# Patient Record
Sex: FEMALE | Race: WHITE | NOT HISPANIC OR LATINO
[De-identification: names, ages, dates, MRNs, and addresses within clinical notes are randomized per-mention and may not be internally consistent; named-entity substitution may affect disease eponyms.]

---

## 2017-03-21 ENCOUNTER — RX ONLY (OUTPATIENT)
Age: 37
Setting detail: RX ONLY
End: 2017-03-21

## 2017-03-21 PROBLEM — Z85.820 PERSONAL HISTORY OF MALIGNANT MELANOMA OF SKIN: Status: ACTIVE | Noted: 2017-03-21

## 2017-04-12 ENCOUNTER — HOSPITAL ENCOUNTER (OUTPATIENT)
Dept: RADIOLOGY | Facility: MEDICAL CENTER | Age: 37
End: 2017-04-12
Attending: INTERNAL MEDICINE
Payer: COMMERCIAL

## 2017-04-12 DIAGNOSIS — C43.62 MALIGNANT MELANOMA OF LEFT UPPER EXTREMITY INCLUDING SHOULDER (HCC): ICD-10-CM

## 2017-04-12 PROCEDURE — A9552 F18 FDG: HCPCS

## 2017-04-28 RX ORDER — LISINOPRIL 10 MG/1
10 TABLET ORAL DAILY
COMMUNITY
End: 2021-07-12

## 2017-05-09 ENCOUNTER — HOSPITAL ENCOUNTER (OUTPATIENT)
Facility: MEDICAL CENTER | Age: 37
End: 2017-05-09
Attending: SURGERY | Admitting: SURGERY
Payer: COMMERCIAL

## 2017-05-09 ENCOUNTER — APPOINTMENT (OUTPATIENT)
Dept: RADIOLOGY | Facility: MEDICAL CENTER | Age: 37
End: 2017-05-09
Attending: SURGERY
Payer: COMMERCIAL

## 2017-05-09 VITALS
HEART RATE: 92 BPM | HEIGHT: 64 IN | OXYGEN SATURATION: 95 % | RESPIRATION RATE: 16 BRPM | SYSTOLIC BLOOD PRESSURE: 114 MMHG | WEIGHT: 184.3 LBS | BODY MASS INDEX: 31.47 KG/M2 | DIASTOLIC BLOOD PRESSURE: 78 MMHG | TEMPERATURE: 97.3 F

## 2017-05-09 DIAGNOSIS — C43.62 MALIGNANT MELANOMA OF LEFT UPPER EXTREMITY INCLUDING SHOULDER (HCC): ICD-10-CM

## 2017-05-09 LAB
GLUCOSE BLD-MCNC: 210 MG/DL (ref 65–99)
GLUCOSE BLD-MCNC: 289 MG/DL (ref 65–99)
HCG SERPL QL: NEGATIVE

## 2017-05-09 PROCEDURE — 160009 HCHG ANES TIME/MIN: Performed by: SURGERY

## 2017-05-09 PROCEDURE — 88342 IMHCHEM/IMCYTCHM 1ST ANTB: CPT

## 2017-05-09 PROCEDURE — 700111 HCHG RX REV CODE 636 W/ 250 OVERRIDE (IP)

## 2017-05-09 PROCEDURE — 160041 HCHG SURGERY MINUTES - EA ADDL 1 MIN LEVEL 4: Performed by: SURGERY

## 2017-05-09 PROCEDURE — 160029 HCHG SURGERY MINUTES - 1ST 30 MINS LEVEL 4: Performed by: SURGERY

## 2017-05-09 PROCEDURE — 82962 GLUCOSE BLOOD TEST: CPT

## 2017-05-09 PROCEDURE — 160036 HCHG PACU - EA ADDL 30 MINS PHASE I: Performed by: SURGERY

## 2017-05-09 PROCEDURE — 500054 HCHG BANDAGE, ELASTIC 6: Performed by: SURGERY

## 2017-05-09 PROCEDURE — 88341 IMHCHEM/IMCYTCHM EA ADD ANTB: CPT

## 2017-05-09 PROCEDURE — 160035 HCHG PACU - 1ST 60 MINS PHASE I: Performed by: SURGERY

## 2017-05-09 PROCEDURE — 88305 TISSUE EXAM BY PATHOLOGIST: CPT | Mod: 59

## 2017-05-09 PROCEDURE — 501838 HCHG SUTURE GENERAL: Performed by: SURGERY

## 2017-05-09 PROCEDURE — 500431 HCHG DRESSING, KLING 2: Performed by: SURGERY

## 2017-05-09 PROCEDURE — 700101 HCHG RX REV CODE 250

## 2017-05-09 PROCEDURE — 501480 HCHG STOCKINETTE: Performed by: SURGERY

## 2017-05-09 PROCEDURE — A6402 STERILE GAUZE <= 16 SQ IN: HCPCS | Performed by: SURGERY

## 2017-05-09 PROCEDURE — 700102 HCHG RX REV CODE 250 W/ 637 OVERRIDE(OP)

## 2017-05-09 PROCEDURE — A9270 NON-COVERED ITEM OR SERVICE: HCPCS

## 2017-05-09 PROCEDURE — 160048 HCHG OR STATISTICAL LEVEL 1-5: Performed by: SURGERY

## 2017-05-09 PROCEDURE — 502240 HCHG MISC OR SUPPLY RC 0272: Performed by: SURGERY

## 2017-05-09 PROCEDURE — 84703 CHORIONIC GONADOTROPIN ASSAY: CPT

## 2017-05-09 PROCEDURE — 160002 HCHG RECOVERY MINUTES (STAT): Performed by: SURGERY

## 2017-05-09 PROCEDURE — 88307 TISSUE EXAM BY PATHOLOGIST: CPT

## 2017-05-09 PROCEDURE — A9541 TC99M SULFUR COLLOID: HCPCS

## 2017-05-09 RX ORDER — LIDOCAINE HYDROCHLORIDE 10 MG/ML
INJECTION, SOLUTION INFILTRATION; PERINEURAL
Status: COMPLETED
Start: 2017-05-09 | End: 2017-05-09

## 2017-05-09 RX ORDER — BUPIVACAINE HYDROCHLORIDE AND EPINEPHRINE 5; 5 MG/ML; UG/ML
INJECTION, SOLUTION EPIDURAL; INTRACAUDAL; PERINEURAL
Status: DISCONTINUED | OUTPATIENT
Start: 2017-05-09 | End: 2017-05-09 | Stop reason: HOSPADM

## 2017-05-09 RX ORDER — SODIUM CHLORIDE, SODIUM LACTATE, POTASSIUM CHLORIDE, CALCIUM CHLORIDE 600; 310; 30; 20 MG/100ML; MG/100ML; MG/100ML; MG/100ML
INJECTION, SOLUTION INTRAVENOUS CONTINUOUS
Status: DISCONTINUED | OUTPATIENT
Start: 2017-05-09 | End: 2017-05-09 | Stop reason: HOSPADM

## 2017-05-09 RX ORDER — CELECOXIB 200 MG/1
CAPSULE ORAL
Status: COMPLETED
Start: 2017-05-09 | End: 2017-05-09

## 2017-05-09 RX ORDER — ACETAMINOPHEN 500 MG
TABLET ORAL
Status: COMPLETED
Start: 2017-05-09 | End: 2017-05-09

## 2017-05-09 RX ORDER — OXYCODONE HCL 5 MG/5 ML
SOLUTION, ORAL ORAL
Status: COMPLETED
Start: 2017-05-09 | End: 2017-05-09

## 2017-05-09 RX ORDER — LIDOCAINE AND PRILOCAINE 25; 25 MG/G; MG/G
1 CREAM TOPICAL
Status: COMPLETED | OUTPATIENT
Start: 2017-05-09 | End: 2017-05-09

## 2017-05-09 RX ORDER — LIDOCAINE HYDROCHLORIDE 10 MG/ML
0.5 INJECTION, SOLUTION INFILTRATION; PERINEURAL
Status: COMPLETED | OUTPATIENT
Start: 2017-05-09 | End: 2017-05-09

## 2017-05-09 RX ORDER — HYDROCODONE BITARTRATE AND ACETAMINOPHEN 5; 325 MG/1; MG/1
1-2 TABLET ORAL EVERY 4 HOURS PRN
Qty: 24 TAB | Refills: 2 | Status: ON HOLD | OUTPATIENT
Start: 2017-05-09 | End: 2017-06-23

## 2017-05-09 RX ORDER — GABAPENTIN 300 MG/1
CAPSULE ORAL
Status: COMPLETED
Start: 2017-05-09 | End: 2017-05-09

## 2017-05-09 RX ORDER — ONDANSETRON 2 MG/ML
4 INJECTION INTRAMUSCULAR; INTRAVENOUS EVERY 4 HOURS PRN
Status: DISCONTINUED | OUTPATIENT
Start: 2017-05-09 | End: 2017-05-09 | Stop reason: HOSPADM

## 2017-05-09 RX ORDER — QUETIAPINE FUMARATE 200 MG/1
200 TABLET, FILM COATED ORAL EVERY EVENING
Status: ON HOLD | COMMUNITY
End: 2022-10-24 | Stop reason: SDUPTHER

## 2017-05-09 RX ADMIN — SODIUM CHLORIDE, SODIUM LACTATE, POTASSIUM CHLORIDE, CALCIUM CHLORIDE 1000 ML: 600; 310; 30; 20 INJECTION, SOLUTION INTRAVENOUS at 11:15

## 2017-05-09 RX ADMIN — LIDOCAINE HYDROCHLORIDE 0.5 ML: 10 INJECTION, SOLUTION INFILTRATION; PERINEURAL at 10:30

## 2017-05-09 RX ADMIN — ACETAMINOPHEN 1000 MG: 500 TABLET, FILM COATED ORAL at 13:20

## 2017-05-09 RX ADMIN — GABAPENTIN 300 MG: 300 CAPSULE ORAL at 13:20

## 2017-05-09 RX ADMIN — CELECOXIB 400 MG: 200 CAPSULE ORAL at 13:20

## 2017-05-09 RX ADMIN — OXYCODONE HYDROCHLORIDE 10 MG: 5 SOLUTION ORAL at 16:44

## 2017-05-09 ASSESSMENT — PAIN SCALES - GENERAL
PAINLEVEL_OUTOF10: 0
PAINLEVEL_OUTOF10: 2
PAINLEVEL_OUTOF10: 4
PAINLEVEL_OUTOF10: 2
PAINLEVEL_OUTOF10: 0

## 2017-05-09 NOTE — OR SURGEON
Immediate Post-Operative Note      PreOp Diagnosis: Left Posterior Hand Malignant Melanoma    PostOp Diagnosis: same    Procedure(s):  RADICAL WIDE EXCISION - MALIGNANT MELANOMA HAND - Wound Class: Clean  NODE BIOPSY SENTINEL - AXILLARY - Wound Class: Clean    Surgeon(s):  Lionel Weinberg M.D.    Anesthesiologist/Type of Anesthesia:  Anesthesiologist: Nadja Argueta M.D./General    Surgical Staff:  Circulator: Sergei Nunez R.N.  Scrub Person: Amelia Miguel    Specimen: re-excision of melanoma site and sentinel node to path    Estimated Blood Loss: minimal    Findings: no gross pathology; excision required removal of tissue over back of left hand and onto 5th digit which will leave permanent numbness in this area and potentially limited range of motion of 5th digit.     Complications: no apparent complications        5/9/2017 4:16 PM Lionel Weinberg

## 2017-05-09 NOTE — IP AVS SNAPSHOT
5/9/2017    Jacque Mcintyre  [address] [place] [address]    Dear Jacque:    [facility] wants to ensure your discharge home is safe and you or your loved ones have had all of your questions answered regarding your care after you leave the hospital.    Below is a list of resources and contact information should you have any questions regarding your hospital stay, follow-up instructions, or active medical symptoms.    Questions or Concerns Regarding… Contact   Medical Questions Related to Your Discharge  (7 days a week, 8am-5pm) Contact a Nurse Care Coordinator   [phone]   Medical Questions Not Related to Your Discharge  (24 hours a day / 7 days a week)  Contact the Nurse Health Line   [phone]    Medications or Discharge Instructions Refer to your discharge packet   or contact your [facility] Primary Care Provider   [phone]   Follow-up Appointment(s) Schedule your appointment via [employer]   or contact Scheduling [phone]   Billing Review your statement via [employer]  or contact Billing [phone]   Medical Records Review your records via [employer]   or contact Medical Records [phone]     You may receive a telephone call within two days of discharge. This call is to make certain you understand your discharge instructions and have the opportunity to have any questions answered. You can also easily access your medical information, test results and upcoming appointments via the [employer] free online health management tool. You can learn more and sign up at [employer]. For assistance setting up your Marvel account, please call [phone].    Once again, we want to ensure your discharge home is safe and that you have a clear understanding of any next steps in your care. If you have any questions or concerns, please do not hesitate to contact us, we are here for you. Thank you for choosing [facility] for your healthcare needs.    Sincerely,    Your [facility] Healthcare Team

## 2017-05-09 NOTE — IP AVS SNAPSHOT
" Home Care Instructions                                                                                                                Name:Jacque Mcintyre  Medical Record Number:[id number]  CSN: [id number]    YOB: 1980   Age: 36 y.o.  Sex: female  HT:1.626 m (5' 4.02\") WT: 83.6 kg (184 lb 4.9 oz)          Admit Date: 5/9/2017     Discharge Date:   Today's Date: 5/9/2017  Attending Doctor:  Lionel Weinberg M.D.                  Allergies:  Review of patient's allergies indicates no known allergies.              Follow-up Information     1. Follow up with Lionel Weinberg M.D.. Call in 2 weeks.    Specialty:  Surgery    Contact information    [id number]  [place] [address] [phone]          Discharge Instructions         ACTIVITY: Rest and take it easy for the first 24 hours.  A responsible adult is recommended to remain with you during that time.  It is normal to feel sleepy.  We encourage you to not do anything that requires balance, judgment or coordination.    MILD FLU-LIKE SYMPTOMS ARE NORMAL. YOU MAY EXPERIENCE GENERALIZED MUSCLE ACHES, THROAT IRRITATION, HEADACHE AND/OR SOME NAUSEA.    FOR 24 HOURS DO NOT:  Drive, operate machinery or run household appliances.  Drink beer or alcoholic beverages.   Make important decisions or sign legal documents.    SPECIAL INSTRUCTIONS: *Remove ACE wrap in 48 hours.  Leave gauze on the area for 4 days then remove.     Remove plastic and gauze in 3 days in armpit area.   Leave underlying steristipson both incisions  until they fall off   Patient may shower on Post OP Day #3   Patient will be numb in little finger and will be black and blue and swollen on back of hand.**    DIET: To avoid nausea, slowly advance diet as tolerated, avoiding spicy or greasy foods for the first day.  Add more substantial food to your diet according to your physician's instructions.  Babies can be fed formula or breast milk as soon as they are hungry.  INCREASE FLUIDS AND " FIBER TO AVOID CONSTIPATION.    SURGICAL DRESSING/BATHING: *MAY SHOWER FRIDAY**    FOLLOW-UP APPOINTMENT:  A follow-up appointment should be arranged with your doctor in *FOLLOW UP WITH DR. INFANTE**; call to schedule.    You should CALL YOUR PHYSICIAN if you develop:  Fever greater than 101 degrees F.  Pain not relieved by medication, or persistent nausea or vomiting.  Excessive bleeding (blood soaking through dressing) or unexpected drainage from the wound.  Extreme redness or swelling around the incision site, drainage of pus or foul smelling drainage.  Inability to urinate or empty your bladder within 8 hours.  Problems with breathing or chest pain.    You should call 911 if you develop problems with breathing or chest pain.  If you are unable to contact your doctor or surgical center, you should go to the nearest emergency room or urgent care center.  Physician's telephone #: *DR. INFANTE [phone]    If any questions arise, call your doctor.  If your doctor is not available, please feel free to call the Surgical Center at [phone].  The Center is open Monday through Friday from 7AM to 7PM.  You can also call the [employer] HOTLINE open 24 hours/day, 7 days/week and speak to a nurse at [phone], or toll free at [phone].    A registered nurse may call you a few days after your surgery to see how you are doing after your procedure.    MEDICATIONS: Resume taking daily medication.  Take prescribed pain medication with food.  If no medication is prescribed, you may take non-aspirin pain medication if needed.  PAIN MEDICATION CAN BE VERY CONSTIPATING.  Take a stool softener or laxative such as senokot, pericolace, or milk of magnesia if needed.    Prescription given for **NORCO*.  Last pain medication given at *______4:44 PM_________________**.    If your physician has prescribed pain medication that includes Acetaminophen (Tylenol), do not take additional Acetaminophen (Tylenol) while taking the  prescribed medication.    Depression / Suicide Risk    As you are discharged from this [facility] facility, it is important to learn how to keep safe from harming yourself.    Recognize the warning signs:  · Abrupt changes in personality, positive or negative- including increase in energy   · Giving away possessions  · Change in eating patterns- significant weight changes-  positive or negative  · Change in sleeping patterns- unable to sleep or sleeping all the time   · Unwillingness or inability to communicate  · Depression  · Unusual sadness, discouragement and loneliness  · Talk of wanting to die  · Neglect of personal appearance   · Rebelliousness- reckless behavior  · Withdrawal from people/activities they love  · Confusion- inability to concentrate     If you or a loved one observes any of these behaviors or has concerns about self-harm, here's what you can do:  · Talk about it- your feelings and reasons for harming yourself  · Remove any means that you might use to hurt yourself (examples: pills, rope, extension cords, firearm)  · Get professional help from the community (Mental Health, Substance Abuse, psychological counseling)  · Do not be alone:Call your Safe Contact- someone whom you trust who will be there for you.  · Call your local CRISIS HOTLINE 910-3419 or 373-608-3910  · Call your local Children's Mobile Crisis Response Team [place] (202) 477-8856 or www.[employer]  · Call the toll free National Suicide Prevention Hotlines   · National Suicide Prevention Lifeline 716-602-GRMY (3378)  · National Hope Line Network 800-SUICIDE (756-2092)       Medication List      START taking these medications        Instructions    Morning Afternoon Evening Bedtime    hydrocodone-acetaminophen 5-325 MG Tabs per tablet   Commonly known as:  NORCO        Take 1-2 Tabs by mouth every four hours as needed (prn moderate pain).   Dose:  1-2 Tab                          CONTINUE taking these medications           Instructions    Morning Afternoon Evening Bedtime    albuterol 108 (90 BASE) MCG/ACT Aers inhalation aerosol   Commonly known as:  PROVENTIL HFA        Inhale 2 Puffs by mouth every 6 hours as needed.   Dose:  2 Puff                        atorvastatin 10 MG Tabs   Commonly known as:  LIPITOR        Take 10 mg by mouth every evening.   Dose:  10 mg                        clonidine 0.1 MG Tabs   Commonly known as:  CATAPRES        Take 0.1 mg by mouth 2 times a day.   Dose:  0.1 mg                        lamotrigine 100 MG Tabs   Commonly known as:  LAMICTAL        Take 150 mg by mouth every day.   Dose:  150 mg                        LANTUS SOLOSTAR 100 UNIT/ML Sopn injection   Generic drug:  insulin glargine        Inject 20 Units as instructed every evening.   Dose:  20 Units                        lisinopril 10 MG Tabs   Commonly known as:  PRINIVIL        Take 10 mg by mouth every day.   Dose:  10 mg                        metformin  MG Tb24   Commonly known as:  GLUCOPHAGE XR        Take 750 mg by mouth 2 times a day.   Dose:  750 mg                        paroxetine 40 MG tablet   Commonly known as:  PAXIL        Take 1 Tab by mouth every day. Take with food   Dose:  40 mg                        SEROQUEL 200 MG Tabs   Generic drug:  quetiapine        Take 200 mg by mouth 2 times a day.   Dose:  200 mg                             Where to Get Your Medications      Information about where to get these medications is not yet available     ! Ask your nurse or doctor about these medications    - hydrocodone-acetaminophen 5-325 MG Tabs per tablet            Medication Information     Above and/or attached are the medications your physician expects you to take upon discharge. Review all of your home medications and newly ordered medications with your doctor and/or pharmacist. Follow medication instructions as directed by your doctor and/or pharmacist. Please keep your medication list with you and share with  your physician. Update the information when medications are discontinued, doses are changed, or new medications (including over-the-counter products) are added; and carry medication information at all times in the event of emergency situations.        Resources     Quit Smoking / Tobacco Use:    I understand the use of any tobacco products increases my chance of suffering from future heart disease or stroke and could cause other illnesses which may shorten my life. Quitting the use of tobacco products is the single most important thing I can do to improve my health. For further information on smoking / tobacco cessation call a Toll Free Quit Line at 1-730.244.2726 (*National Cancer [place]) or 1-378.924.3098 (American Lung Association) or you can access the web based program at www.lungusa.org.    [place] Tobacco Users Help Line:  (394) 776-5021       Toll Free: 1-199.727.4947  Quit Tobacco Program [facility] (469)917-4621    Crisis Hotline:    [place] Crisis Hotline:  6-983-RFYRBLH or 1-360.962.2352    [place] Crisis Hotline:    1-182.427.7826 or 149-911-3197    Discharge Survey:   Thank you for choosing [facility]. We hope we did everything we could to make your hospital stay a pleasant one. You may be receiving a survey and we would appreciate your time and participation in answering the questions. Your input is very valuable to us in our efforts to improve our service to our patients and their families.            Signatures     My signature on this form indicates that:    1. I acknowledge receipt and understanding of these Home Care Instruction.  2. My questions regarding this information have been answered to my satisfaction.  3. I have formulated a plan with my discharge nurse to obtain my prescribed medications for home.    __________________________________      __________________________________                   Patient Signature                                  Guardian/Responsible Adult Signature      __________________________________                 __________       ________                       Nurse Signature                                               Date                 Time

## 2017-05-09 NOTE — OR NURSING
1610  RECEIVED PATIENT FROM OR.  REPORT FROM DR. GONZALEZ.  LMA IN PLACE.  RESPIRATIONS ARE EVEN AND UNLABORED.  GAUZE AND TEGADERM TO LEFT AXILLA ARE CDI.  DRESSING TO LEFT HAND IS CDI.      1614  FINGERSTICK 210.    1620  DR. GONZALEZ REMOVED LMA.    1626  PATIENT AWAKE.  DENIES PAIN.  TAKING PO WITHOUT DIFFICULTY.    1644  MEDICATED WITH PO OXYCODONE.    1701  SISTER IN LAW KALEE TO BEDSIDE.     1801  PATIENT USING IS 3000 ML.    1829  UP GETTING DRESSED.    1837  DISCHARGED.  DISCHARGE INSTRUCTIONS GIVEN TO PATIENT.  A VERBAL UNDERSTANDING OF ALL INSTRUCTIONS WAS STATED.  PATIENT TAKING PO AND AMBULATING WITHOUT DIFFICULTY.  DRESSINGS TO LEFT HAND AND LEFT AXILLA REMAIN CDI.  PATIENT STATES SHE IS READY TO GO HOME.

## 2017-05-09 NOTE — OR NURSING
1610  RECEIVED PATIENT FROM OR.  REPORT FROM DR. GONZALEZ.  LMA IN PLACE.  RESPIRATIONS ARE EVEN AND UNLABORED.  GAUZE AND TEGADERM TO LEFT AXILLA ARE CDI.  DRESSING TO LEFT HAND IS CDI.      1614  FINGERSTICK 210.    1620  DR. GONZALEZ REMOVED LMA.    1626  PATIENT AWAKE.  DENIES PAIN.  TAKING PO WITHOUT DIFFICULTY.

## 2017-05-09 NOTE — DISCHARGE INSTRUCTIONS
ACTIVITY: Rest and take it easy for the first 24 hours.  A responsible adult is recommended to remain with you during that time.  It is normal to feel sleepy.  We encourage you to not do anything that requires balance, judgment or coordination.    MILD FLU-LIKE SYMPTOMS ARE NORMAL. YOU MAY EXPERIENCE GENERALIZED MUSCLE ACHES, THROAT IRRITATION, HEADACHE AND/OR SOME NAUSEA.    FOR 24 HOURS DO NOT:  Drive, operate machinery or run household appliances.  Drink beer or alcoholic beverages.   Make important decisions or sign legal documents.    SPECIAL INSTRUCTIONS: *Remove ACE wrap in 48 hours.  Leave gauze on the area for 4 days then remove.     Remove plastic and gauze in 3 days in armpit area.   Leave underlying steristipson both incisions  until they fall off   Patient may shower on Post OP Day #3   Patient will be numb in little finger and will be black and blue and swollen on back of hand.**    DIET: To avoid nausea, slowly advance diet as tolerated, avoiding spicy or greasy foods for the first day.  Add more substantial food to your diet according to your physician's instructions.  Babies can be fed formula or breast milk as soon as they are hungry.  INCREASE FLUIDS AND FIBER TO AVOID CONSTIPATION.    SURGICAL DRESSING/BATHING: *MAY SHOWER FRIDAY**    FOLLOW-UP APPOINTMENT:  A follow-up appointment should be arranged with your doctor in *FOLLOW UP WITH DR. INFANTE**; call to schedule.    You should CALL YOUR PHYSICIAN if you develop:  Fever greater than 101 degrees F.  Pain not relieved by medication, or persistent nausea or vomiting.  Excessive bleeding (blood soaking through dressing) or unexpected drainage from the wound.  Extreme redness or swelling around the incision site, drainage of pus or foul smelling drainage.  Inability to urinate or empty your bladder within 8 hours.  Problems with breathing or chest pain.    You should call 911 if you develop problems with breathing or chest pain.  If you are  unable to contact your doctor or surgical center, you should go to the nearest emergency room or urgent care center.  Physician's telephone #: *DR. INFANTE [phone]    If any questions arise, call your doctor.  If your doctor is not available, please feel free to call the Surgical Center at [phone].  The Center is open Monday through Friday from 7AM to 7PM.  You can also call the HEALTH HOTLINE open 24 hours/day, 7 days/week and speak to a nurse at [phone], or toll free at [phone].    A registered nurse may call you a few days after your surgery to see how you are doing after your procedure.    MEDICATIONS: Resume taking daily medication.  Take prescribed pain medication with food.  If no medication is prescribed, you may take non-aspirin pain medication if needed.  PAIN MEDICATION CAN BE VERY CONSTIPATING.  Take a stool softener or laxative such as senokot, pericolace, or milk of magnesia if needed.    Prescription given for **NORCO*.  Last pain medication given at *______4:44 PM_________________**.    If your physician has prescribed pain medication that includes Acetaminophen (Tylenol), do not take additional Acetaminophen (Tylenol) while taking the prescribed medication.    Depression / Suicide Risk    As you are discharged from this [facility] facility, it is important to learn how to keep safe from harming yourself.    Recognize the warning signs:  · Abrupt changes in personality, positive or negative- including increase in energy   · Giving away possessions  · Change in eating patterns- significant weight changes-  positive or negative  · Change in sleeping patterns- unable to sleep or sleeping all the time   · Unwillingness or inability to communicate  · Depression  · Unusual sadness, discouragement and loneliness  · Talk of wanting to die  · Neglect of personal appearance   · Rebelliousness- reckless behavior  · Withdrawal from people/activities they love  · Confusion- inability to  concentrate     If you or a loved one observes any of these behaviors or has concerns about self-harm, here's what you can do:  · Talk about it- your feelings and reasons for harming yourself  · Remove any means that you might use to hurt yourself (examples: pills, rope, extension cords, firearm)  · Get professional help from the community (Mental Health, Substance Abuse, psychological counseling)  · Do not be alone:Call your Safe Contact- someone whom you trust who will be there for you.  · Call your local CRISIS HOTLINE 287-0788 or 330-587-7965  · Call your local Children's Mobile Crisis Response Team [place] (928) 024-5806 or www.[employer]  · Call the toll free National Suicide Prevention Hotlines   · National Suicide Prevention Lifeline 518-059-NILK (1312)  · National Hope Line Network 800-SUICIDE (890-2208)

## 2017-05-10 NOTE — OP REPORT
DATE OF SERVICE:  05/09/2017    DATE OF OPERATION:  05/09/2017    SURGEON:  Lioenl Weinberg MD    ASSISTANT:  No assistant.    ANESTHESIOLOGIST:  Nadja Argueta MD    TYPE OF ANESTHETIC:  General anesthetic using laryngeal mask airway plus local   0.5% Marcaine with epinephrine.    PREOPERATIVE DIAGNOSIS:  History of malignant melanoma of the left posterior   hand.    POSTOPERATIVE DIAGNOSES:  History of malignant melanoma of the left posterior   hand.    PROCEDURE:  1.  Radical wide excision of malignant melanoma of the left hand with layered   closure.  2.  Left axillary sentinel node biopsy.    FINDINGS:  The patient who was referred from the office of Dr. Reed with a   history of malignant melanoma on the posterior aspect of the left hand and   minimal excision had been previously obtained and no sentinel node had been   obtained, this tumor in greater than 1 mm in depth and a wider excision was   recommended.  Today 1 cm margin was taken around the previous incision site   circumferentially as well as extending this incision more distally and   proximally.  The sentinel node biopsy was also performed after the patient had   been injected with radionuclide.    FINDINGS AND PROCEDURE:  The patient was brought to the operating room and   placed on the table in supine position.  General anesthesia was provided by   Dr. Argueta, the anesthesiologist.  The entire left upper extremity and   axillary area were prepped and draped in the usual sterile fashion.  The   axillary sentinel node biopsy was performed first.  An incision made just   below the hair bearing portion of the left axilla with #15 blade through the   skin and dermis.  All bleeding was controlled with electrocautery.  Dissection   was then carried down through the deeper tissue and then through the   clavipectoral fascia using a navigator probe, a fairly large 2x3 cm node was   identified this was soft and normal in color.  This was dissected out  with the   LigaSure device.  The node was radioactive and was sent to pathology for   further characterization.  Further interrogation of the axilla revealed no   other areas of radioactivity.  The wound was irrigated approximately 100 mL of   warm normal saline.  The clavipectoral fascia was closed using running 3-0   Vicryl suture.  The dermis was closed using running 3-0 Vicryl suture.  The   skin was closed using running 4-0 Monocryl suture in subcuticular fashion.    Steri-Strips and sterile dressing were applied.  Next, the previous incision   for the melanoma on the posterior aspect of the left hand was identified.    This was directly over the 4th and 5th metacarpal bone.  A 1 cm margin was   then marked using a ruler around the previous incision site.  An elliptical   incision was then designed to incorporate this 1 cm margin, so that extended   more superiorly and inferiorly several centimeter, incision was then made in   the skin and dermis using #15 blade.  All bleeding was controlled with an   elliptical electrocautery.  Incision was then carried down to the point where   the fascia above the extensor tendon was identified.  All superficial veins   were controlled with a LigaSure device.  The entire specimen was removed from   the fascia over the tendons, it was oriented for the pathologist and sent to   pathology for further characterization.  The remaining tissue was then   elevated off these tendon circumferentially for approximately 2-3 cm in all   directions.  This provided enough laxity for the wound and brought back   together.  The wound was then closed using 3-0 Vicryl sutures for the dermis   and 3-0 nylon sutures for the skin.  Sterile dressing was applied.  The   patient did tolerate procedure well without complications.  Final sponge and   needle count were correct.       ____________________________________     MD GRACE ESPINO / ARIADNE    DD:  05/09/2017 22:48:23  DT:  05/10/2017  00:10:51    D#:  [id number]  Job#:  [id number]    cc: NIKKI DE OLIVEIRA MD, _____

## 2017-05-23 ENCOUNTER — HOSPITAL ENCOUNTER (OUTPATIENT)
Dept: RADIOLOGY | Facility: MEDICAL CENTER | Age: 37
End: 2017-05-23
Attending: INTERNAL MEDICINE
Payer: COMMERCIAL

## 2017-05-23 DIAGNOSIS — C43.62 MALIGNANT MELANOMA OF LEFT UPPER EXTREMITY INCLUDING SHOULDER (HCC): ICD-10-CM

## 2017-05-23 PROCEDURE — A9579 GAD-BASE MR CONTRAST NOS,1ML: HCPCS | Performed by: INTERNAL MEDICINE

## 2017-05-23 PROCEDURE — 70553 MRI BRAIN STEM W/O & W/DYE: CPT

## 2017-05-23 PROCEDURE — 700117 HCHG RX CONTRAST REV CODE 255: Performed by: INTERNAL MEDICINE

## 2017-05-23 RX ADMIN — GADODIAMIDE 20 ML: 287 INJECTION INTRAVENOUS at 09:08

## 2017-06-20 DIAGNOSIS — Z01.812 PRE-OPERATIVE LABORATORY EXAMINATION: ICD-10-CM

## 2017-06-20 LAB
ANION GAP SERPL CALC-SCNC: 12 MMOL/L (ref 0–11.9)
BUN SERPL-MCNC: 8 MG/DL (ref 8–22)
CALCIUM SERPL-MCNC: 9.6 MG/DL (ref 8.5–10.5)
CHLORIDE SERPL-SCNC: 103 MMOL/L (ref 96–112)
CO2 SERPL-SCNC: 21 MMOL/L (ref 20–33)
CREAT SERPL-MCNC: 0.54 MG/DL (ref 0.5–1.4)
EKG IMPRESSION: NORMAL
ERYTHROCYTE [DISTWIDTH] IN BLOOD BY AUTOMATED COUNT: 45.8 FL (ref 35.9–50)
EST. AVERAGE GLUCOSE BLD GHB EST-MCNC: 240 MG/DL
GFR SERPL CREATININE-BSD FRML MDRD: >60 ML/MIN/1.73 M 2
GLUCOSE SERPL-MCNC: 275 MG/DL (ref 65–99)
HBA1C MFR BLD: 10 % (ref 0–5.6)
HCG SERPL QL: NEGATIVE
HCT VFR BLD AUTO: 39.6 % (ref 37–47)
HGB BLD-MCNC: 13.2 G/DL (ref 12–16)
MCH RBC QN AUTO: 29.9 PG (ref 27–33)
MCHC RBC AUTO-ENTMCNC: 33.3 G/DL (ref 33.6–35)
MCV RBC AUTO: 89.8 FL (ref 81.4–97.8)
PLATELET # BLD AUTO: 301 K/UL (ref 164–446)
PMV BLD AUTO: 11.4 FL (ref 9–12.9)
POTASSIUM SERPL-SCNC: 3.7 MMOL/L (ref 3.6–5.5)
RBC # BLD AUTO: 4.41 M/UL (ref 4.2–5.4)
SODIUM SERPL-SCNC: 136 MMOL/L (ref 135–145)
WBC # BLD AUTO: 7.9 K/UL (ref 4.8–10.8)

## 2017-06-20 PROCEDURE — 85027 COMPLETE CBC AUTOMATED: CPT

## 2017-06-20 PROCEDURE — 80048 BASIC METABOLIC PNL TOTAL CA: CPT

## 2017-06-20 PROCEDURE — 93005 ELECTROCARDIOGRAM TRACING: CPT

## 2017-06-20 PROCEDURE — 83036 HEMOGLOBIN GLYCOSYLATED A1C: CPT

## 2017-06-20 PROCEDURE — 84703 CHORIONIC GONADOTROPIN ASSAY: CPT

## 2017-06-20 PROCEDURE — 93010 ELECTROCARDIOGRAM REPORT: CPT | Performed by: INTERNAL MEDICINE

## 2017-06-20 PROCEDURE — 36415 COLL VENOUS BLD VENIPUNCTURE: CPT

## 2017-06-23 ENCOUNTER — HOSPITAL ENCOUNTER (OUTPATIENT)
Facility: MEDICAL CENTER | Age: 37
End: 2017-06-23
Attending: SURGERY | Admitting: SURGERY
Payer: COMMERCIAL

## 2017-06-23 VITALS
WEIGHT: 178.79 LBS | OXYGEN SATURATION: 93 % | BODY MASS INDEX: 30.52 KG/M2 | TEMPERATURE: 97.7 F | HEIGHT: 64 IN | HEART RATE: 90 BPM | RESPIRATION RATE: 14 BRPM

## 2017-06-23 PROBLEM — C43.62 MELANOMA OF LEFT UPPER ARM (HCC): Status: ACTIVE | Noted: 2017-06-23

## 2017-06-23 LAB
GLUCOSE BLD-MCNC: 197 MG/DL (ref 65–99)
HCG UR QL: NEGATIVE
SP GR UR REFRACTOMETRY: 1.03

## 2017-06-23 PROCEDURE — 81025 URINE PREGNANCY TEST: CPT

## 2017-06-23 PROCEDURE — 160035 HCHG PACU - 1ST 60 MINS PHASE I: Performed by: SURGERY

## 2017-06-23 PROCEDURE — 502240 HCHG MISC OR SUPPLY RC 0272: Performed by: SURGERY

## 2017-06-23 PROCEDURE — 88307 TISSUE EXAM BY PATHOLOGIST: CPT

## 2017-06-23 PROCEDURE — 501838 HCHG SUTURE GENERAL: Performed by: SURGERY

## 2017-06-23 PROCEDURE — 700111 HCHG RX REV CODE 636 W/ 250 OVERRIDE (IP)

## 2017-06-23 PROCEDURE — 160009 HCHG ANES TIME/MIN: Performed by: SURGERY

## 2017-06-23 PROCEDURE — 160041 HCHG SURGERY MINUTES - EA ADDL 1 MIN LEVEL 4: Performed by: SURGERY

## 2017-06-23 PROCEDURE — 501445 HCHG STAPLER, SKIN DISP: Performed by: SURGERY

## 2017-06-23 PROCEDURE — 700102 HCHG RX REV CODE 250 W/ 637 OVERRIDE(OP)

## 2017-06-23 PROCEDURE — 160047 HCHG PACU  - EA ADDL 30 MINS PHASE II: Performed by: SURGERY

## 2017-06-23 PROCEDURE — 160029 HCHG SURGERY MINUTES - 1ST 30 MINS LEVEL 4: Performed by: SURGERY

## 2017-06-23 PROCEDURE — 160002 HCHG RECOVERY MINUTES (STAT): Performed by: SURGERY

## 2017-06-23 PROCEDURE — 160025 RECOVERY II MINUTES (STATS): Performed by: SURGERY

## 2017-06-23 PROCEDURE — 700101 HCHG RX REV CODE 250

## 2017-06-23 PROCEDURE — A6402 STERILE GAUZE <= 16 SQ IN: HCPCS | Performed by: SURGERY

## 2017-06-23 PROCEDURE — 500389 HCHG DRAIN, RESERVOIR SUCT JP 100CC: Performed by: SURGERY

## 2017-06-23 PROCEDURE — 160046 HCHG PACU - 1ST 60 MINS PHASE II: Performed by: SURGERY

## 2017-06-23 PROCEDURE — 500054 HCHG BANDAGE, ELASTIC 6: Performed by: SURGERY

## 2017-06-23 PROCEDURE — 160048 HCHG OR STATISTICAL LEVEL 1-5: Performed by: SURGERY

## 2017-06-23 PROCEDURE — 500122 HCHG BOVIE, BLADE: Performed by: SURGERY

## 2017-06-23 PROCEDURE — 500440 HCHG DRESSING, STERILE ROLL (KERLIX): Performed by: SURGERY

## 2017-06-23 PROCEDURE — 502703 HCHG DEVICE, LIGASURE V SEALER: Performed by: SURGERY

## 2017-06-23 PROCEDURE — 82962 GLUCOSE BLOOD TEST: CPT

## 2017-06-23 PROCEDURE — A9270 NON-COVERED ITEM OR SERVICE: HCPCS

## 2017-06-23 PROCEDURE — 700101 HCHG RX REV CODE 250: Mod: JW

## 2017-06-23 PROCEDURE — 160036 HCHG PACU - EA ADDL 30 MINS PHASE I: Performed by: SURGERY

## 2017-06-23 RX ORDER — SODIUM CHLORIDE 9 MG/ML
INJECTION, SOLUTION INTRAVENOUS CONTINUOUS
Status: DISCONTINUED | OUTPATIENT
Start: 2017-06-23 | End: 2017-06-23 | Stop reason: HOSPADM

## 2017-06-23 RX ORDER — DEXAMETHASONE SODIUM PHOSPHATE 4 MG/ML
4 INJECTION, SOLUTION INTRA-ARTICULAR; INTRALESIONAL; INTRAMUSCULAR; INTRAVENOUS; SOFT TISSUE
Status: DISCONTINUED | OUTPATIENT
Start: 2017-06-23 | End: 2017-06-23 | Stop reason: HOSPADM

## 2017-06-23 RX ORDER — LIDOCAINE HYDROCHLORIDE 10 MG/ML
INJECTION, SOLUTION INFILTRATION; PERINEURAL
Status: DISCONTINUED
Start: 2017-06-23 | End: 2017-06-23 | Stop reason: HOSPADM

## 2017-06-23 RX ORDER — LIDOCAINE HYDROCHLORIDE 10 MG/ML
0.5 INJECTION, SOLUTION INFILTRATION; PERINEURAL
Status: DISCONTINUED | OUTPATIENT
Start: 2017-06-23 | End: 2017-06-23 | Stop reason: HOSPADM

## 2017-06-23 RX ORDER — HALOPERIDOL 5 MG/ML
1 INJECTION INTRAMUSCULAR EVERY 6 HOURS PRN
Status: DISCONTINUED | OUTPATIENT
Start: 2017-06-23 | End: 2017-06-23 | Stop reason: HOSPADM

## 2017-06-23 RX ORDER — LIDOCAINE AND PRILOCAINE 25; 25 MG/G; MG/G
1 CREAM TOPICAL
Status: DISCONTINUED | OUTPATIENT
Start: 2017-06-23 | End: 2017-06-23 | Stop reason: HOSPADM

## 2017-06-23 RX ORDER — ACETAMINOPHEN 500 MG
TABLET ORAL
Status: DISCONTINUED
Start: 2017-06-23 | End: 2017-06-23 | Stop reason: HOSPADM

## 2017-06-23 RX ORDER — GABAPENTIN 300 MG/1
CAPSULE ORAL
Status: DISCONTINUED
Start: 2017-06-23 | End: 2017-06-23 | Stop reason: HOSPADM

## 2017-06-23 RX ORDER — OXYCODONE HCL 5 MG/5 ML
SOLUTION, ORAL ORAL
Status: COMPLETED
Start: 2017-06-23 | End: 2017-06-23

## 2017-06-23 RX ORDER — HYDROCODONE BITARTRATE AND ACETAMINOPHEN 5; 325 MG/1; MG/1
1-2 TABLET ORAL EVERY 4 HOURS PRN
Qty: 24 TAB | Refills: 0 | Status: SHIPPED | OUTPATIENT
Start: 2017-06-23 | End: 2018-01-31

## 2017-06-23 RX ORDER — ACETAMINOPHEN 500 MG
500 TABLET ORAL
COMMUNITY
End: 2019-10-07

## 2017-06-23 RX ORDER — BUPIVACAINE HYDROCHLORIDE AND EPINEPHRINE 5; 5 MG/ML; UG/ML
INJECTION, SOLUTION EPIDURAL; INTRACAUDAL; PERINEURAL
Status: DISCONTINUED | OUTPATIENT
Start: 2017-06-23 | End: 2017-06-23 | Stop reason: HOSPADM

## 2017-06-23 RX ORDER — CELECOXIB 200 MG/1
CAPSULE ORAL
Status: DISCONTINUED
Start: 2017-06-23 | End: 2017-06-23 | Stop reason: HOSPADM

## 2017-06-23 RX ORDER — ONDANSETRON 2 MG/ML
4 INJECTION INTRAMUSCULAR; INTRAVENOUS EVERY 4 HOURS PRN
Status: DISCONTINUED | OUTPATIENT
Start: 2017-06-23 | End: 2017-06-23 | Stop reason: HOSPADM

## 2017-06-23 RX ORDER — SCOLOPAMINE TRANSDERMAL SYSTEM 1 MG/1
1 PATCH, EXTENDED RELEASE TRANSDERMAL
Status: DISCONTINUED | OUTPATIENT
Start: 2017-06-23 | End: 2017-06-23 | Stop reason: HOSPADM

## 2017-06-23 RX ORDER — DIPHENHYDRAMINE HYDROCHLORIDE 50 MG/ML
25 INJECTION INTRAMUSCULAR; INTRAVENOUS EVERY 6 HOURS PRN
Status: DISCONTINUED | OUTPATIENT
Start: 2017-06-23 | End: 2017-06-23 | Stop reason: HOSPADM

## 2017-06-23 RX ADMIN — OXYCODONE HYDROCHLORIDE 5 MG: 5 SOLUTION ORAL at 15:50

## 2017-06-23 RX ADMIN — HYDROMORPHONE HYDROCHLORIDE 0.2 MG: 1 INJECTION, SOLUTION INTRAMUSCULAR; INTRAVENOUS; SUBCUTANEOUS at 17:56

## 2017-06-23 RX ADMIN — OXYCODONE HYDROCHLORIDE 5 MG: 5 SOLUTION ORAL at 16:07

## 2017-06-23 RX ADMIN — FENTANYL CITRATE 50 MCG: 50 INJECTION, SOLUTION INTRAMUSCULAR; INTRAVENOUS at 15:55

## 2017-06-23 RX ADMIN — HYDROMORPHONE HYDROCHLORIDE 0.2 MG: 1 INJECTION, SOLUTION INTRAMUSCULAR; INTRAVENOUS; SUBCUTANEOUS at 17:30

## 2017-06-23 RX ADMIN — FENTANYL CITRATE 50 MCG: 50 INJECTION, SOLUTION INTRAMUSCULAR; INTRAVENOUS at 16:08

## 2017-06-23 RX ADMIN — HYDROMORPHONE HYDROCHLORIDE 0.2 MG: 1 INJECTION, SOLUTION INTRAMUSCULAR; INTRAVENOUS; SUBCUTANEOUS at 16:15

## 2017-06-23 RX ADMIN — HYDROMORPHONE HYDROCHLORIDE 0.2 MG: 1 INJECTION, SOLUTION INTRAMUSCULAR; INTRAVENOUS; SUBCUTANEOUS at 16:44

## 2017-06-23 ASSESSMENT — PAIN SCALES - GENERAL
PAINLEVEL_OUTOF10: 3
PAINLEVEL_OUTOF10: 4
PAINLEVEL_OUTOF10: 6
PAINLEVEL_OUTOF10: 5
PAINLEVEL_OUTOF10: 3
PAINLEVEL_OUTOF10: 5
PAINLEVEL_OUTOF10: 5
PAINLEVEL_OUTOF10: 0

## 2017-06-23 NOTE — IP AVS SNAPSHOT
6/23/2017    Jacque Mcintyre  [address] [place] [address]    Dear Jacque:    [facility] wants to ensure your discharge home is safe and you or your loved ones have had all of your questions answered regarding your care after you leave the hospital.    Below is a list of resources and contact information should you have any questions regarding your hospital stay, follow-up instructions, or active medical symptoms.    Questions or Concerns Regarding… Contact   Medical Questions Related to Your Discharge  (7 days a week, 8am-5pm) Contact a Nurse Care Coordinator   [phone]   Medical Questions Not Related to Your Discharge  (24 hours a day / 7 days a week)  Contact the Nurse Health Line   [phone]    Medications or Discharge Instructions Refer to your discharge packet   or contact your [facility] Primary Care Provider   [phone]   Follow-up Appointment(s) Schedule your appointment via Vusion   or contact Scheduling [phone]   Billing Review your statement via Vusion  or contact Billing [phone]   Medical Records Review your records via Vusion   or contact Medical Records [phone]     You may receive a telephone call within two days of discharge. This call is to make certain you understand your discharge instructions and have the opportunity to have any questions answered. You can also easily access your medical information, test results and upcoming appointments via the Vusion free online health management tool. You can learn more and sign up at [facility]/Vusion. For assistance setting up your Vusion account, please call [phone].    Once again, we want to ensure your discharge home is safe and that you have a clear understanding of any next steps in your care. If you have any questions or concerns, please do not hesitate to contact us, we are here for you. Thank you for choosing [facility] for your healthcare needs.    Sincerely,    Your [facility] Healthcare Team

## 2017-06-23 NOTE — IP AVS SNAPSHOT
" Home Care Instructions                                                                                                                Name:Jacque Mcintyre  Medical Record Number:[id number]  CSN: [id number]    YOB: 1980   Age: 36 y.o.  Sex: female  HT:1.626 m (5' 4\") WT: 81.1 kg (178 lb 12.7 oz)          Admit Date: 6/23/2017     Discharge Date:   Today's Date: 6/23/2017  Attending Doctor:  Lionel Weinberg M.D.                  Allergies:  Review of patient's allergies indicates no known allergies.              Follow-up Information     1. Follow up with Lionel Weinberg M.D.. Call in 2 weeks.    Specialty:  Surgery    Contact information    [address]  [place] [address] [phone]          Discharge Instructions         ACTIVITY: Rest and take it easy for the first 24 hours.  A responsible adult is recommended to remain with you during that time.  It is normal to feel sleepy.  We encourage you to not do anything that requires balance, judgment or coordination.    MILD FLU-LIKE SYMPTOMS ARE NORMAL. YOU MAY EXPERIENCE GENERALIZED MUSCLE ACHES, THROAT IRRITATION, HEADACHE AND/OR SOME NAUSEA.    FOR 24 HOURS DO NOT:  Drive, operate machinery or run household appliances.  Drink beer or alcoholic beverages.   Make important decisions or sign legal documents.    SPECIAL INSTRUCTIONS and SURGICAL DRESSING/BATHING:   Remove plastic and gauze in 3 days   Leave underlying steristips on until they fall off   Patient may shower on Post OP Day #2 (Sunday)  Change dressing around PRISCILLA drain after shower.       Bulb Drain Home Care  A bulb drain consists of a thin rubber tube and a soft, round bulb that creates a gentle suction. The rubber tube is placed in the area where you had surgery. A bulb is attached to the end of the tube that is outside the body. The bulb drain removes excess fluid that normally builds up in a surgical wound after surgery. The color and amount of fluid will vary. Immediately after " surgery, the fluid is bright red and is a little thicker than water. It may gradually change to a yellow or pink color and become more thin and water-like. When the amount decreases to about 1 or 2 tbsp in 24 hours, your health care provider will usually remove it.  DAILY CARE  · Keep the bulb flat (compressed) at all times, except while emptying it. The flatness creates suction. You can flatten the bulb by squeezing it firmly in the middle and then closing the cap.  · Keep sites where the tube enters the skin dry and covered with a bandage (dressing).  · Secure the tube 1-2 in (2.5-5.1 cm) below the insertion sites to keep it from pulling on your stitches. The tube is stitched in place and will not slip out.  · Secure the bulb as directed by your health care provider.  · For the first 3 days after surgery, there usually is more fluid in the bulb. Empty the bulb whenever it becomes half full because the bulb does not create enough suction if it is too full. The bulb could also overflow. Write down how much fluid you remove each time you empty your drain. Add up the amount removed in 24 hours.  · Empty the bulb at the same time every day once the amount of fluid decreases and you only need to empty it once a day. Write down the amounts and the 24-hour totals to give to your health care provider. This helps your health care provider know when the tubes can be removed.  EMPTYING THE BULB DRAIN  Before emptying the bulb, get a measuring cup, a piece of paper and a pen, and wash your hands.  · Gently run your fingers down the tube (stripping) to empty any drainage from the tubing into the bulb. This may need to be done several times a day to clear the tubing of clots and tissue.  · Open the bulb cap to release suction, which causes it to inflate. Do not touch the inside of the cap.  · Gently run your fingers down the tube (stripping) to empty any drainage from the tubing into the bulb.  · Hold the cap out of the way, and  pour fluid into the measuring cup.    · Squeeze the bulb to provide suction.   · Replace the cap.    · Check the tape that holds the tube to your skin. If it is becoming loose, you can remove the loose piece of tape and apply a new one. Then, pin the bulb to your shirt.    · Write down the amount of fluid you emptied out. Write down the date and each time you emptied your bulb drain. (If there are 2 bulbs, note the amount of drainage from each bulb and keep the totals separate. Your health care provider will want to know the total amounts for each drain and which tube is draining more.)    · Flush the fluid down the toilet and wash your hands.    · Call your health care provider once you have less than 2 tbsp of fluid collecting in the bulb drain every 24 hours.  If there is drainage around the tube site, change dressings and keep the area dry. Cleanse around tube with sterile saline and place dry gauze around site. This gauze should be changed when it is soiled. If it stays clean and unsoiled, it should still be changed daily.   SEEK MEDICAL CARE IF:  · Your drainage has a bad smell or is cloudy.    · You have a fever.    · Your drainage is increasing instead of decreasing.    · Your tube fell out.    · You have redness or swelling around the tube site.    · You have drainage from a surgical wound.    · Your bulb drain will not stay flat after you empty it.    MAKE SURE YOU:   · Understand these instructions.  · Will watch your condition.  · Will get help right away if you are not doing well or get worse.     This information is not intended to replace advice given to you by your health care provider. Make sure you discuss any questions you have with your health care provider.     Document Released: 12/15/2001 Document Revised: 01/08/2016 Document Reviewed: 05/22/2013  [employer] Interactive Patient Education ©2016 [employer]    DIET: To avoid nausea, slowly advance diet as tolerated, avoiding spicy or greasy  foods for the first day.  Add more substantial food to your diet according to your physician's instructions. INCREASE FLUIDS AND FIBER TO AVOID CONSTIPATION.    FOLLOW-UP APPOINTMENT:  A follow-up appointment should be arranged with your doctor; call to schedule.    You should CALL YOUR PHYSICIAN if you develop:  Fever greater than 101 degrees F.  Pain not relieved by medication, or persistent nausea or vomiting.  Excessive bleeding (blood soaking through dressing) or unexpected drainage from the wound.  Extreme redness or swelling around the incision site, drainage of pus or foul smelling drainage.  Inability to urinate or empty your bladder within 8 hours.  Problems with breathing or chest pain.    You should call 911 if you develop problems with breathing or chest pain.  If you are unable to contact your doctor or surgical center, you should go to the nearest emergency room or urgent care center.  Physician's telephone #: [phone]    If any questions arise, call your doctor.  If your doctor is not available, please feel free to call the Surgical Center at [phone].  The Center is open Monday through Friday from 7AM to 7PM.  You can also call the BTR HOTLINE open 24 hours/day, 7 days/week and speak to a nurse at [phone], or toll free at [phone].    A registered nurse may call you a few days after your surgery to see how you are doing after your procedure.    MEDICATIONS: Resume taking daily medication.  Take prescribed pain medication with food.  If no medication is prescribed, you may take non-aspirin pain medication if needed.  PAIN MEDICATION CAN BE VERY CONSTIPATING.  Take a stool softener or laxative such as senokot, pericolace, or milk of magnesia if needed.    Prescription given for Norco.  Last pain medication given at 4:07 pm (Can have next pain medication at 8:07)    If your physician has prescribed pain medication that includes Acetaminophen (Tylenol), do not take additional  Acetaminophen (Tylenol) while taking the prescribed medication.    Depression / Suicide Risk    As you are discharged from this [facility] facility, it is important to learn how to keep safe from harming yourself.    Recognize the warning signs:  · Abrupt changes in personality, positive or negative- including increase in energy   · Giving away possessions  · Change in eating patterns- significant weight changes-  positive or negative  · Change in sleeping patterns- unable to sleep or sleeping all the time   · Unwillingness or inability to communicate  · Depression  · Unusual sadness, discouragement and loneliness  · Talk of wanting to die  · Neglect of personal appearance   · Rebelliousness- reckless behavior  · Withdrawal from people/activities they love  · Confusion- inability to concentrate     If you or a loved one observes any of these behaviors or has concerns about self-harm, here's what you can do:  · Talk about it- your feelings and reasons for harming yourself  · Remove any means that you might use to hurt yourself (examples: pills, rope, extension cords, firearm)  · Get professional help from the community (Mental Health, Substance Abuse, psychological counseling)  · Do not be alone:Call your Safe Contact- someone whom you trust who will be there for you.  · Call your local CRISIS HOTLINE 435-1794 or 621-616-9173  · Call your local Children's Mobile Crisis Response Team [place] (862) 702-3541 or www.[employer] Dream  · Call the toll free National Suicide Prevention Hotlines   · National Suicide Prevention Lifeline 370-692-TKUP (9407)  · National Hope Line Network 800-SUICIDE (178-3771)       Medication List      START taking these medications        Instructions    Morning Afternoon Evening Bedtime    hydrocodone-acetaminophen 5-325 MG Tabs per tablet   Commonly known as:  NORCO        Take 1-2 Tabs by mouth every four hours as needed.   Dose:  1-2 Tab                          CONTINUE taking  these medications        Instructions    Morning Afternoon Evening Bedtime    acetaminophen 500 MG Tabs   Commonly known as:  TYLENOL        Take 500 mg by mouth 1 time daily as needed.   Dose:  500 mg                        albuterol 108 (90 BASE) MCG/ACT Aers inhalation aerosol   Commonly known as:  PROVENTIL HFA        Inhale 2 Puffs by mouth every 6 hours as needed.   Dose:  2 Puff                        atorvastatin 10 MG Tabs   Commonly known as:  LIPITOR        Take 10 mg by mouth every evening.   Dose:  10 mg                        clonidine 0.1 MG Tabs   Commonly known as:  CATAPRES        Take 0.1 mg by mouth every evening.   Dose:  0.1 mg                        lamotrigine 100 MG Tabs   Commonly known as:  LAMICTAL        Take 150 mg by mouth every day.   Dose:  150 mg                        LANTUS SOLOSTAR 100 UNIT/ML Sopn injection   Generic drug:  insulin glargine        Inject 20 Units as instructed every evening.   Dose:  20 Units                        lisinopril 10 MG Tabs   Commonly known as:  PRINIVIL        Take 10 mg by mouth every day.   Dose:  10 mg                        metformin  MG Tb24   Commonly known as:  GLUCOPHAGE XR        Take 750 mg by mouth 2 times a day.   Dose:  750 mg                        paroxetine 40 MG tablet   Commonly known as:  PAXIL        Take 1 Tab by mouth every day. Take with food   Dose:  40 mg                        SEROQUEL 200 MG Tabs   Generic drug:  quetiapine        Take 200 mg by mouth every evening.   Dose:  200 mg                             Where to Get Your Medications      You can get these medications from any pharmacy     Bring a paper prescription for each of these medications    - hydrocodone-acetaminophen 5-325 MG Tabs per tablet            Medication Information     Above and/or attached are the medications your physician expects you to take upon discharge. Review all of your home medications and newly ordered medications with your doctor  and/or pharmacist. Follow medication instructions as directed by your doctor and/or pharmacist. Please keep your medication list with you and share with your physician. Update the information when medications are discontinued, doses are changed, or new medications (including over-the-counter products) are added; and carry medication information at all times in the event of emergency situations.        Resources     Quit Smoking / Tobacco Use:    I understand the use of any tobacco products increases my chance of suffering from future heart disease or stroke and could cause other illnesses which may shorten my life. Quitting the use of tobacco products is the single most important thing I can do to improve my health. For further information on smoking / tobacco cessation call a Toll Free Quit Line at 1-995.356.6648 (*National [employer] [place]) or 1-491.556.3583 (American Lung Association) or you can access the web based program at www.lungusa.org.    [place] Tobacco Users Help Line:  (565) 139-2436       Toll Free: 1-381.354.7356  Quit Tobacco Program [facility] (173)751-3553    Crisis Hotline:    [place] Crisis Hotline:  8-939-NMXPQHJ or 1-308.837.5274    [place] Crisis Hotline:    1-144.547.2195 or 163-215-7531    Discharge Survey:   Thank you for choosing [facility]. We hope we did everything we could to make your hospital stay a pleasant one. You may be receiving a survey and we would appreciate your time and participation in answering the questions. Your input is very valuable to us in our efforts to improve our service to our patients and their families.            Signatures     My signature on this form indicates that:    1. I acknowledge receipt and understanding of these Home Care Instruction.  2. My questions regarding this information have been answered to my satisfaction.  3. I have formulated a plan with my discharge nurse to obtain my prescribed medications for  home.    __________________________________      __________________________________                   Patient Signature                                 Guardian/Responsible Adult Signature      __________________________________                 __________       ________                       Nurse Signature                                               Date                 Time

## 2017-06-23 NOTE — OR SURGEON
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    Anesthesiologist/Type of Anesthesia:  Anesthesiologist: Yakov Bradford M.D./General    Surgical Staff:  Circulator: Ada Wheeler R.N.; Kenroy Marie R.N.  Relief Circulator: Ayah Dimas RKARINA  Scrub Person: Shagufta Garg    Specimen: level I,II, II nodes to path    Estimated Blood Loss: 25ccs    Findings: multiple nodes seen, several a little dark    Complications: no apparent complications        6/23/2017 3:17 PM Lionel Weinberg

## 2017-06-24 NOTE — OP REPORT
DATE OF SERVICE:  06/23/2017    SURGEON:  Lionel Weinberg MD    ANESTHESIOLOGIST:  Yakov Bradford MD    TYPE OF ANESTHETIC:  General anesthetic using a laryngeal mask airway plus   local 0.5% Marcaine with epinephrine.    PREOPERATIVE DIAGNOSIS:  Malignant melanoma of the left posterior hand with   metastasis to the left axilla on sentinel node biopsy.    POSTOPERATIVE DIAGNOSIS:  Malignant melanoma of the left posterior hand with   metastasis to the left axilla on sentinel node biopsy.    PROCEDURE:  Radical axillary node dissection on the left.    FINDINGS:  The patient is a 36-year-old female who had a finding of malignant   melanoma, biopsy of her left posterior hand in June 2016.  It is not clear why   further re-excision and node biopsy was performed; however, it was felt the   patient had inadequate excision and leodan evaluation.  She saw Dr. Reed, who   referred her to my office and she was scheduled for a re-excision of the   malignant melanoma primary site along with sentinel node.  The re-excision did   not reveal any residual tumor; however, the sentinel node was positive for   malignant melanoma.  She returns today for completion of node dissection.  At   the time of surgery, she was found to have several somewhat suspicious-looking   lymph nodes, one was relatively large on the order of 3x2 cm and several   other ones were small, less than 1 cm, but somewhat dark in appearance.  A   level 1, 2 and partial 3 dissection was performed in order to incorporate all   of these lymph nodes.  There were no apparent complications.    FINDINGS AND PROCEDURE:  Patient brought to the operating room and placed on   the table in supine position.  General anesthesia was then provided by Dr. Bradford, the anesthesiologist.  A timeout was called.  The correct patient,   correct diagnosis, correct surgery, correct location and surgery were   discussed and agreed upon.  She did receive preoperative IV Ancef.  The left    axilla was then prepped and draped in sterile fashion.  Patient had a previous   incision in the lower part of the axilla.  This was excised using #15 blade   through the skin and dermis and all bleeding was with electrocautery.    Dissection was then carried out both inferiorly and then medially toward the   pectoralis major muscle.  Once the pectoralis major muscle was found, the   lateral border of the pectoralis major muscle was followed superiorly using   the Metzenbaum scissors, electrocautery device and the LigaSure device.    Dissection was carried out until the median pectoral nerve was identified.    The dissection was then carried out more laterally through the clavipectoral   fascia.  The previous biopsy site was contained within the tissue beneath this   area of dissection.  The left axillary vein was exposed.  It appeared to be a   2-vein structure.  All lymphatic tissues from just above this vein down were   then dissected with a LigaSure device.  Care was then taken to avoid injury to   the long thoracic nerve and thoracodorsal nerve at the level just above the   left axillary vein with a somewhat suspicious 3x2 cm soft reddish node.  This   was dissected down with the axillary contents along the thoracodorsal nerve.    There were also multiple small less than 1 cm somewhat darkish nodes.  These   were also resected being careful to not injure the nerve.  All small venous   and arterial tributaries were controlled with LigaSure device.  The large   venous tributary to the axillary vein was controlled with 3-0 Vicryl ties.    Dissection was further carried down to remove all the axillary contents off   the subscapularis muscle laterally in front of the latissimus dorsi muscle and   then inferiorly across the tail of the breast area.  This entire axillary   content was removed and sent to pathology for further characterization.  The   wound was irrigated with approximately 1 liter of warm normal  saline.  There   was no evidence of any bleeding.  A #19-Sinhala Alessio drain was then placed   into the wound and brought through a separate stab wound incision in the   inferior flap.  This drain was sutured to the skin with a 3-0 nylon suture.    Next, the clavipectoral fascia was closed using running 3-0 Vicryl suture.    The dermis was closed using running 3-0 Vicryl suture.  The skin was closed   using running 4-0 Monocryl suture in subcuticular fashion.  Sterile dressings   were then applied.  An Ace wrap was applied.  The patient did tolerate   procedure well without complications.  Final sponge and needle count were   correct.       ____________________________________     MD GRACE ESPINO / ARIADNE    DD:  06/24/2017 09:08:19  DT:  06/24/2017 12:04:09    D#:  [id number]  Job#:  [id number]    cc: Anna Barton MD, GELA HUTCHINSON MD

## 2017-06-24 NOTE — OR NURSING
Report called to Fabricio RODRIGUEZ. Plan of care discussed. Patient reporting 3/10 tolerable incisional pain, no complaints of nausea

## 2017-06-24 NOTE — DISCHARGE INSTRUCTIONS
ACTIVITY: Rest and take it easy for the first 24 hours.  A responsible adult is recommended to remain with you during that time.  It is normal to feel sleepy.  We encourage you to not do anything that requires balance, judgment or coordination.    MILD FLU-LIKE SYMPTOMS ARE NORMAL. YOU MAY EXPERIENCE GENERALIZED MUSCLE ACHES, THROAT IRRITATION, HEADACHE AND/OR SOME NAUSEA.    FOR 24 HOURS DO NOT:  Drive, operate machinery or run household appliances.  Drink beer or alcoholic beverages.   Make important decisions or sign legal documents.    SPECIAL INSTRUCTIONS and SURGICAL DRESSING/BATHING:   Remove plastic and gauze in 3 days   Leave underlying steristips on until they fall off   Patient may shower on Post OP Day #2 (Sunday)  Change dressing around PRISCILLA drain after shower.       Bulb Drain Home Care  A bulb drain consists of a thin rubber tube and a soft, round bulb that creates a gentle suction. The rubber tube is placed in the area where you had surgery. A bulb is attached to the end of the tube that is outside the body. The bulb drain removes excess fluid that normally builds up in a surgical wound after surgery. The color and amount of fluid will vary. Immediately after surgery, the fluid is bright red and is a little thicker than water. It may gradually change to a yellow or pink color and become more thin and water-like. When the amount decreases to about 1 or 2 tbsp in 24 hours, your health care provider will usually remove it.  DAILY CARE  · Keep the bulb flat (compressed) at all times, except while emptying it. The flatness creates suction. You can flatten the bulb by squeezing it firmly in the middle and then closing the cap.  · Keep sites where the tube enters the skin dry and covered with a bandage (dressing).  · Secure the tube 1-2 in (2.5-5.1 cm) below the insertion sites to keep it from pulling on your stitches. The tube is stitched in place and will not slip out.  · Secure the bulb as directed by  your health care provider.  · For the first 3 days after surgery, there usually is more fluid in the bulb. Empty the bulb whenever it becomes half full because the bulb does not create enough suction if it is too full. The bulb could also overflow. Write down how much fluid you remove each time you empty your drain. Add up the amount removed in 24 hours.  · Empty the bulb at the same time every day once the amount of fluid decreases and you only need to empty it once a day. Write down the amounts and the 24-hour totals to give to your health care provider. This helps your health care provider know when the tubes can be removed.  EMPTYING THE BULB DRAIN  Before emptying the bulb, get a measuring cup, a piece of paper and a pen, and wash your hands.  · Gently run your fingers down the tube (stripping) to empty any drainage from the tubing into the bulb. This may need to be done several times a day to clear the tubing of clots and tissue.  · Open the bulb cap to release suction, which causes it to inflate. Do not touch the inside of the cap.  · Gently run your fingers down the tube (stripping) to empty any drainage from the tubing into the bulb.  · Hold the cap out of the way, and pour fluid into the measuring cup.    · Squeeze the bulb to provide suction.   · Replace the cap.    · Check the tape that holds the tube to your skin. If it is becoming loose, you can remove the loose piece of tape and apply a new one. Then, pin the bulb to your shirt.    · Write down the amount of fluid you emptied out. Write down the date and each time you emptied your bulb drain. (If there are 2 bulbs, note the amount of drainage from each bulb and keep the totals separate. Your health care provider will want to know the total amounts for each drain and which tube is draining more.)    · Flush the fluid down the toilet and wash your hands.    · Call your health care provider once you have less than 2 tbsp of fluid collecting in the bulb  drain every 24 hours.  If there is drainage around the tube site, change dressings and keep the area dry. Cleanse around tube with sterile saline and place dry gauze around site. This gauze should be changed when it is soiled. If it stays clean and unsoiled, it should still be changed daily.   SEEK MEDICAL CARE IF:  · Your drainage has a bad smell or is cloudy.    · You have a fever.    · Your drainage is increasing instead of decreasing.    · Your tube fell out.    · You have redness or swelling around the tube site.    · You have drainage from a surgical wound.    · Your bulb drain will not stay flat after you empty it.    MAKE SURE YOU:   · Understand these instructions.  · Will watch your condition.  · Will get help right away if you are not doing well or get worse.     This information is not intended to replace advice given to you by your health care provider. Make sure you discuss any questions you have with your health care provider.     Document Released: 12/15/2001 Document Revised: 01/08/2016 Document Reviewed: 05/22/2013  [employer] Patient Education ©2016 [employer]    DIET: To avoid nausea, slowly advance diet as tolerated, avoiding spicy or greasy foods for the first day.  Add more substantial food to your diet according to your physician's instructions. INCREASE FLUIDS AND FIBER TO AVOID CONSTIPATION.    FOLLOW-UP APPOINTMENT:  A follow-up appointment should be arranged with your doctor; call to schedule.    You should CALL YOUR PHYSICIAN if you develop:  Fever greater than 101 degrees F.  Pain not relieved by medication, or persistent nausea or vomiting.  Excessive bleeding (blood soaking through dressing) or unexpected drainage from the wound.  Extreme redness or swelling around the incision site, drainage of pus or foul smelling drainage.  Inability to urinate or empty your bladder within 8 hours.  Problems with breathing or chest pain.    You should call 911 if you develop problems  with breathing or chest pain.  If you are unable to contact your doctor or surgical center, you should go to the nearest emergency room or urgent care center.  Physician's telephone #: [phone]    If any questions arise, call your doctor.  If your doctor is not available, please feel free to call the Surgical Center at [phone].  The Center is open Monday through Friday from 7AM to 7PM.  You can also call the HEALTH HOTLINE open 24 hours/day, 7 days/week and speak to a nurse at [phone], or toll free at [phone].    A registered nurse may call you a few days after your surgery to see how you are doing after your procedure.    MEDICATIONS: Resume taking daily medication.  Take prescribed pain medication with food.  If no medication is prescribed, you may take non-aspirin pain medication if needed.  PAIN MEDICATION CAN BE VERY CONSTIPATING.  Take a stool softener or laxative such as senokot, pericolace, or milk of magnesia if needed.    Prescription given for Norco.  Last pain medication given at 4:07 pm (Can have next pain medication at 8:07)    If your physician has prescribed pain medication that includes Acetaminophen (Tylenol), do not take additional Acetaminophen (Tylenol) while taking the prescribed medication.    Depression / Suicide Risk    As you are discharged from this [facility] facility, it is important to learn how to keep safe from harming yourself.    Recognize the warning signs:  · Abrupt changes in personality, positive or negative- including increase in energy   · Giving away possessions  · Change in eating patterns- significant weight changes-  positive or negative  · Change in sleeping patterns- unable to sleep or sleeping all the time   · Unwillingness or inability to communicate  · Depression  · Unusual sadness, discouragement and loneliness  · Talk of wanting to die  · Neglect of personal appearance   · Rebelliousness- reckless behavior  · Withdrawal from  people/activities they love  · Confusion- inability to concentrate     If you or a loved one observes any of these behaviors or has concerns about self-harm, here's what you can do:  · Talk about it- your feelings and reasons for harming yourself  · Remove any means that you might use to hurt yourself (examples: pills, rope, extension cords, firearm)  · Get professional help from the community (Mental Health, Substance Abuse, psychological counseling)  · Do not be alone:Call your Safe Contact- someone whom you trust who will be there for you.  · Call your local CRISIS HOTLINE 334-0015 or 240-756-4036  · Call your local Children's Mobile Crisis Response Team [place] (602) 230-1370 or www.[employer]  · Call the toll free National Suicide Prevention Hotlines   · National Suicide Prevention Lifeline 252-043-WJFP (8729)  · National Hope Line Network 800-SUICIDE (889-0514)

## 2017-07-25 ENCOUNTER — HOSPITAL ENCOUNTER (OUTPATIENT)
Facility: MEDICAL CENTER | Age: 37
End: 2017-07-25
Attending: INTERNAL MEDICINE
Payer: COMMERCIAL

## 2017-07-25 PROBLEM — D22.72 MELANOCYTIC NEVI OF LEFT LOWER LIMB, INCLUDING HIP: Status: ACTIVE | Noted: 2017-07-25

## 2017-07-25 PROBLEM — E11.9 TYPE 2 DIABETES MELLITUS WITHOUT COMPLICATIONS: Status: ACTIVE | Noted: 2017-07-25

## 2017-07-25 LAB
BASOPHILS # BLD AUTO: 0.9 % (ref 0–1.8)
BASOPHILS # BLD: 0.08 K/UL (ref 0–0.12)
EOSINOPHIL # BLD AUTO: 0.15 K/UL (ref 0–0.51)
EOSINOPHIL NFR BLD: 1.7 % (ref 0–6.9)
ERYTHROCYTE [DISTWIDTH] IN BLOOD BY AUTOMATED COUNT: 48.1 FL (ref 35.9–50)
HCT VFR BLD AUTO: 43.8 % (ref 37–47)
HGB BLD-MCNC: 14.5 G/DL (ref 12–16)
IMM GRANULOCYTES # BLD AUTO: 0.02 K/UL (ref 0–0.11)
IMM GRANULOCYTES NFR BLD AUTO: 0.2 % (ref 0–0.9)
LYMPHOCYTES # BLD AUTO: 2.59 K/UL (ref 1–4.8)
LYMPHOCYTES NFR BLD: 29.7 % (ref 22–41)
MCH RBC QN AUTO: 30.7 PG (ref 27–33)
MCHC RBC AUTO-ENTMCNC: 33.1 G/DL (ref 33.6–35)
MCV RBC AUTO: 92.6 FL (ref 81.4–97.8)
MONOCYTES # BLD AUTO: 0.57 K/UL (ref 0–0.85)
MONOCYTES NFR BLD AUTO: 6.5 % (ref 0–13.4)
NEUTROPHILS # BLD AUTO: 5.32 K/UL (ref 2–7.15)
NEUTROPHILS NFR BLD: 61 % (ref 44–72)
NRBC # BLD AUTO: 0 K/UL
NRBC BLD AUTO-RTO: 0 /100 WBC
PLATELET # BLD AUTO: 342 K/UL (ref 164–446)
PMV BLD AUTO: 11.6 FL (ref 9–12.9)
RBC # BLD AUTO: 4.73 M/UL (ref 4.2–5.4)
WBC # BLD AUTO: 8.7 K/UL (ref 4.8–10.8)

## 2017-07-25 PROCEDURE — 85025 COMPLETE CBC W/AUTO DIFF WBC: CPT

## 2017-08-08 PROBLEM — D49.2 NEOPLASM OF UNSPECIFIED BEHAVIOR OF BONE, SOFT TISSUE, AND SKIN: Status: RESOLVED | Noted: 2017-07-25 | Resolved: 2017-08-08

## 2017-08-31 ENCOUNTER — OFFICE VISIT (OUTPATIENT)
Dept: URGENT CARE | Facility: CLINIC | Age: 37
End: 2017-08-31
Payer: COMMERCIAL

## 2017-08-31 VITALS
OXYGEN SATURATION: 98 % | DIASTOLIC BLOOD PRESSURE: 78 MMHG | RESPIRATION RATE: 16 BRPM | SYSTOLIC BLOOD PRESSURE: 118 MMHG | HEART RATE: 74 BPM | TEMPERATURE: 97.6 F

## 2017-08-31 DIAGNOSIS — E11.8 TYPE 2 DIABETES MELLITUS WITH COMPLICATION, WITH LONG-TERM CURRENT USE OF INSULIN (HCC): ICD-10-CM

## 2017-08-31 DIAGNOSIS — J40 BRONCHITIS: ICD-10-CM

## 2017-08-31 DIAGNOSIS — R06.2 WHEEZING: ICD-10-CM

## 2017-08-31 DIAGNOSIS — Z79.4 TYPE 2 DIABETES MELLITUS WITH COMPLICATION, WITH LONG-TERM CURRENT USE OF INSULIN (HCC): ICD-10-CM

## 2017-08-31 PROCEDURE — 99203 OFFICE O/P NEW LOW 30 MIN: CPT | Mod: 25 | Performed by: PHYSICIAN ASSISTANT

## 2017-08-31 PROCEDURE — 94640 AIRWAY INHALATION TREATMENT: CPT | Performed by: PHYSICIAN ASSISTANT

## 2017-08-31 RX ORDER — ALBUTEROL SULFATE 2.5 MG/3ML
2.5 SOLUTION RESPIRATORY (INHALATION) ONCE
Status: COMPLETED | OUTPATIENT
Start: 2017-08-31 | End: 2017-08-31

## 2017-08-31 RX ORDER — BENZONATATE 100 MG/1
200 CAPSULE ORAL 3 TIMES DAILY PRN
Qty: 30 CAP | Refills: 0 | Status: SHIPPED | OUTPATIENT
Start: 2017-08-31 | End: 2018-01-31

## 2017-08-31 RX ORDER — ALBUTEROL SULFATE 90 UG/1
2 AEROSOL, METERED RESPIRATORY (INHALATION) EVERY 6 HOURS PRN
Qty: 8.5 G | Refills: 0 | Status: SHIPPED | OUTPATIENT
Start: 2017-08-31 | End: 2018-01-31

## 2017-08-31 RX ORDER — DOXYCYCLINE HYCLATE 100 MG
100 TABLET ORAL 2 TIMES DAILY
Qty: 10 TAB | Refills: 0 | Status: SHIPPED | OUTPATIENT
Start: 2017-08-31 | End: 2017-09-05

## 2017-08-31 RX ADMIN — ALBUTEROL SULFATE 2.5 MG: 2.5 SOLUTION RESPIRATORY (INHALATION) at 17:12

## 2017-08-31 ASSESSMENT — ENCOUNTER SYMPTOMS
CHILLS: 0
EYE REDNESS: 0
SORE THROAT: 0
FEVER: 0
SINUS PAIN: 0
NAUSEA: 0
MYALGIAS: 0
DIARRHEA: 0
DIZZINESS: 0
RHINORRHEA: 1
ABDOMINAL PAIN: 0
NECK PAIN: 0
VOMITING: 0
HEADACHES: 0
COUGH: 1
SHORTNESS OF BREATH: 0
EYE DISCHARGE: 0
WHEEZING: 1
TINGLING: 0
SPUTUM PRODUCTION: 1

## 2017-08-31 NOTE — LETTER
August 31, 2017         Patient: Jacque Mcintyre   YOB: 1980   Date of Visit: 8/31/2017           To Whom it May Concern:    Jacque Mcintyre was seen in my clinic on 8/31/2017. Please excuse this patient from work due to recent illness- she may return on Sunday if symptoms have improved.     If you have any questions or concerns, please don't hesitate to call.        Sincerely,           Dread De LaR osa P.A.-C.  Electronically Signed

## 2017-09-01 NOTE — PROGRESS NOTES
Subjective:      Jacque Mcintyre is a 36 y.o. female who presents with Cough (X 5 days needs antibio )            Pt is 37 y/o female who presents with wheezing, coughing for the last 5-6 days. She is concerned as she has hx of asthma- currently running out of her rescue inhaler. She reports currently being treated for malignant melanoma.       URI    This is a new problem. Episode onset: 6-7 days ago. The problem has been gradually worsening. There has been no fever. Associated symptoms include congestion, coughing, a plugged ear sensation, rhinorrhea and wheezing. Pertinent negatives include no abdominal pain, chest pain, diarrhea, dysuria, ear pain, headaches, joint pain, joint swelling, nausea, neck pain, rash, sinus pain, sneezing, sore throat or vomiting. She has tried decongestant for the symptoms. The treatment provided mild relief.       Review of Systems   Constitutional: Positive for malaise/fatigue. Negative for chills and fever.   HENT: Positive for congestion and rhinorrhea. Negative for ear discharge, ear pain, sneezing and sore throat.    Eyes: Negative for discharge and redness.   Respiratory: Positive for cough, sputum production and wheezing. Negative for shortness of breath.    Cardiovascular: Negative for chest pain and leg swelling.   Gastrointestinal: Negative for abdominal pain, diarrhea, nausea and vomiting.   Genitourinary: Negative for dysuria and urgency.   Musculoskeletal: Negative for joint pain, myalgias and neck pain.   Skin: Negative for itching and rash.   Neurological: Negative for dizziness, tingling and headaches.          Objective:     /78   Pulse 74   Temp 36.4 °C (97.6 °F)   Resp 16   SpO2 98%    PMH:  has a past medical history of Asthma; Bipolar 1 disorder (CMS-[facility]); Cancer (CMS-HCC) (2016); Depression; DM mellitus, gestational; Hyperlipidemia; Hypertension; Pap smear; and PCOS (polycystic ovarian syndrome).  MEDS:   Current Outpatient Prescriptions:   •   Ipilimumab (YERVOY IV), by Intravenous route., Disp: , Rfl:   •  doxycycline (VIBRAMYCIN) 100 MG Tab, Take 1 Tab by mouth 2 times a day for 5 days., Disp: 10 Tab, Rfl: 0  •  albuterol 108 (90 Base) MCG/ACT Aero Soln inhalation aerosol, Inhale 2 Puffs by mouth every 6 hours as needed for Shortness of Breath., Disp: 8.5 g, Rfl: 0  •  benzonatate (TESSALON) 100 MG Cap, Take 2 Caps by mouth 3 times a day as needed for Cough., Disp: 30 Cap, Rfl: 0  •  acetaminophen (TYLENOL) 500 MG Tab, Take 500 mg by mouth 1 time daily as needed., Disp: , Rfl:   •  quetiapine (SEROQUEL) 200 MG Tab, Take 200 mg by mouth every evening., Disp: , Rfl:   •  lisinopril (PRINIVIL) 10 MG Tab, Take 10 mg by mouth every day., Disp: , Rfl:   •  insulin glargine (LANTUS SOLOSTAR) 100 UNIT/ML Solution Pen-injector injection, Inject 20 Units as instructed every evening., Disp: , Rfl:   •  albuterol (PROVENTIL HFA) 108 (90 BASE) MCG/ACT Aero Soln inhalation aerosol, Inhale 2 Puffs by mouth every 6 hours as needed., Disp: 1 Inhaler, Rfl: 5  •  metformin ER (GLUCOPHAGE XR) 750 MG TABLET SR 24 HR, Take 750 mg by mouth 2 times a day., Disp: , Rfl:   •  atorvastatin (LIPITOR) 10 MG Tab, Take 10 mg by mouth every evening., Disp: , Rfl:   •  clonidine (CATAPRES) 0.1 MG TABS, Take 0.1 mg by mouth every evening., Disp: , Rfl:   •  paroxetine (PAXIL) 40 MG tablet, Take 1 Tab by mouth every day. Take with food, Disp: 30 Tab, Rfl: 5  •  hydrocodone-acetaminophen (NORCO) 5-325 MG Tab per tablet, Take 1-2 Tabs by mouth every four hours as needed., Disp: 24 Tab, Rfl: 0  •  lamotrigine (LAMICTAL) 100 MG Tab, Take 150 mg by mouth every day., Disp: , Rfl:   ALLERGIES: No Known Allergies  SURGHX:   Past Surgical History:   Procedure Laterality Date   • AXILLARY NODE DISSECTION Left 6/23/2017    Procedure: AXILLARY NODE DISSECTION- COMPLETION LYPHADENECTOMY;  Surgeon: Lionel Weinberg M.D.;  Location: SURGERY [facility];  Service:    • WIDE EXCISION Left 5/9/2017     Procedure: WIDE EXCISION - MALIGNANT MELANOMA HAND;  Surgeon: Lionel Weinberg M.D.;  Location: SURGERY SAME DAY [facility];  Service:    • NODE BIOPSY SENTINEL Left 5/9/2017    Procedure: NODE BIOPSY SENTINEL - AXILLARY;  Surgeon: Lionel Weinberg M.D.;  Location: SURGERY SAME DAY [facility];  Service:    • OTHER  2016    excisino of melanoma left hand   • ADENOIDECTOMY     • MYRINGOTOMY      with tube placement     SOCHX:  reports that she has been smoking Cigarettes.  She has a 2.50 pack-year smoking history. She has never used smokeless tobacco. She reports that she drinks about 1.0 oz of alcohol per week . She reports that she does not use drugs.  FH: Family history was reviewed, no pertinent findings to report    Physical Exam   Constitutional: She is oriented to person, place, and time. She appears well-developed and well-nourished.   HENT:   Head: Normocephalic and atraumatic.   Mouth/Throat: No oropharyngeal exudate.   Ears- Canals clear- TM- with clear fluid effusions bilaterally.   Pos. PND, with slight erythema- without tonsillar edema or exudate.   Mild discharge noted bilaterally- to nares.      Eyes: EOM are normal. Pupils are equal, round, and reactive to light.   Neck: Normal range of motion. Neck supple.   Cardiovascular: Normal rate and regular rhythm.    No murmur heard.  Pulmonary/Chest: Effort normal. No respiratory distress. She has wheezes.   Exp. Wheezing throughout= recheck after breathing treatment- significant subjective improvement of SOB.    Musculoskeletal: Normal range of motion. She exhibits no tenderness.   Lymphadenopathy:     She has no cervical adenopathy.   Neurological: She is alert and oriented to person, place, and time.   Skin: Skin is warm. No rash noted.   Psychiatric: She has a normal mood and affect. Her behavior is normal.   Vitals reviewed.              Assessment/Plan:     1. Bronchitis  - doxycycline (VIBRAMYCIN) 100 MG Tab; Take 1 Tab by mouth 2 times a day for 5  days.  Dispense: 10 Tab; Refill: 0  - albuterol 108 (90 Base) MCG/ACT Aero Soln inhalation aerosol; Inhale 2 Puffs by mouth every 6 hours as needed for Shortness of Breath.  Dispense: 8.5 g; Refill: 0  - benzonatate (TESSALON) 100 MG Cap; Take 2 Caps by mouth 3 times a day as needed for Cough.  Dispense: 30 Cap; Refill: 0    2. Wheezing  - albuterol (PROVENTIL) 2.5mg/3ml nebulizer solution 2.5 mg; 3 mL by Nebulization route Once.  - albuterol 108 (90 Base) MCG/ACT Aero Soln inhalation aerosol; Inhale 2 Puffs by mouth every 6 hours as needed for Shortness of Breath.  Dispense: 8.5 g; Refill: 0  - benzonatate (TESSALON) 100 MG Cap; Take 2 Caps by mouth 3 times a day as needed for Cough.  Dispense: 30 Cap; Refill: 0    3. Type 2 diabetes mellitus with complication, with long-term current use of insulin (CMS-[facility])    Discussed the importance of tighter DMII control- she is to avoid OTC cough syrup. And follow up with Dr. Lenard ROBERTS for discussion of possible sliding scale insulin.   Due to hx of uncontrolled DMII along with asthma, and with recent hx of malignant melanoma currently on Ipilimumab will treat with Doxycyline today.   Avoid night time dairy, humidification- start on home neb. Work note written.     Patient given precautionary s/sx that mandate immediate follow up and evaluation in the ED. Advised of risks of not doing so.    DDX, Supportive care, and indications for immediate follow-up discussed with patient.    Instructed to return to clinic or nearest emergency department if we are not available for any change in condition, further concerns, or worsening of symptoms.    The patient demonstrated a good understanding and agreed with the treatment plan.

## 2017-10-24 ENCOUNTER — APPOINTMENT (RX ONLY)
Dept: URBAN - METROPOLITAN AREA CLINIC 20 | Facility: CLINIC | Age: 37
Setting detail: DERMATOLOGY
End: 2017-10-24

## 2017-10-24 DIAGNOSIS — L57.8 OTHER SKIN CHANGES DUE TO CHRONIC EXPOSURE TO NONIONIZING RADIATION: ICD-10-CM

## 2017-10-24 DIAGNOSIS — D18.0 HEMANGIOMA: ICD-10-CM

## 2017-10-24 DIAGNOSIS — Z85.820 PERSONAL HISTORY OF MALIGNANT MELANOMA OF SKIN: ICD-10-CM

## 2017-10-24 DIAGNOSIS — D22 MELANOCYTIC NEVI: ICD-10-CM

## 2017-10-24 DIAGNOSIS — L81.4 OTHER MELANIN HYPERPIGMENTATION: ICD-10-CM

## 2017-10-24 PROBLEM — D22.5 MELANOCYTIC NEVI OF TRUNK: Status: ACTIVE | Noted: 2017-10-24

## 2017-10-24 PROBLEM — D22.72 MELANOCYTIC NEVI OF LEFT LOWER LIMB, INCLUDING HIP: Status: ACTIVE | Noted: 2017-10-24

## 2017-10-24 PROBLEM — D18.01 HEMANGIOMA OF SKIN AND SUBCUTANEOUS TISSUE: Status: ACTIVE | Noted: 2017-10-24

## 2017-10-24 PROBLEM — D48.5 NEOPLASM OF UNCERTAIN BEHAVIOR OF SKIN: Status: ACTIVE | Noted: 2017-10-24

## 2017-10-24 PROBLEM — D22.71 MELANOCYTIC NEVI OF RIGHT LOWER LIMB, INCLUDING HIP: Status: ACTIVE | Noted: 2017-10-24

## 2017-10-24 PROCEDURE — 11100: CPT

## 2017-10-24 PROCEDURE — 11101: CPT

## 2017-10-24 PROCEDURE — 99214 OFFICE O/P EST MOD 30 MIN: CPT | Mod: 25

## 2017-10-24 PROCEDURE — ? COUNSELING

## 2017-10-24 PROCEDURE — ? BIOPSY BY SHAVE METHOD

## 2017-10-24 ASSESSMENT — LOCATION DETAILED DESCRIPTION DERM
LOCATION DETAILED: RIGHT INFERIOR LATERAL LOWER BACK
LOCATION DETAILED: LEFT ANTERIOR DISTAL THIGH
LOCATION DETAILED: LEFT DISTAL POSTERIOR THIGH
LOCATION DETAILED: LEFT DORSAL FOREARM
LOCATION DETAILED: RIGHT DORSAL HAND
LOCATION DETAILED: LEFT INFERIOR MEDIAL MIDBACK
LOCATION DETAILED: LEFT INFERIOR UPPER BACK
LOCATION DETAILED: LEFT ANTERIOR THIGH
LOCATION DETAILED: LEFT ANTERIOR PROXIMAL THIGH
LOCATION DETAILED: LEFT UPPER BACK
LOCATION DETAILED: LEFT CHEEK
LOCATION DETAILED: LEFT DORSAL HAND
LOCATION DETAILED: LEFT RADIAL DORSAL HAND
LOCATION DETAILED: LEFT MID BACK
LOCATION DETAILED: RIGHT DISTAL CALF
LOCATION DETAILED: RIGHT CHEEK
LOCATION DETAILED: RIGHT ANTERIOR PROXIMAL THIGH
LOCATION DETAILED: RIGHT ANTERIOR DISTAL THIGH
LOCATION DETAILED: RIGHT ANTERIOR THIGH
LOCATION DETAILED: RIGHT DORSAL FOREARM

## 2017-10-24 ASSESSMENT — LOCATION ZONE DERM
LOCATION ZONE: LEG
LOCATION ZONE: LEG
LOCATION ZONE: TRUNK
LOCATION ZONE: FACE
LOCATION ZONE: HAND
LOCATION ZONE: TRUNK
LOCATION ZONE: ARM

## 2017-10-24 ASSESSMENT — LOCATION SIMPLE DESCRIPTION DERM
LOCATION SIMPLE: RIGHT LOWER EXTREMITY
LOCATION SIMPLE: LEFT THIGH
LOCATION SIMPLE: LEFT LOWER BACK
LOCATION SIMPLE: LEFT THIGH
LOCATION SIMPLE: LEFT LOWER EXTREMITY
LOCATION SIMPLE: RIGHT LOWER BACK
LOCATION SIMPLE: RIGHT UPPER EXTREMITY
LOCATION SIMPLE: RIGHT THIGH
LOCATION SIMPLE: RIGHT HAND
LOCATION SIMPLE: LEFT POSTERIOR THIGH
LOCATION SIMPLE: RIGHT CHEEK
LOCATION SIMPLE: BACK
LOCATION SIMPLE: LEFT UPPER EXTREMITY
LOCATION SIMPLE: LEFT HAND
LOCATION SIMPLE: LEFT UPPER BACK
LOCATION SIMPLE: LEFT CHEEK
LOCATION SIMPLE: RIGHT CALF
LOCATION SIMPLE: RIGHT THIGH

## 2017-10-24 NOTE — PROCEDURE: BIOPSY BY SHAVE METHOD
Dressing: Band-Aid
Curettage Text: The wound bed was treated with curettage after the biopsy was performed.
Detail Level: Detailed
Notification Instructions: Patient will be notified of biopsy results. However, patient instructed to call the office if not contacted within 2 weeks.
Wound Care: Aquaphor
Size Of Lesion In Cm: 0.1
Consent: Written consent was obtained and risks were reviewed including but not limited to scarring, infection, bleeding, scabbing, incomplete removal, nerve damage and allergy to anesthesia.
Electrodesiccation And Curettage Text: The wound bed was treated with electrodesiccation and curettage after the biopsy was performed.
Anesthesia Volume In Cc: 0.2
Hemostasis: Drysol and Electrocautery
Cryotherapy Text: The wound bed was treated with cryotherapy after the biopsy was performed.
Additional Anesthesia Volume In Cc (Will Not Render If 0): 0
Render Post-Care Instructions In Note?: no
Biopsy Type: H and E
Lab Facility: 
Billing Type: Third-Party Bill
Anesthesia Type: 1% lidocaine with 1:100,000 epinephrine and 408mcg clindamycin/ml and a 1:10 solution of 8.4% sodium bicarbonate
Lab: [id number]
Body Location Override (Optional - Billing Will Still Be Based On Selected Body Map Location If Applicable): Right Anterior thigh
Silver Nitrate Text: The wound bed was treated with silver nitrate after the biopsy was performed.
Type Of Destruction Used: Curettage
Biopsy Method: Personna blade
Electrodesiccation Text: The wound bed was treated with electrodesiccation after the biopsy was performed.
Post-Care Instructions: I reviewed with the patient in detail post-care instructions. Patient is to keep the biopsy site dry overnight, and then apply bacitracin twice daily until healed. Patient may apply hydrogen peroxide soaks to remove any crusting.
Body Location Override (Optional - Billing Will Still Be Based On Selected Body Map Location If Applicable): Left Proximal Thigh
Body Location Override (Optional - Billing Will Still Be Based On Selected Body Map Location If Applicable): Right Calf
Size Of Lesion In Cm: 0.5
Body Location Override (Optional - Billing Will Still Be Based On Selected Body Map Location If Applicable): Left Posterior Thigh
Body Location Override (Optional - Billing Will Still Be Based On Selected Body Map Location If Applicable): Right Lower Back
Size Of Lesion In Cm: 0.3
X Size Of Lesion In Cm: 0.6
Body Location Override (Optional - Billing Will Still Be Based On Selected Body Map Location If Applicable): Left Mid Back

## 2017-11-02 ENCOUNTER — RX ONLY (OUTPATIENT)
Age: 37
Setting detail: RX ONLY
End: 2017-11-02

## 2017-11-02 RX ORDER — CLOBETASOL PROPIONATE 0.5 MG/G
CREAM TOPICAL
Qty: 1 | Refills: 3 | Status: ERX

## 2017-11-17 ENCOUNTER — APPOINTMENT (RX ONLY)
Dept: URBAN - METROPOLITAN AREA CLINIC 20 | Facility: CLINIC | Age: 37
Setting detail: DERMATOLOGY
End: 2017-11-17

## 2017-11-17 DIAGNOSIS — D22 MELANOCYTIC NEVI: ICD-10-CM

## 2017-11-17 PROBLEM — D22.72 MELANOCYTIC NEVI OF LEFT LOWER LIMB, INCLUDING HIP: Status: ACTIVE | Noted: 2017-11-17

## 2017-11-17 PROCEDURE — ? EXCISION

## 2017-11-17 PROCEDURE — 12032 INTMD RPR S/A/T/EXT 2.6-7.5: CPT

## 2017-11-17 PROCEDURE — 11401 EXC TR-EXT B9+MARG 0.6-1 CM: CPT

## 2017-11-17 ASSESSMENT — LOCATION ZONE DERM: LOCATION ZONE: LEG

## 2017-11-17 ASSESSMENT — LOCATION DETAILED DESCRIPTION DERM: LOCATION DETAILED: LEFT POSTERIOR ANKLE

## 2017-11-17 ASSESSMENT — LOCATION SIMPLE DESCRIPTION DERM: LOCATION SIMPLE: LEFT ANKLE

## 2017-11-17 NOTE — PROCEDURE: EXCISION
Dermal Closure: simple interrupted
Paramedian Forehead Flap Text: A decision was made to reconstruct the defect utilizing an interpolation axial flap and a staged reconstruction.  A telfa template was made of the defect.  This telfa template was then used to outline the paramedian forehead pedicle flap.  The donor area for the pedicle flap was then injected with anesthesia.  The flap was excised through the skin and subcutaneous tissue down to the layer of the underlying musculature.  The pedicle flap was carefully excised within this deep plane to maintain its blood supply.  The edges of the donor site were undermined.   The donor site was closed in a primary fashion.  The pedicle was then rotated into position and sutured.  Once the tube was sutured into place, adequate blood supply was confirmed with blanching and refill.  The pedicle was then wrapped with xeroform gauze and dressed appropriately with a telfa and gauze bandage to ensure continued blood supply and protect the attached pedicle.
Patient Will Remove Sutures At Home?: Yes
Anesthesia Volume In Cc: 0
S Plasty Text: Given the location and shape of the defect, and the orientation of relaxed skin tension lines, an S-plasty was deemed most appropriate for repair.  Using a sterile surgical marker, the appropriate outline of the S-plasty was drawn, incorporating the defect and placing the expected incisions within the relaxed skin tension lines where possible.  The area thus outlined was incised deep to adipose tissue with a #15 scalpel blade.  The skin margins were undermined to an appropriate distance in all directions utilizing iris scissors. The skin flaps were advanced over the defect.  The opposing margins were then approximated with interrupted buried subcutaneous sutures.
Scalpel Size: 15 blade
O-L Flap Text: The defect edges were debeveled with a #15 scalpel blade.  Given the location of the defect, shape of the defect and the proximity to free margins an O-L flap was deemed most appropriate.  Using a sterile surgical marker, an appropriate advancement flap was drawn incorporating the defect and placing the expected incisions within the relaxed skin tension lines where possible.    The area thus outlined was incised deep to adipose tissue with a #15 scalpel blade.  The skin margins were undermined to an appropriate distance in all directions utilizing iris scissors.
Medical Necessity Information: It is in your best interest to select a reason for this procedure from the list below. All of these items fulfill various CMS LCD requirements except lesion extends to a margin.
Skin Substitute Text: The defect edges were debeveled with a #15 scalpel blade.  Given the location of the defect, shape of the defect and the proximity to free margins a skin substitute graft was deemed most appropriate.  The graft material was trimmed to fit the size of the defect. The graft was then placed in the primary defect and oriented appropriately.
Complex Repair And Double M Plasty Text: The defect edges were debeveled with a #15 scalpel blade.  The primary defect was closed partially with a complex linear closure.  Given the location of the remaining defect, shape of the defect and the proximity to free margins a double M plasty was deemed most appropriate for complete closure of the defect.  Using a sterile surgical marker, an appropriate advancement flap was drawn incorporating the defect and placing the expected incisions within the relaxed skin tension lines where possible.    The area thus outlined was incised deep to adipose tissue with a #15 scalpel blade.  The skin margins were undermined to an appropriate distance in all directions utilizing iris scissors.
Complex Repair Preamble Text (Leave Blank If You Do Not Want): Extensive wide undermining was performed.
Complex Repair And W Plasty Text: The defect edges were debeveled with a #15 scalpel blade.  The primary defect was closed partially with a complex linear closure.  Given the location of the remaining defect, shape of the defect and the proximity to free margins a W plasty was deemed most appropriate for complete closure of the defect.  Using a sterile surgical marker, an appropriate advancement flap was drawn incorporating the defect and placing the expected incisions within the relaxed skin tension lines where possible.    The area thus outlined was incised deep to adipose tissue with a #15 scalpel blade.  The skin margins were undermined to an appropriate distance in all directions utilizing iris scissors.
Trilobed Flap Text: The defect edges were debeveled with a #15 scalpel blade.  Given the location of the defect and the proximity to free margins a trilobed flap was deemed most appropriate.  Using a sterile surgical marker, an appropriate trilobed flap drawn around the defect.    The area thus outlined was incised deep to adipose tissue with a #15 scalpel blade.  The skin margins were undermined to an appropriate distance in all directions utilizing iris scissors.
A-T Advancement Flap Text: The defect edges were debeveled with a #15 scalpel blade.  Given the location of the defect, shape of the defect and the proximity to free margins an A-T advancement flap was deemed most appropriate.  Using a sterile surgical marker, an appropriate advancement flap was drawn incorporating the defect and placing the expected incisions within the relaxed skin tension lines where possible.    The area thus outlined was incised deep to adipose tissue with a #15 scalpel blade.  The skin margins were undermined to an appropriate distance in all directions utilizing iris scissors.
Saucerization Excision Additional Text (Leave Blank If You Do Not Want): The margin was drawn around the clinically apparent lesion.  Incisions were then made along these lines, in a tangential fashion, to the appropriate tissue plane and the lesion was extirpated.
Ftsg Text: The defect edges were debeveled with a #15 scalpel blade.  Given the location of the defect, shape of the defect and the proximity to free margins a full thickness skin graft was deemed most appropriate.  Using a sterile surgical marker, the primary defect shape was transferred to the donor site. The area thus outlined was incised deep to adipose tissue with a #15 scalpel blade.  The harvested graft was then trimmed of adipose tissue until only dermis and epidermis was left.  The skin margins of the secondary defect were undermined to an appropriate distance in all directions utilizing iris scissors.  The secondary defect was closed with interrupted buried subcutaneous sutures.  The skin edges were then re-apposed with running  sutures.  The skin graft was then placed in the primary defect and oriented appropriately.
Size Of Lesion In Cm: 0.3
Lab Facility: 
Bill For Surgical Tray: no
Bilobed Transposition Flap Text: The defect edges were debeveled with a #15 scalpel blade.  Given the location of the defect and the proximity to free margins a bilobed transposition flap was deemed most appropriate.  Using a sterile surgical marker, an appropriate bilobe flap drawn around the defect.    The area thus outlined was incised deep to adipose tissue with a #15 scalpel blade.  The skin margins were undermined to an appropriate distance in all directions utilizing iris scissors.
Muscle Hinge Flap Text: The defect edges were debeveled with a #15 scalpel blade.  Given the size, depth and location of the defect and the proximity to free margins a muscle hinge flap was deemed most appropriate.  Using a sterile surgical marker, an appropriate hinge flap was drawn incorporating the defect. The area thus outlined was incised with a #15 scalpel blade.  The skin margins were undermined to an appropriate distance in all directions utilizing iris scissors.
Intermediate / Complex Repair - Final Wound Length In Cm: 2.7
Rotation Flap Text: The defect edges were debeveled with a #15 scalpel blade.  Given the location of the defect, shape of the defect and the proximity to free margins a rotation flap was deemed most appropriate.  Using a sterile surgical marker, an appropriate rotation flap was drawn incorporating the defect and placing the expected incisions within the relaxed skin tension lines where possible.    The area thus outlined was incised deep to adipose tissue with a #15 scalpel blade.  The skin margins were undermined to an appropriate distance in all directions utilizing iris scissors.
Repair Performed By Another Provider Text (Leave Blank If You Do Not Want): After the tissue was excised the defect was repaired by another provider.
Bilobed Flap Text: The defect edges were debeveled with a #15 scalpel blade.  Given the location of the defect and the proximity to free margins a bilobe flap was deemed most appropriate.  Using a sterile surgical marker, an appropriate bilobe flap drawn around the defect.    The area thus outlined was incised deep to adipose tissue with a #15 scalpel blade.  The skin margins were undermined to an appropriate distance in all directions utilizing iris scissors.
Melolabial Interpolation Flap Text: A decision was made to reconstruct the defect utilizing an interpolation axial flap and a staged reconstruction.  A telfa template was made of the defect.  This telfa template was then used to outline the melolabial interpolation flap.  The donor area for the pedicle flap was then injected with anesthesia.  The flap was excised through the skin and subcutaneous tissue down to the layer of the underlying musculature.  The pedicle flap was carefully excised within this deep plane to maintain its blood supply.  The edges of the donor site were undermined.   The donor site was closed in a primary fashion.  The pedicle was then rotated into position and sutured.  Once the tube was sutured into place, adequate blood supply was confirmed with blanching and refill.  The pedicle was then wrapped with xeroform gauze and dressed appropriately with a telfa and gauze bandage to ensure continued blood supply and protect the attached pedicle.
Anesthesia Type: 1% lidocaine with 1:100,000 epinephrine and 408mcg clindamycin/ml and a 1:10 solution of 8.4% sodium bicarbonate
O-Z Plasty Text: The defect edges were debeveled with a #15 scalpel blade.  Given the location of the defect, shape of the defect and the proximity to free margins an O-Z plasty (double transposition flap) was deemed most appropriate.  Using a sterile surgical marker, the appropriate transposition flaps were drawn incorporating the defect and placing the expected incisions within the relaxed skin tension lines where possible.    The area thus outlined was incised deep to adipose tissue with a #15 scalpel blade.  The skin margins were undermined to an appropriate distance in all directions utilizing iris scissors.  Hemostasis was achieved with electrocautery.  The flaps were then transposed into place, one clockwise and the other counterclockwise, and anchored with interrupted buried subcutaneous sutures.
Eliptical Excision Additional Text (Leave Blank If You Do Not Want): The margin was drawn around the clinically apparent lesion.  An elliptical shape was then drawn on the skin incorporating the lesion and margins.  Incisions were then made along these lines to the appropriate tissue plane and the lesion was extirpated.
Excision Depth: adipose tissue
V-Y Flap Text: The defect edges were debeveled with a #15 scalpel blade.  Given the location of the defect, shape of the defect and the proximity to free margins a V-Y flap was deemed most appropriate.  Using a sterile surgical marker, an appropriate advancement flap was drawn incorporating the defect and placing the expected incisions within the relaxed skin tension lines where possible.    The area thus outlined was incised deep to adipose tissue with a #15 scalpel blade.  The skin margins were undermined to an appropriate distance in all directions utilizing iris scissors.
Posterior Auricular Interpolation Flap Text: A decision was made to reconstruct the defect utilizing an interpolation axial flap and a staged reconstruction.  A telfa template was made of the defect.  This telfa template was then used to outline the posterior auricular interpolation flap.  The donor area for the pedicle flap was then injected with anesthesia.  The flap was excised through the skin and subcutaneous tissue down to the layer of the underlying musculature.  The pedicle flap was carefully excised within this deep plane to maintain its blood supply.  The edges of the donor site were undermined.   The donor site was closed in a primary fashion.  The pedicle was then rotated into position and sutured.  Once the tube was sutured into place, adequate blood supply was confirmed with blanching and refill.  The pedicle was then wrapped with xeroform gauze and dressed appropriately with a telfa and gauze bandage to ensure continued blood supply and protect the attached pedicle.
Split-Thickness Skin Graft Text: The defect edges were debeveled with a #15 scalpel blade.  Given the location of the defect, shape of the defect and the proximity to free margins a split thickness skin graft was deemed most appropriate.  Using a sterile surgical marker, the primary defect shape was transferred to the donor site. The split thickness graft was then harvested.  The skin graft was then placed in the primary defect and oriented appropriately.
Complex Repair And M Plasty Text: The defect edges were debeveled with a #15 scalpel blade.  The primary defect was closed partially with a complex linear closure.  Given the location of the remaining defect, shape of the defect and the proximity to free margins an M plasty was deemed most appropriate for complete closure of the defect.  Using a sterile surgical marker, an appropriate advancement flap was drawn incorporating the defect and placing the expected incisions within the relaxed skin tension lines where possible.    The area thus outlined was incised deep to adipose tissue with a #15 scalpel blade.  The skin margins were undermined to an appropriate distance in all directions utilizing iris scissors.
No Repair - Repaired With Adjacent Surgical Defect Text (Leave Blank If You Do Not Want): After the excision the defect was repaired concurrently with another surgical defect which was in close approximation.
V-Y Plasty Text: The defect edges were debeveled with a #15 scalpel blade.  Given the location of the defect, shape of the defect and the proximity to free margins an V-Y advancement flap was deemed most appropriate.  Using a sterile surgical marker, an appropriate advancement flap was drawn incorporating the defect and placing the expected incisions within the relaxed skin tension lines where possible.    The area thus outlined was incised deep to adipose tissue with a #15 scalpel blade.  The skin margins were undermined to an appropriate distance in all directions utilizing iris scissors.
Lab: [id number]
Double Island Pedicle Flap Text: The defect edges were debeveled with a #15 scalpel blade.  Given the location of the defect, shape of the defect and the proximity to free margins a double island pedicle advancement flap was deemed most appropriate.  Using a sterile surgical marker, an appropriate advancement flap was drawn incorporating the defect, outlining the appropriate donor tissue and placing the expected incisions within the relaxed skin tension lines where possible.    The area thus outlined was incised deep to adipose tissue with a #15 scalpel blade.  The skin margins were undermined to an appropriate distance in all directions around the primary defect and laterally outward around the island pedicle utilizing iris scissors.  There was minimal undermining beneath the pedicle flap.
Home Suture Removal Text: Patient was provided a home suture removal kit and will remove their sutures at home.  If they have any questions or difficulties they will call the office.
Complex Repair And Modified Advancement Flap Text: The defect edges were debeveled with a #15 scalpel blade.  The primary defect was closed partially with a complex linear closure.  Given the location of the remaining defect, shape of the defect and the proximity to free margins a modified advancement flap was deemed most appropriate for complete closure of the defect.  Using a sterile surgical marker, an appropriate advancement flap was drawn incorporating the defect and placing the expected incisions within the relaxed skin tension lines where possible.    The area thus outlined was incised deep to adipose tissue with a #15 scalpel blade.  The skin margins were undermined to an appropriate distance in all directions utilizing iris scissors.
Anesthesia Volume In Cc: 2.5
Spiral Flap Text: The defect edges were debeveled with a #15 scalpel blade.  Given the location of the defect, shape of the defect and the proximity to free margins a spiral flap was deemed most appropriate.  Using a sterile surgical marker, an appropriate rotation flap was drawn incorporating the defect and placing the expected incisions within the relaxed skin tension lines where possible. The area thus outlined was incised deep to adipose tissue with a #15 scalpel blade.  The skin margins were undermined to an appropriate distance in all directions utilizing iris scissors.
Estimated Blood Loss (Cc): minimal
Ear Star Wedge Flap Text: The defect edges were debeveled with a #15 blade scalpel.  Given the location of the defect and the proximity to free margins (helical rim) an ear star wedge flap was deemed most appropriate.  Using a sterile surgical marker, the appropriate flap was drawn incorporating the defect and placing the expected incisions between the helical rim and antihelix where possible.  The area thus outlined was incised through and through with a #15 scalpel blade.
Path Notes (To The Dermatopathologist): Please check margins.
Number Of Deep Sutures (Optional): 4
Crescentic Advancement Flap Text: The defect edges were debeveled with a #15 scalpel blade.  Given the location of the defect and the proximity to free margins a crescentic advancement flap was deemed most appropriate.  Using a sterile surgical marker, the appropriate advancement flap was drawn incorporating the defect and placing the expected incisions within the relaxed skin tension lines where possible.    The area thus outlined was incised deep to adipose tissue with a #15 scalpel blade.  The skin margins were undermined to an appropriate distance in all directions utilizing iris scissors.
Complex Repair And Rotation Flap Text: The defect edges were debeveled with a #15 scalpel blade.  The primary defect was closed partially with a complex linear closure.  Given the location of the remaining defect, shape of the defect and the proximity to free margins a rotation flap was deemed most appropriate for complete closure of the defect.  Using a sterile surgical marker, an appropriate advancement flap was drawn incorporating the defect and placing the expected incisions within the relaxed skin tension lines where possible.    The area thus outlined was incised deep to adipose tissue with a #15 scalpel blade.  The skin margins were undermined to an appropriate distance in all directions utilizing iris scissors.
Interpolation Flap Text: A decision was made to reconstruct the defect utilizing an interpolation axial flap and a staged reconstruction.  A telfa template was made of the defect.  This telfa template was then used to outline the interpolation flap.  The donor area for the pedicle flap was then injected with anesthesia.  The flap was excised through the skin and subcutaneous tissue down to the layer of the underlying musculature.  The interpolation flap was carefully excised within this deep plane to maintain its blood supply.  The edges of the donor site were undermined.   The donor site was closed in a primary fashion.  The pedicle was then rotated into position and sutured.  Once the tube was sutured into place, adequate blood supply was confirmed with blanching and refill.  The pedicle was then wrapped with xeroform gauze and dressed appropriately with a telfa and gauze bandage to ensure continued blood supply and protect the attached pedicle.
Complex Repair And Dermal Autograft Text: The defect edges were debeveled with a #15 scalpel blade.  The primary defect was closed partially with a complex linear closure.  Given the location of the defect, shape of the defect and the proximity to free margins an dermal autograft was deemed most appropriate to repair the remaining defect.  The graft was trimmed to fit the size of the remaining defect.  The graft was then placed in the primary defect, oriented appropriately, and sutured into place.
Dorsal Nasal Flap Text: The defect edges were debeveled with a #15 scalpel blade.  Given the location of the defect and the proximity to free margins a dorsal nasal flap was deemed most appropriate.  Using a sterile surgical marker, an appropriate dorsal nasal flap was drawn around the defect.    The area thus outlined was incised deep to adipose tissue with a #15 scalpel blade.  The skin margins were undermined to an appropriate distance in all directions utilizing iris scissors.
Dermal Autograft Text: The defect edges were debeveled with a #15 scalpel blade.  Given the location of the defect, shape of the defect and the proximity to free margins a dermal autograft was deemed most appropriate.  Using a sterile surgical marker, the primary defect shape was transferred to the donor site. The area thus outlined was incised deep to adipose tissue with a #15 scalpel blade.  The harvested graft was then trimmed of adipose and epidermal tissue until only dermis was left.  The skin graft was then placed in the primary defect and oriented appropriately.
Hemostasis: Electrocautery
Number Of Epidermal Sutures (Optional): 11
Mastoid Interpolation Flap Text: A decision was made to reconstruct the defect utilizing an interpolation axial flap and a staged reconstruction.  A telfa template was made of the defect.  This telfa template was then used to outline the mastoid interpolation flap.  The donor area for the pedicle flap was then injected with anesthesia.  The flap was excised through the skin and subcutaneous tissue down to the layer of the underlying musculature.  The pedicle flap was carefully excised within this deep plane to maintain its blood supply.  The edges of the donor site were undermined.   The donor site was closed in a primary fashion.  The pedicle was then rotated into position and sutured.  Once the tube was sutured into place, adequate blood supply was confirmed with blanching and refill.  The pedicle was then wrapped with xeroform gauze and dressed appropriately with a telfa and gauze bandage to ensure continued blood supply and protect the attached pedicle.
Dressing: pressure dressing
Complex Repair And Tissue Cultured Epidermal Autograft Text: The defect edges were debeveled with a #15 scalpel blade.  The primary defect was closed partially with a complex linear closure.  Given the location of the defect, shape of the defect and the proximity to free margins an tissue cultured epidermal autograft was deemed most appropriate to repair the remaining defect.  The graft was trimmed to fit the size of the remaining defect.  The graft was then placed in the primary defect, oriented appropriately, and sutured into place.
Detail Level: Detailed
Complex Repair And Double Advancement Flap Text: The defect edges were debeveled with a #15 scalpel blade.  The primary defect was closed partially with a complex linear closure.  Given the location of the remaining defect, shape of the defect and the proximity to free margins a double advancement flap was deemed most appropriate for complete closure of the defect.  Using a sterile surgical marker, an appropriate advancement flap was drawn incorporating the defect and placing the expected incisions within the relaxed skin tension lines where possible.    The area thus outlined was incised deep to adipose tissue with a #15 scalpel blade.  The skin margins were undermined to an appropriate distance in all directions utilizing iris scissors.
Island Pedicle Flap-Requiring Vessel Identification Text: The defect edges were debeveled with a #15 scalpel blade.  Given the location of the defect, shape of the defect and the proximity to free margins an island pedicle advancement flap was deemed most appropriate.  Using a sterile surgical marker, an appropriate advancement flap was drawn, based on the axial vessel mentioned above, incorporating the defect, outlining the appropriate donor tissue and placing the expected incisions within the relaxed skin tension lines where possible.    The area thus outlined was incised deep to adipose tissue with a #15 scalpel blade.  The skin margins were undermined to an appropriate distance in all directions around the primary defect and laterally outward around the island pedicle utilizing iris scissors.  There was minimal undermining beneath the pedicle flap.
Complex Repair And A-T Advancement Flap Text: The defect edges were debeveled with a #15 scalpel blade.  The primary defect was closed partially with a complex linear closure.  Given the location of the remaining defect, shape of the defect and the proximity to free margins an A-T advancement flap was deemed most appropriate for complete closure of the defect.  Using a sterile surgical marker, an appropriate advancement flap was drawn incorporating the defect and placing the expected incisions within the relaxed skin tension lines where possible.    The area thus outlined was incised deep to adipose tissue with a #15 scalpel blade.  The skin margins were undermined to an appropriate distance in all directions utilizing iris scissors.
Slit Excision Additional Text (Leave Blank If You Do Not Want): A linear line was drawn on the skin overlying the lesion. An incision was made slowly until the lesion was visualized.  Once visualized, the lesion was removed with blunt dissection.
Excisional Biopsy Additional Text (Leave Blank If You Do Not Want): The margin was drawn around the clinically apparent lesion. An elliptical shape was then drawn on the skin incorporating the lesion and margins.  Incisions were then made along these lines to the appropriate tissue plane and the lesion was extirpated.
Star Wedge Flap Text: The defect edges were debeveled with a #15 scalpel blade.  Given the location of the defect, shape of the defect and the proximity to free margins a star wedge flap was deemed most appropriate.  Using a sterile surgical marker, an appropriate rotation flap was drawn incorporating the defect and placing the expected incisions within the relaxed skin tension lines where possible. The area thus outlined was incised deep to adipose tissue with a #15 scalpel blade.  The skin margins were undermined to an appropriate distance in all directions utilizing iris scissors.
Excision Method: Elliptical
Complex Repair And O-T Advancement Flap Text: The defect edges were debeveled with a #15 scalpel blade.  The primary defect was closed partially with a complex linear closure.  Given the location of the remaining defect, shape of the defect and the proximity to free margins an O-T advancement flap was deemed most appropriate for complete closure of the defect.  Using a sterile surgical marker, an appropriate advancement flap was drawn incorporating the defect and placing the expected incisions within the relaxed skin tension lines where possible.    The area thus outlined was incised deep to adipose tissue with a #15 scalpel blade.  The skin margins were undermined to an appropriate distance in all directions utilizing iris scissors.
Rhombic Flap Text: The defect edges were debeveled with a #15 scalpel blade.  Given the location of the defect and the proximity to free margins a rhombic flap was deemed most appropriate.  Using a sterile surgical marker, an appropriate rhombic flap was drawn incorporating the defect.    The area thus outlined was incised deep to adipose tissue with a #15 scalpel blade.  The skin margins were undermined to an appropriate distance in all directions utilizing iris scissors.
Complex Repair And Xenograft Text: The defect edges were debeveled with a #15 scalpel blade.  The primary defect was closed partially with a complex linear closure.  Given the location of the defect, shape of the defect and the proximity to free margins a xenograft was deemed most appropriate to repair the remaining defect.  The graft was trimmed to fit the size of the remaining defect.  The graft was then placed in the primary defect, oriented appropriately, and sutured into place.
Keystone Flap Text: The defect edges were debeveled with a #15 scalpel blade.  Given the location of the defect, shape of the defect a keystone flap was deemed most appropriate.  Using a sterile surgical marker, an appropriate keystone flap was drawn incorporating the defect, outlining the appropriate donor tissue and placing the expected incisions within the relaxed skin tension lines where possible. The area thus outlined was incised deep to adipose tissue with a #15 scalpel blade.  The skin margins were undermined to an appropriate distance in all directions around the primary defect and laterally outward around the flap utilizing iris scissors.
Helical Rim Advancement Flap Text: The defect edges were debeveled with a #15 blade scalpel.  Given the location of the defect and the proximity to free margins (helical rim) a double helical rim advancement flap was deemed most appropriate.  Using a sterile surgical marker, the appropriate advancement flaps were drawn incorporating the defect and placing the expected incisions between the helical rim and antihelix where possible.  The area thus outlined was incised through and through with a #15 scalpel blade.  With a skin hook and iris scissors, the flaps were gently and sharply undermined and freed up.
H Plasty Text: Given the location of the defect, shape of the defect and the proximity to free margins a H-plasty was deemed most appropriate for repair.  Using a sterile surgical marker, the appropriate advancement arms of the H-plasty were drawn incorporating the defect and placing the expected incisions within the relaxed skin tension lines where possible. The area thus outlined was incised deep to adipose tissue with a #15 scalpel blade. The skin margins were undermined to an appropriate distance in all directions utilizing iris scissors.  The opposing advancement arms were then advanced into place in opposite direction and anchored with interrupted buried subcutaneous sutures.
Tissue Cultured Epidermal Autograft Text: The defect edges were debeveled with a #15 scalpel blade.  Given the location of the defect, shape of the defect and the proximity to free margins a tissue cultured epidermal autograft was deemed most appropriate.  The graft was then trimmed to fit the size of the defect.  The graft was then placed in the primary defect and oriented appropriately.
Graft Donor Site Bandage (Optional-Leave Blank If You Don't Want In Note): Steri-strips and a pressure bandage were applied to the donor site.
Advancement Flap (Double) Text: The defect edges were debeveled with a #15 scalpel blade.  Given the location of the defect and the proximity to free margins a double advancement flap was deemed most appropriate.  Using a sterile surgical marker, the appropriate advancement flaps were drawn incorporating the defect and placing the expected incisions within the relaxed skin tension lines where possible.    The area thus outlined was incised deep to adipose tissue with a #15 scalpel blade.  The skin margins were undermined to an appropriate distance in all directions utilizing iris scissors.
Cheek-To-Nose Interpolation Flap Text: A decision was made to reconstruct the defect utilizing an interpolation axial flap and a staged reconstruction.  A telfa template was made of the defect.  This telfa template was then used to outline the Cheek-To-Nose Interpolation flap.  The donor area for the pedicle flap was then injected with anesthesia.  The flap was excised through the skin and subcutaneous tissue down to the layer of the underlying musculature.  The interpolation flap was carefully excised within this deep plane to maintain its blood supply.  The edges of the donor site were undermined.   The donor site was closed in a primary fashion.  The pedicle was then rotated into position and sutured.  Once the tube was sutured into place, adequate blood supply was confirmed with blanching and refill.  The pedicle was then wrapped with xeroform gauze and dressed appropriately with a telfa and gauze bandage to ensure continued blood supply and protect the attached pedicle.
Island Pedicle Flap Text: The defect edges were debeveled with a #15 scalpel blade.  Given the location of the defect, shape of the defect and the proximity to free margins an island pedicle advancement flap was deemed most appropriate.  Using a sterile surgical marker, an appropriate advancement flap was drawn incorporating the defect, outlining the appropriate donor tissue and placing the expected incisions within the relaxed skin tension lines where possible.    The area thus outlined was incised deep to adipose tissue with a #15 scalpel blade.  The skin margins were undermined to an appropriate distance in all directions around the primary defect and laterally outward around the island pedicle utilizing iris scissors.  There was minimal undermining beneath the pedicle flap.
Complex Repair And Epidermal Autograft Text: The defect edges were debeveled with a #15 scalpel blade.  The primary defect was closed partially with a complex linear closure.  Given the location of the defect, shape of the defect and the proximity to free margins an epidermal autograft was deemed most appropriate to repair the remaining defect.  The graft was trimmed to fit the size of the remaining defect.  The graft was then placed in the primary defect, oriented appropriately, and sutured into place.
Fusiform Excision Additional Text (Leave Blank If You Do Not Want): The margin was drawn around the clinically apparent lesion.  A fusiform shape was then drawn on the skin incorporating the lesion and margins.  Incisions were then made along these lines to the appropriate tissue plane and the lesion was extirpated.
Complex Repair And Dorsal Nasal Flap Text: The defect edges were debeveled with a #15 scalpel blade.  The primary defect was closed partially with a complex linear closure.  Given the location of the remaining defect, shape of the defect and the proximity to free margins a dorsal nasal flap was deemed most appropriate for complete closure of the defect.  Using a sterile surgical marker, an appropriate flap was drawn incorporating the defect and placing the expected incisions within the relaxed skin tension lines where possible.    The area thus outlined was incised deep to adipose tissue with a #15 scalpel blade.  The skin margins were undermined to an appropriate distance in all directions utilizing iris scissors.
Complex Repair And Split-Thickness Skin Graft Text: The defect edges were debeveled with a #15 scalpel blade.  The primary defect was closed partially with a complex linear closure.  Given the location of the defect, shape of the defect and the proximity to free margins a split thickness skin graft was deemed most appropriate to repair the remaining defect.  The graft was trimmed to fit the size of the remaining defect.  The graft was then placed in the primary defect, oriented appropriately, and sutured into place.
Cartilage Graft Text: The defect edges were debeveled with a #15 scalpel blade.  Given the location of the defect, shape of the defect, the fact the defect involved a full thickness cartilage defect a cartilage graft was deemed most appropriate.  An appropriate donor site was identified, cleansed, and anesthetized. The cartilage graft was then harvested and transferred to the recipient site, oriented appropriately and then sutured into place.  The secondary defect was then repaired using a primary closure.
Transposition Flap Text: The defect edges were debeveled with a #15 scalpel blade.  Given the location of the defect and the proximity to free margins a transposition flap was deemed most appropriate.  Using a sterile surgical marker, an appropriate transposition flap was drawn incorporating the defect.    The area thus outlined was incised deep to adipose tissue with a #15 scalpel blade.  The skin margins were undermined to an appropriate distance in all directions utilizing iris scissors.
Island Pedicle Flap With Canthal Suspension Text: The defect edges were debeveled with a #15 scalpel blade.  Given the location of the defect, shape of the defect and the proximity to free margins an island pedicle advancement flap was deemed most appropriate.  Using a sterile surgical marker, an appropriate advancement flap was drawn incorporating the defect, outlining the appropriate donor tissue and placing the expected incisions within the relaxed skin tension lines where possible. The area thus outlined was incised deep to adipose tissue with a #15 scalpel blade.  The skin margins were undermined to an appropriate distance in all directions around the primary defect and laterally outward around the island pedicle utilizing iris scissors.  There was minimal undermining beneath the pedicle flap. A suspension suture was placed in the canthal tendon to prevent tension and prevent ectropion.
Wound Care: Petrolatum
Bilateral Helical Rim Advancement Flap Text: The defect edges were debeveled with a #15 blade scalpel.  Given the location of the defect and the proximity to free margins (helical rim) a bilateral helical rim advancement flap was deemed most appropriate.  Using a sterile surgical marker, the appropriate advancement flaps were drawn incorporating the defect and placing the expected incisions between the helical rim and antihelix where possible.  The area thus outlined was incised through and through with a #15 scalpel blade.  With a skin hook and iris scissors, the flaps were gently and sharply undermined and freed up.
Complex Repair And Single Advancement Flap Text: The defect edges were debeveled with a #15 scalpel blade.  The primary defect was closed partially with a complex linear closure.  Given the location of the remaining defect, shape of the defect and the proximity to free margins a single advancement flap was deemed most appropriate for complete closure of the defect.  Using a sterile surgical marker, an appropriate advancement flap was drawn incorporating the defect and placing the expected incisions within the relaxed skin tension lines where possible.    The area thus outlined was incised deep to adipose tissue with a #15 scalpel blade.  The skin margins were undermined to an appropriate distance in all directions utilizing iris scissors.
Bi-Rhombic Flap Text: The defect edges were debeveled with a #15 scalpel blade.  Given the location of the defect and the proximity to free margins a bi-rhombic flap was deemed most appropriate.  Using a sterile surgical marker, an appropriate rhombic flap was drawn incorporating the defect. The area thus outlined was incised deep to adipose tissue with a #15 scalpel blade.  The skin margins were undermined to an appropriate distance in all directions utilizing iris scissors.
Post-Care Instructions: I reviewed with the patient in detail post-care instructions. Patient is not to engage in any heavy lifting, exercise, or swimming for the next 14 days. Should the patient develop any fevers, chills, bleeding, severe pain patient will contact the office immediately.
Complex Repair And Skin Substitute Graft Text: The defect edges were debeveled with a #15 scalpel blade.  The primary defect was closed partially with a complex linear closure.  Given the location of the remaining defect, shape of the defect and the proximity to free margins a skin substitute graft was deemed most appropriate to repair the remaining defect.  The graft was trimmed to fit the size of the remaining defect.  The graft was then placed in the primary defect, oriented appropriately, and sutured into place.
Xenograft Text: The defect edges were debeveled with a #15 scalpel blade.  Given the location of the defect, shape of the defect and the proximity to free margins a xenograft was deemed most appropriate.  The graft was then trimmed to fit the size of the defect.  The graft was then placed in the primary defect and oriented appropriately.
Complex Repair And Rhombic Flap Text: The defect edges were debeveled with a #15 scalpel blade.  The primary defect was closed partially with a complex linear closure.  Given the location of the remaining defect, shape of the defect and the proximity to free margins a rhombic flap was deemed most appropriate for complete closure of the defect.  Using a sterile surgical marker, an appropriate advancement flap was drawn incorporating the defect and placing the expected incisions within the relaxed skin tension lines where possible.    The area thus outlined was incised deep to adipose tissue with a #15 scalpel blade.  The skin margins were undermined to an appropriate distance in all directions utilizing iris scissors.
Mucosal Advancement Flap Text: Given the location of the defect, shape of the defect and the proximity to free margins a mucosal advancement flap was deemed most appropriate. Incisions were made with a 15 blade scalpel in the appropriate fashion along the cutaneous vermillion border and the mucosal lip. The remaining actinically damaged mucosal tissue was excised.  The mucosal advancement flap was then elevated to the gingival sulcus with care taken to preserve the neurovascular structures and advanced into the primary defect. Care was taken to ensure that precise realignment of the vermilion border was achieved.
Complex Repair And Transposition Flap Text: The defect edges were debeveled with a #15 scalpel blade.  The primary defect was closed partially with a complex linear closure.  Given the location of the remaining defect, shape of the defect and the proximity to free margins a transposition flap was deemed most appropriate for complete closure of the defect.  Using a sterile surgical marker, an appropriate advancement flap was drawn incorporating the defect and placing the expected incisions within the relaxed skin tension lines where possible.    The area thus outlined was incised deep to adipose tissue with a #15 scalpel blade.  The skin margins were undermined to an appropriate distance in all directions utilizing iris scissors.
Advancement Flap (Single) Text: The defect edges were debeveled with a #15 scalpel blade.  Given the location of the defect and the proximity to free margins a single advancement flap was deemed most appropriate.  Using a sterile surgical marker, an appropriate advancement flap was drawn incorporating the defect and placing the expected incisions within the relaxed skin tension lines where possible.    The area thus outlined was incised deep to adipose tissue with a #15 scalpel blade.  The skin margins were undermined to an appropriate distance in all directions utilizing iris scissors.
Burow's Advancement Flap Text: The defect edges were debeveled with a #15 scalpel blade.  Given the location of the defect and the proximity to free margins a Burow's advancement flap was deemed most appropriate.  Using a sterile surgical marker, the appropriate advancement flap was drawn incorporating the defect and placing the expected incisions within the relaxed skin tension lines where possible.    The area thus outlined was incised deep to adipose tissue with a #15 scalpel blade.  The skin margins were undermined to an appropriate distance in all directions utilizing iris scissors.
Melolabial Transposition Flap Text: The defect edges were debeveled with a #15 scalpel blade.  Given the location of the defect and the proximity to free margins a melolabial flap was deemed most appropriate.  Using a sterile surgical marker, an appropriate melolabial transposition flap was drawn incorporating the defect.    The area thus outlined was incised deep to adipose tissue with a #15 scalpel blade.  The skin margins were undermined to an appropriate distance in all directions utilizing iris scissors.
Previous Accession (Optional): [id number]
Purse String (Simple) Text: Given the location of the defect and the characteristics of the surrounding skin a purse string simple closure was deemed most appropriate.  Undermining was performed circumferentially around the surgical defect.  A purse string suture was then placed and tightened.
Repair Type: Intermediate
O-T Advancement Flap Text: The defect edges were debeveled with a #15 scalpel blade.  Given the location of the defect, shape of the defect and the proximity to free margins an O-T advancement flap was deemed most appropriate.  Using a sterile surgical marker, an appropriate advancement flap was drawn incorporating the defect and placing the expected incisions within the relaxed skin tension lines where possible.    The area thus outlined was incised deep to adipose tissue with a #15 scalpel blade.  The skin margins were undermined to an appropriate distance in all directions utilizing iris scissors.
Complex Repair And Z Plasty Text: The defect edges were debeveled with a #15 scalpel blade.  The primary defect was closed partially with a complex linear closure.  Given the location of the remaining defect, shape of the defect and the proximity to free margins a Z plasty was deemed most appropriate for complete closure of the defect.  Using a sterile surgical marker, an appropriate advancement flap was drawn incorporating the defect and placing the expected incisions within the relaxed skin tension lines where possible.    The area thus outlined was incised deep to adipose tissue with a #15 scalpel blade.  The skin margins were undermined to an appropriate distance in all directions utilizing iris scissors.
Intermediate Repair Preamble Text (Leave Blank If You Do Not Want): Undermining was performed with blunt dissection.
Epidermal Autograft Text: The defect edges were debeveled with a #15 scalpel blade.  Given the location of the defect, shape of the defect and the proximity to free margins an epidermal autograft was deemed most appropriate.  Using a sterile surgical marker, the primary defect shape was transferred to the donor site. The epidermal graft was then harvested.  The skin graft was then placed in the primary defect and oriented appropriately.
Z Plasty Text: The lesion was extirpated to the level of the fat with a #15 scalpel blade.  Given the location of the defect, shape of the defect and the proximity to free margins a Z-plasty was deemed most appropriate for repair.  Using a sterile surgical marker, the appropriate transposition arms of the Z-plasty were drawn incorporating the defect and placing the expected incisions within the relaxed skin tension lines where possible.    The area thus outlined was incised deep to adipose tissue with a #15 scalpel blade.  The skin margins were undermined to an appropriate distance in all directions utilizing iris scissors.  The opposing transposition arms were then transposed into place in opposite direction and anchored with interrupted buried subcutaneous sutures.
Perilesional Excision Additional Text (Leave Blank If You Do Not Want): The margin was drawn around the clinically apparent lesion. Incisions were then made along these lines to the appropriate tissue plane and the lesion was extirpated.
W Plasty Text: The lesion was extirpated to the level of the fat with a #15 scalpel blade.  Given the location of the defect, shape of the defect and the proximity to free margins a W-plasty was deemed most appropriate for repair.  Using a sterile surgical marker, the appropriate transposition arms of the W-plasty were drawn incorporating the defect and placing the expected incisions within the relaxed skin tension lines where possible.    The area thus outlined was incised deep to adipose tissue with a #15 scalpel blade.  The skin margins were undermined to an appropriate distance in all directions utilizing iris scissors.  The opposing transposition arms were then transposed into place in opposite direction and anchored with interrupted buried subcutaneous sutures.
Lip Wedge Excision Repair Text: Given the location of the defect and the proximity to free margins a full thickness wedge repair was deemed most appropriate.  Using a sterile surgical marker, the appropriate repair was drawn incorporating the defect and placing the expected incisions perpendicular to the vermilion border.  The vermilion border was also meticulously outlined to ensure appropriate reapproximation during the repair.  The area thus outlined was incised through and through with a #15 scalpel blade.  The muscularis and dermis were reaproximated with deep sutures following hemostasis. Care was taken to realign the vermilion border before proceeding with the superficial closure.  Once the vermilion was realigned the superfical and mucosal closure was finished.
Deep Sutures: 2-0 Vicryl
Complex Repair And Bilobe Flap Text: The defect edges were debeveled with a #15 scalpel blade.  The primary defect was closed partially with a complex linear closure.  Given the location of the remaining defect, shape of the defect and the proximity to free margins a bilobe flap was deemed most appropriate for complete closure of the defect.  Using a sterile surgical marker, an appropriate advancement flap was drawn incorporating the defect and placing the expected incisions within the relaxed skin tension lines where possible.    The area thus outlined was incised deep to adipose tissue with a #15 scalpel blade.  The skin margins were undermined to an appropriate distance in all directions utilizing iris scissors.
Suture Removal: 14 days
O-T Plasty Text: The defect edges were debeveled with a #15 scalpel blade.  Given the location of the defect, shape of the defect and the proximity to free margins an O-T plasty was deemed most appropriate.  Using a sterile surgical marker, an appropriate O-T plasty was drawn incorporating the defect and placing the expected incisions within the relaxed skin tension lines where possible.    The area thus outlined was incised deep to adipose tissue with a #15 scalpel blade.  The skin margins were undermined to an appropriate distance in all directions utilizing iris scissors.
Complex Repair And Melolabial Flap Text: The defect edges were debeveled with a #15 scalpel blade.  The primary defect was closed partially with a complex linear closure.  Given the location of the remaining defect, shape of the defect and the proximity to free margins a melolabial flap was deemed most appropriate for complete closure of the defect.  Using a sterile surgical marker, an appropriate advancement flap was drawn incorporating the defect and placing the expected incisions within the relaxed skin tension lines where possible.    The area thus outlined was incised deep to adipose tissue with a #15 scalpel blade.  The skin margins were undermined to an appropriate distance in all directions utilizing iris scissors.
Date Of Previous Biopsy (Optional): 7/25/17
Billing Type: Third-Party Bill
Purse String (Intermediate) Text: Given the location of the defect and the characteristics of the surrounding skin a purse string intermediate closure was deemed most appropriate.  Undermining was performed circumferentially around the surgical defect.  A purse string suture was then placed and tightened.
Complex Repair And O-L Flap Text: The defect edges were debeveled with a #15 scalpel blade.  The primary defect was closed partially with a complex linear closure.  Given the location of the remaining defect, shape of the defect and the proximity to free margins an O-L flap was deemed most appropriate for complete closure of the defect.  Using a sterile surgical marker, an appropriate flap was drawn incorporating the defect and placing the expected incisions within the relaxed skin tension lines where possible.    The area thus outlined was incised deep to adipose tissue with a #15 scalpel blade.  The skin margins were undermined to an appropriate distance in all directions utilizing iris scissors.
Alar Island Pedicle Flap Text: The defect edges were debeveled with a #15 scalpel blade.  Given the location of the defect, shape of the defect and the proximity to the alar rim an island pedicle advancement flap was deemed most appropriate.  Using a sterile surgical marker, an appropriate advancement flap was drawn incorporating the defect, outlining the appropriate donor tissue and placing the expected incisions within the nasal ala running parallel to the alar rim. The area thus outlined was incised with a #15 scalpel blade.  The skin margins were undermined minimally to an appropriate distance in all directions around the primary defect and laterally outward around the island pedicle utilizing iris scissors.  There was minimal undermining beneath the pedicle flap.
Composite Graft Text: The defect edges were debeveled with a #15 scalpel blade.  Given the location of the defect, shape of the defect, the proximity to free margins and the fact the defect was full thickness a composite graft was deemed most appropriate.  The defect was outline and then transferred to the donor site.  A full thickness graft was then excised from the donor site. The graft was then placed in the primary defect, oriented appropriately and then sutured into place.  The secondary defect was then repaired using a primary closure.
Complex Repair And Ftsg Text: The defect edges were debeveled with a #15 scalpel blade.  The primary defect was closed partially with a complex linear closure.  Given the location of the defect, shape of the defect and the proximity to free margins a full thickness skin graft was deemed most appropriate to repair the remaining defect.  The graft was trimmed to fit the size of the remaining defect.  The graft was then placed in the primary defect, oriented appropriately, and sutured into place.
Cheek Interpolation Flap Text: A decision was made to reconstruct the defect utilizing an interpolation axial flap and a staged reconstruction.  A telfa template was made of the defect.  This telfa template was then used to outline the Cheek Interpolation flap.  The donor area for the pedicle flap was then injected with anesthesia.  The flap was excised through the skin and subcutaneous tissue down to the layer of the underlying musculature.  The interpolation flap was carefully excised within this deep plane to maintain its blood supply.  The edges of the donor site were undermined.   The donor site was closed in a primary fashion.  The pedicle was then rotated into position and sutured.  Once the tube was sutured into place, adequate blood supply was confirmed with blanching and refill.  The pedicle was then wrapped with xeroform gauze and dressed appropriately with a telfa and gauze bandage to ensure continued blood supply and protect the attached pedicle.
Epidermal Sutures: 3-0 Nylon
Consent was obtained from the patient. The risks and benefits to therapy were discussed in detail. Specifically, the risks of infection, scarring, bleeding, prolonged wound healing, incomplete removal, allergy to anesthesia, nerve injury and recurrence were addressed. Prior to the procedure, the treatment site was clearly identified and confirmed by the patient. All components of Universal Protocol/PAUSE Rule completed.
Complex Repair And V-Y Plasty Text: The defect edges were debeveled with a #15 scalpel blade.  The primary defect was closed partially with a complex linear closure.  Given the location of the remaining defect, shape of the defect and the proximity to free margins a V-Y plasty was deemed most appropriate for complete closure of the defect.  Using a sterile surgical marker, an appropriate advancement flap was drawn incorporating the defect and placing the expected incisions within the relaxed skin tension lines where possible.    The area thus outlined was incised deep to adipose tissue with a #15 scalpel blade.  The skin margins were undermined to an appropriate distance in all directions utilizing iris scissors.
Medical Necessity Clause: This procedure was medically necessary because the lesion that was treated was:
Hatchet Flap Text: The defect edges were debeveled with a #15 scalpel blade.  Given the location of the defect, shape of the defect and the proximity to free margins a hatchet flap was deemed most appropriate.  Using a sterile surgical marker, an appropriate hatchet flap was drawn incorporating the defect and placing the expected incisions within the relaxed skin tension lines where possible.    The area thus outlined was incised deep to adipose tissue with a #15 scalpel blade.  The skin margins were undermined to an appropriate distance in all directions utilizing iris scissors.
Epidermal Closure: running
Modified Advancement Flap Text: The defect edges were debeveled with a #15 scalpel blade.  Given the location of the defect, shape of the defect and the proximity to free margins a modified advancement flap was deemed most appropriate.  Using a sterile surgical marker, an appropriate advancement flap was drawn incorporating the defect and placing the expected incisions within the relaxed skin tension lines where possible.    The area thus outlined was incised deep to adipose tissue with a #15 scalpel blade.  The skin margins were undermined to an appropriate distance in all directions utilizing iris scissors.

## 2017-11-17 NOTE — HPI: PROCEDURE (SKIN SURGERY)
Has The Growth Been Previously Biopsied?: has been previously biopsied
Body Location Override (Optional): Right Calf

## 2017-12-13 ENCOUNTER — HOSPITAL ENCOUNTER (OUTPATIENT)
Dept: RADIOLOGY | Facility: MEDICAL CENTER | Age: 37
End: 2017-12-13
Attending: INTERNAL MEDICINE
Payer: COMMERCIAL

## 2017-12-13 DIAGNOSIS — C43.62 MALIGNANT MELANOMA OF LEFT UPPER EXTREMITY INCLUDING SHOULDER (HCC): ICD-10-CM

## 2017-12-13 PROCEDURE — 700117 HCHG RX CONTRAST REV CODE 255: Performed by: INTERNAL MEDICINE

## 2017-12-13 PROCEDURE — 71260 CT THORAX DX C+: CPT

## 2017-12-13 RX ADMIN — IOHEXOL 100 ML: 350 INJECTION, SOLUTION INTRAVENOUS at 10:10

## 2017-12-13 RX ADMIN — IOHEXOL 50 ML: 240 INJECTION, SOLUTION INTRATHECAL; INTRAVASCULAR; INTRAVENOUS; ORAL at 10:11

## 2017-12-21 ENCOUNTER — APPOINTMENT (RX ONLY)
Dept: URBAN - METROPOLITAN AREA CLINIC 20 | Facility: CLINIC | Age: 37
Setting detail: DERMATOLOGY
End: 2017-12-21

## 2017-12-21 DIAGNOSIS — D22 MELANOCYTIC NEVI: ICD-10-CM

## 2017-12-21 PROBLEM — D22.71 MELANOCYTIC NEVI OF RIGHT LOWER LIMB, INCLUDING HIP: Status: ACTIVE | Noted: 2017-12-21

## 2017-12-21 PROCEDURE — 11402 EXC TR-EXT B9+MARG 1.1-2 CM: CPT

## 2017-12-21 PROCEDURE — 12032 INTMD RPR S/A/T/EXT 2.6-7.5: CPT

## 2017-12-21 PROCEDURE — ? EXCISION

## 2017-12-21 ASSESSMENT — LOCATION DETAILED DESCRIPTION DERM: LOCATION DETAILED: RIGHT DISTAL CALF

## 2017-12-21 ASSESSMENT — LOCATION SIMPLE DESCRIPTION DERM: LOCATION SIMPLE: RIGHT CALF

## 2017-12-21 ASSESSMENT — LOCATION ZONE DERM: LOCATION ZONE: LEG

## 2017-12-21 NOTE — PROCEDURE: EXCISION
Patient Will Remove Sutures At Home?: No
Eliptical Excision Additional Text (Leave Blank If You Do Not Want): The margin was drawn around the clinically apparent lesion.  An elliptical shape was then drawn on the skin incorporating the lesion and margins.  Incisions were then made along these lines to the appropriate tissue plane and the lesion was extirpated.
Epidermal Closure Graft Donor Site (Optional): simple interrupted
Primary Defect Width (In Cm): 0
Posterior Auricular Interpolation Flap Text: A decision was made to reconstruct the defect utilizing an interpolation axial flap and a staged reconstruction.  A telfa template was made of the defect.  This telfa template was then used to outline the posterior auricular interpolation flap.  The donor area for the pedicle flap was then injected with anesthesia.  The flap was excised through the skin and subcutaneous tissue down to the layer of the underlying musculature.  The pedicle flap was carefully excised within this deep plane to maintain its blood supply.  The edges of the donor site were undermined.   The donor site was closed in a primary fashion.  The pedicle was then rotated into position and sutured.  Once the tube was sutured into place, adequate blood supply was confirmed with blanching and refill.  The pedicle was then wrapped with xeroform gauze and dressed appropriately with a telfa and gauze bandage to ensure continued blood supply and protect the attached pedicle.
Biopsy Photograph Reviewed: Yes
Double Island Pedicle Flap Text: The defect edges were debeveled with a #15 scalpel blade.  Given the location of the defect, shape of the defect and the proximity to free margins a double island pedicle advancement flap was deemed most appropriate.  Using a sterile surgical marker, an appropriate advancement flap was drawn incorporating the defect, outlining the appropriate donor tissue and placing the expected incisions within the relaxed skin tension lines where possible.    The area thus outlined was incised deep to adipose tissue with a #15 scalpel blade.  The skin margins were undermined to an appropriate distance in all directions around the primary defect and laterally outward around the island pedicle utilizing iris scissors.  There was minimal undermining beneath the pedicle flap.
Melolabial Transposition Flap Text: The defect edges were debeveled with a #15 scalpel blade.  Given the location of the defect and the proximity to free margins a melolabial flap was deemed most appropriate.  Using a sterile surgical marker, an appropriate melolabial transposition flap was drawn incorporating the defect.    The area thus outlined was incised deep to adipose tissue with a #15 scalpel blade.  The skin margins were undermined to an appropriate distance in all directions utilizing iris scissors.
Complex Repair And Double Advancement Flap Text: The defect edges were debeveled with a #15 scalpel blade.  The primary defect was closed partially with a complex linear closure.  Given the location of the remaining defect, shape of the defect and the proximity to free margins a double advancement flap was deemed most appropriate for complete closure of the defect.  Using a sterile surgical marker, an appropriate advancement flap was drawn incorporating the defect and placing the expected incisions within the relaxed skin tension lines where possible.    The area thus outlined was incised deep to adipose tissue with a #15 scalpel blade.  The skin margins were undermined to an appropriate distance in all directions utilizing iris scissors.
Complex Repair And Melolabial Flap Text: The defect edges were debeveled with a #15 scalpel blade.  The primary defect was closed partially with a complex linear closure.  Given the location of the remaining defect, shape of the defect and the proximity to free margins a melolabial flap was deemed most appropriate for complete closure of the defect.  Using a sterile surgical marker, an appropriate advancement flap was drawn incorporating the defect and placing the expected incisions within the relaxed skin tension lines where possible.    The area thus outlined was incised deep to adipose tissue with a #15 scalpel blade.  The skin margins were undermined to an appropriate distance in all directions utilizing iris scissors.
Complex Repair And Dorsal Nasal Flap Text: The defect edges were debeveled with a #15 scalpel blade.  The primary defect was closed partially with a complex linear closure.  Given the location of the remaining defect, shape of the defect and the proximity to free margins a dorsal nasal flap was deemed most appropriate for complete closure of the defect.  Using a sterile surgical marker, an appropriate flap was drawn incorporating the defect and placing the expected incisions within the relaxed skin tension lines where possible.    The area thus outlined was incised deep to adipose tissue with a #15 scalpel blade.  The skin margins were undermined to an appropriate distance in all directions utilizing iris scissors.
Skin Substitute Text: The defect edges were debeveled with a #15 scalpel blade.  Given the location of the defect, shape of the defect and the proximity to free margins a skin substitute graft was deemed most appropriate.  The graft material was trimmed to fit the size of the defect. The graft was then placed in the primary defect and oriented appropriately.
Bi-Rhombic Flap Text: The defect edges were debeveled with a #15 scalpel blade.  Given the location of the defect and the proximity to free margins a bi-rhombic flap was deemed most appropriate.  Using a sterile surgical marker, an appropriate rhombic flap was drawn incorporating the defect. The area thus outlined was incised deep to adipose tissue with a #15 scalpel blade.  The skin margins were undermined to an appropriate distance in all directions utilizing iris scissors.
Complex Repair And M Plasty Text: The defect edges were debeveled with a #15 scalpel blade.  The primary defect was closed partially with a complex linear closure.  Given the location of the remaining defect, shape of the defect and the proximity to free margins an M plasty was deemed most appropriate for complete closure of the defect.  Using a sterile surgical marker, an appropriate advancement flap was drawn incorporating the defect and placing the expected incisions within the relaxed skin tension lines where possible.    The area thus outlined was incised deep to adipose tissue with a #15 scalpel blade.  The skin margins were undermined to an appropriate distance in all directions utilizing iris scissors.
Complex Repair And Double M Plasty Text: The defect edges were debeveled with a #15 scalpel blade.  The primary defect was closed partially with a complex linear closure.  Given the location of the remaining defect, shape of the defect and the proximity to free margins a double M plasty was deemed most appropriate for complete closure of the defect.  Using a sterile surgical marker, an appropriate advancement flap was drawn incorporating the defect and placing the expected incisions within the relaxed skin tension lines where possible.    The area thus outlined was incised deep to adipose tissue with a #15 scalpel blade.  The skin margins were undermined to an appropriate distance in all directions utilizing iris scissors.
Fusiform Excision Additional Text (Leave Blank If You Do Not Want): The margin was drawn around the clinically apparent lesion.  A fusiform shape was then drawn on the skin incorporating the lesion and margins.  Incisions were then made along these lines to the appropriate tissue plane and the lesion was extirpated.
Island Pedicle Flap-Requiring Vessel Identification Text: The defect edges were debeveled with a #15 scalpel blade.  Given the location of the defect, shape of the defect and the proximity to free margins an island pedicle advancement flap was deemed most appropriate.  Using a sterile surgical marker, an appropriate advancement flap was drawn, based on the axial vessel mentioned above, incorporating the defect, outlining the appropriate donor tissue and placing the expected incisions within the relaxed skin tension lines where possible.    The area thus outlined was incised deep to adipose tissue with a #15 scalpel blade.  The skin margins were undermined to an appropriate distance in all directions around the primary defect and laterally outward around the island pedicle utilizing iris scissors.  There was minimal undermining beneath the pedicle flap.
Burow's Advancement Flap Text: The defect edges were debeveled with a #15 scalpel blade.  Given the location of the defect and the proximity to free margins a Burow's advancement flap was deemed most appropriate.  Using a sterile surgical marker, the appropriate advancement flap was drawn incorporating the defect and placing the expected incisions within the relaxed skin tension lines where possible.    The area thus outlined was incised deep to adipose tissue with a #15 scalpel blade.  The skin margins were undermined to an appropriate distance in all directions utilizing iris scissors.
H Plasty Text: Given the location of the defect, shape of the defect and the proximity to free margins a H-plasty was deemed most appropriate for repair.  Using a sterile surgical marker, the appropriate advancement arms of the H-plasty were drawn incorporating the defect and placing the expected incisions within the relaxed skin tension lines where possible. The area thus outlined was incised deep to adipose tissue with a #15 scalpel blade. The skin margins were undermined to an appropriate distance in all directions utilizing iris scissors.  The opposing advancement arms were then advanced into place in opposite direction and anchored with interrupted buried subcutaneous sutures.
Saucerization Excision Additional Text (Leave Blank If You Do Not Want): The margin was drawn around the clinically apparent lesion.  Incisions were then made along these lines, in a tangential fashion, to the appropriate tissue plane and the lesion was extirpated.
Epidermal Autograft Text: The defect edges were debeveled with a #15 scalpel blade.  Given the location of the defect, shape of the defect and the proximity to free margins an epidermal autograft was deemed most appropriate.  Using a sterile surgical marker, the primary defect shape was transferred to the donor site. The epidermal graft was then harvested.  The skin graft was then placed in the primary defect and oriented appropriately.
Dermal Autograft Text: The defect edges were debeveled with a #15 scalpel blade.  Given the location of the defect, shape of the defect and the proximity to free margins a dermal autograft was deemed most appropriate.  Using a sterile surgical marker, the primary defect shape was transferred to the donor site. The area thus outlined was incised deep to adipose tissue with a #15 scalpel blade.  The harvested graft was then trimmed of adipose and epidermal tissue until only dermis was left.  The skin graft was then placed in the primary defect and oriented appropriately.
No Repair - Repaired With Adjacent Surgical Defect Text (Leave Blank If You Do Not Want): After the excision the defect was repaired concurrently with another surgical defect which was in close approximation.
Consent was obtained from the patient. The risks and benefits to therapy were discussed in detail. Specifically, the risks of infection, scarring, bleeding, prolonged wound healing, incomplete removal, allergy to anesthesia, nerve injury and recurrence were addressed. Prior to the procedure, the treatment site was clearly identified and confirmed by the patient. All components of Universal Protocol/PAUSE Rule completed.
Dorsal Nasal Flap Text: The defect edges were debeveled with a #15 scalpel blade.  Given the location of the defect and the proximity to free margins a dorsal nasal flap was deemed most appropriate.  Using a sterile surgical marker, an appropriate dorsal nasal flap was drawn around the defect.    The area thus outlined was incised deep to adipose tissue with a #15 scalpel blade.  The skin margins were undermined to an appropriate distance in all directions utilizing iris scissors.
S Plasty Text: Given the location and shape of the defect, and the orientation of relaxed skin tension lines, an S-plasty was deemed most appropriate for repair.  Using a sterile surgical marker, the appropriate outline of the S-plasty was drawn, incorporating the defect and placing the expected incisions within the relaxed skin tension lines where possible.  The area thus outlined was incised deep to adipose tissue with a #15 scalpel blade.  The skin margins were undermined to an appropriate distance in all directions utilizing iris scissors. The skin flaps were advanced over the defect.  The opposing margins were then approximated with interrupted buried subcutaneous sutures.
Anesthesia Volume In Cc: 3
Rhombic Flap Text: The defect edges were debeveled with a #15 scalpel blade.  Given the location of the defect and the proximity to free margins a rhombic flap was deemed most appropriate.  Using a sterile surgical marker, an appropriate rhombic flap was drawn incorporating the defect.    The area thus outlined was incised deep to adipose tissue with a #15 scalpel blade.  The skin margins were undermined to an appropriate distance in all directions utilizing iris scissors.
Split-Thickness Skin Graft Text: The defect edges were debeveled with a #15 scalpel blade.  Given the location of the defect, shape of the defect and the proximity to free margins a split thickness skin graft was deemed most appropriate.  Using a sterile surgical marker, the primary defect shape was transferred to the donor site. The split thickness graft was then harvested.  The skin graft was then placed in the primary defect and oriented appropriately.
Dressing: pressure dressing
Complex Repair And O-L Flap Text: The defect edges were debeveled with a #15 scalpel blade.  The primary defect was closed partially with a complex linear closure.  Given the location of the remaining defect, shape of the defect and the proximity to free margins an O-L flap was deemed most appropriate for complete closure of the defect.  Using a sterile surgical marker, an appropriate flap was drawn incorporating the defect and placing the expected incisions within the relaxed skin tension lines where possible.    The area thus outlined was incised deep to adipose tissue with a #15 scalpel blade.  The skin margins were undermined to an appropriate distance in all directions utilizing iris scissors.
Complex Repair And Rhombic Flap Text: The defect edges were debeveled with a #15 scalpel blade.  The primary defect was closed partially with a complex linear closure.  Given the location of the remaining defect, shape of the defect and the proximity to free margins a rhombic flap was deemed most appropriate for complete closure of the defect.  Using a sterile surgical marker, an appropriate advancement flap was drawn incorporating the defect and placing the expected incisions within the relaxed skin tension lines where possible.    The area thus outlined was incised deep to adipose tissue with a #15 scalpel blade.  The skin margins were undermined to an appropriate distance in all directions utilizing iris scissors.
Post-Care Instructions: I reviewed with the patient in detail post-care instructions. Patient is not to engage in any heavy lifting, exercise, or swimming for the next 14 days. Should the patient develop any fevers, chills, bleeding, severe pain patient will contact the office immediately.
Deep Sutures: 2-0 Vicryl
Complex Repair Preamble Text (Leave Blank If You Do Not Want): Extensive wide undermining was performed.
Mastoid Interpolation Flap Text: A decision was made to reconstruct the defect utilizing an interpolation axial flap and a staged reconstruction.  A telfa template was made of the defect.  This telfa template was then used to outline the mastoid interpolation flap.  The donor area for the pedicle flap was then injected with anesthesia.  The flap was excised through the skin and subcutaneous tissue down to the layer of the underlying musculature.  The pedicle flap was carefully excised within this deep plane to maintain its blood supply.  The edges of the donor site were undermined.   The donor site was closed in a primary fashion.  The pedicle was then rotated into position and sutured.  Once the tube was sutured into place, adequate blood supply was confirmed with blanching and refill.  The pedicle was then wrapped with xeroform gauze and dressed appropriately with a telfa and gauze bandage to ensure continued blood supply and protect the attached pedicle.
Spiral Flap Text: The defect edges were debeveled with a #15 scalpel blade.  Given the location of the defect, shape of the defect and the proximity to free margins a spiral flap was deemed most appropriate.  Using a sterile surgical marker, an appropriate rotation flap was drawn incorporating the defect and placing the expected incisions within the relaxed skin tension lines where possible. The area thus outlined was incised deep to adipose tissue with a #15 scalpel blade.  The skin margins were undermined to an appropriate distance in all directions utilizing iris scissors.
Melolabial Interpolation Flap Text: A decision was made to reconstruct the defect utilizing an interpolation axial flap and a staged reconstruction.  A telfa template was made of the defect.  This telfa template was then used to outline the melolabial interpolation flap.  The donor area for the pedicle flap was then injected with anesthesia.  The flap was excised through the skin and subcutaneous tissue down to the layer of the underlying musculature.  The pedicle flap was carefully excised within this deep plane to maintain its blood supply.  The edges of the donor site were undermined.   The donor site was closed in a primary fashion.  The pedicle was then rotated into position and sutured.  Once the tube was sutured into place, adequate blood supply was confirmed with blanching and refill.  The pedicle was then wrapped with xeroform gauze and dressed appropriately with a telfa and gauze bandage to ensure continued blood supply and protect the attached pedicle.
Intermediate Repair Preamble Text (Leave Blank If You Do Not Want): Undermining was performed with blunt dissection.
Excision Method: Elliptical
Cartilage Graft Text: The defect edges were debeveled with a #15 scalpel blade.  Given the location of the defect, shape of the defect, the fact the defect involved a full thickness cartilage defect a cartilage graft was deemed most appropriate.  An appropriate donor site was identified, cleansed, and anesthetized. The cartilage graft was then harvested and transferred to the recipient site, oriented appropriately and then sutured into place.  The secondary defect was then repaired using a primary closure.
Repair Performed By Another Provider Text (Leave Blank If You Do Not Want): After the tissue was excised the defect was repaired by another provider.
Complex Repair And Single Advancement Flap Text: The defect edges were debeveled with a #15 scalpel blade.  The primary defect was closed partially with a complex linear closure.  Given the location of the remaining defect, shape of the defect and the proximity to free margins a single advancement flap was deemed most appropriate for complete closure of the defect.  Using a sterile surgical marker, an appropriate advancement flap was drawn incorporating the defect and placing the expected incisions within the relaxed skin tension lines where possible.    The area thus outlined was incised deep to adipose tissue with a #15 scalpel blade.  The skin margins were undermined to an appropriate distance in all directions utilizing iris scissors.
Epidermal Sutures: 4-0 Nylon
Complex Repair And O-T Advancement Flap Text: The defect edges were debeveled with a #15 scalpel blade.  The primary defect was closed partially with a complex linear closure.  Given the location of the remaining defect, shape of the defect and the proximity to free margins an O-T advancement flap was deemed most appropriate for complete closure of the defect.  Using a sterile surgical marker, an appropriate advancement flap was drawn incorporating the defect and placing the expected incisions within the relaxed skin tension lines where possible.    The area thus outlined was incised deep to adipose tissue with a #15 scalpel blade.  The skin margins were undermined to an appropriate distance in all directions utilizing iris scissors.
Slit Excision Additional Text (Leave Blank If You Do Not Want): A linear line was drawn on the skin overlying the lesion. An incision was made slowly until the lesion was visualized.  Once visualized, the lesion was removed with blunt dissection.
Complex Repair And W Plasty Text: The defect edges were debeveled with a #15 scalpel blade.  The primary defect was closed partially with a complex linear closure.  Given the location of the remaining defect, shape of the defect and the proximity to free margins a W plasty was deemed most appropriate for complete closure of the defect.  Using a sterile surgical marker, an appropriate advancement flap was drawn incorporating the defect and placing the expected incisions within the relaxed skin tension lines where possible.    The area thus outlined was incised deep to adipose tissue with a #15 scalpel blade.  The skin margins were undermined to an appropriate distance in all directions utilizing iris scissors.
Complex Repair And Skin Substitute Graft Text: The defect edges were debeveled with a #15 scalpel blade.  The primary defect was closed partially with a complex linear closure.  Given the location of the remaining defect, shape of the defect and the proximity to free margins a skin substitute graft was deemed most appropriate to repair the remaining defect.  The graft was trimmed to fit the size of the remaining defect.  The graft was then placed in the primary defect, oriented appropriately, and sutured into place.
Tissue Cultured Epidermal Autograft Text: The defect edges were debeveled with a #15 scalpel blade.  Given the location of the defect, shape of the defect and the proximity to free margins a tissue cultured epidermal autograft was deemed most appropriate.  The graft was then trimmed to fit the size of the defect.  The graft was then placed in the primary defect and oriented appropriately.
Hemostasis: Electrocautery
Number Of Epidermal Sutures (Optional): 14
Path Notes (To The Dermatopathologist): Please check margins.
O-L Flap Text: The defect edges were debeveled with a #15 scalpel blade.  Given the location of the defect, shape of the defect and the proximity to free margins an O-L flap was deemed most appropriate.  Using a sterile surgical marker, an appropriate advancement flap was drawn incorporating the defect and placing the expected incisions within the relaxed skin tension lines where possible.    The area thus outlined was incised deep to adipose tissue with a #15 scalpel blade.  The skin margins were undermined to an appropriate distance in all directions utilizing iris scissors.
Complex Repair And Rotation Flap Text: The defect edges were debeveled with a #15 scalpel blade.  The primary defect was closed partially with a complex linear closure.  Given the location of the remaining defect, shape of the defect and the proximity to free margins a rotation flap was deemed most appropriate for complete closure of the defect.  Using a sterile surgical marker, an appropriate advancement flap was drawn incorporating the defect and placing the expected incisions within the relaxed skin tension lines where possible.    The area thus outlined was incised deep to adipose tissue with a #15 scalpel blade.  The skin margins were undermined to an appropriate distance in all directions utilizing iris scissors.
V-Y Plasty Text: The defect edges were debeveled with a #15 scalpel blade.  Given the location of the defect, shape of the defect and the proximity to free margins an V-Y advancement flap was deemed most appropriate.  Using a sterile surgical marker, an appropriate advancement flap was drawn incorporating the defect and placing the expected incisions within the relaxed skin tension lines where possible.    The area thus outlined was incised deep to adipose tissue with a #15 scalpel blade.  The skin margins were undermined to an appropriate distance in all directions utilizing iris scissors.
Graft Donor Site Bandage (Optional-Leave Blank If You Don't Want In Note): Steri-strips and a pressure bandage were applied to the donor site.
V-Y Flap Text: The defect edges were debeveled with a #15 scalpel blade.  Given the location of the defect, shape of the defect and the proximity to free margins a V-Y flap was deemed most appropriate.  Using a sterile surgical marker, an appropriate advancement flap was drawn incorporating the defect and placing the expected incisions within the relaxed skin tension lines where possible.    The area thus outlined was incised deep to adipose tissue with a #15 scalpel blade.  The skin margins were undermined to an appropriate distance in all directions utilizing iris scissors.
Trilobed Flap Text: The defect edges were debeveled with a #15 scalpel blade.  Given the location of the defect and the proximity to free margins a trilobed flap was deemed most appropriate.  Using a sterile surgical marker, an appropriate trilobed flap drawn around the defect.    The area thus outlined was incised deep to adipose tissue with a #15 scalpel blade.  The skin margins were undermined to an appropriate distance in all directions utilizing iris scissors.
Island Pedicle Flap Text: The defect edges were debeveled with a #15 scalpel blade.  Given the location of the defect, shape of the defect and the proximity to free margins an island pedicle advancement flap was deemed most appropriate.  Using a sterile surgical marker, an appropriate advancement flap was drawn incorporating the defect, outlining the appropriate donor tissue and placing the expected incisions within the relaxed skin tension lines where possible.    The area thus outlined was incised deep to adipose tissue with a #15 scalpel blade.  The skin margins were undermined to an appropriate distance in all directions around the primary defect and laterally outward around the island pedicle utilizing iris scissors.  There was minimal undermining beneath the pedicle flap.
Composite Graft Text: The defect edges were debeveled with a #15 scalpel blade.  Given the location of the defect, shape of the defect, the proximity to free margins and the fact the defect was full thickness a composite graft was deemed most appropriate.  The defect was outline and then transferred to the donor site.  A full thickness graft was then excised from the donor site. The graft was then placed in the primary defect, oriented appropriately and then sutured into place.  The secondary defect was then repaired using a primary closure.
Medical Necessity Clause: This procedure was medically necessary because the lesion that was treated was:
Bilateral Helical Rim Advancement Flap Text: The defect edges were debeveled with a #15 blade scalpel.  Given the location of the defect and the proximity to free margins (helical rim) a bilateral helical rim advancement flap was deemed most appropriate.  Using a sterile surgical marker, the appropriate advancement flaps were drawn incorporating the defect and placing the expected incisions between the helical rim and antihelix where possible.  The area thus outlined was incised through and through with a #15 scalpel blade.  With a skin hook and iris scissors, the flaps were gently and sharply undermined and freed up.
A-T Advancement Flap Text: The defect edges were debeveled with a #15 scalpel blade.  Given the location of the defect, shape of the defect and the proximity to free margins an A-T advancement flap was deemed most appropriate.  Using a sterile surgical marker, an appropriate advancement flap was drawn incorporating the defect and placing the expected incisions within the relaxed skin tension lines where possible.    The area thus outlined was incised deep to adipose tissue with a #15 scalpel blade.  The skin margins were undermined to an appropriate distance in all directions utilizing iris scissors.
Purse String (Simple) Text: Given the location of the defect and the characteristics of the surrounding skin a purse string simple closure was deemed most appropriate.  Undermining was performed circumferentially around the surgical defect.  A purse string suture was then placed and tightened.
O-T Advancement Flap Text: The defect edges were debeveled with a #15 scalpel blade.  Given the location of the defect, shape of the defect and the proximity to free margins an O-T advancement flap was deemed most appropriate.  Using a sterile surgical marker, an appropriate advancement flap was drawn incorporating the defect and placing the expected incisions within the relaxed skin tension lines where possible.    The area thus outlined was incised deep to adipose tissue with a #15 scalpel blade.  The skin margins were undermined to an appropriate distance in all directions utilizing iris scissors.
Billing Type: Third-Party Bill
Island Pedicle Flap With Canthal Suspension Text: The defect edges were debeveled with a #15 scalpel blade.  Given the location of the defect, shape of the defect and the proximity to free margins an island pedicle advancement flap was deemed most appropriate.  Using a sterile surgical marker, an appropriate advancement flap was drawn incorporating the defect, outlining the appropriate donor tissue and placing the expected incisions within the relaxed skin tension lines where possible. The area thus outlined was incised deep to adipose tissue with a #15 scalpel blade.  The skin margins were undermined to an appropriate distance in all directions around the primary defect and laterally outward around the island pedicle utilizing iris scissors.  There was minimal undermining beneath the pedicle flap. A suspension suture was placed in the canthal tendon to prevent tension and prevent ectropion.
Cheek-To-Nose Interpolation Flap Text: A decision was made to reconstruct the defect utilizing an interpolation axial flap and a staged reconstruction.  A telfa template was made of the defect.  This telfa template was then used to outline the Cheek-To-Nose Interpolation flap.  The donor area for the pedicle flap was then injected with anesthesia.  The flap was excised through the skin and subcutaneous tissue down to the layer of the underlying musculature.  The interpolation flap was carefully excised within this deep plane to maintain its blood supply.  The edges of the donor site were undermined.   The donor site was closed in a primary fashion.  The pedicle was then rotated into position and sutured.  Once the tube was sutured into place, adequate blood supply was confirmed with blanching and refill.  The pedicle was then wrapped with xeroform gauze and dressed appropriately with a telfa and gauze bandage to ensure continued blood supply and protect the attached pedicle.
Complex Repair And Epidermal Autograft Text: The defect edges were debeveled with a #15 scalpel blade.  The primary defect was closed partially with a complex linear closure.  Given the location of the defect, shape of the defect and the proximity to free margins an epidermal autograft was deemed most appropriate to repair the remaining defect.  The graft was trimmed to fit the size of the remaining defect.  The graft was then placed in the primary defect, oriented appropriately, and sutured into place.
Advancement Flap (Double) Text: The defect edges were debeveled with a #15 scalpel blade.  Given the location of the defect and the proximity to free margins a double advancement flap was deemed most appropriate.  Using a sterile surgical marker, the appropriate advancement flaps were drawn incorporating the defect and placing the expected incisions within the relaxed skin tension lines where possible.    The area thus outlined was incised deep to adipose tissue with a #15 scalpel blade.  The skin margins were undermined to an appropriate distance in all directions utilizing iris scissors.
Complex Repair And Xenograft Text: The defect edges were debeveled with a #15 scalpel blade.  The primary defect was closed partially with a complex linear closure.  Given the location of the defect, shape of the defect and the proximity to free margins a xenograft was deemed most appropriate to repair the remaining defect.  The graft was trimmed to fit the size of the remaining defect.  The graft was then placed in the primary defect, oriented appropriately, and sutured into place.
Paramedian Forehead Flap Text: A decision was made to reconstruct the defect utilizing an interpolation axial flap and a staged reconstruction.  A telfa template was made of the defect.  This telfa template was then used to outline the paramedian forehead pedicle flap.  The donor area for the pedicle flap was then injected with anesthesia.  The flap was excised through the skin and subcutaneous tissue down to the layer of the underlying musculature.  The pedicle flap was carefully excised within this deep plane to maintain its blood supply.  The edges of the donor site were undermined.   The donor site was closed in a primary fashion.  The pedicle was then rotated into position and sutured.  Once the tube was sutured into place, adequate blood supply was confirmed with blanching and refill.  The pedicle was then wrapped with xeroform gauze and dressed appropriately with a telfa and gauze bandage to ensure continued blood supply and protect the attached pedicle.
Repair Type: Intermediate
Star Wedge Flap Text: The defect edges were debeveled with a #15 scalpel blade.  Given the location of the defect, shape of the defect and the proximity to free margins a star wedge flap was deemed most appropriate.  Using a sterile surgical marker, an appropriate rotation flap was drawn incorporating the defect and placing the expected incisions within the relaxed skin tension lines where possible. The area thus outlined was incised deep to adipose tissue with a #15 scalpel blade.  The skin margins were undermined to an appropriate distance in all directions utilizing iris scissors.
Keystone Flap Text: The defect edges were debeveled with a #15 scalpel blade.  Given the location of the defect, shape of the defect a keystone flap was deemed most appropriate.  Using a sterile surgical marker, an appropriate keystone flap was drawn incorporating the defect, outlining the appropriate donor tissue and placing the expected incisions within the relaxed skin tension lines where possible. The area thus outlined was incised deep to adipose tissue with a #15 scalpel blade.  The skin margins were undermined to an appropriate distance in all directions around the primary defect and laterally outward around the flap utilizing iris scissors.
Excisional Biopsy Additional Text (Leave Blank If You Do Not Want): The margin was drawn around the clinically apparent lesion. An elliptical shape was then drawn on the skin incorporating the lesion and margins.  Incisions were then made along these lines to the appropriate tissue plane and the lesion was extirpated.
Transposition Flap Text: The defect edges were debeveled with a #15 scalpel blade.  Given the location of the defect and the proximity to free margins a transposition flap was deemed most appropriate.  Using a sterile surgical marker, an appropriate transposition flap was drawn incorporating the defect.    The area thus outlined was incised deep to adipose tissue with a #15 scalpel blade.  The skin margins were undermined to an appropriate distance in all directions utilizing iris scissors.
Z Plasty Text: The lesion was extirpated to the level of the fat with a #15 scalpel blade.  Given the location of the defect, shape of the defect and the proximity to free margins a Z-plasty was deemed most appropriate for repair.  Using a sterile surgical marker, the appropriate transposition arms of the Z-plasty were drawn incorporating the defect and placing the expected incisions within the relaxed skin tension lines where possible.    The area thus outlined was incised deep to adipose tissue with a #15 scalpel blade.  The skin margins were undermined to an appropriate distance in all directions utilizing iris scissors.  The opposing transposition arms were then transposed into place in opposite direction and anchored with interrupted buried subcutaneous sutures.
Bilobed Transposition Flap Text: The defect edges were debeveled with a #15 scalpel blade.  Given the location of the defect and the proximity to free margins a bilobed transposition flap was deemed most appropriate.  Using a sterile surgical marker, an appropriate bilobe flap drawn around the defect.    The area thus outlined was incised deep to adipose tissue with a #15 scalpel blade.  The skin margins were undermined to an appropriate distance in all directions utilizing iris scissors.
Size Of Lesion In Cm: 0.5
O-T Plasty Text: The defect edges were debeveled with a #15 scalpel blade.  Given the location of the defect, shape of the defect and the proximity to free margins an O-T plasty was deemed most appropriate.  Using a sterile surgical marker, an appropriate O-T plasty was drawn incorporating the defect and placing the expected incisions within the relaxed skin tension lines where possible.    The area thus outlined was incised deep to adipose tissue with a #15 scalpel blade.  The skin margins were undermined to an appropriate distance in all directions utilizing iris scissors.
Date Of Previous Biopsy (Optional): 10/24/17
Home Suture Removal Text: Patient was provided a home suture removal kit and will remove their sutures at home.  If they have any questions or difficulties they will call the office.
Number Of Deep Sutures (Optional): 4
Modified Advancement Flap Text: The defect edges were debeveled with a #15 scalpel blade.  Given the location of the defect, shape of the defect and the proximity to free margins a modified advancement flap was deemed most appropriate.  Using a sterile surgical marker, an appropriate advancement flap was drawn incorporating the defect and placing the expected incisions within the relaxed skin tension lines where possible.    The area thus outlined was incised deep to adipose tissue with a #15 scalpel blade.  The skin margins were undermined to an appropriate distance in all directions utilizing iris scissors.
Mucosal Advancement Flap Text: Given the location of the defect, shape of the defect and the proximity to free margins a mucosal advancement flap was deemed most appropriate. Incisions were made with a 15 blade scalpel in the appropriate fashion along the cutaneous vermillion border and the mucosal lip. The remaining actinically damaged mucosal tissue was excised.  The mucosal advancement flap was then elevated to the gingival sulcus with care taken to preserve the neurovascular structures and advanced into the primary defect. Care was taken to ensure that precise realignment of the vermilion border was achieved.
Complex Repair And Z Plasty Text: The defect edges were debeveled with a #15 scalpel blade.  The primary defect was closed partially with a complex linear closure.  Given the location of the remaining defect, shape of the defect and the proximity to free margins a Z plasty was deemed most appropriate for complete closure of the defect.  Using a sterile surgical marker, an appropriate advancement flap was drawn incorporating the defect and placing the expected incisions within the relaxed skin tension lines where possible.    The area thus outlined was incised deep to adipose tissue with a #15 scalpel blade.  The skin margins were undermined to an appropriate distance in all directions utilizing iris scissors.
Previous Accession (Optional): [id number]
Excision Depth: adipose tissue
Complex Repair And Tissue Cultured Epidermal Autograft Text: The defect edges were debeveled with a #15 scalpel blade.  The primary defect was closed partially with a complex linear closure.  Given the location of the defect, shape of the defect and the proximity to free margins an tissue cultured epidermal autograft was deemed most appropriate to repair the remaining defect.  The graft was trimmed to fit the size of the remaining defect.  The graft was then placed in the primary defect, oriented appropriately, and sutured into place.
Advancement Flap (Single) Text: The defect edges were debeveled with a #15 scalpel blade.  Given the location of the defect and the proximity to free margins a single advancement flap was deemed most appropriate.  Using a sterile surgical marker, an appropriate advancement flap was drawn incorporating the defect and placing the expected incisions within the relaxed skin tension lines where possible.    The area thus outlined was incised deep to adipose tissue with a #15 scalpel blade.  The skin margins were undermined to an appropriate distance in all directions utilizing iris scissors.
Cheek Interpolation Flap Text: A decision was made to reconstruct the defect utilizing an interpolation axial flap and a staged reconstruction.  A telfa template was made of the defect.  This telfa template was then used to outline the Cheek Interpolation flap.  The donor area for the pedicle flap was then injected with anesthesia.  The flap was excised through the skin and subcutaneous tissue down to the layer of the underlying musculature.  The interpolation flap was carefully excised within this deep plane to maintain its blood supply.  The edges of the donor site were undermined.   The donor site was closed in a primary fashion.  The pedicle was then rotated into position and sutured.  Once the tube was sutured into place, adequate blood supply was confirmed with blanching and refill.  The pedicle was then wrapped with xeroform gauze and dressed appropriately with a telfa and gauze bandage to ensure continued blood supply and protect the attached pedicle.
Suture Removal: 14 days
Complex Repair And A-T Advancement Flap Text: The defect edges were debeveled with a #15 scalpel blade.  The primary defect was closed partially with a complex linear closure.  Given the location of the remaining defect, shape of the defect and the proximity to free margins an A-T advancement flap was deemed most appropriate for complete closure of the defect.  Using a sterile surgical marker, an appropriate advancement flap was drawn incorporating the defect and placing the expected incisions within the relaxed skin tension lines where possible.    The area thus outlined was incised deep to adipose tissue with a #15 scalpel blade.  The skin margins were undermined to an appropriate distance in all directions utilizing iris scissors.
Wound Care: Petrolatum
Complex Repair And Ftsg Text: The defect edges were debeveled with a #15 scalpel blade.  The primary defect was closed partially with a complex linear closure.  Given the location of the defect, shape of the defect and the proximity to free margins a full thickness skin graft was deemed most appropriate to repair the remaining defect.  The graft was trimmed to fit the size of the remaining defect.  The graft was then placed in the primary defect, oriented appropriately, and sutured into place.
Body Location Override (Optional - Billing Will Still Be Based On Selected Body Map Location If Applicable): Rt Calf
Complex Repair And Split-Thickness Skin Graft Text: The defect edges were debeveled with a #15 scalpel blade.  The primary defect was closed partially with a complex linear closure.  Given the location of the defect, shape of the defect and the proximity to free margins a split thickness skin graft was deemed most appropriate to repair the remaining defect.  The graft was trimmed to fit the size of the remaining defect.  The graft was then placed in the primary defect, oriented appropriately, and sutured into place.
Perilesional Excision Additional Text (Leave Blank If You Do Not Want): The margin was drawn around the clinically apparent lesion. Incisions were then made along these lines to the appropriate tissue plane and the lesion was extirpated.
Complex Repair And Modified Advancement Flap Text: The defect edges were debeveled with a #15 scalpel blade.  The primary defect was closed partially with a complex linear closure.  Given the location of the remaining defect, shape of the defect and the proximity to free margins a modified advancement flap was deemed most appropriate for complete closure of the defect.  Using a sterile surgical marker, an appropriate advancement flap was drawn incorporating the defect and placing the expected incisions within the relaxed skin tension lines where possible.    The area thus outlined was incised deep to adipose tissue with a #15 scalpel blade.  The skin margins were undermined to an appropriate distance in all directions utilizing iris scissors.
Xenograft Text: The defect edges were debeveled with a #15 scalpel blade.  Given the location of the defect, shape of the defect and the proximity to free margins a xenograft was deemed most appropriate.  The graft was then trimmed to fit the size of the defect.  The graft was then placed in the primary defect and oriented appropriately.
Lab: [id number]
Anesthesia Type: 1% lidocaine with epinephrine and a 1:10 solution of 8.4% sodium bicarbonate
Muscle Hinge Flap Text: The defect edges were debeveled with a #15 scalpel blade.  Given the size, depth and location of the defect and the proximity to free margins a muscle hinge flap was deemed most appropriate.  Using a sterile surgical marker, an appropriate hinge flap was drawn incorporating the defect. The area thus outlined was incised with a #15 scalpel blade.  The skin margins were undermined to an appropriate distance in all directions utilizing iris scissors.
Medical Necessity Information: It is in your best interest to select a reason for this procedure from the list below. All of these items fulfill various CMS LCD requirements except lesion extends to a margin.
Crescentic Advancement Flap Text: The defect edges were debeveled with a #15 scalpel blade.  Given the location of the defect and the proximity to free margins a crescentic advancement flap was deemed most appropriate.  Using a sterile surgical marker, the appropriate advancement flap was drawn incorporating the defect and placing the expected incisions within the relaxed skin tension lines where possible.    The area thus outlined was incised deep to adipose tissue with a #15 scalpel blade.  The skin margins were undermined to an appropriate distance in all directions utilizing iris scissors.
Ear Star Wedge Flap Text: The defect edges were debeveled with a #15 blade scalpel.  Given the location of the defect and the proximity to free margins (helical rim) an ear star wedge flap was deemed most appropriate.  Using a sterile surgical marker, the appropriate flap was drawn incorporating the defect and placing the expected incisions between the helical rim and antihelix where possible.  The area thus outlined was incised through and through with a #15 scalpel blade.
Intermediate / Complex Repair - Final Wound Length In Cm: 3.3
Epidermal Closure: running
Lip Wedge Excision Repair Text: Given the location of the defect and the proximity to free margins a full thickness wedge repair was deemed most appropriate.  Using a sterile surgical marker, the appropriate repair was drawn incorporating the defect and placing the expected incisions perpendicular to the vermilion border.  The vermilion border was also meticulously outlined to ensure appropriate reapproximation during the repair.  The area thus outlined was incised through and through with a #15 scalpel blade.  The muscularis and dermis were reaproximated with deep sutures following hemostasis. Care was taken to realign the vermilion border before proceeding with the superficial closure.  Once the vermilion was realigned the superfical and mucosal closure was finished.
Detail Level: Detailed
Ftsg Text: The defect edges were debeveled with a #15 scalpel blade.  Given the location of the defect, shape of the defect and the proximity to free margins a full thickness skin graft was deemed most appropriate.  Using a sterile surgical marker, the primary defect shape was transferred to the donor site. The area thus outlined was incised deep to adipose tissue with a #15 scalpel blade.  The harvested graft was then trimmed of adipose tissue until only dermis and epidermis was left.  The skin margins of the secondary defect were undermined to an appropriate distance in all directions utilizing iris scissors.  The secondary defect was closed with interrupted buried subcutaneous sutures.  The skin edges were then re-apposed with running  sutures.  The skin graft was then placed in the primary defect and oriented appropriately.
Additional Deep Sutures: 3-0 Vicryl
Lab Facility: 
O-Z Plasty Text: The defect edges were debeveled with a #15 scalpel blade.  Given the location of the defect, shape of the defect and the proximity to free margins an O-Z plasty (double transposition flap) was deemed most appropriate.  Using a sterile surgical marker, the appropriate transposition flaps were drawn incorporating the defect and placing the expected incisions within the relaxed skin tension lines where possible.    The area thus outlined was incised deep to adipose tissue with a #15 scalpel blade.  The skin margins were undermined to an appropriate distance in all directions utilizing iris scissors.  Hemostasis was achieved with electrocautery.  The flaps were then transposed into place, one clockwise and the other counterclockwise, and anchored with interrupted buried subcutaneous sutures.
Helical Rim Advancement Flap Text: The defect edges were debeveled with a #15 blade scalpel.  Given the location of the defect and the proximity to free margins (helical rim) a double helical rim advancement flap was deemed most appropriate.  Using a sterile surgical marker, the appropriate advancement flaps were drawn incorporating the defect and placing the expected incisions between the helical rim and antihelix where possible.  The area thus outlined was incised through and through with a #15 scalpel blade.  With a skin hook and iris scissors, the flaps were gently and sharply undermined and freed up.
Interpolation Flap Text: A decision was made to reconstruct the defect utilizing an interpolation axial flap and a staged reconstruction.  A telfa template was made of the defect.  This telfa template was then used to outline the interpolation flap.  The donor area for the pedicle flap was then injected with anesthesia.  The flap was excised through the skin and subcutaneous tissue down to the layer of the underlying musculature.  The interpolation flap was carefully excised within this deep plane to maintain its blood supply.  The edges of the donor site were undermined.   The donor site was closed in a primary fashion.  The pedicle was then rotated into position and sutured.  Once the tube was sutured into place, adequate blood supply was confirmed with blanching and refill.  The pedicle was then wrapped with xeroform gauze and dressed appropriately with a telfa and gauze bandage to ensure continued blood supply and protect the attached pedicle.
Purse String (Intermediate) Text: Given the location of the defect and the characteristics of the surrounding skin a purse string intermediate closure was deemed most appropriate.  Undermining was performed circumferentially around the surgical defect.  A purse string suture was then placed and tightened.
Complex Repair And Bilobe Flap Text: The defect edges were debeveled with a #15 scalpel blade.  The primary defect was closed partially with a complex linear closure.  Given the location of the remaining defect, shape of the defect and the proximity to free margins a bilobe flap was deemed most appropriate for complete closure of the defect.  Using a sterile surgical marker, an appropriate advancement flap was drawn incorporating the defect and placing the expected incisions within the relaxed skin tension lines where possible.    The area thus outlined was incised deep to adipose tissue with a #15 scalpel blade.  The skin margins were undermined to an appropriate distance in all directions utilizing iris scissors.
Size Of Margin In Cm: 0.3
Bilobed Flap Text: The defect edges were debeveled with a #15 scalpel blade.  Given the location of the defect and the proximity to free margins a bilobe flap was deemed most appropriate.  Using a sterile surgical marker, an appropriate bilobe flap drawn around the defect.    The area thus outlined was incised deep to adipose tissue with a #15 scalpel blade.  The skin margins were undermined to an appropriate distance in all directions utilizing iris scissors.
Scalpel Size: 15 blade
Estimated Blood Loss (Cc): minimal
Hatchet Flap Text: The defect edges were debeveled with a #15 scalpel blade.  Given the location of the defect, shape of the defect and the proximity to free margins a hatchet flap was deemed most appropriate.  Using a sterile surgical marker, an appropriate hatchet flap was drawn incorporating the defect and placing the expected incisions within the relaxed skin tension lines where possible.    The area thus outlined was incised deep to adipose tissue with a #15 scalpel blade.  The skin margins were undermined to an appropriate distance in all directions utilizing iris scissors.
W Plasty Text: The lesion was extirpated to the level of the fat with a #15 scalpel blade.  Given the location of the defect, shape of the defect and the proximity to free margins a W-plasty was deemed most appropriate for repair.  Using a sterile surgical marker, the appropriate transposition arms of the W-plasty were drawn incorporating the defect and placing the expected incisions within the relaxed skin tension lines where possible.    The area thus outlined was incised deep to adipose tissue with a #15 scalpel blade.  The skin margins were undermined to an appropriate distance in all directions utilizing iris scissors.  The opposing transposition arms were then transposed into place in opposite direction and anchored with interrupted buried subcutaneous sutures.
Complex Repair And V-Y Plasty Text: The defect edges were debeveled with a #15 scalpel blade.  The primary defect was closed partially with a complex linear closure.  Given the location of the remaining defect, shape of the defect and the proximity to free margins a V-Y plasty was deemed most appropriate for complete closure of the defect.  Using a sterile surgical marker, an appropriate advancement flap was drawn incorporating the defect and placing the expected incisions within the relaxed skin tension lines where possible.    The area thus outlined was incised deep to adipose tissue with a #15 scalpel blade.  The skin margins were undermined to an appropriate distance in all directions utilizing iris scissors.
Alar Island Pedicle Flap Text: The defect edges were debeveled with a #15 scalpel blade.  Given the location of the defect, shape of the defect and the proximity to the alar rim an island pedicle advancement flap was deemed most appropriate.  Using a sterile surgical marker, an appropriate advancement flap was drawn incorporating the defect, outlining the appropriate donor tissue and placing the expected incisions within the nasal ala running parallel to the alar rim. The area thus outlined was incised with a #15 scalpel blade.  The skin margins were undermined minimally to an appropriate distance in all directions around the primary defect and laterally outward around the island pedicle utilizing iris scissors.  There was minimal undermining beneath the pedicle flap.
Complex Repair And Transposition Flap Text: The defect edges were debeveled with a #15 scalpel blade.  The primary defect was closed partially with a complex linear closure.  Given the location of the remaining defect, shape of the defect and the proximity to free margins a transposition flap was deemed most appropriate for complete closure of the defect.  Using a sterile surgical marker, an appropriate advancement flap was drawn incorporating the defect and placing the expected incisions within the relaxed skin tension lines where possible.    The area thus outlined was incised deep to adipose tissue with a #15 scalpel blade.  The skin margins were undermined to an appropriate distance in all directions utilizing iris scissors.
Complex Repair And Dermal Autograft Text: The defect edges were debeveled with a #15 scalpel blade.  The primary defect was closed partially with a complex linear closure.  Given the location of the defect, shape of the defect and the proximity to free margins an dermal autograft was deemed most appropriate to repair the remaining defect.  The graft was trimmed to fit the size of the remaining defect.  The graft was then placed in the primary defect, oriented appropriately, and sutured into place.
Rotation Flap Text: The defect edges were debeveled with a #15 scalpel blade.  Given the location of the defect, shape of the defect and the proximity to free margins a rotation flap was deemed most appropriate.  Using a sterile surgical marker, an appropriate rotation flap was drawn incorporating the defect and placing the expected incisions within the relaxed skin tension lines where possible.    The area thus outlined was incised deep to adipose tissue with a #15 scalpel blade.  The skin margins were undermined to an appropriate distance in all directions utilizing iris scissors.

## 2017-12-21 NOTE — HPI: PROCEDURE (SKIN SURGERY)
Has The Growth Been Previously Biopsied?: has been previously biopsied
Body Location Override (Optional): Rt calf
Year Removed: 2016
Location: Lt Dorsal lateral hand

## 2018-01-24 ENCOUNTER — APPOINTMENT (RX ONLY)
Dept: URBAN - METROPOLITAN AREA CLINIC 20 | Facility: CLINIC | Age: 38
Setting detail: DERMATOLOGY
End: 2018-01-24

## 2018-01-24 DIAGNOSIS — Z85.820 PERSONAL HISTORY OF MALIGNANT MELANOMA OF SKIN: ICD-10-CM

## 2018-01-24 DIAGNOSIS — L82.1 OTHER SEBORRHEIC KERATOSIS: ICD-10-CM

## 2018-01-24 DIAGNOSIS — L81.4 OTHER MELANIN HYPERPIGMENTATION: ICD-10-CM

## 2018-01-24 DIAGNOSIS — D18.0 HEMANGIOMA: ICD-10-CM

## 2018-01-24 DIAGNOSIS — L57.8 OTHER SKIN CHANGES DUE TO CHRONIC EXPOSURE TO NONIONIZING RADIATION: ICD-10-CM

## 2018-01-24 DIAGNOSIS — D22 MELANOCYTIC NEVI: ICD-10-CM

## 2018-01-24 PROBLEM — D22.5 MELANOCYTIC NEVI OF TRUNK: Status: ACTIVE | Noted: 2018-01-24

## 2018-01-24 PROBLEM — D18.01 HEMANGIOMA OF SKIN AND SUBCUTANEOUS TISSUE: Status: ACTIVE | Noted: 2018-01-24

## 2018-01-24 PROBLEM — D22.4 MELANOCYTIC NEVI OF SCALP AND NECK: Status: ACTIVE | Noted: 2018-01-24

## 2018-01-24 PROBLEM — D48.5 NEOPLASM OF UNCERTAIN BEHAVIOR OF SKIN: Status: ACTIVE | Noted: 2018-01-24

## 2018-01-24 PROBLEM — D22.72 MELANOCYTIC NEVI OF LEFT LOWER LIMB, INCLUDING HIP: Status: ACTIVE | Noted: 2018-01-24

## 2018-01-24 PROBLEM — D22.61 MELANOCYTIC NEVI OF RIGHT UPPER LIMB, INCLUDING SHOULDER: Status: ACTIVE | Noted: 2018-01-24

## 2018-01-24 PROCEDURE — 99214 OFFICE O/P EST MOD 30 MIN: CPT | Mod: 25

## 2018-01-24 PROCEDURE — 11100: CPT

## 2018-01-24 PROCEDURE — 11101: CPT

## 2018-01-24 PROCEDURE — ? BIOPSY BY SHAVE METHOD

## 2018-01-24 PROCEDURE — ? OBSERVATION AND MEASURE

## 2018-01-24 PROCEDURE — ? COUNSELING

## 2018-01-24 PROCEDURE — ? ADDITIONAL NOTES

## 2018-01-24 ASSESSMENT — LOCATION DETAILED DESCRIPTION DERM
LOCATION DETAILED: LEFT SUPERIOR UPPER BACK
LOCATION DETAILED: RIGHT MEDIAL SUPERIOR CHEST
LOCATION DETAILED: LEFT ANTERIOR THIGH
LOCATION DETAILED: LEFT DORSAL HAND
LOCATION DETAILED: LEFT MID BACK
LOCATION DETAILED: LEFT CHEEK
LOCATION DETAILED: RIGHT CHEEK
LOCATION DETAILED: LEFT SUPERIOR FRONTAL SCALP
LOCATION DETAILED: RIGHT DORSAL HAND
LOCATION DETAILED: LEFT ANTERIOR PROXIMAL THIGH
LOCATION DETAILED: RIGHT UPPER BACK
LOCATION DETAILED: LEFT DISTAL POSTERIOR THIGH
LOCATION DETAILED: LEFT DORSAL FOREARM
LOCATION DETAILED: LEFT UPPER BACK
LOCATION DETAILED: LEFT ANTERIOR DISTAL THIGH
LOCATION DETAILED: RIGHT ANTERIOR THIGH
LOCATION DETAILED: LEFT RADIAL DORSAL HAND
LOCATION DETAILED: RIGHT PROXIMAL POSTERIOR UPPER ARM
LOCATION DETAILED: RIGHT DORSAL FOREARM

## 2018-01-24 ASSESSMENT — LOCATION SIMPLE DESCRIPTION DERM
LOCATION SIMPLE: LEFT CHEEK
LOCATION SIMPLE: BACK
LOCATION SIMPLE: SCALP
LOCATION SIMPLE: RIGHT HAND
LOCATION SIMPLE: RIGHT POSTERIOR UPPER ARM
LOCATION SIMPLE: LEFT UPPER BACK
LOCATION SIMPLE: LEFT POSTERIOR THIGH
LOCATION SIMPLE: RIGHT CHEEK
LOCATION SIMPLE: LEFT LOWER EXTREMITY
LOCATION SIMPLE: LEFT UPPER EXTREMITY
LOCATION SIMPLE: RIGHT LOWER EXTREMITY
LOCATION SIMPLE: LEFT THIGH
LOCATION SIMPLE: RIGHT UPPER EXTREMITY
LOCATION SIMPLE: LEFT HAND
LOCATION SIMPLE: CHEST

## 2018-01-24 ASSESSMENT — LOCATION ZONE DERM
LOCATION ZONE: HAND
LOCATION ZONE: ARM
LOCATION ZONE: LEG
LOCATION ZONE: SCALP
LOCATION ZONE: TRUNK
LOCATION ZONE: FACE

## 2018-01-24 NOTE — PROCEDURE: BIOPSY BY SHAVE METHOD
Bill 35356 For Specimen Handling/Conveyance To Laboratory?: no
Notification Instructions: Patient will be notified of biopsy results. However, patient instructed to call the office if not contacted within 2 weeks.
Wound Care: Aquaphor
Electrodesiccation And Curettage Text: The wound bed was treated with electrodesiccation and curettage after the biopsy was performed.
Curettage Text: The wound bed was treated with curettage after the biopsy was performed.
Billing Type: Third-Party Bill
Hemostasis: Drysol and Electrocautery
Lab: [id number]
Type Of Destruction Used: Curettage
Dressing: Band-Aid
Detail Level: Detailed
X Size Of Lesion In Cm: 0.5
Anesthesia Type: 1% lidocaine with 1:100,000 epinephrine and 408mcg clindamycin/ml and a 1:10 solution of 8.4% sodium bicarbonate
Additional Anesthesia Volume In Cc (Will Not Render If 0): 0
Consent: Written consent was obtained and risks were reviewed including but not limited to scarring, infection, bleeding, scabbing, incomplete removal, nerve damage and allergy to anesthesia.
Electrodesiccation Text: The wound bed was treated with electrodesiccation after the biopsy was performed.
Post-Care Instructions: I reviewed with the patient in detail post-care instructions. Patient is to keep the biopsy site dry overnight, and then apply bacitracin twice daily until healed. Patient may apply hydrogen peroxide soaks to remove any crusting.
Silver Nitrate Text: The wound bed was treated with silver nitrate after the biopsy was performed.
Biopsy Type: H and E
Biopsy Method: Personna blade
Lab Facility: 
Anesthesia Volume In Cc: 0.2
Cryotherapy Text: The wound bed was treated with cryotherapy after the biopsy was performed.
X Size Of Lesion In Cm: 0.4
X Size Of Lesion In Cm: 0.3

## 2018-01-31 ENCOUNTER — APPOINTMENT (OUTPATIENT)
Dept: RADIOLOGY | Facility: IMAGING CENTER | Age: 38
End: 2018-01-31
Attending: PHYSICIAN ASSISTANT
Payer: COMMERCIAL

## 2018-01-31 ENCOUNTER — OFFICE VISIT (OUTPATIENT)
Dept: URGENT CARE | Facility: CLINIC | Age: 38
End: 2018-01-31
Payer: COMMERCIAL

## 2018-01-31 VITALS
BODY MASS INDEX: 28.17 KG/M2 | TEMPERATURE: 98.6 F | HEIGHT: 64 IN | SYSTOLIC BLOOD PRESSURE: 110 MMHG | WEIGHT: 165 LBS | OXYGEN SATURATION: 91 % | DIASTOLIC BLOOD PRESSURE: 70 MMHG | RESPIRATION RATE: 16 BRPM | HEART RATE: 92 BPM

## 2018-01-31 DIAGNOSIS — J45.41 MODERATE PERSISTENT ASTHMA WITH EXACERBATION: Primary | ICD-10-CM

## 2018-01-31 DIAGNOSIS — J45.41 MODERATE PERSISTENT ASTHMA WITH EXACERBATION: ICD-10-CM

## 2018-01-31 PROCEDURE — 71046 X-RAY EXAM CHEST 2 VIEWS: CPT | Mod: TC | Performed by: PHYSICIAN ASSISTANT

## 2018-01-31 PROCEDURE — 99214 OFFICE O/P EST MOD 30 MIN: CPT | Performed by: PHYSICIAN ASSISTANT

## 2018-01-31 RX ORDER — FLUTICASONE PROPIONATE AND SALMETEROL XINAFOATE 230; 21 UG/1; UG/1
1 AEROSOL, METERED RESPIRATORY (INHALATION) 2 TIMES DAILY
Qty: 12 G | Refills: 0 | Status: SHIPPED | OUTPATIENT
Start: 2018-01-31 | End: 2019-10-07

## 2018-01-31 RX ORDER — IPRATROPIUM BROMIDE AND ALBUTEROL SULFATE 2.5; .5 MG/3ML; MG/3ML
3 SOLUTION RESPIRATORY (INHALATION) ONCE
Status: COMPLETED | OUTPATIENT
Start: 2018-01-31 | End: 2018-01-31

## 2018-01-31 RX ORDER — PREDNISONE 20 MG/1
TABLET ORAL
Qty: 23 TAB | Refills: 0 | Status: SHIPPED | OUTPATIENT
Start: 2018-01-31 | End: 2019-02-09

## 2018-01-31 RX ADMIN — IPRATROPIUM BROMIDE AND ALBUTEROL SULFATE 3 ML: 2.5; .5 SOLUTION RESPIRATORY (INHALATION) at 09:57

## 2018-01-31 NOTE — PROGRESS NOTES
Subjective:      Pt is a 37 y.o. female who presents with Shortness of Breath (astma attack  )            HPI  Pt notes hx of asthma with exacerbation x 2 days with SOB and coughing but no signs of cold or flu per pt. Pt has taken only albuterol recently which she notes is not helping her. Pt states the pain is a 7/10 with coughing fits, aching in nature and worse at night. Pt denies CP, NVD, paresthesias, headaches, dizziness, change in vision, hives, or other joint pain. The pt's medication list, problem list, and allergies have been evaluated and reviewed during today's visit.    PMH:  Past Medical History:   Diagnosis Date   • Asthma     inhaler   • Bipolar 1 disorder (CMS-HCC)    • Cancer (CMS-[facility]) 2016    melanoma   • Depression    • DM mellitus, gestational     insulin   • Hyperlipidemia    • Hypertension    • Pap smear     Dr. Baird   • PCOS (polycystic ovarian syndrome)        PSH:  Past Surgical History:   Procedure Laterality Date   • AXILLARY NODE DISSECTION Left 6/23/2017    Procedure: AXILLARY NODE DISSECTION- COMPLETION LYPHADENECTOMY;  Surgeon: Lionel Weinberg M.D.;  Location: SURGERY [facility];  Service:    • WIDE EXCISION Left 5/9/2017    Procedure: WIDE EXCISION - MALIGNANT MELANOMA HAND;  Surgeon: Lionel Weinberg M.D.;  Location: SURGERY SAME DAY [facility];  Service:    • NODE BIOPSY SENTINEL Left 5/9/2017    Procedure: NODE BIOPSY SENTINEL - AXILLARY;  Surgeon: Lionel Weinberg M.D.;  Location: SURGERY SAME DAY [facility];  Service:    • OTHER  2016    excisino of melanoma left hand   • ADENOIDECTOMY     • MYRINGOTOMY      with tube placement       Fam Hx:    family history includes Diabetes in her father and mother; Hyperlipidemia in her mother; Hypertension in her father; Psychiatry in her mother; Thyroid in her mother.  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     Social History Main Topics   • Smoking status: Former Smoker     Packs/day: 0.50     Years: 5.00     Types: Cigarettes   • Smokeless tobacco: Never Used   • Alcohol use 1.0 oz/week     2 Cans of beer per week      Comment: rare social   • Drug use: No   • Sexual activity: Yes     Other Topics Concern   • Not on file     Social History Narrative   • No narrative on file         Medications:    Current Outpatient Prescriptions:   •  fluticasone-salmeterol (ADVAIR HFA) 230-21 MCG/ACT inhaler, Inhale 1 Puff by mouth 2 times a day., Disp: 12 g, Rfl: 0  •  predniSONE (DELTASONE) 20 MG Tab, Take 3 tabs at once PO daily x 5 days, then take 2 tabs at once daily x 3 days, then take 1 tab PO daily x 2 days, Disp: 23 Tab, Rfl: 0  •  acetaminophen (TYLENOL) 500 MG Tab, Take 500 mg by mouth 1 time daily as needed., Disp: , Rfl:   •  quetiapine (SEROQUEL) 200 MG Tab, Take 200 mg by mouth every evening., Disp: , Rfl:   •  lisinopril (PRINIVIL) 10 MG Tab, Take 10 mg by mouth every day., Disp: , Rfl:   •  insulin glargine (LANTUS SOLOSTAR) 100 UNIT/ML Solution Pen-injector injection, Inject 20 Units as instructed every evening., Disp: , Rfl:   •  albuterol (PROVENTIL HFA) 108 (90 BASE) MCG/ACT Aero Soln inhalation aerosol, Inhale 2 Puffs by mouth every 6 hours as needed., Disp: 1 Inhaler, Rfl: 5  •  lamotrigine (LAMICTAL) 100 MG Tab, Take 150 mg by mouth every day., Disp: , Rfl:   •  metformin ER (GLUCOPHAGE XR) 750 MG TABLET SR 24 HR, Take 750 mg by mouth 2 times a day., Disp: , Rfl:   •  atorvastatin (LIPITOR) 10 MG Tab, Take 10 mg by mouth every evening., Disp: , Rfl:   •  clonidine (CATAPRES) 0.1 MG TABS, Take 0.1 mg by mouth every evening., Disp: , Rfl:   •  paroxetine (PAXIL) 40 MG tablet, Take 1 Tab by mouth every day. Take with food, Disp: 30 Tab, Rfl: 5  •  Ipilimumab (YERVOY IV), by Intravenous  "route., Disp: , Rfl:       Allergies:  Patient has no known allergies.    ROS  Review of Systems   Constitutional: Negative for fever and diaphoresis.   HENT: Positive for congestion. Negative for ear discharge, hearing loss, nosebleeds and tinnitus.    Eyes: Negative for blurred vision, double vision and photophobia.   Respiratory: Positive for cough, sputum production, shortness of breath and wheezing. Negative for hemoptysis.    Cardiovascular: Negative for chest pain and palpitations.   Gastrointestinal: Negative for nausea, vomiting, abdominal pain, diarrhea and constipation.   Genitourinary: Negative for dysuria and flank pain.   Musculoskeletal: Negative for joint pain and myalgias.   Skin: Negative for itching and rash.   Neurological: Negative for dizziness, tingling and weakness.   Endo/Heme/Allergies: Does not bruise/bleed easily.   Psychiatric/Behavioral: Negative for depression. The patient is not nervous/anxious.             Objective:     /70   Pulse 92   Temp 37 °C (98.6 °F)   Resp 16   Ht 1.626 m (5' 4\")   Wt 74.8 kg (165 lb)   SpO2 91%   BMI 28.32 kg/m²      Physical Exam       Physical Exam   Constitutional: PT is oriented to person, place, and time. PT appears well-developed and well-nourished. No distress.   HENT:   Head: Normocephalic and atraumatic.   Right Ear: Hearing, tympanic membrane, external ear and ear canal normal.   Left Ear: Hearing, tympanic membrane, external ear and ear canal normal.   Nose: wnl  Mouth/Throat: Uvula is midline. Mucous membranes are wnl. No oropharyngeal exudate.   Eyes: Conjunctivae normal and EOM are normal. Pupils are equal, round, and reactive to light. Right eye exhibits no discharge. Left eye exhibits no discharge.   Neck: Normal range of motion. Neck supple. No thyromegaly present.   Cardiovascular: Normal rate, regular rhythm, normal heart sounds and intact distal pulses.  Exam reveals no gallop and no friction rub.    No murmur " heard.  Pulmonary/Chest: Effort normal. No respiratory distress. PT has wheezes. PT has no rales. PT exhibits tenderness.   Abdominal: Soft. Bowel sounds are normal. PT exhibits no distension and no mass. There is no tenderness. There is no rebound and no guarding.   Musculoskeletal: Normal range of motion. PT exhibits no edema and no tenderness.   Lymphadenopathy:     PT has no cervical adenopathy.   Neurological: Pt is alert and oriented to person, place, and time. Pt has normal reflexes. No cranial nerve deficit.   Skin: Skin is warm and dry. No rash noted. No erythema.   Psychiatric: PT has a normal mood and affect. Pt behavior is normal. Judgment and thought content normal.     RADS;  Narrative     1/31/2018 9:12 AM    HISTORY/REASON FOR EXAM:  Shortness of Breath      TECHNIQUE/EXAM DESCRIPTION AND NUMBER OF VIEWS:  Two views of the chest.    COMPARISON:  09/03/2015      FINDINGS:  HEART: Not enlarged.  LUNGS: No areas of air space disease are demonstrated.  PLEURA: No effusion or pneumothorax.   Impression     No evidence of acute cardiopulmonary disease      Reading Provider Reading Date   Raphael Juan M.D. Jan 31, 2018      Signing Provider Signing Date Signing Time   Raphael Juan M.D. Jan 31, 2018  9:33 AM          Assessment/Plan:     1. Moderate persistent asthma with exacerbation  - DX-CHEST-2 VIEWS; Future  - ipratropium-albuterol (DUONEB) nebulizer solution 3 mL; 3 mL by Nebulization route Once.  - fluticasone-salmeterol (ADVAIR HFA) 230-21 MCG/ACT inhaler; Inhale 1 Puff by mouth 2 times a day.  Dispense: 12 g; Refill: 0  - predniSONE (DELTASONE) 20 MG Tab; Take 3 tabs at once PO daily x 5 days, then take 2 tabs at once daily x 3 days, then take 1 tab PO daily x 2 days  Dispense: 23 Tab; Refill: 0    Rest, fluids encouraged.  OTC decongestant for congestion/cough  Note given for work.  AVS with medical info given.  Pt was in full understanding and agreement with the plan.  Follow-up as  needed if symptoms worsen or fail to improve.

## 2018-01-31 NOTE — PATIENT INSTRUCTIONS
Asthma, Adult  Asthma is a recurring condition in which the airways tighten and narrow. Asthma can make it difficult to breathe. It can cause coughing, wheezing, and shortness of breath. Asthma episodes, also called asthma attacks, range from minor to life-threatening. Asthma cannot be cured, but medicines and lifestyle changes can help control it.  CAUSES  Asthma is believed to be caused by inherited (genetic) and environmental factors, but its exact cause is unknown. Asthma may be triggered by allergens, lung infections, or irritants in the air. Asthma triggers are different for each person. Common triggers include:   · Animal dander.  · Dust mites.  · Cockroaches.  · Pollen from trees or grass.  · Mold.  · Smoke.  · Air pollutants such as dust, household , hair sprays, aerosol sprays, paint fumes, strong chemicals, or strong odors.  · Cold air, weather changes, and winds (which increase molds and pollens in the air).  · Strong emotional expressions such as crying or laughing hard.  · Stress.  · Certain medicines (such as aspirin) or types of drugs (such as beta-blockers).  · Sulfites in foods and drinks. Foods and drinks that may contain sulfites include dried fruit, potato chips, and sparkling grape juice.  · Infections or inflammatory conditions such as the flu, a cold, or an inflammation of the nasal membranes (rhinitis).  · Gastroesophageal reflux disease (GERD).  · Exercise or strenuous activity.  SYMPTOMS  Symptoms may occur immediately after asthma is triggered or many hours later. Symptoms include:  · Wheezing.  · Excessive nighttime or early morning coughing.  · Frequent or severe coughing with a common cold.  · Chest tightness.  · Shortness of breath.  DIAGNOSIS   The diagnosis of asthma is made by a review of your medical history and a physical exam. Tests may also be performed. These may include:  · Lung function studies. These tests show how much air you breathe in and out.  · Allergy  tests.  · Imaging tests such as X-rays.  TREATMENT   Asthma cannot be cured, but it can usually be controlled. Treatment involves identifying and avoiding your asthma triggers. It also involves medicines. There are 2 classes of medicine used for asthma treatment:   · Controller medicines. These prevent asthma symptoms from occurring. They are usually taken every day.  · Reliever or rescue medicines. These quickly relieve asthma symptoms. They are used as needed and provide short-term relief.  Your health care provider will help you create an asthma action plan. An asthma action plan is a written plan for managing and treating your asthma attacks. It includes a list of your asthma triggers and how they may be avoided. It also includes information on when medicines should be taken and when their dosage should be changed. An action plan may also involve the use of a device called a peak flow meter. A peak flow meter measures how well the lungs are working. It helps you monitor your condition.  HOME CARE INSTRUCTIONS   · Take medicines only as directed by your health care provider. Speak with your health care provider if you have questions about how or when to take the medicines.  · Use a peak flow meter as directed by your health care provider. Record and keep track of readings.  · Understand and use the action plan to help minimize or stop an asthma attack without needing to seek medical care.  · Control your home environment in the following ways to help prevent asthma attacks:  ¨ Do not smoke. Avoid being exposed to secondhand smoke.  ¨ Change your heating and air conditioning filter regularly.  ¨ Limit your use of fireplaces and wood stoves.  ¨ Get rid of pests (such as roaches and mice) and their droppings.  ¨ Throw away plants if you see mold on them.  ¨ Clean your floors and dust regularly. Use unscented cleaning products.  ¨ Try to have someone else vacuum for you regularly. Stay out of rooms while they are  being vacuumed and for a short while afterward. If you vacuum, use a dust mask from a hardware store, a double-layered or microfilter vacuum  bag, or a vacuum  with a HEPA filter.  ¨ Replace carpet with wood, tile, or vinyl jennifer. Carpet can trap dander and dust.  ¨ Use allergy-proof pillows, mattress covers, and box spring covers.  ¨ Wash bed sheets and blankets every week in hot water and dry them in a dryer.  ¨ Use blankets that are made of polyester or cotton.  ¨ Clean bathrooms and sade with bleach. If possible, have someone repaint the walls in these rooms with mold-resistant paint. Keep out of the rooms that are being cleaned and painted.  ¨ Wash hands frequently.  SEEK MEDICAL CARE IF:   · You have wheezing, shortness of breath, or a cough even if taking medicine to prevent attacks.  · The colored mucus you cough up (sputum) is thicker than usual.  · Your sputum changes from clear or white to yellow, green, gray, or bloody.  · You have any problems that may be related to the medicines you are taking (such as a rash, itching, swelling, or trouble breathing).  · You are using a reliever medicine more than 2-3 times per week.  · Your peak flow is still at 50-79% of your personal best after following your action plan for 1 hour.  · You have a fever.  SEEK IMMEDIATE MEDICAL CARE IF:   · You seem to be getting worse and are unresponsive to treatment during an asthma attack.  · You are short of breath even at rest.  · You get short of breath when doing very little physical activity.  · You have difficulty eating, drinking, or talking due to asthma symptoms.  · You develop chest pain.  · You develop a fast heartbeat.  · You have a bluish color to your lips or fingernails.  · You are light-headed, dizzy, or faint.  · Your peak flow is less than 50% of your personal best.  MAKE SURE YOU:   · Understand these instructions.  · Will watch your condition.  · Will get help right away if you are not  doing well or get worse.     This information is not intended to replace advice given to you by your health care provider. Make sure you discuss any questions you have with your health care provider.     Document Released: 12/18/2006 Document Revised: 01/08/2016 Document Reviewed: 07/17/2014  Elsevier Interactive Patient Education ©2016 [employer]

## 2018-04-18 ENCOUNTER — APPOINTMENT (RX ONLY)
Dept: URBAN - METROPOLITAN AREA CLINIC 20 | Facility: CLINIC | Age: 38
Setting detail: DERMATOLOGY
End: 2018-04-18

## 2018-04-18 DIAGNOSIS — L82.1 OTHER SEBORRHEIC KERATOSIS: ICD-10-CM

## 2018-04-18 DIAGNOSIS — D22 MELANOCYTIC NEVI: ICD-10-CM

## 2018-04-18 DIAGNOSIS — L57.8 OTHER SKIN CHANGES DUE TO CHRONIC EXPOSURE TO NONIONIZING RADIATION: ICD-10-CM

## 2018-04-18 DIAGNOSIS — L81.4 OTHER MELANIN HYPERPIGMENTATION: ICD-10-CM

## 2018-04-18 DIAGNOSIS — Z85.820 PERSONAL HISTORY OF MALIGNANT MELANOMA OF SKIN: ICD-10-CM

## 2018-04-18 DIAGNOSIS — D18.0 HEMANGIOMA: ICD-10-CM

## 2018-04-18 PROBLEM — D22.5 MELANOCYTIC NEVI OF TRUNK: Status: ACTIVE | Noted: 2018-04-18

## 2018-04-18 PROBLEM — D22.61 MELANOCYTIC NEVI OF RIGHT UPPER LIMB, INCLUDING SHOULDER: Status: ACTIVE | Noted: 2018-04-18

## 2018-04-18 PROBLEM — D18.01 HEMANGIOMA OF SKIN AND SUBCUTANEOUS TISSUE: Status: ACTIVE | Noted: 2018-04-18

## 2018-04-18 PROBLEM — D22.71 MELANOCYTIC NEVI OF RIGHT LOWER LIMB, INCLUDING HIP: Status: ACTIVE | Noted: 2018-04-18

## 2018-04-18 PROBLEM — D48.5 NEOPLASM OF UNCERTAIN BEHAVIOR OF SKIN: Status: ACTIVE | Noted: 2018-04-18

## 2018-04-18 PROBLEM — D22.72 MELANOCYTIC NEVI OF LEFT LOWER LIMB, INCLUDING HIP: Status: ACTIVE | Noted: 2018-04-18

## 2018-04-18 PROCEDURE — ? BIOPSY BY SHAVE METHOD

## 2018-04-18 PROCEDURE — 11101: CPT

## 2018-04-18 PROCEDURE — ? PHOTO-DOCUMENTATION

## 2018-04-18 PROCEDURE — 99214 OFFICE O/P EST MOD 30 MIN: CPT | Mod: 25

## 2018-04-18 PROCEDURE — ? OBSERVATION AND MEASURE

## 2018-04-18 PROCEDURE — ? COUNSELING

## 2018-04-18 PROCEDURE — 11100: CPT

## 2018-04-18 ASSESSMENT — LOCATION DETAILED DESCRIPTION DERM
LOCATION DETAILED: LEFT UPPER BACK
LOCATION DETAILED: LEFT CHEEK
LOCATION DETAILED: LEFT SUPERIOR UPPER BACK
LOCATION DETAILED: LEFT DORSAL FOREARM
LOCATION DETAILED: LEFT ANTERIOR THIGH
LOCATION DETAILED: RIGHT CHEEK
LOCATION DETAILED: RIGHT DORSAL HAND
LOCATION DETAILED: RIGHT ANTERIOR THIGH
LOCATION DETAILED: RIGHT DISTAL PRETIBIAL REGION
LOCATION DETAILED: RIGHT UPPER BACK
LOCATION DETAILED: LEFT PROXIMAL PRETIBIAL REGION
LOCATION DETAILED: RIGHT DORSAL FOREARM
LOCATION DETAILED: RIGHT PROXIMAL POSTERIOR UPPER ARM
LOCATION DETAILED: RIGHT ANTERIOR MEDIAL MALLEOLUS
LOCATION DETAILED: LEFT RADIAL DORSAL HAND
LOCATION DETAILED: LEFT DORSAL HAND
LOCATION DETAILED: LEFT DISTAL PRETIBIAL REGION
LOCATION DETAILED: LEFT ANTERIOR DISTAL THIGH
LOCATION DETAILED: LEFT MID BACK
LOCATION DETAILED: LEFT LATERAL DISTAL PRETIBIAL REGION

## 2018-04-18 ASSESSMENT — LOCATION SIMPLE DESCRIPTION DERM
LOCATION SIMPLE: RIGHT UPPER EXTREMITY
LOCATION SIMPLE: BACK
LOCATION SIMPLE: LEFT UPPER BACK
LOCATION SIMPLE: RIGHT LOWER EXTREMITY
LOCATION SIMPLE: LEFT CHEEK
LOCATION SIMPLE: RIGHT HAND
LOCATION SIMPLE: LEFT THIGH
LOCATION SIMPLE: RIGHT CHEEK
LOCATION SIMPLE: RIGHT PRETIBIAL REGION
LOCATION SIMPLE: RIGHT POSTERIOR UPPER ARM
LOCATION SIMPLE: LEFT LOWER EXTREMITY
LOCATION SIMPLE: RIGHT ANKLE
LOCATION SIMPLE: LEFT HAND
LOCATION SIMPLE: LEFT UPPER EXTREMITY
LOCATION SIMPLE: LEFT PRETIBIAL REGION

## 2018-04-18 ASSESSMENT — LOCATION ZONE DERM
LOCATION ZONE: LEG
LOCATION ZONE: ARM
LOCATION ZONE: HAND
LOCATION ZONE: FACE
LOCATION ZONE: TRUNK

## 2018-04-18 NOTE — PROCEDURE: BIOPSY BY SHAVE METHOD
Electrodesiccation Text: The wound bed was treated with electrodesiccation after the biopsy was performed.
Dressing: Band-Aid
Destruction After The Procedure: No
Lab Facility: 
Curettage Text: The wound bed was treated with curettage after the biopsy was performed.
Type Of Destruction Used: Curettage
Billing Type: Third-Party Bill
Lab: [id number]
Anesthesia Type: 1% lidocaine with 1:100,000 epinephrine and a 1:6 solution of 8.4% sodium bicarbonate
Silver Nitrate Text: The wound bed was treated with silver nitrate after the biopsy was performed.
Size Of Lesion In Cm: 0.2
Wound Care: Aquaphor
Was A Bandage Applied: Yes
Notification Instructions: Patient will be notified of biopsy results. However, patient instructed to call the office if not contacted within 2 weeks.
Electrodesiccation And Curettage Text: The wound bed was treated with electrodesiccation and curettage after the biopsy was performed.
Post-Care Instructions: I reviewed with the patient in detail post-care instructions. Patient is to keep the biopsy site dry overnight, and then apply bacitracin twice daily until healed. Patient may apply hydrogen peroxide soaks to remove any crusting.
Biopsy Method: Personna blade
Detail Level: Detailed
Consent: Written consent was obtained and risks were reviewed including but not limited to scarring, infection, bleeding, scabbing, incomplete removal, nerve damage and allergy to anesthesia.
Hemostasis: Drysol and Electrocautery
Biopsy Type: H and E
Body Location Override (Optional - Billing Will Still Be Based On Selected Body Map Location If Applicable): Left superior proximal pretibial region
Additional Anesthesia Volume In Cc (Will Not Render If 0): 0
Cryotherapy Text: The wound bed was treated with cryotherapy after the biopsy was performed.
Body Location Override (Optional - Billing Will Still Be Based On Selected Body Map Location If Applicable): Left inferior distal pretibial region

## 2018-07-18 ENCOUNTER — APPOINTMENT (RX ONLY)
Dept: URBAN - METROPOLITAN AREA CLINIC 20 | Facility: CLINIC | Age: 38
Setting detail: DERMATOLOGY
End: 2018-07-18

## 2018-07-18 DIAGNOSIS — D22 MELANOCYTIC NEVI: ICD-10-CM

## 2018-07-18 DIAGNOSIS — L82.1 OTHER SEBORRHEIC KERATOSIS: ICD-10-CM

## 2018-07-18 DIAGNOSIS — D18.0 HEMANGIOMA: ICD-10-CM

## 2018-07-18 DIAGNOSIS — L57.8 OTHER SKIN CHANGES DUE TO CHRONIC EXPOSURE TO NONIONIZING RADIATION: ICD-10-CM

## 2018-07-18 DIAGNOSIS — Z85.820 PERSONAL HISTORY OF MALIGNANT MELANOMA OF SKIN: ICD-10-CM

## 2018-07-18 DIAGNOSIS — L81.4 OTHER MELANIN HYPERPIGMENTATION: ICD-10-CM

## 2018-07-18 DIAGNOSIS — L71.0 PERIORAL DERMATITIS: ICD-10-CM

## 2018-07-18 PROBLEM — D22.4 MELANOCYTIC NEVI OF SCALP AND NECK: Status: ACTIVE | Noted: 2018-07-18

## 2018-07-18 PROBLEM — D48.5 NEOPLASM OF UNCERTAIN BEHAVIOR OF SKIN: Status: ACTIVE | Noted: 2018-07-18

## 2018-07-18 PROBLEM — D22.5 MELANOCYTIC NEVI OF TRUNK: Status: ACTIVE | Noted: 2018-07-18

## 2018-07-18 PROBLEM — D22.72 MELANOCYTIC NEVI OF LEFT LOWER LIMB, INCLUDING HIP: Status: ACTIVE | Noted: 2018-07-18

## 2018-07-18 PROBLEM — D22.61 MELANOCYTIC NEVI OF RIGHT UPPER LIMB, INCLUDING SHOULDER: Status: ACTIVE | Noted: 2018-07-18

## 2018-07-18 PROBLEM — D18.01 HEMANGIOMA OF SKIN AND SUBCUTANEOUS TISSUE: Status: ACTIVE | Noted: 2018-07-18

## 2018-07-18 PROCEDURE — 99214 OFFICE O/P EST MOD 30 MIN: CPT | Mod: 25

## 2018-07-18 PROCEDURE — 11100: CPT

## 2018-07-18 PROCEDURE — 11101: CPT

## 2018-07-18 PROCEDURE — ? COUNSELING

## 2018-07-18 PROCEDURE — ? BIOPSY BY SHAVE METHOD

## 2018-07-18 PROCEDURE — ? ADDITIONAL NOTES

## 2018-07-18 PROCEDURE — ? OBSERVATION AND MEASURE

## 2018-07-18 ASSESSMENT — LOCATION DETAILED DESCRIPTION DERM
LOCATION DETAILED: LEFT INFERIOR UPPER BACK
LOCATION DETAILED: RIGHT ANTERIOR THIGH
LOCATION DETAILED: LEFT CHEEK
LOCATION DETAILED: RIGHT CHEEK
LOCATION DETAILED: LEFT UPPER CUTANEOUS LIP
LOCATION DETAILED: LEFT INFERIOR OCCIPITAL SCALP
LOCATION DETAILED: RIGHT DORSAL FOREARM
LOCATION DETAILED: LEFT SUPERIOR UPPER BACK
LOCATION DETAILED: LEFT ANTERIOR DISTAL THIGH
LOCATION DETAILED: RIGHT UPPER CUTANEOUS LIP
LOCATION DETAILED: LEFT DORSAL FOREARM
LOCATION DETAILED: LEFT DORSAL HAND
LOCATION DETAILED: RIGHT PROXIMAL POSTERIOR UPPER ARM
LOCATION DETAILED: LEFT UPPER BACK
LOCATION DETAILED: RIGHT DORSAL HAND
LOCATION DETAILED: LEFT MID BACK
LOCATION DETAILED: LEFT CENTRAL FRONTAL SCALP
LOCATION DETAILED: LEFT ANTERIOR THIGH
LOCATION DETAILED: LEFT RADIAL DORSAL HAND
LOCATION DETAILED: RIGHT UPPER BACK

## 2018-07-18 ASSESSMENT — LOCATION SIMPLE DESCRIPTION DERM
LOCATION SIMPLE: BACK
LOCATION SIMPLE: LEFT LIP
LOCATION SIMPLE: LEFT UPPER EXTREMITY
LOCATION SIMPLE: LEFT HAND
LOCATION SIMPLE: LEFT CHEEK
LOCATION SIMPLE: LEFT UPPER BACK
LOCATION SIMPLE: SCALP
LOCATION SIMPLE: RIGHT UPPER EXTREMITY
LOCATION SIMPLE: LEFT THIGH
LOCATION SIMPLE: RIGHT LOWER EXTREMITY
LOCATION SIMPLE: RIGHT POSTERIOR UPPER ARM
LOCATION SIMPLE: RIGHT LIP
LOCATION SIMPLE: RIGHT CHEEK
LOCATION SIMPLE: LEFT LOWER EXTREMITY
LOCATION SIMPLE: POSTERIOR SCALP
LOCATION SIMPLE: RIGHT HAND

## 2018-07-18 ASSESSMENT — LOCATION ZONE DERM
LOCATION ZONE: TRUNK
LOCATION ZONE: ARM
LOCATION ZONE: LIP
LOCATION ZONE: HAND
LOCATION ZONE: FACE
LOCATION ZONE: LEG
LOCATION ZONE: SCALP

## 2018-07-18 NOTE — PROCEDURE: BIOPSY BY SHAVE METHOD
Dressing: Band-Aid
Electrodesiccation And Curettage Text: The wound bed was treated with electrodesiccation and curettage after the biopsy was performed.
X Size Of Lesion In Cm: 0.4
Detail Level: Detailed
Lab: [id number]
Notification Instructions: Patient will be notified of biopsy results. However, patient instructed to call the office if not contacted within 2 weeks.
Electrodesiccation Text: The wound bed was treated with electrodesiccation after the biopsy was performed.
Body Location Override (Optional - Billing Will Still Be Based On Selected Body Map Location If Applicable): left superior frontal scalp
Biopsy Method: Personna blade
Cryotherapy Text: The wound bed was treated with cryotherapy after the biopsy was performed.
Biopsy Type: H and E
Post-Care Instructions: I reviewed with the patient in detail post-care instructions. Patient is to keep the biopsy site dry overnight, and then apply bacitracin twice daily until healed. Patient may apply hydrogen peroxide soaks to remove any crusting.
Depth Of Biopsy: dermis
Render Post-Care Instructions In Note?: no
Additional Anesthesia Volume In Cc (Will Not Render If 0): 0
Was A Bandage Applied: Yes
Wound Care: Aquaphor
Type Of Destruction Used: Curettage
Hemostasis: Drysol and Electrocautery
Consent: Written consent was obtained and risks were reviewed including but not limited to scarring, infection, bleeding, scabbing, incomplete removal, nerve damage and allergy to anesthesia.
Silver Nitrate Text: The wound bed was treated with silver nitrate after the biopsy was performed.
Lab Facility: 
Anesthesia Type: 1% lidocaine with 1:100,000 epinephrine and a 1:6 solution of 8.4% sodium bicarbonate
Anesthesia Volume In Cc: 0.2
Billing Type: Third-Party Bill
Curettage Text: The wound bed was treated with curettage after the biopsy was performed.
Body Location Override (Optional - Billing Will Still Be Based On Selected Body Map Location If Applicable): left inferior central scalp
X Size Of Lesion In Cm: 0.6

## 2018-07-18 NOTE — PROCEDURE: OBSERVATION
R hip ROM limited by pain, in supine hip flexion=0-20 degrees, hip abd=~20 degrees R ankle=WFL/bilateral upper extremity ROM was WFL (within functional limits)/Left LE ROM was WFL (within functional limits)
Detail Level: Detailed
Size Of Lesion: 2mm x 2mm

## 2018-07-18 NOTE — PROCEDURE: ADDITIONAL NOTES
Additional Notes: Due to medication interaction can not use doxy\\n\\nTherefore called in \\nAzithromycin 500mg\\nTake one pill a day on Monday, Tuesday, Wednesday and Thursday\\nRepeat dosing for 1 month\\nRefill 2\\nPt aware not to take Zofran when taking azithromycin

## 2018-10-17 ENCOUNTER — APPOINTMENT (RX ONLY)
Dept: URBAN - METROPOLITAN AREA CLINIC 20 | Facility: CLINIC | Age: 38
Setting detail: DERMATOLOGY
End: 2018-10-17

## 2018-10-17 DIAGNOSIS — D18.0 HEMANGIOMA: ICD-10-CM

## 2018-10-17 DIAGNOSIS — Z87.2 PERSONAL HISTORY OF DISEASES OF THE SKIN AND SUBCUTANEOUS TISSUE: ICD-10-CM

## 2018-10-17 DIAGNOSIS — D22 MELANOCYTIC NEVI: ICD-10-CM

## 2018-10-17 DIAGNOSIS — L57.8 OTHER SKIN CHANGES DUE TO CHRONIC EXPOSURE TO NONIONIZING RADIATION: ICD-10-CM

## 2018-10-17 DIAGNOSIS — Z85.820 PERSONAL HISTORY OF MALIGNANT MELANOMA OF SKIN: ICD-10-CM

## 2018-10-17 DIAGNOSIS — L81.4 OTHER MELANIN HYPERPIGMENTATION: ICD-10-CM

## 2018-10-17 PROBLEM — D22.4 MELANOCYTIC NEVI OF SCALP AND NECK: Status: ACTIVE | Noted: 2018-10-17

## 2018-10-17 PROBLEM — D18.01 HEMANGIOMA OF SKIN AND SUBCUTANEOUS TISSUE: Status: ACTIVE | Noted: 2018-10-17

## 2018-10-17 PROBLEM — D22.5 MELANOCYTIC NEVI OF TRUNK: Status: ACTIVE | Noted: 2018-10-17

## 2018-10-17 PROBLEM — D22.61 MELANOCYTIC NEVI OF RIGHT UPPER LIMB, INCLUDING SHOULDER: Status: ACTIVE | Noted: 2018-10-17

## 2018-10-17 PROBLEM — D48.5 NEOPLASM OF UNCERTAIN BEHAVIOR OF SKIN: Status: ACTIVE | Noted: 2018-10-17

## 2018-10-17 PROBLEM — D22.72 MELANOCYTIC NEVI OF LEFT LOWER LIMB, INCLUDING HIP: Status: ACTIVE | Noted: 2018-10-17

## 2018-10-17 PROCEDURE — ? COUNSELING

## 2018-10-17 PROCEDURE — 11100: CPT

## 2018-10-17 PROCEDURE — ? DIAGNOSIS COMMENT

## 2018-10-17 PROCEDURE — ? BIOPSY BY SHAVE METHOD

## 2018-10-17 PROCEDURE — 11101: CPT

## 2018-10-17 PROCEDURE — 99214 OFFICE O/P EST MOD 30 MIN: CPT | Mod: 25

## 2018-10-17 ASSESSMENT — LOCATION ZONE DERM
LOCATION ZONE: ARM
LOCATION ZONE: SCALP
LOCATION ZONE: HAND
LOCATION ZONE: LEG
LOCATION ZONE: FACE
LOCATION ZONE: TRUNK

## 2018-10-17 ASSESSMENT — LOCATION DETAILED DESCRIPTION DERM
LOCATION DETAILED: LEFT DORSAL FOREARM
LOCATION DETAILED: RIGHT PROXIMAL POSTERIOR UPPER ARM
LOCATION DETAILED: RIGHT CHEEK
LOCATION DETAILED: RIGHT DORSAL FOREARM
LOCATION DETAILED: RIGHT ANTERIOR THIGH
LOCATION DETAILED: RIGHT DISTAL LATERAL CALF
LOCATION DETAILED: LEFT RADIAL DORSAL HAND
LOCATION DETAILED: LEFT ANTERIOR PROXIMAL THIGH
LOCATION DETAILED: LEFT CHEEK
LOCATION DETAILED: RIGHT LATERAL ABDOMEN
LOCATION DETAILED: LEFT ANTERIOR DISTAL THIGH
LOCATION DETAILED: RIGHT DORSAL HAND
LOCATION DETAILED: LEFT INFERIOR OCCIPITAL SCALP
LOCATION DETAILED: LEFT POSTERIOR ANKLE
LOCATION DETAILED: LEFT UPPER BACK
LOCATION DETAILED: LEFT SUPERIOR UPPER BACK
LOCATION DETAILED: LEFT ANTERIOR THIGH
LOCATION DETAILED: LEFT DORSAL HAND
LOCATION DETAILED: LEFT MID BACK

## 2018-10-17 ASSESSMENT — LOCATION SIMPLE DESCRIPTION DERM
LOCATION SIMPLE: LEFT ANKLE
LOCATION SIMPLE: RIGHT CALF
LOCATION SIMPLE: POSTERIOR SCALP
LOCATION SIMPLE: RIGHT HAND
LOCATION SIMPLE: RIGHT LOWER EXTREMITY
LOCATION SIMPLE: RIGHT UPPER EXTREMITY
LOCATION SIMPLE: LEFT UPPER BACK
LOCATION SIMPLE: ABDOMEN
LOCATION SIMPLE: LEFT THIGH
LOCATION SIMPLE: LEFT CHEEK
LOCATION SIMPLE: RIGHT POSTERIOR UPPER ARM
LOCATION SIMPLE: LEFT LOWER EXTREMITY
LOCATION SIMPLE: RIGHT CHEEK
LOCATION SIMPLE: BACK
LOCATION SIMPLE: LEFT HAND
LOCATION SIMPLE: LEFT UPPER EXTREMITY

## 2018-10-17 NOTE — PROCEDURE: BIOPSY BY SHAVE METHOD
Electrodesiccation Text: The wound bed was treated with electrodesiccation after the biopsy was performed.
Destruction After The Procedure: No
Silver Nitrate Text: The wound bed was treated with silver nitrate after the biopsy was performed.
X Size Of Lesion In Cm: 0.2
Electrodesiccation And Curettage Text: The wound bed was treated with electrodesiccation and curettage after the biopsy was performed.
Detail Level: Detailed
Notification Instructions: Patient will be notified of biopsy results. However, patient instructed to call the office if not contacted within 2 weeks.
Dressing: Band-Aid
Additional Anesthesia Volume In Cc (Will Not Render If 0): 0
Depth Of Biopsy: dermis
Hemostasis: Drysol and Electrocautery
Lab: [id number]
Post-Care Instructions: I reviewed with the patient in detail post-care instructions. Patient is to keep the biopsy site dry overnight, and then apply bacitracin twice daily until healed. Patient may apply hydrogen peroxide soaks to remove any crusting.
Biopsy Type: H and E
Wound Care: Aquaphor
Lab Facility: 
Anesthesia Type: 1% lidocaine with 1:100,000 epinephrine and a 1:6 solution of 8.4% sodium bicarbonate
Curettage Text: The wound bed was treated with curettage after the biopsy was performed.
Billing Type: Third-Party Bill
Biopsy Method: Personna blade
Was A Bandage Applied: Yes
Type Of Destruction Used: Curettage
Consent: Written consent was obtained and risks were reviewed including but not limited to scarring, infection, bleeding, scabbing, incomplete removal, nerve damage and allergy to anesthesia.
Cryotherapy Text: The wound bed was treated with cryotherapy after the biopsy was performed.
Size Of Lesion In Cm: 0.6
X Size Of Lesion In Cm: 0.3

## 2018-10-17 NOTE — PROCEDURE: DIAGNOSIS COMMENT
Comment: Biopsy proven mild to moderate 10/2017; monitor for repigmentation
Detail Level: Simple
Comment: Biopsy proven; all sites excised

## 2019-01-17 ENCOUNTER — APPOINTMENT (RX ONLY)
Dept: URBAN - METROPOLITAN AREA CLINIC 20 | Facility: CLINIC | Age: 39
Setting detail: DERMATOLOGY
End: 2019-01-17

## 2019-01-17 DIAGNOSIS — Z85.820 PERSONAL HISTORY OF MALIGNANT MELANOMA OF SKIN: ICD-10-CM | Status: UNCHANGED

## 2019-01-17 DIAGNOSIS — L81.4 OTHER MELANIN HYPERPIGMENTATION: ICD-10-CM

## 2019-01-17 DIAGNOSIS — D22 MELANOCYTIC NEVI: ICD-10-CM

## 2019-01-17 DIAGNOSIS — D18.0 HEMANGIOMA: ICD-10-CM

## 2019-01-17 DIAGNOSIS — L57.8 OTHER SKIN CHANGES DUE TO CHRONIC EXPOSURE TO NONIONIZING RADIATION: ICD-10-CM

## 2019-01-17 DIAGNOSIS — Z87.2 PERSONAL HISTORY OF DISEASES OF THE SKIN AND SUBCUTANEOUS TISSUE: ICD-10-CM | Status: UNCHANGED

## 2019-01-17 PROBLEM — D18.01 HEMANGIOMA OF SKIN AND SUBCUTANEOUS TISSUE: Status: ACTIVE | Noted: 2019-01-17

## 2019-01-17 PROBLEM — D48.5 NEOPLASM OF UNCERTAIN BEHAVIOR OF SKIN: Status: ACTIVE | Noted: 2019-01-17

## 2019-01-17 PROBLEM — D22.5 MELANOCYTIC NEVI OF TRUNK: Status: ACTIVE | Noted: 2019-01-17

## 2019-01-17 PROBLEM — D22.4 MELANOCYTIC NEVI OF SCALP AND NECK: Status: ACTIVE | Noted: 2019-01-17

## 2019-01-17 PROBLEM — D22.61 MELANOCYTIC NEVI OF RIGHT UPPER LIMB, INCLUDING SHOULDER: Status: ACTIVE | Noted: 2019-01-17

## 2019-01-17 PROBLEM — D22.72 MELANOCYTIC NEVI OF LEFT LOWER LIMB, INCLUDING HIP: Status: ACTIVE | Noted: 2019-01-17

## 2019-01-17 PROBLEM — D22.21 MELANOCYTIC NEVI OF RIGHT EAR AND EXTERNAL AURICULAR CANAL: Status: ACTIVE | Noted: 2019-01-17

## 2019-01-17 PROCEDURE — 11103 TANGNTL BX SKIN EA SEP/ADDL: CPT

## 2019-01-17 PROCEDURE — ? COUNSELING

## 2019-01-17 PROCEDURE — ? BIOPSY BY SHAVE METHOD

## 2019-01-17 PROCEDURE — 11102 TANGNTL BX SKIN SINGLE LES: CPT

## 2019-01-17 PROCEDURE — 99214 OFFICE O/P EST MOD 30 MIN: CPT | Mod: 25

## 2019-01-17 PROCEDURE — ? DIAGNOSIS COMMENT

## 2019-01-17 PROCEDURE — ? OBSERVATION AND MEASURE

## 2019-01-17 ASSESSMENT — LOCATION DETAILED DESCRIPTION DERM
LOCATION DETAILED: LEFT CHEEK
LOCATION DETAILED: RIGHT BUTTOCK
LOCATION DETAILED: LEFT SUPERIOR UPPER BACK
LOCATION DETAILED: RIGHT CHEEK
LOCATION DETAILED: LEFT UPPER BACK
LOCATION DETAILED: LEFT ANTERIOR THIGH
LOCATION DETAILED: LEFT PROXIMAL POSTERIOR UPPER ARM
LOCATION DETAILED: LEFT ANTERIOR PROXIMAL THIGH
LOCATION DETAILED: RIGHT DORSAL FOREARM
LOCATION DETAILED: RIGHT DORSAL HAND
LOCATION DETAILED: LEFT MID BACK
LOCATION DETAILED: RIGHT DISTAL LATERAL CALF
LOCATION DETAILED: RIGHT ANTERIOR THIGH
LOCATION DETAILED: RIGHT PROXIMAL POSTERIOR UPPER ARM
LOCATION DETAILED: RIGHT RIB CAGE
LOCATION DETAILED: LEFT ANTERIOR DISTAL THIGH
LOCATION DETAILED: RIGHT SUPERIOR HELIX
LOCATION DETAILED: LEFT POSTERIOR ANKLE
LOCATION DETAILED: LEFT DORSAL HAND
LOCATION DETAILED: LEFT INFERIOR OCCIPITAL SCALP
LOCATION DETAILED: RIGHT LATERAL ABDOMEN
LOCATION DETAILED: LEFT DORSAL FOREARM
LOCATION DETAILED: LEFT RADIAL DORSAL HAND

## 2019-01-17 ASSESSMENT — LOCATION SIMPLE DESCRIPTION DERM
LOCATION SIMPLE: POSTERIOR SCALP
LOCATION SIMPLE: RIGHT CALF
LOCATION SIMPLE: RIGHT BUTTOCK
LOCATION SIMPLE: RIGHT POSTERIOR UPPER ARM
LOCATION SIMPLE: LEFT LOWER EXTREMITY
LOCATION SIMPLE: LEFT UPPER EXTREMITY
LOCATION SIMPLE: RIGHT LOWER EXTREMITY
LOCATION SIMPLE: BACK
LOCATION SIMPLE: RIGHT UPPER EXTREMITY
LOCATION SIMPLE: LEFT HAND
LOCATION SIMPLE: RIGHT CHEEK
LOCATION SIMPLE: LEFT POSTERIOR UPPER ARM
LOCATION SIMPLE: LEFT UPPER BACK
LOCATION SIMPLE: LEFT THIGH
LOCATION SIMPLE: ABDOMEN
LOCATION SIMPLE: RIGHT EAR
LOCATION SIMPLE: LEFT CHEEK
LOCATION SIMPLE: LEFT ANKLE
LOCATION SIMPLE: RIGHT HAND

## 2019-01-17 ASSESSMENT — LOCATION ZONE DERM
LOCATION ZONE: EAR
LOCATION ZONE: SCALP
LOCATION ZONE: HAND
LOCATION ZONE: FACE
LOCATION ZONE: TRUNK
LOCATION ZONE: LEG
LOCATION ZONE: ARM

## 2019-01-17 NOTE — PROCEDURE: BIOPSY BY SHAVE METHOD
Size Of Lesion In Cm: 0.4
Lab: [id number]
Curettage Text: The wound bed was treated with curettage after the biopsy was performed.
Additional Anesthesia Volume In Cc (Will Not Render If 0): 0
Cryotherapy Text: The wound bed was treated with cryotherapy after the biopsy was performed.
Anesthesia Type: 1% lidocaine with 1:100,000 epinephrine and a 1:6 solution of 8.4% sodium bicarbonate
Destruction After The Procedure: No
Consent: Written consent was obtained and risks were reviewed including but not limited to scarring, infection, bleeding, scabbing, incomplete removal, nerve damage and allergy to anesthesia.
Wound Care: Aquaphor
Biopsy Type: H and E
X Size Of Lesion In Cm: 0.5
Depth Of Biopsy: dermis
Lab Facility: 
Electrodesiccation Text: The wound bed was treated with electrodesiccation after the biopsy was performed.
Anesthesia Volume In Cc: 0.2
Type Of Destruction Used: Curettage
Post-Care Instructions: I reviewed with the patient in detail post-care instructions. Patient is to keep the biopsy site dry overnight, and then apply bacitracin twice daily until healed. Patient may apply hydrogen peroxide soaks to remove any crusting.
Was A Bandage Applied: Yes
Billing Type: Third-Party Bill
Electrodesiccation And Curettage Text: The wound bed was treated with electrodesiccation and curettage after the biopsy was performed.
Silver Nitrate Text: The wound bed was treated with silver nitrate after the biopsy was performed.
Detail Level: Detailed
Notification Instructions: Patient will be notified of biopsy results. However, patient instructed to call the office if not contacted within 2 weeks.
Dressing: Band-Aid
Biopsy Method: Personna blade
Hemostasis: Drysol and Electrocautery

## 2019-02-06 ENCOUNTER — APPOINTMENT (RX ONLY)
Dept: URBAN - METROPOLITAN AREA CLINIC 20 | Facility: CLINIC | Age: 39
Setting detail: DERMATOLOGY
End: 2019-02-06

## 2019-02-06 DIAGNOSIS — Z85.820 PERSONAL HISTORY OF MALIGNANT MELANOMA OF SKIN: ICD-10-CM

## 2019-02-06 PROCEDURE — ? COUNSELING

## 2019-02-06 PROCEDURE — ? ORDER TESTS

## 2019-02-06 ASSESSMENT — LOCATION DETAILED DESCRIPTION DERM: LOCATION DETAILED: LEFT RADIAL DORSAL HAND

## 2019-02-06 ASSESSMENT — LOCATION ZONE DERM: LOCATION ZONE: HAND

## 2019-02-06 ASSESSMENT — LOCATION SIMPLE DESCRIPTION DERM: LOCATION SIMPLE: LEFT HAND

## 2019-02-06 NOTE — PROCEDURE: ORDER TESTS
Expected Date Of Service: 02/06/2019
Billing Type: Third-Party Bill
Performing Laboratory: [id number]
Lab Facility: [id number]
Bill For Surgical Tray: no

## 2019-02-09 ENCOUNTER — APPOINTMENT (OUTPATIENT)
Dept: RADIOLOGY | Facility: MEDICAL CENTER | Age: 39
End: 2019-02-09
Attending: EMERGENCY MEDICINE
Payer: COMMERCIAL

## 2019-02-09 ENCOUNTER — HOSPITAL ENCOUNTER (EMERGENCY)
Facility: MEDICAL CENTER | Age: 39
End: 2019-02-09
Attending: EMERGENCY MEDICINE
Payer: COMMERCIAL

## 2019-02-09 VITALS
WEIGHT: 176.15 LBS | OXYGEN SATURATION: 92 % | RESPIRATION RATE: 20 BRPM | DIASTOLIC BLOOD PRESSURE: 81 MMHG | BODY MASS INDEX: 30.07 KG/M2 | HEIGHT: 64 IN | TEMPERATURE: 98.9 F | HEART RATE: 113 BPM | SYSTOLIC BLOOD PRESSURE: 139 MMHG

## 2019-02-09 DIAGNOSIS — J45.41 MODERATE PERSISTENT ASTHMA WITH EXACERBATION: ICD-10-CM

## 2019-02-09 DIAGNOSIS — J10.1 INFLUENZA A: ICD-10-CM

## 2019-02-09 LAB
FLUAV RNA SPEC QL NAA+PROBE: POSITIVE
FLUBV RNA SPEC QL NAA+PROBE: NEGATIVE

## 2019-02-09 PROCEDURE — 94760 N-INVAS EAR/PLS OXIMETRY 1: CPT

## 2019-02-09 PROCEDURE — 99284 EMERGENCY DEPT VISIT MOD MDM: CPT

## 2019-02-09 PROCEDURE — 87502 INFLUENZA DNA AMP PROBE: CPT

## 2019-02-09 PROCEDURE — 700111 HCHG RX REV CODE 636 W/ 250 OVERRIDE (IP)

## 2019-02-09 PROCEDURE — 94640 AIRWAY INHALATION TREATMENT: CPT

## 2019-02-09 PROCEDURE — 71045 X-RAY EXAM CHEST 1 VIEW: CPT

## 2019-02-09 PROCEDURE — 700101 HCHG RX REV CODE 250: Performed by: EMERGENCY MEDICINE

## 2019-02-09 RX ORDER — PREDNISONE 20 MG/1
TABLET ORAL
Status: COMPLETED
Start: 2019-02-09 | End: 2019-02-09

## 2019-02-09 RX ORDER — PREDNISONE 20 MG/1
60 TABLET ORAL DAILY
Status: DISCONTINUED | OUTPATIENT
Start: 2019-02-10 | End: 2019-02-10 | Stop reason: HOSPADM

## 2019-02-09 RX ORDER — IPRATROPIUM BROMIDE AND ALBUTEROL SULFATE 2.5; .5 MG/3ML; MG/3ML
3 SOLUTION RESPIRATORY (INHALATION)
Status: DISCONTINUED | OUTPATIENT
Start: 2019-02-09 | End: 2019-02-10 | Stop reason: HOSPADM

## 2019-02-09 RX ORDER — PREDNISONE 20 MG/1
60 TABLET ORAL DAILY
Qty: 12 TAB | Refills: 0 | Status: SHIPPED | OUTPATIENT
Start: 2019-02-09 | End: 2019-02-13

## 2019-02-09 RX ORDER — IPRATROPIUM BROMIDE AND ALBUTEROL SULFATE 2.5; .5 MG/3ML; MG/3ML
3 SOLUTION RESPIRATORY (INHALATION)
Status: DISCONTINUED | OUTPATIENT
Start: 2019-02-09 | End: 2019-02-09

## 2019-02-09 RX ORDER — BENZONATATE 100 MG/1
200 CAPSULE ORAL 3 TIMES DAILY PRN
Qty: 20 CAP | Refills: 0 | Status: SHIPPED | OUTPATIENT
Start: 2019-02-09 | End: 2019-10-07

## 2019-02-09 RX ADMIN — PREDNISONE 60 MG: 20 TABLET ORAL at 21:32

## 2019-02-09 RX ADMIN — IPRATROPIUM BROMIDE AND ALBUTEROL SULFATE 3 ML: .5; 3 SOLUTION RESPIRATORY (INHALATION) at 21:47

## 2019-02-09 RX ADMIN — IPRATROPIUM BROMIDE AND ALBUTEROL SULFATE 3 ML: .5; 3 SOLUTION RESPIRATORY (INHALATION) at 21:48

## 2019-02-09 RX ADMIN — IPRATROPIUM BROMIDE AND ALBUTEROL SULFATE 3 ML: .5; 3 SOLUTION RESPIRATORY (INHALATION) at 21:00

## 2019-02-10 NOTE — ED PROVIDER NOTES
ED Provider Note    CHIEF COMPLAINT  Chief Complaint   Patient presents with   • Shortness of Breath   • Cough     productive       HPI  Jacque Mcintyre is a 38 y.o. female who presents for evaluation of shortness of breath of the past 4 days with wheezing, she does have a history of asthma and states she is been having sneezing and some mild nasal congestion but more of a feeling of congestion in her chest.  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  She is been using her albuterol inhaler with decreasing efficacy.  The patient has not taken her temperature.  No vomiting, no chest pain, no diarrhea, no other complaints    REVIEW OF SYSTEMS  Negative for rash, chest pain, abdominal pain, nausea, vomiting, diarrhea, headache, focal weakness, focal numbness, focal tingling, back pain. All other systems are negative.     PAST MEDICAL HISTORY  Past Medical History:   Diagnosis Date   • Asthma     inhaler   • Bipolar 1 disorder (HCC)    • Cancer (HCC) 2016    melanoma   • Depression    • DM mellitus, gestational     insulin   • Hyperlipidemia    • Hypertension    • Pap smear     Dr. Baird   • PCOS (polycystic ovarian syndrome)        FAMILY HISTORY  Family History   Problem Relation Age of Onset   • Psychiatry Mother         depression   • Diabetes Mother    • Hyperlipidemia Mother    • Thyroid Mother         s/p radioactive iodine treatment on replacement   • Hypertension Father    • Diabetes Father        SOCIAL HISTORY  Social History   Substance Use Topics   • Smoking status: Former Smoker     Packs/day: 0.50     Years: 5.00     Types: Cigarettes   • Smokeless tobacco: Never Used   • Alcohol use 1.0 oz/week     2 Cans of beer per week      Comment: rare social       SURGICAL HISTORY  Past Surgical History:   Procedure Laterality Date   • AXILLARY NODE DISSECTION Left 6/23/2017    Procedure: AXILLARY NODE DISSECTION- COMPLETION  "LYPHADENECTOMY;  Surgeon: Lionel Weinberg M.D.;  Location: SURGERY [facility] ORS;  Service:    • WIDE EXCISION Left 5/9/2017    Procedure: WIDE EXCISION - MALIGNANT MELANOMA HAND;  Surgeon: Lionel Weinberg M.D.;  Location: SURGERY SAME DAY [facility] ORS;  Service:    • NODE BIOPSY SENTINEL Left 5/9/2017    Procedure: NODE BIOPSY SENTINEL - AXILLARY;  Surgeon: Lionel Weinberg M.D.;  Location: SURGERY SAME DAY [facility];  Service:    • OTHER  2016    excisino of melanoma left hand   • ADENOIDECTOMY     • MYRINGOTOMY      with tube placement       CURRENT MEDICATIONS  I personally reviewed the medication list in the charting documentation.     ALLERGIES  Allergies   Allergen Reactions   • Augmentin Vomiting       MEDICAL RECORD  I have reviewed patient's medical record and pertinent results are listed above.      PHYSICAL EXAM  VITAL SIGNS: /81   Pulse (!) 103   Temp 36.4 °C (97.5 °F) (Temporal)   Resp (!) 22   Ht 1.626 m (5' 4\")   Wt 79.9 kg (176 lb 2.4 oz)   SpO2 90%   BMI 30.24 kg/m²    Constitutional: Well appearing patient in no acute distress.  Not toxic, nor ill in appearance.  Eyes: No scleral icterus. Normal conjunctiva   Neck: Supple, comfortable, nonpainful range of motion.   Cardiovascular: Minimal tachycardia, regular  Thorax & Lungs: Left greater than right inspiratory and expiratory wheezes, marginal air movement, no focal rales  Skin: Warm, dry. No rash appreciated  Extremities/Musculoskeletal: No sign of trauma. No asymmetric calf tenderness, erythema or edema. Normal range of motion   Neurologic: Alert & oriented. No focal deficits observed.   Psychiatric: Normal affect appropriate for the clinical situation.    DIAGNOSTIC STUDIES / PROCEDURES    LABS  Results for orders placed or performed during the hospital encounter of 02/09/19   Influenza A/B By PCR (Adult - Flu Only)   Result Value Ref Range    Influenza virus A RNA POSITIVE (A) Negative    Influenza virus B, PCR Negative " Negative        RADIOLOGY  DX-CHEST-PORTABLE (1 VIEW)   Final Result      No acute cardiopulmonary abnormality.            COURSE & MEDICAL DECISION MAKING  I have reviewed any medical record information, laboratory studies and radiographic results as noted above.  Differential diagnoses includes: Asthma exacerbation, viral URI, pneumonia, influenza    Encounter Summary: This is a 38 y.o. female with history of asthma, presents with cough and shortness of breath with wheezing on exam, she has an oxygen saturation on room air right around 90%, she is treated with some supplemental oxygen, breathing treatment, will also obtain an x-ray to rule out pneumonia as well as a flu swab ------ upon reevaluation the patient is somewhat improved although remains wheezy and tight, will administer a second breathing treatment.  She will also receive prednisone ------ influenza A is positive.  The patient is receiving the second breathing treatment and is feeling much better.  She will be prescribed prednisone as well as Tessalon, strict return instructions discussed      DISPOSITION: Discharged home in stable condition      FINAL IMPRESSION  1. Influenza A    2. Moderate persistent asthma with exacerbation           This dictation was created using voice recognition software. The accuracy of the dictation is limited to the abilities of the software. I expect there may be some errors of grammar and possibly content. The nursing notes were reviewed and certain aspects of this information were incorporated into this note.    Electronically signed by: Tulio Li, 2/9/2019 9:22 PM

## 2019-02-10 NOTE — FLOWSHEET NOTE
This note also relates to the following rows which could not be included:  Pulse - Cannot attach notes to unvalidated device data  Pulse Oximetry - Cannot attach notes to unvalidated device data       02/09/19 2100   Events/Summary/Plan   Events/Summary/Plan svn   Interdisciplinary Plan of Care-Goals (Indications)   Bronchodilator Indications History / Diagnosis   Interdisciplinary Plan of Care-Outcomes    Bronchodilator Outcome Improved Vital Signs and Measures of Gas Exchange   Education   Education Yes - Pt. / Family has been Instructed in use of Respiratory Equipment;Yes - Pt. / Family has been Instructed in use of Respiratory Medications and Adverse Reactions   RT Assessment of Delivered Medications   Evaluation of Medication Delivery Daily Yes-- Pt /Family has been Instructed in use of Respiratory Medications and Adverse Reactions   SVN Group   #SVN Performed Yes   Chest Exam   Respiration (!) 22   Breath Sounds   RUL Breath Sounds Clear   RML Breath Sounds Diminished   RLL Breath Sounds Diminished   ASHLEY Breath Sounds Inspiratory Wheezes;Expiratory Wheezes   LLL Breath Sounds Inspiratory Wheezes;Expiratory Wheezes   Secretions   Cough Congested;Non Productive   Oximetry   #Pulse Oximetry (Single Determination) Yes   Oxygen   O2 (LPM) 1.5   O2 Daily Delivery Respiratory  Nasal Cannula

## 2019-02-10 NOTE — ED NOTES
Pt given written and oral discharge instructions. Pt verbalized understanding of all instructions given. All questions answered. VSS. Pt instructed on where to  prescriptions and educated on use. Pt given f/u instructions and educated on s/s of when to return to the ER. Pt ambulating independently upon time of discharge in stable condition.

## 2019-04-17 ENCOUNTER — HOSPITAL ENCOUNTER (OUTPATIENT)
Dept: RADIOLOGY | Facility: MEDICAL CENTER | Age: 39
End: 2019-04-17
Attending: NURSE PRACTITIONER
Payer: COMMERCIAL

## 2019-04-17 DIAGNOSIS — Z85.820 PERSONAL HISTORY OF MALIGNANT MELANOMA OF SKIN: ICD-10-CM

## 2019-04-17 PROCEDURE — A9552 F18 FDG: HCPCS

## 2019-05-09 ENCOUNTER — APPOINTMENT (RX ONLY)
Dept: URBAN - METROPOLITAN AREA CLINIC 20 | Facility: CLINIC | Age: 39
Setting detail: DERMATOLOGY
End: 2019-05-09

## 2019-05-09 DIAGNOSIS — Z85.820 PERSONAL HISTORY OF MALIGNANT MELANOMA OF SKIN: ICD-10-CM

## 2019-05-09 DIAGNOSIS — Z87.2 PERSONAL HISTORY OF DISEASES OF THE SKIN AND SUBCUTANEOUS TISSUE: ICD-10-CM

## 2019-05-09 DIAGNOSIS — L81.4 OTHER MELANIN HYPERPIGMENTATION: ICD-10-CM

## 2019-05-09 DIAGNOSIS — D22 MELANOCYTIC NEVI: ICD-10-CM

## 2019-05-09 DIAGNOSIS — L30.9 DERMATITIS, UNSPECIFIED: ICD-10-CM

## 2019-05-09 DIAGNOSIS — D18.0 HEMANGIOMA: ICD-10-CM

## 2019-05-09 DIAGNOSIS — L57.8 OTHER SKIN CHANGES DUE TO CHRONIC EXPOSURE TO NONIONIZING RADIATION: ICD-10-CM

## 2019-05-09 PROBLEM — D22.72 MELANOCYTIC NEVI OF LEFT LOWER LIMB, INCLUDING HIP: Status: ACTIVE | Noted: 2019-05-09

## 2019-05-09 PROBLEM — D22.4 MELANOCYTIC NEVI OF SCALP AND NECK: Status: ACTIVE | Noted: 2019-05-09

## 2019-05-09 PROBLEM — D18.01 HEMANGIOMA OF SKIN AND SUBCUTANEOUS TISSUE: Status: ACTIVE | Noted: 2019-05-09

## 2019-05-09 PROBLEM — D22.21 MELANOCYTIC NEVI OF RIGHT EAR AND EXTERNAL AURICULAR CANAL: Status: ACTIVE | Noted: 2019-05-09

## 2019-05-09 PROBLEM — D22.5 MELANOCYTIC NEVI OF TRUNK: Status: ACTIVE | Noted: 2019-05-09

## 2019-05-09 PROBLEM — D22.61 MELANOCYTIC NEVI OF RIGHT UPPER LIMB, INCLUDING SHOULDER: Status: ACTIVE | Noted: 2019-05-09

## 2019-05-09 PROCEDURE — ? DIAGNOSIS COMMENT

## 2019-05-09 PROCEDURE — ? COUNSELING

## 2019-05-09 PROCEDURE — ? OBSERVATION AND MEASURE

## 2019-05-09 PROCEDURE — ? RADIOLOGY REPORTS REVIEWED

## 2019-05-09 PROCEDURE — ? ADDITIONAL NOTES

## 2019-05-09 PROCEDURE — 99214 OFFICE O/P EST MOD 30 MIN: CPT

## 2019-05-09 PROCEDURE — ? PRESCRIPTION

## 2019-05-09 ASSESSMENT — LOCATION SIMPLE DESCRIPTION DERM
LOCATION SIMPLE: RIGHT HAND
LOCATION SIMPLE: LEFT THIGH
LOCATION SIMPLE: BACK
LOCATION SIMPLE: RIGHT CALF
LOCATION SIMPLE: RIGHT POSTERIOR UPPER ARM
LOCATION SIMPLE: RIGHT EAR
LOCATION SIMPLE: LEFT UPPER EXTREMITY
LOCATION SIMPLE: LEFT HAND
LOCATION SIMPLE: LEFT ANKLE
LOCATION SIMPLE: RIGHT LOWER EXTREMITY
LOCATION SIMPLE: RIGHT CHEEK
LOCATION SIMPLE: RIGHT LOWER BACK
LOCATION SIMPLE: GLUTEAL CLEFT
LOCATION SIMPLE: ABDOMEN
LOCATION SIMPLE: LEFT CHEEK
LOCATION SIMPLE: RIGHT UPPER EXTREMITY
LOCATION SIMPLE: POSTERIOR SCALP
LOCATION SIMPLE: LEFT UPPER BACK
LOCATION SIMPLE: LEFT LOWER EXTREMITY

## 2019-05-09 ASSESSMENT — LOCATION DETAILED DESCRIPTION DERM
LOCATION DETAILED: RIGHT ANTERIOR THIGH
LOCATION DETAILED: LEFT ANTERIOR THIGH
LOCATION DETAILED: GLUTEAL CLEFT
LOCATION DETAILED: LEFT UPPER BACK
LOCATION DETAILED: LEFT ANTERIOR DISTAL THIGH
LOCATION DETAILED: RIGHT INFERIOR MEDIAL LOWER BACK
LOCATION DETAILED: RIGHT PROXIMAL POSTERIOR UPPER ARM
LOCATION DETAILED: RIGHT DORSAL HAND
LOCATION DETAILED: RIGHT CHEEK
LOCATION DETAILED: LEFT MID BACK
LOCATION DETAILED: RIGHT DISTAL LATERAL CALF
LOCATION DETAILED: LEFT POSTERIOR ANKLE
LOCATION DETAILED: LEFT CHEEK
LOCATION DETAILED: RIGHT SUPERIOR HELIX
LOCATION DETAILED: LEFT SUPERIOR UPPER BACK
LOCATION DETAILED: RIGHT DORSAL FOREARM
LOCATION DETAILED: LEFT RADIAL DORSAL HAND
LOCATION DETAILED: LEFT DORSAL FOREARM
LOCATION DETAILED: RIGHT LATERAL ABDOMEN
LOCATION DETAILED: LEFT DORSAL HAND
LOCATION DETAILED: LEFT ANTERIOR PROXIMAL THIGH
LOCATION DETAILED: LEFT INFERIOR OCCIPITAL SCALP

## 2019-05-09 ASSESSMENT — LOCATION ZONE DERM
LOCATION ZONE: ARM
LOCATION ZONE: TRUNK
LOCATION ZONE: FACE
LOCATION ZONE: EAR
LOCATION ZONE: LEG
LOCATION ZONE: HAND
LOCATION ZONE: SCALP

## 2019-05-09 NOTE — PROCEDURE: COUNSELING
Detail Level: Detailed
Quality 137: Melanoma: Continuity Of Care - Recall System: Patient information entered into a recall system that includes: target date for the next exam specified AND a process to follow up with patients regarding missed or unscheduled appointments
Quality 224: Stage 0-Iic Melanoma: Overutilization Of Imaging Studies For Only Stage 0-Iic Melanoma: None of the following diagnostic imaging studies ordered: chest X-ray, CT, Ultrasound, MRI, PET, or nuclear medicine scans (ML)
Detail Level: Zone
When Should The Patient Follow-Up For Their Next Full-Body Skin Exam?: 3 Months

## 2019-05-10 RX ORDER — TRIAMCINOLONE ACETONIDE 1 MG/G
CREAM TOPICAL BID
Qty: 1 | Refills: 1 | Status: ERX

## 2019-07-25 ENCOUNTER — APPOINTMENT (RX ONLY)
Dept: URBAN - METROPOLITAN AREA CLINIC 36 | Facility: CLINIC | Age: 39
Setting detail: DERMATOLOGY
End: 2019-07-25

## 2019-07-25 DIAGNOSIS — L57.8 OTHER SKIN CHANGES DUE TO CHRONIC EXPOSURE TO NONIONIZING RADIATION: ICD-10-CM

## 2019-07-25 DIAGNOSIS — D18.0 HEMANGIOMA: ICD-10-CM

## 2019-07-25 DIAGNOSIS — D22 MELANOCYTIC NEVI: ICD-10-CM

## 2019-07-25 DIAGNOSIS — L40.0 PSORIASIS VULGARIS: ICD-10-CM

## 2019-07-25 DIAGNOSIS — Z85.820 PERSONAL HISTORY OF MALIGNANT MELANOMA OF SKIN: ICD-10-CM

## 2019-07-25 DIAGNOSIS — L81.4 OTHER MELANIN HYPERPIGMENTATION: ICD-10-CM

## 2019-07-25 DIAGNOSIS — Z87.2 PERSONAL HISTORY OF DISEASES OF THE SKIN AND SUBCUTANEOUS TISSUE: ICD-10-CM

## 2019-07-25 PROBLEM — D18.01 HEMANGIOMA OF SKIN AND SUBCUTANEOUS TISSUE: Status: ACTIVE | Noted: 2019-07-25

## 2019-07-25 PROBLEM — D22.72 MELANOCYTIC NEVI OF LEFT LOWER LIMB, INCLUDING HIP: Status: ACTIVE | Noted: 2019-07-25

## 2019-07-25 PROBLEM — D22.61 MELANOCYTIC NEVI OF RIGHT UPPER LIMB, INCLUDING SHOULDER: Status: ACTIVE | Noted: 2019-07-25

## 2019-07-25 PROBLEM — D22.5 MELANOCYTIC NEVI OF TRUNK: Status: ACTIVE | Noted: 2019-07-25

## 2019-07-25 PROBLEM — D22.21 MELANOCYTIC NEVI OF RIGHT EAR AND EXTERNAL AURICULAR CANAL: Status: ACTIVE | Noted: 2019-07-25

## 2019-07-25 PROBLEM — D22.4 MELANOCYTIC NEVI OF SCALP AND NECK: Status: ACTIVE | Noted: 2019-07-25

## 2019-07-25 PROCEDURE — ? DIAGNOSIS COMMENT

## 2019-07-25 PROCEDURE — 99214 OFFICE O/P EST MOD 30 MIN: CPT

## 2019-07-25 PROCEDURE — ? COUNSELING

## 2019-07-25 PROCEDURE — ? RADIOLOGY REPORTS REVIEWED

## 2019-07-25 PROCEDURE — ? OBSERVATION AND MEASURE

## 2019-07-25 PROCEDURE — ? ADDITIONAL NOTES

## 2019-07-25 PROCEDURE — ? PRESCRIPTION

## 2019-07-25 RX ORDER — TRETINOIN 0.25 MG/G
CREAM TOPICAL
Qty: 1 | Refills: 11 | Status: ERX

## 2019-07-25 ASSESSMENT — LOCATION SIMPLE DESCRIPTION DERM
LOCATION SIMPLE: POSTERIOR SCALP
LOCATION SIMPLE: CHEST
LOCATION SIMPLE: ABDOMEN
LOCATION SIMPLE: BACK
LOCATION SIMPLE: LEFT UPPER BACK
LOCATION SIMPLE: LEFT LOWER EXTREMITY
LOCATION SIMPLE: LEFT ANKLE
LOCATION SIMPLE: NOSE
LOCATION SIMPLE: LEFT THIGH
LOCATION SIMPLE: RIGHT HAND
LOCATION SIMPLE: RIGHT EAR
LOCATION SIMPLE: LEFT HAND
LOCATION SIMPLE: RIGHT LOWER EXTREMITY
LOCATION SIMPLE: RIGHT CALF
LOCATION SIMPLE: RIGHT POSTERIOR UPPER ARM
LOCATION SIMPLE: RIGHT CHEEK

## 2019-07-25 ASSESSMENT — LOCATION DETAILED DESCRIPTION DERM
LOCATION DETAILED: RIGHT PROXIMAL POSTERIOR UPPER ARM
LOCATION DETAILED: RIGHT DISTAL LATERAL CALF
LOCATION DETAILED: RIGHT SUPERIOR HELIX
LOCATION DETAILED: LEFT RADIAL DORSAL HAND
LOCATION DETAILED: NASAL DORSUM
LOCATION DETAILED: UPPER STERNUM
LOCATION DETAILED: LEFT POSTERIOR ANKLE
LOCATION DETAILED: LEFT ANTERIOR THIGH
LOCATION DETAILED: LEFT DORSAL HAND
LOCATION DETAILED: LEFT ANTERIOR DISTAL THIGH
LOCATION DETAILED: LEFT INFERIOR OCCIPITAL SCALP
LOCATION DETAILED: LEFT UPPER BACK
LOCATION DETAILED: LEFT SUPERIOR UPPER BACK
LOCATION DETAILED: LEFT ANTERIOR PROXIMAL THIGH
LOCATION DETAILED: RIGHT LATERAL ABDOMEN
LOCATION DETAILED: LEFT MID BACK
LOCATION DETAILED: RIGHT DORSAL HAND
LOCATION DETAILED: RIGHT CHEEK
LOCATION DETAILED: RIGHT ANTERIOR THIGH

## 2019-07-25 ASSESSMENT — LOCATION ZONE DERM
LOCATION ZONE: SCALP
LOCATION ZONE: EAR
LOCATION ZONE: LEG
LOCATION ZONE: FACE
LOCATION ZONE: TRUNK
LOCATION ZONE: ARM
LOCATION ZONE: NOSE
LOCATION ZONE: HAND

## 2019-07-25 NOTE — HPI: MELANOMA F/U (HISTORY OF MALIGNANT MELANOMA)
What Is The Reason For Today's Visit?: Follow Up Melanoma
Year Excised?: Hx MM; MM left hand, 2.8mm, 6/20/16, EXC @ [facility]-No follow up \\n-3/2017; new to me, margins not captured, referred to Dr. Mills; re-excised, 1 positive auxiliary node, remaining 18 lymph nodes removed came back negative.\\n-3/2017; I referred to Dr. Caraballo\\n\\n\\n\\nHx Moderate dysplastic, right calf, 10/2017, EXC\\nHx Moderate dysplastic, right lower abdomen, 2017, EXC\\nHx Moderate dysplastic, left ankle,11/2017,  EXC\\n\\nMonitoring; left upper thigh; mild to moderate dysplastic, no re pigmentation, 1/2019\\n\\n\\nPertinent History:\\nDr. Gene re-excised on 6/23/17, \\nOncology:\\nDr. Ilya \\n-3/2017-  Yervey infusion 3mg per kg, every 3 weeks for 3 months, then every 3 months for 3 years if tolerated\\n-1/2019; pt did experience nausea and vomiting with most recent treatment.,\\n\\nPET every 6 months \\n3/2017, 2/2018-per pt WNL\\n4/17/19, mild non-specific activity in right hilar and subcarinal lymph nodes.  Focal activity in the gluteal cleft on the right may correlate with a superficial skin lesion. Please correlate clinically.\\n\\nMRI of brain:\\n-3/2017; negative\\n-2/2018; negative \\n\\n[place] Results;\\n-Class 2B Probability score 0.64, high recurrence within 5 years

## 2019-07-25 NOTE — PROCEDURE: ADDITIONAL NOTES
Additional Notes: Staci has a very high risk melanoma. She is doing well on her yervoy infusions. Her last pet scan did show some nonspecific adenopathy. She is a smoker and I encouraged her to quit smoking. I will discuss with Dr. kohler when the next imaging will be done to follow up on the subcarinal and hilar nodes that were enlarged on pet In April
Detail Level: Zone

## 2019-07-25 NOTE — PROCEDURE: COUNSELING
Quality 137: Melanoma: Continuity Of Care - Recall System: Patient information entered into a recall system that includes: target date for the next exam specified AND a process to follow up with patients regarding missed or unscheduled appointments
Detail Level: Detailed
Quality 224: Stage 0-Iic Melanoma: Overutilization Of Imaging Studies For Only Stage 0-Iic Melanoma: None of the following diagnostic imaging studies ordered: chest X-ray, CT, Ultrasound, MRI, PET, or nuclear medicine scans (ML)
When Should The Patient Follow-Up For Their Next Full-Body Skin Exam?: 3 Months
Detail Level: Zone

## 2019-10-07 ENCOUNTER — ANESTHESIA EVENT (OUTPATIENT)
Dept: SURGERY | Facility: MEDICAL CENTER | Age: 39
DRG: 854 | End: 2019-10-07
Payer: COMMERCIAL

## 2019-10-07 ENCOUNTER — APPOINTMENT (OUTPATIENT)
Dept: RADIOLOGY | Facility: MEDICAL CENTER | Age: 39
DRG: 854 | End: 2019-10-07
Attending: EMERGENCY MEDICINE
Payer: COMMERCIAL

## 2019-10-07 ENCOUNTER — ANESTHESIA (OUTPATIENT)
Dept: SURGERY | Facility: MEDICAL CENTER | Age: 39
DRG: 854 | End: 2019-10-07
Payer: COMMERCIAL

## 2019-10-07 ENCOUNTER — HOSPITAL ENCOUNTER (INPATIENT)
Facility: MEDICAL CENTER | Age: 39
LOS: 2 days | DRG: 854 | End: 2019-10-09
Attending: EMERGENCY MEDICINE | Admitting: HOSPITALIST
Payer: COMMERCIAL

## 2019-10-07 DIAGNOSIS — R79.89 ELEVATED LACTIC ACID LEVEL: ICD-10-CM

## 2019-10-07 DIAGNOSIS — D84.9 IMMUNOCOMPROMISED PATIENT (HCC): ICD-10-CM

## 2019-10-07 DIAGNOSIS — R74.8 ELEVATED LIPASE: ICD-10-CM

## 2019-10-07 DIAGNOSIS — K35.30 ACUTE APPENDICITIS WITH LOCALIZED PERITONITIS, WITHOUT PERFORATION, ABSCESS, OR GANGRENE: ICD-10-CM

## 2019-10-07 PROBLEM — E11.9 DM2 (DIABETES MELLITUS, TYPE 2) (HCC): Status: ACTIVE | Noted: 2019-10-07

## 2019-10-07 PROBLEM — K37 APPENDICITIS: Status: ACTIVE | Noted: 2019-10-07

## 2019-10-07 PROBLEM — F31.9 BIPOLAR 1 DISORDER (HCC): Status: ACTIVE | Noted: 2019-10-07

## 2019-10-07 LAB
ALBUMIN SERPL BCP-MCNC: 4.4 G/DL (ref 3.2–4.9)
ALBUMIN/GLOB SERPL: 1.5 G/DL
ALP SERPL-CCNC: 100 U/L (ref 30–99)
ALT SERPL-CCNC: 15 U/L (ref 2–50)
ANION GAP SERPL CALC-SCNC: 20 MMOL/L (ref 0–11.9)
AST SERPL-CCNC: 14 U/L (ref 12–45)
BASOPHILS # BLD AUTO: 0.3 % (ref 0–1.8)
BASOPHILS # BLD: 0.06 K/UL (ref 0–0.12)
BILIRUB SERPL-MCNC: 0.5 MG/DL (ref 0.1–1.5)
BUN SERPL-MCNC: 10 MG/DL (ref 8–22)
CALCIUM SERPL-MCNC: 9.8 MG/DL (ref 8.4–10.2)
CHLORIDE SERPL-SCNC: 100 MMOL/L (ref 96–112)
CO2 SERPL-SCNC: 22 MMOL/L (ref 20–33)
CREAT SERPL-MCNC: 0.49 MG/DL (ref 0.5–1.4)
EOSINOPHIL # BLD AUTO: 0.04 K/UL (ref 0–0.51)
EOSINOPHIL NFR BLD: 0.2 % (ref 0–6.9)
ERYTHROCYTE [DISTWIDTH] IN BLOOD BY AUTOMATED COUNT: 47.5 FL (ref 35.9–50)
GLOBULIN SER CALC-MCNC: 2.9 G/DL (ref 1.9–3.5)
GLUCOSE BLD-MCNC: 135 MG/DL (ref 65–99)
GLUCOSE BLD-MCNC: 161 MG/DL (ref 65–99)
GLUCOSE SERPL-MCNC: 172 MG/DL (ref 65–99)
HCG SERPL QL: NEGATIVE
HCT VFR BLD AUTO: 43 % (ref 37–47)
HGB BLD-MCNC: 14.1 G/DL (ref 12–16)
IMM GRANULOCYTES # BLD AUTO: 0.08 K/UL (ref 0–0.11)
IMM GRANULOCYTES NFR BLD AUTO: 0.4 % (ref 0–0.9)
INR PPP: 0.96 (ref 0.87–1.13)
LACTATE BLD-SCNC: 1.8 MMOL/L (ref 0.5–2)
LACTATE BLD-SCNC: 2.5 MMOL/L (ref 0.5–2)
LACTATE BLD-SCNC: 2.9 MMOL/L (ref 0.5–2)
LIPASE SERPL-CCNC: 154 U/L (ref 7–58)
LYMPHOCYTES # BLD AUTO: 1.45 K/UL (ref 1–4.8)
LYMPHOCYTES NFR BLD: 7.6 % (ref 22–41)
MCH RBC QN AUTO: 28.1 PG (ref 27–33)
MCHC RBC AUTO-ENTMCNC: 32.8 G/DL (ref 33.6–35)
MCV RBC AUTO: 85.8 FL (ref 81.4–97.8)
MONOCYTES # BLD AUTO: 1.02 K/UL (ref 0–0.85)
MONOCYTES NFR BLD AUTO: 5.4 % (ref 0–13.4)
NEUTROPHILS # BLD AUTO: 16.34 K/UL (ref 2–7.15)
NEUTROPHILS NFR BLD: 86.1 % (ref 44–72)
NRBC # BLD AUTO: 0 K/UL
NRBC BLD-RTO: 0 /100 WBC
PATHOLOGY CONSULT NOTE: NORMAL
PLATELET # BLD AUTO: 362 K/UL (ref 164–446)
PMV BLD AUTO: 10.4 FL (ref 9–12.9)
POTASSIUM SERPL-SCNC: 4.9 MMOL/L (ref 3.6–5.5)
PROT SERPL-MCNC: 7.3 G/DL (ref 6–8.2)
PROTHROMBIN TIME: 12.9 SEC (ref 12–14.6)
RBC # BLD AUTO: 5.01 M/UL (ref 4.2–5.4)
SODIUM SERPL-SCNC: 142 MMOL/L (ref 135–145)
WBC # BLD AUTO: 19 K/UL (ref 4.8–10.8)

## 2019-10-07 PROCEDURE — 700102 HCHG RX REV CODE 250 W/ 637 OVERRIDE(OP): Performed by: HOSPITALIST

## 2019-10-07 PROCEDURE — 700101 HCHG RX REV CODE 250: Performed by: SURGERY

## 2019-10-07 PROCEDURE — 501570 HCHG TROCAR, SEPARATOR: Performed by: SURGERY

## 2019-10-07 PROCEDURE — 96376 TX/PRO/DX INJ SAME DRUG ADON: CPT

## 2019-10-07 PROCEDURE — 700105 HCHG RX REV CODE 258: Performed by: ANESTHESIOLOGY

## 2019-10-07 PROCEDURE — 700101 HCHG RX REV CODE 250: Performed by: ANESTHESIOLOGY

## 2019-10-07 PROCEDURE — 501450 HCHG STAPLES, ENDO MULTIFIRE: Performed by: SURGERY

## 2019-10-07 PROCEDURE — 500800 HCHG LAPAROSCOPIC J/L HOOK: Performed by: SURGERY

## 2019-10-07 PROCEDURE — 700111 HCHG RX REV CODE 636 W/ 250 OVERRIDE (IP): Performed by: ANESTHESIOLOGY

## 2019-10-07 PROCEDURE — 500002 HCHG ADHESIVE, DERMABOND: Performed by: SURGERY

## 2019-10-07 PROCEDURE — 501399 HCHG SPECIMAN BAG, ENDO CATC: Performed by: SURGERY

## 2019-10-07 PROCEDURE — 700111 HCHG RX REV CODE 636 W/ 250 OVERRIDE (IP): Performed by: EMERGENCY MEDICINE

## 2019-10-07 PROCEDURE — 36415 COLL VENOUS BLD VENIPUNCTURE: CPT

## 2019-10-07 PROCEDURE — 85610 PROTHROMBIN TIME: CPT

## 2019-10-07 PROCEDURE — 501838 HCHG SUTURE GENERAL: Performed by: SURGERY

## 2019-10-07 PROCEDURE — 501583 HCHG TROCAR, THRD CAN&SEAL 5X100: Performed by: SURGERY

## 2019-10-07 PROCEDURE — 770006 HCHG ROOM/CARE - MED/SURG/GYN SEMI*

## 2019-10-07 PROCEDURE — 700117 HCHG RX CONTRAST REV CODE 255: Performed by: EMERGENCY MEDICINE

## 2019-10-07 PROCEDURE — 700102 HCHG RX REV CODE 250 W/ 637 OVERRIDE(OP): Performed by: ANESTHESIOLOGY

## 2019-10-07 PROCEDURE — 501568 HCHG TROCAR, BLUNTPORT 12MM: Performed by: SURGERY

## 2019-10-07 PROCEDURE — 96365 THER/PROPH/DIAG IV INF INIT: CPT

## 2019-10-07 PROCEDURE — 82962 GLUCOSE BLOOD TEST: CPT

## 2019-10-07 PROCEDURE — 700111 HCHG RX REV CODE 636 W/ 250 OVERRIDE (IP): Performed by: HOSPITALIST

## 2019-10-07 PROCEDURE — 99285 EMERGENCY DEPT VISIT HI MDM: CPT

## 2019-10-07 PROCEDURE — 502571 HCHG PACK, LAP CHOLE: Performed by: SURGERY

## 2019-10-07 PROCEDURE — 87040 BLOOD CULTURE FOR BACTERIA: CPT

## 2019-10-07 PROCEDURE — 84703 CHORIONIC GONADOTROPIN ASSAY: CPT

## 2019-10-07 PROCEDURE — 700105 HCHG RX REV CODE 258: Performed by: HOSPITALIST

## 2019-10-07 PROCEDURE — 160028 HCHG SURGERY MINUTES - 1ST 30 MINS LEVEL 3: Performed by: SURGERY

## 2019-10-07 PROCEDURE — A9270 NON-COVERED ITEM OR SERVICE: HCPCS | Performed by: HOSPITALIST

## 2019-10-07 PROCEDURE — 0DTJ4ZZ RESECTION OF APPENDIX, PERCUTANEOUS ENDOSCOPIC APPROACH: ICD-10-PCS | Performed by: SURGERY

## 2019-10-07 PROCEDURE — 83690 ASSAY OF LIPASE: CPT

## 2019-10-07 PROCEDURE — 160048 HCHG OR STATISTICAL LEVEL 1-5: Performed by: SURGERY

## 2019-10-07 PROCEDURE — 74177 CT ABD & PELVIS W/CONTRAST: CPT

## 2019-10-07 PROCEDURE — 96375 TX/PRO/DX INJ NEW DRUG ADDON: CPT

## 2019-10-07 PROCEDURE — 700101 HCHG RX REV CODE 250: Performed by: HOSPITALIST

## 2019-10-07 PROCEDURE — A9270 NON-COVERED ITEM OR SERVICE: HCPCS | Performed by: ANESTHESIOLOGY

## 2019-10-07 PROCEDURE — 160002 HCHG RECOVERY MINUTES (STAT): Performed by: SURGERY

## 2019-10-07 PROCEDURE — 83605 ASSAY OF LACTIC ACID: CPT | Mod: 91

## 2019-10-07 PROCEDURE — 88304 TISSUE EXAM BY PATHOLOGIST: CPT

## 2019-10-07 PROCEDURE — 99223 1ST HOSP IP/OBS HIGH 75: CPT | Performed by: HOSPITALIST

## 2019-10-07 PROCEDURE — 700105 HCHG RX REV CODE 258: Performed by: EMERGENCY MEDICINE

## 2019-10-07 PROCEDURE — 85025 COMPLETE CBC W/AUTO DIFF WBC: CPT

## 2019-10-07 PROCEDURE — 160039 HCHG SURGERY MINUTES - EA ADDL 1 MIN LEVEL 3: Performed by: SURGERY

## 2019-10-07 PROCEDURE — 80053 COMPREHEN METABOLIC PANEL: CPT

## 2019-10-07 PROCEDURE — 160035 HCHG PACU - 1ST 60 MINS PHASE I: Performed by: SURGERY

## 2019-10-07 PROCEDURE — 160009 HCHG ANES TIME/MIN: Performed by: SURGERY

## 2019-10-07 RX ORDER — ACETAMINOPHEN 325 MG/1
650 TABLET ORAL EVERY 6 HOURS PRN
Status: DISCONTINUED | OUTPATIENT
Start: 2019-10-07 | End: 2019-10-09 | Stop reason: HOSPADM

## 2019-10-07 RX ORDER — ATORVASTATIN CALCIUM 20 MG/1
20 TABLET, FILM COATED ORAL NIGHTLY
Status: DISCONTINUED | OUTPATIENT
Start: 2019-10-07 | End: 2019-10-09 | Stop reason: HOSPADM

## 2019-10-07 RX ORDER — ENALAPRILAT 1.25 MG/ML
1.25 INJECTION INTRAVENOUS EVERY 6 HOURS PRN
Status: DISCONTINUED | OUTPATIENT
Start: 2019-10-07 | End: 2019-10-09 | Stop reason: HOSPADM

## 2019-10-07 RX ORDER — HYDRALAZINE HYDROCHLORIDE 20 MG/ML
5 INJECTION INTRAMUSCULAR; INTRAVENOUS
Status: DISCONTINUED | OUTPATIENT
Start: 2019-10-07 | End: 2019-10-07 | Stop reason: HOSPADM

## 2019-10-07 RX ORDER — CLONAZEPAM 0.5 MG/1
0.25 TABLET ORAL 2 TIMES DAILY PRN
Status: DISCONTINUED | OUTPATIENT
Start: 2019-10-07 | End: 2019-10-09 | Stop reason: HOSPADM

## 2019-10-07 RX ORDER — SODIUM CHLORIDE, SODIUM LACTATE, POTASSIUM CHLORIDE, CALCIUM CHLORIDE 600; 310; 30; 20 MG/100ML; MG/100ML; MG/100ML; MG/100ML
INJECTION, SOLUTION INTRAVENOUS CONTINUOUS
Status: DISCONTINUED | OUTPATIENT
Start: 2019-10-07 | End: 2019-10-08

## 2019-10-07 RX ORDER — SODIUM CHLORIDE 9 MG/ML
30 INJECTION, SOLUTION INTRAVENOUS ONCE
Status: COMPLETED | OUTPATIENT
Start: 2019-10-07 | End: 2019-10-07

## 2019-10-07 RX ORDER — CLONAZEPAM 2 MG/1
1 TABLET ORAL 2 TIMES DAILY
COMMUNITY

## 2019-10-07 RX ORDER — ROCURONIUM BROMIDE 10 MG/ML
INJECTION, SOLUTION INTRAVENOUS PRN
Status: DISCONTINUED | OUTPATIENT
Start: 2019-10-07 | End: 2019-10-07 | Stop reason: SURG

## 2019-10-07 RX ORDER — DIPHENHYDRAMINE HYDROCHLORIDE 50 MG/ML
12.5 INJECTION INTRAMUSCULAR; INTRAVENOUS
Status: DISCONTINUED | OUTPATIENT
Start: 2019-10-07 | End: 2019-10-07 | Stop reason: HOSPADM

## 2019-10-07 RX ORDER — CELECOXIB 200 MG/1
200 CAPSULE ORAL EVERY MORNING
COMMUNITY
End: 2022-05-19

## 2019-10-07 RX ORDER — METFORMIN HYDROCHLORIDE EXTENDED-RELEASE TABLETS 1000 MG/1
1000 TABLET, FILM COATED, EXTENDED RELEASE ORAL
COMMUNITY
End: 2022-08-26

## 2019-10-07 RX ORDER — SODIUM CHLORIDE, SODIUM LACTATE, POTASSIUM CHLORIDE, CALCIUM CHLORIDE 600; 310; 30; 20 MG/100ML; MG/100ML; MG/100ML; MG/100ML
INJECTION, SOLUTION INTRAVENOUS CONTINUOUS
Status: DISCONTINUED | OUTPATIENT
Start: 2019-10-07 | End: 2019-10-07 | Stop reason: HOSPADM

## 2019-10-07 RX ORDER — ONDANSETRON 2 MG/ML
4 INJECTION INTRAMUSCULAR; INTRAVENOUS ONCE
Status: COMPLETED | OUTPATIENT
Start: 2019-10-07 | End: 2019-10-07

## 2019-10-07 RX ORDER — ONDANSETRON 2 MG/ML
INJECTION INTRAMUSCULAR; INTRAVENOUS PRN
Status: DISCONTINUED | OUTPATIENT
Start: 2019-10-07 | End: 2019-10-07 | Stop reason: SURG

## 2019-10-07 RX ORDER — ONDANSETRON 2 MG/ML
4 INJECTION INTRAMUSCULAR; INTRAVENOUS EVERY 4 HOURS PRN
Status: DISCONTINUED | OUTPATIENT
Start: 2019-10-07 | End: 2019-10-09 | Stop reason: HOSPADM

## 2019-10-07 RX ORDER — MORPHINE SULFATE 4 MG/ML
4 INJECTION, SOLUTION INTRAMUSCULAR; INTRAVENOUS ONCE
Status: COMPLETED | OUTPATIENT
Start: 2019-10-07 | End: 2019-10-07

## 2019-10-07 RX ORDER — AMOXICILLIN 250 MG
2 CAPSULE ORAL 2 TIMES DAILY
Status: DISCONTINUED | OUTPATIENT
Start: 2019-10-07 | End: 2019-10-09 | Stop reason: HOSPADM

## 2019-10-07 RX ORDER — HYDROMORPHONE HYDROCHLORIDE 1 MG/ML
0.5 INJECTION, SOLUTION INTRAMUSCULAR; INTRAVENOUS; SUBCUTANEOUS
Status: DISCONTINUED | OUTPATIENT
Start: 2019-10-07 | End: 2019-10-09 | Stop reason: HOSPADM

## 2019-10-07 RX ORDER — HALOPERIDOL 5 MG/ML
1 INJECTION INTRAMUSCULAR
Status: DISCONTINUED | OUTPATIENT
Start: 2019-10-07 | End: 2019-10-07 | Stop reason: HOSPADM

## 2019-10-07 RX ORDER — BISACODYL 10 MG
10 SUPPOSITORY, RECTAL RECTAL
Status: DISCONTINUED | OUTPATIENT
Start: 2019-10-07 | End: 2019-10-09 | Stop reason: HOSPADM

## 2019-10-07 RX ORDER — OXYCODONE HYDROCHLORIDE 5 MG/1
5 TABLET ORAL
Status: DISCONTINUED | OUTPATIENT
Start: 2019-10-07 | End: 2019-10-09 | Stop reason: HOSPADM

## 2019-10-07 RX ORDER — LISINOPRIL 5 MG/1
10 TABLET ORAL DAILY
Status: DISCONTINUED | OUTPATIENT
Start: 2019-10-08 | End: 2019-10-08

## 2019-10-07 RX ORDER — OXCARBAZEPINE 150 MG/1
150 TABLET, FILM COATED ORAL 2 TIMES DAILY
COMMUNITY
End: 2021-01-28

## 2019-10-07 RX ORDER — PROCHLORPERAZINE EDISYLATE 5 MG/ML
5-10 INJECTION INTRAMUSCULAR; INTRAVENOUS EVERY 4 HOURS PRN
Status: DISCONTINUED | OUTPATIENT
Start: 2019-10-07 | End: 2019-10-09 | Stop reason: HOSPADM

## 2019-10-07 RX ORDER — PROMETHAZINE HYDROCHLORIDE 25 MG/1
12.5-25 TABLET ORAL EVERY 4 HOURS PRN
Status: DISCONTINUED | OUTPATIENT
Start: 2019-10-07 | End: 2019-10-09 | Stop reason: HOSPADM

## 2019-10-07 RX ORDER — DIPHENHYDRAMINE HYDROCHLORIDE 50 MG/ML
INJECTION INTRAMUSCULAR; INTRAVENOUS PRN
Status: DISCONTINUED | OUTPATIENT
Start: 2019-10-07 | End: 2019-10-07 | Stop reason: SURG

## 2019-10-07 RX ORDER — GABAPENTIN 300 MG/1
300 CAPSULE ORAL 3 TIMES DAILY
COMMUNITY
End: 2021-01-28

## 2019-10-07 RX ORDER — KETOROLAC TROMETHAMINE 30 MG/ML
15 INJECTION, SOLUTION INTRAMUSCULAR; INTRAVENOUS EVERY 6 HOURS PRN
Status: DISCONTINUED | OUTPATIENT
Start: 2019-10-07 | End: 2019-10-09 | Stop reason: HOSPADM

## 2019-10-07 RX ORDER — METOPROLOL TARTRATE 1 MG/ML
1 INJECTION, SOLUTION INTRAVENOUS
Status: DISCONTINUED | OUTPATIENT
Start: 2019-10-07 | End: 2019-10-07 | Stop reason: HOSPADM

## 2019-10-07 RX ORDER — PROMETHAZINE HYDROCHLORIDE 25 MG/1
12.5-25 SUPPOSITORY RECTAL EVERY 4 HOURS PRN
Status: DISCONTINUED | OUTPATIENT
Start: 2019-10-07 | End: 2019-10-09 | Stop reason: HOSPADM

## 2019-10-07 RX ORDER — BUPIVACAINE HYDROCHLORIDE AND EPINEPHRINE 5; 5 MG/ML; UG/ML
INJECTION, SOLUTION PERINEURAL
Status: DISCONTINUED | OUTPATIENT
Start: 2019-10-07 | End: 2019-10-07 | Stop reason: HOSPADM

## 2019-10-07 RX ORDER — OXYCODONE HYDROCHLORIDE 5 MG/1
2.5 TABLET ORAL
Status: DISCONTINUED | OUTPATIENT
Start: 2019-10-07 | End: 2019-10-09 | Stop reason: HOSPADM

## 2019-10-07 RX ORDER — INSULIN GLARGINE 100 [IU]/ML
40 INJECTION, SOLUTION SUBCUTANEOUS EVERY EVENING
Status: DISCONTINUED | OUTPATIENT
Start: 2019-10-07 | End: 2019-10-09 | Stop reason: HOSPADM

## 2019-10-07 RX ORDER — CYCLOBENZAPRINE HCL 10 MG
10 TABLET ORAL
COMMUNITY
End: 2022-06-15

## 2019-10-07 RX ORDER — ONDANSETRON 4 MG/1
4 TABLET, ORALLY DISINTEGRATING ORAL EVERY 4 HOURS PRN
Status: DISCONTINUED | OUTPATIENT
Start: 2019-10-07 | End: 2019-10-09 | Stop reason: HOSPADM

## 2019-10-07 RX ORDER — SODIUM CHLORIDE, SODIUM LACTATE, POTASSIUM CHLORIDE, CALCIUM CHLORIDE 600; 310; 30; 20 MG/100ML; MG/100ML; MG/100ML; MG/100ML
1000 INJECTION, SOLUTION INTRAVENOUS ONCE
Status: COMPLETED | OUTPATIENT
Start: 2019-10-07 | End: 2019-10-07

## 2019-10-07 RX ORDER — PAROXETINE HYDROCHLORIDE 20 MG/1
40 TABLET, FILM COATED ORAL DAILY
Status: DISCONTINUED | OUTPATIENT
Start: 2019-10-08 | End: 2019-10-09 | Stop reason: HOSPADM

## 2019-10-07 RX ORDER — CYCLOBENZAPRINE HCL 10 MG
10 TABLET ORAL
Status: DISCONTINUED | OUTPATIENT
Start: 2019-10-07 | End: 2019-10-09 | Stop reason: HOSPADM

## 2019-10-07 RX ORDER — HYDROMORPHONE HYDROCHLORIDE 1 MG/ML
0.25 INJECTION, SOLUTION INTRAMUSCULAR; INTRAVENOUS; SUBCUTANEOUS
Status: DISCONTINUED | OUTPATIENT
Start: 2019-10-07 | End: 2019-10-07

## 2019-10-07 RX ORDER — ONDANSETRON 2 MG/ML
4 INJECTION INTRAMUSCULAR; INTRAVENOUS
Status: DISCONTINUED | OUTPATIENT
Start: 2019-10-07 | End: 2019-10-07 | Stop reason: HOSPADM

## 2019-10-07 RX ORDER — POLYETHYLENE GLYCOL 3350 17 G/17G
1 POWDER, FOR SOLUTION ORAL
Status: DISCONTINUED | OUTPATIENT
Start: 2019-10-07 | End: 2019-10-09 | Stop reason: HOSPADM

## 2019-10-07 RX ORDER — QUETIAPINE FUMARATE 100 MG/1
200 TABLET, FILM COATED ORAL EVERY EVENING
Status: DISCONTINUED | OUTPATIENT
Start: 2019-10-07 | End: 2019-10-09 | Stop reason: HOSPADM

## 2019-10-07 RX ORDER — OXCARBAZEPINE 300 MG/1
150 TABLET, FILM COATED ORAL 2 TIMES DAILY
Status: DISCONTINUED | OUTPATIENT
Start: 2019-10-07 | End: 2019-10-09 | Stop reason: HOSPADM

## 2019-10-07 RX ORDER — GABAPENTIN 300 MG/1
300 CAPSULE ORAL 3 TIMES DAILY
Status: DISCONTINUED | OUTPATIENT
Start: 2019-10-07 | End: 2019-10-09 | Stop reason: HOSPADM

## 2019-10-07 RX ORDER — CELECOXIB 200 MG/1
200 CAPSULE ORAL EVERY MORNING
Status: DISCONTINUED | OUTPATIENT
Start: 2019-10-08 | End: 2019-10-07

## 2019-10-07 RX ADMIN — DIPHENHYDRAMINE HYDROCHLORIDE 25 MG: 50 INJECTION, SOLUTION INTRAMUSCULAR; INTRAVENOUS at 20:18

## 2019-10-07 RX ADMIN — FENTANYL CITRATE 50 MCG: 50 INJECTION, SOLUTION INTRAMUSCULAR; INTRAVENOUS at 20:21

## 2019-10-07 RX ADMIN — CEFTRIAXONE SODIUM 2 G: 2 INJECTION, POWDER, FOR SOLUTION INTRAMUSCULAR; INTRAVENOUS at 13:26

## 2019-10-07 RX ADMIN — SODIUM CHLORIDE, POTASSIUM CHLORIDE, SODIUM LACTATE AND CALCIUM CHLORIDE: 600; 310; 30; 20 INJECTION, SOLUTION INTRAVENOUS at 14:35

## 2019-10-07 RX ADMIN — KETOROLAC TROMETHAMINE 15 MG: 30 INJECTION, SOLUTION INTRAMUSCULAR; INTRAVENOUS at 17:30

## 2019-10-07 RX ADMIN — ROCURONIUM BROMIDE 35 MG: 10 INJECTION, SOLUTION INTRAVENOUS at 19:51

## 2019-10-07 RX ADMIN — SUGAMMADEX 75 MG: 100 INJECTION, SOLUTION INTRAVENOUS at 20:17

## 2019-10-07 RX ADMIN — PROPOFOL 170 MG: 10 INJECTION, EMULSION INTRAVENOUS at 19:51

## 2019-10-07 RX ADMIN — OXYCODONE HYDROCHLORIDE 5 MG: 5 TABLET ORAL at 16:01

## 2019-10-07 RX ADMIN — HYDROMORPHONE HYDROCHLORIDE 0.25 MG: 1 INJECTION, SOLUTION INTRAMUSCULAR; INTRAVENOUS; SUBCUTANEOUS at 16:43

## 2019-10-07 RX ADMIN — ONDANSETRON 4 MG: 2 INJECTION INTRAMUSCULAR; INTRAVENOUS at 20:11

## 2019-10-07 RX ADMIN — METRONIDAZOLE 500 MG: 500 INJECTION, SOLUTION INTRAVENOUS at 22:09

## 2019-10-07 RX ADMIN — MORPHINE SULFATE 4 MG: 4 INJECTION INTRAVENOUS at 10:29

## 2019-10-07 RX ADMIN — METRONIDAZOLE 500 MG: 500 INJECTION, SOLUTION INTRAVENOUS at 14:29

## 2019-10-07 RX ADMIN — ONDANSETRON 4 MG: 2 INJECTION INTRAMUSCULAR; INTRAVENOUS at 15:10

## 2019-10-07 RX ADMIN — PROPOFOL 30 MG: 10 INJECTION, EMULSION INTRAVENOUS at 20:20

## 2019-10-07 RX ADMIN — SODIUM CHLORIDE, POTASSIUM CHLORIDE, SODIUM LACTATE AND CALCIUM CHLORIDE 1000 ML: 600; 310; 30; 20 INJECTION, SOLUTION INTRAVENOUS at 10:30

## 2019-10-07 RX ADMIN — GABAPENTIN 300 MG: 300 CAPSULE ORAL at 17:37

## 2019-10-07 RX ADMIN — IOHEXOL 25 ML: 240 INJECTION, SOLUTION INTRATHECAL; INTRAVASCULAR; INTRAVENOUS; ORAL at 12:20

## 2019-10-07 RX ADMIN — HYDROMORPHONE HYDROCHLORIDE 0.25 MG: 1 INJECTION, SOLUTION INTRAMUSCULAR; INTRAVENOUS; SUBCUTANEOUS at 14:03

## 2019-10-07 RX ADMIN — QUETIAPINE FUMARATE 200 MG: 100 TABLET ORAL at 17:37

## 2019-10-07 RX ADMIN — IOHEXOL 100 ML: 350 INJECTION, SOLUTION INTRAVENOUS at 12:20

## 2019-10-07 RX ADMIN — FENTANYL CITRATE 25 MCG: 50 INJECTION, SOLUTION INTRAMUSCULAR; INTRAVENOUS at 20:53

## 2019-10-07 RX ADMIN — INSULIN GLARGINE 40 UNITS: 100 INJECTION, SOLUTION SUBCUTANEOUS at 22:20

## 2019-10-07 RX ADMIN — FENTANYL CITRATE 100 MCG: 50 INJECTION, SOLUTION INTRAMUSCULAR; INTRAVENOUS at 19:49

## 2019-10-07 RX ADMIN — MIDAZOLAM HYDROCHLORIDE 2 MG: 1 INJECTION, SOLUTION INTRAMUSCULAR; INTRAVENOUS at 19:48

## 2019-10-07 RX ADMIN — HYDROCODONE BITARTRATE AND ACETAMINOPHEN 15 ML: 7.5; 325 SOLUTION ORAL at 20:50

## 2019-10-07 RX ADMIN — ONDANSETRON 4 MG: 2 INJECTION INTRAMUSCULAR; INTRAVENOUS at 10:29

## 2019-10-07 RX ADMIN — SODIUM CHLORIDE 2250 ML: 9 INJECTION, SOLUTION INTRAVENOUS at 12:07

## 2019-10-07 RX ADMIN — INSULIN HUMAN 2 UNITS: 100 INJECTION, SOLUTION PARENTERAL at 22:18

## 2019-10-07 RX ADMIN — GABAPENTIN 300 MG: 300 CAPSULE ORAL at 14:34

## 2019-10-07 RX ADMIN — HYDROMORPHONE HYDROCHLORIDE 0.5 MG: 1 INJECTION, SOLUTION INTRAMUSCULAR; INTRAVENOUS; SUBCUTANEOUS at 18:01

## 2019-10-07 RX ADMIN — OXCARBAZEPINE 150 MG: 300 TABLET, FILM COATED ORAL at 17:38

## 2019-10-07 RX ADMIN — SODIUM CHLORIDE, POTASSIUM CHLORIDE, SODIUM LACTATE AND CALCIUM CHLORIDE: 600; 310; 30; 20 INJECTION, SOLUTION INTRAVENOUS at 20:30

## 2019-10-07 RX ADMIN — MORPHINE SULFATE 4 MG: 4 INJECTION INTRAVENOUS at 11:45

## 2019-10-07 ASSESSMENT — ENCOUNTER SYMPTOMS
BACK PAIN: 0
CHILLS: 1
HEADACHES: 0
PALPITATIONS: 0
COUGH: 0
TINGLING: 0
BLURRED VISION: 0
FEVER: 0
DIZZINESS: 0
DEPRESSION: 0
ABDOMINAL PAIN: 1
INSOMNIA: 0
EYE PAIN: 0
NECK PAIN: 0
VOMITING: 1
SORE THROAT: 0
SHORTNESS OF BREATH: 0
NAUSEA: 1

## 2019-10-07 ASSESSMENT — COGNITIVE AND FUNCTIONAL STATUS - GENERAL
DAILY ACTIVITIY SCORE: 18
MOVING FROM LYING ON BACK TO SITTING ON SIDE OF FLAT BED: A LITTLE
CLIMB 3 TO 5 STEPS WITH RAILING: A LITTLE
STANDING UP FROM CHAIR USING ARMS: A LITTLE
EATING MEALS: A LITTLE
PERSONAL GROOMING: A LITTLE
TURNING FROM BACK TO SIDE WHILE IN FLAT BAD: A LITTLE
DRESSING REGULAR LOWER BODY CLOTHING: A LITTLE
MOVING TO AND FROM BED TO CHAIR: A LITTLE
HELP NEEDED FOR BATHING: A LITTLE
WALKING IN HOSPITAL ROOM: A LITTLE
MOBILITY SCORE: 18
TOILETING: A LITTLE
SUGGESTED CMS G CODE MODIFIER MOBILITY: CK
SUGGESTED CMS G CODE MODIFIER DAILY ACTIVITY: CK
DRESSING REGULAR UPPER BODY CLOTHING: A LITTLE

## 2019-10-07 ASSESSMENT — LIFESTYLE VARIABLES
HAVE YOU EVER FELT YOU SHOULD CUT DOWN ON YOUR DRINKING: NO
TOTAL SCORE: 0
EVER FELT BAD OR GUILTY ABOUT YOUR DRINKING: NO
DOES PATIENT WANT TO STOP DRINKING: NO
EVER_SMOKED: YES
AVERAGE NUMBER OF DAYS PER WEEK YOU HAVE A DRINK CONTAINING ALCOHOL: 0
HOW MANY TIMES IN THE PAST YEAR HAVE YOU HAD 5 OR MORE DRINKS IN A DAY: 0
HAVE PEOPLE ANNOYED YOU BY CRITICIZING YOUR DRINKING: NO
TOTAL SCORE: 0
CONSUMPTION TOTAL: NEGATIVE
ALCOHOL_USE: NO
ON A TYPICAL DAY WHEN YOU DRINK ALCOHOL HOW MANY DRINKS DO YOU HAVE: 0
TOTAL SCORE: 0
EVER HAD A DRINK FIRST THING IN THE MORNING TO STEADY YOUR NERVES TO GET RID OF A HANGOVER: NO

## 2019-10-07 ASSESSMENT — COPD QUESTIONNAIRES
DO YOU EVER COUGH UP ANY MUCUS OR PHLEGM?: NO/ONLY WITH OCCASIONAL COLDS OR INFECTIONS
IN THE PAST 12 MONTHS DO YOU DO LESS THAN YOU USED TO BECAUSE OF YOUR BREATHING PROBLEMS: DISAGREE/UNSURE
DURING THE PAST 4 WEEKS HOW MUCH DID YOU FEEL SHORT OF BREATH: NONE/LITTLE OF THE TIME
HAVE YOU SMOKED AT LEAST 100 CIGARETTES IN YOUR ENTIRE LIFE: NO/DON'T KNOW
COPD SCREENING SCORE: 0

## 2019-10-07 ASSESSMENT — PATIENT HEALTH QUESTIONNAIRE - PHQ9
2. FEELING DOWN, DEPRESSED, IRRITABLE, OR HOPELESS: NOT AT ALL
1. LITTLE INTEREST OR PLEASURE IN DOING THINGS: NOT AT ALL
SUM OF ALL RESPONSES TO PHQ9 QUESTIONS 1 AND 2: 0
1. LITTLE INTEREST OR PLEASURE IN DOING THINGS: NOT AT ALL
2. FEELING DOWN, DEPRESSED, IRRITABLE, OR HOPELESS: NOT AT ALL
SUM OF ALL RESPONSES TO PHQ9 QUESTIONS 1 AND 2: 0

## 2019-10-07 NOTE — ASSESSMENT & PLAN NOTE
This is Sepsis Present on admission  SIRS criteria identified on my evaluation include: Tachycardia, with heart rate greater than 90 BPM and Leukocyosis, with WBC greater than 12,000  Source is appendicitis  Sepsis protocol initiated  Fluid resuscitation ordered per protocol  IV antibiotics as appropriate for source of sepsis  While organ dysfunction may be noted elsewhere in this problem list or in the chart, degree of organ dysfunction does not meet CMS criteria for severe sepsis  Blood pressures remain a little soft  White count 15 K  Continue IV fluids  Continue antibiotics  Recheck a.m. labs

## 2019-10-07 NOTE — ED NOTES
1022:  PIV placed in RAC, blood drawn and sent to lab.    1030:  Medicated as ordered.  Reviewed POC w/ pt including pending tests and chart review by ERP.

## 2019-10-07 NOTE — ASSESSMENT & PLAN NOTE
Stage 3. Last chemo was in september. Continue to monitor. Immunocompromised and at risk for complications from this. Aggressive infection control.

## 2019-10-07 NOTE — PROGRESS NOTES
Received report from ER RN. Pt arrived to unit via gurney. Pt is ambulatory, gait steady. Pt c/o abdominal pain, administered IV dilaudid 0.25 ml with good relief. Pt is scheduled for a appendectomy with Dr. Frazier at 1845. Pt states she has been NPO since 2100 last night. Call light within reach. Will continue to monitor.

## 2019-10-07 NOTE — H&P
Hospital Medicine History & Physical Note    Date of Service  10/7/2019    Primary Care Physician  Adriana Levine M.D.    Consultants  surgery- [facility]    Code Status  full    Chief Complaint  Abdominal pain.     History of Presenting Illness  38 y.o. female who presented 10/7/2019 with one day of abdominal pain. It woke her at 3am this morning. It is in both lower quadrants. It improved with flexing her hips and worsens when she lays flat. She has had chills and one episode of vomiting. She had a normal Bowel movement this morning. She has had no dysuria or hematuria.     Review of Systems  Review of Systems   Constitutional: Positive for chills. Negative for fever.   HENT: Negative for sore throat.    Eyes: Negative for blurred vision and pain.   Respiratory: Negative for cough and shortness of breath.    Cardiovascular: Negative for chest pain and palpitations.   Gastrointestinal: Positive for abdominal pain, nausea and vomiting.   Genitourinary: Negative for dysuria and urgency.   Musculoskeletal: Negative for back pain and neck pain.   Skin: Negative for itching and rash.   Neurological: Negative for dizziness, tingling and headaches.   Psychiatric/Behavioral: Negative for depression. The patient does not have insomnia.    All other systems reviewed and are negative.      Past Medical History   has a past medical history of Asthma, Bipolar 1 disorder (HCC), Cancer ([facility]) (2016), Depression, DM mellitus, gestational, Hyperlipidemia, Hypertension, Pap smear, and PCOS (polycystic ovarian syndrome).    Surgical History   has a past surgical history that includes myringotomy; adenoidectomy; other (2016); wide excision (Left, 5/9/2017); node biopsy sentinel (Left, 5/9/2017); and axillary node dissection (Left, 6/23/2017).     Family History  family history includes Diabetes in her father and mother; Hyperlipidemia in her mother; Hypertension in her father; Psychiatric Illness in her mother; Thyroid in her  mother.     Social History   reports that she has been smoking cigarettes. She has a 2.50 pack-year smoking history. She has never used smokeless tobacco. She reports that she drank alcohol. She reports that she has current or past drug history. Drug: Inhaled.    Allergies  Allergies   Allergen Reactions   • Clavulanic Acid        Medications  Prior to Admission Medications   Prescriptions Last Dose Informant Patient Reported? Taking?   Ipilimumab (YERVOY IV) 2019 at Q90D Patient Yes No   Si Dose Pack by Intravenous route every 90 days.   OXcarbazepine (TRILEPTAL) 150 MG Tab 10/7/2019 at 0400 Patient Yes Yes   Sig: Take 150 mg by mouth 2 times a day.   SITagliptin (JANUVIA) 100 MG Tab 10/7/2019 at 0400 Patient Yes Yes   Sig: Take 100 mg by mouth every morning.   albuterol (PROVENTIL HFA) 108 (90 BASE) MCG/ACT Aero Soln inhalation aerosol 10/6/2019 at 2000 Patient No No   Sig: Inhale 2 Puffs by mouth every 6 hours as needed.   atorvastatin (LIPITOR) 20 MG Tab 10/6/2019 at 2000 Patient Yes No   Sig: Take 20 mg by mouth every evening.   celecoxib (CELEBREX) 200 MG Cap 10/7/2019 at 0400 Patient Yes Yes   Sig: Take 200 mg by mouth every morning.   clonazePAM (KLONOPIN) 0.5 MG Tab 10/7/2019 at 0400 Patient Yes Yes   Sig: Take 0.25 mg by mouth 2 times a day as needed (ANXIETY).   cyclobenzaprine (FLEXERIL) 10 MG Tab 10/7/2019 at 0400 Patient Yes Yes   Sig: Take 10 mg by mouth 1 time daily as needed for Muscle Spasms.   gabapentin (NEURONTIN) 300 MG Cap 10/7/2019 at 0400 Patient Yes Yes   Sig: Take 300 mg by mouth 3 times a day.   insulin glargine (LANTUS SOLOSTAR) 100 UNIT/ML Solution Pen-injector injection 10/6/2019 at 2000 Patient Yes No   Sig: Inject 40 Units as instructed every evening.   lisinopril (PRINIVIL) 10 MG Tab 10/7/2019 at 0400 Patient Yes No   Sig: Take 10 mg by mouth every day.   metFORMIN ER 1000 MG TABLET SR 24 HR 10/7/2019 at 0400 Patient Yes Yes   Sig: Take 1,000 mg by mouth 2 Times a Day. 1000  mg Twice daily   paroxetine (PAXIL) 40 MG tablet 10/7/2019 at 0400 Patient No No   Sig: Take 1 Tab by mouth every day. Take with food   quetiapine (SEROQUEL) 200 MG Tab 10/6/2019 at 2000 Patient Yes No   Sig: Take 200 mg by mouth every evening.      Facility-Administered Medications: None       Physical Exam  Temp:  [36.3 °C (97.3 °F)] 36.3 °C (97.3 °F)  Pulse:  [82-98] 98  Resp:  [18] 18  BP: (142-144)/(95-96) 144/95  SpO2:  [95 %-99 %] 97 %    Physical Exam   Constitutional: She is oriented to person, place, and time. She appears well-developed and well-nourished. No distress.   Patient seen and examined  Discussed plan with RN   HENT:   Right Ear: External ear normal.   Left Ear: External ear normal.   Nose: Nose normal.   Eyes: Conjunctivae are normal. Right eye exhibits no discharge. Left eye exhibits no discharge.   Neck: No JVD present.   Cardiovascular: Regular rhythm and normal heart sounds.   No murmur heard.  Cap refill 2sec  Pulses 2+ throughout     Pulmonary/Chest: Effort normal and breath sounds normal. No stridor. No respiratory distress. She has no wheezes. She has no rales.   Abdominal: Soft. She exhibits no distension. There is tenderness. There is rebound and guarding.   Hypoactive bowel sounds.    Musculoskeletal: She exhibits no edema.   Neurological: She is alert and oriented to person, place, and time.   Skin: Skin is warm and dry. She is not diaphoretic. No erythema.   Normal skin  Color.    Psychiatric: She has a normal mood and affect. Her behavior is normal.   Nursing note and vitals reviewed.      Laboratory:  Recent Labs     10/07/19  1022   WBC 19.0*   RBC 5.01   HEMOGLOBIN 14.1   HEMATOCRIT 43.0   MCV 85.8   MCH 28.1   MCHC 32.8*   RDW 47.5   PLATELETCT 362   MPV 10.4     Recent Labs     10/07/19  1022   SODIUM 142   POTASSIUM 4.9   CHLORIDE 100   CO2 22   GLUCOSE 172*   BUN 10   CREATININE 0.49*   CALCIUM 9.8     Recent Labs     10/07/19  1022   ALTSGPT 15   ASTSGOT 14   ALKPHOSPHAT  100*   TBILIRUBIN 0.5   LIPASE 154*   GLUCOSE 172*         No results for input(s): NTPROBNP in the last 72 hours.      No results for input(s): TROPONINT in the last 72 hours.    Urinalysis:    No results found     Imaging:  CT-ABDOMEN-PELVIS WITH   Final Result      1.  Acute appendicitis in the right lower quadrant. No evidence of perforation.      2.  No evidence of bowel obstruction.      3.  Normal appearance of the pancreas.      Findings were discussed with LYNETTE MUELLER M.D. on 10/7/2019 12:36 PM.            Assessment/Plan:  I anticipate this patient will require at least two midnights for appropriate medical management, necessitating inpatient admission.    * Appendicitis  Assessment & Plan  Acute. Dr Frazier from surgery has been consulted. With sepsis. IV antibiotics and treat sepsis. Surgical intervention at discretion of surgery. Pain control.     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)- (present on admission)  Assessment & Plan  Stage 3. Last chemo was in september. Continue to monitor. Immunocompromised and at risk for complications from this. Aggressive infection control.     DM2 (diabetes mellitus, type 2) ([facility])  Assessment & Plan  With hyperglycemia. Check a1c, add ssi. Acuchecks.     Bipolar 1 disorder ([facility])  Assessment & Plan  No flare.     Lactic acidosis- (present on admission)  Assessment & Plan  IV fluids, treat sepsis. trend    Sepsis (HCC)- (present on admission)  Assessment & Plan  This is Sepsis Present on admission  SIRS criteria identified on my evaluation include: Tachycardia, with heart rate greater than 90 BPM and Leukocyosis, with WBC greater than 12,000  Source is appendicitis  Sepsis protocol initiated  Fluid resuscitation ordered per protocol  IV antibiotics as appropriate for source of sepsis  While organ dysfunction may be noted elsewhere in this problem list or in the chart, degree of organ dysfunction does not meet CMS criteria for severe  sepsis  Trend lactic acid          Asthma- (present on admission)  Assessment & Plan  No flare rt protocol       VTE prophylaxis: scd

## 2019-10-07 NOTE — PROGRESS NOTES
Pt reports severe abdominal pain. Pt is sweating and crying. Administered prn oxycodone with no relief and administered prn IV dilaudid 0.25 ml and Page Dr. Wilson for orders. Offered emotional support and ice packs for abdominal pain. Will continue to monitor.

## 2019-10-07 NOTE — ED NOTES
"Per pt she has not eaten anything today  \"only drank the oral contrast for the test\"  Instructed not to have anything by mouth today until after surgery   "

## 2019-10-07 NOTE — ED NOTES
Pt reports pain now 8/10 after receiving Morphine, will notify ERP.  Pt aware of pending CT, attempting to complete oral contrast.

## 2019-10-07 NOTE — ED TRIAGE NOTES
"Chief Complaint   Patient presents with   • Abdominal Pain     Severe abdominal pain \" it feels like my intestines are twisting\" beginning around 0300 last evening.    • N/V     since last night    • Diarrhea     yesterday      Pt receving Yervoy immunotherapy for stage 3 melanoma. Last tx was Sept 11th.   "

## 2019-10-07 NOTE — ED PROVIDER NOTES
"ED Provider Note    CHIEF COMPLAINT  Chief Complaint   Patient presents with   • Abdominal Pain     Severe abdominal pain \" it feels like my intestines are twisting\" beginning around 0300 last evening.    • N/V     since last night    • Diarrhea     yesterday        HPI  Jacque Mcintyre is a 38 y.o. female who presents with a report that she has had episodic vomiting with abdominal pain since yesterday evening.  She says that it feels like her intestines are twisting around.  She had a little bit of diarrhea yesterday without fever.  Reports that she has a history of stage III malignant melanoma with her last chemotherapy back in mid September.  She had a bowel movement this morning without any blood and denies any urinary symptoms.  Denies any other complaints at this time and says that her pain is moderate in severity right pelvis greater than left with no alleviating factors pain is fairly constant    REVIEW OF SYSTEMS  See HPI for further details. All other systems are negative.     PAST MEDICAL HISTORY  Past Medical History:   Diagnosis Date   • Asthma     inhaler   • Bipolar 1 disorder (HCC)    • Cancer (HCC) 2016    melanoma   • Depression    • DM mellitus, gestational     insulin   • Hyperlipidemia    • Hypertension    • Pap smear     Dr. Baird   • PCOS (polycystic ovarian syndrome)        FAMILY HISTORY  Family History   Problem Relation Age of Onset   • Psychiatric Illness Mother         depression   • Diabetes Mother    • Hyperlipidemia Mother    • Thyroid Mother         s/p radioactive iodine treatment on replacement   • Hypertension Father    • Diabetes Father        SOCIAL HISTORY   reports that she has been smoking cigarettes. She has a 2.50 pack-year smoking history. She has never used smokeless tobacco. She reports that she drank alcohol. She reports that she has current or past drug history. Drug: Inhaled.    SURGICAL HISTORY  Past Surgical History:   Procedure Laterality Date   • AXILLARY NODE " "DISSECTION Left 6/23/2017    Procedure: AXILLARY NODE DISSECTION- COMPLETION LYPHADENECTOMY;  Surgeon: Lionel Weinberg M.D.;  Location: SURGERY [facility];  Service:    • WIDE EXCISION Left 5/9/2017    Procedure: WIDE EXCISION - MALIGNANT MELANOMA HAND;  Surgeon: Lionel Weinberg M.D.;  Location: SURGERY SAME DAY [facility];  Service:    • NODE BIOPSY SENTINEL Left 5/9/2017    Procedure: NODE BIOPSY SENTINEL - AXILLARY;  Surgeon: Lionel Weinberg M.D.;  Location: SURGERY SAME DAY [facility];  Service:    • OTHER  2016    excisino of melanoma left hand   • ADENOIDECTOMY     • MYRINGOTOMY      with tube placement       CURRENT MEDICATIONS  Home Medications     Reviewed by Samia Vázquez (Pharmacy Tech) on 10/07/19 at 1203  Med List Status: Complete   Medication Last Dose Status   albuterol (PROVENTIL HFA) 108 (90 BASE) MCG/ACT Aero Soln inhalation aerosol 10/6/2019 Active   atorvastatin (LIPITOR) 20 MG Tab 10/6/2019 Active   celecoxib (CELEBREX) 200 MG Cap 10/7/2019 Active   clonazePAM (KLONOPIN) 0.5 MG Tab 10/7/2019 Active   cyclobenzaprine (FLEXERIL) 10 MG Tab 10/7/2019 Active   gabapentin (NEURONTIN) 300 MG Cap 10/7/2019 Active   insulin glargine (LANTUS SOLOSTAR) 100 UNIT/ML Solution Pen-injector injection 10/6/2019 Active   Ipilimumab (YERVOY IV) 9/11/2019 Active   lisinopril (PRINIVIL) 10 MG Tab 10/7/2019 Active   metFORMIN ER 1000 MG TABLET SR 24 HR 10/7/2019 Active   OXcarbazepine (TRILEPTAL) 150 MG Tab 10/7/2019 Active   paroxetine (PAXIL) 40 MG tablet 10/7/2019 Active   quetiapine (SEROQUEL) 200 MG Tab 10/6/2019 Active   SITagliptin (JANUVIA) 100 MG Tab 10/7/2019 Active                ALLERGIES  Allergies   Allergen Reactions   • Clavulanic Acid        PHYSICAL EXAM  VITAL SIGNS: /95   Pulse 98   Temp 36.3 °C (97.3 °F) (Temporal)   Resp 18   Ht 1.626 m (5' 4\")   Wt 75 kg (165 lb 5.5 oz)   LMP 09/13/2019   SpO2 97%   BMI 28.38 kg/m²    Constitutional: Well developed, Well " nourished, mildly ill appearance.   HENT: Normocephalic, Atraumatic, Bilateral external ears normal, Oropharynx is clear mucous membranes are moist. No oral exudates or nasal discharge.   Eyes: Pupils are equal round and reactive, EOMI, Conjunctiva normal, No discharge.   Neck: Normal range of motion, No tenderness, Supple, No stridor. No meningismus.  Lymphatic: No lymphadenopathy noted.   Cardiovascular: Regular rate and rhythm without murmur rub or gallop.  Thorax & Lungs: Clear breath sounds bilaterally without wheezes, rhonchi or rales. There is no chest wall tenderness.   Abdomen: Soft with tenderness in the right lower quadrant as well as to a lesser extent the left lower quadrant and only minimal in the epigastrium, the abdomen is otherwise non-distended. There is no rebound or guarding. No organomegaly is appreciated. Bowel sounds are normal.  Skin: Slight pallor without rash.   Back: No CVA or spinal tenderness.   Extremities: Intact distal pulses, No edema, No tenderness, No cyanosis, No clubbing. Capillary refill is less than 2 seconds.  Musculoskeletal: Good range of motion in all major joints. No tenderness to palpation or major deformities noted.   Neurologic: Alert & oriented x 3, Normal motor function, Normal sensory function, No focal deficits noted. Reflexes are normal.  Psychiatric: Affect normal, Judgment normal, Mood normal. There is no suicidal ideation or patient reported hallucinations.       RADIOLOGY/PROCEDURES  CT-ABDOMEN-PELVIS WITH   Final Result      1.  Acute appendicitis in the right lower quadrant. No evidence of perforation.      2.  No evidence of bowel obstruction.      3.  Normal appearance of the pancreas.      Findings were discussed with LYNETTE MUELLER M.D. on 10/7/2019 12:36 PM.            COURSE & MEDICAL DECISION MAKING  Pertinent Labs & Imaging studies reviewed. (See chart for details)  Patient presents in immunocompromise state with history of diabetes as well as melanoma  on treatment.  I am concerned about bowel pathology versus pancreatitis versus appendicitis versus colitis.  Also concerned about mass.    White blood cell count returned at 19,000 with an 86% PMNs shift.  Mild acidosis with an anion gap of 20 but normal bicarb of 22.  Lipase is elevated at 154 but this is not this does not clinically correlate well with pancreatitis and she does not have much in the way of epigastric tenderness or mid back pain.  Lactic acid is elevated at 2.9 but she does not appear to be in DKA and this is likely from alternative abdominal pathology    CT scan of the abdomen pelvis reveals acute appendicitis in the right lower quadrant with no perforation and no evidence of obstruction with a normal-appearing pancreas.  Spoke with radiologist, Dr. Jackson about these findings and then spoke with Dr. Frazier who will provide surgical consultation as the patient gets admitted to the hospital under the care of Dr. Newman given her complexity.  Fluid and antibiotics have been given and she is updated on her plan of care    FINAL IMPRESSION  1. Acute appendicitis with localized peritonitis, without perforation, abscess, or gangrene    2. Immunocompromised patient (HCC)    3. Elevated lactic acid level    4. Elevated lipase             Electronically signed by: Kenneth Napier, 10/7/2019 12:38 PM

## 2019-10-08 PROBLEM — I95.9 HYPOTENSION: Status: ACTIVE | Noted: 2019-10-08

## 2019-10-08 PROBLEM — E87.6 HYPOKALEMIA: Status: ACTIVE | Noted: 2019-10-08

## 2019-10-08 LAB
ALBUMIN SERPL BCP-MCNC: 3.5 G/DL (ref 3.2–4.9)
ALBUMIN/GLOB SERPL: 1.7 G/DL
ALP SERPL-CCNC: 65 U/L (ref 30–99)
ALT SERPL-CCNC: 10 U/L (ref 2–50)
ANION GAP SERPL CALC-SCNC: 16 MMOL/L (ref 0–11.9)
AST SERPL-CCNC: 13 U/L (ref 12–45)
BASOPHILS # BLD AUTO: 0.3 % (ref 0–1.8)
BASOPHILS # BLD: 0.05 K/UL (ref 0–0.12)
BILIRUB SERPL-MCNC: 0.3 MG/DL (ref 0.1–1.5)
BUN SERPL-MCNC: 6 MG/DL (ref 8–22)
CALCIUM SERPL-MCNC: 8.2 MG/DL (ref 8.4–10.2)
CHLORIDE SERPL-SCNC: 99 MMOL/L (ref 96–112)
CHOLEST SERPL-MCNC: 131 MG/DL (ref 100–199)
CO2 SERPL-SCNC: 25 MMOL/L (ref 20–33)
CREAT SERPL-MCNC: 0.45 MG/DL (ref 0.5–1.4)
EOSINOPHIL # BLD AUTO: 0.03 K/UL (ref 0–0.51)
EOSINOPHIL NFR BLD: 0.2 % (ref 0–6.9)
ERYTHROCYTE [DISTWIDTH] IN BLOOD BY AUTOMATED COUNT: 47 FL (ref 35.9–50)
EST. AVERAGE GLUCOSE BLD GHB EST-MCNC: 148 MG/DL
GLOBULIN SER CALC-MCNC: 2.1 G/DL (ref 1.9–3.5)
GLUCOSE BLD-MCNC: 104 MG/DL (ref 65–99)
GLUCOSE BLD-MCNC: 104 MG/DL (ref 65–99)
GLUCOSE BLD-MCNC: 111 MG/DL (ref 65–99)
GLUCOSE BLD-MCNC: 78 MG/DL (ref 65–99)
GLUCOSE SERPL-MCNC: 102 MG/DL (ref 65–99)
HBA1C MFR BLD: 6.8 % (ref 0–5.6)
HCT VFR BLD AUTO: 34.1 % (ref 37–47)
HDLC SERPL-MCNC: 61 MG/DL
HGB BLD-MCNC: 11.1 G/DL (ref 12–16)
IMM GRANULOCYTES # BLD AUTO: 0.07 K/UL (ref 0–0.11)
IMM GRANULOCYTES NFR BLD AUTO: 0.5 % (ref 0–0.9)
LACTATE BLD-SCNC: 1.3 MMOL/L (ref 0.5–2)
LDLC SERPL CALC-MCNC: 45 MG/DL
LYMPHOCYTES # BLD AUTO: 1.61 K/UL (ref 1–4.8)
LYMPHOCYTES NFR BLD: 10.6 % (ref 22–41)
MCH RBC QN AUTO: 27.8 PG (ref 27–33)
MCHC RBC AUTO-ENTMCNC: 32.6 G/DL (ref 33.6–35)
MCV RBC AUTO: 85.5 FL (ref 81.4–97.8)
MONOCYTES # BLD AUTO: 1.17 K/UL (ref 0–0.85)
MONOCYTES NFR BLD AUTO: 7.7 % (ref 0–13.4)
NEUTROPHILS # BLD AUTO: 12.2 K/UL (ref 2–7.15)
NEUTROPHILS NFR BLD: 80.7 % (ref 44–72)
NRBC # BLD AUTO: 0 K/UL
NRBC BLD-RTO: 0 /100 WBC
PLATELET # BLD AUTO: 258 K/UL (ref 164–446)
PMV BLD AUTO: 10.4 FL (ref 9–12.9)
POTASSIUM SERPL-SCNC: 3.3 MMOL/L (ref 3.6–5.5)
PROT SERPL-MCNC: 5.6 G/DL (ref 6–8.2)
RBC # BLD AUTO: 3.99 M/UL (ref 4.2–5.4)
SODIUM SERPL-SCNC: 140 MMOL/L (ref 135–145)
TRIGL SERPL-MCNC: 126 MG/DL (ref 0–149)
WBC # BLD AUTO: 15.1 K/UL (ref 4.8–10.8)

## 2019-10-08 PROCEDURE — 36415 COLL VENOUS BLD VENIPUNCTURE: CPT

## 2019-10-08 PROCEDURE — 85025 COMPLETE CBC W/AUTO DIFF WBC: CPT

## 2019-10-08 PROCEDURE — 700111 HCHG RX REV CODE 636 W/ 250 OVERRIDE (IP): Performed by: HOSPITALIST

## 2019-10-08 PROCEDURE — 700102 HCHG RX REV CODE 250 W/ 637 OVERRIDE(OP): Performed by: HOSPITALIST

## 2019-10-08 PROCEDURE — 700105 HCHG RX REV CODE 258: Performed by: HOSPITALIST

## 2019-10-08 PROCEDURE — 80061 LIPID PANEL: CPT

## 2019-10-08 PROCEDURE — 700102 HCHG RX REV CODE 250 W/ 637 OVERRIDE(OP): Performed by: INTERNAL MEDICINE

## 2019-10-08 PROCEDURE — 99233 SBSQ HOSP IP/OBS HIGH 50: CPT | Performed by: INTERNAL MEDICINE

## 2019-10-08 PROCEDURE — 700105 HCHG RX REV CODE 258: Performed by: INTERNAL MEDICINE

## 2019-10-08 PROCEDURE — 80053 COMPREHEN METABOLIC PANEL: CPT

## 2019-10-08 PROCEDURE — 700101 HCHG RX REV CODE 250: Performed by: INTERNAL MEDICINE

## 2019-10-08 PROCEDURE — 700111 HCHG RX REV CODE 636 W/ 250 OVERRIDE (IP): Performed by: INTERNAL MEDICINE

## 2019-10-08 PROCEDURE — 82962 GLUCOSE BLOOD TEST: CPT | Mod: 91

## 2019-10-08 PROCEDURE — A9270 NON-COVERED ITEM OR SERVICE: HCPCS | Performed by: HOSPITALIST

## 2019-10-08 PROCEDURE — 83605 ASSAY OF LACTIC ACID: CPT

## 2019-10-08 PROCEDURE — 700101 HCHG RX REV CODE 250: Performed by: HOSPITALIST

## 2019-10-08 PROCEDURE — 83036 HEMOGLOBIN GLYCOSYLATED A1C: CPT

## 2019-10-08 PROCEDURE — 770006 HCHG ROOM/CARE - MED/SURG/GYN SEMI*

## 2019-10-08 PROCEDURE — A9270 NON-COVERED ITEM OR SERVICE: HCPCS | Performed by: INTERNAL MEDICINE

## 2019-10-08 RX ORDER — GABAPENTIN 300 MG/1
300 CAPSULE ORAL ONCE
Status: COMPLETED | OUTPATIENT
Start: 2019-10-08 | End: 2019-10-08

## 2019-10-08 RX ORDER — SODIUM CHLORIDE AND POTASSIUM CHLORIDE 150; 900 MG/100ML; MG/100ML
INJECTION, SOLUTION INTRAVENOUS CONTINUOUS
Status: DISCONTINUED | OUTPATIENT
Start: 2019-10-08 | End: 2019-10-09 | Stop reason: HOSPADM

## 2019-10-08 RX ORDER — QUETIAPINE FUMARATE 100 MG/1
200 TABLET, FILM COATED ORAL 3 TIMES DAILY
Status: DISCONTINUED | OUTPATIENT
Start: 2019-10-08 | End: 2019-10-08

## 2019-10-08 RX ORDER — METRONIDAZOLE 500 MG/1
500 TABLET ORAL EVERY 8 HOURS
Status: DISCONTINUED | OUTPATIENT
Start: 2019-10-08 | End: 2019-10-09 | Stop reason: HOSPADM

## 2019-10-08 RX ORDER — QUETIAPINE FUMARATE 100 MG/1
200 TABLET, FILM COATED ORAL EVERY EVENING
Status: COMPLETED | OUTPATIENT
Start: 2019-10-08 | End: 2019-10-08

## 2019-10-08 RX ORDER — PAROXETINE HYDROCHLORIDE 20 MG/1
40 TABLET, FILM COATED ORAL ONCE
Status: COMPLETED | OUTPATIENT
Start: 2019-10-08 | End: 2019-10-08

## 2019-10-08 RX ORDER — OXCARBAZEPINE 300 MG/1
150 TABLET, FILM COATED ORAL ONCE
Status: COMPLETED | OUTPATIENT
Start: 2019-10-08 | End: 2019-10-08

## 2019-10-08 RX ADMIN — GABAPENTIN 300 MG: 300 CAPSULE ORAL at 21:07

## 2019-10-08 RX ADMIN — KETOROLAC TROMETHAMINE 15 MG: 30 INJECTION, SOLUTION INTRAMUSCULAR; INTRAVENOUS at 00:30

## 2019-10-08 RX ADMIN — OXYCODONE HYDROCHLORIDE 2.5 MG: 5 TABLET ORAL at 06:55

## 2019-10-08 RX ADMIN — POTASSIUM CHLORIDE AND SODIUM CHLORIDE: 900; 150 INJECTION, SOLUTION INTRAVENOUS at 22:08

## 2019-10-08 RX ADMIN — PAROXETINE HYDROCHLORIDE 40 MG: 20 TABLET, FILM COATED ORAL at 00:38

## 2019-10-08 RX ADMIN — CEFTRIAXONE SODIUM 2 G: 2 INJECTION, POWDER, FOR SOLUTION INTRAMUSCULAR; INTRAVENOUS at 00:34

## 2019-10-08 RX ADMIN — OXCARBAZEPINE 150 MG: 300 TABLET, FILM COATED ORAL at 00:39

## 2019-10-08 RX ADMIN — QUETIAPINE FUMARATE 200 MG: 100 TABLET ORAL at 01:19

## 2019-10-08 RX ADMIN — METRONIDAZOLE 500 MG: 500 TABLET ORAL at 21:00

## 2019-10-08 RX ADMIN — OXYCODONE HYDROCHLORIDE 5 MG: 5 TABLET ORAL at 23:03

## 2019-10-08 RX ADMIN — METRONIDAZOLE 500 MG: 500 INJECTION, SOLUTION INTRAVENOUS at 06:21

## 2019-10-08 RX ADMIN — SODIUM CHLORIDE, POTASSIUM CHLORIDE, SODIUM LACTATE AND CALCIUM CHLORIDE: 600; 310; 30; 20 INJECTION, SOLUTION INTRAVENOUS at 00:30

## 2019-10-08 RX ADMIN — PAROXETINE HYDROCHLORIDE 40 MG: 20 TABLET, FILM COATED ORAL at 11:17

## 2019-10-08 RX ADMIN — QUETIAPINE FUMARATE 200 MG: 100 TABLET ORAL at 18:00

## 2019-10-08 RX ADMIN — OXYCODONE HYDROCHLORIDE 5 MG: 5 TABLET ORAL at 19:40

## 2019-10-08 RX ADMIN — GABAPENTIN 300 MG: 300 CAPSULE ORAL at 15:12

## 2019-10-08 RX ADMIN — INSULIN GLARGINE 40 UNITS: 100 INJECTION, SOLUTION SUBCUTANEOUS at 18:01

## 2019-10-08 RX ADMIN — KETOROLAC TROMETHAMINE 15 MG: 30 INJECTION, SOLUTION INTRAMUSCULAR; INTRAVENOUS at 13:09

## 2019-10-08 RX ADMIN — OXCARBAZEPINE 150 MG: 300 TABLET, FILM COATED ORAL at 17:59

## 2019-10-08 RX ADMIN — CEFTRIAXONE SODIUM 2 G: 2 INJECTION, POWDER, FOR SOLUTION INTRAMUSCULAR; INTRAVENOUS at 17:58

## 2019-10-08 RX ADMIN — ATORVASTATIN CALCIUM 20 MG: 20 TABLET, FILM COATED ORAL at 00:33

## 2019-10-08 RX ADMIN — METRONIDAZOLE 500 MG: 500 TABLET ORAL at 13:11

## 2019-10-08 RX ADMIN — SENNOSIDES, DOCUSATE SODIUM 2 TABLET: 50; 8.6 TABLET, FILM COATED ORAL at 17:59

## 2019-10-08 RX ADMIN — GABAPENTIN 300 MG: 300 CAPSULE ORAL at 00:39

## 2019-10-08 RX ADMIN — POTASSIUM CHLORIDE AND SODIUM CHLORIDE: 900; 150 INJECTION, SOLUTION INTRAVENOUS at 09:24

## 2019-10-08 RX ADMIN — OXYCODONE HYDROCHLORIDE 5 MG: 5 TABLET ORAL at 15:10

## 2019-10-08 RX ADMIN — GABAPENTIN 300 MG: 300 CAPSULE ORAL at 08:27

## 2019-10-08 RX ADMIN — POTASSIUM CHLORIDE AND SODIUM CHLORIDE: 900; 150 INJECTION, SOLUTION INTRAVENOUS at 16:20

## 2019-10-08 RX ADMIN — SENNOSIDES, DOCUSATE SODIUM 2 TABLET: 50; 8.6 TABLET, FILM COATED ORAL at 06:24

## 2019-10-08 RX ADMIN — OXYCODONE HYDROCHLORIDE 5 MG: 5 TABLET ORAL at 11:17

## 2019-10-08 RX ADMIN — ATORVASTATIN CALCIUM 20 MG: 20 TABLET, FILM COATED ORAL at 21:00

## 2019-10-08 RX ADMIN — OXCARBAZEPINE 150 MG: 300 TABLET, FILM COATED ORAL at 08:27

## 2019-10-08 ASSESSMENT — ENCOUNTER SYMPTOMS
NERVOUS/ANXIOUS: 0
SHORTNESS OF BREATH: 1
PALPITATIONS: 0
ROS GI COMMENTS: NO BM
SORE THROAT: 0
ABDOMINAL PAIN: 1
NAUSEA: 0
DIARRHEA: 0
VOMITING: 0
HEADACHES: 0
CONSTIPATION: 0
WEIGHT LOSS: 0
DIZZINESS: 0
WHEEZING: 1

## 2019-10-08 ASSESSMENT — PAIN SCALES - GENERAL: PAIN_LEVEL: 0

## 2019-10-08 NOTE — ANESTHESIA POSTPROCEDURE EVALUATION
Patient: Jacque Mcintyre    Procedure Summary     Date:  10/07/19 Room / Location:  [place] [address] / SURGERY [facility]    Anesthesia Start:  1947 Anesthesia Stop:      Procedure:  APPENDECTOMY, LAPAROSCOPIC (N/A Abdomen) Diagnosis:  (acute appendicitis)    Surgeon:  Lionel Frazier M.D. Responsible Provider:  Boni Walker M.D.    Anesthesia Type:  general ASA Status:  3 - Emergent          Final Anesthesia Type: general  Last vitals  BP   Blood Pressure: (!) 90/66    Temp   36.7 °C (98 °F)    Pulse   Pulse: 90, Heart Rate (Monitored): (!) 111   Resp   18    SpO2   98 %      Anesthesia Post Evaluation    Patient location during evaluation: PACU  Patient participation: complete - patient participated  Level of consciousness: sleepy but conscious and responsive to verbal stimuli  Pain score: 0    Airway patency: patent  Anesthetic complications: no  Cardiovascular status: hemodynamically stable  Respiratory status: acceptable  Hydration status: euvolemic    PONV: none           Nurse Pain Score: 4 (NPRS)

## 2019-10-08 NOTE — OR NURSING
2030- Patient arrived from OR. Respirations spontaneous and unlabored via oral airway. VSS, see flowsheets. Abdomen soft. Lap sites x3 to abdomen. Scant oozing from umbilicus lap site.   2050- Patient reporting increase in pain. Medicated per MAR. Denies any nausea.   2104- Patient sleeping with audible snoring.   2112- Patient reporting tolerable pain.   2117- Meets criteria to transfer back to floor.

## 2019-10-08 NOTE — PROGRESS NOTES
Pt's BP in the 80s, asymptomatic. Dr Torres notified. Continue monitoring Pt and notify him if Pt starts being symptomatic or id MAP below 60.

## 2019-10-08 NOTE — ASSESSMENT & PLAN NOTE
Possibly related to sepsis versus pain medication  Hydrate aggressively, continue to monitor  Not stable for discharge at this time

## 2019-10-08 NOTE — PROGRESS NOTES
Pt arrived to room from surgery. Pt awake, A&Ox4. Regular and unlabored breathing. O2 via NC at 4L. Low BP but Pt asymptomatic. Rates abd pain at 2/10. No other discomforts. Encouraged Pt to call for assistance as needed. Safety measurers in place.

## 2019-10-08 NOTE — CARE PLAN
Problem: Safety  Goal: Will remain free from injury  Outcome: PROGRESSING AS EXPECTED    Pt ambulates independently in room, gait is steady. Family member is at bedside. Call light and belongings are within reach. Hourly rounds in place.      Problem: Infection  Goal: Will remain free from infection  Outcome: PROGRESSING AS EXPECTED    Pt is on IV abt for appendicitis, no adverse reaction noted. Pt is afebrile and VSS. Pt awaiting for lap jeremias at 2045.

## 2019-10-08 NOTE — PROGRESS NOTES
Hospital Medicine Daily Progress Note    Date of Service  10/8/2019    Chief Complaint  Abdominal pain    Hospital Course    *38-year-old diabetic female on insulin presented with 1 day history of abdominal pain radiology revealed acute appendicitis, there was no evidence of rupture at the time of surgery.  She underwent laparoscopic appendectomy by Dr. Frazier on 10/7 in the evening. *      Interval Problem Update  She was hypotensive overnight into the 80s, now in the 90s-states her normal blood pressure is 100-120.  Discussed laboratory studies and evidence of ongoing infection/sepsis, plan of care.  Anticipate patient will be stable for discharge tomorrow.  She has not yet had bowel movement but has had minimal gas    Consultants/Specialty  Lionel Frazier MD-surgery    Code Status  Full    Disposition  Home    Review of Systems  Review of Systems   Constitutional: Negative for malaise/fatigue and weight loss.   HENT: Negative for congestion and sore throat.    Respiratory: Positive for shortness of breath (Mild) and wheezing.    Cardiovascular: Negative for chest pain and palpitations.   Gastrointestinal: Positive for abdominal pain (Postoperative). Negative for constipation, diarrhea, nausea and vomiting.        No BM   Genitourinary: Negative for dysuria.   Musculoskeletal: Negative for joint pain.   Skin: Negative for itching.   Neurological: Negative for dizziness and headaches.   Psychiatric/Behavioral: The patient is not nervous/anxious.         Physical Exam  Temp:  [36.7 °C (98 °F)-38 °C (100.4 °F)] 36.9 °C (98.5 °F)  Pulse:  [] 82  Resp:  [16-22] 18  BP: ()/(44-94) 107/67  SpO2:  [83 %-100 %] 91 %    Physical Exam   Constitutional: She is oriented to person, place, and time. She appears well-developed and well-nourished. No distress.   HENT:   Head: Normocephalic and atraumatic.   Mouth/Throat: Oropharynx is clear and moist.   Eyes: Pupils are equal, round, and reactive to light. Conjunctivae  and EOM are normal. Right eye exhibits no discharge. Left eye exhibits no discharge. No scleral icterus.   Neck: Neck supple.   Cardiovascular: Normal rate and regular rhythm.   Pulmonary/Chest: Effort normal.   Mildly diminished with faint bibasilar wheezes   Abdominal: Soft. She exhibits no distension and no mass. There is tenderness (Normal postoperative). There is no rebound and no guarding.   Somewhat diminished bowel sounds  Incisions CDI  Bruises noted likely from prior insulin injections   Musculoskeletal: She exhibits no edema or tenderness.   Neurological: She is alert and oriented to person, place, and time. No cranial nerve deficit.   Skin: Skin is warm and dry. She is not diaphoretic.   Psychiatric: She has a normal mood and affect.   Nursing note and vitals reviewed.      Fluids    Intake/Output Summary (Last 24 hours) at 10/8/2019 1548  Last data filed at 10/8/2019 0802  Gross per 24 hour   Intake 2420 ml   Output 1220 ml   Net 1200 ml       Laboratory  Recent Labs     10/07/19  1022 10/08/19  0147   WBC 19.0* 15.1*   RBC 5.01 3.99*   HEMOGLOBIN 14.1 11.1*   HEMATOCRIT 43.0 34.1*   MCV 85.8 85.5   MCH 28.1 27.8   MCHC 32.8* 32.6*   RDW 47.5 47.0   PLATELETCT 362 258   MPV 10.4 10.4     Recent Labs     10/07/19  1022 10/08/19  0147   SODIUM 142 140   POTASSIUM 4.9 3.3*   CHLORIDE 100 99   CO2 22 25   GLUCOSE 172* 102*   BUN 10 6*   CREATININE 0.49* 0.45*   CALCIUM 9.8 8.2*     Recent Labs     10/07/19  1022   INR 0.96         Recent Labs     10/08/19  0147   TRIGLYCERIDE 126   HDL 61   LDL 45       Imaging  CT-ABDOMEN-PELVIS WITH   Final Result      1.  Acute appendicitis in the right lower quadrant. No evidence of perforation.      2.  No evidence of bowel obstruction.      3.  Normal appearance of the pancreas.      Findings were discussed with LYNETTE UMELLER M.D. on 10/7/2019 12:36 PM.           Assessment/Plan  * Appendicitis  Assessment & Plan  Acute without perforation status post laparoscopic  appendectomy 10/7 p.m.    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)- (present on admission)  Assessment & Plan  Stage 3. Last chemo was in september. Continue to monitor. Immunocompromised and at risk for complications from this. Aggressive infection control.     Hypotension  Assessment & Plan  Possibly related to sepsis versus pain medication  Hydrate aggressively, continue to monitor  Not stable for discharge at this time    Hypokalemia  Assessment & Plan  IV fluids changed  Recheck in the morning with magnesium    DM2 (diabetes mellitus, type 2) ([facility])  Assessment & Plan  A1c 6.8  Continue Lantus plus sliding scale  Continue Januvia    Bipolar 1 disorder ([facility])  Assessment & Plan  No flare.     Lactic acidosis- (present on admission)  Assessment & Plan  Improving with IV fluids    Sepsis ([facility])- (present on admission)  Assessment & Plan  This is Sepsis Present on admission  SIRS criteria identified on my evaluation include: Tachycardia, with heart rate greater than 90 BPM and Leukocyosis, with WBC greater than 12,000  Source is appendicitis  Sepsis protocol initiated  Fluid resuscitation ordered per protocol  IV antibiotics as appropriate for source of sepsis  While organ dysfunction may be noted elsewhere in this problem list or in the chart, degree of organ dysfunction does not meet CMS criteria for severe sepsis  Blood pressures remain a little soft  White count 15 K  Continue IV fluids  Continue antibiotics  Recheck a.m. labs        Asthma- (present on admission)  Assessment & Plan  Minimal bibasilar wheeze  Continue bronchodilators as needed       VTE prophylaxis: SCDs

## 2019-10-08 NOTE — ANESTHESIA QCDR
2019 [facility] Clinical Data Registry (for Quality Improvement)     Postoperative nausea/vomiting risk protocol (Adult = 18 yrs and Pediatric 3-17 yrs)- (430 and 463)  General inhalation anesthetic (NOT TIVA) with PONV risk factors: Yes  Provision of anti-emetic therapy with at least 2 different classes of agents: Yes   Patient DID NOT receive anti-emetic therapy and reason is documented in Medical Record:  N/A    Multimodal Pain Management- (AQI59)  Patient undergoing Elective Surgery (i.e. Outpatient, or ASC, or Prescheduled Surgery prior to Hospital Admission): No  Use of Multimodal Pain Management, two or more drugs and/or interventions, NOT including systemic opioids: N/A  Exception: Documented allergy to multiple classes of analgesics: N/A    PACU assessment of acute postoperative pain prior to Anesthesia Care End- Applies to Patients Age = 18- (ABG7)  Initial PACU pain score is which of the following: Pain not assessed  Patient unable to report pain score: Yes (Patient Unable to Report)    Post-anesthetic transfer of care checklist/protocol to PACU/ICU- (426 and 427)  Upon conclusion of case, patient transferred to which of the following locations: PACU/Non-ICU  Use of transfer checklist/protocol: Yes  Exclusion: Service Performed in Patient Hospital Room (and thus did not require transfer): N/A    PACU Reintubation- (AQI31)  General anesthesia requiring endotracheal intubation (ETT) along with subsequent extubation in OR or PACU: Yes  Required reintubation in the PACU: No   Extubation was a planned trial documented in the medical record prior to removal of the original airway device:  N/A    Unplanned admission to ICU related to anesthesia service up through end of PACU care- (MD51)  Unplanned admission to ICU (not initially anticipated at anesthesia start time): No

## 2019-10-08 NOTE — OP REPORT
Post OP Note    PreOp Diagnosis: Acute appendicitis    PostOp Diagnosis: Acute appendicitis without rupture    Procedure(s):  APPENDECTOMY, LAPAROSCOPIC - Wound Class: Clean Contaminated    Surgeon(s):  Lionel Frazier M.D.    Anesthesiologist/Type of Anesthesia:  Anesthesiologist: Boni Walker M.D./General    Surgical Staff:  Circulator: Jasmin Paez R.N.  Scrub Person: Micah Melchor R.N.    Specimens removed if any:  ID Type Source Tests Collected by Time Destination   A : appendix Tissue Appendix PATHOLOGY SPECIMEN Lionel Frazier M.D. 10/7/2019  8:12 PM        Estimated Blood Loss: 10 cc    Findings: Dilated and inflamed appendix without rupture    Complications: No complications were noted    Indication: Patient is a 38-year-old female presented with signs symptoms and CT scan findings consistent with acute appendicitis.  The patient was counseled extensively as to the risk versus the benefits of surgery and agreed to proceed fully informed.    Description of the procedure:    The patient was prepped and draped in the standard sterile surgical fashion after induction of general anesthesia.  Appropriate timeout had been performed and antibiotics delivered.  A periumbilical verres insertion was performed and the abdomen was insufflated to 15 mmHg CO2.  A 5 mm Visiport was applied.  A suprapubic 5 mm trocar and a subxiphoid 12 mm trocar placed under direct visualization.    The appendix was located in its right lower quadrant position.  It was dilated and inflamed but not perforated.  A harmonic scalpel was used to carefully take the mesoappendix down to the base of the appendix.  A 45 mm blue load stapler was used to transect the appendix at its base.  An Endo Catch was used to retrieve the appendix via the subxiphoid incision.    The right lower quadrant was irrigated until clear.  The staple line was intact.  Hemostasis was meticulously achieved.  An Endo Close device was used to close the  fascia of the subxiphoid trocar.  Monocryl was used for skin edges.  Dermabond was applied as a dressing.  The patient was extubated to recovery in satisfactory condition.          10/7/2019 8:21 PM Lionel Frazier M.D.

## 2019-10-08 NOTE — CARE PLAN
Problem: Infection  Goal: Will remain free from infection  10/8/2019 0807 by Samantha Araya RHONORIO.  Outcome: PROGRESSING AS EXPECTED    Pt is IV ABT, no adverse reaction noted. Pt is afebrile and VSS. Pt has s/p lap sites to abdomen x3 and dermabond with scant serosang, no SOI.      Problem: Bowel/Gastric:  Goal: Normal bowel function is maintained or improved  10/8/2019 0807 by Samantha Araya, R.N.  Outcome: PROGRESSING AS EXPECTED    Abdomen with hypoactive bowel sounds and soft to palpation. Pt states not passing gas at this time. Encouraged ambulation after breakfast this am.

## 2019-10-08 NOTE — ANESTHESIA PROCEDURE NOTES
Airway  Date/Time: 10/7/2019 7:51 PM  Performed by: Boni Walker M.D.  Authorized by: Boni Walker M.D.     Location:  OR  Urgency:  Elective  Difficult Airway: No    Indications for Airway Management:  Anesthesia  Spontaneous Ventilation: absent    Sedation Level:  Deep  Preoxygenated: Yes    Patient Position:  Sniffing  Mask Difficulty Assessment:  0 - not attempted  Final Airway Type:  Endotracheal airway  Final Endotracheal Airway:  ETT  Cuffed: Yes    Technique Used for Successful ETT Placement:  Direct laryngoscopy  Devices/Methods Used in Placement:  Intubating stylet  Insertion Site:  Oral  Blade Type:  Elliott  Laryngoscope Blade/Videolaryngoscope Blade Size:  3  ETT Size (mm):  2.0  Measured from:  Lips  ETT to Lips (cm):  22  Placement Verified by: capnometry    Cormack-Lehane Classification:  Grade IIa - partial view of glottis  Number of Attempts at Approach:  1  Ventilation Between Attempts:  None  Number of Other Approaches Attempted:  0

## 2019-10-08 NOTE — CONSULTS
General Surgery Consult    CHIEF COMPLAINT: Abdominal pain.     HISTORY OF PRESENT ILLNESS: The patient is a 38 y.o. female, who presents with abdominal pain.  She describes diffuse abdominal pain that migrated to the right lower quadrant. It woke her from sleep this morning.  It became so severe that she presented to the emergency department.  Here she underwent a work-up to include a CT scan consistent with acute appendicitis.  Her medical history is complicated by melanoma for which she recently received treatment.     PAST MEDICAL HISTORY:  has a past medical history of Asthma, Bipolar 1 disorder (HCC), Cancer (HCC) (2016), Depression, DM mellitus, gestational, Hyperlipidemia, Hypertension, Pap smear, and PCOS (polycystic ovarian syndrome).     PAST SURGICAL HISTORY:  has a past surgical history that includes myringotomy; adenoidectomy; other (2016); wide excision (Left, 5/9/2017); node biopsy sentinel (Left, 5/9/2017); and axillary node dissection (Left, 6/23/2017).     ALLERGIES:   Allergies   Allergen Reactions   • Clavulanic Acid    • Augmentin Vomiting        CURRENT MEDICATIONS:   Home Medications     Reviewed by Samia Vázquez (Pharmacy Tech) on 10/07/19 at 1203  Med List Status: Complete   Medication Last Dose Status   albuterol (PROVENTIL HFA) 108 (90 BASE) MCG/ACT Aero Soln inhalation aerosol 10/6/2019 Active   atorvastatin (LIPITOR) 20 MG Tab 10/6/2019 Active   celecoxib (CELEBREX) 200 MG Cap 10/7/2019 Active   clonazePAM (KLONOPIN) 0.5 MG Tab 10/7/2019 Active   cyclobenzaprine (FLEXERIL) 10 MG Tab 10/7/2019 Active   gabapentin (NEURONTIN) 300 MG Cap 10/7/2019 Active   insulin glargine (LANTUS SOLOSTAR) 100 UNIT/ML Solution Pen-injector injection 10/6/2019 Active   Ipilimumab (YERVOY IV) 9/11/2019 Active   lisinopril (PRINIVIL) 10 MG Tab 10/7/2019 Active   metFORMIN ER 1000 MG TABLET SR 24 HR 10/7/2019 Active   OXcarbazepine (TRILEPTAL) 150 MG Tab 10/7/2019 Active   paroxetine (PAXIL) 40 MG  "tablet 10/7/2019 Active   quetiapine (SEROQUEL) 200 MG Tab 10/6/2019 Active   SITagliptin (JANUVIA) 100 MG Tab 10/7/2019 Active                FAMILY HISTORY:   Family History   Problem Relation Age of Onset   • Psychiatric Illness Mother         depression   • Diabetes Mother    • Hyperlipidemia Mother    • Thyroid Mother         s/p radioactive iodine treatment on replacement   • Hypertension Father    • Diabetes Father         SOCIAL HISTORY:   Social History     Tobacco Use   • Smoking status: Current Every Day Smoker     Packs/day: 0.50     Years: 5.00     Pack years: 2.50     Types: Cigarettes   • Smokeless tobacco: Never Used   Substance and Sexual Activity   • Alcohol use: Not Currently   • Drug use: Yes     Types: Inhaled     Comment: occasional smoked marijuanna    • Sexual activity: Yes       REVIEW OF SYSTEMS: Comprehensive review of systems was negative aside from abdominal pain described in the history and physical    PHYSICAL EXAMINATION:     GENERAL: The patient is in no acute distress.  She is alert oriented and appropriate.   VITAL SIGNS: /94   Pulse 96   Temp 36.9 °C (98.5 °F) (Oral)   Resp 18   Ht 1.626 m (5' 4\")   Wt 75 kg (165 lb 5.5 oz)   SpO2 99%   HEAD AND NECK: Demonstrates symmetric, reactive pupils. Extraocular muscles   are intact. Nares and oropharynx are clear.   NECK: Supple. No adenopathy.  CHEST:No respiratory distress.    CARDIOVASCULAR: Regular rate. The extremities are well perfused.   ABDOMEN: Tender palpation of the right lower quadrant with guarding.   EXTREMITIES: Examination of the upper and lower extremities demonstrates no cyanosis edema or clubbing.  NEUROLOGIC: Alert & oriented x 3, Normal motor function, Normal sensory function, No focal deficits noted.    LABORATORY VALUES:   Recent Labs     10/07/19  1022   WBC 19.0*   RBC 5.01   HEMOGLOBIN 14.1   HEMATOCRIT 43.0   MCV 85.8   MCH 28.1   MCHC 32.8*   RDW 47.5   PLATELETCT 362   MPV 10.4     Recent Labs     " 10/07/19  1022   SODIUM 142   POTASSIUM 4.9   CHLORIDE 100   CO2 22   GLUCOSE 172*   BUN 10   CREATININE 0.49*   CALCIUM 9.8     Recent Labs     10/07/19  1022   ASTSGOT 14   ALTSGPT 15   TBILIRUBIN 0.5   ALKPHOSPHAT 100*   GLOBULIN 2.9   INR 0.96     Recent Labs     10/07/19  1022   INR 0.96        IMAGING:   CT-ABDOMEN-PELVIS WITH   Final Result      1.  Acute appendicitis in the right lower quadrant. No evidence of perforation.      2.  No evidence of bowel obstruction.      3.  Normal appearance of the pancreas.      Findings were discussed with LYNETTE MUELLER M.D. on 10/7/2019 12:36 PM.          IMPRESSION AND PLAN:     1.  Acute appendicitis.    1.  The patient will be taken to the operating room for a laparoscopic appendectomy. The surgical conduct was explained. Potential complications including but not limited to infection, bleeding, damage to adjacent structures, anesthetic complications were discussed in detail. Questions were elicited and answered to her satisfaction. She understands the rationale for surgery and elects to proceed.  Operative consent signed.    2.  The patient has been admitted to the medicine service given her recent history of treatment for melanoma.    ___________________________________   Lionel Frazier M.D.    DD: 10/7/2019 DT: 7:27 PM

## 2019-10-08 NOTE — ANESTHESIA PREPROCEDURE EVALUATION
Relevant Problems   PULMONARY   (+) Asthma      ENDO   (+) DM2 (diabetes mellitus, type 2) ([facility])      Other   (+) Appendicitis   (+) Bipolar 1 disorder (HCC)   (+) Lactic acidosis   (+) Sepsis ([facility])       Physical Exam    Airway   Mallampati: II  TM distance: >3 FB  Neck ROM: full       Cardiovascular - normal exam  Rhythm: regular  Rate: normal  (-) murmur     Dental - normal exam         Pulmonary - normal exam  Breath sounds clear to auscultation     Abdominal    Neurological - normal exam                 Anesthesia Plan    ASA 3 (acute appendicitis. IDDM. tobacco dependence. dysplipidemia. moderate asthma.)- EMERGENT   ASA physical status 3 criteria: other (comment)ASA physical status emergent criteria: other (comment) and acute peritonitis    Plan - general       Airway plan will be ETT      Plan Factors:   Patient was previously instructed to abstain from smoking on day of procedure.  Patient smoked on day of procedure.          Pertinent diagnostic labs and testing reviewed    Informed Consent:    Anesthetic plan and risks discussed with patient.    Use of blood products discussed with: patient whom consented to blood products.

## 2019-10-08 NOTE — CARE PLAN
Problem: Infection  Goal: Will remain free from infection  Outcome: PROGRESSING AS EXPECTED  Assess for signs and symptoms of infection. Monitor lab values that indicate possible infection. Implement standard precautions. Perform hand washing.  Administer ABX as prescribed       Problem: Pain Management  Goal: Pain level will decrease to patient's comfort goal  Outcome: PROGRESSING AS EXPECTED  Perform a comprehensive assessment of pain. Observe for non-verbal clues of discomfort. Teach Pt pain scale. Encouraged Pt to report any pain or discomfort. Apply comfort measures. Administer pain medications as appropriate. Instruct Pt to notify RN if pain relief is not achieved.

## 2019-10-08 NOTE — ANESTHESIA TIME REPORT
Anesthesia Start and Stop Event Times     Date Time Event    10/7/2019 1943 Ready for Procedure     1947 Anesthesia Start     2032 Anesthesia Stop        Responsible Staff  10/07/19    Name Role Begin End    Boni Walker M.D. Anesth 1947 2032        Preop Diagnosis (Free Text):  Pre-op Diagnosis     acute appendicitis        Preop Diagnosis (Codes):    Post op Diagnosis  Acute appendicitis      Premium Reason  A. 3PM - 7AM    Comments:

## 2019-10-09 VITALS
DIASTOLIC BLOOD PRESSURE: 92 MMHG | BODY MASS INDEX: 28.23 KG/M2 | OXYGEN SATURATION: 94 % | WEIGHT: 165.34 LBS | TEMPERATURE: 98.7 F | SYSTOLIC BLOOD PRESSURE: 142 MMHG | HEART RATE: 83 BPM | HEIGHT: 64 IN | RESPIRATION RATE: 18 BRPM

## 2019-10-09 LAB
ANION GAP SERPL CALC-SCNC: 12 MMOL/L (ref 0–11.9)
BASOPHILS # BLD AUTO: 0.9 % (ref 0–1.8)
BASOPHILS # BLD: 0.06 K/UL (ref 0–0.12)
BUN SERPL-MCNC: 5 MG/DL (ref 8–22)
CALCIUM SERPL-MCNC: 8.2 MG/DL (ref 8.4–10.2)
CHLORIDE SERPL-SCNC: 108 MMOL/L (ref 96–112)
CO2 SERPL-SCNC: 24 MMOL/L (ref 20–33)
CREAT SERPL-MCNC: 0.45 MG/DL (ref 0.5–1.4)
EOSINOPHIL # BLD AUTO: 0.19 K/UL (ref 0–0.51)
EOSINOPHIL NFR BLD: 2.7 % (ref 0–6.9)
ERYTHROCYTE [DISTWIDTH] IN BLOOD BY AUTOMATED COUNT: 50.6 FL (ref 35.9–50)
GLUCOSE BLD-MCNC: 97 MG/DL (ref 65–99)
GLUCOSE SERPL-MCNC: 104 MG/DL (ref 65–99)
HCT VFR BLD AUTO: 32.7 % (ref 37–47)
HGB BLD-MCNC: 10.2 G/DL (ref 12–16)
IMM GRANULOCYTES # BLD AUTO: 0.02 K/UL (ref 0–0.11)
IMM GRANULOCYTES NFR BLD AUTO: 0.3 % (ref 0–0.9)
LYMPHOCYTES # BLD AUTO: 1.78 K/UL (ref 1–4.8)
LYMPHOCYTES NFR BLD: 25.4 % (ref 22–41)
MAGNESIUM SERPL-MCNC: 1.8 MG/DL (ref 1.5–2.5)
MCH RBC QN AUTO: 27.7 PG (ref 27–33)
MCHC RBC AUTO-ENTMCNC: 31.2 G/DL (ref 33.6–35)
MCV RBC AUTO: 88.9 FL (ref 81.4–97.8)
MONOCYTES # BLD AUTO: 0.79 K/UL (ref 0–0.85)
MONOCYTES NFR BLD AUTO: 11.3 % (ref 0–13.4)
NEUTROPHILS # BLD AUTO: 4.17 K/UL (ref 2–7.15)
NEUTROPHILS NFR BLD: 59.4 % (ref 44–72)
NRBC # BLD AUTO: 0 K/UL
NRBC BLD-RTO: 0 /100 WBC
PLATELET # BLD AUTO: 242 K/UL (ref 164–446)
PMV BLD AUTO: 10.7 FL (ref 9–12.9)
POTASSIUM SERPL-SCNC: 4 MMOL/L (ref 3.6–5.5)
RBC # BLD AUTO: 3.68 M/UL (ref 4.2–5.4)
SODIUM SERPL-SCNC: 144 MMOL/L (ref 135–145)
WBC # BLD AUTO: 7 K/UL (ref 4.8–10.8)

## 2019-10-09 PROCEDURE — 700102 HCHG RX REV CODE 250 W/ 637 OVERRIDE(OP): Performed by: HOSPITALIST

## 2019-10-09 PROCEDURE — A9270 NON-COVERED ITEM OR SERVICE: HCPCS | Performed by: HOSPITALIST

## 2019-10-09 PROCEDURE — 700101 HCHG RX REV CODE 250: Performed by: INTERNAL MEDICINE

## 2019-10-09 PROCEDURE — 83735 ASSAY OF MAGNESIUM: CPT

## 2019-10-09 PROCEDURE — 36415 COLL VENOUS BLD VENIPUNCTURE: CPT

## 2019-10-09 PROCEDURE — 85025 COMPLETE CBC W/AUTO DIFF WBC: CPT

## 2019-10-09 PROCEDURE — 99239 HOSP IP/OBS DSCHRG MGMT >30: CPT | Performed by: INTERNAL MEDICINE

## 2019-10-09 PROCEDURE — 82962 GLUCOSE BLOOD TEST: CPT

## 2019-10-09 PROCEDURE — 80048 BASIC METABOLIC PNL TOTAL CA: CPT

## 2019-10-09 RX ORDER — METRONIDAZOLE 500 MG/1
500 TABLET ORAL EVERY 8 HOURS
Qty: 15 TAB | Refills: 0 | Status: SHIPPED | OUTPATIENT
Start: 2019-10-09 | End: 2021-01-28

## 2019-10-09 RX ORDER — CEFDINIR 300 MG/1
300 CAPSULE ORAL 2 TIMES DAILY
Qty: 10 CAP | Refills: 0 | Status: SHIPPED | OUTPATIENT
Start: 2019-10-09 | End: 2021-01-28

## 2019-10-09 RX ADMIN — OXYCODONE HYDROCHLORIDE 5 MG: 5 TABLET ORAL at 03:21

## 2019-10-09 RX ADMIN — METRONIDAZOLE 500 MG: 500 TABLET ORAL at 05:17

## 2019-10-09 RX ADMIN — SENNOSIDES, DOCUSATE SODIUM 2 TABLET: 50; 8.6 TABLET, FILM COATED ORAL at 05:18

## 2019-10-09 RX ADMIN — GABAPENTIN 300 MG: 300 CAPSULE ORAL at 05:16

## 2019-10-09 RX ADMIN — SITAGLIPTIN 100 MG: 50 TABLET, FILM COATED ORAL at 05:17

## 2019-10-09 RX ADMIN — POTASSIUM CHLORIDE AND SODIUM CHLORIDE: 900; 150 INJECTION, SOLUTION INTRAVENOUS at 03:22

## 2019-10-09 RX ADMIN — CLONAZEPAM 0.25 MG: 0.5 TABLET ORAL at 09:07

## 2019-10-09 RX ADMIN — PAROXETINE HYDROCHLORIDE 40 MG: 20 TABLET, FILM COATED ORAL at 05:18

## 2019-10-09 RX ADMIN — OXYCODONE HYDROCHLORIDE 5 MG: 5 TABLET ORAL at 09:04

## 2019-10-09 RX ADMIN — OXCARBAZEPINE 150 MG: 300 TABLET, FILM COATED ORAL at 05:16

## 2019-10-09 NOTE — PROGRESS NOTES
Assumed care of patient at 1900.  Patient is alert and oriented, resting in bed with ice pack on abdomen.  Oxycodone 5mg given for pain at 5/10 in abdomen.  Patient instructed on I&O and  labeled and in the bathroom.  IV fluids infusing at 175ml/hr through PIV.  3 lap sites to abdomen with dermabond and ERIK.  SCDs to bilat lower legs.  Hourly rounding continues.

## 2019-10-09 NOTE — PROGRESS NOTES
Bedside report received from night RN. Assumed care of patient. Daily plan of care discussed. Pt awake and alert, reports mild pain and requesting prn pain medication. Will medicate per MAR. Hourly rounding in place.

## 2019-10-09 NOTE — CARE PLAN
Problem: Communication  Goal: The ability to communicate needs accurately and effectively will improve  Outcome: PROGRESSING AS EXPECTED  Intervention: [place] patient and significant other/support system to call light to alert staff of needs  Flowsheets (Taken 10/8/2019 1516)  Oriented to:: Call Light & Bedside Controls     Problem: Safety  Goal: Will remain free from injury  Outcome: PROGRESSING AS EXPECTED  Goal: Will remain free from falls  Outcome: PROGRESSING AS EXPECTED     Problem: Venous Thromboembolism (VTW)/Deep Vein Thrombosis (DVT) Prevention:  Goal: Patient will participate in Venous Thrombosis (VTE)/Deep Vein Thrombosis (DVT)Prevention Measures  Outcome: PROGRESSING AS EXPECTED

## 2019-10-09 NOTE — DISCHARGE INSTRUCTIONS
Discharge Instructions    Discharged to home by car with relative. Discharged via walking, hospital escort: Yes.  Special equipment needed: Not Applicable    Be sure to schedule a follow-up appointment with your primary care doctor or any specialists as instructed.     Discharge Plan:   Smoking Cessation Offered: Patient Counseled  Influenza Vaccine Indication: Not indicated: Previously immunized this influenza season and > 8 years of age    I understand that a diet low in cholesterol, fat, and sodium is recommended for good health. Unless I have been given specific instructions below for another diet, I accept this instruction as my diet prescription.   Other diet: n/a    Special Instructions: Discharge Instructions per Rea Herrera M.D.    Follow up with surgery in 2 weeks    DIET: diabetic    ACTIVITY: no lifting, pushing or pulling over 15lb    DIAGNOSIS: appendicitis and sepsis    Return to ER if fever, worsening pain    · Is patient discharged on Warfarin / Coumadin?   No     Depression / Suicide Risk    As you are discharged from this [facility] facility, it is important to learn how to keep safe from harming yourself.    Recognize the warning signs:  · Abrupt changes in personality, positive or negative- including increase in energy   · Giving away possessions  · Change in eating patterns- significant weight changes-  positive or negative  · Change in sleeping patterns- unable to sleep or sleeping all the time   · Unwillingness or inability to communicate  · Depression  · Unusual sadness, discouragement and loneliness  · Talk of wanting to die  · Neglect of personal appearance   · Rebelliousness- reckless behavior  · Withdrawal from people/activities they love  · Confusion- inability to concentrate     If you or a loved one observes any of these behaviors or has concerns about self-harm, here's what you can do:  · Talk about it- your feelings and reasons for harming yourself  · Remove any means  that you might use to hurt yourself (examples: pills, rope, extension cords, firearm)  · Get professional help from the community (Mental Health, Substance Abuse, psychological counseling)  · Do not be alone:Call your Safe Contact- someone whom you trust who will be there for you.  · Call your local CRISIS HOTLINE 244-6937 or 521-402-7936  · Call your local Children's Mobile Crisis Response Team [place] (924) 976-8098 or www.Webbynode  · Call the toll free National Suicide Prevention Hotlines   · National Suicide Prevention Lifeline 487-110-TXTV (2931)  · National Hope Line Network 800-SUICIDE (121-6732)

## 2019-10-09 NOTE — PROGRESS NOTES
Pt discharged to home via personal vehicle with family member. Discharge paperwork reviewed and signed. VSS. Pt escorted off unit with hospital staff.

## 2019-10-10 NOTE — DISCHARGE SUMMARY
"Discharge Summary    CHIEF COMPLAINT ON ADMISSION  Chief Complaint   Patient presents with   • Abdominal Pain     Severe abdominal pain \" it feels like my intestines are twisting\" beginning around 0300 last evening.    • N/V     since last night    • Diarrhea     yesterday        Reason for Admission  Abdominal Pain, Nausea     Admission Date  10/7/2019    CODE STATUS  Prior    HPI & HOSPITAL COURSE  *38-year-old diabetic female on insulin presented with 1 day history of abdominal pain radiology revealed acute appendicitis, there was no evidence of rupture at the time of surgery.  She underwent laparoscopic appendectomy by Dr. Frazier on 10/7 in the evening. *      The following AM she was feeling better, however she was hypotensive 80's-90s overnight and WBC remained high at 15K, IVF and IV atb were continued, by the next AM she was feeling better, was normotensive, WBC were 7K and she was eating and stooling.    We discussed activity restrictions as well as antibiotics and follow up.    Therefore, she is discharged in good and stable condition to home with close outpatient follow-up.    The patient met 2-midnight criteria for an inpatient stay at the time of discharge.    Discharge Date  10/9/2019    FOLLOW UP ITEMS POST DISCHARGE  Surgery    DISCHARGE DIAGNOSES  Principal Problem:    Appendicitis POA: Unknown  Active Problems:    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) POA: Yes    Asthma POA: Yes    Sepsis (HCC) POA: Yes    Lactic acidosis POA: Yes    Bipolar 1 disorder (HCC) POA: Unknown    DM2 (diabetes mellitus, type 2) (HCC) POA: Unknown    Hypokalemia POA: Unknown    Hypotension POA: Unknown  Resolved Problems:    * No resolved hospital problems. *      FOLLOW UP  No future appointments.  Adriana Levine M.D.  [address] [address]  [phone]            MEDICATIONS ON DISCHARGE     Medication List      START taking these medications      " Instructions   cefdinir 300 MG Caps  Commonly known as:  OMNICEF   Take 1 Cap by mouth 2 times a day.  Dose:  300 mg     metroNIDAZOLE 500 MG Tabs  Commonly known as:  FLAGYL   Take 1 Tab by mouth every 8 hours.  Dose:  500 mg        CONTINUE taking these medications      Instructions   albuterol 108 (90 Base) MCG/ACT Aers inhalation aerosol   Inhale 2 Puffs by mouth every 6 hours as needed.  Dose:  2 Puff     atorvastatin 20 MG Tabs  Commonly known as:  LIPITOR   Take 20 mg by mouth every evening.  Dose:  20 mg     celecoxib 200 MG Caps  Commonly known as:  CELEBREX   Take 200 mg by mouth every morning.  Dose:  200 mg     clonazePAM 0.5 MG Tabs  Commonly known as:  KLONOPIN   Take 0.25 mg by mouth 2 times a day as needed (ANXIETY).  Dose:  0.25 mg     cyclobenzaprine 10 MG Tabs  Commonly known as:  FLEXERIL   Take 10 mg by mouth 1 time daily as needed for Muscle Spasms.  Dose:  10 mg     gabapentin 300 MG Caps  Commonly known as:  NEURONTIN   Take 300 mg by mouth 3 times a day.  Dose:  300 mg     LANTUS SOLOSTAR 100 UNIT/ML Sopn injection  Generic drug:  insulin glargine   Inject 40 Units as instructed every evening.  Dose:  40 Units     lisinopril 10 MG Tabs  Commonly known as:  PRINIVIL   Take 10 mg by mouth every day.  Dose:  10 mg     metFORMIN ER 1000 MG Tb24   Take 1,000 mg by mouth 2 Times a Day. 1000 mg Twice daily  Dose:  1,000 mg     paroxetine 40 MG tablet  Commonly known as:  PAXIL   Take 1 Tab by mouth every day. Take with food  Dose:  40 mg     SEROQUEL 200 MG Tabs  Generic drug:  QUEtiapine   Take 200 mg by mouth every evening.  Dose:  200 mg     SITagliptin 100 MG Tabs  Commonly known as:  JANUVIA   Take 100 mg by mouth every morning.  Dose:  100 mg     TRILEPTAL 150 MG Tabs  Generic drug:  OXcarbazepine   Take 150 mg by mouth 2 times a day.  Dose:  150 mg     YERVOY IV   1 Dose Pack by Intravenous route every 90 days.  Dose:  1 Dose Pack            Allergies  Allergies   Allergen Reactions   •  Clavulanic Acid    • Augmentin Vomiting       DIET  No orders of the defined types were placed in this encounter.      ACTIVITY  Light duty.      CONSULTATIONS  Lionel Frazier MD -Surgery    PROCEDURES  Lionel Frazier M.D.   Physician   Surgery General   OP Report   Signed   Date of Service:  10/7/2019  8:21 PM                    []Hide copied text    []Hover for details   Post OP Note     PreOp Diagnosis: Acute appendicitis     PostOp Diagnosis: Acute appendicitis without rupture     Procedure(s):  APPENDECTOMY, LAPAROSCOPIC - Wound Class: Clean Contaminated     Surgeon(s):  Lionel Frazier M.D.     Anesthesiologist/Type of Anesthesia:  Anesthesiologist: Boni Walker M.D./General     Surgical Staff:  Circulator: Jasmin Paez R.N.  Scrub Person: Micah Melchor R.N.     Specimens removed if any:  ID Type Source Tests Collected by Time Destination   A : appendix Tissue Appendix PATHOLOGY SPECIMEN Lionel Frazier M.D. 10/7/2019  8:12 PM           Estimated Blood Loss: 10 cc     Findings: Dilated and inflamed appendix without rupture     Complications: No complications were noted     Indication: Patient is a 38-year-old female presented with signs symptoms and CT scan findings consistent with acute appendicitis.  The patient was counseled extensively as to the risk versus the benefits of surgery and agreed to proceed fully informed.     Description of the procedure:     The patient was prepped and draped in the standard sterile surgical fashion after induction of general anesthesia.  Appropriate timeout had been performed and antibiotics delivered.  A periumbilical verres insertion was performed and the abdomen was insufflated to 15 mmHg CO2.  A 5 mm Visiport was applied.  A suprapubic 5 mm trocar and a subxiphoid 12 mm trocar placed under direct visualization.     The appendix was located in its right lower quadrant position.  It was dilated and inflamed but not perforated.  A harmonic scalpel was used  to carefully take the mesoappendix down to the base of the appendix.  A 45 mm blue load stapler was used to transect the appendix at its base.  An Endo Catch was used to retrieve the appendix via the subxiphoid incision.     The right lower quadrant was irrigated until clear.  The staple line was intact.  Hemostasis was meticulously achieved.  An Endo Close device was used to close the fascia of the subxiphoid trocar.  Monocryl was used for skin edges.  Dermabond was applied as a dressing.  The patient was extubated to recovery in satisfactory condition.              10/7/2019 8:21 PM Lionel Frazier M.D.         Routing History                LABORATORY  Lab Results   Component Value Date    SODIUM 144 10/09/2019    POTASSIUM 4.0 10/09/2019    CHLORIDE 108 10/09/2019    CO2 24 10/09/2019    GLUCOSE 104 (H) 10/09/2019    BUN 5 (L) 10/09/2019    CREATININE 0.45 (L) 10/09/2019        Lab Results   Component Value Date    WBC 7.0 10/09/2019    HEMOGLOBIN 10.2 (L) 10/09/2019    HEMATOCRIT 32.7 (L) 10/09/2019    PLATELETCT 242 10/09/2019        Total time of the discharge process exceeds 37 minutes.

## 2019-10-12 LAB
BACTERIA BLD CULT: NORMAL
BACTERIA BLD CULT: NORMAL
SIGNIFICANT IND 70042: NORMAL
SIGNIFICANT IND 70042: NORMAL
SITE SITE: NORMAL
SITE SITE: NORMAL
SOURCE SOURCE: NORMAL
SOURCE SOURCE: NORMAL

## 2019-10-29 ENCOUNTER — APPOINTMENT (RX ONLY)
Dept: URBAN - METROPOLITAN AREA CLINIC 20 | Facility: CLINIC | Age: 39
Setting detail: DERMATOLOGY
End: 2019-10-29

## 2019-10-29 DIAGNOSIS — L81.4 OTHER MELANIN HYPERPIGMENTATION: ICD-10-CM

## 2019-10-29 DIAGNOSIS — D18.0 HEMANGIOMA: ICD-10-CM

## 2019-10-29 DIAGNOSIS — D22 MELANOCYTIC NEVI: ICD-10-CM

## 2019-10-29 DIAGNOSIS — Z87.2 PERSONAL HISTORY OF DISEASES OF THE SKIN AND SUBCUTANEOUS TISSUE: ICD-10-CM

## 2019-10-29 DIAGNOSIS — Z85.820 PERSONAL HISTORY OF MALIGNANT MELANOMA OF SKIN: ICD-10-CM

## 2019-10-29 PROBLEM — D22.5 MELANOCYTIC NEVI OF TRUNK: Status: ACTIVE | Noted: 2019-10-29

## 2019-10-29 PROBLEM — D22.61 MELANOCYTIC NEVI OF RIGHT UPPER LIMB, INCLUDING SHOULDER: Status: ACTIVE | Noted: 2019-10-29

## 2019-10-29 PROBLEM — D18.01 HEMANGIOMA OF SKIN AND SUBCUTANEOUS TISSUE: Status: ACTIVE | Noted: 2019-10-29

## 2019-10-29 PROBLEM — D22.21 MELANOCYTIC NEVI OF RIGHT EAR AND EXTERNAL AURICULAR CANAL: Status: ACTIVE | Noted: 2019-10-29

## 2019-10-29 PROBLEM — D22.72 MELANOCYTIC NEVI OF LEFT LOWER LIMB, INCLUDING HIP: Status: ACTIVE | Noted: 2019-10-29

## 2019-10-29 PROBLEM — D22.4 MELANOCYTIC NEVI OF SCALP AND NECK: Status: ACTIVE | Noted: 2019-10-29

## 2019-10-29 PROCEDURE — ? ADDITIONAL NOTES

## 2019-10-29 PROCEDURE — ? COUNSELING

## 2019-10-29 PROCEDURE — ? DIAGNOSIS COMMENT

## 2019-10-29 PROCEDURE — ? RADIOLOGY REPORTS REVIEWED

## 2019-10-29 PROCEDURE — ? OBSERVATION AND MEASURE

## 2019-10-29 PROCEDURE — 99214 OFFICE O/P EST MOD 30 MIN: CPT

## 2019-10-29 ASSESSMENT — LOCATION SIMPLE DESCRIPTION DERM
LOCATION SIMPLE: RIGHT CHEEK
LOCATION SIMPLE: ABDOMEN
LOCATION SIMPLE: RIGHT POSTERIOR UPPER ARM
LOCATION SIMPLE: RIGHT LOWER EXTREMITY
LOCATION SIMPLE: RIGHT CALF
LOCATION SIMPLE: LEFT THIGH
LOCATION SIMPLE: LEFT LOWER EXTREMITY
LOCATION SIMPLE: RIGHT EAR
LOCATION SIMPLE: POSTERIOR SCALP
LOCATION SIMPLE: LEFT ANKLE
LOCATION SIMPLE: RIGHT HAND
LOCATION SIMPLE: LEFT HAND
LOCATION SIMPLE: BACK
LOCATION SIMPLE: LEFT UPPER BACK

## 2019-10-29 ASSESSMENT — LOCATION DETAILED DESCRIPTION DERM
LOCATION DETAILED: LEFT SUPERIOR UPPER BACK
LOCATION DETAILED: LEFT ANTERIOR PROXIMAL THIGH
LOCATION DETAILED: LEFT ANTERIOR DISTAL THIGH
LOCATION DETAILED: LEFT MID BACK
LOCATION DETAILED: LEFT ANTERIOR THIGH
LOCATION DETAILED: RIGHT DORSAL HAND
LOCATION DETAILED: RIGHT DISTAL LATERAL CALF
LOCATION DETAILED: LEFT POSTERIOR ANKLE
LOCATION DETAILED: RIGHT LATERAL ABDOMEN
LOCATION DETAILED: RIGHT ANTERIOR THIGH
LOCATION DETAILED: RIGHT SUPERIOR HELIX
LOCATION DETAILED: RIGHT CHEEK
LOCATION DETAILED: RIGHT PROXIMAL POSTERIOR UPPER ARM
LOCATION DETAILED: LEFT INFERIOR OCCIPITAL SCALP
LOCATION DETAILED: LEFT UPPER BACK
LOCATION DETAILED: LEFT RADIAL DORSAL HAND
LOCATION DETAILED: LEFT DORSAL HAND

## 2019-10-29 ASSESSMENT — LOCATION ZONE DERM
LOCATION ZONE: ARM
LOCATION ZONE: HAND
LOCATION ZONE: FACE
LOCATION ZONE: EAR
LOCATION ZONE: SCALP
LOCATION ZONE: TRUNK
LOCATION ZONE: LEG

## 2019-10-29 NOTE — PROCEDURE: COUNSELING
Detail Level: Detailed
Quality 224: Stage 0-Iic Melanoma: Overutilization Of Imaging Studies For Only Stage 0-Iic Melanoma: None of the following diagnostic imaging studies ordered: chest X-ray, CT, Ultrasound, MRI, PET, or nuclear medicine scans (ML)
When Should The Patient Follow-Up For Their Next Full-Body Skin Exam?: 3 Months
Quality 137: Melanoma: Continuity Of Care - Recall System: Patient information entered into a recall system that includes: target date for the next exam specified AND a process to follow up with patients regarding missed or unscheduled appointments
Detail Level: Zone

## 2019-10-29 NOTE — PROCEDURE: DIAGNOSIS COMMENT
Detail Level: Simple
Comment: Biopsy proven mild to moderate 10/2017; monitor for repigmentation
Comment: Biopsy proven; all sites excised

## 2019-12-07 ENCOUNTER — HOSPITAL ENCOUNTER (OUTPATIENT)
Dept: RADIOLOGY | Facility: MEDICAL CENTER | Age: 39
End: 2019-12-07
Attending: PAIN MEDICINE
Payer: COMMERCIAL

## 2019-12-07 DIAGNOSIS — M41.9 SCOLIOSIS, UNSPECIFIED SCOLIOSIS TYPE, UNSPECIFIED SPINAL REGION: ICD-10-CM

## 2019-12-07 PROCEDURE — 72110 X-RAY EXAM L-2 SPINE 4/>VWS: CPT

## 2020-01-03 ENCOUNTER — OFFICE VISIT (OUTPATIENT)
Dept: URGENT CARE | Facility: CLINIC | Age: 40
End: 2020-01-03
Payer: COMMERCIAL

## 2020-01-03 VITALS
DIASTOLIC BLOOD PRESSURE: 84 MMHG | WEIGHT: 165 LBS | SYSTOLIC BLOOD PRESSURE: 132 MMHG | HEIGHT: 64 IN | OXYGEN SATURATION: 95 % | BODY MASS INDEX: 28.17 KG/M2 | RESPIRATION RATE: 16 BRPM | TEMPERATURE: 98.3 F | HEART RATE: 98 BPM

## 2020-01-03 DIAGNOSIS — Z86.79 HISTORY OF HIGH BLOOD PRESSURE: ICD-10-CM

## 2020-01-03 DIAGNOSIS — J06.9 URI WITH COUGH AND CONGESTION: ICD-10-CM

## 2020-01-03 PROCEDURE — 99214 OFFICE O/P EST MOD 30 MIN: CPT | Performed by: PHYSICIAN ASSISTANT

## 2020-01-03 RX ORDER — AZITHROMYCIN 250 MG/1
TABLET, FILM COATED ORAL
Qty: 6 TAB | Refills: 0 | Status: SHIPPED | OUTPATIENT
Start: 2020-01-03 | End: 2021-01-28

## 2020-01-03 ASSESSMENT — ENCOUNTER SYMPTOMS
SINUS PAIN: 0
NAUSEA: 0
DIZZINESS: 0
DOUBLE VISION: 0
WHEEZING: 0
VOMITING: 0
SENSORY CHANGE: 0
COUGH: 1
EYE PAIN: 1
PALPITATIONS: 0
PHOTOPHOBIA: 1
SPUTUM PRODUCTION: 1
TINGLING: 0
MYALGIAS: 0
SPEECH CHANGE: 0
BLURRED VISION: 1
WEAKNESS: 0
HEADACHES: 1
BRUISES/BLEEDS EASILY: 0
SORE THROAT: 0
FEVER: 0
SHORTNESS OF BREATH: 0
DIAPHORESIS: 0
CHILLS: 0

## 2020-01-03 NOTE — PROGRESS NOTES
Subjective:      Jacque Mcintyre is a 39 y.o. female who presents with Blood Pressure Problem (x1 day, high blood pressure, (177/125 taken today) no hx. headache,)            HPI  1. High blood pressure  39-year-old female presents to urgent care with new problem of increased blood pressure onset yesterday.  Patient reports she took her blood pressure yesterday and measured a max of 213/168.  Patient reports associated blurred vision, photophobia and headaches with this episode yesterday.  She reports these symptoms are currently resolved.  Patient states she took her blood pressure again this morning at work and measured it at 177/125.  She denies any history of diagnosed hypertension.  She takes lisinopril 10 mg daily for renal protection secondary to type 2 diabetes.  Patient denies chest pain, shortness of breath, palpitations, numbness/weakness, difficulty with speech/ambulation, or any focal deficits.    2. URI   Patient also reports mild productive cough onset about 1 to 2 weeks ago.  She denies fevers, body aches, sore throat, or sinus pressure.  She denies sick contacts.  Minimal symptomatic relief with over-the-counter cold and cough medications.  Patient denies associated shortness of breath or wheezing.    Review of Systems   Constitutional: Negative for chills, diaphoresis, fever and malaise/fatigue.   HENT: Positive for congestion. Negative for ear pain, nosebleeds, sinus pain and sore throat.    Eyes: Positive for blurred vision, photophobia and pain. Negative for double vision.   Respiratory: Positive for cough and sputum production. Negative for shortness of breath and wheezing.    Cardiovascular: Negative for chest pain, palpitations and leg swelling.   Gastrointestinal: Negative for nausea and vomiting.   Musculoskeletal: Negative for myalgias.   Neurological: Positive for headaches. Negative for dizziness, tingling, sensory change, speech change and weakness.   Endo/Heme/Allergies: Does not  bruise/bleed easily.     Past Medical History:   Diagnosis Date   • Asthma     inhaler   • Bipolar 1 disorder (HCC)    • Cancer (HCC) 2016    melanoma   • Depression    • DM mellitus, gestational     insulin   • Hyperlipidemia    • Hypertension    • Pap smear     Dr. Baird   • PCOS (polycystic ovarian syndrome)      Current Outpatient Medications on File Prior to Visit   Medication Sig Dispense Refill   • metFORMIN ER 1000 MG TABLET SR 24 HR Take 1,000 mg by mouth 2 Times a Day. 1000 mg Twice daily     • SITagliptin (JANUVIA) 100 MG Tab Take 100 mg by mouth every morning.     • celecoxib (CELEBREX) 200 MG Cap Take 200 mg by mouth every morning.     • gabapentin (NEURONTIN) 300 MG Cap Take 300 mg by mouth 3 times a day.     • OXcarbazepine (TRILEPTAL) 150 MG Tab Take 150 mg by mouth 2 times a day.     • clonazePAM (KLONOPIN) 0.5 MG Tab Take 0.25 mg by mouth 2 times a day as needed (ANXIETY).     • cyclobenzaprine (FLEXERIL) 10 MG Tab Take 10 mg by mouth 1 time daily as needed for Muscle Spasms.     • Ipilimumab (YERVOY IV) 1 Dose Pack by Intravenous route every 90 days.     • quetiapine (SEROQUEL) 200 MG Tab Take 200 mg by mouth every evening.     • lisinopril (PRINIVIL) 10 MG Tab Take 10 mg by mouth every day.     • insulin glargine (LANTUS SOLOSTAR) 100 UNIT/ML Solution Pen-injector injection Inject 40 Units as instructed every evening.     • albuterol (PROVENTIL HFA) 108 (90 BASE) MCG/ACT Aero Soln inhalation aerosol Inhale 2 Puffs by mouth every 6 hours as needed. 1 Inhaler 5   • atorvastatin (LIPITOR) 20 MG Tab Take 20 mg by mouth every evening.     • paroxetine (PAXIL) 40 MG tablet Take 1 Tab by mouth every day. Take with food 30 Tab 5   • metroNIDAZOLE (FLAGYL) 500 MG Tab Take 1 Tab by mouth every 8 hours. 15 Tab 0   • cefdinir (OMNICEF) 300 MG Cap Take 1 Cap by mouth 2 times a day. 10 Cap 0     No current facility-administered medications on file prior to visit.      Allergies   Allergen Reactions   •  "Clavulanic Acid    • Augmentin Vomiting     Social History     Tobacco Use   • Smoking status: Current Every Day Smoker     Packs/day: 0.50     Years: 5.00     Pack years: 2.50     Types: Cigarettes   • Smokeless tobacco: Never Used   Substance Use Topics   • Alcohol use: Not Currently      Objective:     /84   Pulse 98   Temp 36.8 °C (98.3 °F) (Temporal)   Resp 16   Ht 1.626 m (5' 4\")   Wt 74.8 kg (165 lb)   SpO2 95%   BMI 28.32 kg/m²       Physical Exam  Vitals signs reviewed.   Constitutional:       General: She is not in acute distress.     Appearance: Normal appearance. She is well-developed. She is not ill-appearing.   HENT:      Head: Normocephalic and atraumatic.      Right Ear: Tympanic membrane normal.      Left Ear: Tympanic membrane normal.      Nose: Nose normal.      Mouth/Throat:      Mouth: Mucous membranes are moist.      Pharynx: Posterior oropharyngeal erythema present. No oropharyngeal exudate.   Eyes:      Extraocular Movements: Extraocular movements intact.      Conjunctiva/sclera: Conjunctivae normal.      Pupils: Pupils are equal, round, and reactive to light.   Neck:      Musculoskeletal: Normal range of motion and neck supple.   Cardiovascular:      Rate and Rhythm: Normal rate and regular rhythm.      Pulses: Normal pulses.      Heart sounds: Normal heart sounds.   Pulmonary:      Effort: Pulmonary effort is normal. No respiratory distress.      Breath sounds: Normal breath sounds.   Musculoskeletal: Normal range of motion.   Skin:     General: Skin is warm and dry.      Findings: No erythema.   Neurological:      General: No focal deficit present.      Mental Status: She is alert and oriented to person, place, and time.      Cranial Nerves: No cranial nerve deficit.      Sensory: No sensory deficit.      Motor: No weakness.      Coordination: Coordination normal.      Gait: Gait normal.   Psychiatric:         Behavior: Behavior normal.         Thought Content: Thought content " normal.         Judgment: Judgment normal.                 Assessment/Plan:     1. URI with cough and congestion  azithromycin (ZITHROMAX) 250 MG Tab   2. History of high blood pressure       1. URI   Continue with over-the-counter cold/cough medications and Tylenol/ibuprofen for symptomatic relief.    2.  High blood pressure  Recommend patient keep log of blood pressures 3 times daily to bring to follow-up appointment with primary care provider scheduled on 1/8/2020.  Patient's blood pressure is 132/84 at discharge from urgent care.  She is in no acute distress.  Patient given strict ER precautions for any increased blood pressure or development of associated neurological symptoms.  Recommend patient stop smoking.  Patient verbalized understanding of treatment plan and has no further questions regarding care.

## 2020-01-11 ENCOUNTER — HOSPITAL ENCOUNTER (OUTPATIENT)
Dept: RADIOLOGY | Facility: MEDICAL CENTER | Age: 40
End: 2020-01-11
Attending: PAIN MEDICINE
Payer: COMMERCIAL

## 2020-01-11 DIAGNOSIS — M54.50 LOW BACK PAIN, UNSPECIFIED BACK PAIN LATERALITY, UNSPECIFIED CHRONICITY, UNSPECIFIED WHETHER SCIATICA PRESENT: ICD-10-CM

## 2020-01-11 PROCEDURE — 72148 MRI LUMBAR SPINE W/O DYE: CPT

## 2020-01-29 ENCOUNTER — APPOINTMENT (RX ONLY)
Dept: URBAN - METROPOLITAN AREA CLINIC 20 | Facility: CLINIC | Age: 40
Setting detail: DERMATOLOGY
End: 2020-01-29

## 2020-01-29 DIAGNOSIS — L81.4 OTHER MELANIN HYPERPIGMENTATION: ICD-10-CM

## 2020-01-29 DIAGNOSIS — Z85.820 PERSONAL HISTORY OF MALIGNANT MELANOMA OF SKIN: ICD-10-CM

## 2020-01-29 DIAGNOSIS — Z87.2 PERSONAL HISTORY OF DISEASES OF THE SKIN AND SUBCUTANEOUS TISSUE: ICD-10-CM

## 2020-01-29 DIAGNOSIS — D18.0 HEMANGIOMA: ICD-10-CM

## 2020-01-29 DIAGNOSIS — D22 MELANOCYTIC NEVI: ICD-10-CM

## 2020-01-29 PROBLEM — D22.21 MELANOCYTIC NEVI OF RIGHT EAR AND EXTERNAL AURICULAR CANAL: Status: ACTIVE | Noted: 2020-01-29

## 2020-01-29 PROBLEM — D22.4 MELANOCYTIC NEVI OF SCALP AND NECK: Status: ACTIVE | Noted: 2020-01-29

## 2020-01-29 PROBLEM — D22.5 MELANOCYTIC NEVI OF TRUNK: Status: ACTIVE | Noted: 2020-01-29

## 2020-01-29 PROBLEM — D48.5 NEOPLASM OF UNCERTAIN BEHAVIOR OF SKIN: Status: ACTIVE | Noted: 2020-01-29

## 2020-01-29 PROBLEM — D22.72 MELANOCYTIC NEVI OF LEFT LOWER LIMB, INCLUDING HIP: Status: ACTIVE | Noted: 2020-01-29

## 2020-01-29 PROBLEM — D22.61 MELANOCYTIC NEVI OF RIGHT UPPER LIMB, INCLUDING SHOULDER: Status: ACTIVE | Noted: 2020-01-29

## 2020-01-29 PROBLEM — D18.01 HEMANGIOMA OF SKIN AND SUBCUTANEOUS TISSUE: Status: ACTIVE | Noted: 2020-01-29

## 2020-01-29 PROCEDURE — ? COUNSELING

## 2020-01-29 PROCEDURE — ? DIAGNOSIS COMMENT

## 2020-01-29 PROCEDURE — ? OBSERVATION AND MEASURE

## 2020-01-29 PROCEDURE — 11103 TANGNTL BX SKIN EA SEP/ADDL: CPT

## 2020-01-29 PROCEDURE — ? ADDITIONAL NOTES

## 2020-01-29 PROCEDURE — 99214 OFFICE O/P EST MOD 30 MIN: CPT | Mod: 25

## 2020-01-29 PROCEDURE — 11102 TANGNTL BX SKIN SINGLE LES: CPT

## 2020-01-29 PROCEDURE — ? BIOPSY BY SHAVE METHOD

## 2020-01-29 ASSESSMENT — LOCATION SIMPLE DESCRIPTION DERM
LOCATION SIMPLE: LEFT POSTERIOR THIGH
LOCATION SIMPLE: ABDOMEN
LOCATION SIMPLE: RIGHT CALF
LOCATION SIMPLE: RIGHT POSTERIOR UPPER ARM
LOCATION SIMPLE: LEFT UPPER BACK
LOCATION SIMPLE: RIGHT HAND
LOCATION SIMPLE: RIGHT EAR
LOCATION SIMPLE: LEFT HAND
LOCATION SIMPLE: LEFT ANKLE
LOCATION SIMPLE: LEFT THIGH
LOCATION SIMPLE: BACK
LOCATION SIMPLE: POSTERIOR SCALP
LOCATION SIMPLE: RIGHT CHEEK
LOCATION SIMPLE: LEFT LOWER EXTREMITY
LOCATION SIMPLE: RIGHT LOWER EXTREMITY

## 2020-01-29 ASSESSMENT — LOCATION DETAILED DESCRIPTION DERM
LOCATION DETAILED: LEFT ANTERIOR DISTAL THIGH
LOCATION DETAILED: LEFT POSTERIOR ANKLE
LOCATION DETAILED: RIGHT SUPERIOR HELIX
LOCATION DETAILED: LEFT ANTERIOR PROXIMAL THIGH
LOCATION DETAILED: RIGHT LATERAL ABDOMEN
LOCATION DETAILED: RIGHT PROXIMAL POSTERIOR UPPER ARM
LOCATION DETAILED: RIGHT CHEEK
LOCATION DETAILED: LEFT DORSAL HAND
LOCATION DETAILED: LEFT PROXIMAL POSTERIOR THIGH
LOCATION DETAILED: LEFT MID BACK
LOCATION DETAILED: LEFT SUPERIOR UPPER BACK
LOCATION DETAILED: LEFT ANTERIOR THIGH
LOCATION DETAILED: RIGHT DISTAL LATERAL CALF
LOCATION DETAILED: RIGHT DORSAL HAND
LOCATION DETAILED: RIGHT ANTERIOR THIGH
LOCATION DETAILED: RIGHT RIB CAGE
LOCATION DETAILED: LEFT RADIAL DORSAL HAND
LOCATION DETAILED: LEFT UPPER BACK
LOCATION DETAILED: LEFT INFERIOR OCCIPITAL SCALP

## 2020-01-29 ASSESSMENT — LOCATION ZONE DERM
LOCATION ZONE: SCALP
LOCATION ZONE: FACE
LOCATION ZONE: HAND
LOCATION ZONE: LEG
LOCATION ZONE: EAR
LOCATION ZONE: TRUNK
LOCATION ZONE: ARM

## 2020-01-29 NOTE — PROCEDURE: BIOPSY BY SHAVE METHOD
Electrodesiccation Text: The wound bed was treated with electrodesiccation after the biopsy was performed.
Hide Additional Anticipated Plan?: No
X Size Of Lesion In Cm: 0
Depth Of Biopsy: dermis
Wound Care: Aquaphor
Biopsy Type: H and E
Notification Instructions: Patient will be notified of biopsy results. However, patient instructed to call the office if not contacted within 2 weeks.
Electrodesiccation And Curettage Text: The wound bed was treated with electrodesiccation and curettage after the biopsy was performed.
Anesthesia Volume In Cc: 0.2
Type Of Destruction Used: Curettage
Consent: Written consent was obtained and risks were reviewed including but not limited to scarring, infection, bleeding, scabbing, incomplete removal, nerve damage and allergy to anesthesia.
Was A Bandage Applied: Yes
Dressing: Band-Aid
Detail Level: Detailed
Biopsy Method: Personna blade
Billing Type: Third-Party Bill
Hemostasis: Drysol and Electrocautery
Silver Nitrate Text: The wound bed was treated with silver nitrate after the biopsy was performed.
Lab: [id number]
Curettage Text: The wound bed was treated with curettage after the biopsy was performed.
Post-Care Instructions: I reviewed with the patient in detail post-care instructions. Patient is to keep the biopsy site dry overnight, and then apply bacitracin twice daily until healed. Patient may apply hydrogen peroxide soaks to remove any crusting.
Anesthesia Type: 1% lidocaine with 1:100,000 epinephrine and a 1:6 solution of 8.4% sodium bicarbonate
Lab Facility: 
Cryotherapy Text: The wound bed was treated with cryotherapy after the biopsy was performed.

## 2020-01-29 NOTE — PROCEDURE: COUNSELING
Quality 224: Stage 0-Iic Melanoma: Overutilization Of Imaging Studies For Only Stage 0-Iic Melanoma: None of the following diagnostic imaging studies ordered: chest X-ray, CT, Ultrasound, MRI, PET, or nuclear medicine scans (ML)
Quality 137: Melanoma: Continuity Of Care - Recall System: Patient information entered into a recall system that includes: target date for the next exam specified AND a process to follow up with patients regarding missed or unscheduled appointments
When Should The Patient Follow-Up For Their Next Full-Body Skin Exam?: 3 Months
Detail Level: Detailed
Detail Level: Zone

## 2020-01-29 NOTE — PROCEDURE: ADDITIONAL NOTES
Detail Level: Zone
Additional Notes: Will discuss w/ Dr. Bar about returning to [facility] for evaluation and guidance in long term management (frequency in PET etc.)\\nPt has 2 treatments left of Rafael, will have completed 3 years. \\nWill discuss w/ Dr. Bar how frequently we should be doing PET

## 2020-05-28 ENCOUNTER — APPOINTMENT (RX ONLY)
Dept: URBAN - METROPOLITAN AREA CLINIC 36 | Facility: CLINIC | Age: 40
Setting detail: DERMATOLOGY
End: 2020-05-28

## 2020-05-28 DIAGNOSIS — Z85.820 PERSONAL HISTORY OF MALIGNANT MELANOMA OF SKIN: ICD-10-CM

## 2020-05-28 DIAGNOSIS — L70.8 OTHER ACNE: ICD-10-CM

## 2020-05-28 DIAGNOSIS — Z87.2 PERSONAL HISTORY OF DISEASES OF THE SKIN AND SUBCUTANEOUS TISSUE: ICD-10-CM

## 2020-05-28 DIAGNOSIS — L81.4 OTHER MELANIN HYPERPIGMENTATION: ICD-10-CM

## 2020-05-28 DIAGNOSIS — L40.0 PSORIASIS VULGARIS: ICD-10-CM

## 2020-05-28 DIAGNOSIS — D485 NEOPLASM OF UNCERTAIN BEHAVIOR OF SKIN: ICD-10-CM

## 2020-05-28 DIAGNOSIS — D22 MELANOCYTIC NEVI: ICD-10-CM

## 2020-05-28 DIAGNOSIS — D18.0 HEMANGIOMA: ICD-10-CM

## 2020-05-28 PROBLEM — D22.21 MELANOCYTIC NEVI OF RIGHT EAR AND EXTERNAL AURICULAR CANAL: Status: ACTIVE | Noted: 2020-05-28

## 2020-05-28 PROBLEM — D22.61 MELANOCYTIC NEVI OF RIGHT UPPER LIMB, INCLUDING SHOULDER: Status: ACTIVE | Noted: 2020-05-28

## 2020-05-28 PROBLEM — D18.01 HEMANGIOMA OF SKIN AND SUBCUTANEOUS TISSUE: Status: ACTIVE | Noted: 2020-05-28

## 2020-05-28 PROBLEM — D22.72 MELANOCYTIC NEVI OF LEFT LOWER LIMB, INCLUDING HIP: Status: ACTIVE | Noted: 2020-05-28

## 2020-05-28 PROBLEM — D48.5 NEOPLASM OF UNCERTAIN BEHAVIOR OF SKIN: Status: ACTIVE | Noted: 2020-05-28

## 2020-05-28 PROBLEM — D22.4 MELANOCYTIC NEVI OF SCALP AND NECK: Status: ACTIVE | Noted: 2020-05-28

## 2020-05-28 PROBLEM — D22.5 MELANOCYTIC NEVI OF TRUNK: Status: ACTIVE | Noted: 2020-05-28

## 2020-05-28 PROCEDURE — 99213 OFFICE O/P EST LOW 20 MIN: CPT | Mod: 25

## 2020-05-28 PROCEDURE — 69100 BIOPSY OF EXTERNAL EAR: CPT

## 2020-05-28 PROCEDURE — ? OBSERVATION AND MEASURE

## 2020-05-28 PROCEDURE — ? DIAGNOSIS COMMENT

## 2020-05-28 PROCEDURE — ? COUNSELING

## 2020-05-28 PROCEDURE — ? BIOPSY BY SHAVE METHOD

## 2020-05-28 PROCEDURE — ? PRESCRIPTION

## 2020-05-28 PROCEDURE — ? PHOTO-DOCUMENTATION

## 2020-05-28 PROCEDURE — 11102 TANGNTL BX SKIN SINGLE LES: CPT | Mod: 59

## 2020-05-28 RX ORDER — CALCIPOTRIENE 50 UG/G
CREAM TOPICAL BID
Qty: 1 | Refills: 3 | Status: ERX

## 2020-05-28 RX ORDER — KETOCONAZOLE 20 MG/ML
SHAMPOO TOPICAL
Qty: 1 | Refills: 0 | Status: ERX

## 2020-05-28 ASSESSMENT — LOCATION DETAILED DESCRIPTION DERM
LOCATION DETAILED: LEFT ANTERIOR THIGH
LOCATION DETAILED: LEFT ANTERIOR PROXIMAL THIGH
LOCATION DETAILED: RIGHT DISTAL LATERAL CALF
LOCATION DETAILED: LEFT PROXIMAL POSTERIOR THIGH
LOCATION DETAILED: RIGHT SUPERIOR HELIX
LOCATION DETAILED: LEFT UPPER BACK
LOCATION DETAILED: LEFT ANTERIOR DISTAL THIGH
LOCATION DETAILED: RIGHT CHEEK
LOCATION DETAILED: LEFT DORSAL HAND
LOCATION DETAILED: LEFT SUPERIOR UPPER BACK
LOCATION DETAILED: RIGHT ANTERIOR THIGH
LOCATION DETAILED: LEFT MID BACK
LOCATION DETAILED: LEFT INFERIOR OCCIPITAL SCALP
LOCATION DETAILED: RIGHT DORSAL HAND
LOCATION DETAILED: RIGHT LATERAL ABDOMEN
LOCATION DETAILED: LEFT POSTERIOR ANKLE
LOCATION DETAILED: RIGHT PROXIMAL POSTERIOR UPPER ARM
LOCATION DETAILED: LEFT RADIAL DORSAL HAND

## 2020-05-28 ASSESSMENT — LOCATION SIMPLE DESCRIPTION DERM
LOCATION SIMPLE: LEFT UPPER BACK
LOCATION SIMPLE: LEFT POSTERIOR THIGH
LOCATION SIMPLE: RIGHT HAND
LOCATION SIMPLE: RIGHT CHEEK
LOCATION SIMPLE: ABDOMEN
LOCATION SIMPLE: RIGHT CALF
LOCATION SIMPLE: RIGHT LOWER EXTREMITY
LOCATION SIMPLE: LEFT LOWER EXTREMITY
LOCATION SIMPLE: BACK
LOCATION SIMPLE: LEFT HAND
LOCATION SIMPLE: RIGHT POSTERIOR UPPER ARM
LOCATION SIMPLE: LEFT THIGH
LOCATION SIMPLE: RIGHT EAR
LOCATION SIMPLE: LEFT ANKLE
LOCATION SIMPLE: POSTERIOR SCALP

## 2020-05-28 ASSESSMENT — LOCATION ZONE DERM
LOCATION ZONE: FACE
LOCATION ZONE: EAR
LOCATION ZONE: SCALP
LOCATION ZONE: ARM
LOCATION ZONE: TRUNK
LOCATION ZONE: LEG
LOCATION ZONE: HAND

## 2020-05-28 NOTE — PROCEDURE: COUNSELING
Detail Level: Detailed
Quality 224: Stage 0-Iic Melanoma: Overutilization Of Imaging Studies For Only Stage 0-Iic Melanoma: None of the following diagnostic imaging studies ordered: chest X-ray, CT, Ultrasound, MRI, PET, or nuclear medicine scans (ML)
Quality 137: Melanoma: Continuity Of Care - Recall System: Patient information entered into a recall system that includes: target date for the next exam specified AND a process to follow up with patients regarding missed or unscheduled appointments
When Should The Patient Follow-Up For Their Next Full-Body Skin Exam?: 3 Months
Detail Level: Zone
Tetracycline Pregnancy And Lactation Text: This medication is Pregnancy Category D and not consider safe during pregnancy. It is also excreted in breast milk.
Sarecycline Counseling: Patient advised regarding possible photosensitivity and discoloration of the teeth, skin, lips, tongue and gums.  Patient instructed to avoid sunlight, if possible.  When exposed to sunlight, patients should wear protective clothing, sunglasses, and sunscreen.  The patient was instructed to call the office immediately if the following severe adverse effects occur:  hearing changes, easy bruising/bleeding, severe headache, or vision changes.  The patient verbalized understanding of the proper use and possible adverse effects of sarecycline.  All of the patient's questions and concerns were addressed.
Azithromycin Counseling:  I discussed with the patient the risks of azithromycin including but not limited to GI upset, allergic reaction, drug rash, diarrhea, and yeast infections.
Topical Retinoid Pregnancy And Lactation Text: This medication is Pregnancy Category C. It is unknown if this medication is excreted in breast milk.
Azithromycin Pregnancy And Lactation Text: This medication is considered safe during pregnancy and is also secreted in breast milk.
Tazorac Counseling:  Patient advised that medication is irritating and drying.  Patient may need to apply sparingly and wash off after an hour before eventually leaving it on overnight.  The patient verbalized understanding of the proper use and possible adverse effects of tazorac.  All of the patient's questions and concerns were addressed.
Birth Control Pills Pregnancy And Lactation Text: This medication should be avoided if pregnant and for the first 30 days post-partum.
Topical Sulfur Applications Counseling: Topical Sulfur Counseling: Patient counseled that this medication may cause skin irritation or allergic reactions.  In the event of skin irritation, the patient was advised to reduce the amount of the drug applied or use it less frequently.   The patient verbalized understanding of the proper use and possible adverse effects of topical sulfur application.  All of the patient's questions and concerns were addressed.
High Dose Vitamin A Counseling: Side effects reviewed, pt to contact office should one occur.
Dapsone Counseling: I discussed with the patient the risks of dapsone including but not limited to hemolytic anemia, agranulocytosis, rashes, methemoglobinemia, kidney failure, peripheral neuropathy, headaches, GI upset, and liver toxicity.  Patients who start dapsone require monitoring including baseline LFTs and weekly CBCs for the first month, then every month thereafter.  The patient verbalized understanding of the proper use and possible adverse effects of dapsone.  All of the patient's questions and concerns were addressed.
Topical Sulfur Applications Pregnancy And Lactation Text: This medication is Pregnancy Category C and has an unknown safety profile during pregnancy. It is unknown if this topical medication is excreted in breast milk.
High Dose Vitamin A Pregnancy And Lactation Text: High dose vitamin A therapy is contraindicated during pregnancy and breast feeding.
Erythromycin Counseling:  I discussed with the patient the risks of erythromycin including but not limited to GI upset, allergic reaction, drug rash, diarrhea, increase in liver enzymes, and yeast infections.
Spironolactone Counseling: Patient advised regarding risks of diarrhea, abdominal pain, hyperkalemia, birth defects (for female patients), liver toxicity and renal toxicity. The patient may need blood work to monitor liver and kidney function and potassium levels while on therapy. The patient verbalized understanding of the proper use and possible adverse effects of spironolactone.  All of the patient's questions and concerns were addressed.
Bactrim Counseling:  I discussed with the patient the risks of sulfa antibiotics including but not limited to GI upset, allergic reaction, drug rash, diarrhea, dizziness, photosensitivity, and yeast infections.  Rarely, more serious reactions can occur including but not limited to aplastic anemia, agranulocytosis, methemoglobinemia, blood dyscrasias, liver or kidney failure, lung infiltrates or desquamative/blistering drug rashes.
Tazorac Pregnancy And Lactation Text: This medication is not safe during pregnancy. It is unknown if this medication is excreted in breast milk.
Benzoyl Peroxide Counseling: Patient counseled that medicine may cause skin irritation and bleach clothing.  In the event of skin irritation, the patient was advised to reduce the amount of the drug applied or use it less frequently.   The patient verbalized understanding of the proper use and possible adverse effects of benzoyl peroxide.  All of the patient's questions and concerns were addressed.
Benzoyl Peroxide Pregnancy And Lactation Text: This medication is Pregnancy Category C. It is unknown if benzoyl peroxide is excreted in breast milk.
Dapsone Pregnancy And Lactation Text: This medication is Pregnancy Category C and is not considered safe during pregnancy or breast feeding.
Minocycline Counseling: Patient advised regarding possible photosensitivity and discoloration of the teeth, skin, lips, tongue and gums.  Patient instructed to avoid sunlight, if possible.  When exposed to sunlight, patients should wear protective clothing, sunglasses, and sunscreen.  The patient was instructed to call the office immediately if the following severe adverse effects occur:  hearing changes, easy bruising/bleeding, severe headache, or vision changes.  The patient verbalized understanding of the proper use and possible adverse effects of minocycline.  All of the patient's questions and concerns were addressed.
Erythromycin Pregnancy And Lactation Text: This medication is Pregnancy Category B and is considered safe during pregnancy. It is also excreted in breast milk.
Isotretinoin Counseling: Patient should get monthly blood tests, not donate blood, not drive at night if vision affected, not share medication, and not undergo elective surgery for 6 months after tx completed. Side effects reviewed, pt to contact office should one occur.
Spironolactone Pregnancy And Lactation Text: This medication can cause feminization of the male fetus and should be avoided during pregnancy. The active metabolite is also found in breast milk.
Topical Clindamycin Counseling: Patient counseled that this medication may cause skin irritation or allergic reactions.  In the event of skin irritation, the patient was advised to reduce the amount of the drug applied or use it less frequently.   The patient verbalized understanding of the proper use and possible adverse effects of clindamycin.  All of the patient's questions and concerns were addressed.
Include Pregnancy/Lactation Warning?: No
Topical Retinoid counseling:  Patient advised to apply a pea-sized amount only at bedtime and wait 30 minutes after washing their face before applying.  If too drying, patient may add a non-comedogenic moisturizer. The patient verbalized understanding of the proper use and possible adverse effects of retinoids.  All of the patient's questions and concerns were addressed.
Doxycycline Counseling:  Patient counseled regarding possible photosensitivity and increased risk for sunburn.  Patient instructed to avoid sunlight, if possible.  When exposed to sunlight, patients should wear protective clothing, sunglasses, and sunscreen.  The patient was instructed to call the office immediately if the following severe adverse effects occur:  hearing changes, easy bruising/bleeding, severe headache, or vision changes.  The patient verbalized understanding of the proper use and possible adverse effects of doxycycline.  All of the patient's questions and concerns were addressed.
Bactrim Pregnancy And Lactation Text: This medication is Pregnancy Category D and is known to cause fetal risk.  It is also excreted in breast milk.
Doxycycline Pregnancy And Lactation Text: This medication is Pregnancy Category D and not consider safe during pregnancy. It is also excreted in breast milk but is considered safe for shorter treatment courses.
Birth Control Pills Counseling: Birth Control Pill Counseling: I discussed with the patient the potential side effects of OCPs including but not limited to increased risk of stroke, heart attack, thrombophlebitis, deep venous thrombosis, hepatic adenomas, breast changes, GI upset, headaches, and depression.  The patient verbalized understanding of the proper use and possible adverse effects of OCPs. All of the patient's questions and concerns were addressed.
Topical Clindamycin Pregnancy And Lactation Text: This medication is Pregnancy Category B and is considered safe during pregnancy. It is unknown if it is excreted in breast milk.
Isotretinoin Pregnancy And Lactation Text: This medication is Pregnancy Category X and is considered extremely dangerous during pregnancy. It is unknown if it is excreted in breast milk.
Tetracycline Counseling: Patient counseled regarding possible photosensitivity and increased risk for sunburn.  Patient instructed to avoid sunlight, if possible.  When exposed to sunlight, patients should wear protective clothing, sunglasses, and sunscreen.  The patient was instructed to call the office immediately if the following severe adverse effects occur:  hearing changes, easy bruising/bleeding, severe headache, or vision changes.  The patient verbalized understanding of the proper use and possible adverse effects of tetracycline.  All of the patient's questions and concerns were addressed. Patient understands to avoid pregnancy while on therapy due to potential birth defects.
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 Add calcipotriene oint to elbows to prevent calciphylaxis

## 2020-08-03 ENCOUNTER — HOSPITAL ENCOUNTER (OUTPATIENT)
Dept: RADIOLOGY | Facility: MEDICAL CENTER | Age: 40
End: 2020-08-03
Attending: INTERNAL MEDICINE
Payer: COMMERCIAL

## 2020-08-03 DIAGNOSIS — C43.62 MALIGNANT MELANOMA OF LEFT UPPER EXTREMITY INCLUDING SHOULDER (HCC): ICD-10-CM

## 2020-08-03 PROCEDURE — A9552 F18 FDG: HCPCS

## 2020-09-16 ENCOUNTER — APPOINTMENT (RX ONLY)
Dept: URBAN - METROPOLITAN AREA CLINIC 20 | Facility: CLINIC | Age: 40
Setting detail: DERMATOLOGY
End: 2020-09-16

## 2020-09-16 DIAGNOSIS — Z85.820 PERSONAL HISTORY OF MALIGNANT MELANOMA OF SKIN: ICD-10-CM

## 2020-09-16 DIAGNOSIS — D18.0 HEMANGIOMA: ICD-10-CM

## 2020-09-16 DIAGNOSIS — D22 MELANOCYTIC NEVI: ICD-10-CM

## 2020-09-16 DIAGNOSIS — L40.0 PSORIASIS VULGARIS: ICD-10-CM | Status: INADEQUATELY CONTROLLED

## 2020-09-16 DIAGNOSIS — Z87.2 PERSONAL HISTORY OF DISEASES OF THE SKIN AND SUBCUTANEOUS TISSUE: ICD-10-CM

## 2020-09-16 DIAGNOSIS — L81.4 OTHER MELANIN HYPERPIGMENTATION: ICD-10-CM

## 2020-09-16 PROBLEM — D22.5 MELANOCYTIC NEVI OF TRUNK: Status: ACTIVE | Noted: 2020-09-16

## 2020-09-16 PROBLEM — D48.5 NEOPLASM OF UNCERTAIN BEHAVIOR OF SKIN: Status: ACTIVE | Noted: 2020-09-16

## 2020-09-16 PROBLEM — D22.72 MELANOCYTIC NEVI OF LEFT LOWER LIMB, INCLUDING HIP: Status: ACTIVE | Noted: 2020-09-16

## 2020-09-16 PROBLEM — D18.01 HEMANGIOMA OF SKIN AND SUBCUTANEOUS TISSUE: Status: ACTIVE | Noted: 2020-09-16

## 2020-09-16 PROBLEM — D22.4 MELANOCYTIC NEVI OF SCALP AND NECK: Status: ACTIVE | Noted: 2020-09-16

## 2020-09-16 PROBLEM — D22.61 MELANOCYTIC NEVI OF RIGHT UPPER LIMB, INCLUDING SHOULDER: Status: ACTIVE | Noted: 2020-09-16

## 2020-09-16 PROCEDURE — ? BIOPSY BY SHAVE METHOD

## 2020-09-16 PROCEDURE — ? COUNSELING

## 2020-09-16 PROCEDURE — 99214 OFFICE O/P EST MOD 30 MIN: CPT | Mod: 25

## 2020-09-16 PROCEDURE — ? DIAGNOSIS COMMENT

## 2020-09-16 PROCEDURE — 11102 TANGNTL BX SKIN SINGLE LES: CPT | Mod: 59

## 2020-09-16 PROCEDURE — 11103 TANGNTL BX SKIN EA SEP/ADDL: CPT | Mod: 59

## 2020-09-16 PROCEDURE — ? ADDITIONAL NOTES

## 2020-09-16 PROCEDURE — 69100 BIOPSY OF EXTERNAL EAR: CPT

## 2020-09-16 ASSESSMENT — LOCATION SIMPLE DESCRIPTION DERM
LOCATION SIMPLE: LEFT THIGH
LOCATION SIMPLE: BACK
LOCATION SIMPLE: RIGHT POSTERIOR UPPER ARM
LOCATION SIMPLE: RIGHT EAR
LOCATION SIMPLE: LEFT ELBOW
LOCATION SIMPLE: LEFT UPPER BACK
LOCATION SIMPLE: LEFT HAND
LOCATION SIMPLE: LEFT LOWER EXTREMITY
LOCATION SIMPLE: RIGHT LOWER EXTREMITY
LOCATION SIMPLE: RIGHT KNEE
LOCATION SIMPLE: RIGHT CALF
LOCATION SIMPLE: LEFT PRETIBIAL REGION
LOCATION SIMPLE: RIGHT HAND
LOCATION SIMPLE: RIGHT CHEEK
LOCATION SIMPLE: POSTERIOR SCALP
LOCATION SIMPLE: ABDOMEN
LOCATION SIMPLE: LEFT ANKLE

## 2020-09-16 ASSESSMENT — LOCATION ZONE DERM
LOCATION ZONE: ARM
LOCATION ZONE: SCALP
LOCATION ZONE: EAR
LOCATION ZONE: LEG
LOCATION ZONE: TRUNK
LOCATION ZONE: FACE
LOCATION ZONE: HAND

## 2020-09-16 ASSESSMENT — LOCATION DETAILED DESCRIPTION DERM
LOCATION DETAILED: LEFT RADIAL DORSAL HAND
LOCATION DETAILED: LEFT MID BACK
LOCATION DETAILED: LEFT INFERIOR OCCIPITAL SCALP
LOCATION DETAILED: LEFT SUPERIOR UPPER BACK
LOCATION DETAILED: LEFT POSTERIOR ANKLE
LOCATION DETAILED: LEFT ANTERIOR DISTAL THIGH
LOCATION DETAILED: RIGHT KNEE
LOCATION DETAILED: RIGHT INFERIOR HELIX
LOCATION DETAILED: LEFT UPPER BACK
LOCATION DETAILED: RIGHT DORSAL HAND
LOCATION DETAILED: RIGHT LATERAL ABDOMEN
LOCATION DETAILED: RIGHT ANTERIOR THIGH
LOCATION DETAILED: RIGHT DISTAL LATERAL CALF
LOCATION DETAILED: LEFT ANTERIOR THIGH
LOCATION DETAILED: RIGHT PROXIMAL POSTERIOR UPPER ARM
LOCATION DETAILED: RIGHT CHEEK
LOCATION DETAILED: LEFT DORSAL HAND
LOCATION DETAILED: LEFT ELBOW
LOCATION DETAILED: LEFT ANTERIOR PROXIMAL THIGH
LOCATION DETAILED: LEFT DISTAL PRETIBIAL REGION

## 2020-09-16 NOTE — HPI: MELANOMA F/U (HISTORY OF MALIGNANT MELANOMA)
What Is The Reason For Today's Visit?: Surveillance against skin cancer recurrences
Breslow Depth?: 2.8mm
Year Excised?: 2016

## 2020-12-16 ENCOUNTER — APPOINTMENT (RX ONLY)
Dept: URBAN - METROPOLITAN AREA CLINIC 20 | Facility: CLINIC | Age: 40
Setting detail: DERMATOLOGY
End: 2020-12-16

## 2020-12-16 DIAGNOSIS — L81.4 OTHER MELANIN HYPERPIGMENTATION: ICD-10-CM

## 2020-12-16 DIAGNOSIS — L40.0 PSORIASIS VULGARIS: ICD-10-CM | Status: INADEQUATELY CONTROLLED

## 2020-12-16 DIAGNOSIS — Z87.2 PERSONAL HISTORY OF DISEASES OF THE SKIN AND SUBCUTANEOUS TISSUE: ICD-10-CM

## 2020-12-16 DIAGNOSIS — D22 MELANOCYTIC NEVI: ICD-10-CM

## 2020-12-16 DIAGNOSIS — D18.0 HEMANGIOMA: ICD-10-CM

## 2020-12-16 DIAGNOSIS — Z85.820 PERSONAL HISTORY OF MALIGNANT MELANOMA OF SKIN: ICD-10-CM

## 2020-12-16 DIAGNOSIS — L98429 CHRONIC ULCER OF OTHER SPECIFIED SITES: ICD-10-CM

## 2020-12-16 DIAGNOSIS — L30.4 ERYTHEMA INTERTRIGO: ICD-10-CM | Status: INADEQUATELY CONTROLLED

## 2020-12-16 DIAGNOSIS — L98419 CHRONIC ULCER OF OTHER SPECIFIED SITES: ICD-10-CM

## 2020-12-16 PROBLEM — D22.4 MELANOCYTIC NEVI OF SCALP AND NECK: Status: ACTIVE | Noted: 2020-12-16

## 2020-12-16 PROBLEM — D48.5 NEOPLASM OF UNCERTAIN BEHAVIOR OF SKIN: Status: ACTIVE | Noted: 2020-12-16

## 2020-12-16 PROBLEM — L97.529 NON-PRESSURE CHRONIC ULCER OF OTHER PART OF LEFT FOOT WITH UNSPECIFIED SEVERITY: Status: ACTIVE | Noted: 2020-12-16

## 2020-12-16 PROBLEM — D22.61 MELANOCYTIC NEVI OF RIGHT UPPER LIMB, INCLUDING SHOULDER: Status: ACTIVE | Noted: 2020-12-16

## 2020-12-16 PROBLEM — D22.5 MELANOCYTIC NEVI OF TRUNK: Status: ACTIVE | Noted: 2020-12-16

## 2020-12-16 PROBLEM — D18.01 HEMANGIOMA OF SKIN AND SUBCUTANEOUS TISSUE: Status: ACTIVE | Noted: 2020-12-16

## 2020-12-16 PROBLEM — D22.72 MELANOCYTIC NEVI OF LEFT LOWER LIMB, INCLUDING HIP: Status: ACTIVE | Noted: 2020-12-16

## 2020-12-16 PROCEDURE — ? COUNSELING

## 2020-12-16 PROCEDURE — 11102 TANGNTL BX SKIN SINGLE LES: CPT

## 2020-12-16 PROCEDURE — ? PRESCRIPTION

## 2020-12-16 PROCEDURE — 99214 OFFICE O/P EST MOD 30 MIN: CPT | Mod: 25

## 2020-12-16 PROCEDURE — ? DIAGNOSIS COMMENT

## 2020-12-16 PROCEDURE — ? ADDITIONAL NOTES

## 2020-12-16 PROCEDURE — ? BIOPSY BY SHAVE METHOD

## 2020-12-16 RX ORDER — KETOCONAZOLE 20 MG/G
CREAM TOPICAL QD
Qty: 1 | Refills: 3 | Status: ERX

## 2020-12-16 RX ORDER — HALOBETASOL PROPIONATE AND TAZAROTENE .1; .45 MG/G; MG/G
LOTION TOPICAL BID
Qty: 1 | Refills: 6 | Status: ERX

## 2020-12-16 ASSESSMENT — LOCATION DETAILED DESCRIPTION DERM
LOCATION DETAILED: LEFT SUPERIOR UPPER BACK
LOCATION DETAILED: RIGHT DORSAL HAND
LOCATION DETAILED: RIGHT CHEEK
LOCATION DETAILED: LEFT ELBOW
LOCATION DETAILED: LEFT ANTERIOR PROXIMAL THIGH
LOCATION DETAILED: LEFT ANTERIOR THIGH
LOCATION DETAILED: RIGHT ANTERIOR THIGH
LOCATION DETAILED: LEFT DORSAL HAND
LOCATION DETAILED: RIGHT PROXIMAL POSTERIOR UPPER ARM
LOCATION DETAILED: LEFT POSTERIOR ANKLE
LOCATION DETAILED: RIGHT DISTAL LATERAL CALF
LOCATION DETAILED: LEFT MID BACK
LOCATION DETAILED: LEFT PROXIMAL POSTERIOR THIGH
LOCATION DETAILED: LEFT INFERIOR OCCIPITAL SCALP
LOCATION DETAILED: PERIUMBILICAL SKIN
LOCATION DETAILED: LEFT RADIAL DORSAL HAND
LOCATION DETAILED: LEFT UPPER BACK
LOCATION DETAILED: LEFT PLANTAR FOREFOOT OVERLYING 1ST METATARSAL
LOCATION DETAILED: RIGHT LATERAL ABDOMEN
LOCATION DETAILED: LEFT ANTERIOR DISTAL THIGH

## 2020-12-16 ASSESSMENT — LOCATION ZONE DERM
LOCATION ZONE: FEET
LOCATION ZONE: TRUNK
LOCATION ZONE: HAND
LOCATION ZONE: ARM
LOCATION ZONE: FACE
LOCATION ZONE: SCALP
LOCATION ZONE: LEG

## 2020-12-16 ASSESSMENT — LOCATION SIMPLE DESCRIPTION DERM
LOCATION SIMPLE: LEFT HAND
LOCATION SIMPLE: RIGHT HAND
LOCATION SIMPLE: LEFT ANKLE
LOCATION SIMPLE: RIGHT LOWER EXTREMITY
LOCATION SIMPLE: LEFT ELBOW
LOCATION SIMPLE: BACK
LOCATION SIMPLE: LEFT UPPER BACK
LOCATION SIMPLE: POSTERIOR SCALP
LOCATION SIMPLE: RIGHT CALF
LOCATION SIMPLE: LEFT PLANTAR SURFACE
LOCATION SIMPLE: LEFT POSTERIOR THIGH
LOCATION SIMPLE: LEFT THIGH
LOCATION SIMPLE: LEFT LOWER EXTREMITY
LOCATION SIMPLE: RIGHT CHEEK
LOCATION SIMPLE: RIGHT POSTERIOR UPPER ARM
LOCATION SIMPLE: ABDOMEN

## 2020-12-16 NOTE — PROCEDURE: DIAGNOSIS COMMENT
Detail Level: Simple
Comment: Biopsy proven mild to moderate 10/2017; monitor for repigmentation
Comment: Biopsy proven; all sites excised
Comment: Excised in 2016; 2.8mm, re-excised 2017 (1+ node,18 neg), chuy simpson/ Ileana [id number], Lelo PERKINS
Comment: Biopsy x 2; [id number], proven benign nevi, closely monitor

## 2020-12-16 NOTE — PROCEDURE: MIPS QUALITY
Quality 130: Documentation Of Current Medications In The Medical Record: Current Medications Documented
Quality 402: Tobacco Use And Help With Quitting Among Adolescents: Patient screened for tobacco and never smoked
Quality 111:Pneumonia Vaccination Status For Older Adults: Pneumococcal Vaccination Previously Received
Quality 431: Preventive Care And Screening: Unhealthy Alcohol Use - Screening: Patient screened for unhealthy alcohol use using a single question and scores less than 2 times per year
Quality 226: Preventive Care And Screening: Tobacco Use: Screening And Cessation Intervention: Patient screened for tobacco use and is an ex/non-smoker
Quality 137: Melanoma: Continuity Of Care - Recall System: Patient information entered into a recall system that includes: target date for the next exam specified AND a process to follow up with patients regarding missed or unscheduled appointments
Detail Level: Detailed

## 2020-12-16 NOTE — PROCEDURE: ADDITIONAL NOTES
Detail Level: Simple
Additional Notes: Pt diabetic, refer to [facility] wound care. \\n\\nNon-healing wound for months

## 2021-01-11 ENCOUNTER — OFFICE VISIT (OUTPATIENT)
Dept: WOUND CARE | Facility: MEDICAL CENTER | Age: 41
End: 2021-01-11
Attending: FAMILY MEDICINE
Payer: COMMERCIAL

## 2021-01-11 VITALS
RESPIRATION RATE: 16 BRPM | SYSTOLIC BLOOD PRESSURE: 127 MMHG | HEART RATE: 73 BPM | DIASTOLIC BLOOD PRESSURE: 77 MMHG | OXYGEN SATURATION: 96 % | TEMPERATURE: 98.9 F

## 2021-01-11 DIAGNOSIS — E08.42 DIABETIC POLYNEUROPATHY ASSOCIATED WITH DIABETES MELLITUS DUE TO UNDERLYING CONDITION (HCC): ICD-10-CM

## 2021-01-11 DIAGNOSIS — E11.621 TYPE 2 DIABETES MELLITUS WITH LEFT DIABETIC FOOT ULCER (HCC): Primary | ICD-10-CM

## 2021-01-11 DIAGNOSIS — L97.529 TYPE 2 DIABETES MELLITUS WITH LEFT DIABETIC FOOT ULCER (HCC): Primary | ICD-10-CM

## 2021-01-11 PROCEDURE — 99213 OFFICE O/P EST LOW 20 MIN: CPT | Mod: 25 | Performed by: NURSE PRACTITIONER

## 2021-01-11 PROCEDURE — 11042 DBRDMT SUBQ TIS 1ST 20SQCM/<: CPT

## 2021-01-11 PROCEDURE — 11042 DBRDMT SUBQ TIS 1ST 20SQCM/<: CPT | Performed by: NURSE PRACTITIONER

## 2021-01-11 PROCEDURE — 99214 OFFICE O/P EST MOD 30 MIN: CPT

## 2021-01-11 ASSESSMENT — ENCOUNTER SYMPTOMS
WHEEZING: 0
VOMITING: 0
HEADACHES: 0
DOUBLE VISION: 0
SHORTNESS OF BREATH: 0
PALPITATIONS: 0
DIZZINESS: 0
BLURRED VISION: 0
CHILLS: 0
FEVER: 0
NAUSEA: 0
COUGH: 0

## 2021-01-11 NOTE — PATIENT INSTRUCTIONS
-Keep your wound dressing clean, dry, and intact.    -Change your dressing if it becomes soiled, soaked, or falls off.    -Should you experience any significant changes in your wound(s), such as infection (redness, swelling, localized heat, increased pain, fever > 101 F, chills) or have any questions regarding your home care instructions, please contact the wound center at [phone]. If after hours, contact your primary care physician or go to the hospital emergency room.

## 2021-01-12 NOTE — PROGRESS NOTES
Provider Encounter- Diabetic Foot Ulcer      HISTORY OF PRESENT ILLNESS  Wound History:    START OF CARE IN CLINIC: 1/11/2021    REFERRING PROVIDER: Adriana Levine M.D.     WOUND- Diabetic foot ulcer   LOCATION: Left plantar first metatarsal head   HISTORY: Patient reports that she went on vacation out of town and walked on her foot for extended period of time.  Patient reports that she has neuropathy and did not notice that she developed a blister to her first MTH on the left foot.  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  Patient asked for referral to [facility] from her primary care provider due to wound nonhealing wound.  Patient was referred to [facility] for assistance and resolution of diabetic foot ulcer.    Pertinent Medical History: Asthma, depression, anxiety, bipolar disorder, DM 2           TOBACCO USE: Current everyday smoker 0.5 packs/day for 5 years    Patient's problem list, allergies, and current medications reviewed and updated in Epic    Interval History:  1/11/2021: Clinic visit with NUHA Chaney. Patient states that they are feeling well today.  Patient denies fever, chills, nausea, vomiting, lightheadedness, dizziness, shortness of breath and chest pain.  Removal of significant amount of thick hardened callus to first MTH.  Excisional debridement of wound bed to stimulate granulation tissue formation.       REVIEW OF SYSTEMS:   Review of Systems   Constitutional: Negative for chills and fever.   HENT: Negative for hearing loss.    Eyes: Negative for blurred vision and double vision.   Respiratory: Negative for cough, shortness of breath and wheezing.    Cardiovascular: Negative for chest pain, palpitations and leg swelling.   Gastrointestinal: Negative for nausea and vomiting.   Skin:        Diabetic foot ulcer left first MTH   Neurological: Negative for dizziness and headaches.       PHYSICAL  EXAMINATION:   /77   Pulse 73   Temp 37.2 °C (98.9 °F)   Resp 16   SpO2 96%     Physical Exam   Constitutional: She is oriented to person, place, and time and well-developed, well-nourished, and in no distress.   HENT:   Head: Normocephalic and atraumatic.   Eyes: Pupils are equal, round, and reactive to light. Conjunctivae are normal.   Neck: Normal range of motion.   Cardiovascular: Intact distal pulses.   Pulmonary/Chest: Effort normal and breath sounds normal. No respiratory distress. She has no wheezes.   Musculoskeletal:         General: No tenderness or edema.   Neurological: She is alert and oriented to person, place, and time.   Skin: Skin is warm. No erythema.   Diabetic foot ulcer to left first MTH   Psychiatric: Mood, memory and affect normal.       WOUND ASSESSMENT          Wound 01/11/21 Left Plantar 1st MTH (Active)   Wound Image    01/11/21 1500   Site Assessment Pink;Red 01/11/21 1500   Periwound Assessment Dry;Callused 01/11/21 1500   Margins Attached edges 01/11/21 1500   Drainage Amount Small 01/11/21 1500   Drainage Description Serosanguineous 01/11/21 1500   Treatments Cleansed;Topical Lidocaine;Provider debridement;Site care 01/11/21 1500   Wound Cleansing Puracyn Streator 01/11/21 1500   Periwound Protectant Skin Protectant Wipes to Periwound;Barrier Paste 01/11/21 1500   Dressing Cleansing/Solutions Not Applicable 01/11/21 1500   Dressing Changed New 01/11/21 1500   Dressing Status Clean;Dry;Intact 01/11/21 1500   Non-staged Wound Description Full thickness 01/11/21 1500   Wound Length (cm) 1.2 cm 01/11/21 1500   Wound Width (cm) 0.8 cm 01/11/21 1500   Wound Depth (cm) 0.2 cm 01/11/21 1500   Wound Surface Area (cm^2) 0.96 cm^2 01/11/21 1500   Wound Volume (cm^3) 0.19 cm^3 01/11/21 1500   Tunneling (cm) 0 cm 01/11/21 1500   Undermining (cm) 0 cm 01/11/21 1500   Wound Odor None 01/11/21 1500   Exposed Structures None 01/11/21 1500          PROCEDURE:   -2% viscous lidocaine applied  topically to wound bed for approximately 5 minutes prior to debridement  -Curette used to debride wound bed and periwound callus.  Excisional debridement was performed to remove devitalized tissue until healthy, bleeding tissue was visualized.   Entire surface of wound, 0.96 cm2 debrided.  Tissue debrided into the subcutaneous layer.  Periwound callus, 2 cm2, debrided to skin level, excising hyperkeratinized tissue.   -Bleeding controlled with manual pressure.    -Wound care completed by wound RN, refer to flowsheet  -Patient tolerated the procedure well, without c/o pain or discomfort.       Pertinent Labs and Diagnostics:    Labs:     A1c:   Lab Results   Component Value Date/Time    HBA1C 6.8 (H) 10/08/2019 01:47 AM          IMAGING: N/A    VASCULAR STUDIES: N/A    LAST  WOUND CULTURE:  DATE : N/A          ASSESSMENT AND PLAN:   1. Type 2 diabetes mellitus with left diabetic foot ulcer (HCC)  -Excisional debridement of wound in clinic today, medically necessary to promote wound healing.  -Patient to return to clinic weekly for assessment and debridement  -Patient to change dressing 1-2 times per week in between clinic visits   Wound care: Zinc paste to perimeter wound to protect against maceration, hoarse shoe dressing for off loading, Hydrofiber silver for antimicrobial properties and absorption, Hypafix tape for skin    2. Diabetic polyneuropathy associated with diabetes mellitus due to underlying condition (HCC)  Comments: Patient has severe diabetic polyneuropathy with limited sensation to her feet patient also reports that she has lost most of the sensation in her hands.  Patient instructed to keep tight control over fasting blood sugar <140 for optimal wound healing; Implications of loss of protective sensation (LOPS) discussed with patient, including increased risk for amputation.  Advised to check feet at least daily, moisturize feet, and to always wear protective foot wear, arrange meticulous regular  foot care by podiatrist or [facility]. Pt with good understanding.       PATIENT EDUCATION  - Importance of tight glucose control for wound healing   - Implications of loss of protective sensation (LOPS) discussed with patient- including increased risk for amputation.  - Advised to check feet at least daily, moisturize feet, and to always wear protective foot wear.   -  Importance of offloading foot to assist with wound healing  - Advised pt not to trim nails or calluses, seek foot/nail care from podiatrist or certified foot/nail nurse  - Importance of adequate nutrition for wound healing    20 min spent face to face with patient, >50% of time spent counseling, coordinating care, reviewing records, discussing POC, educating patient regarding wound healing and progressions, diabetes education.  This time was spent in excess to procedure time.       Please note that this note may have been created using voice recognition software. I have worked with technical experts from [employer] [employer] to optimize the interface.  I have made every reasonable attempt to correct obvious errors, but there may be errors of grammar and possibly content that I did not discover before finalizing the note.    N

## 2021-01-21 ENCOUNTER — OFFICE VISIT (OUTPATIENT)
Dept: WOUND CARE | Facility: MEDICAL CENTER | Age: 41
End: 2021-01-21
Attending: FAMILY MEDICINE
Payer: COMMERCIAL

## 2021-01-21 VITALS
HEART RATE: 86 BPM | RESPIRATION RATE: 20 BRPM | TEMPERATURE: 98.6 F | SYSTOLIC BLOOD PRESSURE: 118 MMHG | OXYGEN SATURATION: 98 % | DIASTOLIC BLOOD PRESSURE: 86 MMHG

## 2021-01-21 DIAGNOSIS — E08.42 DIABETIC POLYNEUROPATHY ASSOCIATED WITH DIABETES MELLITUS DUE TO UNDERLYING CONDITION (HCC): ICD-10-CM

## 2021-01-21 DIAGNOSIS — E11.621 TYPE 2 DIABETES MELLITUS WITH LEFT DIABETIC FOOT ULCER (HCC): Primary | ICD-10-CM

## 2021-01-21 DIAGNOSIS — L97.529 TYPE 2 DIABETES MELLITUS WITH LEFT DIABETIC FOOT ULCER (HCC): Primary | ICD-10-CM

## 2021-01-21 PROCEDURE — 99212 OFFICE O/P EST SF 10 MIN: CPT

## 2021-01-21 PROCEDURE — 11042 DBRDMT SUBQ TIS 1ST 20SQCM/<: CPT

## 2021-01-21 PROCEDURE — 11042 DBRDMT SUBQ TIS 1ST 20SQCM/<: CPT | Performed by: NURSE PRACTITIONER

## 2021-01-21 ASSESSMENT — PAIN SCALES - GENERAL: PAINLEVEL: NO PAIN

## 2021-01-22 ASSESSMENT — ENCOUNTER SYMPTOMS
HEADACHES: 0
FEVER: 0
PALPITATIONS: 0
VOMITING: 0
BLURRED VISION: 0
SHORTNESS OF BREATH: 0
CHILLS: 0
NAUSEA: 0
DIZZINESS: 0
COUGH: 0
WHEEZING: 0
DOUBLE VISION: 0

## 2021-01-22 NOTE — PROGRESS NOTES
Provider Encounter- Diabetic Foot Ulcer      HISTORY OF PRESENT ILLNESS  Wound History:    START OF CARE IN CLINIC: 1/11/2021    REFERRING PROVIDER: Adriana Levine M.D.     WOUND- Diabetic foot ulcer   LOCATION: Left plantar first metatarsal head   HISTORY: Patient reports that she went on vacation out of town and walked on her foot for extended period of time.  Patient reports that she has neuropathy and did not notice that she developed a blister to her first MTH on the left foot.  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  Patient asked for referral to [facility] from her primary care provider due to wound nonhealing wound.  Patient was referred to [facility] for assistance and resolution of diabetic foot ulcer.    Pertinent Medical History: Asthma, depression, anxiety, bipolar disorder, DM 2           TOBACCO USE: Current everyday smoker 0.5 packs/day for 5 years    Patient's problem list, allergies, and current medications reviewed and updated in Epic    Interval History:  1/11/2021: Clinic visit with NUHA Chaney. Patient states that they are feeling well today.  Patient denies fever, chills, nausea, vomiting, lightheadedness, dizziness, shortness of breath and chest pain.  Removal of significant amount of thick hardened callus to first MTH.  Excisional debridement of wound bed to stimulate granulation tissue formation.    1/22/2021: Clinic visit with NUHA Chaney. Patient states that they are feeling well today.  Patient denies fever, chills, nausea, vomiting, lightheadedness, dizziness, shortness of breath and chest pain.  Wound is improving however it would do significantly better if the patient could offload more.  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       REVIEW OF SYSTEMS:   Review of Systems   Constitutional: Negative for chills and fever.   HENT: Negative  for hearing loss.    Eyes: Negative for blurred vision and double vision.   Respiratory: Negative for cough, shortness of breath and wheezing.    Cardiovascular: Negative for chest pain, palpitations and leg swelling.   Gastrointestinal: Negative for nausea and vomiting.   Skin:        Diabetic foot ulcer left first MTH   Neurological: Negative for dizziness and headaches.       PHYSICAL EXAMINATION:   /86   Pulse 86   Temp 37 °C (98.6 °F)   Resp 20   SpO2 98%     Physical Exam   Constitutional: She is oriented to person, place, and time and well-developed, well-nourished, and in no distress.   HENT:   Head: Normocephalic and atraumatic.   Eyes: Pupils are equal, round, and reactive to light. Conjunctivae are normal.   Neck: Normal range of motion.   Cardiovascular: Intact distal pulses.   Pulmonary/Chest: Effort normal and breath sounds normal. No respiratory distress. She has no wheezes.   Musculoskeletal:         General: No tenderness or edema.   Neurological: She is alert and oriented to person, place, and time.   Skin: Skin is warm. No erythema.   Diabetic foot ulcer to left first MTH   Psychiatric: Mood, memory and affect normal.       WOUND ASSESSMENT           Wound 01/11/21 Left Plantar 1st MTH (Active)   Wound Image    01/21/21 1600   Site Assessment Brown;Dry;Red 01/21/21 1600   Periwound Assessment Dry;Callused 01/21/21 1600   Margins Attached edges 01/21/21 1600   Drainage Amount Small 01/21/21 1600   Drainage Description Serosanguineous 01/21/21 1600   Treatments Cleansed;Topical Lidocaine;Provider debridement 01/21/21 1600   Wound Cleansing Puracyn Hurley 01/21/21 1600   Periwound Protectant Barrier Paste 01/21/21 1600   Dressing Cleansing/Solutions Not Applicable 01/11/21 1500   Dressing Options Hydrofera Blue Ready;Komprex Horseshoe;Hypafix Tape 01/21/21 1600   Dressing Changed New 01/21/21 1600   Dressing Status Clean;Dry;Intact 01/21/21 1600   Dressing Change/Treatment Frequency Every 72  hrs, and As Needed 01/21/21 1600   Non-staged Wound Description Full thickness 01/21/21 1600   Wound Length (cm) 0.7 cm 01/21/21 1600   Wound Width (cm) 0.9 cm 01/21/21 1600   Wound Depth (cm) 0.1 cm 01/21/21 1600   Wound Surface Area (cm^2) 0.63 cm^2 01/21/21 1600   Wound Volume (cm^3) 0.06 cm^3 01/21/21 1600   Post-Procedure Length (cm) 0.8 cm 01/21/21 1600   Post-Procedure Width (cm) 1 cm 01/21/21 1600   Post-Procedure Depth (cm) 0.1 cm 01/21/21 1600   Post-Procedure Surface Area (cm^2) 0.8 cm^2 01/21/21 1600   Post-Procedure Volume (cm^3) 0.08 cm^3 01/21/21 1600   Wound Healing % 68 01/21/21 1600   Tunneling (cm) 0 cm 01/21/21 1600   Undermining (cm) 0 cm 01/21/21 1600   Wound Odor None 01/21/21 1600   Exposed Structures None 01/21/21 1600                      PROCEDURE:   -2% viscous lidocaine applied topically to wound bed for approximately 5 minutes prior to debridement  -Curette used to debride wound bed and periwound callus.  Excisional debridement was performed to remove devitalized tissue until healthy, bleeding tissue was visualized.   Entire surface of wound, 0.8 cm2 debrided.  Tissue debrided into the subcutaneous layer.  Periwound callus, 1 cm2, debrided to skin level, excising hyperkeratinized tissue.   -Bleeding controlled with manual pressure.    -Wound care completed by wound RN, refer to flowsheet  -Patient tolerated the procedure well, without c/o pain or discomfort.       Pertinent Labs and Diagnostics:    Labs:     A1c:   Lab Results   Component Value Date/Time    HBA1C 6.8 (H) 10/08/2019 01:47 AM          IMAGING: N/A    VASCULAR STUDIES: N/A    LAST  WOUND CULTURE:  DATE : N/A          ASSESSMENT AND PLAN:   1. Type 2 diabetes mellitus with left diabetic foot ulcer (HCC)  -Excisional debridement of wound in clinic today, medically necessary to promote wound healing.  -Patient to return to clinic weekly for assessment and debridement  -Patient to change dressing 1-2 times per week in between  clinic visits   Wound care: Zinc paste to perimeter wound to protect against maceration, hoarse shoe dressing for off loading, Hydrofera Blue for antimicrobial properties and absorption, Hypafix tape for skin    2. Diabetic polyneuropathy associated with diabetes mellitus due to underlying condition (HCC)  Comments: Patient has severe diabetic polyneuropathy with limited sensation to her feet patient also reports that she has lost most of the sensation in her hands.  Patient instructed to keep tight control over fasting blood sugar <140 for optimal wound healing; Implications of loss of protective sensation (LOPS) discussed with patient, including increased risk for amputation.  Advised to check feet at least daily, moisturize feet, and to always wear protective foot wear, arrange meticulous regular foot care by podiatrist or [facility]. Pt with good understanding.       PATIENT EDUCATION  - Importance of tight glucose control for wound healing   - Implications of loss of protective sensation (LOPS) discussed with patient- including increased risk for amputation.  - Advised to check feet at least daily, moisturize feet, and to always wear protective foot wear.   -  Importance of offloading foot to assist with wound healing  - Advised pt not to trim nails or calluses, seek foot/nail care from podiatrist or certified foot/nail nurse  - Importance of adequate nutrition for wound healing      Please note that this note may have been created using voice recognition software. I have worked with technical experts from [employer] to optimize the interface.  I have made every reasonable attempt to correct obvious errors, but there may be errors of grammar and possibly content that I did not discover before finalizing the note.    N

## 2021-01-28 ENCOUNTER — OFFICE VISIT (OUTPATIENT)
Dept: WOUND CARE | Facility: MEDICAL CENTER | Age: 41
End: 2021-01-28
Attending: FAMILY MEDICINE
Payer: COMMERCIAL

## 2021-01-28 VITALS
SYSTOLIC BLOOD PRESSURE: 126 MMHG | RESPIRATION RATE: 20 BRPM | HEART RATE: 94 BPM | TEMPERATURE: 97.9 F | DIASTOLIC BLOOD PRESSURE: 85 MMHG | OXYGEN SATURATION: 98 %

## 2021-01-28 DIAGNOSIS — E08.42 DIABETIC POLYNEUROPATHY ASSOCIATED WITH DIABETES MELLITUS DUE TO UNDERLYING CONDITION (HCC): ICD-10-CM

## 2021-01-28 DIAGNOSIS — L97.529 TYPE 2 DIABETES MELLITUS WITH LEFT DIABETIC FOOT ULCER (HCC): Primary | ICD-10-CM

## 2021-01-28 DIAGNOSIS — E11.621 TYPE 2 DIABETES MELLITUS WITH LEFT DIABETIC FOOT ULCER (HCC): Primary | ICD-10-CM

## 2021-01-28 PROCEDURE — 11055 PARING/CUTG B9 HYPRKER LES 1: CPT

## 2021-01-28 PROCEDURE — 11055 PARING/CUTG B9 HYPRKER LES 1: CPT | Performed by: NURSE PRACTITIONER

## 2021-01-28 RX ORDER — LISINOPRIL 20 MG/1
20 TABLET ORAL DAILY
Status: ON HOLD | COMMUNITY
Start: 2021-01-18 | End: 2022-10-14

## 2021-01-28 RX ORDER — ATORVASTATIN CALCIUM 40 MG/1
40 TABLET, FILM COATED ORAL EVERY EVENING
Status: ON HOLD | COMMUNITY
Start: 2020-12-04 | End: 2022-10-24 | Stop reason: SDUPTHER

## 2021-01-28 RX ORDER — ATENOLOL 50 MG/1
50 TABLET ORAL EVERY MORNING
Status: ON HOLD | COMMUNITY
Start: 2020-12-04 | End: 2022-10-14

## 2021-01-28 RX ORDER — TRAZODONE HYDROCHLORIDE 100 MG/1
TABLET ORAL
COMMUNITY
Start: 2021-01-13 | End: 2022-05-19

## 2021-01-28 RX ORDER — HYDROCODONE BITARTRATE AND ACETAMINOPHEN 10; 325 MG/1; MG/1
TABLET ORAL
COMMUNITY
Start: 2021-01-09 | End: 2021-07-12

## 2021-01-28 RX ORDER — MINOCYCLINE HYDROCHLORIDE 100 MG/1
CAPSULE ORAL
COMMUNITY
Start: 2021-01-05 | End: 2022-05-19

## 2021-01-28 RX ORDER — GABAPENTIN 800 MG/1
800 TABLET ORAL 4 TIMES DAILY
Status: ON HOLD | COMMUNITY
Start: 2021-01-18 | End: 2022-10-24 | Stop reason: SDUPTHER

## 2021-01-28 RX ORDER — AMLODIPINE BESYLATE 5 MG/1
TABLET ORAL
COMMUNITY
Start: 2021-01-18 | End: 2021-07-12

## 2021-01-28 RX ORDER — LANCETS
EACH MISCELLANEOUS
COMMUNITY
Start: 2020-12-04 | End: 2022-05-19

## 2021-01-28 RX ORDER — OXCARBAZEPINE 300 MG/1
300 TABLET, FILM COATED ORAL 2 TIMES DAILY
Status: ON HOLD | COMMUNITY
Start: 2020-12-23 | End: 2022-10-24 | Stop reason: SDUPTHER

## 2021-01-28 RX ORDER — DULOXETIN HYDROCHLORIDE 60 MG/1
60 CAPSULE, DELAYED RELEASE ORAL EVERY EVENING
Status: ON HOLD | COMMUNITY
Start: 2020-12-31 | End: 2022-10-24 | Stop reason: SDUPTHER

## 2021-01-28 RX ORDER — KETOCONAZOLE 20 MG/G
CREAM TOPICAL
COMMUNITY
Start: 2021-01-16 | End: 2022-05-19

## 2021-01-28 RX ORDER — PEN NEEDLE, DIABETIC 29 G X1/2"
NEEDLE, DISPOSABLE MISCELLANEOUS
COMMUNITY
Start: 2021-01-03 | End: 2022-05-19

## 2021-01-28 ASSESSMENT — ENCOUNTER SYMPTOMS
BLURRED VISION: 0
FEVER: 0
PALPITATIONS: 0
DOUBLE VISION: 0
DIZZINESS: 0
CHILLS: 0
VOMITING: 0
NAUSEA: 0
SHORTNESS OF BREATH: 0
COUGH: 0
HEADACHES: 0
WHEEZING: 0

## 2021-01-28 ASSESSMENT — PAIN SCALES - GENERAL: PAINLEVEL: NO PAIN

## 2021-01-28 NOTE — PROGRESS NOTES
Provider Encounter- Diabetic Foot Ulcer      HISTORY OF PRESENT ILLNESS  Wound History:    START OF CARE IN CLINIC: 1/11/2021    REFERRING PROVIDER: Adriana Levine M.D.     WOUND- Diabetic foot ulcer   LOCATION: Left plantar first metatarsal head   HISTORY: Patient reports that she went on vacation out of town and walked on her foot for extended period of time.  Patient reports that she has neuropathy and did not notice that she developed a blister to her first MTH on the left foot.  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  Patient asked for referral to [facility] from her primary care provider due to wound nonhealing wound.  Patient was referred to [facility] for assistance and resolution of diabetic foot ulcer.    Pertinent Medical History: Asthma, depression, anxiety, bipolar disorder, DM 2           TOBACCO USE: Current everyday smoker 0.5 packs/day for 5 years    Patient's problem list, allergies, and current medications reviewed and updated in Epic    Interval History:  1/11/2021: Clinic visit with NUHA Chaney. Patient states that they are feeling well today.  Patient denies fever, chills, nausea, vomiting, lightheadedness, dizziness, shortness of breath and chest pain.  Removal of significant amount of thick hardened callus to first MTH.  Excisional debridement of wound bed to stimulate granulation tissue formation.    1/22/2021: Clinic visit with UNHA Chaney. Patient states that they are feeling well today.  Patient denies fever, chills, nausea, vomiting, lightheadedness, dizziness, shortness of breath and chest pain.  Wound is improving however it would do significantly better if the patient could offload more.  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    1/28/2021: Clinic visit with NUHA Chaney. Patient states that they are feeling well today.   Patient denies fever, chills, nausea, vomiting, lightheadedness, dizziness, shortness of breath and chest pain.  Removed a significant amount of callus from the patient's wound bed.  The tissue is questionable in nature will bring the patient back in 1 week to evaluate for epithelialization and resolution of wound.       REVIEW OF SYSTEMS:   Review of Systems   Constitutional: Negative for chills and fever.   HENT: Negative for hearing loss.    Eyes: Negative for blurred vision and double vision.   Respiratory: Negative for cough, shortness of breath and wheezing.    Cardiovascular: Negative for chest pain, palpitations and leg swelling.   Gastrointestinal: Negative for nausea and vomiting.   Skin:        Diabetic foot ulcer left first MTH   Neurological: Negative for dizziness and headaches.       PHYSICAL EXAMINATION:   /85   Pulse 94   Temp 36.6 °C (97.9 °F)   Resp 20   SpO2 98%     Physical Exam   Constitutional: She is oriented to person, place, and time and well-developed, well-nourished, and in no distress.   HENT:   Head: Normocephalic and atraumatic.   Eyes: Pupils are equal, round, and reactive to light. Conjunctivae are normal.   Neck: Normal range of motion.   Cardiovascular: Intact distal pulses.   Pulmonary/Chest: Effort normal and breath sounds normal. No respiratory distress. She has no wheezes.   Musculoskeletal:         General: No tenderness or edema.   Neurological: She is alert and oriented to person, place, and time.   Skin: Skin is warm. No erythema.   Diabetic foot ulcer to left first MTH   Psychiatric: Mood, memory and affect normal.       WOUND ASSESSMENT        Wound 01/11/21 Left Plantar 1st MTH (Active)   Wound Image    01/28/21 1515   Site Assessment Dry;Pink 01/28/21 1515   Periwound Assessment Dry;Callused 01/28/21 1515   Margins Attached edges 01/28/21 1515   Drainage Amount Small 01/28/21 1515   Drainage Description Serosanguineous 01/28/21 1515   Treatments  Cleansed;Provider debridement 01/28/21 1515   Wound Cleansing Puracyn Spray 01/28/21 1515   Periwound Protectant Barrier Paste;Skin Moisturizer 01/28/21 1515   Dressing Cleansing/Solutions Not Applicable 01/11/21 1500   Dressing Options Hydrofera Blue Ready;Komprex Horseshoe;Hypafix Tape 01/28/21 1515   Dressing Changed New 01/21/21 1600   Dressing Status Clean;Dry;Intact 01/28/21 1515   Dressing Change/Treatment Frequency Every 72 hrs, and As Needed 01/28/21 1515   Non-staged Wound Description Full thickness 01/28/21 1515   Wound Length (cm) 0.4 cm 01/28/21 1515   Wound Width (cm) 0.5 cm 01/28/21 1515   Wound Depth (cm) 0.1 cm 01/28/21 1515   Wound Surface Area (cm^2) 0.2 cm^2 01/28/21 1515   Wound Volume (cm^3) 0.02 cm^3 01/28/21 1515   Post-Procedure Length (cm) 0.1 cm 01/28/21 1515   Post-Procedure Width (cm) 0.1 cm 01/28/21 1515   Post-Procedure Depth (cm) 0 cm 01/28/21 1515   Post-Procedure Surface Area (cm^2) 0.01 cm^2 01/28/21 1515   Post-Procedure Volume (cm^3) 0 cm^3 01/28/21 1515   Wound Healing % 89 01/28/21 1515   Tunneling (cm) 0 cm 01/28/21 1515   Undermining (cm) 0 cm 01/28/21 1515   Wound Odor None 01/28/21 1515   Exposed Structures None 01/28/21 1515       PROCEDURE: Using an emery board I pared down(1 callus) hyper keratotic tissue to skin level, the surface area of the callus measured approximately 2 cm².  -Wound care completed by wound RN, refer to flowsheet  -Patient tolerated the procedure well, without c/o pain or discomfort.       Pertinent Labs and Diagnostics:    Labs:     A1c:   Lab Results   Component Value Date/Time    HBA1C 6.8 (H) 10/08/2019 01:47 AM          IMAGING: N/A    VASCULAR STUDIES: N/A    LAST  WOUND CULTURE:  DATE : N/A          ASSESSMENT AND PLAN:   1. Type 2 diabetes mellitus with left diabetic foot ulcer (HCC)  -Excisional debridement not necessary in clinic today  -Patient to return to clinic weekly for assessment and debridement  -Patient to change dressing 1-2 times  per week in between clinic visits   Wound care: Zinc paste to perimeter wound to protect against maceration, hoarse shoe dressing for off loading, Hydrofera Blue for antimicrobial properties and absorption, Hypafix tape for securement    2. Diabetic polyneuropathy associated with diabetes mellitus due to underlying condition (HCC)  Comments: Patient has severe diabetic polyneuropathy with limited sensation to her feet patient also reports that she has lost most of the sensation in her hands.  Patient instructed to keep tight control over fasting blood sugar <140 for optimal wound healing; Implications of loss of protective sensation (LOPS) discussed with patient, including increased risk for amputation.  Advised to check feet at least daily, moisturize feet, and to always wear protective foot wear, arrange meticulous regular foot care by podiatrist or [facility]. Pt with good understanding.       PATIENT EDUCATION  - Importance of tight glucose control for wound healing   - Implications of loss of protective sensation (LOPS) discussed with patient- including increased risk for amputation.  - Advised to check feet at least daily, moisturize feet, and to always wear protective foot wear.   -  Importance of offloading foot to assist with wound healing  - Advised pt not to trim nails or calluses, seek foot/nail care from podiatrist or certified foot/nail nurse  - Importance of adequate nutrition for wound healing      Please note that this note may have been created using voice recognition software. I have worked with technical experts from [employer] to optimize the interface.  I have made every reasonable attempt to correct obvious errors, but there may be errors of grammar and possibly content that I did not discover before finalizing the note.    N

## 2021-01-29 NOTE — PATIENT INSTRUCTIONS
-Keep dressings clean and dry. Change dressings once between wound clinic visits, and if the dressings become saturated, soiled, or fall off.    -Avoid prolonged standing or sitting without elevating your legs.    -Never walk around the house barefoot. Always wear a rubber soled slipper when walking around the house.    -Should you experience any significant changes in your wound(s), such as infection (redness, swelling, localized heat, increased pain, fever > 101 F, chills) or have any questions regarding your home care instructions, please contact the wound center at [phone]. If after hours, contact your primary care physician or go to the hospital emergency room.

## 2021-02-04 ENCOUNTER — OFFICE VISIT (OUTPATIENT)
Dept: WOUND CARE | Facility: MEDICAL CENTER | Age: 41
End: 2021-02-04
Attending: FAMILY MEDICINE
Payer: COMMERCIAL

## 2021-02-04 VITALS
HEART RATE: 94 BPM | RESPIRATION RATE: 16 BRPM | OXYGEN SATURATION: 97 % | DIASTOLIC BLOOD PRESSURE: 86 MMHG | SYSTOLIC BLOOD PRESSURE: 124 MMHG | TEMPERATURE: 98.1 F

## 2021-02-04 DIAGNOSIS — L97.529 TYPE 2 DIABETES MELLITUS WITH LEFT DIABETIC FOOT ULCER (HCC): ICD-10-CM

## 2021-02-04 DIAGNOSIS — E11.621 TYPE 2 DIABETES MELLITUS WITH LEFT DIABETIC FOOT ULCER (HCC): ICD-10-CM

## 2021-02-04 PROCEDURE — 97597 DBRDMT OPN WND 1ST 20 CM/<: CPT

## 2021-02-04 NOTE — PATIENT INSTRUCTIONS
-Keep dressings clean and dry. Change dressings if they become over saturated, soiled or fall off.     -Avoid prolonged standing or sitting without elevating your legs.    -Should you experience any significant changes in your wound(s), such as infection (redness, swelling, localized heat, increased pain, fever > 101 F, chills) or have any questions regarding your home care instructions, please contact the wound center at [phone]. If after hours, contact your primary care physician or go to the hospital emergency room.

## 2021-02-04 NOTE — PROCEDURES
Patient not seen by provider this visit. RN visit only.  CSWD with curette to remove ~0.5cm2 of callus and non viable tissue from wound bed and francois wound.     Patient instructed to present to [facility] to begin process of having diabetic shoes and inserts made. Educated patient this process can take up to 4 weeks, so the sooner she can begin this process, the better. Patient verbalizes understanding.

## 2021-02-11 ENCOUNTER — OFFICE VISIT (OUTPATIENT)
Dept: WOUND CARE | Facility: MEDICAL CENTER | Age: 41
End: 2021-02-11
Attending: FAMILY MEDICINE
Payer: COMMERCIAL

## 2021-02-11 VITALS
RESPIRATION RATE: 20 BRPM | TEMPERATURE: 98 F | HEART RATE: 81 BPM | DIASTOLIC BLOOD PRESSURE: 72 MMHG | SYSTOLIC BLOOD PRESSURE: 113 MMHG

## 2021-02-11 DIAGNOSIS — E08.42 DIABETIC POLYNEUROPATHY ASSOCIATED WITH DIABETES MELLITUS DUE TO UNDERLYING CONDITION (HCC): ICD-10-CM

## 2021-02-11 DIAGNOSIS — E11.621 TYPE 2 DIABETES MELLITUS WITH LEFT DIABETIC FOOT ULCER (HCC): Primary | ICD-10-CM

## 2021-02-11 DIAGNOSIS — L97.529 TYPE 2 DIABETES MELLITUS WITH LEFT DIABETIC FOOT ULCER (HCC): Primary | ICD-10-CM

## 2021-02-11 PROCEDURE — 11055 PARING/CUTG B9 HYPRKER LES 1: CPT

## 2021-02-11 PROCEDURE — 11055 PARING/CUTG B9 HYPRKER LES 1: CPT | Performed by: NURSE PRACTITIONER

## 2021-02-11 ASSESSMENT — ENCOUNTER SYMPTOMS
BLURRED VISION: 0
DOUBLE VISION: 0
WHEEZING: 0
SHORTNESS OF BREATH: 0
HEADACHES: 0
DIZZINESS: 0
FEVER: 0
CHILLS: 0
NAUSEA: 0
VOMITING: 0
COUGH: 0
PALPITATIONS: 0

## 2021-02-11 ASSESSMENT — PAIN SCALES - GENERAL: PAINLEVEL: NO PAIN

## 2021-02-11 NOTE — PROGRESS NOTES
Provider Encounter- Diabetic Foot Ulcer      HISTORY OF PRESENT ILLNESS  Wound History:    START OF CARE IN CLINIC: 1/11/2021    REFERRING PROVIDER: Adriana Levine M.D.     WOUND- Diabetic foot ulcer   LOCATION: Left plantar first metatarsal head   HISTORY: Patient reports that she went on vacation out of town and walked on her foot for extended period of time.  Patient reports that she has neuropathy and did not notice that she developed a blister to her first MTH on the left foot.  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  Patient asked for referral to [facility] from her primary care provider due to wound nonhealing wound.  Patient was referred to [facility] for assistance and resolution of diabetic foot ulcer.    Pertinent Medical History: Asthma, depression, anxiety, bipolar disorder, DM 2           TOBACCO USE: Current everyday smoker 0.5 packs/day for 5 years    Patient's problem list, allergies, and current medications reviewed and updated in Epic    Interval History:  1/11/2021: Clinic visit with NUHA Chaney. Patient states that they are feeling well today.  Patient denies fever, chills, nausea, vomiting, lightheadedness, dizziness, shortness of breath and chest pain.  Removal of significant amount of thick hardened callus to first MTH.  Excisional debridement of wound bed to stimulate granulation tissue formation.    1/22/2021: Clinic visit with NUHA Chaney. Patient states that they are feeling well today.  Patient denies fever, chills, nausea, vomiting, lightheadedness, dizziness, shortness of breath and chest pain.  Wound is improving however it would do significantly better if the patient could offload more.  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    1/28/2021: Clinic visit with NUHA Chaney. Patient states that they are feeling well today.   Patient denies fever, chills, nausea, vomiting, lightheadedness, dizziness, shortness of breath and chest pain.  Removed a significant amount of callus from the patient's wound bed.  The tissue is questionable in nature will bring the patient back in 1 week to evaluate for epithelialization and resolution of wound.    2/11/2021: Clinic visit with NUHA Chaney. Patient states that they are feeling well today.  Patient denies fever, chills, nausea, vomiting, lightheadedness, dizziness, shortness of breath and chest pain.  Removal of callus in clinic today.  There are no open wounds beneath the callus.  Patient will be discharged in [facility] at this time.         REVIEW OF SYSTEMS:   Review of Systems   Constitutional: Negative for chills and fever.   HENT: Negative for hearing loss.    Eyes: Negative for blurred vision and double vision.   Respiratory: Negative for cough, shortness of breath and wheezing.    Cardiovascular: Negative for chest pain, palpitations and leg swelling.   Gastrointestinal: Negative for nausea and vomiting.   Skin:        Callus to left first MTH   Neurological: Negative for dizziness and headaches.       PHYSICAL EXAMINATION:   /72   Pulse 81   Temp 36.7 °C (98 °F)   Resp 20     Physical Exam   Constitutional: She is oriented to person, place, and time and well-developed, well-nourished, and in no distress.   HENT:   Head: Normocephalic and atraumatic.   Eyes: Pupils are equal, round, and reactive to light. Conjunctivae are normal.   Cardiovascular: Intact distal pulses.   Pulmonary/Chest: Effort normal and breath sounds normal. No respiratory distress. She has no wheezes.   Musculoskeletal:         General: No tenderness or edema.      Cervical back: Normal range of motion.   Neurological: She is alert and oriented to person, place, and time.   Skin: Skin is warm. No erythema.   Callus removed to left first MTH there are no open wounds at this time   Psychiatric: Mood, memory  and affect normal.         PROCEDURE: Using a #15 scalpel I pared down/shaved 1 callus to the patient's left first MTH callus was taken down to the epithelial level.  -Wound care completed by wound RN, refer to flowsheet  -Patient tolerated the procedure well, without c/o pain or discomfort.       Pertinent Labs and Diagnostics:    Labs:     A1c:   Lab Results   Component Value Date/Time    HBA1C 6.8 (H) 10/08/2019 01:47 AM          IMAGING: N/A    VASCULAR STUDIES: N/A    LAST  WOUND CULTURE:  DATE : N/A          ASSESSMENT AND PLAN:   1. Type 2 diabetes mellitus with left diabetic foot ulcer (HCC)  -Resolved  -Patient educated that she will need to frequently pare down built up callus due to her diabetes with an emery board.  Patient to get her diabetic shoes she states she will go to [facility] on 2/12/2021 for day off.   Wound care: Nonadhesive foam, Hypafix tape    2. Diabetic polyneuropathy associated with diabetes mellitus due to underlying condition (HCC)  Comments: Patient has severe diabetic polyneuropathy with limited sensation to her feet patient also reports that she has lost most of the sensation in her hands.  Patient instructed to keep tight control over fasting blood sugar <140 for optimal wound healing; Implications of loss of protective sensation (LOPS) discussed with patient, including increased risk for amputation.  Advised to check feet at least daily, moisturize feet, and to always wear protective foot wear, arrange meticulous regular foot care by podiatrist or [facility]. Pt with good understanding.       PATIENT EDUCATION  - Importance of tight glucose control for wound healing   - Implications of loss of protective sensation (LOPS) discussed with patient- including increased risk for amputation.  - Advised to check feet at least daily, moisturize feet, and to always wear protective foot wear.   -  Importance of offloading foot to assist with wound healing  - Advised pt not to trim nails or calluses,  seek foot/nail care from podiatrist or certified foot/nail nurse  - Importance of adequate nutrition for wound healing      Please note that this note may have been created using voice recognition software. I have worked with technical experts from [facility] to optimize the interface.  I have made every reasonable attempt to correct obvious errors, but there may be errors of grammar and possibly content that I did not discover before finalizing the note.    N

## 2021-02-18 ENCOUNTER — APPOINTMENT (OUTPATIENT)
Dept: WOUND CARE | Facility: MEDICAL CENTER | Age: 41
End: 2021-02-18
Attending: FAMILY MEDICINE
Payer: COMMERCIAL

## 2021-02-25 ENCOUNTER — APPOINTMENT (OUTPATIENT)
Dept: WOUND CARE | Facility: MEDICAL CENTER | Age: 41
End: 2021-02-25
Attending: FAMILY MEDICINE
Payer: COMMERCIAL

## 2021-05-19 ENCOUNTER — APPOINTMENT (RX ONLY)
Dept: URBAN - METROPOLITAN AREA CLINIC 20 | Facility: CLINIC | Age: 41
Setting detail: DERMATOLOGY
End: 2021-05-19

## 2021-05-19 DIAGNOSIS — D18.0 HEMANGIOMA: ICD-10-CM

## 2021-05-19 DIAGNOSIS — D22 MELANOCYTIC NEVI: ICD-10-CM

## 2021-05-19 DIAGNOSIS — L40.0 PSORIASIS VULGARIS: ICD-10-CM | Status: INADEQUATELY CONTROLLED

## 2021-05-19 DIAGNOSIS — Z87.2 PERSONAL HISTORY OF DISEASES OF THE SKIN AND SUBCUTANEOUS TISSUE: ICD-10-CM

## 2021-05-19 DIAGNOSIS — L81.4 OTHER MELANIN HYPERPIGMENTATION: ICD-10-CM

## 2021-05-19 DIAGNOSIS — L30.4 ERYTHEMA INTERTRIGO: ICD-10-CM

## 2021-05-19 DIAGNOSIS — T81.89 OTHER COMPLICATIONS OF PROCEDURES, NOT ELSEWHERE CLASSIFIED: ICD-10-CM | Status: INADEQUATELY CONTROLLED

## 2021-05-19 DIAGNOSIS — Z85.820 PERSONAL HISTORY OF MALIGNANT MELANOMA OF SKIN: ICD-10-CM

## 2021-05-19 PROBLEM — D22.5 MELANOCYTIC NEVI OF TRUNK: Status: ACTIVE | Noted: 2021-05-19

## 2021-05-19 PROBLEM — D22.4 MELANOCYTIC NEVI OF SCALP AND NECK: Status: ACTIVE | Noted: 2021-05-19

## 2021-05-19 PROBLEM — D18.01 HEMANGIOMA OF SKIN AND SUBCUTANEOUS TISSUE: Status: ACTIVE | Noted: 2021-05-19

## 2021-05-19 PROBLEM — D22.61 MELANOCYTIC NEVI OF RIGHT UPPER LIMB, INCLUDING SHOULDER: Status: ACTIVE | Noted: 2021-05-19

## 2021-05-19 PROBLEM — D22.72 MELANOCYTIC NEVI OF LEFT LOWER LIMB, INCLUDING HIP: Status: ACTIVE | Noted: 2021-05-19

## 2021-05-19 PROBLEM — T81.89XA OTHER COMPLICATIONS OF PROCEDURES, NOT ELSEWHERE CLASSIFIED, INITIAL ENCOUNTER: Status: ACTIVE | Noted: 2021-05-19

## 2021-05-19 PROCEDURE — ? PRESCRIPTION

## 2021-05-19 PROCEDURE — ? DIAGNOSIS COMMENT

## 2021-05-19 PROCEDURE — ? COUNSELING

## 2021-05-19 PROCEDURE — ? ADDITIONAL NOTES

## 2021-05-19 PROCEDURE — ? OBSERVATION AND MEASURE

## 2021-05-19 PROCEDURE — 99214 OFFICE O/P EST MOD 30 MIN: CPT

## 2021-05-19 RX ORDER — HALOBETASOL PROPIONATE AND TAZAROTENE .1; .45 MG/G; MG/G
LOTION TOPICAL
Qty: 1 | Refills: 6 | Status: ERX

## 2021-05-19 ASSESSMENT — LOCATION ZONE DERM
LOCATION ZONE: FEET
LOCATION ZONE: FACE
LOCATION ZONE: LEG
LOCATION ZONE: TRUNK
LOCATION ZONE: ARM
LOCATION ZONE: SCALP
LOCATION ZONE: HAND

## 2021-05-19 ASSESSMENT — LOCATION DETAILED DESCRIPTION DERM
LOCATION DETAILED: LEFT DORSAL HAND
LOCATION DETAILED: LEFT SUPERIOR UPPER BACK
LOCATION DETAILED: RIGHT DORSAL HAND
LOCATION DETAILED: LEFT PLANTAR FOREFOOT OVERLYING 1ST METATARSAL
LOCATION DETAILED: LEFT ANTERIOR PROXIMAL THIGH
LOCATION DETAILED: LEFT MID BACK
LOCATION DETAILED: RIGHT PLANTAR FOREFOOT OVERLYING 1ST METATARSAL
LOCATION DETAILED: RIGHT POSTERIOR SHOULDER
LOCATION DETAILED: LEFT UPPER BACK
LOCATION DETAILED: LEFT ELBOW
LOCATION DETAILED: RIGHT CHEEK
LOCATION DETAILED: LEFT RADIAL DORSAL HAND
LOCATION DETAILED: RIGHT DISTAL LATERAL CALF
LOCATION DETAILED: RIGHT ELBOW
LOCATION DETAILED: PERIUMBILICAL SKIN
LOCATION DETAILED: RIGHT ANTERIOR THIGH
LOCATION DETAILED: LEFT POSTERIOR ANKLE
LOCATION DETAILED: LEFT PROXIMAL POSTERIOR THIGH
LOCATION DETAILED: LEFT INFERIOR OCCIPITAL SCALP
LOCATION DETAILED: RIGHT LATERAL ABDOMEN
LOCATION DETAILED: RIGHT PROXIMAL POSTERIOR UPPER ARM
LOCATION DETAILED: LEFT ANTERIOR DISTAL THIGH
LOCATION DETAILED: LEFT ANTERIOR THIGH

## 2021-05-19 ASSESSMENT — LOCATION SIMPLE DESCRIPTION DERM
LOCATION SIMPLE: LEFT THIGH
LOCATION SIMPLE: LEFT HAND
LOCATION SIMPLE: RIGHT CALF
LOCATION SIMPLE: POSTERIOR SCALP
LOCATION SIMPLE: RIGHT POSTERIOR UPPER ARM
LOCATION SIMPLE: RIGHT LOWER EXTREMITY
LOCATION SIMPLE: LEFT PLANTAR SURFACE
LOCATION SIMPLE: BACK
LOCATION SIMPLE: RIGHT SHOULDER
LOCATION SIMPLE: ABDOMEN
LOCATION SIMPLE: LEFT UPPER BACK
LOCATION SIMPLE: RIGHT ELBOW
LOCATION SIMPLE: RIGHT HAND
LOCATION SIMPLE: LEFT LOWER EXTREMITY
LOCATION SIMPLE: RIGHT CHEEK
LOCATION SIMPLE: LEFT POSTERIOR THIGH
LOCATION SIMPLE: RIGHT PLANTAR SURFACE
LOCATION SIMPLE: LEFT ELBOW
LOCATION SIMPLE: LEFT ANKLE

## 2021-05-19 NOTE — PROCEDURE: ADDITIONAL NOTES
Additional Notes: Plan:\\n1. Continue duobrii once a day\\n2. Start UVB phototherapy  2-3 week for 2 months then reevaluate \\n3. Talk to Dr. Caraballo (Oncologist) about a biologics
Render Risk Assessment In Note?: no
Detail Level: Simple
Additional Notes: Refer to wound care [facility].\\n\\nAlso I have recommended pt see Dr. Martini podiatrist
Additional Notes: Pt is diabetic and has had persistent ulcers to feet, most likely from being on he feet for 12 hours, pt is an RN.  In the past she has seen wound care for delayed healing ulcers. \\n\\nI have also recommended pt seeing a podiatrist for possibly orthotics which may help with pressure points of feet that are sites of reoccurring ulcers.

## 2021-05-19 NOTE — PROCEDURE: DIAGNOSIS COMMENT
Comment: Excised in 2016; 2.8mm, re-excised 2017 (1+ node,18 neg), chuy simpson/ Ileana [id number], Lelo PERKINS
Detail Level: Simple
Comment: Biopsy proven mild to moderate 10/2017; monitor for repigmentation
Comment: Biopsy proven; all sites excised
Comment: Biopsy x 2; [id number], proven benign nevi, closely monitor

## 2021-06-15 ENCOUNTER — APPOINTMENT (RX ONLY)
Dept: URBAN - METROPOLITAN AREA CLINIC 4 | Facility: CLINIC | Age: 41
Setting detail: DERMATOLOGY
End: 2021-06-15

## 2021-06-15 DIAGNOSIS — L40.0 PSORIASIS VULGARIS: ICD-10-CM

## 2021-06-15 PROCEDURE — 96900 ACTINOTHERAPY UV LIGHT: CPT

## 2021-06-15 PROCEDURE — ? PHOTOTHERAPY TREATMENT

## 2021-06-15 ASSESSMENT — LOCATION SIMPLE DESCRIPTION DERM
LOCATION SIMPLE: RIGHT ELBOW
LOCATION SIMPLE: LEFT ELBOW

## 2021-06-15 ASSESSMENT — LOCATION DETAILED DESCRIPTION DERM
LOCATION DETAILED: LEFT ELBOW
LOCATION DETAILED: RIGHT ELBOW

## 2021-06-15 ASSESSMENT — LOCATION ZONE DERM: LOCATION ZONE: ARM

## 2021-06-15 NOTE — PROCEDURE: PHOTOTHERAPY TREATMENT
Total Treatment Time: 30 seconds
Protocol For Photochemotherapy For Severe Photoresponsive Dermatoses: Petrolatum And Broad Band Uvb: The patient received Photochemotherapyfor severe photoresponsive dermatoses: Petrolatum and Broad Band UVB requiring at least 4 to 8 hours of care under direct physician supervision.
Protocol For Photochemotherapy: Baby Oil And Nbuvb: The patient received Photochemotherapy: Baby Oil and NBUVB (baby oil applied to all lesions prior to phototherapy).
Protocol For Photochemotherapy: Tar And Broad Band Uvb (Goeckerman Treatment): The patient received Photochemotherapy: Tar and Broad Band UVB (Goeckerman treatment).
Post-Care Instructions: I reviewed with the patient in detail post-care instructions. Patient is to wear sun protection. Patients may expect sunburn like redness, discomfort and scabbing.
Protocol For Broad Band Uvb: The patient received Broad Band UVB.
Changes In Treatment Protocol: Per patient protocol increased treatment time by 15 seconds
Protocol For Protocol For Photochemotherapy For Severe Photoresponsive Dermatoses: Bath Puva: The patient received Photochemotherapy for severe photoresponsive dermatoses: Bath PUVA requiring at least 4 to 8 hours of care under direct physician supervision.
Protocol For Photochemotherapy For Severe Photoresponsive Dermatoses: Petrolatum And Nbuvb: The patient received Photochemotherapy for severe photoresponsive dermatoses: Petrolatum and NBUVB requiring at least 4 to 8 hours of care under direct physician supervision.
Protocol For Photochemotherapy: Petrolatum And Broad Band Uvb: The patient received Photochemotherapy: Petrolatum and Broad Band UVB.
Protocol For Photochemotherapy: Mineral Oil And Nbuvb: The patient received Photochemotherapy: Mineral Oil and NBUVB (mineral oil applied to all lesions prior to phototherapy).
Render Post-Care In The Note: no
Consent: Written consent obtained.  The risks were reviewed with the patient including but not limited to: burn, pigmentary changes, pain, blistering, scabbing, redness, increased risk of skin cancers, and the remote possibility of scarring.
Treatment Number: 2
Protocol For Nb Uva: The patient received NB UVA.
Name Of Supervising Technician: Julieta Malagon MA
Protocol For Puva: The patient received PUVA.
Protocol For Photochemotherapy: Triamcinolone Ointment And Nbuvb: The patient received Photochemotherapy: Triamcinolone and NBUVB (triamcinolone ointment applied to all lesions prior to phototherapy).
Protocol For Photochemotherapy: Mineral Oil And Broad Band Uvb: The patient received Photochemotherapy: Mineral Oil and Broad Band UVB.
Protocol For Nbuvb: The patient received NBUVB.
Protocol For Photochemotherapy For Severe Photoresponsive Dermatoses: Puva: The patient received Photochemotherapy for severe photoresponsive dermatoses: PUVA requiring at least 4 to 8 hours of care under direct physician supervision.
Protocol For Uva: The patient received UVA.
Protocol: NBUVB
Protocol For Photochemotherapy For Severe Photoresponsive Dermatoses: Tar And Nbuvb (Goeckerman Treatment): The patient received Photochemotherapy for severe photoresponsive dermatoses: Tar and NBUVB (Goeckerman treatment) requiring at least 4 to 8 hours of care under direct physician supervision.
Protocol For Photochemotherapy For Severe Photoresponsive Dermatoses: Tar And Broad Band Uvb (Goeckerman Treatment): The patient received Photochemotherapy for severe photoresponsive dermatoses: Tar and Broad Band UVB (Goeckerman treatment) requiring at least 4 to 8 hours of care under direct physician supervision.
Additional Shield Locations: Face - covered with a towel
Protocol For Photochemotherapy: Petrolatum And Nbuvb: The patient received Photochemotherapy: Petrolatum and NBUVB (petrolatum applied to all lesions prior to phototherapy).
Detail Level: Zone
Protocol For Photochemotherapy: Tar And Nbuvb (Goeckerman Treatment): The patient received Photochemotherapy: Tar and NBUVB (Goeckerman treatment).
Protocol For Bath Puva: The patient received Bath PUVA.
Comments On Previous Treatment: Pt denies any redness or discomfort from previous treatment
Protocol For Uva1: The patient received UVA1.

## 2021-06-16 ENCOUNTER — APPOINTMENT (RX ONLY)
Dept: URBAN - METROPOLITAN AREA CLINIC 4 | Facility: CLINIC | Age: 41
Setting detail: DERMATOLOGY
End: 2021-06-16

## 2021-06-16 DIAGNOSIS — L40.0 PSORIASIS VULGARIS: ICD-10-CM

## 2021-06-16 PROCEDURE — ? PHOTOTHERAPY TREATMENT

## 2021-06-16 NOTE — PROCEDURE: PHOTOTHERAPY TREATMENT
Protocol For Photochemotherapy: Mineral Oil And Broad Band Uvb: The patient received Photochemotherapy: Mineral Oil and Broad Band UVB.
Protocol For Photochemotherapy: Triamcinolone Ointment And Nbuvb: The patient received Photochemotherapy: Triamcinolone and NBUVB (triamcinolone ointment applied to all lesions prior to phototherapy).
Protocol: NBUVB
Protocol For Photochemotherapy For Severe Photoresponsive Dermatoses: Puva: The patient received Photochemotherapy for severe photoresponsive dermatoses: PUVA requiring at least 4 to 8 hours of care under direct physician supervision.
Total Body Time: 30 seconds
Protocol For Uva: The patient received UVA.
Protocol For Photochemotherapy: Petrolatum And Nbuvb: The patient received Photochemotherapy: Petrolatum and NBUVB (petrolatum applied to all lesions prior to phototherapy).
Protocol For Bath Puva: The patient received Bath PUVA.
Protocol For Photochemotherapy For Severe Photoresponsive Dermatoses: Tar And Nbuvb (Goeckerman Treatment): The patient received Photochemotherapy for severe photoresponsive dermatoses: Tar and NBUVB (Goeckerman treatment) requiring at least 4 to 8 hours of care under direct physician supervision.
Post-Care Instructions: I reviewed with the patient in detail post-care instructions. Patient is to wear sun protection. Patients may expect sunburn like redness, discomfort and scabbing.
Protocol For Uva1: The patient received UVA1.
Detail Level: Zone
Protocol For Broad Band Uvb: The patient received Broad Band UVB.
Protocol For Photochemotherapy: Baby Oil And Nbuvb: The patient received Photochemotherapy: Baby Oil and NBUVB (baby oil applied to all lesions prior to phototherapy).
Protocol For Photochemotherapy For Severe Photoresponsive Dermatoses: Tar And Broad Band Uvb (Goeckerman Treatment): The patient received Photochemotherapy for severe photoresponsive dermatoses: Tar and Broad Band UVB (Goeckerman treatment) requiring at least 4 to 8 hours of care under direct physician supervision.
Protocol For Protocol For Photochemotherapy For Severe Photoresponsive Dermatoses: Bath Puva: The patient received Photochemotherapy for severe photoresponsive dermatoses: Bath PUVA requiring at least 4 to 8 hours of care under direct physician supervision.
Protocol For Photochemotherapy For Severe Photoresponsive Dermatoses: Petrolatum And Broad Band Uvb: The patient received Photochemotherapyfor severe photoresponsive dermatoses: Petrolatum and Broad Band UVB requiring at least 4 to 8 hours of care under direct physician supervision.
Name Of Supervising Technician: Julieta Malagon MA
Protocol For Photochemotherapy For Severe Photoresponsive Dermatoses: Petrolatum And Nbuvb: The patient received Photochemotherapy for severe photoresponsive dermatoses: Petrolatum and NBUVB requiring at least 4 to 8 hours of care under direct physician supervision.
Protocol For Nb Uva: The patient received NB UVA.
Consent: Written consent obtained.  The risks were reviewed with the patient including but not limited to: burn, pigmentary changes, pain, blistering, scabbing, redness, increased risk of skin cancers, and the remote possibility of scarring.
Treatment Number: 2
Protocol For Photochemotherapy: Tar And Broad Band Uvb (Goeckerman Treatment): The patient received Photochemotherapy: Tar and Broad Band UVB (Goeckerman treatment).
Protocol For Photochemotherapy: Petrolatum And Broad Band Uvb: The patient received Photochemotherapy: Petrolatum and Broad Band UVB.
Protocol For Nbuvb: The patient received NBUVB.
Protocol For Puva: The patient received PUVA.
Protocol For Photochemotherapy: Tar And Nbuvb (Goeckerman Treatment): The patient received Photochemotherapy: Tar and NBUVB (Goeckerman treatment).
Protocol For Photochemotherapy: Mineral Oil And Nbuvb: The patient received Photochemotherapy: Mineral Oil and NBUVB (mineral oil applied to all lesions prior to phototherapy).
Render Post-Care In The Note: no

## 2021-06-17 ENCOUNTER — APPOINTMENT (RX ONLY)
Dept: URBAN - METROPOLITAN AREA CLINIC 4 | Facility: CLINIC | Age: 41
Setting detail: DERMATOLOGY
End: 2021-06-17

## 2021-06-17 DIAGNOSIS — L40.0 PSORIASIS VULGARIS: ICD-10-CM

## 2021-06-17 PROCEDURE — ? PHOTOTHERAPY TREATMENT

## 2021-06-17 PROCEDURE — 96900 ACTINOTHERAPY UV LIGHT: CPT

## 2021-06-17 ASSESSMENT — LOCATION DETAILED DESCRIPTION DERM
LOCATION DETAILED: RIGHT ELBOW
LOCATION DETAILED: LEFT ELBOW

## 2021-06-17 ASSESSMENT — LOCATION SIMPLE DESCRIPTION DERM
LOCATION SIMPLE: LEFT ELBOW
LOCATION SIMPLE: RIGHT ELBOW

## 2021-06-17 ASSESSMENT — LOCATION ZONE DERM: LOCATION ZONE: ARM

## 2021-06-17 NOTE — PROCEDURE: PHOTOTHERAPY TREATMENT
Total Treatment Time: 45 seconds
Protocol For Photochemotherapy For Severe Photoresponsive Dermatoses: Petrolatum And Broad Band Uvb: The patient received Photochemotherapyfor severe photoresponsive dermatoses: Petrolatum and Broad Band UVB requiring at least 4 to 8 hours of care under direct physician supervision.
Protocol For Photochemotherapy: Baby Oil And Nbuvb: The patient received Photochemotherapy: Baby Oil and NBUVB (baby oil applied to all lesions prior to phototherapy).
Protocol For Photochemotherapy: Tar And Broad Band Uvb (Goeckerman Treatment): The patient received Photochemotherapy: Tar and Broad Band UVB (Goeckerman treatment).
Post-Care Instructions: I reviewed with the patient in detail post-care instructions. Patient is to wear sun protection. Patients may expect sunburn like redness, discomfort and scabbing.
Protocol For Broad Band Uvb: The patient received Broad Band UVB.
Changes In Treatment Protocol: Per patient protocol increased treatment time by 15 seconds
Protocol For Protocol For Photochemotherapy For Severe Photoresponsive Dermatoses: Bath Puva: The patient received Photochemotherapy for severe photoresponsive dermatoses: Bath PUVA requiring at least 4 to 8 hours of care under direct physician supervision.
Protocol For Photochemotherapy For Severe Photoresponsive Dermatoses: Petrolatum And Nbuvb: The patient received Photochemotherapy for severe photoresponsive dermatoses: Petrolatum and NBUVB requiring at least 4 to 8 hours of care under direct physician supervision.
Skin Type: II
Protocol For Photochemotherapy: Petrolatum And Broad Band Uvb: The patient received Photochemotherapy: Petrolatum and Broad Band UVB.
Protocol For Photochemotherapy: Mineral Oil And Nbuvb: The patient received Photochemotherapy: Mineral Oil and NBUVB (mineral oil applied to all lesions prior to phototherapy).
Render Post-Care In The Note: no
Consent: Written consent obtained.  The risks were reviewed with the patient including but not limited to: burn, pigmentary changes, pain, blistering, scabbing, redness, increased risk of skin cancers, and the remote possibility of scarring.
Treatment Number: 3
Protocol For Nb Uva: The patient received NB UVA.
Name Of Supervising Technician: Rita Eisenberg MA
Protocol For Puva: The patient received PUVA.
Protocol For Photochemotherapy: Triamcinolone Ointment And Nbuvb: The patient received Photochemotherapy: Triamcinolone and NBUVB (triamcinolone ointment applied to all lesions prior to phototherapy).
Protocol For Photochemotherapy: Mineral Oil And Broad Band Uvb: The patient received Photochemotherapy: Mineral Oil and Broad Band UVB.
Protocol For Nbuvb: The patient received NBUVB.
Protocol For Photochemotherapy For Severe Photoresponsive Dermatoses: Puva: The patient received Photochemotherapy for severe photoresponsive dermatoses: PUVA requiring at least 4 to 8 hours of care under direct physician supervision.
Protocol For Uva: The patient received UVA.
Protocol: NBUVB
Protocol For Photochemotherapy For Severe Photoresponsive Dermatoses: Tar And Nbuvb (Goeckerman Treatment): The patient received Photochemotherapy for severe photoresponsive dermatoses: Tar and NBUVB (Goeckerman treatment) requiring at least 4 to 8 hours of care under direct physician supervision.
Protocol For Photochemotherapy For Severe Photoresponsive Dermatoses: Tar And Broad Band Uvb (Goeckerman Treatment): The patient received Photochemotherapy for severe photoresponsive dermatoses: Tar and Broad Band UVB (Goeckerman treatment) requiring at least 4 to 8 hours of care under direct physician supervision.
Additional Shield Locations: Face - covered with a towel
Protocol For Photochemotherapy: Petrolatum And Nbuvb: The patient received Photochemotherapy: Petrolatum and NBUVB (petrolatum applied to all lesions prior to phototherapy).
Detail Level: Zone
Protocol For Photochemotherapy: Tar And Nbuvb (Goeckerman Treatment): The patient received Photochemotherapy: Tar and NBUVB (Goeckerman treatment).
Protocol For Bath Puva: The patient received Bath PUVA.
Comments On Previous Treatment: Pt denies any redness or discomfort from previous treatment
Protocol For Uva1: The patient received UVA1.

## 2021-06-22 ENCOUNTER — APPOINTMENT (RX ONLY)
Dept: URBAN - METROPOLITAN AREA CLINIC 4 | Facility: CLINIC | Age: 41
Setting detail: DERMATOLOGY
End: 2021-06-22

## 2021-06-22 DIAGNOSIS — L40.0 PSORIASIS VULGARIS: ICD-10-CM

## 2021-06-22 PROCEDURE — 96900 ACTINOTHERAPY UV LIGHT: CPT

## 2021-06-22 PROCEDURE — ? PHOTOTHERAPY TREATMENT

## 2021-06-22 ASSESSMENT — LOCATION ZONE DERM: LOCATION ZONE: ARM

## 2021-06-22 ASSESSMENT — LOCATION SIMPLE DESCRIPTION DERM
LOCATION SIMPLE: LEFT ELBOW
LOCATION SIMPLE: RIGHT ELBOW

## 2021-06-22 ASSESSMENT — LOCATION DETAILED DESCRIPTION DERM
LOCATION DETAILED: RIGHT ELBOW
LOCATION DETAILED: LEFT ELBOW

## 2021-06-22 NOTE — PROCEDURE: PHOTOTHERAPY TREATMENT
Total Treatment Time: 60 seconds
Protocol For Photochemotherapy For Severe Photoresponsive Dermatoses: Petrolatum And Broad Band Uvb: The patient received Photochemotherapyfor severe photoresponsive dermatoses: Petrolatum and Broad Band UVB requiring at least 4 to 8 hours of care under direct physician supervision.
Protocol For Photochemotherapy: Baby Oil And Nbuvb: The patient received Photochemotherapy: Baby Oil and NBUVB (baby oil applied to all lesions prior to phototherapy).
Protocol For Photochemotherapy: Tar And Broad Band Uvb (Goeckerman Treatment): The patient received Photochemotherapy: Tar and Broad Band UVB (Goeckerman treatment).
Post-Care Instructions: I reviewed with the patient in detail post-care instructions. Patient is to wear sun protection. Patients may expect sunburn like redness, discomfort and scabbing.
Protocol For Broad Band Uvb: The patient received Broad Band UVB.
Changes In Treatment Protocol: Per patient protocol increased treatment time by 15 seconds
Protocol For Protocol For Photochemotherapy For Severe Photoresponsive Dermatoses: Bath Puva: The patient received Photochemotherapy for severe photoresponsive dermatoses: Bath PUVA requiring at least 4 to 8 hours of care under direct physician supervision.
Protocol For Photochemotherapy For Severe Photoresponsive Dermatoses: Petrolatum And Nbuvb: The patient received Photochemotherapy for severe photoresponsive dermatoses: Petrolatum and NBUVB requiring at least 4 to 8 hours of care under direct physician supervision.
Skin Type: II
Protocol For Photochemotherapy: Petrolatum And Broad Band Uvb: The patient received Photochemotherapy: Petrolatum and Broad Band UVB.
Protocol For Photochemotherapy: Mineral Oil And Nbuvb: The patient received Photochemotherapy: Mineral Oil and NBUVB (mineral oil applied to all lesions prior to phototherapy).
Render Post-Care In The Note: no
Consent: Written consent obtained.  The risks were reviewed with the patient including but not limited to: burn, pigmentary changes, pain, blistering, scabbing, redness, increased risk of skin cancers, and the remote possibility of scarring.
Treatment Number: 4
Protocol For Nb Uva: The patient received NB UVA.
Name Of Supervising Technician: Carol Enlgand MA
Protocol For Puva: The patient received PUVA.
Protocol For Photochemotherapy: Triamcinolone Ointment And Nbuvb: The patient received Photochemotherapy: Triamcinolone and NBUVB (triamcinolone ointment applied to all lesions prior to phototherapy).
Protocol For Photochemotherapy: Mineral Oil And Broad Band Uvb: The patient received Photochemotherapy: Mineral Oil and Broad Band UVB.
Protocol For Nbuvb: The patient received NBUVB.
Protocol For Photochemotherapy For Severe Photoresponsive Dermatoses: Puva: The patient received Photochemotherapy for severe photoresponsive dermatoses: PUVA requiring at least 4 to 8 hours of care under direct physician supervision.
Protocol For Uva: The patient received UVA.
Protocol: NBUVB
Protocol For Photochemotherapy For Severe Photoresponsive Dermatoses: Tar And Nbuvb (Goeckerman Treatment): The patient received Photochemotherapy for severe photoresponsive dermatoses: Tar and NBUVB (Goeckerman treatment) requiring at least 4 to 8 hours of care under direct physician supervision.
Protocol For Photochemotherapy For Severe Photoresponsive Dermatoses: Tar And Broad Band Uvb (Goeckerman Treatment): The patient received Photochemotherapy for severe photoresponsive dermatoses: Tar and Broad Band UVB (Goeckerman treatment) requiring at least 4 to 8 hours of care under direct physician supervision.
Additional Shield Locations: Face - covered with a towel
Protocol For Photochemotherapy: Petrolatum And Nbuvb: The patient received Photochemotherapy: Petrolatum and NBUVB (petrolatum applied to all lesions prior to phototherapy).
Detail Level: Zone
Protocol For Photochemotherapy: Tar And Nbuvb (Goeckerman Treatment): The patient received Photochemotherapy: Tar and NBUVB (Goeckerman treatment).
Protocol For Bath Puva: The patient received Bath PUVA.
Comments On Previous Treatment: Pt denies any redness or discomfort from previous treatment
Protocol For Uva1: The patient received UVA1.

## 2021-06-24 ENCOUNTER — APPOINTMENT (RX ONLY)
Dept: URBAN - METROPOLITAN AREA CLINIC 4 | Facility: CLINIC | Age: 41
Setting detail: DERMATOLOGY
End: 2021-06-24

## 2021-06-24 DIAGNOSIS — L40.0 PSORIASIS VULGARIS: ICD-10-CM

## 2021-06-24 PROCEDURE — 96900 ACTINOTHERAPY UV LIGHT: CPT

## 2021-06-24 PROCEDURE — ? PHOTOTHERAPY TREATMENT

## 2021-06-24 ASSESSMENT — LOCATION DETAILED DESCRIPTION DERM
LOCATION DETAILED: RIGHT ELBOW
LOCATION DETAILED: LEFT ELBOW

## 2021-06-24 ASSESSMENT — LOCATION SIMPLE DESCRIPTION DERM
LOCATION SIMPLE: RIGHT ELBOW
LOCATION SIMPLE: LEFT ELBOW

## 2021-06-24 ASSESSMENT — LOCATION ZONE DERM: LOCATION ZONE: ARM

## 2021-06-24 NOTE — PROCEDURE: PHOTOTHERAPY TREATMENT
Total Treatment Time: 1 minute 00 seconds
Protocol For Photochemotherapy For Severe Photoresponsive Dermatoses: Petrolatum And Broad Band Uvb: The patient received Photochemotherapyfor severe photoresponsive dermatoses: Petrolatum and Broad Band UVB requiring at least 4 to 8 hours of care under direct physician supervision.
Protocol For Photochemotherapy: Baby Oil And Nbuvb: The patient received Photochemotherapy: Baby Oil and NBUVB (baby oil applied to all lesions prior to phototherapy).
Protocol For Photochemotherapy: Tar And Broad Band Uvb (Goeckerman Treatment): The patient received Photochemotherapy: Tar and Broad Band UVB (Goeckerman treatment).
Post-Care Instructions: I reviewed with the patient in detail post-care instructions. Patient is to wear sun protection. Patients may expect sunburn like redness, discomfort and scabbing.
Protocol For Broad Band Uvb: The patient received Broad Band UVB.
Changes In Treatment Protocol: Per protocol, increased treatment time by 15 seconds
Protocol For Protocol For Photochemotherapy For Severe Photoresponsive Dermatoses: Bath Puva: The patient received Photochemotherapy for severe photoresponsive dermatoses: Bath PUVA requiring at least 4 to 8 hours of care under direct physician supervision.
Protocol For Photochemotherapy For Severe Photoresponsive Dermatoses: Petrolatum And Nbuvb: The patient received Photochemotherapy for severe photoresponsive dermatoses: Petrolatum and NBUVB requiring at least 4 to 8 hours of care under direct physician supervision.
Skin Type: II
Protocol For Photochemotherapy: Petrolatum And Broad Band Uvb: The patient received Photochemotherapy: Petrolatum and Broad Band UVB.
Protocol For Photochemotherapy: Mineral Oil And Nbuvb: The patient received Photochemotherapy: Mineral Oil and NBUVB (mineral oil applied to all lesions prior to phototherapy).
Render Post-Care In The Note: no
Consent: Written consent obtained.  The risks were reviewed with the patient including but not limited to: burn, pigmentary changes, pain, blistering, scabbing, redness, increased risk of skin cancers, and the remote possibility of scarring.
Treatment Number: 5
Protocol For Nb Uva: The patient received NB UVA.
Name Of Supervising Technician: Rita Eisenberg MA
Protocol For Puva: The patient received PUVA.
Protocol For Photochemotherapy: Triamcinolone Ointment And Nbuvb: The patient received Photochemotherapy: Triamcinolone and NBUVB (triamcinolone ointment applied to all lesions prior to phototherapy).
Protocol For Photochemotherapy: Mineral Oil And Broad Band Uvb: The patient received Photochemotherapy: Mineral Oil and Broad Band UVB.
Protocol For Nbuvb: The patient received NBUVB.
Protocol For Photochemotherapy For Severe Photoresponsive Dermatoses: Puva: The patient received Photochemotherapy for severe photoresponsive dermatoses: PUVA requiring at least 4 to 8 hours of care under direct physician supervision.
Protocol For Uva: The patient received UVA.
Protocol: NBUVB
Protocol For Photochemotherapy For Severe Photoresponsive Dermatoses: Tar And Nbuvb (Goeckerman Treatment): The patient received Photochemotherapy for severe photoresponsive dermatoses: Tar and NBUVB (Goeckerman treatment) requiring at least 4 to 8 hours of care under direct physician supervision.
Protocol For Photochemotherapy For Severe Photoresponsive Dermatoses: Tar And Broad Band Uvb (Goeckerman Treatment): The patient received Photochemotherapy for severe photoresponsive dermatoses: Tar and Broad Band UVB (Goeckerman treatment) requiring at least 4 to 8 hours of care under direct physician supervision.
Additional Shield Locations: Face - covered with a towel
Protocol For Photochemotherapy: Petrolatum And Nbuvb: The patient received Photochemotherapy: Petrolatum and NBUVB (petrolatum applied to all lesions prior to phototherapy).
Detail Level: Zone
Protocol For Photochemotherapy: Tar And Nbuvb (Goeckerman Treatment): The patient received Photochemotherapy: Tar and NBUVB (Goeckerman treatment).
Protocol For Bath Puva: The patient received Bath PUVA.
Comments On Previous Treatment: Patient denies any redness or discomfort from previous treatment
Protocol For Uva1: The patient received UVA1.

## 2021-07-01 ENCOUNTER — APPOINTMENT (RX ONLY)
Dept: URBAN - METROPOLITAN AREA CLINIC 4 | Facility: CLINIC | Age: 41
Setting detail: DERMATOLOGY
End: 2021-07-01

## 2021-07-01 DIAGNOSIS — L40.0 PSORIASIS VULGARIS: ICD-10-CM

## 2021-07-01 PROCEDURE — 96900 ACTINOTHERAPY UV LIGHT: CPT

## 2021-07-01 PROCEDURE — ? PHOTOTHERAPY TREATMENT

## 2021-07-01 ASSESSMENT — LOCATION SIMPLE DESCRIPTION DERM
LOCATION SIMPLE: RIGHT ELBOW
LOCATION SIMPLE: LEFT ELBOW

## 2021-07-01 ASSESSMENT — LOCATION DETAILED DESCRIPTION DERM
LOCATION DETAILED: RIGHT ELBOW
LOCATION DETAILED: LEFT ELBOW

## 2021-07-01 ASSESSMENT — LOCATION ZONE DERM: LOCATION ZONE: ARM

## 2021-07-01 NOTE — PROCEDURE: PHOTOTHERAPY TREATMENT
Total Treatment Time: 1 minute 45 seconds
Protocol For Photochemotherapy For Severe Photoresponsive Dermatoses: Petrolatum And Broad Band Uvb: The patient received Photochemotherapyfor severe photoresponsive dermatoses: Petrolatum and Broad Band UVB requiring at least 4 to 8 hours of care under direct physician supervision.
Protocol For Photochemotherapy: Baby Oil And Nbuvb: The patient received Photochemotherapy: Baby Oil and NBUVB (baby oil applied to all lesions prior to phototherapy).
Protocol For Photochemotherapy: Tar And Broad Band Uvb (Goeckerman Treatment): The patient received Photochemotherapy: Tar and Broad Band UVB (Goeckerman treatment).
Post-Care Instructions: I reviewed with the patient in detail post-care instructions. Patient is to wear sun protection. Patients may expect sunburn like redness, discomfort and scabbing.
Protocol For Broad Band Uvb: The patient received Broad Band UVB.
Changes In Treatment Protocol: Per protocol, increased treatment time by 15 seconds
Protocol For Protocol For Photochemotherapy For Severe Photoresponsive Dermatoses: Bath Puva: The patient received Photochemotherapy for severe photoresponsive dermatoses: Bath PUVA requiring at least 4 to 8 hours of care under direct physician supervision.
Protocol For Photochemotherapy For Severe Photoresponsive Dermatoses: Petrolatum And Nbuvb: The patient received Photochemotherapy for severe photoresponsive dermatoses: Petrolatum and NBUVB requiring at least 4 to 8 hours of care under direct physician supervision.
Skin Type: II
Protocol For Photochemotherapy: Petrolatum And Broad Band Uvb: The patient received Photochemotherapy: Petrolatum and Broad Band UVB.
Protocol For Photochemotherapy: Mineral Oil And Nbuvb: The patient received Photochemotherapy: Mineral Oil and NBUVB (mineral oil applied to all lesions prior to phototherapy).
Render Post-Care In The Note: no
Consent: Written consent obtained.  The risks were reviewed with the patient including but not limited to: burn, pigmentary changes, pain, blistering, scabbing, redness, increased risk of skin cancers, and the remote possibility of scarring.
Treatment Number: 7
Protocol For Nb Uva: The patient received NB UVA.
Name Of Supervising Technician: Julieta Malagon MA
Protocol For Puva: The patient received PUVA.
Protocol For Photochemotherapy: Triamcinolone Ointment And Nbuvb: The patient received Photochemotherapy: Triamcinolone and NBUVB (triamcinolone ointment applied to all lesions prior to phototherapy).
Protocol For Photochemotherapy: Mineral Oil And Broad Band Uvb: The patient received Photochemotherapy: Mineral Oil and Broad Band UVB.
Protocol For Nbuvb: The patient received NBUVB.
Protocol For Photochemotherapy For Severe Photoresponsive Dermatoses: Puva: The patient received Photochemotherapy for severe photoresponsive dermatoses: PUVA requiring at least 4 to 8 hours of care under direct physician supervision.
Protocol For Uva: The patient received UVA.
Protocol: NBUVB
Protocol For Photochemotherapy For Severe Photoresponsive Dermatoses: Tar And Nbuvb (Goeckerman Treatment): The patient received Photochemotherapy for severe photoresponsive dermatoses: Tar and NBUVB (Goeckerman treatment) requiring at least 4 to 8 hours of care under direct physician supervision.
Protocol For Photochemotherapy For Severe Photoresponsive Dermatoses: Tar And Broad Band Uvb (Goeckerman Treatment): The patient received Photochemotherapy for severe photoresponsive dermatoses: Tar and Broad Band UVB (Goeckerman treatment) requiring at least 4 to 8 hours of care under direct physician supervision.
Additional Shield Locations: Face - covered with a towel
Protocol For Photochemotherapy: Petrolatum And Nbuvb: The patient received Photochemotherapy: Petrolatum and NBUVB (petrolatum applied to all lesions prior to phototherapy).
Detail Level: Zone
Protocol For Photochemotherapy: Tar And Nbuvb (Goeckerman Treatment): The patient received Photochemotherapy: Tar and NBUVB (Goeckerman treatment).
Protocol For Bath Puva: The patient received Bath PUVA.
Comments On Previous Treatment: Patient denies any redness or discomfort from previous treatment
Protocol For Uva1: The patient received UVA1.

## 2021-07-06 ENCOUNTER — APPOINTMENT (RX ONLY)
Dept: URBAN - METROPOLITAN AREA CLINIC 4 | Facility: CLINIC | Age: 41
Setting detail: DERMATOLOGY
End: 2021-07-06

## 2021-07-06 DIAGNOSIS — L40.0 PSORIASIS VULGARIS: ICD-10-CM

## 2021-07-06 PROCEDURE — 96900 ACTINOTHERAPY UV LIGHT: CPT

## 2021-07-06 PROCEDURE — ? PHOTOTHERAPY TREATMENT

## 2021-07-06 ASSESSMENT — LOCATION SIMPLE DESCRIPTION DERM
LOCATION SIMPLE: RIGHT ELBOW
LOCATION SIMPLE: LEFT ELBOW

## 2021-07-06 ASSESSMENT — LOCATION ZONE DERM: LOCATION ZONE: ARM

## 2021-07-06 ASSESSMENT — LOCATION DETAILED DESCRIPTION DERM
LOCATION DETAILED: LEFT ELBOW
LOCATION DETAILED: RIGHT ELBOW

## 2021-07-06 NOTE — PROCEDURE: PHOTOTHERAPY TREATMENT
Total Treatment Time: 2 minute 00 seconds
Protocol For Photochemotherapy For Severe Photoresponsive Dermatoses: Petrolatum And Broad Band Uvb: The patient received Photochemotherapyfor severe photoresponsive dermatoses: Petrolatum and Broad Band UVB requiring at least 4 to 8 hours of care under direct physician supervision.
Protocol For Photochemotherapy: Baby Oil And Nbuvb: The patient received Photochemotherapy: Baby Oil and NBUVB (baby oil applied to all lesions prior to phototherapy).
Protocol For Photochemotherapy: Tar And Broad Band Uvb (Goeckerman Treatment): The patient received Photochemotherapy: Tar and Broad Band UVB (Goeckerman treatment).
Post-Care Instructions: I reviewed with the patient in detail post-care instructions. Patient is to wear sun protection. Patients may expect sunburn like redness, discomfort and scabbing.
Protocol For Broad Band Uvb: The patient received Broad Band UVB.
Changes In Treatment Protocol: Per protocol, increased treatment time by 15 seconds
Protocol For Protocol For Photochemotherapy For Severe Photoresponsive Dermatoses: Bath Puva: The patient received Photochemotherapy for severe photoresponsive dermatoses: Bath PUVA requiring at least 4 to 8 hours of care under direct physician supervision.
Protocol For Photochemotherapy For Severe Photoresponsive Dermatoses: Petrolatum And Nbuvb: The patient received Photochemotherapy for severe photoresponsive dermatoses: Petrolatum and NBUVB requiring at least 4 to 8 hours of care under direct physician supervision.
Skin Type: II
Protocol For Photochemotherapy: Petrolatum And Broad Band Uvb: The patient received Photochemotherapy: Petrolatum and Broad Band UVB.
Protocol For Photochemotherapy: Mineral Oil And Nbuvb: The patient received Photochemotherapy: Mineral Oil and NBUVB (mineral oil applied to all lesions prior to phototherapy).
Render Post-Care In The Note: no
Consent: Written consent obtained.  The risks were reviewed with the patient including but not limited to: burn, pigmentary changes, pain, blistering, scabbing, redness, increased risk of skin cancers, and the remote possibility of scarring.
Treatment Number: 8
Protocol For Nb Uva: The patient received NB UVA.
Name Of Supervising Technician: Julieta Malagon MA
Protocol For Puva: The patient received PUVA.
Protocol For Photochemotherapy: Triamcinolone Ointment And Nbuvb: The patient received Photochemotherapy: Triamcinolone and NBUVB (triamcinolone ointment applied to all lesions prior to phototherapy).
Protocol For Photochemotherapy: Mineral Oil And Broad Band Uvb: The patient received Photochemotherapy: Mineral Oil and Broad Band UVB.
Protocol For Nbuvb: The patient received NBUVB.
Protocol For Photochemotherapy For Severe Photoresponsive Dermatoses: Puva: The patient received Photochemotherapy for severe photoresponsive dermatoses: PUVA requiring at least 4 to 8 hours of care under direct physician supervision.
Protocol For Uva: The patient received UVA.
Protocol: NBUVB
Protocol For Photochemotherapy For Severe Photoresponsive Dermatoses: Tar And Nbuvb (Goeckerman Treatment): The patient received Photochemotherapy for severe photoresponsive dermatoses: Tar and NBUVB (Goeckerman treatment) requiring at least 4 to 8 hours of care under direct physician supervision.
Protocol For Photochemotherapy For Severe Photoresponsive Dermatoses: Tar And Broad Band Uvb (Goeckerman Treatment): The patient received Photochemotherapy for severe photoresponsive dermatoses: Tar and Broad Band UVB (Goeckerman treatment) requiring at least 4 to 8 hours of care under direct physician supervision.
Additional Shield Locations: Face - covered with a towel
Protocol For Photochemotherapy: Petrolatum And Nbuvb: The patient received Photochemotherapy: Petrolatum and NBUVB (petrolatum applied to all lesions prior to phototherapy).
Detail Level: Zone
Protocol For Photochemotherapy: Tar And Nbuvb (Goeckerman Treatment): The patient received Photochemotherapy: Tar and NBUVB (Goeckerman treatment).
Protocol For Bath Puva: The patient received Bath PUVA.
Comments On Previous Treatment: Patient denies any redness or discomfort from previous treatment
Protocol For Uva1: The patient received UVA1.

## 2021-07-12 ENCOUNTER — NON-PROVIDER VISIT (OUTPATIENT)
Dept: WOUND CARE | Facility: MEDICAL CENTER | Age: 41
End: 2021-07-12
Attending: FAMILY MEDICINE
Payer: COMMERCIAL

## 2021-07-12 PROCEDURE — 99211 OFF/OP EST MAY X REQ PHY/QHP: CPT

## 2021-07-12 PROCEDURE — 97597 DBRDMT OPN WND 1ST 20 CM/<: CPT

## 2021-07-12 RX ORDER — METFORMIN HYDROCHLORIDE 500 MG/1
TABLET, EXTENDED RELEASE ORAL
COMMUNITY
Start: 2021-04-21 | End: 2021-07-12

## 2021-07-12 RX ORDER — ATORVASTATIN CALCIUM 10 MG/1
10 TABLET, FILM COATED ORAL
COMMUNITY
End: 2021-07-12

## 2021-07-12 NOTE — CERTIFICATION
Non Provider Encounter- Diabetic Foot Ulcer    HISTORY OF PRESENT ILLNESS  Wound History:    START OF CARE IN CLINIC: 07/12/2021    REFERRING PROVIDER: Adriana Levine MD   WOUND- Diabetic foot ulcer   LOCATION: Left 1st plantar MTH   HISTORY: Patient is a 40 year old female who reports that she went on vacation out of town several months ago and walked on her foot for an extended period of time with shoes she knew she shouldn't wear. Patient reports that she has neuropathy and did not notice that she developed a blister to her first MTH on the left foot. 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 She was seen at the beginning of this year for the same wound and had healed it. The callus becomes very thick and she does not want to sand it down too far and cause a worse wound so she went to her PCP for referral to [facility].     **This RN did give her a prescription to [facility] for diabetic inserts.       Pertinent Medical History: DM2, bipolar 1 disorder, anxiety, asthma     DIABETES HX: Diagnosed with type 2 diabetes ~3 years ago, and is currently managing with oral medications and insulin. Has had previous diabetes education. Does have numbness in feet. Usually wears regular shoe without inserts. Does check feet routinely. Has had previous foot ulcers or foot surgery. Current occupation - RN at [employer]. Offloading - not offloading at this time.      TOBACCO USE: 3/4 pack per day smoker     OFFLOADING: None at this time. Prescription given for inserts.     Pertinent Labs and Diagnostics:    Labs:     A1c:   Lab Results   Component Value Date/Time    HBA1C 6.8 (H) 10/08/2019 01:47 AM      IMAGING: None recent    VASCULAR STUDIES: None in [facility]    LAST  WOUND CULTURE:  DATE : None in [facility]           FALL RISK ASSESSMENT- Not a fall risk   65 years or older     Fall within the last 2 years   Uses ambulatory devices  Loss of protective sensation in feet   Use of prostethic/orthotic    Presence of lower  extremity/foot/toe amputation   Taking medication that increases risk (per facility policy)    Patient allergies and medications reviewed via Epic.     WOUND ASSESSMENT-   Wound 07/12/21 Left 1st plantar MTH (Active)   Wound Image    07/12/21 1530   Site Assessment Brown;Red;Yellow 07/12/21 1530   Periwound Assessment Callused 07/12/21 1530   Margins Attached edges 07/12/21 1530   Drainage Amount None 07/12/21 1530   Treatments Cleansed;CSWD - Conservative Sharp Wound Debridement;Site care 07/12/21 1530   Wound Cleansing Puracyn New Site 07/12/21 1530   Periwound Protectant Skin Protectant Wipes to Periwound;Barrier Paste 07/12/21 1530   Dressing Cleansing/Solutions Not Applicable 07/12/21 1530   Dressing Options Hydrofiber Silver;Nonadhesive Foam;Hypafix Tape 07/12/21 1530   Dressing Changed New 07/12/21 1530   Dressing Status Clean;Dry;Intact 07/12/21 1530   Dressing Change/Treatment Frequency Every 72 hrs, and As Needed 07/12/21 1530   Non-staged Wound Description Full thickness 07/12/21 1530   Post-Procedure Length (cm) 0.1 cm 07/12/21 1530   Post-Procedure Width (cm) 0.1 cm 07/12/21 1530   Post-Procedure Depth (cm) 0.1 cm 07/12/21 1530   Post-Procedure Surface Area (cm^2) 0.01 cm^2 07/12/21 1530   Post-Procedure Volume (cm^3) 0 cm^3 07/12/21 1530   Tunneling (cm) 0 cm 07/12/21 1530   Undermining (cm) 0 cm 07/12/21 1530   Wound Odor None 07/12/21 1530   Right Foot Monofilament 10-point exam (Sensate) 0/10 07/12/21 1530   Left Foot Monofilament 10-point exam (Sensate) 0/10 07/12/21 1530   Exposed Structures None 07/12/21 1530     Pre-debridement photo      Procedures:     -Declined the need for lidocaine due to neuropathy.   -Scalpel used to debride wound bed and periwound callus. Entire surface of wound, <0.1 cm2 debrided.  Periwound callus, ~ 5 cm2, debrided to skin level. Alberto board used to smooth callus.   -Refer to flowsheet for wound care details.     Post-debridement photo      PATIENT EDUCATION  -  Importance of tight glucose control for wound healing   - Implications of loss of protective sensation (LOPS) discussed with patient- including increased risk for amputation.  - Advised to check feet at least daily, moisturize feet, and to always wear protective foot wear.   -  Importance of offloading foot to assist with wound healing  - Advised pt not to trim nails or calluses, seek foot/nail care from podiatrist or certified foot/nail nurse  - Importance of adequate nutrition for wound healing

## 2021-07-13 ENCOUNTER — APPOINTMENT (RX ONLY)
Dept: URBAN - METROPOLITAN AREA CLINIC 4 | Facility: CLINIC | Age: 41
Setting detail: DERMATOLOGY
End: 2021-07-13

## 2021-07-13 DIAGNOSIS — L40.0 PSORIASIS VULGARIS: ICD-10-CM

## 2021-07-13 PROCEDURE — 96900 ACTINOTHERAPY UV LIGHT: CPT

## 2021-07-13 PROCEDURE — ? PHOTOTHERAPY TREATMENT

## 2021-07-13 ASSESSMENT — LOCATION ZONE DERM: LOCATION ZONE: ARM

## 2021-07-13 ASSESSMENT — LOCATION SIMPLE DESCRIPTION DERM
LOCATION SIMPLE: LEFT ELBOW
LOCATION SIMPLE: RIGHT ELBOW

## 2021-07-13 ASSESSMENT — LOCATION DETAILED DESCRIPTION DERM
LOCATION DETAILED: RIGHT ELBOW
LOCATION DETAILED: LEFT ELBOW

## 2021-07-13 NOTE — PROCEDURE: PHOTOTHERAPY TREATMENT
Total Treatment Time: 2 minute 15 seconds
Protocol For Photochemotherapy For Severe Photoresponsive Dermatoses: Petrolatum And Broad Band Uvb: The patient received Photochemotherapyfor severe photoresponsive dermatoses: Petrolatum and Broad Band UVB requiring at least 4 to 8 hours of care under direct physician supervision.
Protocol For Photochemotherapy: Baby Oil And Nbuvb: The patient received Photochemotherapy: Baby Oil and NBUVB (baby oil applied to all lesions prior to phototherapy).
Protocol For Photochemotherapy: Tar And Broad Band Uvb (Goeckerman Treatment): The patient received Photochemotherapy: Tar and Broad Band UVB (Goeckerman treatment).
Post-Care Instructions: I reviewed with the patient in detail post-care instructions. Patient is to wear sun protection. Patients may expect sunburn like redness, discomfort and scabbing.
Protocol For Broad Band Uvb: The patient received Broad Band UVB.
Changes In Treatment Protocol: Per protocol, increased treatment time by 15 seconds
Protocol For Protocol For Photochemotherapy For Severe Photoresponsive Dermatoses: Bath Puva: The patient received Photochemotherapy for severe photoresponsive dermatoses: Bath PUVA requiring at least 4 to 8 hours of care under direct physician supervision.
Protocol For Photochemotherapy For Severe Photoresponsive Dermatoses: Petrolatum And Nbuvb: The patient received Photochemotherapy for severe photoresponsive dermatoses: Petrolatum and NBUVB requiring at least 4 to 8 hours of care under direct physician supervision.
Skin Type: II
Protocol For Photochemotherapy: Petrolatum And Broad Band Uvb: The patient received Photochemotherapy: Petrolatum and Broad Band UVB.
Protocol For Photochemotherapy: Mineral Oil And Nbuvb: The patient received Photochemotherapy: Mineral Oil and NBUVB (mineral oil applied to all lesions prior to phototherapy).
Render Post-Care In The Note: no
Consent: Written consent obtained.  The risks were reviewed with the patient including but not limited to: burn, pigmentary changes, pain, blistering, scabbing, redness, increased risk of skin cancers, and the remote possibility of scarring.
Treatment Number: 9
Protocol For Nb Uva: The patient received NB UVA.
Name Of Supervising Technician: Julieta Malagon MA
Protocol For Puva: The patient received PUVA.
Protocol For Photochemotherapy: Triamcinolone Ointment And Nbuvb: The patient received Photochemotherapy: Triamcinolone and NBUVB (triamcinolone ointment applied to all lesions prior to phototherapy).
Protocol For Photochemotherapy: Mineral Oil And Broad Band Uvb: The patient received Photochemotherapy: Mineral Oil and Broad Band UVB.
Protocol For Nbuvb: The patient received NBUVB.
Protocol For Photochemotherapy For Severe Photoresponsive Dermatoses: Puva: The patient received Photochemotherapy for severe photoresponsive dermatoses: PUVA requiring at least 4 to 8 hours of care under direct physician supervision.
Protocol For Uva: The patient received UVA.
Protocol: NBUVB
Protocol For Photochemotherapy For Severe Photoresponsive Dermatoses: Tar And Nbuvb (Goeckerman Treatment): The patient received Photochemotherapy for severe photoresponsive dermatoses: Tar and NBUVB (Goeckerman treatment) requiring at least 4 to 8 hours of care under direct physician supervision.
Protocol For Photochemotherapy For Severe Photoresponsive Dermatoses: Tar And Broad Band Uvb (Goeckerman Treatment): The patient received Photochemotherapy for severe photoresponsive dermatoses: Tar and Broad Band UVB (Goeckerman treatment) requiring at least 4 to 8 hours of care under direct physician supervision.
Additional Shield Locations: Face - covered with a towel
Protocol For Photochemotherapy: Petrolatum And Nbuvb: The patient received Photochemotherapy: Petrolatum and NBUVB (petrolatum applied to all lesions prior to phototherapy).
Detail Level: Zone
Protocol For Photochemotherapy: Tar And Nbuvb (Goeckerman Treatment): The patient received Photochemotherapy: Tar and NBUVB (Goeckerman treatment).
Protocol For Bath Puva: The patient received Bath PUVA.
Comments On Previous Treatment: Patient denies any redness or discomfort from previous treatment
Protocol For Uva1: The patient received UVA1.

## 2021-07-13 NOTE — PATIENT INSTRUCTIONS
-Keep your wound dressing clean, dry, and intact.    -Change your dressing if it becomes soiled, soaked, or falls off.    -Should you experience any significant changes in your wound(s), such as infection (redness, swelling, localized heat, increased pain, fever > 101 F, chills) or have any questions regarding your home care instructions, please contact the wound center at [phone]. If after hours, contact your primary care physician or go to the hospital emergency room.

## 2021-07-15 ENCOUNTER — APPOINTMENT (RX ONLY)
Dept: URBAN - METROPOLITAN AREA CLINIC 4 | Facility: CLINIC | Age: 41
Setting detail: DERMATOLOGY
End: 2021-07-15

## 2021-07-15 DIAGNOSIS — L40.0 PSORIASIS VULGARIS: ICD-10-CM

## 2021-07-15 PROCEDURE — 96900 ACTINOTHERAPY UV LIGHT: CPT

## 2021-07-15 PROCEDURE — ? PHOTOTHERAPY TREATMENT

## 2021-07-15 ASSESSMENT — LOCATION ZONE DERM: LOCATION ZONE: ARM

## 2021-07-15 ASSESSMENT — LOCATION SIMPLE DESCRIPTION DERM
LOCATION SIMPLE: LEFT ELBOW
LOCATION SIMPLE: RIGHT ELBOW

## 2021-07-15 ASSESSMENT — LOCATION DETAILED DESCRIPTION DERM
LOCATION DETAILED: RIGHT ELBOW
LOCATION DETAILED: LEFT ELBOW

## 2021-07-15 NOTE — PROCEDURE: PHOTOTHERAPY TREATMENT
Total Treatment Time: 2 minute 30 seconds
Protocol For Photochemotherapy For Severe Photoresponsive Dermatoses: Petrolatum And Broad Band Uvb: The patient received Photochemotherapyfor severe photoresponsive dermatoses: Petrolatum and Broad Band UVB requiring at least 4 to 8 hours of care under direct physician supervision.
Protocol For Photochemotherapy: Baby Oil And Nbuvb: The patient received Photochemotherapy: Baby Oil and NBUVB (baby oil applied to all lesions prior to phototherapy).
Protocol For Photochemotherapy: Tar And Broad Band Uvb (Goeckerman Treatment): The patient received Photochemotherapy: Tar and Broad Band UVB (Goeckerman treatment).
Post-Care Instructions: I reviewed with the patient in detail post-care instructions. Patient is to wear sun protection. Patients may expect sunburn like redness, discomfort and scabbing.
Protocol For Broad Band Uvb: The patient received Broad Band UVB.
Changes In Treatment Protocol: Per protocol, increased treatment time by 15 seconds
Protocol For Protocol For Photochemotherapy For Severe Photoresponsive Dermatoses: Bath Puva: The patient received Photochemotherapy for severe photoresponsive dermatoses: Bath PUVA requiring at least 4 to 8 hours of care under direct physician supervision.
Protocol For Photochemotherapy For Severe Photoresponsive Dermatoses: Petrolatum And Nbuvb: The patient received Photochemotherapy for severe photoresponsive dermatoses: Petrolatum and NBUVB requiring at least 4 to 8 hours of care under direct physician supervision.
Skin Type: II
Protocol For Photochemotherapy: Petrolatum And Broad Band Uvb: The patient received Photochemotherapy: Petrolatum and Broad Band UVB.
Protocol For Photochemotherapy: Mineral Oil And Nbuvb: The patient received Photochemotherapy: Mineral Oil and NBUVB (mineral oil applied to all lesions prior to phototherapy).
Render Post-Care In The Note: no
Consent: Written consent obtained.  The risks were reviewed with the patient including but not limited to: burn, pigmentary changes, pain, blistering, scabbing, redness, increased risk of skin cancers, and the remote possibility of scarring.
Treatment Number: 10
Protocol For Nb Uva: The patient received NB UVA.
Name Of Supervising Technician: Julieta Malagon MA
Protocol For Puva: The patient received PUVA.
Protocol For Photochemotherapy: Triamcinolone Ointment And Nbuvb: The patient received Photochemotherapy: Triamcinolone and NBUVB (triamcinolone ointment applied to all lesions prior to phototherapy).
Protocol For Photochemotherapy: Mineral Oil And Broad Band Uvb: The patient received Photochemotherapy: Mineral Oil and Broad Band UVB.
Protocol For Nbuvb: The patient received NBUVB.
Protocol For Photochemotherapy For Severe Photoresponsive Dermatoses: Puva: The patient received Photochemotherapy for severe photoresponsive dermatoses: PUVA requiring at least 4 to 8 hours of care under direct physician supervision.
Protocol For Uva: The patient received UVA.
Protocol: NBUVB
Protocol For Photochemotherapy For Severe Photoresponsive Dermatoses: Tar And Nbuvb (Goeckerman Treatment): The patient received Photochemotherapy for severe photoresponsive dermatoses: Tar and NBUVB (Goeckerman treatment) requiring at least 4 to 8 hours of care under direct physician supervision.
Protocol For Photochemotherapy For Severe Photoresponsive Dermatoses: Tar And Broad Band Uvb (Goeckerman Treatment): The patient received Photochemotherapy for severe photoresponsive dermatoses: Tar and Broad Band UVB (Goeckerman treatment) requiring at least 4 to 8 hours of care under direct physician supervision.
Additional Shield Locations: Face - covered with a towel
Protocol For Photochemotherapy: Petrolatum And Nbuvb: The patient received Photochemotherapy: Petrolatum and NBUVB (petrolatum applied to all lesions prior to phototherapy).
Detail Level: Zone
Protocol For Photochemotherapy: Tar And Nbuvb (Goeckerman Treatment): The patient received Photochemotherapy: Tar and NBUVB (Goeckerman treatment).
Protocol For Bath Puva: The patient received Bath PUVA.
Comments On Previous Treatment: Patient denies any redness or discomfort from previous treatment
Protocol For Uva1: The patient received UVA1.

## 2021-07-20 ENCOUNTER — APPOINTMENT (RX ONLY)
Dept: URBAN - METROPOLITAN AREA CLINIC 4 | Facility: CLINIC | Age: 41
Setting detail: DERMATOLOGY
End: 2021-07-20

## 2021-07-20 DIAGNOSIS — L40.0 PSORIASIS VULGARIS: ICD-10-CM

## 2021-07-20 PROCEDURE — ? PHOTOTHERAPY TREATMENT

## 2021-07-20 PROCEDURE — 96900 ACTINOTHERAPY UV LIGHT: CPT

## 2021-07-20 ASSESSMENT — LOCATION SIMPLE DESCRIPTION DERM
LOCATION SIMPLE: RIGHT ELBOW
LOCATION SIMPLE: LEFT ELBOW

## 2021-07-20 ASSESSMENT — LOCATION ZONE DERM: LOCATION ZONE: ARM

## 2021-07-20 ASSESSMENT — LOCATION DETAILED DESCRIPTION DERM
LOCATION DETAILED: LEFT ELBOW
LOCATION DETAILED: RIGHT ELBOW

## 2021-07-20 NOTE — PROCEDURE: PHOTOTHERAPY TREATMENT
Total Treatment Time: 2 minute 45 seconds
Protocol For Photochemotherapy For Severe Photoresponsive Dermatoses: Petrolatum And Broad Band Uvb: The patient received Photochemotherapyfor severe photoresponsive dermatoses: Petrolatum and Broad Band UVB requiring at least 4 to 8 hours of care under direct physician supervision.
Protocol For Photochemotherapy: Baby Oil And Nbuvb: The patient received Photochemotherapy: Baby Oil and NBUVB (baby oil applied to all lesions prior to phototherapy).
Protocol For Photochemotherapy: Tar And Broad Band Uvb (Goeckerman Treatment): The patient received Photochemotherapy: Tar and Broad Band UVB (Goeckerman treatment).
Post-Care Instructions: I reviewed with the patient in detail post-care instructions. Patient is to wear sun protection. Patients may expect sunburn like redness, discomfort and scabbing.
Protocol For Broad Band Uvb: The patient received Broad Band UVB.
Changes In Treatment Protocol: Per protocol, increased treatment time by 15 seconds
Protocol For Protocol For Photochemotherapy For Severe Photoresponsive Dermatoses: Bath Puva: The patient received Photochemotherapy for severe photoresponsive dermatoses: Bath PUVA requiring at least 4 to 8 hours of care under direct physician supervision.
Protocol For Photochemotherapy For Severe Photoresponsive Dermatoses: Petrolatum And Nbuvb: The patient received Photochemotherapy for severe photoresponsive dermatoses: Petrolatum and NBUVB requiring at least 4 to 8 hours of care under direct physician supervision.
Skin Type: II
Protocol For Photochemotherapy: Petrolatum And Broad Band Uvb: The patient received Photochemotherapy: Petrolatum and Broad Band UVB.
Protocol For Photochemotherapy: Mineral Oil And Nbuvb: The patient received Photochemotherapy: Mineral Oil and NBUVB (mineral oil applied to all lesions prior to phototherapy).
Render Post-Care In The Note: no
Consent: Written consent obtained.  The risks were reviewed with the patient including but not limited to: burn, pigmentary changes, pain, blistering, scabbing, redness, increased risk of skin cancers, and the remote possibility of scarring.
Treatment Number: 11
Protocol For Nb Uva: The patient received NB UVA.
Name Of Supervising Technician: Julieta Malagon MA
Protocol For Puva: The patient received PUVA.
Protocol For Photochemotherapy: Triamcinolone Ointment And Nbuvb: The patient received Photochemotherapy: Triamcinolone and NBUVB (triamcinolone ointment applied to all lesions prior to phototherapy).
Protocol For Photochemotherapy: Mineral Oil And Broad Band Uvb: The patient received Photochemotherapy: Mineral Oil and Broad Band UVB.
Protocol For Nbuvb: The patient received NBUVB.
Protocol For Photochemotherapy For Severe Photoresponsive Dermatoses: Puva: The patient received Photochemotherapy for severe photoresponsive dermatoses: PUVA requiring at least 4 to 8 hours of care under direct physician supervision.
Protocol For Uva: The patient received UVA.
Protocol: NBUVB
Protocol For Photochemotherapy For Severe Photoresponsive Dermatoses: Tar And Nbuvb (Goeckerman Treatment): The patient received Photochemotherapy for severe photoresponsive dermatoses: Tar and NBUVB (Goeckerman treatment) requiring at least 4 to 8 hours of care under direct physician supervision.
Protocol For Photochemotherapy For Severe Photoresponsive Dermatoses: Tar And Broad Band Uvb (Goeckerman Treatment): The patient received Photochemotherapy for severe photoresponsive dermatoses: Tar and Broad Band UVB (Goeckerman treatment) requiring at least 4 to 8 hours of care under direct physician supervision.
Additional Shield Locations: Face - covered with a towel
Protocol For Photochemotherapy: Petrolatum And Nbuvb: The patient received Photochemotherapy: Petrolatum and NBUVB (petrolatum applied to all lesions prior to phototherapy).
Detail Level: Zone
Protocol For Photochemotherapy: Tar And Nbuvb (Goeckerman Treatment): The patient received Photochemotherapy: Tar and NBUVB (Goeckerman treatment).
Protocol For Bath Puva: The patient received Bath PUVA.
Comments On Previous Treatment: Patient denies any redness or discomfort from previous treatment
Protocol For Uva1: The patient received UVA1.

## 2021-07-21 ENCOUNTER — NON-PROVIDER VISIT (OUTPATIENT)
Dept: WOUND CARE | Facility: MEDICAL CENTER | Age: 41
End: 2021-07-21
Attending: FAMILY MEDICINE
Payer: COMMERCIAL

## 2021-07-21 PROCEDURE — 97597 DBRDMT OPN WND 1ST 20 CM/<: CPT

## 2021-07-21 NOTE — CERTIFICATION
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 Pt instr dc today, keep area clean, it will be fragile for a few days, bathe and dry area gently, as scar tissue only ever regains a maximum of 80% of the tensile strength of the surrounding skin, remodeling of scar can continue for 6mo - a year.Advised to soak foot for a few minutes every night and then use the gennaro board to remove callus as she is having thick buildup of callus daily. Contact PCP for a referral back here if any problems with area opening and draining again. Pt understands          Thank you for the referral and the opportunity to treat your patient.

## 2021-07-21 NOTE — PATIENT INSTRUCTIONS
- Resolved wound be fragile for a few days, bathe and dry area gently, only ever regains a maximum of 80% of the tensile strength of the surrounding skin, remodeling of scar can continue for 6mo - a year. Contact PCP for a referral back her if any problems with area opening and draining again.    -Should you experience any significant changes in your wound(s), such as infection (redness, swelling, localized heat, increased pain, fever > 101 F, chills) or have any questions regarding your home care instructions, please contact the wound center at [phone]. If after hours, contact your primary care physician or go to the hospital emergency room.

## 2021-07-21 NOTE — PROCEDURES
CSWD with scalpel to remove thick callus. Alberto board used to soften callus. Patient tolerated well.

## 2021-07-27 ENCOUNTER — APPOINTMENT (RX ONLY)
Dept: URBAN - METROPOLITAN AREA CLINIC 4 | Facility: CLINIC | Age: 41
Setting detail: DERMATOLOGY
End: 2021-07-27

## 2021-07-27 DIAGNOSIS — L40.0 PSORIASIS VULGARIS: ICD-10-CM

## 2021-07-27 PROCEDURE — ? PHOTOTHERAPY TREATMENT

## 2021-07-27 PROCEDURE — 96900 ACTINOTHERAPY UV LIGHT: CPT

## 2021-07-27 ASSESSMENT — LOCATION SIMPLE DESCRIPTION DERM
LOCATION SIMPLE: LEFT ELBOW
LOCATION SIMPLE: RIGHT ELBOW

## 2021-07-27 ASSESSMENT — LOCATION ZONE DERM: LOCATION ZONE: ARM

## 2021-07-27 ASSESSMENT — LOCATION DETAILED DESCRIPTION DERM
LOCATION DETAILED: LEFT ELBOW
LOCATION DETAILED: RIGHT ELBOW

## 2021-07-27 NOTE — PROCEDURE: PHOTOTHERAPY TREATMENT
Total Treatment Time: 3 minute 00 seconds
Protocol For Photochemotherapy For Severe Photoresponsive Dermatoses: Petrolatum And Broad Band Uvb: The patient received Photochemotherapyfor severe photoresponsive dermatoses: Petrolatum and Broad Band UVB requiring at least 4 to 8 hours of care under direct physician supervision.
Protocol For Photochemotherapy: Baby Oil And Nbuvb: The patient received Photochemotherapy: Baby Oil and NBUVB (baby oil applied to all lesions prior to phototherapy).
Protocol For Photochemotherapy: Tar And Broad Band Uvb (Goeckerman Treatment): The patient received Photochemotherapy: Tar and Broad Band UVB (Goeckerman treatment).
Post-Care Instructions: I reviewed with the patient in detail post-care instructions. Patient is to wear sun protection. Patients may expect sunburn like redness, discomfort and scabbing.
Protocol For Broad Band Uvb: The patient received Broad Band UVB.
Changes In Treatment Protocol: Per protocol, increased treatment time by 15 seconds
Protocol For Protocol For Photochemotherapy For Severe Photoresponsive Dermatoses: Bath Puva: The patient received Photochemotherapy for severe photoresponsive dermatoses: Bath PUVA requiring at least 4 to 8 hours of care under direct physician supervision.
Protocol For Photochemotherapy For Severe Photoresponsive Dermatoses: Petrolatum And Nbuvb: The patient received Photochemotherapy for severe photoresponsive dermatoses: Petrolatum and NBUVB requiring at least 4 to 8 hours of care under direct physician supervision.
Skin Type: II
Protocol For Photochemotherapy: Petrolatum And Broad Band Uvb: The patient received Photochemotherapy: Petrolatum and Broad Band UVB.
Protocol For Photochemotherapy: Mineral Oil And Nbuvb: The patient received Photochemotherapy: Mineral Oil and NBUVB (mineral oil applied to all lesions prior to phototherapy).
Render Post-Care In The Note: no
Consent: Written consent obtained.  The risks were reviewed with the patient including but not limited to: burn, pigmentary changes, pain, blistering, scabbing, redness, increased risk of skin cancers, and the remote possibility of scarring.
Treatment Number: 12
Protocol For Nb Uva: The patient received NB UVA.
Name Of Supervising Technician: Julieta Malagon MA
Protocol For Puva: The patient received PUVA.
Protocol For Photochemotherapy: Triamcinolone Ointment And Nbuvb: The patient received Photochemotherapy: Triamcinolone and NBUVB (triamcinolone ointment applied to all lesions prior to phototherapy).
Protocol For Photochemotherapy: Mineral Oil And Broad Band Uvb: The patient received Photochemotherapy: Mineral Oil and Broad Band UVB.
Protocol For Nbuvb: The patient received NBUVB.
Protocol For Photochemotherapy For Severe Photoresponsive Dermatoses: Puva: The patient received Photochemotherapy for severe photoresponsive dermatoses: PUVA requiring at least 4 to 8 hours of care under direct physician supervision.
Protocol For Uva: The patient received UVA.
Protocol: NBUVB
Protocol For Photochemotherapy For Severe Photoresponsive Dermatoses: Tar And Nbuvb (Goeckerman Treatment): The patient received Photochemotherapy for severe photoresponsive dermatoses: Tar and NBUVB (Goeckerman treatment) requiring at least 4 to 8 hours of care under direct physician supervision.
Protocol For Photochemotherapy For Severe Photoresponsive Dermatoses: Tar And Broad Band Uvb (Goeckerman Treatment): The patient received Photochemotherapy for severe photoresponsive dermatoses: Tar and Broad Band UVB (Goeckerman treatment) requiring at least 4 to 8 hours of care under direct physician supervision.
Additional Shield Locations: Face - covered with a towel
Protocol For Photochemotherapy: Petrolatum And Nbuvb: The patient received Photochemotherapy: Petrolatum and NBUVB (petrolatum applied to all lesions prior to phototherapy).
Detail Level: Zone
Protocol For Photochemotherapy: Tar And Nbuvb (Goeckerman Treatment): The patient received Photochemotherapy: Tar and NBUVB (Goeckerman treatment).
Protocol For Bath Puva: The patient received Bath PUVA.
Comments On Previous Treatment: Patient denies any redness or discomfort from previous treatment
Protocol For Uva1: The patient received UVA1.

## 2021-07-29 ENCOUNTER — APPOINTMENT (RX ONLY)
Dept: URBAN - METROPOLITAN AREA CLINIC 4 | Facility: CLINIC | Age: 41
Setting detail: DERMATOLOGY
End: 2021-07-29

## 2021-07-29 DIAGNOSIS — L40.0 PSORIASIS VULGARIS: ICD-10-CM

## 2021-07-29 PROCEDURE — 96900 ACTINOTHERAPY UV LIGHT: CPT

## 2021-07-29 PROCEDURE — ? PHOTOTHERAPY TREATMENT

## 2021-07-29 ASSESSMENT — LOCATION DETAILED DESCRIPTION DERM
LOCATION DETAILED: RIGHT ELBOW
LOCATION DETAILED: LEFT ELBOW

## 2021-07-29 ASSESSMENT — LOCATION SIMPLE DESCRIPTION DERM
LOCATION SIMPLE: LEFT ELBOW
LOCATION SIMPLE: RIGHT ELBOW

## 2021-07-29 ASSESSMENT — LOCATION ZONE DERM: LOCATION ZONE: ARM

## 2021-07-29 NOTE — PROCEDURE: PHOTOTHERAPY TREATMENT
Total Treatment Time: 3 minute 15 seconds
Protocol For Photochemotherapy For Severe Photoresponsive Dermatoses: Petrolatum And Broad Band Uvb: The patient received Photochemotherapyfor severe photoresponsive dermatoses: Petrolatum and Broad Band UVB requiring at least 4 to 8 hours of care under direct physician supervision.
Protocol For Photochemotherapy: Baby Oil And Nbuvb: The patient received Photochemotherapy: Baby Oil and NBUVB (baby oil applied to all lesions prior to phototherapy).
Protocol For Photochemotherapy: Tar And Broad Band Uvb (Goeckerman Treatment): The patient received Photochemotherapy: Tar and Broad Band UVB (Goeckerman treatment).
Post-Care Instructions: I reviewed with the patient in detail post-care instructions. Patient is to wear sun protection. Patients may expect sunburn like redness, discomfort and scabbing.
Protocol For Broad Band Uvb: The patient received Broad Band UVB.
Changes In Treatment Protocol: Per protocol, increased treatment time by 15 seconds
Protocol For Protocol For Photochemotherapy For Severe Photoresponsive Dermatoses: Bath Puva: The patient received Photochemotherapy for severe photoresponsive dermatoses: Bath PUVA requiring at least 4 to 8 hours of care under direct physician supervision.
Protocol For Photochemotherapy For Severe Photoresponsive Dermatoses: Petrolatum And Nbuvb: The patient received Photochemotherapy for severe photoresponsive dermatoses: Petrolatum and NBUVB requiring at least 4 to 8 hours of care under direct physician supervision.
Skin Type: II
Protocol For Photochemotherapy: Petrolatum And Broad Band Uvb: The patient received Photochemotherapy: Petrolatum and Broad Band UVB.
Protocol For Photochemotherapy: Mineral Oil And Nbuvb: The patient received Photochemotherapy: Mineral Oil and NBUVB (mineral oil applied to all lesions prior to phototherapy).
Render Post-Care In The Note: no
Consent: Written consent obtained.  The risks were reviewed with the patient including but not limited to: burn, pigmentary changes, pain, blistering, scabbing, redness, increased risk of skin cancers, and the remote possibility of scarring.
Treatment Number: 13
Protocol For Nb Uva: The patient received NB UVA.
Name Of Supervising Technician: Julieta Malagon MA
Protocol For Puva: The patient received PUVA.
Protocol For Photochemotherapy: Triamcinolone Ointment And Nbuvb: The patient received Photochemotherapy: Triamcinolone and NBUVB (triamcinolone ointment applied to all lesions prior to phototherapy).
Protocol For Photochemotherapy: Mineral Oil And Broad Band Uvb: The patient received Photochemotherapy: Mineral Oil and Broad Band UVB.
Protocol For Nbuvb: The patient received NBUVB.
Protocol For Photochemotherapy For Severe Photoresponsive Dermatoses: Puva: The patient received Photochemotherapy for severe photoresponsive dermatoses: PUVA requiring at least 4 to 8 hours of care under direct physician supervision.
Protocol For Uva: The patient received UVA.
Protocol: NBUVB
Protocol For Photochemotherapy For Severe Photoresponsive Dermatoses: Tar And Nbuvb (Goeckerman Treatment): The patient received Photochemotherapy for severe photoresponsive dermatoses: Tar and NBUVB (Goeckerman treatment) requiring at least 4 to 8 hours of care under direct physician supervision.
Protocol For Photochemotherapy For Severe Photoresponsive Dermatoses: Tar And Broad Band Uvb (Goeckerman Treatment): The patient received Photochemotherapy for severe photoresponsive dermatoses: Tar and Broad Band UVB (Goeckerman treatment) requiring at least 4 to 8 hours of care under direct physician supervision.
Additional Shield Locations: Face - covered with a towel
Protocol For Photochemotherapy: Petrolatum And Nbuvb: The patient received Photochemotherapy: Petrolatum and NBUVB (petrolatum applied to all lesions prior to phototherapy).
Detail Level: Zone
Protocol For Photochemotherapy: Tar And Nbuvb (Goeckerman Treatment): The patient received Photochemotherapy: Tar and NBUVB (Goeckerman treatment).
Protocol For Bath Puva: The patient received Bath PUVA.
Comments On Previous Treatment: Patient denies any redness or discomfort from previous treatment
Protocol For Uva1: The patient received UVA1.

## 2021-08-03 ENCOUNTER — APPOINTMENT (RX ONLY)
Dept: URBAN - METROPOLITAN AREA CLINIC 4 | Facility: CLINIC | Age: 41
Setting detail: DERMATOLOGY
End: 2021-08-03

## 2021-08-03 DIAGNOSIS — L40.0 PSORIASIS VULGARIS: ICD-10-CM

## 2021-08-03 PROCEDURE — ? PHOTOTHERAPY TREATMENT

## 2021-08-03 PROCEDURE — 96900 ACTINOTHERAPY UV LIGHT: CPT

## 2021-08-03 ASSESSMENT — LOCATION DETAILED DESCRIPTION DERM
LOCATION DETAILED: RIGHT ELBOW
LOCATION DETAILED: LEFT ELBOW

## 2021-08-03 ASSESSMENT — LOCATION SIMPLE DESCRIPTION DERM
LOCATION SIMPLE: RIGHT ELBOW
LOCATION SIMPLE: LEFT ELBOW

## 2021-08-03 ASSESSMENT — LOCATION ZONE DERM: LOCATION ZONE: ARM

## 2021-08-03 NOTE — PROCEDURE: PHOTOTHERAPY TREATMENT
Total Treatment Time: 3 minute 30 seconds
Protocol For Photochemotherapy For Severe Photoresponsive Dermatoses: Petrolatum And Broad Band Uvb: The patient received Photochemotherapyfor severe photoresponsive dermatoses: Petrolatum and Broad Band UVB requiring at least 4 to 8 hours of care under direct physician supervision.
Protocol For Photochemotherapy: Baby Oil And Nbuvb: The patient received Photochemotherapy: Baby Oil and NBUVB (baby oil applied to all lesions prior to phototherapy).
Protocol For Photochemotherapy: Tar And Broad Band Uvb (Goeckerman Treatment): The patient received Photochemotherapy: Tar and Broad Band UVB (Goeckerman treatment).
Post-Care Instructions: I reviewed with the patient in detail post-care instructions. Patient is to wear sun protection. Patients may expect sunburn like redness, discomfort and scabbing.
Protocol For Broad Band Uvb: The patient received Broad Band UVB.
Changes In Treatment Protocol: Per protocol, increased treatment time by 15 seconds
Protocol For Protocol For Photochemotherapy For Severe Photoresponsive Dermatoses: Bath Puva: The patient received Photochemotherapy for severe photoresponsive dermatoses: Bath PUVA requiring at least 4 to 8 hours of care under direct physician supervision.
Protocol For Photochemotherapy For Severe Photoresponsive Dermatoses: Petrolatum And Nbuvb: The patient received Photochemotherapy for severe photoresponsive dermatoses: Petrolatum and NBUVB requiring at least 4 to 8 hours of care under direct physician supervision.
Skin Type: II
Protocol For Photochemotherapy: Petrolatum And Broad Band Uvb: The patient received Photochemotherapy: Petrolatum and Broad Band UVB.
Protocol For Photochemotherapy: Mineral Oil And Nbuvb: The patient received Photochemotherapy: Mineral Oil and NBUVB (mineral oil applied to all lesions prior to phototherapy).
Render Post-Care In The Note: no
Consent: Written consent obtained.  The risks were reviewed with the patient including but not limited to: burn, pigmentary changes, pain, blistering, scabbing, redness, increased risk of skin cancers, and the remote possibility of scarring.
Treatment Number: 14
Protocol For Nb Uva: The patient received NB UVA.
Name Of Supervising Technician: Julieta Malagon MA
Protocol For Puva: The patient received PUVA.
Protocol For Photochemotherapy: Triamcinolone Ointment And Nbuvb: The patient received Photochemotherapy: Triamcinolone and NBUVB (triamcinolone ointment applied to all lesions prior to phototherapy).
Protocol For Photochemotherapy: Mineral Oil And Broad Band Uvb: The patient received Photochemotherapy: Mineral Oil and Broad Band UVB.
Protocol For Nbuvb: The patient received NBUVB.
Protocol For Photochemotherapy For Severe Photoresponsive Dermatoses: Puva: The patient received Photochemotherapy for severe photoresponsive dermatoses: PUVA requiring at least 4 to 8 hours of care under direct physician supervision.
Protocol For Uva: The patient received UVA.
Protocol: NBUVB
Protocol For Photochemotherapy For Severe Photoresponsive Dermatoses: Tar And Nbuvb (Goeckerman Treatment): The patient received Photochemotherapy for severe photoresponsive dermatoses: Tar and NBUVB (Goeckerman treatment) requiring at least 4 to 8 hours of care under direct physician supervision.
Protocol For Photochemotherapy For Severe Photoresponsive Dermatoses: Tar And Broad Band Uvb (Goeckerman Treatment): The patient received Photochemotherapy for severe photoresponsive dermatoses: Tar and Broad Band UVB (Goeckerman treatment) requiring at least 4 to 8 hours of care under direct physician supervision.
Additional Shield Locations: Face - covered with a towel
Protocol For Photochemotherapy: Petrolatum And Nbuvb: The patient received Photochemotherapy: Petrolatum and NBUVB (petrolatum applied to all lesions prior to phototherapy).
Detail Level: Zone
Protocol For Photochemotherapy: Tar And Nbuvb (Goeckerman Treatment): The patient received Photochemotherapy: Tar and NBUVB (Goeckerman treatment).
Protocol For Bath Puva: The patient received Bath PUVA.
Comments On Previous Treatment: Patient denies any redness or discomfort from previous treatment
Protocol For Uva1: The patient received UVA1.

## 2021-08-10 ENCOUNTER — APPOINTMENT (RX ONLY)
Dept: URBAN - METROPOLITAN AREA CLINIC 4 | Facility: CLINIC | Age: 41
Setting detail: DERMATOLOGY
End: 2021-08-10

## 2021-08-10 DIAGNOSIS — L40.0 PSORIASIS VULGARIS: ICD-10-CM

## 2021-08-10 PROCEDURE — ? PHOTOTHERAPY TREATMENT

## 2021-08-10 PROCEDURE — 96900 ACTINOTHERAPY UV LIGHT: CPT

## 2021-08-10 ASSESSMENT — LOCATION DETAILED DESCRIPTION DERM
LOCATION DETAILED: RIGHT ELBOW
LOCATION DETAILED: LEFT ELBOW

## 2021-08-10 ASSESSMENT — LOCATION SIMPLE DESCRIPTION DERM
LOCATION SIMPLE: LEFT ELBOW
LOCATION SIMPLE: RIGHT ELBOW

## 2021-08-10 ASSESSMENT — LOCATION ZONE DERM: LOCATION ZONE: ARM

## 2021-08-10 NOTE — PROCEDURE: PHOTOTHERAPY TREATMENT
Total Treatment Time: 1 minute 45 seconds
Protocol For Photochemotherapy For Severe Photoresponsive Dermatoses: Petrolatum And Broad Band Uvb: The patient received Photochemotherapyfor severe photoresponsive dermatoses: Petrolatum and Broad Band UVB requiring at least 4 to 8 hours of care under direct physician supervision.
Protocol For Photochemotherapy: Baby Oil And Nbuvb: The patient received Photochemotherapy: Baby Oil and NBUVB (baby oil applied to all lesions prior to phototherapy).
Protocol For Photochemotherapy: Tar And Broad Band Uvb (Goeckerman Treatment): The patient received Photochemotherapy: Tar and Broad Band UVB (Goeckerman treatment).
Post-Care Instructions: I reviewed with the patient in detail post-care instructions. Patient is to wear sun protection. Patients may expect sunburn like redness, discomfort and scabbing.
Protocol For Broad Band Uvb: The patient received Broad Band UVB.
Changes In Treatment Protocol: per laura, reduced tx time by 50% due to burn. Max time will be set at: 3 min 15 sec
Protocol For Protocol For Photochemotherapy For Severe Photoresponsive Dermatoses: Bath Puva: The patient received Photochemotherapy for severe photoresponsive dermatoses: Bath PUVA requiring at least 4 to 8 hours of care under direct physician supervision.
Protocol For Photochemotherapy For Severe Photoresponsive Dermatoses: Petrolatum And Nbuvb: The patient received Photochemotherapy for severe photoresponsive dermatoses: Petrolatum and NBUVB requiring at least 4 to 8 hours of care under direct physician supervision.
Skin Type: II
Protocol For Photochemotherapy: Petrolatum And Broad Band Uvb: The patient received Photochemotherapy: Petrolatum and Broad Band UVB.
Protocol For Photochemotherapy: Mineral Oil And Nbuvb: The patient received Photochemotherapy: Mineral Oil and NBUVB (mineral oil applied to all lesions prior to phototherapy).
Render Post-Care In The Note: no
Consent: Written consent obtained.  The risks were reviewed with the patient including but not limited to: burn, pigmentary changes, pain, blistering, scabbing, redness, increased risk of skin cancers, and the remote possibility of scarring.
Treatment Number: 15
Protocol For Nb Uva: The patient received NB UVA.
Name Of Supervising Technician: Julieta Malagon MA
Protocol For Puva: The patient received PUVA.
Protocol For Photochemotherapy: Triamcinolone Ointment And Nbuvb: The patient received Photochemotherapy: Triamcinolone and NBUVB (triamcinolone ointment applied to all lesions prior to phototherapy).
Protocol For Photochemotherapy: Mineral Oil And Broad Band Uvb: The patient received Photochemotherapy: Mineral Oil and Broad Band UVB.
Protocol For Nbuvb: The patient received NBUVB.
Protocol For Photochemotherapy For Severe Photoresponsive Dermatoses: Puva: The patient received Photochemotherapy for severe photoresponsive dermatoses: PUVA requiring at least 4 to 8 hours of care under direct physician supervision.
Protocol For Uva: The patient received UVA.
Protocol: NBUVB
Protocol For Photochemotherapy For Severe Photoresponsive Dermatoses: Tar And Nbuvb (Goeckerman Treatment): The patient received Photochemotherapy for severe photoresponsive dermatoses: Tar and NBUVB (Goeckerman treatment) requiring at least 4 to 8 hours of care under direct physician supervision.
Protocol For Photochemotherapy For Severe Photoresponsive Dermatoses: Tar And Broad Band Uvb (Goeckerman Treatment): The patient received Photochemotherapy for severe photoresponsive dermatoses: Tar and Broad Band UVB (Goeckerman treatment) requiring at least 4 to 8 hours of care under direct physician supervision.
Additional Shield Locations: Face - covered with a towel
Protocol For Photochemotherapy: Petrolatum And Nbuvb: The patient received Photochemotherapy: Petrolatum and NBUVB (petrolatum applied to all lesions prior to phototherapy).
Detail Level: Zone
Protocol For Photochemotherapy: Tar And Nbuvb (Goeckerman Treatment): The patient received Photochemotherapy: Tar and NBUVB (Goeckerman treatment).
Protocol For Bath Puva: The patient received Bath PUVA.
Comments On Previous Treatment: Pt reported burn after last tx at time of: 3:30
Protocol For Uva1: The patient received UVA1.

## 2021-08-12 ENCOUNTER — APPOINTMENT (RX ONLY)
Dept: URBAN - METROPOLITAN AREA CLINIC 4 | Facility: CLINIC | Age: 41
Setting detail: DERMATOLOGY
End: 2021-08-12

## 2021-08-12 DIAGNOSIS — L40.0 PSORIASIS VULGARIS: ICD-10-CM

## 2021-08-12 PROCEDURE — 96900 ACTINOTHERAPY UV LIGHT: CPT

## 2021-08-12 PROCEDURE — ? PHOTOTHERAPY TREATMENT

## 2021-08-12 ASSESSMENT — LOCATION DETAILED DESCRIPTION DERM
LOCATION DETAILED: RIGHT ELBOW
LOCATION DETAILED: LEFT ELBOW

## 2021-08-12 ASSESSMENT — LOCATION SIMPLE DESCRIPTION DERM
LOCATION SIMPLE: LEFT ELBOW
LOCATION SIMPLE: RIGHT ELBOW

## 2021-08-12 ASSESSMENT — LOCATION ZONE DERM: LOCATION ZONE: ARM

## 2021-08-12 NOTE — PROCEDURE: PHOTOTHERAPY TREATMENT
Total Treatment Time: 2 minute 00 seconds
Protocol For Photochemotherapy For Severe Photoresponsive Dermatoses: Petrolatum And Broad Band Uvb: The patient received Photochemotherapyfor severe photoresponsive dermatoses: Petrolatum and Broad Band UVB requiring at least 4 to 8 hours of care under direct physician supervision.
Protocol For Photochemotherapy: Baby Oil And Nbuvb: The patient received Photochemotherapy: Baby Oil and NBUVB (baby oil applied to all lesions prior to phototherapy).
Protocol For Photochemotherapy: Tar And Broad Band Uvb (Goeckerman Treatment): The patient received Photochemotherapy: Tar and Broad Band UVB (Goeckerman treatment).
Post-Care Instructions: I reviewed with the patient in detail post-care instructions. Patient is to wear sun protection. Patients may expect sunburn like redness, discomfort and scabbing.
Protocol For Broad Band Uvb: The patient received Broad Band UVB.
Changes In Treatment Protocol: Per protocol, increased treatment time by 15 seconds. \\nMax time of 3 minutes 15 seconds.
Protocol For Protocol For Photochemotherapy For Severe Photoresponsive Dermatoses: Bath Puva: The patient received Photochemotherapy for severe photoresponsive dermatoses: Bath PUVA requiring at least 4 to 8 hours of care under direct physician supervision.
Protocol For Photochemotherapy For Severe Photoresponsive Dermatoses: Petrolatum And Nbuvb: The patient received Photochemotherapy for severe photoresponsive dermatoses: Petrolatum and NBUVB requiring at least 4 to 8 hours of care under direct physician supervision.
Skin Type: II
Protocol For Photochemotherapy: Petrolatum And Broad Band Uvb: The patient received Photochemotherapy: Petrolatum and Broad Band UVB.
Protocol For Photochemotherapy: Mineral Oil And Nbuvb: The patient received Photochemotherapy: Mineral Oil and NBUVB (mineral oil applied to all lesions prior to phototherapy).
Render Post-Care In The Note: no
Consent: Written consent obtained.  The risks were reviewed with the patient including but not limited to: burn, pigmentary changes, pain, blistering, scabbing, redness, increased risk of skin cancers, and the remote possibility of scarring.
Treatment Number: 16
Protocol For Nb Uva: The patient received NB UVA.
Name Of Supervising Technician: Julieta Malagon MA
Protocol For Puva: The patient received PUVA.
Protocol For Photochemotherapy: Triamcinolone Ointment And Nbuvb: The patient received Photochemotherapy: Triamcinolone and NBUVB (triamcinolone ointment applied to all lesions prior to phototherapy).
Protocol For Photochemotherapy: Mineral Oil And Broad Band Uvb: The patient received Photochemotherapy: Mineral Oil and Broad Band UVB.
Protocol For Nbuvb: The patient received NBUVB.
Protocol For Photochemotherapy For Severe Photoresponsive Dermatoses: Puva: The patient received Photochemotherapy for severe photoresponsive dermatoses: PUVA requiring at least 4 to 8 hours of care under direct physician supervision.
Protocol For Uva: The patient received UVA.
Protocol: NBUVB
Protocol For Photochemotherapy For Severe Photoresponsive Dermatoses: Tar And Nbuvb (Goeckerman Treatment): The patient received Photochemotherapy for severe photoresponsive dermatoses: Tar and NBUVB (Goeckerman treatment) requiring at least 4 to 8 hours of care under direct physician supervision.
Protocol For Photochemotherapy For Severe Photoresponsive Dermatoses: Tar And Broad Band Uvb (Goeckerman Treatment): The patient received Photochemotherapy for severe photoresponsive dermatoses: Tar and Broad Band UVB (Goeckerman treatment) requiring at least 4 to 8 hours of care under direct physician supervision.
Additional Shield Locations: Face - covered with a towel
Protocol For Photochemotherapy: Petrolatum And Nbuvb: The patient received Photochemotherapy: Petrolatum and NBUVB (petrolatum applied to all lesions prior to phototherapy).
Detail Level: Zone
Protocol For Photochemotherapy: Tar And Nbuvb (Goeckerman Treatment): The patient received Photochemotherapy: Tar and NBUVB (Goeckerman treatment).
Protocol For Bath Puva: The patient received Bath PUVA.
Comments On Previous Treatment: Marcin
Protocol For Uva1: The patient received UVA1.

## 2021-08-19 ENCOUNTER — APPOINTMENT (RX ONLY)
Dept: URBAN - METROPOLITAN AREA CLINIC 4 | Facility: CLINIC | Age: 41
Setting detail: DERMATOLOGY
End: 2021-08-19

## 2021-08-19 DIAGNOSIS — L40.0 PSORIASIS VULGARIS: ICD-10-CM

## 2021-08-19 PROCEDURE — 96900 ACTINOTHERAPY UV LIGHT: CPT

## 2021-08-19 PROCEDURE — ? PHOTOTHERAPY TREATMENT

## 2021-08-19 ASSESSMENT — LOCATION DETAILED DESCRIPTION DERM
LOCATION DETAILED: RIGHT ELBOW
LOCATION DETAILED: LEFT ELBOW

## 2021-08-19 ASSESSMENT — LOCATION SIMPLE DESCRIPTION DERM
LOCATION SIMPLE: LEFT ELBOW
LOCATION SIMPLE: RIGHT ELBOW

## 2021-08-19 ASSESSMENT — LOCATION ZONE DERM: LOCATION ZONE: ARM

## 2021-08-19 NOTE — PROCEDURE: PHOTOTHERAPY TREATMENT
Total Treatment Time: 2 minute 15 seconds
Protocol For Photochemotherapy For Severe Photoresponsive Dermatoses: Petrolatum And Broad Band Uvb: The patient received Photochemotherapyfor severe photoresponsive dermatoses: Petrolatum and Broad Band UVB requiring at least 4 to 8 hours of care under direct physician supervision.
Protocol For Photochemotherapy: Baby Oil And Nbuvb: The patient received Photochemotherapy: Baby Oil and NBUVB (baby oil applied to all lesions prior to phototherapy).
Protocol For Photochemotherapy: Tar And Broad Band Uvb (Goeckerman Treatment): The patient received Photochemotherapy: Tar and Broad Band UVB (Goeckerman treatment).
Post-Care Instructions: I reviewed with the patient in detail post-care instructions. Patient is to wear sun protection. Patients may expect sunburn like redness, discomfort and scabbing.
Protocol For Broad Band Uvb: The patient received Broad Band UVB.
Changes In Treatment Protocol: Per protocol, increased treatment time by 15 seconds. \\nMax time of 3 minutes 15 seconds.
Protocol For Protocol For Photochemotherapy For Severe Photoresponsive Dermatoses: Bath Puva: The patient received Photochemotherapy for severe photoresponsive dermatoses: Bath PUVA requiring at least 4 to 8 hours of care under direct physician supervision.
Protocol For Photochemotherapy For Severe Photoresponsive Dermatoses: Petrolatum And Nbuvb: The patient received Photochemotherapy for severe photoresponsive dermatoses: Petrolatum and NBUVB requiring at least 4 to 8 hours of care under direct physician supervision.
Skin Type: II
Protocol For Photochemotherapy: Petrolatum And Broad Band Uvb: The patient received Photochemotherapy: Petrolatum and Broad Band UVB.
Protocol For Photochemotherapy: Mineral Oil And Nbuvb: The patient received Photochemotherapy: Mineral Oil and NBUVB (mineral oil applied to all lesions prior to phototherapy).
Render Post-Care In The Note: no
Consent: Written consent obtained.  The risks were reviewed with the patient including but not limited to: burn, pigmentary changes, pain, blistering, scabbing, redness, increased risk of skin cancers, and the remote possibility of scarring.
Treatment Number: 17
Protocol For Nb Uva: The patient received NB UVA.
Name Of Supervising Technician: Julieta Malagon MA
Protocol For Puva: The patient received PUVA.
Protocol For Photochemotherapy: Triamcinolone Ointment And Nbuvb: The patient received Photochemotherapy: Triamcinolone and NBUVB (triamcinolone ointment applied to all lesions prior to phototherapy).
Protocol For Photochemotherapy: Mineral Oil And Broad Band Uvb: The patient received Photochemotherapy: Mineral Oil and Broad Band UVB.
Protocol For Nbuvb: The patient received NBUVB.
Protocol For Photochemotherapy For Severe Photoresponsive Dermatoses: Puva: The patient received Photochemotherapy for severe photoresponsive dermatoses: PUVA requiring at least 4 to 8 hours of care under direct physician supervision.
Protocol For Uva: The patient received UVA.
Protocol: NBUVB
Protocol For Photochemotherapy For Severe Photoresponsive Dermatoses: Tar And Nbuvb (Goeckerman Treatment): The patient received Photochemotherapy for severe photoresponsive dermatoses: Tar and NBUVB (Goeckerman treatment) requiring at least 4 to 8 hours of care under direct physician supervision.
Protocol For Photochemotherapy For Severe Photoresponsive Dermatoses: Tar And Broad Band Uvb (Goeckerman Treatment): The patient received Photochemotherapy for severe photoresponsive dermatoses: Tar and Broad Band UVB (Goeckerman treatment) requiring at least 4 to 8 hours of care under direct physician supervision.
Additional Shield Locations: Face - covered with a towel
Protocol For Photochemotherapy: Petrolatum And Nbuvb: The patient received Photochemotherapy: Petrolatum and NBUVB (petrolatum applied to all lesions prior to phototherapy).
Detail Level: Zone
Protocol For Photochemotherapy: Tar And Nbuvb (Goeckerman Treatment): The patient received Photochemotherapy: Tar and NBUVB (Goeckerman treatment).
Protocol For Bath Puva: The patient received Bath PUVA.
Comments On Previous Treatment: Marcin
Protocol For Uva1: The patient received UVA1.

## 2021-08-24 ENCOUNTER — APPOINTMENT (RX ONLY)
Dept: URBAN - METROPOLITAN AREA CLINIC 4 | Facility: CLINIC | Age: 41
Setting detail: DERMATOLOGY
End: 2021-08-24

## 2021-08-24 DIAGNOSIS — L40.0 PSORIASIS VULGARIS: ICD-10-CM

## 2021-08-24 PROCEDURE — ? PHOTOTHERAPY TREATMENT

## 2021-08-24 PROCEDURE — 96900 ACTINOTHERAPY UV LIGHT: CPT

## 2021-08-24 ASSESSMENT — LOCATION SIMPLE DESCRIPTION DERM
LOCATION SIMPLE: LEFT ELBOW
LOCATION SIMPLE: RIGHT ELBOW

## 2021-08-24 ASSESSMENT — LOCATION ZONE DERM: LOCATION ZONE: ARM

## 2021-08-24 ASSESSMENT — LOCATION DETAILED DESCRIPTION DERM
LOCATION DETAILED: LEFT ELBOW
LOCATION DETAILED: RIGHT ELBOW

## 2021-08-24 NOTE — PROCEDURE: PHOTOTHERAPY TREATMENT
Total Treatment Time: 2 minute 30 seconds
Protocol For Photochemotherapy For Severe Photoresponsive Dermatoses: Petrolatum And Broad Band Uvb: The patient received Photochemotherapyfor severe photoresponsive dermatoses: Petrolatum and Broad Band UVB requiring at least 4 to 8 hours of care under direct physician supervision.
Protocol For Photochemotherapy: Baby Oil And Nbuvb: The patient received Photochemotherapy: Baby Oil and NBUVB (baby oil applied to all lesions prior to phototherapy).
Protocol For Photochemotherapy: Tar And Broad Band Uvb (Goeckerman Treatment): The patient received Photochemotherapy: Tar and Broad Band UVB (Goeckerman treatment).
Post-Care Instructions: I reviewed with the patient in detail post-care instructions. Patient is to wear sun protection. Patients may expect sunburn like redness, discomfort and scabbing.
Protocol For Broad Band Uvb: The patient received Broad Band UVB.
Changes In Treatment Protocol: Per protocol, increased treatment time by 15 seconds. \\nMax time of 3 minutes 15 seconds.
Protocol For Protocol For Photochemotherapy For Severe Photoresponsive Dermatoses: Bath Puva: The patient received Photochemotherapy for severe photoresponsive dermatoses: Bath PUVA requiring at least 4 to 8 hours of care under direct physician supervision.
Protocol For Photochemotherapy For Severe Photoresponsive Dermatoses: Petrolatum And Nbuvb: The patient received Photochemotherapy for severe photoresponsive dermatoses: Petrolatum and NBUVB requiring at least 4 to 8 hours of care under direct physician supervision.
Skin Type: II
Protocol For Photochemotherapy: Petrolatum And Broad Band Uvb: The patient received Photochemotherapy: Petrolatum and Broad Band UVB.
Protocol For Photochemotherapy: Mineral Oil And Nbuvb: The patient received Photochemotherapy: Mineral Oil and NBUVB (mineral oil applied to all lesions prior to phototherapy).
Render Post-Care In The Note: no
Consent: Written consent obtained.  The risks were reviewed with the patient including but not limited to: burn, pigmentary changes, pain, blistering, scabbing, redness, increased risk of skin cancers, and the remote possibility of scarring.
Treatment Number: 18
Protocol For Nb Uva: The patient received NB UVA.
Name Of Supervising Technician: Julieta Malagon MA
Protocol For Puva: The patient received PUVA.
Protocol For Photochemotherapy: Triamcinolone Ointment And Nbuvb: The patient received Photochemotherapy: Triamcinolone and NBUVB (triamcinolone ointment applied to all lesions prior to phototherapy).
Protocol For Photochemotherapy: Mineral Oil And Broad Band Uvb: The patient received Photochemotherapy: Mineral Oil and Broad Band UVB.
Protocol For Nbuvb: The patient received NBUVB.
Protocol For Photochemotherapy For Severe Photoresponsive Dermatoses: Puva: The patient received Photochemotherapy for severe photoresponsive dermatoses: PUVA requiring at least 4 to 8 hours of care under direct physician supervision.
Protocol For Uva: The patient received UVA.
Protocol: NBUVB
Protocol For Photochemotherapy For Severe Photoresponsive Dermatoses: Tar And Nbuvb (Goeckerman Treatment): The patient received Photochemotherapy for severe photoresponsive dermatoses: Tar and NBUVB (Goeckerman treatment) requiring at least 4 to 8 hours of care under direct physician supervision.
Protocol For Photochemotherapy For Severe Photoresponsive Dermatoses: Tar And Broad Band Uvb (Goeckerman Treatment): The patient received Photochemotherapy for severe photoresponsive dermatoses: Tar and Broad Band UVB (Goeckerman treatment) requiring at least 4 to 8 hours of care under direct physician supervision.
Additional Shield Locations: Face - covered with a towel
Protocol For Photochemotherapy: Petrolatum And Nbuvb: The patient received Photochemotherapy: Petrolatum and NBUVB (petrolatum applied to all lesions prior to phototherapy).
Detail Level: Zone
Protocol For Photochemotherapy: Tar And Nbuvb (Goeckerman Treatment): The patient received Photochemotherapy: Tar and NBUVB (Goeckerman treatment).
Protocol For Bath Puva: The patient received Bath PUVA.
Comments On Previous Treatment: Marcin
Protocol For Uva1: The patient received UVA1.

## 2021-08-26 ENCOUNTER — APPOINTMENT (RX ONLY)
Dept: URBAN - METROPOLITAN AREA CLINIC 4 | Facility: CLINIC | Age: 41
Setting detail: DERMATOLOGY
End: 2021-08-26

## 2021-08-26 DIAGNOSIS — L40.0 PSORIASIS VULGARIS: ICD-10-CM

## 2021-08-26 PROCEDURE — 96900 ACTINOTHERAPY UV LIGHT: CPT

## 2021-08-26 PROCEDURE — ? PHOTOTHERAPY TREATMENT

## 2021-08-26 ASSESSMENT — LOCATION ZONE DERM: LOCATION ZONE: ARM

## 2021-08-26 ASSESSMENT — LOCATION DETAILED DESCRIPTION DERM
LOCATION DETAILED: RIGHT ELBOW
LOCATION DETAILED: LEFT ELBOW

## 2021-08-26 ASSESSMENT — LOCATION SIMPLE DESCRIPTION DERM
LOCATION SIMPLE: LEFT ELBOW
LOCATION SIMPLE: RIGHT ELBOW

## 2021-08-26 NOTE — PROCEDURE: PHOTOTHERAPY TREATMENT
Total Treatment Time: 2 minute 45 seconds
Protocol For Photochemotherapy For Severe Photoresponsive Dermatoses: Petrolatum And Broad Band Uvb: The patient received Photochemotherapyfor severe photoresponsive dermatoses: Petrolatum and Broad Band UVB requiring at least 4 to 8 hours of care under direct physician supervision.
Protocol For Photochemotherapy: Baby Oil And Nbuvb: The patient received Photochemotherapy: Baby Oil and NBUVB (baby oil applied to all lesions prior to phototherapy).
Protocol For Photochemotherapy: Tar And Broad Band Uvb (Goeckerman Treatment): The patient received Photochemotherapy: Tar and Broad Band UVB (Goeckerman treatment).
Post-Care Instructions: I reviewed with the patient in detail post-care instructions. Patient is to wear sun protection. Patients may expect sunburn like redness, discomfort and scabbing.
Protocol For Broad Band Uvb: The patient received Broad Band UVB.
Changes In Treatment Protocol: Per protocol, increased treatment time by 15 seconds. \\nMax time of 3 minutes 15 seconds.
Protocol For Protocol For Photochemotherapy For Severe Photoresponsive Dermatoses: Bath Puva: The patient received Photochemotherapy for severe photoresponsive dermatoses: Bath PUVA requiring at least 4 to 8 hours of care under direct physician supervision.
Protocol For Photochemotherapy For Severe Photoresponsive Dermatoses: Petrolatum And Nbuvb: The patient received Photochemotherapy for severe photoresponsive dermatoses: Petrolatum and NBUVB requiring at least 4 to 8 hours of care under direct physician supervision.
Skin Type: II
Protocol For Photochemotherapy: Petrolatum And Broad Band Uvb: The patient received Photochemotherapy: Petrolatum and Broad Band UVB.
Protocol For Photochemotherapy: Mineral Oil And Nbuvb: The patient received Photochemotherapy: Mineral Oil and NBUVB (mineral oil applied to all lesions prior to phototherapy).
Render Post-Care In The Note: no
Consent: Written consent obtained.  The risks were reviewed with the patient including but not limited to: burn, pigmentary changes, pain, blistering, scabbing, redness, increased risk of skin cancers, and the remote possibility of scarring.
Treatment Number: 19
Protocol For Nb Uva: The patient received NB UVA.
Name Of Supervising Technician: Julieta Malagon MA
Protocol For Puva: The patient received PUVA.
Protocol For Photochemotherapy: Triamcinolone Ointment And Nbuvb: The patient received Photochemotherapy: Triamcinolone and NBUVB (triamcinolone ointment applied to all lesions prior to phototherapy).
Protocol For Photochemotherapy: Mineral Oil And Broad Band Uvb: The patient received Photochemotherapy: Mineral Oil and Broad Band UVB.
Protocol For Nbuvb: The patient received NBUVB.
Protocol For Photochemotherapy For Severe Photoresponsive Dermatoses: Puva: The patient received Photochemotherapy for severe photoresponsive dermatoses: PUVA requiring at least 4 to 8 hours of care under direct physician supervision.
Protocol For Uva: The patient received UVA.
Protocol: NBUVB
Protocol For Photochemotherapy For Severe Photoresponsive Dermatoses: Tar And Nbuvb (Goeckerman Treatment): The patient received Photochemotherapy for severe photoresponsive dermatoses: Tar and NBUVB (Goeckerman treatment) requiring at least 4 to 8 hours of care under direct physician supervision.
Protocol For Photochemotherapy For Severe Photoresponsive Dermatoses: Tar And Broad Band Uvb (Goeckerman Treatment): The patient received Photochemotherapy for severe photoresponsive dermatoses: Tar and Broad Band UVB (Goeckerman treatment) requiring at least 4 to 8 hours of care under direct physician supervision.
Additional Shield Locations: Face - covered with a towel
Protocol For Photochemotherapy: Petrolatum And Nbuvb: The patient received Photochemotherapy: Petrolatum and NBUVB (petrolatum applied to all lesions prior to phototherapy).
Detail Level: Zone
Protocol For Photochemotherapy: Tar And Nbuvb (Goeckerman Treatment): The patient received Photochemotherapy: Tar and NBUVB (Goeckerman treatment).
Protocol For Bath Puva: The patient received Bath PUVA.
Comments On Previous Treatment: Marcin
Protocol For Uva1: The patient received UVA1.

## 2021-09-07 ENCOUNTER — APPOINTMENT (RX ONLY)
Dept: URBAN - METROPOLITAN AREA CLINIC 4 | Facility: CLINIC | Age: 41
Setting detail: DERMATOLOGY
End: 2021-09-07

## 2021-09-07 DIAGNOSIS — L40.0 PSORIASIS VULGARIS: ICD-10-CM

## 2021-09-07 PROCEDURE — ? PHOTOTHERAPY TREATMENT

## 2021-09-07 PROCEDURE — 96900 ACTINOTHERAPY UV LIGHT: CPT

## 2021-09-07 ASSESSMENT — LOCATION DETAILED DESCRIPTION DERM
LOCATION DETAILED: RIGHT ELBOW
LOCATION DETAILED: LEFT ELBOW

## 2021-09-07 ASSESSMENT — LOCATION ZONE DERM: LOCATION ZONE: ARM

## 2021-09-07 ASSESSMENT — LOCATION SIMPLE DESCRIPTION DERM
LOCATION SIMPLE: RIGHT ELBOW
LOCATION SIMPLE: LEFT ELBOW

## 2021-09-07 NOTE — PROCEDURE: PHOTOTHERAPY TREATMENT
Total Treatment Time: 2 minute 00 seconds
Protocol For Photochemotherapy For Severe Photoresponsive Dermatoses: Petrolatum And Broad Band Uvb: The patient received Photochemotherapyfor severe photoresponsive dermatoses: Petrolatum and Broad Band UVB requiring at least 4 to 8 hours of care under direct physician supervision.
Protocol For Photochemotherapy: Baby Oil And Nbuvb: The patient received Photochemotherapy: Baby Oil and NBUVB (baby oil applied to all lesions prior to phototherapy).
Protocol For Photochemotherapy: Tar And Broad Band Uvb (Goeckerman Treatment): The patient received Photochemotherapy: Tar and Broad Band UVB (Goeckerman treatment).
Post-Care Instructions: I reviewed with the patient in detail post-care instructions. Patient is to wear sun protection. Patients may expect sunburn like redness, discomfort and scabbing.
Protocol For Broad Band Uvb: The patient received Broad Band UVB.
Changes In Treatment Protocol: Per protocol, decreased treatment by 25% due to time gap. \\nMax time of 3 minutes 15 seconds.
Protocol For Protocol For Photochemotherapy For Severe Photoresponsive Dermatoses: Bath Puva: The patient received Photochemotherapy for severe photoresponsive dermatoses: Bath PUVA requiring at least 4 to 8 hours of care under direct physician supervision.
Protocol For Photochemotherapy For Severe Photoresponsive Dermatoses: Petrolatum And Nbuvb: The patient received Photochemotherapy for severe photoresponsive dermatoses: Petrolatum and NBUVB requiring at least 4 to 8 hours of care under direct physician supervision.
Skin Type: II
Protocol For Photochemotherapy: Petrolatum And Broad Band Uvb: The patient received Photochemotherapy: Petrolatum and Broad Band UVB.
Protocol For Photochemotherapy: Mineral Oil And Nbuvb: The patient received Photochemotherapy: Mineral Oil and NBUVB (mineral oil applied to all lesions prior to phototherapy).
Render Post-Care In The Note: no
Consent: Written consent obtained.  The risks were reviewed with the patient including but not limited to: burn, pigmentary changes, pain, blistering, scabbing, redness, increased risk of skin cancers, and the remote possibility of scarring.
Treatment Number: 20
Protocol For Nb Uva: The patient received NB UVA.
Name Of Supervising Technician: Julieta Malagon MA
Protocol For Puva: The patient received PUVA.
Protocol For Photochemotherapy: Triamcinolone Ointment And Nbuvb: The patient received Photochemotherapy: Triamcinolone and NBUVB (triamcinolone ointment applied to all lesions prior to phototherapy).
Protocol For Photochemotherapy: Mineral Oil And Broad Band Uvb: The patient received Photochemotherapy: Mineral Oil and Broad Band UVB.
Protocol For Nbuvb: The patient received NBUVB.
Protocol For Photochemotherapy For Severe Photoresponsive Dermatoses: Puva: The patient received Photochemotherapy for severe photoresponsive dermatoses: PUVA requiring at least 4 to 8 hours of care under direct physician supervision.
Protocol For Uva: The patient received UVA.
Protocol: NBUVB
Protocol For Photochemotherapy For Severe Photoresponsive Dermatoses: Tar And Nbuvb (Goeckerman Treatment): The patient received Photochemotherapy for severe photoresponsive dermatoses: Tar and NBUVB (Goeckerman treatment) requiring at least 4 to 8 hours of care under direct physician supervision.
Protocol For Photochemotherapy For Severe Photoresponsive Dermatoses: Tar And Broad Band Uvb (Goeckerman Treatment): The patient received Photochemotherapy for severe photoresponsive dermatoses: Tar and Broad Band UVB (Goeckerman treatment) requiring at least 4 to 8 hours of care under direct physician supervision.
Additional Shield Locations: Face - covered with a towel
Protocol For Photochemotherapy: Petrolatum And Nbuvb: The patient received Photochemotherapy: Petrolatum and NBUVB (petrolatum applied to all lesions prior to phototherapy).
Detail Level: Zone
Protocol For Photochemotherapy: Tar And Nbuvb (Goeckerman Treatment): The patient received Photochemotherapy: Tar and NBUVB (Goeckerman treatment).
Protocol For Bath Puva: The patient received Bath PUVA.
Comments On Previous Treatment: Patient denies any redness or disconfort from previous visit.
Protocol For Uva1: The patient received UVA1.

## 2021-09-21 ENCOUNTER — APPOINTMENT (RX ONLY)
Dept: URBAN - METROPOLITAN AREA CLINIC 4 | Facility: CLINIC | Age: 41
Setting detail: DERMATOLOGY
End: 2021-09-21

## 2021-09-21 DIAGNOSIS — L40.0 PSORIASIS VULGARIS: ICD-10-CM

## 2021-09-21 PROCEDURE — ? PHOTOTHERAPY TREATMENT

## 2021-09-21 PROCEDURE — 96900 ACTINOTHERAPY UV LIGHT: CPT

## 2021-09-21 ASSESSMENT — LOCATION SIMPLE DESCRIPTION DERM
LOCATION SIMPLE: LEFT ELBOW
LOCATION SIMPLE: RIGHT ELBOW

## 2021-09-21 ASSESSMENT — LOCATION DETAILED DESCRIPTION DERM
LOCATION DETAILED: LEFT ELBOW
LOCATION DETAILED: RIGHT ELBOW

## 2021-09-21 ASSESSMENT — LOCATION ZONE DERM: LOCATION ZONE: ARM

## 2021-09-21 NOTE — PROCEDURE: PHOTOTHERAPY TREATMENT
Total Treatment Time: 1 minute 00 seconds
Protocol For Photochemotherapy For Severe Photoresponsive Dermatoses: Petrolatum And Broad Band Uvb: The patient received Photochemotherapyfor severe photoresponsive dermatoses: Petrolatum and Broad Band UVB requiring at least 4 to 8 hours of care under direct physician supervision.
Protocol For Photochemotherapy: Baby Oil And Nbuvb: The patient received Photochemotherapy: Baby Oil and NBUVB (baby oil applied to all lesions prior to phototherapy).
Protocol For Photochemotherapy: Tar And Broad Band Uvb (Goeckerman Treatment): The patient received Photochemotherapy: Tar and Broad Band UVB (Goeckerman treatment).
Post-Care Instructions: I reviewed with the patient in detail post-care instructions. Patient is to wear sun protection. Patients may expect sunburn like redness, discomfort and scabbing.
Protocol For Broad Band Uvb: The patient received Broad Band UVB.
Changes In Treatment Protocol: Per protocol, decreased treatment by 50% due to time gap. \\nMax time of 3 minutes 15 seconds.
Protocol For Protocol For Photochemotherapy For Severe Photoresponsive Dermatoses: Bath Puva: The patient received Photochemotherapy for severe photoresponsive dermatoses: Bath PUVA requiring at least 4 to 8 hours of care under direct physician supervision.
Protocol For Photochemotherapy For Severe Photoresponsive Dermatoses: Petrolatum And Nbuvb: The patient received Photochemotherapy for severe photoresponsive dermatoses: Petrolatum and NBUVB requiring at least 4 to 8 hours of care under direct physician supervision.
Skin Type: II
Protocol For Photochemotherapy: Petrolatum And Broad Band Uvb: The patient received Photochemotherapy: Petrolatum and Broad Band UVB.
Protocol For Photochemotherapy: Mineral Oil And Nbuvb: The patient received Photochemotherapy: Mineral Oil and NBUVB (mineral oil applied to all lesions prior to phototherapy).
Render Post-Care In The Note: no
Consent: Written consent obtained.  The risks were reviewed with the patient including but not limited to: burn, pigmentary changes, pain, blistering, scabbing, redness, increased risk of skin cancers, and the remote possibility of scarring.
Treatment Number: 21
Protocol For Nb Uva: The patient received NB UVA.
Name Of Supervising Technician: Julieta Malagon MA
Protocol For Puva: The patient received PUVA.
Protocol For Photochemotherapy: Triamcinolone Ointment And Nbuvb: The patient received Photochemotherapy: Triamcinolone and NBUVB (triamcinolone ointment applied to all lesions prior to phototherapy).
Protocol For Photochemotherapy: Mineral Oil And Broad Band Uvb: The patient received Photochemotherapy: Mineral Oil and Broad Band UVB.
Protocol For Nbuvb: The patient received NBUVB.
Protocol For Photochemotherapy For Severe Photoresponsive Dermatoses: Puva: The patient received Photochemotherapy for severe photoresponsive dermatoses: PUVA requiring at least 4 to 8 hours of care under direct physician supervision.
Protocol For Uva: The patient received UVA.
Protocol: NBUVB
Protocol For Photochemotherapy For Severe Photoresponsive Dermatoses: Tar And Nbuvb (Goeckerman Treatment): The patient received Photochemotherapy for severe photoresponsive dermatoses: Tar and NBUVB (Goeckerman treatment) requiring at least 4 to 8 hours of care under direct physician supervision.
Protocol For Photochemotherapy For Severe Photoresponsive Dermatoses: Tar And Broad Band Uvb (Goeckerman Treatment): The patient received Photochemotherapy for severe photoresponsive dermatoses: Tar and Broad Band UVB (Goeckerman treatment) requiring at least 4 to 8 hours of care under direct physician supervision.
Additional Shield Locations: Face - covered with a towel
Protocol For Photochemotherapy: Petrolatum And Nbuvb: The patient received Photochemotherapy: Petrolatum and NBUVB (petrolatum applied to all lesions prior to phototherapy).
Detail Level: Zone
Protocol For Photochemotherapy: Tar And Nbuvb (Goeckerman Treatment): The patient received Photochemotherapy: Tar and NBUVB (Goeckerman treatment).
Protocol For Bath Puva: The patient received Bath PUVA.
Comments On Previous Treatment: Patient denies any redness or disconfort from previous visit.
Protocol For Uva1: The patient received UVA1.

## 2021-09-23 ENCOUNTER — APPOINTMENT (RX ONLY)
Dept: URBAN - METROPOLITAN AREA CLINIC 20 | Facility: CLINIC | Age: 41
Setting detail: DERMATOLOGY
End: 2021-09-23

## 2021-09-23 DIAGNOSIS — L82.0 INFLAMED SEBORRHEIC KERATOSIS: ICD-10-CM

## 2021-09-23 DIAGNOSIS — L70.8 OTHER ACNE: ICD-10-CM | Status: INADEQUATELY CONTROLLED

## 2021-09-23 DIAGNOSIS — T81.89 OTHER COMPLICATIONS OF PROCEDURES, NOT ELSEWHERE CLASSIFIED: ICD-10-CM

## 2021-09-23 DIAGNOSIS — Z87.2 PERSONAL HISTORY OF DISEASES OF THE SKIN AND SUBCUTANEOUS TISSUE: ICD-10-CM

## 2021-09-23 DIAGNOSIS — D18.0 HEMANGIOMA: ICD-10-CM

## 2021-09-23 DIAGNOSIS — L57.8 OTHER SKIN CHANGES DUE TO CHRONIC EXPOSURE TO NONIONIZING RADIATION: ICD-10-CM

## 2021-09-23 DIAGNOSIS — L81.4 OTHER MELANIN HYPERPIGMENTATION: ICD-10-CM

## 2021-09-23 DIAGNOSIS — D22 MELANOCYTIC NEVI: ICD-10-CM

## 2021-09-23 DIAGNOSIS — L40.0 PSORIASIS VULGARIS: ICD-10-CM

## 2021-09-23 DIAGNOSIS — Z85.820 PERSONAL HISTORY OF MALIGNANT MELANOMA OF SKIN: ICD-10-CM

## 2021-09-23 PROBLEM — D48.5 NEOPLASM OF UNCERTAIN BEHAVIOR OF SKIN: Status: ACTIVE | Noted: 2021-09-23

## 2021-09-23 PROBLEM — D18.01 HEMANGIOMA OF SKIN AND SUBCUTANEOUS TISSUE: Status: ACTIVE | Noted: 2021-09-23

## 2021-09-23 PROBLEM — D22.61 MELANOCYTIC NEVI OF RIGHT UPPER LIMB, INCLUDING SHOULDER: Status: ACTIVE | Noted: 2021-09-23

## 2021-09-23 PROBLEM — T81.89XA OTHER COMPLICATIONS OF PROCEDURES, NOT ELSEWHERE CLASSIFIED, INITIAL ENCOUNTER: Status: ACTIVE | Noted: 2021-09-23

## 2021-09-23 PROBLEM — D22.5 MELANOCYTIC NEVI OF TRUNK: Status: ACTIVE | Noted: 2021-09-23

## 2021-09-23 PROBLEM — D22.4 MELANOCYTIC NEVI OF SCALP AND NECK: Status: ACTIVE | Noted: 2021-09-23

## 2021-09-23 PROBLEM — D22.72 MELANOCYTIC NEVI OF LEFT LOWER LIMB, INCLUDING HIP: Status: ACTIVE | Noted: 2021-09-23

## 2021-09-23 PROCEDURE — 69100 BIOPSY OF EXTERNAL EAR: CPT

## 2021-09-23 PROCEDURE — ? BIOPSY BY SHAVE METHOD

## 2021-09-23 PROCEDURE — 17110 DESTRUCTION B9 LES UP TO 14: CPT

## 2021-09-23 PROCEDURE — ? COUNSELING

## 2021-09-23 PROCEDURE — ? OBSERVATION AND MEASURE

## 2021-09-23 PROCEDURE — ? LIQUID NITROGEN

## 2021-09-23 PROCEDURE — ? PRESCRIPTION

## 2021-09-23 PROCEDURE — 99214 OFFICE O/P EST MOD 30 MIN: CPT | Mod: 25

## 2021-09-23 PROCEDURE — ? DIAGNOSIS COMMENT

## 2021-09-23 PROCEDURE — ? ADDITIONAL NOTES

## 2021-09-23 PROCEDURE — 11102 TANGNTL BX SKIN SINGLE LES: CPT | Mod: 59

## 2021-09-23 PROCEDURE — 69100 BIOPSY OF EXTERNAL EAR: CPT | Mod: 76

## 2021-09-23 RX ORDER — HALOBETASOL PROPIONATE AND TAZAROTENE .1; .45 MG/G; MG/G
LOTION TOPICAL
Qty: 100 | Refills: 6 | Status: ERX

## 2021-09-23 ASSESSMENT — LOCATION DETAILED DESCRIPTION DERM
LOCATION DETAILED: LEFT PLANTAR FOREFOOT OVERLYING 1ST METATARSAL
LOCATION DETAILED: RIGHT SUPERIOR HELIX
LOCATION DETAILED: RIGHT DORSAL HAND
LOCATION DETAILED: LEFT INFERIOR OCCIPITAL SCALP
LOCATION DETAILED: LEFT MID BACK
LOCATION DETAILED: LEFT ANTERIOR PROXIMAL THIGH
LOCATION DETAILED: LEFT ELBOW
LOCATION DETAILED: RIGHT POSTERIOR SHOULDER
LOCATION DETAILED: LEFT POSTERIOR ANKLE
LOCATION DETAILED: RIGHT DISTAL LATERAL CALF
LOCATION DETAILED: LEFT SUPERIOR UPPER BACK
LOCATION DETAILED: RIGHT LATERAL ABDOMEN
LOCATION DETAILED: RIGHT ELBOW
LOCATION DETAILED: RIGHT SUPERIOR MEDIAL BUCCAL CHEEK
LOCATION DETAILED: RIGHT PROXIMAL POSTERIOR UPPER ARM
LOCATION DETAILED: LEFT PROXIMAL POSTERIOR THIGH
LOCATION DETAILED: RIGHT CHEEK
LOCATION DETAILED: RIGHT MID-UPPER BACK
LOCATION DETAILED: LEFT RADIAL DORSAL HAND
LOCATION DETAILED: LEFT DORSAL HAND
LOCATION DETAILED: RIGHT ANTERIOR THIGH
LOCATION DETAILED: LEFT ANTERIOR THIGH
LOCATION DETAILED: LEFT UPPER BACK
LOCATION DETAILED: LEFT INFERIOR CENTRAL MALAR CHEEK
LOCATION DETAILED: RIGHT INFERIOR HELIX
LOCATION DETAILED: RIGHT PLANTAR FOREFOOT OVERLYING 1ST METATARSAL
LOCATION DETAILED: RIGHT SUPERIOR LATERAL FOREHEAD
LOCATION DETAILED: LEFT ANTERIOR DISTAL THIGH
LOCATION DETAILED: RIGHT INFERIOR UPPER BACK

## 2021-09-23 ASSESSMENT — LOCATION ZONE DERM
LOCATION ZONE: FACE
LOCATION ZONE: LEG
LOCATION ZONE: EAR
LOCATION ZONE: HAND
LOCATION ZONE: ARM
LOCATION ZONE: FEET
LOCATION ZONE: SCALP
LOCATION ZONE: TRUNK

## 2021-09-23 ASSESSMENT — LOCATION SIMPLE DESCRIPTION DERM
LOCATION SIMPLE: RIGHT PLANTAR SURFACE
LOCATION SIMPLE: ABDOMEN
LOCATION SIMPLE: RIGHT SHOULDER
LOCATION SIMPLE: LEFT LOWER EXTREMITY
LOCATION SIMPLE: LEFT CHEEK
LOCATION SIMPLE: RIGHT POSTERIOR UPPER ARM
LOCATION SIMPLE: RIGHT EAR
LOCATION SIMPLE: LEFT UPPER BACK
LOCATION SIMPLE: LEFT THIGH
LOCATION SIMPLE: LEFT POSTERIOR THIGH
LOCATION SIMPLE: RIGHT ELBOW
LOCATION SIMPLE: LEFT ANKLE
LOCATION SIMPLE: POSTERIOR SCALP
LOCATION SIMPLE: BACK
LOCATION SIMPLE: LEFT ELBOW
LOCATION SIMPLE: LEFT HAND
LOCATION SIMPLE: LEFT PLANTAR SURFACE
LOCATION SIMPLE: RIGHT UPPER BACK
LOCATION SIMPLE: RIGHT FOREHEAD
LOCATION SIMPLE: RIGHT HAND
LOCATION SIMPLE: RIGHT CHEEK
LOCATION SIMPLE: RIGHT LOWER EXTREMITY
LOCATION SIMPLE: RIGHT CALF

## 2021-09-23 NOTE — HPI: MOLE CHECK
What Is The Reason For Today's Visit?: Mole Check
Additional History: Right posterior shoulder - 4mm x 4mm
Additional History: Biopsy proven mild dys - repigmented - left anterior proximal thigh

## 2021-09-23 NOTE — PROCEDURE: LIQUID NITROGEN
Add 52 Modifier (Optional): no
Consent: The patient's consent was obtained including but not limited to risks of crusting, scabbing, blistering, scarring, darker or lighter pigmentary change, recurrence, incomplete removal and infection.
Medical Necessity Information: It is in your best interest to select a reason for this procedure from the list below. All of these items fulfill various CMS LCD requirements except the new and changing color options.
Post-Care Instructions: I reviewed with the patient in detail post-care instructions. Patient is to wear sunprotection, and avoid picking at any of the treated lesions. Pt may apply Vaseline to crusted or scabbing areas.
Detail Level: Detailed
Show Applicator Variable?: Yes
Medical Necessity Clause: This procedure was medically necessary because the lesions that were treated were:

## 2021-09-23 NOTE — PROCEDURE: COUNSELING

## 2021-09-23 NOTE — HPI: WOUND CHECK
How Is Your Wound Healing?: healing well
Additional History: Patient states that she went to [facility] and the areas healed well. Recently they have reopened & she has noticed she needs to wear tennis shoes in order to keep wounds at bay.

## 2021-09-23 NOTE — HPI: RASH (PSORIASIS)
How Severe Is Your Psoriasis?: mild
Is This A New Presentation, Or A Follow-Up?: Follow Up Psoriasis
Additional History: UVB phototherapy and Duobrii are positively managing psoriasis.

## 2021-09-23 NOTE — PROCEDURE: ADDITIONAL NOTES
Additional Notes: Refer to wound care [facility].\\n\\nAlso I have recommended pt see Dr. Martini podiatrist
Detail Level: Simple
Render Risk Assessment In Note?: no
Additional Notes: Plan;\\n\\n1. Pt completeted 18 sessions of UVB with + results, however not long term solution due to history of MM.\\n2. Also used Duobri with + results\\n3. Submit for extract laser, continue UVB until ready to do laser.\\n4. Pt not candidate for biologic
Additional Notes: [facility] is managing pt, her PCP had to do the referral, I would like to get notes from them.\\n\\nPt high risk 2/2 hx of DM.
Additional Notes: Will send note to Dr. Caraballo, would like consult on starting spironlactone for hormonal acne, make sure there is no contraindications. 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 Recent retrospective and observational studies do not suggest an increased risk of prostate or breast cancer (Yanet 2016; Nate 2020; Gabbie 2019).\\n\\nPt has been on MInocycline through PCP for 2 years.  Discussed long term SE w/ minocycline.
Additional Notes: Pt last PET scan 2019 unremarkable.\\nPt to reach out to Dr. Caraballo about future maintenance imaging if warranted.

## 2021-09-23 NOTE — PROCEDURE: DIAGNOSIS COMMENT
Comment: Excised in 2016; 2.8mm, re-excised 2017 (1+ node,18 neg), chuy simpson/ Ileana [phone], Lleo PERKINS
Detail Level: Simple
Comment: Biopsy proven mild to moderate 10/2017; monitor for repigmentation
Comment: Biopsy proven; all sites excised
Comment: Biopsy x 2; [id number], proven benign nevi, closely monitor

## 2021-10-08 ENCOUNTER — HOSPITAL ENCOUNTER (OUTPATIENT)
Dept: RADIOLOGY | Facility: MEDICAL CENTER | Age: 41
End: 2021-10-08
Attending: OBSTETRICS & GYNECOLOGY
Payer: COMMERCIAL

## 2021-10-08 DIAGNOSIS — Z12.31 VISIT FOR SCREENING MAMMOGRAM: ICD-10-CM

## 2021-10-08 PROCEDURE — 77063 BREAST TOMOSYNTHESIS BI: CPT

## 2021-10-22 ENCOUNTER — APPOINTMENT (OUTPATIENT)
Dept: RADIOLOGY | Facility: MEDICAL CENTER | Age: 41
End: 2021-10-22
Attending: FAMILY MEDICINE
Payer: COMMERCIAL

## 2021-10-24 ENCOUNTER — HOSPITAL ENCOUNTER (OUTPATIENT)
Dept: RADIOLOGY | Facility: MEDICAL CENTER | Age: 41
End: 2021-10-24
Attending: FAMILY MEDICINE
Payer: COMMERCIAL

## 2021-10-24 DIAGNOSIS — E04.0 NONTOXIC (DIFFUSE) GOITER: ICD-10-CM

## 2021-10-24 PROCEDURE — 76536 US EXAM OF HEAD AND NECK: CPT

## 2021-11-02 ENCOUNTER — OFFICE VISIT (OUTPATIENT)
Dept: WOUND CARE | Facility: MEDICAL CENTER | Age: 41
End: 2021-11-02
Attending: FAMILY MEDICINE
Payer: COMMERCIAL

## 2021-11-02 VITALS
SYSTOLIC BLOOD PRESSURE: 121 MMHG | HEART RATE: 77 BPM | DIASTOLIC BLOOD PRESSURE: 84 MMHG | RESPIRATION RATE: 16 BRPM | TEMPERATURE: 98 F | OXYGEN SATURATION: 98 %

## 2021-11-02 DIAGNOSIS — E08.42 DIABETIC POLYNEUROPATHY ASSOCIATED WITH DIABETES MELLITUS DUE TO UNDERLYING CONDITION (HCC): ICD-10-CM

## 2021-11-02 DIAGNOSIS — E11.621 TYPE 2 DIABETES MELLITUS WITH LEFT DIABETIC FOOT ULCER (HCC): Primary | ICD-10-CM

## 2021-11-02 DIAGNOSIS — L97.529 TYPE 2 DIABETES MELLITUS WITH LEFT DIABETIC FOOT ULCER (HCC): Primary | ICD-10-CM

## 2021-11-02 PROCEDURE — 99213 OFFICE O/P EST LOW 20 MIN: CPT

## 2021-11-02 PROCEDURE — 11042 DBRDMT SUBQ TIS 1ST 20SQCM/<: CPT | Performed by: NURSE PRACTITIONER

## 2021-11-02 PROCEDURE — 11042 DBRDMT SUBQ TIS 1ST 20SQCM/<: CPT

## 2021-11-02 ASSESSMENT — ENCOUNTER SYMPTOMS
SHORTNESS OF BREATH: 0
NERVOUS/ANXIOUS: 0
FEVER: 0
BACK PAIN: 1
CHILLS: 0
CLAUDICATION: 0
NAUSEA: 0
VOMITING: 0
DIARRHEA: 0
CONSTIPATION: 0
COUGH: 0
DEPRESSION: 1

## 2021-11-02 NOTE — PROGRESS NOTES
Added to LPS per Shannon at soonest availability for chronic recurring wound to left plantar 1st MTH with prominent met head.    X-Ray, ESR and CRP ordered this visit.     This RN to fill out LPS sheets.

## 2021-11-03 NOTE — PROGRESS NOTES
Provider Encounter- Diabetic Foot Ulcer      HISTORY OF PRESENT ILLNESS  Wound History:    START OF CARE IN CLINIC: 11/2/2021    REFERRING PROVIDER: Adriana Levine M.D.     WOUND- Diabetic foot ulcer   LOCATION: Right plantar first MTH/base of hallux   HISTORY: Patient has had a wound to the site off-and-on for at least 2 years.  Started as blister which evolved into a callus and then open wound.  She has been treated for this wound in the clinic several times in the past, discharge due to resolution.  She was prescribed orthotic shoes and inserts, however stated these did not work for her.  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    Pertinent Medical History: Diabetes mellitus type 2, diabetic foot ulcer, melanoma, bipolar 1 disorder    DIABETES HX: Diagnosed with type 2 diabetes 7 years ago, and is currently managing with insulin and oral medications.  Checks blood sugars 1 time daily and reports that these typically run around 130-140.  Has had previous diabetes education.  Does have numbness in feet.  Usually wears regular, nonprescriptive shoes. Does not check feet routinely.  Has had previous foot ulcers, no surgeries to her feet.  Current occupation-psychiatric RN.  Offloading none       TOBACCO USE: Current everyday smoker, 1/2 pack/day    Patient's problem list, allergies, and current medications reviewed and updated in Epic    Interval History:  11/2/2021 Initial clinic visit with NUHA Soriano, KATT-BC, CWBAKARIN, CFCN.  Patient states he is feeling well overall, denies fevers, chills, nausea, vomiting, cough or shortness of breath.  Reports her blood sugar this morning was 142.  She presents today wearing regular tennis shoes.      REVIEW OF SYSTEMS:   Review of Systems   Constitutional: Negative for chills and fever.   Respiratory: Negative for cough and shortness of breath.    Cardiovascular: Negative for chest pain and claudication.   Gastrointestinal: Negative for constipation, diarrhea, nausea  and vomiting.   Musculoskeletal: Positive for back pain and joint pain.   Skin:        Recurrent ulcer/callus to right foot x2 years   Neurological:        Numbness in both feet   Psychiatric/Behavioral: Positive for depression. The patient is not nervous/anxious.         Long history of depression, for which she is currently being treated.  She denies wanting to harm her self or others       PHYSICAL EXAMINATION:   /84 (BP Location: Right arm, Patient Position: Sitting)   Pulse 77   Temp 36.7 °C (98 °F) (Temporal)   Resp 16   SpO2 98%     Physical Exam  HENT:      Head: Normocephalic.   Eyes:      Pupils: Pupils are equal, round, and reactive to light.   Cardiovascular:      Rate and Rhythm: Normal rate.      Pulses: Normal pulses.      Comments: Pedal pulses palpable, 2+  Pulmonary:      Effort: Pulmonary effort is normal.   Musculoskeletal:      Comments: Nonpitting edema bilateral lower extremities   Skin:     General: Skin is warm.      Comments: Full-thickness ulcer to right plantar first MTH, Wegener's grade 1  Refer to wound flowsheet and photos    Thick callus to left plantar first MTH   Neurological:      General: No focal deficit present.      Mental Status: She is alert and oriented to person, place, and time.   Psychiatric:         Mood and Affect: Mood normal.         Behavior: Behavior normal.         Thought Content: Thought content normal.         Judgment: Judgment normal.     Monofilament testing with a 10 gram force: sensation intact: decreased bilaterally  Visual Inspection: Feet with maceration, ulcers, fissures.  Pedal pulses: intact bilaterally    WOUND ASSESSMENT  Wound 11/02/21 Diabetic Ulcer Left plantar 1st MTH (Active)   Wound Image    11/02/21 1607   Site Assessment Dry;Brown;Black 11/02/21 1607   Periwound Assessment Callused 11/02/21 1607   Margins Attached edges 11/02/21 1607   Drainage Amount Moderate 11/02/21 1607   Drainage Description Serosanguineous 11/02/21 1607    Treatments Cleansed;Topical Lidocaine;Provider debridement 21   Wound Cleansing Normal Saline Irrigation 21   Periwound Protectant Skin Protectant Wipes to Periwound;Barrier Paste 21   Dressing Cleansing/Solutions Normal Saline 21   Dressing Options Collagen Dressing;Hydrofiber Silver;Nonadhesive Foam;Hypafix Tape 21   Non-staged Wound Description Full thickness 21   Post-Procedure Length (cm) 1.1 cm 21   Post-Procedure Width (cm) 2.4 cm 21   Post-Procedure Depth (cm) 0.5 cm 21   Post-Procedure Surface Area (cm^2) 2.64 cm^2 21   Post-Procedure Volume (cm^3) 1.32 cm^3 21   Tunneling (cm) 0 cm 21   Undermining (cm) 0 cm 21   Pulses Left;DP;2+ 21   Right Foot Monofilament 10-point exam (Sensate) 2/10 11/02/21 1607   Left Foot Monofilament 10-point exam (Sensate) 1/10 11/02/21 1607   Exposed Structures None 21        PROCEDURE:   -2% viscous lidocaine applied topically to wound bed for approximately 5 minutes prior to debridement  -Scalpel used to debride callus, exposing underlying wound.  Total area of callus debrided approximately 3.0 cm²  -Curette then used to debride wound bed.  Excisional debridement was performed to remove devitalized tissue until healthy, bleeding tissue was visualized.   Entire surface of wound, 2.64 cm2 debrided.  Tissue debrided into the subcutaneous layer.    -Bleeding controlled with manual pressure.    -Wound care completed by wound RN, refer to flowsheet  -Patient tolerated the procedure well, without c/o pain or discomfort.       Pertinent Labs and Diagnostics:    Labs:     A1c:   Lab Results   Component Value Date/Time    HBA1C 6.8 (H) 10/08/2019 01:47 AM   More recent A1c available at patient's PCP, reportedly 6.7      IMAGIN2021-three-view x-ray of right foot ordered, with weightbearing view    VASCULAR STUDIES:  None found in epic    LAST  WOUND CULTURE:  DATE : No recent cultures found in epic             ASSESSMENT AND PLAN:     1. Type 2 diabetes mellitus with left diabetic foot ulcer (HCC)  Comments: Recurrent ulcer and callus, present for approximately 2 years off and on    11/2/2021: Patient has been treated for wound to the same site several times in the past at [facility].  Thick callus, with fissure noted at lateral aspect.  She has been's prescribed offloading boots and shoes in the past, however does not wear them.  -Excisional debridement of wound in clinic today, medically necessary to promote wound healing.  -Patient to return to clinic weekly for assessment and debridement  -Patient to change dressing 1-2 times per week in between clinic visits  -X-ray ordered with weightbearing view, to assess for OM and structural anomalies  -ESR and CRP ordered  -Patient scheduled to be seen in LPS rounds on 11/12  -Patient has tried offloading boots and shoes in the past, states she is unable to wear them    Wound care: Collagen into base of fissure to promote granulation, Silver Hydrofiber to manage exudate and bioburden, foam cover dressing, Hypafix tape      2. Diabetic polyneuropathy associated with diabetes mellitus due to underlying condition (HCC)  Comments: Monofilament testing in clinic on 11/2 indicates significant LOPS    11/2/2021:  - Implications of loss of protective sensation (LOPS) discussed with patient- including increased risk for amputation.  Advised to check feet at least daily, moisturize feet, and to always wear protective foot wear.         PATIENT EDUCATION  - Importance of tight glucose control for wound healing   - Implications of loss of protective sensation (LOPS) discussed with patient- including increased risk for amputation.  - Advised to check feet at least daily, moisturize feet, and to always wear protective foot wear.   -  Importance of offloading foot to assist with wound healing  - Advised  pt not to trim nails or calluses, seek foot/nail care from podiatrist or certified foot/nail nurse  - Importance of adequate nutrition for wound healing    30 min spent face to face with patient, >50% of time spent counseling, coordinating care, reviewing records, discussing POC, educating patient regarding wound healing and progressions, diabetes education.  This time was spent in excess to procedure time.       Please note that this note may have been created using voice recognition software. I have worked with technical experts from [employer] to optimize the interface.  I have made every reasonable attempt to correct obvious errors, but there may be errors of grammar and possibly content that I did not discover before finalizing the note.    N

## 2021-11-10 ENCOUNTER — APPOINTMENT (OUTPATIENT)
Dept: WOUND CARE | Facility: MEDICAL CENTER | Age: 41
End: 2021-11-10
Attending: FAMILY MEDICINE
Payer: COMMERCIAL

## 2021-11-12 ENCOUNTER — OFFICE VISIT (OUTPATIENT)
Dept: WOUND CARE | Facility: MEDICAL CENTER | Age: 41
End: 2021-11-12
Attending: FAMILY MEDICINE
Payer: COMMERCIAL

## 2021-11-12 DIAGNOSIS — L97.529 TYPE 2 DIABETES MELLITUS WITH LEFT DIABETIC FOOT ULCER (HCC): ICD-10-CM

## 2021-11-12 DIAGNOSIS — E08.42 DIABETIC POLYNEUROPATHY ASSOCIATED WITH DIABETES MELLITUS DUE TO UNDERLYING CONDITION (HCC): ICD-10-CM

## 2021-11-12 DIAGNOSIS — E11.621 TYPE 2 DIABETES MELLITUS WITH LEFT DIABETIC FOOT ULCER (HCC): ICD-10-CM

## 2021-11-12 PROCEDURE — 99213 OFFICE O/P EST LOW 20 MIN: CPT | Performed by: NURSE PRACTITIONER

## 2021-11-12 PROCEDURE — 99212 OFFICE O/P EST SF 10 MIN: CPT

## 2021-11-12 NOTE — PROGRESS NOTES
Pt seen during ortho rounds with LPS team for left plantar 1st MTH wound. Following physician assessment, APRN evaluated and dressed patient's wound. See APRN note for full details.     Patient to continue wound care weekly.   Patient present to Orthotist for diabetic shoes and inserts, if wound continues to reopen, will consider surgery.

## 2021-11-13 NOTE — PROGRESS NOTES
LIMB PRESERVATION SERVICE ROUNDS    Referral to LPS Rounds from: Wound clinic     WOUND HISTORY: Patient has had a wound to her right plantar hallux off-and-on for at least 2 years.  Started as blister which evolved into a callus and then open wound.  She has been treated for this wound in the clinic several times in the past, discharged due to resolution.  She was prescribed orthotic shoes and inserts, however stated these did not work for her.    She is also unable to wear her offloading boot, feels it makes her wound worse.  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   She was referred to LPS rounds for consideration of surgical intervention to prevent ulcer recurrence, and also for consultation with orthotist.      LPS Rounds Interval notes:  11/12/2021: Tamra states she is feeling well, reports her blood sugar this morning was 135.  She presents today without an open wound, however callus has again built up.  She did not get her x-ray done prior to today's visit as requested.  States she was out of town for her son's graduation from her [employer] basic training.         RESULTS:       Labs:     A1c:   Lab Results   Component Value Date/Time    HBA1C 6.8 (H) 10/08/2019 01:47 AM          IMAGING: X-ray previously ordered    VASCULAR STUDIES: N/A, pedal pulses palpable    LAST  WOUND CULTURE:  DATE : None found in epic                      OBJECTIVE FINDINGS:      Pulses: DP and PT pulses palpable, 2+   + Silfverskiold  + Neuropathy from toes to ankle    Wound(s):  Thick callus to right plantar/medial hallux.  Measures approximately 3 x 3 cm.  No open wound      PROCEDURE   No debridement in clinic today.  Silicone foam applied to callus                PLAN:         Wound Care: Patient to return to wound clinic weekly for debridement of callus   Imaging/Labs:  Patient to get x-ray done as previously ordered  Offloading: Rx for offloading shoes and inserts.  Patient to follow-up with [facility] orthotics  Antibiotics:   N/A  Surgery:  Surgical options discussed.  GSR recommended if orthotics are not effective for preventing recurrence of ulcer/callus.  Could also consider tendon transfer to realign foot.        LPS Follow up:  If no improvement with orthotics      20 minutes was spent on preparation for visit, examination, counseling and coordination of care regarding the above.        Please note that this dictation was created using voice recognition software. I have  worked with technical experts from [employer] to optimize the interface.  I have made every reasonable attempt to correct obvious errors, but there may be errors of grammar and possibly content that I did not discover before finalizing the note.

## 2021-11-23 ENCOUNTER — OFFICE VISIT (OUTPATIENT)
Dept: WOUND CARE | Facility: MEDICAL CENTER | Age: 41
End: 2021-11-23
Attending: FAMILY MEDICINE
Payer: COMMERCIAL

## 2021-11-23 VITALS
HEART RATE: 76 BPM | RESPIRATION RATE: 16 BRPM | SYSTOLIC BLOOD PRESSURE: 135 MMHG | TEMPERATURE: 98.5 F | OXYGEN SATURATION: 98 % | DIASTOLIC BLOOD PRESSURE: 90 MMHG

## 2021-11-23 DIAGNOSIS — E11.621 TYPE 2 DIABETES MELLITUS WITH LEFT DIABETIC FOOT ULCER (HCC): Primary | ICD-10-CM

## 2021-11-23 DIAGNOSIS — L97.529 TYPE 2 DIABETES MELLITUS WITH LEFT DIABETIC FOOT ULCER (HCC): Primary | ICD-10-CM

## 2021-11-23 DIAGNOSIS — E08.42 DIABETIC POLYNEUROPATHY ASSOCIATED WITH DIABETES MELLITUS DUE TO UNDERLYING CONDITION (HCC): ICD-10-CM

## 2021-11-23 DIAGNOSIS — Z72.0 TOBACCO ABUSE: ICD-10-CM

## 2021-11-23 PROCEDURE — 99213 OFFICE O/P EST LOW 20 MIN: CPT

## 2021-11-23 PROCEDURE — 99213 OFFICE O/P EST LOW 20 MIN: CPT | Mod: 25 | Performed by: NURSE PRACTITIONER

## 2021-11-23 PROCEDURE — 11055 PARING/CUTG B9 HYPRKER LES 1: CPT | Performed by: NURSE PRACTITIONER

## 2021-11-23 PROCEDURE — 11055 PARING/CUTG B9 HYPRKER LES 1: CPT

## 2021-11-23 ASSESSMENT — ENCOUNTER SYMPTOMS
SHORTNESS OF BREATH: 0
FEVER: 0
VOMITING: 0
BACK PAIN: 1
COUGH: 0
DEPRESSION: 1
NERVOUS/ANXIOUS: 0
NAUSEA: 0
CLAUDICATION: 0
CHILLS: 0
DIARRHEA: 0
CONSTIPATION: 0

## 2021-11-23 NOTE — PROGRESS NOTES
Provider Encounter- Diabetic Foot Ulcer      HISTORY OF PRESENT ILLNESS  Wound History:    START OF CARE IN CLINIC: 11/2/2021    REFERRING PROVIDER: Adriana Levine M.D.     WOUND- Diabetic foot ulcer   LOCATION: Right plantar first MTH/base of hallux   HISTORY: Patient has had a wound to the site off-and-on for at least 2 years.  Started as blister which evolved into a callus and then open wound.  She has been treated for this wound in the clinic several times in the past, discharge due to resolution.  She was prescribed orthotic shoes and inserts, however stated these did not work for her.  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    Pertinent Medical History: Diabetes mellitus type 2, diabetic foot ulcer, melanoma, bipolar 1 disorder    DIABETES HX: Diagnosed with type 2 diabetes 7 years ago, and is currently managing with insulin and oral medications.  Checks blood sugars 1 time daily and reports that these typically run around 130-140.  Has had previous diabetes education.  Does have numbness in feet.  Usually wears regular, nonprescriptive shoes. Does not check feet routinely.  Has had previous foot ulcers, no surgeries to her feet.  Current occupation-psychiatric RN.  Offloading none       TOBACCO USE: Current everyday smoker, 1/2 pack/day    Patient's problem list, allergies, and current medications reviewed and updated in Epic    Interval History:  11/2/2021 Initial clinic visit with NUHA Soriano, VIRGINIA, JASON, DELANO.  Patient states she is feeling well overall, denies fevers, chills, nausea, vomiting, cough or shortness of breath.  Reports her blood sugar this morning was 142.  She presents today wearing regular tennis shoes.    11/23/2021 : Clinic visit with NUHA Soriano, VIRGINIA, JASON, DELANO.  So states she is feeling well.  She did not check her blood sugar today, but reports it was 147 yesterday.  She was seen in LPS rounds last Friday, at which time surgical options (GSR and tendon  transfer) versus orthotic footwear were presented as options.  Patient has opted to try orthotic footwear and avoid surgery at this time.  She has an appointment on 12/3 with  orthotics to begin process for shoes and inserts.   He presents today with just callus over her bilateral first MTHs.   No open wound after debridement of callus noted.  She is therefore discharged from [facility], encouraged to return to clinic if wound recurs.  She had been referred to podiatry for ongoing management of her callus.  If she does decide to proceed with surgery, she I instructed her to call so that I can place referral.  I also spoke to her about getting better control of her diabetes, and smoking cessation.      REVIEW OF SYSTEMS:   Review of Systems   Constitutional: Negative for chills and fever.   Respiratory: Negative for cough and shortness of breath.    Cardiovascular: Negative for chest pain and claudication.   Gastrointestinal: Negative for constipation, diarrhea, nausea and vomiting.   Musculoskeletal: Positive for back pain and joint pain.   Skin:        Recurrent ulcer/callus to right foot x2 years   Neurological:        Numbness in both feet   Psychiatric/Behavioral: Positive for depression. The patient is not nervous/anxious.         Long history of depression, for which she is currently being treated.  She denies wanting to harm her self or others       PHYSICAL EXAMINATION:   /90   Pulse 76   Temp 36.9 °C (98.5 °F) (Temporal)   Resp 16   SpO2 98%     Physical Exam  HENT:      Head: Normocephalic.   Eyes:      Pupils: Pupils are equal, round, and reactive to light.   Cardiovascular:      Rate and Rhythm: Normal rate.      Pulses: Normal pulses.      Comments: Pedal pulses palpable, 2+  Pulmonary:      Effort: Pulmonary effort is normal.   Musculoskeletal:      Comments: Nonpitting edema bilateral lower extremities   Skin:     General: Skin is warm.      Comments: Full-thickness ulcer to right plantar  first MTH, Wegener's grade 1  Refer to wound flowsheet and photos    Thick callus to left plantar first MTH   Neurological:      General: No focal deficit present.      Mental Status: She is alert and oriented to person, place, and time.   Psychiatric:         Mood and Affect: Mood normal.         Behavior: Behavior normal.         Thought Content: Thought content normal.         Judgment: Judgment normal.     Monofilament testing with a 10 gram force: sensation intact: decreased bilaterally  Visual Inspection: Feet with maceration, ulcers, fissures.  Pedal pulses: intact bilaterally    WOUND ASSESSMENT               PROCEDURE:   - scalpel used to excise callus over left first MTH to skin level.  -Small amount of bleeding noted, controlled with manual pressure  -Wound care completed by wound RN, refer to flowsheet  -Patient tolerated the procedure well, without c/o pain or discomfort.       Pertinent Labs and Diagnostics:    Labs:     A1c:   Lab Results   Component Value Date/Time    HBA1C 6.8 (H) 10/08/2019 01:47 AM   More recent A1c available at patient's PCP, reportedly 6.7      IMAGIN2021-three-view x-ray of right foot ordered, with weightbearing view    VASCULAR STUDIES: None found in epic    LAST  WOUND CULTURE:  DATE : No recent cultures found in epic             ASSESSMENT AND PLAN:     1. Type 2 diabetes mellitus with left diabetic foot ulcer (HCC)  Comments: Recurrent ulcer and callus, present for approximately 2 years off and on    2021: Patient has been treated for wound to the same site several times in the past at [facility].  Thick callus development to plantar first MTH, both of left foot and right foot.  Surgical options discussed LPS rounds last week, patient has opted to try orthotic footwear first.  -Callus debrided to skin level  -Patient to obtain orthotic shoes and inserts from  orthotics, reminded that she must wear these consistently to prevent/minimize callus development, and  thus decrease her risk for ulceration  -If she changes her mind and decides to have recommended orthopedic surgery, she is to call me so that I can place referral  -I did recommend that she get x-ray as previously ordered to provide baseline  -Discharge from [facility]  -Patient to return to clinic ASAP if wound recurs, or if she develops new wounds      Wound care: Silicone foam over left first MTH to reduce friction and pressure      2. Diabetic polyneuropathy associated with diabetes mellitus due to underlying condition (HCC)  Comments: Monofilament testing in clinic on 11/2 indicates significant LOPS    11/23/2021:  - Implications of loss of protective sensation (LOPS) reviewed with patient- including increased risk for amputation.  Advised to check feet at least daily, moisturize feet, and to always wear protective foot wear.       3.  Tobacco abuse    11/23/2021: Current everyday smoker, admits to 1/2 pack/day  -Adverse effects of nicotine on vascular system and on overall health discussed.  Patient strongly encouraged to quit smoking    PATIENT EDUCATION  - Importance of tight glucose control for wound healing   - Implications of loss of protective sensation (LOPS) discussed with patient- including increased risk for amputation.  - Advised to check feet at least daily, moisturize feet, and to always wear protective foot wear.   -  Importance of offloading foot to assist with wound healing  - Advised pt not to trim nails or calluses, seek foot/nail care from podiatrist or certified foot/nail nurse  - Importance of adequate nutrition for wound healing    20 min spent face to face with patient, >50% of time spent counseling, coordinating care, reviewing records, discussing POC, educating patient regarding wound healing and progressions, diabetes education.  This time was spent in excess to procedure time.       Please note that this note may have been created using voice recognition software. I have worked with  technical experts from [facility] to optimize the interface.  I have made every reasonable attempt to correct obvious errors, but there may be errors of grammar and possibly content that I did not discover before finalizing the note.    N

## 2021-11-23 NOTE — PROGRESS NOTES
[facility]   [facility]   [address]   [address]   [place] [address]   [phone] Fax: [phone]    Discharge Note        Wound location: left plantar 1st MTH  Date of Discharge: 11/23/2021    Assessment:  Discharge patient at this time secondary to wound resolution. Patient states that she has an appointment with Lu to get her diabetic shoes and inserts made 12/10/2021. Referral to podiatry placed this visit. Patient instructed to expect a call from podiatry once insurance approves.

## 2021-12-16 ENCOUNTER — APPOINTMENT (RX ONLY)
Dept: URBAN - METROPOLITAN AREA CLINIC 20 | Facility: CLINIC | Age: 41
Setting detail: DERMATOLOGY
End: 2021-12-16

## 2021-12-16 DIAGNOSIS — Z85.820 PERSONAL HISTORY OF MALIGNANT MELANOMA OF SKIN: ICD-10-CM

## 2021-12-16 DIAGNOSIS — T81.89 OTHER COMPLICATIONS OF PROCEDURES, NOT ELSEWHERE CLASSIFIED: ICD-10-CM

## 2021-12-16 DIAGNOSIS — Z87.2 PERSONAL HISTORY OF DISEASES OF THE SKIN AND SUBCUTANEOUS TISSUE: ICD-10-CM

## 2021-12-16 DIAGNOSIS — L40.0 PSORIASIS VULGARIS: ICD-10-CM

## 2021-12-16 DIAGNOSIS — L57.8 OTHER SKIN CHANGES DUE TO CHRONIC EXPOSURE TO NONIONIZING RADIATION: ICD-10-CM

## 2021-12-16 DIAGNOSIS — D22 MELANOCYTIC NEVI: ICD-10-CM | Status: STABLE

## 2021-12-16 DIAGNOSIS — L70.8 OTHER ACNE: ICD-10-CM | Status: IMPROVED

## 2021-12-16 DIAGNOSIS — L81.4 OTHER MELANIN HYPERPIGMENTATION: ICD-10-CM

## 2021-12-16 DIAGNOSIS — D18.0 HEMANGIOMA: ICD-10-CM

## 2021-12-16 PROBLEM — D22.5 MELANOCYTIC NEVI OF TRUNK: Status: ACTIVE | Noted: 2021-12-16

## 2021-12-16 PROBLEM — D22.4 MELANOCYTIC NEVI OF SCALP AND NECK: Status: ACTIVE | Noted: 2021-12-16

## 2021-12-16 PROBLEM — D48.5 NEOPLASM OF UNCERTAIN BEHAVIOR OF SKIN: Status: ACTIVE | Noted: 2021-12-16

## 2021-12-16 PROBLEM — D22.72 MELANOCYTIC NEVI OF LEFT LOWER LIMB, INCLUDING HIP: Status: ACTIVE | Noted: 2021-12-16

## 2021-12-16 PROBLEM — D22.61 MELANOCYTIC NEVI OF RIGHT UPPER LIMB, INCLUDING SHOULDER: Status: ACTIVE | Noted: 2021-12-16

## 2021-12-16 PROBLEM — T81.89XA OTHER COMPLICATIONS OF PROCEDURES, NOT ELSEWHERE CLASSIFIED, INITIAL ENCOUNTER: Status: ACTIVE | Noted: 2021-12-16

## 2021-12-16 PROBLEM — D18.01 HEMANGIOMA OF SKIN AND SUBCUTANEOUS TISSUE: Status: ACTIVE | Noted: 2021-12-16

## 2021-12-16 PROCEDURE — ? OBSERVATION AND MEASURE

## 2021-12-16 PROCEDURE — 99213 OFFICE O/P EST LOW 20 MIN: CPT | Mod: 25

## 2021-12-16 PROCEDURE — 11102 TANGNTL BX SKIN SINGLE LES: CPT

## 2021-12-16 PROCEDURE — ? COUNSELING

## 2021-12-16 PROCEDURE — ? PRESCRIPTION

## 2021-12-16 PROCEDURE — ? BIOPSY BY SHAVE METHOD

## 2021-12-16 PROCEDURE — 11103 TANGNTL BX SKIN EA SEP/ADDL: CPT

## 2021-12-16 PROCEDURE — ? ADDITIONAL NOTES

## 2021-12-16 PROCEDURE — ? DIAGNOSIS COMMENT

## 2021-12-16 RX ORDER — CLOBETASOL PROPIONATE 0.5 MG/G
OINTMENT TOPICAL
Qty: 60 | Refills: 10 | Status: ERX

## 2021-12-16 ASSESSMENT — LOCATION DETAILED DESCRIPTION DERM
LOCATION DETAILED: LEFT UPPER BACK
LOCATION DETAILED: RIGHT CHEEK
LOCATION DETAILED: LEFT ANTERIOR THIGH
LOCATION DETAILED: LEFT SUPERIOR UPPER BACK
LOCATION DETAILED: LEFT ANTERIOR DISTAL THIGH
LOCATION DETAILED: LEFT INFERIOR OCCIPITAL SCALP
LOCATION DETAILED: LEFT RADIAL DORSAL HAND
LOCATION DETAILED: RIGHT INFERIOR UPPER BACK
LOCATION DETAILED: LEFT ANTERIOR PROXIMAL THIGH
LOCATION DETAILED: LEFT PLANTAR FOREFOOT OVERLYING 1ST METATARSAL
LOCATION DETAILED: RIGHT SUPERIOR HELIX
LOCATION DETAILED: LEFT DORSAL HAND
LOCATION DETAILED: RIGHT MID-UPPER BACK
LOCATION DETAILED: RIGHT LATERAL ABDOMEN
LOCATION DETAILED: SUPERIOR THORACIC SPINE
LOCATION DETAILED: RIGHT PROXIMAL POSTERIOR UPPER ARM
LOCATION DETAILED: RIGHT DORSAL HAND
LOCATION DETAILED: RIGHT PLANTAR FOREFOOT OVERLYING 1ST METATARSAL
LOCATION DETAILED: LEFT POSTERIOR ANKLE
LOCATION DETAILED: RIGHT ANTERIOR THIGH
LOCATION DETAILED: LEFT ELBOW
LOCATION DETAILED: RIGHT DISTAL LATERAL CALF
LOCATION DETAILED: RIGHT LATERAL BUTTOCK
LOCATION DETAILED: RIGHT SUPERIOR MEDIAL BUCCAL CHEEK
LOCATION DETAILED: RIGHT ELBOW
LOCATION DETAILED: LEFT MID BACK
LOCATION DETAILED: LEFT INFERIOR CENTRAL MALAR CHEEK
LOCATION DETAILED: RIGHT DISTAL POSTERIOR UPPER ARM
LOCATION DETAILED: LEFT PROXIMAL POSTERIOR THIGH

## 2021-12-16 ASSESSMENT — LOCATION SIMPLE DESCRIPTION DERM
LOCATION SIMPLE: LEFT UPPER BACK
LOCATION SIMPLE: POSTERIOR SCALP
LOCATION SIMPLE: LEFT POSTERIOR THIGH
LOCATION SIMPLE: LEFT PLANTAR SURFACE
LOCATION SIMPLE: LEFT ELBOW
LOCATION SIMPLE: UPPER BACK
LOCATION SIMPLE: RIGHT BUTTOCK
LOCATION SIMPLE: RIGHT PLANTAR SURFACE
LOCATION SIMPLE: LEFT CHEEK
LOCATION SIMPLE: RIGHT UPPER BACK
LOCATION SIMPLE: LEFT THIGH
LOCATION SIMPLE: RIGHT CALF
LOCATION SIMPLE: BACK
LOCATION SIMPLE: LEFT HAND
LOCATION SIMPLE: ABDOMEN
LOCATION SIMPLE: RIGHT ELBOW
LOCATION SIMPLE: LEFT ANKLE
LOCATION SIMPLE: RIGHT EAR
LOCATION SIMPLE: RIGHT CHEEK
LOCATION SIMPLE: RIGHT POSTERIOR UPPER ARM
LOCATION SIMPLE: LEFT LOWER EXTREMITY
LOCATION SIMPLE: RIGHT LOWER EXTREMITY
LOCATION SIMPLE: RIGHT HAND

## 2021-12-16 ASSESSMENT — LOCATION ZONE DERM
LOCATION ZONE: EAR
LOCATION ZONE: SCALP
LOCATION ZONE: TRUNK
LOCATION ZONE: FACE
LOCATION ZONE: ARM
LOCATION ZONE: LEG
LOCATION ZONE: HAND
LOCATION ZONE: FEET

## 2021-12-16 NOTE — PROCEDURE: DIAGNOSIS COMMENT
Comment: Excised in 2016; 2.8mm, re-excised 2017 (1+ node,18 neg), chuy simpson/ Ileana [id number], Lelo PERKINS
Detail Level: Simple
Comment: Biopsy proven mild to moderate 10/2017; monitor for repigmentation
Comment: Biopsy proven; all sites excised
Comment: Biopsy x 2; [id number], proven benign nevi, closely monitor

## 2021-12-16 NOTE — HPI: MOLE CHECK
What Is The Reason For Today's Visit?: Mole Check
Additional History: Left posterior thigh - 1mm x 1mm\\nRecurrent nevi - bx proven benign

## 2021-12-16 NOTE — PROCEDURE: COUNSELING

## 2022-01-06 ENCOUNTER — APPOINTMENT (RX ONLY)
Dept: URBAN - METROPOLITAN AREA CLINIC 4 | Facility: CLINIC | Age: 42
Setting detail: DERMATOLOGY
End: 2022-01-06

## 2022-01-06 DIAGNOSIS — D22 MELANOCYTIC NEVI: ICD-10-CM

## 2022-01-06 PROBLEM — D22.5 MELANOCYTIC NEVI OF TRUNK: Status: ACTIVE | Noted: 2022-01-06

## 2022-01-06 PROCEDURE — 11404 EXC TR-EXT B9+MARG 3.1-4 CM: CPT

## 2022-01-06 PROCEDURE — ? DIAGNOSIS COMMENT

## 2022-01-06 PROCEDURE — 13102 CMPLX RPR TRUNK ADDL 5CM/<: CPT

## 2022-01-06 PROCEDURE — 13101 CMPLX RPR TRUNK 2.6-7.5 CM: CPT

## 2022-01-06 PROCEDURE — ? EXCISION

## 2022-01-06 ASSESSMENT — LOCATION SIMPLE DESCRIPTION DERM: LOCATION SIMPLE: RIGHT BUTTOCK

## 2022-01-06 ASSESSMENT — LOCATION DETAILED DESCRIPTION DERM: LOCATION DETAILED: RIGHT BUTTOCK

## 2022-01-06 ASSESSMENT — LOCATION ZONE DERM: LOCATION ZONE: TRUNK

## 2022-01-06 NOTE — PROCEDURE: DIAGNOSIS COMMENT
Comment: Compound melanocytic nevus with atypical junctional component involving the periphery of the shave biopsy
Detail Level: Simple
Render Risk Assessment In Note?: no

## 2022-01-06 NOTE — PROCEDURE: EXCISION
Medical Necessity Information: It is in your best interest to select a reason for this procedure from the list below. All of these items fulfill various CMS LCD requirements except lesion extends to a margin.
Include Z78.9 (Other Specified Conditions Influencing Health Status) As An Associated Diagnosis?: No
Medical Necessity Clause: This procedure was medically necessary because the lesion that was treated was:
Lab: [id number]
Lab Facility: 
Previous Accession (Optional): [id number]
Referring Physician (Optional): LUCINDA Walter
Surgeon (Optional): Jaxon
Biopsy Photograph Reviewed: Yes
Size Of Lesion In Cm: 2.6
X Size Of Lesion In Cm (Optional): 0
Size Of Margin In Cm: 0.5
Anesthesia Volume In Cc: 8.7
Eye Clamp Note Details: An eye clamp was used during the procedure.
Excision Method: Elliptical
Saucerization Depth: dermis and superficial adipose tissue
Repair Type: Complex
Suturegard Retention Suture: 2-0 Nylon
Retention Suture Bite Size: 3 mm
Length To Time In Minutes Device Was In Place: 10
Number Of Hemigard Strips Per Side: 1
Intermediate / Complex Repair - Final Wound Length In Cm: 7.6
Width Of Defect Perpendicular To Closure In Cm (Required): 2.1
Distance Of Undermining In Cm (Required): 3.2
Undermining Type: Entire Wound
Debridement Text: The wound edges were debrided prior to proceeding with the closure to facilitate wound healing.
Helical Rim Text: The closure involved the helical rim.
Vermilion Border Text: The closure involved the vermilion border.
Nostril Rim Text: The closure involved the nostril rim.
Retention Suture Text: Retention sutures were placed to support the closure and prevent dehiscence.
Epidermal Closure Graft Donor Site (Optional): simple interrupted
Graft Donor Site Bandage (Optional-Leave Blank If You Don't Want In Note): Steri-strips and a pressure bandage were applied to the donor site.
Detail Level: Detailed
Excision Depth: adipose tissue
Scalpel Size: 15 blade
Anesthesia Type: 1% lidocaine with 1:100,000 epinephrine and 408mcg clindamycin/ml and a 1:10 solution of 8.4% sodium bicarbonate
Additional Anesthesia Volume In Cc: 6
Hemostasis: Electrocautery
Estimated Blood Loss (Cc): minimal
Repair Anesthesia Method: local infiltration
Anesthesia Volume In Cc: 7.8
Deep Sutures: 2-0 Vicryl
Dermal Closure: buried vertical mattress
Epidermal Sutures: 5-0 Caprosyn
Epidermal Closure: running subcuticular
Wound Care: Bacitracin
Dressing: steri-strips
Suturegard Intro: Intraoperative tissue expansion was performed, utilizing the SUTUREGARD device, in order to reduce wound tension.
Suturegard Body: The suture ends were repeatedly re-tightened and re-clamped to achieve the desired tissue expansion.
Hemigard Intro: Due to skin fragility and wound tension, it was decided to use HEMIGARD adhesive retention suture devices to permit a linear closure. The skin was cleaned and dried for a 6cm distance away from the wound. Excessive hair, if present, was removed to allow for adhesion.
Hemigard Postcare Instructions: The HEMIGARD strips are to remain completely dry for at least 5-7 days.
Positioning (Leave Blank If You Do Not Want): The patient was placed in a comfortable position exposing the surgical site.
Complex Repair Preamble Text (Leave Blank If You Do Not Want): Extensive wide undermining was performed.
Intermediate Repair Preamble Text (Leave Blank If You Do Not Want): Undermining was performed with blunt dissection.
Fusiform Excision Additional Text (Leave Blank If You Do Not Want): The margin was drawn around the clinically apparent lesion.  A fusiform shape was then drawn on the skin incorporating the lesion and margins.  Incisions were then made along these lines to the appropriate tissue plane and the lesion was extirpated.
Eliptical Excision Additional Text (Leave Blank If You Do Not Want): The margin was drawn around the clinically apparent lesion.  An elliptical shape was then drawn on the skin incorporating the lesion and margins.  Incisions were then made along these lines to the appropriate tissue plane and the lesion was extirpated.
Saucerization Excision Additional Text (Leave Blank If You Do Not Want): The margin was drawn around the clinically apparent lesion.  Incisions were then made along these lines, in a tangential fashion, to the appropriate tissue plane and the lesion was extirpated.
Slit Excision Additional Text (Leave Blank If You Do Not Want): A linear line was drawn on the skin overlying the lesion. An incision was made slowly until the lesion was visualized.  Once visualized, the lesion was removed with blunt dissection.
Excisional Biopsy Additional Text (Leave Blank If You Do Not Want): The margin was drawn around the clinically apparent lesion. An elliptical shape was then drawn on the skin incorporating the lesion and margins.  Incisions were then made along these lines to the appropriate tissue plane and the lesion was extirpated.
Perilesional Excision Additional Text (Leave Blank If You Do Not Want): The margin was drawn around the clinically apparent lesion. Incisions were then made along these lines to the appropriate tissue plane and the lesion was extirpated.
Repair Performed By Another Provider Text (Leave Blank If You Do Not Want): After the tissue was excised the defect was repaired by another provider.
No Repair - Repaired With Adjacent Surgical Defect Text (Leave Blank If You Do Not Want): After the excision the defect was repaired concurrently with another surgical defect which was in close approximation.
Adjacent Tissue Transfer Text: The defect edges were debeveled with a #15 scalpel blade.  Given the location of the defect and the proximity to free margins an adjacent tissue transfer was deemed most appropriate.  Using a sterile surgical marker, an appropriate flap was drawn incorporating the defect and placing the expected incisions within the relaxed skin tension lines where possible.    The area thus outlined was incised deep to adipose tissue with a #15 scalpel blade.  The skin margins were undermined to an appropriate distance in all directions utilizing iris scissors.
Advancement Flap (Single) Text: The defect edges were debeveled with a #15 scalpel blade.  Given the location of the defect and the proximity to free margins a single advancement flap was deemed most appropriate.  Using a sterile surgical marker, an appropriate advancement flap was drawn incorporating the defect and placing the expected incisions within the relaxed skin tension lines where possible.    The area thus outlined was incised deep to adipose tissue with a #15 scalpel blade.  The skin margins were undermined to an appropriate distance in all directions utilizing iris scissors.
Advancement Flap (Double) Text: The defect edges were debeveled with a #15 scalpel blade.  Given the location of the defect and the proximity to free margins a double advancement flap was deemed most appropriate.  Using a sterile surgical marker, the appropriate advancement flaps were drawn incorporating the defect and placing the expected incisions within the relaxed skin tension lines where possible.    The area thus outlined was incised deep to adipose tissue with a #15 scalpel blade.  The skin margins were undermined to an appropriate distance in all directions utilizing iris scissors.
Burow's Advancement Flap Text: The defect edges were debeveled with a #15 scalpel blade.  Given the location of the defect and the proximity to free margins a Burow's advancement flap was deemed most appropriate.  Using a sterile surgical marker, the appropriate advancement flap was drawn incorporating the defect and placing the expected incisions within the relaxed skin tension lines where possible.    The area thus outlined was incised deep to adipose tissue with a #15 scalpel blade.  The skin margins were undermined to an appropriate distance in all directions utilizing iris scissors.
Chonodrocutaneous Helical Advancement Flap Text: The defect edges were debeveled with a #15 scalpel blade.  Given the location of the defect and the proximity to free margins a chondrocutaneous helical advancement flap was deemed most appropriate.  Using a sterile surgical marker, the appropriate advancement flap was drawn incorporating the defect and placing the expected incisions within the relaxed skin tension lines where possible.    The area thus outlined was incised deep to adipose tissue with a #15 scalpel blade.  The skin margins were undermined to an appropriate distance in all directions utilizing iris scissors.
Crescentic Advancement Flap Text: The defect edges were debeveled with a #15 scalpel blade.  Given the location of the defect and the proximity to free margins a crescentic advancement flap was deemed most appropriate.  Using a sterile surgical marker, the appropriate advancement flap was drawn incorporating the defect and placing the expected incisions within the relaxed skin tension lines where possible.    The area thus outlined was incised deep to adipose tissue with a #15 scalpel blade.  The skin margins were undermined to an appropriate distance in all directions utilizing iris scissors.
A-T Advancement Flap Text: The defect edges were debeveled with a #15 scalpel blade.  Given the location of the defect, shape of the defect and the proximity to free margins an A-T advancement flap was deemed most appropriate.  Using a sterile surgical marker, an appropriate advancement flap was drawn incorporating the defect and placing the expected incisions within the relaxed skin tension lines where possible.    The area thus outlined was incised deep to adipose tissue with a #15 scalpel blade.  The skin margins were undermined to an appropriate distance in all directions utilizing iris scissors.
O-T Advancement Flap Text: The defect edges were debeveled with a #15 scalpel blade.  Given the location of the defect, shape of the defect and the proximity to free margins an O-T advancement flap was deemed most appropriate.  Using a sterile surgical marker, an appropriate advancement flap was drawn incorporating the defect and placing the expected incisions within the relaxed skin tension lines where possible.    The area thus outlined was incised deep to adipose tissue with a #15 scalpel blade.  The skin margins were undermined to an appropriate distance in all directions utilizing iris scissors.
O-L Flap Text: The defect edges were debeveled with a #15 scalpel blade.  Given the location of the defect, shape of the defect and the proximity to free margins an O-L flap was deemed most appropriate.  Using a sterile surgical marker, an appropriate advancement flap was drawn incorporating the defect and placing the expected incisions within the relaxed skin tension lines where possible.    The area thus outlined was incised deep to adipose tissue with a #15 scalpel blade.  The skin margins were undermined to an appropriate distance in all directions utilizing iris scissors.
O-Z Flap Text: The defect edges were debeveled with a #15 scalpel blade.  Given the location of the defect, shape of the defect and the proximity to free margins an O-Z flap was deemed most appropriate.  Using a sterile surgical marker, an appropriate transposition flap was drawn incorporating the defect and placing the expected incisions within the relaxed skin tension lines where possible. The area thus outlined was incised deep to adipose tissue with a #15 scalpel blade.  The skin margins were undermined to an appropriate distance in all directions utilizing iris scissors.
Double O-Z Flap Text: The defect edges were debeveled with a #15 scalpel blade.  Given the location of the defect, shape of the defect and the proximity to free margins a Double O-Z flap was deemed most appropriate.  Using a sterile surgical marker, an appropriate transposition flap was drawn incorporating the defect and placing the expected incisions within the relaxed skin tension lines where possible. The area thus outlined was incised deep to adipose tissue with a #15 scalpel blade.  The skin margins were undermined to an appropriate distance in all directions utilizing iris scissors.
V-Y Flap Text: The defect edges were debeveled with a #15 scalpel blade.  Given the location of the defect, shape of the defect and the proximity to free margins a V-Y flap was deemed most appropriate.  Using a sterile surgical marker, an appropriate advancement flap was drawn incorporating the defect and placing the expected incisions within the relaxed skin tension lines where possible.    The area thus outlined was incised deep to adipose tissue with a #15 scalpel blade.  The skin margins were undermined to an appropriate distance in all directions utilizing iris scissors.
Advancement-Rotation Flap Text: The defect edges were debeveled with a #15 scalpel blade.  Given the location of the defect, shape of the defect and the proximity to free margins an advancement-rotation flap was deemed most appropriate.  Using a sterile surgical marker, an appropriate flap was drawn incorporating the defect and placing the expected incisions within the relaxed skin tension lines where possible. The area thus outlined was incised deep to adipose tissue with a #15 scalpel blade.  The skin margins were undermined to an appropriate distance in all directions utilizing iris scissors.
Mercedes Flap Text: The defect edges were debeveled with a #15 scalpel blade.  Given the location of the defect, shape of the defect and the proximity to free margins a Mercedes flap was deemed most appropriate.  Using a sterile surgical marker, an appropriate advancement flap was drawn incorporating the defect and placing the expected incisions within the relaxed skin tension lines where possible. The area thus outlined was incised deep to adipose tissue with a #15 scalpel blade.  The skin margins were undermined to an appropriate distance in all directions utilizing iris scissors.
Modified Advancement Flap Text: The defect edges were debeveled with a #15 scalpel blade.  Given the location of the defect, shape of the defect and the proximity to free margins a modified advancement flap was deemed most appropriate.  Using a sterile surgical marker, an appropriate advancement flap was drawn incorporating the defect and placing the expected incisions within the relaxed skin tension lines where possible.    The area thus outlined was incised deep to adipose tissue with a #15 scalpel blade.  The skin margins were undermined to an appropriate distance in all directions utilizing iris scissors.
Mucosal Advancement Flap Text: Given the location of the defect, shape of the defect and the proximity to free margins a mucosal advancement flap was deemed most appropriate. Incisions were made with a 15 blade scalpel in the appropriate fashion along the cutaneous vermillion border and the mucosal lip. The remaining actinically damaged mucosal tissue was excised.  The mucosal advancement flap was then elevated to the gingival sulcus with care taken to preserve the neurovascular structures and advanced into the primary defect. Care was taken to ensure that precise realignment of the vermilion border was achieved.
Peng Advancement Flap Text: The defect edges were debeveled with a #15 scalpel blade.  Given the location of the defect, shape of the defect and the proximity to free margins a Peng advancement flap was deemed most appropriate.  Using a sterile surgical marker, an appropriate advancement flap was drawn incorporating the defect and placing the expected incisions within the relaxed skin tension lines where possible. The area thus outlined was incised deep to adipose tissue with a #15 scalpel blade.  The skin margins were undermined to an appropriate distance in all directions utilizing iris scissors.
Hatchet Flap Text: The defect edges were debeveled with a #15 scalpel blade.  Given the location of the defect, shape of the defect and the proximity to free margins a hatchet flap was deemed most appropriate.  Using a sterile surgical marker, an appropriate hatchet flap was drawn incorporating the defect and placing the expected incisions within the relaxed skin tension lines where possible.    The area thus outlined was incised deep to adipose tissue with a #15 scalpel blade.  The skin margins were undermined to an appropriate distance in all directions utilizing iris scissors.
Rotation Flap Text: The defect edges were debeveled with a #15 scalpel blade.  Given the location of the defect, shape of the defect and the proximity to free margins a rotation flap was deemed most appropriate.  Using a sterile surgical marker, an appropriate rotation flap was drawn incorporating the defect and placing the expected incisions within the relaxed skin tension lines where possible.    The area thus outlined was incised deep to adipose tissue with a #15 scalpel blade.  The skin margins were undermined to an appropriate distance in all directions utilizing iris scissors.
Spiral Flap Text: The defect edges were debeveled with a #15 scalpel blade.  Given the location of the defect, shape of the defect and the proximity to free margins a spiral flap was deemed most appropriate.  Using a sterile surgical marker, an appropriate rotation flap was drawn incorporating the defect and placing the expected incisions within the relaxed skin tension lines where possible. The area thus outlined was incised deep to adipose tissue with a #15 scalpel blade.  The skin margins were undermined to an appropriate distance in all directions utilizing iris scissors.
Staged Advancement Flap Text: The defect edges were debeveled with a #15 scalpel blade.  Given the location of the defect, shape of the defect and the proximity to free margins a staged advancement flap was deemed most appropriate.  Using a sterile surgical marker, an appropriate advancement flap was drawn incorporating the defect and placing the expected incisions within the relaxed skin tension lines where possible. The area thus outlined was incised deep to adipose tissue with a #15 scalpel blade.  The skin margins were undermined to an appropriate distance in all directions utilizing iris scissors.
Star Wedge Flap Text: The defect edges were debeveled with a #15 scalpel blade.  Given the location of the defect, shape of the defect and the proximity to free margins a star wedge flap was deemed most appropriate.  Using a sterile surgical marker, an appropriate rotation flap was drawn incorporating the defect and placing the expected incisions within the relaxed skin tension lines where possible. The area thus outlined was incised deep to adipose tissue with a #15 scalpel blade.  The skin margins were undermined to an appropriate distance in all directions utilizing iris scissors.
Transposition Flap Text: The defect edges were debeveled with a #15 scalpel blade.  Given the location of the defect and the proximity to free margins a transposition flap was deemed most appropriate.  Using a sterile surgical marker, an appropriate transposition flap was drawn incorporating the defect.    The area thus outlined was incised deep to adipose tissue with a #15 scalpel blade.  The skin margins were undermined to an appropriate distance in all directions utilizing iris scissors.
Muscle Hinge Flap Text: The defect edges were debeveled with a #15 scalpel blade.  Given the size, depth and location of the defect and the proximity to free margins a muscle hinge flap was deemed most appropriate.  Using a sterile surgical marker, an appropriate hinge flap was drawn incorporating the defect. The area thus outlined was incised with a #15 scalpel blade.  The skin margins were undermined to an appropriate distance in all directions utilizing iris scissors.
Mustarde Flap Text: The defect edges were debeveled with a #15 scalpel blade.  Given the size, depth and location of the defect and the proximity to free margins a Mustarde flap was deemed most appropriate.  Using a sterile surgical marker, an appropriate flap was drawn incorporating the defect. The area thus outlined was incised with a #15 scalpel blade.  The skin margins were undermined to an appropriate distance in all directions utilizing iris scissors.
Nasal Turnover Hinge Flap Text: The defect edges were debeveled with a #15 scalpel blade.  Given the size, depth, location of the defect and the defect being full thickness a nasal turnover hinge flap was deemed most appropriate.  Using a sterile surgical marker, an appropriate hinge flap was drawn incorporating the defect. The area thus outlined was incised with a #15 scalpel blade. The flap was designed to recreate the nasal mucosal lining and the alar rim. The skin margins were undermined to an appropriate distance in all directions utilizing iris scissors.
Nasalis-Muscle-Based Myocutaneous Island Pedicle Flap Text: Using a #15 blade, an incision was made around the donor flap to the level of the nasalis muscle. Wide lateral undermining was then performed in both the subcutaneous plane above the nasalis muscle, and in a submuscular plane just above periosteum. This allowed the formation of a free nasalis muscle axial pedicle (based on the angular artery) which was still attached to the actual cutaneous flap, increasing its mobility and vascular viability. Hemostasis was obtained with pinpoint electrocoagulation. The flap was mobilized into position and the pivotal anchor points positioned and stabilized with buried interrupted sutures. Subcutaneous and dermal tissues were closed in a multilayered fashion with sutures. Tissue redundancies were excised, and the epidermal edges were apposed without significant tension and sutured with sutures.
Orbicularis Oris Muscle Flap Text: The defect edges were debeveled with a #15 scalpel blade.  Given that the defect affected the competency of the oral sphincter an obicularis oris muscle flap was deemed most appropriate to restore this competency and normal muscle function.  Using a sterile surgical marker, an appropriate flap was drawn incorporating the defect. The area thus outlined was incised with a #15 scalpel blade.
Melolabial Transposition Flap Text: The defect edges were debeveled with a #15 scalpel blade.  Given the location of the defect and the proximity to free margins a melolabial flap was deemed most appropriate.  Using a sterile surgical marker, an appropriate melolabial transposition flap was drawn incorporating the defect.    The area thus outlined was incised deep to adipose tissue with a #15 scalpel blade.  The skin margins were undermined to an appropriate distance in all directions utilizing iris scissors.
Rhombic Flap Text: The defect edges were debeveled with a #15 scalpel blade.  Given the location of the defect and the proximity to free margins a rhombic flap was deemed most appropriate.  Using a sterile surgical marker, an appropriate rhombic flap was drawn incorporating the defect.    The area thus outlined was incised deep to adipose tissue with a #15 scalpel blade.  The skin margins were undermined to an appropriate distance in all directions utilizing iris scissors.
Rhomboid Transposition Flap Text: The defect edges were debeveled with a #15 scalpel blade.  Given the location of the defect and the proximity to free margins a rhomboid transposition flap was deemed most appropriate.  Using a sterile surgical marker, an appropriate rhomboid flap was drawn incorporating the defect.    The area thus outlined was incised deep to adipose tissue with a #15 scalpel blade.  The skin margins were undermined to an appropriate distance in all directions utilizing iris scissors.
Bi-Rhombic Flap Text: The defect edges were debeveled with a #15 scalpel blade.  Given the location of the defect and the proximity to free margins a bi-rhombic flap was deemed most appropriate.  Using a sterile surgical marker, an appropriate rhombic flap was drawn incorporating the defect. The area thus outlined was incised deep to adipose tissue with a #15 scalpel blade.  The skin margins were undermined to an appropriate distance in all directions utilizing iris scissors.
Helical Rim Advancement Flap Text: The defect edges were debeveled with a #15 blade scalpel.  Given the location of the defect and the proximity to free margins (helical rim) a double helical rim advancement flap was deemed most appropriate.  Using a sterile surgical marker, the appropriate advancement flaps were drawn incorporating the defect and placing the expected incisions between the helical rim and antihelix where possible.  The area thus outlined was incised through and through with a #15 scalpel blade.  With a skin hook and iris scissors, the flaps were gently and sharply undermined and freed up.
Bilateral Helical Rim Advancement Flap Text: The defect edges were debeveled with a #15 blade scalpel.  Given the location of the defect and the proximity to free margins (helical rim) a bilateral helical rim advancement flap was deemed most appropriate.  Using a sterile surgical marker, the appropriate advancement flaps were drawn incorporating the defect and placing the expected incisions between the helical rim and antihelix where possible.  The area thus outlined was incised through and through with a #15 scalpel blade.  With a skin hook and iris scissors, the flaps were gently and sharply undermined and freed up.
Ear Star Wedge Flap Text: The defect edges were debeveled with a #15 blade scalpel.  Given the location of the defect and the proximity to free margins (helical rim) an ear star wedge flap was deemed most appropriate.  Using a sterile surgical marker, the appropriate flap was drawn incorporating the defect and placing the expected incisions between the helical rim and antihelix where possible.  The area thus outlined was incised through and through with a #15 scalpel blade.
Banner Transposition Flap Text: The defect edges were debeveled with a #15 scalpel blade.  Given the location of the defect and the proximity to free margins a Banner transposition flap was deemed most appropriate.  Using a sterile surgical marker, an appropriate flap drawn around the defect. The area thus outlined was incised deep to adipose tissue with a #15 scalpel blade.  The skin margins were undermined to an appropriate distance in all directions utilizing iris scissors.
Bilobed Flap Text: The defect edges were debeveled with a #15 scalpel blade.  Given the location of the defect and the proximity to free margins a bilobe flap was deemed most appropriate.  Using a sterile surgical marker, an appropriate bilobe flap drawn around the defect.    The area thus outlined was incised deep to adipose tissue with a #15 scalpel blade.  The skin margins were undermined to an appropriate distance in all directions utilizing iris scissors.
Bilobed Transposition Flap Text: The defect edges were debeveled with a #15 scalpel blade.  Given the location of the defect and the proximity to free margins a bilobed transposition flap was deemed most appropriate.  Using a sterile surgical marker, an appropriate bilobe flap drawn around the defect.    The area thus outlined was incised deep to adipose tissue with a #15 scalpel blade.  The skin margins were undermined to an appropriate distance in all directions utilizing iris scissors.
Trilobed Flap Text: The defect edges were debeveled with a #15 scalpel blade.  Given the location of the defect and the proximity to free margins a trilobed flap was deemed most appropriate.  Using a sterile surgical marker, an appropriate trilobed flap drawn around the defect.    The area thus outlined was incised deep to adipose tissue with a #15 scalpel blade.  The skin margins were undermined to an appropriate distance in all directions utilizing iris scissors.
Dorsal Nasal Flap Text: The defect edges were debeveled with a #15 scalpel blade.  Given the location of the defect and the proximity to free margins a dorsal nasal flap was deemed most appropriate.  Using a sterile surgical marker, an appropriate dorsal nasal flap was drawn around the defect.    The area thus outlined was incised deep to adipose tissue with a #15 scalpel blade.  The skin margins were undermined to an appropriate distance in all directions utilizing iris scissors.
Island Pedicle Flap Text: The defect edges were debeveled with a #15 scalpel blade.  Given the location of the defect, shape of the defect and the proximity to free margins an island pedicle advancement flap was deemed most appropriate.  Using a sterile surgical marker, an appropriate advancement flap was drawn incorporating the defect, outlining the appropriate donor tissue and placing the expected incisions within the relaxed skin tension lines where possible.    The area thus outlined was incised deep to adipose tissue with a #15 scalpel blade.  The skin margins were undermined to an appropriate distance in all directions around the primary defect and laterally outward around the island pedicle utilizing iris scissors.  There was minimal undermining beneath the pedicle flap.
Island Pedicle Flap With Canthal Suspension Text: The defect edges were debeveled with a #15 scalpel blade.  Given the location of the defect, shape of the defect and the proximity to free margins an island pedicle advancement flap was deemed most appropriate.  Using a sterile surgical marker, an appropriate advancement flap was drawn incorporating the defect, outlining the appropriate donor tissue and placing the expected incisions within the relaxed skin tension lines where possible. The area thus outlined was incised deep to adipose tissue with a #15 scalpel blade.  The skin margins were undermined to an appropriate distance in all directions around the primary defect and laterally outward around the island pedicle utilizing iris scissors.  There was minimal undermining beneath the pedicle flap. A suspension suture was placed in the canthal tendon to prevent tension and prevent ectropion.
Alar Island Pedicle Flap Text: The defect edges were debeveled with a #15 scalpel blade.  Given the location of the defect, shape of the defect and the proximity to the alar rim an island pedicle advancement flap was deemed most appropriate.  Using a sterile surgical marker, an appropriate advancement flap was drawn incorporating the defect, outlining the appropriate donor tissue and placing the expected incisions within the nasal ala running parallel to the alar rim. The area thus outlined was incised with a #15 scalpel blade.  The skin margins were undermined minimally to an appropriate distance in all directions around the primary defect and laterally outward around the island pedicle utilizing iris scissors.  There was minimal undermining beneath the pedicle flap.
Double Island Pedicle Flap Text: The defect edges were debeveled with a #15 scalpel blade.  Given the location of the defect, shape of the defect and the proximity to free margins a double island pedicle advancement flap was deemed most appropriate.  Using a sterile surgical marker, an appropriate advancement flap was drawn incorporating the defect, outlining the appropriate donor tissue and placing the expected incisions within the relaxed skin tension lines where possible.    The area thus outlined was incised deep to adipose tissue with a #15 scalpel blade.  The skin margins were undermined to an appropriate distance in all directions around the primary defect and laterally outward around the island pedicle utilizing iris scissors.  There was minimal undermining beneath the pedicle flap.
Island Pedicle Flap-Requiring Vessel Identification Text: The defect edges were debeveled with a #15 scalpel blade.  Given the location of the defect, shape of the defect and the proximity to free margins an island pedicle advancement flap was deemed most appropriate.  Using a sterile surgical marker, an appropriate advancement flap was drawn, based on the axial vessel mentioned above, incorporating the defect, outlining the appropriate donor tissue and placing the expected incisions within the relaxed skin tension lines where possible.    The area thus outlined was incised deep to adipose tissue with a #15 scalpel blade.  The skin margins were undermined to an appropriate distance in all directions around the primary defect and laterally outward around the island pedicle utilizing iris scissors.  There was minimal undermining beneath the pedicle flap.
Keystone Flap Text: The defect edges were debeveled with a #15 scalpel blade.  Given the location of the defect, shape of the defect a keystone flap was deemed most appropriate.  Using a sterile surgical marker, an appropriate keystone flap was drawn incorporating the defect, outlining the appropriate donor tissue and placing the expected incisions within the relaxed skin tension lines where possible. The area thus outlined was incised deep to adipose tissue with a #15 scalpel blade.  The skin margins were undermined to an appropriate distance in all directions around the primary defect and laterally outward around the flap utilizing iris scissors.
O-T Plasty Text: The defect edges were debeveled with a #15 scalpel blade.  Given the location of the defect, shape of the defect and the proximity to free margins an O-T plasty was deemed most appropriate.  Using a sterile surgical marker, an appropriate O-T plasty was drawn incorporating the defect and placing the expected incisions within the relaxed skin tension lines where possible.    The area thus outlined was incised deep to adipose tissue with a #15 scalpel blade.  The skin margins were undermined to an appropriate distance in all directions utilizing iris scissors.
O-Z Plasty Text: The defect edges were debeveled with a #15 scalpel blade.  Given the location of the defect, shape of the defect and the proximity to free margins an O-Z plasty (double transposition flap) was deemed most appropriate.  Using a sterile surgical marker, the appropriate transposition flaps were drawn incorporating the defect and placing the expected incisions within the relaxed skin tension lines where possible.    The area thus outlined was incised deep to adipose tissue with a #15 scalpel blade.  The skin margins were undermined to an appropriate distance in all directions utilizing iris scissors.  Hemostasis was achieved with electrocautery.  The flaps were then transposed into place, one clockwise and the other counterclockwise, and anchored with interrupted buried subcutaneous sutures.
Double O-Z Plasty Text: The defect edges were debeveled with a #15 scalpel blade.  Given the location of the defect, shape of the defect and the proximity to free margins a Double O-Z plasty (double transposition flap) was deemed most appropriate.  Using a sterile surgical marker, the appropriate transposition flaps were drawn incorporating the defect and placing the expected incisions within the relaxed skin tension lines where possible. The area thus outlined was incised deep to adipose tissue with a #15 scalpel blade.  The skin margins were undermined to an appropriate distance in all directions utilizing iris scissors.  Hemostasis was achieved with electrocautery.  The flaps were then transposed into place, one clockwise and the other counterclockwise, and anchored with interrupted buried subcutaneous sutures.
V-Y Plasty Text: The defect edges were debeveled with a #15 scalpel blade.  Given the location of the defect, shape of the defect and the proximity to free margins an V-Y advancement flap was deemed most appropriate.  Using a sterile surgical marker, an appropriate advancement flap was drawn incorporating the defect and placing the expected incisions within the relaxed skin tension lines where possible.    The area thus outlined was incised deep to adipose tissue with a #15 scalpel blade.  The skin margins were undermined to an appropriate distance in all directions utilizing iris scissors.
H Plasty Text: Given the location of the defect, shape of the defect and the proximity to free margins a H-plasty was deemed most appropriate for repair.  Using a sterile surgical marker, the appropriate advancement arms of the H-plasty were drawn incorporating the defect and placing the expected incisions within the relaxed skin tension lines where possible. The area thus outlined was incised deep to adipose tissue with a #15 scalpel blade. The skin margins were undermined to an appropriate distance in all directions utilizing iris scissors.  The opposing advancement arms were then advanced into place in opposite direction and anchored with interrupted buried subcutaneous sutures.
W Plasty Text: The lesion was extirpated to the level of the fat with a #15 scalpel blade.  Given the location of the defect, shape of the defect and the proximity to free margins a W-plasty was deemed most appropriate for repair.  Using a sterile surgical marker, the appropriate transposition arms of the W-plasty were drawn incorporating the defect and placing the expected incisions within the relaxed skin tension lines where possible.    The area thus outlined was incised deep to adipose tissue with a #15 scalpel blade.  The skin margins were undermined to an appropriate distance in all directions utilizing iris scissors.  The opposing transposition arms were then transposed into place in opposite direction and anchored with interrupted buried subcutaneous sutures.
Z Plasty Text: The lesion was extirpated to the level of the fat with a #15 scalpel blade.  Given the location of the defect, shape of the defect and the proximity to free margins a Z-plasty was deemed most appropriate for repair.  Using a sterile surgical marker, the appropriate transposition arms of the Z-plasty were drawn incorporating the defect and placing the expected incisions within the relaxed skin tension lines where possible.    The area thus outlined was incised deep to adipose tissue with a #15 scalpel blade.  The skin margins were undermined to an appropriate distance in all directions utilizing iris scissors.  The opposing transposition arms were then transposed into place in opposite direction and anchored with interrupted buried subcutaneous sutures.
Zygomaticofacial Flap Text: Given the location of the defect, shape of the defect and the proximity to free margins a zygomaticofacial flap was deemed most appropriate for repair.  Using a sterile surgical marker, the appropriate flap was drawn incorporating the defect and placing the expected incisions within the relaxed skin tension lines where possible. The area thus outlined was incised deep to adipose tissue with a #15 scalpel blade with preservation of a vascular pedicle.  The skin margins were undermined to an appropriate distance in all directions utilizing iris scissors.  The flap was then placed into the defect and anchored with interrupted buried subcutaneous sutures.
Cheek Interpolation Flap Text: A decision was made to reconstruct the defect utilizing an interpolation axial flap and a staged reconstruction.  A telfa template was made of the defect.  This telfa template was then used to outline the Cheek Interpolation flap.  The donor area for the pedicle flap was then injected with anesthesia.  The flap was excised through the skin and subcutaneous tissue down to the layer of the underlying musculature.  The interpolation flap was carefully excised within this deep plane to maintain its blood supply.  The edges of the donor site were undermined.   The donor site was closed in a primary fashion.  The pedicle was then rotated into position and sutured.  Once the tube was sutured into place, adequate blood supply was confirmed with blanching and refill.  The pedicle was then wrapped with xeroform gauze and dressed appropriately with a telfa and gauze bandage to ensure continued blood supply and protect the attached pedicle.
Cheek-To-Nose Interpolation Flap Text: A decision was made to reconstruct the defect utilizing an interpolation axial flap and a staged reconstruction.  A telfa template was made of the defect.  This telfa template was then used to outline the Cheek-To-Nose Interpolation flap.  The donor area for the pedicle flap was then injected with anesthesia.  The flap was excised through the skin and subcutaneous tissue down to the layer of the underlying musculature.  The interpolation flap was carefully excised within this deep plane to maintain its blood supply.  The edges of the donor site were undermined.   The donor site was closed in a primary fashion.  The pedicle was then rotated into position and sutured.  Once the tube was sutured into place, adequate blood supply was confirmed with blanching and refill.  The pedicle was then wrapped with xeroform gauze and dressed appropriately with a telfa and gauze bandage to ensure continued blood supply and protect the attached pedicle.
Interpolation Flap Text: A decision was made to reconstruct the defect utilizing an interpolation axial flap and a staged reconstruction.  A telfa template was made of the defect.  This telfa template was then used to outline the interpolation flap.  The donor area for the pedicle flap was then injected with anesthesia.  The flap was excised through the skin and subcutaneous tissue down to the layer of the underlying musculature.  The interpolation flap was carefully excised within this deep plane to maintain its blood supply.  The edges of the donor site were undermined.   The donor site was closed in a primary fashion.  The pedicle was then rotated into position and sutured.  Once the tube was sutured into place, adequate blood supply was confirmed with blanching and refill.  The pedicle was then wrapped with xeroform gauze and dressed appropriately with a telfa and gauze bandage to ensure continued blood supply and protect the attached pedicle.
Melolabial Interpolation Flap Text: A decision was made to reconstruct the defect utilizing an interpolation axial flap and a staged reconstruction.  A telfa template was made of the defect.  This telfa template was then used to outline the melolabial interpolation flap.  The donor area for the pedicle flap was then injected with anesthesia.  The flap was excised through the skin and subcutaneous tissue down to the layer of the underlying musculature.  The pedicle flap was carefully excised within this deep plane to maintain its blood supply.  The edges of the donor site were undermined.   The donor site was closed in a primary fashion.  The pedicle was then rotated into position and sutured.  Once the tube was sutured into place, adequate blood supply was confirmed with blanching and refill.  The pedicle was then wrapped with xeroform gauze and dressed appropriately with a telfa and gauze bandage to ensure continued blood supply and protect the attached pedicle.
Mastoid Interpolation Flap Text: A decision was made to reconstruct the defect utilizing an interpolation axial flap and a staged reconstruction.  A telfa template was made of the defect.  This telfa template was then used to outline the mastoid interpolation flap.  The donor area for the pedicle flap was then injected with anesthesia.  The flap was excised through the skin and subcutaneous tissue down to the layer of the underlying musculature.  The pedicle flap was carefully excised within this deep plane to maintain its blood supply.  The edges of the donor site were undermined.   The donor site was closed in a primary fashion.  The pedicle was then rotated into position and sutured.  Once the tube was sutured into place, adequate blood supply was confirmed with blanching and refill.  The pedicle was then wrapped with xeroform gauze and dressed appropriately with a telfa and gauze bandage to ensure continued blood supply and protect the attached pedicle.
Posterior Auricular Interpolation Flap Text: A decision was made to reconstruct the defect utilizing an interpolation axial flap and a staged reconstruction.  A telfa template was made of the defect.  This telfa template was then used to outline the posterior auricular interpolation flap.  The donor area for the pedicle flap was then injected with anesthesia.  The flap was excised through the skin and subcutaneous tissue down to the layer of the underlying musculature.  The pedicle flap was carefully excised within this deep plane to maintain its blood supply.  The edges of the donor site were undermined.   The donor site was closed in a primary fashion.  The pedicle was then rotated into position and sutured.  Once the tube was sutured into place, adequate blood supply was confirmed with blanching and refill.  The pedicle was then wrapped with xeroform gauze and dressed appropriately with a telfa and gauze bandage to ensure continued blood supply and protect the attached pedicle.
Paramedian Forehead Flap Text: A decision was made to reconstruct the defect utilizing an interpolation axial flap and a staged reconstruction.  A telfa template was made of the defect.  This telfa template was then used to outline the paramedian forehead pedicle flap.  The donor area for the pedicle flap was then injected with anesthesia.  The flap was excised through the skin and subcutaneous tissue down to the layer of the underlying musculature.  The pedicle flap was carefully excised within this deep plane to maintain its blood supply.  The edges of the donor site were undermined.   The donor site was closed in a primary fashion.  The pedicle was then rotated into position and sutured.  Once the tube was sutured into place, adequate blood supply was confirmed with blanching and refill.  The pedicle was then wrapped with xeroform gauze and dressed appropriately with a telfa and gauze bandage to ensure continued blood supply and protect the attached pedicle.
Lip Wedge Excision Repair Text: Given the location of the defect and the proximity to free margins a full thickness wedge repair was deemed most appropriate.  Using a sterile surgical marker, the appropriate repair was drawn incorporating the defect and placing the expected incisions perpendicular to the vermilion border.  The vermilion border was also meticulously outlined to ensure appropriate reapproximation during the repair.  The area thus outlined was incised through and through with a #15 scalpel blade.  The muscularis and dermis were reaproximated with deep sutures following hemostasis. Care was taken to realign the vermilion border before proceeding with the superficial closure.  Once the vermilion was realigned the superfical and mucosal closure was finished.
Ftsg Text: The defect edges were debeveled with a #15 scalpel blade.  Given the location of the defect, shape of the defect and the proximity to free margins a full thickness skin graft was deemed most appropriate.  Using a sterile surgical marker, the primary defect shape was transferred to the donor site. The area thus outlined was incised deep to adipose tissue with a #15 scalpel blade.  The harvested graft was then trimmed of adipose tissue until only dermis and epidermis was left.  The skin margins of the secondary defect were undermined to an appropriate distance in all directions utilizing iris scissors.  The secondary defect was closed with interrupted buried subcutaneous sutures.  The skin edges were then re-apposed with running  sutures.  The skin graft was then placed in the primary defect and oriented appropriately.
Split-Thickness Skin Graft Text: The defect edges were debeveled with a #15 scalpel blade.  Given the location of the defect, shape of the defect and the proximity to free margins a split thickness skin graft was deemed most appropriate.  Using a sterile surgical marker, the primary defect shape was transferred to the donor site. The split thickness graft was then harvested.  The skin graft was then placed in the primary defect and oriented appropriately.
Burow's Graft Text: The defect edges were debeveled with a #15 scalpel blade.  Given the location of the defect, shape of the defect, the proximity to free margins and the presence of a standing cone deformity a Burow's skin graft was deemed most appropriate. The standing cone was removed and this tissue was then trimmed to the shape of the primary defect. The adipose tissue was also removed until only dermis and epidermis were left.  The skin margins of the secondary defect were undermined to an appropriate distance in all directions utilizing iris scissors.  The secondary defect was closed with interrupted buried subcutaneous sutures.  The skin edges were then re-apposed with running  sutures.  The skin graft was then placed in the primary defect and oriented appropriately.
Cartilage Graft Text: The defect edges were debeveled with a #15 scalpel blade.  Given the location of the defect, shape of the defect, the fact the defect involved a full thickness cartilage defect a cartilage graft was deemed most appropriate.  An appropriate donor site was identified, cleansed, and anesthetized. The cartilage graft was then harvested and transferred to the recipient site, oriented appropriately and then sutured into place.  The secondary defect was then repaired using a primary closure.
Composite Graft Text: The defect edges were debeveled with a #15 scalpel blade.  Given the location of the defect, shape of the defect, the proximity to free margins and the fact the defect was full thickness a composite graft was deemed most appropriate.  The defect was outline and then transferred to the donor site.  A full thickness graft was then excised from the donor site. The graft was then placed in the primary defect, oriented appropriately and then sutured into place.  The secondary defect was then repaired using a primary closure.
Epidermal Autograft Text: The defect edges were debeveled with a #15 scalpel blade.  Given the location of the defect, shape of the defect and the proximity to free margins an epidermal autograft was deemed most appropriate.  Using a sterile surgical marker, the primary defect shape was transferred to the donor site. The epidermal graft was then harvested.  The skin graft was then placed in the primary defect and oriented appropriately.
Dermal Autograft Text: The defect edges were debeveled with a #15 scalpel blade.  Given the location of the defect, shape of the defect and the proximity to free margins a dermal autograft was deemed most appropriate.  Using a sterile surgical marker, the primary defect shape was transferred to the donor site. The area thus outlined was incised deep to adipose tissue with a #15 scalpel blade.  The harvested graft was then trimmed of adipose and epidermal tissue until only dermis was left.  The skin graft was then placed in the primary defect and oriented appropriately.
Skin Substitute Text: The defect edges were debeveled with a #15 scalpel blade.  Given the location of the defect, shape of the defect and the proximity to free margins a skin substitute graft was deemed most appropriate.  The graft material was trimmed to fit the size of the defect. The graft was then placed in the primary defect and oriented appropriately.
Tissue Cultured Epidermal Autograft Text: The defect edges were debeveled with a #15 scalpel blade.  Given the location of the defect, shape of the defect and the proximity to free margins a tissue cultured epidermal autograft was deemed most appropriate.  The graft was then trimmed to fit the size of the defect.  The graft was then placed in the primary defect and oriented appropriately.
Xenograft Text: The defect edges were debeveled with a #15 scalpel blade.  Given the location of the defect, shape of the defect and the proximity to free margins a xenograft was deemed most appropriate.  The graft was then trimmed to fit the size of the defect.  The graft was then placed in the primary defect and oriented appropriately.
Purse String (Intermediate) Text: Given the location of the defect and the characteristics of the surrounding skin a purse string intermediate closure was deemed most appropriate.  Undermining was performed circumferentially around the surgical defect.  A purse string suture was then placed and tightened.
Purse String (Simple) Text: Given the location of the defect and the characteristics of the surrounding skin a purse string simple closure was deemed most appropriate.  Undermining was performed circumferentially around the surgical defect.  A purse string suture was then placed and tightened.
Complex Repair And Single Advancement Flap Text: The defect edges were debeveled with a #15 scalpel blade.  The primary defect was closed partially with a complex linear closure.  Given the location of the remaining defect, shape of the defect and the proximity to free margins a single advancement flap was deemed most appropriate for complete closure of the defect.  Using a sterile surgical marker, an appropriate advancement flap was drawn incorporating the defect and placing the expected incisions within the relaxed skin tension lines where possible.    The area thus outlined was incised deep to adipose tissue with a #15 scalpel blade.  The skin margins were undermined to an appropriate distance in all directions utilizing iris scissors.
Complex Repair And Double Advancement Flap Text: The defect edges were debeveled with a #15 scalpel blade.  The primary defect was closed partially with a complex linear closure.  Given the location of the remaining defect, shape of the defect and the proximity to free margins a double advancement flap was deemed most appropriate for complete closure of the defect.  Using a sterile surgical marker, an appropriate advancement flap was drawn incorporating the defect and placing the expected incisions within the relaxed skin tension lines where possible.    The area thus outlined was incised deep to adipose tissue with a #15 scalpel blade.  The skin margins were undermined to an appropriate distance in all directions utilizing iris scissors.
Complex Repair And Modified Advancement Flap Text: The defect edges were debeveled with a #15 scalpel blade.  The primary defect was closed partially with a complex linear closure.  Given the location of the remaining defect, shape of the defect and the proximity to free margins a modified advancement flap was deemed most appropriate for complete closure of the defect.  Using a sterile surgical marker, an appropriate advancement flap was drawn incorporating the defect and placing the expected incisions within the relaxed skin tension lines where possible.    The area thus outlined was incised deep to adipose tissue with a #15 scalpel blade.  The skin margins were undermined to an appropriate distance in all directions utilizing iris scissors.
Complex Repair And A-T Advancement Flap Text: The defect edges were debeveled with a #15 scalpel blade.  The primary defect was closed partially with a complex linear closure.  Given the location of the remaining defect, shape of the defect and the proximity to free margins an A-T advancement flap was deemed most appropriate for complete closure of the defect.  Using a sterile surgical marker, an appropriate advancement flap was drawn incorporating the defect and placing the expected incisions within the relaxed skin tension lines where possible.    The area thus outlined was incised deep to adipose tissue with a #15 scalpel blade.  The skin margins were undermined to an appropriate distance in all directions utilizing iris scissors.
Complex Repair And O-T Advancement Flap Text: The defect edges were debeveled with a #15 scalpel blade.  The primary defect was closed partially with a complex linear closure.  Given the location of the remaining defect, shape of the defect and the proximity to free margins an O-T advancement flap was deemed most appropriate for complete closure of the defect.  Using a sterile surgical marker, an appropriate advancement flap was drawn incorporating the defect and placing the expected incisions within the relaxed skin tension lines where possible.    The area thus outlined was incised deep to adipose tissue with a #15 scalpel blade.  The skin margins were undermined to an appropriate distance in all directions utilizing iris scissors.
Complex Repair And O-L Flap Text: The defect edges were debeveled with a #15 scalpel blade.  The primary defect was closed partially with a complex linear closure.  Given the location of the remaining defect, shape of the defect and the proximity to free margins an O-L flap was deemed most appropriate for complete closure of the defect.  Using a sterile surgical marker, an appropriate flap was drawn incorporating the defect and placing the expected incisions within the relaxed skin tension lines where possible.    The area thus outlined was incised deep to adipose tissue with a #15 scalpel blade.  The skin margins were undermined to an appropriate distance in all directions utilizing iris scissors.
Complex Repair And Bilobe Flap Text: The defect edges were debeveled with a #15 scalpel blade.  The primary defect was closed partially with a complex linear closure.  Given the location of the remaining defect, shape of the defect and the proximity to free margins a bilobe flap was deemed most appropriate for complete closure of the defect.  Using a sterile surgical marker, an appropriate advancement flap was drawn incorporating the defect and placing the expected incisions within the relaxed skin tension lines where possible.    The area thus outlined was incised deep to adipose tissue with a #15 scalpel blade.  The skin margins were undermined to an appropriate distance in all directions utilizing iris scissors.
Complex Repair And Melolabial Flap Text: The defect edges were debeveled with a #15 scalpel blade.  The primary defect was closed partially with a complex linear closure.  Given the location of the remaining defect, shape of the defect and the proximity to free margins a melolabial flap was deemed most appropriate for complete closure of the defect.  Using a sterile surgical marker, an appropriate advancement flap was drawn incorporating the defect and placing the expected incisions within the relaxed skin tension lines where possible.    The area thus outlined was incised deep to adipose tissue with a #15 scalpel blade.  The skin margins were undermined to an appropriate distance in all directions utilizing iris scissors.
Complex Repair And Rotation Flap Text: The defect edges were debeveled with a #15 scalpel blade.  The primary defect was closed partially with a complex linear closure.  Given the location of the remaining defect, shape of the defect and the proximity to free margins a rotation flap was deemed most appropriate for complete closure of the defect.  Using a sterile surgical marker, an appropriate advancement flap was drawn incorporating the defect and placing the expected incisions within the relaxed skin tension lines where possible.    The area thus outlined was incised deep to adipose tissue with a #15 scalpel blade.  The skin margins were undermined to an appropriate distance in all directions utilizing iris scissors.
Complex Repair And Rhombic Flap Text: The defect edges were debeveled with a #15 scalpel blade.  The primary defect was closed partially with a complex linear closure.  Given the location of the remaining defect, shape of the defect and the proximity to free margins a rhombic flap was deemed most appropriate for complete closure of the defect.  Using a sterile surgical marker, an appropriate advancement flap was drawn incorporating the defect and placing the expected incisions within the relaxed skin tension lines where possible.    The area thus outlined was incised deep to adipose tissue with a #15 scalpel blade.  The skin margins were undermined to an appropriate distance in all directions utilizing iris scissors.
Complex Repair And Transposition Flap Text: The defect edges were debeveled with a #15 scalpel blade.  The primary defect was closed partially with a complex linear closure.  Given the location of the remaining defect, shape of the defect and the proximity to free margins a transposition flap was deemed most appropriate for complete closure of the defect.  Using a sterile surgical marker, an appropriate advancement flap was drawn incorporating the defect and placing the expected incisions within the relaxed skin tension lines where possible.    The area thus outlined was incised deep to adipose tissue with a #15 scalpel blade.  The skin margins were undermined to an appropriate distance in all directions utilizing iris scissors.
Complex Repair And V-Y Plasty Text: The defect edges were debeveled with a #15 scalpel blade.  The primary defect was closed partially with a complex linear closure.  Given the location of the remaining defect, shape of the defect and the proximity to free margins a V-Y plasty was deemed most appropriate for complete closure of the defect.  Using a sterile surgical marker, an appropriate advancement flap was drawn incorporating the defect and placing the expected incisions within the relaxed skin tension lines where possible.    The area thus outlined was incised deep to adipose tissue with a #15 scalpel blade.  The skin margins were undermined to an appropriate distance in all directions utilizing iris scissors.
Complex Repair And M Plasty Text: The defect edges were debeveled with a #15 scalpel blade.  The primary defect was closed partially with a complex linear closure.  Given the location of the remaining defect, shape of the defect and the proximity to free margins an M plasty was deemed most appropriate for complete closure of the defect.  Using a sterile surgical marker, an appropriate advancement flap was drawn incorporating the defect and placing the expected incisions within the relaxed skin tension lines where possible.    The area thus outlined was incised deep to adipose tissue with a #15 scalpel blade.  The skin margins were undermined to an appropriate distance in all directions utilizing iris scissors.
Complex Repair And Double M Plasty Text: The defect edges were debeveled with a #15 scalpel blade.  The primary defect was closed partially with a complex linear closure.  Given the location of the remaining defect, shape of the defect and the proximity to free margins a double M plasty was deemed most appropriate for complete closure of the defect.  Using a sterile surgical marker, an appropriate advancement flap was drawn incorporating the defect and placing the expected incisions within the relaxed skin tension lines where possible.    The area thus outlined was incised deep to adipose tissue with a #15 scalpel blade.  The skin margins were undermined to an appropriate distance in all directions utilizing iris scissors.
Complex Repair And W Plasty Text: The defect edges were debeveled with a #15 scalpel blade.  The primary defect was closed partially with a complex linear closure.  Given the location of the remaining defect, shape of the defect and the proximity to free margins a W plasty was deemed most appropriate for complete closure of the defect.  Using a sterile surgical marker, an appropriate advancement flap was drawn incorporating the defect and placing the expected incisions within the relaxed skin tension lines where possible.    The area thus outlined was incised deep to adipose tissue with a #15 scalpel blade.  The skin margins were undermined to an appropriate distance in all directions utilizing iris scissors.
Complex Repair And Z Plasty Text: The defect edges were debeveled with a #15 scalpel blade.  The primary defect was closed partially with a complex linear closure.  Given the location of the remaining defect, shape of the defect and the proximity to free margins a Z plasty was deemed most appropriate for complete closure of the defect.  Using a sterile surgical marker, an appropriate advancement flap was drawn incorporating the defect and placing the expected incisions within the relaxed skin tension lines where possible.    The area thus outlined was incised deep to adipose tissue with a #15 scalpel blade.  The skin margins were undermined to an appropriate distance in all directions utilizing iris scissors.
Complex Repair And Dorsal Nasal Flap Text: The defect edges were debeveled with a #15 scalpel blade.  The primary defect was closed partially with a complex linear closure.  Given the location of the remaining defect, shape of the defect and the proximity to free margins a dorsal nasal flap was deemed most appropriate for complete closure of the defect.  Using a sterile surgical marker, an appropriate flap was drawn incorporating the defect and placing the expected incisions within the relaxed skin tension lines where possible.    The area thus outlined was incised deep to adipose tissue with a #15 scalpel blade.  The skin margins were undermined to an appropriate distance in all directions utilizing iris scissors.
Complex Repair And Ftsg Text: The defect edges were debeveled with a #15 scalpel blade.  The primary defect was closed partially with a complex linear closure.  Given the location of the defect, shape of the defect and the proximity to free margins a full thickness skin graft was deemed most appropriate to repair the remaining defect.  The graft was trimmed to fit the size of the remaining defect.  The graft was then placed in the primary defect, oriented appropriately, and sutured into place.
Complex Repair And Burow's Graft Text: The defect edges were debeveled with a #15 scalpel blade.  The primary defect was closed partially with a complex linear closure.  Given the location of the defect, shape of the defect, the proximity to free margins and the presence of a standing cone deformity a Burow's graft was deemed most appropriate to repair the remaining defect.  The graft was trimmed to fit the size of the remaining defect.  The graft was then placed in the primary defect, oriented appropriately, and sutured into place.
Complex Repair And Split-Thickness Skin Graft Text: The defect edges were debeveled with a #15 scalpel blade.  The primary defect was closed partially with a complex linear closure.  Given the location of the defect, shape of the defect and the proximity to free margins a split thickness skin graft was deemed most appropriate to repair the remaining defect.  The graft was trimmed to fit the size of the remaining defect.  The graft was then placed in the primary defect, oriented appropriately, and sutured into place.
Complex Repair And Epidermal Autograft Text: The defect edges were debeveled with a #15 scalpel blade.  The primary defect was closed partially with a complex linear closure.  Given the location of the defect, shape of the defect and the proximity to free margins an epidermal autograft was deemed most appropriate to repair the remaining defect.  The graft was trimmed to fit the size of the remaining defect.  The graft was then placed in the primary defect, oriented appropriately, and sutured into place.
Complex Repair And Dermal Autograft Text: The defect edges were debeveled with a #15 scalpel blade.  The primary defect was closed partially with a complex linear closure.  Given the location of the defect, shape of the defect and the proximity to free margins an dermal autograft was deemed most appropriate to repair the remaining defect.  The graft was trimmed to fit the size of the remaining defect.  The graft was then placed in the primary defect, oriented appropriately, and sutured into place.
Complex Repair And Tissue Cultured Epidermal Autograft Text: The defect edges were debeveled with a #15 scalpel blade.  The primary defect was closed partially with a complex linear closure.  Given the location of the defect, shape of the defect and the proximity to free margins an tissue cultured epidermal autograft was deemed most appropriate to repair the remaining defect.  The graft was trimmed to fit the size of the remaining defect.  The graft was then placed in the primary defect, oriented appropriately, and sutured into place.
Complex Repair And Xenograft Text: The defect edges were debeveled with a #15 scalpel blade.  The primary defect was closed partially with a complex linear closure.  Given the location of the defect, shape of the defect and the proximity to free margins a xenograft was deemed most appropriate to repair the remaining defect.  The graft was trimmed to fit the size of the remaining defect.  The graft was then placed in the primary defect, oriented appropriately, and sutured into place.
Complex Repair And Skin Substitute Graft Text: The defect edges were debeveled with a #15 scalpel blade.  The primary defect was closed partially with a complex linear closure.  Given the location of the remaining defect, shape of the defect and the proximity to free margins a skin substitute graft was deemed most appropriate to repair the remaining defect.  The graft was trimmed to fit the size of the remaining defect.  The graft was then placed in the primary defect, oriented appropriately, and sutured into place.
Path Notes (To The Dermatopathologist): Please check margins.
Consent was obtained from the patient. The risks and benefits to therapy were discussed in detail. Specifically, the risks of infection, scarring, bleeding, prolonged wound healing, incomplete removal, allergy to anesthesia, nerve injury and recurrence were addressed. Prior to the procedure, the treatment site was clearly identified and confirmed by the patient. All components of Universal Protocol/PAUSE Rule completed.
Post-Care Instructions: I reviewed with the patient in detail post-care instructions:\\n1. Apply bacitracin over the steri-strips.  \\n2. Cut non-stick pad (Telfa) to cover the steri-strips\\n3. Apply tape (hypafix) over the non-stick pad\\n4. Change once per day for 5 days\\n5. Shower with bandage on, change bandage after shower\\n\\nPatient is not to engage in any heavy lifting, exercise, hot tub, or swimming for the next 14 days. Should the patient develop any fevers, chills, bleeding, severe pain patient will contact the office immediately.
Home Suture Removal Text: Patient was provided a home suture removal kit and will remove their sutures at home.  If they have any questions or difficulties they will call the office.
Where Do You Want The Question To Include Opioid Counseling Located?: Case Summary Tab
Billing Type: Third-Party Bill
Information: Selecting Yes will display possible errors in your note based on the variables you have selected. This validation is only offered as a suggestion for you. PLEASE NOTE THAT THE VALIDATION TEXT WILL BE REMOVED WHEN YOU FINALIZE YOUR NOTE. IF YOU WANT TO FAX A PRELIMINARY NOTE YOU WILL NEED TO TOGGLE THIS TO 'NO' IF YOU DO NOT WANT IT IN YOUR FAXED NOTE.

## 2022-03-10 ENCOUNTER — APPOINTMENT (RX ONLY)
Dept: URBAN - METROPOLITAN AREA CLINIC 20 | Facility: CLINIC | Age: 42
Setting detail: DERMATOLOGY
End: 2022-03-10

## 2022-03-10 DIAGNOSIS — L40.0 PSORIASIS VULGARIS: ICD-10-CM | Status: INADEQUATELY CONTROLLED

## 2022-03-10 DIAGNOSIS — L81.4 OTHER MELANIN HYPERPIGMENTATION: ICD-10-CM

## 2022-03-10 DIAGNOSIS — L70.8 OTHER ACNE: ICD-10-CM

## 2022-03-10 DIAGNOSIS — T81.89 OTHER COMPLICATIONS OF PROCEDURES, NOT ELSEWHERE CLASSIFIED: ICD-10-CM

## 2022-03-10 DIAGNOSIS — Z85.820 PERSONAL HISTORY OF MALIGNANT MELANOMA OF SKIN: ICD-10-CM

## 2022-03-10 DIAGNOSIS — Z87.2 PERSONAL HISTORY OF DISEASES OF THE SKIN AND SUBCUTANEOUS TISSUE: ICD-10-CM

## 2022-03-10 DIAGNOSIS — L57.8 OTHER SKIN CHANGES DUE TO CHRONIC EXPOSURE TO NONIONIZING RADIATION: ICD-10-CM

## 2022-03-10 DIAGNOSIS — D22 MELANOCYTIC NEVI: ICD-10-CM

## 2022-03-10 DIAGNOSIS — D18.0 HEMANGIOMA: ICD-10-CM

## 2022-03-10 PROBLEM — D22.61 MELANOCYTIC NEVI OF RIGHT UPPER LIMB, INCLUDING SHOULDER: Status: ACTIVE | Noted: 2022-03-10

## 2022-03-10 PROBLEM — D48.5 NEOPLASM OF UNCERTAIN BEHAVIOR OF SKIN: Status: ACTIVE | Noted: 2022-03-10

## 2022-03-10 PROBLEM — D18.01 HEMANGIOMA OF SKIN AND SUBCUTANEOUS TISSUE: Status: ACTIVE | Noted: 2022-03-10

## 2022-03-10 PROBLEM — D22.5 MELANOCYTIC NEVI OF TRUNK: Status: ACTIVE | Noted: 2022-03-10

## 2022-03-10 PROBLEM — D22.4 MELANOCYTIC NEVI OF SCALP AND NECK: Status: ACTIVE | Noted: 2022-03-10

## 2022-03-10 PROBLEM — D22.72 MELANOCYTIC NEVI OF LEFT LOWER LIMB, INCLUDING HIP: Status: ACTIVE | Noted: 2022-03-10

## 2022-03-10 PROBLEM — T81.89XA OTHER COMPLICATIONS OF PROCEDURES, NOT ELSEWHERE CLASSIFIED, INITIAL ENCOUNTER: Status: ACTIVE | Noted: 2022-03-10

## 2022-03-10 PROCEDURE — 11103 TANGNTL BX SKIN EA SEP/ADDL: CPT

## 2022-03-10 PROCEDURE — ? COUNSELING

## 2022-03-10 PROCEDURE — ? PRESCRIPTION

## 2022-03-10 PROCEDURE — 99214 OFFICE O/P EST MOD 30 MIN: CPT | Mod: 25

## 2022-03-10 PROCEDURE — 11102 TANGNTL BX SKIN SINGLE LES: CPT

## 2022-03-10 PROCEDURE — ? OBSERVATION AND MEASURE

## 2022-03-10 PROCEDURE — ? ADDITIONAL NOTES

## 2022-03-10 PROCEDURE — ? BIOPSY BY SHAVE METHOD

## 2022-03-10 PROCEDURE — ? DIAGNOSIS COMMENT

## 2022-03-10 RX ORDER — CLOBETASOL PROPIONATE 0.5 MG/G
OINTMENT TOPICAL
Qty: 60 | Refills: 10 | Status: ERX

## 2022-03-10 ASSESSMENT — LOCATION ZONE DERM
LOCATION ZONE: FACE
LOCATION ZONE: TRUNK
LOCATION ZONE: FEET
LOCATION ZONE: HAND
LOCATION ZONE: LEG
LOCATION ZONE: SCALP
LOCATION ZONE: EAR
LOCATION ZONE: ARM

## 2022-03-10 ASSESSMENT — LOCATION DETAILED DESCRIPTION DERM
LOCATION DETAILED: RIGHT PROXIMAL POSTERIOR UPPER ARM
LOCATION DETAILED: LEFT MID BACK
LOCATION DETAILED: RIGHT SUPERIOR HELIX
LOCATION DETAILED: LEFT SUPERIOR UPPER BACK
LOCATION DETAILED: RIGHT INFERIOR UPPER BACK
LOCATION DETAILED: RIGHT LATERAL ABDOMEN
LOCATION DETAILED: RIGHT SUPERIOR MEDIAL BUCCAL CHEEK
LOCATION DETAILED: RIGHT MID-UPPER BACK
LOCATION DETAILED: LEFT RADIAL DORSAL HAND
LOCATION DETAILED: LEFT ANTERIOR DISTAL THIGH
LOCATION DETAILED: LEFT ANTERIOR PROXIMAL THIGH
LOCATION DETAILED: LEFT ANTERIOR PROXIMAL UPPER ARM
LOCATION DETAILED: RIGHT DORSAL HAND
LOCATION DETAILED: LEFT PLANTAR FOREFOOT OVERLYING 1ST METATARSAL
LOCATION DETAILED: LEFT INFERIOR OCCIPITAL SCALP
LOCATION DETAILED: LEFT PROXIMAL POSTERIOR UPPER ARM
LOCATION DETAILED: LEFT UPPER BACK
LOCATION DETAILED: RIGHT VENTRAL LATERAL PROXIMAL FOREARM
LOCATION DETAILED: RIGHT ELBOW
LOCATION DETAILED: LEFT PROXIMAL POSTERIOR THIGH
LOCATION DETAILED: LEFT POSTERIOR ANKLE
LOCATION DETAILED: LEFT INFERIOR CENTRAL MALAR CHEEK
LOCATION DETAILED: RIGHT ANTERIOR THIGH
LOCATION DETAILED: LEFT ANTERIOR THIGH
LOCATION DETAILED: RIGHT CHEEK
LOCATION DETAILED: RIGHT DISTAL LATERAL CALF
LOCATION DETAILED: LEFT DORSAL HAND
LOCATION DETAILED: LEFT ELBOW
LOCATION DETAILED: RIGHT BUTTOCK
LOCATION DETAILED: LEFT MID-UPPER BACK
LOCATION DETAILED: RIGHT PLANTAR FOREFOOT OVERLYING 1ST METATARSAL

## 2022-03-10 ASSESSMENT — LOCATION SIMPLE DESCRIPTION DERM
LOCATION SIMPLE: RIGHT BUTTOCK
LOCATION SIMPLE: LEFT HAND
LOCATION SIMPLE: RIGHT FOREARM
LOCATION SIMPLE: POSTERIOR SCALP
LOCATION SIMPLE: RIGHT ELBOW
LOCATION SIMPLE: LEFT ANKLE
LOCATION SIMPLE: LEFT POSTERIOR THIGH
LOCATION SIMPLE: LEFT UPPER BACK
LOCATION SIMPLE: LEFT UPPER ARM
LOCATION SIMPLE: RIGHT UPPER BACK
LOCATION SIMPLE: LEFT LOWER EXTREMITY
LOCATION SIMPLE: BACK
LOCATION SIMPLE: RIGHT POSTERIOR UPPER ARM
LOCATION SIMPLE: LEFT ELBOW
LOCATION SIMPLE: RIGHT PLANTAR SURFACE
LOCATION SIMPLE: ABDOMEN
LOCATION SIMPLE: RIGHT CHEEK
LOCATION SIMPLE: RIGHT HAND
LOCATION SIMPLE: RIGHT EAR
LOCATION SIMPLE: LEFT THIGH
LOCATION SIMPLE: RIGHT CALF
LOCATION SIMPLE: LEFT POSTERIOR UPPER ARM
LOCATION SIMPLE: LEFT PLANTAR SURFACE
LOCATION SIMPLE: RIGHT LOWER EXTREMITY
LOCATION SIMPLE: LEFT CHEEK

## 2022-03-10 NOTE — PROCEDURE: DIAGNOSIS COMMENT
Comment: Excised in 2016; 2.8mm, re-excised 2017 (1+ node,18 neg), chuy simpson/ Ileana [id number], Lelo PERKINS
Detail Level: Simple
Comment: Biopsy proven mild to moderate 10/2017; monitor for repigmentation
Comment: Biopsy proven; all sites excised
Comment: Biopsy x 2; [id number], proven benign nevi, closely monitor

## 2022-03-10 NOTE — HPI: MOLE CHECK
What Is The Reason For Today's Visit?: Mole Check
Additional History: Left proximal posterior thigh - 1mm x 1mm\\nBiopsied twice - benign both times

## 2022-03-10 NOTE — PROCEDURE: ADDITIONAL NOTES
Render Risk Assessment In Note?: no
Additional Notes: Pt last PET scan 2019 unremarkable.\\nPt to reach out to Dr. Caraballo about future maintenance imaging if warranted.
Detail Level: Simple
Additional Notes: Plan;\\n\\n1. Pt completeted 18 sessions of UVB with + results, however not long term solution due to history of MM.\\n2. Also used Duobri with + results\\n3. Submit for extract laser.\\n4. Pt not candidate for biologic due to invasive melanoma history. \\nMeantime clobetasol w/ occlusion at bedtime.
Additional Notes: currently improved, hold off on any systemic treatment. \\n\\nHistory below:\\n\\nIf worsens will send note to Dr. Caraballo, would like consult on starting spironlactone for hormonal acne, make sure there is no contraindications. 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 Recent retrospective and observational studies do not suggest an increased risk of prostate or breast cancer (Yanet 2016; Nate 2020; Gabbie 2019).\\n\\nPt has been on MInocycline through PCP for 2 years.  Discussed long term SE w/ minocycline.
Additional Notes: Refer to wound care [facility].\\n\\nAlso I have recommended pt see Dr. Martini podiatrist
Additional Notes: Per pt wound care DC her, she is managing wounds herself, went to podiatrist working on better fitting shoes.\\n\\nPt high risk 2/2 hx of DM.

## 2022-04-11 ENCOUNTER — HOSPITAL ENCOUNTER (EMERGENCY)
Facility: MEDICAL CENTER | Age: 42
End: 2022-04-11
Attending: EMERGENCY MEDICINE
Payer: COMMERCIAL

## 2022-04-11 VITALS
OXYGEN SATURATION: 97 % | RESPIRATION RATE: 16 BRPM | HEART RATE: 81 BPM | WEIGHT: 165.79 LBS | SYSTOLIC BLOOD PRESSURE: 102 MMHG | TEMPERATURE: 98.1 F | BODY MASS INDEX: 28.3 KG/M2 | HEIGHT: 64 IN | DIASTOLIC BLOOD PRESSURE: 69 MMHG

## 2022-04-11 DIAGNOSIS — R11.2 NAUSEA VOMITING AND DIARRHEA: ICD-10-CM

## 2022-04-11 DIAGNOSIS — E86.0 DEHYDRATION: ICD-10-CM

## 2022-04-11 DIAGNOSIS — R19.7 NAUSEA VOMITING AND DIARRHEA: ICD-10-CM

## 2022-04-11 LAB
ALBUMIN SERPL BCP-MCNC: 4.9 G/DL (ref 3.2–4.9)
ALBUMIN/GLOB SERPL: 1.7 G/DL
ALP SERPL-CCNC: 124 U/L (ref 30–99)
ALT SERPL-CCNC: 17 U/L (ref 2–50)
ANION GAP SERPL CALC-SCNC: 17 MMOL/L (ref 7–16)
AST SERPL-CCNC: 14 U/L (ref 12–45)
BASOPHILS # BLD AUTO: 0.6 % (ref 0–1.8)
BASOPHILS # BLD: 0.13 K/UL (ref 0–0.12)
BILIRUB SERPL-MCNC: 0.4 MG/DL (ref 0.1–1.5)
BUN SERPL-MCNC: 13 MG/DL (ref 8–22)
CALCIUM SERPL-MCNC: 9.6 MG/DL (ref 8.4–10.2)
CHLORIDE SERPL-SCNC: 105 MMOL/L (ref 96–112)
CO2 SERPL-SCNC: 19 MMOL/L (ref 20–33)
CREAT SERPL-MCNC: 0.6 MG/DL (ref 0.5–1.4)
EOSINOPHIL # BLD AUTO: 0.01 K/UL (ref 0–0.51)
EOSINOPHIL NFR BLD: 0 % (ref 0–6.9)
ERYTHROCYTE [DISTWIDTH] IN BLOOD BY AUTOMATED COUNT: 51 FL (ref 35.9–50)
GFR SERPLBLD CREATININE-BSD FMLA CKD-EPI: 114 ML/MIN/1.73 M 2
GLOBULIN SER CALC-MCNC: 2.9 G/DL (ref 1.9–3.5)
GLUCOSE SERPL-MCNC: 194 MG/DL (ref 65–99)
HCT VFR BLD AUTO: 48.3 % (ref 37–47)
HGB BLD-MCNC: 15.5 G/DL (ref 12–16)
IMM GRANULOCYTES # BLD AUTO: 0.14 K/UL (ref 0–0.11)
IMM GRANULOCYTES NFR BLD AUTO: 0.7 % (ref 0–0.9)
LIPASE SERPL-CCNC: 45 U/L (ref 7–58)
LYMPHOCYTES # BLD AUTO: 0.98 K/UL (ref 1–4.8)
LYMPHOCYTES NFR BLD: 4.8 % (ref 22–41)
MCH RBC QN AUTO: 29.6 PG (ref 27–33)
MCHC RBC AUTO-ENTMCNC: 32.1 G/DL (ref 33.6–35)
MCV RBC AUTO: 92.4 FL (ref 81.4–97.8)
MONOCYTES # BLD AUTO: 1.05 K/UL (ref 0–0.85)
MONOCYTES NFR BLD AUTO: 5.1 % (ref 0–13.4)
NEUTROPHILS # BLD AUTO: 18.19 K/UL (ref 2–7.15)
NEUTROPHILS NFR BLD: 88.8 % (ref 44–72)
NRBC # BLD AUTO: 0 K/UL
NRBC BLD-RTO: 0 /100 WBC
PLATELET # BLD AUTO: 363 K/UL (ref 164–446)
PMV BLD AUTO: 10.8 FL (ref 9–12.9)
POTASSIUM SERPL-SCNC: 5.1 MMOL/L (ref 3.6–5.5)
PROT SERPL-MCNC: 7.8 G/DL (ref 6–8.2)
RBC # BLD AUTO: 5.23 M/UL (ref 4.2–5.4)
SODIUM SERPL-SCNC: 141 MMOL/L (ref 135–145)
WBC # BLD AUTO: 20.5 K/UL (ref 4.8–10.8)

## 2022-04-11 PROCEDURE — 700111 HCHG RX REV CODE 636 W/ 250 OVERRIDE (IP): Performed by: EMERGENCY MEDICINE

## 2022-04-11 PROCEDURE — 80053 COMPREHEN METABOLIC PANEL: CPT

## 2022-04-11 PROCEDURE — 36415 COLL VENOUS BLD VENIPUNCTURE: CPT

## 2022-04-11 PROCEDURE — 96375 TX/PRO/DX INJ NEW DRUG ADDON: CPT

## 2022-04-11 PROCEDURE — 85025 COMPLETE CBC W/AUTO DIFF WBC: CPT

## 2022-04-11 PROCEDURE — 96374 THER/PROPH/DIAG INJ IV PUSH: CPT

## 2022-04-11 PROCEDURE — 99284 EMERGENCY DEPT VISIT MOD MDM: CPT

## 2022-04-11 PROCEDURE — 83690 ASSAY OF LIPASE: CPT

## 2022-04-11 PROCEDURE — 700105 HCHG RX REV CODE 258: Performed by: EMERGENCY MEDICINE

## 2022-04-11 RX ORDER — ONDANSETRON 4 MG/1
4 TABLET, ORALLY DISINTEGRATING ORAL EVERY 6 HOURS PRN
Qty: 10 TABLET | Refills: 0 | Status: SHIPPED | OUTPATIENT
Start: 2022-04-11 | End: 2022-05-19

## 2022-04-11 RX ORDER — METOCLOPRAMIDE 10 MG/1
10 TABLET ORAL 4 TIMES DAILY
Qty: 40 TABLET | Refills: 0 | Status: SHIPPED | OUTPATIENT
Start: 2022-04-11 | End: 2022-04-21

## 2022-04-11 RX ORDER — METOCLOPRAMIDE HYDROCHLORIDE 5 MG/ML
10 INJECTION INTRAMUSCULAR; INTRAVENOUS ONCE
Status: COMPLETED | OUTPATIENT
Start: 2022-04-11 | End: 2022-04-11

## 2022-04-11 RX ORDER — SODIUM CHLORIDE 9 MG/ML
1000 INJECTION, SOLUTION INTRAVENOUS ONCE
Status: COMPLETED | OUTPATIENT
Start: 2022-04-11 | End: 2022-04-11

## 2022-04-11 RX ORDER — DIPHENHYDRAMINE HYDROCHLORIDE 50 MG/ML
25 INJECTION INTRAMUSCULAR; INTRAVENOUS ONCE
Status: COMPLETED | OUTPATIENT
Start: 2022-04-11 | End: 2022-04-11

## 2022-04-11 RX ADMIN — METOCLOPRAMIDE 10 MG: 5 INJECTION, SOLUTION INTRAMUSCULAR; INTRAVENOUS at 14:37

## 2022-04-11 RX ADMIN — SODIUM CHLORIDE 1000 ML: 9 INJECTION, SOLUTION INTRAVENOUS at 14:37

## 2022-04-11 RX ADMIN — DIPHENHYDRAMINE HYDROCHLORIDE 25 MG: 50 INJECTION INTRAMUSCULAR; INTRAVENOUS at 14:36

## 2022-04-11 NOTE — ED NOTES
Pt discharged to female guest, reviewed all discharge instructions including medications and follow up, pt verbalizes understanding, and denies questions. Electronic prescription discussed with pt.  Escorted to lobby.

## 2022-04-11 NOTE — ED PROVIDER NOTES
ED Provider Note    CHIEF COMPLAINT  Chief Complaint   Patient presents with   • Nausea/Vomiting/Diarrhea     Onset this am Emesis x 5 Diarrhea x 10 Liquid stool No blood  Mild abd cramping Denies fever  HX DM FSBS this am 174  Pt worried about being dehydrated       HPI  Jacque Mcintyre is a 41 y.o. female who presents with chief complaint of nausea vomiting and multiple episodes of diarrhea.  Patient reports she woke up this morning at a relatively normal morning and then started feeling nauseous.  She reports she vomited twice, emesis was nonbloody nonbilious.  She reports she then had around 10 episodes of watery diarrhea that persisted for hours.  She reports she had similar episodes in the past which she ascribes to her history of gastroparesis.  Patient is worried she may be dehydrated and therefore came to the emergency department.  She reports that most of her symptoms have actually resolved at this point.  She denies any associated abdominal pain.  Patient did not have breakfast this morning outside of a cup of coffee, she ate a [employer] burrito last night.  Patient denies any fevers or chills.  Patient denies any bloody or black stool.  Patient denies any associated chest pain with her symptoms, she denies any shortness of breath.    REVIEW OF SYSTEMS  ROS    See HPI for further details. All other systems are negative.     PAST MEDICAL HISTORY   has a past medical history of Asthma, Bipolar 1 disorder (HCC), Cancer ([facility]) (2016), Depression, DM mellitus, gestational, Hyperlipidemia, Hypertension, Pap smear, and PCOS (polycystic ovarian syndrome).    SOCIAL HISTORY  Social History     Tobacco Use   • Smoking status: Current Every Day Smoker     Packs/day: 0.75     Years: 5.00     Pack years: 3.75     Types: Cigarettes   • Smokeless tobacco: Never Used   Vaping Use   • Vaping Use: Never used   Substance and Sexual Activity   • Alcohol use: Not Currently   • Drug use: Yes     Types: Inhaled     Comment:  occasional smoked marijuanna    • Sexual activity: Yes       SURGICAL HISTORY   has a past surgical history that includes myringotomy; adenoidectomy; other (2016); wide excision (Left, 5/9/2017); node biopsy sentinel (Left, 5/9/2017); axillary node dissection (Left, 6/23/2017); and lap,appendectomy (N/A, 10/7/2019).    CURRENT MEDICATIONS  Home Medications    **Home medications have not yet been reviewed for this encounter**         ALLERGIES  Allergies   Allergen Reactions   • Clavulanic Acid    • Augmentin Vomiting       PHYSICAL EXAM  Vitals:    04/11/22 1356   BP: 118/82   Pulse: (!) 104   Resp: 16   Temp: 37.1 °C (98.8 °F)   SpO2: 94%       Physical Exam  Constitutional:       Appearance: She is well-developed.   HENT:      Head: Normocephalic and atraumatic.      Mouth/Throat:      Mouth: Mucous membranes are dry.   Eyes:      Conjunctiva/sclera: Conjunctivae normal.   Cardiovascular:      Rate and Rhythm: Regular rhythm. Tachycardia present.   Pulmonary:      Effort: Pulmonary effort is normal.      Breath sounds: Normal breath sounds.   Abdominal:      General: Bowel sounds are normal. There is no distension.      Palpations: Abdomen is soft.      Tenderness: There is no abdominal tenderness. There is no rebound.   Musculoskeletal:      Cervical back: Normal range of motion and neck supple.   Skin:     General: Skin is warm and dry.      Findings: No rash.   Neurological:      Mental Status: She is alert and oriented to person, place, and time.   Psychiatric:         Behavior: Behavior normal.           DIAGNOSTIC STUDIES / PROCEDURES      LABS  Results for orders placed or performed during the hospital encounter of 04/11/22   CBC WITH DIFFERENTIAL   Result Value Ref Range    WBC 20.5 (H) 4.8 - 10.8 K/uL    RBC 5.23 4.20 - 5.40 M/uL    Hemoglobin 15.5 12.0 - 16.0 g/dL    Hematocrit 48.3 (H) 37.0 - 47.0 %    MCV 92.4 81.4 - 97.8 fL    MCH 29.6 27.0 - 33.0 pg    MCHC 32.1 (L) 33.6 - 35.0 g/dL    RDW 51.0 (H)  35.9 - 50.0 fL    Platelet Count 363 164 - 446 K/uL    MPV 10.8 9.0 - 12.9 fL    Neutrophils-Polys 88.80 (H) 44.00 - 72.00 %    Lymphocytes 4.80 (L) 22.00 - 41.00 %    Monocytes 5.10 0.00 - 13.40 %    Eosinophils 0.00 0.00 - 6.90 %    Basophils 0.60 0.00 - 1.80 %    Immature Granulocytes 0.70 0.00 - 0.90 %    Nucleated RBC 0.00 /100 WBC    Neutrophils (Absolute) 18.19 (H) 2.00 - 7.15 K/uL    Lymphs (Absolute) 0.98 (L) 1.00 - 4.80 K/uL    Monos (Absolute) 1.05 (H) 0.00 - 0.85 K/uL    Eos (Absolute) 0.01 0.00 - 0.51 K/uL    Baso (Absolute) 0.13 (H) 0.00 - 0.12 K/uL    Immature Granulocytes (abs) 0.14 (H) 0.00 - 0.11 K/uL    NRBC (Absolute) 0.00 K/uL   CMP   Result Value Ref Range    Sodium 141 135 - 145 mmol/L    Potassium 5.1 3.6 - 5.5 mmol/L    Chloride 105 96 - 112 mmol/L    Co2 19 (L) 20 - 33 mmol/L    Anion Gap 17.0 (H) 7.0 - 16.0    Glucose 194 (H) 65 - 99 mg/dL    Bun 13 8 - 22 mg/dL    Creatinine 0.60 0.50 - 1.40 mg/dL    Calcium 9.6 8.4 - 10.2 mg/dL    AST(SGOT) 14 12 - 45 U/L    ALT(SGPT) 17 2 - 50 U/L    Alkaline Phosphatase 124 (H) 30 - 99 U/L    Total Bilirubin 0.4 0.1 - 1.5 mg/dL    Albumin 4.9 3.2 - 4.9 g/dL    Total Protein 7.8 6.0 - 8.2 g/dL    Globulin 2.9 1.9 - 3.5 g/dL    A-G Ratio 1.7 g/dL   LIPASE   Result Value Ref Range    Lipase 45 7 - 58 U/L   ESTIMATED GFR   Result Value Ref Range    GFR (CKD-EPI) 114 >60 mL/min/1.73 m 2         RADIOLOGY  No orders to display           COURSE & MEDICAL DECISION MAKING  Pertinent Labs & Imaging studies reviewed. (See chart for details)    Patient here with tachycardia, dry mucous membranes in the setting of nausea vomiting and diarrhea.  She does appear dehydrated at this point.  We will give IV fluids.  Symptoms appear most consistent with either gastroenteritis versus gastroparesis.  Given Reglan and Benadryl.  Will give IV fluids.  Will check basic labs for further risk stratification and to check patient's electrolytes.  Patient's abdominal exam is  entirely benign, believe surgical pathology is highly unlikely.  We will send stool cultures, given the patient appears well, does not have fever, no bloody stool, no recent travel outside the [place], I do not believe that empiric therapy is currently indicated.  Patient's symptoms remain entirely resolved.  She remains comfortable here in the emergency department.  Patient's basic labs reveal a significantly elevated white count around 20.  I reexamined patient.  She has a ongoing entirely normal abdominal exam.  She does not have any ongoing diarrhea.  Patient was able to give us a stool sample which was well formed.  Although patient does have an elevated white count, she remains without any focal findings on her exam.  She is well-appearing.  Check she has no symptoms at this point.  I believe is likely nonspecific at this point.  I discussed checking CT imaging or further labs with patient, however she is comfortable deferring these at this point as she is entirely asymptomatic.  I have given patient Zofran and Reglan.  I have stressed the importance of patient returning to the emergency department if symptoms  return.    The patient will not drink alcohol nor drive with prescribed medications. The patient will return for worsening symptoms and is stable at the time of discharge. The patient verbalizes understanding and will comply.    FINAL IMPRESSION    1. Nausea vomiting and diarrhea    2. Dehydration            Electronically signed by: Villa Thomson M.D., 4/11/2022 2:25 PM

## 2022-05-19 ENCOUNTER — APPOINTMENT (OUTPATIENT)
Dept: RADIOLOGY | Facility: MEDICAL CENTER | Age: 42
DRG: 639 | End: 2022-05-19
Attending: EMERGENCY MEDICINE
Payer: COMMERCIAL

## 2022-05-19 ENCOUNTER — HOSPITAL ENCOUNTER (INPATIENT)
Facility: MEDICAL CENTER | Age: 42
LOS: 1 days | DRG: 639 | End: 2022-05-20
Attending: EMERGENCY MEDICINE | Admitting: INTERNAL MEDICINE
Payer: COMMERCIAL

## 2022-05-19 DIAGNOSIS — R19.7 DIARRHEA, UNSPECIFIED TYPE: ICD-10-CM

## 2022-05-19 DIAGNOSIS — R11.2 NON-INTRACTABLE VOMITING WITH NAUSEA, UNSPECIFIED VOMITING TYPE: ICD-10-CM

## 2022-05-19 DIAGNOSIS — D72.828 OTHER ELEVATED WHITE BLOOD CELL (WBC) COUNT: ICD-10-CM

## 2022-05-19 DIAGNOSIS — E10.10 DIABETIC KETOACIDOSIS WITHOUT COMA ASSOCIATED WITH TYPE 1 DIABETES MELLITUS (HCC): ICD-10-CM

## 2022-05-19 PROBLEM — K52.9 GASTROENTERITIS: Status: ACTIVE | Noted: 2022-05-19

## 2022-05-19 PROBLEM — E11.10 DKA, TYPE 2, NOT AT GOAL (HCC): Status: ACTIVE | Noted: 2022-05-19

## 2022-05-19 LAB
ALBUMIN SERPL BCP-MCNC: 4.5 G/DL (ref 3.2–4.9)
ALBUMIN/GLOB SERPL: 1.4 G/DL
ALP SERPL-CCNC: 134 U/L (ref 30–99)
ALT SERPL-CCNC: 13 U/L (ref 2–50)
ANION GAP SERPL CALC-SCNC: 14 MMOL/L (ref 7–16)
ANION GAP SERPL CALC-SCNC: 19 MMOL/L (ref 7–16)
APPEARANCE UR: CLEAR
AST SERPL-CCNC: 18 U/L (ref 12–45)
B-OH-BUTYR SERPL-MCNC: 0.31 MMOL/L (ref 0.02–0.27)
BACTERIA #/AREA URNS HPF: ABNORMAL /HPF
BASE EXCESS BLDV CALC-SCNC: -11 MMOL/L
BASOPHILS # BLD AUTO: 0.7 % (ref 0–1.8)
BASOPHILS # BLD: 0.17 K/UL (ref 0–0.12)
BILIRUB SERPL-MCNC: 0.3 MG/DL (ref 0.1–1.5)
BILIRUB UR QL STRIP.AUTO: NEGATIVE
BODY TEMPERATURE: 36.6 CENTIGRADE
BUN SERPL-MCNC: 12 MG/DL (ref 8–22)
BUN SERPL-MCNC: 13 MG/DL (ref 8–22)
CALCIUM SERPL-MCNC: 8.8 MG/DL (ref 8.4–10.2)
CALCIUM SERPL-MCNC: 9.8 MG/DL (ref 8.4–10.2)
CHLORIDE SERPL-SCNC: 104 MMOL/L (ref 96–112)
CHLORIDE SERPL-SCNC: 105 MMOL/L (ref 96–112)
CO2 SERPL-SCNC: 12 MMOL/L (ref 20–33)
CO2 SERPL-SCNC: 18 MMOL/L (ref 20–33)
COLOR UR: YELLOW
CREAT SERPL-MCNC: 0.58 MG/DL (ref 0.5–1.4)
CREAT SERPL-MCNC: 0.74 MG/DL (ref 0.5–1.4)
EKG IMPRESSION: NORMAL
EOSINOPHIL # BLD AUTO: 0.04 K/UL (ref 0–0.51)
EOSINOPHIL NFR BLD: 0.2 % (ref 0–6.9)
EPI CELLS #/AREA URNS HPF: ABNORMAL /HPF
ERYTHROCYTE [DISTWIDTH] IN BLOOD BY AUTOMATED COUNT: 48.5 FL (ref 35.9–50)
GFR SERPLBLD CREATININE-BSD FMLA CKD-EPI: 103 ML/MIN/1.73 M 2
GFR SERPLBLD CREATININE-BSD FMLA CKD-EPI: 115 ML/MIN/1.73 M 2
GLOBULIN SER CALC-MCNC: 3.2 G/DL (ref 1.9–3.5)
GLUCOSE BLD STRIP.AUTO-MCNC: 151 MG/DL (ref 65–99)
GLUCOSE BLD STRIP.AUTO-MCNC: 163 MG/DL (ref 65–99)
GLUCOSE SERPL-MCNC: 155 MG/DL (ref 65–99)
GLUCOSE SERPL-MCNC: 244 MG/DL (ref 65–99)
GLUCOSE UR STRIP.AUTO-MCNC: 500 MG/DL
HCO3 BLDV-SCNC: 15 MMOL/L (ref 24–28)
HCT VFR BLD AUTO: 50.4 % (ref 37–47)
HGB BLD-MCNC: 15.9 G/DL (ref 12–16)
HYALINE CASTS #/AREA URNS LPF: ABNORMAL /LPF
IMM GRANULOCYTES # BLD AUTO: 0.2 K/UL (ref 0–0.11)
IMM GRANULOCYTES NFR BLD AUTO: 0.8 % (ref 0–0.9)
KETONES UR STRIP.AUTO-MCNC: NEGATIVE MG/DL
LEUKOCYTE ESTERASE UR QL STRIP.AUTO: ABNORMAL
LIPASE SERPL-CCNC: 84 U/L (ref 7–58)
LYMPHOCYTES # BLD AUTO: 0.94 K/UL (ref 1–4.8)
LYMPHOCYTES NFR BLD: 3.7 % (ref 22–41)
MCH RBC QN AUTO: 29.1 PG (ref 27–33)
MCHC RBC AUTO-ENTMCNC: 31.5 G/DL (ref 33.6–35)
MCV RBC AUTO: 92.1 FL (ref 81.4–97.8)
MICRO URNS: ABNORMAL
MONOCYTES # BLD AUTO: 1.83 K/UL (ref 0–0.85)
MONOCYTES NFR BLD AUTO: 7.1 % (ref 0–13.4)
NEUTROPHILS # BLD AUTO: 22.52 K/UL (ref 2–7.15)
NEUTROPHILS NFR BLD: 87.5 % (ref 44–72)
NITRITE UR QL STRIP.AUTO: NEGATIVE
NRBC # BLD AUTO: 0 K/UL
NRBC BLD-RTO: 0 /100 WBC
PCO2 BLDV: 34.9 MMHG (ref 41–51)
PH BLDV: 7.25 [PH] (ref 7.31–7.45)
PH UR STRIP.AUTO: 5.5 [PH] (ref 5–8)
PLATELET # BLD AUTO: 386 K/UL (ref 164–446)
PMV BLD AUTO: 10.5 FL (ref 9–12.9)
PO2 BLDV: 43.5 MMHG (ref 25–40)
POTASSIUM SERPL-SCNC: 5 MMOL/L (ref 3.6–5.5)
POTASSIUM SERPL-SCNC: 5.3 MMOL/L (ref 3.6–5.5)
PROT SERPL-MCNC: 7.7 G/DL (ref 6–8.2)
PROT UR QL STRIP: NEGATIVE MG/DL
RBC # BLD AUTO: 5.47 M/UL (ref 4.2–5.4)
RBC # URNS HPF: ABNORMAL /HPF
RBC UR QL AUTO: NEGATIVE
SAO2 % BLDV: 74.8 %
SODIUM SERPL-SCNC: 135 MMOL/L (ref 135–145)
SODIUM SERPL-SCNC: 137 MMOL/L (ref 135–145)
SP GR UR STRIP.AUTO: 1.02
WBC # BLD AUTO: 25.7 K/UL (ref 4.8–10.8)
WBC #/AREA URNS HPF: ABNORMAL /HPF

## 2022-05-19 PROCEDURE — 36415 COLL VENOUS BLD VENIPUNCTURE: CPT

## 2022-05-19 PROCEDURE — 71045 X-RAY EXAM CHEST 1 VIEW: CPT

## 2022-05-19 PROCEDURE — 96375 TX/PRO/DX INJ NEW DRUG ADDON: CPT

## 2022-05-19 PROCEDURE — 700111 HCHG RX REV CODE 636 W/ 250 OVERRIDE (IP): Performed by: EMERGENCY MEDICINE

## 2022-05-19 PROCEDURE — C9803 HOPD COVID-19 SPEC COLLECT: HCPCS | Performed by: INTERNAL MEDICINE

## 2022-05-19 PROCEDURE — 93005 ELECTROCARDIOGRAM TRACING: CPT | Performed by: EMERGENCY MEDICINE

## 2022-05-19 PROCEDURE — 83690 ASSAY OF LIPASE: CPT

## 2022-05-19 PROCEDURE — A9270 NON-COVERED ITEM OR SERVICE: HCPCS | Performed by: INTERNAL MEDICINE

## 2022-05-19 PROCEDURE — 80048 BASIC METABOLIC PNL TOTAL CA: CPT

## 2022-05-19 PROCEDURE — 74176 CT ABD & PELVIS W/O CONTRAST: CPT

## 2022-05-19 PROCEDURE — 85025 COMPLETE CBC W/AUTO DIFF WBC: CPT

## 2022-05-19 PROCEDURE — 80053 COMPREHEN METABOLIC PANEL: CPT

## 2022-05-19 PROCEDURE — 82803 BLOOD GASES ANY COMBINATION: CPT

## 2022-05-19 PROCEDURE — 82962 GLUCOSE BLOOD TEST: CPT

## 2022-05-19 PROCEDURE — 82010 KETONE BODYS QUAN: CPT

## 2022-05-19 PROCEDURE — 770022 HCHG ROOM/CARE - ICU (200)

## 2022-05-19 PROCEDURE — 96374 THER/PROPH/DIAG INJ IV PUSH: CPT

## 2022-05-19 PROCEDURE — 99291 CRITICAL CARE FIRST HOUR: CPT | Performed by: INTERNAL MEDICINE

## 2022-05-19 PROCEDURE — 700102 HCHG RX REV CODE 250 W/ 637 OVERRIDE(OP): Performed by: INTERNAL MEDICINE

## 2022-05-19 PROCEDURE — 81001 URINALYSIS AUTO W/SCOPE: CPT

## 2022-05-19 PROCEDURE — 700105 HCHG RX REV CODE 258: Performed by: EMERGENCY MEDICINE

## 2022-05-19 PROCEDURE — 99291 CRITICAL CARE FIRST HOUR: CPT

## 2022-05-19 RX ORDER — ATENOLOL 25 MG/1
50 TABLET ORAL DAILY
Status: DISCONTINUED | OUTPATIENT
Start: 2022-05-20 | End: 2022-05-20 | Stop reason: HOSPADM

## 2022-05-19 RX ORDER — MORPHINE SULFATE 4 MG/ML
4 INJECTION INTRAVENOUS ONCE
Status: COMPLETED | OUTPATIENT
Start: 2022-05-19 | End: 2022-05-19

## 2022-05-19 RX ORDER — LISINOPRIL 20 MG/1
20 TABLET ORAL DAILY
Status: DISCONTINUED | OUTPATIENT
Start: 2022-05-20 | End: 2022-05-20 | Stop reason: HOSPADM

## 2022-05-19 RX ORDER — SODIUM CHLORIDE, SODIUM LACTATE, POTASSIUM CHLORIDE, CALCIUM CHLORIDE 600; 310; 30; 20 MG/100ML; MG/100ML; MG/100ML; MG/100ML
1000 INJECTION, SOLUTION INTRAVENOUS ONCE
Status: COMPLETED | OUTPATIENT
Start: 2022-05-19 | End: 2022-05-19

## 2022-05-19 RX ORDER — ONDANSETRON 2 MG/ML
4 INJECTION INTRAMUSCULAR; INTRAVENOUS ONCE
Status: COMPLETED | OUTPATIENT
Start: 2022-05-19 | End: 2022-05-19

## 2022-05-19 RX ORDER — DULOXETIN HYDROCHLORIDE 30 MG/1
60 CAPSULE, DELAYED RELEASE ORAL EVERY EVENING
Status: DISCONTINUED | OUTPATIENT
Start: 2022-05-19 | End: 2022-05-20 | Stop reason: HOSPADM

## 2022-05-19 RX ORDER — DEXTROSE, SODIUM CHLORIDE, SODIUM LACTATE, POTASSIUM CHLORIDE, AND CALCIUM CHLORIDE 5; .6; .31; .03; .02 G/100ML; G/100ML; G/100ML; G/100ML; G/100ML
INJECTION, SOLUTION INTRAVENOUS CONTINUOUS
Status: DISCONTINUED | OUTPATIENT
Start: 2022-05-19 | End: 2022-05-20 | Stop reason: HOSPADM

## 2022-05-19 RX ORDER — QUETIAPINE FUMARATE 100 MG/1
200 TABLET, FILM COATED ORAL EVERY EVENING
Status: DISCONTINUED | OUTPATIENT
Start: 2022-05-19 | End: 2022-05-20 | Stop reason: HOSPADM

## 2022-05-19 RX ORDER — OXCARBAZEPINE 300 MG/1
300 TABLET, FILM COATED ORAL
Status: DISCONTINUED | OUTPATIENT
Start: 2022-05-19 | End: 2022-05-20 | Stop reason: HOSPADM

## 2022-05-19 RX ORDER — ACETAMINOPHEN 500 MG
1000 TABLET ORAL 2 TIMES DAILY PRN
COMMUNITY

## 2022-05-19 RX ORDER — SODIUM CHLORIDE, SODIUM LACTATE, POTASSIUM CHLORIDE, CALCIUM CHLORIDE 600; 310; 30; 20 MG/100ML; MG/100ML; MG/100ML; MG/100ML
1000 INJECTION, SOLUTION INTRAVENOUS ONCE
Status: COMPLETED | OUTPATIENT
Start: 2022-05-19 | End: 2022-05-20

## 2022-05-19 RX ORDER — CLONAZEPAM 0.5 MG/1
0.25 TABLET ORAL 2 TIMES DAILY PRN
Status: DISCONTINUED | OUTPATIENT
Start: 2022-05-19 | End: 2022-05-20 | Stop reason: HOSPADM

## 2022-05-19 RX ORDER — GABAPENTIN 400 MG/1
800 CAPSULE ORAL 4 TIMES DAILY
Status: DISCONTINUED | OUTPATIENT
Start: 2022-05-19 | End: 2022-05-20 | Stop reason: HOSPADM

## 2022-05-19 RX ORDER — CYCLOBENZAPRINE HCL 10 MG
10 TABLET ORAL
Status: DISCONTINUED | OUTPATIENT
Start: 2022-05-19 | End: 2022-05-20 | Stop reason: HOSPADM

## 2022-05-19 RX ORDER — ATORVASTATIN CALCIUM 40 MG/1
40 TABLET, FILM COATED ORAL DAILY
Status: DISCONTINUED | OUTPATIENT
Start: 2022-05-20 | End: 2022-05-20 | Stop reason: HOSPADM

## 2022-05-19 RX ORDER — DAPAGLIFLOZIN 10 MG/1
10 TABLET, FILM COATED ORAL DAILY
COMMUNITY
End: 2022-08-26

## 2022-05-19 RX ADMIN — MORPHINE SULFATE 4 MG: 4 INJECTION INTRAVENOUS at 15:03

## 2022-05-19 RX ADMIN — GABAPENTIN 800 MG: 400 CAPSULE ORAL at 20:50

## 2022-05-19 RX ADMIN — ONDANSETRON HYDROCHLORIDE 4 MG: 2 SOLUTION INTRAMUSCULAR; INTRAVENOUS at 16:13

## 2022-05-19 RX ADMIN — INSULIN GLARGINE-YFGN 20 UNITS: 100 INJECTION, SOLUTION SUBCUTANEOUS at 20:53

## 2022-05-19 RX ADMIN — INSULIN HUMAN 3 UNITS: 100 INJECTION, SOLUTION PARENTERAL at 20:53

## 2022-05-19 RX ADMIN — SODIUM CHLORIDE, POTASSIUM CHLORIDE, SODIUM LACTATE AND CALCIUM CHLORIDE 1000 ML: 600; 310; 30; 20 INJECTION, SOLUTION INTRAVENOUS at 16:14

## 2022-05-19 RX ADMIN — DULOXETINE 60 MG: 30 CAPSULE, DELAYED RELEASE ORAL at 20:51

## 2022-05-19 RX ADMIN — SODIUM CHLORIDE, POTASSIUM CHLORIDE, SODIUM LACTATE AND CALCIUM CHLORIDE 1000 ML: 600; 310; 30; 20 INJECTION, SOLUTION INTRAVENOUS at 15:03

## 2022-05-19 RX ADMIN — CLONAZEPAM 0.25 MG: 0.5 TABLET ORAL at 20:52

## 2022-05-19 RX ADMIN — QUETIAPINE FUMARATE 200 MG: 100 TABLET ORAL at 20:50

## 2022-05-19 RX ADMIN — OXCARBAZEPINE 300 MG: 300 TABLET, FILM COATED ORAL at 20:51

## 2022-05-19 ASSESSMENT — ENCOUNTER SYMPTOMS
DIARRHEA: 1
CONSTIPATION: 0
NAUSEA: 1
BLOOD IN STOOL: 0
ABDOMINAL PAIN: 1
HEARTBURN: 0
VOMITING: 1

## 2022-05-19 ASSESSMENT — PATIENT HEALTH QUESTIONNAIRE - PHQ9
1. LITTLE INTEREST OR PLEASURE IN DOING THINGS: NOT AT ALL
SUM OF ALL RESPONSES TO PHQ9 QUESTIONS 1 AND 2: 0
2. FEELING DOWN, DEPRESSED, IRRITABLE, OR HOPELESS: NOT AT ALL

## 2022-05-19 ASSESSMENT — COGNITIVE AND FUNCTIONAL STATUS - GENERAL
DAILY ACTIVITIY SCORE: 24
SUGGESTED CMS G CODE MODIFIER MOBILITY: CH
MOBILITY SCORE: 24
SUGGESTED CMS G CODE MODIFIER DAILY ACTIVITY: CH

## 2022-05-19 ASSESSMENT — LIFESTYLE VARIABLES
TOTAL SCORE: 0
HOW MANY TIMES IN THE PAST YEAR HAVE YOU HAD 5 OR MORE DRINKS IN A DAY: 0
EVER HAD A DRINK FIRST THING IN THE MORNING TO STEADY YOUR NERVES TO GET RID OF A HANGOVER: NO
HAVE PEOPLE ANNOYED YOU BY CRITICIZING YOUR DRINKING: NO
ON A TYPICAL DAY WHEN YOU DRINK ALCOHOL HOW MANY DRINKS DO YOU HAVE: 1
TOTAL SCORE: 0
EVER FELT BAD OR GUILTY ABOUT YOUR DRINKING: NO
TOTAL SCORE: 0
AVERAGE NUMBER OF DAYS PER WEEK YOU HAVE A DRINK CONTAINING ALCOHOL: 1
HAVE YOU EVER FELT YOU SHOULD CUT DOWN ON YOUR DRINKING: NO
CONSUMPTION TOTAL: NEGATIVE
ALCOHOL_USE: YES

## 2022-05-19 ASSESSMENT — PAIN DESCRIPTION - PAIN TYPE: TYPE: ACUTE PAIN

## 2022-05-19 ASSESSMENT — FIBROSIS 4 INDEX: FIB4 SCORE: 0.4

## 2022-05-19 NOTE — ED NOTES
Assisted to the [facility], kennedy to give a UA or a stool though she had the urge, no results. Medicated for pain and EKG done.

## 2022-05-19 NOTE — ED PROVIDER NOTES
"ED Provider Note    CHIEF COMPLAINT  Chief Complaint   Patient presents with   • N/V       HPI  Jacque Mcintyre is a 41 y.o. female who presents with a history of bipolar disorder, asthma, insulin-dependent diabetes, high cholesterol, hypertension, she has had a previous appendectomy, she reports having nausea vomiting and diarrhea that began this morning.  The patient describes lower abdominal pain as well.  She says that she has never had DKA before.  She [place] travel out of [place], she denies any antibiotic use.  She denies urinary symptoms.    REVIEW OF SYSTEMS  See HPI for further details. All other systems are negative.     PAST MEDICAL HISTORY   has a past medical history of Asthma, Bipolar 1 disorder (HCC), Cancer (HCC) (2016), Depression, DM mellitus, gestational, Hyperlipidemia, Hypertension, Pap smear, and PCOS (polycystic ovarian syndrome).    SOCIAL HISTORY  Social History     Tobacco Use   • Smoking status: Current Every Day Smoker     Packs/day: 0.75     Years: 5.00     Pack years: 3.75     Types: Cigarettes   • Smokeless tobacco: Never Used   Vaping Use   • Vaping Use: Never used   Substance and Sexual Activity   • Alcohol use: Not Currently   • Drug use: Yes     Types: Inhaled     Comment: occasional smoked [employer]    • Sexual activity: Yes       SURGICAL HISTORY   has a past surgical history that includes myringotomy; adenoidectomy; other (2016); wide excision (Left, 5/9/2017); node biopsy sentinel (Left, 5/9/2017); axillary node dissection (Left, 6/23/2017); and lap,appendectomy (N/A, 10/7/2019).    CURRENT MEDICATIONS  Atenolol, Lipitor, Cymbalta, Neurontin, insulin, Prinivil, trazodone, metformin    ALLERGIES  Allergies   Allergen Reactions   • Clavulanic Acid    • Augmentin Vomiting       FAMILY HISTORY  No pertinent family history    PHYSICAL EXAM  VITAL SIGNS: /73   Pulse (!) 133   Temp 36.6 °C (97.9 °F) (Temporal)   Resp 17   Ht 1.62 m (5' 3.78\")   Wt 75 kg (165 lb " 5.5 oz)   SpO2 95%   BMI 28.58 kg/m²  @CAROL[[id number]::@   Pulse ox interpretation: I interpret this pulse ox as normal.  Constitutional: Alert.  The patient appears dehydrated with dry mucous membranes.  HENT: No signs of trauma, Bilateral external ears normal, Nose normal.   Eyes: Pupils are equal and reactive, Conjunctiva normal, Non-icteric.   Neck: Normal range of motion, No tenderness, Supple, No stridor.   Lymphatic: No lymphadenopathy noted.   Cardiovascular: Tachycardic with no murmurs.  Thorax & Lungs: Kusmal respirations.  Abdomen: Bowel sounds normal, Soft, diffuse lower abdominal tenderness, no peritoneal signs.  Skin: Warm, Dry, No erythema, No rash.   Extremities: Intact distal pulses, No edema, No tenderness, No cyanosis.  Musculoskeletal: Good range of motion in all major joints. No tenderness to palpation or major deformities noted.   Neurologic: Alert , Normal motor function, Normal sensory function, No focal deficits noted.   Psychiatric: Affect normal, Judgment normal, Mood normal.       DIAGNOSTIC STUDIES / PROCEDURES    EKG  This is a 12-lead EKG interpretation by myself.  It is sinus tachycardia rate 129.  The axis is normal.  The intervals are normal.  There are nonspecific ST-T wave changes.  There is no old for comparison.  My impression of this EKG, sinus tachycardia, does not meet STEMI criteria at this time.    LABS  Labs Reviewed   CBC WITH DIFFERENTIAL - Abnormal; Notable for the following components:       Result Value    WBC 25.7 (*)     RBC 5.47 (*)     Hematocrit 50.4 (*)     MCHC 31.5 (*)     Neutrophils-Polys 87.50 (*)     Lymphocytes 3.70 (*)     Neutrophils (Absolute) 22.52 (*)     Lymphs (Absolute) 0.94 (*)     Monos (Absolute) 1.83 (*)     Baso (Absolute) 0.17 (*)     Immature Granulocytes (abs) 0.20 (*)     All other components within normal limits   COMP METABOLIC PANEL - Abnormal; Notable for the following components:    Co2 12 (*)     Anion Gap 19.0 (*)     Glucose 244 (*)      Alkaline Phosphatase 134 (*)     All other components within normal limits   LIPASE - Abnormal; Notable for the following components:    Lipase 84 (*)     All other components within normal limits   VENOUS BLOOD GAS - Abnormal; Notable for the following components:    Venous Bg Ph 7.25 (*)     Venous Bg Pco2 34.9 (*)     Venous Bg Po2 43.5 (*)     Venous Bg Hco3 15 (*)     All other components within normal limits   ESTIMATED GFR   URINALYSIS,CULTURE IF INDICATED   BETA-HYDROXYBUTYRIC ACID         RADIOLOGY  CT-ABDOMEN-PELVIS W/O   Final Result      1.  Increased small bowel fluid suggesting gastroenteritis.   2.  No definite bowel obstruction or perforation.   3.  No focal mesenteric inflammatory process.   4.  New LEFT lower lobe lung nodule concerning for metastasis.      DX-CHEST-PORTABLE (1 VIEW)   Final Result      No evidence of acute cardiopulmonary process.              COURSE & MEDICAL DECISION MAKING  Pertinent Labs & Imaging studies reviewed. (See chart for details)    The patient presents with vomiting and diarrhea.  Differential diagnosis includes gastroenteritis, DKA, dehydration    The patient has had no travel out of [place] recently, she has not had antibiotics recently.  Labs were ordered, CT scan was ordered to evaluate.    The patient's white blood count is markedly elevated 25.7, she is being aggressively hydrated with IV fluids.  Her fingerstick initially was 300 before she came in, her blood sugar here is 244.  Her CO2 is low at 12, her pH below 7.25.  Her CT scan is consistent with gastroenteritis.    I spoke with Dr. Sandoval the intensivist.  It appears the patient has DKA from gastroenteritis.  He will assess the patient for hospitalization.    The total critical care time on this patient is 40 minutes, resuscitating patient, speaking with admitting physician, and deciphering test results. This 40 minutes is exclusive of separately billable procedures.    FINAL IMPRESSION  1.  Diabetic ketoacidosis without coma associated with type 1 diabetes mellitus (HCC)     2. Other elevated white blood cell (WBC) count     3. Non-intractable vomiting with nausea, unspecified vomiting type     4. Diarrhea, unspecified type           Total critical care time 40 minutes as outlined above      Electronically signed by: Quang Domingo M.D., 5/19/2022 2:55 PM

## 2022-05-19 NOTE — ED NOTES
Corneal Foreign Body: Discussed with patient there may some discomfort including pain, redness, light sensitivity, tearing and blurry vision. The foreign body was removed today in office using a 27G needle. The patient was instructed not to rub the eye. Continue Oflox QID, prescribed Emycin up to TID PRN, QHS. Artificial tears may be used in between doses of Besivance to relieve discomfort. Fluids infusing , pt medicated for nausea.  Pt to be admitted .  poc explained to pt

## 2022-05-20 VITALS
DIASTOLIC BLOOD PRESSURE: 57 MMHG | RESPIRATION RATE: 14 BRPM | SYSTOLIC BLOOD PRESSURE: 93 MMHG | HEART RATE: 74 BPM | HEIGHT: 64 IN | WEIGHT: 165.34 LBS | TEMPERATURE: 97.2 F | BODY MASS INDEX: 28.23 KG/M2 | OXYGEN SATURATION: 92 %

## 2022-05-20 PROBLEM — E11.10 DKA, TYPE 2, NOT AT GOAL (HCC): Status: RESOLVED | Noted: 2022-05-19 | Resolved: 2022-05-20

## 2022-05-20 LAB
ALBUMIN SERPL BCP-MCNC: 3.9 G/DL (ref 3.2–4.9)
ALBUMIN/GLOB SERPL: 1.7 G/DL
ALP SERPL-CCNC: 90 U/L (ref 30–99)
ALT SERPL-CCNC: 10 U/L (ref 2–50)
ANION GAP SERPL CALC-SCNC: 14 MMOL/L (ref 7–16)
AST SERPL-CCNC: 12 U/L (ref 12–45)
BILIRUB SERPL-MCNC: 0.4 MG/DL (ref 0.1–1.5)
BUN SERPL-MCNC: 10 MG/DL (ref 8–22)
CALCIUM SERPL-MCNC: 9 MG/DL (ref 8.4–10.2)
CHLORIDE SERPL-SCNC: 103 MMOL/L (ref 96–112)
CO2 SERPL-SCNC: 22 MMOL/L (ref 20–33)
CREAT SERPL-MCNC: 0.58 MG/DL (ref 0.5–1.4)
ERYTHROCYTE [DISTWIDTH] IN BLOOD BY AUTOMATED COUNT: 48.8 FL (ref 35.9–50)
FLUAV RNA SPEC QL NAA+PROBE: NEGATIVE
FLUBV RNA SPEC QL NAA+PROBE: NEGATIVE
GFR SERPLBLD CREATININE-BSD FMLA CKD-EPI: 115 ML/MIN/1.73 M 2
GLOBULIN SER CALC-MCNC: 2.3 G/DL (ref 1.9–3.5)
GLUCOSE BLD STRIP.AUTO-MCNC: 187 MG/DL (ref 65–99)
GLUCOSE SERPL-MCNC: 137 MG/DL (ref 65–99)
HCT VFR BLD AUTO: 39.9 % (ref 37–47)
HGB BLD-MCNC: 12.5 G/DL (ref 12–16)
MAGNESIUM SERPL-MCNC: 1.7 MG/DL (ref 1.5–2.5)
MCH RBC QN AUTO: 28.8 PG (ref 27–33)
MCHC RBC AUTO-ENTMCNC: 31.3 G/DL (ref 33.6–35)
MCV RBC AUTO: 91.9 FL (ref 81.4–97.8)
PHOSPHATE SERPL-MCNC: 4.5 MG/DL (ref 2.5–4.5)
PLATELET # BLD AUTO: 287 K/UL (ref 164–446)
PMV BLD AUTO: 10.4 FL (ref 9–12.9)
POTASSIUM SERPL-SCNC: 3.9 MMOL/L (ref 3.6–5.5)
PROT SERPL-MCNC: 6.2 G/DL (ref 6–8.2)
RBC # BLD AUTO: 4.34 M/UL (ref 4.2–5.4)
RSV RNA SPEC QL NAA+PROBE: NEGATIVE
SARS-COV-2 RNA RESP QL NAA+PROBE: NOTDETECTED
SODIUM SERPL-SCNC: 139 MMOL/L (ref 135–145)
SPECIMEN SOURCE: NORMAL
WBC # BLD AUTO: 8.8 K/UL (ref 4.8–10.8)

## 2022-05-20 PROCEDURE — 0241U HCHG SARS-COV-2 COVID-19 NFCT DS RESP RNA 4 TRGT MIC: CPT

## 2022-05-20 PROCEDURE — 85027 COMPLETE CBC AUTOMATED: CPT

## 2022-05-20 PROCEDURE — 80053 COMPREHEN METABOLIC PANEL: CPT

## 2022-05-20 PROCEDURE — 700111 HCHG RX REV CODE 636 W/ 250 OVERRIDE (IP): Performed by: INTERNAL MEDICINE

## 2022-05-20 PROCEDURE — 83735 ASSAY OF MAGNESIUM: CPT

## 2022-05-20 PROCEDURE — 99239 HOSP IP/OBS DSCHRG MGMT >30: CPT | Performed by: INTERNAL MEDICINE

## 2022-05-20 PROCEDURE — A9270 NON-COVERED ITEM OR SERVICE: HCPCS | Performed by: INTERNAL MEDICINE

## 2022-05-20 PROCEDURE — 84100 ASSAY OF PHOSPHORUS: CPT

## 2022-05-20 PROCEDURE — 82962 GLUCOSE BLOOD TEST: CPT

## 2022-05-20 PROCEDURE — 700102 HCHG RX REV CODE 250 W/ 637 OVERRIDE(OP): Performed by: INTERNAL MEDICINE

## 2022-05-20 RX ORDER — MAGNESIUM SULFATE HEPTAHYDRATE 40 MG/ML
2 INJECTION, SOLUTION INTRAVENOUS ONCE
Status: COMPLETED | OUTPATIENT
Start: 2022-05-20 | End: 2022-05-20

## 2022-05-20 RX ORDER — ACETAMINOPHEN 325 MG/1
650 TABLET ORAL EVERY 4 HOURS PRN
Status: DISCONTINUED | OUTPATIENT
Start: 2022-05-20 | End: 2022-05-20 | Stop reason: HOSPADM

## 2022-05-20 RX ADMIN — ATORVASTATIN CALCIUM 40 MG: 40 TABLET, FILM COATED ORAL at 06:21

## 2022-05-20 RX ADMIN — GABAPENTIN 800 MG: 400 CAPSULE ORAL at 08:50

## 2022-05-20 RX ADMIN — LISINOPRIL 20 MG: 20 TABLET ORAL at 06:21

## 2022-05-20 RX ADMIN — ACETAMINOPHEN 650 MG: 325 TABLET, FILM COATED ORAL at 04:28

## 2022-05-20 RX ADMIN — ATENOLOL 50 MG: 25 TABLET ORAL at 06:21

## 2022-05-20 RX ADMIN — MAGNESIUM SULFATE 2 G: 2 INJECTION INTRAVENOUS at 08:50

## 2022-05-20 ASSESSMENT — PAIN DESCRIPTION - PAIN TYPE
TYPE: ACUTE PAIN

## 2022-05-20 NOTE — CARE PLAN
The patient is Stable - Low risk of patient condition declining or worsening    Shift Goals  Clinical Goals: Manage labs, BG  Patient Goals: Follow up with nodule found on CT    Progress made toward(s) clinical / shift goals:    Problem: Pain - Standard  Goal: Alleviation of pain or a reduction in pain to the patient’s comfort goal  Outcome: Progressing     Problem: Knowledge Deficit - Standard  Goal: Patient and family/care givers will demonstrate understanding of plan of care, disease process/condition, diagnostic tests and medications  Outcome: Progressing     Problem: Diabetes Management  Goal: Patient will achieve and maintain glucose in satisfactory range  Outcome: Progressing     Problem: Knowledge Deficit - Diabetes  Goal: Patient will demonstrate knowledge of insulin injection, symptoms, and treatment of hypoglycemia and diet prior to discharge  Outcome: Progressing     Problem: Discharge Planning - Diabetes  Goal: Patient's continuum of care needs will be met  Outcome: Progressing     Problem: Skin Integrity - Diabetes  Goal: Patient's skin on legs and feet will remain intact while hospitalized  Outcome: Progressing     Problem: Infection - Diabetes  Goal: Patient will remain free from signs and symptoms of infection  Outcome: Progressing  Goal: Promotion wound healing, line and drain management  Outcome: Progressing     Problem: Respiratory  Goal: Patient will achieve/maintain optimum respiratory ventilation and gas exchange  Outcome: Progressing     Problem: Fluid Balance or Risk for Fluid Volume Deficit  Goal: Patient will demonstrate adequate hydration and vital signs  Outcome: Progressing     Problem: Nutrition Deficit - Diabetes  Goal: Patient will demonstrate adequate hydration and vital signs  Outcome: Progressing     Problem: Diabetic Ulcer  Goal: Early identification of diabetic foot wound and initiation of appropriate interventions  Outcome: Progressing       Patient is not progressing towards  the following goals:

## 2022-05-20 NOTE — ASSESSMENT & PLAN NOTE
A1c 6.8%  Insulin/metformin/farxiga  Incited by gastroenteritis  Severe acidemia on admission is improving with IVF  Anion gap still elevated due to ketosis  Will load with lantus 20u, RISS and feed  dIVF to prevent hypoglycemia  Aggressively replete K and phos per protocol  Closely monitor on telemetry

## 2022-05-20 NOTE — DISCHARGE INSTRUCTIONS
Discharge Instructions    Discharged to home by car with relative. Discharged via wheelchair, hospital escort: Yes.  Special equipment needed: Not Applicable    Be sure to schedule a follow-up appointment with your primary care doctor or any specialists as instructed.     Discharge Plan:   Diet Plan: Discussed  Activity Level: Discussed  Confirmed Follow up Appointment: Appointment Scheduled  Confirmed Symptoms Management: Discussed  Medication Reconciliation Updated: Yes    I understand that a diet low in cholesterol, fat, and sodium is recommended for good health. Unless I have been given specific instructions below for another diet, I accept this instruction as my diet prescription.   Other diet: Diabetic    Special Instructions: None    Is patient discharged on Warfarin / Coumadin?   No     Diabetic Ketoacidosis  Diabetic ketoacidosis is a serious complication of diabetes. This condition develops when there is not enough insulin in the body. Insulin is an hormone that regulates blood sugar levels in the body. Normally, insulin allows glucose to enter the cells in the body. The cells break down glucose for energy. Without enough insulin, the body cannot break down glucose, so it breaks down fats instead. This leads to high blood glucose levels in the body and the production of acids that are called ketones. Ketones are poisonous at high levels.  If diabetic ketoacidosis is not treated, it can cause severe dehydration and can lead to a coma or death.  What are the causes?  This condition develops when a lack of insulin causes the body to break down fats instead of glucose. This may be triggered by:  Stress on the body. This stress is brought on by an illness.  Infection.  Medicines that raise blood glucose levels.  Not taking diabetes medicine.  New onset of type 1 diabetes mellitus.  What are the signs or symptoms?  Symptoms of this condition include:  Fatigue.  Weight loss.  Excessive  thirst.  Light-headedness.  Fruity or sweet-smelling breath.  Excessive urination.  Vision changes.  Confusion or irritability.  Nausea.  Vomiting.  Rapid breathing.  Abdominal pain.  Feeling flushed.  How is this diagnosed?  This condition is diagnosed based on your medical history, a physical exam, and blood tests. You may also have a urine test to check for ketones.  How is this treated?  This condition may be treated with:  Fluid replacement. This may be done to correct dehydration.  Insulin injections. These may be given through the skin or through an IV tube.  Electrolyte replacement. Electrolytes are minerals in your blood. Electrolytes such as potassium and sodium may be given in pill form or through an IV tube.  Antibiotic medicines. These may be prescribed if your condition was caused by an infection.  Diabetic ketoacidosis is a serious medical condition. You may need emergency treatment in the hospital to monitor your condition.  Follow these instructions at home:  Eating and drinking  Drink enough fluids to keep your urine clear or pale yellow.  If you are not able to eat, drink clear fluids in small amounts as you are able. Clear fluids include water, ice chips, fruit juice with water added (diluted), and low-calorie sports drinks. You may also have sugar-free jello or popsicles.  If you are able to eat, follow your usual diet and drink sugar-free liquids, such as water.  Medicines  Take over-the-counter and prescription medicines only as told by your health care provider.  Continue to take insulin and other diabetes medicines as told by your health care provider.  If you were prescribed an antibiotic, take it as told by your health care provider. Do not stop taking the antibiotic even if you start to feel better.  General instructions    Check your urine for ketones when you are ill and as told by your health care provider.  If your blood glucose is 240 mg/dL (13.3 mmol/L) or higher, check your urine  ketones every 4-6 hours.  Check your blood glucose every day, as often as told by your health care provider.  If your blood glucose is high, drink plenty of fluids. This helps to flush out ketones.  If your blood glucose is above your target for 2 tests in a row, contact your health care provider.  Carry a medical alert card or wear medical alert jewelry that says that you have diabetes.  Rest and exercise only as told by your health care provider. Do not exercise when your blood glucose is high and you have ketones in your urine.  If you get sick, call your health care provider and begin treatment quickly. Your body often needs extra insulin to fight an illness. Check your blood glucose every 4-6 hours when you are sick.  Keep all follow-up visits as told by your health care provider. This is important.  Contact a health care provider if:  Your blood glucose level is higher than 240 mg/dL (13.3 mmol/L) for 2 days in a row.  You have moderate or large ketones in your urine.  You have a fever.  You cannot eat or drink without vomiting.  You have been vomiting for more than 2 hours.  You continue to have symptoms of diabetic ketoacidosis.  You develop new symptoms.  Get help right away if:  Your blood glucose monitor reads “high” even when you are taking insulin.  You faint.  You have chest pain.  You have trouble breathing.  You have sudden trouble speaking or swallowing.  You have vomiting or diarrhea that gets worse after 3 hours.  You are unable to stay awake.  You have trouble thinking.  You are severely dehydrated. Symptoms of severe dehydration include:  Extreme thirst.  Dry mouth.  Rapid breathing.  These symptoms may represent a serious problem that is an emergency. Do not wait to see if the symptoms will go away. Get medical help right away. Call your local emergency services (911 in the [place] Do not drive yourself to the hospital.  Summary  Diabetic ketoacidosis is a serious complication of diabetes. This  condition develops when there is not enough insulin in the body.  This condition is diagnosed based on your medical history, a physical exam, and blood tests. You may also have a urine test to check for ketones.  Diabetic ketoacidosis is a serious medical condition. You may need emergency treatment in the hospital to monitor your condition.  Contact your health care provider if your blood glucose is higher than 240 mg/dl for 2 days in a row or if you have moderate or large ketones in your urine.  This information is not intended to replace advice given to you by your health care provider. Make sure you discuss any questions you have with your health care provider.  Document Released: 12/15/2001 Document Revised: 02/02/2018 Document Reviewed: 01/22/2018  [employer] Patient Education © 2020 [employer]      Depression / Suicide Risk    As you are discharged from this [facility] facility, it is important to learn how to keep safe from harming yourself.    Recognize the warning signs:  Abrupt changes in personality, positive or negative- including increase in energy   Giving away possessions  Change in eating patterns- significant weight changes-  positive or negative  Change in sleeping patterns- unable to sleep or sleeping all the time   Unwillingness or inability to communicate  Depression  Unusual sadness, discouragement and loneliness  Talk of wanting to die  Neglect of personal appearance   Rebelliousness- reckless behavior  Withdrawal from people/activities they love  Confusion- inability to concentrate     If you or a loved one observes any of these behaviors or has concerns about self-harm, here's what you can do:  Talk about it- your feelings and reasons for harming yourself  Remove any means that you might use to hurt yourself (examples: pills, rope, extension cords, firearm)  Get professional help from the community (Mental Health, Substance Abuse, psychological counseling)  Do not be alone:Call your Safe  Contact- someone whom you trust who will be there for you.  Call your local CRISIS HOTLINE 106-1363 or 411-427-8663  Call your local Children's Mobile Crisis Response Team [place] (896) 904-0571 or www.[employer]  Call the toll free National Suicide Prevention Hotlines   National Suicide Prevention Lifeline 201-121-DDKR (8134)  [place] [employer] [facility] [employer] 800-SUICIDE (867-7963)

## 2022-05-20 NOTE — CARE PLAN
Problem: Pain - Standard  Goal: Alleviation of pain or a reduction in pain to the patient’s comfort goal  Outcome: Progressing     Problem: Knowledge Deficit - Standard  Goal: Patient and family/care givers will demonstrate understanding of plan of care, disease process/condition, diagnostic tests and medications  Outcome: Progressing   The patient is Watcher - Medium risk of patient condition declining or worsening         Progress made toward(s) clinical / shift goals:  Pt is able to communicate all needs.     Patient is not progressing towards the following goals:

## 2022-05-20 NOTE — ASSESSMENT & PLAN NOTE
S/p excision, T3N1M0  PET CT 2020 reassuring  CT abd today incidental LEFT lower lobe measuring 8 x 9 mm, new from 2020.  Will need outpatient f/u/PETCT and possible bx, looks amendable from percutaneous approach

## 2022-05-20 NOTE — CONSULTS
Critical Care Consultation    Date of consult: 5/19/2022    Referring Physician  Quang Domingo M.D.     Reason for Consultation  DKA type II    History of Presenting Illness  Very pleasant 41 y.o. female with history of type 2 diabetes on insulin/metformin/farxiga, A1c 6.8% in 2019, who presented 5/19/2022 with complaints of nausea/vomiting and lower abdominal pain and diarrhea x1 day.  Bowel movements watery, no blood.  Emesis clear as well.  Ate [employer] and old mesh potatoes last night she feels she may have gotten food poisoning.    Labs on admission notable for a white count of 25, bicarb of 12, anion gap 19, glucose 244.  VBG pH 7.25.  Beta hydroxybutyrate elevated 0.31. 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    Code Status  Prior    Review of Systems  Review of Systems   Gastrointestinal: Positive for abdominal pain, diarrhea, nausea and vomiting. Negative for blood in stool, constipation, heartburn and melena.     Past Medical History   has a past medical history of Asthma, Bipolar 1 disorder (HCC), Cancer (HCC) (2016), Depression, DM mellitus, gestational, Hyperlipidemia, Hypertension, Pap smear, and PCOS (polycystic ovarian syndrome).    Surgical History   has a past surgical history that includes myringotomy; adenoidectomy; other (2016); wide excision (Left, 5/9/2017); node biopsy sentinel (Left, 5/9/2017); axillary node dissection (Left, 6/23/2017); and pr lap,appendectomy (N/A, 10/7/2019).    Family History  family history includes Diabetes in her father and mother; Hyperlipidemia in her mother; Hypertension in her father; Psychiatric Illness in her mother; Thyroid in her mother.    Social History   reports that  she has been smoking cigarettes. She has a 3.75 pack-year smoking history. She has never used smokeless tobacco. She reports previous alcohol use. She reports current drug use. Drug: Inhaled.    Medications  Home Medications     Reviewed by Samia Rodriguez (Pharmacy [facility]) on 05/19/22 at 1626  Med List Status: Complete   Medication Last Dose Status   acetaminophen (TYLENOL) 500 MG Tab 5/18/2022 Active   albuterol (PROVENTIL HFA) 108 (90 BASE) MCG/ACT Aero Soln inhalation aerosol 5/19/2022 Active   atenolol (TENORMIN) 50 MG Tab 5/18/2022 Active   atorvastatin (LIPITOR) 40 MG Tab 5/18/2022 Active   clonazePAM (KLONOPIN) 0.5 MG Tab 5/18/2022 Active   cyclobenzaprine (FLEXERIL) 10 MG Tab 5/18/2022 Active   Dapagliflozin Propanediol (FARXIGA) 10 MG Tab 5/18/2022 Active   DULoxetine (CYMBALTA) 60 MG Cap DR Particles delayed-release capsule 5/18/2022 Active   gabapentin (NEURONTIN) 800 MG tablet 5/18/2022 Active   insulin glargine (LANTUS) 100 UNIT/ML Solution Pen-injector injection 5/18/2022 Active   lisinopril (PRINIVIL) 20 MG Tab 5/18/2022 Active   metFORMIN ER 1000 MG TABLET SR 24 HR 5/18/2022 Active   OXcarbazepine (TRILEPTAL) 300 MG Tab 5/18/2022 Active   QUEtiapine (SEROQUEL) 200 MG Tab 5/18/2022 Active              Current Facility-Administered Medications   Medication Dose Route Frequency Provider Last Rate Last Admin   • [START ON 5/20/2022] atenolol (TENORMIN) tablet 50 mg  50 mg Oral DAILY Garrett Sandoval M.D.       • [START ON 5/20/2022] atorvastatin (LIPITOR) tablet 40 mg  40 mg Oral DAILY Garrett Sandoval M.D.       • clonazePAM (KLONOPIN) tablet 0.25 mg  0.25 mg Oral BID PRN Garrett Sandoval M.D.       • cyclobenzaprine (Flexeril) tablet 10 mg  10 mg Oral QDAY PRN Garrett Sandoval M.D.       • DULoxetine (CYMBALTA) capsule 60 mg  60 mg Oral Q EVENING Garrett Sandoval M.D.       • gabapentin (NEURONTIN) capsule 800 mg  800 mg Oral 4X/DAY Garrett Sandoval M.D.       • [START ON 5/20/2022]  lisinopril (PRINIVIL) tablet 20 mg  20 mg Oral DAILY Garrett Sandoval M.D.       • OXcarbazepine (TRILEPTAL) tablet 300 mg  300 mg Oral QHS Garrett Sandoval M.D.       • QUEtiapine (Seroquel) tablet 200 mg  200 mg Oral Q EVENING Garrett Sandoval M.D.       • D5LR infusion   Intravenous Continuous Garrett Sandoval M.D.       • insulin GLARGINE (Lantus,Semglee) injection  20 Units Subcutaneous Q EVENING Garrett Sandoval M.D.       • insulin regular (HumuLIN R,NovoLIN R) injection  3-14 Units Subcutaneous 4X/DAY ACHS Garrett Sandoval M.D.        And   • dextrose 10 % BOLUS 25 g  25 g Intravenous Q15 MIN PRN Garrett Sandoval M.D.         Allergies  Allergies   Allergen Reactions   • Clavulanic Acid Vomiting     Violent vomiting   • Augmentin Vomiting     Vital Signs last 24 hours  Temp:  [36.6 °C (97.9 °F)] 36.6 °C (97.9 °F)  Pulse:  [] 98  Resp:  [17-21] 17  BP: (108-117)/(62-75) 110/62  SpO2:  [91 %-98 %] 98 %    Physical Exam  Physical Exam  Vitals and nursing note reviewed.   Constitutional:       General: She is not in acute distress.     Appearance: Normal appearance. She is well-developed. She is ill-appearing. She is not diaphoretic.   Eyes:      General: No scleral icterus.        Right eye: No discharge.         Left eye: No discharge.      Conjunctiva/sclera: Conjunctivae normal.      Pupils: Pupils are equal, round, and reactive to light.   Neck:      Thyroid: No thyromegaly.      Vascular: No JVD.   Cardiovascular:      Rate and Rhythm: Regular rhythm. Tachycardia present.      Heart sounds: Normal heart sounds. No murmur heard.    No gallop.   Pulmonary:      Effort: Pulmonary effort is normal. No respiratory distress.      Breath sounds: Normal breath sounds. No wheezing or rales.   Abdominal:      General: There is no distension.      Palpations: Abdomen is soft. There is no mass.      Tenderness: There is no abdominal tenderness. There is no right CVA tenderness, left CVA tenderness,  guarding or rebound.      Hernia: No hernia is present.   Musculoskeletal:         General: No tenderness.   Lymphadenopathy:      Cervical: No cervical adenopathy.   Skin:     General: Skin is warm.      Capillary Refill: Capillary refill takes less than 2 seconds.      Findings: No erythema or rash.   Neurological:      Mental Status: She is alert and oriented to person, place, and time.      Cranial Nerves: No cranial nerve deficit.      Sensory: No sensory deficit.      Motor: No abnormal muscle tone.   Psychiatric:         Behavior: Behavior normal.       Fluids    Intake/Output Summary (Last 24 hours) at 5/19/2022 1909  Last data filed at 5/19/2022 1800  Gross per 24 hour   Intake 950 ml   Output 0 ml   Net 950 ml       Laboratory  Recent Results (from the past 48 hour(s))   CBC w/ Differential    Collection Time: 05/19/22  2:45 PM   Result Value Ref Range    WBC 25.7 (H) 4.8 - 10.8 K/uL    RBC 5.47 (H) 4.20 - 5.40 M/uL    Hemoglobin 15.9 12.0 - 16.0 g/dL    Hematocrit 50.4 (H) 37.0 - 47.0 %    MCV 92.1 81.4 - 97.8 fL    MCH 29.1 27.0 - 33.0 pg    MCHC 31.5 (L) 33.6 - 35.0 g/dL    RDW 48.5 35.9 - 50.0 fL    Platelet Count 386 164 - 446 K/uL    MPV 10.5 9.0 - 12.9 fL    Neutrophils-Polys 87.50 (H) 44.00 - 72.00 %    Lymphocytes 3.70 (L) 22.00 - 41.00 %    Monocytes 7.10 0.00 - 13.40 %    Eosinophils 0.20 0.00 - 6.90 %    Basophils 0.70 0.00 - 1.80 %    Immature Granulocytes 0.80 0.00 - 0.90 %    Nucleated RBC 0.00 /100 WBC    Neutrophils (Absolute) 22.52 (H) 2.00 - 7.15 K/uL    Lymphs (Absolute) 0.94 (L) 1.00 - 4.80 K/uL    Monos (Absolute) 1.83 (H) 0.00 - 0.85 K/uL    Eos (Absolute) 0.04 0.00 - 0.51 K/uL    Baso (Absolute) 0.17 (H) 0.00 - 0.12 K/uL    Immature Granulocytes (abs) 0.20 (H) 0.00 - 0.11 K/uL    NRBC (Absolute) 0.00 K/uL   Complete Metabolic Panel (CMP)    Collection Time: 05/19/22  2:45 PM   Result Value Ref Range    Sodium 135 135 - 145 mmol/L    Potassium 5.3 3.6 - 5.5 mmol/L    Chloride 104 96  - 112 mmol/L    Co2 12 (L) 20 - 33 mmol/L    Anion Gap 19.0 (H) 7.0 - 16.0    Glucose 244 (H) 65 - 99 mg/dL    Bun 12 8 - 22 mg/dL    Creatinine 0.74 0.50 - 1.40 mg/dL    Calcium 9.8 8.4 - 10.2 mg/dL    AST(SGOT) 18 12 - 45 U/L    ALT(SGPT) 13 2 - 50 U/L    Alkaline Phosphatase 134 (H) 30 - 99 U/L    Total Bilirubin 0.3 0.1 - 1.5 mg/dL    Albumin 4.5 3.2 - 4.9 g/dL    Total Protein 7.7 6.0 - 8.2 g/dL    Globulin 3.2 1.9 - 3.5 g/dL    A-G Ratio 1.4 g/dL   BETA-HYDROXYBUTYRIC ACID    Collection Time: 22  2:45 PM   Result Value Ref Range    beta-Hydroxybutyric Acid 0.31 (H) 0.02 - 0.27 mmol/L   LIPASE    Collection Time: 22  2:45 PM   Result Value Ref Range    Lipase 84 (H) 7 - 58 U/L   VENOUS BLOOD GAS    Collection Time: 22  2:45 PM   Result Value Ref Range    Venous Bg Ph 7.25 (L) 7.31 - 7.45    Venous Bg Pco2 34.9 (L) 41.0 - 51.0 mmHg    Venous Bg Po2 43.5 (H) 25.0 - 40.0 mmHg    Venous Bg O2 Saturation 74.8 %    Venous Bg Hco3 15 (L) 24 - 28 mmol/L    Venous Bg Base Excess -11 mmol/L    Body Temp 36.6 Centigrade   ESTIMATED GFR    Collection Time: 22  2:45 PM   Result Value Ref Range    GFR (CKD-EPI) 103 >60 mL/min/1.73 m 2   EKG (NOW)    Collection Time: 22  3:00 PM   Result Value Ref Range    Report       [facility] Emergency Dept.    Test Date:  2022  Pt Name:    KALEE CENTENO                    Department: [facility]  MRN:        [id number]                      [facility]  Gender:     Female                       Technician: [id number]  :        1980                   Requested By:ARLINE RADFORD  Order #:    [id number]                    Richard MD:    Measurements  Intervals                                Axis  Rate:       129                          P:          71  KS:         136                          QRS:        65  QRSD:       82                           T:          66  QT:         320  QTc:        469    Interpretive Statements  SINUS  TACHYCARDIA  Compared to ECG 06/20/2017 09:41:52  Sinus rhythm no longer present     POCT glucose device results    Collection Time: 05/19/22  5:25 PM   Result Value Ref Range    POC Glucose, Blood 163 (H) 65 - 99 mg/dL   Basic Metabolic Panel    Collection Time: 05/19/22  6:30 PM   Result Value Ref Range    Sodium 137 135 - 145 mmol/L    Potassium 5.0 3.6 - 5.5 mmol/L    Chloride 105 96 - 112 mmol/L    Co2 18 (L) 20 - 33 mmol/L    Glucose 155 (H) 65 - 99 mg/dL    Bun 13 8 - 22 mg/dL    Creatinine 0.58 0.50 - 1.40 mg/dL    Calcium 8.8 8.4 - 10.2 mg/dL    Anion Gap 14.0 7.0 - 16.0   ESTIMATED GFR    Collection Time: 05/19/22  6:30 PM   Result Value Ref Range    GFR (CKD-EPI) 115 >60 mL/min/1.73 m 2     Imaging    CT-ABDOMEN-PELVIS W/O   Final Result      1.  Increased small bowel fluid suggesting gastroenteritis.   2.  No definite bowel obstruction or perforation.   3.  No focal mesenteric inflammatory process.   4.  New LEFT lower lobe lung nodule concerning for metastasis.      DX-CHEST-PORTABLE (1 VIEW)   Final Result      No evidence of acute cardiopulmonary process.        Assessment/Plan    * DKA, type 2, not at goal (HCC)- (present on admission)  Assessment & Plan  A1c 6.8%  Insulin/metformin/farxiga  Incited by gastroenteritis  Severe acidemia on admission is improving with IVF  Anion gap still elevated due to ketosis  Will load with lantus 20u, RISS and feed  dIVF to prevent hypoglycemia  Aggressively replete K and phos per protocol  Closely monitor on telemetry    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)- (present on admission)  Assessment & Plan  S/p excision, T3N1M0  PET CT 2020 reassuring  CT abd today incidental LEFT lower lobe measuring 8 x 9 mm, new from 2020.  Will need outpatient f/u/PETCT and possible bx, looks amendable from percutaneous approach    Gastroenteritis  Assessment & Plan  Likely food poisoning given acute onset, a/w n/v/d  IVF rehydration  Supportive  care  Screen for COVID/flu    Bipolar 1 disorder (HCC)- (present on admission)  Assessment & Plan  Cont cymbalta, trileptal, seroquel    Discussed patient condition and risk of morbidity and/or mortality with RN, Pharmacy, Patient and ER physician.      The patient remains critically ill.  Critical care time = 36 minutes in directly providing and coordinating critical care and extensive data review.  No time overlap and excludes procedures.    This note was generated using voice recognition software which has a chance of producing errors of grammar and content.  I have made every reasonable attempt to find and correct any errors, but it should be expected that some may not be found prior to finalization of this note.  __________  Garrett Sandoval MD  Pulmonary and Critical Care Medicine  [facility]

## 2022-05-20 NOTE — ASSESSMENT & PLAN NOTE
Likely food poisoning given acute onset, a/w n/v/d  IVF rehydration  Supportive care  Screen for COVID/flu

## 2022-05-20 NOTE — PROGRESS NOTES
Pt arrived to 320, A/Ox4, room air, no complaints of pain, VSS, pt states she is feeling better than when she came in. Skin check complete, pt has glasses bedside.

## 2022-05-20 NOTE — PROGRESS NOTES
Pt was given discharge orders, PIV and tele box removed. All belongings with patient, no further questions or needs at this time.

## 2022-05-20 NOTE — PROGRESS NOTES
Education provided regarding falls and safety, medications and plan of care. Pt agreeable to all and demonstrated proper use of call light. 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 No further questions. Pt able to ambulate and perform ADL's independently. Supplies at bedside and states she will use ad francois.    Pt was notified of nodule appearing on LLL by MD whom expressed concern for malignancy due to history of Melanoma with mets. Pt is saddened by this news. Therapeutic presence provided and questions answered. Pt eager to have it followed up. By end of assessment pt was in better spirits.    Per verbal order from MD Sandoval, start ordered fluids if pt unable to eat, place transfer to Telemetry orders for pt if AM labs return with CO2 > 18, Anion Gap < or = 12. Contacted MD this AM, ok to place transfer orders based on AM results. Order placed.    No acute events overnight.     12hr CC complete.    Telemetry Shift Summary    Rhythm NSR   HR Range 80-90  Ectopy N/A  Measurements 0.18/0.10/0.36        Normal Values  Rhythm SR  HR Range    Measurements 0.12-0.20 / 0.06-0.10  / 0.30-0.52

## 2022-05-20 NOTE — PROGRESS NOTES
4 Eyes Skin Assessment Completed by Osman RN and MICHAEL Guerra.    Head WDL  Ears WDL  Nose WDL  Mouth WDL  Neck WDL  Breast/Chest WDL  Shoulder Blades WDL  Spine WDL  (R) Arm/Elbow/Hand WDL  (L) Arm/Elbow/Hand WDL  Abdomen WDL  Groin WDL  Scrotum/Coccyx/Buttocks WDL  (R) Leg WDL  (L) Leg WDL  (R) Heel/Foot/Toe WDL  (L) Heel/Foot/Toe Ulcer(s)          Devices In Places ECG, Tele Box, Blood Pressure Cuff and Pulse Ox      Interventions In Place Pillows, Heels Loaded W/Pillows and Pressure Redistribution Mattress    Possible Skin Injury Yes    Pictures Uploaded Into Epic Yes  Wound Consult Placed Yes  RN Wound Prevention Protocol Ordered Yes

## 2022-05-20 NOTE — CARE PLAN
Problem: Pain - Standard  Goal: Alleviation of pain or a reduction in pain to the patient’s comfort goal  Outcome: Progressing     Problem: Knowledge Deficit - Standard  Goal: Patient and family/care givers will demonstrate understanding of plan of care, disease process/condition, diagnostic tests and medications  Outcome: Progressing   The patient is Watcher - Medium risk of patient condition declining or worsening    Shift Goals  Clinical Goals: Manage labs, BG  Patient Goals: Follow up with nodule found on CT    Progress made toward(s) clinical / shift goals:  Pt is able to participate in care.     Patient is not progressing towards the following goals:

## 2022-05-31 ENCOUNTER — HOSPITAL ENCOUNTER (OUTPATIENT)
Dept: RADIOLOGY | Facility: MEDICAL CENTER | Age: 42
End: 2022-05-31
Attending: INTERNAL MEDICINE
Payer: COMMERCIAL

## 2022-05-31 DIAGNOSIS — C43.62 MALIGNANT MELANOMA OF LEFT UPPER EXTREMITY INCLUDING SHOULDER (HCC): ICD-10-CM

## 2022-05-31 PROCEDURE — A9552 F18 FDG: HCPCS

## 2022-06-02 ENCOUNTER — APPOINTMENT (RX ONLY)
Dept: URBAN - METROPOLITAN AREA CLINIC 20 | Facility: CLINIC | Age: 42
Setting detail: DERMATOLOGY
End: 2022-06-02

## 2022-06-02 DIAGNOSIS — D18.0 HEMANGIOMA: ICD-10-CM

## 2022-06-02 DIAGNOSIS — L70.8 OTHER ACNE: ICD-10-CM

## 2022-06-02 DIAGNOSIS — T81.89 OTHER COMPLICATIONS OF PROCEDURES, NOT ELSEWHERE CLASSIFIED: ICD-10-CM

## 2022-06-02 DIAGNOSIS — L57.8 OTHER SKIN CHANGES DUE TO CHRONIC EXPOSURE TO NONIONIZING RADIATION: ICD-10-CM

## 2022-06-02 DIAGNOSIS — L40.0 PSORIASIS VULGARIS: ICD-10-CM

## 2022-06-02 DIAGNOSIS — Z85.820 PERSONAL HISTORY OF MALIGNANT MELANOMA OF SKIN: ICD-10-CM

## 2022-06-02 DIAGNOSIS — D22 MELANOCYTIC NEVI: ICD-10-CM

## 2022-06-02 DIAGNOSIS — Z87.2 PERSONAL HISTORY OF DISEASES OF THE SKIN AND SUBCUTANEOUS TISSUE: ICD-10-CM

## 2022-06-02 DIAGNOSIS — L81.4 OTHER MELANIN HYPERPIGMENTATION: ICD-10-CM

## 2022-06-02 PROBLEM — D18.01 HEMANGIOMA OF SKIN AND SUBCUTANEOUS TISSUE: Status: ACTIVE | Noted: 2022-06-02

## 2022-06-02 PROBLEM — D48.5 NEOPLASM OF UNCERTAIN BEHAVIOR OF SKIN: Status: ACTIVE | Noted: 2022-06-02

## 2022-06-02 PROBLEM — D22.4 MELANOCYTIC NEVI OF SCALP AND NECK: Status: ACTIVE | Noted: 2022-06-02

## 2022-06-02 PROBLEM — D22.72 MELANOCYTIC NEVI OF LEFT LOWER LIMB, INCLUDING HIP: Status: ACTIVE | Noted: 2022-06-02

## 2022-06-02 PROBLEM — T81.89XA OTHER COMPLICATIONS OF PROCEDURES, NOT ELSEWHERE CLASSIFIED, INITIAL ENCOUNTER: Status: ACTIVE | Noted: 2022-06-02

## 2022-06-02 PROBLEM — D22.5 MELANOCYTIC NEVI OF TRUNK: Status: ACTIVE | Noted: 2022-06-02

## 2022-06-02 PROBLEM — D22.61 MELANOCYTIC NEVI OF RIGHT UPPER LIMB, INCLUDING SHOULDER: Status: ACTIVE | Noted: 2022-06-02

## 2022-06-02 PROCEDURE — ? ADDITIONAL NOTES

## 2022-06-02 PROCEDURE — ? COUNSELING

## 2022-06-02 PROCEDURE — ? BIOPSY BY SHAVE METHOD

## 2022-06-02 PROCEDURE — 11102 TANGNTL BX SKIN SINGLE LES: CPT

## 2022-06-02 PROCEDURE — ? PRESCRIPTION

## 2022-06-02 PROCEDURE — ? DIAGNOSIS COMMENT

## 2022-06-02 PROCEDURE — 11103 TANGNTL BX SKIN EA SEP/ADDL: CPT

## 2022-06-02 PROCEDURE — ? OBSERVATION AND MEASURE

## 2022-06-02 PROCEDURE — 99214 OFFICE O/P EST MOD 30 MIN: CPT | Mod: 25

## 2022-06-02 RX ORDER — CLOBETASOL PROPIONATE 0.5 MG/G
OINTMENT TOPICAL
Qty: 60 | Refills: 10 | Status: ERX

## 2022-06-02 ASSESSMENT — LOCATION SIMPLE DESCRIPTION DERM
LOCATION SIMPLE: LEFT ELBOW
LOCATION SIMPLE: POSTERIOR SCALP
LOCATION SIMPLE: LEFT UPPER BACK
LOCATION SIMPLE: LEFT CHEEK
LOCATION SIMPLE: LEFT ANKLE
LOCATION SIMPLE: RIGHT PLANTAR SURFACE
LOCATION SIMPLE: LEFT PLANTAR SURFACE
LOCATION SIMPLE: RIGHT CHEEK
LOCATION SIMPLE: RIGHT POSTERIOR UPPER ARM
LOCATION SIMPLE: BACK
LOCATION SIMPLE: RIGHT BUTTOCK
LOCATION SIMPLE: RIGHT LOWER EXTREMITY
LOCATION SIMPLE: LEFT THIGH
LOCATION SIMPLE: RIGHT UPPER ARM
LOCATION SIMPLE: LEFT HAND
LOCATION SIMPLE: RIGHT CALF
LOCATION SIMPLE: LEFT POSTERIOR THIGH
LOCATION SIMPLE: RIGHT HAND
LOCATION SIMPLE: RIGHT EAR
LOCATION SIMPLE: RIGHT UPPER BACK
LOCATION SIMPLE: LEFT LOWER EXTREMITY
LOCATION SIMPLE: ABDOMEN
LOCATION SIMPLE: RIGHT ELBOW

## 2022-06-02 ASSESSMENT — LOCATION DETAILED DESCRIPTION DERM
LOCATION DETAILED: LEFT PLANTAR FOREFOOT OVERLYING 1ST METATARSAL
LOCATION DETAILED: LEFT DORSAL HAND
LOCATION DETAILED: RIGHT DORSAL HAND
LOCATION DETAILED: RIGHT PLANTAR FOREFOOT OVERLYING 1ST METATARSAL
LOCATION DETAILED: RIGHT LATERAL ABDOMEN
LOCATION DETAILED: LEFT INFERIOR CENTRAL MALAR CHEEK
LOCATION DETAILED: RIGHT SUPERIOR MEDIAL BUCCAL CHEEK
LOCATION DETAILED: LEFT ANTERIOR DISTAL THIGH
LOCATION DETAILED: RIGHT ANTERIOR THIGH
LOCATION DETAILED: RIGHT BUTTOCK
LOCATION DETAILED: RIGHT CHEEK
LOCATION DETAILED: LEFT PROXIMAL POSTERIOR THIGH
LOCATION DETAILED: LEFT ELBOW
LOCATION DETAILED: LEFT ANTERIOR PROXIMAL THIGH
LOCATION DETAILED: LEFT SUPERIOR UPPER BACK
LOCATION DETAILED: LEFT INFERIOR OCCIPITAL SCALP
LOCATION DETAILED: LEFT POSTERIOR ANKLE
LOCATION DETAILED: LEFT UPPER BACK
LOCATION DETAILED: RIGHT MID-UPPER BACK
LOCATION DETAILED: LEFT RADIAL DORSAL HAND
LOCATION DETAILED: LEFT MID-UPPER BACK
LOCATION DETAILED: RIGHT DISTAL LATERAL CALF
LOCATION DETAILED: RIGHT ANTERIOR DISTAL UPPER ARM
LOCATION DETAILED: RIGHT SUPERIOR HELIX
LOCATION DETAILED: RIGHT PROXIMAL POSTERIOR UPPER ARM
LOCATION DETAILED: LEFT ANTERIOR THIGH
LOCATION DETAILED: LEFT MID BACK
LOCATION DETAILED: RIGHT ELBOW
LOCATION DETAILED: RIGHT INFERIOR UPPER BACK

## 2022-06-02 ASSESSMENT — LOCATION ZONE DERM
LOCATION ZONE: ARM
LOCATION ZONE: SCALP
LOCATION ZONE: LEG
LOCATION ZONE: EAR
LOCATION ZONE: FEET
LOCATION ZONE: TRUNK
LOCATION ZONE: FACE
LOCATION ZONE: HAND

## 2022-06-02 NOTE — PROCEDURE: ADDITIONAL NOTES
Render Risk Assessment In Note?: no
Additional Notes: On 5/31/22 per pt PET showed intense uptake to left hilum concern for metastasis\\n\\nPt last PET scan 2019 unremarkable.
Detail Level: Simple
Additional Notes: Plan;\\n\\n1. Pt completeted 18 sessions of UVB with + results, however not long term solution due to history of MM.\\n2. Also used Duobri with + results\\n3. Submit for extract laser.\\n4. Pt not candidate for biologic due to invasive melanoma history. \\nMeantime clobetasol w/ occlusion at bedtime.
Additional Notes: currently improved, hold off on any systemic treatment. \\n\\nHistory below:\\n\\nIf worsens will send note to Dr. Caraballo, would like consult on starting spironlactone for hormonal acne, make sure there is no contraindications. 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 Recent retrospective and observational studies do not suggest an increased risk of prostate or breast cancer (Yanet 2016; Nate 2020; Gabbie 2019).\\n\\nPt has been on MInocycline through PCP for 2 years.  Discussed long term SE w/ minocycline.
Additional Notes: Refer to wound care [facility].\\n\\nAlso I have recommended pt see Dr. Martini podiatrist
Additional Notes: Per pt wound care DC her, she is managing wounds herself, went to podiatrist working on better fitting shoes.\\n\\nPt high risk 2/2 hx of DM.

## 2022-06-02 NOTE — PROCEDURE: DIAGNOSIS COMMENT
Comment: Excised in 2016; 2.8mm, re-excised 2017 (1+ node,18 neg), chuy simpson/ Ileana [phone], Lelo PERKINS
Detail Level: Simple
Comment: Biopsy proven mild to moderate 10/2017; monitor for repigmentation
Comment: Biopsy proven; all sites excised
Comment: Biopsy x 2; [id number], proven benign nevi, closely monitor

## 2022-06-10 DIAGNOSIS — R59.0 HILAR ADENOPATHY: ICD-10-CM

## 2022-06-13 ENCOUNTER — OFFICE VISIT (OUTPATIENT)
Dept: SLEEP MEDICINE | Facility: MEDICAL CENTER | Age: 42
End: 2022-06-13
Payer: COMMERCIAL

## 2022-06-13 VITALS
WEIGHT: 164.38 LBS | SYSTOLIC BLOOD PRESSURE: 102 MMHG | DIASTOLIC BLOOD PRESSURE: 60 MMHG | BODY MASS INDEX: 28.06 KG/M2 | OXYGEN SATURATION: 98 % | HEIGHT: 64 IN | HEART RATE: 60 BPM

## 2022-06-13 DIAGNOSIS — R93.89 ABNORMAL CT SCAN, CHEST: ICD-10-CM

## 2022-06-13 PROCEDURE — 99204 OFFICE O/P NEW MOD 45 MIN: CPT | Performed by: INTERNAL MEDICINE

## 2022-06-13 ASSESSMENT — ENCOUNTER SYMPTOMS
NEUROLOGICAL NEGATIVE: 1
GASTROINTESTINAL NEGATIVE: 1
CONSTITUTIONAL NEGATIVE: 1
CARDIOVASCULAR NEGATIVE: 1
RESPIRATORY NEGATIVE: 1
PSYCHIATRIC NEGATIVE: 1
BACK PAIN: 1
EYES NEGATIVE: 1

## 2022-06-13 ASSESSMENT — FIBROSIS 4 INDEX: FIB4 SCORE: 0.57

## 2022-06-13 NOTE — ASSESSMENT & PLAN NOTE
Hx of melanoma since 2016 involving left axilla T3N1M0  01/2022 with new skin lesion on right buttock with clear margins  Now CT chest and PET positive with left infrahilar LN versus endobronchial lesion. Station 12 L  Pt does have expiratory rhonchi suggestive of endobronchial lesion  Bronchoscopy with EBUS recommended  Reviewed with patient and reviewed risks and benefits  Pt has agreed to bronchoscopy and EBUS on 6/17/2022 3:30 pm with Dr. Swanson

## 2022-06-13 NOTE — PROGRESS NOTES
Pulmonary Consultation    Date of Service: 6/13/2022    Consulting Physician: Adriana Levine M.D.    Reason for consult:  Hospital Follow-up (ED 05/19-05/20. Pulm. Consult 05/19/22) and Other (PET/CT 05/31/22)      Problem List Items Addressed This Visit     Abnormal CT scan, chest     Hx of melanoma since 2016 involving left axilla T3N1M0  01/2022 with new skin lesion on right buttock with clear margins  Now CT chest and PET positive with left infrahilar LN versus endobronchial lesion. Station 12 L  Pt does have expiratory rhonchi suggestive of endobronchial lesion  Bronchoscopy with EBUS recommended  Reviewed with patient and reviewed risks and benefits  Pt has agreed to bronchoscopy and EBUS on 6/17/2022 3:30 pm with Dr. Swanson                     History of Present Illness: Jacque Mcintyre is a 41 y.o. female with a past medical history of melanoma dx in June of 2016  on left dorsal hand found to have lymph nodes axilla 2017 T3b,,pN1b, M0.  Received immunotherapy yervoy which she has completed 5/2020 (See Cancer Care Specialist note in Media by DR. Reed for details)  January 2022 new primary on right buttock area - skin only with clear margins  PET done 5/31/2022 personally viewedshows new uptake in left infrahilar area station 12 l  To note pt had been admitted for DKA in 5/2022 and that is when abnormality was initially seen.    PMhx: DM type II, 10 pk yr hx of smoking, asthma (albuterol daily not using her maintenance)    Exercise tolerance:  Walking distance: no issues due to breathing  [place]: hip pain    Night time symptoms: none    Pulmonary History:    Environmental exposures: no hot tub; no woodstove, no mining work, and no asbestos exposure    Pets: 2 cats  Occupation History:psychiatry nurse, [place]  Family history of pulmonary disease: none  Second hand smoke exposure: none    No cough or SOB    Review of Systems   Constitutional: Negative.    HENT: Negative.    Eyes: Negative.    Respiratory:  Negative.    Cardiovascular: Negative.    Gastrointestinal: Negative.    Genitourinary: Negative.    Musculoskeletal: Positive for back pain.   Skin: Negative.    Neurological: Negative.    Endo/Heme/Allergies: Negative.    Psychiatric/Behavioral: Negative.        Current Outpatient Medications on File Prior to Visit   Medication Sig Dispense Refill   • Dapagliflozin Propanediol (FARXIGA) 10 MG Tab Take 10 mg by mouth every day.     • acetaminophen (TYLENOL) 500 MG Tab Take 1,000 mg by mouth one time.     • atenolol (TENORMIN) 50 MG Tab Take 50 mg by mouth every day.     • atorvastatin (LIPITOR) 40 MG Tab Take 40 mg by mouth every day.     • DULoxetine (CYMBALTA) 60 MG Cap DR Particles delayed-release capsule Take 60 mg by mouth every evening.     • gabapentin (NEURONTIN) 800 MG tablet Take 800 mg by mouth 4 times a day.     • lisinopril (PRINIVIL) 20 MG Tab Take 20 mg by mouth every day.     • OXcarbazepine (TRILEPTAL) 300 MG Tab Take 300 mg by mouth at bedtime.     • metFORMIN ER 1000 MG TABLET SR 24 HR Take 500-1,000 mg by mouth 2 times a day. 500 mg in am and 1000 mg in pm     • clonazePAM (KLONOPIN) 0.5 MG Tab Take 0.25 mg by mouth 2 times a day as needed (ANXIETY).     • cyclobenzaprine (FLEXERIL) 10 MG Tab Take 10 mg by mouth 1 time daily as needed for Muscle Spasms.     • QUEtiapine (SEROQUEL) 200 MG Tab Take 200 mg by mouth every evening.     • insulin glargine (LANTUS) 100 UNIT/ML Solution Pen-injector injection Inject 40 Units as instructed every evening.     • albuterol (PROVENTIL HFA) 108 (90 BASE) MCG/ACT Aero Soln inhalation aerosol Inhale 2 Puffs by mouth every 6 hours as needed. (Patient taking differently: Inhale 1 Puff every 6 hours as needed for Shortness of Breath.) 1 Inhaler 5     No current facility-administered medications on file prior to visit.       Social History     Tobacco Use   • Smoking status: Current Every Day Smoker     Packs/day: 0.75     Years: 10.00     Pack years: 7.50      "Types: Cigarettes   • Smokeless tobacco: Never Used   Vaping Use   • Vaping Use: Never used   Substance Use Topics   • Alcohol use: Not Currently   • Drug use: Yes     Types: Inhaled     Comment: occasional smoked sarkis         Past Medical History:   Diagnosis Date   • Asthma     inhaler   • Bipolar 1 disorder (HCC)    • Cancer (HCC) 01/01/2016    melanoma   • Cough    • Depression    • DM mellitus, gestational     insulin   • Hyperlipidemia    • Hypertension    • Pap smear     Dr. Baird   • PCOS (polycystic ovarian syndrome)    • Shortness of breath    • Sputum production    • Wheezing        Past Surgical History:   Procedure Laterality Date   • IN LAP,APPENDECTOMY N/A 10/7/2019    Procedure: APPENDECTOMY, LAPAROSCOPIC;  Surgeon: Lionel Frazier M.D.;  Location: SURGERY [facility] ORS;  Service: General   • AXILLARY NODE DISSECTION Left 6/23/2017    Procedure: AXILLARY NODE DISSECTION- COMPLETION LYPHADENECTOMY;  Surgeon: Lionel Weinberg M.D.;  Location: SURGERY [facility];  Service:    • WIDE EXCISION Left 5/9/2017    Procedure: WIDE EXCISION - MALIGNANT MELANOMA HAND;  Surgeon: Lionel Weinberg M.D.;  Location: SURGERY SAME DAY [facility] ORS;  Service:    • NODE BIOPSY SENTINEL Left 5/9/2017    Procedure: NODE BIOPSY SENTINEL - AXILLARY;  Surgeon: Lionel Weinberg M.D.;  Location: SURGERY SAME DAY [facility];  Service:    • OTHER  2016    excisino of melanoma left hand   • ADENOIDECTOMY     • MYRINGOTOMY      with tube placement       Allergies: Clavulanic acid and Augmentin    Family History   Problem Relation Age of Onset   • Psychiatric Illness Mother         depression   • Diabetes Mother    • Hyperlipidemia Mother    • Thyroid Mother         s/p radioactive iodine treatment on replacement   • Hypertension Father    • Diabetes Father        Vitals:    06/13/22 1518   Height: 1.626 m (5' 4\")   Weight: 74.6 kg (164 lb 6 oz)   Weight % change since last entry.: 0 %   BP: 102/60   Pulse: 60   BMI " (Calculated): 28.21       Physical Examination  Physical Exam  HENT:      Head: Normocephalic and atraumatic.      Mouth/Throat:      Mouth: Mucous membranes are dry.   Eyes:      Pupils: Pupils are equal, round, and reactive to light.   Cardiovascular:      Rate and Rhythm: Normal rate and regular rhythm.   Pulmonary:      Effort: Pulmonary effort is normal.      Comments: Exp rhonchi on the left posterior lung field  Musculoskeletal:         General: Normal range of motion.      Cervical back: Normal range of motion.   Skin:     General: Skin is warm and dry.   Neurological:      General: No focal deficit present.      Mental Status: She is alert and oriented to person, place, and time.   Psychiatric:         Mood and Affect: Mood normal.         Behavior: Behavior normal.         Thought Content: Thought content normal.         Judgment: Judgment normal.           Elham Doe M.D., MD MPH [facility]  [facility]

## 2022-06-15 ENCOUNTER — PRE-ADMISSION TESTING (OUTPATIENT)
Dept: ADMISSIONS | Facility: MEDICAL CENTER | Age: 42
End: 2022-06-15
Attending: INTERNAL MEDICINE
Payer: COMMERCIAL

## 2022-06-15 RX ORDER — MELOXICAM 15 MG/1
15 TABLET ORAL DAILY
Status: ON HOLD | COMMUNITY
Start: 2022-03-30 | End: 2022-10-24

## 2022-06-15 RX ORDER — CLOBETASOL PROPIONATE 0.5 MG/G
OINTMENT TOPICAL
COMMUNITY
Start: 2022-05-16 | End: 2022-08-26

## 2022-06-15 RX ORDER — INSULIN GLARGINE-YFGN 100 [IU]/ML
INJECTION, SOLUTION SUBCUTANEOUS
COMMUNITY
Start: 2022-05-10 | End: 2022-06-15

## 2022-06-15 RX ORDER — IBUPROFEN 200 MG
200 TABLET ORAL EVERY 6 HOURS PRN
COMMUNITY
End: 2022-10-01

## 2022-06-15 RX ORDER — METFORMIN HYDROCHLORIDE 500 MG/1
500 TABLET, EXTENDED RELEASE ORAL DAILY
COMMUNITY
Start: 2022-04-16 | End: 2022-08-26

## 2022-06-15 RX ORDER — BLOOD SUGAR DIAGNOSTIC
STRIP MISCELLANEOUS
COMMUNITY
Start: 2022-03-18 | End: 2022-10-01

## 2022-06-15 ASSESSMENT — FIBROSIS 4 INDEX: FIB4 SCORE: 0.57

## 2022-06-15 NOTE — PREPROCEDURE INSTRUCTIONS
Pre admit appointment completed, questions answered. Pt instructed to continue regularly prescribed meds through day of procedure. Per anesthesia protocol instructed to take these meds the day of procedure- tylenol if needed, albuterol inh if needed, clonazepam if needed, gabapentin and trileptal. METs score >4.

## 2022-06-17 ENCOUNTER — HOSPITAL ENCOUNTER (OUTPATIENT)
Facility: MEDICAL CENTER | Age: 42
End: 2022-06-17
Attending: INTERNAL MEDICINE | Admitting: INTERNAL MEDICINE
Payer: COMMERCIAL

## 2022-06-17 ENCOUNTER — ANESTHESIA (OUTPATIENT)
Dept: SURGERY | Facility: MEDICAL CENTER | Age: 42
End: 2022-06-17
Payer: COMMERCIAL

## 2022-06-17 ENCOUNTER — ANESTHESIA EVENT (OUTPATIENT)
Dept: SURGERY | Facility: MEDICAL CENTER | Age: 42
End: 2022-06-17
Payer: COMMERCIAL

## 2022-06-17 VITALS
SYSTOLIC BLOOD PRESSURE: 112 MMHG | OXYGEN SATURATION: 92 % | HEIGHT: 64 IN | BODY MASS INDEX: 27.48 KG/M2 | TEMPERATURE: 98.1 F | DIASTOLIC BLOOD PRESSURE: 68 MMHG | WEIGHT: 160.94 LBS | HEART RATE: 73 BPM | RESPIRATION RATE: 16 BRPM

## 2022-06-17 LAB
APPEARANCE FLD: NORMAL
BODY FLD TYPE: NORMAL
COLOR FLD: NORMAL
FUNGUS SPEC FUNGUS STN: NORMAL
GLUCOSE BLD STRIP.AUTO-MCNC: 128 MG/DL (ref 65–99)
GLUCOSE BLD STRIP.AUTO-MCNC: 133 MG/DL (ref 65–99)
HCG UR QL: NEGATIVE
LYMPHOCYTES NFR FLD: 1 %
MESOTHL CELL NFR FLD: 38 %
MONONUC CELLS NFR FLD: 12 %
NEUTROPHILS NFR FLD: 45 %
PATHOLOGY CONSULT NOTE: NORMAL
SIGNIFICANT IND 70042: NORMAL
SITE SITE: NORMAL
SOURCE SOURCE: NORMAL
WBC OTHER NFR FLD: 4 %

## 2022-06-17 PROCEDURE — 88305 TISSUE EXAM BY PATHOLOGIST: CPT

## 2022-06-17 PROCEDURE — 160002 HCHG RECOVERY MINUTES (STAT): Performed by: INTERNAL MEDICINE

## 2022-06-17 PROCEDURE — 160029 HCHG SURGERY MINUTES - 1ST 30 MINS LEVEL 4: Performed by: INTERNAL MEDICINE

## 2022-06-17 PROCEDURE — 87116 MYCOBACTERIA CULTURE: CPT

## 2022-06-17 PROCEDURE — 00520 ANES CLOSED CHEST PX NOS: CPT | Performed by: ANESTHESIOLOGY

## 2022-06-17 PROCEDURE — 87070 CULTURE OTHR SPECIMN AEROBIC: CPT

## 2022-06-17 PROCEDURE — 87206 SMEAR FLUORESCENT/ACID STAI: CPT

## 2022-06-17 PROCEDURE — 87102 FUNGUS ISOLATION CULTURE: CPT

## 2022-06-17 PROCEDURE — 88173 CYTOPATH EVAL FNA REPORT: CPT

## 2022-06-17 PROCEDURE — 82962 GLUCOSE BLOOD TEST: CPT

## 2022-06-17 PROCEDURE — 700101 HCHG RX REV CODE 250: Performed by: ANESTHESIOLOGY

## 2022-06-17 PROCEDURE — 160025 RECOVERY II MINUTES (STATS): Performed by: INTERNAL MEDICINE

## 2022-06-17 PROCEDURE — 160046 HCHG PACU - 1ST 60 MINS PHASE II: Performed by: INTERNAL MEDICINE

## 2022-06-17 PROCEDURE — 160048 HCHG OR STATISTICAL LEVEL 1-5: Performed by: INTERNAL MEDICINE

## 2022-06-17 PROCEDURE — 160041 HCHG SURGERY MINUTES - EA ADDL 1 MIN LEVEL 4: Performed by: INTERNAL MEDICINE

## 2022-06-17 PROCEDURE — 160036 HCHG PACU - EA ADDL 30 MINS PHASE I: Performed by: INTERNAL MEDICINE

## 2022-06-17 PROCEDURE — 89240 UNLISTED MISC PATH TEST: CPT

## 2022-06-17 PROCEDURE — 700105 HCHG RX REV CODE 258: Performed by: ANESTHESIOLOGY

## 2022-06-17 PROCEDURE — 160009 HCHG ANES TIME/MIN: Performed by: INTERNAL MEDICINE

## 2022-06-17 PROCEDURE — 81025 URINE PREGNANCY TEST: CPT

## 2022-06-17 PROCEDURE — 160035 HCHG PACU - 1ST 60 MINS PHASE I: Performed by: INTERNAL MEDICINE

## 2022-06-17 PROCEDURE — 88172 CYTP DX EVAL FNA 1ST EA SITE: CPT

## 2022-06-17 PROCEDURE — 88112 CYTOPATH CELL ENHANCE TECH: CPT

## 2022-06-17 PROCEDURE — 87205 SMEAR GRAM STAIN: CPT

## 2022-06-17 PROCEDURE — 700111 HCHG RX REV CODE 636 W/ 250 OVERRIDE (IP): Performed by: ANESTHESIOLOGY

## 2022-06-17 RX ORDER — SODIUM CHLORIDE, SODIUM LACTATE, POTASSIUM CHLORIDE, CALCIUM CHLORIDE 600; 310; 30; 20 MG/100ML; MG/100ML; MG/100ML; MG/100ML
INJECTION, SOLUTION INTRAVENOUS
Status: DISCONTINUED | OUTPATIENT
Start: 2022-06-17 | End: 2022-06-17 | Stop reason: SURG

## 2022-06-17 RX ORDER — HALOPERIDOL 5 MG/ML
1 INJECTION INTRAMUSCULAR
Status: DISCONTINUED | OUTPATIENT
Start: 2022-06-17 | End: 2022-06-17 | Stop reason: HOSPADM

## 2022-06-17 RX ORDER — ALBUTEROL SULFATE 2.5 MG/3ML
2.5 SOLUTION RESPIRATORY (INHALATION)
Status: DISCONTINUED | OUTPATIENT
Start: 2022-06-17 | End: 2022-06-17 | Stop reason: HOSPADM

## 2022-06-17 RX ORDER — DIPHENHYDRAMINE HYDROCHLORIDE 50 MG/ML
12.5 INJECTION INTRAMUSCULAR; INTRAVENOUS
Status: DISCONTINUED | OUTPATIENT
Start: 2022-06-17 | End: 2022-06-17 | Stop reason: HOSPADM

## 2022-06-17 RX ORDER — PHENYLEPHRINE HYDROCHLORIDE 10 MG/ML
INJECTION, SOLUTION INTRAMUSCULAR; INTRAVENOUS; SUBCUTANEOUS PRN
Status: DISCONTINUED | OUTPATIENT
Start: 2022-06-17 | End: 2022-06-17 | Stop reason: SURG

## 2022-06-17 RX ORDER — SODIUM CHLORIDE, SODIUM LACTATE, POTASSIUM CHLORIDE, CALCIUM CHLORIDE 600; 310; 30; 20 MG/100ML; MG/100ML; MG/100ML; MG/100ML
INJECTION, SOLUTION INTRAVENOUS CONTINUOUS
Status: DISCONTINUED | OUTPATIENT
Start: 2022-06-17 | End: 2022-06-17 | Stop reason: HOSPADM

## 2022-06-17 RX ORDER — HYDRALAZINE HYDROCHLORIDE 20 MG/ML
5 INJECTION INTRAMUSCULAR; INTRAVENOUS
Status: DISCONTINUED | OUTPATIENT
Start: 2022-06-17 | End: 2022-06-17 | Stop reason: HOSPADM

## 2022-06-17 RX ORDER — LABETALOL HYDROCHLORIDE 5 MG/ML
5 INJECTION, SOLUTION INTRAVENOUS
Status: DISCONTINUED | OUTPATIENT
Start: 2022-06-17 | End: 2022-06-17 | Stop reason: HOSPADM

## 2022-06-17 RX ORDER — MEPERIDINE HYDROCHLORIDE 25 MG/ML
6.25 INJECTION INTRAMUSCULAR; INTRAVENOUS; SUBCUTANEOUS
Status: DISCONTINUED | OUTPATIENT
Start: 2022-06-17 | End: 2022-06-17 | Stop reason: HOSPADM

## 2022-06-17 RX ORDER — DEXAMETHASONE SODIUM PHOSPHATE 4 MG/ML
INJECTION, SOLUTION INTRA-ARTICULAR; INTRALESIONAL; INTRAMUSCULAR; INTRAVENOUS; SOFT TISSUE PRN
Status: DISCONTINUED | OUTPATIENT
Start: 2022-06-17 | End: 2022-06-17 | Stop reason: SURG

## 2022-06-17 RX ORDER — METOPROLOL TARTRATE 1 MG/ML
1 INJECTION, SOLUTION INTRAVENOUS
Status: DISCONTINUED | OUTPATIENT
Start: 2022-06-17 | End: 2022-06-17 | Stop reason: HOSPADM

## 2022-06-17 RX ADMIN — PHENYLEPHRINE HYDROCHLORIDE 200 MCG: 10 INJECTION INTRAVENOUS at 15:32

## 2022-06-17 RX ADMIN — MIDAZOLAM 2 MG: 1 INJECTION INTRAMUSCULAR; INTRAVENOUS at 15:13

## 2022-06-17 RX ADMIN — ROCURONIUM BROMIDE 50 MG: 10 INJECTION, SOLUTION INTRAVENOUS at 15:20

## 2022-06-17 RX ADMIN — DEXAMETHASONE SODIUM PHOSPHATE 8 MG: 4 INJECTION, SOLUTION INTRA-ARTICULAR; INTRALESIONAL; INTRAMUSCULAR; INTRAVENOUS; SOFT TISSUE at 15:14

## 2022-06-17 RX ADMIN — PHENYLEPHRINE HYDROCHLORIDE 100 MCG: 10 INJECTION INTRAVENOUS at 15:22

## 2022-06-17 RX ADMIN — PHENYLEPHRINE HYDROCHLORIDE 200 MCG: 10 INJECTION INTRAVENOUS at 15:42

## 2022-06-17 RX ADMIN — SUGAMMADEX 200 MG: 100 INJECTION, SOLUTION INTRAVENOUS at 16:03

## 2022-06-17 RX ADMIN — PROPOFOL 150 MG: 10 INJECTION, EMULSION INTRAVENOUS at 15:20

## 2022-06-17 RX ADMIN — FENTANYL CITRATE 100 MCG: 50 INJECTION, SOLUTION INTRAMUSCULAR; INTRAVENOUS at 15:20

## 2022-06-17 RX ADMIN — SODIUM CHLORIDE, POTASSIUM CHLORIDE, SODIUM LACTATE AND CALCIUM CHLORIDE: 600; 310; 30; 20 INJECTION, SOLUTION INTRAVENOUS at 15:13

## 2022-06-17 ASSESSMENT — FIBROSIS 4 INDEX: FIB4 SCORE: 0.57

## 2022-06-17 ASSESSMENT — PAIN DESCRIPTION - PAIN TYPE
TYPE: SURGICAL PAIN
TYPE: ACUTE PAIN

## 2022-06-17 ASSESSMENT — PAIN SCALES - GENERAL: PAIN_LEVEL: 0

## 2022-06-17 NOTE — ANESTHESIA PROCEDURE NOTES
Airway    Date/Time: 6/17/2022 3:20 PM  Performed by: Leonard Grant D.O.  Authorized by: Leonard Grant D.O.     Location:  OR  Urgency:  Elective  Indications for Airway Management:  Anesthesia      Spontaneous Ventilation: absent    Sedation Level:  Deep  Preoxygenated: Yes    Patient Position:  Sniffing  Mask Difficulty Assessment:  2 - vent by mask + OA or adjuvant +/- NMBA  Final Airway Type:  Endotracheal airway  Final Endotracheal Airway:  ETT  Cuffed: Yes    Technique Used for Successful ETT Placement:  Direct laryngoscopy    Insertion Site:  Oral  Blade Type:  Elliott  Laryngoscope Blade/Videolaryngoscope Blade Size:  3  ETT Size (mm):  8.5  Measured from:  Lips  ETT to Lips (cm):  21  Placement Verified by: auscultation and capnometry    Cormack-Lehane Classification:  Grade IIa - partial view of glottis  Number of Attempts at Approach:  1

## 2022-06-17 NOTE — OR NURSING
1611: To PACU from OR via gurney, respirations spontaneous and non-labored. Pt rouses to voice, denies pain and nausea. Pt's FSBG checked and at 128.  1625: No change.   1640: Pt more awake, tolerating ice chips.  1655: Pt's BP low, pt not symptomatic, pt laying down.  1710: Pt resting, no change in BP or position.  1725: No change.  1740: No change.  1755: No change.  1810: Pt's BP improved. O2 sats stable on room air. Meets criteria to transfer to Stage 2.   1820: Report to Tiffani RODRIGUEZ.

## 2022-06-17 NOTE — ANESTHESIA POSTPROCEDURE EVALUATION
Patient: Jacque Mcintyre    Procedure Summary     Date: 06/17/22 Room / Location:  PROCEDURE ROOM / SURGERY [facility]    Anesthesia Start: 1513 Anesthesia Stop: 1613    Procedures:       FIBER OPTIC BRONCHOSCOPY WITH WASH, BRUSH, BRONCHOALVEOLAR LAVAGE, BIOPSY, FINE NEEDLE ASPIRATION (N/A )      ENDOBRONCHIAL ULTRASOUND (EBUS) (N/A ) Diagnosis:       Hilar adenopathy      (HILAR ADENOPATHY)    Surgeons: Yue Swanson M.D. Responsible Provider: Leonard Grant D.O.    Anesthesia Type: general ASA Status: 2          Final Anesthesia Type: general  Last vitals  BP   Blood Pressure: 102/63    Temp   36.6 °C (97.9 °F)    Pulse   90   Resp   18    SpO2   91 %      Anesthesia Post Evaluation    Patient location during evaluation: PACU  Patient participation: complete - patient participated  Level of consciousness: awake and alert  Pain score: 0    Airway patency: patent  Anesthetic complications: no  Cardiovascular status: hemodynamically stable  Respiratory status: acceptable  Hydration status: euvolemic    PONV: none          No complications documented.     Nurse Pain Score: 0 (NPRS)

## 2022-06-17 NOTE — ANESTHESIA PREPROCEDURE EVALUATION
Case: [id number] Date/Time: 06/17/22 2885    Procedures:       FIBER OPTIC BRONCHOSCOPY WITH POSSIBLE WASH, BRUSH, BRONCHOALVEOLAR LAVAGE, BIOPSY, FINE NEEDLE ASPIRATION      ENDOBRONCHIAL ULTRASOUND (EBUS)    Pre-op diagnosis: HILAR ADENOPATHY    Location:  PROCEDURE ROOM / SURGERY [facility]    Surgeons: Yue Swanson M.D.          Relevant Problems   PULMONARY   (positive) Asthma      ENDO   (positive) DM2 (diabetes mellitus, type 2) (HCC)       Physical Exam    Airway   Mallampati: II  TM distance: >3 FB  Neck ROM: full       Cardiovascular - normal exam  Rhythm: regular  Rate: normal  (-) murmur     Dental - normal exam           Pulmonary - normal exam  Breath sounds clear to auscultation     Abdominal    Neurological - normal exam                 Anesthesia Plan    ASA 2       Plan - general       Airway plan will be ETT          Induction: intravenous    Postoperative Plan: Postoperative administration of opioids is intended.    Pertinent diagnostic labs and testing reviewed    Informed Consent:    Anesthetic plan and risks discussed with patient.    Use of blood products discussed with: patient whom consented to blood products.

## 2022-06-17 NOTE — DISCHARGE INSTRUCTIONS
ACTIVITY: Rest and take it easy for the first 24 hours.  A responsible adult is recommended to remain with you during that time.  It is normal to feel sleepy.  We encourage you to not do anything that requires balance, judgment or coordination.    MILD FLU-LIKE SYMPTOMS ARE NORMAL. YOU MAY EXPERIENCE GENERALIZED MUSCLE ACHES, THROAT IRRITATION, HEADACHE AND/OR SOME NAUSEA.    FOR 24 HOURS DO NOT:  Drive, operate machinery or run household appliances.  Drink beer or alcoholic beverages.   Make important decisions or sign legal documents.    SPECIAL INSTRUCTIONS: Flexible Bronchoscopy, Care After  Follow these instructions at home:  Eating and drinking  Do not eat or drink anything (not even water) for 2 hours after your test, or until your numbing medicine (local anesthetic) wears off.  When your numbness is gone and your cough and gag reflexes have come back, you may:  Eat only soft foods.  Slowly drink liquids.  The day after the test, go back to your normal diet.  Take over-the-counter and prescription medicines only as told by your doctor.  Return to your normal activities as told. Ask what activities are safe for you.  Do not use any products that have nicotine or tobacco in them. This includes cigarettes and e-cigarettes. If you need help quitting, ask your doctor.  Keep all follow-up visits as told by your doctor. This is important. It is very important if you had a tissue sample (biopsy) taken.  Get help right away if:  You have shortness of breath that gets worse.  You get light-headed.  You feel like you are going to pass out (faint).  You have chest pain.  You cough up:  More than a little blood.  More blood than before.  Summary  Do not eat or drink anything (not even water) for 2 hours after your test, or until your numbing medicine wears off.  Do not use cigarettes. Do not use e-cigarettes.  Get help right away if you have chest pain.  This information is not intended to replace advice given to you by  your health care provider. Make sure you discuss any questions you have with your health care provider.    DIET: To avoid nausea, slowly advance diet as tolerated, avoiding spicy or greasy foods for the first day.  Add more substantial food to your diet according to your physician's instructions. INCREASE FLUIDS AND FIBER TO AVOID CONSTIPATION.    FOLLOW-UP APPOINTMENT:  A follow-up appointment should be arranged with your doctor; call to schedule.    You should CALL YOUR PHYSICIAN if you develop:  Fever greater than 101 degrees F.  Pain not relieved by medication, or persistent nausea or vomiting.  Excessive bleeding (blood soaking through dressing) or unexpected drainage from the wound.  Extreme redness or swelling around the incision site, drainage of pus or foul smelling drainage.  Inability to urinate or empty your bladder within 8 hours.  Problems with breathing or chest pain.    You should call 911 if you develop problems with breathing or chest pain.  If you are unable to contact your doctor or surgical center, you should go to the nearest emergency room or urgent care center.  Physician's telephone #: Dr. Swanson [phone]    If any questions arise, call your doctor.  If your doctor is not available, please feel free to call the Surgical Center at [phone].     A registered nurse may call you a few days after your surgery to see how you are doing after your procedure.    MEDICATIONS: Resume taking daily medication.  Take prescribed pain medication with food.  If no medication is prescribed, you may take non-aspirin pain medication if needed.  PAIN MEDICATION CAN BE VERY CONSTIPATING.  Take a stool softener or laxative such as senokot, pericolace, or milk of magnesia if needed.    Prescription given prior to procedure.  Last pain medication given at ______________________.    If your physician has prescribed pain medication that includes Acetaminophen (Tylenol), do not take additional Acetaminophen  (Tylenol) while taking the prescribed medication.    Depression / Suicide Risk    As you are discharged from this [facility] facility, it is important to learn how to keep safe from harming yourself.    Recognize the warning signs:  Abrupt changes in personality, positive or negative- including increase in energy   Giving away possessions  Change in eating patterns- significant weight changes-  positive or negative  Change in sleeping patterns- unable to sleep or sleeping all the time   Unwillingness or inability to communicate  Depression  Unusual sadness, discouragement and loneliness  Talk of wanting to die  Neglect of personal appearance   Rebelliousness- reckless behavior  Withdrawal from people/activities they love  Confusion- inability to concentrate     If you or a loved one observes any of these behaviors or has concerns about self-harm, here's what you can do:  Talk about it- your feelings and reasons for harming yourself  Remove any means that you might use to hurt yourself (examples: pills, rope, extension cords, firearm)  Get professional help from the community (Mental Health, Substance Abuse, psychological counseling)  Do not be alone:Call your Safe Contact- someone whom you trust who will be there for you.  Call your local CRISIS HOTLINE 058-2592 or 357-492-3387  Call your local Children's Mobile Crisis Response Team [place] (414) 238-6566 or www.Livevol  Call the toll free National Suicide Prevention Hotlines   National Suicide Prevention Lifeline 276-205-POII (5222)  National Hope Line Network 800-SUICIDE (865-1793)

## 2022-06-17 NOTE — OR NURSING
1430:  Patient allergies and NPO status verified, home medication reconciliation completed and belongings secured. Patient verbalizes understanding of pain scale, expected course of stay and plan of care. Surgical site verified with patient. IV access established. Sequentials placed on legs. Triple aim completed by CNA.

## 2022-06-17 NOTE — ANESTHESIA TIME REPORT
Anesthesia Start and Stop Event Times     Date Time Event    6/17/2022 1401 Ready for Procedure     1513 Anesthesia Start     1613 Anesthesia Stop        Responsible Staff  06/17/22    Name Role Begin End    Leonard Grant D.O. Anesth 1513 1613        Overtime Reason:  no overtime (within assigned shift)    Comments:

## 2022-06-18 LAB
GRAM STN SPEC: NORMAL
RHODAMINE-AURAMINE STN SPEC: NORMAL
SIGNIFICANT IND 70042: NORMAL
SIGNIFICANT IND 70042: NORMAL
SITE SITE: NORMAL
SITE SITE: NORMAL
SOURCE SOURCE: NORMAL
SOURCE SOURCE: NORMAL

## 2022-06-18 NOTE — OR NURSING
1825- Patient arrived to phase 2. Patient changed out of surgical gown into clothes. Patient is A&Ox4. Patient reports no pain and no nausea. Vital signs taken and patient assessed. Asked the patient if they had to use the bathroom. The patient stated that she had voided already.    1855- IV removed and discharge instructions read. All questions answered. Discharged to care of responsible adult. Patient transported to car via wheelchair by RN.

## 2022-06-19 LAB
BACTERIA SPEC RESP CULT: NORMAL
GRAM STN SPEC: NORMAL
SIGNIFICANT IND 70042: NORMAL
SITE SITE: NORMAL
SOURCE SOURCE: NORMAL

## 2022-06-23 ENCOUNTER — HOSPITAL ENCOUNTER (OUTPATIENT)
Dept: RADIOLOGY | Facility: MEDICAL CENTER | Age: 42
End: 2022-06-23
Attending: INTERNAL MEDICINE
Payer: COMMERCIAL

## 2022-06-23 DIAGNOSIS — C43.62 MALIGNANT MELANOMA OF LEFT UPPER EXTREMITY INCLUDING SHOULDER (HCC): ICD-10-CM

## 2022-06-23 PROCEDURE — 700117 HCHG RX CONTRAST REV CODE 255: Performed by: INTERNAL MEDICINE

## 2022-06-23 PROCEDURE — A9576 INJ PROHANCE MULTIPACK: HCPCS | Performed by: INTERNAL MEDICINE

## 2022-06-23 PROCEDURE — 70553 MRI BRAIN STEM W/O & W/DYE: CPT

## 2022-06-23 RX ADMIN — GADOTERIDOL 15 ML: 279.3 INJECTION, SOLUTION INTRAVENOUS at 16:29

## 2022-06-24 ENCOUNTER — TELEPHONE (OUTPATIENT)
Dept: HOSPITALIST | Facility: MEDICAL CENTER | Age: 42
End: 2022-06-24
Payer: COMMERCIAL

## 2022-06-24 ENCOUNTER — TELEPHONE (OUTPATIENT)
Dept: HOSPITALIST | Facility: MEDICAL CENTER | Age: 42
End: 2022-06-24

## 2022-06-24 NOTE — TELEPHONE ENCOUNTER
I spoke to Ms. Dave on the phone this morning at 10:46 AM.  I briefly reviewed Dr. Black's note as well as Dr. Swanson's procedure note.  I was happy to inform the patient that her lymph node biopsy was diagnostic (lymph tissue seen) and that the cytology was negative for malignancy.  She does have some cultures from the bronchoscopy that will take a few weeks to finalize, but so far there is  no growth to date.  It is not clear to me what the follow-up plan was from the note.  The patient did stated that she has an oncologist.  I advised her to follow-up with the oncologist.      Total consult time: 5 minutes which included time spent on chart review, personally reviewing pertinent images and labs, time spent counseling and educating the patient over the phone.    Geovani Rebolledo DO  Staff Pulmonologist and Intensivist  [facility]     Please note that this dictation was created using voice recognition software. The accuracy of the dictation is limited to the abilities of the software. I have made every reasonable attempt to correct obvious errors, but I expect that there are errors of grammar and possibly content that I did not discover before finalizing the note.

## 2022-07-13 LAB
FUNGUS SPEC CULT: NORMAL
FUNGUS SPEC FUNGUS STN: NORMAL
SIGNIFICANT IND 70042: NORMAL
SITE SITE: NORMAL
SOURCE SOURCE: NORMAL

## 2022-07-22 ENCOUNTER — NON-PROVIDER VISIT (OUTPATIENT)
Dept: WOUND CARE | Facility: MEDICAL CENTER | Age: 42
End: 2022-07-22
Attending: FAMILY MEDICINE
Payer: COMMERCIAL

## 2022-07-22 PROCEDURE — 97597 DBRDMT OPN WND 1ST 20 CM/<: CPT

## 2022-07-22 PROCEDURE — 99211 OFF/OP EST MAY X REQ PHY/QHP: CPT

## 2022-07-22 RX ORDER — ONDANSETRON 4 MG/1
4 TABLET, ORALLY DISINTEGRATING ORAL EVERY 6 HOURS PRN
COMMUNITY
Start: 2022-07-17 | End: 2022-10-01

## 2022-07-22 RX ORDER — INSULIN GLARGINE-YFGN 100 [IU]/ML
INJECTION, SOLUTION SUBCUTANEOUS
COMMUNITY
Start: 2022-07-21 | End: 2022-08-26

## 2022-07-22 NOTE — PATIENT INSTRUCTIONS
-Keep your wound dressing clean, dry, and intact.    -Change your dressing every 3 to 4 days or if it becomes soiled, soaked, or falls off.    -Should you experience any significant changes in your wound(s), such as infection (redness, swelling, localized heat, increased pain, fever > 101 F, chills) or have any questions regarding your home care instructions, please contact the wound center at [phone]. If after hours, contact your primary care physician or go to the hospital emergency room.

## 2022-07-22 NOTE — CERTIFICATION
Non Provider Encounter- Diabetic Foot Ulcer      HISTORY OF PRESENT ILLNESS  Wound History:    START OF CARE IN CLINIC: 7/22/2022    REFERRING PROVIDER: Adriana Levine M.D.   WOUND- Diabetic foot ulcer   LOCATION: Left Plantar 1st MTH   HISTORY: Patient is a pleasant 41 y.o. who has been previously treated at [facility] for the same wound. Patient stated that the wound started as a fissure approximately 2 months ago, but was unable to get in as her insurance deductible was too high and has since changed. Patient stated she trims her own callus down and sometimes goes too deep. She states she has been applying petroleum jelly and cover dressing. The patient stated that she does have a referral to podiatry, but never set it up.     Pertinent Medical History: Diabetes mellitus type 2, diabetic foot ulcer, melanoma, bipolar 1 disorder    DIABETES HX: DIABETES HX: Diagnosed with type 2 diabetes 8 years ago, and is currently managing with insulin and oral medications.  Checks blood sugars 1 time daily and reports that these typically run around 140-160.  Has had previous diabetes education.  Does have numbness in feet.  Usually wears diabetic shoes and inserts. Does not check feet routinely.  Has had previous foot ulcers, no surgeries to her feet.  Current occupation-psychiatric RN.  Offloading none       TOBACCO USE: Current everyday smoker     OFFLOADING: Patient states she is unable to wear offloading shoes to work.    Pertinent Labs and Diagnostics:    Labs:     A1c:   Lab Results   Component Value Date/Time    HBA1C 6.8 (H) 10/08/2019 01:47 AM          IMAGING: None    VASCULAR STUDIES: None    LAST  WOUND CULTURE:  DATE : None    FALL RISK ASSESSMENT-  None   65 years or older     Fall within the last 2 years   Uses ambulatory devices  Loss of protective sensation in feet   Use of prostethic/orthotic    Presence of lower extremity/foot/toe amputation   Taking medication that increases risk (per facility  policy)    Interventions Recommended (if any of the above are selected):   Use of Assistive Device:   Supervision with ambulation: Caregiver   Assistance with ambulation: Caregiver   Home safety education: Educational material provided    Patient allergies and medications reviewed via Epic.     WOUND ASSESSMENT-      Wound 07/22/22 Diabetic Ulcer Left Plantar 1st MTH (Active)   Wound Image   07/22/22 1500   Site Assessment Red 07/22/22 1500   Periwound Assessment Callused 07/22/22 1500   Margins Attached edges 07/22/22 1500   Drainage Amount Scant 07/22/22 1500   Drainage Description Serosanguineous 07/22/22 1500   Treatments Cleansed;CSWD - Conservative Sharp Wound Debridement 07/22/22 1500   Wound Cleansing Normal Saline Irrigation 07/22/22 1500   Periwound Protectant Skin Protectant Wipes to Periwound;Barrier Paste 07/22/22 1500   Dressing Cleansing/Solutions Not Applicable 07/22/22 1500   Dressing Options Hydrofera Blue Ready;Hypafix Tape 07/22/22 1500   Dressing Changed New 07/22/22 1500   Dressing Change/Treatment Frequency Every 72 hrs, and As Needed 07/22/22 1500   Non-staged Wound Description Full thickness 07/22/22 1500   Wound Length (cm) 0.2 cm 07/22/22 1500   Wound Width (cm) 0.2 cm 07/22/22 1500   Wound Depth (cm) 0.1 cm 07/22/22 1500   Wound Surface Area (cm^2) 0.04 cm^2 07/22/22 1500   Wound Volume (cm^3) 0.004 cm^3 07/22/22 1500   Post-Procedure Length (cm) 0.2 cm 07/22/22 1500   Post-Procedure Width (cm) 0.2 cm 07/22/22 1500   Post-Procedure Depth (cm) 0.1 cm 07/22/22 1500   Post-Procedure Surface Area (cm^2) 0.04 cm^2 07/22/22 1500   Post-Procedure Volume (cm^3) 0.004 cm^3 07/22/22 1500   Tunneling (cm) 0 cm 07/22/22 1500   Undermining (cm) 0 cm 07/22/22 1500   Wound Odor None 07/22/22 1500   Pulses Left;2+;DP 07/22/22 1500   Right Foot Monofilament 10-point exam (Sensate) 1/10 07/22/22 1500   Left Foot Monofilament 10-point exam (Sensate) 2/10 07/22/22 1500   Exposed Structures None 07/22/22 1500      Procedures:       -Patient has significant neuropathy and declined lidocaine.  -Curette used to debride wound bed and periwound callus. Entire surface of wound, <1cm2 debrided.  Periwound callus, ~ 2cm2, debrided to skin level  -Refer to flowsheet for wound care details.       PATIENT EDUCATION  - Importance of tight glucose control for wound healing   - Implications of loss of protective sensation (LOPS) discussed with patient- including increased risk for amputation.  - Advised to check feet at least daily, moisturize feet, and to always wear protective foot wear.   -  Importance of offloading foot to assist with wound healing  - Advised pt not to trim nails or calluses, seek foot/nail care from podiatrist or certified foot/nail nurse  - Importance of adequate nutrition for wound healing    -Patient instructed to bring diabetic shoes and inserts to  Clinic for adjustment. They are less than 1 year old.  -Patient instructed to establish with podiatrist.

## 2022-07-31 LAB
MYCOBACTERIUM SPEC CULT: NORMAL
RHODAMINE-AURAMINE STN SPEC: NORMAL
SIGNIFICANT IND 70042: NORMAL
SITE SITE: NORMAL
SOURCE SOURCE: NORMAL

## 2022-08-25 ENCOUNTER — APPOINTMENT (RX ONLY)
Dept: URBAN - METROPOLITAN AREA CLINIC 20 | Facility: CLINIC | Age: 42
Setting detail: DERMATOLOGY
End: 2022-08-25

## 2022-08-25 DIAGNOSIS — D22 MELANOCYTIC NEVI: ICD-10-CM

## 2022-08-25 DIAGNOSIS — T81.89 OTHER COMPLICATIONS OF PROCEDURES, NOT ELSEWHERE CLASSIFIED: ICD-10-CM | Status: RESOLVED

## 2022-08-25 DIAGNOSIS — Z87.2 PERSONAL HISTORY OF DISEASES OF THE SKIN AND SUBCUTANEOUS TISSUE: ICD-10-CM

## 2022-08-25 DIAGNOSIS — Z85.820 PERSONAL HISTORY OF MALIGNANT MELANOMA OF SKIN: ICD-10-CM

## 2022-08-25 DIAGNOSIS — L81.4 OTHER MELANIN HYPERPIGMENTATION: ICD-10-CM

## 2022-08-25 DIAGNOSIS — D18.0 HEMANGIOMA: ICD-10-CM

## 2022-08-25 DIAGNOSIS — L70.8 OTHER ACNE: ICD-10-CM

## 2022-08-25 DIAGNOSIS — L72.8 OTHER FOLLICULAR CYSTS OF THE SKIN AND SUBCUTANEOUS TISSUE: ICD-10-CM

## 2022-08-25 DIAGNOSIS — L40.0 PSORIASIS VULGARIS: ICD-10-CM

## 2022-08-25 DIAGNOSIS — L57.8 OTHER SKIN CHANGES DUE TO CHRONIC EXPOSURE TO NONIONIZING RADIATION: ICD-10-CM

## 2022-08-25 PROBLEM — D22.5 MELANOCYTIC NEVI OF TRUNK: Status: ACTIVE | Noted: 2022-08-25

## 2022-08-25 PROBLEM — D22.61 MELANOCYTIC NEVI OF RIGHT UPPER LIMB, INCLUDING SHOULDER: Status: ACTIVE | Noted: 2022-08-25

## 2022-08-25 PROBLEM — T81.89XA OTHER COMPLICATIONS OF PROCEDURES, NOT ELSEWHERE CLASSIFIED, INITIAL ENCOUNTER: Status: ACTIVE | Noted: 2022-08-25

## 2022-08-25 PROBLEM — D22.4 MELANOCYTIC NEVI OF SCALP AND NECK: Status: ACTIVE | Noted: 2022-08-25

## 2022-08-25 PROBLEM — D22.72 MELANOCYTIC NEVI OF LEFT LOWER LIMB, INCLUDING HIP: Status: ACTIVE | Noted: 2022-08-25

## 2022-08-25 PROBLEM — D18.01 HEMANGIOMA OF SKIN AND SUBCUTANEOUS TISSUE: Status: ACTIVE | Noted: 2022-08-25

## 2022-08-25 PROCEDURE — 99214 OFFICE O/P EST MOD 30 MIN: CPT

## 2022-08-25 PROCEDURE — ? ADDITIONAL NOTES

## 2022-08-25 PROCEDURE — ? COUNSELING

## 2022-08-25 PROCEDURE — ? PRESCRIPTION

## 2022-08-25 PROCEDURE — ? OBSERVATION AND MEASURE

## 2022-08-25 PROCEDURE — ? DIAGNOSIS COMMENT

## 2022-08-25 RX ORDER — CALCIPOTRIENE AND BETAMETHASONE DIPROPIONATE 50; 64 UG/G; MG/G
CREAM TOPICAL
Qty: 60 | Refills: 6 | Status: ERX

## 2022-08-25 RX ORDER — DOXYCYCLINE HYCLATE 100 MG/1
CAPSULE, GELATIN COATED ORAL
Qty: 14 | Refills: 0 | Status: ERX

## 2022-08-25 ASSESSMENT — LOCATION SIMPLE DESCRIPTION DERM
LOCATION SIMPLE: LEFT THIGH
LOCATION SIMPLE: ABDOMEN
LOCATION SIMPLE: RIGHT HAND
LOCATION SIMPLE: RIGHT ELBOW
LOCATION SIMPLE: LEFT ELBOW
LOCATION SIMPLE: LEFT PLANTAR SURFACE
LOCATION SIMPLE: LEFT HAND
LOCATION SIMPLE: LEFT POSTERIOR THIGH
LOCATION SIMPLE: RIGHT EAR
LOCATION SIMPLE: RIGHT LOWER EXTREMITY
LOCATION SIMPLE: RIGHT BUTTOCK
LOCATION SIMPLE: LEFT ANKLE
LOCATION SIMPLE: RIGHT UPPER BACK
LOCATION SIMPLE: LEFT CHEEK
LOCATION SIMPLE: GENITALIA
LOCATION SIMPLE: RIGHT PLANTAR SURFACE
LOCATION SIMPLE: POSTERIOR SCALP
LOCATION SIMPLE: BACK
LOCATION SIMPLE: RIGHT CHEEK
LOCATION SIMPLE: RIGHT POSTERIOR UPPER ARM
LOCATION SIMPLE: RIGHT CALF
LOCATION SIMPLE: LEFT UPPER BACK
LOCATION SIMPLE: LEFT LOWER EXTREMITY

## 2022-08-25 ASSESSMENT — LOCATION DETAILED DESCRIPTION DERM
LOCATION DETAILED: LEFT MID BACK
LOCATION DETAILED: RIGHT INFERIOR UPPER BACK
LOCATION DETAILED: LEFT ANTERIOR PROXIMAL THIGH
LOCATION DETAILED: RIGHT ELBOW
LOCATION DETAILED: LEFT POSTERIOR ANKLE
LOCATION DETAILED: LEFT ANTERIOR DISTAL THIGH
LOCATION DETAILED: RIGHT SUPERIOR MEDIAL BUCCAL CHEEK
LOCATION DETAILED: LEFT RADIAL DORSAL HAND
LOCATION DETAILED: RIGHT DISTAL LATERAL CALF
LOCATION DETAILED: RIGHT BUTTOCK
LOCATION DETAILED: LEFT ANTERIOR THIGH
LOCATION DETAILED: GENITALIA
LOCATION DETAILED: RIGHT ANTERIOR THIGH
LOCATION DETAILED: RIGHT SUPERIOR HELIX
LOCATION DETAILED: LEFT INFERIOR CENTRAL MALAR CHEEK
LOCATION DETAILED: LEFT ELBOW
LOCATION DETAILED: RIGHT LATERAL ABDOMEN
LOCATION DETAILED: RIGHT PROXIMAL POSTERIOR UPPER ARM
LOCATION DETAILED: RIGHT PLANTAR FOREFOOT OVERLYING 1ST METATARSAL
LOCATION DETAILED: LEFT PROXIMAL POSTERIOR THIGH
LOCATION DETAILED: RIGHT DORSAL HAND
LOCATION DETAILED: RIGHT CHEEK
LOCATION DETAILED: LEFT SUPERIOR UPPER BACK
LOCATION DETAILED: LEFT UPPER BACK
LOCATION DETAILED: RIGHT MID-UPPER BACK
LOCATION DETAILED: LEFT INFERIOR OCCIPITAL SCALP
LOCATION DETAILED: LEFT PLANTAR FOREFOOT OVERLYING 1ST METATARSAL
LOCATION DETAILED: LEFT DORSAL HAND

## 2022-08-25 ASSESSMENT — LOCATION ZONE DERM
LOCATION ZONE: TRUNK
LOCATION ZONE: SCALP
LOCATION ZONE: FEET
LOCATION ZONE: ARM
LOCATION ZONE: HAND
LOCATION ZONE: LEG
LOCATION ZONE: EAR
LOCATION ZONE: FACE

## 2022-08-25 NOTE — PROCEDURE: DIAGNOSIS COMMENT
Comment: Excised in 2016; 2.8mm, re-excised 2017 (1+ node,18 neg), chuy simpson/ Ileana [id number], Lelo PERKINS
Detail Level: Simple
Comment: Biopsy proven mild to moderate 10/2017; monitor for repigmentation
Comment: Biopsy proven; all sites excised
Comment: Biopsy x 2; [id number], proven benign nevi, closely monitor

## 2022-08-25 NOTE — PROCEDURE: ADDITIONAL NOTES
Render Risk Assessment In Note?: no
Additional Notes: On 5/31/22 per pt PET showed intense uptake to left hilum concern for metastasis.\\nPt had biopsy to lymph per pt no sign of cancer, awaiting path note from Dr. Caraballo\\nPer pt schedule CT in couple of weeks.
Detail Level: Simple
Additional Notes: Plan;\\n\\n1. Pt completeted 18 sessions of UVB with + results, however not long term solution due to history of MM.\\n2. Also used Duobri with + results\\n3. Submit for extract laser.\\n4. Pt not candidate for biologic due to invasive melanoma history. \\nMeantime clobetasol w/ occlusion at bedtime.
Additional Notes: currently improved, hold off on any systemic treatment. \\n\\nHistory below:\\n\\nIf worsens will send note to Dr. Caraballo, would like consult on starting spironlactone for hormonal acne, make sure there is no contraindications. 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 Recent retrospective and observational studies do not suggest an increased risk of prostate or breast cancer (Yanet 2016; Nate 2020; Gabbie 2019).\\n\\nPt has been on MInocycline through PCP for 2 years.  Discussed long term SE w/ minocycline.
Additional Notes: Clear no open wounds

## 2022-08-25 NOTE — HPI: MOLE CHECK
What Is The Reason For Today's Visit?: Mole Check
Additional History: Left proximal posterior thigh - 1mm x 1mm\\nRecurrent nevus x biopsy proven benign

## 2022-08-25 NOTE — PROCEDURE: COUNSELING
Quality 224: Stage 0-Iic Melanoma: Overutilization Of Imaging Studies For Only Stage 0-Iic Melanoma: None of the following diagnostic imaging studies ordered: chest X-ray, CT, Ultrasound, MRI, PET, or nuclear medicine scans (ML)
Quality 137: Melanoma: Continuity Of Care - Recall System: Patient information entered into a recall system that includes: target date for the next exam specified AND a process to follow up with patients regarding missed or unscheduled appointments
When Should The Patient Follow-Up For Their Next Full-Body Skin Exam?: 3 Months
Detail Level: Detailed
Dapsone Counseling: I discussed with the patient the risks of dapsone including but not limited to hemolytic anemia, agranulocytosis, rashes, methemoglobinemia, kidney failure, peripheral neuropathy, headaches, GI upset, and liver toxicity.  Patients who start dapsone require monitoring including baseline LFTs and weekly CBCs for the first month, then every month thereafter.  The patient verbalized understanding of the proper use and possible adverse effects of dapsone.  All of the patient's questions and concerns were addressed.
Erythromycin Counseling:  I discussed with the patient the risks of erythromycin including but not limited to GI upset, allergic reaction, drug rash, diarrhea, increase in liver enzymes, and yeast infections.
Spironolactone Pregnancy And Lactation Text: This medication can cause feminization of the male fetus and should be avoided during pregnancy. The active metabolite is also found in breast milk.
Tazorac Pregnancy And Lactation Text: This medication is not safe during pregnancy. It is unknown if this medication is excreted in breast milk.
High Dose Vitamin A Counseling: Side effects reviewed, pt to contact office should one occur.
Use Enhanced Medication Counseling?: No
Benzoyl Peroxide Pregnancy And Lactation Text: This medication is Pregnancy Category C. It is unknown if benzoyl peroxide is excreted in breast milk.
Bactrim Pregnancy And Lactation Text: This medication is Pregnancy Category D and is known to cause fetal risk.  It is also excreted in breast milk.
Azithromycin Counseling:  I discussed with the patient the risks of azithromycin including but not limited to GI upset, allergic reaction, drug rash, diarrhea, and yeast infections.
Dapsone Pregnancy And Lactation Text: This medication is Pregnancy Category C and is not considered safe during pregnancy or breast feeding.
Sarecycline Counseling: Patient advised regarding possible photosensitivity and discoloration of the teeth, skin, lips, tongue and gums.  Patient instructed to avoid sunlight, if possible.  When exposed to sunlight, patients should wear protective clothing, sunglasses, and sunscreen.  The patient was instructed to call the office immediately if the following severe adverse effects occur:  hearing changes, easy bruising/bleeding, severe headache, or vision changes.  The patient verbalized understanding of the proper use and possible adverse effects of sarecycline.  All of the patient's questions and concerns were addressed.
Topical Sulfur Applications Pregnancy And Lactation Text: This medication is Pregnancy Category C and has an unknown safety profile during pregnancy. It is unknown if this topical medication is excreted in breast milk.
Topical Retinoid counseling:  Patient advised to apply a pea-sized amount only at bedtime and wait 30 minutes after washing their face before applying.  If too drying, patient may add a non-comedogenic moisturizer. The patient verbalized understanding of the proper use and possible adverse effects of retinoids.  All of the patient's questions and concerns were addressed.
Erythromycin Pregnancy And Lactation Text: This medication is Pregnancy Category B and is considered safe during pregnancy. It is also excreted in breast milk.
Tetracycline Counseling: Patient counseled regarding possible photosensitivity and increased risk for sunburn.  Patient instructed to avoid sunlight, if possible.  When exposed to sunlight, patients should wear protective clothing, sunglasses, and sunscreen.  The patient was instructed to call the office immediately if the following severe adverse effects occur:  hearing changes, easy bruising/bleeding, severe headache, or vision changes.  The patient verbalized understanding of the proper use and possible adverse effects of tetracycline.  All of the patient's questions and concerns were addressed. Patient understands to avoid pregnancy while on therapy due to potential birth defects.
Birth Control Pills Counseling: Birth Control Pill Counseling: I discussed with the patient the potential side effects of OCPs including but not limited to increased risk of stroke, heart attack, thrombophlebitis, deep venous thrombosis, hepatic adenomas, breast changes, GI upset, headaches, and depression.  The patient verbalized understanding of the proper use and possible adverse effects of OCPs. All of the patient's questions and concerns were addressed.
High Dose Vitamin A Pregnancy And Lactation Text: High dose vitamin A therapy is contraindicated during pregnancy and breast feeding.
Topical Clindamycin Counseling: Patient counseled that this medication may cause skin irritation or allergic reactions.  In the event of skin irritation, the patient was advised to reduce the amount of the drug applied or use it less frequently.   The patient verbalized understanding of the proper use and possible adverse effects of clindamycin.  All of the patient's questions and concerns were addressed.
Tetracycline Pregnancy And Lactation Text: This medication is Pregnancy Category D and not consider safe during pregnancy. It is also excreted in breast milk.
Azithromycin Pregnancy And Lactation Text: This medication is considered safe during pregnancy and is also secreted in breast milk.
Doxycycline Counseling:  Patient counseled regarding possible photosensitivity and increased risk for sunburn.  Patient instructed to avoid sunlight, if possible.  When exposed to sunlight, patients should wear protective clothing, sunglasses, and sunscreen.  The patient was instructed to call the office immediately if the following severe adverse effects occur:  hearing changes, easy bruising/bleeding, severe headache, or vision changes.  The patient verbalized understanding of the proper use and possible adverse effects of doxycycline.  All of the patient's questions and concerns were addressed.
Isotretinoin Counseling: Patient should get monthly blood tests, not donate blood, not drive at night if vision affected, not share medication, and not undergo elective surgery for 6 months after tx completed. Side effects reviewed, pt to contact office should one occur.
Topical Retinoid Pregnancy And Lactation Text: This medication is Pregnancy Category C. It is unknown if this medication is excreted in breast milk.
Birth Control Pills Pregnancy And Lactation Text: This medication should be avoided if pregnant and for the first 30 days post-partum.
Minocycline Counseling: Patient advised regarding possible photosensitivity and discoloration of the teeth, skin, lips, tongue and gums.  Patient instructed to avoid sunlight, if possible.  When exposed to sunlight, patients should wear protective clothing, sunglasses, and sunscreen.  The patient was instructed to call the office immediately if the following severe adverse effects occur:  hearing changes, easy bruising/bleeding, severe headache, or vision changes.  The patient verbalized understanding of the proper use and possible adverse effects of minocycline.  All of the patient's questions and concerns were addressed.
Detail Level: Zone
Topical Clindamycin Pregnancy And Lactation Text: This medication is Pregnancy Category B and is considered safe during pregnancy. It is unknown if it is excreted in breast milk.
Doxycycline Pregnancy And Lactation Text: This medication is Pregnancy Category D and not consider safe during pregnancy. It is also excreted in breast milk but is considered safe for shorter treatment courses.
Spironolactone Counseling: Patient advised regarding risks of diarrhea, abdominal pain, hyperkalemia, birth defects (for female patients), liver toxicity and renal toxicity. The patient may need blood work to monitor liver and kidney function and potassium levels while on therapy. The patient verbalized understanding of the proper use and possible adverse effects of spironolactone.  All of the patient's questions and concerns were addressed.
Benzoyl Peroxide Counseling: Patient counseled that medicine may cause skin irritation and bleach clothing.  In the event of skin irritation, the patient was advised to reduce the amount of the drug applied or use it less frequently.   The patient verbalized understanding of the proper use and possible adverse effects of benzoyl peroxide.  All of the patient's questions and concerns were addressed.
Bactrim Counseling:  I discussed with the patient the risks of sulfa antibiotics including but not limited to GI upset, allergic reaction, drug rash, diarrhea, dizziness, photosensitivity, and yeast infections.  Rarely, more serious reactions can occur including but not limited to aplastic anemia, agranulocytosis, methemoglobinemia, blood dyscrasias, liver or kidney failure, lung infiltrates or desquamative/blistering drug rashes.
Isotretinoin Pregnancy And Lactation Text: This medication is Pregnancy Category X and is considered extremely dangerous during pregnancy. It is unknown if it is excreted in breast milk.
Topical Sulfur Applications Counseling: Topical Sulfur Counseling: Patient counseled that this medication may cause skin irritation or allergic reactions.  In the event of skin irritation, the patient was advised to reduce the amount of the drug applied or use it less frequently.   The patient verbalized understanding of the proper use and possible adverse effects of topical sulfur application.  All of the patient's questions and concerns were addressed.
Tazorac Counseling:  Patient advised that medication is irritating and drying.  Patient may need to apply sparingly and wash off after an hour before eventually leaving it on overnight.  The patient verbalized understanding of the proper use and possible adverse effects of tazorac.  All of the patient's questions and concerns were addressed.
Detail Level: Simple

## 2022-08-26 ENCOUNTER — HOSPITAL ENCOUNTER (OUTPATIENT)
Facility: MEDICAL CENTER | Age: 42
End: 2022-08-27
Attending: EMERGENCY MEDICINE | Admitting: INTERNAL MEDICINE
Payer: COMMERCIAL

## 2022-08-26 DIAGNOSIS — A41.9 SEPSIS WITHOUT ACUTE ORGAN DYSFUNCTION, DUE TO UNSPECIFIED ORGANISM (HCC): ICD-10-CM

## 2022-08-26 DIAGNOSIS — L03.115 CELLULITIS OF RIGHT LOWER EXTREMITY: ICD-10-CM

## 2022-08-26 DIAGNOSIS — R73.9 HYPERGLYCEMIA: ICD-10-CM

## 2022-08-26 PROBLEM — L03.90 CELLULITIS: Status: ACTIVE | Noted: 2022-08-26

## 2022-08-26 PROBLEM — D72.829 LEUCOCYTOSIS: Status: ACTIVE | Noted: 2022-08-26

## 2022-08-26 LAB
ALBUMIN SERPL BCP-MCNC: 4.4 G/DL (ref 3.2–4.9)
ALBUMIN/GLOB SERPL: 1.9 G/DL
ALP SERPL-CCNC: 130 U/L (ref 30–99)
ALT SERPL-CCNC: 13 U/L (ref 2–50)
ANION GAP SERPL CALC-SCNC: 16 MMOL/L (ref 7–16)
AST SERPL-CCNC: 12 U/L (ref 12–45)
BASOPHILS # BLD AUTO: 0.5 % (ref 0–1.8)
BASOPHILS # BLD: 0.06 K/UL (ref 0–0.12)
BILIRUB SERPL-MCNC: 0.2 MG/DL (ref 0.1–1.5)
BUN SERPL-MCNC: 14 MG/DL (ref 8–22)
CALCIUM SERPL-MCNC: 9.4 MG/DL (ref 8.4–10.2)
CHLORIDE SERPL-SCNC: 104 MMOL/L (ref 96–112)
CO2 SERPL-SCNC: 20 MMOL/L (ref 20–33)
CREAT SERPL-MCNC: 0.52 MG/DL (ref 0.5–1.4)
EKG IMPRESSION: NORMAL
EOSINOPHIL # BLD AUTO: 0.02 K/UL (ref 0–0.51)
EOSINOPHIL NFR BLD: 0.2 % (ref 0–6.9)
ERYTHROCYTE [DISTWIDTH] IN BLOOD BY AUTOMATED COUNT: 49.3 FL (ref 35.9–50)
GFR SERPLBLD CREATININE-BSD FMLA CKD-EPI: 118 ML/MIN/1.73 M 2
GLOBULIN SER CALC-MCNC: 2.3 G/DL (ref 1.9–3.5)
GLUCOSE SERPL-MCNC: 235 MG/DL (ref 65–99)
HCG SERPL QL: NEGATIVE
HCT VFR BLD AUTO: 39.5 % (ref 37–47)
HGB BLD-MCNC: 12.6 G/DL (ref 12–16)
IMM GRANULOCYTES # BLD AUTO: 0.04 K/UL (ref 0–0.11)
IMM GRANULOCYTES NFR BLD AUTO: 0.4 % (ref 0–0.9)
LACTATE BLD-SCNC: 1.1 MMOL/L (ref 0.5–2)
LACTATE BLD-SCNC: 1.5 MMOL/L (ref 0.5–2)
LACTATE BLD-SCNC: 3.7 MMOL/L (ref 0.5–2)
LYMPHOCYTES # BLD AUTO: 1.5 K/UL (ref 1–4.8)
LYMPHOCYTES NFR BLD: 13.6 % (ref 22–41)
MCH RBC QN AUTO: 26.7 PG (ref 27–33)
MCHC RBC AUTO-ENTMCNC: 31.9 G/DL (ref 33.6–35)
MCV RBC AUTO: 83.7 FL (ref 81.4–97.8)
MONOCYTES # BLD AUTO: 0.53 K/UL (ref 0–0.85)
MONOCYTES NFR BLD AUTO: 4.8 % (ref 0–13.4)
NEUTROPHILS # BLD AUTO: 8.91 K/UL (ref 2–7.15)
NEUTROPHILS NFR BLD: 80.5 % (ref 44–72)
NRBC # BLD AUTO: 0 K/UL
NRBC BLD-RTO: 0 /100 WBC
PLATELET # BLD AUTO: 422 K/UL (ref 164–446)
PMV BLD AUTO: 10.4 FL (ref 9–12.9)
POTASSIUM SERPL-SCNC: 3.9 MMOL/L (ref 3.6–5.5)
PROT SERPL-MCNC: 6.7 G/DL (ref 6–8.2)
RBC # BLD AUTO: 4.72 M/UL (ref 4.2–5.4)
SCCMEC + MECA PNL NOSE NAA+PROBE: NEGATIVE
SCCMEC + MECA PNL NOSE NAA+PROBE: NEGATIVE
SODIUM SERPL-SCNC: 140 MMOL/L (ref 135–145)
WBC # BLD AUTO: 11.1 K/UL (ref 4.8–10.8)

## 2022-08-26 PROCEDURE — 99220 PR INITIAL OBSERVATION CARE,LEVL III: CPT | Performed by: INTERNAL MEDICINE

## 2022-08-26 PROCEDURE — G0378 HOSPITAL OBSERVATION PER HR: HCPCS

## 2022-08-26 PROCEDURE — 82010 KETONE BODYS QUAN: CPT

## 2022-08-26 PROCEDURE — 87641 MR-STAPH DNA AMP PROBE: CPT

## 2022-08-26 PROCEDURE — 36415 COLL VENOUS BLD VENIPUNCTURE: CPT

## 2022-08-26 PROCEDURE — 87640 STAPH A DNA AMP PROBE: CPT

## 2022-08-26 PROCEDURE — 700111 HCHG RX REV CODE 636 W/ 250 OVERRIDE (IP): Performed by: EMERGENCY MEDICINE

## 2022-08-26 PROCEDURE — 94760 N-INVAS EAR/PLS OXIMETRY 1: CPT

## 2022-08-26 PROCEDURE — 85025 COMPLETE CBC W/AUTO DIFF WBC: CPT

## 2022-08-26 PROCEDURE — 700105 HCHG RX REV CODE 258: Performed by: INTERNAL MEDICINE

## 2022-08-26 PROCEDURE — 80053 COMPREHEN METABOLIC PANEL: CPT

## 2022-08-26 PROCEDURE — 700105 HCHG RX REV CODE 258: Performed by: EMERGENCY MEDICINE

## 2022-08-26 PROCEDURE — 99285 EMERGENCY DEPT VISIT HI MDM: CPT

## 2022-08-26 PROCEDURE — 96365 THER/PROPH/DIAG IV INF INIT: CPT

## 2022-08-26 PROCEDURE — 96367 TX/PROPH/DG ADDL SEQ IV INF: CPT

## 2022-08-26 PROCEDURE — 96366 THER/PROPH/DIAG IV INF ADDON: CPT

## 2022-08-26 PROCEDURE — A9270 NON-COVERED ITEM OR SERVICE: HCPCS | Performed by: INTERNAL MEDICINE

## 2022-08-26 PROCEDURE — 700102 HCHG RX REV CODE 250 W/ 637 OVERRIDE(OP): Performed by: INTERNAL MEDICINE

## 2022-08-26 PROCEDURE — 93005 ELECTROCARDIOGRAM TRACING: CPT | Performed by: EMERGENCY MEDICINE

## 2022-08-26 PROCEDURE — 87040 BLOOD CULTURE FOR BACTERIA: CPT | Mod: 91

## 2022-08-26 PROCEDURE — 96372 THER/PROPH/DIAG INJ SC/IM: CPT

## 2022-08-26 PROCEDURE — 700111 HCHG RX REV CODE 636 W/ 250 OVERRIDE (IP): Performed by: INTERNAL MEDICINE

## 2022-08-26 PROCEDURE — 83605 ASSAY OF LACTIC ACID: CPT | Mod: 91

## 2022-08-26 PROCEDURE — 84703 CHORIONIC GONADOTROPIN ASSAY: CPT

## 2022-08-26 PROCEDURE — 93005 ELECTROCARDIOGRAM TRACING: CPT

## 2022-08-26 PROCEDURE — 82962 GLUCOSE BLOOD TEST: CPT

## 2022-08-26 RX ORDER — IBUPROFEN 200 MG
200 TABLET ORAL EVERY 6 HOURS PRN
Status: DISCONTINUED | OUTPATIENT
Start: 2022-08-26 | End: 2022-08-27 | Stop reason: HOSPADM

## 2022-08-26 RX ORDER — OXYCODONE HYDROCHLORIDE 5 MG/1
5 TABLET ORAL EVERY 4 HOURS PRN
Status: DISCONTINUED | OUTPATIENT
Start: 2022-08-26 | End: 2022-08-27 | Stop reason: HOSPADM

## 2022-08-26 RX ORDER — BISACODYL 10 MG
10 SUPPOSITORY, RECTAL RECTAL
Status: DISCONTINUED | OUTPATIENT
Start: 2022-08-26 | End: 2022-08-27 | Stop reason: HOSPADM

## 2022-08-26 RX ORDER — SODIUM CHLORIDE, SODIUM LACTATE, POTASSIUM CHLORIDE, CALCIUM CHLORIDE 600; 310; 30; 20 MG/100ML; MG/100ML; MG/100ML; MG/100ML
1000 INJECTION, SOLUTION INTRAVENOUS ONCE
Status: COMPLETED | OUTPATIENT
Start: 2022-08-26 | End: 2022-08-26

## 2022-08-26 RX ORDER — PROMETHAZINE HYDROCHLORIDE 25 MG/1
12.5-25 TABLET ORAL EVERY 4 HOURS PRN
Status: DISCONTINUED | OUTPATIENT
Start: 2022-08-26 | End: 2022-08-27 | Stop reason: HOSPADM

## 2022-08-26 RX ORDER — CLONAZEPAM 0.5 MG/1
0.25 TABLET ORAL 2 TIMES DAILY PRN
Status: DISCONTINUED | OUTPATIENT
Start: 2022-08-26 | End: 2022-08-27 | Stop reason: HOSPADM

## 2022-08-26 RX ORDER — ONDANSETRON 4 MG/1
4 TABLET, ORALLY DISINTEGRATING ORAL EVERY 4 HOURS PRN
Status: DISCONTINUED | OUTPATIENT
Start: 2022-08-26 | End: 2022-08-27 | Stop reason: HOSPADM

## 2022-08-26 RX ORDER — QUETIAPINE FUMARATE 100 MG/1
200 TABLET, FILM COATED ORAL EVERY EVENING
Status: DISCONTINUED | OUTPATIENT
Start: 2022-08-26 | End: 2022-08-27 | Stop reason: HOSPADM

## 2022-08-26 RX ORDER — SODIUM CHLORIDE 9 MG/ML
INJECTION, SOLUTION INTRAVENOUS CONTINUOUS
Status: DISCONTINUED | OUTPATIENT
Start: 2022-08-26 | End: 2022-08-27 | Stop reason: HOSPADM

## 2022-08-26 RX ORDER — DOXYCYCLINE HYCLATE 100 MG
100 TABLET ORAL 2 TIMES DAILY
Status: ON HOLD | COMMUNITY
Start: 2022-08-25 | End: 2022-08-27

## 2022-08-26 RX ORDER — SODIUM CHLORIDE, SODIUM LACTATE, POTASSIUM CHLORIDE, AND CALCIUM CHLORIDE .6; .31; .03; .02 G/100ML; G/100ML; G/100ML; G/100ML
30 INJECTION, SOLUTION INTRAVENOUS ONCE
Status: COMPLETED | OUTPATIENT
Start: 2022-08-26 | End: 2022-08-27

## 2022-08-26 RX ORDER — MELOXICAM 15 MG/1
15 TABLET ORAL DAILY
Status: DISCONTINUED | OUTPATIENT
Start: 2022-08-26 | End: 2022-08-26

## 2022-08-26 RX ORDER — FLASH GLUCOSE SCANNING READER
1 EACH MISCELLANEOUS
COMMUNITY
End: 2022-10-01

## 2022-08-26 RX ORDER — ATORVASTATIN CALCIUM 40 MG/1
40 TABLET, FILM COATED ORAL EVERY EVENING
Status: DISCONTINUED | OUTPATIENT
Start: 2022-08-26 | End: 2022-08-27 | Stop reason: HOSPADM

## 2022-08-26 RX ORDER — PROCHLORPERAZINE EDISYLATE 5 MG/ML
5-10 INJECTION INTRAMUSCULAR; INTRAVENOUS EVERY 4 HOURS PRN
Status: DISCONTINUED | OUTPATIENT
Start: 2022-08-26 | End: 2022-08-27 | Stop reason: HOSPADM

## 2022-08-26 RX ORDER — POLYETHYLENE GLYCOL 3350 17 G/17G
1 POWDER, FOR SOLUTION ORAL
Status: DISCONTINUED | OUTPATIENT
Start: 2022-08-26 | End: 2022-08-27 | Stop reason: HOSPADM

## 2022-08-26 RX ORDER — ATENOLOL 25 MG/1
50 TABLET ORAL EVERY MORNING
Status: DISCONTINUED | OUTPATIENT
Start: 2022-08-26 | End: 2022-08-27 | Stop reason: HOSPADM

## 2022-08-26 RX ORDER — PROMETHAZINE HYDROCHLORIDE 25 MG/1
12.5-25 SUPPOSITORY RECTAL EVERY 4 HOURS PRN
Status: DISCONTINUED | OUTPATIENT
Start: 2022-08-26 | End: 2022-08-27 | Stop reason: HOSPADM

## 2022-08-26 RX ORDER — AMOXICILLIN 250 MG
2 CAPSULE ORAL 2 TIMES DAILY
Status: DISCONTINUED | OUTPATIENT
Start: 2022-08-26 | End: 2022-08-27 | Stop reason: HOSPADM

## 2022-08-26 RX ORDER — DULOXETIN HYDROCHLORIDE 30 MG/1
60 CAPSULE, DELAYED RELEASE ORAL EVERY EVENING
Status: DISCONTINUED | OUTPATIENT
Start: 2022-08-26 | End: 2022-08-27 | Stop reason: HOSPADM

## 2022-08-26 RX ORDER — ONDANSETRON 2 MG/ML
4 INJECTION INTRAMUSCULAR; INTRAVENOUS EVERY 4 HOURS PRN
Status: DISCONTINUED | OUTPATIENT
Start: 2022-08-26 | End: 2022-08-27 | Stop reason: HOSPADM

## 2022-08-26 RX ORDER — LISINOPRIL 20 MG/1
20 TABLET ORAL DAILY
Status: DISCONTINUED | OUTPATIENT
Start: 2022-08-27 | End: 2022-08-27 | Stop reason: HOSPADM

## 2022-08-26 RX ORDER — ACETAMINOPHEN 325 MG/1
650 TABLET ORAL EVERY 8 HOURS PRN
Status: DISCONTINUED | OUTPATIENT
Start: 2022-08-26 | End: 2022-08-27 | Stop reason: HOSPADM

## 2022-08-26 RX ORDER — GABAPENTIN 400 MG/1
800 CAPSULE ORAL 4 TIMES DAILY
Status: DISCONTINUED | OUTPATIENT
Start: 2022-08-26 | End: 2022-08-27 | Stop reason: HOSPADM

## 2022-08-26 RX ORDER — EMPAGLIFLOZIN AND METFORMIN HYDROCHLORIDE 12.5; 1 MG/1; MG/1
1 TABLET ORAL 2 TIMES DAILY
COMMUNITY
End: 2022-10-01

## 2022-08-26 RX ORDER — OXCARBAZEPINE 300 MG/1
300 TABLET, FILM COATED ORAL 2 TIMES DAILY
Status: DISCONTINUED | OUTPATIENT
Start: 2022-08-26 | End: 2022-08-27 | Stop reason: HOSPADM

## 2022-08-26 RX ADMIN — VANCOMYCIN HYDROCHLORIDE 1750 MG: 1 INJECTION, POWDER, LYOPHILIZED, FOR SOLUTION INTRAVENOUS at 14:55

## 2022-08-26 RX ADMIN — VANCOMYCIN HYDROCHLORIDE 1000 MG: 1 INJECTION, POWDER, LYOPHILIZED, FOR SOLUTION INTRAVENOUS at 23:30

## 2022-08-26 RX ADMIN — SODIUM CHLORIDE, POTASSIUM CHLORIDE, SODIUM LACTATE AND CALCIUM CHLORIDE 1000 ML: 600; 310; 30; 20 INJECTION, SOLUTION INTRAVENOUS at 14:00

## 2022-08-26 RX ADMIN — QUETIAPINE FUMARATE 200 MG: 100 TABLET ORAL at 18:41

## 2022-08-26 RX ADMIN — GABAPENTIN 800 MG: 400 CAPSULE ORAL at 21:08

## 2022-08-26 RX ADMIN — DULOXETINE HYDROCHLORIDE 60 MG: 30 CAPSULE, DELAYED RELEASE ORAL at 18:42

## 2022-08-26 RX ADMIN — INSULIN GLARGINE-YFGN 20 UNITS: 100 INJECTION, SOLUTION SUBCUTANEOUS at 18:48

## 2022-08-26 RX ADMIN — OXYCODONE HYDROCHLORIDE 5 MG: 5 TABLET ORAL at 23:29

## 2022-08-26 RX ADMIN — ATORVASTATIN CALCIUM 40 MG: 40 TABLET, FILM COATED ORAL at 18:42

## 2022-08-26 RX ADMIN — GABAPENTIN 800 MG: 400 CAPSULE ORAL at 18:47

## 2022-08-26 RX ADMIN — IBUPROFEN 200 MG: 200 TABLET, FILM COATED ORAL at 19:45

## 2022-08-26 RX ADMIN — OXCARBAZEPINE 300 MG: 300 TABLET, FILM COATED ORAL at 18:41

## 2022-08-26 RX ADMIN — SODIUM CHLORIDE: 9 INJECTION, SOLUTION INTRAVENOUS at 19:45

## 2022-08-26 RX ADMIN — RIVAROXABAN 10 MG: 10 TABLET, FILM COATED ORAL at 18:41

## 2022-08-26 RX ADMIN — SODIUM CHLORIDE, POTASSIUM CHLORIDE, SODIUM LACTATE AND CALCIUM CHLORIDE 2127 ML: 600; 310; 30; 20 INJECTION, SOLUTION INTRAVENOUS at 17:03

## 2022-08-26 ASSESSMENT — ENCOUNTER SYMPTOMS
SPUTUM PRODUCTION: 0
ABDOMINAL PAIN: 0
ORTHOPNEA: 0
VOMITING: 0
PALPITATIONS: 0
BACK PAIN: 0
NAUSEA: 0
INSOMNIA: 0
WHEEZING: 0
DEPRESSION: 0
SINUS PAIN: 0
WEIGHT LOSS: 0
BLOOD IN STOOL: 0
SORE THROAT: 0
EYE PAIN: 0
NERVOUS/ANXIOUS: 0
SHORTNESS OF BREATH: 0
HEARTBURN: 0
STRIDOR: 0
FEVER: 0
BLURRED VISION: 0
HEADACHES: 0
FOCAL WEAKNESS: 0
CHILLS: 0
EYE DISCHARGE: 0
CLAUDICATION: 0
NECK PAIN: 0
DIZZINESS: 0
MYALGIAS: 0
COUGH: 0
SEIZURES: 0
CONSTIPATION: 0
DIARRHEA: 0
EYE REDNESS: 0

## 2022-08-26 ASSESSMENT — FIBROSIS 4 INDEX
FIB4 SCORE: 0.34
FIB4 SCORE: 0.57
FIB4 SCORE: 0.34

## 2022-08-26 ASSESSMENT — LIFESTYLE VARIABLES
TOTAL SCORE: 0
EVER FELT BAD OR GUILTY ABOUT YOUR DRINKING: NO
ALCOHOL_USE: NO
EVER HAD A DRINK FIRST THING IN THE MORNING TO STEADY YOUR NERVES TO GET RID OF A HANGOVER: NO
HOW MANY TIMES IN THE PAST YEAR HAVE YOU HAD 5 OR MORE DRINKS IN A DAY: 0
AVERAGE NUMBER OF DAYS PER WEEK YOU HAVE A DRINK CONTAINING ALCOHOL: 0
TOTAL SCORE: 0
CONSUMPTION TOTAL: NEGATIVE
HAVE PEOPLE ANNOYED YOU BY CRITICIZING YOUR DRINKING: NO
TOTAL SCORE: 0
HAVE YOU EVER FELT YOU SHOULD CUT DOWN ON YOUR DRINKING: NO
ON A TYPICAL DAY WHEN YOU DRINK ALCOHOL HOW MANY DRINKS DO YOU HAVE: 0

## 2022-08-26 ASSESSMENT — PATIENT HEALTH QUESTIONNAIRE - PHQ9
SUM OF ALL RESPONSES TO PHQ9 QUESTIONS 1 AND 2: 0
2. FEELING DOWN, DEPRESSED, IRRITABLE, OR HOPELESS: NOT AT ALL
1. LITTLE INTEREST OR PLEASURE IN DOING THINGS: NOT AT ALL

## 2022-08-26 ASSESSMENT — PAIN DESCRIPTION - PAIN TYPE
TYPE: ACUTE PAIN

## 2022-08-26 NOTE — ED NOTES
Pharmacy Medication Reconciliation    ~Med rec updated and complete per patient at bedside  ~Allergies have been verified and updated  ~Pt home pharmacy: [facility]      ~Patient reports that she is on a course of Doxycycline that was started on 08/25/2022

## 2022-08-26 NOTE — ED PROVIDER NOTES
"ED Provider Note    Scribed for Dario Naylor M.D. by Arsenio Calix. 8/26/2022  1:35 PM    Primary care provider: Adriana Levine M.D.  Means of arrival: Walk in  History obtained from: Patient  History limited by: None    CHIEF COMPLAINT  Chief Complaint   Patient presents with    Hyperglycemia     Pt feels she my be going into DKA   in triage Heavy breathing x 1 hr  Under going a med change last week and \" my blood sugars are all over the place \"    Abscess     RT groin x 1 wk On oral ABX Only had 2 doses No fever Some pus draunage  No cough       HPI  Jacque Mcintyre is a 41 y.o. female who presents to the Emergency Department for evaluation of moderate hyperglycemia onset prior to arrival. The patient states that her blood sugar was measured to be 275 earlier today and was prompted to visit the ED over concerns that she may be going into DKA. She was recently admitted to the ICU for an episode of DKA. Additionally, the patient reports that she changed medications including metformin and tresiba. Associated symptoms include nausea, wound drainage, and an abscess. The patient states that states that she has an abscess to the right side of her groin for which she has been prescribed a course of Doxycycline. She has taken 2 100 mg doses thus far. The patient denies any dysuria, fever, chills, diarrhea, abdominal pain, cough, shortness of breath, or recent foreign travel. She does not medicate with any daily blood thinners. Patient is allergic to Augmentin with adverse effects including vomiting and diarrhea. No alleviating or exacerbating factors noted.     REVIEW OF SYSTEMS  Pertinent positives include: hyperglycemia, nausea, wound drainage, and an abscess. Pertinent negatives include: dysuria, fever, chills, diarrhea, abdominal pain, cough, shortness of breath, or recent foreign travel. See history of present illness. All other systems are negative.     PAST MEDICAL HISTORY   has a past medical " history of Asthma, Bipolar 1 disorder (HCC), Cancer (HCC) (01/01/2016), Cough, Depression, DM mellitus,, Hyperlipidemia, Hypertension, Pap smear, PCOS (polycystic ovarian syndrome), Shortness of breath, Sputum production, and Wheezing.    SURGICAL HISTORY   has a past surgical history that includes myringotomy; adenoidectomy; other (2016); wide excision (Left, 5/9/2017); node biopsy sentinel (Left, 5/9/2017); axillary node dissection (Left, 6/23/2017); lap,appendectomy (N/A, 10/7/2019); bronchoscopy,diagnostic (N/A, 6/17/2022); and endobronchial us add-on (N/A, 6/17/2022).    SOCIAL HISTORY  Social History     Tobacco Use    Smoking status: Every Day     Packs/day: 0.75     Years: 10.00     Pack years: 7.50     Types: Cigarettes    Smokeless tobacco: Never   Vaping Use    Vaping Use: Never used   Substance Use Topics    Alcohol use: Not Currently    Drug use: Yes     Types: Inhaled     Comment: occasional smoked marijuanna approx 3 times per month      Social History     Substance and Sexual Activity   Drug Use Yes    Types: Inhaled    Comment: occasional smoked marijuanna approx 3 times per month       FAMILY HISTORY  Family History   Problem Relation Age of Onset    Psychiatric Illness Mother         depression    Diabetes Mother     Hyperlipidemia Mother     Thyroid Mother         s/p radioactive iodine treatment on replacement    Hypertension Father     Diabetes Father        CURRENT MEDICATIONS  Home Medications       Reviewed by Samia Franklin (Pharmacy Tech) on 08/26/22 at 1530  Med List Status: Complete     Medication Last Dose Status   acetaminophen (TYLENOL) 500 MG Tab 8/25/2022 Active   albuterol (PROVENTIL HFA) 108 (90 BASE) MCG/ACT Aero Soln inhalation aerosol 8/26/2022 Active   atenolol (TENORMIN) 50 MG Tab 8/25/2022 Active   atorvastatin (LIPITOR) 40 MG Tab 8/25/2022 Active   clonazePAM (KLONOPIN) 0.5 MG Tab 8/26/2022 Active   Continuous Blood Gluc  (FREESTYLE RICARDA 14 DAY READER) Device   "Active   doxycycline (VIBRAMYCIN) 100 MG Tab 8/26/2022 Active   DULoxetine (CYMBALTA) 60 MG Cap DR Particles delayed-release capsule 8/25/2022 Active   Empagliflozin-metFORMIN HCl (SYNJARDY) 12.5-1000 MG Tab 8/25/2022 Active   gabapentin (NEURONTIN) 800 MG tablet 8/26/2022 Active   ibuprofen (MOTRIN) 200 MG Tab PRN Active   Insulin Degludec (TRESIBA FLEXTOUCH SC) 8/25/2022 Active   lisinopril (PRINIVIL) 20 MG Tab LAST WEEK Active   meloxicam (MOBIC) 15 MG tablet 8/26/2022 Active   ondansetron (ZOFRAN ODT) 4 MG TABLET DISPERSIBLE 8/26/2022 Active   ONETOUCH ULTRA strip N/A Active   OXcarbazepine (TRILEPTAL) 300 MG Tab 8/26/2022 Active   QUEtiapine (SEROQUEL) 200 MG Tab 8/25/2022 Active                    ALLERGIES  Allergies   Allergen Reactions    Amoxicillin-Pot Clavulanate Vomiting    Clavulanic Acid Vomiting     Violent vomiting       PHYSICAL EXAM  VITAL SIGNS: /64   Pulse 82   Temp 36.2 °C (97.1 °F) (Temporal)   Resp 18   Ht 1.626 m (5' 4\")   Wt 70.9 kg (156 lb 4.9 oz)   LMP 08/07/2022 (Exact Date)   SpO2 97%   BMI 26.83 kg/m²     Constitutional: Alert in no apparent distress.  HENT: No signs of trauma, Bilateral external ears normal, Nose normal. Uvula midline.   Eyes: Pupils are equal and reactive, Conjunctiva normal, Non-icteric.   Neck: Normal range of motion, No tenderness, Supple, No stridor.   Lymphatic: No lymphadenopathy noted.   Cardiovascular: Regular rate and rhythm, no murmurs.   Thorax & Lungs: Normal breath sounds, No respiratory distress, No wheezing, No chest tenderness.   Abdomen:  Soft, No tenderness, No peritoneal signs, No masses, No pulsatile masses.   Skin: Warm, Dry, No erythema, No rash.   Back: No bony tenderness, No CVA tenderness.   Extremities: 1.5 cm area of cellulitis to the medial right thigh, no fluctuants or crepitus. Intact distal pulses, No edema, No tenderness, No cyanosis.  Musculoskeletal: Good range of motion in all major joints. No tenderness to palpation " "or major deformities noted.   Neurologic: Alert , Normal motor function, Normal sensory function, No focal deficits noted.   Psychiatric: Affect normal, Judgment normal, Mood normal.     DIAGNOSTIC STUDIES / PROCEDURES    LABS  Labs Reviewed   CBC WITH DIFFERENTIAL - Abnormal; Notable for the following components:       Result Value    WBC 11.1 (*)     MCH 26.7 (*)     MCHC 31.9 (*)     Neutrophils-Polys 80.50 (*)     Lymphocytes 13.60 (*)     Neutrophils (Absolute) 8.91 (*)     All other components within normal limits   COMP METABOLIC PANEL - Abnormal; Notable for the following components:    Glucose 235 (*)     Alkaline Phosphatase 130 (*)     All other components within normal limits   LACTIC ACID - Abnormal; Notable for the following components:    Lactic Acid 3.7 (*)     All other components within normal limits   HCG QUAL SERUM   LACTIC ACID   ESTIMATED GFR   BLOOD CULTURE    Narrative:     1 of 2 for Blood Culture x 2 sites order. Per Hospital  Policy: Only change Specimen Src: to \"Line\" if specified by  physician order.   BLOOD CULTURE    Narrative:     2 of 2 blood culture x2  Sites order. Per Hospital Policy:  Only change Specimen Src: to \"Line\" if specified by physician  order.   BETA-HYDROXYBUTYRIC ACID   LACTIC ACID   S. AUREUS BY PCR, NASAL COMPLETE      All labs reviewed by me.    EKG  12 Lead EKG interpreted by me as detailed below    Results for orders placed or performed during the hospital encounter of 22   EKG   Result Value Ref Range    Report       [facility] Emergency Dept.    Test Date:  2022  Pt Name:    KALEE CENTENO                    Department: [facility]  MRN:        [id number]                      Room:  Gender:     Female                       Technician: [id number]  :        1980                   Requested By:ER TRIAGE PROTOCOL  Order #:    [id number]                    Reading MD:    Measurements  Intervals                                Axis  Rate:      "  78                           P:          24  TN:         127                          QRS:        62  QRSD:       98                           T:          60  QT:         400  QTc:        456    Interpretive Statements  Sinus rhythm  Compared to ECG 05/19/2022 15:00:55  Sinus tachycardia no longer present         COURSE & MEDICAL DECISION MAKING  Nursing notes, VS, PMSFHx reviewed in chart.    41 y.o. female p/w chief complaint of hyperglycemia onset prior to arrival.    #hyperglycemia, nausea, wound drainage, and an abscess  -DKA, sepsis  Blood work remarkable for above  Treated with LR Bolus for hyperglycemia    1:35 PM Patient seen and examined at bedside. The patient will be treated with LR Bolus. Ordered EKG, Beta-HCG Qualitative Serum, CBC with diff, Venous Blood Gas, Blood Culture, Lactic Acid, and Beta-Hydroxybutyric Acid. Discussed utilizing labs to further evaluate the patient, she is amenable to the plan of care.     2:25 PM - Ordered Vancomycin 1750 mg/500 mL NS IVPB to treat the patient.    3:12 PM - Paged Hospitalist    3:13 PM -  I discussed the patient's case and the above findings with Dr. Kidd (Hospitalist) who will assess the patient for hospitalization.     Intravenous fluids administered for hyperglycemia.  Patient not appropriate for oral rehydration, as surgical process needs to be ruled out before trial of oral rehydration.   On repeat evaluation, Pt w/ positive fluid response.    I verified that the patient was wearing a mask and I was wearing appropriate PPE every time I entered the room. The patient's mask was on the patient at all times during my encounter except for a brief view of the oropharynx.       DISPOSITION:  Patient will be hospitalized by Dr. Kidd in guarded condition.      FINAL IMPRESSION  1. Hyperglycemia    2. Cellulitis of right lower extremity          IArsenio (Scribe), am scribing for, and in the presence of, Dario Naylor M.D..    Electronically signed by:  Arsenio Calix (Scribe), 8/26/2022    IDario M.D. personally performed the services described in this documentation, as scribed by Arsenio Calix in my presence, and it is both accurate and complete.    The note accurately reflects work and decisions made by me.  Dario Naylor M.D.  8/26/2022  7:50 PM

## 2022-08-26 NOTE — H&P
"Hospital Medicine History & Physical Note    Date of Service  8/26/2022    Primary Care Physician  Adriana Levine M.D.    Consultants      Code Status  Full Code    Chief Complaint  Chief Complaint   Patient presents with    Hyperglycemia     Pt feels she my be going into DKA   in triage Heavy breathing x 1 hr  Under going a med change last week and \" my blood sugars are all over the place \"    Abscess     RT groin x 1 wk On oral ABX Only had 2 doses No fever Some pus draunage  No cough       History of Presenting Illness  Jacque Mcintyre is a 41 y.o. female who presented 8/26/2022 with cellulitis around her right inner thigh for the past 1 week.  It started out as a small little bump which she tried to irritate  With her nails and it get worse a little bit.  She took doxycycline since yesterday without much improvement.  So she decided to come to ER.  There is no enough abscess noticed in the area to drain.  She was started on antibiotic.  She will be admitted for observation.  In addition she was found to have lactic acid of 3.7.  Also started on IV fluid.    I discussed the plan of care with patient.    Review of Systems  Review of Systems   Constitutional:  Negative for chills, fever and weight loss.   HENT:  Negative for congestion, hearing loss, nosebleeds, sinus pain and sore throat.    Eyes:  Negative for blurred vision, pain, discharge and redness.   Respiratory:  Negative for cough, sputum production, shortness of breath, wheezing and stridor.    Cardiovascular:  Negative for chest pain, palpitations, orthopnea and claudication.   Gastrointestinal:  Negative for abdominal pain, blood in stool, constipation, diarrhea, heartburn, nausea and vomiting.   Genitourinary:  Negative for dysuria, frequency, hematuria and urgency.   Musculoskeletal:  Negative for back pain, myalgias and neck pain.   Skin:  Negative for itching and rash.   Neurological:  Negative for dizziness, focal weakness, seizures " and headaches.   Psychiatric/Behavioral:  Negative for depression. The patient is not nervous/anxious and does not have insomnia.      Past Medical History   has a past medical history of Asthma, Bipolar 1 disorder (HCC), Cancer (HCC) (2016), Cough, Depression, DM mellitus,, Hyperlipidemia, Hypertension, Pap smear, PCOS (polycystic ovarian syndrome), Shortness of breath, Sputum production, and Wheezing.    Surgical History   has a past surgical history that includes myringotomy; adenoidectomy; other (); wide excision (Left, 2017); node biopsy sentinel (Left, 2017); axillary node dissection (Left, 2017); pr lap,appendectomy (N/A, 10/7/2019); pr bronchoscopy,diagnostic (N/A, 2022); and endobronchial us add-on (N/A, 2022).     Family History  family history includes Diabetes in her father and mother; Hyperlipidemia in her mother; Hypertension in her father; Psychiatric Illness in her mother; Thyroid in her mother.       Social History   reports that she has been smoking cigarettes. She has a 7.50 pack-year smoking history. She has never used smokeless tobacco. She reports that she does not currently use alcohol. She reports current drug use. Drug: Inhaled.    Allergies  Allergies   Allergen Reactions    Amoxicillin-Pot Clavulanate Vomiting    Clavulanic Acid Vomiting     Violent vomiting       Medications  Prior to Admission Medications   Prescriptions Last Dose Informant Patient Reported? Taking?   Continuous Blood Gluc  (FREESTYLE RICARDA 14 DAY READER) Device  Patient Yes Yes   Si Each every 14 days.   DULoxetine (CYMBALTA) 60 MG Cap DR Particles delayed-release capsule 2022 at PM Patient Yes No   Sig: Take 60 mg by mouth every evening.   Empagliflozin-metFORMIN HCl (SYNJARDY) 12.5-1000 MG Tab 2022 at PM Patient Yes Yes   Sig: Take 1 Tablet by mouth 2 times a day.   Insulin Degludec (TRESIBA FLEXTOUCH SC) 2022 at PM Patient Yes Yes   Sig: Inject 20 Units  under the skin every evening.   ONETOUCH ULTRA strip N/A at N/A Patient Yes No   OXcarbazepine (TRILEPTAL) 300 MG Tab 8/26/2022 at AM Patient Yes No   Sig: Take 300 mg by mouth 2 times a day.   QUEtiapine (SEROQUEL) 200 MG Tab 8/25/2022 at PM Patient Yes No   Sig: Take 200 mg by mouth every evening.   acetaminophen (TYLENOL) 500 MG Tab 8/25/2022 at PM Patient Yes No   Sig: Take 1,000 mg by mouth 2 times a day.   albuterol (PROVENTIL HFA) 108 (90 BASE) MCG/ACT Aero Soln inhalation aerosol 8/26/2022 at AM Patient No No   Sig: Inhale 2 Puffs by mouth every 6 hours as needed.   atenolol (TENORMIN) 50 MG Tab 8/25/2022 at AM Patient Yes No   Sig: Take 50 mg by mouth every morning.   atorvastatin (LIPITOR) 40 MG Tab 8/25/2022 at PM Patient Yes No   Sig: Take 40 mg by mouth every evening.   clonazePAM (KLONOPIN) 0.5 MG Tab 8/26/2022 at AM Patient Yes No   Sig: Take 0.25 mg by mouth 2 times a day as needed (ANXIETY).   doxycycline (VIBRAMYCIN) 100 MG Tab 8/26/2022 at AM Patient Yes Yes   Sig: Take 100 mg by mouth 2 times a day.   gabapentin (NEURONTIN) 800 MG tablet 8/26/2022 at AM Patient Yes No   Sig: Take 800 mg by mouth 4 times a day.   ibuprofen (MOTRIN) 200 MG Tab PRN at PRN Patient Yes No   Sig: Take 200 mg by mouth every 6 hours as needed.   lisinopril (PRINIVIL) 20 MG Tab LAST WEEK at LAST WEEK Patient Yes No   Sig: Take 20 mg by mouth every day.   meloxicam (MOBIC) 15 MG tablet 8/26/2022 at AM Patient Yes No   Sig: Take 15 mg by mouth every day.   ondansetron (ZOFRAN ODT) 4 MG TABLET DISPERSIBLE 8/26/2022 at AM Patient Yes No   Sig: Take 4 mg by mouth every 6 hours as needed.      Facility-Administered Medications: None       Physical Exam  Temp:  [36.2 °C (97.1 °F)] 36.2 °C (97.1 °F)  Pulse:  [82] 82  Resp:  [18] 18  BP: (107)/(64) 107/64  SpO2:  [97 %] 97 %  Blood Pressure: 107/64   Temperature: 36.2 °C (97.1 °F)   Pulse: 82   Respiration: 18   Pulse Oximetry: 97 %       Physical Exam  Vitals reviewed.    Constitutional:       General: She is not in acute distress.     Appearance: Normal appearance. She is normal weight. She is not ill-appearing, toxic-appearing or diaphoretic.   HENT:      Head: Normocephalic and atraumatic.      Right Ear: Tympanic membrane, ear canal and external ear normal.      Left Ear: Tympanic membrane, ear canal and external ear normal.      Nose: No congestion or rhinorrhea.   Eyes:      Extraocular Movements: Extraocular movements intact.      Conjunctiva/sclera: Conjunctivae normal.      Pupils: Pupils are equal, round, and reactive to light.   Cardiovascular:      Rate and Rhythm: Normal rate and regular rhythm.      Pulses: Normal pulses.      Heart sounds: Normal heart sounds. No murmur heard.    No friction rub. No gallop.   Pulmonary:      Effort: Pulmonary effort is normal. No respiratory distress.      Breath sounds: Normal breath sounds. No stridor. No wheezing or rhonchi.   Abdominal:      General: Bowel sounds are normal. There is no distension.      Palpations: Abdomen is soft. There is no mass.      Tenderness: There is no abdominal tenderness. There is no guarding or rebound.      Hernia: No hernia is present.   Musculoskeletal:         General: No swelling or tenderness.      Cervical back: Normal range of motion and neck supple.   Skin:     Findings: Erythema present.      Comments: There is a small area of cellulitis with central skin opening around her right inner thigh   Neurological:      General: No focal deficit present.      Mental Status: She is alert and oriented to person, place, and time.       Laboratory:  Recent Labs     08/26/22  1350   WBC 11.1*   RBC 4.72   HEMOGLOBIN 12.6   HEMATOCRIT 39.5   MCV 83.7   MCH 26.7*   MCHC 31.9*   RDW 49.3   PLATELETCT 422   MPV 10.4     Recent Labs     08/26/22  1350   SODIUM 140   POTASSIUM 3.9   CHLORIDE 104   CO2 20   GLUCOSE 235*   BUN 14   CREATININE 0.52   CALCIUM 9.4     Recent Labs     08/26/22  1350   ALTSGPT 13    ASTSGOT 12   ALKPHOSPHAT 130*   TBILIRUBIN 0.2   GLUCOSE 235*         No results for input(s): NTPROBNP in the last 72 hours.      No results for input(s): TROPONINT in the last 72 hours.    Imaging:  No orders to display           Assessment/Plan:  Justification for Admission Status  I anticipate this patient is appropriate for observation status at this time because cellulitis    Patient will need a Med/Surg bed on MEDICAL service .  The need is secondary to cellulitis.    * Cellulitis- (present on admission)  Assessment & Plan  Started on vancomycin  De-escalate as needed  Ordered MRSA nares    Leucocytosis  Assessment & Plan  Related to above    DM2 (diabetes mellitus, type 2) (HCC)- (present on admission)  Assessment & Plan  On SSI      VTE prophylaxis: Xarelto 10 mg daily as prophylaxis

## 2022-08-26 NOTE — DISCHARGE PLANNING
Met with pt and her mom. Pt is an RN at [employer]. She is independent with ADLs and IADLs. Mom is able to help her whenever she needs it. Pt has a 17 year old son who can also help her.     PCP is Dr. Wright. Pharmacy is [facility] on [address]. Verified information on facesheet.       Care Transition Team Assessment    Information Source  Orientation Level: Oriented X4  Information Given By: Patient  Who is responsible for making decisions for patient? : Patient    Readmission Evaluation  Is this a readmission?: No    Elopement Risk  Legal Hold: No    Interdisciplinary Discharge Planning  Does Admitting Nurse Feel This Could be a Complex Discharge?: No  Primary Care Physician: Dr Wright  Lives with - Patient's Self Care Capacity: Child Less than 18 Years of Age  Support Systems: Children, Parent  Housing / Facility: 1 Story Apartment / Condo  Do You Take your Prescribed Medications Regularly: Yes  Able to Return to Previous ADL's: Yes  Mobility Issues: No  Prior Services: None, Home-Independent  Patient Prefers to be Discharged to:: Home  Assistance Needed: No  Durable Medical Equipment: Not Applicable    Discharge Preparedness  What is your plan after discharge?: Home with help  What are your discharge supports?: Child, Parent  Prior Functional Level: Ambulatory, Drives Self, Independent with Activities of Daily Living, Independent with Medication Management  Difficulity with ADLs: None  Difficulity with IADLs: None    Functional Assesment  Prior Functional Level: Ambulatory, Drives Self, Independent with Activities of Daily Living, Independent with Medication Management    Finances  Financial Barriers to Discharge: No  Prescription Coverage: Yes    Vision / Hearing Impairment  Vision Impairment : Yes  Hearing Impairment : No    Values / Beliefs / Concerns  Values / Beliefs Concerns : No    Advance Directive  Advance Directive?: None    Domestic Abuse  Have you ever been the victim of abuse or violence?: No  Physical Abuse  or Sexual Abuse: No         Discharge Risks or Barriers  Discharge risks or barriers?: No  Patient risk factors: Other (comment)    Anticipated Discharge Information  Discharge Disposition: Discharged to home/self care (01)

## 2022-08-26 NOTE — PROGRESS NOTES
"Pharmacy Vancomycin Kinetics Note for 8/26/2022     41 y.o. female on Vancomycin day # 1     Vancomycin Indication (AUC Dosing): Skin/skin structure infection    Provider specified end date: 08/29/22    Active Antibiotics (From admission, onward)      Ordered     Ordering Provider       Fri Aug 26, 2022  4:10 PM    08/26/22 1610  vancomycin 1000 mg in 250 mL NS IVPB Premix  (vancomycin (VANCOCIN) IV (LD + Maintenance))  EVERY 8 HOURS        Note to Pharmacy: Per P&T kinetics protocol    Kirill Kidd M.D.       Fri Aug 26, 2022  3:39 PM    08/26/22 1539  MD Alert...Vancomycin per Pharmacy  PHARMACY TO DOSE        Question:  Indication(s) for vancomycin?  Answer:  Skin and soft tissue infection    Kirill Kidd M.D.       Fri Aug 26, 2022  2:25 PM    08/26/22 1425  vancomycin 1750 mg/500mL NS IVPB premix  (vancomycin (VANCOCIN) IV (LD + Maintenance))  ONCE        Note to Pharmacy: Per P&T kinetics protocol    Dario Naylor M.D.            Dosing Weight: 70.9 kg (156 lb 4.9 oz)      Admission History: Admitted on 8/26/2022 for Cellulitis [L03.90]  Pertinent history: Patient presents to the ED with worsening right thigh wound that has had pus and irritation. Has been on doxycyline. Noted to have a cellulitis in the surrounding area.    Allergies:     Amoxicillin-pot clavulanate and Clavulanic acid     Pertinent cultures to date:     Results       Procedure Component Value Units Date/Time    S. Aureus By PCR, Nasal Complete [[id number]]     Order Status: Sent Specimen: Respirate from Respiratory     Blood Culture [[id number]] Collected: 08/26/22 1425    Order Status: Sent Specimen: Blood from Peripheral Updated: 08/26/22 1430    Narrative:      2 of 2 blood culture x2  Sites order. Per Hospital Policy:  Only change Specimen Src: to \"Line\" if specified by physician  order.    Blood Culture [[id number]] Collected: 08/26/22 1415    Order Status: Sent Specimen: Blood from Peripheral Updated: 08/26/22 1429    Narrative:      1 " "of 2 for Blood Culture x 2 sites order. Per Hospital  Policy: Only change Specimen Src: to \"Line\" if specified by  physician order.            Labs:     Estimated Creatinine Clearance: 134.8 mL/min (by C-G formula based on SCr of 0.52 mg/dL).  Recent Labs     22  1350   WBC 11.1*   NEUTSPOLYS 80.50*     Recent Labs     22  1350   BUN 14   CREATININE 0.52   ALBUMIN 4.4     No intake or output data in the 24 hours ending 22 1610   /64   Pulse 82   Temp 36.2 °C (97.1 °F) (Temporal)   Resp 18   Ht 1.626 m (5' 4\")   Wt 70.9 kg (156 lb 4.9 oz)   SpO2 97%  Temp (24hrs), Av.2 °C (97.1 °F), Min:36.2 °C (97.1 °F), Max:36.2 °C (97.1 °F)      List concerns for Vancomycin clearance:     None    Pharmacokinetics:     AUC kinetics:   Ke (hr ^-1): 0.1156 hr^-1  Half life: 6 hr  Clearance: 5.327  Estimated TDD: 2663.5  Estimated Dose: 888  Estimated interval: 8      A/P:     -  Vancomycin dose: Received 1750 mg IV in the ED, will start 1 g IV q8 hours at 2300 tonight     -  Next vancomycin level(s):    -No levels ordered at this time. If patient continues on Vancomycin, trough/peak to be ordered around 5th total dose when at steady state.      -  Predicted vancomycin AUC from initial AUC test calculator: 563 mg·hr/L    -  Comments: Wound (abscess) not large enough to drain, already opened and has been draining pus at home. MRSA PCR ordered. F/u and de-escalate as appropriate.     Mari Zuluaga, PharmD    "

## 2022-08-27 VITALS
DIASTOLIC BLOOD PRESSURE: 76 MMHG | OXYGEN SATURATION: 91 % | BODY MASS INDEX: 27.21 KG/M2 | HEART RATE: 60 BPM | TEMPERATURE: 98 F | WEIGHT: 159.39 LBS | RESPIRATION RATE: 16 BRPM | SYSTOLIC BLOOD PRESSURE: 104 MMHG | HEIGHT: 64 IN

## 2022-08-27 LAB
ALBUMIN SERPL BCP-MCNC: 3.5 G/DL (ref 3.2–4.9)
ALBUMIN/GLOB SERPL: 1.7 G/DL
ALP SERPL-CCNC: 103 U/L (ref 30–99)
ALT SERPL-CCNC: 10 U/L (ref 2–50)
ANION GAP SERPL CALC-SCNC: 10 MMOL/L (ref 7–16)
AST SERPL-CCNC: 10 U/L (ref 12–45)
BILIRUB SERPL-MCNC: <0.2 MG/DL (ref 0.1–1.5)
BUN SERPL-MCNC: 10 MG/DL (ref 8–22)
CALCIUM SERPL-MCNC: 8.3 MG/DL (ref 8.4–10.2)
CHLORIDE SERPL-SCNC: 105 MMOL/L (ref 96–112)
CO2 SERPL-SCNC: 22 MMOL/L (ref 20–33)
CREAT SERPL-MCNC: 0.41 MG/DL (ref 0.5–1.4)
ERYTHROCYTE [DISTWIDTH] IN BLOOD BY AUTOMATED COUNT: 51.3 FL (ref 35.9–50)
GFR SERPLBLD CREATININE-BSD FMLA CKD-EPI: 125 ML/MIN/1.73 M 2
GLOBULIN SER CALC-MCNC: 2.1 G/DL (ref 1.9–3.5)
GLUCOSE BLD STRIP.AUTO-MCNC: 145 MG/DL (ref 65–99)
GLUCOSE BLD STRIP.AUTO-MCNC: 154 MG/DL (ref 65–99)
GLUCOSE BLD STRIP.AUTO-MCNC: 166 MG/DL (ref 65–99)
GLUCOSE SERPL-MCNC: 215 MG/DL (ref 65–99)
HCT VFR BLD AUTO: 35.6 % (ref 37–47)
HGB BLD-MCNC: 11 G/DL (ref 12–16)
MCH RBC QN AUTO: 26.5 PG (ref 27–33)
MCHC RBC AUTO-ENTMCNC: 30.9 G/DL (ref 33.6–35)
MCV RBC AUTO: 85.8 FL (ref 81.4–97.8)
PLATELET # BLD AUTO: 339 K/UL (ref 164–446)
PMV BLD AUTO: 10.5 FL (ref 9–12.9)
POTASSIUM SERPL-SCNC: 3.8 MMOL/L (ref 3.6–5.5)
PROT SERPL-MCNC: 5.6 G/DL (ref 6–8.2)
RBC # BLD AUTO: 4.15 M/UL (ref 4.2–5.4)
SODIUM SERPL-SCNC: 137 MMOL/L (ref 135–145)
WBC # BLD AUTO: 7.6 K/UL (ref 4.8–10.8)

## 2022-08-27 PROCEDURE — 96366 THER/PROPH/DIAG IV INF ADDON: CPT

## 2022-08-27 PROCEDURE — G0378 HOSPITAL OBSERVATION PER HR: HCPCS

## 2022-08-27 PROCEDURE — 85027 COMPLETE CBC AUTOMATED: CPT

## 2022-08-27 PROCEDURE — 99217 PR OBSERVATION CARE DISCHARGE: CPT | Performed by: HOSPITALIST

## 2022-08-27 PROCEDURE — RXMED WILLOW AMBULATORY MEDICATION CHARGE: Performed by: HOSPITALIST

## 2022-08-27 PROCEDURE — 36415 COLL VENOUS BLD VENIPUNCTURE: CPT

## 2022-08-27 PROCEDURE — 94760 N-INVAS EAR/PLS OXIMETRY 1: CPT

## 2022-08-27 PROCEDURE — 700111 HCHG RX REV CODE 636 W/ 250 OVERRIDE (IP): Performed by: INTERNAL MEDICINE

## 2022-08-27 PROCEDURE — 700102 HCHG RX REV CODE 250 W/ 637 OVERRIDE(OP): Performed by: INTERNAL MEDICINE

## 2022-08-27 PROCEDURE — 96372 THER/PROPH/DIAG INJ SC/IM: CPT

## 2022-08-27 PROCEDURE — A9270 NON-COVERED ITEM OR SERVICE: HCPCS | Performed by: INTERNAL MEDICINE

## 2022-08-27 PROCEDURE — 80053 COMPREHEN METABOLIC PANEL: CPT

## 2022-08-27 PROCEDURE — 82962 GLUCOSE BLOOD TEST: CPT

## 2022-08-27 RX ORDER — LINEZOLID 600 MG/1
600 TABLET, FILM COATED ORAL 2 TIMES DAILY
Qty: 18 TABLET | Refills: 0 | Status: SHIPPED | OUTPATIENT
Start: 2022-08-27 | End: 2022-09-22

## 2022-08-27 RX ADMIN — IBUPROFEN 200 MG: 200 TABLET, FILM COATED ORAL at 06:37

## 2022-08-27 RX ADMIN — INSULIN HUMAN 1 UNITS: 100 INJECTION, SOLUTION PARENTERAL at 06:11

## 2022-08-27 RX ADMIN — IBUPROFEN 200 MG: 200 TABLET, FILM COATED ORAL at 12:55

## 2022-08-27 RX ADMIN — ATENOLOL 50 MG: 25 TABLET ORAL at 06:05

## 2022-08-27 RX ADMIN — INSULIN HUMAN 1 UNITS: 100 INJECTION, SOLUTION PARENTERAL at 11:02

## 2022-08-27 RX ADMIN — GABAPENTIN 800 MG: 400 CAPSULE ORAL at 08:01

## 2022-08-27 RX ADMIN — OXCARBAZEPINE 300 MG: 300 TABLET, FILM COATED ORAL at 06:05

## 2022-08-27 RX ADMIN — GABAPENTIN 800 MG: 400 CAPSULE ORAL at 12:55

## 2022-08-27 RX ADMIN — VANCOMYCIN HYDROCHLORIDE 1000 MG: 1 INJECTION, POWDER, LYOPHILIZED, FOR SOLUTION INTRAVENOUS at 06:04

## 2022-08-27 RX ADMIN — LISINOPRIL 20 MG: 20 TABLET ORAL at 06:05

## 2022-08-27 RX ADMIN — CLONAZEPAM 0.25 MG: 0.5 TABLET ORAL at 06:43

## 2022-08-27 ASSESSMENT — COGNITIVE AND FUNCTIONAL STATUS - GENERAL
SUGGESTED CMS G CODE MODIFIER MOBILITY: CH
MOBILITY SCORE: 24
DAILY ACTIVITIY SCORE: 24
SUGGESTED CMS G CODE MODIFIER DAILY ACTIVITY: CH

## 2022-08-27 ASSESSMENT — PAIN DESCRIPTION - PAIN TYPE
TYPE: ACUTE PAIN
TYPE: ACUTE PAIN

## 2022-08-27 NOTE — PROGRESS NOTES
Received report from night shift RN. Patient resting in bed, able to ambulate to bathroom this AM with a steady gait. Safety precautions in place. Call light, belongings in reach.

## 2022-08-27 NOTE — PROGRESS NOTES
Pt stated her pain is still elevated and is asking for pain medication. Dr. Torres updated, orders received.

## 2022-08-27 NOTE — DISCHARGE SUMMARY
"Discharge Summary    CHIEF COMPLAINT ON ADMISSION  Chief Complaint   Patient presents with    Hyperglycemia     Pt feels she my be going into DKA   in triage Heavy breathing x 1 hr  Under going a med change last week and \" my blood sugars are all over the place \"    Abscess     RT groin x 1 wk On oral ABX Only had 2 doses No fever Some pus draunage  No cough       Reason for Admission   Possible DKA, Shortness of Breath     Admission Date  8/26/2022    CODE STATUS  Full Code    HPI & HOSPITAL COURSE  Ms. Jacque Menchaca is a 41-year-old female comes into the hospital complaining of a pain in her right groin.  Patient reported 7 to 8 days ago to the outpatient clinic for evaluation.  At that point she was placed on oral antibiotics with doxycycline.  She has failed these antibiotics and thus came into the emergency room as her blood sugars were also elevated.  Patient was quickly placed on vancomycin and has received at this point 3 doses.  The patient's abscess is almost completely resolved.  Patient has remained afebrile.  Heart rate and blood pressure have been normal.  White blood cell count which was initially elevated at 11.1 has come under control and most recently 7.6.  At this point will switch patient over to oral Zyvox.  She will complete a 10-day course of antibiotics.  Patient otherwise has been stabilized can discharge home with outpatient follow-ups and monitoring.  Currently alert awake oriented x3 pleasant cooperative female understands her discharge plan instructions.    Therefore, she is discharged in good and stable condition to home with close outpatient follow-up.    The patient recovered much more quickly than anticipated on admission.    Discharge Date  8/27/2022    FOLLOW UP ITEMS POST DISCHARGE  Follow-up with the primary care physician in 2 to 3 days    DISCHARGE DIAGNOSES  Principal Problem:    Cellulitis POA: Yes  Active Problems:    DM2 (diabetes mellitus, type 2) ([facility]) POA: Yes    " Leucocytosis POA: Unknown  Resolved Problems:    * No resolved hospital problems. *      FOLLOW UP  No future appointments.  No follow-up provider specified.    MEDICATIONS ON DISCHARGE     Medication List        START taking these medications        Instructions   linezolid 600 MG Tabs  Commonly known as: Zyvox   Take 1 Tablet by mouth 2 times a day for 9 days.  Dose: 600 mg            CONTINUE taking these medications        Instructions   acetaminophen 500 MG Tabs  Commonly known as: TYLENOL   Take 1,000 mg by mouth 2 times a day.  Dose: 1,000 mg     albuterol 108 (90 Base) MCG/ACT Aers inhalation aerosol  Commonly known as: Proventil HFA   Inhale 2 Puffs by mouth every 6 hours as needed.  Dose: 2 Puff     atenolol 50 MG Tabs  Commonly known as: TENORMIN   Take 50 mg by mouth every morning.  Dose: 50 mg     atorvastatin 40 MG Tabs  Commonly known as: LIPITOR   Take 40 mg by mouth every evening.  Dose: 40 mg     clonazePAM 0.5 MG Tabs  Commonly known as: KLONOPIN   Take 0.25 mg by mouth 2 times a day as needed (ANXIETY).  Dose: 0.25 mg     DULoxetine 60 MG Cpep delayed-release capsule  Commonly known as: CYMBALTA   Take 60 mg by mouth every evening.  Dose: 60 mg     FreeStyle Yesica 14 Day [place]   1 Each every 14 days.  Dose: 1 Each     gabapentin 800 MG tablet  Commonly known as: NEURONTIN   Take 800 mg by mouth 4 times a day.  Dose: 800 mg     ibuprofen 200 MG Tabs  Commonly known as: MOTRIN   Take 200 mg by mouth every 6 hours as needed.  Dose: 200 mg     lisinopril 20 MG Tabs  Commonly known as: PRINIVIL   Take 20 mg by mouth every day.  Dose: 20 mg     meloxicam 15 MG tablet  Commonly known as: MOBIC   Take 15 mg by mouth every day.  Dose: 15 mg     ondansetron 4 MG Tbdp  Commonly known as: ZOFRAN ODT   Take 4 mg by mouth every 6 hours as needed.  Dose: 4 mg     OneTouch Ultra strip  Generic drug: glucose blood      OXcarbazepine 300 MG Tabs  Commonly known as: TRILEPTAL   Take 300 mg by mouth 2 times a  day.  Dose: 300 mg     QUEtiapine 200 MG Tabs  Commonly known as: Seroquel   Take 200 mg by mouth every evening.  Dose: 200 mg     Synjardy 12.5-1000 MG Tabs  Generic drug: Empagliflozin-metFORMIN HCl   Take 1 Tablet by mouth 2 times a day.  Dose: 1 Tablet     TRESIBA FLEXTOUCH SC   Inject 20 Units under the skin every evening.  Dose: 20 Units            STOP taking these medications      doxycycline 100 MG Tabs  Commonly known as: VIBRAMYCIN              Allergies  Allergies   Allergen Reactions    Amoxicillin-Pot Clavulanate Vomiting    Clavulanic Acid Vomiting     Violent vomiting       DIET  Orders Placed This Encounter   Procedures    Diet Order Diet: Consistent CHO (Diabetic)     Standing Status:   Standing     Number of Occurrences:   1     Order Specific Question:   Diet:     Answer:   Consistent CHO (Diabetic) [4]       ACTIVITY  As tolerated.  Weight bearing as tolerated    CONSULTATIONS  None    PROCEDURES  None    LABORATORY  Lab Results   Component Value Date    SODIUM 137 08/27/2022    POTASSIUM 3.8 08/27/2022    CHLORIDE 105 08/27/2022    CO2 22 08/27/2022    GLUCOSE 215 (H) 08/27/2022    BUN 10 08/27/2022    CREATININE 0.41 (L) 08/27/2022        Lab Results   Component Value Date    WBC 7.6 08/27/2022    HEMOGLOBIN 11.0 (L) 08/27/2022    HEMATOCRIT 35.6 (L) 08/27/2022    PLATELETCT 339 08/27/2022        Total time of the discharge process exceeds 34 minutes.

## 2022-08-27 NOTE — PROGRESS NOTES
Assumed care of pt at 1915. Received report from Wendy RODRIGUEZ. Pt resting in bed, pain is stated at a 5/10 in lower back as well as BLE. Pt given Ibuprofen for pain. Explained to pt that she would be receiving another dose of gabapentin within the next hour as well. Pt stated she has numbness and tingling in BLE and it is a little more than baseline. Small pimple like wound to right upper thigh with no drainage. Discussed plan for the night including IV abx and managing pain. No other needs at this time, call light in reach, bed in lowest position.

## 2022-08-27 NOTE — DISCHARGE INSTRUCTIONS
Cellulitis, Adult    Cellulitis is a skin infection. The infected area is often warm, red, swollen, and sore. It occurs most often in the arms and lower legs. It is very important to get treated for this condition.  What are the causes?  This condition is caused by bacteria. The bacteria enter through a break in the skin, such as a cut, burn, insect bite, open sore, or crack.  What increases the risk?  This condition is more likely to occur in people who:  Have a weak body defense system (immune system).  Have open cuts, burns, bites, or scrapes on the skin.  Are older than 60 years of age.  Have a blood sugar problem (diabetes).  Have a long-lasting (chronic) liver disease (cirrhosis) or kidney disease.  Are very overweight (obese).  Have a skin problem, such as:  Itchy rash (eczema).  Slow movement of blood in the veins (venous stasis).  Fluid buildup below the skin (edema).  Have been treated with high-energy rays (radiation).  Use IV drugs.  What are the signs or symptoms?  Symptoms of this condition include:  Skin that is:  Red.  Streaking.  Spotting.  Swollen.  Sore or painful when you touch it.  Warm.  A fever.  Chills.  Blisters.  How is this diagnosed?  This condition is diagnosed based on:  Medical history.  Physical exam.  Blood tests.  Imaging tests.  How is this treated?  Treatment for this condition may include:  Medicines to treat infections or allergies.  Home care, such as:  Rest.  Placing cold or warm cloths (compresses) on the skin.  Hospital care, if the condition is very bad.  Follow these instructions at home:  Medicines  Take over-the-counter and prescription medicines only as told by your doctor.  If you were prescribed an antibiotic medicine, take it as told by your doctor. Do not stop taking it even if you start to feel better.  General instructions    Drink enough fluid to keep your pee (urine) pale yellow.  Do not touch or rub the infected area.  Raise (elevate) the infected area above  the level of your heart while you are sitting or lying down.  Place cold or warm cloths on the area as told by your doctor.  Keep all follow-up visits as told by your doctor. This is important.  Contact a doctor if:  You have a fever.  You do not start to get better after 1-2 days of treatment.  Your bone or joint under the infected area starts to hurt after the skin has healed.  Your infection comes back. This can happen in the same area or another area.  You have a swollen bump in the area.  You have new symptoms.  You feel ill and have muscle aches and pains.  Get help right away if:  Your symptoms get worse.  You feel very sleepy.  You throw up (vomit) or have watery poop (diarrhea) for a long time.  You see red streaks coming from the area.  Your red area gets larger.  Your red area turns dark in color.  These symptoms may represent a serious problem that is an emergency. Do not wait to see if the symptoms will go away. Get medical help right away. Call your local emergency services (911 in the [place] Do not drive yourself to the hospital.  Summary  Cellulitis is a skin infection. The area is often warm, red, swollen, and sore.  This condition is treated with medicines, rest, and cold and warm cloths.  Take all medicines only as told by your doctor.  Tell your doctor if symptoms do not start to get better after 1-2 days of treatment.  This information is not intended to replace advice given to you by your health care provider. Make sure you discuss any questions you have with your health care provider.  Document Released: 06/05/2009 Document Revised: 05/09/2019 Document Reviewed: 05/09/2019  [employer] Patient Education © 2020 [employer]  Linezolid tablets  What is this medicine?  LINEZOLID (judith tanner lid) is an oxazolidinone antibiotic. It is used to treat certain kinds of bacterial infections. It will not work for colds, flu, or other viral infections.  This medicine may be used for other purposes; ask your  health care provider or pharmacist if you have questions.  COMMON BRAND NAME(S): Zyvox  What should I tell my health care provider before I take this medicine?  They need to know if you have any of these conditions:    cancer  diabetes    heart disease    high blood pressure    kidney disease    pheochromocytoma    untreated thyroid disease    an unusual or allergic reaction to linezolid, other antibiotics or medicines, foods, dyes, or preservatives    pregnant or trying to get pregnant    breast-feeding  How should I use this medicine?  Take this medicine by mouth with a glass of water. Follow the directions on the prescription label. Take with food or on an empty stomach. Take your medicine at regular intervals. Do not take your medicine more often than directed. Take all of your medicine as directed even if you think your are better. Do not skip doses or stop your medicine early.  Talk to your pediatrician regarding the use of this medicine in children. While this drug may be prescribed for selected conditions, precautions do apply.  Overdosage: If you think you have taken too much of this medicine contact a poison control center or emergency room at once.  NOTE: This medicine is only for you. Do not share this medicine with others.  What if I miss a dose?  If you miss a dose, take it as soon as you can. If it is almost time for your next dose, take only that dose. Do not take double or extra doses.  What may interact with this medicine?  Do not take this medicine with any of the following medications:  bupropion  certain medicines for depression, anxiety, or psychotic disturbances  doxepin  fluoxetine  furazolidone  green tea  MAOIs like Carbex, Eldepryl, Marplan, Nardil, and Parnate  milnacipran  procarbazine  rasagiline  selegiline  Wes's wort  tryptophan  This medicine may also interact with the following medications:  birth control pills  medicines for allergies or colds like phenylpropanolamine,  pseudoephedrine  medicines for blood pressure  stimulant medicines for attention disorders, weight loss, or to stay awake  This list may not describe all possible interactions. Give your health care provider a list of all the medicines, herbs, non-prescription drugs, or dietary supplements you use. Also tell them if you smoke, drink alcohol, or use illegal drugs. Some items may interact with your medicine.  What should I watch for while using this medicine?  Tell your doctor or health care professional if your symptoms do not begin to improve or if you get new symptoms.  You will need to be on a special diet while taking this medicine. Ask your doctor or health care professional for a list of foods that you should try to avoid. This includes, but is not limited to, smoked or processed meats, aged cheeses, soy sauce, red sanjeev and beer.  Do not treat diarrhea with over-the-counter products. Contact your doctor if you have diarrhea that lasts more than 2 days or if the diarrhea is severe and watery.  What side effects may I notice from receiving this medicine?  Side effects that you should report to your doctor or health care professional as soon as possible:  allergic reactions like skin rash, itching or hives, swelling of the face, lips, or tongue  breathing problems  burning, numbness, or tingling  changes in vision  confused, restless  discolored, sore mouth  fever  irregular heart beat, blood pressure  seizures  tremor, trouble walking  unusual bleeding or bruising  unusually weak or tired  Side effects that usually do not require medical attention (report to your doctor or health care professional if they continue or are bothersome):  changes in taste  constipation or diarrhea  dizzy  headache  nausea, vomiting  stomach upset  trouble sleeping  vaginal itch, irritation  This list may not describe all possible side effects. Call your doctor for medical advice about side effects. You may report side effects to FDA  at 4-226-FDA-1295.  Where should I keep my medicine?  Keep out of the reach of children.  Store at room temperature between 15 and 30 degrees C (59 and 86 degrees F). Keep container tightly closed to protect from light and moisture. Throw away any unused medicine after the expiration date.  NOTE: This sheet is a summary. It may not cover all possible information. If you have questions about this medicine, talk to your doctor, pharmacist, or health care provider.  © 2020 [employer] (2014-07-25 14:02:20)

## 2022-08-27 NOTE — CARE PLAN
The patient is Stable - Low risk of patient condition declining or worsening    Shift Goals  Clinical Goals: Patient will be able to tolerate IV abx this shift    Progress made toward(s) clinical / shift goals:  Patient able to communicate needs appropriately. Awaiting meds to beds prior to discharge. Medicated per MAR. Able to tolerate IV abx this AM.     Patient is not progressing towards the following goals: n/a

## 2022-08-27 NOTE — CARE PLAN
The patient is Stable - Low risk of patient condition declining or worsening    Shift Goals  Clinical Goals: Pt will tolerate IV abx. Pt pain will be managed AEB pt being able to sleep at least 5 hours overnght.    Progress made toward(s) clinical / shift goals:  Pt had no adverse s/s from abx overnight. Pt was able to sleep from 2300- 0330. Pt received ibuprofen and one PRN dose of oxycodone overnight.    Patient is not progressing towards the following goals: n/a

## 2022-08-27 NOTE — PROGRESS NOTES
4 Eyes Skin Assessment Completed by Jassi RN and MICHAEL Gomez.    Head WDL  Ears WDL  Nose WDL  Mouth WDL  Neck WDL  Breast/Chest WDL  Shoulder Blades WDL  Spine WDL  (R) Arm/Elbow/Hand WDL  (L) Arm/Elbow/Hand WDL  Abdomen WDL  Groin WDL  Scrotum/Coccyx/Buttocks WDL  (R) Leg Redness, Blanching, and Scab  (L) Leg WDL  (R) Heel/Foot/Toe WDL  (L) Heel/Foot/Toe WDL          Devices In Places Pulse Ox      Interventions In Place Pressure Redistribution Mattress    Possible Skin Injury Yes    Pictures Uploaded Into Epic Yes  Wound Consult Placed N/A  RN Wound Prevention Protocol Ordered No

## 2022-08-28 NOTE — PROGRESS NOTES
Discharging patient home per MD orders. Patient verbalized understanding of discharge instructions and educational materials, as well as, follow up appointments, medications, prescriptions. Patient is voiding without difficulty, pain is well controlled, tolerating oral medications. O2 is greater than 90%. IV removed. All questions have been answered. Patient has been discharged off the unit with a hospital escort.

## 2022-08-29 LAB — B-OH-BUTYR SERPL-MCNC: 0.13 MMOL/L (ref 0.02–0.27)

## 2022-09-13 ENCOUNTER — PHARMACY VISIT (OUTPATIENT)
Dept: PHARMACY | Facility: MEDICAL CENTER | Age: 42
End: 2022-09-13
Payer: COMMERCIAL

## 2022-10-01 ENCOUNTER — APPOINTMENT (OUTPATIENT)
Dept: RADIOLOGY | Facility: MEDICAL CENTER | Age: 42
DRG: 025 | End: 2022-10-01
Attending: EMERGENCY MEDICINE
Payer: COMMERCIAL

## 2022-10-01 ENCOUNTER — APPOINTMENT (OUTPATIENT)
Dept: RADIOLOGY | Facility: MEDICAL CENTER | Age: 42
DRG: 025 | End: 2022-10-01
Attending: STUDENT IN AN ORGANIZED HEALTH CARE EDUCATION/TRAINING PROGRAM
Payer: COMMERCIAL

## 2022-10-01 ENCOUNTER — HOSPITAL ENCOUNTER (INPATIENT)
Facility: MEDICAL CENTER | Age: 42
LOS: 14 days | DRG: 025 | End: 2022-10-15
Attending: EMERGENCY MEDICINE | Admitting: STUDENT IN AN ORGANIZED HEALTH CARE EDUCATION/TRAINING PROGRAM
Payer: COMMERCIAL

## 2022-10-01 DIAGNOSIS — K52.9 GASTROENTERITIS: ICD-10-CM

## 2022-10-01 DIAGNOSIS — G93.89 BRAIN MASS: ICD-10-CM

## 2022-10-01 DIAGNOSIS — K59.03 DRUG-INDUCED CONSTIPATION: ICD-10-CM

## 2022-10-01 DIAGNOSIS — R29.898 LEFT ARM WEAKNESS: ICD-10-CM

## 2022-10-01 DIAGNOSIS — R29.898 LEFT LEG WEAKNESS: ICD-10-CM

## 2022-10-01 DIAGNOSIS — C79.31 MALIGNANT MELANOMA METASTATIC TO BRAIN (HCC): ICD-10-CM

## 2022-10-01 DIAGNOSIS — G93.6 VASOGENIC BRAIN EDEMA (HCC): ICD-10-CM

## 2022-10-01 DIAGNOSIS — C79.31 BRAIN METASTASES: ICD-10-CM

## 2022-10-01 LAB
ALBUMIN SERPL BCP-MCNC: 4.4 G/DL (ref 3.2–4.9)
ALBUMIN/GLOB SERPL: 1.6 G/DL
ALP SERPL-CCNC: 118 U/L (ref 30–99)
ALT SERPL-CCNC: 13 U/L (ref 2–50)
ANION GAP SERPL CALC-SCNC: 15 MMOL/L (ref 7–16)
AST SERPL-CCNC: 13 U/L (ref 12–45)
BASOPHILS # BLD AUTO: 0.7 % (ref 0–1.8)
BASOPHILS # BLD: 0.08 K/UL (ref 0–0.12)
BILIRUB SERPL-MCNC: 0.2 MG/DL (ref 0.1–1.5)
BLOOD CULTURE HOLD CXBCH: NORMAL
BUN SERPL-MCNC: 14 MG/DL (ref 8–22)
CALCIUM SERPL-MCNC: 9.4 MG/DL (ref 8.5–10.5)
CHLORIDE SERPL-SCNC: 103 MMOL/L (ref 96–112)
CO2 SERPL-SCNC: 21 MMOL/L (ref 20–33)
CREAT SERPL-MCNC: 0.51 MG/DL (ref 0.5–1.4)
EKG IMPRESSION: NORMAL
EOSINOPHIL # BLD AUTO: 0.07 K/UL (ref 0–0.51)
EOSINOPHIL NFR BLD: 0.6 % (ref 0–6.9)
ERYTHROCYTE [DISTWIDTH] IN BLOOD BY AUTOMATED COUNT: 48.2 FL (ref 35.9–50)
GFR SERPLBLD CREATININE-BSD FMLA CKD-EPI: 118 ML/MIN/1.73 M 2
GLOBULIN SER CALC-MCNC: 2.7 G/DL (ref 1.9–3.5)
GLUCOSE BLD STRIP.AUTO-MCNC: 254 MG/DL (ref 65–99)
GLUCOSE SERPL-MCNC: 170 MG/DL (ref 65–99)
HCG SERPL QL: NEGATIVE
HCT VFR BLD AUTO: 43 % (ref 37–47)
HGB BLD-MCNC: 13.5 G/DL (ref 12–16)
IMM GRANULOCYTES # BLD AUTO: 0.04 K/UL (ref 0–0.11)
IMM GRANULOCYTES NFR BLD AUTO: 0.3 % (ref 0–0.9)
LYMPHOCYTES # BLD AUTO: 2.63 K/UL (ref 1–4.8)
LYMPHOCYTES NFR BLD: 21.7 % (ref 22–41)
MCH RBC QN AUTO: 25.4 PG (ref 27–33)
MCHC RBC AUTO-ENTMCNC: 31.4 G/DL (ref 33.6–35)
MCV RBC AUTO: 80.8 FL (ref 81.4–97.8)
MONOCYTES # BLD AUTO: 0.91 K/UL (ref 0–0.85)
MONOCYTES NFR BLD AUTO: 7.5 % (ref 0–13.4)
NEUTROPHILS # BLD AUTO: 8.39 K/UL (ref 2–7.15)
NEUTROPHILS NFR BLD: 69.2 % (ref 44–72)
NRBC # BLD AUTO: 0 K/UL
NRBC BLD-RTO: 0 /100 WBC
PLATELET # BLD AUTO: 519 K/UL (ref 164–446)
PMV BLD AUTO: 9.5 FL (ref 9–12.9)
POTASSIUM SERPL-SCNC: 4.1 MMOL/L (ref 3.6–5.5)
PROT SERPL-MCNC: 7.1 G/DL (ref 6–8.2)
RBC # BLD AUTO: 5.32 M/UL (ref 4.2–5.4)
SODIUM SERPL-SCNC: 139 MMOL/L (ref 135–145)
TROPONIN T SERPL-MCNC: 11 NG/L (ref 6–19)
WBC # BLD AUTO: 12.1 K/UL (ref 4.8–10.8)

## 2022-10-01 PROCEDURE — 82962 GLUCOSE BLOOD TEST: CPT

## 2022-10-01 PROCEDURE — 71045 X-RAY EXAM CHEST 1 VIEW: CPT

## 2022-10-01 PROCEDURE — 80053 COMPREHEN METABOLIC PANEL: CPT

## 2022-10-01 PROCEDURE — 700102 HCHG RX REV CODE 250 W/ 637 OVERRIDE(OP): Performed by: STUDENT IN AN ORGANIZED HEALTH CARE EDUCATION/TRAINING PROGRAM

## 2022-10-01 PROCEDURE — 36415 COLL VENOUS BLD VENIPUNCTURE: CPT

## 2022-10-01 PROCEDURE — A9270 NON-COVERED ITEM OR SERVICE: HCPCS | Performed by: STUDENT IN AN ORGANIZED HEALTH CARE EDUCATION/TRAINING PROGRAM

## 2022-10-01 PROCEDURE — 70450 CT HEAD/BRAIN W/O DYE: CPT

## 2022-10-01 PROCEDURE — 96372 THER/PROPH/DIAG INJ SC/IM: CPT

## 2022-10-01 PROCEDURE — 96374 THER/PROPH/DIAG INJ IV PUSH: CPT

## 2022-10-01 PROCEDURE — 85025 COMPLETE CBC W/AUTO DIFF WBC: CPT

## 2022-10-01 PROCEDURE — 770000 HCHG ROOM/CARE - INTERMEDIATE ICU *

## 2022-10-01 PROCEDURE — 93005 ELECTROCARDIOGRAM TRACING: CPT | Performed by: EMERGENCY MEDICINE

## 2022-10-01 PROCEDURE — A9270 NON-COVERED ITEM OR SERVICE: HCPCS | Performed by: EMERGENCY MEDICINE

## 2022-10-01 PROCEDURE — 99223 1ST HOSP IP/OBS HIGH 75: CPT | Performed by: STUDENT IN AN ORGANIZED HEALTH CARE EDUCATION/TRAINING PROGRAM

## 2022-10-01 PROCEDURE — 700105 HCHG RX REV CODE 258: Performed by: STUDENT IN AN ORGANIZED HEALTH CARE EDUCATION/TRAINING PROGRAM

## 2022-10-01 PROCEDURE — 700117 HCHG RX CONTRAST REV CODE 255: Performed by: STUDENT IN AN ORGANIZED HEALTH CARE EDUCATION/TRAINING PROGRAM

## 2022-10-01 PROCEDURE — 84703 CHORIONIC GONADOTROPIN ASSAY: CPT

## 2022-10-01 PROCEDURE — 700102 HCHG RX REV CODE 250 W/ 637 OVERRIDE(OP): Performed by: EMERGENCY MEDICINE

## 2022-10-01 PROCEDURE — 71260 CT THORAX DX C+: CPT

## 2022-10-01 PROCEDURE — 99285 EMERGENCY DEPT VISIT HI MDM: CPT

## 2022-10-01 PROCEDURE — 700111 HCHG RX REV CODE 636 W/ 250 OVERRIDE (IP): Performed by: EMERGENCY MEDICINE

## 2022-10-01 PROCEDURE — 84484 ASSAY OF TROPONIN QUANT: CPT

## 2022-10-01 RX ORDER — LISINOPRIL 20 MG/1
20 TABLET ORAL DAILY
Status: DISCONTINUED | OUTPATIENT
Start: 2022-10-02 | End: 2022-10-03

## 2022-10-01 RX ORDER — GABAPENTIN 400 MG/1
800 CAPSULE ORAL 4 TIMES DAILY
Status: DISCONTINUED | OUTPATIENT
Start: 2022-10-01 | End: 2022-10-15 | Stop reason: HOSPADM

## 2022-10-01 RX ORDER — INSULIN LISPRO 100 [IU]/ML
1-6 INJECTION, SOLUTION INTRAVENOUS; SUBCUTANEOUS EVERY 6 HOURS
Status: DISCONTINUED | OUTPATIENT
Start: 2022-10-01 | End: 2022-10-03

## 2022-10-01 RX ORDER — INSULIN DEGLUDEC 100 U/ML
20 INJECTION, SOLUTION SUBCUTANEOUS
Status: ON HOLD | COMMUNITY
End: 2022-10-24

## 2022-10-01 RX ORDER — CLONAZEPAM 0.5 MG/1
0.25 TABLET ORAL 2 TIMES DAILY PRN
Status: DISCONTINUED | OUTPATIENT
Start: 2022-10-01 | End: 2022-10-02

## 2022-10-01 RX ORDER — DEXAMETHASONE SODIUM PHOSPHATE 4 MG/ML
10 INJECTION, SOLUTION INTRA-ARTICULAR; INTRALESIONAL; INTRAMUSCULAR; INTRAVENOUS; SOFT TISSUE ONCE
Status: COMPLETED | OUTPATIENT
Start: 2022-10-01 | End: 2022-10-01

## 2022-10-01 RX ORDER — DIAZEPAM 5 MG/1
5 TABLET ORAL ONCE
Status: COMPLETED | OUTPATIENT
Start: 2022-10-01 | End: 2022-10-01

## 2022-10-01 RX ORDER — SODIUM CHLORIDE 9 MG/ML
INJECTION, SOLUTION INTRAVENOUS CONTINUOUS
Status: ACTIVE | OUTPATIENT
Start: 2022-10-01 | End: 2022-10-02

## 2022-10-01 RX ORDER — OXCARBAZEPINE 300 MG/1
300 TABLET, FILM COATED ORAL 2 TIMES DAILY
Status: DISCONTINUED | OUTPATIENT
Start: 2022-10-01 | End: 2022-10-15 | Stop reason: HOSPADM

## 2022-10-01 RX ORDER — ATENOLOL 50 MG/1
50 TABLET ORAL EVERY MORNING
Status: DISCONTINUED | OUTPATIENT
Start: 2022-10-01 | End: 2022-10-03

## 2022-10-01 RX ORDER — ATENOLOL 50 MG/1
50 TABLET ORAL EVERY MORNING
Status: DISCONTINUED | OUTPATIENT
Start: 2022-10-02 | End: 2022-10-01

## 2022-10-01 RX ORDER — ATORVASTATIN CALCIUM 40 MG/1
40 TABLET, FILM COATED ORAL EVERY EVENING
Status: DISCONTINUED | OUTPATIENT
Start: 2022-10-01 | End: 2022-10-15 | Stop reason: HOSPADM

## 2022-10-01 RX ORDER — DEXTROSE MONOHYDRATE 25 G/50ML
25 INJECTION, SOLUTION INTRAVENOUS
Status: DISCONTINUED | OUTPATIENT
Start: 2022-10-01 | End: 2022-10-03

## 2022-10-01 RX ORDER — ALBUTEROL SULFATE 90 UG/1
2 AEROSOL, METERED RESPIRATORY (INHALATION) EVERY 4 HOURS PRN
Status: DISCONTINUED | OUTPATIENT
Start: 2022-10-01 | End: 2022-10-15 | Stop reason: HOSPADM

## 2022-10-01 RX ORDER — QUETIAPINE FUMARATE 100 MG/1
200 TABLET, FILM COATED ORAL EVERY EVENING
Status: DISCONTINUED | OUTPATIENT
Start: 2022-10-01 | End: 2022-10-15 | Stop reason: HOSPADM

## 2022-10-01 RX ORDER — ACETAMINOPHEN 325 MG/1
650 TABLET ORAL EVERY 6 HOURS PRN
Status: DISCONTINUED | OUTPATIENT
Start: 2022-10-01 | End: 2022-10-07

## 2022-10-01 RX ORDER — DEXAMETHASONE SODIUM PHOSPHATE 4 MG/ML
4 INJECTION, SOLUTION INTRA-ARTICULAR; INTRALESIONAL; INTRAMUSCULAR; INTRAVENOUS; SOFT TISSUE EVERY 6 HOURS
Status: DISPENSED | OUTPATIENT
Start: 2022-10-02 | End: 2022-10-05

## 2022-10-01 RX ORDER — DULOXETIN HYDROCHLORIDE 20 MG/1
60 CAPSULE, DELAYED RELEASE ORAL EVERY EVENING
Status: DISCONTINUED | OUTPATIENT
Start: 2022-10-01 | End: 2022-10-15 | Stop reason: HOSPADM

## 2022-10-01 RX ORDER — LINEZOLID 600 MG/1
600 TABLET, FILM COATED ORAL 2 TIMES DAILY
Status: ON HOLD | COMMUNITY
Start: 2022-09-13 | End: 2022-10-14

## 2022-10-01 RX ADMIN — ATORVASTATIN CALCIUM 40 MG: 40 TABLET, FILM COATED ORAL at 21:04

## 2022-10-01 RX ADMIN — OXCARBAZEPINE 300 MG: 300 TABLET, FILM COATED ORAL at 23:30

## 2022-10-01 RX ADMIN — ACETAMINOPHEN 650 MG: 325 TABLET, FILM COATED ORAL at 23:30

## 2022-10-01 RX ADMIN — IOHEXOL 98 ML: 350 INJECTION, SOLUTION INTRAVENOUS at 21:30

## 2022-10-01 RX ADMIN — INSULIN GLARGINE-YFGN 14 UNITS: 100 INJECTION, SOLUTION SUBCUTANEOUS at 22:16

## 2022-10-01 RX ADMIN — SODIUM CHLORIDE: 9 INJECTION, SOLUTION INTRAVENOUS at 21:06

## 2022-10-01 RX ADMIN — INSULIN LISPRO 3 UNITS: 100 INJECTION, SOLUTION INTRAVENOUS; SUBCUTANEOUS at 22:17

## 2022-10-01 RX ADMIN — DIAZEPAM 5 MG: 5 TABLET ORAL at 19:00

## 2022-10-01 RX ADMIN — GABAPENTIN 800 MG: 400 CAPSULE ORAL at 21:00

## 2022-10-01 RX ADMIN — DULOXETINE 60 MG: 20 CAPSULE, DELAYED RELEASE ORAL at 21:06

## 2022-10-01 RX ADMIN — DEXAMETHASONE SODIUM PHOSPHATE 10 MG: 4 INJECTION, SOLUTION INTRA-ARTICULAR; INTRALESIONAL; INTRAMUSCULAR; INTRAVENOUS; SOFT TISSUE at 19:00

## 2022-10-01 RX ADMIN — QUETIAPINE FUMARATE 200 MG: 100 TABLET ORAL at 21:06

## 2022-10-01 ASSESSMENT — LIFESTYLE VARIABLES
AVERAGE NUMBER OF DAYS PER WEEK YOU HAVE A DRINK CONTAINING ALCOHOL: 0
ON A TYPICAL DAY WHEN YOU DRINK ALCOHOL HOW MANY DRINKS DO YOU HAVE: 0
DOES PATIENT WANT TO STOP DRINKING: NO
HOW MANY TIMES IN THE PAST YEAR HAVE YOU HAD 5 OR MORE DRINKS IN A DAY: 0
ALCOHOL_USE: NO
EVER FELT BAD OR GUILTY ABOUT YOUR DRINKING: NO
TOTAL SCORE: 0
HAVE PEOPLE ANNOYED YOU BY CRITICIZING YOUR DRINKING: NO
EVER HAD A DRINK FIRST THING IN THE MORNING TO STEADY YOUR NERVES TO GET RID OF A HANGOVER: NO
TOTAL SCORE: 0
HAVE YOU EVER FELT YOU SHOULD CUT DOWN ON YOUR DRINKING: NO
CONSUMPTION TOTAL: NEGATIVE
TOTAL SCORE: 0

## 2022-10-01 ASSESSMENT — ENCOUNTER SYMPTOMS
RESPIRATORY NEGATIVE: 1
FOCAL WEAKNESS: 1
EYES NEGATIVE: 1
WEAKNESS: 1
PSYCHIATRIC NEGATIVE: 1
GASTROINTESTINAL NEGATIVE: 1
CONSTITUTIONAL NEGATIVE: 1
MUSCULOSKELETAL NEGATIVE: 1
CARDIOVASCULAR NEGATIVE: 1

## 2022-10-01 ASSESSMENT — PATIENT HEALTH QUESTIONNAIRE - PHQ9
2. FEELING DOWN, DEPRESSED, IRRITABLE, OR HOPELESS: NOT AT ALL
SUM OF ALL RESPONSES TO PHQ9 QUESTIONS 1 AND 2: 0
1. LITTLE INTEREST OR PLEASURE IN DOING THINGS: NOT AT ALL

## 2022-10-01 ASSESSMENT — FIBROSIS 4 INDEX
FIB4 SCORE: 0.4
FIB4 SCORE: 0.3

## 2022-10-01 ASSESSMENT — PAIN DESCRIPTION - PAIN TYPE: TYPE: CHRONIC PAIN

## 2022-10-02 ENCOUNTER — APPOINTMENT (OUTPATIENT)
Dept: RADIOLOGY | Facility: MEDICAL CENTER | Age: 42
DRG: 025 | End: 2022-10-02
Attending: NEUROLOGICAL SURGERY
Payer: COMMERCIAL

## 2022-10-02 LAB
GLUCOSE BLD STRIP.AUTO-MCNC: 153 MG/DL (ref 65–99)
GLUCOSE BLD STRIP.AUTO-MCNC: 179 MG/DL (ref 65–99)
GLUCOSE BLD STRIP.AUTO-MCNC: 185 MG/DL (ref 65–99)
LDH SERPL L TO P-CCNC: 242 U/L (ref 107–266)

## 2022-10-02 PROCEDURE — 82962 GLUCOSE BLOOD TEST: CPT

## 2022-10-02 PROCEDURE — 51798 US URINE CAPACITY MEASURE: CPT

## 2022-10-02 PROCEDURE — 99233 SBSQ HOSP IP/OBS HIGH 50: CPT | Performed by: HOSPITALIST

## 2022-10-02 PROCEDURE — 700102 HCHG RX REV CODE 250 W/ 637 OVERRIDE(OP): Performed by: HOSPITALIST

## 2022-10-02 PROCEDURE — 83615 LACTATE (LD) (LDH) ENZYME: CPT

## 2022-10-02 PROCEDURE — A9576 INJ PROHANCE MULTIPACK: HCPCS | Performed by: NEUROLOGICAL SURGERY

## 2022-10-02 PROCEDURE — 700102 HCHG RX REV CODE 250 W/ 637 OVERRIDE(OP): Performed by: STUDENT IN AN ORGANIZED HEALTH CARE EDUCATION/TRAINING PROGRAM

## 2022-10-02 PROCEDURE — 700111 HCHG RX REV CODE 636 W/ 250 OVERRIDE (IP): Performed by: HOSPITALIST

## 2022-10-02 PROCEDURE — 700117 HCHG RX CONTRAST REV CODE 255: Performed by: NEUROLOGICAL SURGERY

## 2022-10-02 PROCEDURE — A9270 NON-COVERED ITEM OR SERVICE: HCPCS | Performed by: HOSPITALIST

## 2022-10-02 PROCEDURE — 70553 MRI BRAIN STEM W/O & W/DYE: CPT

## 2022-10-02 PROCEDURE — 700105 HCHG RX REV CODE 258: Performed by: STUDENT IN AN ORGANIZED HEALTH CARE EDUCATION/TRAINING PROGRAM

## 2022-10-02 PROCEDURE — 700111 HCHG RX REV CODE 636 W/ 250 OVERRIDE (IP): Performed by: STUDENT IN AN ORGANIZED HEALTH CARE EDUCATION/TRAINING PROGRAM

## 2022-10-02 PROCEDURE — A9270 NON-COVERED ITEM OR SERVICE: HCPCS | Performed by: STUDENT IN AN ORGANIZED HEALTH CARE EDUCATION/TRAINING PROGRAM

## 2022-10-02 PROCEDURE — 770001 HCHG ROOM/CARE - MED/SURG/GYN PRIV*

## 2022-10-02 RX ORDER — FAMOTIDINE 20 MG/1
20 TABLET, FILM COATED ORAL 2 TIMES DAILY
Status: DISCONTINUED | OUTPATIENT
Start: 2022-10-02 | End: 2022-10-15 | Stop reason: HOSPADM

## 2022-10-02 RX ORDER — SODIUM CHLORIDE 9 MG/ML
INJECTION, SOLUTION INTRAVENOUS CONTINUOUS
Status: ACTIVE | OUTPATIENT
Start: 2022-10-02 | End: 2022-10-03

## 2022-10-02 RX ORDER — ALPRAZOLAM 0.25 MG/1
0.25 TABLET ORAL 4 TIMES DAILY PRN
Status: DISCONTINUED | OUTPATIENT
Start: 2022-10-02 | End: 2022-10-15 | Stop reason: HOSPADM

## 2022-10-02 RX ORDER — ALPRAZOLAM 0.25 MG/1
0.25 TABLET ORAL NIGHTLY PRN
Status: DISCONTINUED | OUTPATIENT
Start: 2022-10-02 | End: 2022-10-02

## 2022-10-02 RX ORDER — ONDANSETRON 2 MG/ML
4 INJECTION INTRAMUSCULAR; INTRAVENOUS EVERY 6 HOURS PRN
Status: DISCONTINUED | OUTPATIENT
Start: 2022-10-02 | End: 2022-10-15 | Stop reason: HOSPADM

## 2022-10-02 RX ADMIN — GABAPENTIN 800 MG: 400 CAPSULE ORAL at 12:29

## 2022-10-02 RX ADMIN — INSULIN GLARGINE-YFGN 14 UNITS: 100 INJECTION, SOLUTION SUBCUTANEOUS at 17:41

## 2022-10-02 RX ADMIN — LISINOPRIL 20 MG: 20 TABLET ORAL at 05:15

## 2022-10-02 RX ADMIN — FAMOTIDINE 20 MG: 20 TABLET, FILM COATED ORAL at 17:43

## 2022-10-02 RX ADMIN — INSULIN LISPRO 1 UNITS: 100 INJECTION, SOLUTION INTRAVENOUS; SUBCUTANEOUS at 12:29

## 2022-10-02 RX ADMIN — INSULIN LISPRO 1 UNITS: 100 INJECTION, SOLUTION INTRAVENOUS; SUBCUTANEOUS at 17:41

## 2022-10-02 RX ADMIN — DEXAMETHASONE SODIUM PHOSPHATE 4 MG: 4 INJECTION, SOLUTION INTRA-ARTICULAR; INTRALESIONAL; INTRAMUSCULAR; INTRAVENOUS; SOFT TISSUE at 07:06

## 2022-10-02 RX ADMIN — ATORVASTATIN CALCIUM 40 MG: 40 TABLET, FILM COATED ORAL at 17:43

## 2022-10-02 RX ADMIN — QUETIAPINE FUMARATE 200 MG: 100 TABLET ORAL at 17:43

## 2022-10-02 RX ADMIN — SODIUM CHLORIDE: 9 INJECTION, SOLUTION INTRAVENOUS at 06:58

## 2022-10-02 RX ADMIN — DEXAMETHASONE SODIUM PHOSPHATE 4 MG: 4 INJECTION, SOLUTION INTRA-ARTICULAR; INTRALESIONAL; INTRAMUSCULAR; INTRAVENOUS; SOFT TISSUE at 12:29

## 2022-10-02 RX ADMIN — ACETAMINOPHEN 650 MG: 325 TABLET, FILM COATED ORAL at 17:43

## 2022-10-02 RX ADMIN — ATENOLOL 50 MG: 50 TABLET ORAL at 00:01

## 2022-10-02 RX ADMIN — DEXAMETHASONE SODIUM PHOSPHATE 4 MG: 4 INJECTION, SOLUTION INTRA-ARTICULAR; INTRALESIONAL; INTRAMUSCULAR; INTRAVENOUS; SOFT TISSUE at 01:47

## 2022-10-02 RX ADMIN — DULOXETINE 60 MG: 20 CAPSULE, DELAYED RELEASE ORAL at 17:43

## 2022-10-02 RX ADMIN — ALPRAZOLAM 0.25 MG: 0.25 TABLET ORAL at 10:05

## 2022-10-02 RX ADMIN — GABAPENTIN 800 MG: 400 CAPSULE ORAL at 17:42

## 2022-10-02 RX ADMIN — DEXAMETHASONE SODIUM PHOSPHATE 4 MG: 4 INJECTION, SOLUTION INTRA-ARTICULAR; INTRALESIONAL; INTRAMUSCULAR; INTRAVENOUS; SOFT TISSUE at 19:31

## 2022-10-02 RX ADMIN — OXCARBAZEPINE 300 MG: 300 TABLET, FILM COATED ORAL at 08:13

## 2022-10-02 RX ADMIN — GABAPENTIN 800 MG: 400 CAPSULE ORAL at 20:34

## 2022-10-02 RX ADMIN — GABAPENTIN 800 MG: 400 CAPSULE ORAL at 08:13

## 2022-10-02 RX ADMIN — OXCARBAZEPINE 300 MG: 300 TABLET, FILM COATED ORAL at 19:32

## 2022-10-02 RX ADMIN — GADOTERIDOL 13 ML: 279.3 INJECTION, SOLUTION INTRAVENOUS at 02:59

## 2022-10-02 RX ADMIN — SODIUM CHLORIDE: 9 INJECTION, SOLUTION INTRAVENOUS at 21:36

## 2022-10-02 RX ADMIN — ONDANSETRON HYDROCHLORIDE 4 MG: 2 SOLUTION INTRAMUSCULAR; INTRAVENOUS at 08:04

## 2022-10-02 RX ADMIN — INSULIN LISPRO 1 UNITS: 100 INJECTION, SOLUTION INTRAVENOUS; SUBCUTANEOUS at 05:20

## 2022-10-02 RX ADMIN — CLONAZEPAM 0.25 MG: 0.5 TABLET ORAL at 01:47

## 2022-10-02 ASSESSMENT — ENCOUNTER SYMPTOMS
COUGH: 0
WEAKNESS: 1
CHILLS: 0
NECK PAIN: 0
EYES NEGATIVE: 1
HEARTBURN: 0
POLYDIPSIA: 0
BLURRED VISION: 0
VOMITING: 0
DOUBLE VISION: 0
RESPIRATORY NEGATIVE: 1
PALPITATIONS: 0
BRUISES/BLEEDS EASILY: 0
DEPRESSION: 1
HEADACHES: 0
BACK PAIN: 1
NERVOUS/ANXIOUS: 1
FEVER: 0
CARDIOVASCULAR NEGATIVE: 1
FOCAL WEAKNESS: 1
NAUSEA: 1
DIZZINESS: 0

## 2022-10-02 ASSESSMENT — PAIN DESCRIPTION - PAIN TYPE
TYPE: CHRONIC PAIN

## 2022-10-02 ASSESSMENT — FIBROSIS 4 INDEX: FIB4 SCORE: 0.3

## 2022-10-02 NOTE — CONSULTS
Neurosurgery Consult Note    Patient: Jacque Mcintyre MRN: [id number]    Date of Consultation: 10/1/2022    Reason for Consultation: brain lesion    Referring Physician: Ar Whalen MD Emergency Medicine    Chief Complaint: left-sided weakness    History of Present Illness:  The patient is a 41 y.o. female with a history of known melanoma presenting with several days of left-sided weakness.  She states that a few days ago she noticed left leg weakness, where she was dragging it and having difficulty picking it up.  This then progressed to her left arm.  Due to the symptoms, she presented to [facility] emergency department for further evaluation.  Noncontrast CT scan of the brain revealed several new right frontal lobe lesions concerning for metastatic disease.  Neurosurgery was consulted for evaluation.    On further questioning, the patient denies any headaches, nausea, vomiting.  She has no sensory changes.  She does not take aspirin, Plavix, Coumadin.  She was initially diagnosed with melanoma of the left hand 6 years ago, and was resected at that time.  She also at some point had another melanoma lesion on the back of her right leg.  She was on immunotherapy for 3 years, but has been off it for about 3 years.  She is followed by Dr. Reed of [facility].  A PET scan in May of this year revealed a lesion with high uptake near her sternum.  This was biopsied and reportedly negative for tumor.  She had an MRI brain in June of this year, which was also reported as negative.    Her family including her parents, teenage son, friend, and cousin are present at bedside.    Medications:  Current Facility-Administered Medications   Medication Dose Route Frequency Provider Last Rate Last Admin    [START ON 10/2/2022] dexamethasone (DECADRON) injection 4 mg  4 mg Intravenous Q6HR Rhett Coffey M.D.        albuterol inhaler 2 Puff  2 Puff Inhalation Q4HRS PRN Rhett Coffey M.D.         [START ON 10/2/2022] atenolol (TENORMIN) tablet 50 mg  50 mg Oral QAM Rhett Coffey M.D.        atorvastatin (LIPITOR) tablet 40 mg  40 mg Oral Q EVENING Rhett Coffey M.D.        clonazePAM (KLONOPIN) tablet 0.25 mg  0.25 mg Oral BID PRN Rhett Coffey M.D.        DULoxetine (CYMBALTA) capsule 60 mg  60 mg Oral Q EVENING Rhett Coffey M.D.        gabapentin (NEURONTIN) capsule 800 mg  800 mg Oral 4X/DAY Rhett Coffey M.D.        [START ON 10/2/2022] lisinopril (PRINIVIL) tablet 20 mg  20 mg Oral DAILY Rhett Coffey M.D.        OXcarbazepine (TRILEPTAL) tablet 300 mg  300 mg Oral BID Rhett Coffey M.D.        QUEtiapine (Seroquel) tablet 200 mg  200 mg Oral Q EVENING Rhett Coffey M.D.        NS infusion   Intravenous Continuous Rhett Coffey M.D.        acetaminophen (Tylenol) tablet 650 mg  650 mg Oral Q6HRS PRN Rhett Coffey M.D.        insulin GLARGINE (Lantus,Semglee) injection  0.2 Units/kg/day Subcutaneous Q EVENING Rhett Coffey M.D.        And    insulin lispro (AdmeLOG,HumaLOG) injection  1-6 Units Subcutaneous Q6HRS Rhett Coffey M.D.        And    dextrose 50% (D50W) injection 25 g  25 g Intravenous Q15 MIN PRN Rhett Coffey M.D.         Current Outpatient Medications   Medication Sig Dispense Refill    Insulin Degludec (TRESIBA FLEXTOUCH) 100 UNIT/ML Solution Pen-injector Inject 20 Units under the skin at bedtime.      linezolid (ZYVOX) 600 MG Tab Take 600 mg by mouth 2 times a day.      meloxicam (MOBIC) 15 MG tablet Take 15 mg by mouth every day.      acetaminophen (TYLENOL) 500 MG Tab Take 1,000 mg by mouth 2 times a day.      atenolol (TENORMIN) 50 MG Tab Take 50 mg by mouth every morning.      atorvastatin (LIPITOR) 40 MG Tab Take 40 mg by mouth every evening.      DULoxetine (CYMBALTA) 60 MG Cap DR Particles delayed-release capsule Take 60 mg by mouth every evening.      gabapentin (NEURONTIN) 800 MG tablet Take 800 mg by mouth 4  times a day.      lisinopril (PRINIVIL) 20 MG Tab Take 20 mg by mouth every day.      OXcarbazepine (TRILEPTAL) 300 MG Tab Take 300 mg by mouth 2 times a day.      clonazePAM (KLONOPIN) 0.5 MG Tab Take 0.25 mg by mouth 2 times a day as needed (sleep). Indications: Feeling Anxious      QUEtiapine (SEROQUEL) 200 MG Tab Take 200 mg by mouth every evening.      albuterol (PROVENTIL HFA) 108 (90 BASE) MCG/ACT Aero Soln inhalation aerosol Inhale 2 Puffs by mouth every 6 hours as needed. (Patient taking differently: Inhale 2 Puffs every 6 hours as needed for Shortness of Breath.) 1 Inhaler 5       Allergies:  Allergies   Allergen Reactions    Amoxicillin-Pot Clavulanate Vomiting    Clavulanic Acid Vomiting     Violent vomiting       Past Medical History:  Past Medical History:   Diagnosis Date    Asthma     inhaler    Bipolar 1 disorder (HCC)     Cancer (HCC) 01/01/2016    melanoma    Cough     Depression     DM mellitus,     insulin    Hyperlipidemia     Hypertension     Pap smear     Dr. Baird    PCOS (polycystic ovarian syndrome)     Shortness of breath     Sputum production     Wheezing        Past Surgical History:  Past Surgical History:   Procedure Laterality Date    MT BRONCHOSCOPY,DIAGNOSTIC N/A 6/17/2022    Procedure: FIBER OPTIC BRONCHOSCOPY WITH WASH, BRUSH, BRONCHOALVEOLAR LAVAGE, BIOPSY, FINE NEEDLE ASPIRATION;  Surgeon: Yue Swanson M.D.;  Location: [facility];  Service: Pulmonary    ENDOBRONCHIAL US ADD-ON N/A 6/17/2022    Procedure: ENDOBRONCHIAL ULTRASOUND (EBUS);  Surgeon: Yue Swanson M.D.;  Location: [facility];  Service: Pulmonary    MT LAP,APPENDECTOMY N/A 10/7/2019    Procedure: APPENDECTOMY, LAPAROSCOPIC;  Surgeon: Lionel Frazier M.D.;  Location: [facility];  Service: General    AXILLARY NODE DISSECTION Left 6/23/2017    Procedure: AXILLARY NODE DISSECTION- COMPLETION LYPHADENECTOMY;  Surgeon: Lionel Weinberg M.D.;  Location: [facility];   Service:     WIDE EXCISION Left 5/9/2017    Procedure: WIDE EXCISION - MALIGNANT MELANOMA HAND;  Surgeon: Lionel Weinberg M.D.;  Location: SURGERY SAME DAY [facility];  Service:     NODE BIOPSY SENTINEL Left 5/9/2017    Procedure: NODE BIOPSY SENTINEL - AXILLARY;  Surgeon: Lionel Weinberg M.D.;  Location: SURGERY SAME DAY [facility];  Service:     OTHER  2016    excisino of melanoma left hand    ADENOIDECTOMY      MYRINGOTOMY      with tube placement       Family History:  Family History   Problem Relation Age of Onset    Psychiatric Illness Mother         depression    Diabetes Mother     Hyperlipidemia Mother     Thyroid Mother         s/p radioactive iodine treatment on replacement    Hypertension Father     Diabetes Father        Social History:  Social History     Socioeconomic History    Marital status:      Spouse name: Not on file    Number of children: 2    Years of education: Not on file    Highest education level: Not on file   Occupational History    Occupation: stay at home mom     Employer: none   Tobacco Use    Smoking status: Every Day     Packs/day: 0.75     Years: 10.00     Pack years: 7.50     Types: Cigarettes    Smokeless tobacco: Never   Vaping Use    Vaping Use: Never used   Substance and Sexual Activity    Alcohol use: Not Currently    Drug use: Yes     Types: Inhaled     Comment: occasional smoked marijuanna approx 3 times per month    Sexual activity: Yes   Other Topics Concern    Not on file   Social History Narrative    Not on file     Social Determinants of Health     Financial Resource Strain: Not on file   Food Insecurity: Not on file   Transportation Needs: Not on file   Physical Activity: Not on file   Stress: Not on file   Social Connections: Not on file   Intimate Partner Violence: Not on file   Housing Stability: Not on file       Physical Examination:  Vitals:    10/01/22 1951   BP:    Pulse: 98   Resp:    Temp:    SpO2: 95%     Laying in bed, no acute  distress  Normocephalic, atraumatic    Neurological  Awake, alert, oriented to person, place and time.  Pupils OD 4mm, OS 7mm, reactive.  Extraocular movements full.  Facial sensation intact to light touch.  Face symmetric, HB 1.  Palate rises symmetrically.  Tongue is midline.  Shoulder shrug 5/5.  Sensation intact to light touch in all 4 extremities.  Patient has mild pronator drift in the left arm.  She has 3/5 strength in the left upper extremity, 3/5 strength in the left lower extremity.  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      Labs:  Recent Labs     10/01/22  1846   WBC 12.1*   RBC 5.32   HEMOGLOBIN 13.5   HEMATOCRIT 43.0   MCV 80.8*   MCH 25.4*   MCHC 31.4*   RDW 48.2   PLATELETCT 519*   MPV 9.5     Recent Labs     10/01/22  1846   SODIUM 139   POTASSIUM 4.1   CHLORIDE 103   CO2 21   GLUCOSE 170*   BUN 14   CREATININE 0.51   CALCIUM 9.4               Imaging:    CT head without contrast dated 10/1/2022 was independently reviewed in detail, and my interpretation is as follows.  There were 3 hyperdense lesions in the right frontal lobe.  The largest is just anterior to the motor strip with surrounding vasogenic edema.  There is 3.2 cm in the maximum dimension.  There is a small lesion measuring 15 mm just anterior to this.  There is a final lesion measuring 12 mm in the frontal pole.  There is no midline shift or significant mass-effect.    MRI brain with and without contrast dated 6/232022 was independently reviewed in detail,  and my interpretation is as follows.  In retrospect there was likely a very tiny enhancing lesion in the right frontal lobe that may represent a metastatic lesion.    Assessment and Plan:    Jacque Mcintyre is a 41 y.o. female with a history of known melanoma presenting with new brain lesions concerning for metastatic disease.  She has left sided weakness as result of the location of one of the lesions, as well as vasogenic edema.    Recommendations:  - No acute neurosurgical  intervention at this time.  - Admit to hospital medicine service for expedited work-up  - Neuro checks q 4 h  -Decadron 4mg every 6h  -The patient takes Tegretol 300 mg twice daily.  This should suffice as seizure prophylaxis.  -MRI brain with and without contrast to evaluate lesions.  -CT chest abdomen pelvis for staging  -I notified radiation oncologist, Judy August MD who will evaluate the patient on Monday.  -The patient may eat as tolerated  - DVT prophylaxis: OK for DVT ppx if indicated  -Final operative/treatment plan pending the above work-up.  Decisions will be made in conjunction with radiation and medical oncology.    Thank you for this consult. Please call with questions.    Pebbles Tejada M.D.    A total of 50 minutes were spent in the evaluation, examination, coordination of care, review of labs and imaging of this patient. I spent >50% of time face-to-face on patient counseling.

## 2022-10-02 NOTE — ASSESSMENT & PLAN NOTE
Likely metastatic melanoma.  Continue Decadron and Keppra. Underwent right frontal craniotomy with tumor resection today.  Neurosurgery, Radiation oncology and medical oncology following

## 2022-10-02 NOTE — ED NOTES
Med rec updated and complete. Allergies reviewed. Confirmed name and date of birth. Pt recently finished a course of ZYVOX on 09/22/22    [facility] [phone]

## 2022-10-02 NOTE — ASSESSMENT & PLAN NOTE
Initially diagnosed in 2016, underwent immunotherapy and lymph node dissection.  Now with widespread metastatic disease.  Underwent right frontal craniotomy with tumor resection on 10/7/2022, transferred to Neuro ICU post surgically, transferred back to oncology on 10/8/2022.

## 2022-10-02 NOTE — ED PROVIDER NOTES
"ED Provider Note    Scribed for Ar Whalen M.D. by Patria Rai. 10/1/2022,  6:08 PM.      Means of Arrival: Private vehicle  History obtained from: Patient, medical record  History limited by: N/A    CHIEF COMPLAINT  Chief Complaint   Patient presents with    Weakness     PT c/o L sided upper and lower extremity weakness for \"several days.\" Pt states she now has a limp she never had before. Obvious drift noted to LUE and LLE. Pt states she is diabetic and feels \"shaky and sweaty\" despite normal FSBG levels       HPI  Jacque Mcintyre is a 41 y.o. female who presents to the Emergency Department for left-sided weakness.  She reports that over the past several days she has been having progressive weakness of the left upper and lower extremity to the point where she has taken falls.  She reports that she cannot push herself up with her left arm.  She denies any visual changes, double vision, headache, nausea, vomiting, fevers, trauma.  She denies any pain associated with this.    REVIEW OF SYSTEMS  CONSTITUTIONAL:  No fever.  CARDIOVASCULAR:  No chest discomfort.  RESPIRATORY:  No pleuritic chest pain.  GASTROINTESTINAL:  No abdominal pain.  GENITOURINARY:   No dysuria.  MUSCULOSKELETAL:  No arthralgia.  See HPI for further details.   All other systems are negative.     PAST MEDICAL HISTORY  Past Medical History:   Diagnosis Date    Asthma     inhaler    Bipolar 1 disorder (HCC)     Cancer (HCC) 01/01/2016    melanoma    Cough     Depression     DM mellitus,     insulin    Hyperlipidemia     Hypertension     Pap smear     Dr. Baird    PCOS (polycystic ovarian syndrome)     Shortness of breath     Sputum production     Wheezing        FAMILY HISTORY  Family History   Problem Relation Age of Onset    Psychiatric Illness Mother         depression    Diabetes Mother     Hyperlipidemia Mother     Thyroid Mother         s/p radioactive iodine treatment on replacement    Hypertension Father     Diabetes Father  "       SOCIAL HISTORY   reports that she has been smoking cigarettes. She has a 7.50 pack-year smoking history. She has never used smokeless tobacco. She reports that she does not currently use alcohol. She reports current drug use. Drug: Inhaled.    SURGICAL HISTORY  Past Surgical History:   Procedure Laterality Date    MO BRONCHOSCOPY,DIAGNOSTIC N/A 6/17/2022    Procedure: FIBER OPTIC BRONCHOSCOPY WITH WASH, BRUSH, BRONCHOALVEOLAR LAVAGE, BIOPSY, FINE NEEDLE ASPIRATION;  Surgeon: Yue Swanson M.D.;  Location: SURGERY [facility];  Service: Pulmonary    ENDOBRONCHIAL US ADD-ON N/A 6/17/2022    Procedure: ENDOBRONCHIAL ULTRASOUND (EBUS);  Surgeon: Yue Swanson M.D.;  Location: [facility];  Service: Pulmonary    MO LAP,APPENDECTOMY N/A 10/7/2019    Procedure: APPENDECTOMY, LAPAROSCOPIC;  Surgeon: Lionel Frazier M.D.;  Location: [facility];  Service: General    AXILLARY NODE DISSECTION Left 6/23/2017    Procedure: AXILLARY NODE DISSECTION- COMPLETION LYPHADENECTOMY;  Surgeon: Lionel Weinberg M.D.;  Location: [facility];  Service:     WIDE EXCISION Left 5/9/2017    Procedure: WIDE EXCISION - MALIGNANT MELANOMA HAND;  Surgeon: Lionel Weinberg M.D.;  Location: SURGERY SAME DAY [facility];  Service:     NODE BIOPSY SENTINEL Left 5/9/2017    Procedure: NODE BIOPSY SENTINEL - AXILLARY;  Surgeon: Lionel Weinberg M.D.;  Location: SURGERY SAME DAY [facility];  Service:     OTHER  2016    excisino of melanoma left hand    ADENOIDECTOMY      MYRINGOTOMY      with tube placement       CURRENT MEDICATIONS  Home Medications       Reviewed by Samia Dalton (Pharmacy Tech) on 10/01/22 at 1917  Med List Status: Complete     Medication Last Dose Status   acetaminophen (TYLENOL) 500 MG Tab 10/1/2022 Active   albuterol (PROVENTIL HFA) 108 (90 BASE) MCG/ACT Aero Soln inhalation aerosol 10/1/2022 Active   atenolol (TENORMIN) 50 MG Tab 10/1/2022 Active   atorvastatin (LIPITOR) 40 MG  "Tab 2022 Active   clonazePAM (KLONOPIN) 0.5 MG Tab 2022 Active   DULoxetine (CYMBALTA) 60 MG Cap DR Particles delayed-release capsule 2022 Active   gabapentin (NEURONTIN) 800 MG tablet 10/1/2022 Active   Insulin Degludec (TRESIBA FLEXTOUCH) 100 UNIT/ML Solution Pen-injector 2022 Active   linezolid (ZYVOX) 600 MG Tab 2022 Active   lisinopril (PRINIVIL) 20 MG Tab 10/1/2022 Active   meloxicam (MOBIC) 15 MG tablet 10/1/2022 Active   OXcarbazepine (TRILEPTAL) 300 MG Tab 10/1/2022 Active   QUEtiapine (SEROQUEL) 200 MG Tab 2022 Active                    ALLERGIES  Allergies   Allergen Reactions    Amoxicillin-Pot Clavulanate Vomiting    Clavulanic Acid Vomiting     Violent vomiting       PHYSICAL EXAM  VITAL SIGNS: BP (!) 142/100   Pulse (!) 130   Temp 36.6 °C (97.8 °F) (Temporal)   Resp 16   Ht 1.626 m (5' 4\")   Wt 70.1 kg (154 lb 8.7 oz)   SpO2 94%   BMI 26.53 kg/m²    Gen: Alert, no acute distress  HEENT: ATNC, normal oropharynx  Eyes: PERRL, EOMI, normal conjunctiva.   Neck: trachea midline  Resp: no respiratory distress clear to auscultation bilaterally  CV: No JVD, regular rate and rhythm, no rubs, rubs, gallops  Abd: non-distended, soft, nontender  Ext: No deformities, no edema  Psych: normal mood  Neuro: speech fluent, moves all extremities.  Drift to the left upper and lower extremity.  NIHSS: 2.  Normal cerebellar function.  Cranial nerves II through XII intact    DIAGNOSTIC STUDIES / PROCEDURES     EKG  Results for orders placed or performed during the hospital encounter of 10/01/22   EKG (NOW)   Result Value Ref Range    Report       [facility] Emergency Dept.    Test Date:  2022-10-01  Pt Name:    KALEE CENTENO                    Department: ER  MRN:        [id number]                      Room:       [facility]  Gender:     Female                       Technician: [id number]  :        1980                   Requested By:JESENIA MONTALVO  Order #:    [id number]        "             Reading MD: Ar Whalen    Measurements  Intervals                                Axis  Rate:       108                          P:          63  CA:         141                          QRS:        39  QRSD:       97                           T:          32  QT:         348  QTc:        467    Interpretive Statements  Sinus tachycardia  Compared to ECG 08/26/2022 12:56:49  Sinus rhythm no longer present  Electronically Signed On 10-1-2022 20:11:47 PDT by Ar Whalen          LABS  Labs Reviewed   CBC WITH DIFFERENTIAL - Abnormal; Notable for the following components:       Result Value    WBC 12.1 (*)     MCV 80.8 (*)     MCH 25.4 (*)     MCHC 31.4 (*)     Platelet Count 519 (*)     Lymphocytes 21.70 (*)     Neutrophils (Absolute) 8.39 (*)     Monos (Absolute) 0.91 (*)     All other components within normal limits    Narrative:     Biotin intake of greater than 5 mg per day may interfere with  troponin levels, causing false low values.   COMP METABOLIC PANEL - Abnormal; Notable for the following components:    Glucose 170 (*)     Alkaline Phosphatase 118 (*)     All other components within normal limits    Narrative:     Biotin intake of greater than 5 mg per day may interfere with  troponin levels, causing false low values.   TROPONIN    Narrative:     Biotin intake of greater than 5 mg per day may interfere with  troponin levels, causing false low values.   HCG QUAL SERUM    Narrative:     Biotin intake of greater than 5 mg per day may interfere with  troponin levels, causing false low values.   ESTIMATED GFR    Narrative:     Biotin intake of greater than 5 mg per day may interfere with  troponin levels, causing false low values.     All labs reviewed by me.    RADIOLOGY  DX-CHEST-PORTABLE (1 VIEW)   Final Result         1.  Possible pulmonary nodule within each lung. Metastasis is a possibility.      2.  No infiltrates or consolidations identified.      CT-HEAD W/O   Final Result   Addendum  (preliminary) 1 of 1   These findings were discussed with JESENIA HERNANDEZ on 10/01/2022.      Final         1.  3 masses in the right cerebral hemisphere largest measuring 3.2 cm with extensive vasogenic edema as described above. Findings are likely due to metastases.      2.  Localized midline shift to the left side frontoparietal level measuring 3 mm.      3.  The 2 smaller masses in the right frontal lobe demonstrates some increased density which could indicate that hemorrhage may be present within these masses.      These findings were discussed with Benjamín nurse for dr hernandez on 10/01/2022.         CT-CHEST,ABDOMEN,PELVIS WITH    (Results Pending)     The radiologist’s interpretation of all radiology studies have been reviewed by me.    COURSE & MEDICAL DECISION MAKING  Pertinent Labs & Imaging studies reviewed. (See chart for details)    6:08 PM Patient seen and examined at bedside. Ordered for  CT-head w/o, DX-chest,CBC w/ diff, CMP, troponin, hcg qual, and EKG to evaluate.     6:40 PM - Patient was reevaluated at bedside. Discussed lab  and radiology  results with the patient and informed them of the findings.      6:40 PM - I discussed the patient's case and the above findings with Dr. Tejada (Neuro surgery) who will consult on the case    6:50 PM Paged Hospitalist.      6:52 PM - I  informed that patient that I talked with Neurosurgery and paged the hospitalist regarding admission.     7:05 - I discussed the patient's case and the above findings with Dr. Coffey (Hospitalist) who will evaluate the patient for hospitalization    7:18 PM Paged Intensivist.      7:26pm Case discussed with Dr. Watters, oncology, who will evaluate the patient during hospitalization    7:27pm Case discussed with Dr. Reese, intensivist, who agrees with plan for admission to the [facility].        CRITICAL CARE  The very real possibilty of a deterioration of this patient's condition required the highest level of my preparedness for sudden,  emergent intervention.  I provided critical care services, which included medication orders, frequent reevaluations of the patient's condition and response to treatment, ordering and reviewing test results, and discussing the case with various consultants.  The critical care time associated with the care of the patient was 32 minutes. Review chart for interventions. This time is exclusive of any other billable procedures.       Medical Decision Making:  Patient presents with new left-sided weakness.  This does not involve the face, will start with CT scan of the head to evaluate for mass, intracranial bleed.  If negative, possible spinal etiology.  Patient's remainder of work-up demonstrates no signs of sepsis, toxidrome, trauma.    Unfortunately the CAT scan of the head demonstrate with what appears to be metastatic disease.  When I mention this to the patient, she reports that she does have a history of melanoma but was unaware of any metastasis for this.  Given the vasogenic edema and neurodeficit, will provide a dose of IV dexamethasone.  Neurosurgery consulted, given the new neurodeficits, will admit to the [facility] for more frequent neurochecks.  The patient's oncology team was notified of the patient's hospitalization and condition and they will follow the patient and provide recommendations.        Case discussed with Dr. Coffey , who will evaluate the patient for hospitalization. Patient will be hospitalized in guarded condition.    FINAL IMPRESSION  1. Left arm weakness    2. Left leg weakness    3. Brain metastases (HCC)    4. Vasogenic brain edema (HCC)            Patria MCKEON (Yariel), am scribing for, and in the presence of, Ar Whalen M.D..    Electronically signed by: Patria Parker), 10/1/2022    Ar MCKEON M.D. personally performed the services described in this documentation, as scribed by Patria Rai in my presence, and it is both accurate and  complete.    The note accurately reflects work and decisions made by me.  Ar Whalen M.D.  10/1/2022  8:39 PM      This dictation was created using voice recognition software. The accuracy of the dictation is limited to the abilities of the software. I expect there may be some errors of grammar and possibly content. The nursing notes were reviewed and certain aspects of this information were incorporated into this note.

## 2022-10-02 NOTE — ASSESSMENT & PLAN NOTE
On steroids.  Continue SSI with Accu-Cheks and hypoglycemia protocol.  SSI scale was increased due to elevated blood glucose readings.  Diabetic diet.  Monitor closely

## 2022-10-02 NOTE — CONSULTS
This young woman unfortunately has evidence of metastatic melanoma to her brain and likely lungs.  He came in today complaining of new onset weakness, CT head demonstrated masses in her brain with surrounding vasogenic edema.    Given new focal neurologic deficits, reasonable to admit to IMCU for more frequent neuro monitoring.    Garrett Reese M.D.

## 2022-10-02 NOTE — CARE PLAN
The patient is Watcher - Medium risk of patient condition declining or worsening    Shift Goals  Clinical Goals: Hemodynamic stability, MRI, Q4 neuro's.  Patient Goals: Rest, update with labs    Progress made toward(s) clinical / shift goals:  Pt A&Ox4. Significant left side weakness  present, but able to follow commands and move extremity. Pt denies decreased sensation. 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 Denies visual changes. MRI with and without contrast obtained. Graf placed for inability to urinate, 1300mL output. Per neuro surgery, no acute interventions at this time and plan for oncologist to visit pt on Monday. Continue on Q6 hour decadron.    Problem: Pain - Standard  Goal: Alleviation of pain or a reduction in pain to the patient’s comfort goal  Outcome: Progressing     Problem: Fall Risk  Goal: Patient will remain free from falls  Outcome: Progressing     Problem: Hemodynamics  Goal: Patient's hemodynamics, fluid balance and neurologic status will be stable or improve  Outcome: Progressing     Problem: Mobility  Goal: Patient's capacity to carry out activities will improve  Outcome: Progressing       Patient is not progressing towards the following goals:

## 2022-10-02 NOTE — ASSESSMENT & PLAN NOTE
Initially diagnosed in 2016, underwent immunotherapy and lymph node dissection.  Now with widespread metastatic disease.  Underwent right frontal craniotomy of 3 frontal tumors on 10/7/2022.  Patient is to start radiation therapy about 2 weeks after surgery.  Patient follows Dr. Reed as an outpatient, chemotherapy/immunotherapy will depend on final pathology report.  Continue Decadron and Keppra.

## 2022-10-02 NOTE — ED TRIAGE NOTES
"Chief Complaint   Patient presents with    Weakness     PT c/o L sided upper and lower extremity weakness for \"several days.\" Pt states she now has a limp she never had before. Obvious drift noted to FARA and SHERRI. Pt states she is diabetic and feels \"shaky and sweaty\" despite normal FSBG levels       Ambulatory to triage for above complaint.   Educated on triage process, encourage to inform staff of any changes.     BP (!) 142/100   Pulse (!) 130   Temp 36.6 °C (97.8 °F) (Temporal)   Resp 16   Ht 1.626 m (5' 4\")   Wt 70.1 kg (154 lb 8.7 oz)   SpO2 94%   BMI 26.53 kg/m²     "

## 2022-10-02 NOTE — ED NOTES
Report received from prior RN. Pt alert. Resp normal/unlabored. Bed side rails up/in low position. Pt updated to POC and all questions answered.     Pt given diet orders, insulin per glucose 254, family at bedside.

## 2022-10-02 NOTE — PROGRESS NOTES
4 Eyes Skin Assessment Completed by Rosa RN and MICHAEL Hemphill.    Head WDL  Ears WDL  Nose WDL  Mouth WDL  Neck WDL  Breast/Chest Incision  Shoulder Blades WDL, bruising present  Spine Bruising  (R) Arm/Elbow/Hand WDL  (L) Arm/Elbow/Hand WDL  Abdomen WDL  Groin WDL  Scrotum/Coccyx/Buttocks WDL  (R) Leg WDL  (L) Leg WDL  (R) Heel/Foot/Toe WDL  (L) Heel/Foot/Toe Ulcer(s) scab          Devices In Places Tele Box, Blood Pressure Cuff, and Pulse Ox      Interventions In Place Pillows    Possible Skin Injury No    Pictures Uploaded Into Epic N/A  Wound Consult Placed N/A  RN Wound Prevention Protocol Ordered No

## 2022-10-02 NOTE — ED NOTES
Report to receiving RN and pt transported with RN and tech on monitor, family members and all personal items taken with pt.

## 2022-10-02 NOTE — PROGRESS NOTES
[facility] Medicine Daily Progress Note    Date of Service  10/2/2022    Chief Complaint  Jacque Mcintyre is a 41 y.o. female admitted 10/1/2022 with left-sided weakness, history of melanoma    Hospital Course  This is a 41-year-old female presented on 10/1 with left upper and left lower extremity weakness, ongoing for approximately a week, slow progression, has a history of melanoma diagnosis in 2016, right adrenal lesion was in her left hand, additional lesion on the right trunk, she is s/p surgical excision, lymph node dissection in the left axilla as well as immunotherapy for 3 years, she is supposedly was in remission, then had a abnormal PET scan earlier this year with a biopsy performed which turned out negative.  The patient after presentation was found to have multiple brain lesions that were new on CT scanning, neurosurgery consulted, recommended MRI and CT abdomen pelvis, the patient found with multiple brain metastases with significant surrounding edema as well as metastatic disease documented on CT including pulmonary nodules, lymphadenopathy.  The patient received imaging results via [employer] and is devastated.    Interval Problem Update  Patient seen and examined today.  Data, Medication data reviewed.  Case discussed with nursing as available.  Plan of Care reviewed with patient and notified of changes.   10/2 the patient is tearful, she reviewed her imaging reports on my chart, she has significant amount of anxiety, some nausea, denies currently headache, no visual changes, remains weak on the left side, we discussed current status and plan for consultation with medical oncology, radiation oncology and further planning based on the recommendations.  The patient has had no other developments in terms of neurologic changes    I have discussed this patient's plan of care and discharge plan at IDT rounds today with Case Management, Nursing, Nursing leadership, and other members of the IDT  team.    Consultants/Specialty  critical care, neurosurgery, and oncology    Code Status  Full Code    Disposition  Patient is not medically cleared for discharge.   Anticipate discharge to to home with close outpatient follow-up.  I have placed the appropriate orders for post-discharge needs.    Review of Systems  Review of Systems   Constitutional:  Positive for malaise/fatigue. Negative for chills and fever.   HENT: Negative.     Eyes: Negative.  Negative for blurred vision and double vision.   Respiratory: Negative.  Negative for cough.    Cardiovascular: Negative.  Negative for chest pain and palpitations.   Gastrointestinal:  Positive for nausea. Negative for heartburn and vomiting.   Genitourinary: Negative.  Negative for dysuria and frequency.   Musculoskeletal:  Positive for back pain. Negative for neck pain.   Skin: Negative.  Negative for itching and rash.   Neurological:  Positive for focal weakness and weakness. Negative for dizziness and headaches.   Endo/Heme/Allergies: Negative.  Negative for polydipsia. Does not bruise/bleed easily.   Psychiatric/Behavioral:  Positive for depression. The patient is nervous/anxious.       Physical Exam  Temp:  [36.2 °C (97.1 °F)-36.6 °C (97.8 °F)] 36.3 °C (97.4 °F)  Pulse:  [] 77  Resp:  [16-37] 16  BP: ()/() 132/83  SpO2:  [90 %-97 %] 95 %    Physical Exam  Vitals and nursing note reviewed.   Constitutional:       Appearance: She is well-developed. She is not diaphoretic.   HENT:      Head: Normocephalic and atraumatic.      Nose: Nose normal.   Eyes:      Conjunctiva/sclera: Conjunctivae normal.      Pupils: Pupils are equal, round, and reactive to light.   Neck:      Thyroid: No thyromegaly.      Vascular: No JVD.   Cardiovascular:      Rate and Rhythm: Normal rate and regular rhythm.      Heart sounds: Normal heart sounds.     No friction rub. No gallop.   Pulmonary:      Effort: Pulmonary effort is normal.      Breath sounds: Normal breath  sounds. No wheezing or rales.   Abdominal:      General: Bowel sounds are normal. There is no distension.      Palpations: Abdomen is soft. There is no mass.      Tenderness: There is no abdominal tenderness. There is no guarding or rebound.   Musculoskeletal:         General: No tenderness. Normal range of motion.      Cervical back: Normal range of motion and neck supple.   Lymphadenopathy:      Cervical: No cervical adenopathy.   Skin:     General: Skin is warm and dry.   Neurological:      Mental Status: She is alert and oriented to person, place, and time.      Cranial Nerves: No cranial nerve deficit.      Motor: Weakness present.   Psychiatric:         Behavior: Behavior normal.       Fluids    Intake/Output Summary (Last 24 hours) at 10/2/2022 0731  Last data filed at 10/2/2022 0658  Gross per 24 hour   Intake 2280 ml   Output 1300 ml   Net 980 ml       Laboratory  Recent Labs     10/01/22  1846   WBC 12.1*   RBC 5.32   HEMOGLOBIN 13.5   HEMATOCRIT 43.0   MCV 80.8*   MCH 25.4*   MCHC 31.4*   RDW 48.2   PLATELETCT 519*   MPV 9.5     Recent Labs     10/01/22  1846   SODIUM 139   POTASSIUM 4.1   CHLORIDE 103   CO2 21   GLUCOSE 170*   BUN 14   CREATININE 0.51   CALCIUM 9.4                   Imaging  MR-BRAIN-WITH & W/O   Final Result      1.  Right-sided predominant, supratentorial metastatic disease with the largest mass in the right frontoparietal region measuring 3.4 x 3.0 cm as described in detail above.   2.  Associated edema with regional associated cortical effacement and partial effacement of the right lateral ventricle. Minimal localized midline shift adjacent to the large right frontoparietal mass.   3.  Evidence of prior hemorrhage within these masses.   4.  Small focus of restricted diffusion in the right occipital bone could represent osseous metastatic disease.      CT-CHEST,ABDOMEN,PELVIS WITH   Final Result      1.  BILATERAL pulmonary nodules, the largest of which measures 12 mm in the LEFT  lower lobe and which has enlarged since May of this year.   2.  Enlarged LEFT hilar lymph node   3.  Enlarged LEFT external iliac lymph node. This would be amenable to image guided tissue sampling if clinically appropriate.   4.  7 mm LEFT and LEFT upper quadrant subcutaneous soft tissue nodules, new since prior   5.  9 mm RIGHT lower quadrant peritoneal nodule, new since prior   6.  33 mm LEFT ovarian cyst   7.  Thyroid nodules measuring up to 23 mm   8.  BILATERAL L5 pars defects with grade 1 anterolisthesis of L5 on S1   9.  Incompletely assessed LEFT renal lesion, new since prior and most likely a cyst though attention on top is recommended.      DX-CHEST-PORTABLE (1 VIEW)   Final Result         1.  Possible pulmonary nodule within each lung. Metastasis is a possibility.      2.  No infiltrates or consolidations identified.      CT-HEAD W/O   Final Result   Addendum (preliminary) 1 of 1   These findings were discussed with JESENIA HERNANDEZ on 10/01/2022.      Final         1.  3 masses in the right cerebral hemisphere largest measuring 3.2 cm with extensive vasogenic edema as described above. Findings are likely due to metastases.      2.  Localized midline shift to the left side frontoparietal level measuring 3 mm.      3.  The 2 smaller masses in the right frontal lobe demonstrates some increased density which could indicate that hemorrhage may be present within these masses.      These findings were discussed with Benjamín milan for dr hernandez on 10/01/2022.              Assessment/Plan  * Brain metastases (HCC)  Assessment & Plan  Likely metastatic melanoma  Noted to have three masses with midline shift and vasogenic edema  Decadron seizure prophylaxis  Neurosurgery consulted, no intervention likely  CT abdomen chest resulted  MRI brain resulted  Multiple additional metastases found  Medical oncology consulted  Radiation oncology to consult on Monday      Malignant melanoma metastatic to brain (HCC)- (present on  admission)  Assessment & Plan  Multiple other metastases sites  Medical oncology consult  Radiation oncology consult  The patient with a history of 3 years of immunotherapy    DM2 (diabetes mellitus, type 2) (HCC)- (present on admission)  Assessment & Plan  Sliding scale  Has had DKA in the past    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 In review of yesterday's note (10/1/2022), there are no changes except as documented above.      Please note that this dictation was created using voice recognition software. I have made every reasonable attempt to correct obvious errors, but I expect that there are errors of grammar and possibly context that I did not discover before finalizing the note.

## 2022-10-02 NOTE — H&P
"Hospital Medicine History & Physical Note    Date of Service  10/1/2022    Primary Care Physician  Adriana Levine M.D.    Consultants  Neurosurgery    Code Status  Full Code    Chief Complaint  Chief Complaint   Patient presents with    Weakness     PT c/o L sided upper and lower extremity weakness for \"several days.\" Pt states she now has a limp she never had before. Obvious drift noted to FARA and SHERRI. Pt states she is diabetic and feels \"shaky and sweaty\" despite normal FSBG levels       History of Presenting Illness  Jacque Mcintyre is a 41 y.o. female who presented 10/1/2022 with left upper and lower extremity weakness for the last week.  States that this has slowly progressed, causing her to come to the ED for evaluation.  Denies headache nausea vomiting chest pain shortness of breath dizziness.    Patient has a history of melanoma to which she received treatment and has been in remission for the last 2-1/2 years.  Has not received any treatment during this time.  Had a PET scan in May 2022 that showed increased uptake, consistent with metastatic activity in the infrahilar lymph node.  Biopsy was done to which results were negative for malignancy.    In the ED, patient found to be tachycardic, other vital signs wnl.  CT head showing 3 masses in the right cerebral hemisphere largest measuring 3.2 cm with extensive vasogenic edema.  2 smaller masses in the right frontal lobe that could be hemorrhages present within the masses.    I discussed the plan of care with patient.    Review of Systems  Review of Systems   Constitutional: Negative.    HENT: Negative.     Eyes: Negative.    Respiratory: Negative.     Cardiovascular: Negative.    Gastrointestinal: Negative.    Genitourinary: Negative.    Musculoskeletal: Negative.    Skin: Negative.    Neurological:  Positive for focal weakness and weakness.   Endo/Heme/Allergies: Negative.    Psychiatric/Behavioral: Negative.       Past Medical History   has a past " medical history of Asthma, Bipolar 1 disorder (HCC), Cancer (HCC) (2016), Cough, Depression, DM mellitus,, Hyperlipidemia, Hypertension, Pap smear, PCOS (polycystic ovarian syndrome), Shortness of breath, Sputum production, and Wheezing.    Surgical History   has a past surgical history that includes myringotomy; adenoidectomy; other (); wide excision (Left, 2017); node biopsy sentinel (Left, 2017); axillary node dissection (Left, 2017); pr lap,appendectomy (N/A, 10/7/2019); pr bronchoscopy,diagnostic (N/A, 2022); and endobronchial us add-on (N/A, 2022).     Family History  family history includes Diabetes in her father and mother; Hyperlipidemia in her mother; Hypertension in her father; Psychiatric Illness in her mother; Thyroid in her mother.   Family history reviewed with patient. There is no family history that is pertinent to the chief complaint.     Social History   reports that she has been smoking cigarettes. She has a 7.50 pack-year smoking history. She has never used smokeless tobacco. She reports that she does not currently use alcohol. She reports current drug use. Drug: Inhaled.    Allergies  Allergies   Allergen Reactions    Amoxicillin-Pot Clavulanate Vomiting    Clavulanic Acid Vomiting     Violent vomiting       Medications  Prior to Admission Medications   Prescriptions Last Dose Informant Patient Reported? Taking?   Continuous Blood Gluc  (FREESTYLE RICARDA 14 DAY READER) Device  Patient Yes No   Si Each every 14 days.   DULoxetine (CYMBALTA) 60 MG Cap DR Particles delayed-release capsule  Patient Yes No   Sig: Take 60 mg by mouth every evening.   Empagliflozin-metFORMIN HCl (SYNJARDY) 12.5-1000 MG Tab  Patient Yes No   Sig: Take 1 Tablet by mouth 2 times a day.   Insulin Degludec (TRESIBA FLEXTOUCH SC)  Patient Yes No   Sig: Inject 20 Units under the skin every evening.   ONETOUCH ULTRA strip  Patient Yes No   OXcarbazepine (TRILEPTAL) 300 MG Tab   Patient Yes No   Sig: Take 300 mg by mouth 2 times a day.   QUEtiapine (SEROQUEL) 200 MG Tab  Patient Yes No   Sig: Take 200 mg by mouth every evening.   acetaminophen (TYLENOL) 500 MG Tab  Patient Yes No   Sig: Take 1,000 mg by mouth 2 times a day.   albuterol (PROVENTIL HFA) 108 (90 BASE) MCG/ACT Aero Soln inhalation aerosol  Patient No No   Sig: Inhale 2 Puffs by mouth every 6 hours as needed.   atenolol (TENORMIN) 50 MG Tab  Patient Yes No   Sig: Take 50 mg by mouth every morning.   atorvastatin (LIPITOR) 40 MG Tab  Patient Yes No   Sig: Take 40 mg by mouth every evening.   clonazePAM (KLONOPIN) 0.5 MG Tab  Patient Yes No   Sig: Take 0.25 mg by mouth 2 times a day as needed (ANXIETY).   gabapentin (NEURONTIN) 800 MG tablet  Patient Yes No   Sig: Take 800 mg by mouth 4 times a day.   ibuprofen (MOTRIN) 200 MG Tab  Patient Yes No   Sig: Take 200 mg by mouth every 6 hours as needed.   lisinopril (PRINIVIL) 20 MG Tab  Patient Yes No   Sig: Take 20 mg by mouth every day.   meloxicam (MOBIC) 15 MG tablet  Patient Yes No   Sig: Take 15 mg by mouth every day.   ondansetron (ZOFRAN ODT) 4 MG TABLET DISPERSIBLE  Patient Yes No   Sig: Take 4 mg by mouth every 6 hours as needed.      Facility-Administered Medications: None       Physical Exam  Temp:  [36.6 °C (97.8 °F)] 36.6 °C (97.8 °F)  Pulse:  [130] 130  Resp:  [16] 16  BP: (142)/(100) 142/100  SpO2:  [94 %] 94 %  Blood Pressure: (!) 142/100   Temperature: 36.6 °C (97.8 °F)   Pulse: (!) 130   Respiration: 16   Pulse Oximetry: 94 %       Physical Exam  Constitutional:       Appearance: Normal appearance. She is normal weight.   HENT:      Head: Normocephalic.      Nose: Nose normal.      Mouth/Throat:      Mouth: Mucous membranes are moist.   Eyes:      Pupils: Pupils are equal, round, and reactive to light.   Cardiovascular:      Rate and Rhythm: Normal rate and regular rhythm.      Pulses: Normal pulses.   Pulmonary:      Effort: Pulmonary effort is normal.      Breath  sounds: Normal breath sounds.   Abdominal:      General: Abdomen is flat. Bowel sounds are normal.      Palpations: Abdomen is soft.   Musculoskeletal:      Cervical back: Neck supple.   Skin:     General: Skin is warm.   Neurological:      Mental Status: She is alert and oriented to person, place, and time. Mental status is at baseline.      Comments: No tongue deviation  No facial droop  Left upper and lower extremity strength 4 out of 5  Right upper and lower extremity 5 out of 5   Psychiatric:         Mood and Affect: Mood normal.         Behavior: Behavior normal.         Thought Content: Thought content normal.         Judgment: Judgment normal.       Laboratory:          No results for input(s): ALTSGPT, ASTSGOT, ALKPHOSPHAT, TBILIRUBIN, DBILIRUBIN, GAMMAGT, AMYLASE, LIPASE, ALB, PREALBUMIN, GLUCOSE in the last 72 hours.      No results for input(s): NTPROBNP in the last 72 hours.      No results for input(s): TROPONINT in the last 72 hours.    Imaging:  DX-CHEST-PORTABLE (1 VIEW)   Final Result         1.  Possible pulmonary nodule within each lung. Metastasis is a possibility.      2.  No infiltrates or consolidations identified.      CT-HEAD W/O   Final Result   Addendum (preliminary) 1 of 1   These findings were discussed with JESENIA HERNANDEZ on 10/01/2022.      Final         1.  3 masses in the right cerebral hemisphere largest measuring 3.2 cm with extensive vasogenic edema as described above. Findings are likely due to metastases.      2.  Localized midline shift to the left side frontoparietal level measuring 3 mm.      3.  The 2 smaller masses in the right frontal lobe demonstrates some increased density which could indicate that hemorrhage may be present within these masses.      These findings were discussed with Benjamín nurse for dr hernandez on 10/01/2022.             no X-Ray or EKG requiring interpretation    Assessment/Plan:  Justification for Admission Status  I anticipate this patient will require at  "least two midnights for appropriate medical management, necessitating inpatient admission because patient has newly diagnosed metastatic melanoma.      * Brain metastases (HCC)  Assessment & Plan  Likely metastatic melanoma  Noted to have three masses with midline shift and vasogenic edema  Decadron  Neurosurgery consulted, no intervention for now, awaiting official recommendations  Stage with CAT scan chest and abdomen  Oncology consult in a.m.      DM2 (diabetes mellitus, type 2) (HCC)- (present on admission)  Assessment & Plan  Sliding scale  Has had DKA in the past    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)- (present on admission)  Assessment & Plan  Currently not on tx for the last 2 years  Noted to have metastases to brain  Had PET scan in 5/31/22 showing \"Focal intense uptake corresponding to enlarged LEFT infrahilar lymph node, new from prior exam, consistent with metastasis.\"  Pathology results from biopsy of this lymph node was negative for malignancy.    Depression- (present on admission)  Assessment & Plan  Restart home medications      VTE prophylaxis: pharmacologic prophylaxis contraindicated due to possible hemorrhage seen on CAT scan  "

## 2022-10-03 LAB
ANION GAP SERPL CALC-SCNC: 12 MMOL/L (ref 7–16)
BUN SERPL-MCNC: 20 MG/DL (ref 8–22)
CALCIUM SERPL-MCNC: 8.7 MG/DL (ref 8.5–10.5)
CHLORIDE SERPL-SCNC: 106 MMOL/L (ref 96–112)
CO2 SERPL-SCNC: 19 MMOL/L (ref 20–33)
CREAT SERPL-MCNC: 0.56 MG/DL (ref 0.5–1.4)
ERYTHROCYTE [DISTWIDTH] IN BLOOD BY AUTOMATED COUNT: 50.3 FL (ref 35.9–50)
GFR SERPLBLD CREATININE-BSD FMLA CKD-EPI: 115 ML/MIN/1.73 M 2
GLUCOSE BLD STRIP.AUTO-MCNC: 191 MG/DL (ref 65–99)
GLUCOSE BLD STRIP.AUTO-MCNC: 204 MG/DL (ref 65–99)
GLUCOSE BLD STRIP.AUTO-MCNC: 215 MG/DL (ref 65–99)
GLUCOSE BLD STRIP.AUTO-MCNC: 218 MG/DL (ref 65–99)
GLUCOSE SERPL-MCNC: 191 MG/DL (ref 65–99)
HCT VFR BLD AUTO: 36.6 % (ref 37–47)
HGB BLD-MCNC: 11.2 G/DL (ref 12–16)
MAGNESIUM SERPL-MCNC: 2.1 MG/DL (ref 1.5–2.5)
MCH RBC QN AUTO: 25.5 PG (ref 27–33)
MCHC RBC AUTO-ENTMCNC: 30.6 G/DL (ref 33.6–35)
MCV RBC AUTO: 83.2 FL (ref 81.4–97.8)
PHOSPHATE SERPL-MCNC: 4.2 MG/DL (ref 2.5–4.5)
PLATELET # BLD AUTO: 419 K/UL (ref 164–446)
PMV BLD AUTO: 9.8 FL (ref 9–12.9)
POTASSIUM SERPL-SCNC: 5 MMOL/L (ref 3.6–5.5)
RBC # BLD AUTO: 4.4 M/UL (ref 4.2–5.4)
SODIUM SERPL-SCNC: 137 MMOL/L (ref 135–145)
WBC # BLD AUTO: 13.1 K/UL (ref 4.8–10.8)

## 2022-10-03 PROCEDURE — A9270 NON-COVERED ITEM OR SERVICE: HCPCS | Performed by: STUDENT IN AN ORGANIZED HEALTH CARE EDUCATION/TRAINING PROGRAM

## 2022-10-03 PROCEDURE — 700105 HCHG RX REV CODE 258: Performed by: STUDENT IN AN ORGANIZED HEALTH CARE EDUCATION/TRAINING PROGRAM

## 2022-10-03 PROCEDURE — 97167 OT EVAL HIGH COMPLEX 60 MIN: CPT

## 2022-10-03 PROCEDURE — 99233 SBSQ HOSP IP/OBS HIGH 50: CPT | Performed by: HOSPITALIST

## 2022-10-03 PROCEDURE — 85027 COMPLETE CBC AUTOMATED: CPT

## 2022-10-03 PROCEDURE — 700105 HCHG RX REV CODE 258: Performed by: HOSPITALIST

## 2022-10-03 PROCEDURE — A9270 NON-COVERED ITEM OR SERVICE: HCPCS | Performed by: HOSPITALIST

## 2022-10-03 PROCEDURE — L4398 FOOT DROP SPLINT PRE OTS: HCPCS

## 2022-10-03 PROCEDURE — 700111 HCHG RX REV CODE 636 W/ 250 OVERRIDE (IP): Performed by: STUDENT IN AN ORGANIZED HEALTH CARE EDUCATION/TRAINING PROGRAM

## 2022-10-03 PROCEDURE — 99223 1ST HOSP IP/OBS HIGH 75: CPT | Performed by: RADIOLOGY

## 2022-10-03 PROCEDURE — 700111 HCHG RX REV CODE 636 W/ 250 OVERRIDE (IP): Performed by: HOSPITALIST

## 2022-10-03 PROCEDURE — A9270 NON-COVERED ITEM OR SERVICE: HCPCS | Performed by: INTERNAL MEDICINE

## 2022-10-03 PROCEDURE — 83735 ASSAY OF MAGNESIUM: CPT

## 2022-10-03 PROCEDURE — 82962 GLUCOSE BLOOD TEST: CPT | Mod: 91

## 2022-10-03 PROCEDURE — 700102 HCHG RX REV CODE 250 W/ 637 OVERRIDE(OP): Performed by: STUDENT IN AN ORGANIZED HEALTH CARE EDUCATION/TRAINING PROGRAM

## 2022-10-03 PROCEDURE — 84100 ASSAY OF PHOSPHORUS: CPT

## 2022-10-03 PROCEDURE — 770004 HCHG ROOM/CARE - ONCOLOGY PRIVATE *

## 2022-10-03 PROCEDURE — 700102 HCHG RX REV CODE 250 W/ 637 OVERRIDE(OP): Performed by: HOSPITALIST

## 2022-10-03 PROCEDURE — 700102 HCHG RX REV CODE 250 W/ 637 OVERRIDE(OP): Performed by: INTERNAL MEDICINE

## 2022-10-03 PROCEDURE — 51798 US URINE CAPACITY MEASURE: CPT

## 2022-10-03 PROCEDURE — 97163 PT EVAL HIGH COMPLEX 45 MIN: CPT

## 2022-10-03 PROCEDURE — 80048 BASIC METABOLIC PNL TOTAL CA: CPT

## 2022-10-03 RX ORDER — DEXTROSE MONOHYDRATE 25 G/50ML
25 INJECTION, SOLUTION INTRAVENOUS
Status: DISCONTINUED | OUTPATIENT
Start: 2022-10-03 | End: 2022-10-05

## 2022-10-03 RX ORDER — ATENOLOL 50 MG/1
25 TABLET ORAL EVERY MORNING
Status: DISCONTINUED | OUTPATIENT
Start: 2022-10-04 | End: 2022-10-15 | Stop reason: HOSPADM

## 2022-10-03 RX ORDER — AMOXICILLIN 250 MG
2 CAPSULE ORAL 2 TIMES DAILY
Status: DISCONTINUED | OUTPATIENT
Start: 2022-10-03 | End: 2022-10-07

## 2022-10-03 RX ORDER — INSULIN LISPRO 100 [IU]/ML
1-6 INJECTION, SOLUTION INTRAVENOUS; SUBCUTANEOUS
Status: DISCONTINUED | OUTPATIENT
Start: 2022-10-03 | End: 2022-10-05

## 2022-10-03 RX ORDER — POLYETHYLENE GLYCOL 3350 17 G/17G
1 POWDER, FOR SOLUTION ORAL
Status: DISCONTINUED | OUTPATIENT
Start: 2022-10-03 | End: 2022-10-07

## 2022-10-03 RX ORDER — BISACODYL 10 MG
10 SUPPOSITORY, RECTAL RECTAL
Status: DISCONTINUED | OUTPATIENT
Start: 2022-10-03 | End: 2022-10-07

## 2022-10-03 RX ORDER — OXYCODONE HYDROCHLORIDE 5 MG/1
5 TABLET ORAL EVERY 4 HOURS PRN
Status: DISCONTINUED | OUTPATIENT
Start: 2022-10-03 | End: 2022-10-08

## 2022-10-03 RX ORDER — NYSTATIN 100000 [USP'U]/G
POWDER TOPICAL 2 TIMES DAILY
Status: DISPENSED | OUTPATIENT
Start: 2022-10-03 | End: 2022-10-10

## 2022-10-03 RX ORDER — ENOXAPARIN SODIUM 100 MG/ML
40 INJECTION SUBCUTANEOUS DAILY
Status: DISCONTINUED | OUTPATIENT
Start: 2022-10-03 | End: 2022-10-15 | Stop reason: HOSPADM

## 2022-10-03 RX ADMIN — DEXAMETHASONE SODIUM PHOSPHATE 4 MG: 4 INJECTION, SOLUTION INTRA-ARTICULAR; INTRALESIONAL; INTRAMUSCULAR; INTRAVENOUS; SOFT TISSUE at 06:42

## 2022-10-03 RX ADMIN — ALPRAZOLAM 0.25 MG: 0.25 TABLET ORAL at 13:50

## 2022-10-03 RX ADMIN — ALBUTEROL SULFATE 2 PUFF: 90 AEROSOL, METERED RESPIRATORY (INHALATION) at 21:51

## 2022-10-03 RX ADMIN — QUETIAPINE FUMARATE 200 MG: 100 TABLET ORAL at 21:30

## 2022-10-03 RX ADMIN — OXCARBAZEPINE 300 MG: 300 TABLET, FILM COATED ORAL at 08:36

## 2022-10-03 RX ADMIN — FAMOTIDINE 20 MG: 20 TABLET, FILM COATED ORAL at 16:50

## 2022-10-03 RX ADMIN — OXYCODONE 5 MG: 5 TABLET ORAL at 16:50

## 2022-10-03 RX ADMIN — ALBUTEROL SULFATE 2 PUFF: 90 AEROSOL, METERED RESPIRATORY (INHALATION) at 03:28

## 2022-10-03 RX ADMIN — FAMOTIDINE 20 MG: 20 TABLET, FILM COATED ORAL at 05:00

## 2022-10-03 RX ADMIN — DEXAMETHASONE SODIUM PHOSPHATE 4 MG: 4 INJECTION, SOLUTION INTRA-ARTICULAR; INTRALESIONAL; INTRAMUSCULAR; INTRAVENOUS; SOFT TISSUE at 13:44

## 2022-10-03 RX ADMIN — ENOXAPARIN SODIUM 40 MG: 40 INJECTION SUBCUTANEOUS at 17:06

## 2022-10-03 RX ADMIN — GABAPENTIN 800 MG: 400 CAPSULE ORAL at 16:49

## 2022-10-03 RX ADMIN — SODIUM CHLORIDE: 9 INJECTION, SOLUTION INTRAVENOUS at 21:28

## 2022-10-03 RX ADMIN — SENNOSIDES AND DOCUSATE SODIUM 2 TABLET: 50; 8.6 TABLET ORAL at 17:06

## 2022-10-03 RX ADMIN — DEXAMETHASONE SODIUM PHOSPHATE 4 MG: 4 INJECTION, SOLUTION INTRA-ARTICULAR; INTRALESIONAL; INTRAMUSCULAR; INTRAVENOUS; SOFT TISSUE at 00:14

## 2022-10-03 RX ADMIN — INSULIN LISPRO 2 UNITS: 100 INJECTION, SOLUTION INTRAVENOUS; SUBCUTANEOUS at 21:49

## 2022-10-03 RX ADMIN — OXYCODONE 5 MG: 5 TABLET ORAL at 11:35

## 2022-10-03 RX ADMIN — ATORVASTATIN CALCIUM 40 MG: 40 TABLET, FILM COATED ORAL at 16:49

## 2022-10-03 RX ADMIN — INSULIN LISPRO 2 UNITS: 100 INJECTION, SOLUTION INTRAVENOUS; SUBCUTANEOUS at 05:24

## 2022-10-03 RX ADMIN — OXCARBAZEPINE 300 MG: 300 TABLET, FILM COATED ORAL at 21:51

## 2022-10-03 RX ADMIN — GABAPENTIN 800 MG: 400 CAPSULE ORAL at 21:26

## 2022-10-03 RX ADMIN — INSULIN LISPRO 1 UNITS: 100 INJECTION, SOLUTION INTRAVENOUS; SUBCUTANEOUS at 17:02

## 2022-10-03 RX ADMIN — SODIUM CHLORIDE: 9 INJECTION, SOLUTION INTRAVENOUS at 11:40

## 2022-10-03 RX ADMIN — GABAPENTIN 800 MG: 400 CAPSULE ORAL at 08:36

## 2022-10-03 RX ADMIN — SODIUM CHLORIDE: 9 INJECTION, SOLUTION INTRAVENOUS at 04:39

## 2022-10-03 RX ADMIN — INSULIN GLARGINE-YFGN 14 UNITS: 100 INJECTION, SOLUTION SUBCUTANEOUS at 17:03

## 2022-10-03 RX ADMIN — DEXAMETHASONE SODIUM PHOSPHATE 4 MG: 4 INJECTION, SOLUTION INTRA-ARTICULAR; INTRALESIONAL; INTRAMUSCULAR; INTRAVENOUS; SOFT TISSUE at 21:27

## 2022-10-03 RX ADMIN — NYSTATIN: 100000 POWDER TOPICAL at 18:03

## 2022-10-03 RX ADMIN — OXYCODONE 5 MG: 5 TABLET ORAL at 22:52

## 2022-10-03 RX ADMIN — OXYCODONE 5 MG: 5 TABLET ORAL at 05:00

## 2022-10-03 RX ADMIN — ACETAMINOPHEN 650 MG: 325 TABLET, FILM COATED ORAL at 03:32

## 2022-10-03 RX ADMIN — GABAPENTIN 800 MG: 400 CAPSULE ORAL at 13:44

## 2022-10-03 RX ADMIN — DULOXETINE 60 MG: 20 CAPSULE, DELAYED RELEASE ORAL at 16:48

## 2022-10-03 RX ADMIN — INSULIN LISPRO 2 UNITS: 100 INJECTION, SOLUTION INTRAVENOUS; SUBCUTANEOUS at 00:00

## 2022-10-03 ASSESSMENT — COGNITIVE AND FUNCTIONAL STATUS - GENERAL
SUGGESTED CMS G CODE MODIFIER MOBILITY: CK
CLIMB 3 TO 5 STEPS WITH RAILING: A LITTLE
WALKING IN HOSPITAL ROOM: A LITTLE
TURNING FROM BACK TO SIDE WHILE IN FLAT BAD: A LITTLE
HELP NEEDED FOR BATHING: A LITTLE
TOILETING: A LITTLE
STANDING UP FROM CHAIR USING ARMS: A LITTLE
PERSONAL GROOMING: A LITTLE
MOVING FROM LYING ON BACK TO SITTING ON SIDE OF FLAT BED: A LITTLE
MOVING TO AND FROM BED TO CHAIR: A LITTLE
DAILY ACTIVITIY SCORE: 18
SUGGESTED CMS G CODE MODIFIER DAILY ACTIVITY: CK
DRESSING REGULAR UPPER BODY CLOTHING: A LITTLE
DRESSING REGULAR LOWER BODY CLOTHING: A LITTLE
MOBILITY SCORE: 18
EATING MEALS: A LITTLE

## 2022-10-03 ASSESSMENT — GAIT ASSESSMENTS
DISTANCE (FEET): 125
GAIT LEVEL OF ASSIST: MINIMAL ASSIST
ASSISTIVE DEVICE: FRONT WHEEL WALKER

## 2022-10-03 ASSESSMENT — ENCOUNTER SYMPTOMS
NERVOUS/ANXIOUS: 1
BACK PAIN: 1
HEARTBURN: 0
RESPIRATORY NEGATIVE: 1
HEADACHES: 0
DIZZINESS: 0
CARDIOVASCULAR NEGATIVE: 1
BRUISES/BLEEDS EASILY: 0
EYES NEGATIVE: 1
COUGH: 0
NAUSEA: 1
WEAKNESS: 1
PALPITATIONS: 0
DEPRESSION: 1
BLURRED VISION: 0
POLYDIPSIA: 0
VOMITING: 0
CHILLS: 0
DOUBLE VISION: 0
FOCAL WEAKNESS: 1
FEVER: 0
NECK PAIN: 0

## 2022-10-03 ASSESSMENT — PAIN DESCRIPTION - PAIN TYPE
TYPE: CHRONIC PAIN
TYPE: ACUTE PAIN
TYPE: CHRONIC PAIN;ACUTE PAIN
TYPE: ACUTE PAIN;CHRONIC PAIN
TYPE: ACUTE PAIN

## 2022-10-03 ASSESSMENT — FIBROSIS 4 INDEX: FIB4 SCORE: 0.3

## 2022-10-03 ASSESSMENT — ACTIVITIES OF DAILY LIVING (ADL): TOILETING: INDEPENDENT

## 2022-10-03 NOTE — PROGRESS NOTES
[facility] Medicine Daily Progress Note    Date of Service  10/3/2022    Chief Complaint  Jacque Mcintyre is a 41 y.o. female admitted 10/1/2022 with left-sided weakness, history of melanoma    Hospital Course  This is a 41-year-old female presented on 10/1 with left upper and left lower extremity weakness, ongoing for approximately a week, slow progression, has a history of melanoma diagnosis in 2016, right adrenal lesion was in her left hand, additional lesion on the right trunk, she is s/p surgical excision, lymph node dissection in the left axilla as well as immunotherapy for 3 years, she is supposedly was in remission, then had a abnormal PET scan earlier this year with a biopsy performed which turned out negative.  The patient after presentation was found to have multiple brain lesions that were new on CT scanning, neurosurgery consulted, recommended MRI and CT abdomen pelvis, the patient found with multiple brain metastases with significant surrounding edema as well as metastatic disease documented on CT including pulmonary nodules, lymphadenopathy.  The patient [facility] and is devastated.    Interval Problem Update  Patient seen and examined today.  Data, Medication data reviewed.  Case discussed with nursing as available.  Plan of Care reviewed with patient and notified of changes.   10/2 the patient is tearful, she reviewed her imaging reports on my chart, she has significant amount of anxiety, some nausea, denies currently headache, no visual changes, remains weak on the left side, we discussed current status and plan for consultation with medical oncology, radiation oncology and further planning based on the recommendations.  The patient has had no other developments in terms of neurologic changes  10/3 the patient feels somewhat better, increased mobility in her left lower leg, left upper extremity, mild headache discussed with oncology, planning for likely debulking  neurosurgery, ongoing steroids.  Considering BRAF and MEK inhibitor as per oncology.  The patient is neurologically otherwise stable and hemodynamically stable, can transfer to the oncology unit for the time being.  Supportive care, GI prophylaxis with steroids, glycemic control  Reduce antihypertensive regimen given the patient's normal blood pressures,  I have discussed this patient's plan of care and discharge plan at IDT rounds today with Case Management, Nursing, Nursing leadership, and other members of the IDT team.    Consultants/Specialty  critical care, neurosurgery, and oncology, radiation oncology    Code Status  Full Code    Disposition  Patient is not medically cleared for discharge.   Anticipate discharge to to home with close outpatient follow-up.  I have placed the appropriate orders for post-discharge needs.    Review of Systems  Review of Systems   Constitutional:  Positive for malaise/fatigue. Negative for chills and fever.   HENT: Negative.     Eyes: Negative.  Negative for blurred vision and double vision.   Respiratory: Negative.  Negative for cough.    Cardiovascular: Negative.  Negative for chest pain and palpitations.   Gastrointestinal:  Positive for nausea. Negative for heartburn and vomiting.   Genitourinary: Negative.  Negative for dysuria and frequency.   Musculoskeletal:  Positive for back pain. Negative for neck pain.   Skin: Negative.  Negative for itching and rash.   Neurological:  Positive for focal weakness and weakness. Negative for dizziness and headaches.   Endo/Heme/Allergies: Negative.  Negative for polydipsia. Does not bruise/bleed easily.   Psychiatric/Behavioral:  Positive for depression. The patient is nervous/anxious.       Physical Exam  Temp:  [36.2 °C (97.2 °F)-36.5 °C (97.7 °F)] 36.5 °C (97.7 °F)  Pulse:  [] 64  Resp:  [13-41] 24  BP: ()/() 104/64  SpO2:  [88 %-99 %] 96 %    Physical Exam  Vitals and nursing note reviewed.   Constitutional:        Appearance: She is well-developed. She is not diaphoretic.   HENT:      Head: Normocephalic and atraumatic.      Nose: Nose normal.   Eyes:      Conjunctiva/sclera: Conjunctivae normal.      Pupils: Pupils are equal, round, and reactive to light.   Neck:      Thyroid: No thyromegaly.      Vascular: No JVD.   Cardiovascular:      Rate and Rhythm: Normal rate and regular rhythm.      Heart sounds: Normal heart sounds.     No friction rub. No gallop.   Pulmonary:      Effort: Pulmonary effort is normal.      Breath sounds: Normal breath sounds. No wheezing or rales.   Abdominal:      General: Bowel sounds are normal. There is no distension.      Palpations: Abdomen is soft. There is no mass.      Tenderness: There is no abdominal tenderness. There is no guarding or rebound.   Musculoskeletal:         General: No tenderness. Normal range of motion.      Cervical back: Normal range of motion and neck supple.   Lymphadenopathy:      Cervical: No cervical adenopathy.   Skin:     General: Skin is warm and dry.   Neurological:      Mental Status: She is alert and oriented to person, place, and time.      Cranial Nerves: No cranial nerve deficit.      Motor: Weakness present.   Psychiatric:         Behavior: Behavior normal.       Fluids    Intake/Output Summary (Last 24 hours) at 10/3/2022 0759  Last data filed at 10/3/2022 0627  Gross per 24 hour   Intake 1297.5 ml   Output 2575 ml   Net -1277.5 ml         Laboratory  Recent Labs     10/01/22  1846 10/03/22  0230   WBC 12.1* 13.1*   RBC 5.32 4.40   HEMOGLOBIN 13.5 11.2*   HEMATOCRIT 43.0 36.6*   MCV 80.8* 83.2   MCH 25.4* 25.5*   MCHC 31.4* 30.6*   RDW 48.2 50.3*   PLATELETCT 519* 419   MPV 9.5 9.8       Recent Labs     10/01/22  1846 10/03/22  0230   SODIUM 139 137   POTASSIUM 4.1 5.0   CHLORIDE 103 106   CO2 21 19*   GLUCOSE 170* 191*   BUN 14 20   CREATININE 0.51 0.56   CALCIUM 9.4 8.7                     Imaging  MR-BRAIN-WITH & W/O   Final Result      1.  Right-sided  predominant, supratentorial metastatic disease with the largest mass in the right frontoparietal region measuring 3.4 x 3.0 cm as described in detail above.   2.  Associated edema with regional associated cortical effacement and partial effacement of the right lateral ventricle. Minimal localized midline shift adjacent to the large right frontoparietal mass.   3.  Evidence of prior hemorrhage within these masses.   4.  Small focus of restricted diffusion in the right occipital bone could represent osseous metastatic disease.      CT-CHEST,ABDOMEN,PELVIS WITH   Final Result      1.  BILATERAL pulmonary nodules, the largest of which measures 12 mm in the LEFT lower lobe and which has enlarged since May of this year.   2.  Enlarged LEFT hilar lymph node   3.  Enlarged LEFT external iliac lymph node. This would be amenable to image guided tissue sampling if clinically appropriate.   4.  7 mm LEFT and LEFT upper quadrant subcutaneous soft tissue nodules, new since prior   5.  9 mm RIGHT lower quadrant peritoneal nodule, new since prior   6.  33 mm LEFT ovarian cyst   7.  Thyroid nodules measuring up to 23 mm   8.  BILATERAL L5 pars defects with grade 1 anterolisthesis of L5 on S1   9.  Incompletely assessed LEFT renal lesion, new since prior and most likely a cyst though attention on top is recommended.      DX-CHEST-PORTABLE (1 VIEW)   Final Result         1.  Possible pulmonary nodule within each lung. Metastasis is a possibility.      2.  No infiltrates or consolidations identified.      CT-HEAD W/O   Final Result   Addendum (preliminary) 1 of 1   These findings were discussed with JESENIA MONTALVO on 10/01/2022.      Final         1.  3 masses in the right cerebral hemisphere largest measuring 3.2 cm with extensive vasogenic edema as described above. Findings are likely due to metastases.      2.  Localized midline shift to the left side frontoparietal level measuring 3 mm.      3.  The 2 smaller masses in the right  frontal lobe demonstrates some increased density which could indicate that hemorrhage may be present within these masses.      These findings were discussed with [place]jam[place] nurse for dr hernandez on 10/01/2022.                Assessment/Plan  * Brain metastases (HCC)  Assessment & Plan  Likely metastatic melanoma  Noted to have three masses with midline shift and vasogenic edema  Decadron seizure prophylaxis  Neurosurgery consulted, no intervention likely  CT abdomen chest resulted  MRI brain resulted  Multiple additional metastases found  Medical oncology consulted  Radiation oncology to consult on Monday      Malignant melanoma metastatic to brain (HCC)- (present on admission)  Assessment & Plan  Multiple other metastases sites  Medical oncology consult  Radiation oncology consult  The patient with a history of 3 years of immunotherapy    DM2 (diabetes mellitus, type 2) (HCC)- (present on admission)  Assessment & Plan  Sliding scale  Has had DKA in the past    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)- (present on admission)  Assessment & Plan  Diagnosed in 2016, s/p immunotherapy and lymph node dissection  Recurrent with widespread metastasis    Depression- (present on admission)  Assessment & Plan  Restart home medications, add anxiolytics     Plan  Continue with Decadron, GI prophylaxis  Med Onc consult appreciated,  Radiation oncology consulted  Likely debulking intervention midweek  Symptomatic care  Anxiolytics  Glycemic control  Reduce IV fluids, reduce antihypertensive regimen  See orders  Medically complex high risk case    VTE prophylaxis: SCDs/TEDs Lovenox    I have performed a physical exam and reviewed and updated ROS and Plan today (10/3/2022). In review of yesterday's note (10/2/2022), there are no changes except as documented above.      Please note that this dictation was created using voice recognition software. I have made every reasonable attempt to correct obvious errors, but I  expect that there are errors of grammar and possibly context that I did not discover before finalizing the note.

## 2022-10-03 NOTE — CARE PLAN
The patient is Watcher - Medium risk of patient condition declining or worsening    Shift Goals  Clinical Goals: Hemodynamic stability, Vitals,  Patient Goals: See family, get rest, cancer treatment  Family Goals: Mhmm    Progress made toward(s) clinical / shift goals:  Blood pressure improving to 100's/ 70's after started on continuous fluids. Plan for radiation oncologist to see pt today and start stereotactic radiation.  A&Ox4. Stable Q4 neuro's. Left sided weakness, able to follow commands. Noted left foot drop and worsening left foot contraction. Pt able to wiggle her toes, but unable to dorsiflex or plantar flex. Notified Dr. Figueroa and Prafo boot ordered. ROM performed. Oxycodone given for complaints of back pain and headache this morning.     Problem: Knowledge Deficit - Standard  Goal: Patient and family/care givers will demonstrate understanding of plan of care, disease process/condition, diagnostic tests and medications  Outcome: Progressing     Problem: Pain - Standard  Goal: Alleviation of pain or a reduction in pain to the patient’s comfort goal  Outcome: Progressing     Problem: Respiratory  Goal: Patient will achieve/maintain optimum respiratory ventilation and gas exchange  Outcome: Progressing     Problem: Urinary Elimination  Goal: Establish and maintain regular urinary output  Outcome: Progressing       Patient is not progressing towards the following goals:

## 2022-10-03 NOTE — CARE PLAN
Problem: Knowledge Deficit - Standard  Goal: Patient and family/care givers will demonstrate understanding of plan of care, disease process/condition, diagnostic tests and medications  Outcome: Progressing     Problem: Pain - Standard  Goal: Alleviation of pain or a reduction in pain to the patient’s comfort goal  Outcome: Progressing     Problem: Fall Risk  Goal: Patient will remain free from falls  Outcome: Progressing     Problem: Psychosocial  Goal: Patient's level of anxiety will decrease  Outcome: Progressing  Goal: Patient's ability to verbalize feelings about condition will improve  Outcome: Progressing  Goal: Patient's ability to re-evaluate and adapt role responsibilities will improve  Outcome: Progressing  Goal: Patient and family will demonstrate ability to cope with life altering diagnosis and/or procedure  Outcome: Progressing  Goal: Spiritual and cultural needs incorporated into hospitalization  Outcome: Progressing     Problem: Communication  Goal: The ability to communicate needs accurately and effectively will improve  Outcome: Progressing     Problem: Hemodynamics  Goal: Patient's hemodynamics, fluid balance and neurologic status will be stable or improve  Outcome: Progressing     Problem: Respiratory  Goal: Patient will achieve/maintain optimum respiratory ventilation and gas exchange  Outcome: Progressing     Problem: Risk for Aspiration  Goal: Patient's risk for aspiration will be absent or decrease  Outcome: Progressing     Problem: Urinary Elimination  Goal: Establish and maintain regular urinary output  Outcome: Progressing     Problem: Mobility  Goal: Patient's capacity to carry out activities will improve  Outcome: Progressing     Problem: Self Care  Goal: Patient will have the ability to perform ADLs independently or with assistance (bathe, groom, dress, toilet and feed)  Outcome: Progressing   The patient is Stable - Low risk of patient condition declining or worsening    Shift  Goals  Clinical Goals: pain control  Patient Goals:   Family Goals:     Progress made toward(s) clinical / shift goals:      Patient is not progressing towards the following goals:    Pt AOx4. Roxicodone and Xanax given with positive results. PIV's patent SL. L side weakness. Graf draining to gravity. Last BM 3 days ago, Miralax offered. 2 p assist.

## 2022-10-03 NOTE — THERAPY
Physical Therapy   Initial Evaluation     Patient Name: Jacque Mcintyre  Age:  41 y.o., Sex:  female  Medical Record #: [id number]  Today's Date: 10/3/2022     Precautions  Precautions: Fall Risk  Comments: L inattention    Assessment  Patient is 41 y.o. female with left upper and lower extremity weakness for the last week.  States that this has slowly progressed. Patient has a history of melanoma to which she received treatment and has been in remission for the last 2-1/2 years.  Has not received any treatment during this time.  Had a PET scan in May 2022 that showed increased uptake, consistent with metastatic activity in the infrahilar lymph node.  Biopsy was done to which results were negative for malignancy.CT head showing 3 masses in the right cerebral hemisphere largest measuring 3.2 cm with extensive vasogenic edema.  2 smaller masses in the right frontal lobe that could be hemorrhages present within the masses..    Additional factors influencing patient status / progress : Today, pt is limited most by L side weakness and L neglect/inattention. Pt needs sequencing cues to come to EOB, cues to use her L side to assist with movement. LE strength is diminished, most impacted is L df, but improves when pt able to focus on giving effort during strength testing and ambulation. Pt tolerated 125 feet of ambulation using FWW with min A. See below for details. Pt plans to dc to parent's home where she will have assist as needed. PT to follow.      Plan    Recommend Physical Therapy 3 times per week until therapy goals are met for the following treatments:  Bed Mobility, Gait Training, Neuro Re-Education / Balance, Stair Training, and Therapeutic Activities    DC Equipment Recommendations: Front-Wheel Walker  Discharge Recommendations: Recommend home health for continued physical therapy services        Objective       10/03/22 1039   Charge Group   PT Evaluation PT Evaluation High   Total Time Spent   PT Total Time Yes    PT Evaluation Time Spent (Mins) 38   PT Total Time Spent (Calculated) 38   Precautions   Precautions Fall Risk   Comments L inattention   Vitals   O2 Delivery Device None - Room Air   Pain 0 - 10 Group   Therapist Pain Assessment During Activity;0;Nurse Notified   Prior Living Situation   Prior Services None   Housing / Facility 1 Story House  (parent's home)   Steps Into Home 3   Steps In Home 0   Rail None   Elevator No   Equipment Owned None   Lives with - Patient's Self Care Capacity Child Less than 18 Years of Age;Parents   Comments Pt plans to stay with parents upon dc, usually lives with her 16 yo son. Parents are retired and can assist as needed.   Prior Level of Functional Mobility   Bed Mobility Independent   Transfer Status Independent   Ambulation Independent   Distance Ambulation (Feet)   (community, works at nurse at [employer])   Assistive Devices Used None   Stairs Independent   Cognition    Cognition / Consciousness X   Level of Consciousness Alert   Comments L inattention, struggles to follow cues for MMT and coordination testing for LE   Passive ROM Lower Body   Passive ROM Lower Body WDL   Active ROM Lower Body    Active ROM Lower Body  X   Comments L df and L knee flexion limited by weakness   Strength Lower Body   Lower Body Strength  X   Lt Hip Flexion Strength 3 (F)   Lt Knee Flexion Strength 3- (F-)   Lt Knee Extension Strength 3- (F-)   Lt Ankle Dorsiflexion Strength 2- (P-)   Lower Body Muscle Tone   Lower Body Muscle Tone  WDL   Coordination Lower Body    Coordination Lower Body  X   Toe Tapping Left Impaired   Heel To Shin Left Impaired   Vision   Vision Comments L inattention   Balance Assessment   Sitting Balance (Static) Fair   Sitting Balance (Dynamic) Fair   Standing Balance (Static) Fair -   Standing Balance (Dynamic) Poor +   Weight Shift Sitting Fair   Weight Shift Standing Poor   Comments with FWW   Gait Analysis   Gait Level Of Assist Minimal Assist   Assistive Device Front Wheel  Walker   Distance (Feet) 125   # of Times Distance was Traveled 1   Deviation   (L  on FWW less efficient, FWW off center and L LE in ER, L toes kicked FWW a few times.)   # of Stairs Climbed 0   Weight Bearing Status no restrictions   Bed Mobility    Supine to Sit Minimal Assist   Sit to Supine   (up chair)   Scooting Moderate Assist   Rolling Minimal Assist to Rt.   Comments sequencing assist   Functional Mobility   Sit to Stand Minimal Assist   Bed, Chair, Wheelchair Transfer Minimal Assist   Transfer Method Stand Step   How much difficulty does the patient currently have...   Turning over in bed (including adjusting bedclothes, sheets and blankets)? 3   Sitting down on and standing up from a chair with arms (e.g., wheelchair, bedside commode, etc.) 3   Moving from lying on back to sitting on the side of the bed? 3   How much help from another person does the patient currently need...   Moving to and from a bed to a chair (including a wheelchair)? 3   Need to walk in a hospital room? 3   Climbing 3-5 steps with a railing? 3   6 clicks Mobility Score 18   Activity Tolerance   Sitting in Chair 10+   Short Term Goals    Short Term Goal # 1 Pt will perform bed mobility with flat bed, no rail with supervision in 6 visits.   Short Term Goal # 2 Pt will transfer with supervision in 6 visits to improve functional indep.   Short Term Goal # 3 Pt will ambulate x 200 feet using FWW with supervision in 6 visits to improve functional indep.   Short Term Goal # 4 Pt will negotiate 3 stairs with cg assist using no rail in 6 visits (no rail at parent's home, will likely need family assist any time coming/going vs install rail)   Education Group   Education Provided Role of Physical Therapist   Role of Physical Therapist Patient Response Patient;Acceptance;Explanation;Action Demonstration   Problem List    Problems Impaired Bed Mobility;Impaired Transfers;Impaired Ambulation;Functional Strength Deficit;Impaired  Balance;Decreased Activity Tolerance;Safety Awareness Deficits / Cognition;Motor Planning / Sequencing   Anticipated Discharge Equipment and Recommendations   DC Equipment Recommendations Front-Wheel Walker   Discharge Recommendations Recommend home health for continued physical therapy services   Interdisciplinary Plan of Care Collaboration   IDT Collaboration with  Nursing   Patient Position at End of Therapy Seated;Call Light within Reach;Tray Table within Reach;Phone within Reach   Collaboration Comments nsg updated   Session Information   Date / Session Number  10/3-1 (1/3, 10/9)

## 2022-10-03 NOTE — PROGRESS NOTES
HEMATOLOGY-ONCOLOGY PROGRESS NOTE    Interval update  Recurrent stage IV metastatic melanoma  10/3/2022  No acute issues.  Reports slight improvement in left upper and lower extremity strength.  No new neurological symptoms.  No fever/chills, chest pain, SOB    Objective:  Medications reviewed and notable for:  Current Facility-Administered Medications   Medication Dose    oxyCODONE immediate-release (ROXICODONE) tablet 5 mg  5 mg    [START ON 10/4/2022] atenolol (TENORMIN) tablet 25 mg  25 mg    enoxaparin (Lovenox) inj 40 mg  40 mg    insulin GLARGINE (Lantus,Semglee) injection  0.2 Units/kg/day    And    insulin lispro (AdmeLOG,HumaLOG) injection  1-6 Units    And    dextrose 50% (D50W) injection 25 g  25 g    senna-docusate (PERICOLACE or SENOKOT S) 8.6-50 MG per tablet 2 Tablet  2 Tablet    And    polyethylene glycol/lytes (MIRALAX) PACKET 1 Packet  1 Packet    And    magnesium hydroxide (MILK OF MAGNESIA) suspension 30 mL  30 mL    And    bisacodyl (DULCOLAX) suppository 10 mg  10 mg    ondansetron (ZOFRAN) syringe/vial injection 4 mg  4 mg    ALPRAZolam (XANAX) tablet 0.25 mg  0.25 mg    famotidine (PEPCID) tablet 20 mg  20 mg    NS infusion      dexamethasone (DECADRON) injection 4 mg  4 mg    albuterol inhaler 2 Puff  2 Puff    atorvastatin (LIPITOR) tablet 40 mg  40 mg    DULoxetine (CYMBALTA) capsule 60 mg  60 mg    gabapentin (NEURONTIN) capsule 800 mg  800 mg    OXcarbazepine (TRILEPTAL) tablet 300 mg  300 mg    QUEtiapine (Seroquel) tablet 200 mg  200 mg    acetaminophen (Tylenol) tablet 650 mg  650 mg       ROS: Left-sided weakness  Constitutional: No fatigue, no fevers or chills, no night sweats  Resp: No cough or SOB  Cardio:No chest pain or palpitations  Pschy: No depression or anxiety   Neuro: No headaches, no seizure, no vision changes  GI: no abdominal pain, nausea or vomiting. No diarrhea or constipation   All other ROS negative    /65   Pulse 63   Temp 37.5 °C (99.5 °F) (Temporal)    "Resp 16   Ht 1.626 m (5' 4\")   Wt 66.1 kg (145 lb 11.6 oz)   SpO2 96%     ECOG PS:  General:  NAD  HEENT:  PERRLA, EOMI  Cor:   regular rate and rhythm, no murmurs, rubs, or gallops  Pulm:   clear to auscultation bilaterally  Abd:   bowel sounds present, soft, nontender, nondistended, no palpable masses or organomegaly  Extremities:  warm, no lower extremity edema  Neurologic:  A&O x 3. LUE & LLE weakness  Pyschiatric:  Appropriate mood and affect    Labs reviewed and notable for:  Recent Labs     10/01/22  1846 10/03/22  0230   WBC 12.1* 13.1*   RBC 5.32 4.40   HEMOGLOBIN 13.5 11.2*   HEMATOCRIT 43.0 36.6*   MCV 80.8* 83.2   MCH 25.4* 25.5*   MCHC 31.4* 30.6*   RDW 48.2 50.3*   PLATELETCT 519* 419   MPV 9.5 9.8         .@CMP  Recent Results (from the past 24 hour(s))   POCT glucose device results    Collection Time: 10/02/22  5:31 PM   Result Value Ref Range    POC Glucose, Blood 179 (H) 65 - 99 mg/dL   POCT glucose device results    Collection Time: 10/02/22 11:59 PM   Result Value Ref Range    POC Glucose, Blood 204 (H) 65 - 99 mg/dL   CBC WITHOUT DIFFERENTIAL    Collection Time: 10/03/22  2:30 AM   Result Value Ref Range    WBC 13.1 (H) 4.8 - 10.8 K/uL    RBC 4.40 4.20 - 5.40 M/uL    Hemoglobin 11.2 (L) 12.0 - 16.0 g/dL    Hematocrit 36.6 (L) 37.0 - 47.0 %    MCV 83.2 81.4 - 97.8 fL    MCH 25.5 (L) 27.0 - 33.0 pg    MCHC 30.6 (L) 33.6 - 35.0 g/dL    RDW 50.3 (H) 35.9 - 50.0 fL    Platelet Count 419 164 - 446 K/uL    MPV 9.8 9.0 - 12.9 fL   Basic Metabolic Panel    Collection Time: 10/03/22  2:30 AM   Result Value Ref Range    Sodium 137 135 - 145 mmol/L    Potassium 5.0 3.6 - 5.5 mmol/L    Chloride 106 96 - 112 mmol/L    Co2 19 (L) 20 - 33 mmol/L    Glucose 191 (H) 65 - 99 mg/dL    Bun 20 8 - 22 mg/dL    Creatinine 0.56 0.50 - 1.40 mg/dL    Calcium 8.7 8.5 - 10.5 mg/dL    Anion Gap 12.0 7.0 - 16.0   MAGNESIUM    Collection Time: 10/03/22  2:30 AM   Result Value Ref Range    Magnesium 2.1 1.5 - 2.5 mg/dL "   PHOSPHORUS    Collection Time: 10/03/22  2:30 AM   Result Value Ref Range    Phosphorus 4.2 2.5 - 4.5 mg/dL   ESTIMATED GFR    Collection Time: 10/03/22  2:30 AM   Result Value Ref Range    GFR (CKD-EPI) 115 >60 mL/min/1.73 m 2   POCT glucose device results    Collection Time: 10/03/22  5:03 AM   Result Value Ref Range    POC Glucose, Blood 218 (H) 65 - 99 mg/dL       Diagnostic imaging:  MR-BRAIN-WITH & W/O   Final Result       1.  Right-sided predominant, supratentorial metastatic disease with the largest mass in the right frontoparietal region measuring 3.4 x 3.0 cm as described in detail above.   2.  Associated edema with regional associated cortical effacement and partial effacement of the right lateral ventricle. Minimal localized midline shift adjacent to the large right frontoparietal mass.   3.  Evidence of prior hemorrhage within these masses.   4.  Small focus of restricted diffusion in the right occipital bone could represent osseous metastatic disease.       CT-CHEST,ABDOMEN,PELVIS WITH   Final Result       1.  BILATERAL pulmonary nodules, the largest of which measures 12 mm in the LEFT lower lobe and which has enlarged since May of this year.   2.  Enlarged LEFT hilar lymph node   3.  Enlarged LEFT external iliac lymph node. This would be amenable to image guided tissue sampling if clinically appropriate.   4.  7 mm LEFT and LEFT upper quadrant subcutaneous soft tissue nodules, new since prior   5.  9 mm RIGHT lower quadrant peritoneal nodule, new since prior   6.  33 mm LEFT ovarian cyst   7.  Thyroid nodules measuring up to 23 mm   8.  BILATERAL L5 pars defects with grade 1 anterolisthesis of L5 on S1   9.  Incompletely assessed LEFT renal lesion, new since prior and most likely a cyst though attention on top is recommended.       DX-CHEST-PORTABLE (1 VIEW)   Final Result           1.  Possible pulmonary nodule within each lung. Metastasis is a possibility.       2.  No infiltrates or  consolidations identified.       CT-HEAD W/O   Final Result   Addendum (preliminary) 1 of 1   These findings were discussed with JESENIA MONTALVO on 10/01/2022.       Final           1.  3 masses in the right cerebral hemisphere largest measuring 3.2 cm with extensive vasogenic edema as described above. Findings are likely due to metastases.       2.  Localized midline shift to the left side frontoparietal level measuring 3 mm.       3.  The 2 smaller masses in the right frontal lobe demonstrates some increased density which could indicate that hemorrhage may be present within these masses.       Assessment and Recommendations:  Ms. Frederick is a 41-year-old female with history of metastatic melanoma currently admitted with symptomatic right frontal lobe metastasis .    Recurrent stage IV metastatic melanoma in a patient with previous history of T3N1 disease status post adjuvant immunotherapy for almost 3+ years and she recently was in observation with repeat PET scans. Unfortunately currently she has diffuse metastatic disease with lung lesions peritoneal nodule and 6 lesions in the brain.     2. Metastatic brain lesions 5 on the right side and 1 in the left side.    -Tentative plan for surgical resection of frontoparietal mass 3x3 cm. Neurosurgery following.   - Continue with dexamethasone and Keppra  - BRAF mutation status unknown,  will request BRAF testing from old 2017 specimen.  - Management of medical issues per primary team      High complexicity/Drug monitoring   We will continue to follow with you     Quality-Core Measures: Labs, meds reviewed    Georgie Lim MD

## 2022-10-03 NOTE — PROGRESS NOTES
Neurosurgery Progress Note    Subjective:  No events overnight.  Reports that her left arm and leg feel slightly stronger on steroids.    Exam:  Awake, alert, oriented to person, place and time.  Pupils equally round and reactive to light, 4 to 3mm.  Extraocular movements full.  Facial sensation intact to light touch.  Face symmetric, HB 1.  Palate rises symmetrically.  Tongue is midline.  Shoulder shrug 5/5.  No pronator drift.  Sensation intact to light touch in all 4 extremities.  Left arm and leg are antigravity, now at least 4 out of 5 resistance.  Left foot is weakness.    Recent Labs     10/01/22  1846 10/03/22  0230   WBC 12.1* 13.1*   RBC 5.32 4.40   HEMOGLOBIN 13.5 11.2*   HEMATOCRIT 43.0 36.6*   MCV 80.8* 83.2   MCH 25.4* 25.5*   MCHC 31.4* 30.6*   RDW 48.2 50.3*   PLATELETCT 519* 419   MPV 9.5 9.8     Recent Labs     10/01/22  1846 10/03/22  0230   SODIUM 139 137   POTASSIUM 4.1 5.0   CHLORIDE 103 106   CO2 21 19*   GLUCOSE 170* 191*   BUN 14 20   CREATININE 0.51 0.56   CALCIUM 9.4 8.7               Imaging:  MRI brain dated 10/2/2022 with and without contrast reviewed in detail.  My interpretation is as follows.  There is again a 3 cm right frontal heterogeneously enhancing mass with surrounding significant vasogenic edema just anterior to the motor strip, as well as 2 additional right frontal lesions about 12 mm each.  There are also at least 3 very small additional lesions supratentorially likely representing metastatic disease.    Assessment and Plan:  Ms. Mcintyre is a 41-year-old woman who is presenting with newly diagnosed stage IV metastatic melanoma.  She has a dominant and symptomatic right frontal lobe metastasis, as well as several smaller satellite lesions in the right frontal lobe.  She also has at least 3 additional tiny supratentorial lesions.    Had a long conversation with her this morning about these findings.  I stated that we will discuss with medical and radiation oncology regarding the  utility of debulking/resection of the right frontal largest lesions.  I discussed the indications, benefits, risk of the procedure, including but not limited to bleeding, infection, worsening weakness, no improvement in her weakness, new sensory changes.  I will coordinate with medical and radiation oncology regarding the neck steps in her care.    -Okay for PT OT  -For DVT prophylaxis  -okay for diet  - continue Decadron and Keppra  -If we proceed with surgical intervention, tentatively will plan for Wednesday or Thursday of this week    Please call with questions.    ePbbles Tejada M.D.     A total of 35 minutes were spent in the evaluation, examination, and coordination of care of this patient.

## 2022-10-03 NOTE — PROGRESS NOTES
Pt with soft BP's 87/51, normal trend of BP in the 120's. Pt previously on continuous fluids which were dc'd at 1900. Notified Dr. Coffey of hypotension and orders to restart NS at 150mL/hr. Verbal to cancel transfer to [facility] due to hypotension.

## 2022-10-03 NOTE — THERAPY
"Occupational Therapy   Initial Evaluation     Patient Name: Jacque Mcintyre  Age:  41 y.o., Sex:  female  Medical Record #: [id number]  Today's Date: 10/3/2022    Precautions: Fall Risk  Comments: L deficits, L inattention    Assessment    Patient is 41 y.o. female admitted with L sided weakness, pmhx includes melanoma in remission 2.5 years. Pt found to have R frontal lobe metastasis with multiple lesions. Pt presents with decreased balance and L-sided coordination/strength impairments which impact safety and independence in ADLs. Pt also limited by L inattention requiring additional cues to scan L visual field and initiate tasks with LUE. Pt may elect for surgical intervention prior to radiation, still considering options. OT to follow while admitted, anticipate post-acute rehabilitation will be beneficial to optimize functional independence following acute admission.     Plan    Recommend Occupational Therapy 3 times per week until therapy goals are met for the following treatments:  Adaptive Equipment, Neuro Re-Education / Balance, Self Care/Activities of Daily Living, Therapeutic Activities, and Therapeutic Exercises.    DC Equipment Recommendations: Tub / Shower Seat, Bed Side Commode  Discharge Recommendations: Recommend post-acute placement for additional occupational therapy services prior to discharge home     Subjective    \"My left side is working better than yesterday.\"     Objective       10/03/22 1035   Prior Living Situation   Prior Services None   Housing / Facility 1 Story House   Steps Into Home 3   Steps In Home 0   Bathroom Set up Walk In Shower;Bathtub / Shower Combination   Equipment Owned None   Lives with - Patient's Self Care Capacity Child Less than 18 Years of Age;Parents   Comments pt currently lives with 16yo son, plans to move in with her parents for more assistance while going through CA treatment   Prior Level of ADL Function   Self Feeding Independent   Grooming / Hygiene Independent "   Bathing Independent   Dressing Independent   Toileting Independent   Prior Level of IADL Function   Medication Management Independent   Laundry Independent   Kitchen Mobility Independent   Finances Independent   Home Management Independent   Shopping Independent   Prior Level Of Mobility Independent Without Device in Community   Occupation (Pre-Hospital Vocational) Employed Full Time  (RN at [employer])   Precautions   Precautions Fall Risk   Comments L deficits, L inattention   Pain 0 - 10 Group   Therapist Pain Assessment Post Activity Pain Same as Prior to Activity;0;Nurse Notified   Cognition    Level of Consciousness Alert   Comments motivated, cooperative, decreased insight to L inattention/neglect   Active ROM Upper Body   Active ROM Upper Body  WDL   Strength Upper Body   Upper Body Strength  X   Comments LUE grossly 3-, RUE WDL   Coordination Upper Body   Coordination X   Comments decreased coordination L rapid alternating movements, cues to initiate with LUE   Balance Assessment   Sitting Balance (Static) Fair   Sitting Balance (Dynamic) Fair   Standing Balance (Static) Fair -   Standing Balance (Dynamic) Poor +   Weight Shift Sitting Fair   Weight Shift Standing Poor   Comments standing with FWW   Bed Mobility    Supine to Sit Minimal Assist   Scooting Moderate Assist   Rolling Minimal Assist to Rt.   ADL Assessment   Grooming Seated;Minimal Assist  (cues for sequencing/reaching for cups with LUE)   Upper Body Dressing Minimal Assist   Lower Body Dressing Minimal Assist   Toileting   (declined BM, has urban cath)   How much help from another person does the patient currently need...   Putting on and taking off regular lower body clothing? 3   Bathing (including washing, rinsing, and drying)? 3   Toileting, which includes using a toilet, bedpan, or urinal? 3   Putting on and taking off regular upper body clothing? 3   Taking care of personal grooming such as brushing teeth? 3   Eating meals? 3   6 Clicks  Daily Activity Score 18   Functional Mobility   Sit to Stand Minimal Assist   Bed, Chair, Wheelchair Transfer Minimal Assist   Transfer Method Stand Step   Mobility cues for safety during transfers   Activity Tolerance   Sitting in Chair 10+min   Sitting Edge of Bed 10min   Standing 10min   Patient / Family Goals   Patient / Family Goal #1 full recovery   Short Term Goals   Short Term Goal # 1 pt will demo functional transfers at SPV level   Short Term Goal # 2 pt will complete >5min standing grooming ADLs at sink with SPV   Short Term Goal # 3 pt will complete toileting ADL at SPV level   Education Group   Education Provided Role of Occupational Therapist;Activities of Daily Living;Transfers;Home Safety;Adaptive Equipment   Role of Occupational Therapist Patient Response Patient;Acceptance;Explanation;Verbal Demonstration   Home Safety Patient Response Patient;Acceptance;Explanation;Verbal Demonstration   Transfers Patient Response Patient;Acceptance;Explanation;Verbal Demonstration;Action Demonstration;Reinforcement Needed   ADL Patient Response Patient;Acceptance;Explanation;Verbal Demonstration;Action Demonstration   Adaptive Equipment Patient Response Patient;Acceptance;Explanation;Verbal Demonstration   Problem List   Problem List Decreased Active Daily Living Skills;Decreased Homemaking Skills;Decreased Upper Extremity Strength Left;Decreased Upper Extremity AROM Left;Decreased Functional Mobility;Decreased Activity Tolerance;Safety Awareness Deficits / Cognition;Impaired Coordination Left Upper Extremity;Impaired Cognitive Function;Impaired Postural Control / Balance   Anticipated Discharge Equipment and Recommendations   DC Equipment Recommendations Tub / Shower Seat;Bed Side Commode   Discharge Recommendations Recommend post-acute placement for additional occupational therapy services prior to discharge home

## 2022-10-03 NOTE — PROGRESS NOTES
RADIATION ONCOLOGY CONSULT    Patient name:  Jacque Mcintyre    Primary Physician:  Adriana Levine M.D. MRN: [id number]  CSN: [id number]   Referring physician:  No ref. provider found : 1980, 41 y.o.       DATE OF SERVICE: 10/3/2022    IDENTIFICATION: A 41 y.o. female with likely metastatic melanoma with several brain lesions from being consulted for consideration of SRS.  Visit Diagnoses     ICD-10-CM   1. Left arm weakness  R29.898   2. Left leg weakness  R29.898   3. Brain metastases (HCC)  C79.31   4. Vasogenic brain edema (HCC)  G93.6       She is being seen in consultation at the kind request of Dr. Tejada.      HISTORY OF PRESENT ILLNESS: This is a 41-year-old woman with a history of melanoma dating back to approximately 6 years ago, initial pathologic staging not clear at this time, who presented with several days of left-sided weakness.  She reports that over the last month, and particularly over the last week prior to admission, she had left leg weakness, where it is starting to drag and she was having difficulty walking.  Subsequently progressed to her left arm, and as a result, she was concerned for stroke and presented to [facility].  A noncontrast CT initially revealed several new right frontal lobe lesions concerning for metastatic disease, neurosurgery was consulted.  She was seen over the weekend, an MRI was obtained that revealed a left 3 cm right high frontal mass with extensive vasogenic edema, with 2 additional right frontal lesions approximately 1.2 cm each.  There are also 3 very small additional supratentorial lesions also.  She was started on steroids and has improved somewhat, though still reports that she has left-sided weakness.  Her headaches are mildly improved.  She is now eager to see me and make a plan for treatment.    With regards to her initial melanoma, she has been managed by Dr. Reed from cancer care specialist.  She was treated with  adjuvant immunotherapy for 3 years, and has been off it for approximately 3 years now.  She had a PET scan in May of this year that revealed a lesion with high uptake near her sternum that was reportedly negative by biopsy.  MRI of the brain in June was also reportedly negative, though in retrospect there is a small punctate area of enhancement present where the large right frontal lobe lesion is now presently.    PROBLEM LIST:  Patient Active Problem List   Diagnosis    Asthma    Depression    Nausea & vomiting    Sepsis (HCC)    Leukocytosis    Lactic acidosis    Anxiety    Diarrhea    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)    Melanoma of left upper arm (HCC)    Bipolar 1 disorder (HCC)    DM2 (diabetes mellitus, type 2) (HCC)    Appendicitis    Hypokalemia    Hypotension    Gastroenteritis    Abnormal CT scan, chest    Cellulitis    Leucocytosis    Brain metastases (HCC)    Malignant melanoma metastatic to brain (HCC)        PAST SURGICAL HISTORY:  Past Surgical History:   Procedure Laterality Date    FL BRONCHOSCOPY,DIAGNOSTIC N/A 6/17/2022    Procedure: FIBER OPTIC BRONCHOSCOPY WITH WASH, BRUSH, BRONCHOALVEOLAR LAVAGE, BIOPSY, FINE NEEDLE ASPIRATION;  Surgeon: Yue Swanson M.D.;  Location: [facility];  Service: Pulmonary    ENDOBRONCHIAL US ADD-ON N/A 6/17/2022    Procedure: ENDOBRONCHIAL ULTRASOUND (EBUS);  Surgeon: Yue Swanson M.D.;  Location: [facility];  Service: Pulmonary    FL LAP,APPENDECTOMY N/A 10/7/2019    Procedure: APPENDECTOMY, LAPAROSCOPIC;  Surgeon: Lionel Frazier M.D.;  Location: [facility];  Service: General    AXILLARY NODE DISSECTION Left 6/23/2017    Procedure: AXILLARY NODE DISSECTION- COMPLETION LYPHADENECTOMY;  Surgeon: Lionel Weinberg M.D.;  Location: [facility];  Service:     WIDE EXCISION Left 5/9/2017    Procedure: WIDE EXCISION - MALIGNANT MELANOMA HAND;  Surgeon: Lionel Weinberg M.D.;   Location: SURGERY SAME DAY [facility];  Service:     NODE BIOPSY SENTINEL Left 5/9/2017    Procedure: NODE BIOPSY SENTINEL - AXILLARY;  Surgeon: Lionel Weinberg M.D.;  Location: SURGERY SAME DAY [facility];  Service:     OTHER  2016    excisino of melanoma left hand    ADENOIDECTOMY      MYRINGOTOMY      with tube placement       CURRENT MEDICATIONS:  Current Facility-Administered Medications   Medication Dose Route Frequency Provider Last Rate Last Admin    oxyCODONE immediate-release (ROXICODONE) tablet 5 mg  5 mg Oral Q4HRS PRN Teodoro Figueroa M.D.   5 mg at 10/03/22 1135    [START ON 10/4/2022] atenolol (TENORMIN) tablet 25 mg  25 mg Oral QAM Aditya Del Castillo M.D.        enoxaparin (Lovenox) inj 40 mg  40 mg Subcutaneous DAILY AT 1800 Aditya Del Castillo M.D.        insulin GLARGINE (Lantus,Semglee) injection  0.2 Units/kg/day Subcutaneous Q EVENING Aditya Del Castillo M.D.        And    insulin lispro (AdmeLOG,HumaLOG) injection  1-6 Units Subcutaneous 4X/DAY ACHS Aditya Del Castillo M.D.        And    dextrose 50% (D50W) injection 25 g  25 g Intravenous Q15 MIN PRN Aditya Del Castillo M.D.        senna-docusate (PERICOLACE or SENOKOT S) 8.6-50 MG per tablet 2 Tablet  2 Tablet Oral BID Aditya Del Castillo M.D.        And    polyethylene glycol/lytes (MIRALAX) PACKET 1 Packet  1 Packet Oral QDAY PRN Aditya Del Castillo M.D.        And    magnesium hydroxide (MILK OF MAGNESIA) suspension 30 mL  30 mL Oral QDAY PRN Aditya Del Castillo M.D.        And    bisacodyl (DULCOLAX) suppository 10 mg  10 mg Rectal QDAY PRN Aditya Del Castillo M.D.        ondansetron (ZOFRAN) syringe/vial injection 4 mg  4 mg Intravenous Q6HRS PRN Aditya Del Castillo M.D.   4 mg at 10/02/22 0804    ALPRAZolam (XANAX) tablet 0.25 mg  0.25 mg Oral 4X/DAY PRN Aditya Del Castillo M.D.   0.25 mg at 10/03/22 1350    famotidine (PEPCID) tablet 20 mg  20 mg Oral BID Aditya Del Castillo M.D.   20 mg at 10/03/22 0500    NS infusion    "Intravenous Continuous Aditya Del Castillo M.D. 100 mL/hr at 10/03/22 1140 New Bag at 10/03/22 1140    dexamethasone (DECADRON) injection 4 mg  4 mg Intravenous Q6HR Rhett Coffey M.D.   4 mg at 10/03/22 1344    albuterol inhaler 2 Puff  2 Puff Inhalation Q4HRS PRN Rhett Coffey M.D.   2 Puff at 10/03/22 0328    atorvastatin (LIPITOR) tablet 40 mg  40 mg Oral Q EVENING Rhett Coffey M.D.   40 mg at 10/02/22 1743    DULoxetine (CYMBALTA) capsule 60 mg  60 mg Oral Q EVENING Rhett Coffey M.D.   60 mg at 10/02/22 1743    gabapentin (NEURONTIN) capsule 800 mg  800 mg Oral 4X/DAY Rhett Coffey M.D.   800 mg at 10/03/22 1344    OXcarbazepine (TRILEPTAL) tablet 300 mg  300 mg Oral BID Rhett Coffey M.D.   300 mg at 10/03/22 0836    QUEtiapine (Seroquel) tablet 200 mg  200 mg Oral Q EVENING Rhett Coffey M.D.   200 mg at 10/02/22 1743    acetaminophen (Tylenol) tablet 650 mg  650 mg Oral Q6HRS PRN Rhett Coffey M.D.   650 mg at 10/03/22 0332       ALLERGIES:    Amoxicillin-pot clavulanate and Clavulanic acid    FAMILY HISTORY:    family history includes Diabetes in her father and mother; Hyperlipidemia in her mother; Hypertension in her father; Psychiatric Illness in her mother; Thyroid in her mother.    SOCIAL HISTORY:     reports that she has been smoking cigarettes. She has a 7.50 pack-year smoking history. She has never used smokeless tobacco. She reports that she does not currently use alcohol. She reports current drug use. Drug: Inhaled.    REVIEW OF SYSTEMS:  A complete review of system taken. Pertinent items in HPI.       PHYSICAL EXAM:    PERFORMANCE STATUS:  /65   Pulse 63   Temp 37.5 °C (99.5 °F) (Temporal)   Resp 16   Ht 1.626 m (5' 4\")   Wt 66.1 kg (145 lb 11.6 oz)   SpO2 96%   BMI 25.01 kg/m²   Physical Exam  Vitals and nursing note reviewed.   Constitutional:       Appearance: Normal appearance.   HENT:      Head: Normocephalic and atraumatic.      " Mouth/Throat:      Mouth: Mucous membranes are moist.      Pharynx: Oropharynx is clear.   Eyes:      Extraocular Movements: Extraocular movements intact.      Pupils: Pupils are equal, round, and reactive to light.   Cardiovascular:      Rate and Rhythm: Normal rate and regular rhythm.   Pulmonary:      Effort: Pulmonary effort is normal.      Breath sounds: Normal breath sounds.   Abdominal:      Palpations: Abdomen is soft.   Musculoskeletal:         General: No swelling or tenderness.      Right lower leg: No edema.      Left lower leg: No edema.   Neurological:      Mental Status: She is alert and oriented to person, place, and time.      Sensory: Sensation is intact.      Comments: She still has generalized weakness on her left side with her left upper extremity and lower extremity, 4 out of 5.  Cranial nerves II through XII intact.  Gait not tested.        LABORATORY DATA:   Recent Labs     10/01/22  1846 10/03/22  0230   WBC 12.1* 13.1*   RBC 5.32 4.40   HEMOGLOBIN 13.5 11.2*   HEMATOCRIT 43.0 36.6*   MCV 80.8* 83.2   MCH 25.4* 25.5*   MCHC 31.4* 30.6*   RDW 48.2 50.3*   PLATELETCT 519* 419   MPV 9.5 9.8     Recent Labs     10/01/22  1846 10/03/22  0230   SODIUM 139 137   POTASSIUM 4.1 5.0   CHLORIDE 103 106   CO2 21 19*   GLUCOSE 170* 191*   BUN 14 20   CREATININE 0.51 0.56   CALCIUM 9.4 8.7          RADIOLOGY DATA:  CT-CHEST,ABDOMEN,PELVIS WITH    Result Date: 10/1/2022  1.  BILATERAL pulmonary nodules, the largest of which measures 12 mm in the LEFT lower lobe and which has enlarged since May of this year. 2.  Enlarged LEFT hilar lymph node 3.  Enlarged LEFT external iliac lymph node. This would be amenable to image guided tissue sampling if clinically appropriate. 4.  7 mm LEFT and LEFT upper quadrant subcutaneous soft tissue nodules, new since prior 5.  9 mm RIGHT lower quadrant peritoneal nodule, new since prior 6.  33 mm LEFT ovarian cyst 7.  Thyroid nodules measuring up to 23 mm 8.  BILATERAL L5 pars  defects with grade 1 anterolisthesis of L5 on S1 9.  Incompletely assessed LEFT renal lesion, new since prior and most likely a cyst though attention on top is recommended.    CT-HEAD W/O    Addendum Date: 10/1/2022    These findings were discussed with JESENIA HERNANDEZ on 10/01/2022.    Result Date: 10/1/2022  1.  3 masses in the right cerebral hemisphere largest measuring 3.2 cm with extensive vasogenic edema as described above. Findings are likely due to metastases. 2.  Localized midline shift to the left side frontoparietal level measuring 3 mm. 3.  The 2 smaller masses in the right frontal lobe demonstrates some increased density which could indicate that hemorrhage may be present within these masses. These findings were discussed with Benjamín mialn for dr hernandez on 10/01/2022.     DX-CHEST-PORTABLE (1 VIEW)    Result Date: 10/1/2022  1.  Possible pulmonary nodule within each lung. Metastasis is a possibility. 2.  No infiltrates or consolidations identified.    MR-BRAIN-WITH & W/O    Result Date: 10/2/2022  1.  Right-sided predominant, supratentorial metastatic disease with the largest mass in the right frontoparietal region measuring 3.4 x 3.0 cm as described in detail above. 2.  Associated edema with regional associated cortical effacement and partial effacement of the right lateral ventricle. Minimal localized midline shift adjacent to the large right frontoparietal mass. 3.  Evidence of prior hemorrhage within these masses. 4.  Small focus of restricted diffusion in the right occipital bone could represent osseous metastatic disease.       IMPRESSION:    A 41 y.o. with with a prior history of melanoma dating back to approximately 6 years ago who now presents with new brain metastases likely metastatic melanoma with a large dominant right frontal lobe lesions and smaller satellite lesions in the right frontal lobe.  Visit Diagnoses     ICD-10-CM   1. Left arm weakness  R29.898   2. Left leg weakness  R29.898   3.  Brain metastases (HCC)  C79.31   4. Vasogenic brain edema (HCC)  G93.6     No matching staging information was found for the patient.      RECOMMENDATIONS:   I had a long discussion with her regarding the results from her MRI, and also her case with neurosurgery as well as with medical oncology.  Given the size of the primary right frontal lobe lesion, I favor proceeding with surgical resection of this due to its size and the likelihood of slightly less than optimal control with SRS alone.  Following the resection of the large right frontal lobe lesion, any remaining lesions as well as the postoperative cavity can be treated with stereotactic radiosurgery thereafter.  In the meantime, I recommend continuing Decadron and Keppra until surgical intervention is completed.  Ultimately thereafter, she will need a full systemic work-up with staging, and a plan for systemic treatment thereafter.  I discussed with her the logistics and set up of neurosurgery likely later this week, and that we will plan to proceed with postoperative SRS approximately 2 weeks after her resection is completed.  We will likely do this as an outpatient, though if she remains in house for any reason, then we can certainly do it as an inpatient as well.  At the moment, we left our discussion at that, and broadly discussed the acute and long-term toxicities of SRS, but we will plan to review these in detail again after her resection is completed.      Thank you for the opportunity to participate in her care.  If any questions or comments, please do not hesitate in calling.

## 2022-10-04 LAB
ANION GAP SERPL CALC-SCNC: 13 MMOL/L (ref 7–16)
BUN SERPL-MCNC: 14 MG/DL (ref 8–22)
CALCIUM SERPL-MCNC: 9 MG/DL (ref 8.5–10.5)
CHLORIDE SERPL-SCNC: 105 MMOL/L (ref 96–112)
CO2 SERPL-SCNC: 21 MMOL/L (ref 20–33)
CREAT SERPL-MCNC: 0.5 MG/DL (ref 0.5–1.4)
ERYTHROCYTE [DISTWIDTH] IN BLOOD BY AUTOMATED COUNT: 51.1 FL (ref 35.9–50)
GFR SERPLBLD CREATININE-BSD FMLA CKD-EPI: 119 ML/MIN/1.73 M 2
GLUCOSE BLD STRIP.AUTO-MCNC: 168 MG/DL (ref 65–99)
GLUCOSE BLD STRIP.AUTO-MCNC: 170 MG/DL (ref 65–99)
GLUCOSE BLD STRIP.AUTO-MCNC: 215 MG/DL (ref 65–99)
GLUCOSE BLD STRIP.AUTO-MCNC: 263 MG/DL (ref 65–99)
GLUCOSE BLD STRIP.AUTO-MCNC: 299 MG/DL (ref 65–99)
GLUCOSE SERPL-MCNC: 175 MG/DL (ref 65–99)
HCT VFR BLD AUTO: 37.5 % (ref 37–47)
HGB BLD-MCNC: 11.5 G/DL (ref 12–16)
MAGNESIUM SERPL-MCNC: 1.9 MG/DL (ref 1.5–2.5)
MCH RBC QN AUTO: 25.5 PG (ref 27–33)
MCHC RBC AUTO-ENTMCNC: 30.7 G/DL (ref 33.6–35)
MCV RBC AUTO: 83.1 FL (ref 81.4–97.8)
PHOSPHATE SERPL-MCNC: 4.3 MG/DL (ref 2.5–4.5)
PLATELET # BLD AUTO: 449 K/UL (ref 164–446)
PMV BLD AUTO: 10.2 FL (ref 9–12.9)
POTASSIUM SERPL-SCNC: 4.2 MMOL/L (ref 3.6–5.5)
RBC # BLD AUTO: 4.51 M/UL (ref 4.2–5.4)
SODIUM SERPL-SCNC: 139 MMOL/L (ref 135–145)
WBC # BLD AUTO: 12 K/UL (ref 4.8–10.8)

## 2022-10-04 PROCEDURE — 700102 HCHG RX REV CODE 250 W/ 637 OVERRIDE(OP): Performed by: STUDENT IN AN ORGANIZED HEALTH CARE EDUCATION/TRAINING PROGRAM

## 2022-10-04 PROCEDURE — 85027 COMPLETE CBC AUTOMATED: CPT

## 2022-10-04 PROCEDURE — 83735 ASSAY OF MAGNESIUM: CPT

## 2022-10-04 PROCEDURE — A9270 NON-COVERED ITEM OR SERVICE: HCPCS | Performed by: HOSPITALIST

## 2022-10-04 PROCEDURE — A9270 NON-COVERED ITEM OR SERVICE: HCPCS | Performed by: STUDENT IN AN ORGANIZED HEALTH CARE EDUCATION/TRAINING PROGRAM

## 2022-10-04 PROCEDURE — 700102 HCHG RX REV CODE 250 W/ 637 OVERRIDE(OP): Performed by: INTERNAL MEDICINE

## 2022-10-04 PROCEDURE — 84100 ASSAY OF PHOSPHORUS: CPT

## 2022-10-04 PROCEDURE — 99232 SBSQ HOSP IP/OBS MODERATE 35: CPT | Performed by: INTERNAL MEDICINE

## 2022-10-04 PROCEDURE — 82962 GLUCOSE BLOOD TEST: CPT

## 2022-10-04 PROCEDURE — 700111 HCHG RX REV CODE 636 W/ 250 OVERRIDE (IP): Performed by: STUDENT IN AN ORGANIZED HEALTH CARE EDUCATION/TRAINING PROGRAM

## 2022-10-04 PROCEDURE — 770004 HCHG ROOM/CARE - ONCOLOGY PRIVATE *

## 2022-10-04 PROCEDURE — 80048 BASIC METABOLIC PNL TOTAL CA: CPT

## 2022-10-04 PROCEDURE — 36415 COLL VENOUS BLD VENIPUNCTURE: CPT

## 2022-10-04 PROCEDURE — 700102 HCHG RX REV CODE 250 W/ 637 OVERRIDE(OP): Performed by: HOSPITALIST

## 2022-10-04 PROCEDURE — A9270 NON-COVERED ITEM OR SERVICE: HCPCS | Performed by: INTERNAL MEDICINE

## 2022-10-04 RX ADMIN — OXCARBAZEPINE 300 MG: 300 TABLET, FILM COATED ORAL at 21:01

## 2022-10-04 RX ADMIN — QUETIAPINE FUMARATE 200 MG: 100 TABLET ORAL at 21:01

## 2022-10-04 RX ADMIN — FAMOTIDINE 20 MG: 20 TABLET, FILM COATED ORAL at 05:45

## 2022-10-04 RX ADMIN — INSULIN LISPRO 1 UNITS: 100 INJECTION, SOLUTION INTRAVENOUS; SUBCUTANEOUS at 07:50

## 2022-10-04 RX ADMIN — FAMOTIDINE 20 MG: 20 TABLET, FILM COATED ORAL at 17:19

## 2022-10-04 RX ADMIN — GABAPENTIN 800 MG: 400 CAPSULE ORAL at 07:40

## 2022-10-04 RX ADMIN — DEXAMETHASONE SODIUM PHOSPHATE 4 MG: 4 INJECTION, SOLUTION INTRA-ARTICULAR; INTRALESIONAL; INTRAMUSCULAR; INTRAVENOUS; SOFT TISSUE at 09:41

## 2022-10-04 RX ADMIN — ALPRAZOLAM 0.25 MG: 0.25 TABLET ORAL at 07:40

## 2022-10-04 RX ADMIN — NYSTATIN: 100000 POWDER TOPICAL at 17:27

## 2022-10-04 RX ADMIN — OXYCODONE 5 MG: 5 TABLET ORAL at 05:44

## 2022-10-04 RX ADMIN — ALPRAZOLAM 0.25 MG: 0.25 TABLET ORAL at 17:35

## 2022-10-04 RX ADMIN — DULOXETINE 60 MG: 20 CAPSULE, DELAYED RELEASE ORAL at 17:18

## 2022-10-04 RX ADMIN — NYSTATIN: 100000 POWDER TOPICAL at 05:49

## 2022-10-04 RX ADMIN — GABAPENTIN 800 MG: 400 CAPSULE ORAL at 21:01

## 2022-10-04 RX ADMIN — SENNOSIDES AND DOCUSATE SODIUM 2 TABLET: 50; 8.6 TABLET ORAL at 05:45

## 2022-10-04 RX ADMIN — DEXAMETHASONE SODIUM PHOSPHATE 4 MG: 4 INJECTION, SOLUTION INTRA-ARTICULAR; INTRALESIONAL; INTRAMUSCULAR; INTRAVENOUS; SOFT TISSUE at 03:25

## 2022-10-04 RX ADMIN — DEXAMETHASONE SODIUM PHOSPHATE 4 MG: 4 INJECTION, SOLUTION INTRA-ARTICULAR; INTRALESIONAL; INTRAMUSCULAR; INTRAVENOUS; SOFT TISSUE at 21:02

## 2022-10-04 RX ADMIN — INSULIN LISPRO 3 UNITS: 100 INJECTION, SOLUTION INTRAVENOUS; SUBCUTANEOUS at 21:02

## 2022-10-04 RX ADMIN — OXYCODONE 5 MG: 5 TABLET ORAL at 17:35

## 2022-10-04 RX ADMIN — DEXAMETHASONE SODIUM PHOSPHATE 4 MG: 4 INJECTION, SOLUTION INTRA-ARTICULAR; INTRALESIONAL; INTRAMUSCULAR; INTRAVENOUS; SOFT TISSUE at 15:13

## 2022-10-04 RX ADMIN — INSULIN LISPRO 3 UNITS: 100 INJECTION, SOLUTION INTRAVENOUS; SUBCUTANEOUS at 17:29

## 2022-10-04 RX ADMIN — INSULIN GLARGINE-YFGN 14 UNITS: 100 INJECTION, SOLUTION SUBCUTANEOUS at 17:30

## 2022-10-04 RX ADMIN — GABAPENTIN 800 MG: 400 CAPSULE ORAL at 17:19

## 2022-10-04 RX ADMIN — OXCARBAZEPINE 300 MG: 300 TABLET, FILM COATED ORAL at 07:40

## 2022-10-04 RX ADMIN — GABAPENTIN 800 MG: 400 CAPSULE ORAL at 12:35

## 2022-10-04 RX ADMIN — SENNOSIDES AND DOCUSATE SODIUM 2 TABLET: 50; 8.6 TABLET ORAL at 17:19

## 2022-10-04 RX ADMIN — INSULIN LISPRO 2 UNITS: 100 INJECTION, SOLUTION INTRAVENOUS; SUBCUTANEOUS at 12:39

## 2022-10-04 RX ADMIN — ATORVASTATIN CALCIUM 40 MG: 40 TABLET, FILM COATED ORAL at 17:19

## 2022-10-04 ASSESSMENT — ENCOUNTER SYMPTOMS
FOCAL WEAKNESS: 1
EYES NEGATIVE: 1
PSYCHIATRIC NEGATIVE: 1
MUSCULOSKELETAL NEGATIVE: 1
RESPIRATORY NEGATIVE: 1
CARDIOVASCULAR NEGATIVE: 1
GASTROINTESTINAL NEGATIVE: 1

## 2022-10-04 ASSESSMENT — PAIN DESCRIPTION - PAIN TYPE
TYPE: ACUTE PAIN
TYPE: ACUTE PAIN
TYPE: ACUTE PAIN;CHRONIC PAIN
TYPE: ACUTE PAIN

## 2022-10-04 NOTE — CARE PLAN
Problem: Knowledge Deficit - Standard  Goal: Patient and family/care givers will demonstrate understanding of plan of care, disease process/condition, diagnostic tests and medications  Outcome: Progressing     Problem: Fall Risk  Goal: Patient will remain free from falls  Outcome: Progressing   The patient is Stable - Low risk of patient condition declining or worsening    Shift Goals  Clinical Goals: Monitor neuro status, safety, mobilization  Patient Goals: To get surgery scheduled  Family Goals: NA    Progress made toward(s) clinical / shift goals:  pt states understanding of disease process.  Bed alarm activated.     Patient is not progressing towards the following goals:

## 2022-10-04 NOTE — PROGRESS NOTES
HEMATOLOGY-ONCOLOGY PROGRESS NOTE    Interval updates  Stage IV metastatic melanoma with symptomatic R frontal metastasis  10/4/22:  No acute issues. No new neurological symptoms. Feels stronger on left side  Tentative plan for neurosurgical intervention for Thursday.    Objective:  Medications reviewed and notable for:  Current Facility-Administered Medications   Medication Dose    oxyCODONE immediate-release (ROXICODONE) tablet 5 mg  5 mg    atenolol (TENORMIN) tablet 25 mg  25 mg    enoxaparin (Lovenox) inj 40 mg  40 mg    insulin GLARGINE (Lantus,Semglee) injection  0.2 Units/kg/day    And    insulin lispro (AdmeLOG,HumaLOG) injection  1-6 Units    And    dextrose 50% (D50W) injection 25 g  25 g    senna-docusate (PERICOLACE or SENOKOT S) 8.6-50 MG per tablet 2 Tablet  2 Tablet    And    polyethylene glycol/lytes (MIRALAX) PACKET 1 Packet  1 Packet    And    magnesium hydroxide (MILK OF MAGNESIA) suspension 30 mL  30 mL    And    bisacodyl (DULCOLAX) suppository 10 mg  10 mg    nystatin (MYCOSTATIN) powder      ondansetron (ZOFRAN) syringe/vial injection 4 mg  4 mg    ALPRAZolam (XANAX) tablet 0.25 mg  0.25 mg    famotidine (PEPCID) tablet 20 mg  20 mg    dexamethasone (DECADRON) injection 4 mg  4 mg    albuterol inhaler 2 Puff  2 Puff    atorvastatin (LIPITOR) tablet 40 mg  40 mg    DULoxetine (CYMBALTA) capsule 60 mg  60 mg    gabapentin (NEURONTIN) capsule 800 mg  800 mg    OXcarbazepine (TRILEPTAL) tablet 300 mg  300 mg    QUEtiapine (Seroquel) tablet 200 mg  200 mg    acetaminophen (Tylenol) tablet 650 mg  650 mg       ROS:   Constitutional: No fatigue, no fevers or chills, no night sweats  Resp: No cough or SOB  Cardio:No chest pain or palpitations  Pschy: No depression or anxiety   Neuro: No headaches, no seizure, no vision changes  GI: no abdominal pain, nausea or vomiting. No diarrhea or constipation   All other ROS negative    BP (!) 96/67   Pulse 81   Temp 37.1 °C (98.8 °F) (Temporal)   Resp 18    "Ht 1.626 m (5' 4\")   Wt 66.1 kg (145 lb 11.6 oz)   SpO2 98%     ECOG PS:  General:  comfortable, NAD  HEENT:  sclera anicteric, pupils equal, round, reactive to light, oral cavity and oropharynx clear, mucous membranes moist  Neck:   supple, no lymphadenopathy  Cor:   regular rate and rhythm, no murmurs, rubs, or gallops  Pulm:   clear to auscultation bilaterally  Abd:   bowel sounds present, soft, nontender, nondistended, no palpable masses or organomegaly  Extremities:  warm, no lower extremity edema  Neurologic:  A&O x 3. RUE/RLE 5/5, LUE/LLE 4+/5  Pyschiatric:  Appropriate mood and affect    Labs reviewed and notable for:  Recent Labs     10/01/22  1846 10/03/22  0230 10/04/22  0630   WBC 12.1* 13.1* 12.0*   RBC 5.32 4.40 4.51   HEMOGLOBIN 13.5 11.2* 11.5*   HEMATOCRIT 43.0 36.6* 37.5   MCV 80.8* 83.2 83.1   MCH 25.4* 25.5* 25.5*   MCHC 31.4* 30.6* 30.7*   RDW 48.2 50.3* 51.1*   PLATELETCT 519* 419 449*   MPV 9.5 9.8 10.2         .@CMP  Recent Results (from the past 24 hour(s))   POCT glucose device results    Collection Time: 10/03/22  4:51 PM   Result Value Ref Range    POC Glucose, Blood 191 (H) 65 - 99 mg/dL   POCT glucose device results    Collection Time: 10/03/22  9:50 PM   Result Value Ref Range    POC Glucose, Blood 215 (H) 65 - 99 mg/dL   CBC WITHOUT DIFFERENTIAL    Collection Time: 10/04/22  6:30 AM   Result Value Ref Range    WBC 12.0 (H) 4.8 - 10.8 K/uL    RBC 4.51 4.20 - 5.40 M/uL    Hemoglobin 11.5 (L) 12.0 - 16.0 g/dL    Hematocrit 37.5 37.0 - 47.0 %    MCV 83.1 81.4 - 97.8 fL    MCH 25.5 (L) 27.0 - 33.0 pg    MCHC 30.7 (L) 33.6 - 35.0 g/dL    RDW 51.1 (H) 35.9 - 50.0 fL    Platelet Count 449 (H) 164 - 446 K/uL    MPV 10.2 9.0 - 12.9 fL   Basic Metabolic Panel    Collection Time: 10/04/22  6:30 AM   Result Value Ref Range    Sodium 139 135 - 145 mmol/L    Potassium 4.2 3.6 - 5.5 mmol/L    Chloride 105 96 - 112 mmol/L    Co2 21 20 - 33 mmol/L    Glucose 175 (H) 65 - 99 mg/dL    Bun 14 8 - 22 " mg/dL    Creatinine 0.50 0.50 - 1.40 mg/dL    Calcium 9.0 8.5 - 10.5 mg/dL    Anion Gap 13.0 7.0 - 16.0   MAGNESIUM    Collection Time: 10/04/22  6:30 AM   Result Value Ref Range    Magnesium 1.9 1.5 - 2.5 mg/dL   PHOSPHORUS    Collection Time: 10/04/22  6:30 AM   Result Value Ref Range    Phosphorus 4.3 2.5 - 4.5 mg/dL   ESTIMATED GFR    Collection Time: 10/04/22  6:30 AM   Result Value Ref Range    GFR (CKD-EPI) 119 >60 mL/min/1.73 m 2   POCT glucose device results    Collection Time: 10/04/22  7:46 AM   Result Value Ref Range    POC Glucose, Blood 168 (H) 65 - 99 mg/dL       Diagnostic imaging:      Assessment and Recommendations:  Ms. Mcintyre is a 41-year-old female with history of metastatic melanoma currently admitted with symptomatic right frontal lobe metastasis .     Recurrent stage IV metastatic melanoma in a patient with previous history of T3N1 disease status post adjuvant immunotherapy for almost 3+ years and she recently was in observation with repeat PET scans. Unfortunately currently she has diffuse metastatic disease with lung lesions peritoneal nodule and 6 lesions in the brain.     2. Metastatic brain lesions 5 on the right side and 1 in the left side.     -Tentative plan for surgical resection of frontoparietal mass 3x3 cm on Thursday. Neurosurgery following.   - On dexamethasone and Trileptal  - BRAF mutation status unknown. If BRAF positive she will be a candidate for BRAF/MAC inhibitor, if not would likely need dual immunotherapy. Systemic treatment can be initiated in outpatient setting.  - Management of medical issues per primary team        High complexicity/Drug monitoring   We will see her in outpatient clinic for further management     Quality-Core Measures: Labs, meds reviewed    Georgie Lim MD

## 2022-10-04 NOTE — CARE PLAN
A&Ox4. Up with 1-2 PA. Left foot drop present with boot in place. Treated with PRN medication for chronic back pain. Denies nausea. Graf CDI with adequate output. Patient expresses anxiety about scheduling of upcoming surgery. Encouraged ROM of left side. Calls appropriately to make needs known, safety precautions in place.       The patient is Watcher - Medium risk of patient condition declining or worsening    Shift Goals  Clinical Goals: Monitor neuro status, safety, mobilization  Patient Goals: To get surgery scheduled  Family Goals: NA    Progress made toward(s) clinical / shift goals:  Q4 neuro assessments in place. Patient remains free from falls and injury. Mobilized up to chair this AM.     Patient is not progressing towards the following goals: NA

## 2022-10-04 NOTE — PROGRESS NOTES
Neurosurgery Progress Note    Subjective:  No events overnight.  Reports that her left arm and leg feel continue to feel stronger on steroids.    Exam:  Sitting comfortably in a chair.  Awake, alert, oriented to person, place and time.  Pupils equally round and reactive to light, 4 to 3mm.  Extraocular movements full.  Facial sensation intact to light touch.  Face symmetric, HB 1.  Palate rises symmetrically.  Tongue is midline.  Shoulder shrug 5/5.  No pronator drift.  Sensation intact to light touch in all 4 extremities.  Left arm and leg are antigravity, now at least 4+ out of 5 resistance.  Left foot is weakest, but still much improved.    Recent Labs     10/01/22  1846 10/03/22  0230 10/04/22  0630   WBC 12.1* 13.1* 12.0*   RBC 5.32 4.40 4.51   HEMOGLOBIN 13.5 11.2* 11.5*   HEMATOCRIT 43.0 36.6* 37.5   MCV 80.8* 83.2 83.1   MCH 25.4* 25.5* 25.5*   MCHC 31.4* 30.6* 30.7*   RDW 48.2 50.3* 51.1*   PLATELETCT 519* 419 449*   MPV 9.5 9.8 10.2       Recent Labs     10/01/22  1846 10/03/22  0230 10/04/22  0630   SODIUM 139 137 139   POTASSIUM 4.1 5.0 4.2   CHLORIDE 103 106 105   CO2 21 19* 21   GLUCOSE 170* 191* 175*   BUN 14 20 14   CREATININE 0.51 0.56 0.50   CALCIUM 9.4 8.7 9.0                 Assessment and Plan:  Ms. Mcintyre is a 41-year-old woman who is presenting with newly diagnosed stage IV metastatic melanoma.  She has a dominant and symptomatic right frontal lobe metastasis, as well as several smaller satellite lesions in the right frontal lobe.  She also has at least 3 additional tiny supratentorial lesions.    Had a long conversation with her this morning about these findings.  I stated that we will discuss with medical and radiation oncology regarding the utility of debulking/resection of the right frontal largest lesions.  I discussed the indications, benefits, risk of the procedure, including but not limited to bleeding, infection, worsening weakness, no improvement in her weakness, new sensory changes.   She is in agreement to proceed with a right frontal craniotomy for tumor resection on Thursday.    -Okay for PT OT  -Hold DVT ppx tomorrow  - NPO tomorrow night  - continue Decadron and Keppra    Please call with questions.    Pebbles Tejada M.D.     A total of 35 minutes were spent in the evaluation, examination, and coordination of care of this patient.

## 2022-10-04 NOTE — PROGRESS NOTES
[facility] Medicine Daily Progress Note    Date of Service  10/4/2022    Chief Complaint  Jacque Mcintyre is a 41 y.o. female admitted 10/1/2022 with left-sided weakness.  She was diagnosed with melanoma in 2016, and underwent immunotherapy for 3 years.  She had an abnormal PET scan earlier this year, biopsy was reportedly negative.  She has type 2 diabetes and depression.    Hospital Course  CT head from 10/1/2022 showed 3 masses in the right cerebral hemisphere with extensive vasogenic edema, localized midline shift to the left side frontoparietal level, 2 small masses in the right frontal lobe with increased density which could indicate hemorrhage. Neurosurgery were consulted. MRI brain from 10/2/2022 showed right-sided predominant, supratentorial metastatic disease with associated edema, localized midline shift, evidence of prior hemorrhage within the masses.  CT chest, abdomen, pelvis from 10/1/2022 showed bilateral pulmonary nodules, enlarged left hilar lymph node, enlarged left external iliac lymph node, 9 mm right lower quadrant peritoneal nodule, 7 mm left and left upper quadrant subcutaneous soft tissue nodules.  Decadron and Keppra started.  Possible plan for neurosurgery this week.    Medical oncology and radiation oncology were consulted.     For medical oncology, will need to confirm diagnosis of melanoma and BRAF testing.  If BRAF positive, she will be candidate for BRAF/MAC inhibitor not she will need dual immunotherapy.    Interval Problem Update  Patient was evaluated by neurosurgery today.  She has been scheduled for right frontal craniotomy of tumor resection on 10/6/2022.  Afebrile.    I have discussed this patient's plan of care and discharge plan at IDT rounds today with Case Management, Nursing, Nursing leadership, and other members of the IDT team.    Consultants/Specialty  neurosurgery and oncology, radiation oncology    Code Status  Full Code    Disposition  Patient is medically cleared for  discharge.   Anticipate discharge to to home with close outpatient follow-up.  I have placed the appropriate orders for post-discharge needs.    Review of Systems  Review of Systems   Constitutional:  Positive for malaise/fatigue.   HENT: Negative.     Eyes: Negative.    Respiratory: Negative.     Cardiovascular: Negative.    Gastrointestinal: Negative.    Genitourinary: Negative.    Musculoskeletal: Negative.    Skin: Negative.    Neurological:  Positive for focal weakness.        Left sided weakness   Endo/Heme/Allergies: Negative.    Psychiatric/Behavioral: Negative.          Anxiety is controlled with medications.       Physical Exam  Temp:  [36.6 °C (97.8 °F)-37.1 °C (98.8 °F)] 37.1 °C (98.8 °F)  Pulse:  [64-81] 72  Resp:  [16-18] 16  BP: ()/(64-86) 93/69  SpO2:  [95 %-98 %] 96 %    Physical Exam  Constitutional:       General: She is not in acute distress.  HENT:      Nose: Nose normal.      Mouth/Throat:      Mouth: Mucous membranes are moist.   Eyes:      Pupils: Pupils are equal, round, and reactive to light.   Cardiovascular:      Rate and Rhythm: Normal rate.   Pulmonary:      Effort: Pulmonary effort is normal.   Abdominal:      Palpations: Abdomen is soft.   Musculoskeletal:      Cervical back: Normal range of motion.      Right lower leg: No edema.      Left lower leg: No edema.   Skin:     General: Skin is warm.   Neurological:      Mental Status: She is alert.      Motor: Weakness present.      Comments: Left upper and lower extremity weakness   Psychiatric:         Mood and Affect: Mood normal.       Fluids    Intake/Output Summary (Last 24 hours) at 10/4/2022 1657  Last data filed at 10/4/2022 1236  Gross per 24 hour   Intake --   Output 5250 ml   Net -5250 ml       Laboratory  Recent Labs     10/01/22  1846 10/03/22  0230 10/04/22  0630   WBC 12.1* 13.1* 12.0*   RBC 5.32 4.40 4.51   HEMOGLOBIN 13.5 11.2* 11.5*   HEMATOCRIT 43.0 36.6* 37.5   MCV 80.8* 83.2 83.1   MCH 25.4* 25.5* 25.5*   MCHC  31.4* 30.6* 30.7*   RDW 48.2 50.3* 51.1*   PLATELETCT 519* 419 449*   MPV 9.5 9.8 10.2     Recent Labs     10/01/22  1846 10/03/22  0230 10/04/22  0630   SODIUM 139 137 139   POTASSIUM 4.1 5.0 4.2   CHLORIDE 103 106 105   CO2 21 19* 21   GLUCOSE 170* 191* 175*   BUN 14 20 14   CREATININE 0.51 0.56 0.50   CALCIUM 9.4 8.7 9.0                   Imaging  MR-BRAIN-WITH & W/O   Final Result      1.  Right-sided predominant, supratentorial metastatic disease with the largest mass in the right frontoparietal region measuring 3.4 x 3.0 cm as described in detail above.   2.  Associated edema with regional associated cortical effacement and partial effacement of the right lateral ventricle. Minimal localized midline shift adjacent to the large right frontoparietal mass.   3.  Evidence of prior hemorrhage within these masses.   4.  Small focus of restricted diffusion in the right occipital bone could represent osseous metastatic disease.      CT-CHEST,ABDOMEN,PELVIS WITH   Final Result      1.  BILATERAL pulmonary nodules, the largest of which measures 12 mm in the LEFT lower lobe and which has enlarged since May of this year.   2.  Enlarged LEFT hilar lymph node   3.  Enlarged LEFT external iliac lymph node. This would be amenable to image guided tissue sampling if clinically appropriate.   4.  7 mm LEFT and LEFT upper quadrant subcutaneous soft tissue nodules, new since prior   5.  9 mm RIGHT lower quadrant peritoneal nodule, new since prior   6.  33 mm LEFT ovarian cyst   7.  Thyroid nodules measuring up to 23 mm   8.  BILATERAL L5 pars defects with grade 1 anterolisthesis of L5 on S1   9.  Incompletely assessed LEFT renal lesion, new since prior and most likely a cyst though attention on top is recommended.      DX-CHEST-PORTABLE (1 VIEW)   Final Result         1.  Possible pulmonary nodule within each lung. Metastasis is a possibility.      2.  No infiltrates or consolidations identified.      CT-HEAD W/O   Final Result    Addendum (preliminary) 1 of 1   These findings were discussed with JESENIA HERNANDEZ on 10/01/2022.      Final         1.  3 masses in the right cerebral hemisphere largest measuring 3.2 cm with extensive vasogenic edema as described above. Findings are likely due to metastases.      2.  Localized midline shift to the left side frontoparietal level measuring 3 mm.      3.  The 2 smaller masses in the right frontal lobe demonstrates some increased density which could indicate that hemorrhage may be present within these masses.      These findings were discussed with Benjamín nurse for dr hernandez on 10/01/2022.              Assessment/Plan  * Brain metastases (HCC)  Assessment & Plan  Likely metastatic melanoma.  Continue Decadron and Keppra.  Plan for right frontal craniotomy with tumor resection on 10/6/2022.  Neurosurgery, Radiation oncology and medical oncology following    Malignant melanoma metastatic to brain (HCC)- (present on admission)  Assessment & Plan  Neurosurgery following.  Plan for right frontal craniotomy of tumor resection on 10/6/2022.    DM2 (diabetes mellitus, type 2) (HCC)- (present on admission)  Assessment & Plan  On steroids.  Continue SSI with Accu-Cheks and hypoglycemia protocol.    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)- (present on admission)  Assessment & Plan  Initially diagnosed in 2016, underwent immunotherapy and lymph node dissection.  Now with widespread metastatic disease.  Planned for right frontal craniotomy with tumor resection on 10/6/2022.  Neurosurgery following.    Depression- (present on admission)  Assessment & Plan  Stable.  Continue Xanax and Cymbalta.       VTE prophylaxis: SCDs/TEDs Lovenox on hold from 10/5/2022

## 2022-10-04 NOTE — PROGRESS NOTES
4 Eyes Skin Assessment Completed by Lilli, RN and MICHAEL Padgett.    Head WDL  Ears WDL  Nose WDL  Mouth WDL  Neck WDL  Breast/Chest Redness  Shoulder Blades WDL  Spine Bruising  (R) Arm/Elbow/Hand Redness, blanching  (L) Arm/Elbow/Hand Redness, blanching  Abdomen Redness under abd fold  Groin WDL  Scrotum/Coccyx/Buttocks WDL  (R) Leg WDL  (L) Leg WDL  (R) Heel/Foot/Toe WDL  (L) Heel/Foot/Toe WDL          Devices In Places Pulse Ox      Interventions In Place Waffle Overlay, intradry, pt turn self    Possible Skin Injury No    Pictures Uploaded Into Epic N/A  Wound Consult Placed N/A  RN Wound Prevention Protocol Ordered No

## 2022-10-05 LAB
ANION GAP SERPL CALC-SCNC: 14 MMOL/L (ref 7–16)
APTT PPP: 26.8 SEC (ref 24.7–36)
BASOPHILS # BLD AUTO: 0.2 % (ref 0–1.8)
BASOPHILS # BLD: 0.02 K/UL (ref 0–0.12)
BUN SERPL-MCNC: 18 MG/DL (ref 8–22)
CALCIUM SERPL-MCNC: 9.7 MG/DL (ref 8.5–10.5)
CHLORIDE SERPL-SCNC: 100 MMOL/L (ref 96–112)
CO2 SERPL-SCNC: 23 MMOL/L (ref 20–33)
CREAT SERPL-MCNC: 0.53 MG/DL (ref 0.5–1.4)
EOSINOPHIL # BLD AUTO: 0 K/UL (ref 0–0.51)
EOSINOPHIL NFR BLD: 0 % (ref 0–6.9)
ERYTHROCYTE [DISTWIDTH] IN BLOOD BY AUTOMATED COUNT: 47.1 FL (ref 35.9–50)
GFR SERPLBLD CREATININE-BSD FMLA CKD-EPI: 117 ML/MIN/1.73 M 2
GLUCOSE BLD STRIP.AUTO-MCNC: 201 MG/DL (ref 65–99)
GLUCOSE BLD STRIP.AUTO-MCNC: 252 MG/DL (ref 65–99)
GLUCOSE BLD STRIP.AUTO-MCNC: 289 MG/DL (ref 65–99)
GLUCOSE BLD STRIP.AUTO-MCNC: 308 MG/DL (ref 65–99)
GLUCOSE SERPL-MCNC: 269 MG/DL (ref 65–99)
HCT VFR BLD AUTO: 37.4 % (ref 37–47)
HGB BLD-MCNC: 11.8 G/DL (ref 12–16)
IMM GRANULOCYTES # BLD AUTO: 0.06 K/UL (ref 0–0.11)
IMM GRANULOCYTES NFR BLD AUTO: 0.6 % (ref 0–0.9)
INR PPP: 1.02 (ref 0.87–1.13)
LYMPHOCYTES # BLD AUTO: 1.42 K/UL (ref 1–4.8)
LYMPHOCYTES NFR BLD: 13.6 % (ref 22–41)
MCH RBC QN AUTO: 24.9 PG (ref 27–33)
MCHC RBC AUTO-ENTMCNC: 31.6 G/DL (ref 33.6–35)
MCV RBC AUTO: 79.1 FL (ref 81.4–97.8)
MONOCYTES # BLD AUTO: 0.57 K/UL (ref 0–0.85)
MONOCYTES NFR BLD AUTO: 5.5 % (ref 0–13.4)
NEUTROPHILS # BLD AUTO: 8.37 K/UL (ref 2–7.15)
NEUTROPHILS NFR BLD: 80.1 % (ref 44–72)
NRBC # BLD AUTO: 0 K/UL
NRBC BLD-RTO: 0 /100 WBC
PLATELET # BLD AUTO: 483 K/UL (ref 164–446)
PMV BLD AUTO: 10 FL (ref 9–12.9)
POTASSIUM SERPL-SCNC: 4.3 MMOL/L (ref 3.6–5.5)
PROTHROMBIN TIME: 13.3 SEC (ref 12–14.6)
RBC # BLD AUTO: 4.73 M/UL (ref 4.2–5.4)
SODIUM SERPL-SCNC: 137 MMOL/L (ref 135–145)
WBC # BLD AUTO: 10.4 K/UL (ref 4.8–10.8)

## 2022-10-05 PROCEDURE — 85730 THROMBOPLASTIN TIME PARTIAL: CPT

## 2022-10-05 PROCEDURE — 97530 THERAPEUTIC ACTIVITIES: CPT

## 2022-10-05 PROCEDURE — 85610 PROTHROMBIN TIME: CPT

## 2022-10-05 PROCEDURE — 82962 GLUCOSE BLOOD TEST: CPT

## 2022-10-05 PROCEDURE — 770004 HCHG ROOM/CARE - ONCOLOGY PRIVATE *

## 2022-10-05 PROCEDURE — 97116 GAIT TRAINING THERAPY: CPT

## 2022-10-05 PROCEDURE — 700102 HCHG RX REV CODE 250 W/ 637 OVERRIDE(OP): Performed by: HOSPITALIST

## 2022-10-05 PROCEDURE — 700102 HCHG RX REV CODE 250 W/ 637 OVERRIDE(OP): Performed by: STUDENT IN AN ORGANIZED HEALTH CARE EDUCATION/TRAINING PROGRAM

## 2022-10-05 PROCEDURE — 99232 SBSQ HOSP IP/OBS MODERATE 35: CPT | Performed by: INTERNAL MEDICINE

## 2022-10-05 PROCEDURE — A9270 NON-COVERED ITEM OR SERVICE: HCPCS | Performed by: HOSPITALIST

## 2022-10-05 PROCEDURE — 700102 HCHG RX REV CODE 250 W/ 637 OVERRIDE(OP)

## 2022-10-05 PROCEDURE — A9270 NON-COVERED ITEM OR SERVICE: HCPCS | Performed by: INTERNAL MEDICINE

## 2022-10-05 PROCEDURE — A9270 NON-COVERED ITEM OR SERVICE: HCPCS | Performed by: STUDENT IN AN ORGANIZED HEALTH CARE EDUCATION/TRAINING PROGRAM

## 2022-10-05 PROCEDURE — 700102 HCHG RX REV CODE 250 W/ 637 OVERRIDE(OP): Performed by: INTERNAL MEDICINE

## 2022-10-05 PROCEDURE — 97112 NEUROMUSCULAR REEDUCATION: CPT

## 2022-10-05 PROCEDURE — 700111 HCHG RX REV CODE 636 W/ 250 OVERRIDE (IP): Performed by: STUDENT IN AN ORGANIZED HEALTH CARE EDUCATION/TRAINING PROGRAM

## 2022-10-05 PROCEDURE — 85025 COMPLETE CBC W/AUTO DIFF WBC: CPT

## 2022-10-05 PROCEDURE — 80048 BASIC METABOLIC PNL TOTAL CA: CPT

## 2022-10-05 PROCEDURE — A9270 NON-COVERED ITEM OR SERVICE: HCPCS

## 2022-10-05 RX ORDER — QUETIAPINE FUMARATE 100 MG/1
200 TABLET, FILM COATED ORAL ONCE
Status: COMPLETED | OUTPATIENT
Start: 2022-10-05 | End: 2022-10-05

## 2022-10-05 RX ORDER — INSULIN LISPRO 100 [IU]/ML
0-12 INJECTION, SOLUTION INTRAVENOUS; SUBCUTANEOUS
Status: DISCONTINUED | OUTPATIENT
Start: 2022-10-05 | End: 2022-10-13

## 2022-10-05 RX ORDER — DEXTROSE MONOHYDRATE 25 G/50ML
25 INJECTION, SOLUTION INTRAVENOUS
Status: DISCONTINUED | OUTPATIENT
Start: 2022-10-05 | End: 2022-10-15 | Stop reason: HOSPADM

## 2022-10-05 RX ADMIN — FAMOTIDINE 20 MG: 20 TABLET, FILM COATED ORAL at 05:23

## 2022-10-05 RX ADMIN — QUETIAPINE FUMARATE 200 MG: 100 TABLET ORAL at 22:53

## 2022-10-05 RX ADMIN — OXYCODONE 5 MG: 5 TABLET ORAL at 20:08

## 2022-10-05 RX ADMIN — INSULIN LISPRO 6 UNITS: 100 INJECTION, SOLUTION INTRAVENOUS; SUBCUTANEOUS at 12:21

## 2022-10-05 RX ADMIN — NYSTATIN: 100000 POWDER TOPICAL at 17:57

## 2022-10-05 RX ADMIN — GABAPENTIN 800 MG: 400 CAPSULE ORAL at 08:31

## 2022-10-05 RX ADMIN — ALPRAZOLAM 0.25 MG: 0.25 TABLET ORAL at 20:08

## 2022-10-05 RX ADMIN — SENNOSIDES AND DOCUSATE SODIUM 2 TABLET: 50; 8.6 TABLET ORAL at 17:56

## 2022-10-05 RX ADMIN — GABAPENTIN 800 MG: 400 CAPSULE ORAL at 12:21

## 2022-10-05 RX ADMIN — SENNOSIDES AND DOCUSATE SODIUM 2 TABLET: 50; 8.6 TABLET ORAL at 05:23

## 2022-10-05 RX ADMIN — DEXAMETHASONE SODIUM PHOSPHATE 4 MG: 4 INJECTION, SOLUTION INTRA-ARTICULAR; INTRALESIONAL; INTRAMUSCULAR; INTRAVENOUS; SOFT TISSUE at 14:59

## 2022-10-05 RX ADMIN — NYSTATIN: 100000 POWDER TOPICAL at 05:23

## 2022-10-05 RX ADMIN — ATORVASTATIN CALCIUM 40 MG: 40 TABLET, FILM COATED ORAL at 17:56

## 2022-10-05 RX ADMIN — INSULIN LISPRO 8 UNITS: 100 INJECTION, SOLUTION INTRAVENOUS; SUBCUTANEOUS at 20:10

## 2022-10-05 RX ADMIN — OXCARBAZEPINE 300 MG: 300 TABLET, FILM COATED ORAL at 20:07

## 2022-10-05 RX ADMIN — ALBUTEROL SULFATE 2 PUFF: 90 AEROSOL, METERED RESPIRATORY (INHALATION) at 05:55

## 2022-10-05 RX ADMIN — INSULIN LISPRO 6 UNITS: 100 INJECTION, SOLUTION INTRAVENOUS; SUBCUTANEOUS at 17:57

## 2022-10-05 RX ADMIN — QUETIAPINE FUMARATE 200 MG: 100 TABLET ORAL at 17:56

## 2022-10-05 RX ADMIN — GABAPENTIN 800 MG: 400 CAPSULE ORAL at 17:56

## 2022-10-05 RX ADMIN — ALPRAZOLAM 0.25 MG: 0.25 TABLET ORAL at 14:59

## 2022-10-05 RX ADMIN — INSULIN GLARGINE-YFGN 14 UNITS: 100 INJECTION, SOLUTION SUBCUTANEOUS at 17:57

## 2022-10-05 RX ADMIN — OXCARBAZEPINE 300 MG: 300 TABLET, FILM COATED ORAL at 08:31

## 2022-10-05 RX ADMIN — FAMOTIDINE 20 MG: 20 TABLET, FILM COATED ORAL at 17:56

## 2022-10-05 RX ADMIN — OXYCODONE 5 MG: 5 TABLET ORAL at 14:59

## 2022-10-05 RX ADMIN — POLYETHYLENE GLYCOL 3350 1 PACKET: 17 POWDER, FOR SOLUTION ORAL at 08:31

## 2022-10-05 RX ADMIN — ALPRAZOLAM 0.25 MG: 0.25 TABLET ORAL at 05:52

## 2022-10-05 RX ADMIN — DULOXETINE 60 MG: 20 CAPSULE, DELAYED RELEASE ORAL at 17:56

## 2022-10-05 RX ADMIN — INSULIN LISPRO 2 UNITS: 100 INJECTION, SOLUTION INTRAVENOUS; SUBCUTANEOUS at 08:32

## 2022-10-05 RX ADMIN — DEXAMETHASONE SODIUM PHOSPHATE 4 MG: 4 INJECTION, SOLUTION INTRA-ARTICULAR; INTRALESIONAL; INTRAMUSCULAR; INTRAVENOUS; SOFT TISSUE at 08:31

## 2022-10-05 RX ADMIN — DEXAMETHASONE SODIUM PHOSPHATE 4 MG: 4 INJECTION, SOLUTION INTRA-ARTICULAR; INTRALESIONAL; INTRAMUSCULAR; INTRAVENOUS; SOFT TISSUE at 02:57

## 2022-10-05 RX ADMIN — OXYCODONE 5 MG: 5 TABLET ORAL at 05:52

## 2022-10-05 RX ADMIN — GABAPENTIN 800 MG: 400 CAPSULE ORAL at 20:08

## 2022-10-05 ASSESSMENT — ENCOUNTER SYMPTOMS
EYES NEGATIVE: 1
FOCAL WEAKNESS: 1
RESPIRATORY NEGATIVE: 1
GASTROINTESTINAL NEGATIVE: 1
PSYCHIATRIC NEGATIVE: 1
MUSCULOSKELETAL NEGATIVE: 1
CARDIOVASCULAR NEGATIVE: 1

## 2022-10-05 ASSESSMENT — COGNITIVE AND FUNCTIONAL STATUS - GENERAL
SUGGESTED CMS G CODE MODIFIER MOBILITY: CL
DRESSING REGULAR LOWER BODY CLOTHING: A LITTLE
EATING MEALS: A LITTLE
CLIMB 3 TO 5 STEPS WITH RAILING: A LITTLE
WALKING IN HOSPITAL ROOM: A LITTLE
MOBILITY SCORE: 12
MOVING FROM LYING ON BACK TO SITTING ON SIDE OF FLAT BED: UNABLE
TOILETING: A LITTLE
STANDING UP FROM CHAIR USING ARMS: A LITTLE
SUGGESTED CMS G CODE MODIFIER DAILY ACTIVITY: CK
HELP NEEDED FOR BATHING: A LITTLE
DRESSING REGULAR UPPER BODY CLOTHING: A LITTLE
TURNING FROM BACK TO SIDE WHILE IN FLAT BAD: UNABLE
PERSONAL GROOMING: A LITTLE
DAILY ACTIVITIY SCORE: 18
MOVING TO AND FROM BED TO CHAIR: UNABLE

## 2022-10-05 ASSESSMENT — GAIT ASSESSMENTS
GAIT LEVEL OF ASSIST: MINIMAL ASSIST
ASSISTIVE DEVICE: FRONT WHEEL WALKER
DISTANCE (FEET): 100
DEVIATION: STEP TO

## 2022-10-05 ASSESSMENT — PAIN DESCRIPTION - PAIN TYPE: TYPE: ACUTE PAIN

## 2022-10-05 NOTE — PROGRESS NOTES
Hospital Medicine Daily Progress Note    Date of Service  10/5/2022    Chief Complaint  Jacque Mcintyre is a 41 y.o. female admitted 10/1/2022 with left-sided weakness.  She was diagnosed with melanoma in 2016, and underwent immunotherapy for 3 years.  She had an abnormal PET scan earlier this year, biopsy was reportedly negative.  She has type 2 diabetes and depression.    Hospital Course  CT head from 10/1/2022 showed 3 masses in the right cerebral hemisphere with extensive vasogenic edema, localized midline shift to the left side frontoparietal level, 2 small masses in the right frontal lobe with increased density which could indicate hemorrhage. Neurosurgery were consulted. MRI brain from 10/2/2022 showed right-sided predominant, supratentorial metastatic disease with associated edema, localized midline shift, evidence of prior hemorrhage within the masses.  CT chest, abdomen, pelvis from 10/1/2022 showed bilateral pulmonary nodules, enlarged left hilar lymph node, enlarged left external iliac lymph node, 9 mm right lower quadrant peritoneal nodule, 7 mm left and left upper quadrant subcutaneous soft tissue nodules.  Decadron and Keppra started.  Possible plan for neurosurgery this week.    Medical oncology and radiation oncology were consulted.     For medical oncology, will need to confirm diagnosis of melanoma and BRAF testing.  If BRAF positive, she will be candidate for BRAF/MAC inhibitor not she will need dual immunotherapy.    Interval Problem Update  Patient was evaluated by neurosurgery on 10/4/2022.  She has been scheduled for right frontal craniotomy of tumor resection on 10/6/2022. NPO at midnight. Afebrile, hemodynamically stable.  SSI scale was increased due to elevated blood glucose readings.  Anxiety is controlled with medications.    I have discussed this patient's plan of care and discharge plan at IDT rounds today with Case Management, Nursing, Nursing leadership, and other members of the IDT  team.    Consultants/Specialty  neurosurgery and oncology, radiation oncology    Code Status  Full Code    Disposition  Patient is medically cleared for discharge.   Anticipate discharge to to home with close outpatient follow-up.  I have placed the appropriate orders for post-discharge needs.    Review of Systems  Review of Systems   Constitutional:  Positive for malaise/fatigue.   HENT: Negative.     Eyes: Negative.    Respiratory: Negative.     Cardiovascular: Negative.    Gastrointestinal: Negative.    Genitourinary: Negative.    Musculoskeletal: Negative.    Skin: Negative.    Neurological:  Positive for focal weakness.        Left sided weakness   Endo/Heme/Allergies: Negative.    Psychiatric/Behavioral: Negative.          Anxiety is controlled with medications.       Physical Exam  Temp:  [36.6 °C (97.8 °F)-36.9 °C (98.4 °F)] 36.9 °C (98.4 °F)  Pulse:  [54-84] 84  Resp:  [16-18] 16  BP: ()/(58-74) 110/74  SpO2:  [90 %-96 %] 94 %    Physical Exam  Constitutional:       General: She is not in acute distress.  HENT:      Nose: Nose normal.      Mouth/Throat:      Mouth: Mucous membranes are moist.   Eyes:      Pupils: Pupils are equal, round, and reactive to light.   Cardiovascular:      Rate and Rhythm: Normal rate.   Pulmonary:      Effort: Pulmonary effort is normal.   Abdominal:      Palpations: Abdomen is soft.   Musculoskeletal:      Cervical back: Normal range of motion.      Right lower leg: No edema.      Left lower leg: No edema.   Skin:     General: Skin is warm.   Neurological:      Mental Status: She is alert.      Motor: Weakness present.      Comments: Left upper and lower extremity weakness   Psychiatric:         Mood and Affect: Mood normal.       Fluids    Intake/Output Summary (Last 24 hours) at 10/5/2022 1324  Last data filed at 10/5/2022 0452  Gross per 24 hour   Intake --   Output 1700 ml   Net -1700 ml       Laboratory  Recent Labs     10/03/22  0230 10/04/22  0630 10/05/22  0050    WBC 13.1* 12.0* 10.4   RBC 4.40 4.51 4.73   HEMOGLOBIN 11.2* 11.5* 11.8*   HEMATOCRIT 36.6* 37.5 37.4   MCV 83.2 83.1 79.1*   MCH 25.5* 25.5* 24.9*   MCHC 30.6* 30.7* 31.6*   RDW 50.3* 51.1* 47.1   PLATELETCT 419 449* 483*   MPV 9.8 10.2 10.0     Recent Labs     10/03/22  0230 10/04/22  0630 10/05/22  0050   SODIUM 137 139 137   POTASSIUM 5.0 4.2 4.3   CHLORIDE 106 105 100   CO2 19* 21 23   GLUCOSE 191* 175* 269*   BUN 20 14 18   CREATININE 0.56 0.50 0.53   CALCIUM 8.7 9.0 9.7                   Imaging  MR-BRAIN-WITH & W/O   Final Result      1.  Right-sided predominant, supratentorial metastatic disease with the largest mass in the right frontoparietal region measuring 3.4 x 3.0 cm as described in detail above.   2.  Associated edema with regional associated cortical effacement and partial effacement of the right lateral ventricle. Minimal localized midline shift adjacent to the large right frontoparietal mass.   3.  Evidence of prior hemorrhage within these masses.   4.  Small focus of restricted diffusion in the right occipital bone could represent osseous metastatic disease.      CT-CHEST,ABDOMEN,PELVIS WITH   Final Result      1.  BILATERAL pulmonary nodules, the largest of which measures 12 mm in the LEFT lower lobe and which has enlarged since May of this year.   2.  Enlarged LEFT hilar lymph node   3.  Enlarged LEFT external iliac lymph node. This would be amenable to image guided tissue sampling if clinically appropriate.   4.  7 mm LEFT and LEFT upper quadrant subcutaneous soft tissue nodules, new since prior   5.  9 mm RIGHT lower quadrant peritoneal nodule, new since prior   6.  33 mm LEFT ovarian cyst   7.  Thyroid nodules measuring up to 23 mm   8.  BILATERAL L5 pars defects with grade 1 anterolisthesis of L5 on S1   9.  Incompletely assessed LEFT renal lesion, new since prior and most likely a cyst though attention on top is recommended.      DX-CHEST-PORTABLE (1 VIEW)   Final Result         1.   Possible pulmonary nodule within each lung. Metastasis is a possibility.      2.  No infiltrates or consolidations identified.      CT-HEAD W/O   Final Result   Addendum (preliminary) 1 of 1   These findings were discussed with JESENIA HERNANDEZ on 10/01/2022.      Final         1.  3 masses in the right cerebral hemisphere largest measuring 3.2 cm with extensive vasogenic edema as described above. Findings are likely due to metastases.      2.  Localized midline shift to the left side frontoparietal level measuring 3 mm.      3.  The 2 smaller masses in the right frontal lobe demonstrates some increased density which could indicate that hemorrhage may be present within these masses.      These findings were discussed with [place]jam[place] nurse for dr hernandez on 10/01/2022.              Assessment/Plan  * Brain metastases (HCC)  Assessment & Plan  Likely metastatic melanoma.  Continue Decadron and Keppra.  Plan for right frontal craniotomy with tumor resection on 10/6/2022.  Neurosurgery, Radiation oncology and medical oncology following    Malignant melanoma metastatic to brain (HCC)- (present on admission)  Assessment & Plan  Neurosurgery following.  Plan for right frontal craniotomy of tumor resection on 10/6/2022.    DM2 (diabetes mellitus, type 2) (HCC)- (present on admission)  Assessment & Plan  On steroids.  Continue SSI with Accu-Cheks and hypoglycemia protocol.  SSI scale increased due to elevated blood glucose readings.  Monitor closely    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)- (present on admission)  Assessment & Plan  Initially diagnosed in 2016, underwent immunotherapy and lymph node dissection.  Now with widespread metastatic disease.  Planned for right frontal craniotomy with tumor resection on 10/6/2022.  Neurosurgery following.    Depression- (present on admission)  Assessment & Plan  Stable.  Continue Xanax and Cymbalta.       VTE prophylaxis: SCDs/TEDs Lovenox on hold from  10/5/2022

## 2022-10-05 NOTE — PROGRESS NOTES
Neurosurgery Progress Note    Subjective:  No events overnight.  Reports that her left arm and leg feel continue to feel stronger on steroids. Answered a number of questions about surgery tomorrow.    Exam:  Sitting comfortably in a chair.  Awake, alert, oriented to person, place and time.  Pupils equally round and reactive to light, 4 to 3mm.  Extraocular movements full.  Facial sensation intact to light touch.  Face symmetric, HB 1.  Palate rises symmetrically.  Tongue is midline.  Shoulder shrug 5/5.  No pronator drift.  Sensation intact to light touch in all 4 extremities.  Left arm and leg are antigravity, now at least 4+ out of 5 resistance.  Left foot is weakest, but still much improved.    Recent Labs     10/03/22  0230 10/04/22  0630 10/05/22  0050   WBC 13.1* 12.0* 10.4   RBC 4.40 4.51 4.73   HEMOGLOBIN 11.2* 11.5* 11.8*   HEMATOCRIT 36.6* 37.5 37.4   MCV 83.2 83.1 79.1*   MCH 25.5* 25.5* 24.9*   MCHC 30.6* 30.7* 31.6*   RDW 50.3* 51.1* 47.1   PLATELETCT 419 449* 483*   MPV 9.8 10.2 10.0       Recent Labs     10/03/22  0230 10/04/22  0630 10/05/22  0050   SODIUM 137 139 137   POTASSIUM 5.0 4.2 4.3   CHLORIDE 106 105 100   CO2 19* 21 23   GLUCOSE 191* 175* 269*   BUN 20 14 18   CREATININE 0.56 0.50 0.53   CALCIUM 8.7 9.0 9.7                 Assessment and Plan:  Ms. Mcintyre is a 41-year-old woman who is presenting with newly diagnosed stage IV metastatic melanoma.  She has a dominant and symptomatic right frontal lobe metastasis, as well as several smaller satellite lesions in the right frontal lobe.  She also has at least 3 additional tiny supratentorial lesions.    - plan for right frontal craniotomy for tumor resection tomorrow, ICU thereafter  - NPO midnight  - hold dvt ppx tonight    Please call with questions.    Pebbles Tejada M.D.     A total of 35 minutes were spent in the evaluation, examination, and coordination of care of this patient.

## 2022-10-05 NOTE — CARE PLAN
The patient is Stable - Low risk of patient condition declining or worsening    Shift Goals  Clinical Goals: safety, Q4 neuro checks  Patient Goals: rest  Family Goals: NA    Progress made toward(s) clinical / shift goals:  would like to shower and mobilize, able to ambulate to the bathroom. Plan for shower this afternoon.     Patient is not progressing towards the following goals:    Problem: Knowledge Deficit - Standard  Goal: Patient and family/care givers will demonstrate understanding of plan of care, disease process/condition, diagnostic tests and medications  Outcome: Progressing     Problem: Pain - Standard  Goal: Alleviation of pain or a reduction in pain to the patient’s comfort goal  Outcome: Progressing     Problem: Fall Risk  Goal: Patient will remain free from falls  Outcome: Progressing     Problem: Psychosocial  Goal: Patient's level of anxiety will decrease  Outcome: Progressing

## 2022-10-05 NOTE — CARE PLAN
The patient is Watcher - Medium risk of patient condition declining or worsening    Shift Goals  Clinical Goals: safety, Q4 neuro checks  Patient Goals: rest  Family Goals: NA    Progress made toward(s) clinical / shift goals:      Problem: Knowledge Deficit - Standard  Goal: Patient and family/care givers will demonstrate understanding of plan of care, disease process/condition, diagnostic tests and medications  Outcome: Progressing  Note: A/Ox4, pt is able to understand plan of care. Q4 nuero checks in place. All questions answered at the moment.       Problem: Pain - Standard  Goal: Alleviation of pain or a reduction in pain to the patient’s comfort goal  Outcome: Progressing  Note: PRN pain medications given per MAR working effectively to promote comfort.        Problem: Fall Risk  Goal: Patient will remain free from falls  Outcome: Progressing  Note: Fall precautions in place, bed in lowest position, call light and belongings in reach.   Pt calls appropriately.         Patient is not progressing towards the following goals:

## 2022-10-05 NOTE — THERAPY
"Occupational Therapy  Daily Treatment     Patient Name: Jacque Mcintyre  Age:  41 y.o., Sex:  female  Medical Record #: [id number]  Today's Date: 10/5/2022     Precautions  Precautions: Fall Risk  Comments: L deficits    Assessment    Pt motivated to work on L UE function today. She already completed a shower and ADL routine earlier this AM with CNA. Participated in seated UE exercises. Provided handout to mom. Mom instructed in some gentle stretches she can do with patient. Per chart pt going for crani tomorrow. Will follow up post op as appropriate.    Plan    Continue current treatment plan.    DC Equipment Recommendations: Tub / Shower Seat, Bed Side Commode  Discharge Recommendations: Recommend post-acute placement for additional occupational therapy services prior to discharge home       Objective       10/05/22 1350   Cognition    Cognition / Consciousness X   Level of Consciousness Alert   Comments L inattention, feeling very relaxed throughout, needs cues to correct posture/sitting balance throughout   Sitting Upper Body Exercises   Sitting Upper Body Exercises Yes   Comments L scap mobs, assisted. LUE AROM in all planes. Edu to mom on how to assist patient with stretching/AROM. Gave demo of how to perform gentle towel slides w/ LUE if she is feeling up to it later.   Balance   Sitting Balance (Static) Poor +   Sitting Balance (Dynamic) Poor   Weight Shift Sitting Poor   Skilled Intervention Verbal Cuing;Tactile Cuing;Sequencing   Comments frequent LOB posteriorly, pt reports she is just \"very relaxed\" and not paying attention   Bed Mobility    Supine to Sit Minimal Assist   Sit to Supine Minimal Assist   Scooting Minimal Assist   Rolling Minimal Assist to Rt.;Minimum Assist to Lt.   Skilled Intervention Verbal Cuing;Tactile Cuing;Sequencing   Activities of Daily Living   Comments focus on LUE function today; pt already completed shower and ADL routine w/ CNA earlier   How much help from another person does " the patient currently need...   Putting on and taking off regular lower body clothing? 3   Bathing (including washing, rinsing, and drying)? 3   Toileting, which includes using a toilet, bedpan, or urinal? 3   Putting on and taking off regular upper body clothing? 3   Taking care of personal grooming such as brushing teeth? 3   Eating meals? 3   6 Clicks Daily Activity Score 18   Functional Mobility   Mobility EOB only   Patient / Family Goals   Patient / Family Goal #1 full recovery   Short Term Goals   Short Term Goal # 1 pt will demo functional transfers at SPV level   Goal Outcome # 1 Goal not met   Short Term Goal # 2 pt will complete >5min standing grooming ADLs at sink with SPV   Goal Outcome # 2 Goal not met   Short Term Goal # 3 pt will complete toileting ADL at SPV level   Goal Outcome # 3 Goal not met   Short Term Goal # 4 pt will participate in activities to improve LUE function   Goal Outcome # 4 Progressing as expected

## 2022-10-06 ENCOUNTER — ANESTHESIA (OUTPATIENT)
Dept: SURGERY | Facility: MEDICAL CENTER | Age: 42
DRG: 025 | End: 2022-10-06
Payer: COMMERCIAL

## 2022-10-06 ENCOUNTER — ANESTHESIA EVENT (OUTPATIENT)
Dept: SURGERY | Facility: MEDICAL CENTER | Age: 42
DRG: 025 | End: 2022-10-06
Payer: COMMERCIAL

## 2022-10-06 ENCOUNTER — APPOINTMENT (OUTPATIENT)
Dept: RADIOLOGY | Facility: MEDICAL CENTER | Age: 42
DRG: 025 | End: 2022-10-06
Attending: NEUROLOGICAL SURGERY
Payer: COMMERCIAL

## 2022-10-06 PROBLEM — G93.89 BRAIN MASS: Status: ACTIVE | Noted: 2022-10-06

## 2022-10-06 LAB
ABO + RH BLD: NORMAL
ABO GROUP BLD: NORMAL
ANION GAP SERPL CALC-SCNC: 12 MMOL/L (ref 7–16)
BASOPHILS # BLD AUTO: 0.1 % (ref 0–1.8)
BASOPHILS # BLD: 0.02 K/UL (ref 0–0.12)
BLD GP AB SCN SERPL QL: NORMAL
BUN SERPL-MCNC: 24 MG/DL (ref 8–22)
CALCIUM SERPL-MCNC: 9.3 MG/DL (ref 8.5–10.5)
CHLORIDE SERPL-SCNC: 98 MMOL/L (ref 96–112)
CO2 SERPL-SCNC: 25 MMOL/L (ref 20–33)
CREAT SERPL-MCNC: 0.46 MG/DL (ref 0.5–1.4)
EOSINOPHIL # BLD AUTO: 0.03 K/UL (ref 0–0.51)
EOSINOPHIL NFR BLD: 0.2 % (ref 0–6.9)
ERYTHROCYTE [DISTWIDTH] IN BLOOD BY AUTOMATED COUNT: 46.9 FL (ref 35.9–50)
GFR SERPLBLD CREATININE-BSD FMLA CKD-EPI: 121 ML/MIN/1.73 M 2
GLUCOSE BLD STRIP.AUTO-MCNC: 162 MG/DL (ref 65–99)
GLUCOSE BLD STRIP.AUTO-MCNC: 182 MG/DL (ref 65–99)
GLUCOSE BLD STRIP.AUTO-MCNC: 187 MG/DL (ref 65–99)
GLUCOSE BLD STRIP.AUTO-MCNC: 216 MG/DL (ref 65–99)
GLUCOSE BLD STRIP.AUTO-MCNC: 232 MG/DL (ref 65–99)
GLUCOSE SERPL-MCNC: 241 MG/DL (ref 65–99)
HCG SERPL QL: NEGATIVE
HCT VFR BLD AUTO: 39.2 % (ref 37–47)
HGB BLD-MCNC: 12.4 G/DL (ref 12–16)
IMM GRANULOCYTES # BLD AUTO: 0.07 K/UL (ref 0–0.11)
IMM GRANULOCYTES NFR BLD AUTO: 0.5 % (ref 0–0.9)
LYMPHOCYTES # BLD AUTO: 3.1 K/UL (ref 1–4.8)
LYMPHOCYTES NFR BLD: 22.9 % (ref 22–41)
MCH RBC QN AUTO: 25.4 PG (ref 27–33)
MCHC RBC AUTO-ENTMCNC: 31.6 G/DL (ref 33.6–35)
MCV RBC AUTO: 80.2 FL (ref 81.4–97.8)
MONOCYTES # BLD AUTO: 1.31 K/UL (ref 0–0.85)
MONOCYTES NFR BLD AUTO: 9.7 % (ref 0–13.4)
NEUTROPHILS # BLD AUTO: 9.02 K/UL (ref 2–7.15)
NEUTROPHILS NFR BLD: 66.6 % (ref 44–72)
NRBC # BLD AUTO: 0 K/UL
NRBC BLD-RTO: 0 /100 WBC
PLATELET # BLD AUTO: 502 K/UL (ref 164–446)
PMV BLD AUTO: 9.8 FL (ref 9–12.9)
POTASSIUM SERPL-SCNC: 3.7 MMOL/L (ref 3.6–5.5)
RBC # BLD AUTO: 4.89 M/UL (ref 4.2–5.4)
RH BLD: NORMAL
SODIUM SERPL-SCNC: 135 MMOL/L (ref 135–145)
WBC # BLD AUTO: 13.6 K/UL (ref 4.8–10.8)

## 2022-10-06 PROCEDURE — 81210 BRAF GENE: CPT

## 2022-10-06 PROCEDURE — 80048 BASIC METABOLIC PNL TOTAL CA: CPT

## 2022-10-06 PROCEDURE — 160035 HCHG PACU - 1ST 60 MINS PHASE I: Performed by: NEUROLOGICAL SURGERY

## 2022-10-06 PROCEDURE — 95861 NEEDLE EMG 2 EXTREMITIES: CPT | Performed by: NEUROLOGICAL SURGERY

## 2022-10-06 PROCEDURE — 00B70ZZ EXCISION OF CEREBRAL HEMISPHERE, OPEN APPROACH: ICD-10-PCS | Performed by: NEUROLOGICAL SURGERY

## 2022-10-06 PROCEDURE — 160002 HCHG RECOVERY MINUTES (STAT): Performed by: NEUROLOGICAL SURGERY

## 2022-10-06 PROCEDURE — C1713 ANCHOR/SCREW BN/BN,TIS/BN: HCPCS | Performed by: NEUROLOGICAL SURGERY

## 2022-10-06 PROCEDURE — 110371 HCHG SHELL REV 272: Performed by: NEUROLOGICAL SURGERY

## 2022-10-06 PROCEDURE — 88342 IMHCHEM/IMCYTCHM 1ST ANTB: CPT

## 2022-10-06 PROCEDURE — 700102 HCHG RX REV CODE 250 W/ 637 OVERRIDE(OP): Performed by: ANESTHESIOLOGY

## 2022-10-06 PROCEDURE — 700111 HCHG RX REV CODE 636 W/ 250 OVERRIDE (IP): Performed by: ANESTHESIOLOGY

## 2022-10-06 PROCEDURE — 95937 NEUROMUSCULAR JUNCTION TEST: CPT | Performed by: NEUROLOGICAL SURGERY

## 2022-10-06 PROCEDURE — 770022 HCHG ROOM/CARE - ICU (200)

## 2022-10-06 PROCEDURE — 700101 HCHG RX REV CODE 250: Performed by: ANESTHESIOLOGY

## 2022-10-06 PROCEDURE — 160036 HCHG PACU - EA ADDL 30 MINS PHASE I: Performed by: NEUROLOGICAL SURGERY

## 2022-10-06 PROCEDURE — 95940 IONM IN OPERATNG ROOM 15 MIN: CPT | Performed by: NEUROLOGICAL SURGERY

## 2022-10-06 PROCEDURE — 85025 COMPLETE CBC W/AUTO DIFF WBC: CPT

## 2022-10-06 PROCEDURE — 160042 HCHG SURGERY MINUTES - EA ADDL 1 MIN LEVEL 5: Performed by: NEUROLOGICAL SURGERY

## 2022-10-06 PROCEDURE — 99232 SBSQ HOSP IP/OBS MODERATE 35: CPT | Performed by: INTERNAL MEDICINE

## 2022-10-06 PROCEDURE — 700102 HCHG RX REV CODE 250 W/ 637 OVERRIDE(OP)

## 2022-10-06 PROCEDURE — 00210 ANES INTRACRANIAL PX NOS: CPT | Performed by: ANESTHESIOLOGY

## 2022-10-06 PROCEDURE — 160009 HCHG ANES TIME/MIN: Performed by: NEUROLOGICAL SURGERY

## 2022-10-06 PROCEDURE — 88341 IMHCHEM/IMCYTCHM EA ADD ANTB: CPT | Mod: 91

## 2022-10-06 PROCEDURE — 160048 HCHG OR STATISTICAL LEVEL 1-5: Performed by: NEUROLOGICAL SURGERY

## 2022-10-06 PROCEDURE — 700102 HCHG RX REV CODE 250 W/ 637 OVERRIDE(OP): Performed by: INTERNAL MEDICINE

## 2022-10-06 PROCEDURE — 88331 PATH CONSLTJ SURG 1 BLK 1SPC: CPT

## 2022-10-06 PROCEDURE — 86901 BLOOD TYPING SEROLOGIC RH(D): CPT

## 2022-10-06 PROCEDURE — 84703 CHORIONIC GONADOTROPIN ASSAY: CPT

## 2022-10-06 PROCEDURE — 86900 BLOOD TYPING SEROLOGIC ABO: CPT

## 2022-10-06 PROCEDURE — 700111 HCHG RX REV CODE 636 W/ 250 OVERRIDE (IP): Performed by: HOSPITALIST

## 2022-10-06 PROCEDURE — 82962 GLUCOSE BLOOD TEST: CPT

## 2022-10-06 PROCEDURE — A9270 NON-COVERED ITEM OR SERVICE: HCPCS | Performed by: ANESTHESIOLOGY

## 2022-10-06 PROCEDURE — 88307 TISSUE EXAM BY PATHOLOGIST: CPT | Mod: 59

## 2022-10-06 PROCEDURE — 95938 SOMATOSENSORY TESTING: CPT | Performed by: NEUROLOGICAL SURGERY

## 2022-10-06 PROCEDURE — 160031 HCHG SURGERY MINUTES - 1ST 30 MINS LEVEL 5: Performed by: NEUROLOGICAL SURGERY

## 2022-10-06 PROCEDURE — 95939 C MOTOR EVOKED UPR&LWR LIMBS: CPT | Performed by: NEUROLOGICAL SURGERY

## 2022-10-06 PROCEDURE — 700111 HCHG RX REV CODE 636 W/ 250 OVERRIDE (IP): Performed by: NEUROLOGICAL SURGERY

## 2022-10-06 PROCEDURE — 36620 INSERTION CATHETER ARTERY: CPT | Mod: RT | Performed by: ANESTHESIOLOGY

## 2022-10-06 PROCEDURE — 99291 CRITICAL CARE FIRST HOUR: CPT | Performed by: STUDENT IN AN ORGANIZED HEALTH CARE EDUCATION/TRAINING PROGRAM

## 2022-10-06 PROCEDURE — 700111 HCHG RX REV CODE 636 W/ 250 OVERRIDE (IP): Performed by: STUDENT IN AN ORGANIZED HEALTH CARE EDUCATION/TRAINING PROGRAM

## 2022-10-06 PROCEDURE — 700111 HCHG RX REV CODE 636 W/ 250 OVERRIDE (IP): Performed by: NURSE PRACTITIONER

## 2022-10-06 PROCEDURE — 86850 RBC ANTIBODY SCREEN: CPT

## 2022-10-06 PROCEDURE — 70450 CT HEAD/BRAIN W/O DYE: CPT

## 2022-10-06 PROCEDURE — 4A10X4G MONITORING OF CENTRAL NERVOUS ELECTRICAL ACTIVITY, INTRAOPERATIVE, EXTERNAL APPROACH: ICD-10-PCS | Performed by: NEUROLOGICAL SURGERY

## 2022-10-06 PROCEDURE — 700101 HCHG RX REV CODE 250: Performed by: NEUROLOGICAL SURGERY

## 2022-10-06 PROCEDURE — 700105 HCHG RX REV CODE 258: Performed by: NEUROLOGICAL SURGERY

## 2022-10-06 PROCEDURE — 700105 HCHG RX REV CODE 258: Performed by: ANESTHESIOLOGY

## 2022-10-06 PROCEDURE — 93005 ELECTROCARDIOGRAM TRACING: CPT | Performed by: STUDENT IN AN ORGANIZED HEALTH CARE EDUCATION/TRAINING PROGRAM

## 2022-10-06 DEVICE — PLATE NC BURR HOLE COVER 10MM (6NCX6=36): Type: IMPLANTABLE DEVICE | Site: CRANIAL | Status: FUNCTIONAL

## 2022-10-06 DEVICE — GRAFT DURAMATRIX ONLAY PLUS 3IN X 3IN OR 7.5CM X 7.5CM: Type: IMPLANTABLE DEVICE | Site: CRANIAL | Status: FUNCTIONAL

## 2022-10-06 DEVICE — PLATE NC BURR HOLE COVER FOR SHUNT 14MM (6NCX6=36): Type: IMPLANTABLE DEVICE | Site: CRANIAL | Status: FUNCTIONAL

## 2022-10-06 DEVICE — SCREW STRYK NC 1.5X5MM (6NCX40=240) (80EA/PK) CONSIGNED QTY 240 PRE-LOAD: Type: IMPLANTABLE DEVICE | Site: CRANIAL | Status: FUNCTIONAL

## 2022-10-06 RX ORDER — ACETAMINOPHEN 650 MG/1
650 SUPPOSITORY RECTAL EVERY 6 HOURS PRN
Status: DISCONTINUED | OUTPATIENT
Start: 2022-10-06 | End: 2022-10-15 | Stop reason: HOSPADM

## 2022-10-06 RX ORDER — LEVETIRACETAM 500 MG/5ML
1000 INJECTION, SOLUTION, CONCENTRATE INTRAVENOUS EVERY 12 HOURS
Status: DISCONTINUED | OUTPATIENT
Start: 2022-10-06 | End: 2022-10-07

## 2022-10-06 RX ORDER — LIDOCAINE HYDROCHLORIDE 20 MG/ML
INJECTION, SOLUTION EPIDURAL; INFILTRATION; INTRACAUDAL; PERINEURAL PRN
Status: DISCONTINUED | OUTPATIENT
Start: 2022-10-06 | End: 2022-10-06 | Stop reason: SURG

## 2022-10-06 RX ORDER — METOCLOPRAMIDE HYDROCHLORIDE 5 MG/ML
10 INJECTION INTRAMUSCULAR; INTRAVENOUS EVERY 6 HOURS PRN
Status: DISCONTINUED | OUTPATIENT
Start: 2022-10-06 | End: 2022-10-13

## 2022-10-06 RX ORDER — REMIFENTANIL HYDROCHLORIDE 1 MG/ML
INJECTION, POWDER, LYOPHILIZED, FOR SOLUTION INTRAVENOUS
Status: DISCONTINUED | OUTPATIENT
Start: 2022-10-06 | End: 2022-10-06 | Stop reason: SURG

## 2022-10-06 RX ORDER — HYDROMORPHONE HYDROCHLORIDE 1 MG/ML
0.2 INJECTION, SOLUTION INTRAMUSCULAR; INTRAVENOUS; SUBCUTANEOUS
Status: DISCONTINUED | OUTPATIENT
Start: 2022-10-06 | End: 2022-10-06 | Stop reason: HOSPADM

## 2022-10-06 RX ORDER — HYDROMORPHONE HYDROCHLORIDE 1 MG/ML
1 INJECTION, SOLUTION INTRAMUSCULAR; INTRAVENOUS; SUBCUTANEOUS ONCE
Status: COMPLETED | OUTPATIENT
Start: 2022-10-06 | End: 2022-10-06

## 2022-10-06 RX ORDER — LABETALOL HYDROCHLORIDE 5 MG/ML
5 INJECTION, SOLUTION INTRAVENOUS
Status: DISCONTINUED | OUTPATIENT
Start: 2022-10-06 | End: 2022-10-06 | Stop reason: HOSPADM

## 2022-10-06 RX ORDER — HYDROMORPHONE HYDROCHLORIDE 1 MG/ML
INJECTION, SOLUTION INTRAMUSCULAR; INTRAVENOUS; SUBCUTANEOUS
Status: ACTIVE
Start: 2022-10-06 | End: 2022-10-07

## 2022-10-06 RX ORDER — OXYCODONE HYDROCHLORIDE 5 MG/1
5 TABLET ORAL EVERY 4 HOURS PRN
Status: DISCONTINUED | OUTPATIENT
Start: 2022-10-06 | End: 2022-10-06

## 2022-10-06 RX ORDER — DEXAMETHASONE SODIUM PHOSPHATE 4 MG/ML
INJECTION, SOLUTION INTRA-ARTICULAR; INTRALESIONAL; INTRAMUSCULAR; INTRAVENOUS; SOFT TISSUE PRN
Status: DISCONTINUED | OUTPATIENT
Start: 2022-10-06 | End: 2022-10-06 | Stop reason: SURG

## 2022-10-06 RX ORDER — PROCHLORPERAZINE EDISYLATE 5 MG/ML
10 INJECTION INTRAMUSCULAR; INTRAVENOUS EVERY 6 HOURS PRN
Status: DISCONTINUED | OUTPATIENT
Start: 2022-10-06 | End: 2022-10-15 | Stop reason: HOSPADM

## 2022-10-06 RX ORDER — CEFAZOLIN SODIUM 1 G/3ML
INJECTION, POWDER, FOR SOLUTION INTRAMUSCULAR; INTRAVENOUS PRN
Status: DISCONTINUED | OUTPATIENT
Start: 2022-10-06 | End: 2022-10-06 | Stop reason: SURG

## 2022-10-06 RX ORDER — SODIUM CHLORIDE, SODIUM LACTATE, POTASSIUM CHLORIDE, CALCIUM CHLORIDE 600; 310; 30; 20 MG/100ML; MG/100ML; MG/100ML; MG/100ML
INJECTION, SOLUTION INTRAVENOUS
Status: DISCONTINUED | OUTPATIENT
Start: 2022-10-06 | End: 2022-10-06 | Stop reason: SURG

## 2022-10-06 RX ORDER — HYDRALAZINE HYDROCHLORIDE 20 MG/ML
5 INJECTION INTRAMUSCULAR; INTRAVENOUS
Status: DISCONTINUED | OUTPATIENT
Start: 2022-10-06 | End: 2022-10-06 | Stop reason: HOSPADM

## 2022-10-06 RX ORDER — HALOPERIDOL 5 MG/ML
1 INJECTION INTRAMUSCULAR
Status: DISCONTINUED | OUTPATIENT
Start: 2022-10-06 | End: 2022-10-06 | Stop reason: HOSPADM

## 2022-10-06 RX ORDER — MIDAZOLAM HYDROCHLORIDE 1 MG/ML
1 INJECTION INTRAMUSCULAR; INTRAVENOUS
Status: DISCONTINUED | OUTPATIENT
Start: 2022-10-06 | End: 2022-10-06 | Stop reason: HOSPADM

## 2022-10-06 RX ORDER — OXYCODONE HCL 5 MG/5 ML
5 SOLUTION, ORAL ORAL
Status: COMPLETED | OUTPATIENT
Start: 2022-10-06 | End: 2022-10-06

## 2022-10-06 RX ORDER — MIDAZOLAM HYDROCHLORIDE 1 MG/ML
INJECTION INTRAMUSCULAR; INTRAVENOUS PRN
Status: DISCONTINUED | OUTPATIENT
Start: 2022-10-06 | End: 2022-10-06 | Stop reason: SURG

## 2022-10-06 RX ORDER — HYDROMORPHONE HYDROCHLORIDE 1 MG/ML
0.1 INJECTION, SOLUTION INTRAMUSCULAR; INTRAVENOUS; SUBCUTANEOUS
Status: DISCONTINUED | OUTPATIENT
Start: 2022-10-06 | End: 2022-10-06 | Stop reason: HOSPADM

## 2022-10-06 RX ORDER — ROCURONIUM BROMIDE 10 MG/ML
INJECTION, SOLUTION INTRAVENOUS PRN
Status: DISCONTINUED | OUTPATIENT
Start: 2022-10-06 | End: 2022-10-06 | Stop reason: SURG

## 2022-10-06 RX ORDER — ONDANSETRON 2 MG/ML
INJECTION INTRAMUSCULAR; INTRAVENOUS PRN
Status: DISCONTINUED | OUTPATIENT
Start: 2022-10-06 | End: 2022-10-06 | Stop reason: SURG

## 2022-10-06 RX ORDER — LEVETIRACETAM 500 MG/5ML
INJECTION, SOLUTION, CONCENTRATE INTRAVENOUS PRN
Status: DISCONTINUED | OUTPATIENT
Start: 2022-10-06 | End: 2022-10-06 | Stop reason: SURG

## 2022-10-06 RX ORDER — HYDROMORPHONE HYDROCHLORIDE 1 MG/ML
0.4 INJECTION, SOLUTION INTRAMUSCULAR; INTRAVENOUS; SUBCUTANEOUS
Status: DISCONTINUED | OUTPATIENT
Start: 2022-10-06 | End: 2022-10-06 | Stop reason: HOSPADM

## 2022-10-06 RX ORDER — DIPHENHYDRAMINE HYDROCHLORIDE 50 MG/ML
12.5 INJECTION INTRAMUSCULAR; INTRAVENOUS
Status: DISCONTINUED | OUTPATIENT
Start: 2022-10-06 | End: 2022-10-06 | Stop reason: HOSPADM

## 2022-10-06 RX ORDER — ALBUTEROL SULFATE 2.5 MG/3ML
2.5 SOLUTION RESPIRATORY (INHALATION)
Status: DISCONTINUED | OUTPATIENT
Start: 2022-10-06 | End: 2022-10-06 | Stop reason: HOSPADM

## 2022-10-06 RX ORDER — BUPIVACAINE HYDROCHLORIDE AND EPINEPHRINE 5; 5 MG/ML; UG/ML
INJECTION, SOLUTION EPIDURAL; INTRACAUDAL; PERINEURAL
Status: DISCONTINUED | OUTPATIENT
Start: 2022-10-06 | End: 2022-10-06 | Stop reason: HOSPADM

## 2022-10-06 RX ORDER — DEXAMETHASONE SODIUM PHOSPHATE 4 MG/ML
4 INJECTION, SOLUTION INTRA-ARTICULAR; INTRALESIONAL; INTRAMUSCULAR; INTRAVENOUS; SOFT TISSUE EVERY 6 HOURS
Status: DISCONTINUED | OUTPATIENT
Start: 2022-10-06 | End: 2022-10-09

## 2022-10-06 RX ORDER — OXYCODONE HCL 5 MG/5 ML
10 SOLUTION, ORAL ORAL
Status: COMPLETED | OUTPATIENT
Start: 2022-10-06 | End: 2022-10-06

## 2022-10-06 RX ORDER — HYDROMORPHONE HYDROCHLORIDE 1 MG/ML
0.5 INJECTION, SOLUTION INTRAMUSCULAR; INTRAVENOUS; SUBCUTANEOUS
Status: DISCONTINUED | OUTPATIENT
Start: 2022-10-06 | End: 2022-10-06

## 2022-10-06 RX ORDER — CEFAZOLIN SODIUM 1 G/3ML
INJECTION, POWDER, FOR SOLUTION INTRAMUSCULAR; INTRAVENOUS
Status: DISCONTINUED | OUTPATIENT
Start: 2022-10-06 | End: 2022-10-06 | Stop reason: HOSPADM

## 2022-10-06 RX ORDER — ONDANSETRON 2 MG/ML
4 INJECTION INTRAMUSCULAR; INTRAVENOUS
Status: COMPLETED | OUTPATIENT
Start: 2022-10-06 | End: 2022-10-06

## 2022-10-06 RX ORDER — SODIUM CHLORIDE, SODIUM LACTATE, POTASSIUM CHLORIDE, CALCIUM CHLORIDE 600; 310; 30; 20 MG/100ML; MG/100ML; MG/100ML; MG/100ML
INJECTION, SOLUTION INTRAVENOUS CONTINUOUS
Status: DISCONTINUED | OUTPATIENT
Start: 2022-10-06 | End: 2022-10-06 | Stop reason: HOSPADM

## 2022-10-06 RX ORDER — HYDROMORPHONE HYDROCHLORIDE 1 MG/ML
.5-1 INJECTION, SOLUTION INTRAMUSCULAR; INTRAVENOUS; SUBCUTANEOUS
Status: DISCONTINUED | OUTPATIENT
Start: 2022-10-06 | End: 2022-10-12

## 2022-10-06 RX ADMIN — PROCHLORPERAZINE EDISYLATE 10 MG: 5 INJECTION INTRAMUSCULAR; INTRAVENOUS at 21:18

## 2022-10-06 RX ADMIN — INSULIN LISPRO 2 UNITS: 100 INJECTION, SOLUTION INTRAVENOUS; SUBCUTANEOUS at 22:18

## 2022-10-06 RX ADMIN — FENTANYL CITRATE 100 MCG: 50 INJECTION, SOLUTION INTRAMUSCULAR; INTRAVENOUS at 11:30

## 2022-10-06 RX ADMIN — HYDROMORPHONE HYDROCHLORIDE 1 MG: 1 INJECTION, SOLUTION INTRAMUSCULAR; INTRAVENOUS; SUBCUTANEOUS at 17:21

## 2022-10-06 RX ADMIN — LIDOCAINE HYDROCHLORIDE 50 MG: 20 INJECTION, SOLUTION EPIDURAL; INFILTRATION; INTRACAUDAL at 07:38

## 2022-10-06 RX ADMIN — SUGAMMADEX 200 MG: 100 INJECTION, SOLUTION INTRAVENOUS at 08:00

## 2022-10-06 RX ADMIN — FENTANYL CITRATE 25 MCG: 50 INJECTION, SOLUTION INTRAMUSCULAR; INTRAVENOUS at 13:46

## 2022-10-06 RX ADMIN — ONDANSETRON HYDROCHLORIDE 4 MG: 2 SOLUTION INTRAMUSCULAR; INTRAVENOUS at 15:00

## 2022-10-06 RX ADMIN — PROPOFOL 150 MCG/KG/MIN: 10 INJECTION, EMULSION INTRAVENOUS at 07:39

## 2022-10-06 RX ADMIN — SODIUM CHLORIDE, POTASSIUM CHLORIDE, SODIUM LACTATE AND CALCIUM CHLORIDE: 600; 310; 30; 20 INJECTION, SOLUTION INTRAVENOUS at 07:31

## 2022-10-06 RX ADMIN — INSULIN HUMAN 4 UNITS: 100 INJECTION, SOLUTION PARENTERAL at 10:45

## 2022-10-06 RX ADMIN — OXYCODONE HYDROCHLORIDE 10 MG: 5 SOLUTION ORAL at 13:42

## 2022-10-06 RX ADMIN — REMIFENTANIL HYDROCHLORIDE 0.25 MCG/KG/MIN: 1 INJECTION, POWDER, LYOPHILIZED, FOR SOLUTION INTRAVENOUS at 07:39

## 2022-10-06 RX ADMIN — ONDANSETRON 4 MG: 2 INJECTION INTRAMUSCULAR; INTRAVENOUS at 12:06

## 2022-10-06 RX ADMIN — DEXAMETHASONE SODIUM PHOSPHATE 4 MG: 4 INJECTION, SOLUTION INTRA-ARTICULAR; INTRALESIONAL; INTRAMUSCULAR; INTRAVENOUS; SOFT TISSUE at 17:33

## 2022-10-06 RX ADMIN — PROPOFOL 200 MG: 10 INJECTION, EMULSION INTRAVENOUS at 07:38

## 2022-10-06 RX ADMIN — MIDAZOLAM HYDROCHLORIDE 2 MG: 1 INJECTION, SOLUTION INTRAMUSCULAR; INTRAVENOUS at 07:31

## 2022-10-06 RX ADMIN — ROCURONIUM BROMIDE 50 MG: 10 INJECTION, SOLUTION INTRAVENOUS at 07:38

## 2022-10-06 RX ADMIN — FENTANYL CITRATE 25 MCG: 50 INJECTION, SOLUTION INTRAMUSCULAR; INTRAVENOUS at 13:20

## 2022-10-06 RX ADMIN — HYDROMORPHONE HYDROCHLORIDE 1 MG: 1 INJECTION, SOLUTION INTRAMUSCULAR; INTRAVENOUS; SUBCUTANEOUS at 15:00

## 2022-10-06 RX ADMIN — FENTANYL CITRATE 50 MCG: 50 INJECTION, SOLUTION INTRAMUSCULAR; INTRAVENOUS at 08:12

## 2022-10-06 RX ADMIN — HYDROMORPHONE HYDROCHLORIDE 1 MG: 1 INJECTION, SOLUTION INTRAMUSCULAR; INTRAVENOUS; SUBCUTANEOUS at 19:22

## 2022-10-06 RX ADMIN — NYSTATIN: 100000 POWDER TOPICAL at 04:26

## 2022-10-06 RX ADMIN — METOCLOPRAMIDE 10 MG: 5 INJECTION, SOLUTION INTRAMUSCULAR; INTRAVENOUS at 15:47

## 2022-10-06 RX ADMIN — ONDANSETRON 4 MG: 2 INJECTION INTRAMUSCULAR; INTRAVENOUS at 12:50

## 2022-10-06 RX ADMIN — FENTANYL CITRATE 50 MCG: 50 INJECTION, SOLUTION INTRAMUSCULAR; INTRAVENOUS at 11:50

## 2022-10-06 RX ADMIN — CEFAZOLIN 2 G: 2 INJECTION, POWDER, FOR SOLUTION INTRAMUSCULAR; INTRAVENOUS at 22:19

## 2022-10-06 RX ADMIN — PROPOFOL 50 MG: 10 INJECTION, EMULSION INTRAVENOUS at 07:43

## 2022-10-06 RX ADMIN — DEXAMETHASONE SODIUM PHOSPHATE 4 MG: 4 INJECTION, SOLUTION INTRA-ARTICULAR; INTRALESIONAL; INTRAMUSCULAR; INTRAVENOUS; SOFT TISSUE at 15:10

## 2022-10-06 RX ADMIN — FENTANYL CITRATE 50 MCG: 50 INJECTION, SOLUTION INTRAMUSCULAR; INTRAVENOUS at 12:06

## 2022-10-06 RX ADMIN — LEVETIRACETAM 1000 MG: 100 INJECTION, SOLUTION INTRAVENOUS at 08:10

## 2022-10-06 RX ADMIN — CEFAZOLIN 2 G: 2 INJECTION, POWDER, FOR SOLUTION INTRAMUSCULAR; INTRAVENOUS at 15:30

## 2022-10-06 RX ADMIN — DEXAMETHASONE SODIUM PHOSPHATE 10 MG: 4 INJECTION, SOLUTION INTRA-ARTICULAR; INTRALESIONAL; INTRAMUSCULAR; INTRAVENOUS; SOFT TISSUE at 08:00

## 2022-10-06 RX ADMIN — LEVETIRACETAM 1000 MG: 100 INJECTION, SOLUTION INTRAVENOUS at 17:32

## 2022-10-06 RX ADMIN — FENTANYL CITRATE 25 MCG: 50 INJECTION, SOLUTION INTRAMUSCULAR; INTRAVENOUS at 12:52

## 2022-10-06 RX ADMIN — FENTANYL CITRATE 25 MCG: 50 INJECTION, SOLUTION INTRAMUSCULAR; INTRAVENOUS at 13:07

## 2022-10-06 RX ADMIN — INSULIN LISPRO 2 UNITS: 100 INJECTION, SOLUTION INTRAVENOUS; SUBCUTANEOUS at 17:31

## 2022-10-06 RX ADMIN — CEFAZOLIN 2 G: 330 INJECTION, POWDER, FOR SOLUTION INTRAMUSCULAR; INTRAVENOUS at 08:00

## 2022-10-06 ASSESSMENT — PAIN DESCRIPTION - PAIN TYPE
TYPE: ACUTE PAIN
TYPE: SURGICAL PAIN
TYPE: ACUTE PAIN
TYPE: ACUTE PAIN

## 2022-10-06 ASSESSMENT — ENCOUNTER SYMPTOMS
NAUSEA: 1
FOCAL WEAKNESS: 1
SHORTNESS OF BREATH: 0
HEADACHES: 1

## 2022-10-06 ASSESSMENT — PAIN SCALES - GENERAL: PAIN_LEVEL: 5

## 2022-10-06 NOTE — ANESTHESIA PREPROCEDURE EVALUATION
Case: [id number] Date/Time: 10/06/22 0715    Procedure: RIGHT FRONTAL CRANIOTOMY WITH STEALTH    Location: [facility] OR 04 / SURGERY [facility]    Surgeons: Pebbles Tejada M.D.          Relevant Problems   ANESTHESIA (within normal limits)      PULMONARY   (positive) Asthma      NEURO (within normal limits)      CARDIAC (within normal limits)      GI (within normal limits)       (within normal limits)      ENDO   (positive) DM2 (diabetes mellitus, type 2) (HCC)      Other   (positive) Brain metastases (HCC)   (positive) Depression   (positive) Malignant melanoma metastatic to brain (HCC)   (positive) 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)   (positive) Nausea & vomiting       Physical Exam    Airway   Mallampati: II  TM distance: >3 FB  Neck ROM: full       Cardiovascular - normal exam  Rhythm: regular  Rate: normal  (-) murmur     Dental - normal exam           Pulmonary - normal exam  Breath sounds clear to auscultation     Abdominal    Neurological - normal exam                 Anesthesia Plan    ASA 3   ASA physical status 3 criteria: diabetes - poorly controlled    Plan - general       Airway plan will be ETT          Induction: intravenous    Postoperative Plan: Postoperative administration of opioids is intended.    Pertinent diagnostic labs and testing reviewed    Informed Consent:    Anesthetic plan and risks discussed with patient.    Use of blood products discussed with: patient whom consented to blood products.

## 2022-10-06 NOTE — ANESTHESIA POSTPROCEDURE EVALUATION
Patient: Jacque Mcintyre    Procedure Summary     Date: 10/06/22 Room / Location: [facility] 05 / SURGERY [facility]    Anesthesia Start: 0731 Anesthesia Stop: 1226    Procedure: RIGHT FRONTAL CRANIOTOMY WITH STEALTH (Right: Head) Diagnosis: (stage IV metastatic melanoma)    Surgeons: Pebbles Tejada M.D. Responsible Provider: Jaspreet Bush M.D.    Anesthesia Type: general ASA Status: 3          Final Anesthesia Type: general  Last vitals  BP   Blood Pressure: 126/62, Arterial BP: 125/72    Temp   37.2 °C (98.9 °F)    Pulse   65   Resp   15    SpO2   94 %      Anesthesia Post Evaluation    Patient location during evaluation: PACU  Patient participation: complete - patient participated  Level of consciousness: awake and alert and sleepy but conscious  Pain score: 5    Airway patency: patent  Anesthetic complications: no  Cardiovascular status: hemodynamically stable  Respiratory status: acceptable  Hydration status: euvolemic    PONV: none          No notable events documented.

## 2022-10-06 NOTE — PROGRESS NOTES
Neurosurgery Progress Note    Subjective:  No events overnight. Ready for surgery.    Exam:  Sitting comfortably in a chair.  Awake, alert, oriented to person, place and time.  Pupils equally round and reactive to light, 4 to 3mm.  Extraocular movements full.  Facial sensation intact to light touch.  Face symmetric, HB 1.  Palate rises symmetrically.  Tongue is midline.  Shoulder shrug 5/5.  No pronator drift.  Sensation intact to light touch in all 4 extremities.  Left arm and leg are antigravity, now at least 4 out of 5 resistance.  Left foot is weakest, but still much improved.    Recent Labs     10/04/22  0630 10/05/22  0050 10/06/22  0130   WBC 12.0* 10.4 13.6*   RBC 4.51 4.73 4.89   HEMOGLOBIN 11.5* 11.8* 12.4   HEMATOCRIT 37.5 37.4 39.2   MCV 83.1 79.1* 80.2*   MCH 25.5* 24.9* 25.4*   MCHC 30.7* 31.6* 31.6*   RDW 51.1* 47.1 46.9   PLATELETCT 449* 483* 502*   MPV 10.2 10.0 9.8       Recent Labs     10/04/22  0630 10/05/22  0050 10/06/22  0130   SODIUM 139 137 135   POTASSIUM 4.2 4.3 3.7   CHLORIDE 105 100 98   CO2 21 23 25   GLUCOSE 175* 269* 241*   BUN 14 18 24*   CREATININE 0.50 0.53 0.46*   CALCIUM 9.0 9.7 9.3       Recent Labs     10/05/22  1544   APTT 26.8   INR 1.02           Assessment and Plan:  Ms. Mcintyre is a 41-year-old woman who is presenting with newly diagnosed stage IV metastatic melanoma.  She has a dominant and symptomatic right frontal lobe metastasis, as well as several smaller satellite lesions in the right frontal lobe.  She also has at least 3 additional tiny supratentorial lesions.    - plan for right frontal craniotomy for tumor resection today, ICU thereafter    Please call with questions.    Pebbles Tejada M.D.     A total of 35 minutes were spent in the evaluation, examination, and coordination of care of this patient.

## 2022-10-06 NOTE — ASSESSMENT & PLAN NOTE
Likely from metastatic melanoma with 3 masses in the right cerebral hemisphere with extensive edema-now status post craniotomy with tumor resection.  She also has smaller satellite lesions.  Postop with left-sided weakness -repeat CT head shows some postop changes but otherwise no significant mass-effect, large territorial infarction or new mass.    -MRI  -Neurochecks  -Pain control with p.o. oxycodone and IV Dilaudid  -Stat CT head for any acute changes  -Decadron 4 mg every 6 hours  -Keppra 1 g twice daily  -Appreciate neurosurgery recs  -Appreciate rad onc recs

## 2022-10-06 NOTE — OR NURSING
Patient recovered well post op. A&Ox4. VSS, 6L oxymask. Surgical sites CDI. Surgical pain managed. Blood sugar complete upon arrival to PACU, insulin administered per orders. Neuro checked completed as ordered. Patient able to drink fluids without Nausea and vomiting. PT belongings on her bed. Patient taken to CT scan prior to getting her to RICU 116 Spouse updated and discussed plan of care. Report called to Mariann RODRIGUEZ.

## 2022-10-06 NOTE — CARE PLAN
The patient is Watcher - Medium risk of patient condition declining or worsening    Shift Goals  Clinical Goals: safety, Q4 nuero checks, NPO 0000  Patient Goals: rest  Family Goals: NA    Progress made toward(s) clinical / shift goals:      Problem: Knowledge Deficit - Standard  Goal: Patient and family/care givers will demonstrate understanding of plan of care, disease process/condition, diagnostic tests and medications  Outcome: Progressing  Note: A/Ox4, pt is able to understand plan of care.  Q4 neuro checks.  Pt will be NPO at midnight for procedure tomorrow.  All questions answered at the moment.       Problem: Pain - Standard  Goal: Alleviation of pain or a reduction in pain to the patient’s comfort goal  Outcome: Progressing  Note: PRN pain medications given per MAR working effectively to promote comfort.        Problem: Fall Risk  Goal: Patient will remain free from falls  Outcome: Progressing  Note: Fall precautions in place, bed in lowest position, bed alarm on, call light and belongings in reach.   Pt calls appropriately.       Problem: Psychosocial  Goal: Patient's level of anxiety will decrease  Outcome: Progressing     Problem: Mobility  Goal: Patient's capacity to carry out activities will improve  Outcome: Progressing       Patient is not progressing towards the following goals:

## 2022-10-06 NOTE — CONSULTS
Critical Care Consultation    Date of consult: 10/6/2022    Referring Physician  Elizabeth Aguilera M.D.    Reason for Consultation  Brain met    History of Presenting Illness  41 y.o. female with history of DM2, depression, melanoma in 2016 who underwent immunotherapy for 3 years who presented 10/1/2022 with with left-sided weakness.  CT head showed 3 masses in the right cerebral hemisphere with extensive vasogenic edema, localized midline shift to the left, as well as 2 small masses in the right frontal lobe with increased density.  CT chest abdomen showed bilateral pulmonary nodules (12 mm at largest), enlarged hilar and external iliac Lns, LUQ SQ soft tissue nodules, RLQ peritoneal nodule, 33 mm left ovarian cyst, thyroid nodules (23 mm at largest).  She was started on Decadron and Keppra.  She underwent right frontal craniotomy for tumor resection on 10/6.   Postoperatively, she was noted to have weakness in her left side-2/5 in LUE and 0/5 in LLE.  She had a repeat CT head postop which showed small amount of extra-axial intracavitary right frontal air after the resection, no evidence of significant mass-effect, large territorial infarction or new mass.     Code Status  Full Code    Review of Systems  Review of Systems   Constitutional:  Positive for malaise/fatigue.   Respiratory:  Negative for shortness of breath.    Cardiovascular:  Negative for chest pain.   Gastrointestinal:  Positive for nausea.   Neurological:  Positive for focal weakness and headaches.     Past Medical History   has a past medical history of Asthma, Bipolar 1 disorder (HCC), Cancer (HCC) (01/01/2016), Cough, Depression, DM mellitus,, Hyperlipidemia, Hypertension, Pap smear, PCOS (polycystic ovarian syndrome), Shortness of breath, Sputum production, and Wheezing.    She has no past medical history of Painful breathing.    Surgical History   has a past surgical history that includes myringotomy; adenoidectomy; other (2016); wide excision  (Left, 5/9/2017); node biopsy sentinel (Left, 5/9/2017); axillary node dissection (Left, 6/23/2017); pr lap,appendectomy (N/A, 10/7/2019); pr bronchoscopy,diagnostic (N/A, 6/17/2022); and endobronchial us add-on (N/A, 6/17/2022).    Family History  family history includes Diabetes in her father and mother; Hyperlipidemia in her mother; Hypertension in her father; Psychiatric Illness in her mother; Thyroid in her mother.    Social History   reports that she has been smoking cigarettes. She has a 7.50 pack-year smoking history. She has never used smokeless tobacco. She reports that she does not currently use alcohol. She reports current drug use. Drug: Inhaled.    Medications  Home Medications       Reviewed by Krysta Lopez R.N. (Registered Nurse) on 10/06/22 at 0803  Med List Status: Complete     Medication Last Dose Status   acetaminophen (TYLENOL) 500 MG Tab 10/1/2022 Active   acetaminophen (Tylenol) tablet 650 mg  Active   albuterol (PROVENTIL HFA) 108 (90 BASE) MCG/ACT Aero Soln inhalation aerosol 10/1/2022 Active   albuterol inhaler 2 Puff  Active   ALPRAZolam (XANAX) tablet 0.25 mg  Active   atenolol (TENORMIN) 50 MG Tab 10/1/2022 Active   atenolol (TENORMIN) tablet 25 mg  Active   atorvastatin (LIPITOR) 40 MG Tab 9/30/2022 Active   atorvastatin (LIPITOR) tablet 40 mg  Active   bisacodyl (DULCOLAX) suppository 10 mg  Active   clonazePAM (KLONOPIN) 0.5 MG Tab 9/30/2022 Active   dextrose 50% (D50W) injection 25 g  Active   DULoxetine (CYMBALTA) 60 MG Cap DR Particles delayed-release capsule 9/30/2022 Active   DULoxetine (CYMBALTA) capsule 60 mg  Active   enoxaparin (Lovenox) inj 40 mg  Active   famotidine (PEPCID) tablet 20 mg  Active   gabapentin (NEURONTIN) 800 MG tablet 10/1/2022 Active   gabapentin (NEURONTIN) capsule 800 mg  Active   Insulin Degludec (TRESIBA FLEXTOUCH) 100 UNIT/ML Solution Pen-injector 9/30/2022 Active   insulin GLARGINE (Lantus,Semglee) injection  Active   insulin lispro  (AdmeLOG,HumaLOG) injection  Active   linezolid (ZYVOX) 600 MG Tab 9/22/2022 Active   lisinopril (PRINIVIL) 20 MG Tab 10/1/2022 Active   magnesium hydroxide (MILK OF MAGNESIA) suspension 30 mL  Active   meloxicam (MOBIC) 15 MG tablet 10/1/2022 Active   nystatin (MYCOSTATIN) powder  Active   ondansetron (ZOFRAN) syringe/vial injection 4 mg  Active   OXcarbazepine (TRILEPTAL) 300 MG Tab 10/1/2022 Active   OXcarbazepine (TRILEPTAL) tablet 300 mg  Active   oxyCODONE immediate-release (ROXICODONE) tablet 5 mg  Active   polyethylene glycol/lytes (MIRALAX) PACKET 1 Packet  Active   QUEtiapine (SEROQUEL) 200 MG Tab 9/30/2022 Active   QUEtiapine (Seroquel) tablet 200 mg  Active   senna-docusate (PERICOLACE or SENOKOT S) 8.6-50 MG per tablet 2 Tablet  Active   sugammadex (BRIDION) injection  Active                  Current Facility-Administered Medications   Medication Dose Route Frequency Provider Last Rate Last Admin    ceFAZolin (Ancef) 2 g in  mL IVPB  2 g Intravenous Q8HRS Pebbles Tejada M.D. 200 mL/hr at 10/06/22 1530 2 g at 10/06/22 1530    dexamethasone (DECADRON) injection 4 mg  4 mg Intravenous Q6HRS Pebbles Tejada M.D.   4 mg at 10/06/22 1510    levETIRAcetam (Keppra) injection 1,000 mg  1,000 mg Intravenous Q12HRS Pebbles Tejada M.D.        HYDROMORPHONE HCL PF 1 MG/ML INJ SOLN             metoclopramide (REGLAN) injection 10 mg  10 mg Intravenous Q6HRS PRN Elizabeth Aguilera M.D.   10 mg at 10/06/22 1547    HYDROmorphone (Dilaudid) injection 0.5-1 mg  0.5-1 mg Intravenous Q2HRS PRN Garrett Reese M.D.        insulin GLARGINE (Lantus,Semglee) injection  0.2 Units/kg/day Subcutaneous Q EVENING Rachana Robert M.D.   14 Units at 10/05/22 1757    And    insulin lispro (AdmeLOG,HumaLOG) injection  0-12 Units Subcutaneous 4X/DAY DAVONTE Robert M.D.   8 Units at 10/05/22 2010    And    dextrose 50% (D50W) injection 25 g  25 g Intravenous Q15 MIN PRN Rachana Robert M.D.        oxyCODONE immediate-release  (ROXICODONE) tablet 5 mg  5 mg Oral Q4HRS PRN Teodoro Figueroa M.D.   5 mg at 10/05/22 2008    atenolol (TENORMIN) tablet 25 mg  25 mg Oral QAM Aditya Del Castillo M.D.        [Held by provider] enoxaparin (Lovenox) inj 40 mg  40 mg Subcutaneous DAILY AT 1800 Rachana Robert M.D.   40 mg at 10/03/22 1706    senna-docusate (PERICOLACE or SENOKOT S) 8.6-50 MG per tablet 2 Tablet  2 Tablet Oral BID Aditya Del Castillo M.D.   2 Tablet at 10/05/22 1756    And    polyethylene glycol/lytes (MIRALAX) PACKET 1 Packet  1 Packet Oral QDAY PRN Aditya Del Castillo M.D.   1 Packet at 10/05/22 0831    And    magnesium hydroxide (MILK OF MAGNESIA) suspension 30 mL  30 mL Oral QDAY PRN Aditya Del Castillo M.D.        And    bisacodyl (DULCOLAX) suppository 10 mg  10 mg Rectal QDAY PRN Aditya Del Castillo M.D.        nystatin (MYCOSTATIN) powder   Topical BID Aditya Del Castillo M.D.   Given at 10/06/22 0426    ondansetron (ZOFRAN) syringe/vial injection 4 mg  4 mg Intravenous Q6HRS PRN Aditya Del Castillo M.D.   4 mg at 10/06/22 1500    ALPRAZolam (XANAX) tablet 0.25 mg  0.25 mg Oral 4X/DAY PRN Aditya Del Castillo M.D.   0.25 mg at 10/05/22 2008    famotidine (PEPCID) tablet 20 mg  20 mg Oral BID Aditya Del Castillo M.D.   20 mg at 10/05/22 1756    albuterol inhaler 2 Puff  2 Puff Inhalation Q4HRS PRN Rhett Coffey M.D.   2 Puff at 10/05/22 0555    atorvastatin (LIPITOR) tablet 40 mg  40 mg Oral Q EVENING Rhett Coffey M.D.   40 mg at 10/05/22 1756    DULoxetine (CYMBALTA) capsule 60 mg  60 mg Oral Q EVENING Rhett Coffey M.D.   60 mg at 10/05/22 1756    gabapentin (NEURONTIN) capsule 800 mg  800 mg Oral 4X/DAY Rhett Coffey M.D.   800 mg at 10/05/22 2008    OXcarbazepine (TRILEPTAL) tablet 300 mg  300 mg Oral BID Rhett Coffey M.D.   300 mg at 10/05/22 2007    QUEtiapine (Seroquel) tablet 200 mg  200 mg Oral Q EVENING Rhett Coffey M.D.   200 mg at 10/05/22 1756    acetaminophen (Tylenol) tablet 650 mg   650 mg Oral Q6HRS PRN Rhett Coffey M.D.   650 mg at 10/03/22 0332       Allergies  Allergies   Allergen Reactions    Amoxicillin-Pot Clavulanate Vomiting    Clavulanic Acid Vomiting     Violent vomiting       Vital Signs last 24 hours  Temp:  [36.6 °C (97.8 °F)-37.2 °C (98.9 °F)] 37.2 °C (98.9 °F)  Pulse:  [] 65  Resp:  [12-42] 42  BP: ()/(62-76) 120/76  SpO2:  [92 %-100 %] 95 %    Physical Exam  Physical Exam  Vitals and nursing note reviewed.   Constitutional:       Comments: Sleepy but arouses to voice   HENT:      Mouth/Throat:      Mouth: Mucous membranes are moist.   Eyes:      General: No scleral icterus.  Cardiovascular:      Rate and Rhythm: Normal rate and regular rhythm.   Pulmonary:      Effort: Pulmonary effort is normal. No respiratory distress.      Breath sounds: No wheezing.   Abdominal:      Palpations: Abdomen is soft.   Skin:     General: Skin is warm and dry.   Neurological:      Mental Status: She is alert.      Comments: 5/5 strength in RUE and RLE.  2/5 strength in LUE and 0/5 in LLE.        Fluids    Intake/Output Summary (Last 24 hours) at 10/6/2022 1633  Last data filed at 10/6/2022 1226  Gross per 24 hour   Intake 1900 ml   Output 2400 ml   Net -500 ml       Laboratory  Recent Results (from the past 48 hour(s))   POCT glucose device results    Collection Time: 10/04/22  5:26 PM   Result Value Ref Range    POC Glucose, Blood 299 (H) 65 - 99 mg/dL   POCT glucose device results    Collection Time: 10/04/22  9:01 PM   Result Value Ref Range    POC Glucose, Blood 263 (H) 65 - 99 mg/dL   CBC WITH DIFFERENTIAL    Collection Time: 10/05/22 12:50 AM   Result Value Ref Range    WBC 10.4 4.8 - 10.8 K/uL    RBC 4.73 4.20 - 5.40 M/uL    Hemoglobin 11.8 (L) 12.0 - 16.0 g/dL    Hematocrit 37.4 37.0 - 47.0 %    MCV 79.1 (L) 81.4 - 97.8 fL    MCH 24.9 (L) 27.0 - 33.0 pg    MCHC 31.6 (L) 33.6 - 35.0 g/dL    RDW 47.1 35.9 - 50.0 fL    Platelet Count 483 (H) 164 - 446 K/uL    MPV 10.0 9.0 -  12.9 fL    Neutrophils-Polys 80.10 (H) 44.00 - 72.00 %    Lymphocytes 13.60 (L) 22.00 - 41.00 %    Monocytes 5.50 0.00 - 13.40 %    Eosinophils 0.00 0.00 - 6.90 %    Basophils 0.20 0.00 - 1.80 %    Immature Granulocytes 0.60 0.00 - 0.90 %    Nucleated RBC 0.00 /100 WBC    Neutrophils (Absolute) 8.37 (H) 2.00 - 7.15 K/uL    Lymphs (Absolute) 1.42 1.00 - 4.80 K/uL    Monos (Absolute) 0.57 0.00 - 0.85 K/uL    Eos (Absolute) 0.00 0.00 - 0.51 K/uL    Baso (Absolute) 0.02 0.00 - 0.12 K/uL    Immature Granulocytes (abs) 0.06 0.00 - 0.11 K/uL    NRBC (Absolute) 0.00 K/uL   Basic Metabolic Panel    Collection Time: 10/05/22 12:50 AM   Result Value Ref Range    Sodium 137 135 - 145 mmol/L    Potassium 4.3 3.6 - 5.5 mmol/L    Chloride 100 96 - 112 mmol/L    Co2 23 20 - 33 mmol/L    Glucose 269 (H) 65 - 99 mg/dL    Bun 18 8 - 22 mg/dL    Creatinine 0.53 0.50 - 1.40 mg/dL    Calcium 9.7 8.5 - 10.5 mg/dL    Anion Gap 14.0 7.0 - 16.0   ESTIMATED GFR    Collection Time: 10/05/22 12:50 AM   Result Value Ref Range    GFR (CKD-EPI) 117 >60 mL/min/1.73 m 2   ABO Rh Confirm    Collection Time: 10/05/22 12:50 AM   Result Value Ref Range    ABO Rh Confirm AB POS    POCT glucose device results    Collection Time: 10/05/22  8:30 AM   Result Value Ref Range    POC Glucose, Blood 201 (H) 65 - 99 mg/dL   POCT glucose device results    Collection Time: 10/05/22 12:20 PM   Result Value Ref Range    POC Glucose, Blood 252 (H) 65 - 99 mg/dL   Prothrombin Time    Collection Time: 10/05/22  3:44 PM   Result Value Ref Range    PT 13.3 12.0 - 14.6 sec    INR 1.02 0.87 - 1.13   APTT    Collection Time: 10/05/22  3:44 PM   Result Value Ref Range    APTT 26.8 24.7 - 36.0 sec   POCT glucose device results    Collection Time: 10/05/22  5:54 PM   Result Value Ref Range    POC Glucose, Blood 289 (H) 65 - 99 mg/dL   POCT glucose device results    Collection Time: 10/05/22  8:07 PM   Result Value Ref Range    POC Glucose, Blood 308 (H) 65 - 99 mg/dL   CBC WITH  DIFFERENTIAL    Collection Time: 10/06/22  1:30 AM   Result Value Ref Range    WBC 13.6 (H) 4.8 - 10.8 K/uL    RBC 4.89 4.20 - 5.40 M/uL    Hemoglobin 12.4 12.0 - 16.0 g/dL    Hematocrit 39.2 37.0 - 47.0 %    MCV 80.2 (L) 81.4 - 97.8 fL    MCH 25.4 (L) 27.0 - 33.0 pg    MCHC 31.6 (L) 33.6 - 35.0 g/dL    RDW 46.9 35.9 - 50.0 fL    Platelet Count 502 (H) 164 - 446 K/uL    MPV 9.8 9.0 - 12.9 fL    Neutrophils-Polys 66.60 44.00 - 72.00 %    Lymphocytes 22.90 22.00 - 41.00 %    Monocytes 9.70 0.00 - 13.40 %    Eosinophils 0.20 0.00 - 6.90 %    Basophils 0.10 0.00 - 1.80 %    Immature Granulocytes 0.50 0.00 - 0.90 %    Nucleated RBC 0.00 /100 WBC    Neutrophils (Absolute) 9.02 (H) 2.00 - 7.15 K/uL    Lymphs (Absolute) 3.10 1.00 - 4.80 K/uL    Monos (Absolute) 1.31 (H) 0.00 - 0.85 K/uL    Eos (Absolute) 0.03 0.00 - 0.51 K/uL    Baso (Absolute) 0.02 0.00 - 0.12 K/uL    Immature Granulocytes (abs) 0.07 0.00 - 0.11 K/uL    NRBC (Absolute) 0.00 K/uL   Basic Metabolic Panel    Collection Time: 10/06/22  1:30 AM   Result Value Ref Range    Sodium 135 135 - 145 mmol/L    Potassium 3.7 3.6 - 5.5 mmol/L    Chloride 98 96 - 112 mmol/L    Co2 25 20 - 33 mmol/L    Glucose 241 (H) 65 - 99 mg/dL    Bun 24 (H) 8 - 22 mg/dL    Creatinine 0.46 (L) 0.50 - 1.40 mg/dL    Calcium 9.3 8.5 - 10.5 mg/dL    Anion Gap 12.0 7.0 - 16.0   ESTIMATED GFR    Collection Time: 10/06/22  1:30 AM   Result Value Ref Range    GFR (CKD-EPI) 121 >60 mL/min/1.73 m 2   COD - Adult (Type and Screen)    Collection Time: 10/06/22  1:30 AM   Result Value Ref Range    ABO Grouping Only AB     Rh Grouping Only POS     Antibody Screen-Cod NEG    POCT glucose device results    Collection Time: 10/06/22  6:11 AM   Result Value Ref Range    POC Glucose, Blood 187 (H) 65 - 99 mg/dL   HCG QUAL SERUM    Collection Time: 10/06/22  6:25 AM   Result Value Ref Range    Beta-Hcg Qualitative Serum Negative Negative   POCT glucose device results    Collection Time: 10/06/22 10:17 AM    Result Value Ref Range    POC Glucose, Blood 216 (H) 65 - 99 mg/dL   POCT glucose device results    Collection Time: 10/06/22 12:35 PM   Result Value Ref Range    POC Glucose, Blood 232 (H) 65 - 99 mg/dL   EKG    Collection Time: 10/06/22  3:45 PM   Result Value Ref Range    Report       [facility]    Test Date:  2022-10-06  Pt Name:    KALEE CENTENO                    Department: [facility]  MRN:        [id number]                      Room:       R116  Gender:     Female                       Technician: ANTONI  :        1980                   Requested By:TANVI QUINTEROS  Order #:    [id number]                    Reading MD:    Measurements  Intervals                                Axis  Rate:       56                           P:          48  CO:         133                          QRS:        19  QRSD:       100                          T:          62  QT:         464  QTc:        448    Interpretive Statements  Sinus bradycardia  Compared to ECG 10/01/2022 19:46:07  Sinus tachycardia no longer present         Imaging  Reviewed    Assessment/Plan  * Brain metastases (HCC)  Assessment & Plan  Likely from metastatic melanoma with 3 masses in the right cerebral hemisphere with extensive edema-now status post craniotomy with tumor resection.  She also has smaller satellite lesions.  Postop with left-sided weakness -repeat CT head shows some postop changes but otherwise no significant mass-effect, large territorial infarction or new mass.    -MRI  -Neurochecks  -Pain control with p.o. oxycodone and IV Dilaudid  -Stat CT head for any acute changes  -Decadron 4 mg every 6 hours  -Keppra 1 g twice daily  -Appreciate neurosurgery recs  -Appreciate rad onc recs    Malignant melanoma metastatic to brain (HCC)- (present on admission)  Assessment & Plan  Plan as noted above    DM2 (diabetes mellitus, type 2) (HCC)- (present on admission)  Assessment & Plan  Insulin sliding scale since she is on steroids  Monitor  closely-maintain normoglycemia    Depression- (present on admission)  Assessment & Plan  On Cymbalta and Xanax      Discussed patient condition and risk of morbidity and/or mortality with RN, RT, and Pharmacy.    The patient remains critically ill.  Critical care time = 45 minutes in directly providing and coordinating critical care and extensive data review.  No time overlap and excludes procedures.        Elizabeth Aguilera MD  Pulmonary and Critical Care Medicine  [facility]

## 2022-10-06 NOTE — ANESTHESIA TIME REPORT
Anesthesia Start and Stop Event Times     Date Time Event    10/6/2022 0707 Ready for Procedure     0731 Anesthesia Start     1226 Anesthesia Stop        Responsible Staff  10/06/22    Name Role Begin End    Jaspreet Bush M.D. Anesth 0731 1226        Overtime Reason:  no overtime (within assigned shift)    Comments:

## 2022-10-06 NOTE — ANESTHESIA PROCEDURE NOTES
Airway    Date/Time: 10/6/2022 7:39 AM  Performed by: Jaspreet Bush M.D.  Authorized by: Jaspreet Bush M.D.     Location:  OR  Urgency:  Elective  Indications for Airway Management:  Anesthesia      Spontaneous Ventilation: absent    Sedation Level:  Deep  Preoxygenated: Yes    Patient Position:  Sniffing  Mask Difficulty Assessment:  0 - not attempted  Final Airway Type:  Endotracheal airway  Final Endotracheal Airway:  ETT  Cuffed: Yes    Technique Used for Successful ETT Placement:  Direct laryngoscopy  Devices/Methods Used in Placement:  Cricoid pressure    Insertion Site:  Oral  Blade Type:  Elliott  Laryngoscope Blade/Videolaryngoscope Blade Size:  3  ETT Size (mm):  7.0  Measured from:  Teeth  ETT to Teeth (cm):  22  Placement Verified by: auscultation and capnometry    Cormack-Lehane Classification:  Grade IIa - partial view of glottis  Number of Attempts at Approach:  1

## 2022-10-06 NOTE — THERAPY
Physical Therapy   Daily Treatment     Patient Name: Jacque Mcintyre  Age:  41 y.o., Sex:  female  Medical Record #: [id number]  Today's Date: 10/5/2022     Precautions  Precautions: Fall Risk  Comments: L deficits with L inattention    Assessment    Pt seen for PT treatment session. Very calm and relaxed throughout session with good receptivity of cues and education. Pt required Mod - Min A to complete mobility as noted below. L inattention limiting overall safety with slight tone noted throughout L side today. PT will continue to follow while in house and resume PT POC as appropriate post operatively.     Plan    Continue current treatment plan.    DC Equipment Recommendations: Unable to determine at this time  Discharge Recommendations:  TBD, pending brain surgery tomorrow. Will continue to evaluate possible need for IRF vs home with HH     10/05/22 1712   Precautions   Precautions Fall Risk   Comments L deficits with L inattention   Vitals   O2 Delivery Device None - Room Air   Pain 0 - 10 Group   Therapist Pain Assessment During Activity;Nurse Notified  (no pain reported)   Cognition    Cognition / Consciousness X   Level of Consciousness Alert   Comments impaired sequencing and L inattention   Active ROM Lower Body    Active ROM Lower Body  X   Comments L LE limited by weakness and some extension tone   Strength Lower Body   Lt Hip Flexion Strength 2- (P-)   Lt Knee Flexion Strength 2+ (P+)   Lt Knee Extension Strength 3- (F-)   Lt Ankle Dorsiflexion Strength 2- (P-)   Balance   Sitting Balance (Static) Fair -   Sitting Balance (Dynamic) Fair -   Standing Balance (Static) Fair -   Standing Balance (Dynamic) Fair -   Weight Shift Sitting Poor   Weight Shift Standing Fair   Skilled Intervention Compensatory Strategies;Sequencing;Verbal Cuing   Comments L lateral lean and LOB while EOB. slight L lean/drift in standing   Gait Analysis   Gait Level Of Assist Minimal Assist   Assistive Device Front Wheel Walker    Distance (Feet) 100   # of Times Distance was Traveled 1   Deviation Step To  (FWW positioned to the R of body with L LE in ER.)   # of Stairs Climbed 0   Weight Bearing Status no restrictions   Skilled Intervention Verbal Cuing;Compensatory Strategies   Comments cues for body awareness and increased hip/knee flexion as pt fatigued to clear toes   Bed Mobility    Supine to Sit Moderate Assist  (modified log roll to the R)   Sit to Supine   (seated in chair at end of session)   Scooting Maximal Assist  (impaired sequencing to scoot L hip to EOB)   Rolling Minimum Assist to Lt.;Minimal Assist to Rt.   Skilled Intervention Verbal Cuing;Sequencing;Compensatory Strategies   Comments trial OOB to L side next session   Functional Mobility   Sit to Stand Minimal Assist   Bed, Chair, Wheelchair Transfer Minimal Assist   Transfer Method Stand Step   Skilled Intervention Verbal Cuing;Sequencing   Comments cues to put L hand on FWW first, then push from bed with R   How much difficulty does the patient currently have...   Turning over in bed (including adjusting bedclothes, sheets and blankets)? 1   Sitting down on and standing up from a chair with arms (e.g., wheelchair, bedside commode, etc.) 1   Moving from lying on back to sitting on the side of the bed? 1   How much help from another person does the patient currently need...   Moving to and from a bed to a chair (including a wheelchair)? 3   Need to walk in a hospital room? 3   Climbing 3-5 steps with a railing? 3   6 clicks Mobility Score 12   Activity Tolerance   Sitting in Chair up to chair at end of session   Sitting Edge of Bed 5 min   Standing 15 min   Short Term Goals    Short Term Goal # 1 Pt will perform bed mobility with flat bed, no rail with supervision in 6 visits.   Goal Outcome # 1 goal not met   Short Term Goal # 2 Pt will transfer with supervision in 6 visits to improve functional indep.   Goal Outcome # 2 Goal not met   Short Term Goal # 3 Pt will  ambulate x 200 feet using FWW with supervision in 6 visits to improve functional indep.   Goal Outcome # 3 Goal not met   Short Term Goal # 4 Pt will negotiate 3 stairs with cg assist using no rail in 6 visits (no rail at parent's home, will likely need family assist any time coming/going vs install rail)   Goal Outcome # 4 Goal not met   Anticipated Discharge Equipment and Recommendations   DC Equipment Recommendations Unable to determine at this time   Discharge Recommendations   (TBD, pending brain surgery tomorrow. Will continue to evaluate possible need for IRF vs home with HH)

## 2022-10-06 NOTE — PROGRESS NOTES
[facility] Medicine Daily Progress Note    Date of Service  10/6/2022    Chief Complaint  Jacque Mcintyre is a 41 y.o. female admitted 10/1/2022 with left-sided weakness.  She was diagnosed with melanoma in 2016, and underwent immunotherapy for 3 years.  She had an abnormal PET scan earlier this year, biopsy was reportedly negative.  She has type 2 diabetes and depression.    Hospital Course  CT head from 10/1/2022 showed 3 masses in the right cerebral hemisphere with extensive vasogenic edema, localized midline shift to the left side frontoparietal level, 2 small masses in the right frontal lobe with increased density which could indicate hemorrhage. Neurosurgery were consulted. MRI brain from 10/2/2022 showed right-sided predominant, supratentorial metastatic disease with associated edema, localized midline shift, evidence of prior hemorrhage within the masses.  CT chest, abdomen, pelvis from 10/1/2022 showed bilateral pulmonary nodules, enlarged left hilar lymph node, enlarged left external iliac lymph node, 9 mm right lower quadrant peritoneal nodule, 7 mm left and left upper quadrant subcutaneous soft tissue nodules.  Decadron and Keppra started.  Medical oncology and radiation oncology were consulted.     For medical oncology, will need to confirm diagnosis of melanoma and BRAF testing.  If BRAF positive, she will be candidate for BRAF/MAC inhibitor not she will need dual immunotherapy.    Patient was evaluated by neurosurgery on 10/4/2022.  She has been scheduled for right frontal craniotomy of tumor resection on 10/6/2022. NPO at midnight. Afebrile, hemodynamically stable.  SSI scale was increased due to elevated blood glucose readings.  Anxiety was controlled with medications.    Interval Problem Update  Patient was taken to the OR today for right frontal craniotomy.     I have discussed this patient's plan of care and discharge plan at IDT rounds today with Case Management, Nursing, Nursing leadership, and  other members of the IDT team.    Consultants/Specialty  neurosurgery and oncology, radiation oncology    Code Status  Full Code    Disposition  Patient is medically cleared for discharge.   Anticipate discharge to to home with close outpatient follow-up.  I have placed the appropriate orders for post-discharge needs.    Review of Systems  Review of Systems   Unable to perform ROS: Acuity of condition      Physical Exam  Temp:  [36.6 °C (97.8 °F)-37.2 °C (98.9 °F)] 37.2 °C (98.9 °F)  Pulse:  [] 65  Resp:  [12-20] 15  BP: ()/(60-76) 126/62  SpO2:  [92 %-100 %] 94 %    Physical Exam  Constitutional:       Comments: Sedated, post-op   Eyes:      Comments: Eyes closed   Cardiovascular:      Rate and Rhythm: Normal rate.   Pulmonary:      Effort: Pulmonary effort is normal.   Abdominal:      Palpations: Abdomen is soft.   Musculoskeletal:      Right lower leg: No edema.      Left lower leg: No edema.   Skin:     General: Skin is warm.   Neurological:      Comments: Left upper and lower extremity weakness   Psychiatric:      Comments: sedated       Fluids    Intake/Output Summary (Last 24 hours) at 10/6/2022 1600  Last data filed at 10/6/2022 1226  Gross per 24 hour   Intake 1900 ml   Output 2400 ml   Net -500 ml       Laboratory  Recent Labs     10/04/22  0630 10/05/22  0050 10/06/22  0130   WBC 12.0* 10.4 13.6*   RBC 4.51 4.73 4.89   HEMOGLOBIN 11.5* 11.8* 12.4   HEMATOCRIT 37.5 37.4 39.2   MCV 83.1 79.1* 80.2*   MCH 25.5* 24.9* 25.4*   MCHC 30.7* 31.6* 31.6*   RDW 51.1* 47.1 46.9   PLATELETCT 449* 483* 502*   MPV 10.2 10.0 9.8     Recent Labs     10/04/22  0630 10/05/22  0050 10/06/22  0130   SODIUM 139 137 135   POTASSIUM 4.2 4.3 3.7   CHLORIDE 105 100 98   CO2 21 23 25   GLUCOSE 175* 269* 241*   BUN 14 18 24*   CREATININE 0.50 0.53 0.46*   CALCIUM 9.0 9.7 9.3     Recent Labs     10/05/22  1544   APTT 26.8   INR 1.02               Imaging  CT-HEAD W/O   Final Result      1.  Small amount of extra-axial and  intracavitary RIGHT frontal applied following resection of RIGHT frontal masses   2.  No evidence of significant mass effect, large territorial infarction or new mass   3.  Residual tumor not excluded by this exam.         MR-BRAIN-WITH & W/O   Final Result      1.  Right-sided predominant, supratentorial metastatic disease with the largest mass in the right frontoparietal region measuring 3.4 x 3.0 cm as described in detail above.   2.  Associated edema with regional associated cortical effacement and partial effacement of the right lateral ventricle. Minimal localized midline shift adjacent to the large right frontoparietal mass.   3.  Evidence of prior hemorrhage within these masses.   4.  Small focus of restricted diffusion in the right occipital bone could represent osseous metastatic disease.      CT-CHEST,ABDOMEN,PELVIS WITH   Final Result      1.  BILATERAL pulmonary nodules, the largest of which measures 12 mm in the LEFT lower lobe and which has enlarged since May of this year.   2.  Enlarged LEFT hilar lymph node   3.  Enlarged LEFT external iliac lymph node. This would be amenable to image guided tissue sampling if clinically appropriate.   4.  7 mm LEFT and LEFT upper quadrant subcutaneous soft tissue nodules, new since prior   5.  9 mm RIGHT lower quadrant peritoneal nodule, new since prior   6.  33 mm LEFT ovarian cyst   7.  Thyroid nodules measuring up to 23 mm   8.  BILATERAL L5 pars defects with grade 1 anterolisthesis of L5 on S1   9.  Incompletely assessed LEFT renal lesion, new since prior and most likely a cyst though attention on top is recommended.      DX-CHEST-PORTABLE (1 VIEW)   Final Result         1.  Possible pulmonary nodule within each lung. Metastasis is a possibility.      2.  No infiltrates or consolidations identified.      CT-HEAD W/O   Final Result   Addendum (preliminary) 1 of 1   These findings were discussed with JESENIA MONTALVO on 10/01/2022.      Final         1.  3 masses in  the right cerebral hemisphere largest measuring 3.2 cm with extensive vasogenic edema as described above. Findings are likely due to metastases.      2.  Localized midline shift to the left side frontoparietal level measuring 3 mm.      3.  The 2 smaller masses in the right frontal lobe demonstrates some increased density which could indicate that hemorrhage may be present within these masses.      These findings were discussed with Benjamín nurse for dr hernandez on 10/01/2022.              Assessment/Plan  * Brain metastases (HCC)  Assessment & Plan  Likely metastatic melanoma.  Continue Decadron and Keppra. Underwent right frontal craniotomy with tumor resection today.  Neurosurgery, Radiation oncology and medical oncology following    Malignant melanoma metastatic to brain (HCC)- (present on admission)  Assessment & Plan  Neurosurgery following. Underwent right frontal craniotomy of tumor resection today    DM2 (diabetes mellitus, type 2) (HCC)- (present on admission)  Assessment & Plan  On steroids.  Continue SSI with Accu-Cheks and hypoglycemia protocol.  SSI scale increased due to elevated blood glucose readings.  Monitor closely    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)- (present on admission)  Assessment & Plan  Initially diagnosed in 2016, underwent immunotherapy and lymph node dissection.  Now with widespread metastatic disease.  Underwent right frontal craniotomy with tumor resection today. Transferred to Neuro ICU post surgically. Neurosurgery following.    Depression- (present on admission)  Assessment & Plan  Continue Xanax and Cymbalta.       VTE prophylaxis: SCDs/TEDs Lovenox on hold from 10/5/2022

## 2022-10-06 NOTE — ASSESSMENT & PLAN NOTE
-POD #1 tumor resection  -Serial neurologic exams  -Monitor drain output  -Brain MRI  -Steroids  -Keppra  -Neurosurgery following  -HOB >30

## 2022-10-06 NOTE — ANESTHESIA PROCEDURE NOTES
Peripheral IV    Date/Time: 10/6/2022 7:47 AM  Performed by: Jaspreet Bush M.D.  Authorized by: Jaspreet Bush M.D.     Size:  16 G  Laterality:  Right  Local Anesthetic:  None  Site Prep:  Alcohol  Technique:  Direct puncture  Attempts:  1

## 2022-10-06 NOTE — ANESTHESIA PROCEDURE NOTES
Arterial Line  Performed by: Jaspreet Bush M.D.  Authorized by: Jaspreet Bush M.D.     Start Time:  10/6/2022 7:43 AM  End Time:  10/6/2022 7:45 AM  Localization: surface landmarks    Patient Location:  OR  Indication: continuous blood pressure monitoring        Catheter Size:  20 G  Seldinger Technique?: Yes    Laterality:  Right  Site:  Radial artery  Line Secured:  Antimicrobial disc, tape and transparent dressing  Events: patient tolerated procedure well with no complications

## 2022-10-07 ENCOUNTER — APPOINTMENT (OUTPATIENT)
Dept: RADIOLOGY | Facility: MEDICAL CENTER | Age: 42
DRG: 025 | End: 2022-10-07
Attending: NURSE PRACTITIONER
Payer: COMMERCIAL

## 2022-10-07 ENCOUNTER — APPOINTMENT (OUTPATIENT)
Dept: RADIOLOGY | Facility: MEDICAL CENTER | Age: 42
DRG: 025 | End: 2022-10-07
Attending: NEUROLOGICAL SURGERY
Payer: COMMERCIAL

## 2022-10-07 PROBLEM — Z79.899 CHRONIC PRESCRIPTION BENZODIAZEPINE USE: Status: ACTIVE | Noted: 2022-10-07

## 2022-10-07 PROBLEM — K59.00 CONSTIPATION: Status: ACTIVE | Noted: 2022-10-07

## 2022-10-07 PROBLEM — E11.9 DM2 (DIABETES MELLITUS, TYPE 2) (HCC): Chronic | Status: ACTIVE | Noted: 2019-10-07

## 2022-10-07 PROBLEM — Z79.899 CHRONIC PRESCRIPTION BENZODIAZEPINE USE: Chronic | Status: ACTIVE | Noted: 2022-10-07

## 2022-10-07 LAB
ANION GAP SERPL CALC-SCNC: 11 MMOL/L (ref 7–16)
BASOPHILS # BLD AUTO: 0.2 % (ref 0–1.8)
BASOPHILS # BLD: 0.04 K/UL (ref 0–0.12)
BUN SERPL-MCNC: 14 MG/DL (ref 8–22)
CALCIUM SERPL-MCNC: 8.3 MG/DL (ref 8.5–10.5)
CHLORIDE SERPL-SCNC: 106 MMOL/L (ref 96–112)
CO2 SERPL-SCNC: 25 MMOL/L (ref 20–33)
CREAT SERPL-MCNC: 0.38 MG/DL (ref 0.5–1.4)
EKG IMPRESSION: NORMAL
EOSINOPHIL # BLD AUTO: 0 K/UL (ref 0–0.51)
EOSINOPHIL NFR BLD: 0 % (ref 0–6.9)
ERYTHROCYTE [DISTWIDTH] IN BLOOD BY AUTOMATED COUNT: 47.8 FL (ref 35.9–50)
GFR SERPLBLD CREATININE-BSD FMLA CKD-EPI: 127 ML/MIN/1.73 M 2
GLUCOSE BLD STRIP.AUTO-MCNC: 138 MG/DL (ref 65–99)
GLUCOSE BLD STRIP.AUTO-MCNC: 149 MG/DL (ref 65–99)
GLUCOSE BLD STRIP.AUTO-MCNC: 149 MG/DL (ref 65–99)
GLUCOSE BLD STRIP.AUTO-MCNC: 201 MG/DL (ref 65–99)
GLUCOSE SERPL-MCNC: 165 MG/DL (ref 65–99)
HCT VFR BLD AUTO: 33 % (ref 37–47)
HGB BLD-MCNC: 10.2 G/DL (ref 12–16)
IMM GRANULOCYTES # BLD AUTO: 0.07 K/UL (ref 0–0.11)
IMM GRANULOCYTES NFR BLD AUTO: 0.4 % (ref 0–0.9)
LYMPHOCYTES # BLD AUTO: 1.27 K/UL (ref 1–4.8)
LYMPHOCYTES NFR BLD: 7.1 % (ref 22–41)
MCH RBC QN AUTO: 25.2 PG (ref 27–33)
MCHC RBC AUTO-ENTMCNC: 30.9 G/DL (ref 33.6–35)
MCV RBC AUTO: 81.5 FL (ref 81.4–97.8)
MONOCYTES # BLD AUTO: 1.61 K/UL (ref 0–0.85)
MONOCYTES NFR BLD AUTO: 9 % (ref 0–13.4)
NEUTROPHILS # BLD AUTO: 14.99 K/UL (ref 2–7.15)
NEUTROPHILS NFR BLD: 83.3 % (ref 44–72)
NRBC # BLD AUTO: 0 K/UL
NRBC BLD-RTO: 0 /100 WBC
PLATELET # BLD AUTO: 466 K/UL (ref 164–446)
PMV BLD AUTO: 10.2 FL (ref 9–12.9)
POTASSIUM SERPL-SCNC: 3.9 MMOL/L (ref 3.6–5.5)
RBC # BLD AUTO: 4.05 M/UL (ref 4.2–5.4)
SODIUM SERPL-SCNC: 142 MMOL/L (ref 135–145)
WBC # BLD AUTO: 18 K/UL (ref 4.8–10.8)

## 2022-10-07 PROCEDURE — A9576 INJ PROHANCE MULTIPACK: HCPCS | Performed by: NEUROLOGICAL SURGERY

## 2022-10-07 PROCEDURE — 93010 ELECTROCARDIOGRAM REPORT: CPT | Performed by: INTERNAL MEDICINE

## 2022-10-07 PROCEDURE — A9270 NON-COVERED ITEM OR SERVICE: HCPCS | Performed by: NURSE PRACTITIONER

## 2022-10-07 PROCEDURE — 700105 HCHG RX REV CODE 258: Performed by: NURSE PRACTITIONER

## 2022-10-07 PROCEDURE — 700102 HCHG RX REV CODE 250 W/ 637 OVERRIDE(OP): Performed by: NURSE PRACTITIONER

## 2022-10-07 PROCEDURE — 70553 MRI BRAIN STEM W/O & W/DYE: CPT

## 2022-10-07 PROCEDURE — 74018 RADEX ABDOMEN 1 VIEW: CPT

## 2022-10-07 PROCEDURE — 700117 HCHG RX CONTRAST REV CODE 255: Performed by: NEUROLOGICAL SURGERY

## 2022-10-07 PROCEDURE — A9270 NON-COVERED ITEM OR SERVICE: HCPCS | Performed by: STUDENT IN AN ORGANIZED HEALTH CARE EDUCATION/TRAINING PROGRAM

## 2022-10-07 PROCEDURE — 700111 HCHG RX REV CODE 636 W/ 250 OVERRIDE (IP): Performed by: NURSE PRACTITIONER

## 2022-10-07 PROCEDURE — 700102 HCHG RX REV CODE 250 W/ 637 OVERRIDE(OP): Performed by: STUDENT IN AN ORGANIZED HEALTH CARE EDUCATION/TRAINING PROGRAM

## 2022-10-07 PROCEDURE — 99291 CRITICAL CARE FIRST HOUR: CPT | Performed by: NURSE PRACTITIONER

## 2022-10-07 PROCEDURE — 700102 HCHG RX REV CODE 250 W/ 637 OVERRIDE(OP): Performed by: INTERNAL MEDICINE

## 2022-10-07 PROCEDURE — 700105 HCHG RX REV CODE 258: Performed by: NEUROLOGICAL SURGERY

## 2022-10-07 PROCEDURE — 700111 HCHG RX REV CODE 636 W/ 250 OVERRIDE (IP): Performed by: HOSPITALIST

## 2022-10-07 PROCEDURE — 80048 BASIC METABOLIC PNL TOTAL CA: CPT

## 2022-10-07 PROCEDURE — A9270 NON-COVERED ITEM OR SERVICE: HCPCS | Performed by: HOSPITALIST

## 2022-10-07 PROCEDURE — 82962 GLUCOSE BLOOD TEST: CPT | Mod: 91

## 2022-10-07 PROCEDURE — 770022 HCHG ROOM/CARE - ICU (200)

## 2022-10-07 PROCEDURE — 700111 HCHG RX REV CODE 636 W/ 250 OVERRIDE (IP): Performed by: NEUROLOGICAL SURGERY

## 2022-10-07 PROCEDURE — 700102 HCHG RX REV CODE 250 W/ 637 OVERRIDE(OP): Performed by: HOSPITALIST

## 2022-10-07 PROCEDURE — 700111 HCHG RX REV CODE 636 W/ 250 OVERRIDE (IP): Performed by: STUDENT IN AN ORGANIZED HEALTH CARE EDUCATION/TRAINING PROGRAM

## 2022-10-07 PROCEDURE — 85025 COMPLETE CBC W/AUTO DIFF WBC: CPT

## 2022-10-07 PROCEDURE — A9270 NON-COVERED ITEM OR SERVICE: HCPCS | Performed by: INTERNAL MEDICINE

## 2022-10-07 RX ORDER — AMOXICILLIN 250 MG
2 CAPSULE ORAL 2 TIMES DAILY
Status: DISCONTINUED | OUTPATIENT
Start: 2022-10-07 | End: 2022-10-15 | Stop reason: HOSPADM

## 2022-10-07 RX ORDER — SODIUM CHLORIDE, SODIUM LACTATE, POTASSIUM CHLORIDE, AND CALCIUM CHLORIDE .6; .31; .03; .02 G/100ML; G/100ML; G/100ML; G/100ML
1000 INJECTION, SOLUTION INTRAVENOUS ONCE
Status: COMPLETED | OUTPATIENT
Start: 2022-10-07 | End: 2022-10-07

## 2022-10-07 RX ORDER — ACETAMINOPHEN 325 MG/1
650 TABLET ORAL EVERY 6 HOURS PRN
Status: DISCONTINUED | OUTPATIENT
Start: 2022-10-07 | End: 2022-10-15 | Stop reason: HOSPADM

## 2022-10-07 RX ORDER — BISACODYL 10 MG
10 SUPPOSITORY, RECTAL RECTAL
Status: DISCONTINUED | OUTPATIENT
Start: 2022-10-07 | End: 2022-10-15 | Stop reason: HOSPADM

## 2022-10-07 RX ORDER — POLYETHYLENE GLYCOL 3350 17 G/17G
1 POWDER, FOR SOLUTION ORAL DAILY
Status: DISCONTINUED | OUTPATIENT
Start: 2022-10-07 | End: 2022-10-15 | Stop reason: HOSPADM

## 2022-10-07 RX ORDER — LEVETIRACETAM 500 MG/1
1000 TABLET ORAL 2 TIMES DAILY
Status: DISCONTINUED | OUTPATIENT
Start: 2022-10-07 | End: 2022-10-15 | Stop reason: HOSPADM

## 2022-10-07 RX ADMIN — PROCHLORPERAZINE EDISYLATE 10 MG: 5 INJECTION INTRAMUSCULAR; INTRAVENOUS at 06:01

## 2022-10-07 RX ADMIN — CEFAZOLIN 2 G: 2 INJECTION, POWDER, FOR SOLUTION INTRAMUSCULAR; INTRAVENOUS at 06:02

## 2022-10-07 RX ADMIN — OXYCODONE 5 MG: 5 TABLET ORAL at 17:43

## 2022-10-07 RX ADMIN — HYDROMORPHONE HYDROCHLORIDE 1 MG: 1 INJECTION, SOLUTION INTRAMUSCULAR; INTRAVENOUS; SUBCUTANEOUS at 06:01

## 2022-10-07 RX ADMIN — BISACODYL 10 MG: 10 SUPPOSITORY RECTAL at 06:02

## 2022-10-07 RX ADMIN — ONDANSETRON HYDROCHLORIDE 4 MG: 2 SOLUTION INTRAMUSCULAR; INTRAVENOUS at 02:57

## 2022-10-07 RX ADMIN — DEXAMETHASONE SODIUM PHOSPHATE 4 MG: 4 INJECTION, SOLUTION INTRA-ARTICULAR; INTRALESIONAL; INTRAMUSCULAR; INTRAVENOUS; SOFT TISSUE at 06:01

## 2022-10-07 RX ADMIN — DEXAMETHASONE SODIUM PHOSPHATE 4 MG: 4 INJECTION, SOLUTION INTRA-ARTICULAR; INTRALESIONAL; INTRAMUSCULAR; INTRAVENOUS; SOFT TISSUE at 00:03

## 2022-10-07 RX ADMIN — HYDROMORPHONE HYDROCHLORIDE 0.5 MG: 1 INJECTION, SOLUTION INTRAMUSCULAR; INTRAVENOUS; SUBCUTANEOUS at 08:03

## 2022-10-07 RX ADMIN — QUETIAPINE FUMARATE 200 MG: 100 TABLET ORAL at 20:01

## 2022-10-07 RX ADMIN — SENNOSIDES AND DOCUSATE SODIUM 2 TABLET: 50; 8.6 TABLET ORAL at 17:45

## 2022-10-07 RX ADMIN — INSULIN GLARGINE-YFGN 14 UNITS: 100 INJECTION, SOLUTION SUBCUTANEOUS at 17:35

## 2022-10-07 RX ADMIN — ONDANSETRON HYDROCHLORIDE 4 MG: 2 SOLUTION INTRAMUSCULAR; INTRAVENOUS at 10:16

## 2022-10-07 RX ADMIN — HYDROMORPHONE HYDROCHLORIDE 1 MG: 1 INJECTION, SOLUTION INTRAMUSCULAR; INTRAVENOUS; SUBCUTANEOUS at 14:30

## 2022-10-07 RX ADMIN — LEVETIRACETAM 1000 MG: 100 INJECTION, SOLUTION INTRAVENOUS at 06:01

## 2022-10-07 RX ADMIN — HYDROMORPHONE HYDROCHLORIDE 0.5 MG: 1 INJECTION, SOLUTION INTRAMUSCULAR; INTRAVENOUS; SUBCUTANEOUS at 15:58

## 2022-10-07 RX ADMIN — ONDANSETRON HYDROCHLORIDE 4 MG: 2 SOLUTION INTRAMUSCULAR; INTRAVENOUS at 17:42

## 2022-10-07 RX ADMIN — HYDROMORPHONE HYDROCHLORIDE 0.5 MG: 1 INJECTION, SOLUTION INTRAMUSCULAR; INTRAVENOUS; SUBCUTANEOUS at 19:50

## 2022-10-07 RX ADMIN — OXCARBAZEPINE 300 MG: 300 TABLET, FILM COATED ORAL at 20:00

## 2022-10-07 RX ADMIN — HYDROMORPHONE HYDROCHLORIDE 0.5 MG: 1 INJECTION, SOLUTION INTRAMUSCULAR; INTRAVENOUS; SUBCUTANEOUS at 00:06

## 2022-10-07 RX ADMIN — POLYETHYLENE GLYCOL 3350 1 PACKET: 17 POWDER, FOR SOLUTION ORAL at 17:42

## 2022-10-07 RX ADMIN — LEVETIRACETAM 1000 MG: 500 TABLET, FILM COATED ORAL at 17:43

## 2022-10-07 RX ADMIN — METOCLOPRAMIDE 10 MG: 5 INJECTION, SOLUTION INTRAMUSCULAR; INTRAVENOUS at 15:30

## 2022-10-07 RX ADMIN — GABAPENTIN 800 MG: 400 CAPSULE ORAL at 20:00

## 2022-10-07 RX ADMIN — INSULIN LISPRO 4 UNITS: 100 INJECTION, SOLUTION INTRAVENOUS; SUBCUTANEOUS at 20:11

## 2022-10-07 RX ADMIN — HYDROMORPHONE HYDROCHLORIDE 1 MG: 1 INJECTION, SOLUTION INTRAMUSCULAR; INTRAVENOUS; SUBCUTANEOUS at 11:10

## 2022-10-07 RX ADMIN — PROCHLORPERAZINE EDISYLATE 10 MG: 5 INJECTION INTRAMUSCULAR; INTRAVENOUS at 13:28

## 2022-10-07 RX ADMIN — HYDROMORPHONE HYDROCHLORIDE 0.5 MG: 1 INJECTION, SOLUTION INTRAMUSCULAR; INTRAVENOUS; SUBCUTANEOUS at 02:56

## 2022-10-07 RX ADMIN — DEXAMETHASONE SODIUM PHOSPHATE 4 MG: 4 INJECTION, SOLUTION INTRA-ARTICULAR; INTRALESIONAL; INTRAMUSCULAR; INTRAVENOUS; SOFT TISSUE at 17:42

## 2022-10-07 RX ADMIN — ATORVASTATIN CALCIUM 40 MG: 40 TABLET, FILM COATED ORAL at 17:45

## 2022-10-07 RX ADMIN — GADOTERIDOL 14 ML: 279.3 INJECTION, SOLUTION INTRAVENOUS at 15:28

## 2022-10-07 RX ADMIN — DULOXETINE 60 MG: 20 CAPSULE, DELAYED RELEASE ORAL at 17:45

## 2022-10-07 RX ADMIN — FAMOTIDINE 20 MG: 20 TABLET, FILM COATED ORAL at 17:55

## 2022-10-07 RX ADMIN — SODIUM CHLORIDE, POTASSIUM CHLORIDE, SODIUM LACTATE AND CALCIUM CHLORIDE 1000 ML: 600; 310; 30; 20 INJECTION, SOLUTION INTRAVENOUS at 11:10

## 2022-10-07 RX ADMIN — DEXAMETHASONE SODIUM PHOSPHATE 4 MG: 4 INJECTION, SOLUTION INTRA-ARTICULAR; INTRALESIONAL; INTRAMUSCULAR; INTRAVENOUS; SOFT TISSUE at 11:09

## 2022-10-07 RX ADMIN — NYSTATIN: 100000 POWDER TOPICAL at 17:42

## 2022-10-07 ASSESSMENT — PAIN DESCRIPTION - PAIN TYPE
TYPE: ACUTE PAIN

## 2022-10-07 ASSESSMENT — COGNITIVE AND FUNCTIONAL STATUS - GENERAL
WALKING IN HOSPITAL ROOM: A LOT
EATING MEALS: A LITTLE
TURNING FROM BACK TO SIDE WHILE IN FLAT BAD: A LOT
MOBILITY SCORE: 12
DRESSING REGULAR UPPER BODY CLOTHING: A LOT
STANDING UP FROM CHAIR USING ARMS: A LOT
DAILY ACTIVITIY SCORE: 15
TOILETING: A LOT
MOVING TO AND FROM BED TO CHAIR: A LOT
HELP NEEDED FOR BATHING: A LOT
SUGGESTED CMS G CODE MODIFIER DAILY ACTIVITY: CK
SUGGESTED CMS G CODE MODIFIER MOBILITY: CL
MOVING FROM LYING ON BACK TO SITTING ON SIDE OF FLAT BED: A LOT
DRESSING REGULAR LOWER BODY CLOTHING: A LITTLE
CLIMB 3 TO 5 STEPS WITH RAILING: A LOT
PERSONAL GROOMING: A LITTLE

## 2022-10-07 ASSESSMENT — ENCOUNTER SYMPTOMS
DOUBLE VISION: 0
BLURRED VISION: 0
FOCAL WEAKNESS: 1
NAUSEA: 1
SHORTNESS OF BREATH: 0
HEADACHES: 1
CONSTIPATION: 1
VOMITING: 0

## 2022-10-07 NOTE — HOSPITAL COURSE
Jacque Mcintyre is a 41-year-old female with PMH significant for melanoma 2016 status post immunotherapy, DM2 and depression who presented 10/1/2022 with left-sided weakness.  She had an abnormal PET scan earlier this year, however biopsy was reportedly negative.  CT head 10/1/2022 showed 3 masses in the right cerebral hemisphere with extensive vasogenic edema, localized midline shift to the left, as well as 2 small masses in the right frontal lobe with increased density.  CT chest abdomen showed bilateral pulmonary nodules (12 mm at largest), enlarged hilar and external iliac Lns, LUQ SQ soft tissue nodules, RLQ peritoneal nodule, 33 mm left ovarian cyst, thyroid nodules (23 mm at largest).  She was started on Decadron and Keppra.  She underwent right frontal craniotomy for tumor resection on 10/6.   Postoperatively, she was noted to have worsening left sided weakness LUE/LLE 0-1/5  She had a repeat CT head postop which showed small amount of extra-axial intracavitary right frontal air after the resection, no evidence of significant mass-effect, large territorial infarction or new mass.

## 2022-10-07 NOTE — CARE PLAN
The patient is Stable - Low risk of patient condition declining or worsening    Shift Goals  Clinical Goals: Pain management, MRI, VSS  Patient Goals: Rest, pain control  Family Goals: TRIXIE    Progress made toward(s) clinical / shift goals:        Problem: Knowledge Deficit - Standard  Goal: Patient and family/care givers will demonstrate understanding of plan of care, disease process/condition, diagnostic tests and medications  Outcome: Progressing     Problem: Pain - Standard  Goal: Alleviation of pain or a reduction in pain to the patient’s comfort goal  Outcome: Progressing     Problem: Fall Risk  Goal: Patient will remain free from falls  Outcome: Progressing     Problem: Psychosocial  Goal: Patient's level of anxiety will decrease  Outcome: Progressing  Goal: Patient's ability to verbalize feelings about condition will improve  Outcome: Progressing  Goal: Patient's ability to re-evaluate and adapt role responsibilities will improve  Outcome: Progressing  Goal: Patient and family will demonstrate ability to cope with life altering diagnosis and/or procedure  Outcome: Progressing  Goal: Spiritual and cultural needs incorporated into hospitalization  Outcome: Progressing     Problem: Communication  Goal: The ability to communicate needs accurately and effectively will improve  Outcome: Progressing     Problem: Hemodynamics  Goal: Patient's hemodynamics, fluid balance and neurologic status will be stable or improve  Outcome: Progressing     Problem: Respiratory  Goal: Patient will achieve/maintain optimum respiratory ventilation and gas exchange  Outcome: Progressing     Problem: Urinary Elimination  Goal: Establish and maintain regular urinary output  Outcome: Progressing     Problem: Mobility  Goal: Patient's capacity to carry out activities will improve  Outcome: Progressing     Problem: Self Care  Goal: Patient will have the ability to perform ADLs independently or with assistance (bathe, groom, dress, toilet  and feed)  Outcome: Progressing     Problem: Skin Integrity  Goal: Skin integrity is maintained or improved  Outcome: Progressing       Patient is not progressing towards the following goals:

## 2022-10-07 NOTE — OP REPORT
"Neurosurgery Operative Note    Patient Name: Jacque Mcintyre MRN: [id number] YOB: 1980  Date of Surgery:  10/6/22    SURGEON: Pebbles Tejada M.D.    ASSISTANT: none    PRE-OPERATIVE DIAGNOSIS:   Right frontal intraaxial brain lesions  Vasogenic edema, compression of brain  History of melanoma  Left hemiparesis  Type II diabetes mellitus           POST-OPERATIVE DIAGNOSIS:   Right frontal intraaxial brain tumors  Vasogenic edema, compression of brain  History of melanoma  Left hemiparesis  Type II diabetes mellitus           PROCEDURE:   Right frontal craniotomy for resection of supratentorial intraaxial brain tumors, three, same incision and same craniotomy  Use of intradural stereotacic neuronavigation           ANESTHESIA: GETA           ESTIMATED BLOOD LOSS: 200cc           DRAINS: none    FINDINGS: three vascular right frontal tumors, gross total resection           SPECIMENS:   ID Type Source Tests Collected by Time Destination   A : RIGHT FRONTAL BRAIN MASS #1 Tissue Brain PATHOLOGY SPECIMEN Pebbles Tejada M.D. 10/6/2022 10:29 AM    B : RIGHT FRONTAL BRAIN LESION #2 Tissue Brain PATHOLOGY SPECIMEN Pebbles Tejada M.D. 10/6/2022 10:47 AM    C : RIGHT FRONTAL BRAIN LESION #3 Tissue Brain PATHOLOGY SPECIMEN Pebbles Tejada M.D. 10/6/2022 11:07 AM               IMPLANTS:   Implant Name Type Inv. Item Serial No.  Lot No. LRB No. Used Action   GRAFT DURAMATRIX ONLAY PLUS 3\" X 3\" OR 7.5CM X 7.5CM Dura GRAFT DURAMATRIX ONLAY PLUS 3\" X 3\" OR 7.5CM X 7.5CM  [employer]LER CRANIOMAXILLOFACIAL [id number] Right 1 Implanted   SCREW STRYK NC 1.5X5MM ([id number]) (80EA/[id number]) CONSIGNED  PRE-LOAD Screw SCREW STRYK NC 1.5X5MM ([id number]) (80EA/[id number]) CONSIGNED  PRE-LOAD  TYLER CRANIOMAXILLOFACIAL  Right 12 Implanted   PLATE NC ARMIDA HOLE COVER 10MM ([id number]) Plate PLATE NC ARMIDA HOLE COVER 10MM ([id number])  TYLER CRANIOMAXILLOFACIAL  Right 1 Implanted   PLATE NC ARMIDA HOLE COVER FOR SHUNT 14MM " (2VOT4=00) Plate PLATE NC ARMIDA HOLE COVER FOR SHUNT 14MM ([id number]=45)  TYLER CRANIOMAXILLOFACIAL  Right 3 Implanted              COMPLICATIONS: None apparent.    Operative Indications: The patient is a 41 year old woman with a history of melanoma presenting with left hemiparesis. She was found to have new intracranial lesions concerning for metastatic disease, three in the right frontal lobe with the largest > 3cm.  Due to the large size of this lesion, the proximity to the motor strip, and the presence of nearby satellite lesions, a craniotomy for resection was recommended prior to radiation and chemotherapy.    The indications, benefits, alternatives and risks to the procedure were discussed with the patient, which included but were not limited to bleeding, infection, stroke, injury to nearby nerves and vessels, an inability to obrtain a diagnosis, cerebrospinal fluid leak, new temporary or permanent weakness or sensory changes, difficulty swallowing, hoarseness, worsened imbalance or discoordination, coma and rarely, even death.  The patient was in agreement and wished to proceed.    Operative Details: The patient was identified in the pre-operative holding area by perioperative staff by two forms of identification. The patient was brought to the operating room, induced under general anesthesia and intubated without complication. Antibiotics, Decadron and Keppra were administered. A Graf catheter, IV access and arterial line were placed by the Anesthesia team and nursing staff. Subcutaneous needles for neuromonitoring were placed by the technician to monitor MEP and SSEP. The patient was positioned supine on the operating table with a bump under the left shoulder. The head was secured in a Crandon headholder and positioned with the left side up, sagittal sinus parallel to the floor, and the head tilted upward. The patient's craniofacial landmarks were used to co-register to the pre-operative MRI. The locations  of the three right frontal tumors were marked on the scalp. A curvilinear incision was planned eccentric to the right, across midline. The hair was clipped, and the scalp was prepped with chlorhexidine scrub, isopropyl alcohol-soaked 4x4 sponges, then Chloraprep. The patient was draped in the usual sterile fashion. A formal time-out indicated the correct patient, procedure and side.     Marcaine 0.5% with epinephrine 1:200,000 was injected subcutaneously along the incision. Incision was made with a 10-blade. Monopolar cautery was used to dissect through the subcutaneous tissue in the subperiosteal plane to raise a cutaneous flap anteriorly and posteriorly. The flaps were retracted with fish hooks. The tumor edges were marked on the skull using neuronavigation and a frontal craniotomy was planned. Four clark holes were created with a high-speed drill bit and expanded with a curette, including two on midline over the sagittal sinus. The epidural plane was dissected with a [place] dental and Penfield 3 instrument. A B1 footplate was used to turn a craniotomy, and the flap was soaked on the back table. Hemostasis was achieved with bipolar cautery and thrombin soaked Gelfoam.      There was a durotomy frontally, and this opening was extended sharply with Metzenbaum scissor in a curved fashion, the base along the sagittal sinus, and tacked medially with 4-0 Nuralon suture. A 4-electrode contact lead was placed posteriorly subdurally for phase reversal to detect the primary motor and sensory cortices, which were well posterior to the edge of the craniotomy between electrodes 1 and 2. The neuronavigation probe was used to identify the desired cortical entry point. The stimulator probe was brought into the field, and the surrounding cortical areas were stimulated up to 15 mA.  There was no activation of motor evoked potentials in the location of the desired corticectomy, but there was cortical activation two gyri posterior to  this. The cortical surface was coagulated with bipolar cautery and incised sharply with a 15 blade. The white matter was dissected with cautery and suction until a dark, firm lesion was encountered. In a systematic manner, a plane between a normal white matter and abnormal tissue was established using bipolar cautery and suction and blunt dissection with fibrillar balls.  The plane was maintained with cottonoid patties.  The microscope was draped and brought into the field. Under bright magnification utilizing microsurgical technique, the tumor pseudocapsule was dissected from the white matter. Subcortical mapping posteriorly and inferiorly confirmed that corticospinal tracts were at least 8-15mm away from the site of dissection. The tumor was entered and there was significant hemorrhage from the medial aspect, likely the source of the blood supply. Extra time and care was taken to control the bleeding. The dissection continued until the lesion was freed and resected en bloc. Frozen section was confirmed to be lesional tissue consistent with melanoma.  Hemostasis was meticulously achieved with bipolar cautery and fibrillar.    Attention was then turned to the more anterior lesion. Neuronavigation confirmed the location. A medial corticectomy was performed and the lesion encountered. It was sampled for permanent section and removed in a piecemeal fashion with bipolar cautery and suction. Hemostasis was meticulously achieved.    Finally, the third most anterior lesion was identified with intraoperative ultrasound as a hyperechoic spherical lesion. Another corticectomy was created and dissection to the lesion was performed with bipolar cautery and suction. This lesion was sampled for permanent section, and removed in a piecemeal fashion. Part of the lesion demonstrated old hemorrhagic contents, which were suctioned out. Once I was satisfied with the resection, all three operative beds were inspected for excellent  hemostasis.Transcranial MEPs were performed without change in the signals.    The field was copiously irrigated with Ancef infused saline.  A Duragen inlay was placed, and the dura was reopposed with interrupted 4-0 Nurolon suture.  The bone flap was secured to the skull with titanium mini-plates and screws.     The incision was closed in layers, interrupted inverted 0-Vicryl for galea and staples for the skin. The skin was cleansed and dressed with bacitracin, Telfa, and Medpore tape.    The Gregory headholder was removed and the patient was extubated, and taken to the recovery room in a stable condition.     All counts were correct x2.      KIMBERLYN Horowitz was present for the entirety of the surgery.

## 2022-10-07 NOTE — THERAPY
PT Contact Note    PT tx attempted post-op crani with tumor resection however pt feeling fatigued and in pain, requesting to rest today. Pt requested to have PT reposition her LLE, increased tone noted throughout LLE primarily at ankle with strong plantarflexion and inversion. PT with difficulty positioning L ankle in PRAFO requiring 2-person assist for optimal placement. Discussed with nsg for 2 hour don/doff PRAFO schedule and ROM. Will re-attempt PT tx tomorrow as able.    Shagufta Dawson, PT, DPT  Ext. [phone]

## 2022-10-07 NOTE — PROGRESS NOTES
Critical Care Progress Note    Date of admission  10/1/2022    Chief Complaint  Left sided weakness    Hospital Course  Jacque Mcintyre is a 41-year-old female with PMH significant for melanoma 2016 status post immunotherapy, DM2 and depression who presented 10/1/2022 with left-sided weakness.  She had an abnormal PET scan earlier this year, however biopsy was reportedly negative.  CT head 10/1/2022 showed 3 masses in the right cerebral hemisphere with extensive vasogenic edema, localized midline shift to the left, as well as 2 small masses in the right frontal lobe with increased density.  CT chest abdomen showed bilateral pulmonary nodules (12 mm at largest), enlarged hilar and external iliac Lns, LUQ SQ soft tissue nodules, RLQ peritoneal nodule, 33 mm left ovarian cyst, thyroid nodules (23 mm at largest).  She was started on Decadron and Keppra.  She underwent right frontal craniotomy for tumor resection on 10/6.   Postoperatively, she was noted to have worsening left sided weakness LUE/LLE 0-1/5  She had a repeat CT head postop which showed small amount of extra-axial intracavitary right frontal air after the resection, no evidence of significant mass-effect, large territorial infarction or new mass.      Interval Problem Update  Right orbital swelling with mild ecchymosis  Complains of 10/10 headaches.  MRI pending  Neuro: Drowsy, awakens to voice.  Oriented x4.  Following commands.  Able to wiggle fingers on the left, otherwise unable to to move LUE.  T-max 98.9  SB 50s  SBP's 80's after Dilaudid -110s at baseline  I/O: 2100/3900  Last BM 9/30 escalating bowel protocol  Mobility 1    Review of Systems  Review of Systems   Constitutional:  Positive for malaise/fatigue.   Eyes:  Negative for blurred vision and double vision.   Respiratory:  Negative for shortness of breath.    Cardiovascular:  Negative for chest pain.   Gastrointestinal:  Positive for constipation and nausea. Negative for vomiting.   Neurological:   Positive for focal weakness and headaches.   All other systems reviewed and are negative.     Vital Signs for last 24 hours   Temp:  [36.4 °C (97.5 °F)-36.9 °C (98.5 °F)] 36.6 °C (97.9 °F)  Pulse:  [51-64] 58  Resp:  [10-26] 20  BP: ()/(54-72) 103/64  SpO2:  [96 %-100 %] 96 %    Hemodynamic parameters for last 24 hours       Respiratory Information for the last 24 hours       Physical Exam   Physical Exam  Vitals and nursing note reviewed.   Constitutional:       General: She is sleeping. She is not in acute distress.     Appearance: Normal appearance.      Interventions: Nasal cannula in place.   HENT:      Head: Normocephalic.      Right Ear: Hearing normal.      Left Ear: Hearing normal.      Nose: Nose normal.      Mouth/Throat:      Lips: Pink.      Mouth: Mucous membranes are moist.   Eyes:      General: No scleral icterus.     Pupils: Pupils are equal, round, and reactive to light.      Comments: Right eye orbital swelling.   Cardiovascular:      Rate and Rhythm: Normal rate and regular rhythm.   Pulmonary:      Effort: Pulmonary effort is normal. No respiratory distress.      Breath sounds: No wheezing.   Abdominal:      Palpations: Abdomen is soft.   Musculoskeletal:      Comments: 5/5 strength in RUE and RLE.    2/5 strength in LUE and 0/5 in LLE (wiggles fingers)   Skin:     General: Skin is warm and dry.   Neurological:      Mental Status: She is easily aroused.      Motor: Weakness present.   Psychiatric:         Mood and Affect: Mood normal.         Behavior: Behavior normal.         Cognition and Memory: Cognition normal.       Medications  Current Facility-Administered Medications   Medication Dose Route Frequency Provider Last Rate Last Admin    acetaminophen (Tylenol) tablet 650 mg  650 mg Oral Q6HRS PRN Maral Sommer, A.P.R.N.        polyethylene glycol/lytes (MIRALAX) PACKET 1 Packet  1 Packet Oral DAILY Maral Sommer, A.P.R.N.        And    senna-docusate (PERICOLACE or SENOKOT S) 8.6-50  MG per tablet 2 Tablet  2 Tablet Oral BID Maral Sommer, A.P.R.N.        And    magnesium hydroxide (MILK OF MAGNESIA) suspension 30 mL  30 mL Oral QDAY PRN Maral Sommer A.P.R.NKalee        And    bisacodyl (DULCOLAX) suppository 10 mg  10 mg Rectal QDAY PRN Maral Sommer A.P.R.N.        dexamethasone (DECADRON) injection 4 mg  4 mg Intravenous Q6HRS Pebbles Tejada M.D.   4 mg at 10/07/22 1109    levETIRAcetam (Keppra) injection 1,000 mg  1,000 mg Intravenous Q12HRS Pebbles Tejada M.D.   1,000 mg at 10/07/22 0601    metoclopramide (REGLAN) injection 10 mg  10 mg Intravenous Q6HRS PRN Elizabeth Aguilera M.D.   10 mg at 10/07/22 1530    HYDROmorphone (Dilaudid) injection 0.5-1 mg  0.5-1 mg Intravenous Q2HRS PRN Garrett Reese M.D.   0.5 mg at 10/07/22 1558    prochlorperazine (COMPAZINE) injection 10 mg  10 mg Intravenous Q6HRS PRN Shaye Shafer   10 mg at 10/07/22 1328    acetaminophen (TYLENOL) suppository 650 mg  650 mg Rectal Q6HRS PRN Shaye Shafer        insulin GLARGINE (Lantus,Semglee) injection  0.2 Units/kg/day Subcutaneous Q EVENING Rachana Robert M.D.   14 Units at 10/05/22 1757    And    insulin lispro (AdmeLOG,HumaLOG) injection  0-12 Units Subcutaneous 4X/DAY ACHS Rachana Robert M.D.   2 Units at 10/06/22 2218    And    dextrose 50% (D50W) injection 25 g  25 g Intravenous Q15 MIN PRN Rachana Robert M.D.        oxyCODONE immediate-release (ROXICODONE) tablet 5 mg  5 mg Oral Q4HRS PRN Teodoro Figueroa M.D.   5 mg at 10/05/22 2008    atenolol (TENORMIN) tablet 25 mg  25 mg Oral QAM Aditya Del Castillo M.D.        [Held by provider] enoxaparin (Lovenox) inj 40 mg  40 mg Subcutaneous DAILY AT 1800 Rachana Robert M.D.   40 mg at 10/03/22 1706    nystatin (MYCOSTATIN) powder   Topical BID Aditya Del Castillo M.D.   Given at 10/06/22 0426    ondansetron (ZOFRAN) syringe/vial injection 4 mg  4 mg Intravenous Q6HRS PRN Aditya Del Castillo M.D.   4 mg at 10/07/22 1016    ALPRAZolam (XANAX) tablet 0.25 mg   0.25 mg Oral 4X/DAY PRN Aditya Del Castilol M.D.   0.25 mg at 10/05/22 2008    famotidine (PEPCID) tablet 20 mg  20 mg Oral BID Aditya Del Castillo M.D.   20 mg at 10/05/22 1756    albuterol inhaler 2 Puff  2 Puff Inhalation Q4HRS PRN Rhett Coffey M.D.   2 Puff at 10/05/22 0555    atorvastatin (LIPITOR) tablet 40 mg  40 mg Oral Q EVENING Rhett Coffey M.D.   40 mg at 10/05/22 1756    DULoxetine (CYMBALTA) capsule 60 mg  60 mg Oral Q EVENING Rhett Coffye M.D.   60 mg at 10/05/22 1756    gabapentin (NEURONTIN) capsule 800 mg  800 mg Oral 4X/DAY Rhett Coffey M.D.   800 mg at 10/05/22 2008    OXcarbazepine (TRILEPTAL) tablet 300 mg  300 mg Oral BID Rhett Coffey M.D.   300 mg at 10/05/22 2007    QUEtiapine (Seroquel) tablet 200 mg  200 mg Oral Q EVENING Rhett Coffey M.D.   200 mg at 10/05/22 1756       Fluids    Intake/Output Summary (Last 24 hours) at 10/7/2022 1627  Last data filed at 10/7/2022 1600  Gross per 24 hour   Intake 300 ml   Output 2625 ml   Net -2325 ml       Laboratory          Recent Labs     10/05/22  0050 10/06/22  0130 10/07/22  0415   SODIUM 137 135 142   POTASSIUM 4.3 3.7 3.9   CHLORIDE 100 98 106   CO2 23 25 25   BUN 18 24* 14   CREATININE 0.53 0.46* 0.38*   CALCIUM 9.7 9.3 8.3*     Recent Labs     10/05/22  0050 10/06/22  0130 10/07/22  0415   GLUCOSE 269* 241* 165*     Recent Labs     10/05/22  0050 10/06/22  0130 10/07/22  0415   WBC 10.4 13.6* 18.0*   NEUTSPOLYS 80.10* 66.60 83.30*   LYMPHOCYTES 13.60* 22.90 7.10*   MONOCYTES 5.50 9.70 9.00   EOSINOPHILS 0.00 0.20 0.00   BASOPHILS 0.20 0.10 0.20     Recent Labs     10/05/22  0050 10/05/22  1544 10/06/22  0130 10/07/22  0415   RBC 4.73  --  4.89 4.05*   HEMOGLOBIN 11.8*  --  12.4 10.2*   HEMATOCRIT 37.4  --  39.2 33.0*   PLATELETCT 483*  --  502* 466*   PROTHROMBTM  --  13.3  --   --    APTT  --  26.8  --   --    INR  --  1.02  --   --        Imaging  CT:    Reviewed    Assessment/Plan  Constipation  Assessment  & Plan  -last known BM 9/30  -on narcotics, poor mobility, recent surgery  -abdominal xray showing constipation  -daily Miralax and bowel protocol    Chronic prescription benzodiazepine use- (present on admission)  Assessment & Plan  -continue home Xanax    Malignant melanoma metastatic to brain (HCC)- (present on admission)  Assessment & Plan  -POD #1 tumor resection  -Serial neurologic exams  -Monitor drain output  -Brain MRI  -Steroids  -Keppra  -Neurosurgery following  -HOB >30    Brain metastases (HCC)  Assessment & Plan  Likely from metastatic melanoma with 3 masses in the right cerebral hemisphere with extensive edema-now status post craniotomy with tumor resection.  She also has smaller satellite lesions.  Postop with left-sided weakness -repeat CT head shows some postop changes but otherwise no significant mass-effect, large territorial infarction or new mass.    -MRI  -Neurochecks  -Pain control with p.o. oxycodone and IV Dilaudid  -Stat CT head for any acute changes  -Decadron 4 mg every 6 hours  -Keppra 1 g twice daily  -Appreciate neurosurgery recs  -Appreciate rad onc recs    DM2 (diabetes mellitus, type 2) (HCC)- (present on admission)  Assessment & Plan  -Accu-Cheks AC at bedtime with SSI  -Hypoglycemia protocol  -Long-acting insulin  -CHO diet    Depression- (present on admission)  Assessment & Plan  -continue Cymbalta       VTE:  Contraindicated  Ulcer: Not Indicated  Lines: Graf Catheter  Ongoing indication addressedArt line    I have performed a physical exam and reviewed and updated ROS and Plan today (10/7/2022). In review of yesterday's note (10/6/2022), there are no changes except as documented above.     Discussed patient condition and risk of morbidity and/or mortality with RN, RT, Pharmacy, , Charge nurse / hot rounds, Patient, neurosurgery, and my attending Dr. Brown.  The patient remains critically ill.  Critical care time = 54 minutes in directly providing and  coordinating critical care and extensive data review.  No time overlap and excludes procedures.    Please note that this dictation was created using voice recognition software. I have made every reasonable attempt to correct obvious errors, but there may be errors of grammar and possibly content that I did not discover before finalizing the note.    NORA Peterson.

## 2022-10-07 NOTE — PROGRESS NOTES
Neurosurgery Progress Note    Subjective:  S/p craniotomy for resection of three right frontal tumors  Headache all night. No moving left leg since surgery.    Exam:  Sitting comfortably in a chair.  Awake, alert, oriented to person, place and time.  Pupils equally round and reactive to light, 4 to 3mm.  Extraocular movements full.  Facial sensation intact to light touch.  Face symmetric, HB 1.  Palate rises symmetrically.  Tongue is midline.  Shoulder shrug 5/5.  No pronator drift.  Sensation intact to light touch in all 4 extremities.  Left leg no movement. Left arm moves at least 3/5 in all muscle groups.    Cranial incision dressed, c/d/I.    Recent Labs     10/05/22  0050 10/06/22  0130 10/07/22  0415   WBC 10.4 13.6* 18.0*   RBC 4.73 4.89 4.05*   HEMOGLOBIN 11.8* 12.4 10.2*   HEMATOCRIT 37.4 39.2 33.0*   MCV 79.1* 80.2* 81.5   MCH 24.9* 25.4* 25.2*   MCHC 31.6* 31.6* 30.9*   RDW 47.1 46.9 47.8   PLATELETCT 483* 502* 466*   MPV 10.0 9.8 10.2       Recent Labs     10/05/22  0050 10/06/22  0130 10/07/22  0415   SODIUM 137 135 142   POTASSIUM 4.3 3.7 3.9   CHLORIDE 100 98 106   CO2 23 25 25   GLUCOSE 269* 241* 165*   BUN 18 24* 14   CREATININE 0.53 0.46* 0.38*   CALCIUM 9.7 9.3 8.3*       Recent Labs     10/05/22  1544   APTT 26.8   INR 1.02             Assessment and Plan:  Ms. Mcintyre is a 41-year-old woman who is presenting with newly diagnosed stage IV metastatic melanoma.  She has a dominant and symptomatic right frontal lobe metastasis, as well as several smaller satellite lesions in the right frontal lobe.  She also has at least 3 additional tiny supratentorial lesions. Now POD#1 s/p right frontal craniotomy for resection of 3 frontal tumors.    - continue ICU care today, OK to transfer to floor tomorrow  - MRI today  - decadron 4mg q6h  - Keppra 1g BID  - ADAT  - PT/OT/OOB  - Dressing off tomorrow  - hold DVT ppx  Please call with questions.    Pebbles Tejada M.D.     A total of 35 minutes were spent in the  evaluation, examination, and coordination of care of this patient.

## 2022-10-07 NOTE — PROGRESS NOTES
Patient admitted to R116 from PACU.  Report received from Luis RODRIGUEZ.   Pt received oxycodone in PACU but still c/o 10/10 pain.  Dr. Tejada paged and new order noted for PRN IV dilaudid

## 2022-10-07 NOTE — ASSESSMENT & PLAN NOTE
-last known BM 9/30  -on narcotics, poor mobility, recent surgery  -abdominal xray showing constipation  -daily Miralax and bowel protocol

## 2022-10-07 NOTE — THERAPY
Occupational Therapy Contact Note    Attempted OT session, pt now s/p crani w/ tumor resection. Pt feeling fatigued and in pain today, requesting to rest today. Will attempt tomorrow as able.    Meredith Portillo, OTR/L

## 2022-10-08 LAB
GLUCOSE BLD STRIP.AUTO-MCNC: 143 MG/DL (ref 65–99)
GLUCOSE BLD STRIP.AUTO-MCNC: 152 MG/DL (ref 65–99)
GLUCOSE BLD STRIP.AUTO-MCNC: 154 MG/DL (ref 65–99)
GLUCOSE BLD STRIP.AUTO-MCNC: 224 MG/DL (ref 65–99)

## 2022-10-08 PROCEDURE — 700102 HCHG RX REV CODE 250 W/ 637 OVERRIDE(OP): Performed by: STUDENT IN AN ORGANIZED HEALTH CARE EDUCATION/TRAINING PROGRAM

## 2022-10-08 PROCEDURE — 700111 HCHG RX REV CODE 636 W/ 250 OVERRIDE (IP): Performed by: NEUROLOGICAL SURGERY

## 2022-10-08 PROCEDURE — 700102 HCHG RX REV CODE 250 W/ 637 OVERRIDE(OP): Performed by: NURSE PRACTITIONER

## 2022-10-08 PROCEDURE — 700111 HCHG RX REV CODE 636 W/ 250 OVERRIDE (IP): Performed by: INTERNAL MEDICINE

## 2022-10-08 PROCEDURE — 700102 HCHG RX REV CODE 250 W/ 637 OVERRIDE(OP): Performed by: HOSPITALIST

## 2022-10-08 PROCEDURE — 82962 GLUCOSE BLOOD TEST: CPT | Mod: 91

## 2022-10-08 PROCEDURE — 770004 HCHG ROOM/CARE - ONCOLOGY PRIVATE *

## 2022-10-08 PROCEDURE — A9270 NON-COVERED ITEM OR SERVICE: HCPCS | Performed by: HOSPITALIST

## 2022-10-08 PROCEDURE — 700111 HCHG RX REV CODE 636 W/ 250 OVERRIDE (IP): Performed by: STUDENT IN AN ORGANIZED HEALTH CARE EDUCATION/TRAINING PROGRAM

## 2022-10-08 PROCEDURE — A9270 NON-COVERED ITEM OR SERVICE: HCPCS | Performed by: STUDENT IN AN ORGANIZED HEALTH CARE EDUCATION/TRAINING PROGRAM

## 2022-10-08 PROCEDURE — 97112 NEUROMUSCULAR REEDUCATION: CPT

## 2022-10-08 PROCEDURE — 700102 HCHG RX REV CODE 250 W/ 637 OVERRIDE(OP): Performed by: INTERNAL MEDICINE

## 2022-10-08 PROCEDURE — A9270 NON-COVERED ITEM OR SERVICE: HCPCS | Performed by: NURSE PRACTITIONER

## 2022-10-08 PROCEDURE — 97530 THERAPEUTIC ACTIVITIES: CPT

## 2022-10-08 PROCEDURE — 97164 PT RE-EVAL EST PLAN CARE: CPT

## 2022-10-08 PROCEDURE — 99233 SBSQ HOSP IP/OBS HIGH 50: CPT | Performed by: STUDENT IN AN ORGANIZED HEALTH CARE EDUCATION/TRAINING PROGRAM

## 2022-10-08 RX ORDER — OXYCODONE HYDROCHLORIDE 5 MG/1
5 TABLET ORAL EVERY 4 HOURS PRN
Status: DISCONTINUED | OUTPATIENT
Start: 2022-10-08 | End: 2022-10-12

## 2022-10-08 RX ADMIN — SENNOSIDES AND DOCUSATE SODIUM 2 TABLET: 50; 8.6 TABLET ORAL at 17:19

## 2022-10-08 RX ADMIN — SENNOSIDES AND DOCUSATE SODIUM 2 TABLET: 50; 8.6 TABLET ORAL at 06:26

## 2022-10-08 RX ADMIN — LEVETIRACETAM 1000 MG: 500 TABLET, FILM COATED ORAL at 06:26

## 2022-10-08 RX ADMIN — HYDROMORPHONE HYDROCHLORIDE 0.5 MG: 1 INJECTION, SOLUTION INTRAMUSCULAR; INTRAVENOUS; SUBCUTANEOUS at 17:26

## 2022-10-08 RX ADMIN — DEXAMETHASONE SODIUM PHOSPHATE 4 MG: 4 INJECTION, SOLUTION INTRA-ARTICULAR; INTRALESIONAL; INTRAMUSCULAR; INTRAVENOUS; SOFT TISSUE at 17:19

## 2022-10-08 RX ADMIN — OXCARBAZEPINE 300 MG: 300 TABLET, FILM COATED ORAL at 08:03

## 2022-10-08 RX ADMIN — INSULIN LISPRO 2 UNITS: 100 INJECTION, SOLUTION INTRAVENOUS; SUBCUTANEOUS at 18:45

## 2022-10-08 RX ADMIN — HYDROMORPHONE HYDROCHLORIDE 0.5 MG: 1 INJECTION, SOLUTION INTRAMUSCULAR; INTRAVENOUS; SUBCUTANEOUS at 11:43

## 2022-10-08 RX ADMIN — QUETIAPINE FUMARATE 200 MG: 100 TABLET ORAL at 21:00

## 2022-10-08 RX ADMIN — FAMOTIDINE 20 MG: 20 TABLET, FILM COATED ORAL at 06:26

## 2022-10-08 RX ADMIN — FAMOTIDINE 20 MG: 20 TABLET, FILM COATED ORAL at 17:19

## 2022-10-08 RX ADMIN — NYSTATIN: 100000 POWDER TOPICAL at 17:20

## 2022-10-08 RX ADMIN — HYDROMORPHONE HYDROCHLORIDE 0.5 MG: 1 INJECTION, SOLUTION INTRAMUSCULAR; INTRAVENOUS; SUBCUTANEOUS at 06:45

## 2022-10-08 RX ADMIN — DEXAMETHASONE SODIUM PHOSPHATE 4 MG: 4 INJECTION, SOLUTION INTRA-ARTICULAR; INTRALESIONAL; INTRAMUSCULAR; INTRAVENOUS; SOFT TISSUE at 11:43

## 2022-10-08 RX ADMIN — ACETAMINOPHEN 650 MG: 325 TABLET, FILM COATED ORAL at 18:39

## 2022-10-08 RX ADMIN — ACETAMINOPHEN 650 MG: 325 TABLET, FILM COATED ORAL at 08:06

## 2022-10-08 RX ADMIN — HYDROMORPHONE HYDROCHLORIDE 1 MG: 1 INJECTION, SOLUTION INTRAMUSCULAR; INTRAVENOUS; SUBCUTANEOUS at 20:59

## 2022-10-08 RX ADMIN — POLYETHYLENE GLYCOL 3350 1 PACKET: 17 POWDER, FOR SOLUTION ORAL at 06:26

## 2022-10-08 RX ADMIN — GABAPENTIN 800 MG: 400 CAPSULE ORAL at 21:00

## 2022-10-08 RX ADMIN — DULOXETINE 60 MG: 20 CAPSULE, DELAYED RELEASE ORAL at 17:20

## 2022-10-08 RX ADMIN — GABAPENTIN 800 MG: 400 CAPSULE ORAL at 17:19

## 2022-10-08 RX ADMIN — GABAPENTIN 800 MG: 400 CAPSULE ORAL at 13:19

## 2022-10-08 RX ADMIN — NYSTATIN: 100000 POWDER TOPICAL at 06:26

## 2022-10-08 RX ADMIN — LEVETIRACETAM 1000 MG: 500 TABLET, FILM COATED ORAL at 17:20

## 2022-10-08 RX ADMIN — ALBUTEROL SULFATE 2 PUFF: 90 AEROSOL, METERED RESPIRATORY (INHALATION) at 03:35

## 2022-10-08 RX ADMIN — GABAPENTIN 800 MG: 400 CAPSULE ORAL at 08:03

## 2022-10-08 RX ADMIN — ENOXAPARIN SODIUM 40 MG: 40 INJECTION SUBCUTANEOUS at 17:20

## 2022-10-08 RX ADMIN — INSULIN GLARGINE-YFGN 14 UNITS: 100 INJECTION, SOLUTION SUBCUTANEOUS at 18:45

## 2022-10-08 RX ADMIN — INSULIN LISPRO 4 UNITS: 100 INJECTION, SOLUTION INTRAVENOUS; SUBCUTANEOUS at 21:14

## 2022-10-08 RX ADMIN — INSULIN LISPRO 2 UNITS: 100 INJECTION, SOLUTION INTRAVENOUS; SUBCUTANEOUS at 13:18

## 2022-10-08 RX ADMIN — DEXAMETHASONE SODIUM PHOSPHATE 4 MG: 4 INJECTION, SOLUTION INTRA-ARTICULAR; INTRALESIONAL; INTRAMUSCULAR; INTRAVENOUS; SOFT TISSUE at 06:26

## 2022-10-08 RX ADMIN — HYDROMORPHONE HYDROCHLORIDE 0.5 MG: 1 INJECTION, SOLUTION INTRAMUSCULAR; INTRAVENOUS; SUBCUTANEOUS at 09:00

## 2022-10-08 RX ADMIN — HYDROMORPHONE HYDROCHLORIDE 0.5 MG: 1 INJECTION, SOLUTION INTRAMUSCULAR; INTRAVENOUS; SUBCUTANEOUS at 03:36

## 2022-10-08 RX ADMIN — HYDROMORPHONE HYDROCHLORIDE 0.5 MG: 1 INJECTION, SOLUTION INTRAMUSCULAR; INTRAVENOUS; SUBCUTANEOUS at 14:35

## 2022-10-08 RX ADMIN — DEXAMETHASONE SODIUM PHOSPHATE 4 MG: 4 INJECTION, SOLUTION INTRA-ARTICULAR; INTRALESIONAL; INTRAMUSCULAR; INTRAVENOUS; SOFT TISSUE at 00:04

## 2022-10-08 RX ADMIN — ATORVASTATIN CALCIUM 40 MG: 40 TABLET, FILM COATED ORAL at 17:20

## 2022-10-08 ASSESSMENT — ENCOUNTER SYMPTOMS
FEVER: 0
WHEEZING: 0
COUGH: 0
HEADACHES: 1
FOCAL WEAKNESS: 1
SHORTNESS OF BREATH: 0
ABDOMINAL PAIN: 0
CHILLS: 0
VOMITING: 0
NAUSEA: 0

## 2022-10-08 ASSESSMENT — COGNITIVE AND FUNCTIONAL STATUS - GENERAL
TOILETING: A LOT
HELP NEEDED FOR BATHING: A LOT
MOVING FROM LYING ON BACK TO SITTING ON SIDE OF FLAT BED: UNABLE
MOBILITY SCORE: 7
MOVING TO AND FROM BED TO CHAIR: UNABLE
PERSONAL GROOMING: A LITTLE
TURNING FROM BACK TO SIDE WHILE IN FLAT BAD: UNABLE
WALKING IN HOSPITAL ROOM: TOTAL
EATING MEALS: A LITTLE
DRESSING REGULAR UPPER BODY CLOTHING: A LOT
SUGGESTED CMS G CODE MODIFIER DAILY ACTIVITY: CK
DAILY ACTIVITIY SCORE: 14
SUGGESTED CMS G CODE MODIFIER MOBILITY: CM
CLIMB 3 TO 5 STEPS WITH RAILING: TOTAL
STANDING UP FROM CHAIR USING ARMS: A LOT
DRESSING REGULAR LOWER BODY CLOTHING: A LOT

## 2022-10-08 ASSESSMENT — PAIN DESCRIPTION - PAIN TYPE
TYPE: ACUTE PAIN

## 2022-10-08 ASSESSMENT — GAIT ASSESSMENTS: GAIT LEVEL OF ASSIST: UNABLE TO PARTICIPATE

## 2022-10-08 NOTE — PROGRESS NOTES
Hospital Medicine Daily Progress Note    Date of Service  10/8/2022    Chief Complaint  Jacque Mcintyre is a 41 y.o. female admitted 10/1/2022 with left-sided weakness    Hospital Course  CT head from 10/1/2022 showed 3 masses in the right cerebral hemisphere with extensive vasogenic edema, localized midline shift to the left side frontoparietal level, 2 small masses in the right frontal lobe with increased density which could indicate hemorrhage. Neurosurgery were consulted. MRI brain from 10/2/2022 showed right-sided predominant, supratentorial metastatic disease with associated edema, localized midline shift, evidence of prior hemorrhage within the masses.  CT chest, abdomen, pelvis from 10/1/2022 showed bilateral pulmonary nodules, enlarged left hilar lymph node, enlarged left external iliac lymph node, 9 mm right lower quadrant peritoneal nodule, 7 mm left and left upper quadrant subcutaneous soft tissue nodules.  Decadron and Keppra started.  Medical oncology and radiation oncology were consulted.      For medical oncology, will need to confirm diagnosis of melanoma and BRAF testing.  If BRAF positive, she will be candidate for BRAF/MAC inhibitor not she will need dual immunotherapy.     Patient was evaluated by neurosurgery on 10/4/2022.  She has been scheduled for right frontal craniotomy of tumor resection on 10/6/2022. NPO at midnight. Afebrile, hemodynamically stable.  SSI scale was increased due to elevated blood glucose readings.  Anxiety was controlled with medications.    Interval Problem Update  POD #2.  Continues to have headaches but well controlled on current pain meds.  Patient cleared for transfer back to the floor.  Continuing Decadron 4 mg every 6 hours.  Patient denies any seizures.  Incision are healing well.  Cleared to restart DVT prophylaxis by neurosurgery.    I have discussed this patient's plan of care and discharge plan at IDT rounds today with Case Management, Nursing, Nursing  leadership, and other members of the IDT team.    Consultants/Specialty  critical care and neurosurgery    Code Status  Full Code    Disposition  Patient is not medically cleared for discharge.   Anticipate discharge to to home with close outpatient follow-up.  I have placed the appropriate orders for post-discharge needs.    Review of Systems  Review of Systems   Constitutional:  Positive for malaise/fatigue. Negative for chills and fever.   Respiratory:  Negative for cough, shortness of breath and wheezing.    Cardiovascular:  Negative for chest pain.   Gastrointestinal:  Negative for abdominal pain, nausea and vomiting.   Neurological:  Positive for focal weakness (unchanged) and headaches.   All other systems reviewed and are negative.     Physical Exam  Temp:  [36.2 °C (97.1 °F)-36.9 °C (98.5 °F)] 36.8 °C (98.3 °F)  Pulse:  [54-74] 63  Resp:  [12-33] 18  BP: ()/(56-83) 112/67  SpO2:  [89 %-99 %] 89 %    Physical Exam  Vitals and nursing note reviewed.   Constitutional:       General: She is not in acute distress.  HENT:      Head:      Comments: Surgical incision-intact, dried blood  Eyes:      General: No scleral icterus.        Right eye: No discharge.         Left eye: No discharge.   Cardiovascular:      Rate and Rhythm: Normal rate and regular rhythm.      Heart sounds: Normal heart sounds.   Pulmonary:      Effort: Pulmonary effort is normal. No respiratory distress.      Breath sounds: Normal breath sounds.   Abdominal:      General: There is no distension.      Tenderness: There is no abdominal tenderness. There is no guarding.   Musculoskeletal:         General: No tenderness.      Right lower leg: No edema.      Left lower leg: No edema.   Skin:     General: Skin is warm and dry.      Coloration: Skin is not jaundiced.   Neurological:      Mental Status: She is alert and oriented to person, place, and time.      Comments: Left arm 3/5 and left leg proximally 3/5, no left foot movement    Psychiatric:         Mood and Affect: Mood normal.         Behavior: Behavior normal.         Thought Content: Thought content normal.       Fluids    Intake/Output Summary (Last 24 hours) at 10/8/2022 1009  Last data filed at 10/8/2022 0900  Gross per 24 hour   Intake 575 ml   Output 2050 ml   Net -1475 ml       Laboratory  Recent Labs     10/06/22  0130 10/07/22  0415   WBC 13.6* 18.0*   RBC 4.89 4.05*   HEMOGLOBIN 12.4 10.2*   HEMATOCRIT 39.2 33.0*   MCV 80.2* 81.5   MCH 25.4* 25.2*   MCHC 31.6* 30.9*   RDW 46.9 47.8   PLATELETCT 502* 466*   MPV 9.8 10.2     Recent Labs     10/06/22  0130 10/07/22  0415   SODIUM 135 142   POTASSIUM 3.7 3.9   CHLORIDE 98 106   CO2 25 25   GLUCOSE 241* 165*   BUN 24* 14   CREATININE 0.46* 0.38*   CALCIUM 9.3 8.3*     Recent Labs     10/05/22  1544   APTT 26.8   INR 1.02               Imaging  MR-BRAIN-WITH & W/O   Final Result      When compared with the previous MRI dated 10/2/2022, there has been significant interval reduction in the extent of contrast enhancement of the the previously seen metastatic lesions. Hemorrhagic products are noted within these lesions. There is diffuse    white matter edema is noted surrounding these lesions. There are no new lesions. These findings likely represents true tumor response or pseudo response secondary to the chemotherapy.      [id number] VIEW   Final Result      1. Diffuse colonic stool could represent constipation.   2.  No dilated loops of bowel or free air.      CT-HEAD W/O   Final Result      1.  Small amount of extra-axial and intracavitary RIGHT frontal applied following resection of RIGHT frontal masses   2.  No evidence of significant mass effect, large territorial infarction or new mass   3.  Residual tumor not excluded by this exam.         MR-BRAIN-WITH & W/O   Final Result      1.  Right-sided predominant, supratentorial metastatic disease with the largest mass in the right frontoparietal region measuring 3.4 x 3.0 cm as  described in detail above.   2.  Associated edema with regional associated cortical effacement and partial effacement of the right lateral ventricle. Minimal localized midline shift adjacent to the large right frontoparietal mass.   3.  Evidence of prior hemorrhage within these masses.   4.  Small focus of restricted diffusion in the right occipital bone could represent osseous metastatic disease.      CT-CHEST,ABDOMEN,PELVIS WITH   Final Result      1.  BILATERAL pulmonary nodules, the largest of which measures 12 mm in the LEFT lower lobe and which has enlarged since May of this year.   2.  Enlarged LEFT hilar lymph node   3.  Enlarged LEFT external iliac lymph node. This would be amenable to image guided tissue sampling if clinically appropriate.   4.  7 mm LEFT and LEFT upper quadrant subcutaneous soft tissue nodules, new since prior   5.  9 mm RIGHT lower quadrant peritoneal nodule, new since prior   6.  33 mm LEFT ovarian cyst   7.  Thyroid nodules measuring up to 23 mm   8.  BILATERAL L5 pars defects with grade 1 anterolisthesis of L5 on S1   9.  Incompletely assessed LEFT renal lesion, new since prior and most likely a cyst though attention on top is recommended.      DX-CHEST-PORTABLE (1 VIEW)   Final Result         1.  Possible pulmonary nodule within each lung. Metastasis is a possibility.      2.  No infiltrates or consolidations identified.      CT-HEAD W/O   Final Result   Addendum (preliminary) 1 of 1   These findings were discussed with JESENIA MONTALVO on 10/01/2022.      Final         1.  3 masses in the right cerebral hemisphere largest measuring 3.2 cm with extensive vasogenic edema as described above. Findings are likely due to metastases.      2.  Localized midline shift to the left side frontoparietal level measuring 3 mm.      3.  The 2 smaller masses in the right frontal lobe demonstrates some increased density which could indicate that hemorrhage may be present within these masses.      These  findings were discussed with Benjamín nurse for dr hernandez on 10/01/2022.              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 Underwent right frontal craniotomy for tumor resection  Continue Decadron 4 mg every 6 for 14 days  Keppra for seizure prophylaxis  Neurochecks    Constipation  Assessment & Plan  Constipation noted on abdominal x-ray  As needed laxatives    Brain metastases (HCC)  Assessment & Plan  Likely metastatic melanoma.  Continue Decadron and Keppra. Underwent right frontal craniotomy with tumor resection today.  Neurosurgery, Radiation oncology and medical oncology following    DM2 (diabetes mellitus, type 2) (HCC)- (present on admission)  Assessment & Plan  On steroids.  Continue SSI with Accu-Cheks and hypoglycemia protocol.  SSI scale increased due to elevated blood glucose readings.  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  Now with widespread metastatic disease.  Underwent right frontal craniotomy with tumor resection today. Transferred to Neuro ICU post surgically. Neurosurgery following.    Depression- (present on admission)  Assessment & Plan  Continue Xanax and Cymbalta.       VTE prophylaxis: SCDs/TEDs    I have performed a physical exam and reviewed and updated ROS and Plan today (10/8/2022). In review of yesterday's note (10/7/2022), there are no changes except as documented above.

## 2022-10-08 NOTE — PROGRESS NOTES
Jacque Mcintyre is a 41-year-old female with PMH significant for melanoma 2016 status post immunotherapy, DM2 and depression who presented 10/1/2022 with left-sided weakness.  She had an abnormal PET scan earlier this year, however biopsy was reportedly negative.  CT head 10/1/2022 showed 3 masses in the right cerebral hemisphere with extensive vasogenic edema, localized midline shift to the left, as well as 2 small masses in the right frontal lobe with increased density.  CT chest abdomen showed bilateral pulmonary nodules (12 mm at largest), enlarged hilar and external iliac Lns, LUQ SQ soft tissue nodules, RLQ peritoneal nodule, 33 mm left ovarian cyst, thyroid nodules (23 mm at largest).  She was started on Decadron and Keppra.  She underwent right frontal craniotomy for tumor resection on 10/6.   Postoperatively, she was noted to have worsening left sided weakness LUE/LLE 0-1/5  She had a repeat CT head postop which showed small amount of extra-axial intracavitary right frontal air after the resection, no evidence of significant mass-effect, large territorial infarction or new mass.     Neurosurgery recommending Keppra 1 g twice daily x3 months.  Neurosurgery okay to transfer to floor  MRI brain showed gross total resection of the tumors with no acute abnormalities.  No acute events overnight.  Patient working with PT OT this morning.    No longer requires ICU care.  Transfer to Neurosurgery floor.  Discussed with Hospitalist, Dr. Barreto, who will assume care.  Critical care service is available for any questions or concerns.    Please note that this dictation was created using voice recognition software. I have made every reasonable attempt to correct obvious errors, but there may be errors of grammar and possibly content that I did not discover before finalizing the note.    NORA Peterson.

## 2022-10-08 NOTE — CARE PLAN
The patient is Stable - Low risk of patient condition declining or worsening    Shift Goals  Clinical Goals: pain control, interact with L side  Patient Goals: pain control  Family Goals: TRIXIE    Progress made toward(s) clinical / shift goals:  Family sitting on left side of bed, patient working with left extremities throughout day.

## 2022-10-08 NOTE — PROGRESS NOTES
Neurosurgery Progress Note    Subjective:  Headache and fatigue yesterday.  Feeling much better today.  Started to move the left lower extremity.    Exam:  Sitting comfortably in a chair.  Awake, alert, oriented to person, place and time.  Pupils equally round and reactive to light, 4 to 3mm.  Extraocular movements full.  Facial sensation intact to light touch.  Face symmetric, HB 1.  Palate rises symmetrically.  Tongue is midline.  Shoulder shrug 5/5.  No pronator drift.  Sensation intact to light touch in all 4 extremities.  Proximally, left leg 3/5.  Distally, no movement in the foot yet.  Left arm proximally minimal movement in the shoulder and biceps.  Hand, wrist, elbow extension at least 3/5.    Cranial incision dressing removed, c/d/i    Recent Labs     10/06/22  0130 10/07/22  0415   WBC 13.6* 18.0*   RBC 4.89 4.05*   HEMOGLOBIN 12.4 10.2*   HEMATOCRIT 39.2 33.0*   MCV 80.2* 81.5   MCH 25.4* 25.2*   MCHC 31.6* 30.9*   RDW 46.9 47.8   PLATELETCT 502* 466*   MPV 9.8 10.2       Recent Labs     10/06/22  0130 10/07/22  0415   SODIUM 135 142   POTASSIUM 3.7 3.9   CHLORIDE 98 106   CO2 25 25   GLUCOSE 241* 165*   BUN 24* 14   CREATININE 0.46* 0.38*   CALCIUM 9.3 8.3*       Recent Labs     10/05/22  1544   APTT 26.8   INR 1.02           MRI brain with and without contrast dated 10/7/2020 was reviewed in detail.  Interval resection of 3 right frontal enhancing metastatic lesions.  No evidence of residual tumors.  Minimal devascularization of the brain parenchyma in the operative beds.  No acute stroke.  No hemorrhagic complication.  Unchanged right frontal vasogenic edema.    Assessment and Plan:  Ms. Mcintyre is a 41-year-old woman who is presenting with newly diagnosed stage IV metastatic melanoma.  She has a dominant and symptomatic right frontal lobe metastasis, as well as several smaller satellite lesions in the right frontal lobe.  She also has at least 3 additional tiny supratentorial lesions. Now POD#2 s/p  right frontal craniotomy for resection of 3 frontal tumors, doing well.    -Transfer to floor today  -MRI completed with gross total resection of the tumors.  No acute complications.  - decadron 4mg q6h, will wean to off by postop day 14.  - Keppra 1g BID for 3 months  - PT/OT/OOB  - The incision is closed with staples. Wash incision with soap and water at least every other day. Pat to dry. Leave open to air. No creams, gels, ointments. Call the clinic if any drainage or bleeding occurs.  -Okay for DVT ppx today    Please call with questions.    Pebbles Tejada M.D.     A total of 35 minutes were spent in the evaluation, examination, and coordination of care of this patient.

## 2022-10-08 NOTE — PROGRESS NOTES
Graf pulled today 48 hours after placement per MD order. Placed on 10/6 for retention.     Patient retained 500 ml, straight cathed x1.

## 2022-10-08 NOTE — THERAPY
Occupational Therapy  Daily Treatment     Patient Name: Jacque Mcintyre  Age:  41 y.o., Sex:  female  Medical Record #: [id number]  Today's Date: 10/8/2022     Precautions  Precautions: Fall Risk  Comments: L weakness and L inattention    Assessment    Pt seen for re-eval after surgery and increased left sided weakness. Pt motivated and participatory. Pt has supportive young adult son at home and second young adult son visiting to assist. Pt limited by left weakness and inattention but has at least some movement in all parts of L UE with some intermittent tone. Pt can benefit from acute OT to increase independence in ADLs prior to dc. Anticipate that at current level of function, pt will need post acute OT placement prior to home, as pt is currently needing 24 hour assistance for all ADLs.     Plan    Treatment plan modified to 4 times per week until therapy goals are met for the following treatments:  Adaptive Equipment, Neuro Re-Education / Balance, Self Care/Activities of Daily Living, Therapeutic Activities, and Therapeutic Exercises.    DC Equipment Recommendations: Tub / Shower Seat, Bed Side Commode  Discharge Recommendations: Recommend post-acute placement for additional occupational therapy services prior to discharge home     10/08/22 0903   Precautions   Precautions Fall Risk   Comments L weakness and L inattention   Cognition    Cognition / Consciousness X   Level of Consciousness Alert   Ability To Follow Commands 3 Step   Comments 1-2 second delay for actions on left side   Balance   Sitting Balance (Static) Fair   Sitting Balance (Dynamic) Fair -   Standing Balance (Static) Poor +   Standing Balance (Dynamic) Poor   Weight Shift Sitting Poor   Weight Shift Standing Poor   Skilled Intervention Verbal Cuing;Tactile Cuing;Compensatory Strategies;Postural Facilitation   Comments L lateral and posterior lean   Bed Mobility    Supine to Sit Maximal Assist   Sit to Supine Maximal Assist   Scooting Maximal  Assist   Rolling Moderate Assist to Lt.   Skilled Intervention Verbal Cuing;Tactile Cuing;Postural Facilitation   Activities of Daily Living   Grooming Seated;Supervision  (not using left)   Lower Body Dressing Maximal Assist   Toileting Maximal Assist  (pericare for period)   Skilled Intervention Verbal Cuing;Tactile Cuing;Postural Facilitation;Compensatory Strategies   Comments cues to look left and use L UE; 1-2 second delay for all movements on L   Functional Mobility   Sit to Stand Moderate Assist   Bed, Chair, Wheelchair Transfer Moderate Assist   Transfer Method Stand Pivot   Skilled Intervention Verbal Cuing;Tactile Cuing;Compensatory Strategies;Postural Facilitation   Comments Cues to use L UE with 1-2 second delay on each movement; Tone significant in L LE   Visual Perception   Visual Perception  X   Neglect Mild Left   Comments Will look left if cued; rests gaze just right of center   Activity Tolerance   Sitting Edge of Bed 20 min   Standing 1 min x 4   Comments fatiguing after up for about 15 min   Patient / Family Goals   Patient / Family Goal #1 full recovery   Goal #1 Outcome Progressing slower than expected   Short Term Goals   Short Term Goal # 1 pt will demo functional transfers at SPV level   Goal Outcome # 1 Goal not met   Short Term Goal # 2 pt will complete >5min standing grooming ADLs at sink with SPV   Goal Outcome # 2 Goal not met   Short Term Goal # 3 pt will complete toileting ADL at SPV level   Goal Outcome # 3 Goal not met   Short Term Goal # 4 pt will participate in activities to improve LUE function   Goal Outcome # 4 Progressing as expected   Anticipated Discharge Equipment and Recommendations   DC Equipment Recommendations Tub / Shower Seat;Bed Side Commode   Discharge Recommendations Recommend post-acute placement for additional occupational therapy services prior to discharge home

## 2022-10-08 NOTE — THERAPY
Physical Therapy   Re-Evaluation     Patient Name: Jacque Mcintyre  Age:  41 y.o., Sex:  female  Medical Record #: [id number]  Today's Date: 10/8/2022     Precautions  Precautions: Fall Risk  Comments: L weakness and L inattention    Assessment    Patient seen for PT re-evaluation, now POD #2 R frontal craniotomy with mass resection, noted to have increased L side weakness and increased LLE > LUE tone post-op.  Patient limited by L weakness, inattention, increased tone LLE > LUE, decreased balance, and decreased activity tolerance.  Patient mobilized as detailed below, requiring mod-max A x 2 people for mobility.  Patient demonstrated STS x 4 with blocking & positioning of L knee and foot/ankle.  Seated balance improved while sitting EOB and patient was able to identify her L lean and initiate corrective R weight shift.  Patient would benefit from continued skilled acute PT and post acute placement to address impairments & maximize safety and independence with functional mobility.      Plan    Treatment plan modified to 4 times per week until therapy goals are met for the following treatments:  Bed Mobility, Equipment, Gait Training, Neuro Re-Education / Balance, Self Care/Home Evaluation, Therapeutic Activities, and Therapeutic Exercises.    DC Equipment Recommendations: Unable to determine at this time  Discharge Recommendations: Recommend post-acute placement for additional physical therapy services prior to discharge home    Objective     10/08/22 0857   Precautions   Precautions Fall Risk   Comments L weakness, L inattention   Cognition    Cognition / Consciousness X   Level of Consciousness Alert   Comments 1-2 sec delay, flat affect, very pleasant & cooperative, good initiation   Passive ROM Lower Body   Passive ROM Lower Body X   Comments L ankle ROM limited by increased tone, limited PROM   Active ROM Lower Body    Active ROM Lower Body  X   Comments LLE WFL except L ankle limited by tone, minimal AROM    Strength Lower Body   Lower Body Strength  X   Lt Hip Flexion Strength 2- (P-)   Lt Knee Flexion Strength 2 (P)   Lt Knee Extension Strength 3- (F-)   Lt Ankle Dorsiflexion Strength 2- (P-) (very minimal joint motion)   Sensation Lower Body   Comments Reported baseline peripheral neuropathy but stated no numbness or tingling today   Lower Body Muscle Tone   Lower Body Muscle Tone  X   Lt Lower Extremity Muscle Tone Hypertonic   Comments Tone greatest at L ankle.  L ankle PF, inversion, L knee flexion, L hip ER.   Neuro-Muscular Treatments   Neuro-Muscular Treatments Weight Shift Left;Weight Shift Right;Verbal Cuing;Tactile Cuing;Postural Changes;Postural Facilitation   Vision   Vision Comments Slight L inattention, able to rotate fully to L with cues   Other Treatments   Other Treatments Provided 2 people required to don PRAFO, one to hold ankle position, one to strap PRAFO   Neurological Concerns   Neurological Concerns Yes   Sitting Posture During ADL's Lateral Lean Left;Posterior Lean   Standing Posture During ADL's Lateral Lean Left;Posterior Lean;Other (hip flexion)   Comments Assist to maintain LLE positioning due to increased tone.  After several minutes sitting EOB pt able to identify L lean and initiate R corrective lean   Balance   Sitting Balance (Static) Fair -   Sitting Balance (Dynamic) Fair -   Standing Balance (Static) Poor   Standing Balance (Dynamic) Poor -   Weight Shift Sitting Poor   Weight Shift Standing Poor   Skilled Intervention Verbal Cuing;Tactile Cuing;Postural Facilitation;Compensatory Strategies   Comments Initial max A due to L & posterior lean, progressed to close CGA   Gait Analysis   Gait Level Of Assist Unable to Participate   Comments Gait deferred due to increased LLE tone, not safe to ambulate this date   Bed Mobility    Supine to Sit Maximal Assist   Sit to Supine Maximal Assist   Scooting Maximal Assist   Rolling Moderate Assist to Lt.   Skilled Intervention Verbal  Cuing;Tactile Cuing;Compensatory Strategies;Facilitation   Comments moving to L side of bed   Functional Mobility   Sit to Stand Moderate Assist (x 2 people)   Skilled Intervention Verbal Cuing;Tactile Cuing;Facilitation   Comments L knee and foot/ankle blocking.  STS x 4   Activity Tolerance   Sitting in Chair NT   Sitting Edge of Bed 20 min   Standing 1 min x 4   Short Term Goals    Short Term Goal # 1 Pt will perform supine <> sit with min A within 6 visits to progress bed mobility   Short Term Goal # 2 Pt will perform STS with LRAD and min A within 6 visits to progress OOB mobility   Short Term Goal # 3 Pt will perform functional transfers with LRAD and min A within 6 visits to progress functional mobility   Anticipated Discharge Equipment and Recommendations   DC Equipment Recommendations Unable to determine at this time   Discharge Recommendations Recommend post-acute placement for additional physical therapy services prior to discharge home

## 2022-10-08 NOTE — PROGRESS NOTES
Arterial line with flat waveform occluded and infiltrated. Line discontinued. Will endorsed to oncoming shift

## 2022-10-09 PROBLEM — C43.62 MALIGNANT MELANOMA OF LEFT UPPER EXTREMITY INCLUDING SHOULDER (HCC): Status: RESOLVED | Noted: 2017-05-09 | Resolved: 2022-10-09

## 2022-10-09 PROBLEM — C79.31 BRAIN METASTASES: Status: RESOLVED | Noted: 2022-10-01 | Resolved: 2022-10-09

## 2022-10-09 LAB
ANION GAP SERPL CALC-SCNC: 11 MMOL/L (ref 7–16)
BUN SERPL-MCNC: 15 MG/DL (ref 8–22)
CALCIUM SERPL-MCNC: 9.1 MG/DL (ref 8.5–10.5)
CHLORIDE SERPL-SCNC: 98 MMOL/L (ref 96–112)
CO2 SERPL-SCNC: 29 MMOL/L (ref 20–33)
CREAT SERPL-MCNC: 0.36 MG/DL (ref 0.5–1.4)
ERYTHROCYTE [DISTWIDTH] IN BLOOD BY AUTOMATED COUNT: 48.3 FL (ref 35.9–50)
GFR SERPLBLD CREATININE-BSD FMLA CKD-EPI: 128 ML/MIN/1.73 M 2
GLUCOSE BLD STRIP.AUTO-MCNC: 184 MG/DL (ref 65–99)
GLUCOSE BLD STRIP.AUTO-MCNC: 187 MG/DL (ref 65–99)
GLUCOSE BLD STRIP.AUTO-MCNC: 226 MG/DL (ref 65–99)
GLUCOSE BLD STRIP.AUTO-MCNC: 259 MG/DL (ref 65–99)
GLUCOSE SERPL-MCNC: 240 MG/DL (ref 65–99)
HCT VFR BLD AUTO: 35.2 % (ref 37–47)
HGB BLD-MCNC: 10.9 G/DL (ref 12–16)
MCH RBC QN AUTO: 25.3 PG (ref 27–33)
MCHC RBC AUTO-ENTMCNC: 31 G/DL (ref 33.6–35)
MCV RBC AUTO: 81.7 FL (ref 81.4–97.8)
PLATELET # BLD AUTO: 534 K/UL (ref 164–446)
PMV BLD AUTO: 10.3 FL (ref 9–12.9)
POTASSIUM SERPL-SCNC: 4.2 MMOL/L (ref 3.6–5.5)
RBC # BLD AUTO: 4.31 M/UL (ref 4.2–5.4)
SODIUM SERPL-SCNC: 138 MMOL/L (ref 135–145)
WBC # BLD AUTO: 13 K/UL (ref 4.8–10.8)

## 2022-10-09 PROCEDURE — 770004 HCHG ROOM/CARE - ONCOLOGY PRIVATE *

## 2022-10-09 PROCEDURE — 700111 HCHG RX REV CODE 636 W/ 250 OVERRIDE (IP): Performed by: INTERNAL MEDICINE

## 2022-10-09 PROCEDURE — 700102 HCHG RX REV CODE 250 W/ 637 OVERRIDE(OP): Performed by: INTERNAL MEDICINE

## 2022-10-09 PROCEDURE — 700111 HCHG RX REV CODE 636 W/ 250 OVERRIDE (IP): Performed by: STUDENT IN AN ORGANIZED HEALTH CARE EDUCATION/TRAINING PROGRAM

## 2022-10-09 PROCEDURE — 51798 US URINE CAPACITY MEASURE: CPT

## 2022-10-09 PROCEDURE — 80048 BASIC METABOLIC PNL TOTAL CA: CPT

## 2022-10-09 PROCEDURE — 700111 HCHG RX REV CODE 636 W/ 250 OVERRIDE (IP): Performed by: PHYSICIAN ASSISTANT

## 2022-10-09 PROCEDURE — 700102 HCHG RX REV CODE 250 W/ 637 OVERRIDE(OP): Performed by: NURSE PRACTITIONER

## 2022-10-09 PROCEDURE — A9270 NON-COVERED ITEM OR SERVICE: HCPCS | Performed by: HOSPITALIST

## 2022-10-09 PROCEDURE — 82962 GLUCOSE BLOOD TEST: CPT | Mod: 91

## 2022-10-09 PROCEDURE — 700111 HCHG RX REV CODE 636 W/ 250 OVERRIDE (IP): Performed by: NEUROLOGICAL SURGERY

## 2022-10-09 PROCEDURE — 700102 HCHG RX REV CODE 250 W/ 637 OVERRIDE(OP): Performed by: HOSPITALIST

## 2022-10-09 PROCEDURE — A9270 NON-COVERED ITEM OR SERVICE: HCPCS | Performed by: NURSE PRACTITIONER

## 2022-10-09 PROCEDURE — A9270 NON-COVERED ITEM OR SERVICE: HCPCS | Performed by: STUDENT IN AN ORGANIZED HEALTH CARE EDUCATION/TRAINING PROGRAM

## 2022-10-09 PROCEDURE — 85027 COMPLETE CBC AUTOMATED: CPT

## 2022-10-09 PROCEDURE — 36415 COLL VENOUS BLD VENIPUNCTURE: CPT

## 2022-10-09 PROCEDURE — 700102 HCHG RX REV CODE 250 W/ 637 OVERRIDE(OP): Performed by: STUDENT IN AN ORGANIZED HEALTH CARE EDUCATION/TRAINING PROGRAM

## 2022-10-09 PROCEDURE — 99232 SBSQ HOSP IP/OBS MODERATE 35: CPT | Performed by: INTERNAL MEDICINE

## 2022-10-09 RX ORDER — DEXAMETHASONE SODIUM PHOSPHATE 4 MG/ML
4 INJECTION, SOLUTION INTRA-ARTICULAR; INTRALESIONAL; INTRAMUSCULAR; INTRAVENOUS; SOFT TISSUE EVERY 8 HOURS
Status: DISCONTINUED | OUTPATIENT
Start: 2022-10-09 | End: 2022-10-11

## 2022-10-09 RX ADMIN — OXCARBAZEPINE 300 MG: 300 TABLET, FILM COATED ORAL at 08:17

## 2022-10-09 RX ADMIN — ATORVASTATIN CALCIUM 40 MG: 40 TABLET, FILM COATED ORAL at 17:43

## 2022-10-09 RX ADMIN — NYSTATIN: 100000 POWDER TOPICAL at 17:42

## 2022-10-09 RX ADMIN — LEVETIRACETAM 1000 MG: 500 TABLET, FILM COATED ORAL at 17:42

## 2022-10-09 RX ADMIN — HYDROMORPHONE HYDROCHLORIDE 1 MG: 1 INJECTION, SOLUTION INTRAMUSCULAR; INTRAVENOUS; SUBCUTANEOUS at 17:43

## 2022-10-09 RX ADMIN — INSULIN LISPRO 6 UNITS: 100 INJECTION, SOLUTION INTRAVENOUS; SUBCUTANEOUS at 11:21

## 2022-10-09 RX ADMIN — HYDROMORPHONE HYDROCHLORIDE 1 MG: 1 INJECTION, SOLUTION INTRAMUSCULAR; INTRAVENOUS; SUBCUTANEOUS at 21:22

## 2022-10-09 RX ADMIN — DEXAMETHASONE SODIUM PHOSPHATE 4 MG: 4 INJECTION, SOLUTION INTRA-ARTICULAR; INTRALESIONAL; INTRAMUSCULAR; INTRAVENOUS; SOFT TISSUE at 21:21

## 2022-10-09 RX ADMIN — INSULIN LISPRO 2 UNITS: 100 INJECTION, SOLUTION INTRAVENOUS; SUBCUTANEOUS at 08:30

## 2022-10-09 RX ADMIN — DULOXETINE 60 MG: 20 CAPSULE, DELAYED RELEASE ORAL at 17:47

## 2022-10-09 RX ADMIN — HYDROMORPHONE HYDROCHLORIDE 1 MG: 1 INJECTION, SOLUTION INTRAMUSCULAR; INTRAVENOUS; SUBCUTANEOUS at 04:40

## 2022-10-09 RX ADMIN — INSULIN GLARGINE-YFGN 14 UNITS: 100 INJECTION, SOLUTION SUBCUTANEOUS at 17:49

## 2022-10-09 RX ADMIN — FAMOTIDINE 20 MG: 20 TABLET, FILM COATED ORAL at 04:40

## 2022-10-09 RX ADMIN — INSULIN LISPRO 4 UNITS: 100 INJECTION, SOLUTION INTRAVENOUS; SUBCUTANEOUS at 17:47

## 2022-10-09 RX ADMIN — FAMOTIDINE 20 MG: 20 TABLET, FILM COATED ORAL at 17:43

## 2022-10-09 RX ADMIN — ENOXAPARIN SODIUM 40 MG: 40 INJECTION SUBCUTANEOUS at 17:42

## 2022-10-09 RX ADMIN — DEXAMETHASONE SODIUM PHOSPHATE 4 MG: 4 INJECTION, SOLUTION INTRA-ARTICULAR; INTRALESIONAL; INTRAMUSCULAR; INTRAVENOUS; SOFT TISSUE at 14:18

## 2022-10-09 RX ADMIN — SENNOSIDES AND DOCUSATE SODIUM 2 TABLET: 50; 8.6 TABLET ORAL at 04:40

## 2022-10-09 RX ADMIN — DEXAMETHASONE SODIUM PHOSPHATE 4 MG: 4 INJECTION, SOLUTION INTRA-ARTICULAR; INTRALESIONAL; INTRAMUSCULAR; INTRAVENOUS; SOFT TISSUE at 01:06

## 2022-10-09 RX ADMIN — NYSTATIN: 100000 POWDER TOPICAL at 04:40

## 2022-10-09 RX ADMIN — LEVETIRACETAM 1000 MG: 500 TABLET, FILM COATED ORAL at 04:40

## 2022-10-09 RX ADMIN — GABAPENTIN 800 MG: 400 CAPSULE ORAL at 21:22

## 2022-10-09 RX ADMIN — DEXAMETHASONE SODIUM PHOSPHATE 4 MG: 4 INJECTION, SOLUTION INTRA-ARTICULAR; INTRALESIONAL; INTRAMUSCULAR; INTRAVENOUS; SOFT TISSUE at 04:40

## 2022-10-09 RX ADMIN — GABAPENTIN 800 MG: 400 CAPSULE ORAL at 08:17

## 2022-10-09 RX ADMIN — SENNOSIDES AND DOCUSATE SODIUM 2 TABLET: 50; 8.6 TABLET ORAL at 17:44

## 2022-10-09 RX ADMIN — HYDROMORPHONE HYDROCHLORIDE 1 MG: 1 INJECTION, SOLUTION INTRAMUSCULAR; INTRAVENOUS; SUBCUTANEOUS at 14:22

## 2022-10-09 RX ADMIN — OXCARBAZEPINE 300 MG: 300 TABLET, FILM COATED ORAL at 21:22

## 2022-10-09 RX ADMIN — POLYETHYLENE GLYCOL 3350 1 PACKET: 17 POWDER, FOR SOLUTION ORAL at 04:39

## 2022-10-09 RX ADMIN — HYDROMORPHONE HYDROCHLORIDE 1 MG: 1 INJECTION, SOLUTION INTRAMUSCULAR; INTRAVENOUS; SUBCUTANEOUS at 08:24

## 2022-10-09 RX ADMIN — QUETIAPINE FUMARATE 200 MG: 100 TABLET ORAL at 21:22

## 2022-10-09 RX ADMIN — GABAPENTIN 800 MG: 400 CAPSULE ORAL at 11:21

## 2022-10-09 RX ADMIN — ALBUTEROL SULFATE 2 PUFF: 90 AEROSOL, METERED RESPIRATORY (INHALATION) at 04:39

## 2022-10-09 RX ADMIN — GABAPENTIN 800 MG: 400 CAPSULE ORAL at 17:43

## 2022-10-09 RX ADMIN — HYDROMORPHONE HYDROCHLORIDE 1 MG: 1 INJECTION, SOLUTION INTRAMUSCULAR; INTRAVENOUS; SUBCUTANEOUS at 11:56

## 2022-10-09 RX ADMIN — INSULIN LISPRO 2 UNITS: 100 INJECTION, SOLUTION INTRAVENOUS; SUBCUTANEOUS at 21:36

## 2022-10-09 ASSESSMENT — COGNITIVE AND FUNCTIONAL STATUS - GENERAL
HELP NEEDED FOR BATHING: A LOT
SUGGESTED CMS G CODE MODIFIER MOBILITY: CM
TOILETING: A LOT
STANDING UP FROM CHAIR USING ARMS: A LOT
MOVING TO AND FROM BED TO CHAIR: UNABLE
EATING MEALS: A LITTLE
DRESSING REGULAR UPPER BODY CLOTHING: A LOT
MOVING FROM LYING ON BACK TO SITTING ON SIDE OF FLAT BED: UNABLE
PERSONAL GROOMING: A LITTLE
SUGGESTED CMS G CODE MODIFIER DAILY ACTIVITY: CK
TURNING FROM BACK TO SIDE WHILE IN FLAT BAD: UNABLE
DAILY ACTIVITIY SCORE: 14
DRESSING REGULAR LOWER BODY CLOTHING: A LOT
CLIMB 3 TO 5 STEPS WITH RAILING: TOTAL
MOBILITY SCORE: 7
WALKING IN HOSPITAL ROOM: TOTAL

## 2022-10-09 ASSESSMENT — PAIN DESCRIPTION - PAIN TYPE
TYPE: ACUTE PAIN

## 2022-10-09 ASSESSMENT — ENCOUNTER SYMPTOMS
GASTROINTESTINAL NEGATIVE: 1
EYES NEGATIVE: 1
MUSCULOSKELETAL NEGATIVE: 1
RESPIRATORY NEGATIVE: 1
FOCAL WEAKNESS: 1
HEADACHES: 1
CARDIOVASCULAR NEGATIVE: 1
PSYCHIATRIC NEGATIVE: 1

## 2022-10-09 NOTE — PROGRESS NOTES
Patient got up to the commode with a x3 assist. Unable to empty bladder. Patient was bladder scanned, patient retaining 670 ml, contacted on call hospitalist via voalte. Given order to straight cath one more time.

## 2022-10-09 NOTE — PROGRESS NOTES
Pt unable to void since 0400 this morning; bladder scan showed to have greater that 300 ml. Order placed by Dr. Robert to place urban catheter.

## 2022-10-09 NOTE — PROGRESS NOTES
Neurosurgery Progress Note    Subjective:  Headache controlled on pain meds, continued fatigue   Started to move the left lower extremity.  Transferred to floor yesterday    Exam:  Sitting comfortably in a chair.  Awake, alert, oriented to person, place and time.  Pupils equally round and reactive to light, 4 to 3mm.  Extraocular movements full.  Facial sensation intact to light touch.  Face symmetric, HB 1.  Palate rises symmetrically.  Tongue is midline.  Shoulder shrug 5/5.  No pronator drift.  Sensation intact to light touch in all 4 extremities.  Proximally, left leg 3/5.  Distally, no movement in the foot yet.  Left arm proximally minimal movement in the shoulder and biceps.  Hand, wrist, elbow extension at least 3/5.    Cranial incision dressing removed, c/d/i    Recent Labs     10/07/22  0415   WBC 18.0*   RBC 4.05*   HEMOGLOBIN 10.2*   HEMATOCRIT 33.0*   MCV 81.5   MCH 25.2*   MCHC 30.9*   RDW 47.8   PLATELETCT 466*   MPV 10.2       Recent Labs     10/07/22  0415   SODIUM 142   POTASSIUM 3.9   CHLORIDE 106   CO2 25   GLUCOSE 165*   BUN 14   CREATININE 0.38*   CALCIUM 8.3*                 MRI brain with and without contrast dated 10/7/2020 was reviewed in detail.  Interval resection of 3 right frontal enhancing metastatic lesions.  No evidence of residual tumors.  Minimal devascularization of the brain parenchyma in the operative beds.  No acute stroke.  No hemorrhagic complication.  Unchanged right frontal vasogenic edema.    Assessment and Plan:  Ms. Mcintyre is a 41-year-old woman who is presenting with newly diagnosed stage IV metastatic melanoma.  She has a dominant and symptomatic right frontal lobe metastasis, as well as several smaller satellite lesions in the right frontal lobe.  She also has at least 3 additional tiny supratentorial lesions. Now POD#3 s/p right frontal craniotomy for resection of 3 frontal tumors, doing well.      -MRI completed with gross total resection of the tumors.  No acute  complications.  - decadron 4mg q8h, will wean to off by postop day 14.  - Keppra 1g BID for 3 months  - PT/OT/OOB  - The incision is closed with staples. Wash incision with soap and water at least every other day. Pat to dry. Leave open to air. No creams, gels, ointments. Call the clinic if any drainage or bleeding occurs.  -Okay for DVT ppx today    Please call with questions.    CORINA [email or web address].     A total of 35 minutes were spent in the evaluation, examination, and coordination of care of this patient.

## 2022-10-09 NOTE — CARE PLAN
The patient is Watcher - Medium risk of patient condition declining or worsening    Shift Goals  Clinical Goals: pain control, bladder elmination, monitor craniotomy site  Patient Goals: pain control, advance diet  Family Goals: N/A    Progress made toward(s) clinical / shift goals:  Patient is being medicated for pain per MAR. Pt craniotomy site is CDI.     Patient is not progressing towards the following goals: N/A    Patient is AxO x4 and understands plan of care, all questions answered at this time. Personal belongings and call light are within reach, pt calls appropriately for nursing needs, frequent rounding in place.

## 2022-10-09 NOTE — PROGRESS NOTES
[facility] Medicine Daily Progress Note    Date of Service  10/9/2022    Chief Complaint  Jacque Mcintyre is a 41 y.o. female admitted 10/1/2022 with left-sided weakness.  She was diagnosed with melanoma in 2016, and underwent immunotherapy for 3 years.  She had an abnormal PET scan earlier this year, biopsy was reportedly negative.  She has type 2 diabetes and depression.    Hospital Course  CT head from 10/1/2022 showed 3 masses in the right cerebral hemisphere with extensive vasogenic edema, localized midline shift to the left side frontoparietal level, 2 small masses in the right frontal lobe with increased density which could indicate hemorrhage. Neurosurgery were consulted. MRI brain from 10/2/2022 showed right-sided predominant, supratentorial metastatic disease with associated edema, localized midline shift, evidence of prior hemorrhage within the masses.  CT chest, abdomen, pelvis from 10/1/2022 showed bilateral pulmonary nodules, enlarged left hilar lymph node, enlarged left external iliac lymph node, 9 mm right lower quadrant peritoneal nodule, 7 mm left and left upper quadrant subcutaneous soft tissue nodules.  Decadron and Keppra started.  Medical oncology and radiation oncology were consulted.     Per medical oncology, will need to confirm diagnosis of melanoma and BRAF testing.  If BRAF positive, she will be candidate for BRAF/MAC inhibitor, otherwise she will need dual immunotherapy.    Patient was evaluated by neurosurgery on 10/4/2022, and underwent craniotomy for resection of 3 right frontal tumors on 10/7/2022.    Postprocedure MRI from 10/7/2022 showed significant reduction in extent of contrast enhancement of the previously seen metastatic lesions.  Hemorrhagic products were noted within the lesions.  No new lesions were noted.    Interval Problem Update  Patient was transferred to the medical floor on 10/8/2022.  Headache has improved. Still has 3/5 left-sided weakness.    I have discussed this  patient's plan of care and discharge plan at IDT rounds today with Case Management, Nursing, Nursing leadership, and other members of the IDT team.    Consultants/Specialty  neurosurgery and oncology, radiation oncology    Code Status  Full Code    Disposition  Patient is medically cleared for discharge.   Anticipate discharge to to home with close outpatient follow-up.  I have placed the appropriate orders for post-discharge needs.    Review of Systems  Review of Systems   Constitutional:  Positive for malaise/fatigue.   HENT: Negative.     Eyes: Negative.    Respiratory: Negative.     Cardiovascular: Negative.    Gastrointestinal: Negative.    Genitourinary:         Retention   Musculoskeletal: Negative.    Skin: Negative.    Neurological:  Positive for focal weakness and headaches.        Left sided weakness 3/5   Psychiatric/Behavioral: Negative.        Physical Exam  Temp:  [36.2 °C (97.1 °F)-37.3 °C (99.2 °F)] 37.3 °C (99.2 °F)  Pulse:  [56-69] 64  Resp:  [16-22] 16  BP: ()/(56-71) 100/69  SpO2:  [90 %-96 %] 96 %    Physical Exam  Constitutional:       Appearance: She is ill-appearing.   Eyes:      Extraocular Movements: Extraocular movements intact.      Pupils: Pupils are equal, round, and reactive to light.   Cardiovascular:      Rate and Rhythm: Normal rate.   Pulmonary:      Effort: Pulmonary effort is normal.   Abdominal:      Palpations: Abdomen is soft.   Musculoskeletal:      Right lower leg: No edema.      Left lower leg: No edema.   Skin:     General: Skin is warm.   Neurological:      Comments: Left upper and lower extremity weakness   Psychiatric:         Mood and Affect: Mood normal.       Fluids    Intake/Output Summary (Last 24 hours) at 10/9/2022 1441  Last data filed at 10/9/2022 1052  Gross per 24 hour   Intake 340 ml   Output 1500 ml   Net -1160 ml       Laboratory  Recent Labs     10/07/22  0415 10/09/22  0958   WBC 18.0* 13.0*   RBC 4.05* 4.31   HEMOGLOBIN 10.2* 10.9*   HEMATOCRIT  33.0* 35.2*   MCV 81.5 81.7   MCH 25.2* 25.3*   MCHC 30.9* 31.0*   RDW 47.8 48.3   PLATELETCT 466* 534*   MPV 10.2 10.3     Recent Labs     10/07/22  0415 10/09/22  0958   SODIUM 142 138   POTASSIUM 3.9 4.2   CHLORIDE 106 98   CO2 25 29   GLUCOSE 165* 240*   BUN 14 15   CREATININE 0.38* 0.36*   CALCIUM 8.3* 9.1                     Imaging  MR-BRAIN-WITH & W/O   Final Result      When compared with the previous MRI dated 10/2/2022, there has been significant interval reduction in the extent of contrast enhancement of the the previously seen metastatic lesions. Hemorrhagic products are noted within these lesions. There is diffuse    white matter edema is noted surrounding these lesions. There are no new lesions. These findings likely represents true tumor response or pseudo response secondary to the chemotherapy.      [id number] VIEW   Final Result      1. Diffuse colonic stool could represent constipation.   2.  No dilated loops of bowel or free air.      CT-HEAD W/O   Final Result      1.  Small amount of extra-axial and intracavitary RIGHT frontal applied following resection of RIGHT frontal masses   2.  No evidence of significant mass effect, large territorial infarction or new mass   3.  Residual tumor not excluded by this exam.         MR-BRAIN-WITH & W/O   Final Result      1.  Right-sided predominant, supratentorial metastatic disease with the largest mass in the right frontoparietal region measuring 3.4 x 3.0 cm as described in detail above.   2.  Associated edema with regional associated cortical effacement and partial effacement of the right lateral ventricle. Minimal localized midline shift adjacent to the large right frontoparietal mass.   3.  Evidence of prior hemorrhage within these masses.   4.  Small focus of restricted diffusion in the right occipital bone could represent osseous metastatic disease.      CT-CHEST,ABDOMEN,PELVIS WITH   Final Result      1.  BILATERAL pulmonary nodules, the largest  of which measures 12 mm in the LEFT lower lobe and which has enlarged since May of this year.   2.  Enlarged LEFT hilar lymph node   3.  Enlarged LEFT external iliac lymph node. This would be amenable to image guided tissue sampling if clinically appropriate.   4.  7 mm LEFT and LEFT upper quadrant subcutaneous soft tissue nodules, new since prior   5.  9 mm RIGHT lower quadrant peritoneal nodule, new since prior   6.  33 mm LEFT ovarian cyst   7.  Thyroid nodules measuring up to 23 mm   8.  BILATERAL L5 pars defects with grade 1 anterolisthesis of L5 on S1   9.  Incompletely assessed LEFT renal lesion, new since prior and most likely a cyst though attention on top is recommended.      DX-CHEST-PORTABLE (1 VIEW)   Final Result         1.  Possible pulmonary nodule within each lung. Metastasis is a possibility.      2.  No infiltrates or consolidations identified.      CT-HEAD W/O   Final Result   Addendum (preliminary) 1 of 1   These findings were discussed with JESENIA HERNANDEZ on 10/01/2022.      Final         1.  3 masses in the right cerebral hemisphere largest measuring 3.2 cm with extensive vasogenic edema as described above. Findings are likely due to metastases.      2.  Localized midline shift to the left side frontoparietal level measuring 3 mm.      3.  The 2 smaller masses in the right frontal lobe demonstrates some increased density which could indicate that hemorrhage may be present within these masses.      These findings were discussed with Benjamín milan for dr hernandez on 10/01/2022.              Assessment/Plan  * Malignant melanoma metastatic to brain (HCC)- (present on admission)  Assessment & Plan  Initially diagnosed in 2016, underwent immunotherapy and lymph node dissection.  Now with widespread metastatic disease.  Underwent right frontal craniotomy of 3 frontal tumors on 10/7/2022.  Patient is to start radiation therapy about 2 weeks after surgery.  Patient follows Dr. Reed as an outpatient,  chemotherapy/immunotherapy will depend on final pathology report.  Continue Decadron and Keppra.    DM2 (diabetes mellitus, type 2) (HCC)- (present on admission)  Assessment & Plan  On steroids.  Continue SSI with Accu-Cheks and hypoglycemia protocol.  SSI scale was increased due to elevated blood glucose readings.  Diabetic diet.  Monitor closely    Constipation  Assessment & Plan  Constipation noted on abdominal x-ray.  Continue as needed laxatives    Depression- (present on admission)  Assessment & Plan  Continue Xanax and Cymbalta.       VTE prophylaxis: SCDs/TEDs Lovenox on hold from 10/5/2022

## 2022-10-10 LAB
ANION GAP SERPL CALC-SCNC: 9 MMOL/L (ref 7–16)
BASOPHILS # BLD AUTO: 0.1 % (ref 0–1.8)
BASOPHILS # BLD: 0.01 K/UL (ref 0–0.12)
BUN SERPL-MCNC: 20 MG/DL (ref 8–22)
CALCIUM SERPL-MCNC: 9 MG/DL (ref 8.5–10.5)
CHLORIDE SERPL-SCNC: 100 MMOL/L (ref 96–112)
CO2 SERPL-SCNC: 31 MMOL/L (ref 20–33)
CREAT SERPL-MCNC: 0.43 MG/DL (ref 0.5–1.4)
EOSINOPHIL # BLD AUTO: 0 K/UL (ref 0–0.51)
EOSINOPHIL NFR BLD: 0 % (ref 0–6.9)
ERYTHROCYTE [DISTWIDTH] IN BLOOD BY AUTOMATED COUNT: 48.1 FL (ref 35.9–50)
GFR SERPLBLD CREATININE-BSD FMLA CKD-EPI: 123 ML/MIN/1.73 M 2
GLUCOSE BLD STRIP.AUTO-MCNC: 181 MG/DL (ref 65–99)
GLUCOSE BLD STRIP.AUTO-MCNC: 182 MG/DL (ref 65–99)
GLUCOSE BLD STRIP.AUTO-MCNC: 183 MG/DL (ref 65–99)
GLUCOSE BLD STRIP.AUTO-MCNC: 248 MG/DL (ref 65–99)
GLUCOSE SERPL-MCNC: 175 MG/DL (ref 65–99)
HCT VFR BLD AUTO: 33.3 % (ref 37–47)
HGB BLD-MCNC: 10.1 G/DL (ref 12–16)
IMM GRANULOCYTES # BLD AUTO: 0.05 K/UL (ref 0–0.11)
IMM GRANULOCYTES NFR BLD AUTO: 0.5 % (ref 0–0.9)
LYMPHOCYTES # BLD AUTO: 2.1 K/UL (ref 1–4.8)
LYMPHOCYTES NFR BLD: 19.2 % (ref 22–41)
MCH RBC QN AUTO: 24.9 PG (ref 27–33)
MCHC RBC AUTO-ENTMCNC: 30.3 G/DL (ref 33.6–35)
MCV RBC AUTO: 82 FL (ref 81.4–97.8)
MONOCYTES # BLD AUTO: 0.88 K/UL (ref 0–0.85)
MONOCYTES NFR BLD AUTO: 8.1 % (ref 0–13.4)
NEUTROPHILS # BLD AUTO: 7.87 K/UL (ref 2–7.15)
NEUTROPHILS NFR BLD: 72.1 % (ref 44–72)
NRBC # BLD AUTO: 0 K/UL
NRBC BLD-RTO: 0 /100 WBC
PLATELET # BLD AUTO: 534 K/UL (ref 164–446)
PMV BLD AUTO: 10.5 FL (ref 9–12.9)
POTASSIUM SERPL-SCNC: 4.3 MMOL/L (ref 3.6–5.5)
RBC # BLD AUTO: 4.06 M/UL (ref 4.2–5.4)
SODIUM SERPL-SCNC: 140 MMOL/L (ref 135–145)
WBC # BLD AUTO: 10.9 K/UL (ref 4.8–10.8)

## 2022-10-10 PROCEDURE — 700102 HCHG RX REV CODE 250 W/ 637 OVERRIDE(OP): Performed by: STUDENT IN AN ORGANIZED HEALTH CARE EDUCATION/TRAINING PROGRAM

## 2022-10-10 PROCEDURE — A9270 NON-COVERED ITEM OR SERVICE: HCPCS | Performed by: NURSE PRACTITIONER

## 2022-10-10 PROCEDURE — 36415 COLL VENOUS BLD VENIPUNCTURE: CPT

## 2022-10-10 PROCEDURE — A9270 NON-COVERED ITEM OR SERVICE: HCPCS | Performed by: STUDENT IN AN ORGANIZED HEALTH CARE EDUCATION/TRAINING PROGRAM

## 2022-10-10 PROCEDURE — 80048 BASIC METABOLIC PNL TOTAL CA: CPT

## 2022-10-10 PROCEDURE — 700111 HCHG RX REV CODE 636 W/ 250 OVERRIDE (IP): Performed by: INTERNAL MEDICINE

## 2022-10-10 PROCEDURE — 770004 HCHG ROOM/CARE - ONCOLOGY PRIVATE *

## 2022-10-10 PROCEDURE — A9270 NON-COVERED ITEM OR SERVICE: HCPCS | Performed by: HOSPITALIST

## 2022-10-10 PROCEDURE — 99232 SBSQ HOSP IP/OBS MODERATE 35: CPT | Performed by: INTERNAL MEDICINE

## 2022-10-10 PROCEDURE — 700102 HCHG RX REV CODE 250 W/ 637 OVERRIDE(OP): Performed by: INTERNAL MEDICINE

## 2022-10-10 PROCEDURE — 82962 GLUCOSE BLOOD TEST: CPT | Mod: 91

## 2022-10-10 PROCEDURE — 700102 HCHG RX REV CODE 250 W/ 637 OVERRIDE(OP): Performed by: HOSPITALIST

## 2022-10-10 PROCEDURE — A9270 NON-COVERED ITEM OR SERVICE: HCPCS | Performed by: INTERNAL MEDICINE

## 2022-10-10 PROCEDURE — 97530 THERAPEUTIC ACTIVITIES: CPT

## 2022-10-10 PROCEDURE — 85025 COMPLETE CBC W/AUTO DIFF WBC: CPT

## 2022-10-10 PROCEDURE — 700102 HCHG RX REV CODE 250 W/ 637 OVERRIDE(OP): Performed by: NURSE PRACTITIONER

## 2022-10-10 PROCEDURE — 700111 HCHG RX REV CODE 636 W/ 250 OVERRIDE (IP): Performed by: STUDENT IN AN ORGANIZED HEALTH CARE EDUCATION/TRAINING PROGRAM

## 2022-10-10 PROCEDURE — 700111 HCHG RX REV CODE 636 W/ 250 OVERRIDE (IP): Performed by: PHYSICIAN ASSISTANT

## 2022-10-10 RX ORDER — LACTULOSE 20 G/30ML
30 SOLUTION ORAL
Status: DISCONTINUED | OUTPATIENT
Start: 2022-10-10 | End: 2022-10-15 | Stop reason: HOSPADM

## 2022-10-10 RX ADMIN — ENOXAPARIN SODIUM 40 MG: 40 INJECTION SUBCUTANEOUS at 17:13

## 2022-10-10 RX ADMIN — FAMOTIDINE 20 MG: 20 TABLET, FILM COATED ORAL at 05:09

## 2022-10-10 RX ADMIN — GABAPENTIN 800 MG: 400 CAPSULE ORAL at 08:26

## 2022-10-10 RX ADMIN — MAGNESIUM HYDROXIDE 30 ML: 400 SUSPENSION ORAL at 05:10

## 2022-10-10 RX ADMIN — LEVETIRACETAM 1000 MG: 500 TABLET, FILM COATED ORAL at 05:09

## 2022-10-10 RX ADMIN — OXCARBAZEPINE 300 MG: 300 TABLET, FILM COATED ORAL at 21:32

## 2022-10-10 RX ADMIN — DEXAMETHASONE SODIUM PHOSPHATE 4 MG: 4 INJECTION, SOLUTION INTRA-ARTICULAR; INTRALESIONAL; INTRAMUSCULAR; INTRAVENOUS; SOFT TISSUE at 13:11

## 2022-10-10 RX ADMIN — INSULIN LISPRO 4 UNITS: 100 INJECTION, SOLUTION INTRAVENOUS; SUBCUTANEOUS at 17:21

## 2022-10-10 RX ADMIN — HYDROMORPHONE HYDROCHLORIDE 1 MG: 1 INJECTION, SOLUTION INTRAMUSCULAR; INTRAVENOUS; SUBCUTANEOUS at 08:46

## 2022-10-10 RX ADMIN — DULOXETINE 60 MG: 20 CAPSULE, DELAYED RELEASE ORAL at 17:13

## 2022-10-10 RX ADMIN — INSULIN LISPRO 2 UNITS: 100 INJECTION, SOLUTION INTRAVENOUS; SUBCUTANEOUS at 13:21

## 2022-10-10 RX ADMIN — HYDROMORPHONE HYDROCHLORIDE 1 MG: 1 INJECTION, SOLUTION INTRAMUSCULAR; INTRAVENOUS; SUBCUTANEOUS at 13:21

## 2022-10-10 RX ADMIN — LEVETIRACETAM 1000 MG: 500 TABLET, FILM COATED ORAL at 17:13

## 2022-10-10 RX ADMIN — HYDROMORPHONE HYDROCHLORIDE 1 MG: 1 INJECTION, SOLUTION INTRAMUSCULAR; INTRAVENOUS; SUBCUTANEOUS at 05:10

## 2022-10-10 RX ADMIN — INSULIN LISPRO 2 UNITS: 100 INJECTION, SOLUTION INTRAVENOUS; SUBCUTANEOUS at 08:38

## 2022-10-10 RX ADMIN — FAMOTIDINE 20 MG: 20 TABLET, FILM COATED ORAL at 17:14

## 2022-10-10 RX ADMIN — QUETIAPINE FUMARATE 200 MG: 100 TABLET ORAL at 21:32

## 2022-10-10 RX ADMIN — OXCARBAZEPINE 300 MG: 300 TABLET, FILM COATED ORAL at 08:26

## 2022-10-10 RX ADMIN — GABAPENTIN 800 MG: 400 CAPSULE ORAL at 13:11

## 2022-10-10 RX ADMIN — INSULIN LISPRO 2 UNITS: 100 INJECTION, SOLUTION INTRAVENOUS; SUBCUTANEOUS at 21:36

## 2022-10-10 RX ADMIN — LACTULOSE 30 ML: 20 SOLUTION ORAL at 17:14

## 2022-10-10 RX ADMIN — GABAPENTIN 800 MG: 400 CAPSULE ORAL at 17:13

## 2022-10-10 RX ADMIN — NYSTATIN 1 G: 100000 POWDER TOPICAL at 05:10

## 2022-10-10 RX ADMIN — SENNOSIDES AND DOCUSATE SODIUM 2 TABLET: 50; 8.6 TABLET ORAL at 17:14

## 2022-10-10 RX ADMIN — INSULIN GLARGINE-YFGN 14 UNITS: 100 INJECTION, SOLUTION SUBCUTANEOUS at 17:20

## 2022-10-10 RX ADMIN — HYDROMORPHONE HYDROCHLORIDE 1 MG: 1 INJECTION, SOLUTION INTRAMUSCULAR; INTRAVENOUS; SUBCUTANEOUS at 21:42

## 2022-10-10 RX ADMIN — HYDROMORPHONE HYDROCHLORIDE 1 MG: 1 INJECTION, SOLUTION INTRAMUSCULAR; INTRAVENOUS; SUBCUTANEOUS at 17:17

## 2022-10-10 RX ADMIN — ATORVASTATIN CALCIUM 40 MG: 40 TABLET, FILM COATED ORAL at 17:14

## 2022-10-10 RX ADMIN — GABAPENTIN 800 MG: 400 CAPSULE ORAL at 21:32

## 2022-10-10 RX ADMIN — DEXAMETHASONE SODIUM PHOSPHATE 4 MG: 4 INJECTION, SOLUTION INTRA-ARTICULAR; INTRALESIONAL; INTRAMUSCULAR; INTRAVENOUS; SOFT TISSUE at 21:31

## 2022-10-10 RX ADMIN — DEXAMETHASONE SODIUM PHOSPHATE 4 MG: 4 INJECTION, SOLUTION INTRA-ARTICULAR; INTRALESIONAL; INTRAMUSCULAR; INTRAVENOUS; SOFT TISSUE at 05:10

## 2022-10-10 RX ADMIN — ALBUTEROL SULFATE 2 PUFF: 90 AEROSOL, METERED RESPIRATORY (INHALATION) at 05:24

## 2022-10-10 ASSESSMENT — ENCOUNTER SYMPTOMS
MUSCULOSKELETAL NEGATIVE: 1
GASTROINTESTINAL NEGATIVE: 1
EYES NEGATIVE: 1
RESPIRATORY NEGATIVE: 1
CARDIOVASCULAR NEGATIVE: 1
FOCAL WEAKNESS: 1
HEADACHES: 1
PSYCHIATRIC NEGATIVE: 1

## 2022-10-10 ASSESSMENT — PAIN DESCRIPTION - PAIN TYPE
TYPE: ACUTE PAIN

## 2022-10-10 ASSESSMENT — COGNITIVE AND FUNCTIONAL STATUS - GENERAL
WALKING IN HOSPITAL ROOM: TOTAL
MOVING FROM LYING ON BACK TO SITTING ON SIDE OF FLAT BED: UNABLE
MOBILITY SCORE: 8
MOVING TO AND FROM BED TO CHAIR: UNABLE
STANDING UP FROM CHAIR USING ARMS: A LOT
TURNING FROM BACK TO SIDE WHILE IN FLAT BAD: A LOT
SUGGESTED CMS G CODE MODIFIER MOBILITY: CM
CLIMB 3 TO 5 STEPS WITH RAILING: TOTAL

## 2022-10-10 ASSESSMENT — GAIT ASSESSMENTS: GAIT LEVEL OF ASSIST: UNABLE TO PARTICIPATE

## 2022-10-10 NOTE — PROGRESS NOTES
Neurosurgery Progress Note    Subjective:  Headaches improved. Stable weakness in left arm and leg.    Exam:  Laying in bed comfortable.  Awake, alert, oriented to person, place and time.  Pupils equally round and reactive to light, 4 to 3mm.  Extraocular movements full.  Facial sensation intact to light touch.  Face symmetric, HB 1.  Palate rises symmetrically.  Tongue is midline.  Shoulder shrug 5/5.  No pronator drift.  Sensation intact to light touch in all 4 extremities.  Proximally, left leg 3/5.  Distally, starting to move the left foot.  Left arm proximally minimal movement in the shoulder and biceps.  Hand, wrist, elbow extension at least 3/5.    Cranial incision dressing removed, c/d/i    Recent Labs     10/09/22  0958 10/10/22  0438   WBC 13.0* 10.9*   RBC 4.31 4.06*   HEMOGLOBIN 10.9* 10.1*   HEMATOCRIT 35.2* 33.3*   MCV 81.7 82.0   MCH 25.3* 24.9*   MCHC 31.0* 30.3*   RDW 48.3 48.1   PLATELETCT 534* 534*   MPV 10.3 10.5       Recent Labs     10/09/22  0958 10/10/22  0438   SODIUM 138 140   POTASSIUM 4.2 4.3   CHLORIDE 98 100   CO2 29 31   GLUCOSE 240* 175*   BUN 15 20   CREATININE 0.36* 0.43*   CALCIUM 9.1 9.0                 MRI brain with and without contrast dated 10/7/2020 was reviewed in detail.  Interval resection of 3 right frontal enhancing metastatic lesions.  No evidence of residual tumors.  Minimal devascularization of the brain parenchyma in the operative beds.  No acute stroke.  No hemorrhagic complication.  Unchanged right frontal vasogenic edema.    Assessment and Plan:  Ms. Mcintyre is a 41-year-old woman who is presenting with newly diagnosed stage IV metastatic melanoma.  She has a dominant and symptomatic right frontal lobe metastasis, as well as several smaller satellite lesions in the right frontal lobe.  She also has at least 3 additional tiny supratentorial lesions. Now POD#4 s/p right frontal craniotomy for resection of 3 frontal tumors, doing well.      -MRI completed with gross  total resection of the tumors.  No acute complications.  -Discussed at multidisciplinary tumor board conference. Plan to await BRAF mutation evaluation. Will plan to hold off on stereotactic radiosurgery and systemic therapy for 2 weeks until staples are removed.  - decadron 4mg q8h, will wean to off by postop day 14.  - Keppra 1g BID for 3 months  - PT/OT/OOB  -Okay for DVT ppx  - The incision is closed with staples. Wash incision with soap and water at least every other day. Pat to dry. Leave open to air. No creams, gels, ointments. Call the clinic if any drainage or bleeding occurs.   - Follow up in Neurosurgery clinic in 2 weeks for staple removal.    Please call with questions.    Pebbles Tejada M.D.     A total of 35 minutes were spent in the evaluation, examination, and coordination of care of this patient.

## 2022-10-10 NOTE — CARE PLAN
Problem: Pain - Standard  Goal: Alleviation of pain or a reduction in pain to the patient’s comfort goal  Outcome: Progressing     Problem: Fall Risk  Goal: Patient will remain free from falls  Outcome: Progressing     The patient is Watcher - Medium risk of patient condition declining or worsening    Shift Goals  Clinical Goals: pain control  Patient Goals: pain control, rest  Family Goals: N/A    Progress made toward(s) clinical / shift goals:  Fall precautions are in place    Patient is not progressing towards the following goals:

## 2022-10-10 NOTE — CARE PLAN
The patient is Watcher - Medium risk of patient condition declining or worsening    Shift Goals  Clinical Goals: pain control  Patient Goals: pain control, rest  Family Goals: N/A    Progress made toward(s) clinical / shift goals:    Problem: Knowledge Deficit - Standard  Goal: Patient and family/care givers will demonstrate understanding of plan of care, disease process/condition, diagnostic tests and medications  10/10/2022 0028 by Jossie Gómez R.N.  Outcome: Progressing  Note: Patient is AxO x4 and understands plan of care, all questions answered at this time.      Problem: Pain - Standard  Goal: Alleviation of pain or a reduction in pain to the patient’s comfort goal  10/10/2022 0028 by SATHISH CollinsN.  Outcome: Progressing  Note: Patient is being medicated for pain per MAR.      Problem: Fall Risk  Goal: Patient will remain free from falls  10/10/2022 0025 by Jossie Gómez R.N.  Outcome: Progressing  Note: Personal belongings and call light are within reach. Bed locked and in lowest position. Bed alarm is on.      Problem: Skin Integrity  Goal: Skin integrity is maintained or improved  10/10/2022 0025 by NYDIA Collins.N.  Outcome: Progressing  Note: Patient is being repositioned with pillows, waffle overlay in place.        Patient is not progressing towards the following goals: N/A

## 2022-10-10 NOTE — THERAPY
Physical Therapy   Daily Treatment     Patient Name: Jacque Mcintyre  Age:  41 y.o., Sex:  female  Medical Record #: [id number]  Today's Date: 10/10/2022     Precautions: Fall Risk  Comments: L weakness/L inattention, significant LLE tone    Assessment    Pt seen for PT tx sessions. She cont to demonstrate strong tone in distal LLE resulting in L ankle malposition impacting transfers. Some improvement noted in ability to hold midline in sitting however ongoing L lateral leaning with fatigue. Pt instructed in SPT from bed>WC. Performed 4 transfers in total. Pt with good ability to utilize LLE and bear wt when cues although delay noted. Re-positioned L ankle in PRAFO at end of session. Recommend PMR Consult. PT to cont.    Plan    Continue current treatment plan.    DC Equipment Recommendations: Unable to determine at this time  Discharge Recommendations: Recommend post-acute placement for additional physical therapy services prior to discharge home (Recommend PMR Consult)     Objective    Cognition    Cognition / Consciousness X   Level of Consciousness Alert   Ability To Follow Commands 2 Step   Attention Impaired   Sequencing Impaired   Comments moderate L inattention, delay in ability to move L side, repetitive cues to sequence transfer   Passive ROM Lower Body   Passive ROM Lower Body X   Comments L ankle PROM limited by increased DF tone, able to passively DF L ankle -15 degrees of neutral   Active ROM Lower Body    Active ROM Lower Body  X   Comments L ankle limited by tone/weakness   Neuro-Muscular Treatments   Neuro-Muscular Treatments Anterior weight shift;Facilitation;Postural Changes;Postural Facilitation;Sensory Stimuli;Sequencing;Tactile Cuing;Verbal Cuing;Weight Shift Right;Weight Shift Left   Other Treatments   Other Treatments Provided 4 transfers total   Neurological Concerns   Sitting Posture During ADL's Lateral Lean Left   Standing Posture During ADL's Lateral Lean Left   Balance   Sitting Balance  (Static) Poor +   Sitting Balance (Dynamic) Poor -   Standing Balance (Static) Trace +   Standing Balance (Dynamic) Trace   Weight Shift Sitting Poor   Weight Shift Standing Poor   Skilled Intervention Verbal Cuing;Sequencing;Postural Facilitation   Comments impaired by L lateral lean   Gait Analysis   Gait Level Of Assist Unable to Participate   Bed Mobility    Supine to Sit Minimal Assist   Sit to Supine Moderate Assist   Rolling Standby Assist  (cues to sequence onto L side)   Skilled Intervention Verbal Cuing;Sequencing;Postural Facilitation   Functional Mobility   Sit to Stand Moderate Assist   Bed, Chair, Wheelchair Transfer Maximal Assist   Transfer Method Stand Pivot   Skilled Intervention Verbal Cuing;Sequencing;Postural Facilitation   Short Term Goals    Short Term Goal # 1 Pt will perform supine <> sit with min A within 6 visits to progress bed mobility   Goal Outcome # 1 Progressing as expected   Short Term Goal # 2 Pt will perform STS with LRAD and min A within 6 visits to progress OOB mobility   Goal Outcome # 2 Progressing as expected   Short Term Goal # 3 Pt will perform functional transfers with LRAD and min A within 6 visits to progress functional mobility   Goal Outcome # 3 Goal not met

## 2022-10-10 NOTE — PROGRESS NOTES
[facility] Medicine Daily Progress Note    Date of Service  10/10/2022    Chief Complaint  Jacque Mcintyre is a 41 y.o. female admitted 10/1/2022 with left-sided weakness.  She was diagnosed with melanoma in 2016, and underwent immunotherapy for 3 years.  She had an abnormal PET scan earlier this year, biopsy was reportedly negative.  She has type 2 diabetes and depression.    Hospital Course  CT head from 10/1/2022 showed 3 masses in the right cerebral hemisphere with extensive vasogenic edema, localized midline shift to the left side frontoparietal level, 2 small masses in the right frontal lobe with increased density which could indicate hemorrhage. Neurosurgery were consulted. MRI brain from 10/2/2022 showed right-sided predominant, supratentorial metastatic disease with associated edema, localized midline shift, evidence of prior hemorrhage within the masses.  CT chest, abdomen, pelvis from 10/1/2022 showed bilateral pulmonary nodules, enlarged left hilar lymph node, enlarged left external iliac lymph node, 9 mm right lower quadrant peritoneal nodule, 7 mm left and left upper quadrant subcutaneous soft tissue nodules.  Decadron and Keppra started.  Medical oncology and radiation oncology were consulted.     Per medical oncology, will need to confirm diagnosis of melanoma and BRAF testing.  If BRAF positive, she will be candidate for BRAF/MAC inhibitor, otherwise she will need dual immunotherapy.    Patient was evaluated by neurosurgery on 10/4/2022, and underwent craniotomy for resection of 3 right frontal tumors on 10/7/2022.    Postprocedure MRI from 10/7/2022 showed significant reduction in extent of contrast enhancement of the previously seen metastatic lesions.  Hemorrhagic products were noted within the lesions.  No new lesions were noted. Patient was transferred to the medical floor on 10/8/2022.      Interval Problem Update  Has 3/5 left-sided weakness. Headache has improved.  Final pathology is pending.  Afebrile.    I have discussed this patient's plan of care and discharge plan at IDT rounds today with Case Management, Nursing, Nursing leadership, and other members of the IDT team.    Consultants/Specialty  neurosurgery and oncology, radiation oncology    Code Status  Full Code    Disposition  Patient is medically cleared for discharge.   Anticipate discharge to to home with close outpatient follow-up.  I have placed the appropriate orders for post-discharge needs.    Review of Systems  Review of Systems   Constitutional:  Positive for malaise/fatigue.   HENT: Negative.     Eyes: Negative.    Respiratory: Negative.     Cardiovascular: Negative.    Gastrointestinal: Negative.    Genitourinary:         Retention   Musculoskeletal: Negative.    Skin: Negative.    Neurological:  Positive for focal weakness and headaches.        Left sided weakness 3/5   Psychiatric/Behavioral: Negative.        Physical Exam  Temp:  [36.5 °C (97.7 °F)-36.9 °C (98.5 °F)] 36.7 °C (98.1 °F)  Pulse:  [58-73] 61  Resp:  [16-17] 16  BP: (87-98)/(58-65) 98/65  SpO2:  [93 %-98 %] 98 %    Physical Exam  Constitutional:       Appearance: She is ill-appearing.   Eyes:      Extraocular Movements: Extraocular movements intact.      Pupils: Pupils are equal, round, and reactive to light.   Cardiovascular:      Rate and Rhythm: Normal rate.   Pulmonary:      Effort: Pulmonary effort is normal.   Abdominal:      Palpations: Abdomen is soft.   Musculoskeletal:      Right lower leg: No edema.      Left lower leg: No edema.   Skin:     General: Skin is warm.   Neurological:      Comments: Left upper and lower extremity weakness   Psychiatric:         Mood and Affect: Mood normal.       Fluids    Intake/Output Summary (Last 24 hours) at 10/10/2022 1802  Last data filed at 10/10/2022 1626  Gross per 24 hour   Intake 240 ml   Output 1650 ml   Net -1410 ml       Laboratory  Recent Labs     10/09/22  0958 10/10/22  0438   WBC 13.0* 10.9*   RBC 4.31 4.06*    HEMOGLOBIN 10.9* 10.1*   HEMATOCRIT 35.2* 33.3*   MCV 81.7 82.0   MCH 25.3* 24.9*   MCHC 31.0* 30.3*   RDW 48.3 48.1   PLATELETCT 534* 534*   MPV 10.3 10.5     Recent Labs     10/09/22  0958 10/10/22  0438   SODIUM 138 140   POTASSIUM 4.2 4.3   CHLORIDE 98 100   CO2 29 31   GLUCOSE 240* 175*   BUN 15 20   CREATININE 0.36* 0.43*   CALCIUM 9.1 9.0                     Imaging  MR-BRAIN-WITH & W/O   Final Result      When compared with the previous MRI dated 10/2/2022, there has been significant interval reduction in the extent of contrast enhancement of the the previously seen metastatic lesions. Hemorrhagic products are noted within these lesions. There is diffuse    white matter edema is noted surrounding these lesions. There are no new lesions. These findings likely represents true tumor response or pseudo response secondary to the chemotherapy.      [id number] VIEW   Final Result      1. Diffuse colonic stool could represent constipation.   2.  No dilated loops of bowel or free air.      CT-HEAD W/O   Final Result      1.  Small amount of extra-axial and intracavitary RIGHT frontal applied following resection of RIGHT frontal masses   2.  No evidence of significant mass effect, large territorial infarction or new mass   3.  Residual tumor not excluded by this exam.         MR-BRAIN-WITH & W/O   Final Result      1.  Right-sided predominant, supratentorial metastatic disease with the largest mass in the right frontoparietal region measuring 3.4 x 3.0 cm as described in detail above.   2.  Associated edema with regional associated cortical effacement and partial effacement of the right lateral ventricle. Minimal localized midline shift adjacent to the large right frontoparietal mass.   3.  Evidence of prior hemorrhage within these masses.   4.  Small focus of restricted diffusion in the right occipital bone could represent osseous metastatic disease.      CT-CHEST,ABDOMEN,PELVIS WITH   Final Result      1.   BILATERAL pulmonary nodules, the largest of which measures 12 mm in the LEFT lower lobe and which has enlarged since May of this year.   2.  Enlarged LEFT hilar lymph node   3.  Enlarged LEFT external iliac lymph node. This would be amenable to image guided tissue sampling if clinically appropriate.   4.  7 mm LEFT and LEFT upper quadrant subcutaneous soft tissue nodules, new since prior   5.  9 mm RIGHT lower quadrant peritoneal nodule, new since prior   6.  33 mm LEFT ovarian cyst   7.  Thyroid nodules measuring up to 23 mm   8.  BILATERAL L5 pars defects with grade 1 anterolisthesis of L5 on S1   9.  Incompletely assessed LEFT renal lesion, new since prior and most likely a cyst though attention on top is recommended.      DX-CHEST-PORTABLE (1 VIEW)   Final Result         1.  Possible pulmonary nodule within each lung. Metastasis is a possibility.      2.  No infiltrates or consolidations identified.      CT-HEAD W/O   Final Result   Addendum (preliminary) 1 of 1   These findings were discussed with JESENIA HERNANDEZ on 10/01/2022.      Final         1.  3 masses in the right cerebral hemisphere largest measuring 3.2 cm with extensive vasogenic edema as described above. Findings are likely due to metastases.      2.  Localized midline shift to the left side frontoparietal level measuring 3 mm.      3.  The 2 smaller masses in the right frontal lobe demonstrates some increased density which could indicate that hemorrhage may be present within these masses.      These findings were discussed with Benjamín milan for dr hernandez on 10/01/2022.              Assessment/Plan  * Malignant melanoma metastatic to brain (HCC)- (present on admission)  Assessment & Plan  Initially diagnosed in 2016, underwent immunotherapy and lymph node dissection.  Now with widespread metastatic disease.  Underwent right frontal craniotomy of 3 frontal tumors on 10/7/2022.  Patient is to start radiation therapy about 2 weeks after surgery.  Patient  follows Dr. Reed as an outpatient, chemotherapy/immunotherapy will depend on final pathology report.  Continue Decadron and Keppra.    DM2 (diabetes mellitus, type 2) (HCC)- (present on admission)  Assessment & Plan  On steroids.  Continue SSI with Accu-Cheks and hypoglycemia protocol.  SSI scale was increased due to elevated blood glucose readings.  Diabetic diet.  Monitor closely    Constipation  Assessment & Plan  Constipation noted on abdominal x-ray.  Continue as needed laxatives    Depression- (present on admission)  Assessment & Plan  Continue Xanax and Cymbalta.       VTE prophylaxis: SCDs/TEDs Lovenox on hold from 10/5/2022

## 2022-10-11 LAB
ANION GAP SERPL CALC-SCNC: 10 MMOL/L (ref 7–16)
BASOPHILS # BLD AUTO: 0.1 % (ref 0–1.8)
BASOPHILS # BLD: 0.01 K/UL (ref 0–0.12)
BUN SERPL-MCNC: 18 MG/DL (ref 8–22)
CALCIUM SERPL-MCNC: 8.9 MG/DL (ref 8.5–10.5)
CHLORIDE SERPL-SCNC: 99 MMOL/L (ref 96–112)
CO2 SERPL-SCNC: 29 MMOL/L (ref 20–33)
CREAT SERPL-MCNC: 0.42 MG/DL (ref 0.5–1.4)
EOSINOPHIL # BLD AUTO: 0 K/UL (ref 0–0.51)
EOSINOPHIL NFR BLD: 0 % (ref 0–6.9)
ERYTHROCYTE [DISTWIDTH] IN BLOOD BY AUTOMATED COUNT: 48.5 FL (ref 35.9–50)
GFR SERPLBLD CREATININE-BSD FMLA CKD-EPI: 124 ML/MIN/1.73 M 2
GLUCOSE BLD STRIP.AUTO-MCNC: 184 MG/DL (ref 65–99)
GLUCOSE BLD STRIP.AUTO-MCNC: 220 MG/DL (ref 65–99)
GLUCOSE BLD STRIP.AUTO-MCNC: 261 MG/DL (ref 65–99)
GLUCOSE BLD STRIP.AUTO-MCNC: 286 MG/DL (ref 65–99)
GLUCOSE SERPL-MCNC: 218 MG/DL (ref 65–99)
HCT VFR BLD AUTO: 32.6 % (ref 37–47)
HGB BLD-MCNC: 9.8 G/DL (ref 12–16)
IMM GRANULOCYTES # BLD AUTO: 0.05 K/UL (ref 0–0.11)
IMM GRANULOCYTES NFR BLD AUTO: 0.5 % (ref 0–0.9)
LYMPHOCYTES # BLD AUTO: 1.26 K/UL (ref 1–4.8)
LYMPHOCYTES NFR BLD: 12.9 % (ref 22–41)
MCH RBC QN AUTO: 24.7 PG (ref 27–33)
MCHC RBC AUTO-ENTMCNC: 30.1 G/DL (ref 33.6–35)
MCV RBC AUTO: 82.1 FL (ref 81.4–97.8)
MONOCYTES # BLD AUTO: 0.68 K/UL (ref 0–0.85)
MONOCYTES NFR BLD AUTO: 7 % (ref 0–13.4)
NEUTROPHILS # BLD AUTO: 7.75 K/UL (ref 2–7.15)
NEUTROPHILS NFR BLD: 79.5 % (ref 44–72)
NRBC # BLD AUTO: 0 K/UL
NRBC BLD-RTO: 0 /100 WBC
PLATELET # BLD AUTO: 509 K/UL (ref 164–446)
PMV BLD AUTO: 10.2 FL (ref 9–12.9)
POTASSIUM SERPL-SCNC: 4.1 MMOL/L (ref 3.6–5.5)
RBC # BLD AUTO: 3.97 M/UL (ref 4.2–5.4)
SODIUM SERPL-SCNC: 138 MMOL/L (ref 135–145)
WBC # BLD AUTO: 9.8 K/UL (ref 4.8–10.8)

## 2022-10-11 PROCEDURE — 82962 GLUCOSE BLOOD TEST: CPT

## 2022-10-11 PROCEDURE — A9270 NON-COVERED ITEM OR SERVICE: HCPCS | Performed by: INTERNAL MEDICINE

## 2022-10-11 PROCEDURE — 700102 HCHG RX REV CODE 250 W/ 637 OVERRIDE(OP): Performed by: NURSE PRACTITIONER

## 2022-10-11 PROCEDURE — 97112 NEUROMUSCULAR REEDUCATION: CPT

## 2022-10-11 PROCEDURE — 700102 HCHG RX REV CODE 250 W/ 637 OVERRIDE(OP): Performed by: INTERNAL MEDICINE

## 2022-10-11 PROCEDURE — 700102 HCHG RX REV CODE 250 W/ 637 OVERRIDE(OP): Performed by: HOSPITALIST

## 2022-10-11 PROCEDURE — A9270 NON-COVERED ITEM OR SERVICE: HCPCS | Performed by: NURSE PRACTITIONER

## 2022-10-11 PROCEDURE — 770004 HCHG ROOM/CARE - ONCOLOGY PRIVATE *

## 2022-10-11 PROCEDURE — 85025 COMPLETE CBC W/AUTO DIFF WBC: CPT

## 2022-10-11 PROCEDURE — 36415 COLL VENOUS BLD VENIPUNCTURE: CPT

## 2022-10-11 PROCEDURE — 80048 BASIC METABOLIC PNL TOTAL CA: CPT

## 2022-10-11 PROCEDURE — 99232 SBSQ HOSP IP/OBS MODERATE 35: CPT | Performed by: INTERNAL MEDICINE

## 2022-10-11 PROCEDURE — 700111 HCHG RX REV CODE 636 W/ 250 OVERRIDE (IP): Performed by: PHYSICIAN ASSISTANT

## 2022-10-11 PROCEDURE — 700102 HCHG RX REV CODE 250 W/ 637 OVERRIDE(OP): Performed by: STUDENT IN AN ORGANIZED HEALTH CARE EDUCATION/TRAINING PROGRAM

## 2022-10-11 PROCEDURE — A9270 NON-COVERED ITEM OR SERVICE: HCPCS | Performed by: STUDENT IN AN ORGANIZED HEALTH CARE EDUCATION/TRAINING PROGRAM

## 2022-10-11 PROCEDURE — 700111 HCHG RX REV CODE 636 W/ 250 OVERRIDE (IP): Performed by: STUDENT IN AN ORGANIZED HEALTH CARE EDUCATION/TRAINING PROGRAM

## 2022-10-11 PROCEDURE — 700111 HCHG RX REV CODE 636 W/ 250 OVERRIDE (IP): Performed by: INTERNAL MEDICINE

## 2022-10-11 PROCEDURE — A9270 NON-COVERED ITEM OR SERVICE: HCPCS | Performed by: HOSPITALIST

## 2022-10-11 RX ORDER — DEXAMETHASONE 4 MG/1
4 TABLET ORAL EVERY 8 HOURS
Status: DISCONTINUED | OUTPATIENT
Start: 2022-10-11 | End: 2022-10-12

## 2022-10-11 RX ADMIN — QUETIAPINE FUMARATE 200 MG: 100 TABLET ORAL at 21:20

## 2022-10-11 RX ADMIN — INSULIN LISPRO 2 UNITS: 100 INJECTION, SOLUTION INTRAVENOUS; SUBCUTANEOUS at 12:42

## 2022-10-11 RX ADMIN — SENNOSIDES AND DOCUSATE SODIUM 2 TABLET: 50; 8.6 TABLET ORAL at 06:24

## 2022-10-11 RX ADMIN — INSULIN LISPRO 6 UNITS: 100 INJECTION, SOLUTION INTRAVENOUS; SUBCUTANEOUS at 09:47

## 2022-10-11 RX ADMIN — INSULIN GLARGINE-YFGN 14 UNITS: 100 INJECTION, SOLUTION SUBCUTANEOUS at 18:17

## 2022-10-11 RX ADMIN — LEVETIRACETAM 1000 MG: 500 TABLET, FILM COATED ORAL at 18:05

## 2022-10-11 RX ADMIN — FAMOTIDINE 20 MG: 20 TABLET, FILM COATED ORAL at 06:25

## 2022-10-11 RX ADMIN — DEXAMETHASONE SODIUM PHOSPHATE 4 MG: 4 INJECTION, SOLUTION INTRA-ARTICULAR; INTRALESIONAL; INTRAMUSCULAR; INTRAVENOUS; SOFT TISSUE at 06:25

## 2022-10-11 RX ADMIN — HYDROMORPHONE HYDROCHLORIDE 1 MG: 1 INJECTION, SOLUTION INTRAMUSCULAR; INTRAVENOUS; SUBCUTANEOUS at 21:20

## 2022-10-11 RX ADMIN — ENOXAPARIN SODIUM 40 MG: 40 INJECTION SUBCUTANEOUS at 18:08

## 2022-10-11 RX ADMIN — GABAPENTIN 800 MG: 400 CAPSULE ORAL at 10:01

## 2022-10-11 RX ADMIN — GABAPENTIN 800 MG: 400 CAPSULE ORAL at 14:10

## 2022-10-11 RX ADMIN — SENNOSIDES AND DOCUSATE SODIUM 2 TABLET: 50; 8.6 TABLET ORAL at 18:05

## 2022-10-11 RX ADMIN — INSULIN LISPRO 6 UNITS: 100 INJECTION, SOLUTION INTRAVENOUS; SUBCUTANEOUS at 21:29

## 2022-10-11 RX ADMIN — GABAPENTIN 800 MG: 400 CAPSULE ORAL at 21:20

## 2022-10-11 RX ADMIN — OXCARBAZEPINE 300 MG: 300 TABLET, FILM COATED ORAL at 22:05

## 2022-10-11 RX ADMIN — DULOXETINE 60 MG: 20 CAPSULE, DELAYED RELEASE ORAL at 18:05

## 2022-10-11 RX ADMIN — POLYETHYLENE GLYCOL 3350 1 PACKET: 17 POWDER, FOR SOLUTION ORAL at 06:24

## 2022-10-11 RX ADMIN — HYDROMORPHONE HYDROCHLORIDE 1 MG: 1 INJECTION, SOLUTION INTRAMUSCULAR; INTRAVENOUS; SUBCUTANEOUS at 14:29

## 2022-10-11 RX ADMIN — ATORVASTATIN CALCIUM 40 MG: 40 TABLET, FILM COATED ORAL at 18:05

## 2022-10-11 RX ADMIN — ALBUTEROL SULFATE 2 PUFF: 90 AEROSOL, METERED RESPIRATORY (INHALATION) at 09:52

## 2022-10-11 RX ADMIN — INSULIN LISPRO 4 UNITS: 100 INJECTION, SOLUTION INTRAVENOUS; SUBCUTANEOUS at 17:55

## 2022-10-11 RX ADMIN — HYDROMORPHONE HYDROCHLORIDE 1 MG: 1 INJECTION, SOLUTION INTRAMUSCULAR; INTRAVENOUS; SUBCUTANEOUS at 10:01

## 2022-10-11 RX ADMIN — HYDROMORPHONE HYDROCHLORIDE 1 MG: 1 INJECTION, SOLUTION INTRAMUSCULAR; INTRAVENOUS; SUBCUTANEOUS at 06:25

## 2022-10-11 RX ADMIN — DEXAMETHASONE 4 MG: 4 TABLET ORAL at 21:20

## 2022-10-11 RX ADMIN — LEVETIRACETAM 1000 MG: 500 TABLET, FILM COATED ORAL at 06:25

## 2022-10-11 RX ADMIN — GABAPENTIN 800 MG: 400 CAPSULE ORAL at 18:15

## 2022-10-11 RX ADMIN — DEXAMETHASONE SODIUM PHOSPHATE 4 MG: 4 INJECTION, SOLUTION INTRA-ARTICULAR; INTRALESIONAL; INTRAMUSCULAR; INTRAVENOUS; SOFT TISSUE at 14:10

## 2022-10-11 RX ADMIN — FAMOTIDINE 20 MG: 20 TABLET, FILM COATED ORAL at 18:05

## 2022-10-11 ASSESSMENT — PAIN DESCRIPTION - PAIN TYPE
TYPE: ACUTE PAIN

## 2022-10-11 ASSESSMENT — COGNITIVE AND FUNCTIONAL STATUS - GENERAL
PERSONAL GROOMING: A LITTLE
TOILETING: A LOT
DAILY ACTIVITIY SCORE: 15
HELP NEEDED FOR BATHING: A LOT
DRESSING REGULAR LOWER BODY CLOTHING: A LOT
SUGGESTED CMS G CODE MODIFIER DAILY ACTIVITY: CK
DRESSING REGULAR UPPER BODY CLOTHING: A LITTLE
EATING MEALS: A LITTLE

## 2022-10-11 ASSESSMENT — ENCOUNTER SYMPTOMS
FOCAL WEAKNESS: 1
HEADACHES: 1
EYES NEGATIVE: 1
CARDIOVASCULAR NEGATIVE: 1
PSYCHIATRIC NEGATIVE: 1
RESPIRATORY NEGATIVE: 1
MUSCULOSKELETAL NEGATIVE: 1
CONSTIPATION: 1

## 2022-10-11 ASSESSMENT — PATIENT HEALTH QUESTIONNAIRE - PHQ9
2. FEELING DOWN, DEPRESSED, IRRITABLE, OR HOPELESS: NOT AT ALL
1. LITTLE INTEREST OR PLEASURE IN DOING THINGS: NOT AT ALL
SUM OF ALL RESPONSES TO PHQ9 QUESTIONS 1 AND 2: 0

## 2022-10-11 NOTE — CARE PLAN
The patient is Watcher - Medium risk of patient condition declining or worsening    Shift Goals  Clinical Goals: pain control  Patient Goals: pain control, rest  Family Goals: N/A    Progress made toward(s) clinical / shift goals:    Problem: Knowledge Deficit - Standard  Goal: Patient and family/care givers will demonstrate understanding of plan of care, disease process/condition, diagnostic tests and medications  10/11/2022 0151 by Nieves Henderson R.N.  Outcome: Progressing  10/11/2022 0151 by Nieves Henderson R.N.  Outcome: Progressing     Problem: Pain - Standard  Goal: Alleviation of pain or a reduction in pain to the patient’s comfort goal  10/11/2022 0151 by Nieves Henderson R.N.  Outcome: Progressing  10/11/2022 0151 by Nieves Henderson, R.N.  Outcome: Progressing     Problem: Fall Risk  Goal: Patient will remain free from falls  10/11/2022 0151 by Nieves Henderson, R.N.  Outcome: Progressing  10/11/2022 0151 by Nieves Henderson, R.N.  Outcome: Progressing       Patient is not progressing towards the following goals:

## 2022-10-11 NOTE — PROGRESS NOTES
[facility] Medicine Daily Progress Note    Date of Service  10/11/2022    Chief Complaint  Jacque Mcintyre is a 41 y.o. female admitted 10/1/2022 with left-sided weakness.  She was diagnosed with melanoma in 2016, and underwent immunotherapy for 3 years.  She had an abnormal PET scan earlier this year, biopsy was reportedly negative.  She has type 2 diabetes and depression.    Hospital Course  CT head from 10/1/2022 showed 3 masses in the right cerebral hemisphere with extensive vasogenic edema, localized midline shift to the left side frontoparietal level, 2 small masses in the right frontal lobe with increased density which could indicate hemorrhage. Neurosurgery were consulted. MRI brain from 10/2/2022 showed right-sided predominant, supratentorial metastatic disease with associated edema, localized midline shift, evidence of prior hemorrhage within the masses.  CT chest, abdomen, pelvis from 10/1/2022 showed bilateral pulmonary nodules, enlarged left hilar lymph node, enlarged left external iliac lymph node, 9 mm right lower quadrant peritoneal nodule, 7 mm left and left upper quadrant subcutaneous soft tissue nodules.  Decadron and Keppra started.  Medical oncology and radiation oncology were consulted.     Per medical oncology, will need to confirm diagnosis of melanoma and BRAF testing.  If BRAF positive, she will be candidate for BRAF/MAC inhibitor, otherwise she will need dual immunotherapy.  Recommended outpatient follow-up.    Patient was evaluated by neurosurgery on 10/4/2022, and underwent craniotomy for resection of 3 right frontal tumors on 10/7/2022.    Postprocedure MRI from 10/7/2022 showed significant reduction in extent of contrast enhancement of the previously seen metastatic lesions.  Hemorrhagic products were noted within the lesions.  No new lesions were noted. Patient was transferred to the medical floor on 10/8/2022.      Interval Problem Update  Patient was seen and examined at bedside.  I  have personally reviewed and interpreted vitals, labs, and imaging.    10/11.  Afebrile.  Intermittent hypotension, bradycardia.  On 0-1 L nasal cannula.  Denies fever, chills, chest pains, shortness of breath.  Reports constipation but is passing gas.  Headache is improved.    I have discussed this patient's plan of care and discharge plan at IDT rounds today with Case Management, Nursing, Nursing leadership, and other members of the IDT team.    Consultants/Specialty  neurosurgery and oncology, radiation oncology    Code Status  Full Code    Disposition  Patient is not medically cleared for discharge.   Anticipate discharge to to skilled nursing facility.  I have placed the appropriate orders for post-discharge needs.    Review of Systems  Review of Systems   Constitutional:  Positive for malaise/fatigue.   HENT: Negative.     Eyes: Negative.    Respiratory: Negative.     Cardiovascular: Negative.    Gastrointestinal:  Positive for constipation.   Genitourinary:         Retention   Musculoskeletal: Negative.    Skin: Negative.    Neurological:  Positive for focal weakness and headaches.        Left sided weakness 3/5   Psychiatric/Behavioral: Negative.        Physical Exam  Temp:  [36.2 °C (97.2 °F)-36.9 °C (98.4 °F)] 36.9 °C (98.4 °F)  Pulse:  [52-73] 52  Resp:  [16-18] 16  BP: ()/(54-66) 102/66  SpO2:  [93 %-98 %] 93 %    Physical Exam  Constitutional:       Appearance: She is ill-appearing.   Eyes:      Extraocular Movements: Extraocular movements intact.      Pupils: Pupils are equal, round, and reactive to light.   Cardiovascular:      Rate and Rhythm: Normal rate.   Pulmonary:      Effort: Pulmonary effort is normal.   Abdominal:      Palpations: Abdomen is soft.   Musculoskeletal:      Right lower leg: No edema.      Left lower leg: No edema.   Skin:     General: Skin is warm.   Neurological:      Comments: Left upper and lower extremity weakness   Psychiatric:         Mood and Affect: Mood normal.        Fluids    Intake/Output Summary (Last 24 hours) at 10/11/2022 1039  Last data filed at 10/11/2022 0900  Gross per 24 hour   Intake 480 ml   Output 1550 ml   Net -1070 ml         Laboratory  Recent Labs     10/09/22  0958 10/10/22  0438 10/11/22  0120   WBC 13.0* 10.9* 9.8   RBC 4.31 4.06* 3.97*   HEMOGLOBIN 10.9* 10.1* 9.8*   HEMATOCRIT 35.2* 33.3* 32.6*   MCV 81.7 82.0 82.1   MCH 25.3* 24.9* 24.7*   MCHC 31.0* 30.3* 30.1*   RDW 48.3 48.1 48.5   PLATELETCT 534* 534* 509*   MPV 10.3 10.5 10.2       Recent Labs     10/09/22  0958 10/10/22  0438 10/11/22  0120   SODIUM 138 140 138   POTASSIUM 4.2 4.3 4.1   CHLORIDE 98 100 99   CO2 29 31 29   GLUCOSE 240* 175* 218*   BUN 15 20 18   CREATININE 0.36* 0.43* 0.42*   CALCIUM 9.1 9.0 8.9                       Imaging  MR-BRAIN-WITH & W/O   Final Result      When compared with the previous MRI dated 10/2/2022, there has been significant interval reduction in the extent of contrast enhancement of the the previously seen metastatic lesions. Hemorrhagic products are noted within these lesions. There is diffuse    white matter edema is noted surrounding these lesions. There are no new lesions. These findings likely represents true tumor response or pseudo response secondary to the chemotherapy.      [id number] VIEW   Final Result      1. Diffuse colonic stool could represent constipation.   2.  No dilated loops of bowel or free air.      CT-HEAD W/O   Final Result      1.  Small amount of extra-axial and intracavitary RIGHT frontal applied following resection of RIGHT frontal masses   2.  No evidence of significant mass effect, large territorial infarction or new mass   3.  Residual tumor not excluded by this exam.         MR-BRAIN-WITH & W/O   Final Result      1.  Right-sided predominant, supratentorial metastatic disease with the largest mass in the right frontoparietal region measuring 3.4 x 3.0 cm as described in detail above.   2.  Associated edema with regional  associated cortical effacement and partial effacement of the right lateral ventricle. Minimal localized midline shift adjacent to the large right frontoparietal mass.   3.  Evidence of prior hemorrhage within these masses.   4.  Small focus of restricted diffusion in the right occipital bone could represent osseous metastatic disease.      CT-CHEST,ABDOMEN,PELVIS WITH   Final Result      1.  BILATERAL pulmonary nodules, the largest of which measures 12 mm in the LEFT lower lobe and which has enlarged since May of this year.   2.  Enlarged LEFT hilar lymph node   3.  Enlarged LEFT external iliac lymph node. This would be amenable to image guided tissue sampling if clinically appropriate.   4.  7 mm LEFT and LEFT upper quadrant subcutaneous soft tissue nodules, new since prior   5.  9 mm RIGHT lower quadrant peritoneal nodule, new since prior   6.  33 mm LEFT ovarian cyst   7.  Thyroid nodules measuring up to 23 mm   8.  BILATERAL L5 pars defects with grade 1 anterolisthesis of L5 on S1   9.  Incompletely assessed LEFT renal lesion, new since prior and most likely a cyst though attention on top is recommended.      DX-CHEST-PORTABLE (1 VIEW)   Final Result         1.  Possible pulmonary nodule within each lung. Metastasis is a possibility.      2.  No infiltrates or consolidations identified.      CT-HEAD W/O   Final Result   Addendum (preliminary) 1 of 1   These findings were discussed with JESENIA HERNANDEZ on 10/01/2022.      Final         1.  3 masses in the right cerebral hemisphere largest measuring 3.2 cm with extensive vasogenic edema as described above. Findings are likely due to metastases.      2.  Localized midline shift to the left side frontoparietal level measuring 3 mm.      3.  The 2 smaller masses in the right frontal lobe demonstrates some increased density which could indicate that hemorrhage may be present within these masses.      These findings were discussed with Benjamín hernandez on  10/01/2022.                Assessment/Plan  * Malignant melanoma metastatic to brain (HCC)- (present on admission)  Assessment & Plan  Initially diagnosed in 2016, underwent immunotherapy and lymph node dissection.  Now with widespread metastatic disease.  Underwent right frontal craniotomy of 3 frontal tumors on 10/7/2022.  Patient is to start radiation therapy about 2 weeks after surgery.  Patient follows Dr. Reed as an outpatient, chemotherapy/immunotherapy will depend on final pathology report.  Continue Decadron and Keppra.    Constipation  Assessment & Plan  Constipation noted on abdominal x-ray.  Continue as needed laxatives    DM2 (diabetes mellitus, type 2) (HCC)- (present on admission)  Assessment & Plan  On steroids.  Continue SSI with Accu-Cheks and hypoglycemia protocol.  SSI scale was increased due to elevated blood glucose readings.  Diabetic diet.  Monitor closely    Depression- (present on admission)  Assessment & Plan  Continue Xanax and Cymbalta.       VTE prophylaxis: SCDs/TEDs Lovenox

## 2022-10-11 NOTE — THERAPY
Occupational Therapy  Daily Treatment     Patient Name: Jacque Mcintyre  Age:  41 y.o., Sex:  female  Medical Record #: [id number]  Today's Date: 10/11/2022     Precautions  Precautions: Fall Risk  Comments: L weakness/L inattention, significant LLE tone    Assessment    Pt feeling fatigued from sitting up in wheelchair for about an hour. Participated in stand pivot transfers in prep for ADL txfs. LUE A/AROM in bed. Remains limited by weakness, fatigue, impaired balance, abnormal muscle tone, and L inattention.    Pt would benefit from physiatry consult. Message sent to MD.     Plan    Continue current treatment plan.    DC Equipment Recommendations: Unable to determine at this time  Discharge Recommendations: Recommend post-acute placement for additional occupational therapy services prior to discharge home       Objective       10/11/22 1323   Cognition    Cognition / Consciousness X   Level of Consciousness Alert   Ability To Follow Commands 2 Step   Attention Impaired   Comments L inattention   Supine Upper Body Exercises   Supine Upper Body Exercises Yes   Comments LUE A/AROM, stretching, light resistance in all planes   Balance   Sitting Balance (Static) Poor +   Sitting Balance (Dynamic) Poor   Standing Balance (Static) Poor -   Standing Balance (Dynamic) Trace   Weight Shift Sitting Poor   Weight Shift Standing Poor   Skilled Intervention Verbal Cuing;Tactile Cuing;Sequencing   Comments w/ full contact assist   Bed Mobility    Sit to Supine Moderate Assist   Scooting Moderate Assist   Skilled Intervention Verbal Cuing;Tactile Cuing;Sequencing   Activities of Daily Living   Comments focus on ADL txfs and LUE function today   How much help from another person does the patient currently need...   Putting on and taking off regular lower body clothing? 2   Bathing (including washing, rinsing, and drying)? 2   Toileting, which includes using a toilet, bedpan, or urinal? 2   Putting on and taking off regular upper  body clothing? 3   Taking care of personal grooming such as brushing teeth? 3   Eating meals? 3   6 Clicks Daily Activity Score 15   Functional Mobility   Sit to Stand Moderate Assist   Bed, Chair, Wheelchair Transfer Moderate Assist   Transfer Method Stand Pivot   Mobility wheelchair>BTB   Skilled Intervention Verbal Cuing;Tactile Cuing;Sequencing   Comments increased tone LLE after sitting down from txf, improved positioning of LLE to floor during txf however   Patient / Family Goals   Patient / Family Goal #1 full recovery   Short Term Goals   Short Term Goal # 1 pt will demo functional transfers at SPV level   Goal Outcome # 1 Goal not met   Short Term Goal # 2 pt will complete >5min standing grooming ADLs at sink with SPV   Goal Outcome # 2 Goal not met   Short Term Goal # 3 pt will complete toileting ADL at SPV level   Goal Outcome # 3 Goal not met   Short Term Goal # 4 pt will participate in activities to improve LUE function   Goal Outcome # 4 Progressing as expected

## 2022-10-12 PROBLEM — E27.3 ADRENAL INSUFFICIENCY DUE TO CORTICOSTEROID WITHDRAWAL (HCC): Status: ACTIVE | Noted: 2022-10-12

## 2022-10-12 PROBLEM — T38.0X5A ADRENAL INSUFFICIENCY DUE TO CORTICOSTEROID WITHDRAWAL (HCC): Status: ACTIVE | Noted: 2022-10-12

## 2022-10-12 LAB
ANION GAP SERPL CALC-SCNC: 10 MMOL/L (ref 7–16)
BASOPHILS # BLD AUTO: 0.2 % (ref 0–1.8)
BASOPHILS # BLD: 0.02 K/UL (ref 0–0.12)
BUN SERPL-MCNC: 16 MG/DL (ref 8–22)
CALCIUM SERPL-MCNC: 8.9 MG/DL (ref 8.5–10.5)
CHLORIDE SERPL-SCNC: 98 MMOL/L (ref 96–112)
CO2 SERPL-SCNC: 28 MMOL/L (ref 20–33)
CORTIS SERPL-MCNC: 1.2 UG/DL (ref 0–23)
CREAT SERPL-MCNC: 0.47 MG/DL (ref 0.5–1.4)
EOSINOPHIL # BLD AUTO: 0.04 K/UL (ref 0–0.51)
EOSINOPHIL NFR BLD: 0.4 % (ref 0–6.9)
ERYTHROCYTE [DISTWIDTH] IN BLOOD BY AUTOMATED COUNT: 46.5 FL (ref 35.9–50)
GFR SERPLBLD CREATININE-BSD FMLA CKD-EPI: 120 ML/MIN/1.73 M 2
GLUCOSE BLD STRIP.AUTO-MCNC: 209 MG/DL (ref 65–99)
GLUCOSE BLD STRIP.AUTO-MCNC: 216 MG/DL (ref 65–99)
GLUCOSE BLD STRIP.AUTO-MCNC: 221 MG/DL (ref 65–99)
GLUCOSE BLD STRIP.AUTO-MCNC: 224 MG/DL (ref 65–99)
GLUCOSE SERPL-MCNC: 174 MG/DL (ref 65–99)
HCT VFR BLD AUTO: 31.6 % (ref 37–47)
HGB BLD-MCNC: 9.7 G/DL (ref 12–16)
IMM GRANULOCYTES # BLD AUTO: 0.06 K/UL (ref 0–0.11)
IMM GRANULOCYTES NFR BLD AUTO: 0.5 % (ref 0–0.9)
LYMPHOCYTES # BLD AUTO: 2.24 K/UL (ref 1–4.8)
LYMPHOCYTES NFR BLD: 20.1 % (ref 22–41)
MAGNESIUM SERPL-MCNC: 2 MG/DL (ref 1.5–2.5)
MCH RBC QN AUTO: 24.7 PG (ref 27–33)
MCHC RBC AUTO-ENTMCNC: 30.7 G/DL (ref 33.6–35)
MCV RBC AUTO: 80.4 FL (ref 81.4–97.8)
MONOCYTES # BLD AUTO: 1.15 K/UL (ref 0–0.85)
MONOCYTES NFR BLD AUTO: 10.3 % (ref 0–13.4)
NEUTROPHILS # BLD AUTO: 7.65 K/UL (ref 2–7.15)
NEUTROPHILS NFR BLD: 68.5 % (ref 44–72)
NRBC # BLD AUTO: 0 K/UL
NRBC BLD-RTO: 0 /100 WBC
PHOSPHATE SERPL-MCNC: 4.5 MG/DL (ref 2.5–4.5)
PLATELET # BLD AUTO: 555 K/UL (ref 164–446)
PMV BLD AUTO: 10.1 FL (ref 9–12.9)
POTASSIUM SERPL-SCNC: 3.8 MMOL/L (ref 3.6–5.5)
RBC # BLD AUTO: 3.93 M/UL (ref 4.2–5.4)
SODIUM SERPL-SCNC: 136 MMOL/L (ref 135–145)
WBC # BLD AUTO: 11.2 K/UL (ref 4.8–10.8)

## 2022-10-12 PROCEDURE — 99233 SBSQ HOSP IP/OBS HIGH 50: CPT | Performed by: INTERNAL MEDICINE

## 2022-10-12 PROCEDURE — 700111 HCHG RX REV CODE 636 W/ 250 OVERRIDE (IP): Performed by: INTERNAL MEDICINE

## 2022-10-12 PROCEDURE — 97140 MANUAL THERAPY 1/> REGIONS: CPT

## 2022-10-12 PROCEDURE — 700102 HCHG RX REV CODE 250 W/ 637 OVERRIDE(OP): Performed by: NURSE PRACTITIONER

## 2022-10-12 PROCEDURE — 97530 THERAPEUTIC ACTIVITIES: CPT

## 2022-10-12 PROCEDURE — 82533 TOTAL CORTISOL: CPT

## 2022-10-12 PROCEDURE — 83735 ASSAY OF MAGNESIUM: CPT

## 2022-10-12 PROCEDURE — 82962 GLUCOSE BLOOD TEST: CPT | Mod: 91

## 2022-10-12 PROCEDURE — A9270 NON-COVERED ITEM OR SERVICE: HCPCS | Performed by: NURSE PRACTITIONER

## 2022-10-12 PROCEDURE — A9270 NON-COVERED ITEM OR SERVICE: HCPCS | Performed by: INTERNAL MEDICINE

## 2022-10-12 PROCEDURE — 700102 HCHG RX REV CODE 250 W/ 637 OVERRIDE(OP): Performed by: HOSPITALIST

## 2022-10-12 PROCEDURE — 700102 HCHG RX REV CODE 250 W/ 637 OVERRIDE(OP): Performed by: INTERNAL MEDICINE

## 2022-10-12 PROCEDURE — 80048 BASIC METABOLIC PNL TOTAL CA: CPT

## 2022-10-12 PROCEDURE — A9270 NON-COVERED ITEM OR SERVICE: HCPCS | Performed by: HOSPITALIST

## 2022-10-12 PROCEDURE — 700111 HCHG RX REV CODE 636 W/ 250 OVERRIDE (IP): Performed by: STUDENT IN AN ORGANIZED HEALTH CARE EDUCATION/TRAINING PROGRAM

## 2022-10-12 PROCEDURE — 97110 THERAPEUTIC EXERCISES: CPT

## 2022-10-12 PROCEDURE — A9270 NON-COVERED ITEM OR SERVICE: HCPCS | Performed by: STUDENT IN AN ORGANIZED HEALTH CARE EDUCATION/TRAINING PROGRAM

## 2022-10-12 PROCEDURE — 84100 ASSAY OF PHOSPHORUS: CPT

## 2022-10-12 PROCEDURE — 85025 COMPLETE CBC W/AUTO DIFF WBC: CPT

## 2022-10-12 PROCEDURE — 36415 COLL VENOUS BLD VENIPUNCTURE: CPT

## 2022-10-12 PROCEDURE — 700102 HCHG RX REV CODE 250 W/ 637 OVERRIDE(OP): Performed by: STUDENT IN AN ORGANIZED HEALTH CARE EDUCATION/TRAINING PROGRAM

## 2022-10-12 PROCEDURE — 770004 HCHG ROOM/CARE - ONCOLOGY PRIVATE *

## 2022-10-12 RX ORDER — DEXAMETHASONE 4 MG/1
4 TABLET ORAL EVERY 12 HOURS
Status: DISCONTINUED | OUTPATIENT
Start: 2022-10-12 | End: 2022-10-15

## 2022-10-12 RX ORDER — OXYCODONE HYDROCHLORIDE 5 MG/1
5 TABLET ORAL
Status: DISCONTINUED | OUTPATIENT
Start: 2022-10-12 | End: 2022-10-15 | Stop reason: HOSPADM

## 2022-10-12 RX ORDER — HYDROMORPHONE HYDROCHLORIDE 1 MG/ML
0.5 INJECTION, SOLUTION INTRAMUSCULAR; INTRAVENOUS; SUBCUTANEOUS
Status: DISCONTINUED | OUTPATIENT
Start: 2022-10-12 | End: 2022-10-15 | Stop reason: HOSPADM

## 2022-10-12 RX ORDER — OXYCODONE HYDROCHLORIDE 10 MG/1
10 TABLET ORAL
Status: DISCONTINUED | OUTPATIENT
Start: 2022-10-12 | End: 2022-10-15 | Stop reason: HOSPADM

## 2022-10-12 RX ADMIN — OXCARBAZEPINE 300 MG: 300 TABLET, FILM COATED ORAL at 08:14

## 2022-10-12 RX ADMIN — OXYCODONE HYDROCHLORIDE 10 MG: 10 TABLET ORAL at 21:13

## 2022-10-12 RX ADMIN — INSULIN LISPRO 4 UNITS: 100 INJECTION, SOLUTION INTRAVENOUS; SUBCUTANEOUS at 08:20

## 2022-10-12 RX ADMIN — DEXAMETHASONE 4 MG: 4 TABLET ORAL at 05:58

## 2022-10-12 RX ADMIN — HYDROMORPHONE HYDROCHLORIDE 1 MG: 1 INJECTION, SOLUTION INTRAMUSCULAR; INTRAVENOUS; SUBCUTANEOUS at 05:58

## 2022-10-12 RX ADMIN — FAMOTIDINE 20 MG: 20 TABLET, FILM COATED ORAL at 05:58

## 2022-10-12 RX ADMIN — DEXAMETHASONE 4 MG: 4 TABLET ORAL at 17:43

## 2022-10-12 RX ADMIN — INSULIN LISPRO 4 UNITS: 100 INJECTION, SOLUTION INTRAVENOUS; SUBCUTANEOUS at 17:28

## 2022-10-12 RX ADMIN — SENNOSIDES AND DOCUSATE SODIUM 2 TABLET: 50; 8.6 TABLET ORAL at 17:33

## 2022-10-12 RX ADMIN — ACETAMINOPHEN 650 MG: 325 TABLET, FILM COATED ORAL at 14:53

## 2022-10-12 RX ADMIN — HYDROMORPHONE HYDROCHLORIDE 0.5 MG: 1 INJECTION, SOLUTION INTRAMUSCULAR; INTRAVENOUS; SUBCUTANEOUS at 17:16

## 2022-10-12 RX ADMIN — OXYCODONE 5 MG: 5 TABLET ORAL at 08:18

## 2022-10-12 RX ADMIN — ACETAMINOPHEN 650 MG: 325 TABLET, FILM COATED ORAL at 00:42

## 2022-10-12 RX ADMIN — OXYCODONE HYDROCHLORIDE 10 MG: 10 TABLET ORAL at 15:28

## 2022-10-12 RX ADMIN — LEVETIRACETAM 1000 MG: 500 TABLET, FILM COATED ORAL at 05:58

## 2022-10-12 RX ADMIN — HYDROMORPHONE HYDROCHLORIDE 1 MG: 1 INJECTION, SOLUTION INTRAMUSCULAR; INTRAVENOUS; SUBCUTANEOUS at 00:39

## 2022-10-12 RX ADMIN — QUETIAPINE FUMARATE 200 MG: 100 TABLET ORAL at 21:13

## 2022-10-12 RX ADMIN — DULOXETINE 60 MG: 20 CAPSULE, DELAYED RELEASE ORAL at 17:32

## 2022-10-12 RX ADMIN — LEVETIRACETAM 1000 MG: 500 TABLET, FILM COATED ORAL at 17:33

## 2022-10-12 RX ADMIN — INSULIN LISPRO 4 UNITS: 100 INJECTION, SOLUTION INTRAVENOUS; SUBCUTANEOUS at 21:19

## 2022-10-12 RX ADMIN — FAMOTIDINE 20 MG: 20 TABLET, FILM COATED ORAL at 17:33

## 2022-10-12 RX ADMIN — ATORVASTATIN CALCIUM 40 MG: 40 TABLET, FILM COATED ORAL at 17:33

## 2022-10-12 RX ADMIN — SENNOSIDES AND DOCUSATE SODIUM 2 TABLET: 50; 8.6 TABLET ORAL at 05:58

## 2022-10-12 RX ADMIN — POLYETHYLENE GLYCOL 3350 1 PACKET: 17 POWDER, FOR SOLUTION ORAL at 05:58

## 2022-10-12 RX ADMIN — OXCARBAZEPINE 300 MG: 300 TABLET, FILM COATED ORAL at 21:14

## 2022-10-12 RX ADMIN — GABAPENTIN 800 MG: 400 CAPSULE ORAL at 08:13

## 2022-10-12 RX ADMIN — INSULIN LISPRO 4 UNITS: 100 INJECTION, SOLUTION INTRAVENOUS; SUBCUTANEOUS at 12:17

## 2022-10-12 RX ADMIN — GABAPENTIN 800 MG: 400 CAPSULE ORAL at 12:16

## 2022-10-12 RX ADMIN — GABAPENTIN 800 MG: 400 CAPSULE ORAL at 21:13

## 2022-10-12 RX ADMIN — GABAPENTIN 800 MG: 400 CAPSULE ORAL at 17:33

## 2022-10-12 RX ADMIN — ENOXAPARIN SODIUM 40 MG: 40 INJECTION SUBCUTANEOUS at 17:33

## 2022-10-12 RX ADMIN — INSULIN GLARGINE-YFGN 14 UNITS: 100 INJECTION, SOLUTION SUBCUTANEOUS at 17:30

## 2022-10-12 RX ADMIN — OXYCODONE HYDROCHLORIDE 10 MG: 10 TABLET ORAL at 12:16

## 2022-10-12 ASSESSMENT — COGNITIVE AND FUNCTIONAL STATUS - GENERAL
TURNING FROM BACK TO SIDE WHILE IN FLAT BAD: UNABLE
STANDING UP FROM CHAIR USING ARMS: TOTAL
CLIMB 3 TO 5 STEPS WITH RAILING: TOTAL
MOVING TO AND FROM BED TO CHAIR: UNABLE
SUGGESTED CMS G CODE MODIFIER MOBILITY: CN
WALKING IN HOSPITAL ROOM: TOTAL
MOVING FROM LYING ON BACK TO SITTING ON SIDE OF FLAT BED: UNABLE
MOBILITY SCORE: 6

## 2022-10-12 ASSESSMENT — PAIN DESCRIPTION - PAIN TYPE
TYPE: ACUTE PAIN

## 2022-10-12 ASSESSMENT — ENCOUNTER SYMPTOMS
EYES NEGATIVE: 1
HEADACHES: 1
CONSTIPATION: 1
RESPIRATORY NEGATIVE: 1
PSYCHIATRIC NEGATIVE: 1
CARDIOVASCULAR NEGATIVE: 1
MUSCULOSKELETAL NEGATIVE: 1
FOCAL WEAKNESS: 1

## 2022-10-12 ASSESSMENT — GAIT ASSESSMENTS: GAIT LEVEL OF ASSIST: UNABLE TO PARTICIPATE

## 2022-10-12 NOTE — PROGRESS NOTES
[facility] Medicine Daily Progress Note    Date of Service  10/12/2022    Chief Complaint  Jacque Mcintyre is a 41 y.o. female admitted 10/1/2022 with left-sided weakness.  She was diagnosed with melanoma in 2016, and underwent immunotherapy for 3 years.  She had an abnormal PET scan earlier this year, biopsy was reportedly negative.  She has type 2 diabetes and depression.    Hospital Course  CT head from 10/1/2022 showed 3 masses in the right cerebral hemisphere with extensive vasogenic edema, localized midline shift to the left side frontoparietal level, 2 small masses in the right frontal lobe with increased density which could indicate hemorrhage. Neurosurgery were consulted. MRI brain from 10/2/2022 showed right-sided predominant, supratentorial metastatic disease with associated edema, localized midline shift, evidence of prior hemorrhage within the masses.  CT chest, abdomen, pelvis from 10/1/2022 showed bilateral pulmonary nodules, enlarged left hilar lymph node, enlarged left external iliac lymph node, 9 mm right lower quadrant peritoneal nodule, 7 mm left and left upper quadrant subcutaneous soft tissue nodules.  Decadron and Keppra started.  Medical oncology and radiation oncology were consulted.     Per medical oncology, will need to confirm diagnosis of melanoma and BRAF testing.  If BRAF positive, she will be candidate for BRAF/MAC inhibitor, otherwise she will need dual immunotherapy.  Recommended outpatient follow-up.    Patient was evaluated by neurosurgery on 10/4/2022, and underwent craniotomy for resection of 3 right frontal tumors on 10/7/2022.    Postprocedure MRI from 10/7/2022 showed significant reduction in extent of contrast enhancement of the previously seen metastatic lesions.  Hemorrhagic products were noted within the lesions.  No new lesions were noted. Patient was transferred to the medical floor on 10/8/2022.      Interval Problem Update  Patient was seen and examined at bedside.  I  have personally reviewed and interpreted vitals, labs, and imaging.    10/11.  Afebrile.  Intermittent hypotension, bradycardia.  On 0-1 L nasal cannula.  Denies fever, chills, chest pains, shortness of breath.  Reports constipation but is passing gas.  Headache is improved.  10/12.  Afebrile.  Has been hypotensive, bradycardic.  On room air.  Patient is not acidotic.  Maps greater than 65.  A.m. cortisol on lower range likely secondary to adrenal suppression from dexamethasone.  Taper Decadron.  Denies fever, chills, chest pain, shortness of breath.  Complains of persistent headache.  Denies dizziness, lightheadedness.  She is constipated but is passing gas.  Physiatry referral.      I have discussed this patient's plan of care and discharge plan at IDT rounds today with Case Management, Nursing, Nursing leadership, and other members of the IDT team.    Consultants/Specialty  neurosurgery and oncology, radiation oncology    Code Status  Full Code    Disposition  Patient is not medically cleared for discharge.   Anticipate discharge to to skilled nursing facility.  I have placed the appropriate orders for post-discharge needs.    Review of Systems  Review of Systems   Constitutional:  Positive for malaise/fatigue.   HENT: Negative.     Eyes: Negative.    Respiratory: Negative.     Cardiovascular: Negative.    Gastrointestinal:  Positive for constipation.   Genitourinary:         Retention   Musculoskeletal: Negative.    Skin: Negative.    Neurological:  Positive for focal weakness and headaches.        Left sided weakness 3/5   Psychiatric/Behavioral: Negative.        Physical Exam  Temp:  [36.8 °C (98.2 °F)-37.2 °C (98.9 °F)] 36.8 °C (98.2 °F)  Pulse:  [60-68] 61  Resp:  [16-18] 18  BP: ()/(58-68) 101/61  SpO2:  [95 %-99 %] 95 %    Physical Exam  Constitutional:       Appearance: She is ill-appearing.   Eyes:      Extraocular Movements: Extraocular movements intact.      Conjunctiva/sclera: Conjunctivae  normal.   Cardiovascular:      Rate and Rhythm: Bradycardia present.   Pulmonary:      Effort: Pulmonary effort is normal.   Abdominal:      Palpations: Abdomen is soft.   Musculoskeletal:      Right lower leg: No edema.      Left lower leg: No edema.   Skin:     General: Skin is warm.   Neurological:      Comments: Left upper and lower extremity weakness   Psychiatric:         Mood and Affect: Mood normal.       Fluids    Intake/Output Summary (Last 24 hours) at 10/12/2022 0721  Last data filed at 10/12/2022 0328  Gross per 24 hour   Intake 240 ml   Output 1100 ml   Net -860 ml         Laboratory  Recent Labs     10/10/22  0438 10/11/22  0120 10/12/22  0135   WBC 10.9* 9.8 11.2*   RBC 4.06* 3.97* 3.93*   HEMOGLOBIN 10.1* 9.8* 9.7*   HEMATOCRIT 33.3* 32.6* 31.6*   MCV 82.0 82.1 80.4*   MCH 24.9* 24.7* 24.7*   MCHC 30.3* 30.1* 30.7*   RDW 48.1 48.5 46.5   PLATELETCT 534* 509* 555*   MPV 10.5 10.2 10.1       Recent Labs     10/10/22  0438 10/11/22  0120 10/12/22  0135   SODIUM 140 138 136   POTASSIUM 4.3 4.1 3.8   CHLORIDE 100 99 98   CO2 31 29 28   GLUCOSE 175* 218* 174*   BUN 20 18 16   CREATININE 0.43* 0.42* 0.47*   CALCIUM 9.0 8.9 8.9                       Imaging  MR-BRAIN-WITH & W/O   Final Result      When compared with the previous MRI dated 10/2/2022, there has been significant interval reduction in the extent of contrast enhancement of the the previously seen metastatic lesions. Hemorrhagic products are noted within these lesions. There is diffuse    white matter edema is noted surrounding these lesions. There are no new lesions. These findings likely represents true tumor response or pseudo response secondary to the chemotherapy.      [id number] VIEW   Final Result      1. Diffuse colonic stool could represent constipation.   2.  No dilated loops of bowel or free air.      CT-HEAD W/O   Final Result      1.  Small amount of extra-axial and intracavitary RIGHT frontal applied following resection of RIGHT  frontal masses   2.  No evidence of significant mass effect, large territorial infarction or new mass   3.  Residual tumor not excluded by this exam.         MR-BRAIN-WITH & W/O   Final Result      1.  Right-sided predominant, supratentorial metastatic disease with the largest mass in the right frontoparietal region measuring 3.4 x 3.0 cm as described in detail above.   2.  Associated edema with regional associated cortical effacement and partial effacement of the right lateral ventricle. Minimal localized midline shift adjacent to the large right frontoparietal mass.   3.  Evidence of prior hemorrhage within these masses.   4.  Small focus of restricted diffusion in the right occipital bone could represent osseous metastatic disease.      CT-CHEST,ABDOMEN,PELVIS WITH   Final Result      1.  BILATERAL pulmonary nodules, the largest of which measures 12 mm in the LEFT lower lobe and which has enlarged since May of this year.   2.  Enlarged LEFT hilar lymph node   3.  Enlarged LEFT external iliac lymph node. This would be amenable to image guided tissue sampling if clinically appropriate.   4.  7 mm LEFT and LEFT upper quadrant subcutaneous soft tissue nodules, new since prior   5.  9 mm RIGHT lower quadrant peritoneal nodule, new since prior   6.  33 mm LEFT ovarian cyst   7.  Thyroid nodules measuring up to 23 mm   8.  BILATERAL L5 pars defects with grade 1 anterolisthesis of L5 on S1   9.  Incompletely assessed LEFT renal lesion, new since prior and most likely a cyst though attention on top is recommended.      DX-CHEST-PORTABLE (1 VIEW)   Final Result         1.  Possible pulmonary nodule within each lung. Metastasis is a possibility.      2.  No infiltrates or consolidations identified.      CT-HEAD W/O   Final Result   Addendum (preliminary) 1 of 1   These findings were discussed with JESENIA MONTALVO on 10/01/2022.      Final         1.  3 masses in the right cerebral hemisphere largest measuring 3.2 cm with  extensive vasogenic edema as described above. Findings are likely due to metastases.      2.  Localized midline shift to the left side frontoparietal level measuring 3 mm.      3.  The 2 smaller masses in the right frontal lobe demonstrates some increased density which could indicate that hemorrhage may be present within these masses.      These findings were discussed with Benjamín nurse for dr hernandez on 10/01/2022.                Assessment/Plan  * Malignant melanoma metastatic to brain (HCC)- (present on admission)  Assessment & Plan  Initially diagnosed in 2016, underwent immunotherapy and lymph node dissection.  Now with widespread metastatic disease.  Underwent right frontal craniotomy of 3 frontal tumors on 10/7/2022.  Patient is to start radiation therapy about 2 weeks after surgery.  Patient follows Dr. Reed as an outpatient, chemotherapy/immunotherapy will depend on final pathology report.  Continue Decadron and Keppra.    Adrenal insufficiency due to corticosteroid withdrawal (HCC)  Assessment & Plan  Patient has been hypotensive, bradycardic  Low a.m. cortisol  Taper dexamethasone    Constipation  Assessment & Plan  Constipation noted on abdominal x-ray.  Continue as needed laxatives    DM2 (diabetes mellitus, type 2) (HCC)- (present on admission)  Assessment & Plan  On steroids.  Continue SSI with Accu-Cheks and hypoglycemia protocol.  SSI scale was increased due to elevated blood glucose readings.  Diabetic diet.  Monitor closely    Depression- (present on admission)  Assessment & Plan  Continue Xanax and Cymbalta.       VTE prophylaxis: SCDs/TEDs Lovenox

## 2022-10-12 NOTE — CARE PLAN
The patient is Watcher - Medium risk of patient condition declining or worsening    Shift Goals  Clinical Goals: pain control  Patient Goals: pain control  Family Goals: N/A    Progress made toward(s) clinical / shift goals:    Problem: Knowledge Deficit - Standard  Goal: Patient and family/care givers will demonstrate understanding of plan of care, disease process/condition, diagnostic tests and medications  Outcome: Progressing     Problem: Pain - Standard  Goal: Alleviation of pain or a reduction in pain to the patient’s comfort goal  Outcome: Progressing     Problem: Fall Risk  Goal: Patient will remain free from falls  Outcome: Progressing     Problem: Psychosocial  Goal: Patient's level of anxiety will decrease  Outcome: Progressing     Problem: Communication  Goal: The ability to communicate needs accurately and effectively will improve  Outcome: Progressing     Problem: Urinary Elimination  Goal: Establish and maintain regular urinary output  Outcome: Progressing     Problem: Skin Integrity  Goal: Skin integrity is maintained or improved  Outcome: Progressing       Patient is not progressing towards the following goals:

## 2022-10-12 NOTE — DISCHARGE PLANNING
Case Management Discharge Planning    Admission Date: 10/1/2022  GMLOS: 6.6  ALOS: 11    6-Clicks ADL Score: 15  6-Clicks Mobility Score: 8  PT and/or OT Eval ordered: Yes  Post-acute Referrals Ordered: Yes  Post-acute Choice Obtained: Yes  Has referral(s) been sent to post-acute provider:  Yes      Anticipated Discharge Dispo: Discharge Disposition: Disch to  rehab facility or distinct part unit (62)  Discharge Address: [address]  [address]  [place] [address]  Discharge Contact Phone Number: [phone]    DME Needed: No    Action(s) Taken: Updated Provider/Nurse on Discharge Plan, Choice obtained, and Referral(s) sent.  Met w/ Pt on 10/12/22 to attain IRF choice, (MD placed a Physiatry consult).  Choice signed by Pt for 1. [facility]  2. [facility] -- pending IRF response. Pt has very good family support & plans to D/C to home w/ family support after IRF.    Escalations Completed: None    Medically Clear: No    Barriers to Discharge: Medical clearance and Pending Placement    Is the patient up for discharge tomorrow: No, pending Medical Clearance & IRF placement.

## 2022-10-12 NOTE — DISCHARGE PLANNING
[facility] Transitional Care Coordination    Referral from: Dr. Vera  Insurance Provider on Facesheet:  [facility]  Potential Rehab diagnosis: Non-traumatic brain    Chart review indicates patient has ongoing medical management and therapy needs to possibly meet inpatient rehab facility criteria with the goal of returning to community.      D/C Support: Parents - to be confirmed    Physiatry to consult.  POD 6 right frontal craniotomy for resection of brain tumors.   Would welcome clarification of Oncology treatment plan.      Last Covid test:    Thank you for the referral.

## 2022-10-12 NOTE — DISCHARGE PLANNING
Received Choice form at 1017  Agency/Facility Name: [facility], and [facility]   Referral sent per Choice form @ [id number]

## 2022-10-12 NOTE — H&P
Medical chart review completed.     Patient is a 41 y.o. female with a past medical history significant for BP1, History of melanoma, asthma, DM2, HTN, HLD, and PCOS admitted to [facility] on 10/1/2022  5:26 PM with UE and LE weakness.  Patient reportedly had slowly worsening weakness over a few weeks. In ED she was found to have tachycardia.  CT head showed 3 masses with largest in the right cerebral hemisphere measuring 3.2 cm with extensive vasogenic edema. NSG was consulted. Per the history she had a history of melanoma and recent PET with uptake near sternum but biopsy was negative. Per NSG they did not recommend surgical recommendation and recommended conservative management with MRI, Tegretol, and Decadron. Patient eventually did undergo right craniotomy on 10/6/22 with Dr. Tejada with resection of 3 lesions with recommendation of Decadron and Keppra. Per most recent notes awaiting full pathology for treatment plan.     Patient was last evaluated by OT on 10/11/22 and was modA for ADLs. Patient was last evaluated by PT on 10/10/22 and was mod-maxA for mobility. Patient was previously living in a 1 story home with 3 steps to enter; living with 17 y.o. son but able to move in with parents.       PMH:  Past Medical History:   Diagnosis Date    Asthma     inhaler    Bipolar 1 disorder (HCC)     Cancer (HCC) 01/01/2016    melanoma    Cough     Depression     DM mellitus,     insulin    Hyperlipidemia     Hypertension     Pap smear     Dr. Baird    PCOS (polycystic ovarian syndrome)     Shortness of breath     Sputum production     Wheezing        PSH:  Past Surgical History:   Procedure Laterality Date    CRANIOTOMY STEALTH Right 10/6/2022    Procedure: RIGHT FRONTAL CRANIOTOMY WITH STEALTH;  Surgeon: Pebbles Tejada M.D.;  Location: [facility]ERY [facility];  Service: Neurosurgery    CO BRONCHOSCOPY,DIAGNOSTIC N/A 6/17/2022    Procedure: FIBER OPTIC BRONCHOSCOPY WITH WASH, BRUSH, BRONCHOALVEOLAR LAVAGE,  BIOPSY, FINE NEEDLE ASPIRATION;  Surgeon: Yue Swanson M.D.;  [facility];  Service: Pulmonary    ENDOBRONCHIAL US ADD-ON N/A 6/17/2022    Procedure: ENDOBRONCHIAL ULTRASOUND (EBUS);  Surgeon: Yue Swanson M.D.;  Location: [facility];  Service: Pulmonary    NM LAP,APPENDECTOMY N/A 10/7/2019    Procedure: APPENDECTOMY, LAPAROSCOPIC;  Surgeon: Lionel Frazier M.D.;  Location: SURGERY [facility];  Service: General    AXILLARY NODE DISSECTION Left 6/23/2017    Procedure: AXILLARY NODE DISSECTION- COMPLETION LYPHADENECTOMY;  Surgeon: Lionel Weinberg M.D.;  Location: SURGERY [facility];  Service:     WIDE EXCISION Left 5/9/2017    Procedure: WIDE EXCISION - MALIGNANT MELANOMA HAND;  Surgeon: Lionel Weinberg M.D.;  Location: SURGERY [facility];  Service:     NODE BIOPSY SENTINEL Left 5/9/2017    Procedure: NODE BIOPSY SENTINEL - AXILLARY;  Surgeon: Lionel Weinberg M.D.;  Location: SURGERY [facility] DAY [facility];  Service:     OTHER  2016    excisino of melanoma left hand    ADENOIDECTOMY      MYRINGOTOMY      with tube placement       FAMILY HISTORY:  Family History   Problem Relation Age of Onset    Psychiatric Illness Mother         depression    Diabetes Mother     Hyperlipidemia Mother     Thyroid Mother         s/p radioactive iodine treatment on replacement    Hypertension Father     Diabetes Father        MEDICATIONS:  Current Facility-Administered Medications   Medication Dose    oxyCODONE immediate-release (ROXICODONE) tablet 5 mg  5 mg    Or    oxyCODONE immediate release (ROXICODONE) tablet 10 mg  10 mg    Or    HYDROmorphone (Dilaudid) injection 0.5 mg  0.5 mg    dexamethasone (DECADRON) tablet 4 mg  4 mg    lactulose 20 GM/30ML solution 30 mL  30 mL    acetaminophen (Tylenol) tablet 650 mg  650 mg    polyethylene glycol/lytes (MIRALAX) PACKET 1 Packet  1 Packet    And    senna-docusate (PERICOLACE or SENOKOT S) 8.6-50 MG per tablet 2 Tablet  2  Tablet    And    magnesium hydroxide (MILK OF MAGNESIA) suspension 30 mL  30 mL    And    bisacodyl (DULCOLAX) suppository 10 mg  10 mg    levETIRAcetam (KEPPRA) tablet 1,000 mg  1,000 mg    metoclopramide (REGLAN) injection 10 mg  10 mg    prochlorperazine (COMPAZINE) injection 10 mg  10 mg    acetaminophen (TYLENOL) suppository 650 mg  650 mg    insulin GLARGINE (Lantus,Semglee) injection  0.2 Units/kg/day    And    insulin lispro (AdmeLOG,HumaLOG) injection  0-12 Units    And    dextrose 50% (D50W) injection 25 g  25 g    [Held by provider] atenolol (TENORMIN) tablet 25 mg  25 mg    enoxaparin (Lovenox) inj 40 mg  40 mg    ondansetron (ZOFRAN) syringe/vial injection 4 mg  4 mg    ALPRAZolam (XANAX) tablet 0.25 mg  0.25 mg    famotidine (PEPCID) tablet 20 mg  20 mg    albuterol inhaler 2 Puff  2 Puff    atorvastatin (LIPITOR) tablet 40 mg  40 mg    DULoxetine (CYMBALTA) capsule 60 mg  60 mg    gabapentin (NEURONTIN) capsule 800 mg  800 mg    OXcarbazepine (TRILEPTAL) tablet 300 mg  300 mg    QUEtiapine (Seroquel) tablet 200 mg  200 mg       ALLERGIES:  Amoxicillin-pot clavulanate and Clavulanic acid    PSYCHOSOCIAL HISTORY:  Living Site:  Home  Living With:  family  Caregiver's availability:  Not Applicable  Number of stairs:   3  Substance use history:  Unknown      The patient presents functional deficits in mobility and self-care, and Moderate  de-conditioning. Pre-morbidly, this patient lived in a single level home with Three steps to enter,with family  The patient was evaluated by acute care Physical Therapy and Occupational Therapy; currently requiring moderate assistance for mobility and moderate assistance for ADLs,. The patient's current diet is Regular with Thin liquids.     Patient with metastatic melanoma to brain with resection on 10/6/22 The patient is   a Good candidate for an acute inpatient rehabilitation program with a coordinated program of care at an intensity and frequency not available at a  lower level of care.     Note: This recommendation requires that patient has at least CGA/Minimal Assistance needs in at least two therapy disciplines.  If patient progresses to no longer need CGA/Arlet with at least two therapy disciplines they may be more appropriate for Skilled nursing facility versus home with home health.      This recommendation is substantiated by the patient's current medical condition with intervention and assessment of medical issues requiring an acute level of care for patient's safety and maximum outcome. A coordinated program of care will be provided by an interdisciplinary team including physical therapy, occupational therapy, speech language pathology, hospitalist, physiatry, rehab nursing, and rehab psychology. Rehab goals include improved mobility, self-care management, strength and conditioning/endurance, pain management, bowel and bladder management, mood and affect, and safety with independent home management including caregiver training. Estimated length of stay is approximately 10-17 days. Rehab potential: Good. Disposition: to pre-morbid independent living setting with supportive care of patient's family. We will continue to follow with you in anticipation of discharge to acute inpatient rehabilitation when medically stable to do so at the discretion of the attending physician. Thank you for allowing us to participate in this patient's care. Please call with any questions regarding this recommendation.    Zach Patel M.D.

## 2022-10-13 ENCOUNTER — PATIENT OUTREACH (OUTPATIENT)
Dept: ONCOLOGY | Facility: MEDICAL CENTER | Age: 42
End: 2022-10-13
Payer: COMMERCIAL

## 2022-10-13 LAB
ANION GAP SERPL CALC-SCNC: 13 MMOL/L (ref 7–16)
BASOPHILS # BLD AUTO: 0.3 % (ref 0–1.8)
BASOPHILS # BLD: 0.04 K/UL (ref 0–0.12)
BUN SERPL-MCNC: 15 MG/DL (ref 8–22)
CALCIUM SERPL-MCNC: 9 MG/DL (ref 8.5–10.5)
CHLORIDE SERPL-SCNC: 100 MMOL/L (ref 96–112)
CO2 SERPL-SCNC: 26 MMOL/L (ref 20–33)
CREAT SERPL-MCNC: 0.45 MG/DL (ref 0.5–1.4)
EOSINOPHIL # BLD AUTO: 0.01 K/UL (ref 0–0.51)
EOSINOPHIL NFR BLD: 0.1 % (ref 0–6.9)
ERYTHROCYTE [DISTWIDTH] IN BLOOD BY AUTOMATED COUNT: 46.9 FL (ref 35.9–50)
GFR SERPLBLD CREATININE-BSD FMLA CKD-EPI: 122 ML/MIN/1.73 M 2
GLUCOSE BLD STRIP.AUTO-MCNC: 178 MG/DL (ref 65–99)
GLUCOSE BLD STRIP.AUTO-MCNC: 191 MG/DL (ref 65–99)
GLUCOSE BLD STRIP.AUTO-MCNC: 212 MG/DL (ref 65–99)
GLUCOSE BLD STRIP.AUTO-MCNC: 266 MG/DL (ref 65–99)
GLUCOSE SERPL-MCNC: 224 MG/DL (ref 65–99)
HCT VFR BLD AUTO: 34.1 % (ref 37–47)
HGB BLD-MCNC: 10.3 G/DL (ref 12–16)
IMM GRANULOCYTES # BLD AUTO: 0.07 K/UL (ref 0–0.11)
IMM GRANULOCYTES NFR BLD AUTO: 0.5 % (ref 0–0.9)
LYMPHOCYTES # BLD AUTO: 2.45 K/UL (ref 1–4.8)
LYMPHOCYTES NFR BLD: 18.8 % (ref 22–41)
MAGNESIUM SERPL-MCNC: 2 MG/DL (ref 1.5–2.5)
MCH RBC QN AUTO: 24.4 PG (ref 27–33)
MCHC RBC AUTO-ENTMCNC: 30.2 G/DL (ref 33.6–35)
MCV RBC AUTO: 80.8 FL (ref 81.4–97.8)
MONOCYTES # BLD AUTO: 1.14 K/UL (ref 0–0.85)
MONOCYTES NFR BLD AUTO: 8.7 % (ref 0–13.4)
NEUTROPHILS # BLD AUTO: 9.35 K/UL (ref 2–7.15)
NEUTROPHILS NFR BLD: 71.6 % (ref 44–72)
NRBC # BLD AUTO: 0 K/UL
NRBC BLD-RTO: 0 /100 WBC
PHOSPHATE SERPL-MCNC: 3.9 MG/DL (ref 2.5–4.5)
PLATELET # BLD AUTO: 551 K/UL (ref 164–446)
PMV BLD AUTO: 10.1 FL (ref 9–12.9)
POTASSIUM SERPL-SCNC: 4 MMOL/L (ref 3.6–5.5)
RBC # BLD AUTO: 4.22 M/UL (ref 4.2–5.4)
SODIUM SERPL-SCNC: 139 MMOL/L (ref 135–145)
WBC # BLD AUTO: 13.1 K/UL (ref 4.8–10.8)

## 2022-10-13 PROCEDURE — A9270 NON-COVERED ITEM OR SERVICE: HCPCS | Performed by: INTERNAL MEDICINE

## 2022-10-13 PROCEDURE — 700101 HCHG RX REV CODE 250: Performed by: PHYSICAL MEDICINE & REHABILITATION

## 2022-10-13 PROCEDURE — 700111 HCHG RX REV CODE 636 W/ 250 OVERRIDE (IP): Performed by: INTERNAL MEDICINE

## 2022-10-13 PROCEDURE — 700102 HCHG RX REV CODE 250 W/ 637 OVERRIDE(OP): Performed by: PHYSICAL MEDICINE & REHABILITATION

## 2022-10-13 PROCEDURE — A9270 NON-COVERED ITEM OR SERVICE: HCPCS | Performed by: NURSE PRACTITIONER

## 2022-10-13 PROCEDURE — 700102 HCHG RX REV CODE 250 W/ 637 OVERRIDE(OP): Performed by: NURSE PRACTITIONER

## 2022-10-13 PROCEDURE — 99406 BEHAV CHNG SMOKING 3-10 MIN: CPT | Performed by: PHYSICAL MEDICINE & REHABILITATION

## 2022-10-13 PROCEDURE — 700102 HCHG RX REV CODE 250 W/ 637 OVERRIDE(OP): Performed by: HOSPITALIST

## 2022-10-13 PROCEDURE — 84100 ASSAY OF PHOSPHORUS: CPT

## 2022-10-13 PROCEDURE — A9270 NON-COVERED ITEM OR SERVICE: HCPCS | Performed by: PHYSICAL MEDICINE & REHABILITATION

## 2022-10-13 PROCEDURE — 82962 GLUCOSE BLOOD TEST: CPT | Mod: 91

## 2022-10-13 PROCEDURE — A9270 NON-COVERED ITEM OR SERVICE: HCPCS | Performed by: STUDENT IN AN ORGANIZED HEALTH CARE EDUCATION/TRAINING PROGRAM

## 2022-10-13 PROCEDURE — 770004 HCHG ROOM/CARE - ONCOLOGY PRIVATE *

## 2022-10-13 PROCEDURE — 80048 BASIC METABOLIC PNL TOTAL CA: CPT

## 2022-10-13 PROCEDURE — 700102 HCHG RX REV CODE 250 W/ 637 OVERRIDE(OP): Performed by: INTERNAL MEDICINE

## 2022-10-13 PROCEDURE — 99223 1ST HOSP IP/OBS HIGH 75: CPT | Mod: 25 | Performed by: PHYSICAL MEDICINE & REHABILITATION

## 2022-10-13 PROCEDURE — 85025 COMPLETE CBC W/AUTO DIFF WBC: CPT

## 2022-10-13 PROCEDURE — A9270 NON-COVERED ITEM OR SERVICE: HCPCS | Performed by: HOSPITALIST

## 2022-10-13 PROCEDURE — 99232 SBSQ HOSP IP/OBS MODERATE 35: CPT | Performed by: INTERNAL MEDICINE

## 2022-10-13 PROCEDURE — 83735 ASSAY OF MAGNESIUM: CPT

## 2022-10-13 PROCEDURE — 36415 COLL VENOUS BLD VENIPUNCTURE: CPT

## 2022-10-13 PROCEDURE — 700102 HCHG RX REV CODE 250 W/ 637 OVERRIDE(OP): Performed by: STUDENT IN AN ORGANIZED HEALTH CARE EDUCATION/TRAINING PROGRAM

## 2022-10-13 RX ORDER — TAMSULOSIN HYDROCHLORIDE 0.4 MG/1
0.4 CAPSULE ORAL
Status: DISCONTINUED | OUTPATIENT
Start: 2022-10-13 | End: 2022-10-15 | Stop reason: HOSPADM

## 2022-10-13 RX ORDER — METOCLOPRAMIDE 10 MG/1
10 TABLET ORAL EVERY 6 HOURS PRN
Status: DISCONTINUED | OUTPATIENT
Start: 2022-10-13 | End: 2022-10-15 | Stop reason: HOSPADM

## 2022-10-13 RX ORDER — ENEMA 19; 7 G/133ML; G/133ML
1 ENEMA RECTAL ONCE
Status: COMPLETED | OUTPATIENT
Start: 2022-10-13 | End: 2022-10-14

## 2022-10-13 RX ORDER — BACLOFEN 10 MG/1
10 TABLET ORAL 3 TIMES DAILY
Status: DISCONTINUED | OUTPATIENT
Start: 2022-10-13 | End: 2022-10-15 | Stop reason: HOSPADM

## 2022-10-13 RX ORDER — INSULIN LISPRO 100 [IU]/ML
3-14 INJECTION, SOLUTION INTRAVENOUS; SUBCUTANEOUS
Status: DISCONTINUED | OUTPATIENT
Start: 2022-10-13 | End: 2022-10-15 | Stop reason: HOSPADM

## 2022-10-13 RX ADMIN — QUETIAPINE FUMARATE 200 MG: 100 TABLET ORAL at 20:50

## 2022-10-13 RX ADMIN — SENNOSIDES AND DOCUSATE SODIUM 2 TABLET: 50; 8.6 TABLET ORAL at 17:55

## 2022-10-13 RX ADMIN — TAMSULOSIN HYDROCHLORIDE 0.4 MG: 0.4 CAPSULE ORAL at 17:53

## 2022-10-13 RX ADMIN — GABAPENTIN 800 MG: 400 CAPSULE ORAL at 20:50

## 2022-10-13 RX ADMIN — OXYCODONE HYDROCHLORIDE 10 MG: 10 TABLET ORAL at 14:50

## 2022-10-13 RX ADMIN — ATORVASTATIN CALCIUM 40 MG: 40 TABLET, FILM COATED ORAL at 17:56

## 2022-10-13 RX ADMIN — INSULIN LISPRO 6 UNITS: 100 INJECTION, SOLUTION INTRAVENOUS; SUBCUTANEOUS at 11:28

## 2022-10-13 RX ADMIN — OXYCODONE HYDROCHLORIDE 10 MG: 10 TABLET ORAL at 20:50

## 2022-10-13 RX ADMIN — INSULIN GLARGINE-YFGN 14 UNITS: 100 INJECTION, SOLUTION SUBCUTANEOUS at 17:25

## 2022-10-13 RX ADMIN — INSULIN LISPRO 2 UNITS: 100 INJECTION, SOLUTION INTRAVENOUS; SUBCUTANEOUS at 08:35

## 2022-10-13 RX ADMIN — LEVETIRACETAM 1000 MG: 500 TABLET, FILM COATED ORAL at 06:05

## 2022-10-13 RX ADMIN — BACLOFEN 10 MG: 10 TABLET ORAL at 17:55

## 2022-10-13 RX ADMIN — HYDROMORPHONE HYDROCHLORIDE 0.5 MG: 1 INJECTION, SOLUTION INTRAMUSCULAR; INTRAVENOUS; SUBCUTANEOUS at 17:15

## 2022-10-13 RX ADMIN — BISACODYL 10 MG: 10 SUPPOSITORY RECTAL at 14:50

## 2022-10-13 RX ADMIN — ENOXAPARIN SODIUM 40 MG: 40 INJECTION SUBCUTANEOUS at 17:26

## 2022-10-13 RX ADMIN — DULOXETINE 60 MG: 20 CAPSULE, DELAYED RELEASE ORAL at 17:28

## 2022-10-13 RX ADMIN — INSULIN LISPRO 2 UNITS: 100 INJECTION, SOLUTION INTRAVENOUS; SUBCUTANEOUS at 17:21

## 2022-10-13 RX ADMIN — INSULIN LISPRO 4 UNITS: 100 INJECTION, SOLUTION INTRAVENOUS; SUBCUTANEOUS at 20:52

## 2022-10-13 RX ADMIN — POLYETHYLENE GLYCOL 3350 1 PACKET: 17 POWDER, FOR SOLUTION ORAL at 06:04

## 2022-10-13 RX ADMIN — OXYCODONE HYDROCHLORIDE 10 MG: 10 TABLET ORAL at 06:10

## 2022-10-13 RX ADMIN — OXCARBAZEPINE 300 MG: 300 TABLET, FILM COATED ORAL at 08:28

## 2022-10-13 RX ADMIN — DEXAMETHASONE 4 MG: 4 TABLET ORAL at 06:05

## 2022-10-13 RX ADMIN — OXYCODONE HYDROCHLORIDE 10 MG: 10 TABLET ORAL at 11:24

## 2022-10-13 RX ADMIN — OXCARBAZEPINE 300 MG: 300 TABLET, FILM COATED ORAL at 20:51

## 2022-10-13 RX ADMIN — DEXAMETHASONE 4 MG: 4 TABLET ORAL at 17:28

## 2022-10-13 RX ADMIN — HYDROMORPHONE HYDROCHLORIDE 0.5 MG: 1 INJECTION, SOLUTION INTRAMUSCULAR; INTRAVENOUS; SUBCUTANEOUS at 08:31

## 2022-10-13 RX ADMIN — LEVETIRACETAM 1000 MG: 500 TABLET, FILM COATED ORAL at 17:55

## 2022-10-13 RX ADMIN — FAMOTIDINE 20 MG: 20 TABLET, FILM COATED ORAL at 06:05

## 2022-10-13 RX ADMIN — FAMOTIDINE 20 MG: 20 TABLET, FILM COATED ORAL at 17:28

## 2022-10-13 RX ADMIN — GABAPENTIN 800 MG: 400 CAPSULE ORAL at 14:50

## 2022-10-13 RX ADMIN — GABAPENTIN 800 MG: 400 CAPSULE ORAL at 08:34

## 2022-10-13 RX ADMIN — SENNOSIDES AND DOCUSATE SODIUM 2 TABLET: 50; 8.6 TABLET ORAL at 06:05

## 2022-10-13 RX ADMIN — GABAPENTIN 800 MG: 400 CAPSULE ORAL at 17:28

## 2022-10-13 ASSESSMENT — PAIN DESCRIPTION - PAIN TYPE
TYPE: ACUTE PAIN

## 2022-10-13 ASSESSMENT — ENCOUNTER SYMPTOMS
HEADACHES: 1
FOCAL WEAKNESS: 1
CONSTIPATION: 1
MUSCULOSKELETAL NEGATIVE: 1
PSYCHIATRIC NEGATIVE: 1
RESPIRATORY NEGATIVE: 1
EYES NEGATIVE: 1
CARDIOVASCULAR NEGATIVE: 1

## 2022-10-13 NOTE — DISCHARGE PLANNING
[facility] Transitional Care Coordination    Physiatry consult complete.  Dr. Patel recommending appropriate for inpatient rehab.      Call out to patients Mother Guillermina Hammond to discuss IRF referral/verify discharge support.  Guillermina did not answer, unable to leave voice message.  TCC will follow.    1523 -  Submitting case to [facility] for consideration of IRF level care.  Will follow for auth determination.

## 2022-10-13 NOTE — PREADMISSION SCREENING NOTE
Pre-Admission Screening Form    Patient Information:   Name: Jacque Mcintyre     MRN: [id number]       : 1980      Age: 41 y.o.   Gender: female      Race: White [7]       Marital Status:  [4]  Family Contact: StephanyGuillermina Donovan        Relationship: Mother [8]  Friend [5]  Home Phone: [phone]           Cell Phone: [phone]  Advanced Directives: None  Code Status:  FULL  Current Attending Provider: Ernesto Vera D.O.  Referring Physician: Dr. Vera      Physiatrist Consult: Dr. Dalton Patel       Referral Date: 10/12/2022  Primary Payor Source:  [facility]  Secondary Payor Source:      Medical Information:   Date of Admission to Acute Care Setting:10/1/2022  Room Number: R320/00  Rehabilitation Diagnosis: 0002.1 - Brain Dysfunction: Non-Traumatic  Immunization History   Administered Date(s) Administered    DTP - Historical vaccine 1979, 1979, 1979, 09/10/1980, 1984    Hep A/HEP B Combined Vaccine (TwinRix) 2017, 2017, 2018    Hepatitis B Vaccine Adolescent/Pediatric 1996, 1996, 1997    INFLUENZA TIV (IM) 10/01/2014    Influenza (IM) Preservative Free - HISTORICAL DATA 10/14/2015    Influenza Seasonal Injectable - Historical Data 10/01/2014, 10/13/2014    Influenza Vac Subunit Quad Inj (Pf) 2019, 2020    Influenza Vaccine H1N1 - HISTORICAL DATA 2009    Influenza Vaccine Quad Inj (Pf) 2016, 10/02/2017, 2018, 10/05/2021    MMR Vaccine 1980, 1994    MODERNA SARS-COV-2 VACCINE (12+) 2020, 2021, 2021, 2022    OPV TRIVALENT - HISTORICAL DATA (GIVEN PRIOR TO MAY 2016) 1979, 1979, 09/10/1980, 1984    TD Vaccine 1994    Tdap Vaccine 2015, 2015    Tuberculin Skin Test 09/10/2014, 2014, 2014, 2015    Varicella Vaccine Live 2015, 2015     Allergies   Allergen  Reactions    Amoxicillin-Pot Clavulanate Vomiting    Clavulanic Acid Vomiting     Violent vomiting     Past Medical History:   Diagnosis Date    Asthma     inhaler    Bipolar 1 disorder (HCC)     Cancer (HCC) 01/01/2016    melanoma    Cough     Depression     DM mellitus,     insulin    Hyperlipidemia     Hypertension     Pap smear     Dr. Baird    PCOS (polycystic ovarian syndrome)     Shortness of breath     Sputum production     Wheezing      Past Surgical History:   Procedure Laterality Date    CRANIOTOMY STEALTH Right 10/6/2022    Procedure: RIGHT FRONTAL CRANIOTOMY WITH STEALTH;  Surgeon: Pebbles Tejada M.D.;  Location: [facility];  Service: Neurosurgery    FL BRONCHOSCOPY,DIAGNOSTIC N/A 6/17/2022    Procedure: FIBER OPTIC BRONCHOSCOPY WITH WASH, BRUSH, BRONCHOALVEOLAR LAVAGE, BIOPSY, FINE NEEDLE ASPIRATION;  Surgeon: Yue Swanson M.D.;  Location: [facility];  Service: Pulmonary    ENDOBRONCHIAL US ADD-ON N/A 6/17/2022    Procedure: ENDOBRONCHIAL ULTRASOUND (EBUS);  Surgeon: Yue Swanson M.D.;  Location: [facility];  Service: Pulmonary    FL LAP,APPENDECTOMY N/A 10/7/2019    Procedure: APPENDECTOMY, LAPAROSCOPIC;  Surgeon: Lionel Frazier M.D.;  Location: [facility];  Service: General    AXILLARY NODE DISSECTION Left 6/23/2017    Procedure: AXILLARY NODE DISSECTION- COMPLETION LYPHADENECTOMY;  Surgeon: Lionel Weinberg M.D.;  Location: [facility];  Service:     WIDE EXCISION Left 5/9/2017    Procedure: WIDE EXCISION - MALIGNANT MELANOMA HAND;  Surgeon: Lionel Weinberg M.D.;  Location: SURGERY SAME DAY [facility];  Service:     NODE BIOPSY SENTINEL Left 5/9/2017    Procedure: NODE BIOPSY SENTINEL - AXILLARY;  Surgeon: Lionel Weinberg M.D.;  Location: SURGERY SAME DAY [facility];  Service:     OTHER  2016    excisino of melanoma left hand    ADENOIDECTOMY      MYRINGOTOMY      with tube placement       History Leading to Admission,  "Conditions that Caused the Need for Rehab (CMS):     Rhett Coffey M.D.  Physician  [facility]  H&P     Signed  Date of Service:  10/1/2022  7:07 PM    Hospital Medicine History & Physical Note     Date of Service  10/1/2022     Primary Care Physician  Adriana Levine M.D.     Consultants  Neurosurgery     Code Status  Full Code     Chief Complaint    Chief Complaint  Patient presents with   Weakness      PT c/o L sided upper and lower extremity weakness for \"several days.\" Pt states she now has a limp she never had before. Obvious drift noted to JOSEE and ESTEVANE. Pt states she is diabetic and feels \"shaky and sweaty\" despite normal FSBG levels        History of Presenting Illness  Jacque Mcintyre is a 41 y.o. female who presented 10/1/2022 with left upper and lower extremity weakness for the last week.  States that this has slowly progressed, causing her to come to the ED for evaluation.  Denies headache nausea vomiting chest pain shortness of breath dizziness.     Patient has a history of melanoma to which she received treatment and has been in remission for the last 2-1/2 years.  Has not received any treatment during this time.  Had a PET scan in May 2022 that showed increased uptake, consistent with metastatic activity in the infrahilar lymph node.  Biopsy was done to which results were negative for malignancy.     In the ED, patient found to be tachycardic, other vital signs wnl.  CT head showing 3 masses in the right cerebral hemisphere largest measuring 3.2 cm with extensive vasogenic edema.  2 smaller masses in the right frontal lobe that could be hemorrhages present within the masses.     I discussed the plan of care with patient.     Review of Systems  Review of Systems   Constitutional: Negative.    HENT: Negative.     Eyes: Negative.    Respiratory: Negative.     Cardiovascular: Negative.    Gastrointestinal: Negative.    Genitourinary: Negative.    Musculoskeletal: Negative.    Skin: " Negative.    Neurological:  Positive for focal weakness and weakness.   Endo/Heme/Allergies: Negative.    Psychiatric/Behavioral: Negative.        Past Medical History   has a past medical history of Asthma, Bipolar 1 disorder (HCC), Cancer (HCC) (01/01/2016), Cough, Depression, DM mellitus,, Hyperlipidemia, Hypertension, Pap smear, PCOS (polycystic ovarian syndrome), Shortness of breath, Sputum production, and Wheezing.     Surgical History   has a past surgical history that includes myringotomy; adenoidectomy; other (2016); wide excision (Left, 5/9/2017); node biopsy sentinel (Left, 5/9/2017); axillary node dissection (Left, 6/23/2017); pr lap,appendectomy (N/A, 10/7/2019); pr bronchoscopy,diagnostic (N/A, 6/17/2022); and endobronchial us add-on (N/A, 6/17/2022).      Family History  family history includes Diabetes in her father and mother; Hyperlipidemia in her mother; Hypertension in her father; Psychiatric Illness in her mother; Thyroid in her mother.   Family history reviewed with patient. There is no family history that is pertinent to the chief complaint.      Social History   reports that she has been smoking cigarettes. She has a 7.50 pack-year smoking history. She has never used smokeless tobacco. She reports that she does not currently use alcohol. She reports current drug use. Drug: Inhaled.     Allergies    Allergies  Allergen Reactions   Amoxicillin-Pot Clavulanate Vomiting   Clavulanic Acid Vomiting      Violent vomiting   Assessment/Plan:  Justification for Admission Status  I anticipate this patient will require at least two midnights for appropriate medical management, necessitating inpatient admission because patient has newly diagnosed metastatic melanoma.      * Brain metastases (HCC)  Assessment & Plan  Likely metastatic melanoma  Noted to have three masses with midline shift and vasogenic edema  Decadron  Neurosurgery consulted, no intervention for now, awaiting official  "recommendations  Stage with CAT scan chest and abdomen  Oncology consult in a.m.     DM2 (diabetes mellitus, type 2) (HCC)- (present on admission)  Assessment & Plan  Sliding scale  Has had DKA in the past     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)- (present on admission)  Assessment & Plan  Currently not on tx for the last 2 years  Noted to have metastases to brain  Had PET scan in 5/31/22 showing \"Focal intense uptake corresponding to enlarged LEFT infrahilar lymph node, new from prior exam, consistent with metastasis.\"  Pathology results from biopsy of this lymph node was negative for malignancy.     Depression- (present on admission)  Assessment & Plan  Restart home medications      VTE prophylaxis: pharmacologic prophylaxis contraindicated due to possible hemorrhage seen on CAT scan        Garrett Reese M.D.  Physician  Critical Care  Consults     Signed  Date of Service:  10/1/2022  7:30 PM    This young woman unfortunately has evidence of metastatic melanoma to her brain and likely lungs.  He came in today complaining of new onset weakness, CT head demonstrated masses in her brain with surrounding vasogenic edema.     Given new focal neurologic deficits, reasonable to admit to [facility] for more frequent neuro monitoring.     Garrett Reese M.D.          Pebbles Tejada M.D.  Physician  Surgery Neurosurgery  Consults     Signed  Date of Service:  10/1/2022  8:31 PM    Neurosurgery Consult Note     Patient: Jacque Mcintyre MRN: [id number]     Date of Consultation: 10/1/2022     Reason for Consultation: brain lesion     Referring Physician: Ar Whalen MD Emergency Medicine     Chief Complaint: left-sided weakness     History of Present Illness:  The patient is a 41 y.o. female with a history of known melanoma presenting with several days of left-sided weakness.  She states that a few days ago she noticed left leg weakness, where she was dragging it and having difficulty picking it up.  " This then progressed to her left arm.  Due to the symptoms, she presented to [facility] emergency department for further evaluation.  Noncontrast CT scan of the brain revealed several new right frontal lobe lesions concerning for metastatic disease.  Neurosurgery was consulted for evaluation.     On further questioning, the patient denies any headaches, nausea, vomiting.  She has no sensory changes.  She does not take aspirin, Plavix, Coumadin.  She was initially diagnosed with melanoma of the left hand 6 years ago, and was resected at that time.  She also at some point had another melanoma lesion on the back of her right leg.  She was on immunotherapy for 3 years, but has been off it for about 3 years.  She is followed by Dr. Reed of [facility].  A PET scan in May of this year revealed a lesion with high uptake near her sternum.  This was biopsied and reportedly negative for tumor.  She had an MRI brain in June of this year, which was also reported as negative.     Her family including her parents, teenage son, friend, and cousin are present at bedside.     Assessment and Plan:     Jacque Mcintyre is a 41 y.o. female with a history of known melanoma presenting with new brain lesions concerning for metastatic disease.  She has left sided weakness as result of the location of one of the lesions, as well as vasogenic edema.     Recommendations:  - No acute neurosurgical intervention at this time.  - Admit to hospital medicine service for expedited work-up  - Neuro checks q 4 h  -Decadron 4mg every 6h  -The patient takes Tegretol 300 mg twice daily.  This should suffice as seizure prophylaxis.  -MRI brain with and without contrast to evaluate lesions.  -CT chest abdomen pelvis for staging  -I notified radiation oncologist, Judy August MD who will evaluate the patient on Monday.  -The patient may eat as tolerated  - DVT prophylaxis: OK for DVT ppx if indicated  -Final  operative/treatment plan pending the above work-up.  Decisions will be made in conjunction with radiation and medical oncology.     Thank you for this consult. Please call with questions.     Pebbles Tejada M.D.          Emma Watters M.D.  Physician  Hematology & Oncology  Consults     Signed  Date of Service:  10/2/2022  4:52 PM    Consult Note: Hematology/Oncology     Date of consultation: 10/2/2022 4:53 PM     Referring provider:      Reason for consultation: Recurrent melanoma with brain metastasis     History of presenting illness:      Ms. Thompson is a 41-year-old  woman who was diagnosed in 2016 with T3b N1 M0 malignant melanoma of the left forehead patient had resections and resections finally had a lymph node biopsy which showed 1 mm metastatic deposit in the sentinel lymph node this was subsequently followed by axillary lymph node dissection 18 lymph nodes negative.  Patient was seen by Dr. Reed and Dr. Candelaria at [facility] Patient decided to go on Yervoy and this was started on July 25, 2017 there were some breaks in between but patient to got Yervoy until 5/20/2020. Patient's was disease-free for a long time including restaging PET scans were all negative.  Most recently May 2022 PET/CT showed Level 12 lymph node positive SUV 5.2 this was followed by EBUS biopsy biopsy was negative for cancer.June 23, 2022 patient had MRI of the brain which was negative for metastatic disease.     On October 1, 2022 patient came into [facility] emergency room with complaints of left upper and lower extremity weakness started for over 3 days gradually got worse and in his stage which she cannot walk denied any facial droop.  A CT scan followed by MRI of the brain confirmed metastatic brain disease mainly on the right side with 5 lesions largest measuring 3 cm on the right frontoparietal lobe.  There was another lesion on the left side.  Patient also had a tiny osseous hyperintensity in the  occipital bone.  Patient was admitted neurosurgery was consulted steroids were started.     October 1, 2022 staging CT chest abdomen pelvis Bilateral pulmonary nodules 2 on the right side measuring 9 and 7 mm left lower lobe measures 12 mm.  Enlarged left hilar lymph node.  Enlarged left external iliac lymph node.  This could be a number to biopsy.  There were 2 subcutaneous nodules left upper quadrant there is 9 mm peritoneal nodule in the right lower quadrant.  33 mm left ovarian cyst thyroid nodules measuring 23 mm.  Incompletely assessed left renal lesion new since prior and most likely a cyst.     I have seen patient at bedside neuro 2 of her friendsI have seen patient at bedside neuro 2 of her friends. Patient has 2 sons 19 and seventeen 9-year-old is an  course he is currently in flight to come to see his mom.  Patient is in tears reports of some headaches.  Denies any bowel or bladder incontinence.     Assessment and Plan:     1. Recurrent stage IV metastatic melanoma in a patient with previous history of T3N1 disease status post adjuvant immunotherapy for almost 3+ years and she recently was in observation with repeat PET scans. Unfortunately currently she has diffuse metastatic disease with lung lesions peritoneal nodule and 6 lesions in the brain.  - I would recommend getting a CT-guided biopsy of the Enlarged left external iliac lymph node or left lower lobe lung nodule which is 1.2 cm.  This would help us in confirming recurrent disease as well as we can do additional testing for NexGen ration sequencing PD-L1 and especially BRAF mutation.  We do not have any BRAF mutation testing on the old 2017 specimen.  - Patient would be a candidate for dual agent immunotherapy or BRAF and MEK inhibitor. Discussed with patient about prognosis medication chances of response including CNS penetration etc. answered all her questions to satisfaction.     2. Metastatic brain lesions 5 on the right side and 1 in  the left side.  - Continue with dexamethasone and Keppra  - Neurosurgical evaluation ongoing.  - Consult radiation oncology for stereotactic radiation after discussion with neurosurgery please. Multiple lesions I would recommend stereotactic radiation. Usually metastatic melanoma in the brain also response well to immunotherapy or BRAF inhibitors.  CNS penetration of these drugs is pretty good.     She agreed and verbalized her agreement and understanding with the current plan.  I answered all questions and concerns she has at this time.     Thank you for allowing me to participate in her care.     Please note that this dictation was created using voice recognition software. I have made every reasonable attempt to correct obvious errors, but I expect that there are errors of grammar and possibly content that I did not discover before finalizing the note.        SIGNATURES:  KIMBERLYN Shook M.D.  Physician  Surgery Neurosurgery  OP Report     Signed  Date of Service:  10/6/2022 12:00 PM    Neurosurgery Operative Note     Patient Name: Jacque Mcintyre MRN: [id number] YOB: 1980  Date of Surgery:  10/6/22     SURGEON: Pebbles Tejada M.D.     ASSISTANT: none     PRE-OPERATIVE DIAGNOSIS:   1. Right frontal intraaxial brain lesions  2. Vasogenic edema, compression of brain  3. History of melanoma  4. Left hemiparesis  5. Type II diabetes mellitus           POST-OPERATIVE DIAGNOSIS:   1. Right frontal intraaxial brain tumors  2. Vasogenic edema, compression of brain  3. History of melanoma  4. Left hemiparesis  5. Type II diabetes mellitus           PROCEDURE:   1. Right frontal craniotomy for resection of supratentorial intraaxial brain tumors, three, same incision and same craniotomy  2. Use of intradural stereotacic neuronavigation           ANESTHESIA: GETA           ESTIMATED BLOOD LOSS: 200cc           DRAINS: none     FINDINGS: three vascular right frontal  "tumors, gross total resection           SPECIMENS:     ID Type Source Tests Collected by Time Destination  A : RIGHT FRONTAL BRAIN MASS #1 Tissue Brain PATHOLOGY SPECIMEN Pebbles Tejada M.D. 10/6/2022 10:29 AM    B : RIGHT FRONTAL BRAIN LESION #2 Tissue Brain PATHOLOGY SPECIMEN Pebbles Tejada M.D. 10/6/2022 10:47 AM    C : RIGHT FRONTAL BRAIN LESION #3 Tissue Brain PATHOLOGY SPECIMEN Pebbles Tejada M.D. 10/6/2022 11:07 AM                IMPLANTS:     Implant Name Type Inv. Item Serial No.  Lot No. LRB No. Used Action  GRAFT DURAMATRIX ONLAY PLUS 3\" X 3\" OR 7.5CM X 7.5CM Dura GRAFT DURAMATRIX ONLAY PLUS 3\" X 3\" OR 7.5CM X 7.5CM   TYLER CRANIOMAXILLOFACIAL [id number] Right 1 Implanted  SCREW STRYK NC 1.5X5MM ([id number]) ([id number]) CONSIGNED  PRE-LOAD Screw SCREW STRYK NC 1.5X5MM ([id number]) ([id number]) CONSIGNED  PRE-LOAD   TYLER CRANIOMAXILLOFACIAL   Right 12 Implanted  PLATE NC ARMIDA HOLE COVER 10MM ([id number]) Plate PLATE NC ARMIDA HOLE COVER 10MM ([id number])   TYLER CRANIOMAXILLOFACIAL   Right 1 Implanted  PLATE NC ARMIDA HOLE COVER FOR SHUNT 14MM ([id number]) Plate PLATE NC ARMIDA HOLE COVER FOR SHUNT 14MM ([id number]=58)   TYLER CRANIOMAXILLOFACIAL   Right 3 Implanted              COMPLICATIONS: None apparent.     Operative Indications: The patient is a 41 year old woman with a history of melanoma presenting with left hemiparesis. She was found to have new intracranial lesions concerning for metastatic disease, three in the right frontal lobe with the largest > 3cm.  Due to the large size of this lesion, the proximity to the motor strip, and the presence of nearby satellite lesions, a craniotomy for resection was recommended prior to radiation and chemotherapy.     The indications, benefits, alternatives and risks to the procedure were discussed with the patient, which included but were not limited to bleeding, infection, stroke, injury to nearby nerves and vessels, an inability to obrtain " a diagnosis, cerebrospinal fluid leak, new temporary or permanent weakness or sensory changes, difficulty swallowing, hoarseness, worsened imbalance or discoordination, coma and rarely, even death.  The patient was in agreement and wished to proceed.     Operative Details: The patient was identified in the pre-operative holding area by perioperative staff by two forms of identification. The patient was brought to the operating room, induced under general anesthesia and intubated without complication. Antibiotics, Decadron and Keppra were administered. A Graf catheter, IV access and arterial line were placed by the Anesthesia team and nursing staff. Subcutaneous needles for neuromonitoring were placed by the technician to monitor MEP and SSEP. The patient was positioned supine on the operating table with a bump under the left shoulder. The head was secured in a [place] headholder and positioned with the left side up, sagittal sinus parallel to the floor, and the head tilted upward. The patient's craniofacial landmarks were used to co-register to the pre-operative MRI. The locations of the three right frontal tumors were marked on the scalp. A curvilinear incision was planned eccentric to the right, across midline. The hair was clipped, and the scalp was prepped with chlorhexidine scrub, isopropyl alcohol-soaked 4x4 sponges, then Chloraprep. The patient was draped in the usual sterile fashion. A formal time-out indicated the correct patient, procedure and side.      Marcaine 0.5% with epinephrine 1:200,000 was injected subcutaneously along the incision. Incision was made with a 10-blade. Monopolar cautery was used to dissect through the subcutaneous tissue in the subperiosteal plane to raise a cutaneous flap anteriorly and posteriorly. The flaps were retracted with fish hooks. The tumor edges were marked on the skull using neuronavigation and a frontal craniotomy was planned. Four clark holes were created with a  high-speed drill bit and expanded with a curette, including two on midline over the sagittal sinus. The epidural plane was dissected with a [employer] dental and Penfield 3 instrument. A B1 footplate was used to turn a craniotomy, and the flap was soaked on the back table. Hemostasis was achieved with bipolar cautery and thrombin soaked Gelfoam.      There was a durotomy frontally, and this opening was extended sharply with Metzenbaum scissor in a curved fashion, the base along the sagittal sinus, and tacked medially with 4-0 Nuralon suture. A 4-electrode contact lead was placed posteriorly subdurally for phase reversal to detect the primary motor and sensory cortices, which were well posterior to the edge of the craniotomy between electrodes 1 and 2. The neuronavigation probe was used to identify the desired cortical entry point. The stimulator probe was brought into the field, and the surrounding cortical areas were stimulated up to 15 mA.  There was no activation of motor evoked potentials in the location of the desired corticectomy, but there was cortical activation two gyri posterior to this. The cortical surface was coagulated with bipolar cautery and incised sharply with a 15 blade. The white matter was dissected with cautery and suction until a dark, firm lesion was encountered. In a systematic manner, a plane between a normal white matter and abnormal tissue was established using bipolar cautery and suction and blunt dissection with fibrillar balls.  The plane was maintained with cottonoid patties.  The microscope was draped and brought into the field. Under bright magnification utilizing microsurgical technique, the tumor pseudocapsule was dissected from the white matter. Subcortical mapping posteriorly and inferiorly confirmed that corticospinal tracts were at least 8-15mm away from the site of dissection. The tumor was entered and there was significant hemorrhage from the medial aspect, likely the source of  the blood supply. Extra time and care was taken to control the bleeding. The dissection continued until the lesion was freed and resected en bloc. Frozen section was confirmed to be lesional tissue consistent with melanoma.  Hemostasis was meticulously achieved with bipolar cautery and fibrillar.     Attention was then turned to the more anterior lesion. Neuronavigation confirmed the location. A medial corticectomy was performed and the lesion encountered. It was sampled for permanent section and removed in a piecemeal fashion with bipolar cautery and suction. Hemostasis was meticulously achieved.     Finally, the third most anterior lesion was identified with intraoperative ultrasound as a hyperechoic spherical lesion. Another corticectomy was created and dissection to the lesion was performed with bipolar cautery and suction. This lesion was sampled for permanent section, and removed in a piecemeal fashion. Part of the lesion demonstrated old hemorrhagic contents, which were suctioned out. Once I was satisfied with the resection, all three operative beds were inspected for excellent hemostasis.Transcranial MEPs were performed without change in the signals.     The field was copiously irrigated with Ancef infused saline.  A Duragen inlay was placed, and the dura was reopposed with interrupted 4-0 Nurolon suture.  The bone flap was secured to the skull with titanium mini-plates and screws.     The incision was closed in layers, interrupted inverted 0-Vicryl for galea and staples for the skin. The skin was cleansed and dressed with bacitracin, Telfa, and Medpore tape.     The [place] headholder was removed and the patient was extubated, and taken to the recovery room in a stable condition.     All counts were correct x2.      Pebbles Tejada M.D.     I was present for the entirety of the surgery.                Elizabeth Aguilera M.D.  Physician  Critical Care  Consults     Signed  Date of Service:  10/6/2022   4:15 PM    Critical Care Consultation     Date of consult: 10/6/2022     Referring Physician  Elizabeth Aguilera M.D.     Reason for Consultation  Brain met     History of Presenting Illness  41 y.o. female with history of DM2, depression, melanoma in 2016 who underwent immunotherapy for 3 years who presented 10/1/2022 with with left-sided weakness.  CT head showed 3 masses in the right cerebral hemisphere with extensive vasogenic edema, localized midline shift to the left, as well as 2 small masses in the right frontal lobe with increased density.  CT chest abdomen showed bilateral pulmonary nodules (12 mm at largest), enlarged hilar and external iliac Lns, LUQ SQ soft tissue nodules, RLQ peritoneal nodule, 33 mm left ovarian cyst, thyroid nodules (23 mm at largest).  She was started on Decadron and Keppra.  She underwent right frontal craniotomy for tumor resection on 10/6.   Postoperatively, she was noted to have weakness in her left side-2/5 in LUE and 0/5 in LLE.  She had a repeat CT head postop which showed small amount of extra-axial intracavitary right frontal air after the resection, no evidence of significant mass-effect, large territorial infarction or new mass.      Code Status  Full Code     Review of Systems  Review of Systems   Constitutional:  Positive for malaise/fatigue.   Respiratory:  Negative for shortness of breath.    Cardiovascular:  Negative for chest pain.   Gastrointestinal:  Positive for nausea.   Neurological:  Positive for focal weakness and headaches.      Past Medical History   has a past medical history of Asthma, Bipolar 1 disorder (HCC), Cancer (HCC) (01/01/2016), Cough, Depression, DM mellitus,, Hyperlipidemia, Hypertension, Pap smear, PCOS (polycystic ovarian syndrome), Shortness of breath, Sputum production, and Wheezing.     She has no past medical history of Painful breathing.     Surgical History   has a past surgical history that includes myringotomy; adenoidectomy; other  (2016); wide excision (Left, 5/9/2017); node biopsy sentinel (Left, 5/9/2017); axillary node dissection (Left, 6/23/2017); pr lap,appendectomy (N/A, 10/7/2019); pr bronchoscopy,diagnostic (N/A, 6/17/2022); and endobronchial us add-on (N/A, 6/17/2022).     Family History  family history includes Diabetes in her father and mother; Hyperlipidemia in her mother; Hypertension in her father; Psychiatric Illness in her mother; Thyroid in her mother.     Social History   reports that she has been smoking cigarettes. She has a 7.50 pack-year smoking history. She has never used smokeless tobacco. She reports that she does not currently use alcohol. She reports current drug use. Drug: Inhaled.  Assessment/Plan  * Brain metastases (HCC)  Assessment & Plan  Likely from metastatic melanoma with 3 masses in the right cerebral hemisphere with extensive edema-now status post craniotomy with tumor resection.  She also has smaller satellite lesions.  Postop with left-sided weakness -repeat CT head shows some postop changes but otherwise no significant mass-effect, large territorial infarction or new mass.     -MRI  -Neurochecks  -Pain control with p.o. oxycodone and IV Dilaudid  -Stat CT head for any acute changes  -Decadron 4 mg every 6 hours  -Keppra 1 g twice daily  -Appreciate neurosurgery recs  -Appreciate rad onc recs     Malignant melanoma metastatic to brain (HCC)- (present on admission)  Assessment & Plan  Plan as noted above     DM2 (diabetes mellitus, type 2) (HCC)- (present on admission)  Assessment & Plan  Insulin sliding scale since she is on steroids  Monitor closely-maintain normoglycemia     Depression- (present on admission)  Assessment & Plan  On Cymbalta and Xanax      Discussed patient condition and risk of morbidity and/or mortality with RN, RT, and Pharmacy.    The patient remains critically ill.  Critical care time = 45 minutes in directly providing and coordinating critical care and extensive data review.  No  time overlap and excludes procedures.      MD Zach Hernandez M.D.  Physician  Physical Medicine & Rehab  H&P     Signed  Date of Service:  10/12/2022 12:37 PM    Medical chart review completed.      Patient is a 41 y.o. female with a past medical history significant for BP1, History of melanoma, asthma, DM2, HTN, HLD, and PCOS admitted to [facility] on 10/1/2022  5:26 PM with UE and LE weakness.  Patient reportedly had slowly worsening weakness over a few weeks. In ED she was found to have tachycardia.  CT head showed 3 masses with largest in the right cerebral hemisphere measuring 3.2 cm with extensive vasogenic edema. NSG was consulted. Per the history she had a history of melanoma and recent PET with uptake near sternum but biopsy was negative. Per NSG they did not recommend surgical recommendation and recommended conservative management with MRI, Tegretol, and Decadron. Patient eventually did undergo right craniotomy on 10/6/22 with Dr. Tejada with resection of 3 lesions with recommendation of Decadron and Keppra. Per most recent notes awaiting full pathology for treatment plan.      Patient was last evaluated by OT on 10/11/22 and was modA for ADLs. Patient was last evaluated by PT on 10/10/22 and was mod-maxA for mobility. Patient was previously living in a 1 story home with 3 steps to enter; living with 17 y.o. son but able to move in with parents.      ALLERGIES:  Amoxicillin-pot clavulanate and Clavulanic acid     PSYCHOSOCIAL HISTORY:  Living Site:  Home  Living With:  family  Caregiver's availability:  Not Applicable  Number of stairs:   3  Substance use history:  Unknown        The patient presents functional deficits in mobility and self-care, and Moderate  de-conditioning. Pre-morbidly, this patient lived in a single level home with Three steps to enter,with family  The patient was evaluated by acute care Physical Therapy and Occupational  Therapy; currently requiring moderate assistance for mobility and moderate assistance for ADLs,. The patient's current diet is Regular with Thin liquids.      Patient with metastatic melanoma to brain with resection on 10/6/22 The patient is   a Good candidate for an acute inpatient rehabilitation program with a coordinated program of care at an intensity and frequency not available at a lower level of care.      Note: This recommendation requires that patient has at least CGA/Minimal Assistance needs in at least two therapy disciplines.  If patient progresses to no longer need CGA/Arlet with at least two therapy disciplines they may be more appropriate for Skilled nursing facility versus home with home health.       This recommendation is substantiated by the patient's current medical condition with intervention and assessment of medical issues requiring an acute level of care for patient's safety and maximum outcome. A coordinated program of care will be provided by an interdisciplinary team including physical therapy, occupational therapy, speech language pathology, hospitalist, physiatry, rehab nursing, and rehab psychology. Rehab goals include improved mobility, self-care management, strength and conditioning/endurance, pain management, bowel and bladder management, mood and affect, and safety with independent home management including caregiver training. Estimated length of stay is approximately 10-17 days. Rehab potential: Good. Disposition: to pre-morbid independent living setting with supportive care of patient's family. We will continue to follow with you in anticipation of discharge to acute inpatient rehabilitation when medically stable to do so at the discretion of the attending physician. Thank you for allowing us to participate in this patient's care. Please call with any questions regarding this recommendation.     Zach Patel M.D.    Co-morbidities:  See above  Potential Risk -  "Complications: Cognitive Impairment, Contractures, Deep Vein Thrombosis, Malnutrition, Pain, Paralysis, Perceptual Impairment, Pneumonia, Pressure Ulcer, Seizures, Urinary Tract Infection, and Infection  Level of Risk: High    Ongoing Medical Management Needed (Medical/Nursing Needs):   Patient Active Problem List    Diagnosis Date Noted    Adrenal insufficiency due to corticosteroid withdrawal ([facility]) 10/12/2022    Chronic prescription benzodiazepine use 10/07/2022    Constipation 10/07/2022    Brain mass 10/06/2022    Malignant melanoma metastatic to brain ([facility]) 10/01/2022    Cellulitis 08/26/2022    Leucocytosis 08/26/2022    Abnormal CT scan, chest 06/13/2022    Gastroenteritis 05/19/2022    Hypokalemia 10/08/2019    Hypotension 10/08/2019    Bipolar 1 disorder ([facility]) 10/07/2019    DM2 (diabetes mellitus, type 2) ([facility]) 10/07/2019    Appendicitis 10/07/2019    Melanoma of left upper arm ([facility]) 06/23/2017    Nausea & vomiting 09/04/2015    Sepsis ([facility]) 09/04/2015    Leukocytosis 09/04/2015    Lactic acidosis 09/04/2015    Anxiety 09/04/2015    Diarrhea 09/04/2015    Asthma     Depression      Current Vital Signs:   Temperature: 36.7 °C (98.1 °F) Pulse: 62 Respiration: 18 Blood Pressure: (!) 98/70  Weight: 66.1 kg (145 lb 11.6 oz) Height: 162.6 cm (5' 4\")  Pulse Oximetry: 97 % O2 (LPM): 0      Completed Laboratory Reports:  Recent Labs     10/11/22  0120 10/12/22  0135 10/13/22  0424   WBC 9.8 11.2* 13.1*   HEMOGLOBIN 9.8* 9.7* 10.3*   HEMATOCRIT 32.6* 31.6* 34.1*   PLATELETCT 509* 555* 551*   SODIUM 138 136 139   POTASSIUM 4.1 3.8 4.0   BUN 18 16 15   CREATININE 0.42* 0.47* 0.45*   GLUCOSE 218* 174* 224*     Additional Labs: Not Applicable    Prior Living Situation:   Housing / Facility: 1 Story House (parent's home)  Steps Into Home: 3  Steps In Home: 0  Lives with - Patient's Self Care Capacity: Child Less than 18 Years of Age, Parents  Equipment Owned: None    Prior Level of Function / Living Situation:   Physical " Therapy: Prior Services: None  Housing / Facility: 1 Story House (parent's home)  Steps Into Home: 3  Steps In Home: 0  Rail: None  Elevator: No  Bathroom Set up: Walk In Shower, Bathtub / Shower Combination  Equipment Owned: None  Lives with - Patient's Self Care Capacity: Child Less than 18 Years of Age, Parents  Bed Mobility: Independent  Transfer Status: Independent  Ambulation: Independent  Distance Ambulation (Feet):  (community, works at nurse at hospitals)  Assistive Devices Used: None  Stairs: Independent  Current Level of Function:   Gait Level Of Assist: Unable to Participate  Assistive Device: Front Wheel Walker  Distance (Feet): 100  Deviation: Step To (FWW positioned to the R of body with L LE in ER.)  # of Stairs Climbed: 0  Weight Bearing Status: no restrictions  Skilled Intervention: Verbal Cuing, Compensatory Strategies  Supine to Sit: Minimal Assist (heavy use of left railing)  Sit to Supine: Moderate Assist  Scooting: Moderate Assist  Rolling: Standby Assist (cues to sequence onto L side)  Skilled Intervention: Verbal Cuing, Tactile Cuing, Sequencing  Comments: moving to L side of bed  Sit to Stand: Moderate Assist (to max with FWW ; x 3 reps with focus on left foot flat and midline holds)  Bed, Chair, Wheelchair Transfer:  (fatigued post and asking for rest; was up in chair yesterday)  Transfer Method: Stand Pivot  Skilled Intervention: Verbal Cuing, Tactile Cuing, Sequencing  Sitting in Chair: NT  Sitting Edge of Bed: > 15 mins  Standing: 3 mins  Occupational Therapy:   Self Feeding: Independent  Grooming / Hygiene: Independent  Bathing: Independent  Dressing: Independent  Toileting: Independent  Medication Management: Independent  Laundry: Independent  Kitchen Mobility: Independent  Finances: Independent  Home Management: Independent  Shopping: Independent  Prior Level Of Mobility: Independent Without Device in Community  Prior Services: None  Housing / Facility: 1 Story House (parent's  home)  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 Therapeutic Interventions Required: Maximize Endurance, Mobility, Strength, and Safety  Occupational Therapy: 1.5 hr / day 5 days / week. Therapeutic Interventions Required: Maximize Self Care, ADLs, IADLs, and Energy Conservation  Speech & Language Pathology: SLP Ila Stapleton [facility] 5 days / week. Therapeutic Interventions Required: Maximize Pending Recommendations SLP Cog Eval [facility]  Rehabilitation Nursin/7. Therapeutic Interventions Required: Monitor Pain, Skin, Vital Signs, Intake and Output, Labs, Safety, Aspiration Risk, Family Training, and Craniotomy incision care; DVT Prophylaxis; Graf care/maintenance; Bowel regimen; Infection control; ADL's.   Rehabilitation Physician: 3 - 5 days / week. Therapeutic Interventions Required: Medical Management  Respiratory Care: Consult. Therapeutic Interventions Required: Pulmonary Toileting and Respiratory care per protocol  Dietician: Consult. Therapeutic Interventions Required: Nutritional evaluation with recommendations to promote optimal health and healing.     She requires 24-hour rehabilitation nursing to manage bowel and bladder function, skin care, surgical incision, nutrition and fluid intake, pulmonary hygiene, pain control, safety, medication management, and patient/family goals. In addition, rehabilitation nursing will reiterate and reinforce therapy skills  and equipment use, including ADLs, as well as provide education to the patient and family. Jacque Mcintyre is willing to participate in and is able to tolerate the proposed plan of care.    Rehabilitation Goals and Plan (Expected frequency & duration of treatment in the IRF):   Return to the Community, Modified Independent Level of Care, Outpatient Support, and Family Able to Provide 24/7 Assistance  Anticipated Date of Rehabilitation Admission: 10/14/2022  Patient/Family oriented IRF level of care/facility/plan: Yes  Patient/Family willing to participate in IRF care/facility/plan: Yes  Patient able to tolerate IRF level of care proposed: Yes  Patient has potential to benefit IRF level of care proposed: Yes  Comments: Not Applicable    Special Needs or Precautions - Medical Necessity:  Safety Concerns/Precautions:  Fall Risk / High Risk for Falls, Balance, Cognition, and Bed / Chair Alarm  Complex Wound Care: Craniotomy incision care  Pain Management  Craniotomy Precautions    Diet:   DIET ORDERS (From admission to next 24h)       Start     Ordered    10/09/22 0826  Diet Order Diet: Consistent CHO (Diabetic)  ALL MEALS        Question:  Diet:  Answer:  Consistent CHO (Diabetic)    10/09/22 0825                    Anticipated Discharge Destination / Patient/Family Goal:  Destination: Home with Assistance Support System: Family   Anticipated home health services: OT, PT, SLP, Nursing, and Aide  Previously used HH service/ provider: Not Applicable  Anticipated DME Needs:  TO be determined  Outpatient Services:  To be determined  Alternative resources to address additional identified needs:   No future appointments.    Pre-Screen Completed: 10/13/2022 3:06 PM Tamika Noland R.N.

## 2022-10-13 NOTE — DISCHARGE PLANNING
Agency/Facility Name: [facility]   Outcome: DPA left voicemail regarding referral status. DPA requesting a call back.

## 2022-10-13 NOTE — CONSULTS
Physical Medicine and Rehabilitation Consultation              Date of initial consultation: 10/13/2022  Consulting provider: Ernesto Vera D.O.  Reason for consultation: assess for acute inpatient rehab appropriateness  LOS: 12 Day(s)    Chief complaint: Brain metastasis    HPI: The patient is a 41 y.o. right hand dominant female with a past medical history of type 2 diabetes, depression, melanoma in 2016 status post immunotherapy for 3 years;  who presented on 10/1/2022  5:26 PM with left-sided weakness.  Work-up with CT head found 3 masses in the right cerebral hemisphere with extensive vasogenic edema and midline shift.  CT chest abdomen showed bilateral pulmonary nodules, left upper quadrant subcutaneous soft tissue nodules, right lower quadrant peritoneal nodule, 33 mm left ovarian cyst, and thyroid nodules.  Patient was started on Decadron and Keppra.  Patient was assessed by neurosurgery and underwent right frontal craniotomy on 10/6 with Dr. Pebbles Tejada MD.     Functionally, patient is unable to participate in gait, requiring max assist with ADLs, demonstrates left-sided weakness, functional left inattention, limited by weakness, fatigue, balance.    The patient currently reports overall doing well.  She is awake, alert, has clear speech.  Patient endorses a headache, diabetic neuropathy left-sided weakness with left lower extremity being the most affected.  Patient has urinary retention, constipation for 2 weeks spasticity at her left ankle.  Patient has 2 children, ages 17 and 19.  19-year-old is in the , patient has split custody of 17-year-old.    ROS  Pertinent positives are mentioned in the HPI, all others reviewed and are negative.    Social Hx:  1 SH  3 CAROLYN  With: Parents, 17-year-old son    Employment: Nurse at [employer]  Tobacco: 1 pack/day  Alcohol: Minimal      THERAPY:  Restrictions: Fall risk  PT: Functional mobility   10/12: Unable to participate in gait, mod assist sit to  stand    OT: ADLs  10/11: ADLs not assessed, Co. treating with physical therapy for mobility  10/8: Max assist lower body dressing and toileting    SLP:   None    IMAGING:    MR brain 10/2/2022  1.  Right-sided predominant, supratentorial metastatic disease with the largest mass in the right frontoparietal region measuring 3.4 x 3.0 cm as described in detail above.  2.  Associated edema with regional associated cortical effacement and partial effacement of the right lateral ventricle. Minimal localized midline shift adjacent to the large right frontoparietal mass.  3.  Evidence of prior hemorrhage within these masses.  4.  Small focus of restricted diffusion in the right occipital bone could represent osseous metastatic disease.    CT abdomen pelvis 10/1/2022  1.  BILATERAL pulmonary nodules, the largest of which measures 12 mm in the LEFT lower lobe and which has enlarged since May of this year.  2.  Enlarged LEFT hilar lymph node  3.  Enlarged LEFT external iliac lymph node. This would be amenable to image guided tissue sampling if clinically appropriate.  4.  7 mm LEFT and LEFT upper quadrant subcutaneous soft tissue nodules, new since prior  5.  9 mm RIGHT lower quadrant peritoneal nodule, new since prior  6.  33 mm LEFT ovarian cyst  7.  Thyroid nodules measuring up to 23 mm  8.  BILATERAL L5 pars defects with grade 1 anterolisthesis of L5 on S1  9.  Incompletely assessed LEFT renal lesion, new since prior and most likely a cyst though attention on top is recommended.    PROCEDURES:  Pebbles Tejada MD  10/6/2022  Right frontal craniotomy for resection of supratentorial intraaxial brain tumors, three, same incision and same craniotomy  Use of intradural stereotacic neuronavigation             PMH:  Past Medical History:   Diagnosis Date    Asthma     inhaler    Bipolar 1 disorder (HCC)     Cancer (HCC) 01/01/2016    melanoma    Cough     Depression     DM mellitus,     insulin    Hyperlipidemia     Hypertension      Pap smear     Dr. Baird    PCOS (polycystic ovarian syndrome)     Shortness of breath     Sputum production     Wheezing        PSH:  Past Surgical History:   Procedure Laterality Date    CRANIOTOMY STEALTH Right 10/6/2022    Procedure: RIGHT FRONTAL CRANIOTOMY WITH STEALTH;  Surgeon: Pebbles Tejada M.D.;  Location: [facility];  Service: Neurosurgery    AL BRONCHOSCOPY,DIAGNOSTIC N/A 6/17/2022    Procedure: FIBER OPTIC BRONCHOSCOPY WITH WASH, BRUSH, BRONCHOALVEOLAR LAVAGE, BIOPSY, FINE NEEDLE ASPIRATION;  Surgeon: Yue Swanson M.D.;  Location: [facility];  Service: Pulmonary    ENDOBRONCHIAL US ADD-ON N/A 6/17/2022    Procedure: ENDOBRONCHIAL ULTRASOUND (EBUS);  Surgeon: Yue Swanson M.D.;  Location: [facility];  Service: Pulmonary    AL LAP,APPENDECTOMY N/A 10/7/2019    Procedure: APPENDECTOMY, LAPAROSCOPIC;  Surgeon: Lionel Frazier M.D.;  Location: [facility];  Service: General    AXILLARY NODE DISSECTION Left 6/23/2017    Procedure: AXILLARY NODE DISSECTION- COMPLETION LYPHADENECTOMY;  Surgeon: Lionel Weinberg M.D.;  Location: [facility];  Service:     WIDE EXCISION Left 5/9/2017    Procedure: WIDE EXCISION - MALIGNANT MELANOMA HAND;  Surgeon: Lionel Weinberg M.D.;  Location: SURGERY SAME DAY [facility];  Service:     NODE BIOPSY SENTINEL Left 5/9/2017    Procedure: NODE BIOPSY SENTINEL - AXILLARY;  Surgeon: Lionel Weinberg M.D.;  Location: SURGERY SAME DAY [facility];  Service:     OTHER  2016    excisino of melanoma left hand    ADENOIDECTOMY      MYRINGOTOMY      with tube placement       FHX:  Family History   Problem Relation Age of Onset    Psychiatric Illness Mother         depression    Diabetes Mother     Hyperlipidemia Mother     Thyroid Mother         s/p radioactive iodine treatment on replacement    Hypertension Father     Diabetes Father        Medications:  Current Facility-Administered Medications   Medication Dose     "oxyCODONE immediate-release (ROXICODONE) tablet 5 mg  5 mg    Or    oxyCODONE immediate release (ROXICODONE) tablet 10 mg  10 mg    Or    HYDROmorphone (Dilaudid) injection 0.5 mg  0.5 mg    dexamethasone (DECADRON) tablet 4 mg  4 mg    lactulose 20 GM/30ML solution 30 mL  30 mL    acetaminophen (Tylenol) tablet 650 mg  650 mg    polyethylene glycol/lytes (MIRALAX) PACKET 1 Packet  1 Packet    And    senna-docusate (PERICOLACE or SENOKOT S) 8.6-50 MG per tablet 2 Tablet  2 Tablet    And    magnesium hydroxide (MILK OF MAGNESIA) suspension 30 mL  30 mL    And    bisacodyl (DULCOLAX) suppository 10 mg  10 mg    levETIRAcetam (KEPPRA) tablet 1,000 mg  1,000 mg    metoclopramide (REGLAN) injection 10 mg  10 mg    prochlorperazine (COMPAZINE) injection 10 mg  10 mg    acetaminophen (TYLENOL) suppository 650 mg  650 mg    insulin GLARGINE (Lantus,Semglee) injection  0.2 Units/kg/day    And    insulin lispro (AdmeLOG,HumaLOG) injection  0-12 Units    And    dextrose 50% (D50W) injection 25 g  25 g    [Held by provider] atenolol (TENORMIN) tablet 25 mg  25 mg    enoxaparin (Lovenox) inj 40 mg  40 mg    ondansetron (ZOFRAN) syringe/vial injection 4 mg  4 mg    ALPRAZolam (XANAX) tablet 0.25 mg  0.25 mg    famotidine (PEPCID) tablet 20 mg  20 mg    albuterol inhaler 2 Puff  2 Puff    atorvastatin (LIPITOR) tablet 40 mg  40 mg    DULoxetine (CYMBALTA) capsule 60 mg  60 mg    gabapentin (NEURONTIN) capsule 800 mg  800 mg    OXcarbazepine (TRILEPTAL) tablet 300 mg  300 mg    QUEtiapine (Seroquel) tablet 200 mg  200 mg       Allergies:  Allergies   Allergen Reactions    Amoxicillin-Pot Clavulanate Vomiting    Clavulanic Acid Vomiting     Violent vomiting         Physical Exam:  Vitals: BP (!) 98/70   Pulse 62   Temp 36.7 °C (98.1 °F) (Temporal)   Resp 18   Ht 1.626 m (5' 4\")   Wt 66.1 kg (145 lb 11.6 oz)   SpO2 97%   Gen: NAD  Head: Right head with surgical incision, closed with staples, open to air, nondraining  Eyes/ " Nose/ Mouth: PERRLA, moist mucous membranes  Cardio: RRR, good distal perfusion, warm extremities  Pulm: normal respiratory effort, no cyanosis   Abd: Soft NTND, negative borborygmi   Ext: No peripheral edema. No calf tenderness. No clubbing.    Mental status:  A&Ox4 (person, place, date, situation) answers questions appropriately follows commands  Speech: fluent, no aphasia or dysarthria    Motor:      Upper Extremity  Myotome R L   Shoulder flexion C5 5 4/5   Elbow flexion C5 5 4/5   Wrist extension C6 5 4/5   Elbow extension C7 5 4/5   Finger flexion C8 5 4/5   Finger abduction T1 5 4/5     Lower Extremity Myotome R L   Hip flexion L2 5 3/5   Knee extension L3 5 3/5   Ankle dorsiflexion L4 5 2/5   Toe extension L5 5 2/5   Ankle plantarflexion S1 5 2/5       Sensory:   intact to light touch through out    DTRs:  Right  Left    Brachioradialis  2+  2+   Patella tendon  2+ 3+     Negative Godinez b/l     Tone: Increased tone of left gastrocsoleus complex    Labs: Reviewed and significant for   Recent Labs     10/11/22  0120 10/12/22  0135 10/13/22  0424   RBC 3.97* 3.93* 4.22   HEMOGLOBIN 9.8* 9.7* 10.3*   HEMATOCRIT 32.6* 31.6* 34.1*   PLATELETCT 509* 555* 551*     Recent Labs     10/11/22  0120 10/12/22  0135 10/13/22  0424   SODIUM 138 136 139   POTASSIUM 4.1 3.8 4.0   CHLORIDE 99 98 100   CO2 29 28 26   GLUCOSE 218* 174* 224*   BUN 18 16 15   CREATININE 0.42* 0.47* 0.45*   CALCIUM 8.9 8.9 9.0     Recent Results (from the past 24 hour(s))   POCT glucose device results    Collection Time: 10/12/22  5:21 PM   Result Value Ref Range    POC Glucose, Blood 221 (H) 65 - 99 mg/dL   POCT glucose device results    Collection Time: 10/12/22  9:18 PM   Result Value Ref Range    POC Glucose, Blood 216 (H) 65 - 99 mg/dL   Basic Metabolic Panel    Collection Time: 10/13/22  4:24 AM   Result Value Ref Range    Sodium 139 135 - 145 mmol/L    Potassium 4.0 3.6 - 5.5 mmol/L    Chloride 100 96 - 112 mmol/L    Co2 26 20 - 33 mmol/L     Glucose 224 (H) 65 - 99 mg/dL    Bun 15 8 - 22 mg/dL    Creatinine 0.45 (L) 0.50 - 1.40 mg/dL    Calcium 9.0 8.5 - 10.5 mg/dL    Anion Gap 13.0 7.0 - 16.0   CBC WITH DIFFERENTIAL    Collection Time: 10/13/22  4:24 AM   Result Value Ref Range    WBC 13.1 (H) 4.8 - 10.8 K/uL    RBC 4.22 4.20 - 5.40 M/uL    Hemoglobin 10.3 (L) 12.0 - 16.0 g/dL    Hematocrit 34.1 (L) 37.0 - 47.0 %    MCV 80.8 (L) 81.4 - 97.8 fL    MCH 24.4 (L) 27.0 - 33.0 pg    MCHC 30.2 (L) 33.6 - 35.0 g/dL    RDW 46.9 35.9 - 50.0 fL    Platelet Count 551 (H) 164 - 446 K/uL    MPV 10.1 9.0 - 12.9 fL    Neutrophils-Polys 71.60 44.00 - 72.00 %    Lymphocytes 18.80 (L) 22.00 - 41.00 %    Monocytes 8.70 0.00 - 13.40 %    Eosinophils 0.10 0.00 - 6.90 %    Basophils 0.30 0.00 - 1.80 %    Immature Granulocytes 0.50 0.00 - 0.90 %    Nucleated RBC 0.00 /100 WBC    Neutrophils (Absolute) 9.35 (H) 2.00 - 7.15 K/uL    Lymphs (Absolute) 2.45 1.00 - 4.80 K/uL    Monos (Absolute) 1.14 (H) 0.00 - 0.85 K/uL    Eos (Absolute) 0.01 0.00 - 0.51 K/uL    Baso (Absolute) 0.04 0.00 - 0.12 K/uL    Immature Granulocytes (abs) 0.07 0.00 - 0.11 K/uL    NRBC (Absolute) 0.00 K/uL   MAGNESIUM    Collection Time: 10/13/22  4:24 AM   Result Value Ref Range    Magnesium 2.0 1.5 - 2.5 mg/dL   PHOSPHORUS    Collection Time: 10/13/22  4:24 AM   Result Value Ref Range    Phosphorus 3.9 2.5 - 4.5 mg/dL   ESTIMATED GFR    Collection Time: 10/13/22  4:24 AM   Result Value Ref Range    GFR (CKD-EPI) 122 >60 mL/min/1.73 m 2   POCT glucose device results    Collection Time: 10/13/22  8:27 AM   Result Value Ref Range    POC Glucose, Blood 178 (H) 65 - 99 mg/dL   POCT glucose device results    Collection Time: 10/13/22 11:26 AM   Result Value Ref Range    POC Glucose, Blood 266 (H) 65 - 99 mg/dL         ASSESSMENT:  Patient is a 41 y.o. female admitted with left-sided weakness, found to have recurrent melanoma with brain metastasis, now status post craniotomy for tumor removal on 10/6 with   Pebbles Tejada MD    [facility] Code / Diagnosis to Support: 0017.2 - Medically Complex: Neoplasms    Rehabilitation: Impaired ADLs and mobility  Patient is a good candidate for inpatient rehab based on needs for PT, OT.  Patient will also benefit from family training.  Patient has a good discharge situation which will be home with parents.     Barriers to transfer include: Insurance authorization, TCCs to verify disposition, medical clearance and bed availability     All cases are subject to administrative review and recommendations may change    Disposition recommendations:  -Good candidate for IPR.  Patient has deficits with mobility and ADLs secondary to her brain mets.  Currently awaiting BRAF mutation evaluation, which will then be followed by stereotactic radiosurgery, and systemic therapy for 2 weeks.  -TCC to submit to [facility] insurance for prior authorization  -[facility] rehab will need to know the exact time and location of radiation treatments to ensure compatibility with rehab.  -PMR to follow in the periphery for rehab appropriateness, please reach out with questions or request for medical management    Medical Complexity:    Metastatic melanoma to brain  -Status post tumor debulking with Dr. Tejada on 10/6  -Planning for 3 treatments of radiation and systemic therapy  -Awaiting BRAF testing  -Keppra 1000 mg twice daily, Trileptal 300 mg twice daily  -Decadron 4 mg twice daily  -Functionally, she has left hemiparesis, with her left leg being the most affected.  -Continue PT and OT while in-house  - [facility]    Neurogenic bladder  -Graf catheter management  -Starting Flomax    Neurogenic bowels  -Severe constipation  -Fleets enema ordered today    Spasticity  -Strong tone at left ankle  -PRAFO boot to LLE while in bed  -Starting baclofen 10 mg 3 times daily  -Patient is a candidate for Botox as an outpatient    Neuropathic pain  -Cymbalta 60 mg daily  -Gabapentin 800 mg 4 times daily    Tobacco  abuse  - Tobacco abuse cessation counseling given today for ~5 min as it pertains to vascular disease, heart attack, stroke, wound healing, and pulmonary disease.     DVT PPX: Lovenox 40 mg injection daily      Thank you for allowing us to participate in the care of this patient.     Patient was seen for 85 minutes on unit/floor of which > 50% of time was spent on counseling and coordination of care regarding the above, including prognosis, risk reduction, benefits of treatment, and options for next stage of care.    Mumtaz Crandall, DO   Physical Medicine and Rehabilitation     Please note that this dictation was created using voice recognition software. I have made every reasonable attempt to correct obvious errors, but there may be errors of grammar and possibly content that I did not discover before finalizing the note.

## 2022-10-13 NOTE — CARE PLAN
The patient is Watcher - Medium risk of patient condition declining or worsening    Shift Goals  Clinical Goals: bowel movement, safety, pain control  Patient Goals: bowel movement/ rest  Family Goals: N/A    Progress made toward(s) clinical / shift goals:  Patient is being medicated for pain per MAR. Bed alarm is on and sounding. Bowel regimen in place.     Patient is not progressing towards the following goals: N/A     Patient is AxO x4 and understands plan of care, all questions answered at this time. Personal belongings and call light are within reach, pt calls appropriately for nursing needs, frequent rounding in place.

## 2022-10-13 NOTE — CARE PLAN
"The patient is Stable - Low risk of patient condition declining or worsening    Shift Goals  Clinical Goals: bowel movement, safety  Patient Goals: bowel movement  Family Goals: N/A    Progress made toward(s) clinical / shift goals:    Problem: Fall Risk  Goal: Patient will remain free from falls  Outcome: Progressing  Note: Pt safe and free from injury throughout shift. PT/OT worked with pt today. Strip bed alarm on.      Problem: Skin Integrity  Goal: Skin integrity is maintained or improved  Outcome: Progressing       Patient is not progressing towards the following goals:    Pt without BM today, but feels \"something is happening\". Pt family brought prunes and apricots. Pt with good fluid intake.     Problem: Pain - Standard  Goal: Alleviation of pain or a reduction in pain to the patient’s comfort goal  Outcome: Not Progressing  Note: Pt still requiring IV dilaudid for pain control.      "

## 2022-10-13 NOTE — PROGRESS NOTES
Pt currently inpatient.  Radiation plan for postoperative SRS approximately 2 weeks after her resection is completed per Dr August's notes and discussion.  Continue to monitor for outpatient cancer nurse navigation needs.

## 2022-10-13 NOTE — THERAPY
Physical Therapy   Daily Treatment     Patient Name: Jacque Mcintyre  Age:  41 y.o., Sex:  female  Medical Record #: [id number]  Today's Date: 10/12/2022     Precautions  Precautions: Fall Risk  Comments: L weaknesss/functional left inattention, aware of left side at rest    Assessment    Pt with improving attention to left; high motivation; pre standing calf stretch/heat with good result during weight bearing; noted left hip extension limiting standing efforts; able to stand several times with FWW and cues for midline holds, equal weight bearing, able to take about 20% weight through right LE with attempts at standing; highly eager and motivated; recommend acute rehab when medically ready for dc; would also recommend psychotherapy consult as pt tearful with loss of function and 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    DC Equipment Recommendations: Unable to determine at this time  Discharge Recommendations: Recommend acute rehab      Abridged Subjective/Objective     10/12/22 1520   Cognition    Cognition / Consciousness X   Level of Consciousness Alert   Ability To Follow Commands 2 Step   Comments pleasant and cooperative; proprioception appears to impact where her left hand is but visually tracking equally throughout fields and attends to left side during function, likely more aware of need now;   Passive ROM Lower Body   Passive ROM Lower Body X   Comments had been in prafo boot all day prior, no noted clonus/motor tone today, but found in foot inversion, reports this was here prior to sx   Strength Lower Body   Lower Body Strength  X   Lt Hip Flexion Strength 1 (T)   Comments needing repetition of PNF extension of the hip, only can recruitment in middle 40 deg of ROM x 10 reps   Sensation Lower Body   Lower Extremity Sensation   X   Comments prioception impaired at majority   Supine Lower Body Exercise   Supine Lower Body Exercises Yes   Gluteal  Isometrics 1 set of 10   Quadriceps Isometrics 1 set of 10;Bilateral   Balance   Sitting Balance (Static) Fair -   Sitting Balance (Dynamic) Poor +   Standing Balance (Static) Poor -   Standing Balance (Dynamic) Trace +   Weight Shift Sitting Poor   Weight Shift Standing Poor   Skilled Intervention Tactile Cuing;Verbal Cuing;Sequencing;Postural Facilitation   Comments B UE support in sitting/standing; holding onto left railing and can hold balance with cues for midline, heavy slump left trunk initially able to improve overtime   Gait Analysis   Gait Level Of Assist Unable to Participate   Bed Mobility    Supine to Sit Minimal Assist  (heavy use of left railing)   Sit to Supine Moderate Assist   Functional Mobility   Sit to Stand Moderate Assist  (to max with FWW ; x 3 reps with focus on left foot flat and midline holds)   Bed, Chair, Wheelchair Transfer   (fatigued post and asking for rest; was up in chair yesterday)   Short Term Goals    Short Term Goal # 1 Pt will perform supine <> sit with min A within 6 visits to progress bed mobility   Goal Outcome # 1 goal not met   Short Term Goal # 2 Pt will perform STS with FWW and min A within 6 visits to progress OOB mobility   Goal Outcome # 2 Goal not met   Short Term Goal # 3 Pt will perform functional transfers with FWW and min A within 6 visits to progress functional mobility   Goal Outcome # 3 Goal not met

## 2022-10-14 ENCOUNTER — PATIENT OUTREACH (OUTPATIENT)
Dept: ONCOLOGY | Facility: MEDICAL CENTER | Age: 42
End: 2022-10-14
Payer: COMMERCIAL

## 2022-10-14 LAB
GLUCOSE BLD STRIP.AUTO-MCNC: 172 MG/DL (ref 65–99)
GLUCOSE BLD STRIP.AUTO-MCNC: 177 MG/DL (ref 65–99)
GLUCOSE BLD STRIP.AUTO-MCNC: 245 MG/DL (ref 65–99)
GLUCOSE BLD STRIP.AUTO-MCNC: 256 MG/DL (ref 65–99)

## 2022-10-14 PROCEDURE — 700101 HCHG RX REV CODE 250: Performed by: PHYSICAL MEDICINE & REHABILITATION

## 2022-10-14 PROCEDURE — 700102 HCHG RX REV CODE 250 W/ 637 OVERRIDE(OP): Performed by: NURSE PRACTITIONER

## 2022-10-14 PROCEDURE — 82962 GLUCOSE BLOOD TEST: CPT | Mod: 91

## 2022-10-14 PROCEDURE — A9270 NON-COVERED ITEM OR SERVICE: HCPCS | Performed by: NURSE PRACTITIONER

## 2022-10-14 PROCEDURE — A9270 NON-COVERED ITEM OR SERVICE: HCPCS | Performed by: HOSPITALIST

## 2022-10-14 PROCEDURE — 97116 GAIT TRAINING THERAPY: CPT

## 2022-10-14 PROCEDURE — 770004 HCHG ROOM/CARE - ONCOLOGY PRIVATE *

## 2022-10-14 PROCEDURE — 700102 HCHG RX REV CODE 250 W/ 637 OVERRIDE(OP): Performed by: HOSPITALIST

## 2022-10-14 PROCEDURE — 97110 THERAPEUTIC EXERCISES: CPT

## 2022-10-14 PROCEDURE — A9270 NON-COVERED ITEM OR SERVICE: HCPCS | Performed by: PHYSICAL MEDICINE & REHABILITATION

## 2022-10-14 PROCEDURE — 700102 HCHG RX REV CODE 250 W/ 637 OVERRIDE(OP): Performed by: STUDENT IN AN ORGANIZED HEALTH CARE EDUCATION/TRAINING PROGRAM

## 2022-10-14 PROCEDURE — 99232 SBSQ HOSP IP/OBS MODERATE 35: CPT | Performed by: INTERNAL MEDICINE

## 2022-10-14 PROCEDURE — 700102 HCHG RX REV CODE 250 W/ 637 OVERRIDE(OP): Performed by: INTERNAL MEDICINE

## 2022-10-14 PROCEDURE — A9270 NON-COVERED ITEM OR SERVICE: HCPCS | Performed by: STUDENT IN AN ORGANIZED HEALTH CARE EDUCATION/TRAINING PROGRAM

## 2022-10-14 PROCEDURE — 700111 HCHG RX REV CODE 636 W/ 250 OVERRIDE (IP): Performed by: INTERNAL MEDICINE

## 2022-10-14 PROCEDURE — 700102 HCHG RX REV CODE 250 W/ 637 OVERRIDE(OP): Performed by: PHYSICAL MEDICINE & REHABILITATION

## 2022-10-14 PROCEDURE — A9270 NON-COVERED ITEM OR SERVICE: HCPCS | Performed by: INTERNAL MEDICINE

## 2022-10-14 RX ORDER — INSULIN LISPRO 100 [IU]/ML
3-14 INJECTION, SOLUTION INTRAVENOUS; SUBCUTANEOUS
Status: CANCELLED | OUTPATIENT
Start: 2022-10-14

## 2022-10-14 RX ORDER — QUETIAPINE FUMARATE 100 MG/1
200 TABLET, FILM COATED ORAL EVERY EVENING
Status: CANCELLED | OUTPATIENT
Start: 2022-10-14

## 2022-10-14 RX ORDER — OXYCODONE HCL 10 MG/1
10 TABLET, FILM COATED, EXTENDED RELEASE ORAL EVERY 12 HOURS
Status: CANCELLED | OUTPATIENT
Start: 2022-10-14

## 2022-10-14 RX ORDER — DULOXETIN HYDROCHLORIDE 20 MG/1
60 CAPSULE, DELAYED RELEASE ORAL EVERY EVENING
Status: CANCELLED | OUTPATIENT
Start: 2022-10-14

## 2022-10-14 RX ORDER — LEVETIRACETAM 500 MG/1
1000 TABLET ORAL 2 TIMES DAILY
Status: CANCELLED | OUTPATIENT
Start: 2022-10-14 | End: 2023-01-05

## 2022-10-14 RX ORDER — ENOXAPARIN SODIUM 100 MG/ML
40 INJECTION SUBCUTANEOUS DAILY
Status: CANCELLED | OUTPATIENT
Start: 2022-10-14

## 2022-10-14 RX ORDER — ATORVASTATIN CALCIUM 40 MG/1
40 TABLET, FILM COATED ORAL EVERY EVENING
Status: CANCELLED | OUTPATIENT
Start: 2022-10-14

## 2022-10-14 RX ORDER — TAMSULOSIN HYDROCHLORIDE 0.4 MG/1
0.4 CAPSULE ORAL
Status: CANCELLED | OUTPATIENT
Start: 2022-10-15

## 2022-10-14 RX ORDER — GABAPENTIN 400 MG/1
800 CAPSULE ORAL 4 TIMES DAILY
Status: CANCELLED | OUTPATIENT
Start: 2022-10-14

## 2022-10-14 RX ORDER — BACLOFEN 10 MG/1
10 TABLET ORAL 3 TIMES DAILY
Status: CANCELLED | OUTPATIENT
Start: 2022-10-14

## 2022-10-14 RX ORDER — OXCARBAZEPINE 300 MG/1
300 TABLET, FILM COATED ORAL 2 TIMES DAILY
Status: CANCELLED | OUTPATIENT
Start: 2022-10-14

## 2022-10-14 RX ORDER — DEXAMETHASONE 4 MG/1
4 TABLET ORAL EVERY 12 HOURS
Status: CANCELLED | OUTPATIENT
Start: 2022-10-14

## 2022-10-14 RX ORDER — ALPRAZOLAM 0.25 MG/1
0.25 TABLET ORAL 4 TIMES DAILY PRN
Status: CANCELLED | OUTPATIENT
Start: 2022-10-14

## 2022-10-14 RX ORDER — OXYCODONE HCL 10 MG/1
10 TABLET, FILM COATED, EXTENDED RELEASE ORAL EVERY 12 HOURS
Status: DISCONTINUED | OUTPATIENT
Start: 2022-10-14 | End: 2022-10-15 | Stop reason: HOSPADM

## 2022-10-14 RX ADMIN — GABAPENTIN 800 MG: 400 CAPSULE ORAL at 09:53

## 2022-10-14 RX ADMIN — OXYCODONE HYDROCHLORIDE 10 MG: 10 TABLET, FILM COATED, EXTENDED RELEASE ORAL at 18:53

## 2022-10-14 RX ADMIN — INSULIN LISPRO 7 UNITS: 100 INJECTION, SOLUTION INTRAVENOUS; SUBCUTANEOUS at 20:55

## 2022-10-14 RX ADMIN — INSULIN LISPRO 4 UNITS: 100 INJECTION, SOLUTION INTRAVENOUS; SUBCUTANEOUS at 12:46

## 2022-10-14 RX ADMIN — INSULIN LISPRO 3 UNITS: 100 INJECTION, SOLUTION INTRAVENOUS; SUBCUTANEOUS at 09:47

## 2022-10-14 RX ADMIN — OXCARBAZEPINE 300 MG: 300 TABLET, FILM COATED ORAL at 09:50

## 2022-10-14 RX ADMIN — OXYCODONE HYDROCHLORIDE 10 MG: 10 TABLET, FILM COATED, EXTENDED RELEASE ORAL at 09:50

## 2022-10-14 RX ADMIN — OXYCODONE HYDROCHLORIDE 10 MG: 10 TABLET ORAL at 00:56

## 2022-10-14 RX ADMIN — SODIUM PHOSPHATE 133 ML: 7; 19 ENEMA RECTAL at 12:47

## 2022-10-14 RX ADMIN — POLYETHYLENE GLYCOL 3350 1 PACKET: 17 POWDER, FOR SOLUTION ORAL at 05:39

## 2022-10-14 RX ADMIN — BACLOFEN 10 MG: 10 TABLET ORAL at 17:10

## 2022-10-14 RX ADMIN — DEXAMETHASONE 4 MG: 4 TABLET ORAL at 17:09

## 2022-10-14 RX ADMIN — LEVETIRACETAM 1000 MG: 500 TABLET, FILM COATED ORAL at 05:38

## 2022-10-14 RX ADMIN — ATORVASTATIN CALCIUM 40 MG: 40 TABLET, FILM COATED ORAL at 17:10

## 2022-10-14 RX ADMIN — FAMOTIDINE 20 MG: 20 TABLET, FILM COATED ORAL at 17:10

## 2022-10-14 RX ADMIN — GABAPENTIN 800 MG: 400 CAPSULE ORAL at 18:53

## 2022-10-14 RX ADMIN — GABAPENTIN 800 MG: 400 CAPSULE ORAL at 14:40

## 2022-10-14 RX ADMIN — OXCARBAZEPINE 300 MG: 300 TABLET, FILM COATED ORAL at 20:49

## 2022-10-14 RX ADMIN — OXYCODONE HYDROCHLORIDE 10 MG: 10 TABLET ORAL at 05:44

## 2022-10-14 RX ADMIN — INSULIN LISPRO 3 UNITS: 100 INJECTION, SOLUTION INTRAVENOUS; SUBCUTANEOUS at 17:12

## 2022-10-14 RX ADMIN — DULOXETINE 60 MG: 20 CAPSULE, DELAYED RELEASE ORAL at 17:09

## 2022-10-14 RX ADMIN — SENNOSIDES AND DOCUSATE SODIUM 2 TABLET: 50; 8.6 TABLET ORAL at 17:09

## 2022-10-14 RX ADMIN — SENNOSIDES AND DOCUSATE SODIUM 2 TABLET: 50; 8.6 TABLET ORAL at 05:39

## 2022-10-14 RX ADMIN — DEXAMETHASONE 4 MG: 4 TABLET ORAL at 05:39

## 2022-10-14 RX ADMIN — QUETIAPINE FUMARATE 200 MG: 100 TABLET ORAL at 20:49

## 2022-10-14 RX ADMIN — BACLOFEN 10 MG: 10 TABLET ORAL at 12:44

## 2022-10-14 RX ADMIN — ENOXAPARIN SODIUM 40 MG: 40 INJECTION SUBCUTANEOUS at 17:11

## 2022-10-14 RX ADMIN — OXYCODONE HYDROCHLORIDE 10 MG: 10 TABLET ORAL at 18:53

## 2022-10-14 RX ADMIN — FAMOTIDINE 20 MG: 20 TABLET, FILM COATED ORAL at 05:39

## 2022-10-14 RX ADMIN — LEVETIRACETAM 1000 MG: 500 TABLET, FILM COATED ORAL at 17:09

## 2022-10-14 RX ADMIN — OXYCODONE HYDROCHLORIDE 10 MG: 10 TABLET ORAL at 23:04

## 2022-10-14 RX ADMIN — GABAPENTIN 800 MG: 400 CAPSULE ORAL at 20:49

## 2022-10-14 RX ADMIN — TAMSULOSIN HYDROCHLORIDE 0.4 MG: 0.4 CAPSULE ORAL at 09:50

## 2022-10-14 RX ADMIN — BACLOFEN 10 MG: 10 TABLET ORAL at 05:38

## 2022-10-14 ASSESSMENT — COGNITIVE AND FUNCTIONAL STATUS - GENERAL
MOVING TO AND FROM BED TO CHAIR: A LOT
MOVING FROM LYING ON BACK TO SITTING ON SIDE OF FLAT BED: A LOT
MOBILITY SCORE: 14
TURNING FROM BACK TO SIDE WHILE IN FLAT BAD: A LOT
SUGGESTED CMS G CODE MODIFIER MOBILITY: CL
CLIMB 3 TO 5 STEPS WITH RAILING: A LOT
WALKING IN HOSPITAL ROOM: A LITTLE
STANDING UP FROM CHAIR USING ARMS: A LITTLE

## 2022-10-14 ASSESSMENT — ENCOUNTER SYMPTOMS
HEADACHES: 1
PSYCHIATRIC NEGATIVE: 1
MUSCULOSKELETAL NEGATIVE: 1
CARDIOVASCULAR NEGATIVE: 1
FOCAL WEAKNESS: 1
EYES NEGATIVE: 1
RESPIRATORY NEGATIVE: 1
CONSTIPATION: 1

## 2022-10-14 ASSESSMENT — GAIT ASSESSMENTS
DISTANCE (FEET): 25
DEVIATION: DECREASED HEEL STRIKE;DECREASED TOE OFF;STEP TO
GAIT LEVEL OF ASSIST: MINIMAL ASSIST
ASSISTIVE DEVICE: FRONT WHEEL WALKER

## 2022-10-14 ASSESSMENT — PAIN DESCRIPTION - PAIN TYPE
TYPE: ACUTE PAIN

## 2022-10-14 NOTE — DISCHARGE PLANNING
Insurance has authorized.  Jacque is medically cleared per [facility].  Spoke with Guillermina, mother regarding [facility] and D/C resources/support.  She is hopeful for an admission.  Jacque will live with her Parents in a 1 LV home with 2ST to enter.  Parents have both recently retired and will provide 24/7 sup/assistance.  They are healthy and in good shape.  Guillermina is looking forward to family training.  Estimated LOS with [facility] is about 2 weeks.  Notes reflect radiation will start 2 weeks post op (10-16), therefore not enough time for Rehab.  Msg placed to Dr. Emma Watters to see if starting radiation can be on 10-28 or later to allow time for Rehab.      1234-Spoke with Jacque regarding [facility] and verified D/C plan.  She is agreeable with an admission.  She wishes to speak with her Oncologist regarding postponing radiation to get his opinion.      1237-Per Dr. Emma Watters: Dr. August is ok waiting until 10-28 before XRT.  He would like to get her mapped before discharge.  He is also requesting Dr. Cliff Reed notified for med onc opinion.  Msg placed to Dr. Cliff Reed.  Dr. Ernesto Vera will look into mapping today.     1255-I have a placed a msg to Megan to look into mapping availability.  Per Dr. Cliff Reed: That will delay her systemic therapy.  That is his concern.    1313-Per Dr. Vera: Dr. August said she can go to rehab and will work mapping out later.  Spoke with Cate with Radiation.  She states that Dr. August said it is too early to do the mapping any sooner than 2 weeks post op.  Awaiting Onc to speak with Jacque regarding decision.     1503-Spoke with Erna Mullins, RN [facility] Navigator.  She spoke with Dr. Reed and all are in agreement with no TX until after D/C from Rehab.  I spoke with Jacque and she is agreeable as well.  Dr. Patel has accepted.  [facility], is looking into transport.

## 2022-10-14 NOTE — PROGRESS NOTES
Information Dr. Carbajal wants you to know:    Your opinion matters! Thank you for choosing Dr.Colleen Carbajal, ENT, at [facility].     In the next few weeks you may receive a patient satisfaction survey about your most recent clinic visit with us. Please take some time to evaluate your visit. We strive to provide you with the best medical care and hope you were happy with our services. Your input will help us better meet your health care needs in the future. Again, thank you for your feedback and we look forward to your next visit.     Medication prescribed at today’s visit  Will be sent to your pharmacy electronically. Please allow 1-2 hours for your pharmacy to get the medication ready for you.    Medication Requests:  If you need a refill on your prescription please call your pharmacy and let them know. Please be proactive and call before your medication runs out. The pharmacy will then contact us for the refill. Please allow 24-48 hours for the refill to be processed.     Imaging or Referral orders:   You should be contacted by [facility] within 5-7 business days to schedule these appointments/tests. If you have not been contacted by that time please call [facility] Scheduling department at [phone]. Or my office if you have further questions.    Test results:  If your physician has ordered additional laboratory or radiology testing as part of your ongoing plan of care, please allow 5-7 business days from the day of your lab draw or test completion for the results to be sent and reviewed by your provider. If your results are critical and require more immediate intervention, you will be contacted sooner. Your results will be conveyed to you via a phone call or letter.    [employer] Registration:  If you are not registered for a [employer] account, please ask your  to send you the link via e-mail or you can call [phone] and receive registration information.    Clinic hours for   [facility] Medicine Daily Progress Note    Date of Service  10/13/2022    Chief Complaint  Jacque Mcintyre is a 41 y.o. female admitted 10/1/2022 with left-sided weakness.  She was diagnosed with melanoma in 2016, and underwent immunotherapy for 3 years.  She had an abnormal PET scan earlier this year, biopsy was reportedly negative.  She has type 2 diabetes and depression.    Hospital Course  CT head from 10/1/2022 showed 3 masses in the right cerebral hemisphere with extensive vasogenic edema, localized midline shift to the left side frontoparietal level, 2 small masses in the right frontal lobe with increased density which could indicate hemorrhage. Neurosurgery were consulted. MRI brain from 10/2/2022 showed right-sided predominant, supratentorial metastatic disease with associated edema, localized midline shift, evidence of prior hemorrhage within the masses.  CT chest, abdomen, pelvis from 10/1/2022 showed bilateral pulmonary nodules, enlarged left hilar lymph node, enlarged left external iliac lymph node, 9 mm right lower quadrant peritoneal nodule, 7 mm left and left upper quadrant subcutaneous soft tissue nodules.  Decadron and Keppra started.  Medical oncology and radiation oncology were consulted.     Per medical oncology, will need to confirm diagnosis of melanoma and BRAF testing.  If BRAF positive, she will be candidate for BRAF/MAC inhibitor, otherwise she will need dual immunotherapy.  Recommended outpatient follow-up.    Patient was evaluated by neurosurgery on 10/4/2022, and underwent craniotomy for resection of 3 right frontal tumors on 10/7/2022.    Postprocedure MRI from 10/7/2022 showed significant reduction in extent of contrast enhancement of the previously seen metastatic lesions.  Hemorrhagic products were noted within the lesions.  No new lesions were noted. Patient was transferred to the medical floor on 10/8/2022.      Interval Problem Update  Patient was seen and examined at bedside.  I  Plein:  Monday 8:30 am - 4:30 pm KIM [place]  Tuesday 7:30 am - 4:30 pm KARL Mcbride  Wednesday In Surgery (no clinic hours)   Thursday 8:30 am - 5:30 pm KIM Paniagua  Friday 8:30 am - 4:30 pm KIM Paniagua     [facility]      [address]               [address]  [place] [address]                    [place] [address]  Phone [phone]              Phone [phone]         have personally reviewed and interpreted vitals, labs, and imaging.    10/11.  Afebrile.  Intermittent hypotension, bradycardia.  On 0-1 L nasal cannula.  Denies fever, chills, chest pains, shortness of breath.  Reports constipation but is passing gas.  Headache is improved.  10/12.  Afebrile.  Has been hypotensive, bradycardic.  On room air.  Patient is not acidotic.  Maps greater than 65.  A.m. cortisol on lower range likely secondary to adrenal suppression from dexamethasone.  Taper Decadron.  Denies fever, chills, chest pain, shortness of breath.  Complains of persistent headache.  Denies dizziness, lightheadedness.  She is constipated but is passing gas.  Physiatry referral.    10/13.  Afebrile.  Has been hypotensive.  On room air.  Denies fever, chills, chest pain, shortness of breath.  Headache is improved.  Still no bowel movements but is passing gas.  Participated PT and left arm strength is improving.  Patient is a good candidate for inpatient rehab.    I have discussed this patient's plan of care and discharge plan at IDT rounds today with Case Management, Nursing, Nursing leadership, and other members of the IDT team.    Consultants/Specialty  neurosurgery and oncology, radiation oncology    Code Status  Full Code    Disposition  Patient is not medically cleared for discharge.   Anticipate discharge to to skilled nursing facility.  I have placed the appropriate orders for post-discharge needs.    Review of Systems  Review of Systems   Constitutional:  Positive for malaise/fatigue.   HENT: Negative.     Eyes: Negative.    Respiratory: Negative.     Cardiovascular: Negative.    Gastrointestinal:  Positive for constipation.   Genitourinary:         Retention   Musculoskeletal: Negative.    Skin: Negative.    Neurological:  Positive for focal weakness and headaches.        Left sided weakness 3/5   Psychiatric/Behavioral: Negative.        Physical Exam  Temp:  [36.7 °C (98 °F)-36.9 °C (98.4 °F)] 36.7 °C (98  °F)  Pulse:  [62-79] 65  Resp:  [16-18] 18  BP: ()/(69-75) 101/71  SpO2:  [93 %-97 %] 97 %    Physical Exam  Constitutional:       Appearance: She is ill-appearing.   Eyes:      Extraocular Movements: Extraocular movements intact.      Conjunctiva/sclera: Conjunctivae normal.   Cardiovascular:      Rate and Rhythm: Bradycardia present.   Pulmonary:      Effort: Pulmonary effort is normal.   Abdominal:      Palpations: Abdomen is soft.   Musculoskeletal:      Right lower leg: No edema.      Left lower leg: No edema.   Skin:     General: Skin is warm.   Neurological:      Comments: Left upper and lower extremity weakness   Psychiatric:         Mood and Affect: Mood normal.       Fluids    Intake/Output Summary (Last 24 hours) at 10/13/2022 1718  Last data filed at 10/13/2022 0418  Gross per 24 hour   Intake --   Output 650 ml   Net -650 ml         Laboratory  Recent Labs     10/11/22  0120 10/12/22  0135 10/13/22  0424   WBC 9.8 11.2* 13.1*   RBC 3.97* 3.93* 4.22   HEMOGLOBIN 9.8* 9.7* 10.3*   HEMATOCRIT 32.6* 31.6* 34.1*   MCV 82.1 80.4* 80.8*   MCH 24.7* 24.7* 24.4*   MCHC 30.1* 30.7* 30.2*   RDW 48.5 46.5 46.9   PLATELETCT 509* 555* 551*   MPV 10.2 10.1 10.1       Recent Labs     10/11/22  0120 10/12/22  0135 10/13/22  0424   SODIUM 138 136 139   POTASSIUM 4.1 3.8 4.0   CHLORIDE 99 98 100   CO2 29 28 26   GLUCOSE 218* 174* 224*   BUN 18 16 15   CREATININE 0.42* 0.47* 0.45*   CALCIUM 8.9 8.9 9.0                       Imaging  MR-BRAIN-WITH & W/O   Final Result      When compared with the previous MRI dated 10/2/2022, there has been significant interval reduction in the extent of contrast enhancement of the the previously seen metastatic lesions. Hemorrhagic products are noted within these lesions. There is diffuse    white matter edema is noted surrounding these lesions. There are no new lesions. These findings likely represents true tumor response or pseudo response secondary to the chemotherapy.       [id number] VIEW   Final Result      1. Diffuse colonic stool could represent constipation.   2.  No dilated loops of bowel or free air.      CT-HEAD W/O   Final Result      1.  Small amount of extra-axial and intracavitary RIGHT frontal applied following resection of RIGHT frontal masses   2.  No evidence of significant mass effect, large territorial infarction or new mass   3.  Residual tumor not excluded by this exam.         MR-BRAIN-WITH & W/O   Final Result      1.  Right-sided predominant, supratentorial metastatic disease with the largest mass in the right frontoparietal region measuring 3.4 x 3.0 cm as described in detail above.   2.  Associated edema with regional associated cortical effacement and partial effacement of the right lateral ventricle. Minimal localized midline shift adjacent to the large right frontoparietal mass.   3.  Evidence of prior hemorrhage within these masses.   4.  Small focus of restricted diffusion in the right occipital bone could represent osseous metastatic disease.      CT-CHEST,ABDOMEN,PELVIS WITH   Final Result      1.  BILATERAL pulmonary nodules, the largest of which measures 12 mm in the LEFT lower lobe and which has enlarged since May of this year.   2.  Enlarged LEFT hilar lymph node   3.  Enlarged LEFT external iliac lymph node. This would be amenable to image guided tissue sampling if clinically appropriate.   4.  7 mm LEFT and LEFT upper quadrant subcutaneous soft tissue nodules, new since prior   5.  9 mm RIGHT lower quadrant peritoneal nodule, new since prior   6.  33 mm LEFT ovarian cyst   7.  Thyroid nodules measuring up to 23 mm   8.  BILATERAL L5 pars defects with grade 1 anterolisthesis of L5 on S1   9.  Incompletely assessed LEFT renal lesion, new since prior and most likely a cyst though attention on top is recommended.      DX-CHEST-PORTABLE (1 VIEW)   Final Result         1.  Possible pulmonary nodule within each lung. Metastasis is a possibility.       2.  No infiltrates or consolidations identified.      CT-HEAD W/O   Final Result   Addendum (preliminary) 1 of 1   These findings were discussed with JESENIA HERNANDEZ on 10/01/2022.      Final         1.  3 masses in the right cerebral hemisphere largest measuring 3.2 cm with extensive vasogenic edema as described above. Findings are likely due to metastases.      2.  Localized midline shift to the left side frontoparietal level measuring 3 mm.      3.  The 2 smaller masses in the right frontal lobe demonstrates some increased density which could indicate that hemorrhage may be present within these masses.      These findings were discussed with [place]jam[place] nurse for dr hernandez on 10/01/2022.                Assessment/Plan  * Malignant melanoma metastatic to brain (HCC)- (present on admission)  Assessment & Plan  Initially diagnosed in 2016, underwent immunotherapy and lymph node dissection.  Now with widespread metastatic disease.  Underwent right frontal craniotomy of 3 frontal tumors on 10/7/2022.  Patient is to start radiation therapy about 2 weeks after surgery.  Patient follows Dr. Reed as an outpatient, chemotherapy/immunotherapy will depend on final pathology report.  Continue Decadron and Keppra.    Adrenal insufficiency due to corticosteroid withdrawal (HCC)  Assessment & Plan  Patient has been hypotensive, bradycardic  Low a.m. cortisol  Taper dexamethasone    Constipation  Assessment & Plan  Constipation noted on abdominal x-ray.  Continue as needed laxatives    DM2 (diabetes mellitus, type 2) (HCC)- (present on admission)  Assessment & Plan  On steroids.  Continue SSI with Accu-Cheks and hypoglycemia protocol.  SSI scale was increased due to elevated blood glucose readings.  Diabetic diet.  Monitor closely    Depression- (present on admission)  Assessment & Plan  Continue Xanax and Cymbalta.         VTE prophylaxis: SCDs/TEDs Lovenox

## 2022-10-14 NOTE — THERAPY
Physical Therapy   Daily Treatment     Patient Name: Jacque Mcintyre  Age:  41 y.o., Sex:  female  Medical Record #: [id number]  Today's Date: 10/14/2022     Precautions: Fall Risk    Assessment    Pt with significant improvement in fxnl mob today. PRAFO donned upon therapist arrival. Pt demonstrates improved L ankle flexibility today, able to passively stretch L ankle -5 degrees of neutral. Worked on supine exercises include heel slides, hip ADD/IR, and SAQ. Improved bed mob negotiation and attention to L side. Pt was able to stand x5 reps with work towards anterior wt shifting due to occasional LOB posteriorly. Initiated gait trng x25ft with FWW and min A, facilitation provided for L knee extension during stance phase. Pt remains highly motivated, cont to recommend acute rehab, pt able to tolerate 3 hours/day.    Plan    Continue current treatment plan.    DC Equipment Recommendations: Unable to determine at this time  Discharge Recommendations: Recommend post-acute placement for additional physical therapy services prior to discharge home     Objective    Cognition    Cognition / Consciousness X   Level of Consciousness Alert   Ability To Follow Commands 2 Step   Sequencing Impaired   Comments improved L attetion, quicker to sequence L side   Passive ROM Lower Body   Passive ROM Lower Body X   Comments -5 degrees of L ankle DF, reports has been wearing boot all day and started baclofen last night   Active ROM Lower Body    Active ROM Lower Body  X   Comments improved AROM of L ankle   Strength Lower Body   Lt Hip Flexion Strength 2 (P)   Lt Knee Flexion Strength 2 (P)   Lt Knee Extension Strength 3+ (F+)   Lt Ankle Dorsiflexion Strength 2 (P)   Comments improving strength in LLE   Lower Body Muscle Tone   Comments tone normalizing throughout distal LLE   Supine Lower Body Exercise   Supine Lower Body Exercises Yes   Hip Adduction  Left;1 set of 10   Short Arc Quad Left;1 set of 10   Heel Slide 1 set of 10;Left    Home Exercise Program   Comments Provided supine HEP   Neuro-Muscular Treatments   Neuro-Muscular Treatments Anterior weight shift;Postural Changes;Postural Facilitation;Tactile Cuing;Verbal Cuing;Weight Shift Right;Weight Shift Left   Neurological Concerns   Standing Posture During ADL's Posterior Lean   Balance   Sitting Balance (Static) Fair   Sitting Balance (Dynamic) Fair -   Standing Balance (Static) Poor +   Standing Balance (Dynamic) Poor +   Weight Shift Sitting Fair   Weight Shift Standing Fair   Skilled Intervention Verbal Cuing;Sequencing;Postural Facilitation   Comments intermittent posterior LOB   Gait Analysis   Gait Level Of Assist Minimal Assist   Assistive Device Front Wheel Walker   Distance (Feet) 25   # of Times Distance was Traveled 1   Deviation Decreased Heel Strike;Decreased Toe Off;Step To  (decreased L knee extension during stance phase requiring facilitation)   Weight Bearing Status no restrictions   Skilled Intervention Verbal Cuing;Sequencing;Postural Facilitation   Bed Mobility    Supine to Sit Contact Guard Assist   Sit to Supine Minimal Assist  (for LE negotiation)   Scooting Standby Assist   Skilled Intervention Verbal Cuing;Sequencing;Postural Facilitation   Functional Mobility   Sit to Stand Minimal Assist   Bed, Chair, Wheelchair Transfer Minimal Assist   Mobility amb in room with FWW and min A   Skilled Intervention Verbal Cuing;Postural Facilitation;Sequencing   Short Term Goals    Short Term Goal # 1 Pt will perform supine <> sit with min A within 6 visits to progress bed mobility   Goal Outcome # 1 Progressing as expected   Short Term Goal # 2 Pt will perform STS with FWW and min A within 6 visits to progress OOB mobility   Goal Outcome # 2 Goal met, new goal added   Short Term Goal # 2 B  Pt will perform STS with FWW and SPV within 6 visits to increase ind with initiating transfers/gait.   Short Term Goal # 3 Pt will perform functional transfers with FWW and min A within 6  visits to progress functional mobility   Goal Outcome # 3 Goal not met   Short Term Goal # 4 Pt will amb >50ft with FWW and SPV within 6 visits to negotiate limited household distances for DC.

## 2022-10-14 NOTE — PROGRESS NOTES
Assisting with care coordination for cancer treament.  See inpatient chart note for details    ONN introduction letter and message sent via [employer].  Mailed letter and support services calendar.

## 2022-10-14 NOTE — DISCHARGE PLANNING
Transport set for 1200 noon tomorrow 10/15 via GMT, nurse report #[id number]. Notified care team and patient. TCC to remain available if further intervention is needed.

## 2022-10-14 NOTE — CARE PLAN
The patient is Stable - Low risk of patient condition declining or worsening    Shift Goals  Clinical Goals: bowel movement, pain control  Patient Goals: bowel movement, pain control  Family Goals: N/A    Progress made toward(s) clinical / shift goals:    Problem: Knowledge Deficit - Standard  Goal: Patient and family/care givers will demonstrate understanding of plan of care, disease process/condition, diagnostic tests and medications  Outcome: Progressing  Note: Pt understands POC.      Problem: Infection - Standard  Goal: Patient will remain free from infection  Outcome: Progressing  Note: No s/s of infection at this time. Wound care given to crainey site. Graf care given. Pt afebrile, VSS.        Patient is not progressing towards the following goals:      Problem: Bowel Elimination  Goal: Establish and maintain regular bowel function  Outcome: Not Progressing  Note: Pt without significant BM this shift. Suppository administered, patient had small BM.  Enema ordered and will be administered shortly.  Pt with visitors in room at this time.  Continue to monitor.

## 2022-10-14 NOTE — DISCHARGE PLANNING
Case Management Discharge Planning    Admission Date: 10/1/2022  GMLOS: 6.6  ALOS: 13    6-Clicks ADL Score: 15  6-Clicks Mobility Score: 6  PT and/or OT Eval ordered: Yes  Post-acute Referrals Ordered: Yes  Post-acute Choice Obtained: Yes  Has referral(s) been sent to post-acute provider:  Yes      Anticipated Discharge Dispo: Discharge Disposition: Disch to IP rehab facility or distinct part unit (62)  Discharge Address: [address]  [address]  [place] [address]  Discharge Contact Phone Number: [phone]    DME Needed: No    Action(s) Taken: OTHER    Escalations Completed: None    Medically Clear: Yes    Next Steps: Discussed patient in IDT rounds. Patient is medically cleared for IP Rehab. Reached out to Walter to discuss placement. Walter would like to know if chemo can be pushed back to 10/28 to give pt two weeks of rehab? Relayed question to MD - Awaiting determination.     [facility] patient [facility] Saturday [facility]/15 at noon. COBRA completed and signed by patient and MD. COBRA given to unit clerk at main nurses station. Updated bedside RN via voalte.      Barriers to Discharge: Pending Placement    Is the patient up for discharge tomorrow: No

## 2022-10-14 NOTE — PROGRESS NOTES
Received concern from radiation regarding patient going to rehab and still needing radiation mapping before starting treatment at end of month.  Call placed to [facility]  and discussed Dr August's goal of getting patient mapped on Oct. 27th to start treatment on 31st.  Would like to verify rehab can bring patient for mapping.  Was given information that Walter was closely working on this as well.  Spoke with Walter.  Let him know we currently do not have specific time, with tentative date of 27th for radiation mapping.  He said that they (rehab) is able to bring patient for mapping.  Goal would be to discharge prior to radiation treatment on 31st.  He is also waiting to hear back from Dr Reed regarding systemic therapy.    Call placed to Dr Reed.  He reported plans on combined chemo/immunotherapy, waiting for insurance authorization.  He would like to get started as soon as possible.  He understands patient would not be able to receive this treatment while in rehab.   Dr Reed did state he is ok with patient getting discharged to rehab.  Updated him on radiation plan as well.    Updated Walter.    Plan for patient to discharge to rehab start radiation treatment on 31st and systemic treatment after d/c will be arranged by Dr Reed.    Updated Dr August.  Since Friday is a holiday would like to do radiation mapping with MRI on Oct. 26th.     Nurse navigator will assist with coordination with rehab for this.

## 2022-10-14 NOTE — LETTER
[facility]   [address]  [place] [address]  Phone: [phone] - Fax: [phone]              [address]  [place] [address]     Date: 10/14/22      Dear Jacque,    On behalf of your entire care team at [facility], I wish you a speedy recovery as you heal from your recent surgery. I wanted to let you know about the services our entire care team at [facility] have to help you during your treatment.  As Cancer Nurse Navigator, I am a certified oncology nurse, and can address any needs you may have with education, guidance and support. I am available to you and your family through your treatment at [facility].       I am available to address your needs during your journey with the following services:     Care Coordination  I can assist you in facilitating communication between your cancer care treatment team to ensure timely treatment and follow-up.  I can also assist with transition of care back to your primary care provider, or other specialist, as needed.  My goal is to bridge gaps for you throughout the course of your active treatment.       Education Services  Understanding the recommended treatment course by your physician is key. I can provide educational resources personalized to your cancer diagnosis to help you understand your diagnosis and treatment. Please let me know if you would like to receive information about your diagnosis and treatment plan.  I am here to help.     Support Services/Resource Information  [facility] we offer a full scope of support services.  I can assist you with referral information to:  · Cancer Clinical Trials & Research  · Nutrition counseling  · Support groups  · Complementary Therapies such as Mind-Body Techniques Meditation  · Patient Financial Advocates  ·   · [facility], an [employer] affiliate office, our volunteers can assist you with accessing our [employer] library,  support services information, head coverings and comfort items  · Community and national resources, included eligibility based kaitlin assistance and pharmaceutical access programs, if you are in need of additional information.     [facility] offers services that include:  · Behavioral Health  · Genetic counseling & testing  · Acupuncture  · Lymphedema prevention/treatment program  · Palliative care services.          I hope you have an excellent patient experience.  Please feel free to share with me your comments regarding the care you have received- we value your feedback.    Sincerely,       Erna Mullins R.N.  Cancer Nurse Navigator    Office: [phone]  Main: [phone]   Email: [email or web address]

## 2022-10-14 NOTE — CARE PLAN
The patient is Watcher - Medium risk of patient condition declining or worsening    Shift Goals  Clinical Goals: bowel movement, pain control  Patient Goals: rest  Family Goals: N/A    Progress made toward(s) clinical / shift goals:  Patient is being medicated for pain per MAR. Bowel regimen is in place     Patient is not progressing towards the following goals:  Patient has not had bowel movement since 09/30    Problem: Bowel Elimination  Goal: Establish and maintain regular bowel function  Outcome: Not Progressing  Patient is AxO x4 and understands plan of care, all questions answered at this time. Personal belongings and call light are within reach, pt calls appropriately for nursing needs, frequent rounding in place.

## 2022-10-14 NOTE — PROGRESS NOTES
[facility] Medicine Daily Progress Note    Date of Service  10/14/2022    Chief Complaint  Jacque Mcintyre is a 41 y.o. female with a history of diabetes and depression who was admitted 10/1/2022 with left-sided weakness.  She was diagnosed with melanoma in 2016, and underwent immunotherapy for 3 years.  She had an abnormal PET scan earlier this year, biopsy was reportedly negative.  She has type 2 diabetes and depression.    Hospital Course  CT head from 10/1/2022 showed 3 masses in the right cerebral hemisphere with extensive vasogenic edema, localized midline shift to the left side frontoparietal level, 2 small masses in the right frontal lobe with increased density which could indicate hemorrhage. Neurosurgery were consulted. MRI brain from 10/2/2022 showed right-sided predominant, supratentorial metastatic disease with associated edema, localized midline shift, evidence of prior hemorrhage within the masses.  CT chest, abdomen, pelvis from 10/1/2022 showed bilateral pulmonary nodules, enlarged left hilar lymph node, enlarged left external iliac lymph node, 9 mm right lower quadrant peritoneal nodule, 7 mm left and left upper quadrant subcutaneous soft tissue nodules.  Decadron and Keppra started.      Patient was evaluated by neurosurgery on 10/4/2022.  Underwent craniotomy for resection of 3 right frontal tumors on 10/7/2022.  Pathology was consistent with metastatic melanoma.    Postprocedure MRI from 10/7/2022 showed significant reduction in extent of contrast enhancement of the previously seen metastatic lesions.  Hemorrhagic products were noted within the lesions.  No new lesions were noted. Patient was transferred to the medical floor on 10/8/2022.      Patient participated in physical therapy with significant improvement in left-sided weakness.  She was accepted to inpatient rehab with plan for intensive rehab until about 10/28.  After which patient will be starting palliative radiation with Dr. August.   They will try to work out simulation and mapping while she is in rehab    Per medical oncology.  Waiting on final pathology from craniotomy.  If BRAF positive, she will be candidate for BRAF/MAC inhibitor, otherwise she will need dual immunotherapy.  Recommended outpatient follow-up for systemic chemotherapy.    Interval Problem Update  Patient was seen and examined at bedside.  I have personally reviewed and interpreted vitals, labs, and imaging.    10/11.  Afebrile.  Intermittent hypotension, bradycardia.  On 0-1 L nasal cannula.  Denies fever, chills, chest pains, shortness of breath.  Reports constipation but is passing gas.  Headache is improved.  10/12.  Afebrile.  Has been hypotensive, bradycardic.  On room air.  Patient is not acidotic.  Maps greater than 65.  A.m. cortisol on lower range likely secondary to adrenal suppression from dexamethasone.  Taper Decadron.  Denies fever, chills, chest pain, shortness of breath.  Complains of persistent headache.  Denies dizziness, lightheadedness.  She is constipated but is passing gas.  Physiatry referral.    10/13.  Afebrile.  Has been hypotensive.  On room air.  Denies fever, chills, chest pain, shortness of breath.  Headache is improved.  Still no bowel movements but is passing gas.  Participated PT and left arm strength is improving.  Patient is a good candidate for inpatient rehab.  Afebrile.  Intermittent hypotension.  On room air.  Used 2 doses of IV dilaudid yesterday.  Start long acting oxy.  Denies fever, chills, chest pains or shortness of breath.  Headache is improved.  Patient is constipated and will be receiving enema.  Participating with PT.  Accepted to inpatient rehab with transport tomorrow.    I have discussed this patient's plan of care and discharge plan at IDT rounds today with Case Management, Nursing, Nursing leadership, and other members of the IDT team.    Consultants/Specialty  neurosurgery and oncology, radiation oncology    Code  Status  Full Code    Disposition  Patient is not medically cleared for discharge.   Anticipate discharge to to skilled nursing facility.  I have placed the appropriate orders for post-discharge needs.    Review of Systems  Review of Systems   Constitutional:  Positive for malaise/fatigue.   HENT: Negative.     Eyes: Negative.    Respiratory: Negative.     Cardiovascular: Negative.    Gastrointestinal:  Positive for constipation.   Genitourinary:         Retention   Musculoskeletal: Negative.    Skin: Negative.    Neurological:  Positive for focal weakness and headaches.        Left sided weakness 3/5   Psychiatric/Behavioral: Negative.        Physical Exam  Temp:  [36.7 °C (98 °F)-37.7 °C (99.9 °F)] 37.7 °C (99.9 °F)  Pulse:  [62-80] 80  Resp:  [18-20] 18  BP: ()/(62-71) 93/64  SpO2:  [97 %-98 %] 98 %    Physical Exam  Constitutional:       Appearance: She is ill-appearing.   Eyes:      Extraocular Movements: Extraocular movements intact.      Conjunctiva/sclera: Conjunctivae normal.   Cardiovascular:      Rate and Rhythm: Bradycardia present.   Pulmonary:      Effort: Pulmonary effort is normal.   Abdominal:      Palpations: Abdomen is soft.   Musculoskeletal:      Right lower leg: No edema.      Left lower leg: No edema.   Skin:     General: Skin is warm.   Neurological:      Comments: Left upper and lower extremity weakness   Psychiatric:         Mood and Affect: Mood normal.       Fluids    Intake/Output Summary (Last 24 hours) at 10/14/2022 0632  Last data filed at 10/14/2022 0549  Gross per 24 hour   Intake --   Output 800 ml   Net -800 ml         Laboratory  Recent Labs     10/12/22  0135 10/13/22  0424   WBC 11.2* 13.1*   RBC 3.93* 4.22   HEMOGLOBIN 9.7* 10.3*   HEMATOCRIT 31.6* 34.1*   MCV 80.4* 80.8*   MCH 24.7* 24.4*   MCHC 30.7* 30.2*   RDW 46.5 46.9   PLATELETCT 555* 551*   MPV 10.1 10.1       Recent Labs     10/12/22  0135 10/13/22  0424   SODIUM 136 139   POTASSIUM 3.8 4.0   CHLORIDE 98 100    CO2 28 26   GLUCOSE 174* 224*   BUN 16 15   CREATININE 0.47* 0.45*   CALCIUM 8.9 9.0                       Imaging  MR-BRAIN-WITH & W/O   Final Result      When compared with the previous MRI dated 10/2/2022, there has been significant interval reduction in the extent of contrast enhancement of the the previously seen metastatic lesions. Hemorrhagic products are noted within these lesions. There is diffuse    white matter edema is noted surrounding these lesions. There are no new lesions. These findings likely represents true tumor response or pseudo response secondary to the chemotherapy.      [id number] VIEW   Final Result      1. Diffuse colonic stool could represent constipation.   2.  No dilated loops of bowel or free air.      CT-HEAD W/O   Final Result      1.  Small amount of extra-axial and intracavitary RIGHT frontal applied following resection of RIGHT frontal masses   2.  No evidence of significant mass effect, large territorial infarction or new mass   3.  Residual tumor not excluded by this exam.         MR-BRAIN-WITH & W/O   Final Result      1.  Right-sided predominant, supratentorial metastatic disease with the largest mass in the right frontoparietal region measuring 3.4 x 3.0 cm as described in detail above.   2.  Associated edema with regional associated cortical effacement and partial effacement of the right lateral ventricle. Minimal localized midline shift adjacent to the large right frontoparietal mass.   3.  Evidence of prior hemorrhage within these masses.   4.  Small focus of restricted diffusion in the right occipital bone could represent osseous metastatic disease.      CT-CHEST,ABDOMEN,PELVIS WITH   Final Result      1.  BILATERAL pulmonary nodules, the largest of which measures 12 mm in the LEFT lower lobe and which has enlarged since May of this year.   2.  Enlarged LEFT hilar lymph node   3.  Enlarged LEFT external iliac lymph node. This would be amenable to image guided tissue  sampling if clinically appropriate.   4.  7 mm LEFT and LEFT upper quadrant subcutaneous soft tissue nodules, new since prior   5.  9 mm RIGHT lower quadrant peritoneal nodule, new since prior   6.  33 mm LEFT ovarian cyst   7.  Thyroid nodules measuring up to 23 mm   8.  BILATERAL L5 pars defects with grade 1 anterolisthesis of L5 on S1   9.  Incompletely assessed LEFT renal lesion, new since prior and most likely a cyst though attention on top is recommended.      DX-CHEST-PORTABLE (1 VIEW)   Final Result         1.  Possible pulmonary nodule within each lung. Metastasis is a possibility.      2.  No infiltrates or consolidations identified.      CT-HEAD W/O   Final Result   Addendum (preliminary) 1 of 1   These findings were discussed with JESENIA HERNANDEZ on 10/01/2022.      Final         1.  3 masses in the right cerebral hemisphere largest measuring 3.2 cm with extensive vasogenic edema as described above. Findings are likely due to metastases.      2.  Localized midline shift to the left side frontoparietal level measuring 3 mm.      3.  The 2 smaller masses in the right frontal lobe demonstrates some increased density which could indicate that hemorrhage may be present within these masses.      These findings were discussed with Benjam[place] nurse for dr hernandez on 10/01/2022.                Assessment/Plan  * Malignant melanoma metastatic to brain (HCC)- (present on admission)  Assessment & Plan  Initially diagnosed in 2016, underwent immunotherapy and lymph node dissection.  Now with widespread metastatic disease.  Underwent right frontal craniotomy of 3 frontal tumors on 10/7/2022.  Patient is to start radiation therapy about 2 weeks after surgery.  Patient follows Dr. Reed as an outpatient, chemotherapy/immunotherapy will depend on final pathology report.  Continue Decadron and Keppra.    Adrenal insufficiency due to corticosteroid withdrawal (HCC)  Assessment & Plan  Patient has been hypotensive, bradycardic  Low  a.m. cortisol  Taper dexamethasone    Constipation  Assessment & Plan  Constipation noted on abdominal x-ray.  Continue as needed laxatives    DM2 (diabetes mellitus, type 2) ([facility])- (present on admission)  Assessment & Plan  On steroids.  Continue SSI with Accu-Cheks and hypoglycemia protocol.  SSI scale was increased due to elevated blood glucose readings.  Diabetic diet.  Monitor closely    Depression- (present on admission)  Assessment & Plan  Continue Xanax and Cymbalta.         VTE prophylaxis: SCDs/TEDs Lovenox

## 2022-10-15 ENCOUNTER — HOSPITAL ENCOUNTER (INPATIENT)
Facility: REHABILITATION | Age: 42
LOS: 10 days | DRG: 055 | End: 2022-10-25
Attending: PHYSICAL MEDICINE & REHABILITATION | Admitting: PHYSICAL MEDICINE & REHABILITATION
Payer: COMMERCIAL

## 2022-10-15 VITALS
DIASTOLIC BLOOD PRESSURE: 66 MMHG | HEART RATE: 51 BPM | OXYGEN SATURATION: 96 % | RESPIRATION RATE: 17 BRPM | BODY MASS INDEX: 24.88 KG/M2 | SYSTOLIC BLOOD PRESSURE: 97 MMHG | HEIGHT: 64 IN | TEMPERATURE: 97.8 F | WEIGHT: 145.72 LBS

## 2022-10-15 DIAGNOSIS — C79.31 BRAIN METASTASES: ICD-10-CM

## 2022-10-15 DIAGNOSIS — G93.89 BRAIN MASS: ICD-10-CM

## 2022-10-15 DIAGNOSIS — C79.31 MALIGNANT MELANOMA METASTATIC TO BRAIN (HCC): ICD-10-CM

## 2022-10-15 DIAGNOSIS — F31.9 BIPOLAR 1 DISORDER (HCC): ICD-10-CM

## 2022-10-15 DIAGNOSIS — E11.65 TYPE 2 DIABETES MELLITUS WITH HYPERGLYCEMIA, WITH LONG-TERM CURRENT USE OF INSULIN (HCC): Chronic | ICD-10-CM

## 2022-10-15 DIAGNOSIS — G93.6 VASOGENIC BRAIN EDEMA (HCC): ICD-10-CM

## 2022-10-15 DIAGNOSIS — R29.898 LEFT LEG WEAKNESS: ICD-10-CM

## 2022-10-15 DIAGNOSIS — Z79.4 TYPE 2 DIABETES MELLITUS WITH HYPERGLYCEMIA, WITH LONG-TERM CURRENT USE OF INSULIN (HCC): Chronic | ICD-10-CM

## 2022-10-15 DIAGNOSIS — C79.31 METASTATIC CANCER TO BRAIN (HCC): ICD-10-CM

## 2022-10-15 DIAGNOSIS — E78.5 DYSLIPIDEMIA: ICD-10-CM

## 2022-10-15 LAB
FLUAV RNA SPEC QL NAA+PROBE: NEGATIVE
FLUBV RNA SPEC QL NAA+PROBE: NEGATIVE
GLUCOSE BLD STRIP.AUTO-MCNC: 161 MG/DL (ref 65–99)
GLUCOSE BLD STRIP.AUTO-MCNC: 166 MG/DL (ref 65–99)
GLUCOSE BLD STRIP.AUTO-MCNC: 214 MG/DL (ref 65–99)
GLUCOSE BLD STRIP.AUTO-MCNC: 233 MG/DL (ref 65–99)
RSV RNA SPEC QL NAA+PROBE: NEGATIVE
SARS-COV-2 RNA RESP QL NAA+PROBE: NOTDETECTED
SPECIMEN SOURCE: NORMAL

## 2022-10-15 PROCEDURE — 700102 HCHG RX REV CODE 250 W/ 637 OVERRIDE(OP): Performed by: HOSPITALIST

## 2022-10-15 PROCEDURE — 82962 GLUCOSE BLOOD TEST: CPT | Mod: 91

## 2022-10-15 PROCEDURE — A9270 NON-COVERED ITEM OR SERVICE: HCPCS | Performed by: PHYSICAL MEDICINE & REHABILITATION

## 2022-10-15 PROCEDURE — 94760 N-INVAS EAR/PLS OXIMETRY 1: CPT

## 2022-10-15 PROCEDURE — 770010 HCHG ROOM/CARE - REHAB SEMI PRIVAT*

## 2022-10-15 PROCEDURE — 99239 HOSP IP/OBS DSCHRG MGMT >30: CPT | Performed by: INTERNAL MEDICINE

## 2022-10-15 PROCEDURE — 700111 HCHG RX REV CODE 636 W/ 250 OVERRIDE (IP): Performed by: PHYSICAL MEDICINE & REHABILITATION

## 2022-10-15 PROCEDURE — 700102 HCHG RX REV CODE 250 W/ 637 OVERRIDE(OP): Performed by: PHYSICAL MEDICINE & REHABILITATION

## 2022-10-15 PROCEDURE — 700102 HCHG RX REV CODE 250 W/ 637 OVERRIDE(OP): Performed by: NURSE PRACTITIONER

## 2022-10-15 PROCEDURE — A9270 NON-COVERED ITEM OR SERVICE: HCPCS | Performed by: STUDENT IN AN ORGANIZED HEALTH CARE EDUCATION/TRAINING PROGRAM

## 2022-10-15 PROCEDURE — 0241U HCHG SARS-COV-2 COVID-19 NFCT DS RESP RNA 4 TRGT MIC: CPT

## 2022-10-15 PROCEDURE — 700102 HCHG RX REV CODE 250 W/ 637 OVERRIDE(OP): Performed by: STUDENT IN AN ORGANIZED HEALTH CARE EDUCATION/TRAINING PROGRAM

## 2022-10-15 PROCEDURE — A9270 NON-COVERED ITEM OR SERVICE: HCPCS | Performed by: NURSE PRACTITIONER

## 2022-10-15 PROCEDURE — 700102 HCHG RX REV CODE 250 W/ 637 OVERRIDE(OP): Performed by: INTERNAL MEDICINE

## 2022-10-15 PROCEDURE — A9270 NON-COVERED ITEM OR SERVICE: HCPCS | Performed by: HOSPITALIST

## 2022-10-15 PROCEDURE — A9270 NON-COVERED ITEM OR SERVICE: HCPCS | Performed by: INTERNAL MEDICINE

## 2022-10-15 PROCEDURE — 99223 1ST HOSP IP/OBS HIGH 75: CPT | Performed by: PHYSICAL MEDICINE & REHABILITATION

## 2022-10-15 RX ORDER — OXCARBAZEPINE 300 MG/1
300 TABLET, FILM COATED ORAL 2 TIMES DAILY
Status: DISCONTINUED | OUTPATIENT
Start: 2022-10-15 | End: 2022-10-25 | Stop reason: HOSPADM

## 2022-10-15 RX ORDER — ALBUTEROL SULFATE 90 UG/1
2 AEROSOL, METERED RESPIRATORY (INHALATION)
Status: DISCONTINUED | OUTPATIENT
Start: 2022-10-15 | End: 2022-10-25 | Stop reason: HOSPADM

## 2022-10-15 RX ORDER — LEVETIRACETAM 1000 MG/1
1000 TABLET ORAL 2 TIMES DAILY
Qty: 60 TABLET | Refills: 0 | Status: ON HOLD
Start: 2022-10-15 | End: 2022-10-24 | Stop reason: SDUPTHER

## 2022-10-15 RX ORDER — ECHINACEA PURPUREA EXTRACT 125 MG
2 TABLET ORAL PRN
Status: DISCONTINUED | OUTPATIENT
Start: 2022-10-15 | End: 2022-10-25 | Stop reason: HOSPADM

## 2022-10-15 RX ORDER — ENOXAPARIN SODIUM 100 MG/ML
40 INJECTION SUBCUTANEOUS DAILY
Status: DISCONTINUED | OUTPATIENT
Start: 2022-10-15 | End: 2022-10-25 | Stop reason: HOSPADM

## 2022-10-15 RX ORDER — MICONAZOLE NITRATE 20 MG/G
CREAM TOPICAL 2 TIMES DAILY
Status: DISPENSED | OUTPATIENT
Start: 2022-10-15 | End: 2022-10-22

## 2022-10-15 RX ORDER — LEVETIRACETAM 500 MG/1
1000 TABLET ORAL 2 TIMES DAILY
Status: DISCONTINUED | OUTPATIENT
Start: 2022-10-15 | End: 2022-10-25 | Stop reason: HOSPADM

## 2022-10-15 RX ORDER — POLYETHYLENE GLYCOL 3350 17 G/17G
17 POWDER, FOR SOLUTION ORAL DAILY
Qty: 10 EACH | Refills: 0 | Status: ON HOLD
Start: 2022-10-16 | End: 2022-10-24

## 2022-10-15 RX ORDER — MIDAZOLAM HYDROCHLORIDE 5 MG/ML
5 INJECTION INTRAMUSCULAR; INTRAVENOUS PRN
Status: DISCONTINUED | OUTPATIENT
Start: 2022-10-15 | End: 2022-10-25 | Stop reason: HOSPADM

## 2022-10-15 RX ORDER — OXYCODONE HYDROCHLORIDE 10 MG/1
10 TABLET ORAL
Status: DISCONTINUED | OUTPATIENT
Start: 2022-10-15 | End: 2022-10-25 | Stop reason: HOSPADM

## 2022-10-15 RX ORDER — OXYCODONE HCL 10 MG/1
10 TABLET, FILM COATED, EXTENDED RELEASE ORAL EVERY 12 HOURS
Qty: 14 TABLET | Refills: 0 | Status: ON HOLD
Start: 2022-10-15 | End: 2022-10-24

## 2022-10-15 RX ORDER — HYDRALAZINE HYDROCHLORIDE 25 MG/1
25 TABLET, FILM COATED ORAL EVERY 8 HOURS PRN
Status: DISCONTINUED | OUTPATIENT
Start: 2022-10-15 | End: 2022-10-25 | Stop reason: HOSPADM

## 2022-10-15 RX ORDER — AMOXICILLIN 250 MG
2 CAPSULE ORAL 2 TIMES DAILY
Qty: 30 TABLET | Refills: 0 | Status: ON HOLD
Start: 2022-10-15 | End: 2022-10-24

## 2022-10-15 RX ORDER — TAMSULOSIN HYDROCHLORIDE 0.4 MG/1
0.4 CAPSULE ORAL
Qty: 30 CAPSULE | Refills: 0 | Status: ON HOLD
Start: 2022-10-16 | End: 2022-10-24

## 2022-10-15 RX ORDER — TRAZODONE HYDROCHLORIDE 50 MG/1
50 TABLET ORAL
Status: DISCONTINUED | OUTPATIENT
Start: 2022-10-15 | End: 2022-10-25 | Stop reason: HOSPADM

## 2022-10-15 RX ORDER — QUETIAPINE FUMARATE 100 MG/1
200 TABLET, FILM COATED ORAL EVERY EVENING
Status: DISCONTINUED | OUTPATIENT
Start: 2022-10-15 | End: 2022-10-25 | Stop reason: HOSPADM

## 2022-10-15 RX ORDER — ACETAMINOPHEN 325 MG/1
650 TABLET ORAL EVERY 4 HOURS PRN
Status: DISCONTINUED | OUTPATIENT
Start: 2022-10-15 | End: 2022-10-25 | Stop reason: HOSPADM

## 2022-10-15 RX ORDER — DEXAMETHASONE 4 MG/1
4 TABLET ORAL DAILY
Status: DISCONTINUED | OUTPATIENT
Start: 2022-10-16 | End: 2022-10-15 | Stop reason: HOSPADM

## 2022-10-15 RX ORDER — AMOXICILLIN 250 MG
2 CAPSULE ORAL DAILY
Status: DISCONTINUED | OUTPATIENT
Start: 2022-10-15 | End: 2022-10-25 | Stop reason: HOSPADM

## 2022-10-15 RX ORDER — BISACODYL 10 MG
10 SUPPOSITORY, RECTAL RECTAL
Qty: 4 SUPPOSITORY | Refills: 0 | Status: ON HOLD
Start: 2022-10-15 | End: 2022-10-24

## 2022-10-15 RX ORDER — ALUMINA, MAGNESIA, AND SIMETHICONE 2400; 2400; 240 MG/30ML; MG/30ML; MG/30ML
20 SUSPENSION ORAL
Status: DISCONTINUED | OUTPATIENT
Start: 2022-10-15 | End: 2022-10-25 | Stop reason: HOSPADM

## 2022-10-15 RX ORDER — GABAPENTIN 400 MG/1
800 CAPSULE ORAL 4 TIMES DAILY
Status: DISCONTINUED | OUTPATIENT
Start: 2022-10-15 | End: 2022-10-25 | Stop reason: HOSPADM

## 2022-10-15 RX ORDER — ONDANSETRON 2 MG/ML
4 INJECTION INTRAMUSCULAR; INTRAVENOUS 4 TIMES DAILY PRN
Status: DISCONTINUED | OUTPATIENT
Start: 2022-10-15 | End: 2022-10-25 | Stop reason: HOSPADM

## 2022-10-15 RX ORDER — BACLOFEN 10 MG/1
10 TABLET ORAL 3 TIMES DAILY
Status: DISCONTINUED | OUTPATIENT
Start: 2022-10-15 | End: 2022-10-25 | Stop reason: HOSPADM

## 2022-10-15 RX ORDER — OXYCODONE HYDROCHLORIDE 5 MG/1
5 TABLET ORAL
Status: DISCONTINUED | OUTPATIENT
Start: 2022-10-15 | End: 2022-10-25 | Stop reason: HOSPADM

## 2022-10-15 RX ORDER — BISACODYL 10 MG
10 SUPPOSITORY, RECTAL RECTAL
Status: DISCONTINUED | OUTPATIENT
Start: 2022-10-15 | End: 2022-10-25 | Stop reason: HOSPADM

## 2022-10-15 RX ORDER — POLYETHYLENE GLYCOL 3350 17 G/17G
1 POWDER, FOR SOLUTION ORAL
Status: DISCONTINUED | OUTPATIENT
Start: 2022-10-15 | End: 2022-10-15

## 2022-10-15 RX ORDER — ONDANSETRON 4 MG/1
4 TABLET, ORALLY DISINTEGRATING ORAL 4 TIMES DAILY PRN
Status: DISCONTINUED | OUTPATIENT
Start: 2022-10-15 | End: 2022-10-25 | Stop reason: HOSPADM

## 2022-10-15 RX ORDER — OXYCODONE HYDROCHLORIDE 5 MG/1
5 TABLET ORAL
Qty: 20 TABLET | Refills: 0 | Status: ON HOLD
Start: 2022-10-15 | End: 2022-10-24

## 2022-10-15 RX ORDER — BACLOFEN 10 MG/1
10 TABLET ORAL 3 TIMES DAILY
Qty: 90 TABLET | Refills: 0 | Status: ON HOLD
Start: 2022-10-15 | End: 2022-10-24 | Stop reason: SDUPTHER

## 2022-10-15 RX ORDER — DEXAMETHASONE 4 MG/1
4 TABLET ORAL DAILY
Qty: 10 TABLET | Refills: 0 | Status: ON HOLD
Start: 2022-10-16 | End: 2022-10-24

## 2022-10-15 RX ORDER — DULOXETIN HYDROCHLORIDE 30 MG/1
60 CAPSULE, DELAYED RELEASE ORAL EVERY EVENING
Status: DISCONTINUED | OUTPATIENT
Start: 2022-10-15 | End: 2022-10-25 | Stop reason: HOSPADM

## 2022-10-15 RX ORDER — AMOXICILLIN 250 MG
2 CAPSULE ORAL 2 TIMES DAILY
Status: DISCONTINUED | OUTPATIENT
Start: 2022-10-15 | End: 2022-10-15

## 2022-10-15 RX ORDER — ATORVASTATIN CALCIUM 40 MG/1
40 TABLET, FILM COATED ORAL EVERY EVENING
Status: DISCONTINUED | OUTPATIENT
Start: 2022-10-15 | End: 2022-10-25 | Stop reason: HOSPADM

## 2022-10-15 RX ORDER — OMEPRAZOLE 20 MG/1
20 CAPSULE, DELAYED RELEASE ORAL DAILY
Status: DISCONTINUED | OUTPATIENT
Start: 2022-10-15 | End: 2022-10-25 | Stop reason: HOSPADM

## 2022-10-15 RX ORDER — TAMSULOSIN HYDROCHLORIDE 0.4 MG/1
0.4 CAPSULE ORAL
Status: DISCONTINUED | OUTPATIENT
Start: 2022-10-16 | End: 2022-10-24

## 2022-10-15 RX ORDER — POLYETHYLENE GLYCOL 3350 17 G/17G
1 POWDER, FOR SOLUTION ORAL 2 TIMES DAILY
Status: DISCONTINUED | OUTPATIENT
Start: 2022-10-15 | End: 2022-10-25 | Stop reason: HOSPADM

## 2022-10-15 RX ORDER — FAMOTIDINE 20 MG/1
20 TABLET, FILM COATED ORAL 2 TIMES DAILY
Qty: 60 TABLET | Refills: 0 | Status: ON HOLD
Start: 2022-10-15 | End: 2022-10-24

## 2022-10-15 RX ORDER — TRAMADOL HYDROCHLORIDE 50 MG/1
50 TABLET ORAL EVERY 4 HOURS PRN
Status: DISCONTINUED | OUTPATIENT
Start: 2022-10-15 | End: 2022-10-25 | Stop reason: HOSPADM

## 2022-10-15 RX ORDER — INSULIN LISPRO 100 [IU]/ML
3-14 INJECTION, SOLUTION INTRAVENOUS; SUBCUTANEOUS
Status: DISCONTINUED | OUTPATIENT
Start: 2022-10-15 | End: 2022-10-17

## 2022-10-15 RX ORDER — OXYCODONE HCL 10 MG/1
10 TABLET, FILM COATED, EXTENDED RELEASE ORAL EVERY 12 HOURS
Status: DISCONTINUED | OUTPATIENT
Start: 2022-10-15 | End: 2022-10-25 | Stop reason: HOSPADM

## 2022-10-15 RX ORDER — BISACODYL 10 MG
10 SUPPOSITORY, RECTAL RECTAL
Status: DISCONTINUED | OUTPATIENT
Start: 2022-10-15 | End: 2022-10-15

## 2022-10-15 RX ORDER — ALPRAZOLAM 0.25 MG/1
0.25 TABLET ORAL 4 TIMES DAILY PRN
Status: DISCONTINUED | OUTPATIENT
Start: 2022-10-15 | End: 2022-10-25 | Stop reason: HOSPADM

## 2022-10-15 RX ADMIN — GABAPENTIN 800 MG: 400 CAPSULE ORAL at 08:48

## 2022-10-15 RX ADMIN — GABAPENTIN 800 MG: 400 CAPSULE ORAL at 14:08

## 2022-10-15 RX ADMIN — QUETIAPINE FUMARATE 200 MG: 100 TABLET ORAL at 20:58

## 2022-10-15 RX ADMIN — OXYCODONE HYDROCHLORIDE 10 MG: 10 TABLET ORAL at 20:59

## 2022-10-15 RX ADMIN — BACLOFEN 10 MG: 10 TABLET ORAL at 20:58

## 2022-10-15 RX ADMIN — OMEPRAZOLE 20 MG: 20 CAPSULE, DELAYED RELEASE ORAL at 14:08

## 2022-10-15 RX ADMIN — OXYCODONE HYDROCHLORIDE 10 MG: 10 TABLET, FILM COATED, EXTENDED RELEASE ORAL at 04:34

## 2022-10-15 RX ADMIN — OXYCODONE HYDROCHLORIDE 10 MG: 10 TABLET ORAL at 16:07

## 2022-10-15 RX ADMIN — OXYCODONE HYDROCHLORIDE 10 MG: 10 TABLET ORAL at 04:20

## 2022-10-15 RX ADMIN — GABAPENTIN 800 MG: 400 CAPSULE ORAL at 20:58

## 2022-10-15 RX ADMIN — OXYCODONE HYDROCHLORIDE 10 MG: 10 TABLET ORAL at 08:59

## 2022-10-15 RX ADMIN — MICONAZOLE NITRATE: 20 CREAM TOPICAL at 21:21

## 2022-10-15 RX ADMIN — INSULIN LISPRO 3 UNITS: 100 INJECTION, SOLUTION INTRAVENOUS; SUBCUTANEOUS at 09:00

## 2022-10-15 RX ADMIN — LEVETIRACETAM 1000 MG: 500 TABLET, FILM COATED ORAL at 20:57

## 2022-10-15 RX ADMIN — BACLOFEN 10 MG: 10 TABLET ORAL at 12:10

## 2022-10-15 RX ADMIN — POLYETHYLENE GLYCOL 3350 1 PACKET: 17 POWDER, FOR SOLUTION ORAL at 21:00

## 2022-10-15 RX ADMIN — DULOXETINE HYDROCHLORIDE 60 MG: 30 CAPSULE, DELAYED RELEASE ORAL at 20:57

## 2022-10-15 RX ADMIN — SENNOSIDES AND DOCUSATE SODIUM 2 TABLET: 50; 8.6 TABLET ORAL at 17:00

## 2022-10-15 RX ADMIN — INSULIN LISPRO 4 UNITS: 100 INJECTION, SOLUTION INTRAVENOUS; SUBCUTANEOUS at 17:14

## 2022-10-15 RX ADMIN — BACLOFEN 10 MG: 10 TABLET ORAL at 04:35

## 2022-10-15 RX ADMIN — GABAPENTIN 800 MG: 400 CAPSULE ORAL at 17:01

## 2022-10-15 RX ADMIN — ATORVASTATIN CALCIUM 40 MG: 40 TABLET, FILM COATED ORAL at 20:58

## 2022-10-15 RX ADMIN — OXYCODONE HYDROCHLORIDE 10 MG: 10 TABLET, FILM COATED, EXTENDED RELEASE ORAL at 20:57

## 2022-10-15 RX ADMIN — LEVETIRACETAM 1000 MG: 500 TABLET, FILM COATED ORAL at 04:35

## 2022-10-15 RX ADMIN — INSULIN GLARGINE-YFGN 20 UNITS: 100 INJECTION, SOLUTION SUBCUTANEOUS at 21:11

## 2022-10-15 RX ADMIN — SENNOSIDES AND DOCUSATE SODIUM 2 TABLET: 50; 8.6 TABLET ORAL at 04:35

## 2022-10-15 RX ADMIN — POLYETHYLENE GLYCOL 3350 1 PACKET: 17 POWDER, FOR SOLUTION ORAL at 04:21

## 2022-10-15 RX ADMIN — DEXAMETHASONE 4 MG: 4 TABLET ORAL at 04:34

## 2022-10-15 RX ADMIN — FAMOTIDINE 20 MG: 20 TABLET, FILM COATED ORAL at 04:34

## 2022-10-15 RX ADMIN — OXYCODONE HYDROCHLORIDE 10 MG: 10 TABLET ORAL at 12:13

## 2022-10-15 RX ADMIN — ENOXAPARIN SODIUM 40 MG: 40 INJECTION SUBCUTANEOUS at 21:20

## 2022-10-15 RX ADMIN — OXCARBAZEPINE 300 MG: 300 TABLET, FILM COATED ORAL at 21:00

## 2022-10-15 RX ADMIN — INSULIN LISPRO 4 UNITS: 100 INJECTION, SOLUTION INTRAVENOUS; SUBCUTANEOUS at 21:12

## 2022-10-15 RX ADMIN — OXCARBAZEPINE 300 MG: 300 TABLET, FILM COATED ORAL at 08:48

## 2022-10-15 RX ADMIN — TAMSULOSIN HYDROCHLORIDE 0.4 MG: 0.4 CAPSULE ORAL at 08:48

## 2022-10-15 ASSESSMENT — LIFESTYLE VARIABLES
EVER_SMOKED: YES
ALCOHOL_USE: NO
TOTAL SCORE: 0
HOW MANY TIMES IN THE PAST YEAR HAVE YOU HAD 5 OR MORE DRINKS IN A DAY: 0
TOTAL SCORE: 0
ON A TYPICAL DAY WHEN YOU DRINK ALCOHOL HOW MANY DRINKS DO YOU HAVE: 0
EVER FELT BAD OR GUILTY ABOUT YOUR DRINKING: NO
HAVE YOU EVER FELT YOU SHOULD CUT DOWN ON YOUR DRINKING: NO
EVER HAD A DRINK FIRST THING IN THE MORNING TO STEADY YOUR NERVES TO GET RID OF A HANGOVER: NO
TOTAL SCORE: 0
HAVE PEOPLE ANNOYED YOU BY CRITICIZING YOUR DRINKING: NO
AVERAGE NUMBER OF DAYS PER WEEK YOU HAVE A DRINK CONTAINING ALCOHOL: 0
CONSUMPTION TOTAL: NEGATIVE

## 2022-10-15 ASSESSMENT — COPD QUESTIONNAIRES
COPD SCREENING SCORE: 3
HAVE YOU SMOKED AT LEAST 100 CIGARETTES IN YOUR ENTIRE LIFE: YES
DO YOU EVER COUGH UP ANY MUCUS OR PHLEGM?: NO/ONLY WITH OCCASIONAL COLDS OR INFECTIONS
DURING THE PAST 4 WEEKS HOW MUCH DID YOU FEEL SHORT OF BREATH: NONE/LITTLE OF THE TIME

## 2022-10-15 ASSESSMENT — FIBROSIS 4 INDEX: FIB4 SCORE: 0.28

## 2022-10-15 ASSESSMENT — PAIN DESCRIPTION - PAIN TYPE: TYPE: ACUTE PAIN

## 2022-10-15 NOTE — PROGRESS NOTES
4 Eyes Skin Assessment Completed by MICHAEL Fung and MICHAEL Turpin.    Head Scab and Incision  Ears WDL  Nose WDL  Mouth WDL  Neck WDL  Breast/Chest WDL  Shoulder Blades WDL  Spine WDL  (R) Arm/Elbow/Hand WDL  (L) Arm/Elbow/Hand WDL  Abdomen WDL  Groin WDL  Scrotum/Coccyx/Buttocks Redness, Blanching, and Excoriation  (R) Leg WDL  (L) Leg WDL  (R) Heel/Foot/Toe WDL  (L) Heel/Foot/Toe Large callous under great toe          Devices In Places Graf      Interventions In Place Pillows, Q2 Turns, Barrier Cream, and Pressure Redistribution Mattress    Possible Skin Injury No    Pictures Uploaded Into Epic Yes  Wound Consult Placed N/A  RN Wound Prevention Protocol Ordered Yes

## 2022-10-15 NOTE — CARE PLAN
The patient is Stable - Low risk of patient condition declining or worsening    Shift Goals  Clinical Goals: pain control, mobilization, plan for discharge  Patient Goals: clean up for discharge/pain control.  Family Goals: none    Progress made toward(s) clinical / shift goals:  yes        Problem: Knowledge Deficit - Standard  Goal: Patient and family/care givers will demonstrate understanding of plan of care, disease process/condition, diagnostic tests and medications  Outcome: Progressing     Problem: Pain - Standard  Goal: Alleviation of pain or a reduction in pain to the patient’s comfort goal  Outcome: Progressing     Problem: Fall Risk  Goal: Patient will remain free from falls  Outcome: Progressing     Problem: Mobility  Goal: Patient's capacity to carry out activities will improve  Outcome: Progressing     Problem: Skin Integrity  Goal: Skin integrity is maintained or improved  Outcome: Progressing     Problem: Bowel Elimination  Goal: Establish and maintain regular bowel function  Outcome: Progressing

## 2022-10-15 NOTE — CARE PLAN
The patient is Watcher - Medium risk of patient condition declining or worsening    Shift Goals  Clinical Goals: Pain Control / Neuro Checks / Remain free of falls  Patient Goals: Pain Control / Rest  Family Goals: N/A    Progress made toward(s) clinical / shift goals:    Problem: Knowledge Deficit - Standard  Goal: Patient and family/care givers will demonstrate understanding of plan of care, disease process/condition, diagnostic tests and medications  Outcome: Progressing     Problem: Pain - Standard  Goal: Alleviation of pain or a reduction in pain to the patient’s comfort goal  Outcome: Progressing     Problem: Fall Risk  Goal: Patient will remain free from falls  Outcome: Progressing     Problem: Communication  Goal: The ability to communicate needs accurately and effectively will improve  Outcome: Progressing       Patient is not progressing towards the following goals:

## 2022-10-15 NOTE — PROGRESS NOTES
Patient admitted to facility at 1240 via wheelchair; accompanied by hospital transport.  Patient assisted to room and positioned in bed for comfort and safety; call light within reach.  Patient assisted with stowing belongings and oriented to room and facility.  Admission assessment performed and documented in computer.  Admission paperwork completed; signed copies placed in chart.

## 2022-10-15 NOTE — H&P
REHABILITATION HISTORY AND PHYSICAL/POST ADMISSION PHYSICAL EVALUATION    Date of Admission: 10/15/2022    [facility] Code / Diagnosis to Support: 0002.1 - Brain Dysfunction: Non-Traumatic  Etiologic Diagnosis: Metastatic cancer to brain (HCC)    CC: incision site and lower back pain    This history was prepared after interviewing the patient and reviewing the patient's chart on [facility].    HPI:  The patient is a 41 y.o. female with a past medical history of diabetes mellitus; now admitted for acute inpatient rehabilitation with severe functional debility due to nontraumatic brain injury. Per documentation, patient presented to the ED on 10/1/2022 for new left sided weakness. It progressed for the past several days to the point where she started falling. No visual changes, headache, nausea, vomitting, fevers.      The patient was found to have the following:  3 masses in the right cerebral hemisphere largest measuring 3.2 cm with extensive vasogenic edema as described above. Findings are likely due to metastases.  Localized midline shift to the left side frontoparietal level measuring 3 mm.  The 2 smaller masses in the right frontal lobe demonstrates some increased density which could indicate that hemorrhage may be present within these masses.  CT chest, abdomen, pelvis from 10/1/2022 showed bilateral pulmonary nodules, enlarged left hilar lymph node, enlarged left external iliac lymph node, 9 mm right lower quadrant peritoneal nodule, 7 mm left and left upper quadrant subcutaneous soft tissue nodules.      Biopsy results show malignant melanoma with  1. AE1/AE3: Negative.   2. Cam 5.2: Negative.   3. HMB-45: Positive.   4. Melan-A: Positive.   5. S100: Positive.   6. Sox 10: Positive.   BRAF positive    The patient underwent Right frontal craniotomy for resection of supratentorial intraaxial brain tumors, three, same incision and same craniotomy on 10/6/2022 by Dr. Pebbles Tejada MD.     Plan is for palliative  radiation starting 10/28.    Recovery was complicated by: impaired mobility and ADLs; leukocytosis, acute blood loss anemia, thrombocytosis, hyperglycemia    Patient was evaluated by Rehab Medicine physician and Physical Therapy and Occupational Therapy and determined to be appropriate for acute inpatient rehab and was transferred to [facility] on 10/15/2022.    On admission the patient reports incision site pain and lower back pain. Reports first bm yesterday in two weeks. She feels constipated. She is having abdominal cramps. She has a urban after unsuccessful urban removal after surgery. She has been sleeping well at night. She reports some dizziness with activity.    Goals for rehabilitation include:  improving independence and pain, reducing caregiver burden, and returning home safely.    PSYCHOSOCIAL HISTORY:  Pre-mobidly, the patient lives with family in a single level Home with  3 stairs to enter.    With this acute therapeutic intervention, this patient hopes to improve her functional status, and return to independent living with the supportive care of parents. She will be moving in with parents    RN    REVIEW OF SYSTEMS:     Constitutional: denies fevers and chills  Eyes: denies change in vision  Ears, nose, mouth, throat: denies sore throat  Cardiovascular: denies palpitations  Respiratory: denies respiratory discomfort  Gastrointestinal: constipated  Genitourinary: has been using indwelling urban catheter  Musculoskeletal: admits to pain at surgical site  Integumentary: denies pressure ulcer, has incision site from surgery  Neurological: denies spasms, reprots headaches, admits to weakness in left arms / legs but it is improving  Psychiatric: denies history of depression or anxiety, denies change in mood  Endocrine: denies diabetes mellitus  Hematologic/lymphatic: denies history of blood disorders, did not have blood transfusion during acute stay  Allergic/immunologic: has the  following allergies - Augmentin and Clavulanic acid    PMH:  Past Medical History:   Diagnosis Date    Asthma     inhaler    Bipolar 1 disorder (HCC)     Cancer (HCC) 01/01/2016    melanoma    Cough     Depression     DM mellitus,     insulin    Hyperlipidemia     Hypertension     Pap smear     Dr. Baird    PCOS (polycystic ovarian syndrome)     Shortness of breath     Sputum production     Wheezing        PSH:  Past Surgical History:   Procedure Laterality Date    CRANIOTOMY STEALTH Right 10/6/2022    Procedure: RIGHT FRONTAL CRANIOTOMY WITH STEALTH;  Surgeon: Pebbles Tejada M.D.;  Location: [facility];  Service: Neurosurgery    UT BRONCHOSCOPY,DIAGNOSTIC N/A 6/17/2022    Procedure: FIBER OPTIC BRONCHOSCOPY WITH WASH, BRUSH, BRONCHOALVEOLAR LAVAGE, BIOPSY, FINE NEEDLE ASPIRATION;  Surgeon: Yue Swanson M.D.;  Location: [facility];  Service: Pulmonary    ENDOBRONCHIAL US ADD-ON N/A 6/17/2022    Procedure: ENDOBRONCHIAL ULTRASOUND (EBUS);  Surgeon: Yue Swanson M.D.;  Location: [facility];  Service: Pulmonary    UT LAP,APPENDECTOMY N/A 10/7/2019    Procedure: APPENDECTOMY, LAPAROSCOPIC;  Surgeon: Lionel Frazier M.D.;  Location: [facility];  Service: General    AXILLARY NODE DISSECTION Left 6/23/2017    Procedure: AXILLARY NODE DISSECTION- COMPLETION LYPHADENECTOMY;  Surgeon: Lionel Weinberg M.D.;  Location: [facility];  Service:     WIDE EXCISION Left 5/9/2017    Procedure: WIDE EXCISION - MALIGNANT MELANOMA HAND;  Surgeon: Lionel Weinberg M.D.;  Location: SURGERY SAME DAY [facility];  Service:     NODE BIOPSY SENTINEL Left 5/9/2017    Procedure: NODE BIOPSY SENTINEL - AXILLARY;  Surgeon: Lionel Weinberg M.D.;  Location: SURGERY SAME DAY [facility];  Service:     OTHER  2016    excisino of melanoma left hand    ADENOIDECTOMY      MYRINGOTOMY      with tube placement       FAMILY HISTORY:  Family History   Problem Relation Age of Onset     Psychiatric Illness Mother         depression    Diabetes Mother     Hyperlipidemia Mother     Thyroid Mother         s/p radioactive iodine treatment on replacement    Hypertension Father     Diabetes Father        LEVEL OF FUNCTION PRIOR TO DISABILTY:  Independent with ADLs and mobility without an assistive device    LEVEL OF FUNCTION PRIOR TO ADMISSION to [facility]:  Cognition    Cognition / Consciousness X   Level of Consciousness Alert   Ability To Follow Commands 2 Step   Sequencing Impaired   Comments improved L attetion, quicker to sequence L side   Passive ROM Lower Body   Passive ROM Lower Body X   Comments -5 degrees of L ankle DF, reports has been wearing boot all day and started baclofen last night   Active ROM Lower Body    Active ROM Lower Body  X   Comments improved AROM of L ankle   Strength Lower Body   Lt Hip Flexion Strength 2 (P)   Lt Knee Flexion Strength 2 (P)   Lt Knee Extension Strength 3+ (F+)   Lt Ankle Dorsiflexion Strength 2 (P)   Comments improving strength in LLE   Lower Body Muscle Tone   Comments tone normalizing throughout distal LLE   Supine Lower Body Exercise   Supine Lower Body Exercises Yes   Hip Adduction  Left;1 set of 10   Short Arc Quad Left;1 set of 10   Heel Slide 1 set of 10;Left   Home Exercise Program   Comments Provided supine HEP   Neuro-Muscular Treatments   Neuro-Muscular Treatments Anterior weight shift;Postural Changes;Postural Facilitation;Tactile Cuing;Verbal Cuing;Weight Shift Right;Weight Shift Left   Neurological Concerns   Standing Posture During ADL's Posterior Lean   Balance   Sitting Balance (Static) Fair   Sitting Balance (Dynamic) Fair -   Standing Balance (Static) Poor +   Standing Balance (Dynamic) Poor +   Weight Shift Sitting Fair   Weight Shift Standing Fair   Skilled Intervention Verbal Cuing;Sequencing;Postural Facilitation   Comments intermittent posterior LOB   Gait Analysis   Gait Level Of Assist Minimal Assist    Assistive Device Front Wheel Walker   Distance (Feet) 25   # of Times Distance was Traveled 1   Deviation Decreased Heel Strike;Decreased Toe Off;Step To  (decreased L knee extension during stance phase requiring facilitation)   Weight Bearing Status no restrictions   Skilled Intervention Verbal Cuing;Sequencing;Postural Facilitation   Bed Mobility    Supine to Sit Contact Guard Assist   Sit to Supine Minimal Assist  (for LE negotiation)   Scooting Standby Assist   Skilled Intervention Verbal Cuing;Sequencing;Postural Facilitation   Functional Mobility   Sit to Stand Minimal Assist   Bed, Chair, Wheelchair Transfer Minimal Assist   Mobility amb in room with FWW and min A   Skilled Intervention Verbal Cuing;Postural Facilitation;Sequencing     Cognition    Cognition / Consciousness X   Level of Consciousness Alert   Ability To Follow Commands 2 Step   Attention Impaired   Comments L inattention   Supine Upper Body Exercises   Supine Upper Body Exercises Yes   Comments LUE A/AROM, stretching, light resistance in all planes   Balance   Sitting Balance (Static) Poor +   Sitting Balance (Dynamic) Poor   Standing Balance (Static) Poor -   Standing Balance (Dynamic) Trace   Weight Shift Sitting Poor   Weight Shift Standing Poor   Skilled Intervention Verbal Cuing;Tactile Cuing;Sequencing   Comments w/ full contact assist   Bed Mobility    Sit to Supine Moderate Assist   Scooting Moderate Assist   Skilled Intervention Verbal Cuing;Tactile Cuing;Sequencing   Activities of Daily Living   Comments focus on ADL txfs and LUE function today   How much help from another person does the patient currently need...   Putting on and taking off regular lower body clothing? 2   Bathing (including washing, rinsing, and drying)? 2   Toileting, which includes using a toilet, bedpan, or urinal? 2   Putting on and taking off regular upper body clothing? 3   Taking care of personal grooming such as brushing teeth? 3   Eating meals? 3   6  Clicks Daily Activity Score 15   Functional Mobility   Sit to Stand Moderate Assist   Bed, Chair, Wheelchair Transfer Moderate Assist   Transfer Method Stand Pivot   Mobility wheelchair>BTB   Skilled Intervention Verbal Cuing;Tactile Cuing;Sequencing   Comments increased tone LLE after sitting down from txf, improved positioning of LLE to floor during txf however       CURRENT LEVEL OF FUNCTION:   Same as level of function prior to admission to [facility]    MEDICATIONS:  Current Facility-Administered Medications   Medication Dose    Respiratory Therapy Consult      Pharmacy Consult Request ...Pain Management Review 1 Each  1 Each    hydrALAZINE (APRESOLINE) tablet 25 mg  25 mg    acetaminophen (Tylenol) tablet 650 mg  650 mg    senna-docusate (PERICOLACE or SENOKOT S) 8.6-50 MG per tablet 2 Tablet  2 Tablet    And    polyethylene glycol/lytes (MIRALAX) PACKET 1 Packet  1 Packet    And    magnesium hydroxide (MILK OF MAGNESIA) suspension 30 mL  30 mL    And    bisacodyl (DULCOLAX) suppository 10 mg  10 mg    omeprazole (PRILOSEC) capsule 20 mg  20 mg    mag hydrox-al hydrox-simeth (MAALOX PLUS ES or MYLANTA DS) suspension 20 mL  20 mL    ondansetron (ZOFRAN ODT) dispertab 4 mg  4 mg    Or    ondansetron (ZOFRAN) syringe/vial injection 4 mg  4 mg    traZODone (DESYREL) tablet 50 mg  50 mg    sodium chloride (OCEAN) 0.65 % nasal spray 2 Spray  2 Spray    oxyCODONE immediate-release (ROXICODONE) tablet 5 mg  5 mg    Or    oxyCODONE immediate release (ROXICODONE) tablet 10 mg  10 mg    traMADol (Ultram) 50 MG tablet 50 mg  50 mg    midazolam (VERSED) 5 mg/mL (1 mL vial)  5 mg    ALPRAZolam (XANAX) tablet 0.25 mg  0.25 mg    atorvastatin (LIPITOR) tablet 40 mg  40 mg    baclofen (LIORESAL) tablet 10 mg  10 mg    DULoxetine (CYMBALTA) capsule 60 mg  60 mg    enoxaparin (Lovenox) inj 40 mg  40 mg    gabapentin (NEURONTIN) capsule 800 mg  800 mg    insulin GLARGINE (Lantus,Semglee) injection  20 Units     "And    insulin lispro (AdmeLOG,HumaLOG) injection  3-14 Units    levETIRAcetam (KEPPRA) tablet 1,000 mg  1,000 mg    OXcarbazepine (TRILEPTAL) tablet 300 mg  300 mg    oxyCODONE CR (OXYCONTIN) tablet 10 mg  10 mg    QUEtiapine (Seroquel) tablet 200 mg  200 mg    [START ON 10/16/2022] tamsulosin (FLOMAX) capsule 0.4 mg  0.4 mg       PHYSICAL EXAM:     VITAL SIGNS:   height is 1.626 m (5' 4\") and weight is 69 kg (152 lb 1.9 oz). Her oral temperature is 36.6 °C (97.8 °F). Her blood pressure is 116/79 and her pulse is 70. Her respiration is 16.     General: well-groomed in no acute distress, no visitors present  Eyes: no scleral icterus or conjunctival injection  Ears, nose, mouth and throat: moist oral mucosa  Cardiovascular: good peripheral perfusion, negative Gaby sign bilaterally  Respiratory: breathing comfortably without use of accessory muscles  Genitourinary: ++indwelling urban  Musculoskeletal: good symmetry in bilateral shoulders,  Skin: scalp incision site with staples    Neurologic:  Mental status:  A&Ox4 (person, place, date, situation) answers questions appropriately follows commands  Speech: fluent, no aphasia or dysarthria  Motor:    Upper Extremity  Myotome R L   Shoulder flexion C5 5/5 5/5   Elbow flexion C5 5/5 5/5   Wrist extension C6 5/5 5/5   Elbow extension C7 5/5 5/5   Finger flexion C8 5/5 5/5   Finger abduction T1 5/5 4/5     Lower Extremity Myotome R L   Hip flexion L2 5/5 5/5   Knee extension L3 5/5 5/5   Ankle dorsiflexion L4 5/5 5/5   Toe extension L5 5/5 2/5   Ankle plantarflexion S1 5/5 1/5     No clonus at bilateral ankles  Tone: no spasticity noted  Psychiatric: appropriate affect  Hematologic/lymphatic/immunologic: no bruises seen on exposed skin    LABS:  Recent Labs     10/13/22  0424   SODIUM 139   POTASSIUM 4.0   CHLORIDE 100   CO2 26   GLUCOSE 224*   BUN 15   CREATININE 0.45*   CALCIUM 9.0     Recent Labs     10/13/22  0424   WBC 13.1*   RBC 4.22   HEMOGLOBIN 10.3*   HEMATOCRIT " 34.1*   MCV 80.8*   MCH 24.4*   MCHC 30.2*   RDW 46.9   PLATELETCT 551*   MPV 10.1             PRIMARY REHAB DIAGNOSIS:    This patient is a 41 y.o. female admitted for acute inpatient rehabilitation with   Metastatic cancer to brain (HCC).    IMPAIRMENTS:   Cognitive  ADLs/IADLs  Mobility    SECONDARY DIAGNOSIS/MEDICAL CO-MORBIDITIES AFFECTING FUNCTION:  DM2  BPD  PCOS  Asthma    RELEVANT CHANGES SINCE PREADMISSION EVALUATION:    Status unchanged    The patient's rehabilitation potential is Good  The patient's medical prognosis is good    PLAN:   Discussion and Recommendations:   1. The patient requires an acute inpatient rehabilitation program with a coordinated program of care at an intensity and frequency not available at a lower level of care. This recommendation is substantiated by the patient's medical physicians who recommend that the patient's intervention and assessment of medical issues needs to be done at an acute level of care for patient's safety and maximum outcome.   2. A coordinated program of care will be supplied by an interdisciplinary team of physical therapy, occupational therapy, rehab physician, rehab nursing, and, if needed, speech therapy and rehab psychology. Rehab team presents a patient-specific rehabilitation and education program concentrating on prevention of future problems related to accessibility, mobility, skin, bowel, bladder, sexuality, and psychosocial and medical/surgical problems.   3. Need for Rehabilitation Physician: The rehab physician will be evaluating the patient on a multi-weekly basis to help coordinate the program of care. The rehab physician communicates between medical physicians, therapists, and nurses to maximize the patient's potential outcome. Specific areas in which the rehab physician will be providing daily assessment include the following:   A. Assessing the patient's heart rate and blood pressure response (vitals monitoring) to activity and making  adjustments in medications or conservative measures as needed.   B. The rehab physician will be assessing the frequency at which the program can be increased to allow the patient to reach optimal functional outcome.   C. The rehab physician will also provide assessments in daily skin care, especially in light of patient's impairments in mobility.   D. The rehab physician will provide special expertise in understanding how to work with functional impairment and recommend appropriate interventions, compensatory techniques, and education that will facilitate the patient's outcome.   4. Rehab R.N.   The rehab RN will be working with patient to carry over in room mobility and activities of daily living when the patient is not in 3 hours of skilled therapy. Rehab nursing will be working in conjunction with rehab physician to address all the medical issues above and continue to assess laboratory work and discuss abnormalities with the treating physicians, assess vitals, and response to activity, and discuss and report abnormalities with the rehab physician. Rehab RN will also continue daily skin care, supervise bladder/bowel program, instruct in medication administration, and ensure patient safety.   5. Rehab Therapy: Therapies to treat at intensity and frequency of (may change after completion of evaluation by all therapeutic disciplines):       PT:  Physical therapy to address mobility, transfer, gait training and evaluation for adaptive equipment needs 1 hour/day at least 5 days/week for the duration of the ELOS (see below)       OT:  Occupational therapy to address ADLs, self-care, home management training, functional mobility/transfers and assistive device evaluation, and community re-integration 1  hour/day at least 5 days/week for the duration of the ELOS (see below).        ST/Dysphagia:  Speech therapy to address speech, language, and cognitive deficits as well as swallowing difficulties with retraining/dysphagia  management and community re-integration with comprehension, expression, cognitive training 1  hour/day at least 5 days/week for the duration of the ELOS (see below).     Medical management / Rehabilitation Issues/ Adverse Potential as part of rehabilitation plan     REHABILITATION ISSUES/ADVERSE POTENTIAL and MEDICAL CO-MORBIDITIES/ADVERSE POTENTIAL AFFECTING FUNCTION:    Data points:  Reviewed results of radiology tests  Reviewed clinical lab tests  Reviewed old records    ##MSK  #Impaired ADLs and mobility  Patient is admitted to [facility] for comprehensive rehabilitation therapy as described.   Rehabilitation nursing monitors bowel and bladder control, educates on medication administration, co-morbidities and monitors patient safety.  Expected Length of Stay: 10-14 days  Anticipated discharge date: TBD  Anticipated discharge destination: home with parents  Prognosis: good  Equipment: TBD  Postdischarge therapies: TBD  Followup: Pebbles Tejada MD (neurosurgery), Mely Romero DO (PM&R), PCP  Precautions: none  Code: full resuscitation    #Postsurgical pain, acute, controlled  #Neuropathic pain, acute, controlled  Ordered baclofen 10mg TID, gabapentin 800mg QID, oxycontin 10mg BID    ##NEURO  #Metastasis to brain  Ordered keppra 1000mg BID, trileptal 300mg BID  Monitor    #RESP  Respiratory therapy: RT performs O2 management prn, breathing retraining, pulmonary hygiene and bronchospasm management prn to optimize participation in therapies.     ##CARDIAC  #Chronic hyperlipidemia  Ordered lipitor 40mg QHS  DVT prophylaxis: Ordered lovenox 40mg daily. Encourage OOB. Monitor daily for signs and symptoms of DVT including but not limited to swelling and pain to prevent the development of DVT that may interfere with therapies.    ##GI  GI prophylaxis:  Ordered prilosec 20mg daily to prevent gastritis/dyspepsia which may interfere with therapies.  #At risk of opioid induced constipation  Goal of one  continent BM daily    #At high risk of malnutrition  Dietician monitors nutrient intake, recommend supplements prn and provide nutrition education to pt/family to promote optimal nutrition for wound healing/recovery.   Orders Placed This Encounter   Procedures    Diet Order Diet: Consistent CHO (Diabetic)     Standing Status:   Standing     Number of Occurrences:   1     Order Specific Question:   Diet:     Answer:   Consistent CHO (Diabetic) [4]       ##/RENAL  #At risk of urinary retention  timed voids Q3-4hr while awake followed by checking postvoid residual to ensure complete emptying. If PVR>200mL, intermittent catheterization    ##HEME  #Acute blood loss anemia  #Leukocytosis  #Thrombocytosis, likely reactive  Monitor    ##ENDO  #Diabetes mellitus with hyperglycemia  Ordered Lantus 20mg QHS    ##SKIN  Skin/dermal ulcer prophylaxis: Monitor for new skin conditions with q.2 h. turns as required to prevent the development of skin breakdown.     I personally performed a complete drug regimen review and no potential clinically significant medication issues were identified.     Patient was seen for 72 minutes on unit/floor of which > 50% of time was spent on counseling and coordination of care regarding the above, including prognosis, risk reduction, benefits of treatment, and options for next stage of care.    GOALS/EXPECTED LEVEL OF FUNCTION BASED ON CURRENT MEDICAL AND FUNCTIONAL STATUS (may change based on patient's medical status and rate of impairment recovery):  Transfers:   Modified Independent  Mobility/Gait:   Modified Independent  ADL's:   Modified Independent  Cognition:  Mane

## 2022-10-15 NOTE — PROGRESS NOTES
Patient picked up now from transport to transfer to [facility].  Gave baclafen and nic28ml before transfer.

## 2022-10-15 NOTE — DISCHARGE INSTRUCTIONS
Discharge Instructions    [facility]. Discharged via wheelchair, hospital escort: Yes.  Special equipment needed: Walker    Be sure to schedule a follow-up appointment with your primary care doctor or any specialists as instructed.     Discharge Plan:   Diet Plan: Discussed  Activity Level: Discussed  Confirmed Follow up Appointment: Appointment Scheduled  Confirmed Symptoms Management: Discussed  Medication Reconciliation Updated: Yes  Influenza Vaccine Indication: Patient Refuses    I understand that a diet low in cholesterol, fat, and sodium is recommended for good health. Unless I have been given specific instructions below for another diet, I accept this instruction as my diet prescription.   Other diet: diabetic    Special Instructions: None    -Is this patient being discharged with medication to prevent blood clots?  No    Is patient discharged on Warfarin / Coumadin?   No

## 2022-10-15 NOTE — DISCHARGE SUMMARY
"Discharge Summary    CHIEF COMPLAINT ON ADMISSION  Chief Complaint   Patient presents with    Weakness     PT c/o L sided upper and lower extremity weakness for \"several days.\" Pt states she now has a limp she never had before. Obvious drift noted to FARA and SHERRI. Pt states she is diabetic and feels \"shaky and sweaty\" despite normal FSBG levels       Reason for Admission  Weakness     Admission Date  10/1/2022     CODE STATUS  Full Code    HPI & HOSPITAL COURSE  Jacque Mcintyre is a 41 y.o. female with a history of diabetes and depression who was admitted 10/1/2022 with left-sided weakness.  She was diagnosed with melanoma in 2016, and underwent immunotherapy for 3 years.  She had an abnormal PET scan earlier this year, biopsy was reportedly negative.  She has type 2 diabetes and depression.    CT head from 10/1/2022 showed 3 masses in the right cerebral hemisphere with extensive vasogenic edema, localized midline shift to the left side frontoparietal level, 2 small masses in the right frontal lobe with increased density which could indicate hemorrhage. Neurosurgery were consulted. MRI brain from 10/2/2022 showed right-sided predominant, supratentorial metastatic disease with associated edema, localized midline shift, evidence of prior hemorrhage within the masses.  CT chest, abdomen, pelvis from 10/1/2022 showed bilateral pulmonary nodules, enlarged left hilar lymph node, enlarged left external iliac lymph node, 9 mm right lower quadrant peritoneal nodule, 7 mm left and left upper quadrant subcutaneous soft tissue nodules.  Decadron and Keppra started.       Patient was evaluated by neurosurgery on 10/4/2022.  Underwent craniotomy for resection of 3 right frontal tumors on 10/7/2022.  Pathology was consistent with metastatic melanoma.     Postprocedure MRI from 10/7/2022 showed significant reduction in extent of contrast enhancement of the previously seen metastatic lesions.  Hemorrhagic products were noted within " the lesions.  No new lesions were noted. Patient was transferred to the medical floor on 10/8/2022.       Patient participated in physical therapy with significant improvement in left-sided weakness.  She was accepted to inpatient rehab with plan for intensive rehab until about 10/28.  After which patient will be starting palliative radiation with Dr. August.  They will try to work out simulation and mapping while she is in rehab     Per medical oncology.  Waiting on final pathology from craniotomy.  If BRAF positive, she will be candidate for BRAF/MAC inhibitor, otherwise she will need dual immunotherapy.  Recommended outpatient follow-up for systemic chemotherapy.    Stable to transfer to acute rehab hospital.    Therefore, she is discharged in fair and stable condition to an inpatient rehabilitation hospital.    The patient met 2-midnight criteria for an inpatient stay at the time of discharge.      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 RAD[facility] None     Judy August M.D.  [address]  [place] [id number]  [phone]    Follow up      Emma Watters M.D.  [address] [place] [id number] [phone]    Follow up        MEDICATIONS ON DISCHARGE     Medication List        START taking these medications        Instructions   baclofen 10 MG Tabs  Commonly known as: LIORESAL   Take 1 Tablet by mouth 3 times a  day.  Dose: 10 mg     bisacodyl 10 MG Supp  Commonly known as: DULCOLAX   Insert 1 Suppository into the rectum 1 time a day as needed (if magnesium hydroxide ineffective after 24 hours).  Dose: 10 mg     dexamethasone 4 MG Tabs  Start taking on: October 16, 2022  Commonly known as: DECADRON   Take 1 Tablet by mouth every day.  Dose: 4 mg     famotidine 20 MG Tabs  Commonly known as: PEPCID   Take 1 Tablet by mouth 2 times a day.  Dose: 20 mg     levetiracetam 1000 MG tablet  Commonly known as: KEPPRA   Take 1 Tablet by mouth 2 times a day.  Dose: 1,000 mg     magnesium hydroxide 400 MG/5ML Susp  Commonly known as: MILK OF MAGNESIA   Take 30 mL by mouth 1 time a day as needed (if polyethylene glycol ineffective after 24 hours).  Dose: 30 mL     * oxyCODONE CR 10 MG T12a  Commonly known as: OXYCONTIN   Take 1 Tablet by mouth every 12 hours for 5 days.  Dose: 10 mg     * oxyCODONE immediate-release 5 MG Tabs  Commonly known as: ROXICODONE   Take 1 Tablet by mouth every 3 hours as needed for Severe Pain for up to 5 days.  Dose: 5 mg     polyethylene glycol/lytes 17 g Pack  Start taking on: October 16, 2022  Commonly known as: MIRALAX   Take 1 Packet by mouth every day.  Dose: 17 g     senna-docusate 8.6-50 MG Tabs  Commonly known as: PERICOLACE or SENOKOT S   Take 2 Tablets by mouth 2 times a day.  Dose: 2 Tablet     tamsulosin 0.4 MG capsule  Start taking on: October 16, 2022  Commonly known as: FLOMAX   Take 1 Capsule by mouth 1/2 hour after breakfast.  Dose: 0.4 mg           * This list has 2 medication(s) that are the same as other medications prescribed for you. Read the directions carefully, and ask your doctor or other care provider to review them with you.                CHANGE how you take these medications        Instructions   albuterol 108 (90 Base) MCG/ACT Aers inhalation aerosol  What changed: reasons to take this  Commonly known as: Proventil HFA   Inhale 2 Puffs by mouth every 6 hours as needed.  Dose: 2  Puff            CONTINUE taking these medications        Instructions   acetaminophen 500 MG Tabs  Commonly known as: TYLENOL   Take 1,000 mg by mouth 2 times a day.  Dose: 1,000 mg     atorvastatin 40 MG Tabs  Commonly known as: LIPITOR   Take 40 mg by mouth every evening.  Dose: 40 mg     clonazePAM 0.5 MG Tabs  Commonly known as: KLONOPIN   Take 0.25 mg by mouth 2 times a day as needed (sleep). Indications: Feeling Anxious  Dose: 0.25 mg     DULoxetine 60 MG Cpep delayed-release capsule  Commonly known as: CYMBALTA   Take 60 mg by mouth every evening.  Dose: 60 mg     gabapentin 800 MG tablet  Commonly known as: NEURONTIN   Take 800 mg by mouth 4 times a day.  Dose: 800 mg     meloxicam 15 MG tablet  Commonly known as: MOBIC   Take 15 mg by mouth every day.  Dose: 15 mg     OXcarbazepine 300 MG Tabs  Commonly known as: TRILEPTAL   Take 300 mg by mouth 2 times a day.  Dose: 300 mg     QUEtiapine 200 MG Tabs  Commonly known as: Seroquel   Take 200 mg by mouth every evening.  Dose: 200 mg     Tresiba FlexTouch 100 UNIT/ML Sopn  Generic drug: Insulin Degludec   Inject 20 Units under the skin at bedtime.  Dose: 20 Units            STOP taking these medications      atenolol 50 MG Tabs  Commonly known as: TENORMIN     linezolid 600 MG Tabs  Commonly known as: ZYVOX     lisinopril 20 MG Tabs  Commonly known as: PRINIVIL              Allergies  Allergies   Allergen Reactions    Amoxicillin-Pot Clavulanate Vomiting    Clavulanic Acid Vomiting     Violent vomiting       DIET  Orders Placed This Encounter   Procedures    Diet Order Diet: Consistent CHO (Diabetic)     Standing Status:   Standing     Number of Occurrences:   1     Order Specific Question:   Diet:     Answer:   Consistent CHO (Diabetic) [4]       ACTIVITY  As tolerated.  Weight bearing as tolerated    LINES, DRAINS, AND WOUNDS  This is an automated list. Peripheral IVs will be removed prior to discharge.  Peripheral IV 10/08/22 20 G Anterior;Left Forearm  (Active)   Site Assessment Clean;Dry;Intact 10/14/22 0137   Dressing Type Transparent 10/14/22 0137   Line Status No blood return;Flushed;Scrubbed the hub prior to access;Saline locked 10/14/22 0137   Dressing Status Clean;Dry;Intact 10/14/22 0137   Dressing Intervention N/A 10/14/22 0137   Dressing Change Due 10/15/22 10/14/22 0137   Date IV Connector(s) Changed 10/08/22 10/08/22 1500   Infiltration Grading ([facility]) 0 10/14/22 0137   Phlebitis Scale ([facility] Only) 0 10/14/22 0137     Urethral Catheter Latex 16 Fr. (Active)   Site Assessment Clean;Skin intact 10/13/22 2045   Collection Container Standard drainage bag 10/14/22 2010   Urinary Catheter Care Drainage Tube Extended;Drainage Bag Not Overfilled;Drainage Tubing Properly Secured;Drainage Bag Below Bladder Level and Not on Floor 10/14/22 2010   Securement Method Securing device (Describe) 10/13/22 2045   Output (mL) 275 mL 10/15/22 0410      Wound 07/22/22 Diabetic Ulcer Left Plantar 1st MTH (Active)       Wound 08/26/22 (Active)       Wound 10/06/22 Incision Head Right BACITRACIN OINTMENT, TELFA, MEDIPORE TAPE (Active)   Site Assessment Clean;Dry;Closed Wound Edges 10/14/22 2000   Periwound Assessment Clean;Dry;Intact 10/14/22 2000   Margins Defined edges;Attached edges 10/14/22 2000   Closure Open to air;Staples 10/14/22 2000   Drainage Amount None 10/14/22 2000   Drainage Description Other (Comment) 10/11/22 1000   Wound Cleansing Foam Cleanser/Washcloth 10/13/22 2045   Dressing Options Open to Air 10/13/22 2045   Dressing Changed Observed 10/13/22 2045   Dressing Status Clean;Dry;Intact;Open to Air 10/14/22 2000       Peripheral IV 10/08/22 20 G Anterior;Left Forearm (Active)   Site Assessment Clean;Dry;Intact 10/14/22 0137   Dressing Type Transparent 10/14/22 0137   Line Status No blood return;Flushed;Scrubbed the hub prior to access;Saline locked 10/14/22 0137   Dressing Status Clean;Dry;Intact 10/14/22 0137   Dressing Intervention N/A 10/14/22  0137   Dressing Change Due 10/15/22 10/14/22 0137   Date IV Connector(s) Changed 10/08/22 10/08/22 1500   Infiltration Grading ([facility]) 0 10/14/22 0137   Phlebitis Scale ([facility] Only) 0 10/14/22 0137           Urethral Catheter Latex 16 Fr. (Active)   Site Assessment Clean;Skin intact 10/13/22 2045   Collection Container Standard drainage bag 10/14/22 2010   Urinary Catheter Care Drainage Tube Extended;Drainage Bag Not Overfilled;Drainage Tubing Properly Secured;Drainage Bag Below Bladder Level and Not on Floor 10/14/22 2010   Securement Method Securing device (Describe) 10/13/22 2045   Output (mL) 275 mL 10/15/22 0410        MENTAL STATUS ON TRANSFER             CONSULTATIONS  neurosurgery and oncology, radiation oncology    PROCEDURES  Craniotomy    LABORATORY  Lab Results   Component Value Date    SODIUM 139 10/13/2022    POTASSIUM 4.0 10/13/2022    CHLORIDE 100 10/13/2022    CO2 26 10/13/2022    GLUCOSE 224 (H) 10/13/2022    BUN 15 10/13/2022    CREATININE 0.45 (L) 10/13/2022        Lab Results   Component Value Date    WBC 13.1 (H) 10/13/2022    HEMOGLOBIN 10.3 (L) 10/13/2022    HEMATOCRIT 34.1 (L) 10/13/2022    PLATELETCT 551 (H) 10/13/2022        I discussed medications and side effects with the patient.  I discussed prognosis and importance of medical compliance with the patient.  I counseled the patient about diet, exercise, weight loss, smoking cessation, and life style modifications.  All questions and concerns have been addressed.  Total time of the discharge process was 38 minutes.

## 2022-10-16 LAB
25(OH)D3 SERPL-MCNC: 21 NG/ML (ref 30–100)
ALBUMIN SERPL BCP-MCNC: 3.7 G/DL (ref 3.2–4.9)
ALBUMIN/GLOB SERPL: 1.8 G/DL
ALP SERPL-CCNC: 100 U/L (ref 30–99)
ALT SERPL-CCNC: 10 U/L (ref 2–50)
ANION GAP SERPL CALC-SCNC: 10 MMOL/L (ref 7–16)
AST SERPL-CCNC: 8 U/L (ref 12–45)
BASOPHILS # BLD AUTO: 0.3 % (ref 0–1.8)
BASOPHILS # BLD: 0.04 K/UL (ref 0–0.12)
BILIRUB SERPL-MCNC: 0.2 MG/DL (ref 0.1–1.5)
BUN SERPL-MCNC: 12 MG/DL (ref 8–22)
CALCIUM SERPL-MCNC: 8.8 MG/DL (ref 8.5–10.5)
CHLORIDE SERPL-SCNC: 101 MMOL/L (ref 96–112)
CO2 SERPL-SCNC: 28 MMOL/L (ref 20–33)
CREAT SERPL-MCNC: 0.48 MG/DL (ref 0.5–1.4)
EOSINOPHIL # BLD AUTO: 0.02 K/UL (ref 0–0.51)
EOSINOPHIL NFR BLD: 0.2 % (ref 0–6.9)
ERYTHROCYTE [DISTWIDTH] IN BLOOD BY AUTOMATED COUNT: 48.2 FL (ref 35.9–50)
EST. AVERAGE GLUCOSE BLD GHB EST-MCNC: 177 MG/DL
FERRITIN SERPL-MCNC: 18.2 NG/ML (ref 10–291)
GFR SERPLBLD CREATININE-BSD FMLA CKD-EPI: 120 ML/MIN/1.73 M 2
GLOBULIN SER CALC-MCNC: 2.1 G/DL (ref 1.9–3.5)
GLUCOSE BLD STRIP.AUTO-MCNC: 156 MG/DL (ref 65–99)
GLUCOSE BLD STRIP.AUTO-MCNC: 184 MG/DL (ref 65–99)
GLUCOSE BLD STRIP.AUTO-MCNC: 200 MG/DL (ref 65–99)
GLUCOSE BLD STRIP.AUTO-MCNC: 245 MG/DL (ref 65–99)
GLUCOSE SERPL-MCNC: 185 MG/DL (ref 65–99)
HBA1C MFR BLD: 7.8 % (ref 4–5.6)
HCT VFR BLD AUTO: 33.5 % (ref 37–47)
HGB BLD-MCNC: 10.1 G/DL (ref 12–16)
IMM GRANULOCYTES # BLD AUTO: 0.06 K/UL (ref 0–0.11)
IMM GRANULOCYTES NFR BLD AUTO: 0.5 % (ref 0–0.9)
IRON SATN MFR SERPL: 7 % (ref 15–55)
IRON SERPL-MCNC: 19 UG/DL (ref 40–170)
LYMPHOCYTES # BLD AUTO: 2.94 K/UL (ref 1–4.8)
LYMPHOCYTES NFR BLD: 24.8 % (ref 22–41)
MAGNESIUM SERPL-MCNC: 1.9 MG/DL (ref 1.5–2.5)
MCH RBC QN AUTO: 24.5 PG (ref 27–33)
MCHC RBC AUTO-ENTMCNC: 30.1 G/DL (ref 33.6–35)
MCV RBC AUTO: 81.1 FL (ref 81.4–97.8)
MONOCYTES # BLD AUTO: 1.04 K/UL (ref 0–0.85)
MONOCYTES NFR BLD AUTO: 8.8 % (ref 0–13.4)
NEUTROPHILS # BLD AUTO: 7.75 K/UL (ref 2–7.15)
NEUTROPHILS NFR BLD: 65.4 % (ref 44–72)
NRBC # BLD AUTO: 0 K/UL
NRBC BLD-RTO: 0 /100 WBC
PHOSPHATE SERPL-MCNC: 4.5 MG/DL (ref 2.5–4.5)
PLATELET # BLD AUTO: 535 K/UL (ref 164–446)
PMV BLD AUTO: 9.9 FL (ref 9–12.9)
POTASSIUM SERPL-SCNC: 3.9 MMOL/L (ref 3.6–5.5)
PROT SERPL-MCNC: 5.8 G/DL (ref 6–8.2)
RBC # BLD AUTO: 4.13 M/UL (ref 4.2–5.4)
SODIUM SERPL-SCNC: 139 MMOL/L (ref 135–145)
T4 FREE SERPL-MCNC: 1.05 NG/DL (ref 0.93–1.7)
TIBC SERPL-MCNC: 276 UG/DL (ref 250–450)
TSH SERPL DL<=0.005 MIU/L-ACNC: 8.78 UIU/ML (ref 0.38–5.33)
UIBC SERPL-MCNC: 257 UG/DL (ref 110–370)
VIT B12 SERPL-MCNC: 1095 PG/ML (ref 211–911)
WBC # BLD AUTO: 11.9 K/UL (ref 4.8–10.8)

## 2022-10-16 PROCEDURE — 700102 HCHG RX REV CODE 250 W/ 637 OVERRIDE(OP): Performed by: PHYSICAL MEDICINE & REHABILITATION

## 2022-10-16 PROCEDURE — 97116 GAIT TRAINING THERAPY: CPT

## 2022-10-16 PROCEDURE — 82728 ASSAY OF FERRITIN: CPT

## 2022-10-16 PROCEDURE — 97535 SELF CARE MNGMENT TRAINING: CPT

## 2022-10-16 PROCEDURE — 83036 HEMOGLOBIN GLYCOSYLATED A1C: CPT

## 2022-10-16 PROCEDURE — 83550 IRON BINDING TEST: CPT

## 2022-10-16 PROCEDURE — 84100 ASSAY OF PHOSPHORUS: CPT

## 2022-10-16 PROCEDURE — 97162 PT EVAL MOD COMPLEX 30 MIN: CPT

## 2022-10-16 PROCEDURE — 83540 ASSAY OF IRON: CPT

## 2022-10-16 PROCEDURE — 700111 HCHG RX REV CODE 636 W/ 250 OVERRIDE (IP): Performed by: PHYSICAL MEDICINE & REHABILITATION

## 2022-10-16 PROCEDURE — 36415 COLL VENOUS BLD VENIPUNCTURE: CPT

## 2022-10-16 PROCEDURE — 770010 HCHG ROOM/CARE - REHAB SEMI PRIVAT*

## 2022-10-16 PROCEDURE — A9270 NON-COVERED ITEM OR SERVICE: HCPCS | Performed by: PHYSICAL MEDICINE & REHABILITATION

## 2022-10-16 PROCEDURE — 84443 ASSAY THYROID STIM HORMONE: CPT

## 2022-10-16 PROCEDURE — 82962 GLUCOSE BLOOD TEST: CPT | Mod: 91

## 2022-10-16 PROCEDURE — 82306 VITAMIN D 25 HYDROXY: CPT

## 2022-10-16 PROCEDURE — 97166 OT EVAL MOD COMPLEX 45 MIN: CPT

## 2022-10-16 PROCEDURE — 92523 SPEECH SOUND LANG COMPREHEN: CPT

## 2022-10-16 PROCEDURE — 82607 VITAMIN B-12: CPT

## 2022-10-16 PROCEDURE — 83735 ASSAY OF MAGNESIUM: CPT

## 2022-10-16 PROCEDURE — 85025 COMPLETE CBC W/AUTO DIFF WBC: CPT

## 2022-10-16 PROCEDURE — 84439 ASSAY OF FREE THYROXINE: CPT

## 2022-10-16 PROCEDURE — 80053 COMPREHEN METABOLIC PANEL: CPT

## 2022-10-16 RX ORDER — VITAMIN B COMPLEX
1000 TABLET ORAL DAILY
Status: DISCONTINUED | OUTPATIENT
Start: 2022-10-16 | End: 2022-10-25 | Stop reason: HOSPADM

## 2022-10-16 RX ADMIN — GABAPENTIN 800 MG: 400 CAPSULE ORAL at 07:55

## 2022-10-16 RX ADMIN — LEVETIRACETAM 1000 MG: 500 TABLET, FILM COATED ORAL at 20:07

## 2022-10-16 RX ADMIN — DULOXETINE HYDROCHLORIDE 60 MG: 30 CAPSULE, DELAYED RELEASE ORAL at 20:07

## 2022-10-16 RX ADMIN — TAMSULOSIN HYDROCHLORIDE 0.4 MG: 0.4 CAPSULE ORAL at 07:55

## 2022-10-16 RX ADMIN — ENOXAPARIN SODIUM 40 MG: 40 INJECTION SUBCUTANEOUS at 20:08

## 2022-10-16 RX ADMIN — POLYETHYLENE GLYCOL 3350 1 PACKET: 17 POWDER, FOR SOLUTION ORAL at 07:55

## 2022-10-16 RX ADMIN — POLYETHYLENE GLYCOL 3350 1 PACKET: 17 POWDER, FOR SOLUTION ORAL at 20:08

## 2022-10-16 RX ADMIN — OXYCODONE HYDROCHLORIDE 10 MG: 10 TABLET, FILM COATED, EXTENDED RELEASE ORAL at 20:07

## 2022-10-16 RX ADMIN — INSULIN LISPRO 3 UNITS: 100 INJECTION, SOLUTION INTRAVENOUS; SUBCUTANEOUS at 17:32

## 2022-10-16 RX ADMIN — MICONAZOLE NITRATE: 20 CREAM TOPICAL at 07:55

## 2022-10-16 RX ADMIN — INSULIN LISPRO 3 UNITS: 100 INJECTION, SOLUTION INTRAVENOUS; SUBCUTANEOUS at 11:40

## 2022-10-16 RX ADMIN — BACLOFEN 10 MG: 10 TABLET ORAL at 14:39

## 2022-10-16 RX ADMIN — OXYCODONE HYDROCHLORIDE 10 MG: 10 TABLET ORAL at 00:20

## 2022-10-16 RX ADMIN — GABAPENTIN 800 MG: 400 CAPSULE ORAL at 17:25

## 2022-10-16 RX ADMIN — LEVETIRACETAM 1000 MG: 500 TABLET, FILM COATED ORAL at 07:55

## 2022-10-16 RX ADMIN — INSULIN GLARGINE-YFGN 20 UNITS: 100 INJECTION, SOLUTION SUBCUTANEOUS at 20:25

## 2022-10-16 RX ADMIN — OXYCODONE HYDROCHLORIDE 10 MG: 10 TABLET ORAL at 06:08

## 2022-10-16 RX ADMIN — INSULIN LISPRO 3 UNITS: 100 INJECTION, SOLUTION INTRAVENOUS; SUBCUTANEOUS at 07:28

## 2022-10-16 RX ADMIN — OXYCODONE HYDROCHLORIDE 10 MG: 10 TABLET ORAL at 18:51

## 2022-10-16 RX ADMIN — GABAPENTIN 800 MG: 400 CAPSULE ORAL at 20:07

## 2022-10-16 RX ADMIN — OXCARBAZEPINE 300 MG: 300 TABLET, FILM COATED ORAL at 20:08

## 2022-10-16 RX ADMIN — GABAPENTIN 800 MG: 400 CAPSULE ORAL at 13:23

## 2022-10-16 RX ADMIN — BACLOFEN 10 MG: 10 TABLET ORAL at 07:55

## 2022-10-16 RX ADMIN — QUETIAPINE FUMARATE 200 MG: 100 TABLET ORAL at 20:07

## 2022-10-16 RX ADMIN — Medication 1000 UNITS: at 11:36

## 2022-10-16 RX ADMIN — ATORVASTATIN CALCIUM 40 MG: 40 TABLET, FILM COATED ORAL at 20:07

## 2022-10-16 RX ADMIN — MICONAZOLE NITRATE: 20 CREAM TOPICAL at 20:24

## 2022-10-16 RX ADMIN — OXYCODONE HYDROCHLORIDE 10 MG: 10 TABLET ORAL at 13:25

## 2022-10-16 RX ADMIN — INSULIN LISPRO 4 UNITS: 100 INJECTION, SOLUTION INTRAVENOUS; SUBCUTANEOUS at 20:26

## 2022-10-16 RX ADMIN — BACLOFEN 10 MG: 10 TABLET ORAL at 20:08

## 2022-10-16 RX ADMIN — OXCARBAZEPINE 300 MG: 300 TABLET, FILM COATED ORAL at 07:55

## 2022-10-16 RX ADMIN — OMEPRAZOLE 20 MG: 20 CAPSULE, DELAYED RELEASE ORAL at 07:55

## 2022-10-16 RX ADMIN — OXYCODONE HYDROCHLORIDE 10 MG: 10 TABLET ORAL at 09:16

## 2022-10-16 RX ADMIN — SENNOSIDES AND DOCUSATE SODIUM 2 TABLET: 50; 8.6 TABLET ORAL at 07:55

## 2022-10-16 RX ADMIN — OXYCODONE HYDROCHLORIDE 10 MG: 10 TABLET, FILM COATED, EXTENDED RELEASE ORAL at 07:58

## 2022-10-16 SDOH — ECONOMIC STABILITY: TRANSPORTATION INSECURITY
IN THE PAST 12 MONTHS, HAS LACK OF RELIABLE TRANSPORTATION KEPT YOU FROM MEDICAL APPOINTMENTS, MEETINGS, WORK OR FROM GETTING THINGS NEEDED FOR DAILY LIVING?: NO

## 2022-10-16 SDOH — ECONOMIC STABILITY: TRANSPORTATION INSECURITY
IN THE PAST 12 MONTHS, HAS THE LACK OF TRANSPORTATION KEPT YOU FROM MEDICAL APPOINTMENTS OR FROM GETTING MEDICATIONS?: NO

## 2022-10-16 ASSESSMENT — ACTIVITIES OF DAILY LIVING (ADL)
TUB_SHOWER_TRANSFER_DESCRIPTION: GRAB BAR;SHOWER BENCH;INCREASED TIME;INITIAL PREPARATION FOR TASK;SUPERVISION FOR SAFETY;VERBAL CUEING
BED_CHAIR_WHEELCHAIR_TRANSFER_DESCRIPTION: INCREASED TIME;VERBAL CUEING;SET-UP OF EQUIPMENT
TOILETING: INDEPENDENT
BED_CHAIR_WHEELCHAIR_TRANSFER_DESCRIPTION: ADAPTIVE EQUIPMENT;INCREASED TIME;INITIAL PREPARATION FOR TASK;VERBAL CUEING
TOILET_TRANSFER_DESCRIPTION: GRAB BAR;INCREASED TIME;INITIAL PREPARATION FOR TASK;SUPERVISION FOR SAFETY

## 2022-10-16 ASSESSMENT — BRIEF INTERVIEW FOR MENTAL STATUS (BIMS)
WHAT DAY OF THE WEEK IS IT: CORRECT
ASKED TO RECALL SOCK: YES, NO CUE REQUIRED
WHAT YEAR IS IT: CORRECT
WHAT MONTH IS IT: ACCURATE WITHIN 5 DAYS
ASKED TO RECALL BED: YES, NO CUE REQUIRED
INITIAL REPETITION OF BED BLUE SOCK - FIRST ATTEMPT: 3
WHAT MONTH IS IT: ACCURATE WITHIN 5 DAYS
ASKED TO RECALL BLUE: YES, NO CUE REQUIRED
ASKED TO RECALL BLUE: YES, NO CUE REQUIRED
INITIAL REPETITION OF BED BLUE SOCK - FIRST ATTEMPT: 3
ASKED TO RECALL BED: YES, NO CUE REQUIRED
WHAT YEAR IS IT: CORRECT
WHAT DAY OF THE WEEK IS IT: CORRECT
ASKED TO RECALL SOCK: YES, NO CUE REQUIRED
BIMS SUMMARY SCORE: 15
BIMS SUMMARY SCORE: 15

## 2022-10-16 ASSESSMENT — GAIT ASSESSMENTS
DISTANCE (FEET): 180
GAIT LEVEL OF ASSIST: MODERATE ASSIST

## 2022-10-16 ASSESSMENT — FIBROSIS 4 INDEX: FIB4 SCORE: 0.2

## 2022-10-16 NOTE — THERAPY
"Speech Language Pathology   Initial Assessment     Patient Name: Jacque Mcintyre  AGE:  41 y.o., SEX:  female  Medical Record #: [id number]  Today's Date: 10/16/2022     Subjective    Per H&P: \"The patient is a 41 y.o. female with a past medical history of diabetes mellitus; now admitted for acute inpatient rehabilitation with severe functional debility due to nontraumatic brain injury. Per documentation, patient presented to the ED on 10/1/2022 for new left sided weakness. It progressed for the past several days to the point where she started falling. No visual changes, headache, nausea, vomitting, fevers.       The patient was found to have the following:  3 masses in the right cerebral hemisphere largest measuring 3.2 cm with extensive vasogenic edema as described above. Findings are likely due to metastases.  Localized midline shift to the left side frontoparietal level measuring 3 mm.  The 2 smaller masses in the right frontal lobe demonstrates some increased density which could indicate that hemorrhage may be present within these masses.  CT chest, abdomen, pelvis from 10/1/2022 showed bilateral pulmonary nodules, enlarged left hilar lymph node, enlarged left external iliac lymph node, 9 mm right lower quadrant peritoneal nodule, 7 mm left and left upper quadrant subcutaneous soft tissue nodules.       Biopsy results show malignant melanoma with  1. AE1/AE3: Negative.   2. Cam 5.2: Negative.   3. HMB-45: Positive.   4. Melan-A: Positive.   5. S100: Positive.   6. Sox 10: Positive.   BRAF positive     The patient underwent Right frontal craniotomy for resection of supratentorial intraaxial brain tumors, three, same incision and same craniotomy on 10/6/2022 by Dr. Pebbles Tejada MD.      Plan is for palliative radiation starting 10/28.     Recovery was complicated by: impaired mobility and ADLs; leukocytosis, acute blood loss anemia, thrombocytosis, hyperglycemia     Patient was evaluated by Rehab Medicine physician " "and Physical Therapy and Occupational Therapy and determined to be appropriate for acute inpatient rehab and was transferred to [facility] on 10/15/2022.     On admission the patient reports incision site pain and lower back pain. Reports first bm yesterday in two weeks. She feels constipated. She is having abdominal cramps. She has a urban after unsuccessful urban removal after surgery. She has been sleeping well at night. She reports some dizziness with activity.\"        Objective       10/16/22 1331   Evaluation Charges   SLP Speech Language Evaluation Speech Sound Language Comprehension   SLP Total Time Spent   SLP Individual Total Time Spent (Mins) 60   Prior Living Situation   Prior Services Home-Independent   Housing / Facility 1 Story House   Lives with - Patient's Self Care Capacity Alone and Able to Care For Self   Prior Level Of Function   Communication Within Functional Limits   Swallow Within Functional Limits   Dentition Intact   Dentures None   Hearing Within Functional Limits for Evaluation   Hearing Aid None   Vision Wears Corrective Lenses;Distance   Patient's Primary Language English   Education Completed College   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   Receptive Language / Auditory Comprehension   Receptive Language / Auditory Comprehension WDL   Expressive Language   Expressive Language (WDL) WDL   Reading Comprehension    Reading Comprehension (WDL) WDL   Written Language Expression   Dominant Hand Right   Cognition   Complex Attention Moderate (3)   Verbal Short Term Memory 10 Minutes   Written Sequencing Moderate (3)   Clock Drawing Within Functional Limits   Cognitive Pattern Assessment   Cognitive Pattern Assessment Used BIMS   Brief Interview for Mental Status (BIMS)   Repetition of Three Words (First Attempt) 3   Temporal Orientation: Year Correct   Temporal Orientation: Month Accurate within 5 days   Temporal Orientation: Day " "Correct   Recall: \"Sock\" Yes, no cue required   Recall: \"Blue\" Yes, no cue required   Recall: \"Bed\" Yes, no cue required   BIMS Summary Score 15   Social / Pragmatic Communication   Social / Pragmatic Communication WDL  (Flat affect; emotional lability)   Tracheostomy   Tracheostomy No   Speech Mechanisms / Voice Production   Speech Mechanisms / Voice Production (WDL) WDL   Labial Function   Labial Function (WDL) WDL   Lingual Function   Lingual Function (WDL) WDL   Swallowing/Nutritional Status   Swallowing/Nutritional Status Regular food   Functional Level of Assist   Comprehension Modified Independent   Comprehension Description Glasses   Expression Independent   Social Interaction Independent   Problem Solving Minimal Assist   Problem Solving Description Verbal cueing;Therapy schedule;Seat belt;Increased time;Bed/chair alarm   Memory Minimal Assist   Memory Description Verbal cueing;Therapy schedule;Seat belt;Increased time;Bed/chair alarm   Outcome Measures   Outcome Measures Utilized SCCAN   SCCAN (Scales of Cognitive and Communicative Ability for Neurorehabilitation)   Oral Expression - Raw Score 19   Oral Expression - Scale Performance Score 100   Orientation - Raw Score 12   Orientation - Scale Performance Score 100   Memory - Raw Score 14   Memory - Scale Performance Score 74   Speech Comprehension - Raw Score 13   Speech Comprehension - Scale Performance Score 100   Reading Comprehension - Raw Score 11   Reading Comprehension - Scale Performance Score 92   Writing - Raw Score 7   Writing - Scale Performance Score 100   Attention - Raw Score 14   Attention - Scale Performance Score 88   Problem Solving - Raw Score 16   Problem Solving - Scale Performance Score 70   SCCAN Total Raw Score 88   SCCAN Degree of Severity Typical Functioning   Problem List   Problem List Cognitive-Linguistic Deficits   Current Discharge Plan   Current Discharge Plan Temporarily go to Family /  Friend's Home   Benefit   Therapy " "Benefit Patient would benefit from Inpatient Rehab Speech-Language Pathology to address above identified deficits.   Strengths & Barriers   Strengths Able to follow instructions;Alert and oriented;Effective communication skills;Independent prior level of function;Motivated for self care and independence;Pleasant and cooperative;Willingly participates in therapeutic activities   Barriers Impaired functional cognition   Speech Language Pathologist Assigned   Assigned SLP / Extension CW 60 cog (SPLIT OK)       Assessment    Patient is 41 y.o. female with a diagnosis of Metastatic Cancer to Brain.  Additional factors influencing patient status/progress (ie: cognitive factors, co-morbidities, social support, etc): Pt scored 88/94 on the SCCAN, characteristic of typical functioning.  Pt reported that her processing speed has decreased.  While completing a task, pt stated, \"This is discouraging.  This would have been so easy for me.\"  Pt also reported new deficits in memory, which was confirmed as her lowest scale performance on the SCCAN.  Pt would benefit from skilled speech therapy to target processing speed, recall, and executive function to aid in independence at time of discharge.      Plan  Recommend Speech Therapy 30-60 minutes per day 5-6 days per week for 4 weeks for the following treatments:  SLP Aphasia Evaluation, SLP Cognitive Skill Development, and SLP Group Treatment.    Passport items to be completed:  Express basic needs, understand food/liquid recommendations, consistently follow swallow precautions, manage finances, manage medications, arrive to therapy appointments on time, complete daily memory log entries, solve problems related to safety situations, review education related to hospitalization, complete caregiver training     Goals:  Long term and short term goals have been discussed with patient and they are in agreement.    Speech Therapy Problems (Active)       Problem: Memory STGs       Dates: " Start: 10/16/22         Goal: STG-Within one week, patient will recall daily events with 80% accuracy, given min verbal cues and external aids.       Dates: Start: 10/16/22               Problem: Problem Solving STGs       Dates: Start: 10/16/22         Goal: STG-Within one week, patient will complete executive function tasks with 90% accuracy given min verbal cues.       Dates: Start: 10/16/22            Goal: STG-Within one week, patient will complete complex attn tasks (alternating/divided) with 90% accuracy given min verbal cues.       Dates: Start: 10/16/22               Problem: Speech/Swallowing LTGs       Dates: Start: 10/16/22         Goal: LTG-By discharge, patient will solve complex problems mod I for safe discharge home.       Dates: Start: 10/16/22

## 2022-10-16 NOTE — THERAPY
"Occupational Therapy   Initial Evaluation     Patient Name: Jacque Mcintyre  Age:  41 y.o., Sex:  female  Medical Record #: [id number]  Today's Date: 10/16/2022     Subjective    Pt in bed upon arrival, pleasant and agreeable to OT session.      \"I didn't think I was going to be able to do the shower and still have energy to stay up to eat\"     Objective       10/16/22 0701   OT Charge Group   Charges Yes   OT Self Care / ADL 1   OT Evaluation OT Evaluation Mod   OT Total Time Spent   OT Individual Total Time Spent (Mins) 60   Prior Living Situation   Prior Services Home-Independent   Housing / Facility 1 Story House   Steps Into Home 3   Bathroom Set up Bathtub / Shower Combination   Lives with - Patient's Self Care Capacity Alone and Able to Care For Self   Comments pt reports moving into her parents house upon d/c to get extra support   Prior Level of ADL Function   Self Feeding Independent   Grooming / Hygiene Independent   Bathing Independent   Dressing Independent   Toileting Independent   Prior Level of IADL Function   Medication Management Independent   Laundry Independent   Kitchen Mobility Independent   Finances Independent   Home Management Independent   Shopping Independent   Prior Level Of Mobility Independent Without Device in Home   Driving / Transportation Driving Independent   Occupation (Pre-Hospital Vocational) 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[employer])   Prior Functioning: Everyday Activities   Self Care Independent   Indoor Mobility (Ambulation) Independent   Stairs Independent   Functional Cognition Independent   Prior Device Use None of the given options   Vitals   O2 (LPM) 0   O2 Delivery Device None - Room Air   Pain 0 - 10 Group   Therapist Pain Assessment   (pt reports a 3 at beginning of session and a 5 following activity; RN notified)   Cognition    Level of Consciousness Alert   Cognitive Pattern Assessment   Cognitive Pattern Assessment Used BIMS   Brief Interview for " "Mental Status (BIMS)   Repetition of Three Words (First Attempt) 3   Temporal Orientation: Year Correct   Temporal Orientation: Month Accurate within 5 days   Temporal Orientation: Day Correct   Recall: \"Sock\" Yes, no cue required   Recall: \"Blue\" Yes, no cue required   Recall: \"Bed\" Yes, no cue required   BIMS Summary Score 15   Confusion Assessment Method (CAM)   Is there evidence of an acute change in mental status from the patient's baseline? No   Inattention Behavior not present   Disorganized thinking Behavior not present   Altered level of consciousness Behavior not present   Vision Screen   Vision Not tested   Passive ROM Upper Body   Passive ROM Upper Body Not Tested   Active ROM Upper Body   Active ROM Upper Body  WDL   Dominant Hand Right   Comments pt noted to have L inattention   Strength Upper Body   Upper Body Strength  X   Gross Strength Generalized Weakness, Equal Bilaterally.    Comments pt could have more weakness on L side, though also noted to have L side inattention   Balance Assessment   Sitting Balance (Static) Fair -   Sitting Balance (Dynamic) Poor +   Weight Shift Sitting Good   Bed Mobility    Supine to Sit Standby Assist   Sit to Stand Minimal Assist   Coordination Upper Body   Coordination Not Tested   Comments pt noted to have difficulty with FM tasks with L hand this session, needs further testing   Eating   Assistance Needed Set-up / clean-up   Physical Assistance Level No physical assistance   CARE Score - Eating 5   Eating Discharge Goal   Discharge Goal 6   Oral Hygiene   Assistance Needed Set-up / clean-up   Physical Assistance Level No physical assistance   CARE Score - Oral Hygiene 5   Oral Hygiene Discharge Goal   Discharge Goal 6   Shower/Bathe Self   Assistance Needed Physical assistance   Physical Assistance Level 51%-75%   CARE Score - Shower/Bathe Self 2   Shower/Bathe Self Discharge Goal   Discharge Goal 5   Upper Body Dressing   Assistance Needed Supervision   Physical " Assistance Level No physical assistance   CARE Score - Upper Body Dressing 4   Upper Body Dressing Discharge Goal   Discharge Goal 6   Lower Body Dressing   Assistance Needed Physical assistance   Physical Assistance Level 76% or more   CARE Score - Lower Body Dressing 2   Lower Body Dressing Discharge Goal   Discharge Goal 6   Putting On/Taking Off Footwear   Assistance Needed Physical assistance   Physical Assistance Level 25% or less   CARE Score - Putting On/Taking Off Footwear 3   Putting On/Taking Off Footwear Discharge Goal   Discharge Goal 6   Toileting Hygiene   Assistance Needed Physical assistance   Physical Assistance Level 76% or more   CARE Score - Toileting Hygiene 2   Toileting Hygiene Discharge Goal   Discharge Goal 6   Toilet Transfer   Assistance Needed Physical assistance   Physical Assistance Level 26%-50%   CARE Score - Toilet Transfer 3   Toilet Transfer Discharge Goal   Discharge Goal 4   Hearing, Speech, and Vision   Ability to Hear Adequate   Ability to See in Adequate Light Adequate   Expression of Ideas and Wants Without difficulty   Understanding Verbal and Non-Verbal Content Understands   Functional Level of Assist   Eating Supervision   Grooming Standby Assist   Bathing Moderate Assist   Bathing Description Grab bar;Hand held shower;Tub bench;Assit with back;Assit wtih lower extremities;Increased time;Set-up of equipment;Supervision for safety;Verbal cueing   Upper Body Dressing Supervision   Upper Body Dressing Description Increased time;Initial preparation for task;Supervision for safety   Lower Body Dressing Maximal Assist  (A with urban management; A with threading LLE)   Lower Body Dressing Description Assist with threading into pant leg;Increased time;Initial preparation for task;Grab bar;Supervision for safety;Verbal cueing   Toileting Moderate Assist   Toileting Description Assist to pull pants up;Assist for standing balance;Increased time;Supervision for safety;Verbal  cueing  (based on clinical reasoning, required steadying balance when completing hygiene in seated in shower and A with clothing)   Bed, Chair, Wheelchair Transfer Minimal Assist  (with FWW)   Bed Chair Wheelchair Transfer Description Adaptive equipment;Increased time;Initial preparation for task;Verbal cueing   Toilet Transfers Minimal Assist  (based on clinical reasoning)   Toilet Transfer Description Grab bar;Increased time;Initial preparation for task;Supervision for safety   Tub / Shower Transfers Minimal Assist   Tub Shower Transfer Description Grab bar;Shower bench;Increased time;Initial preparation for task;Supervision for safety;Verbal cueing   Comprehension Supervision   Expression Supervision   Problem Solving Minimal Assist   Memory Supervision   Problem List   Problem List Decreased Active Daily Living Skills;Decreased Homemaking Skills;Decreased Upper Extremity Strength Right;Decreased Upper Extremity Strength Left;Decreased Functional Mobility;Decreased Activity Tolerance;Impaired Coordination Left Upper Extremity;Impaired Postural Control / Balance   Precautions   Precautions Fall Risk   Comments L inattention, Dawson hose for BP   Current Discharge Plan   Current Discharge Plan Temporarily go to Family /  Friend's Home   Benefit    Therapy Benefit Patient Would Benefit from Inpatient Rehab Occupational Therapy to Maximize [place] with ADLs, IADLs and Functional Mobility.   Interdisciplinary Plan of Care Collaboration   IDT Collaboration with  Nursing;Physical Therapist   Patient Position at End of Therapy Seated;Chair Alarm On;Self Releasing Lap Belt Applied;Call Light within Reach;Tray Table within Reach   Collaboration Comments pain, CLOF   Equipment Needs   Assistive Device / DME Tub Transfer Bench;Grab Bars In Shower / Tub;Grab Bars By Toilet   Strengths & Barriers   Strengths Able to follow instructions;Alert and oriented;Independent prior level of function;Making steady progress towards  goals;Motivated for self care and independence;Pleasant and cooperative;Supportive family;Willingly participates in therapeutic activities   Barriers Decreased endurance;Fatigue;Generalized weakness;Impaired activity tolerance;Impaired balance       Assessment  Patient is 41 y.o. female with a diagnosis of Metastatic cancer to brain.    Additional factors influencing patient status / progress (ie: cognitive factors, co-morbidities, social support, etc): Pt has a PMH of diabetes mellitus and per oncology notes pt was diagnosed with melanoma in 2016 and underwent immunotherapy for 3 years.  She had an abnormal PET scan earlier this year.     At time of evaluation patient presents below functional baseline w/ primary barriers being L inattention, activity tolerance, fatigue, decreased balance, decreased endurance. She will benefit from daily skilled OT to maximize independence w/ ADLs, IADLs, and functional mobility.     Plan  Recommend Occupational Therapy  minutes per day 5-7 days per week for 10-14 days for the following treatments:  OT Group Therapy, OT Self Care/ADL, OT Community Reintegration, OT Neuro Re-Ed/Balance, OT Therapeutic Activity, OT Evaluation, and OT Therapeutic Exercise.    Passport items to be completed:  Perform bathroom transfers, complete dressing, complete feeding, get ready for the day, prepare a simple meal, participate in household tasks, adapt home for safety needs, demonstrate home exercise program, complete caregiver training     Goals:  Long term and short term goals have been discussed with patient and they are in agreement.    Occupational Therapy Goals (Active)       Problem: Bathing       Dates: Start: 10/16/22         Goal: STG-Within one week, patient will bathe with Min A overall using AE/DME as needed.       Dates: Start: 10/16/22               Problem: Dressing       Dates: Start: 10/16/22         Goal: STG-Within one week, patient will dress LB with Min A overall using  AE/DME as needed.       Dates: Start: 10/16/22               Problem: Functional Transfers       Dates: Start: 10/16/22         Goal: STG-Within one week, patient will transfer to toilet with CGA overall using AE/DME as needed.       Dates: Start: 10/16/22               Problem: OT Long Term Goals       Dates: Start: 10/16/22         Goal: LTG-By discharge, patient will complete basic self care tasks with supervision-Mod I overall using AE/DME as needed.       Dates: Start: 10/16/22            Goal: LTG-By discharge, patient will perform bathroom transfers with supervision overall using AE/DME as needed.       Dates: Start: 10/16/22               Problem: Toileting       Dates: Start: 10/16/22         Goal: STG-Within one week, patient will complete toileting tasks with Min A overall using AE/DME as needed.       Dates: Start: 10/16/22

## 2022-10-16 NOTE — DISCHARGE PLANNING
CASE MANAGEMENT INITIAL ASSESSMENT    Admit Date:  10/15/2022     Case Management has reviewed the medical chart and will meet with patient and family to discuss role of case management / discharge planning / team conference.     Patient is a  41 y.o. female transferred from [facility].    Attending physician: Dr. Patel  PCP: Dr. Adriana Levine   Neurosurgeon: Dr. Pebbles Tejada     Diagnosis: Malignant melanoma metastatic to brain (HCC) [C79.31]  Brain mass [G93.89]  Metastatic cancer to brain (HCC) [C79.31]    Co-morbidities:   Patient Active Problem List    Diagnosis Date Noted    Metastatic cancer to brain (HCC) 10/15/2022    Adrenal insufficiency due to corticosteroid withdrawal (HCC) 10/12/2022    Chronic prescription benzodiazepine use 10/07/2022    Constipation 10/07/2022    Brain mass 10/06/2022    Malignant melanoma metastatic to brain ([facility]) 10/01/2022    Cellulitis 08/26/2022    Leucocytosis 08/26/2022    Abnormal CT scan, chest 06/13/2022    Gastroenteritis 05/19/2022    Hypokalemia 10/08/2019    Hypotension 10/08/2019    Bipolar 1 disorder ([facility]) 10/07/2019    DM2 (diabetes mellitus, type 2) ([facility]) 10/07/2019    Appendicitis 10/07/2019    Melanoma of left upper arm ([facility]) 06/23/2017    Nausea & vomiting 09/04/2015    Sepsis ([facility]) 09/04/2015    Leukocytosis 09/04/2015    Lactic acidosis 09/04/2015    Anxiety 09/04/2015    Diarrhea 09/04/2015    Asthma     Depression      Prior Living Situation:  Housing / Facility: 1 Story House  Lives with - Patient's Self Care Capacity: Alone and Able to Care For Self    Prior Level of Function:  Medication Management: Independent  Finances: Independent  Home Management: Independent  Shopping: Independent  Prior Level Of Mobility: Independent Without Device in Home  Driving / Transportation: Driving Independent    Support Systems:  Primary : Guillermina Hammond (mother) [phone], Korey Hammond (father) [phone]    Previous Services Utilized:   Prior  Services: Home-Independent    Other Information:  Occupation (Pre-Hospital Vocational): 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 Pebbles Tejada ([facility] [place]    Patient / Family Goal:  Patient / Family Goal:  will meet with patient and family to discuss goals    Plan:  1. Continue to follow patient through hospitalization and provide discharge planning in collaboration with patient, family, physicians and ancillary services.     2. Utilize community resources to ensure a safe discharge.

## 2022-10-16 NOTE — CARE PLAN
"The patient is Stable - Low risk of patient condition declining or worsening    Shift Goals  Clinical Goals: safety    Progress made toward(s) clinical / shift goals:      Problem: Pain - Standard  Goal: Alleviation of pain or a reduction in pain to the patient’s comfort goal  Outcome: Progressing  Note: Pain meds given as scheduled and PRN per MAR for c/o headache with adequate relief. Pt sleeps good. Will continue to monitor.     Problem: Fall Risk - Rehab  Goal: Patient will remain free from falls  Outcome: Progressing  Note: Ginger Trinidad Fall risk Assessment Score: 12    Moderate fall risk Interventions  - Bed and strip alarm   - Yellow sign by the door   - Yellow wrist band \"Fall risk\"  - Do not leave patient unattended in the bathroom  - Fall risk education provided    Pt calls appropriately when in need of assistance.       Patient is not progressing towards the following goals:      "

## 2022-10-16 NOTE — FLOWSHEET NOTE
10/15/22 1641 10/15/22 1708   Patient History   Pulmonary Diagnosis  --  asthma   Procedures Relevant to Respiratory Status  --  melanoma with mets to lungs and brain   Home O2  --  No   Home Treatments/Frequency  --  Yes   SVN 1/Frequency  --  albuterol prn   MDI 1/Frequency  --  albuterol Qid   COPD Population Screener   COPD Coordinator Recommended  --  Yes   Bronchodilator Protocol   Med Order With RCP  --  No   Bronchodilator Indications  --  Strong Subjective / Objective Improvement   Breath Sounds Criteria 0  0    Benefit Criteria 1  1    Pulse Criteria 0  0    Respiratory Rate Criteria 0  0    S.O.B. Criteria 0  0    Criteria Total 1 1   Point Values  --  0-3 - PRN   Bronchodilator Goals/Outcome  --  Patient at Stable Baseline

## 2022-10-16 NOTE — CARE PLAN
The patient is Stable - Low risk of patient condition declining or worsening    Shift Goals  Clinical Goals: safety  Patient Goals: pain mgt    Problem: Knowledge Deficit - Standard  Goal: Patient and family/care givers will demonstrate understanding of plan of care, disease process/condition, diagnostic tests and medications  Outcome: Progressing: Rehab POC, medications discussed with pt who verbalizes understanding.     Problem: Diabetes Management  Goal: Patient will achieve and maintain glucose in satisfactory range  Outcome: Progressing: FSBG = 156, 184, and 200. SS insulin administered per MAR.

## 2022-10-16 NOTE — FLOWSHEET NOTE
10/15/22 1708   Events/Summary/Plan   Events/Summary/Plan new admit, assessment done, hx of asthms   Vital Signs   Pulse 71   Respiration 16   Pulse Oximetry 94 %   $ Pulse Oximetry (Spot Check) Yes   Respiratory Assessment   Respiratory Pattern Within Normal Limits   Level of Consciousness Alert   Chest Exam   Work Of Breathing / Effort Within Normal Limits   Breath Sounds   RUL Breath Sounds Clear   RML Breath Sounds Clear   RLL Breath Sounds Crackles   ASHLEY Breath Sounds Clear   LLL Breath Sounds Crackles;Diminished   Oxygen   O2 (LPM) 0   FiO2% 21 %   O2 Delivery Device None - Room Air   Smoking History   Have you ever smoked Yes   Have you smoked in the last 12 months Yes   Have you quit smoking No   Do you have any pipes/cigarettes/lighter/matches/etc in your possesion No   Smoking Cessation Offered Patient Counseled

## 2022-10-16 NOTE — THERAPY
Physical Therapy   Initial Evaluation     Patient Name: Jacque Mcintyre  Age:  41 y.o., Sex:  female  Medical Record #: [id number]  Today's Date: 10/16/2022     Subjective    Pt received in room, agreeable to PT session. Oriented to self, date, location, and DC plan. 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     Objective       10/16/22 1031   PT Charge Group   PT Gait Training 2   PT Evaluation PT Evaluation Mod   PT Total Time Spent   PT Individual Total Time Spent (Mins) 60   Prior Living Situation   Prior Services Home-Independent   Housing / Facility 1 Story House   Steps Into Home 3   Steps In Home 0   Rail None   Elevator No   Equipment Owned None   Lives with - Patient's Self Care Capacity Alone and Able to Care For Self  (previosly residing with 18 y/o son in [facility], however plans to DC to her parent's home in [place]: [facility] with 3 CAROLYN, no HRs, however they plan to install a ramp)   Prior Level of Functional Mobility   Bed Mobility Independent   Transfer Status Independent   Ambulation Independent   Distance Ambulation (Feet)   (community distances)   Assistive Devices Used None   Wheelchair Other (Comments)  (no wc use at baseline)   Stairs Independent   Comments working as RN   Prior Functioning: Everyday Activities   Self Care Independent   Indoor Mobility (Ambulation) Independent   Stairs Independent   Functional Cognition Independent   Prior Device Use None of the given options   Vitals   Pulse 90   Patient BP Position Sitting   Blood Pressure (!) 94/66   Room Air Oximetry 96   Pain 0 - 10 Group   Therapist Pain Assessment   (denies significant pain currently)   Cognition    Level of Consciousness Alert   Initiation Impaired   Comments oriented to self, date, location   Passive ROM Lower Body   Comments L PF contracture.   Active ROM Lower Body    Comments L PF contracture   Strength Lower Body   Rt Hip Flexion Strength 4- (G-)   Rt Knee Flexion Strength 5 (N)   Rt Knee Extension Strength 4 (G)   Rt Ankle  Dorsiflexion Strength 5 (N)   Rt Ankle Plantar Flexion Strength 4 (G)   Lt Hip Flexion Strength 3 (F)   Lt Knee Flexion Strength 3+ (F+)   Lt Knee Extension Strength 3+ (F+)   Lt Ankle Dorsiflexion Strength 0 (Zero)   Lt Ankle Plantar Flexion Strength   (hypertonicity L gastroc)   Sensation Lower Body   Comments unable to assess L ankle proprioception due to L PF contracture   Lower Body Muscle Tone   Lower Body Muscle Tone  X   Lt Lower Extremity Muscle Tone Contractures;Hypertonic  (L gastroc)   Modified Je Scale Right Lower Extremity 4   Bed Mobility    Supine to Sit Minimal Assist   Sit to Supine Moderate Assist   Sit to Stand Minimal Assist   Scooting Contact Guard Assist   Rolling Minimal Assist to Rt.   Comments increased time   Neurological Concerns   Neurological Concerns Yes   Footdrop Present   Roll Left and Right   Assistance Needed Physical assistance   Physical Assistance Level 25% or less   CARE Score - Roll Left and Right 3   Roll Left and Right Discharge Goal   Discharge Goal 6   Sit to Lying   Assistance Needed Physical assistance   Physical Assistance Level 26%-50%   CARE Score - Sit to Lying 3   Sit to Lying Discharge Goal   Discharge Goal 6   Lying to Sitting on Side of Bed   Assistance Needed Physical assistance   Physical Assistance Level 25% or less   CARE Score - Lying to Sitting on Side of Bed 3   Lying to Sitting on Side of Bed Discharge Goal   Discharge Goal 6   Sit to Stand   Assistance Needed Physical assistance   Physical Assistance Level 25% or less   CARE Score - Sit to Stand 3   Sit to Stand Discharge Goal   Discharge Goal 6   Chair/Bed-to-Chair Transfer   Assistance Needed Physical assistance   Physical Assistance Level 26%-50%   CARE Score - Chair/Bed-to-Chair Transfer 3   Chair/Bed-to-Chair Transfer Discharge Goal   Discharge Goal 6   Toilet Transfer   Assistance Needed Physical assistance   Physical Assistance Level 26%-50%   CARE Score - Toilet Transfer 3   Toilet  Transfer Discharge Goal   Discharge Goal 6   Car Transfer   Assistance Needed Physical assistance   Physical Assistance Level 26%-50%   CARE Score - Car Transfer 3   Car Transfer Discharge Goal   Discharge Goal 6   Walk 10 Feet   Assistance Needed Physical assistance   Physical Assistance Level 26%-50%   CARE Score - Walk 10 Feet 3   Walk 10 Feet Discharge Goal   Discharge Goal 6   Walk 50 Feet with Two Turns   Assistance Needed Physical assistance   Physical Assistance Level 26%-50%   CARE Score - Walk 50 Feet with Two Turns 3   Walk 50 Feet with Two Turns Discharge Goal   Discharge Goal 6   Walk 150 Feet   Assistance Needed Physical assistance   Physical Assistance Level 26%-50%   CARE Score - Walk 150 Feet 3   Walk 150 Feet Discharge Goal   Discharge Goal 6   Walking 10 Feet on Uneven Surfaces   Assistance Needed Physical assistance   Physical Assistance Level 26%-50%   CARE Score - Walking 10 Feet on Uneven Surfaces 3   Walking 10 Feet on Uneven Surfaces Discharge Goal   Discharge Goal 6   1 Step (Curb)   Assistance Needed Physical assistance   Physical Assistance Level 26%-50%   CARE Score - 1 Step (Curb) 3   1 Step (Curb) Discharge Goal   Discharge Goal 4   4 Steps   Assistance Needed Physical assistance   Physical Assistance Level 26%-50%   CARE Score - 4 Steps 3   4 Steps Discharge Goal   Discharge Goal 4   12 Steps   Reason if not Attempted Safety concerns   CARE Score - 12 Steps 88   12 Steps Discharge Goal   Discharge Goal 4   Picking Up Object   Assistance Needed Physical assistance   Physical Assistance Level 51%-75%   CARE Score - Picking Up Object 2   Picking Up Object Discharge Goal   Discharge Goal 4   Wheel 50 Feet with Two Turns   Reason if not Attempted Activity not applicable   CARE Score - Wheel 50 Feet with Two Turns 9   Wheel 50 Feet with Two Turns Discharge Goal   Discharge Goal 9   Wheel 150 Feet   Reason if not Attempted Activity not applicable   CARE Score - Wheel 150 Feet 9   Wheel 150  Feet Discharge Goal   Discharge Goal 9   Gait Functional Level of Assist    Gait Level Of Assist Moderate Assist   Assistive Device None   Distance (Feet) 180   # of Times Distance was Traveled 1   Deviation   (slow speed, increased time in DLS, L lateral lean, L PF contracture)   Stairs Functional Level of Assist   Level of Assist with Stairs Moderate Assist   # of Stairs Climbed 8   Stairs Description   (2x4 std height steps continuously with B HRs, variable step-to and reciprocal pattern)   Transfer Functional Level of Assist   Bed, Chair, Wheelchair Transfer Moderate Assist   Bed Chair Wheelchair Transfer Description Increased time;Verbal cueing;Set-up of equipment   Problem List    Problems Pain;Impaired Bed Mobility;Impaired Transfers;Impaired Ambulation;Functional ROM Deficit;Functional Strength Deficit;Impaired Balance;Impaired Coordination;Decreased Activity Tolerance;Safety Awareness Deficits / Cognition;Motor Planning / Sequencing   Precautions   Precautions Fall Risk;Other (See Comments)   Comments chronic LBP   Current Discharge Plan   Current Discharge Plan Temporarily go to Family /  Friend's Home  (plan to DC to parent's home in [place]; [facility] with 3 CAROLYN no HRs, they plan to install a ramp)   Interdisciplinary Plan of Care Collaboration   IDT Collaboration with  Nursing   Patient Position at End of Therapy Seated;Chair Alarm On;Self Releasing Lap Belt Applied;Tray Table within Reach;Call Light within Reach;Phone within Reach   Collaboration Comments tx of care to Dyana RODRIGUEZ   Benefit   Therapy Benefit Patient Would Benefit from Inpatient Rehabilitation Physical Therapy to Maximize Functional [place] with ADLs, IADLs and Mobility.   Strengths & Barriers   Strengths Able to follow instructions;Effective communication skills;Alert and oriented;Good insight into deficits/needs;Independent prior level of function;Making steady progress towards goals;Manages pain appropriately;Motivated for self care and  independence;Pleasant and cooperative;Supportive family;Willingly participates in therapeutic activities   Barriers Hemiparesis       Assessment  Patient is 41 y.o. female with a diagnosis of malignant melanoma with metastases to the brain, s/p Right frontal craniotomy for resection of supratentorial intraaxial brain tumors, three, same incision and same craniotomy on 10/6/2022 by Dr. Pebbles Tejada MD.    Additional factors influencing patient status / progress (ie: cognitive factors, co-morbidities, social support, etc): strong family support.  She presents with L hemiparesis, L gastroc hypertonicity with L PF contracture, gait abnormality, L hemiparesis which per patient has been improving since surgery, with resultant impaired functional mobility and high risk for falls. PT educated on safety including use of call and to not transfer by herself at this time.    Plan  Recommend Physical Therapy  minutes 1-2x/per day 5-7 days per week for 3 weeks for the following treatments:  PT Group Therapy, therex, gait training, neuromuscular re-ed, therapeutic activities.    Passport items to be completed:  Get in/out of bed safely, in/out of a vehicle, safely use mobility device, walk or wheel around home/community, navigate up and down stairs, show how to get up/down from the ground, ensure home is accessible, demonstrate HEP, complete caregiver training    Goals:  Long term and short term goals have been discussed with patient and she is in agreement.    Physical Therapy Problems (Active)       Problem: Balance       Dates: Start: 10/16/22         Goal: STG-Within one week, patient will maintain dynamic standing: score >45/56 on the Thornton Balance Assessment.       Dates: Start: 10/16/22               Problem: Mobility       Dates: Start: 10/16/22         Goal: STG-Within one week, patient will ambulate household distance at least 500' with SPC or no AD, SBA.       Dates: Start: 10/16/22               Problem: Mobility  Transfers       Dates: Start: 10/16/22         Goal: STG-Within one week, patient will perform bed mobility Mod I with bed flat.       Dates: Start: 10/16/22            Goal: STG-Within one week, patient will transfer bed to chair with SPC or no AD.       Dates: Start: 10/16/22               Problem: PT-Long Term Goals       Dates: Start: 10/16/22         Goal: LTG-By discharge, patient will maintain balance: at least 22/30 on the FGA indicating low fall risk, to facilitate safe DC to home.       Dates: Start: 10/16/22            Goal: LTG-By discharge, patient will ambulate at least 1000' during 6 Minute Walk Test.       Dates: Start: 10/16/22            Goal: LTG-By discharge, patient will transfer one surface to another Mod I.       Dates: Start: 10/16/22            Goal: LTG-By discharge, patient will transfer in/out of a car SPV.       Dates: Start: 10/16/22            Goal: LTG-By discharge, patient will ambulate up/down ADA ramp with SPC or no AD, and SPV, to safely enter/exit  her parent's home.       Dates: Start: 10/16/22

## 2022-10-17 ENCOUNTER — APPOINTMENT (OUTPATIENT)
Dept: RADIOLOGY | Facility: REHABILITATION | Age: 42
DRG: 055 | End: 2022-10-17
Attending: HOSPITALIST
Payer: COMMERCIAL

## 2022-10-17 PROBLEM — E78.5 DYSLIPIDEMIA: Status: ACTIVE | Noted: 2022-10-17

## 2022-10-17 PROBLEM — R94.6 BORDERLINE ABNORMAL TFTS: Status: ACTIVE | Noted: 2022-10-17

## 2022-10-17 PROBLEM — D75.839 THROMBOCYTOSIS: Status: ACTIVE | Noted: 2022-10-17

## 2022-10-17 PROBLEM — E55.9 VITAMIN D DEFICIENCY: Status: ACTIVE | Noted: 2022-10-17

## 2022-10-17 PROBLEM — D64.9 ANEMIA: Status: ACTIVE | Noted: 2022-10-17

## 2022-10-17 LAB
APPEARANCE UR: ABNORMAL
BACTERIA #/AREA URNS HPF: ABNORMAL /HPF
BILIRUB UR QL STRIP.AUTO: NEGATIVE
COLOR UR: YELLOW
EPI CELLS #/AREA URNS HPF: ABNORMAL /HPF
GLUCOSE BLD STRIP.AUTO-MCNC: 189 MG/DL (ref 65–99)
GLUCOSE BLD STRIP.AUTO-MCNC: 205 MG/DL (ref 65–99)
GLUCOSE BLD STRIP.AUTO-MCNC: 207 MG/DL (ref 65–99)
GLUCOSE BLD STRIP.AUTO-MCNC: 228 MG/DL (ref 65–99)
GLUCOSE UR STRIP.AUTO-MCNC: >=1000 MG/DL
HYALINE CASTS #/AREA URNS LPF: ABNORMAL /LPF
KETONES UR STRIP.AUTO-MCNC: NEGATIVE MG/DL
LEUKOCYTE ESTERASE UR QL STRIP.AUTO: ABNORMAL
MICRO URNS: ABNORMAL
NITRITE UR QL STRIP.AUTO: POSITIVE
PH UR STRIP.AUTO: 5.5 [PH] (ref 5–8)
PROT UR QL STRIP: 100 MG/DL
RBC # URNS HPF: ABNORMAL /HPF
RBC UR QL AUTO: ABNORMAL
SP GR UR STRIP.AUTO: 1.03
UROBILINOGEN UR STRIP.AUTO-MCNC: 0.2 MG/DL
WBC #/AREA URNS HPF: ABNORMAL /HPF

## 2022-10-17 PROCEDURE — 97530 THERAPEUTIC ACTIVITIES: CPT

## 2022-10-17 PROCEDURE — A9270 NON-COVERED ITEM OR SERVICE: HCPCS | Performed by: PHYSICAL MEDICINE & REHABILITATION

## 2022-10-17 PROCEDURE — 97129 THER IVNTJ 1ST 15 MIN: CPT

## 2022-10-17 PROCEDURE — 87077 CULTURE AEROBIC IDENTIFY: CPT

## 2022-10-17 PROCEDURE — 700102 HCHG RX REV CODE 250 W/ 637 OVERRIDE(OP): Performed by: PHYSICAL MEDICINE & REHABILITATION

## 2022-10-17 PROCEDURE — 71045 X-RAY EXAM CHEST 1 VIEW: CPT

## 2022-10-17 PROCEDURE — 700111 HCHG RX REV CODE 636 W/ 250 OVERRIDE (IP): Performed by: PHYSICAL MEDICINE & REHABILITATION

## 2022-10-17 PROCEDURE — 82962 GLUCOSE BLOOD TEST: CPT | Mod: 91

## 2022-10-17 PROCEDURE — 97130 THER IVNTJ EA ADDL 15 MIN: CPT

## 2022-10-17 PROCEDURE — 700105 HCHG RX REV CODE 258: Performed by: HOSPITALIST

## 2022-10-17 PROCEDURE — 81001 URINALYSIS AUTO W/SCOPE: CPT

## 2022-10-17 PROCEDURE — 87086 URINE CULTURE/COLONY COUNT: CPT

## 2022-10-17 PROCEDURE — 97535 SELF CARE MNGMENT TRAINING: CPT

## 2022-10-17 PROCEDURE — 99233 SBSQ HOSP IP/OBS HIGH 50: CPT | Performed by: PHYSICAL MEDICINE & REHABILITATION

## 2022-10-17 PROCEDURE — 99223 1ST HOSP IP/OBS HIGH 75: CPT | Performed by: HOSPITALIST

## 2022-10-17 PROCEDURE — 87186 SC STD MICRODIL/AGAR DIL: CPT

## 2022-10-17 PROCEDURE — 770010 HCHG ROOM/CARE - REHAB SEMI PRIVAT*

## 2022-10-17 RX ORDER — INSULIN LISPRO 100 [IU]/ML
3-14 INJECTION, SOLUTION INTRAVENOUS; SUBCUTANEOUS
Status: DISCONTINUED | OUTPATIENT
Start: 2022-10-17 | End: 2022-10-17

## 2022-10-17 RX ORDER — SODIUM CHLORIDE 9 MG/ML
1000 INJECTION, SOLUTION INTRAVENOUS ONCE
Status: COMPLETED | OUTPATIENT
Start: 2022-10-17 | End: 2022-10-18

## 2022-10-17 RX ORDER — INSULIN LISPRO 100 [IU]/ML
2-12 INJECTION, SOLUTION INTRAVENOUS; SUBCUTANEOUS
Status: DISCONTINUED | OUTPATIENT
Start: 2022-10-17 | End: 2022-10-18

## 2022-10-17 RX ADMIN — TAMSULOSIN HYDROCHLORIDE 0.4 MG: 0.4 CAPSULE ORAL at 07:57

## 2022-10-17 RX ADMIN — LEVETIRACETAM 1000 MG: 500 TABLET, FILM COATED ORAL at 19:56

## 2022-10-17 RX ADMIN — OXCARBAZEPINE 300 MG: 300 TABLET, FILM COATED ORAL at 20:01

## 2022-10-17 RX ADMIN — OXYCODONE HYDROCHLORIDE 10 MG: 10 TABLET ORAL at 10:30

## 2022-10-17 RX ADMIN — INSULIN LISPRO 3 UNITS: 100 INJECTION, SOLUTION INTRAVENOUS; SUBCUTANEOUS at 07:23

## 2022-10-17 RX ADMIN — LEVETIRACETAM 1000 MG: 500 TABLET, FILM COATED ORAL at 07:57

## 2022-10-17 RX ADMIN — SODIUM CHLORIDE 1000 ML: 9 INJECTION, SOLUTION INTRAVENOUS at 14:20

## 2022-10-17 RX ADMIN — GABAPENTIN 800 MG: 400 CAPSULE ORAL at 17:10

## 2022-10-17 RX ADMIN — ENOXAPARIN SODIUM 40 MG: 40 INJECTION SUBCUTANEOUS at 20:07

## 2022-10-17 RX ADMIN — DULOXETINE HYDROCHLORIDE 60 MG: 30 CAPSULE, DELAYED RELEASE ORAL at 19:56

## 2022-10-17 RX ADMIN — OXYCODONE HYDROCHLORIDE 10 MG: 10 TABLET, FILM COATED, EXTENDED RELEASE ORAL at 07:57

## 2022-10-17 RX ADMIN — MICONAZOLE NITRATE: 20 CREAM TOPICAL at 07:58

## 2022-10-17 RX ADMIN — QUETIAPINE FUMARATE 200 MG: 100 TABLET ORAL at 19:56

## 2022-10-17 RX ADMIN — POLYETHYLENE GLYCOL 3350 1 PACKET: 17 POWDER, FOR SOLUTION ORAL at 20:01

## 2022-10-17 RX ADMIN — ATORVASTATIN CALCIUM 40 MG: 40 TABLET, FILM COATED ORAL at 19:57

## 2022-10-17 RX ADMIN — MICONAZOLE NITRATE: 20 CREAM TOPICAL at 20:09

## 2022-10-17 RX ADMIN — GABAPENTIN 800 MG: 400 CAPSULE ORAL at 07:57

## 2022-10-17 RX ADMIN — OXCARBAZEPINE 300 MG: 300 TABLET, FILM COATED ORAL at 07:57

## 2022-10-17 RX ADMIN — INSULIN LISPRO 4 UNITS: 100 INJECTION, SOLUTION INTRAVENOUS; SUBCUTANEOUS at 20:13

## 2022-10-17 RX ADMIN — OXYCODONE HYDROCHLORIDE 10 MG: 10 TABLET ORAL at 06:08

## 2022-10-17 RX ADMIN — POLYETHYLENE GLYCOL 3350 1 PACKET: 17 POWDER, FOR SOLUTION ORAL at 07:58

## 2022-10-17 RX ADMIN — SENNOSIDES AND DOCUSATE SODIUM 2 TABLET: 50; 8.6 TABLET ORAL at 07:57

## 2022-10-17 RX ADMIN — OMEPRAZOLE 20 MG: 20 CAPSULE, DELAYED RELEASE ORAL at 07:57

## 2022-10-17 RX ADMIN — OXYCODONE HYDROCHLORIDE 10 MG: 10 TABLET ORAL at 18:14

## 2022-10-17 RX ADMIN — INSULIN LISPRO 4 UNITS: 100 INJECTION, SOLUTION INTRAVENOUS; SUBCUTANEOUS at 17:07

## 2022-10-17 RX ADMIN — GABAPENTIN 800 MG: 400 CAPSULE ORAL at 14:23

## 2022-10-17 RX ADMIN — BACLOFEN 10 MG: 10 TABLET ORAL at 07:58

## 2022-10-17 RX ADMIN — INSULIN LISPRO 4 UNITS: 100 INJECTION, SOLUTION INTRAVENOUS; SUBCUTANEOUS at 11:03

## 2022-10-17 RX ADMIN — GABAPENTIN 800 MG: 400 CAPSULE ORAL at 19:57

## 2022-10-17 RX ADMIN — Medication 1000 UNITS: at 07:57

## 2022-10-17 RX ADMIN — OXYCODONE HYDROCHLORIDE 10 MG: 10 TABLET ORAL at 14:24

## 2022-10-17 RX ADMIN — BACLOFEN 10 MG: 10 TABLET ORAL at 20:01

## 2022-10-17 RX ADMIN — OXYCODONE HYDROCHLORIDE 10 MG: 10 TABLET, FILM COATED, EXTENDED RELEASE ORAL at 19:57

## 2022-10-17 RX ADMIN — BACLOFEN 10 MG: 10 TABLET ORAL at 14:24

## 2022-10-17 ASSESSMENT — ENCOUNTER SYMPTOMS
ABDOMINAL PAIN: 0
COUGH: 0
PALPITATIONS: 0
CHILLS: 0
SHORTNESS OF BREATH: 0
FOCAL WEAKNESS: 1
NAUSEA: 0
VOMITING: 0
BRUISES/BLEEDS EASILY: 0
EYES NEGATIVE: 1
POLYDIPSIA: 0
FEVER: 0

## 2022-10-17 NOTE — CARE PLAN
"The patient is Watcher - Medium risk of patient condition declining or worsening    Shift Goals  Clinical Goals: safety  Patient Goals: pain mgt    Progress made toward(s) clinical / shift goals:    Problem: Bladder / Voiding  Goal: Patient will establish and maintain regular urinary output  Note: Patient with indwelling catheter in place draining adequate amounts of clear yellow urine to gravity; skin intact; no leakage.  Denies flank pain or dysuria; afebrile.  Will continue to monitor.      Problem: Fall Risk - Rehab  Goal: Patient will remain free from falls  Note: Ginger Trinidad Fall risk Assessment Score: 13    l  Moderate fall risk Interventions  - Bed and strip alarm   - Yellow sign by the door   - Yellow wrist band \"Fall risk\"  - Room near to the nurse station  - Do not leave patient unattended in the bathroom  - Fall risk education provided       Problem: Skin Integrity  Goal: Skin integrity is maintained or improved  Outcome: Progressing  Note: Patient's skin remains intact and free from new or accidental injury this shift.  Will continue to monitor.            "

## 2022-10-17 NOTE — PROGRESS NOTES
"Rehab Progress Note     Encounter Date: 10/17/2022    CC: Decreased mobility, brain metastasis     Interval Events (Subjective)  Patient sitting up in room. She has low SBP and elevated HR. She also has elevated WBC.  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 Discussed consulting hospitalist. Discussed about muscle spasms after brain metastasis. Discussed about elevated TSH but normal T4. Denies NVD.    Objective:  VITAL SIGNS: BP (!) 98/69   Pulse (!) 129   Temp 36.8 °C (98.3 °F) (Oral)   Resp 18   Ht 1.626 m (5' 4.02\")   Wt 65 kg (143 lb 4.8 oz)   SpO2 95%   BMI 24.58 kg/m²   Gen: NAD  Psych: Mood and affect appropriate  CV: RRR, no edema  Resp: CTAB, no upper airway sounds  Abd: NTND  Neuro: AOx4, following commands    Recent Results (from the past 72 hour(s))   POCT glucose device results    Collection Time: 10/14/22  5:00 PM   Result Value Ref Range    POC Glucose, Blood 177 (H) 65 - 99 mg/dL   POCT glucose device results    Collection Time: 10/14/22  8:52 PM   Result Value Ref Range    POC Glucose, Blood 256 (H) 65 - 99 mg/dL   POCT glucose device results    Collection Time: 10/15/22  8:51 AM   Result Value Ref Range    POC Glucose, Blood 166 (H) 65 - 99 mg/dL   COV-2, FLU A/B, AND RSV BY PCR (2-4 HOURS CEPHEID): Collect NP swab in VTM    Collection Time: 10/15/22  1:40 PM    Specimen: Nasopharyngeal; Respirate   Result Value Ref Range    Influenza virus A RNA Negative Negative    Influenza virus B, PCR Negative Negative    RSV, PCR Negative Negative    SARS-CoV-2 by PCR NotDetected     SARS-CoV-2 Source NP Swab    POCT glucose device results    Collection Time: 10/15/22  2:07 PM   Result Value Ref Range    POC Glucose, Blood 161 (H) 65 - 99 mg/dL   POCT glucose device results    Collection Time: 10/15/22  5:04 PM   Result Value Ref Range    POC Glucose, Blood 233 (H) 65 - 99 mg/dL   POCT glucose device results    Collection Time: 10/15/22  9:02 PM   Result Value Ref Range    POC " Glucose, Blood 214 (H) 65 - 99 mg/dL   CBC with Differential    Collection Time: 10/16/22  5:55 AM   Result Value Ref Range    WBC 11.9 (H) 4.8 - 10.8 K/uL    RBC 4.13 (L) 4.20 - 5.40 M/uL    Hemoglobin 10.1 (L) 12.0 - 16.0 g/dL    Hematocrit 33.5 (L) 37.0 - 47.0 %    MCV 81.1 (L) 81.4 - 97.8 fL    MCH 24.5 (L) 27.0 - 33.0 pg    MCHC 30.1 (L) 33.6 - 35.0 g/dL    RDW 48.2 35.9 - 50.0 fL    Platelet Count 535 (H) 164 - 446 K/uL    MPV 9.9 9.0 - 12.9 fL    Neutrophils-Polys 65.40 44.00 - 72.00 %    Lymphocytes 24.80 22.00 - 41.00 %    Monocytes 8.80 0.00 - 13.40 %    Eosinophils 0.20 0.00 - 6.90 %    Basophils 0.30 0.00 - 1.80 %    Immature Granulocytes 0.50 0.00 - 0.90 %    Nucleated RBC 0.00 /100 WBC    Neutrophils (Absolute) 7.75 (H) 2.00 - 7.15 K/uL    Lymphs (Absolute) 2.94 1.00 - 4.80 K/uL    Monos (Absolute) 1.04 (H) 0.00 - 0.85 K/uL    Eos (Absolute) 0.02 0.00 - 0.51 K/uL    Baso (Absolute) 0.04 0.00 - 0.12 K/uL    Immature Granulocytes (abs) 0.06 0.00 - 0.11 K/uL    NRBC (Absolute) 0.00 K/uL   Comp Metabolic Panel (CMP)    Collection Time: 10/16/22  5:55 AM   Result Value Ref Range    Sodium 139 135 - 145 mmol/L    Potassium 3.9 3.6 - 5.5 mmol/L    Chloride 101 96 - 112 mmol/L    Co2 28 20 - 33 mmol/L    Anion Gap 10.0 7.0 - 16.0    Glucose 185 (H) 65 - 99 mg/dL    Bun 12 8 - 22 mg/dL    Creatinine 0.48 (L) 0.50 - 1.40 mg/dL    Calcium 8.8 8.5 - 10.5 mg/dL    AST(SGOT) 8 (L) 12 - 45 U/L    ALT(SGPT) 10 2 - 50 U/L    Alkaline Phosphatase 100 (H) 30 - 99 U/L    Total Bilirubin 0.2 0.1 - 1.5 mg/dL    Albumin 3.7 3.2 - 4.9 g/dL    Total Protein 5.8 (L) 6.0 - 8.2 g/dL    Globulin 2.1 1.9 - 3.5 g/dL    A-G Ratio 1.8 g/dL   HEMOGLOBIN A1C    Collection Time: 10/16/22  5:55 AM   Result Value Ref Range    Glycohemoglobin 7.8 (H) 4.0 - 5.6 %    Est Avg Glucose 177 mg/dL   TSH with Reflex to FT4    Collection Time: 10/16/22  5:55 AM   Result Value Ref Range    TSH 8.780 (H) 0.380 - 5.330 uIU/mL   Vitamin D, 25-hydroxy  (blood)    Collection Time: 10/16/22  5:55 AM   Result Value Ref Range    25-Hydroxy   Vitamin D 25 21 (L) 30 - 100 ng/mL   VITAMIN B12    Collection Time: 10/16/22  5:55 AM   Result Value Ref Range    Vitamin B12 -True Cobalamin 1095 (H) 211 - 911 pg/mL   FERRITIN    Collection Time: 10/16/22  5:55 AM   Result Value Ref Range    Ferritin 18.2 10.0 - 291.0 ng/mL   IRON/TOTAL IRON BIND    Collection Time: 10/16/22  5:55 AM   Result Value Ref Range    Iron 19 (L) 40 - 170 ug/dL    Total Iron Binding 276 250 - 450 ug/dL    Unsat Iron Binding 257 110 - 370 ug/dL    % Saturation 7 (L) 15 - 55 %   MAGNESIUM    Collection Time: 10/16/22  5:55 AM   Result Value Ref Range    Magnesium 1.9 1.5 - 2.5 mg/dL   PHOSPHORUS    Collection Time: 10/16/22  5:55 AM   Result Value Ref Range    Phosphorus 4.5 2.5 - 4.5 mg/dL   ESTIMATED GFR    Collection Time: 10/16/22  5:55 AM   Result Value Ref Range    GFR (CKD-EPI) 120 >60 mL/min/1.73 m 2   FREE THYROXINE    Collection Time: 10/16/22  5:55 AM   Result Value Ref Range    Free T-4 1.05 0.93 - 1.70 ng/dL   POCT glucose device results    Collection Time: 10/16/22  7:23 AM   Result Value Ref Range    POC Glucose, Blood 156 (H) 65 - 99 mg/dL   POCT glucose device results    Collection Time: 10/16/22 11:38 AM   Result Value Ref Range    POC Glucose, Blood 184 (H) 65 - 99 mg/dL   POCT glucose device results    Collection Time: 10/16/22  5:27 PM   Result Value Ref Range    POC Glucose, Blood 200 (H) 65 - 99 mg/dL   POCT glucose device results    Collection Time: 10/16/22  8:23 PM   Result Value Ref Range    POC Glucose, Blood 245 (H) 65 - 99 mg/dL   POCT glucose device results    Collection Time: 10/17/22  7:22 AM   Result Value Ref Range    POC Glucose, Blood 189 (H) 65 - 99 mg/dL   POCT glucose device results    Collection Time: 10/17/22 11:03 AM   Result Value Ref Range    POC Glucose, Blood 205 (H) 65 - 99 mg/dL       Current Facility-Administered Medications   Medication Frequency     insulin lispro (AdmeLOG,HumaLOG) injection 4X/DAY ACHS    And    insulin GLARGINE (Lantus,Semglee) injection Q EVENING    vitamin D3 (cholecalciferol) tablet 1,000 Units DAILY    Respiratory Therapy Consult Continuous RT    Pharmacy Consult Request ...Pain Management Review 1 Each PHARMACY TO DOSE    hydrALAZINE (APRESOLINE) tablet 25 mg Q8HRS PRN    acetaminophen (Tylenol) tablet 650 mg Q4HRS PRN    omeprazole (PRILOSEC) capsule 20 mg DAILY    mag hydrox-al hydrox-simeth (MAALOX PLUS ES or MYLANTA DS) suspension 20 mL Q2HRS PRN    ondansetron (ZOFRAN ODT) dispertab 4 mg 4X/DAY PRN    Or    ondansetron (ZOFRAN) syringe/vial injection 4 mg 4X/DAY PRN    traZODone (DESYREL) tablet 50 mg QHS PRN    sodium chloride (OCEAN) 0.65 % nasal spray 2 Spray PRN    oxyCODONE immediate-release (ROXICODONE) tablet 5 mg Q3HRS PRN    Or    oxyCODONE immediate release (ROXICODONE) tablet 10 mg Q3HRS PRN    traMADol (Ultram) 50 MG tablet 50 mg Q4HRS PRN    midazolam (VERSED) 5 mg/mL (1 mL vial) PRN    ALPRAZolam (XANAX) tablet 0.25 mg 4X/DAY PRN    atorvastatin (LIPITOR) tablet 40 mg Q EVENING    baclofen (LIORESAL) tablet 10 mg TID    DULoxetine (CYMBALTA) capsule 60 mg Q EVENING    enoxaparin (Lovenox) inj 40 mg DAILY AT 1800    gabapentin (NEURONTIN) capsule 800 mg 4X/DAY    levETIRAcetam (KEPPRA) tablet 1,000 mg BID    OXcarbazepine (TRILEPTAL) tablet 300 mg BID    oxyCODONE CR (OXYCONTIN) tablet 10 mg Q12HRS    QUEtiapine (Seroquel) tablet 200 mg Q EVENING    tamsulosin (FLOMAX) capsule 0.4 mg AFTER BREAKFAST    miconazole 2%-zinc oxide (Steven) topical cream BID    polyethylene glycol/lytes (MIRALAX) PACKET 1 Packet BID    And    senna-docusate (PERICOLACE or SENOKOT S) 8.6-50 MG per tablet 2 Tablet DAILY    And    magnesium hydroxide (MILK OF MAGNESIA) suspension 30 mL QDAY PRN    And    bisacodyl (DULCOLAX) suppository 10 mg QDAY PRN    albuterol inhaler 2 Puff Q4H PRN (RT)       Orders Placed This Encounter   Procedures    Diet  Order Diet: Consistent CHO (Diabetic)     Standing Status:   Standing     Number of Occurrences:   1     Order Specific Question:   Diet:     Answer:   Consistent CHO (Diabetic) [4]       Assessment:  Active Hospital Problems    Diagnosis     *Metastatic cancer to brain (HCC)        Medical Decision Making and Plan:  Adapted from Dr. Arambula's A&P  Metastatic brain cancer  Melanoma  BRAF+, follow-up oncology    ##MSK  #Impaired ADLs and mobility  Patient is admitted [facility] for comprehensive rehabilitation therapy as described.   Rehabilitation nursing monitors bowel and bladder control, educates on medication administration, co-morbidities and monitors patient safety.  Expected Length of Stay: 10-14 days  Anticipated discharge date: TBD  Anticipated discharge destination: home with parents  Prognosis: good  Equipment: TBD  Postdischarge therapies: TBD  Followup: Pebbles Tejada MD (neurosurgery), Mely Romero DO (PM&R), PCP  Precautions: none  Code: full resuscitation     #Postsurgical pain, acute, controlled  #Neuropathic pain, acute, controlled  Ordered baclofen 10mg TID, gabapentin 800mg QID, oxycontin 10mg BID     ##NEURO  #Metastasis to brain  Ordered keppra 1000mg BID, trileptal 300mg BID  Monitor  On Baclofen 10 mg TID for spasms     #RESP  Respiratory therapy: RT performs O2 management prn, breathing retraining, pulmonary hygiene and bronchospasm management prn to optimize participation in therapies.      ##CARDIAC  #Chronic hyperlipidemia  Ordered lipitor 40mg QHS  DVT prophylaxis: Ordered lovenox 40mg daily. Encourage OOB. Monitor daily for signs and symptoms of DVT including but not limited to swelling and pain to prevent the development of DVT that may interfere with therapies.     ##GI  GI prophylaxis:  Ordered prilosec 20mg daily to prevent gastritis/dyspepsia which may interfere with therapies.  #At risk of opioid induced constipation  Goal of one continent BM  daily     #At high risk of malnutrition  Dietician monitors nutrient intake, recommend supplements prn and provide nutrition education to pt/family to promote optimal nutrition for wound healing/recovery.     ##/RENAL  #At risk of urinary retention  timed voids Q3-4hr while awake followed by checking postvoid residual to ensure complete emptying. If PVR>200mL, intermittent catheterization     ##HEME  #Acute blood loss anemia  #Leukocytosis - Consult hospitalist  #Thrombocytosis, likely reactive  Monitor     ##ENDO  #Diabetes mellitus with hyperglycemia  Ordered Lantus 20mg QHS     ##SKIN  Skin/dermal ulcer prophylaxis: Monitor for new skin conditions with q.2 h. turns as required to prevent the development of skin breakdown.       Total time:  36 minutes.  I spent greater than 50% of the time for patient care and coordination on this date, including unit/floor time, and face-to-face time with the patient as per assessment and plan above.  Discussion included metastatic melanoma, elevated TSH but normal T4, elevated WBC, check XR/UA, and consult hospitalist. Patient's case was discussed face to face with Hospitalist on [facility] floor.     Zach Patel M.D.

## 2022-10-17 NOTE — CONSULTS
HOSPITAL MEDICINE CONSULTATION    Requesting Physician:  Dr. Patel    Reason for Consult:  Diabetes Mellitus, Leukocytosis    History of Present Illness:  The patient is a 41-year-old  female with past medical history significant for melanoma status post immunotherapy, diabetes mellitus, dyslipidemia, and bipolar disorder.  She was admitted to [facility] on 10/1/22 for left sided weakness.  Neuroimaging revealed multiple brain masses with vasogenic edema and midline shift.  The patient underwent craniotomy with resection of multiple tumors on 10/6/22 by Dr. Tejada.  Pathology was positive for melanoma.  She is on Keppra and has completed Decadron taper.  Plan is for palliative radiation therapy.  Due to her ongoing functional debility, the patient was transferred to [facility] on 10/15/22.  Hospital Medicine consultation is requested on 10/17/22 to assist in the management of this patient's DM and elevated white blood cell count.  She is also noted to have anemia, thrombocytosis, and abnormal thyroid function    Review of Systems:  Review of Systems   Constitutional:  Negative for chills and fever.   HENT: Negative.     Eyes: Negative.    Respiratory:  Negative for cough and shortness of breath.    Cardiovascular:  Negative for chest pain and palpitations.   Gastrointestinal:  Negative for abdominal pain, nausea and vomiting.   Musculoskeletal:         Wound pain   Skin:  Negative for itching and rash.   Neurological:  Positive for focal weakness.   Endo/Heme/Allergies:  Negative for polydipsia. Does not bruise/bleed easily.   All other systems reviewed and are negative.    Allergies:  Allergies   Allergen Reactions    Augmentin Vomiting    Clavulanic Acid Vomiting     Violent vomiting       Medications:    Current Facility-Administered Medications:     insulin GLARGINE (Lantus,Semglee) injection, 22 Units, Subcutaneous, Q EVENING **AND** insulin lispro  (AdmeLOG,HumaLOG) injection, 2-12 Units, Subcutaneous, 4X/DAY ACHS, Twila Felix M.D., 4 Units at 10/17/22 1707    vitamin D3 (cholecalciferol) tablet 1,000 Units, 1,000 Units, Oral, DAILY, Agustin Arambula D.O., 1,000 Units at 10/17/22 0757    Respiratory Therapy Consult, , Nebulization, Continuous RT, Zach Patel M.D.    Pharmacy Consult Request ...Pain Management Review 1 Each, 1 Each, Other, PHARMACY TO DOSE, Zach Patel M.D.    hydrALAZINE (APRESOLINE) tablet 25 mg, 25 mg, Oral, Q8HRS PRN, Zach Patel M.D.    acetaminophen (Tylenol) tablet 650 mg, 650 mg, Oral, Q4HRS PRN, Zach Patel M.D.    omeprazole (PRILOSEC) capsule 20 mg, 20 mg, Oral, DAILY, Zach Patel M.D., 20 mg at 10/17/22 0757    mag hydrox-al hydrox-simeth (MAALOX PLUS ES or MYLANTA DS) suspension 20 mL, 20 mL, Oral, Q2HRS PRN, Zach Patel M.D.    ondansetron (ZOFRAN ODT) dispertab 4 mg, 4 mg, Oral, 4X/DAY PRN **OR** ondansetron (ZOFRAN) syringe/vial injection 4 mg, 4 mg, Intramuscular, 4X/DAY PRN, Zach Patel M.D.    traZODone (DESYREL) tablet 50 mg, 50 mg, Oral, QHS PRN, Zach Patel M.D.    sodium chloride (OCEAN) 0.65 % nasal spray 2 Spray, 2 Spray, Nasal, PRN, Zach Patel M.D.    oxyCODONE immediate-release (ROXICODONE) tablet 5 mg, 5 mg, Oral, Q3HRS PRN **OR** oxyCODONE immediate release (ROXICODONE) tablet 10 mg, 10 mg, Oral, Q3HRS PRN, Zach Patel M.D., 10 mg at 10/17/22 1814    traMADol (Ultram) 50 MG tablet 50 mg, 50 mg, Oral, Q4HRS PRN, Zach Patel M.D.    midazolam (VERSED) 5 mg/mL (1 mL vial), 5 mg, Nasal, PRN, Zach Patel M.D.    ALPRAZolam (XANAX) tablet 0.25 mg, 0.25 mg, Oral, 4X/DAY PRN, Zach Patel M.D.    atorvastatin (LIPITOR) tablet 40 mg, 40 mg, Oral, Q EVENING, Zach Patel M.D., 40 mg at 10/16/22 2007    baclofen (LIORESAL) tablet 10 mg, 10 mg,  Oral, TID, Zach Patel M.D., 10 mg at 10/17/22 1424    DULoxetine (CYMBALTA) capsule 60 mg, 60 mg, Oral, Q EVENING, Zach Patel M.D., 60 mg at 10/16/22 2007    enoxaparin (Lovenox) inj 40 mg, 40 mg, Subcutaneous, DAILY AT 1800, Zach Patel M.D., 40 mg at 10/16/22 2008    gabapentin (NEURONTIN) capsule 800 mg, 800 mg, Oral, 4X/DAY, Zach Patel M.D., 800 mg at 10/17/22 1710    levETIRAcetam (KEPPRA) tablet 1,000 mg, 1,000 mg, Oral, BID, Zach Patel M.D., 1,000 mg at 10/17/22 0757    OXcarbazepine (TRILEPTAL) tablet 300 mg, 300 mg, Oral, BID, Zach Patel M.D., 300 mg at 10/17/22 0757    oxyCODONE CR (OXYCONTIN) tablet 10 mg, 10 mg, Oral, Q12HRS, Zach Patel M.D., 10 mg at 10/17/22 0757    QUEtiapine (Seroquel) tablet 200 mg, 200 mg, Oral, Q EVENING, Zach Patel M.D., 200 mg at 10/16/22 2007    tamsulosin (FLOMAX) capsule 0.4 mg, 0.4 mg, Oral, AFTER BREAKFAST, Zach Patel M.D., 0.4 mg at 10/17/22 0757    miconazole 2%-zinc oxide (Steven) topical cream, , Topical, BID, JUANCHO Weinstein.OKalee, Given at 10/17/22 0758    senna-docusate (PERICOLACE or SENOKOT S) 8.6-50 MG per tablet 2 Tablet, 2 Tablet, Oral, DAILY, 2 Tablet at 10/17/22 0757 **AND** polyethylene glycol/lytes (MIRALAX) PACKET 1 Packet, 1 Packet, Oral, BID, 1 Packet at 10/17/22 0758 **AND** magnesium hydroxide (MILK OF MAGNESIA) suspension 30 mL, 30 mL, Oral, QDAY PRN **AND** bisacodyl (DULCOLAX) suppository 10 mg, 10 mg, Rectal, QDAY PRN, Agustin Arambula D.O.    albuterol inhaler 2 Puff, 2 Puff, Inhalation, Q4H PRN (RT), Zach Patel M.D.    Past Medical/Surgical History:  Past Medical History:   Diagnosis Date    Asthma     inhaler    Bipolar 1 disorder (HCC)     Cancer (HCC) 01/01/2016    melanoma    Cough     Depression     DM mellitus,     insulin    Hyperlipidemia     Hypertension     Pap smear     Dr. Baird    PCOS  (polycystic ovarian syndrome)     Shortness of breath     Sputum production     Wheezing      Past Surgical History:   Procedure Laterality Date    CRANIOTOMY STEALTH Right 10/6/2022    Procedure: RIGHT FRONTAL CRANIOTOMY WITH STEALTH;  Surgeon: Pebbles Tejada M.D.;  Location: [facility];  Service: Neurosurgery    MO BRONCHOSCOPY,DIAGNOSTIC N/A 6/17/2022    Procedure: FIBER OPTIC BRONCHOSCOPY WITH WASH, BRUSH, BRONCHOALVEOLAR LAVAGE, BIOPSY, FINE NEEDLE ASPIRATION;  Surgeon: Yue Swanson M.D.;  Location: [facility];  Service: Pulmonary    ENDOBRONCHIAL US ADD-ON N/A 6/17/2022    Procedure: ENDOBRONCHIAL ULTRASOUND (EBUS);  Surgeon: Yue Swanson M.D.;  Location: [facility];  Service: Pulmonary    MO LAP,APPENDECTOMY N/A 10/7/2019    Procedure: APPENDECTOMY, LAPAROSCOPIC;  Surgeon: Lionel Frazier M.D.;  Location: [facility];  Service: General    AXILLARY NODE DISSECTION Left 6/23/2017    Procedure: AXILLARY NODE DISSECTION- COMPLETION LYPHADENECTOMY;  Surgeon: Lionel Weinberg M.D.;  Location: [facility];  Service:     WIDE EXCISION Left 5/9/2017    Procedure: WIDE EXCISION - MALIGNANT MELANOMA HAND;  Surgeon: Lionel Weinberg M.D.;  Location: SURGERY SAME DAY [facility];  Service:     NODE BIOPSY SENTINEL Left 5/9/2017    Procedure: NODE BIOPSY SENTINEL - AXILLARY;  Surgeon: Lionel Weinberg M.D.;  Location: SURGERY SAME DAY [facility];  Service:     OTHER  2016    excisino of melanoma left hand    ADENOIDECTOMY      MYRINGOTOMY      with tube placement       Social History:  Social History     Socioeconomic History    Marital status:      Spouse name: Not on file    Number of children: 2    Years of education: Not on file    Highest education level: Not on file   Occupational History    Occupation: stay at home mom     Employer: none   Tobacco Use    Smoking status: Every Day     Packs/day: 0.75     Years: 10.00     Pack years: 7.50      Types: Cigarettes    Smokeless tobacco: Never   Vaping Use    Vaping Use: Never used   Substance and Sexual Activity    Alcohol use: Not Currently    Drug use: Yes     Types: Inhaled     Comment: occasional smoked marijuanna approx 3 times per month    Sexual activity: Yes   Other Topics Concern    Not on file   Social History Narrative    Not on file     Social Determinants of Health     Financial Resource Strain: Not on file   Food Insecurity: Not on file   Transportation Needs: Not on file   Physical Activity: Not on file   Stress: Not on file   Social Connections: Not on file   Intimate Partner Violence: Not on file   Housing Stability: Not on file       Family History:  Family History   Problem Relation Age of Onset    Psychiatric Illness Mother         depression    Diabetes Mother     Hyperlipidemia Mother     Thyroid Mother         s/p radioactive iodine treatment on replacement    Hypertension Father     Diabetes Father        Physical Examination:   Vitals:    10/17/22 0700 10/17/22 0831 10/17/22 0845 10/17/22 1400   BP: 108/53 112/75 (!) 98/69 104/72   Pulse: 90 (!) 103 (!) 129 98   Resp: 18   18   Temp: 36.8 °C (98.3 °F)   37 °C (98.6 °F)   TempSrc: Oral   Oral   SpO2: 95%   99%   Weight:       Height:           Physical Exam  Vitals reviewed.   Constitutional:       Appearance: Normal appearance.   HENT:      Head:      Comments: Crani incision clean and dry w/ intact staples     Right Ear: External ear normal.      Left Ear: External ear normal.      Nose: Nose normal.      Mouth/Throat:      Pharynx: Oropharynx is clear.   Eyes:      General:         Right eye: No discharge.         Left eye: No discharge.      Extraocular Movements: Extraocular movements intact.      Conjunctiva/sclera: Conjunctivae normal.   Cardiovascular:      Rate and Rhythm: Normal rate and regular rhythm.   Pulmonary:      Effort: Pulmonary effort is normal. No respiratory distress.      Breath sounds: Normal breath sounds. No  wheezing.   Abdominal:      General: Bowel sounds are normal. There is no distension.      Palpations: Abdomen is soft.      Tenderness: There is no abdominal tenderness.   Musculoskeletal:      Cervical back: Normal range of motion and neck supple.      Right lower leg: No edema.      Left lower leg: No edema.   Skin:     General: Skin is warm and dry.   Neurological:      Mental Status: She is alert and oriented to person, place, and time.       Laboratory Data:  Recent Labs     10/16/22  0555   WBC 11.9*   RBC 4.13*   HEMOGLOBIN 10.1*   HEMATOCRIT 33.5*   MCV 81.1*   MCH 24.5*   MCHC 30.1*   RDW 48.2   PLATELETCT 535*   MPV 9.9     Recent Labs     10/16/22  0555   SODIUM 139   POTASSIUM 3.9   CHLORIDE 101   CO2 28   GLUCOSE 185*   BUN 12   CREATININE 0.48*   CALCIUM 8.8             Impressions/Recommendations:  DM2 (diabetes mellitus, type 2) ([facility])  HbA1c 7.8  Increase Lantus and adjust SSI  Observe serum glucose trends  Outpt meds include Tresiba 20 u qhs, Metformin, and Jardiance  Pt agreeable to being discharged only on insulin and no PO meds    Leukocytosis  Suspect 2/2 recent steroid taper  But check PCT, UA, and CXR  Follow temp and WBC    Bipolar 1 disorder (HCC)  On Trileptal  Psychiatry F/U    Vitamin D deficiency  Vit D level 21  On supplementation    Dyslipidemia  On Lipitor    Anemia   mL  Has microcytic indices  Vit B12 normal  Fe 19, start supplement if constipation not problematic  Follow H/H    Borderline abnormal TFTs  TSH 8.78 (elevated) and FT4 1.05 (normal)  May be 2/2 subclinical hypothyroidism  Consider Thyroid U/S given lower neck fullness on exam    Thrombocytosis  Likely reactive.  Follow PLT    Hypotension  Also has tachycardia  Will give IVF  Monitor for orthostatic hypotension on Flomax    Brain mass  Has prior h/o melanoma s/p immunotherapy  Presented w/ left sided weakness  Now w/ metastatic melanoma to brain w/ vasogenic edema and MLS  S/P crani w/ resection of multiple  masses on 10/6/22 by Dr. Tejada   mL  Completed Decadron taper  Remains on Kera  Plan for palliative XRT    Full Code    Discussed with patient and Dr. Patel.  Thank you for the opportunity to assist in this patient's care.  We will continue to follow along with you.

## 2022-10-17 NOTE — THERAPY
Speech Language Pathology  Daily Treatment     Patient Name: Jacque Mcintyre  Age:  41 y.o., Sex:  female  Medical Record #: [id number]  Today's Date: 10/17/2022     Precautions  Precautions: Fall Risk, Other (See Comments)  Comments: chronic LBP    Subjective    Pt seen at 1104 and 1504 for a total of 60 minutes. Therapy conducted in room in afternoon as nursing had just started IV fluids.      Objective       10/17/22 1504   Treatment Charges   SLP Cognitive Skill Development First 15 Minutes 1   SLP Cognitive Skill Development Additional 15 Minutes 3   SLP Total Time Spent   SLP Individual Total Time Spent (Mins) 60   Interdisciplinary Plan of Care Collaboration   IDT Collaboration with  Nursing   Patient Position at End of Therapy Seated;Tray Table within Reach;Call Light within Reach   Collaboration Comments transfer of care to nursing       Assessment    Calendar organization task initiated in a.m. session, completed in p.m. session. Pt completed all entries with 100% accuracy IND, answering questions related to entries 90% IND, MIN cue to achieve 100%. Pt reports STM, processing speed and behavioral changes (lability, flat affect) are biggest changes as it pertains to cognitive deficits at this time. Pt also reports mental fatigue with reading. Pt provided with handouts to practice reading prior to next ST session on behavioral changes following BI, mental fatigue following BI and executive function. Pt tasked with bringing these to subsequent therapy session.     Strengths: Able to follow instructions, Alert and oriented, Effective communication skills, Independent prior level of function, Motivated for self care and independence, Pleasant and cooperative, Willingly participates in therapeutic activities  Barriers: Impaired functional cognition    Plan    Continue to target exec function, BI education and compensatory strategies for cog deficits prior to d/c. Complete fxl med sort.     Passport items to be  completed:  manage finances, manage medications, arrive to therapy appointments on time, complete daily memory log entries, solve problems related to safety situations, review education related to hospitalization, complete caregiver training     Speech Therapy Problems (Active)       Problem: Memory STGs       Dates: Start: 10/16/22         Goal: STG-Within one week, patient will recall daily events with 80% accuracy, given min verbal cues and external aids.       Dates: Start: 10/16/22               Problem: Problem Solving STGs       Dates: Start: 10/16/22         Goal: STG-Within one week, patient will complete executive function tasks with 90% accuracy given min verbal cues.       Dates: Start: 10/16/22            Goal: STG-Within one week, patient will complete complex attn tasks (alternating/divided) with 90% accuracy given min verbal cues.       Dates: Start: 10/16/22               Problem: Speech/Swallowing LTGs       Dates: Start: 10/16/22         Goal: LTG-By discharge, patient will solve complex problems mod I for safe discharge home.       Dates: Start: 10/16/22

## 2022-10-17 NOTE — THERAPY
Occupational Therapy  Daily Treatment     Patient Name: Jacque Mcintyre  Age:  41 y.o., Sex:  female  Medical Record #: [id number]  Today's Date: 10/17/2022     Precautions  Precautions: Fall Risk, Other (See Comments)  Comments: chronic LBP         Subjective    Pt received sitting up in bed eating breakfast, agreeable to OT session     Objective       10/17/22 0831   OT Charge Group   OT Self Care / ADL 3   OT Therapy Activity 1   OT Total Time Spent   OT Individual Total Time Spent (Mins) 60   Vitals   Pulse (!) 103   Patient BP Position Sitting   Blood Pressure 112/75   Pain 0 - 10 Group   Location Head;Back   Cognition    Speech/ Communication Delayed Responses   Level of Consciousness Alert   Attention Impaired   Initiation Impaired   Comments delayed processing, cues for initiation, L inattention   Functional Level of Assist   Eating Supervision   Eating Description Supervision for safety;Increased time  (pt able to cut food using BUE, requires increased time)   Grooming Standby Assist   Grooming Description Seated in wheelchair at sink  (cues for initiation)   Upper Body Dressing Supervision   Upper Body Dressing Description Set-up of equipment;Increased time   Lower Body Dressing Moderate Assist   Lower Body Dressing Description Verbal cueing;Supervision for safety;Increased time  (assist with urban management, min A standing safety, assist for L shoe)   Fine Motor / Dexterity    Fine Motor / Dexterity Yes   Fine Motor / Dexterity Interventions issued sqeeze block as well as pipecleaner with 10 beads for HEP for progression of L hand function and dexterity   Balance   Comments pt stood for 4 minutes initially with BUE support from FWW then w/ no UE support with CGA, cues to alter stance for increased support       Assessment    Patient tolerated session well, focus on progression of ADLs, patient requires increased time for task completion due to poor initiation and slow processing speed, able to apply selene  strategies with cues for increased independence. Attempted to walk to bathroom with FWW however pt reporting lightheadedness impacting tolerance for mobility/standing, BP did drop with extended standing to 98/69. Pt reports pain in L calf with standing due to tightness in these muscle groups from hypertonicity, does feel better with sneaker on to decrease angle. Strength in LUE functional, coordination impaired though is able to utilize for basic ADLs (tying shoe, cutting food) given increased time, HEP issued for forced use of L hand.     Strengths: Able to follow instructions, Alert and oriented, Independent prior level of function, Making steady progress towards goals, Motivated for self care and independence, Pleasant and cooperative, Supportive family, Willingly participates in therapeutic activities  Barriers: Decreased endurance, Fatigue, Generalized weakness, Impaired activity tolerance, Impaired balance    Plan    LUE neuro re-ed, standing balance/tolerance, functional mobility, L attention, ADLs with selene technique as needed, IADLs    Occupational Therapy Goals (Active)       Problem: Bathing       Dates: Start: 10/16/22         Goal: STG-Within one week, patient will bathe with Min A overall using AE/DME as needed.       Dates: Start: 10/16/22               Problem: Dressing       Dates: Start: 10/16/22         Goal: STG-Within one week, patient will dress LB with Min A overall using AE/DME as needed.       Dates: Start: 10/16/22               Problem: Functional Transfers       Dates: Start: 10/16/22         Goal: STG-Within one week, patient will transfer to toilet with CGA overall using AE/DME as needed.       Dates: Start: 10/16/22               Problem: OT Long Term Goals       Dates: Start: 10/16/22         Goal: LTG-By discharge, patient will complete basic self care tasks with supervision-Mod I overall using AE/DME as needed.       Dates: Start: 10/16/22            Goal: LTG-By discharge, patient  will perform bathroom transfers with supervision overall using AE/DME as needed.       Dates: Start: 10/16/22               Problem: Toileting       Dates: Start: 10/16/22         Goal: STG-Within one week, patient will complete toileting tasks with Min A overall using AE/DME as needed.       Dates: Start: 10/16/22

## 2022-10-17 NOTE — CARE PLAN
The patient is Watcher - Medium risk of patient condition declining or worsening    Shift Goals  Clinical Goals: safety, pain control    Problem: Pain - Standard  Goal: Alleviation of pain or a reduction in pain to the patient’s comfort goal  Note: Patient is having head pain, PRN oxy has been given, will continue to monitor.      Problem: Bladder / Voiding  Goal: Patient will establish and maintain regular urinary output  Note: Patient with indwelling catheter in place draining adequate amounts of clear yellow urine to gravity; skin intact; no leakage.  Denies flank pain or dysuria; afebrile. UA collected per order, pending results.

## 2022-10-18 PROBLEM — I10 ESSENTIAL HYPERTENSION: Status: ACTIVE | Noted: 2019-10-08

## 2022-10-18 PROBLEM — R09.89 LABILE BLOOD PRESSURE: Status: ACTIVE | Noted: 2019-10-08

## 2022-10-18 LAB
ANION GAP SERPL CALC-SCNC: 9 MMOL/L (ref 7–16)
BUN SERPL-MCNC: 7 MG/DL (ref 8–22)
CALCIUM SERPL-MCNC: 8.1 MG/DL (ref 8.5–10.5)
CHLORIDE SERPL-SCNC: 105 MMOL/L (ref 96–112)
CO2 SERPL-SCNC: 26 MMOL/L (ref 20–33)
CREAT SERPL-MCNC: 0.39 MG/DL (ref 0.5–1.4)
ERYTHROCYTE [DISTWIDTH] IN BLOOD BY AUTOMATED COUNT: 47.8 FL (ref 35.9–50)
GFR SERPLBLD CREATININE-BSD FMLA CKD-EPI: 126 ML/MIN/1.73 M 2
GLUCOSE BLD STRIP.AUTO-MCNC: 177 MG/DL (ref 65–99)
GLUCOSE BLD STRIP.AUTO-MCNC: 185 MG/DL (ref 65–99)
GLUCOSE BLD STRIP.AUTO-MCNC: 198 MG/DL (ref 65–99)
GLUCOSE BLD STRIP.AUTO-MCNC: 205 MG/DL (ref 65–99)
GLUCOSE SERPL-MCNC: 178 MG/DL (ref 65–99)
HCT VFR BLD AUTO: 28.2 % (ref 37–47)
HGB BLD-MCNC: 8.7 G/DL (ref 12–16)
MCH RBC QN AUTO: 24.9 PG (ref 27–33)
MCHC RBC AUTO-ENTMCNC: 30.9 G/DL (ref 33.6–35)
MCV RBC AUTO: 80.6 FL (ref 81.4–97.8)
PLATELET # BLD AUTO: 424 K/UL (ref 164–446)
PMV BLD AUTO: 9.9 FL (ref 9–12.9)
POTASSIUM SERPL-SCNC: 3.7 MMOL/L (ref 3.6–5.5)
PROCALCITONIN SERPL-MCNC: 0.09 NG/ML
RBC # BLD AUTO: 3.5 M/UL (ref 4.2–5.4)
SODIUM SERPL-SCNC: 140 MMOL/L (ref 135–145)
WBC # BLD AUTO: 7.6 K/UL (ref 4.8–10.8)

## 2022-10-18 PROCEDURE — 99233 SBSQ HOSP IP/OBS HIGH 50: CPT | Performed by: PHYSICAL MEDICINE & REHABILITATION

## 2022-10-18 PROCEDURE — 700105 HCHG RX REV CODE 258: Performed by: HOSPITALIST

## 2022-10-18 PROCEDURE — 97535 SELF CARE MNGMENT TRAINING: CPT

## 2022-10-18 PROCEDURE — 97116 GAIT TRAINING THERAPY: CPT | Mod: CQ

## 2022-10-18 PROCEDURE — 85027 COMPLETE CBC AUTOMATED: CPT

## 2022-10-18 PROCEDURE — 36415 COLL VENOUS BLD VENIPUNCTURE: CPT

## 2022-10-18 PROCEDURE — 97130 THER IVNTJ EA ADDL 15 MIN: CPT

## 2022-10-18 PROCEDURE — 700102 HCHG RX REV CODE 250 W/ 637 OVERRIDE(OP): Performed by: PHYSICAL MEDICINE & REHABILITATION

## 2022-10-18 PROCEDURE — 700102 HCHG RX REV CODE 250 W/ 637 OVERRIDE(OP): Performed by: HOSPITALIST

## 2022-10-18 PROCEDURE — 770010 HCHG ROOM/CARE - REHAB SEMI PRIVAT*

## 2022-10-18 PROCEDURE — 84145 PROCALCITONIN (PCT): CPT

## 2022-10-18 PROCEDURE — 82962 GLUCOSE BLOOD TEST: CPT

## 2022-10-18 PROCEDURE — 99232 SBSQ HOSP IP/OBS MODERATE 35: CPT | Performed by: HOSPITALIST

## 2022-10-18 PROCEDURE — A9270 NON-COVERED ITEM OR SERVICE: HCPCS | Performed by: PHYSICAL MEDICINE & REHABILITATION

## 2022-10-18 PROCEDURE — 97530 THERAPEUTIC ACTIVITIES: CPT | Mod: CQ

## 2022-10-18 PROCEDURE — 97530 THERAPEUTIC ACTIVITIES: CPT

## 2022-10-18 PROCEDURE — A9270 NON-COVERED ITEM OR SERVICE: HCPCS | Performed by: HOSPITALIST

## 2022-10-18 PROCEDURE — 700111 HCHG RX REV CODE 636 W/ 250 OVERRIDE (IP): Performed by: PHYSICAL MEDICINE & REHABILITATION

## 2022-10-18 PROCEDURE — 97129 THER IVNTJ 1ST 15 MIN: CPT

## 2022-10-18 PROCEDURE — 700111 HCHG RX REV CODE 636 W/ 250 OVERRIDE (IP): Performed by: HOSPITALIST

## 2022-10-18 PROCEDURE — 80048 BASIC METABOLIC PNL TOTAL CA: CPT

## 2022-10-18 RX ORDER — CIPROFLOXACIN 500 MG/1
500 TABLET, FILM COATED ORAL EVERY 12 HOURS
Status: DISCONTINUED | OUTPATIENT
Start: 2022-10-18 | End: 2022-10-19

## 2022-10-18 RX ORDER — PHENAZOPYRIDINE HYDROCHLORIDE 200 MG/1
200 TABLET, FILM COATED ORAL
Status: DISCONTINUED | OUTPATIENT
Start: 2022-10-18 | End: 2022-10-25 | Stop reason: HOSPADM

## 2022-10-18 RX ORDER — INSULIN LISPRO 100 [IU]/ML
2-12 INJECTION, SOLUTION INTRAVENOUS; SUBCUTANEOUS
Status: DISCONTINUED | OUTPATIENT
Start: 2022-10-18 | End: 2022-10-20

## 2022-10-18 RX ADMIN — OMEPRAZOLE 20 MG: 20 CAPSULE, DELAYED RELEASE ORAL at 08:15

## 2022-10-18 RX ADMIN — GABAPENTIN 800 MG: 400 CAPSULE ORAL at 19:51

## 2022-10-18 RX ADMIN — ATORVASTATIN CALCIUM 40 MG: 40 TABLET, FILM COATED ORAL at 19:51

## 2022-10-18 RX ADMIN — INSULIN LISPRO 2 UNITS: 100 INJECTION, SOLUTION INTRAVENOUS; SUBCUTANEOUS at 07:49

## 2022-10-18 RX ADMIN — LEVETIRACETAM 1000 MG: 500 TABLET, FILM COATED ORAL at 08:15

## 2022-10-18 RX ADMIN — BACLOFEN 10 MG: 10 TABLET ORAL at 19:52

## 2022-10-18 RX ADMIN — OXCARBAZEPINE 300 MG: 300 TABLET, FILM COATED ORAL at 19:50

## 2022-10-18 RX ADMIN — OXYCODONE HYDROCHLORIDE 10 MG: 10 TABLET ORAL at 13:51

## 2022-10-18 RX ADMIN — QUETIAPINE FUMARATE 200 MG: 100 TABLET ORAL at 19:50

## 2022-10-18 RX ADMIN — TAMSULOSIN HYDROCHLORIDE 0.4 MG: 0.4 CAPSULE ORAL at 08:15

## 2022-10-18 RX ADMIN — INSULIN LISPRO 4 UNITS: 100 INJECTION, SOLUTION INTRAVENOUS; SUBCUTANEOUS at 19:57

## 2022-10-18 RX ADMIN — GABAPENTIN 800 MG: 400 CAPSULE ORAL at 17:08

## 2022-10-18 RX ADMIN — BACLOFEN 10 MG: 10 TABLET ORAL at 14:14

## 2022-10-18 RX ADMIN — INSULIN LISPRO 2 UNITS: 100 INJECTION, SOLUTION INTRAVENOUS; SUBCUTANEOUS at 17:03

## 2022-10-18 RX ADMIN — SODIUM CHLORIDE 250 MG: 9 INJECTION, SOLUTION INTRAVENOUS at 17:55

## 2022-10-18 RX ADMIN — OXYCODONE HYDROCHLORIDE 10 MG: 10 TABLET ORAL at 10:03

## 2022-10-18 RX ADMIN — OXYCODONE HYDROCHLORIDE 10 MG: 10 TABLET, FILM COATED, EXTENDED RELEASE ORAL at 07:11

## 2022-10-18 RX ADMIN — DULOXETINE HYDROCHLORIDE 60 MG: 30 CAPSULE, DELAYED RELEASE ORAL at 19:52

## 2022-10-18 RX ADMIN — MICONAZOLE NITRATE: 20 CREAM TOPICAL at 08:15

## 2022-10-18 RX ADMIN — OXYCODONE HYDROCHLORIDE 10 MG: 10 TABLET, FILM COATED, EXTENDED RELEASE ORAL at 19:50

## 2022-10-18 RX ADMIN — MICONAZOLE NITRATE: 20 CREAM TOPICAL at 19:52

## 2022-10-18 RX ADMIN — Medication 1000 UNITS: at 08:15

## 2022-10-18 RX ADMIN — SENNOSIDES AND DOCUSATE SODIUM 2 TABLET: 50; 8.6 TABLET ORAL at 08:15

## 2022-10-18 RX ADMIN — CIPROFLOXACIN 500 MG: 500 TABLET, FILM COATED ORAL at 19:50

## 2022-10-18 RX ADMIN — PHENAZOPYRIDINE HYDROCHLORIDE 200 MG: 200 TABLET ORAL at 17:08

## 2022-10-18 RX ADMIN — OXYCODONE HYDROCHLORIDE 10 MG: 10 TABLET ORAL at 17:08

## 2022-10-18 RX ADMIN — INSULIN LISPRO 2 UNITS: 100 INJECTION, SOLUTION INTRAVENOUS; SUBCUTANEOUS at 10:52

## 2022-10-18 RX ADMIN — POLYETHYLENE GLYCOL 3350 1 PACKET: 17 POWDER, FOR SOLUTION ORAL at 08:15

## 2022-10-18 RX ADMIN — OXCARBAZEPINE 300 MG: 300 TABLET, FILM COATED ORAL at 08:15

## 2022-10-18 RX ADMIN — GABAPENTIN 800 MG: 400 CAPSULE ORAL at 07:11

## 2022-10-18 RX ADMIN — ENOXAPARIN SODIUM 40 MG: 40 INJECTION SUBCUTANEOUS at 19:50

## 2022-10-18 RX ADMIN — BACLOFEN 10 MG: 10 TABLET ORAL at 07:11

## 2022-10-18 RX ADMIN — PHENAZOPYRIDINE HYDROCHLORIDE 200 MG: 200 TABLET ORAL at 13:51

## 2022-10-18 RX ADMIN — POLYETHYLENE GLYCOL 3350 1 PACKET: 17 POWDER, FOR SOLUTION ORAL at 19:49

## 2022-10-18 RX ADMIN — CIPROFLOXACIN 500 MG: 500 TABLET, FILM COATED ORAL at 11:36

## 2022-10-18 RX ADMIN — GABAPENTIN 800 MG: 400 CAPSULE ORAL at 13:52

## 2022-10-18 RX ADMIN — LEVETIRACETAM 1000 MG: 500 TABLET, FILM COATED ORAL at 19:51

## 2022-10-18 ASSESSMENT — GAIT ASSESSMENTS
DISTANCE (FEET): 70
ASSISTIVE DEVICE: FRONT WHEEL WALKER
DEVIATION: DECREASED HEEL STRIKE;BRADYKINETIC;DECREASED TOE OFF
GAIT LEVEL OF ASSIST: MINIMAL ASSIST

## 2022-10-18 ASSESSMENT — PAIN DESCRIPTION - PAIN TYPE: TYPE: ACUTE PAIN

## 2022-10-18 ASSESSMENT — ENCOUNTER SYMPTOMS
CHILLS: 0
SHORTNESS OF BREATH: 0
VOMITING: 0
DIARRHEA: 0
FEVER: 0
NAUSEA: 0
NERVOUS/ANXIOUS: 0
ABDOMINAL PAIN: 0

## 2022-10-18 ASSESSMENT — ACTIVITIES OF DAILY LIVING (ADL): BED_CHAIR_WHEELCHAIR_TRANSFER_DESCRIPTION: INCREASED TIME;VERBAL CUEING;SUPERVISION FOR SAFETY

## 2022-10-18 NOTE — PROGRESS NOTES
[facility] Medicine Daily Progress Note    Date of Service  10/18/2022    Chief Complaint:  Diabetes  UTI/Leukocytosis    Interval History:  Discussed with pt about her diabetes and she would like to go home on just insulin.    Review of Systems  Review of Systems   Constitutional:  Negative for chills and fever.   Respiratory:  Negative for shortness of breath.    Cardiovascular:  Negative for chest pain.   Gastrointestinal:  Negative for abdominal pain, diarrhea, nausea and vomiting.   Psychiatric/Behavioral:  The patient is not nervous/anxious.       Physical Exam  Temp:  [36.8 °C (98.2 °F)-37 °C (98.6 °F)] 36.9 °C (98.4 °F)  Pulse:  [] 96  Resp:  [18] 18  BP: ()/(49-72) 93/49  SpO2:  [96 %-99 %] 97 %    Physical Exam  Vitals and nursing note reviewed.   Constitutional:       Appearance: Normal appearance.   HENT:      Mouth/Throat:      Mouth: Mucous membranes are moist.      Pharynx: Oropharynx is clear.   Eyes:      Conjunctiva/sclera: Conjunctivae normal.      Pupils: Pupils are equal, round, and reactive to light.   Cardiovascular:      Rate and Rhythm: Normal rate and regular rhythm.      Heart sounds: No murmur heard.  Pulmonary:      Effort: Pulmonary effort is normal.      Breath sounds: No stridor. No wheezing or rales.   Abdominal:      General: There is no distension.      Palpations: Abdomen is soft.      Tenderness: There is no abdominal tenderness.   Musculoskeletal:      Cervical back: Normal range of motion and neck supple.      Right lower leg: No edema.      Left lower leg: No edema.   Skin:     General: Skin is warm and dry.      Findings: No rash.   Neurological:      Mental Status: She is alert and oriented to person, place, and time.   Psychiatric:         Mood and Affect: Mood normal.         Behavior: Behavior normal.       Fluids    Intake/Output Summary (Last 24 hours) at 10/18/2022 1119  Last data filed at 10/18/2022 0900  Gross per 24 hour   Intake 1260 ml   Output 1900 ml    Net -640 ml       Laboratory  Recent Labs     10/16/22  0555 10/18/22  0604   WBC 11.9* 7.6   RBC 4.13* 3.50*   HEMOGLOBIN 10.1* 8.7*   HEMATOCRIT 33.5* 28.2*   MCV 81.1* 80.6*   MCH 24.5* 24.9*   MCHC 30.1* 30.9*   RDW 48.2 47.8   PLATELETCT 535* 424   MPV 9.9 9.9     Recent Labs     10/16/22  0555 10/18/22  0604   SODIUM 139 140   POTASSIUM 3.9 3.7   CHLORIDE 101 105   CO2 28 26   GLUCOSE 185* 178*   BUN 12 7*   CREATININE 0.48* 0.39*   CALCIUM 8.8 8.1*                   Imaging       Assessment/Plan  Borderline abnormal TFTs  Assessment & Plan  TSH: 8.78  FT4: 1.05   Likely subclinical hypothyroidism  Needs repeat TFT's as out patient    Anemia  Assessment & Plan  Fe: 19, sats 7%  Vit B12 wnl  Will give IV Fe    Dyslipidemia  Assessment & Plan  On Lipitor    Vitamin D deficiency  Assessment & Plan  Vit D: 21  On supplements    Brain mass- (present on admission)  Assessment & Plan  Has prior hx of melanoma s/p immunotherapy  Presented with left sided weakness  Now w/ metastatic melanoma to brain with vasogenic edema and MLS  S/P crani with resection of multiple masses on 10/6  S/P Decadron taper  On Keppra  Plan for palliative XRT    Essential hypertension- (present on admission)  Assessment & Plan  BP ok but occ dips a little lower -- likely from Flomax  S/P recent IVF's  Likely 2nd to Flomax  Note: home meds include Lisinopril and Atenolol  Monitor for now    DM2 (diabetes mellitus, type 2) (HCC)- (present on admission)  Assessment & Plan  Hba1c: 7.8 (10/16)  BS elevated: 177-228  On Glargine: 22 units qhs --> will increase to 30 units  Note: home meds include Tresiba 20 u qhs, Metformin 1000 mg bid, and Jardiance 12.5 mg  Note: pt agreeable to being discharged only on insulin and no oral meds  Cont to monitor    Bipolar 1 disorder (HCC)- (present on admission)  Assessment & Plan  On Trileptal     UTI (urinary tract infection)- (present on admission)  Assessment & Plan  S/P leukocytosis  Has been  afebrile  PCT: wnl  CXR: ok  U/A (+)  Will get C&S  Will start empiric Cipro

## 2022-10-18 NOTE — PROGRESS NOTES
"Rehab Progress Note     Encounter Date: 10/18/2022    CC: Decreased mobility, brain metastasis     Interval Events (Subjective)  Patient was having severe dysuria and bladder spasms yesterday afternoon so UA was checked. She reports ongoing today. She is sitting up in room. She reports she would agree to something to mask the dysuria as it is very unpleasant. Discussed would start pyridium.  She has been started on antibiotics for UTI.     Objective:  VITAL SIGNS: BP (!) 93/49 Comment: with no compressions on  Pulse 96   Temp 36.9 °C (98.4 °F) (Oral)   Resp 18   Ht 1.626 m (5' 4.02\")   Wt 65 kg (143 lb 4.8 oz)   SpO2 97%   BMI 24.58 kg/m²   Gen: NAD  Psych: Mood and affect appropriate  CV: RRR, no edema  Resp: CTAB, no upper airway sounds  Abd: NTND  Neuro: AOx4, following commands  Unchanged from 10/17/22    Recent Results (from the past 72 hour(s))   COV-2, FLU A/B, AND RSV BY PCR (2-4 HOURS CEPHEID): Collect NP swab in VTM    Collection Time: 10/15/22  1:40 PM    Specimen: Nasopharyngeal; Respirate   Result Value Ref Range    Influenza virus A RNA Negative Negative    Influenza virus B, PCR Negative Negative    RSV, PCR Negative Negative    SARS-CoV-2 by PCR NotDetected     SARS-CoV-2 Source NP Swab    POCT glucose device results    Collection Time: 10/15/22  2:07 PM   Result Value Ref Range    POC Glucose, Blood 161 (H) 65 - 99 mg/dL   POCT glucose device results    Collection Time: 10/15/22  5:04 PM   Result Value Ref Range    POC Glucose, Blood 233 (H) 65 - 99 mg/dL   POCT glucose device results    Collection Time: 10/15/22  9:02 PM   Result Value Ref Range    POC Glucose, Blood 214 (H) 65 - 99 mg/dL   CBC with Differential    Collection Time: 10/16/22  5:55 AM   Result Value Ref Range    WBC 11.9 (H) 4.8 - 10.8 K/uL    RBC 4.13 (L) 4.20 - 5.40 M/uL    Hemoglobin 10.1 (L) 12.0 - 16.0 g/dL    Hematocrit 33.5 (L) 37.0 - 47.0 %    MCV 81.1 (L) 81.4 - 97.8 fL    MCH 24.5 (L) 27.0 - 33.0 pg    MCHC 30.1 (L) " 33.6 - 35.0 g/dL    RDW 48.2 35.9 - 50.0 fL    Platelet Count 535 (H) 164 - 446 K/uL    MPV 9.9 9.0 - 12.9 fL    Neutrophils-Polys 65.40 44.00 - 72.00 %    Lymphocytes 24.80 22.00 - 41.00 %    Monocytes 8.80 0.00 - 13.40 %    Eosinophils 0.20 0.00 - 6.90 %    Basophils 0.30 0.00 - 1.80 %    Immature Granulocytes 0.50 0.00 - 0.90 %    Nucleated RBC 0.00 /100 WBC    Neutrophils (Absolute) 7.75 (H) 2.00 - 7.15 K/uL    Lymphs (Absolute) 2.94 1.00 - 4.80 K/uL    Monos (Absolute) 1.04 (H) 0.00 - 0.85 K/uL    Eos (Absolute) 0.02 0.00 - 0.51 K/uL    Baso (Absolute) 0.04 0.00 - 0.12 K/uL    Immature Granulocytes (abs) 0.06 0.00 - 0.11 K/uL    NRBC (Absolute) 0.00 K/uL   Comp Metabolic Panel (CMP)    Collection Time: 10/16/22  5:55 AM   Result Value Ref Range    Sodium 139 135 - 145 mmol/L    Potassium 3.9 3.6 - 5.5 mmol/L    Chloride 101 96 - 112 mmol/L    Co2 28 20 - 33 mmol/L    Anion Gap 10.0 7.0 - 16.0    Glucose 185 (H) 65 - 99 mg/dL    Bun 12 8 - 22 mg/dL    Creatinine 0.48 (L) 0.50 - 1.40 mg/dL    Calcium 8.8 8.5 - 10.5 mg/dL    AST(SGOT) 8 (L) 12 - 45 U/L    ALT(SGPT) 10 2 - 50 U/L    Alkaline Phosphatase 100 (H) 30 - 99 U/L    Total Bilirubin 0.2 0.1 - 1.5 mg/dL    Albumin 3.7 3.2 - 4.9 g/dL    Total Protein 5.8 (L) 6.0 - 8.2 g/dL    Globulin 2.1 1.9 - 3.5 g/dL    A-G Ratio 1.8 g/dL   HEMOGLOBIN A1C    Collection Time: 10/16/22  5:55 AM   Result Value Ref Range    Glycohemoglobin 7.8 (H) 4.0 - 5.6 %    Est Avg Glucose 177 mg/dL   TSH with Reflex to FT4    Collection Time: 10/16/22  5:55 AM   Result Value Ref Range    TSH 8.780 (H) 0.380 - 5.330 uIU/mL   Vitamin D, 25-hydroxy (blood)    Collection Time: 10/16/22  5:55 AM   Result Value Ref Range    25-Hydroxy   Vitamin D 25 21 (L) 30 - 100 ng/mL   VITAMIN B12    Collection Time: 10/16/22  5:55 AM   Result Value Ref Range    Vitamin B12 -True Cobalamin 1095 (H) 211 - 911 pg/mL   FERRITIN    Collection Time: 10/16/22  5:55 AM   Result Value Ref Range    Ferritin 18.2  10.0 - 291.0 ng/mL   IRON/TOTAL IRON BIND    Collection Time: 10/16/22  5:55 AM   Result Value Ref Range    Iron 19 (L) 40 - 170 ug/dL    Total Iron Binding 276 250 - 450 ug/dL    Unsat Iron Binding 257 110 - 370 ug/dL    % Saturation 7 (L) 15 - 55 %   MAGNESIUM    Collection Time: 10/16/22  5:55 AM   Result Value Ref Range    Magnesium 1.9 1.5 - 2.5 mg/dL   PHOSPHORUS    Collection Time: 10/16/22  5:55 AM   Result Value Ref Range    Phosphorus 4.5 2.5 - 4.5 mg/dL   ESTIMATED GFR    Collection Time: 10/16/22  5:55 AM   Result Value Ref Range    GFR (CKD-EPI) 120 >60 mL/min/1.73 m 2   FREE THYROXINE    Collection Time: 10/16/22  5:55 AM   Result Value Ref Range    Free T-4 1.05 0.93 - 1.70 ng/dL   POCT glucose device results    Collection Time: 10/16/22  7:23 AM   Result Value Ref Range    POC Glucose, Blood 156 (H) 65 - 99 mg/dL   POCT glucose device results    Collection Time: 10/16/22 11:38 AM   Result Value Ref Range    POC Glucose, Blood 184 (H) 65 - 99 mg/dL   POCT glucose device results    Collection Time: 10/16/22  5:27 PM   Result Value Ref Range    POC Glucose, Blood 200 (H) 65 - 99 mg/dL   POCT glucose device results    Collection Time: 10/16/22  8:23 PM   Result Value Ref Range    POC Glucose, Blood 245 (H) 65 - 99 mg/dL   POCT glucose device results    Collection Time: 10/17/22  7:22 AM   Result Value Ref Range    POC Glucose, Blood 189 (H) 65 - 99 mg/dL   POCT glucose device results    Collection Time: 10/17/22 11:03 AM   Result Value Ref Range    POC Glucose, Blood 205 (H) 65 - 99 mg/dL   URINALYSIS    Collection Time: 10/17/22  3:30 PM    Specimen: Urine, Graf Cath   Result Value Ref Range    Color Yellow     Character Cloudy (A)     Specific Gravity 1.031 <1.035    Ph 5.5 5.0 - 8.0    Glucose >=1000 (A) Negative mg/dL    Ketones Negative Negative mg/dL    Protein 100 (A) Negative mg/dL    Bilirubin Negative Negative    Urobilinogen, Urine 0.2 Negative    Nitrite Positive (A) Negative    Leukocyte  Esterase Small (A) Negative    Occult Blood Moderate (A) Negative    Micro Urine Req Microscopic    URINE MICROSCOPIC (W/UA)    Collection Time: 10/17/22  3:30 PM   Result Value Ref Range    WBC Packed (A) /hpf    RBC 10-20 (A) /hpf    Bacteria Many (A) None /hpf    Epithelial Cells Rare /hpf    Hyaline Cast 0-2 /lpf   POCT glucose device results    Collection Time: 10/17/22  5:06 PM   Result Value Ref Range    POC Glucose, Blood 207 (H) 65 - 99 mg/dL   POCT glucose device results    Collection Time: 10/17/22  7:55 PM   Result Value Ref Range    POC Glucose, Blood 228 (H) 65 - 99 mg/dL   PROCALCITONIN    Collection Time: 10/18/22  6:04 AM   Result Value Ref Range    Procalcitonin 0.09 <0.25 ng/mL   CBC WITHOUT DIFFERENTIAL    Collection Time: 10/18/22  6:04 AM   Result Value Ref Range    WBC 7.6 4.8 - 10.8 K/uL    RBC 3.50 (L) 4.20 - 5.40 M/uL    Hemoglobin 8.7 (L) 12.0 - 16.0 g/dL    Hematocrit 28.2 (L) 37.0 - 47.0 %    MCV 80.6 (L) 81.4 - 97.8 fL    MCH 24.9 (L) 27.0 - 33.0 pg    MCHC 30.9 (L) 33.6 - 35.0 g/dL    RDW 47.8 35.9 - 50.0 fL    Platelet Count 424 164 - 446 K/uL    MPV 9.9 9.0 - 12.9 fL   Basic Metabolic Panel    Collection Time: 10/18/22  6:04 AM   Result Value Ref Range    Sodium 140 135 - 145 mmol/L    Potassium 3.7 3.6 - 5.5 mmol/L    Chloride 105 96 - 112 mmol/L    Co2 26 20 - 33 mmol/L    Glucose 178 (H) 65 - 99 mg/dL    Bun 7 (L) 8 - 22 mg/dL    Creatinine 0.39 (L) 0.50 - 1.40 mg/dL    Calcium 8.1 (L) 8.5 - 10.5 mg/dL    Anion Gap 9.0 7.0 - 16.0   ESTIMATED GFR    Collection Time: 10/18/22  6:04 AM   Result Value Ref Range    GFR (CKD-EPI) 126 >60 mL/min/1.73 m 2   POCT glucose device results    Collection Time: 10/18/22  7:45 AM   Result Value Ref Range    POC Glucose, Blood 177 (H) 65 - 99 mg/dL   POCT glucose device results    Collection Time: 10/18/22 10:52 AM   Result Value Ref Range    POC Glucose, Blood 185 (H) 65 - 99 mg/dL       Current Facility-Administered Medications   Medication  Frequency    ferric gluconate complex (Ferrlecit) 250 mg in  mL IVPB BID    ciprofloxacin (CIPRO) tablet 500 mg Q12HRS    phenazopyridine (PYRIDIUM) tablet 200 mg TID WITH MEALS    insulin lispro (AdmeLOG,HumaLOG) injection 4X/DAY ACHS    And    insulin GLARGINE (Lantus,Semglee) injection Q EVENING    vitamin D3 (cholecalciferol) tablet 1,000 Units DAILY    Respiratory Therapy Consult Continuous RT    Pharmacy Consult Request ...Pain Management Review 1 Each PHARMACY TO DOSE    hydrALAZINE (APRESOLINE) tablet 25 mg Q8HRS PRN    acetaminophen (Tylenol) tablet 650 mg Q4HRS PRN    omeprazole (PRILOSEC) capsule 20 mg DAILY    mag hydrox-al hydrox-simeth (MAALOX PLUS ES or MYLANTA DS) suspension 20 mL Q2HRS PRN    ondansetron (ZOFRAN ODT) dispertab 4 mg 4X/DAY PRN    Or    ondansetron (ZOFRAN) syringe/vial injection 4 mg 4X/DAY PRN    traZODone (DESYREL) tablet 50 mg QHS PRN    sodium chloride (OCEAN) 0.65 % nasal spray 2 Spray PRN    oxyCODONE immediate-release (ROXICODONE) tablet 5 mg Q3HRS PRN    Or    oxyCODONE immediate release (ROXICODONE) tablet 10 mg Q3HRS PRN    traMADol (Ultram) 50 MG tablet 50 mg Q4HRS PRN    midazolam (VERSED) 5 mg/mL (1 mL vial) PRN    ALPRAZolam (XANAX) tablet 0.25 mg 4X/DAY PRN    atorvastatin (LIPITOR) tablet 40 mg Q EVENING    baclofen (LIORESAL) tablet 10 mg TID    DULoxetine (CYMBALTA) capsule 60 mg Q EVENING    enoxaparin (Lovenox) inj 40 mg DAILY AT 1800    gabapentin (NEURONTIN) capsule 800 mg 4X/DAY    levETIRAcetam (KEPPRA) tablet 1,000 mg BID    OXcarbazepine (TRILEPTAL) tablet 300 mg BID    oxyCODONE CR (OXYCONTIN) tablet 10 mg Q12HRS    QUEtiapine (Seroquel) tablet 200 mg Q EVENING    tamsulosin (FLOMAX) capsule 0.4 mg AFTER BREAKFAST    miconazole 2%-zinc oxide (Steven) topical cream BID    polyethylene glycol/lytes (MIRALAX) PACKET 1 Packet BID    And    senna-docusate (PERICOLACE or SENOKOT S) 8.6-50 MG per tablet 2 Tablet DAILY    And    magnesium hydroxide (MILK OF  MAGNESIA) suspension 30 mL QDAY PRN    And    bisacodyl (DULCOLAX) suppository 10 mg QDAY PRN    albuterol inhaler 2 Puff Q4H PRN (RT)       Orders Placed This Encounter   Procedures    Diet Order Diet: Consistent CHO (Diabetic)     Standing Status:   Standing     Number of Occurrences:   1     Order Specific Question:   Diet:     Answer:   Consistent CHO (Diabetic) [4]       Assessment:  Active Hospital Problems    Diagnosis     *Metastatic cancer to brain (HCC)        Medical Decision Making and Plan:  Adapted from Dr. Arambula's A&P  Metastatic brain cancer  Melanoma  BRAF+, follow-up oncology    ##MSK  #Impaired ADLs and mobility  Patient is admitted to [facility] for comprehensive rehabilitation therapy as described.   Rehabilitation nursing monitors bowel and bladder control, educates on medication administration, co-morbidities and monitors patient safety.  Expected Length of Stay: 10-14 days  Anticipated discharge date: TBD  Anticipated discharge destination: home with parents  Prognosis: good  Equipment: TBD  Postdischarge therapies: TBD  Followup: Pebbles Tejada MD (neurosurgery), Mely Romero DO (PM&R), PCP  Precautions: none  Code: full resuscitation     #Postsurgical pain, acute, controlled  #Neuropathic pain, acute, controlled  Ordered baclofen 10mg TID, gabapentin 800mg QID, oxycontin 10mg BID     ##NEURO  #Metastasis to brain  Ordered keppra 1000mg BID, trileptal 300mg BID  Monitor  On Baclofen 10 mg TID for spasms     #RESP  Respiratory therapy: RT performs O2 management prn, breathing retraining, pulmonary hygiene and bronchospasm management prn to optimize participation in therapies.      ##CARDIAC  #Chronic hyperlipidemia  Ordered lipitor 40mg QHS  DVT prophylaxis: Ordered lovenox 40mg daily. Encourage OOB. Monitor daily for signs and symptoms of DVT including but not limited to swelling and pain to prevent the development of DVT that may interfere with therapies.      ##GI  GI prophylaxis:  Ordered prilosec 20mg daily to prevent gastritis/dyspepsia which may interfere with therapies.  #At risk of opioid induced constipation  Goal of one continent BM daily     #At high risk of malnutrition  Dietician monitors nutrient intake, recommend supplements prn and provide nutrition education to pt/family to promote optimal nutrition for wound healing/recovery.     ##/RENAL  #At risk of urinary retention  Dysuria on 10/17-10/18/22. UA positive for UTI. Urine culture sent. Ciprofloxacin and pyridium. UCx sent       ##HEME  #Acute blood loss anemia  #Leukocytosis - Consult hospitalist  #Thrombocytosis, likely reactive  Monitor     ##ENDO  #Diabetes mellitus with hyperglycemia  Ordered Lantus 20mg QHS     ##SKIN  Skin/dermal ulcer prophylaxis: Monitor for new skin conditions with q.2 h. turns as required to prevent the development of skin breakdown.       Total time:  36 minutes.  I spent greater than 50% of the time for patient care, counseling, and coordination on this date, including unit/floor time, and face-to-face time with the patient as per interval events and assessment and plan above. Topics discussed included staple removal, dysuria, UA+, started on Abx and will start pyridium. Discussed case with hospitalist.      Zach Patel M.D.

## 2022-10-18 NOTE — CARE PLAN
The patient is Watcher - Medium risk of patient condition declining or worsening    Shift Goals  Clinical Goals: Safety, Pain control    Problem: Pain - Standard  Goal: Alleviation of pain or a reduction in pain to the patient’s comfort goal  Note: Patient is having 8-9/10 head pain, PRN oxy has been given, will continue to monitor.      Problem: Skin Integrity  Goal: Skin integrity is maintained or improved  Note: Per doctor, every other staple was removed, picture uploaded, will continue to monitor.

## 2022-10-18 NOTE — ASSESSMENT & PLAN NOTE
Hba1c: 7.8 (10/16)  BS trending better and a little labile  On Glargine: 22 units qhs --> 30 units (10/18) --> 38 units qhs (10/20) --> 45 units qhs (10/21) --> 50 units qhs (10/22)  Note: home meds include Tresiba 20 u qhs, Metformin 1000 mg bid, and Jardiance 12.5 mg  Note: pt would like to be discharged only on insulin and no oral meds  Cont to monitor

## 2022-10-18 NOTE — ASSESSMENT & PLAN NOTE
BP low normal  Currently not on any hypertensive meds  Note on Flomax  Note: on Baclofen -- can cause lower BP  Note: home meds include Lisinopril and Atenolol  Monitor

## 2022-10-18 NOTE — DIETARY
"Nutrition services: Day 3 of admit.  Jacque Mcintyre is a 41 y.o. female with admitting DX of Metastatic cancer to brain     Consult received for MST of 3 on nutrition screen      Assessment:  Height: 162.6 cm (5' 4.02\")  Weight: 65 kg (143 lb 4.8 oz)  Body mass index is 24.58 kg/m²., BMI classification: WNL  Diet/Intake: consistent CHO (diabetic)/% of all meals    Evaluation:   HX includes diabetes mellitus. Pt w/ new dx of metastatic brain cancer and melanoma. Plan is for initiation of palliative radiation on 10/28.   Weight on 10/16 was 4 kg less than admit wt on 10/15. No report of edema. Pt is currently -820 mL of fluids. Question accuracy of wt vs possible fluids w/ extra fluids provided at [facility].   Labs: POC glucose: 177-228 (regularly > 200).  10/16/22: A1c=7.8  Meds: Lantus, SSI (hospitalist increased Lantus and adjusted SSI)  RD met w/ pt in her room. She reports wt loss of 20 lb (12%) x 4 months. Wt loss is significant. She said that she was eating less due to cancer. She said she was eating ~75% of normal.   PO intake has been adequate here. Pt said that the food is good and she does not have to prepare it.     Malnutrition Risk: pt meets ASPEN criteria for moderate malnutrition in the context of chronic disease r/t diminished PO due to cancer dx AEB report of 12% wt loss x 4 months and PO 75% of normal x 4 months.     Recommendations/Plan:   Encourage intake of >50%  Document intake of all meals as % taken in ADL's to provide interdisciplinary communication across all shifts.   Monitor weight.  Nutrition rep will continue to see patient for ongoing meal and snack preferences.         RD will follow weekly or PRN  "

## 2022-10-18 NOTE — THERAPY
"Physical Therapy   Daily Treatment     Patient Name: Jacque Mcintyre  Age:  41 y.o., Sex:  female  Medical Record #: [id number]  Today's Date: 10/18/2022     Precautions  Precautions: Fall Risk, Other (See Comments)  Comments: chronic LBP    Subjective    \"My strength is pretty good today, its my low blood pressure that has been the problem today\"     Objective     10/18/22 1301   PT Charge Group   PT Gait Training 1   PT Therapeutic Activities 1   Supervising Physical Therapist Jacque Reddy   PT Total Time Spent   PT Individual Total Time Spent (Mins) 30   Vitals   Pulse (!) 116   Patient BP Position Standing 3 minutes   Blood Pressure 100/68   Gait Functional Level of Assist    Gait Level Of Assist Minimal Assist   Assistive Device Front Wheel Walker  (L toe off AFO)   Distance (Feet) 70   # of Times Distance was Traveled 1   Deviation Decreased Heel Strike;Bradykinetic;Decreased Toe Off   Bed Mobility    Sit to Stand Minimal Assist   Neuro-Muscular Treatments   Neuro-Muscular Treatments Postural Facilitation;Verbal Cuing;Sequencing   Comments STS transfer training fro wc <> FWW Zack, using R UE to push and L UE to stabilize walker, vc for inc FABIEN and attention to L LE with STS transition   Interdisciplinary Plan of Care Collaboration   IDT Collaboration with  Family / Caregiver   Patient Position at End of Therapy Seated;Self Releasing Lap Belt Applied;Call Light within Reach;Phone within Reach;Family / Friend in Room   Collaboration Comments patient parents are present throughout to observe session, eager to assist patient upon dc     BP seated prior to stand 106/68, standing 3 minutes 100/68  Patients parents present in room during PT session, asked about installation of a ramp, deferred until after 1st conference on Thursday in order to cont to assess pt mobility    Assessment    Pt participated in short distance amb with toe off carbon fiber AFO L LE, amb CGA/Zack to manage IV pole. No s/s orthostatic " hypotension this session. Eager to progress function and strength. Pt demos -2/5 L DF improved from 0/5 strength on eval.    Strengths: Able to follow instructions, Effective communication skills, Alert and oriented, Good insight into deficits/needs, Independent prior level of function, Making steady progress towards goals, Manages pain appropriately, Motivated for self care and independence, Pleasant and cooperative, Supportive family, Willingly participates in therapeutic activities  Barriers: Hemiparesis    Plan    DF strengthening in gravity minimized position, trial NMES consider FES/bioness during gait training, progress gait with FWW, initiate curb/stairs,balance and coordinaton    Get in/out of bed safely, in/out of a vehicle, safely use mobility device, walk or wheel around home/community, navigate up and down stairs, show how to get up/down from the ground, ensure home is accessible, demonstrate HEP, complete caregiver training     Physical Therapy Problems (Active)       Problem: Balance       Dates: Start: 10/16/22         Goal: STG-Within one week, patient will maintain dynamic standing: score >45/56 on the Thornton Balance Assessment.       Dates: Start: 10/16/22               Problem: Mobility       Dates: Start: 10/16/22         Goal: STG-Within one week, patient will ambulate household distance at least 500' with SPC or no AD, SBA.       Dates: Start: 10/16/22               Problem: Mobility Transfers       Dates: Start: 10/16/22         Goal: STG-Within one week, patient will perform bed mobility Mod I with bed flat.       Dates: Start: 10/16/22            Goal: STG-Within one week, patient will transfer bed to chair with SPC or no AD.       Dates: Start: 10/16/22               Problem: PT-Long Term Goals       Dates: Start: 10/16/22         Goal: LTG-By discharge, patient will maintain balance: at least 22/30 on the FGA indicating low fall risk, to facilitate safe DC to home.       Dates: Start:  10/16/22            Goal: LTG-By discharge, patient will ambulate at least 1000' during 6 Minute Walk Test.       Dates: Start: 10/16/22            Goal: LTG-By discharge, patient will transfer one surface to another Mod I.       Dates: Start: 10/16/22            Goal: LTG-By discharge, patient will transfer in/out of a car SPV.       Dates: Start: 10/16/22            Goal: LTG-By discharge, patient will ambulate up/down ADA ramp with SPC or no AD, and SPV, to safely enter/exit  her parent's home.       Dates: Start: 10/16/22

## 2022-10-18 NOTE — THERAPY
"Occupational Therapy  Daily Treatment     Patient Name: Jacque Mcintyre  Age:  41 y.o., Sex:  female  Medical Record #: [id number]  Today's Date: 10/18/2022     Precautions  Precautions: Fall Risk, Other (See Comments)  Comments: chronic LBP         Subjective    \"I'm a little dizzy, but it's not nearly as bad as yesterday.\"     Objective       10/18/22 0701   OT Charge Group   OT Self Care / ADL 3   OT Therapy Activity 1   OT Total Time Spent   OT Individual Total Time Spent (Mins) 60   Vitals   Pulse 90   Patient BP Position Supine   Blood Pressure (!) 92/53   O2 (LPM) 0   O2 Delivery Device None - Room Air   Vitals Comments Pt reporting mild dizziness upon sitting up with TEDS and abdominal binder donned. Sitting was 104/76, and standing 98/71 with .   Cognition    Speech/ Communication Delayed Responses   Level of Consciousness Alert   Functional Level of Assist   Grooming Supervision;Seated   Grooming Description Seated in wheelchair at sink;Increased time;Supervision for safety;Verbal cueing  (for face washing)   Upper Body Dressing Supervision   Upper Body Dressing Description Set-up of equipment;Increased time;Supervision for safety  (no LOB during completion seated EOB)   Lower Body Dressing Minimal Assist   Lower Body Dressing Description Assistive devices;Assist with threading into pant leg;Initial preparation for task;Increased time;Supervision for safety;Verbal cueing  (Pt able to manage threading catheter and legs, with Min A for balance in standing with FWW for support.)   Bed, Chair, Wheelchair Transfer Minimal Assist   Bed Chair Wheelchair Transfer Description Increased time;Verbal cueing;Supervision for safety  (FWW for stand step)   Interdisciplinary Plan of Care Collaboration   IDT Collaboration with  Nursing   Patient Position at End of Therapy Seated;Chair Alarm On;Self Releasing Lap Belt Applied;Call Light within Reach;Tray Table within Reach;Phone within Reach   Collaboration Comments re: " Catheter and IV.     Noted low BP in supine, abdominal binder donned with pt reporting reduced dizziness and improved BP in sitting and standing. Observed leaking from catheter during session, nursing exchanged, and also disconnected/reconnected IV for dressing.    Assessment    Pt making good progress towards goals with self-care skills, but continues to require increased time for processing and task completion. Pt did don shoes seated EOB with tying this day, demonstrating improved L-FMC.  Strengths: Able to follow instructions, Alert and oriented, Independent prior level of function, Making steady progress towards goals, Motivated for self care and independence, Pleasant and cooperative, Supportive family, Willingly participates in therapeutic activities  Barriers: Decreased endurance, Fatigue, Generalized weakness, Impaired activity tolerance, Impaired balance    Plan    LUE neuro re-ed, standing balance/tolerance, functional mobility, L attention, ADLs with selene technique as needed, IADLs    Occupational Therapy Goals (Active)       Problem: Bathing       Dates: Start: 10/16/22         Goal: STG-Within one week, patient will bathe with Min A overall using AE/DME as needed.       Dates: Start: 10/16/22               Problem: Dressing       Dates: Start: 10/16/22         Goal: STG-Within one week, patient will dress LB with Min A overall using AE/DME as needed.       Dates: Start: 10/16/22               Problem: Functional Transfers       Dates: Start: 10/16/22         Goal: STG-Within one week, patient will transfer to toilet with CGA overall using AE/DME as needed.       Dates: Start: 10/16/22               Problem: OT Long Term Goals       Dates: Start: 10/16/22         Goal: LTG-By discharge, patient will complete basic self care tasks with supervision-Mod I overall using AE/DME as needed.       Dates: Start: 10/16/22            Goal: LTG-By discharge, patient will perform bathroom transfers with supervision  overall using AE/DME as needed.       Dates: Start: 10/16/22               Problem: Toileting       Dates: Start: 10/16/22         Goal: STG-Within one week, patient will complete toileting tasks with Min A overall using AE/DME as needed.       Dates: Start: 10/16/22

## 2022-10-18 NOTE — THERAPY
Speech Language Pathology  Daily Treatment     Patient Name: Jacque Mcintyre  Age:  41 y.o., Sex:  female  Medical Record #: [id number]  Today's Date: 10/18/2022     Precautions  Precautions: Fall Risk, Other (See Comments)  Comments: chronic LBP    Subjective    Pt was pleasant and cooperative during this ST session      Objective       10/18/22 0831   Treatment Charges   SLP Cognitive Skill Development First 15 Minutes 1   SLP Cognitive Skill Development Additional 15 Minutes 3   SLP Total Time Spent   SLP Individual Total Time Spent (Mins) 60       Assessment    Pt completed complex scheduling task requiring her to schedule 6 voicemail's into a weekly calendar.  Pt completed this task independently to achieve 100% accuracy.  Reviewed pt's current medications.  Pt demonstrated a good understanding of all medications and was able to recall the purpose and dosage of her medications with 100% accuracy.  Pt was also able to complete a medication sorting task independently with 100% accuracy.  Pt reported that she still feels as though her cognition is not at its baseline stating difficulty with short term memory and processing speed.      Strengths: Able to follow instructions, Alert and oriented, Effective communication skills, Independent prior level of function, Motivated for self care and independence, Pleasant and cooperative, Willingly participates in therapeutic activities  Barriers: Impaired functional cognition    Plan    Continue targeting executive functions         Speech Therapy Problems (Active)       Problem: Memory STGs       Dates: Start: 10/16/22         Goal: STG-Within one week, patient will recall daily events with 80% accuracy, given min verbal cues and external aids.       Dates: Start: 10/16/22               Problem: Problem Solving STGs       Dates: Start: 10/16/22         Goal: STG-Within one week, patient will complete executive function tasks with 90% accuracy given min verbal cues.        Dates: Start: 10/16/22            Goal: STG-Within one week, patient will complete complex attn tasks (alternating/divided) with 90% accuracy given min verbal cues.       Dates: Start: 10/16/22               Problem: Speech/Swallowing LTGs       Dates: Start: 10/16/22         Goal: LTG-By discharge, patient will solve complex problems mod I for safe discharge home.       Dates: Start: 10/16/22

## 2022-10-18 NOTE — ASSESSMENT & PLAN NOTE
H&H slowly decreasing: Hb 10.1 (10/16) --> 8.7 (10/18) --> 8.0 (10/21) --> 8.3 (10/22)  Fe: 19, sats 7%  B12: wnl  S/P IV Fe  SFOB (-) x 1

## 2022-10-18 NOTE — CARE PLAN
The patient is Watcher - Medium risk of patient condition declining or worsening    Shift Goals  Clinical Goals: safety, pain contol  Patient Goals: pain mgt    Progress made toward(s) clinical / shift goals:    Problem: Diabetes Management  Goal: Patient will achieve and maintain glucose in satisfactory range  Note:  mg/dl. 4 units Lispro SQ. And 22 units Lantus SQ given. No S/S hypo/hyperglycemia noted.     Problem: Infection - Diabetes  Goal: Patient will remain free from signs and symptoms of infection  Note: Pt is able to verbalize s/s of infection: redness of area, fever, pain.

## 2022-10-18 NOTE — ASSESSMENT & PLAN NOTE
Has prior hx of melanoma s/p immunotherapy  Presented with left sided weakness  Now w/ metastatic melanoma to brain with vasogenic edema and MLS  S/P crani with resection of multiple masses on 10/6  S/P Decadron taper  On Keppra  Plan for palliative XRT

## 2022-10-19 ENCOUNTER — PATIENT OUTREACH (OUTPATIENT)
Dept: ONCOLOGY | Facility: MEDICAL CENTER | Age: 42
End: 2022-10-19
Payer: COMMERCIAL

## 2022-10-19 DIAGNOSIS — G93.89 BRAIN MASS: ICD-10-CM

## 2022-10-19 PROBLEM — R42 DIZZINESS ON STANDING: Status: ACTIVE | Noted: 2022-10-19

## 2022-10-19 LAB
GLUCOSE BLD STRIP.AUTO-MCNC: 159 MG/DL (ref 65–99)
GLUCOSE BLD STRIP.AUTO-MCNC: 170 MG/DL (ref 65–99)
GLUCOSE BLD STRIP.AUTO-MCNC: 207 MG/DL (ref 65–99)
GLUCOSE BLD STRIP.AUTO-MCNC: 223 MG/DL (ref 65–99)

## 2022-10-19 PROCEDURE — 700102 HCHG RX REV CODE 250 W/ 637 OVERRIDE(OP): Performed by: HOSPITALIST

## 2022-10-19 PROCEDURE — 97129 THER IVNTJ 1ST 15 MIN: CPT

## 2022-10-19 PROCEDURE — A9270 NON-COVERED ITEM OR SERVICE: HCPCS | Performed by: HOSPITALIST

## 2022-10-19 PROCEDURE — A9270 NON-COVERED ITEM OR SERVICE: HCPCS | Performed by: PHYSICAL MEDICINE & REHABILITATION

## 2022-10-19 PROCEDURE — 770010 HCHG ROOM/CARE - REHAB SEMI PRIVAT*

## 2022-10-19 PROCEDURE — 97530 THERAPEUTIC ACTIVITIES: CPT

## 2022-10-19 PROCEDURE — 97116 GAIT TRAINING THERAPY: CPT

## 2022-10-19 PROCEDURE — 700102 HCHG RX REV CODE 250 W/ 637 OVERRIDE(OP): Performed by: PHYSICAL MEDICINE & REHABILITATION

## 2022-10-19 PROCEDURE — 99232 SBSQ HOSP IP/OBS MODERATE 35: CPT | Performed by: PHYSICAL MEDICINE & REHABILITATION

## 2022-10-19 PROCEDURE — 700111 HCHG RX REV CODE 636 W/ 250 OVERRIDE (IP): Performed by: PHYSICAL MEDICINE & REHABILITATION

## 2022-10-19 PROCEDURE — 82962 GLUCOSE BLOOD TEST: CPT | Mod: 91

## 2022-10-19 PROCEDURE — 97130 THER IVNTJ EA ADDL 15 MIN: CPT

## 2022-10-19 PROCEDURE — 700105 HCHG RX REV CODE 258: Performed by: HOSPITALIST

## 2022-10-19 PROCEDURE — 700111 HCHG RX REV CODE 636 W/ 250 OVERRIDE (IP): Performed by: HOSPITALIST

## 2022-10-19 PROCEDURE — 99232 SBSQ HOSP IP/OBS MODERATE 35: CPT | Performed by: HOSPITALIST

## 2022-10-19 RX ORDER — NITROFURANTOIN 25; 75 MG/1; MG/1
100 CAPSULE ORAL 2 TIMES DAILY WITH MEALS
Status: COMPLETED | OUTPATIENT
Start: 2022-10-19 | End: 2022-10-23

## 2022-10-19 RX ADMIN — POLYETHYLENE GLYCOL 3350 1 PACKET: 17 POWDER, FOR SOLUTION ORAL at 20:35

## 2022-10-19 RX ADMIN — OXCARBAZEPINE 300 MG: 300 TABLET, FILM COATED ORAL at 20:34

## 2022-10-19 RX ADMIN — SODIUM CHLORIDE 250 MG: 9 INJECTION, SOLUTION INTRAVENOUS at 08:08

## 2022-10-19 RX ADMIN — GABAPENTIN 800 MG: 400 CAPSULE ORAL at 08:07

## 2022-10-19 RX ADMIN — POLYETHYLENE GLYCOL 3350 1 PACKET: 17 POWDER, FOR SOLUTION ORAL at 08:08

## 2022-10-19 RX ADMIN — SODIUM CHLORIDE 250 MG: 9 INJECTION, SOLUTION INTRAVENOUS at 17:09

## 2022-10-19 RX ADMIN — OXYCODONE HYDROCHLORIDE 10 MG: 10 TABLET ORAL at 15:42

## 2022-10-19 RX ADMIN — OXCARBAZEPINE 300 MG: 300 TABLET, FILM COATED ORAL at 08:07

## 2022-10-19 RX ADMIN — INSULIN LISPRO 4 UNITS: 100 INJECTION, SOLUTION INTRAVENOUS; SUBCUTANEOUS at 11:33

## 2022-10-19 RX ADMIN — LEVETIRACETAM 1000 MG: 500 TABLET, FILM COATED ORAL at 20:33

## 2022-10-19 RX ADMIN — PHENAZOPYRIDINE HYDROCHLORIDE 200 MG: 200 TABLET ORAL at 17:43

## 2022-10-19 RX ADMIN — CIPROFLOXACIN 500 MG: 500 TABLET, FILM COATED ORAL at 08:07

## 2022-10-19 RX ADMIN — GABAPENTIN 800 MG: 400 CAPSULE ORAL at 17:43

## 2022-10-19 RX ADMIN — OXYCODONE HYDROCHLORIDE 10 MG: 10 TABLET ORAL at 02:15

## 2022-10-19 RX ADMIN — GABAPENTIN 800 MG: 400 CAPSULE ORAL at 20:34

## 2022-10-19 RX ADMIN — OXYCODONE HYDROCHLORIDE 10 MG: 10 TABLET, FILM COATED, EXTENDED RELEASE ORAL at 09:05

## 2022-10-19 RX ADMIN — OXYCODONE HYDROCHLORIDE 10 MG: 10 TABLET ORAL at 18:54

## 2022-10-19 RX ADMIN — BACLOFEN 10 MG: 10 TABLET ORAL at 20:35

## 2022-10-19 RX ADMIN — OXYCODONE HYDROCHLORIDE 10 MG: 10 TABLET ORAL at 12:46

## 2022-10-19 RX ADMIN — Medication 1000 UNITS: at 08:08

## 2022-10-19 RX ADMIN — QUETIAPINE FUMARATE 200 MG: 100 TABLET ORAL at 20:34

## 2022-10-19 RX ADMIN — MICONAZOLE NITRATE: 20 CREAM TOPICAL at 09:06

## 2022-10-19 RX ADMIN — ATORVASTATIN CALCIUM 40 MG: 40 TABLET, FILM COATED ORAL at 20:41

## 2022-10-19 RX ADMIN — OMEPRAZOLE 20 MG: 20 CAPSULE, DELAYED RELEASE ORAL at 08:07

## 2022-10-19 RX ADMIN — SENNOSIDES AND DOCUSATE SODIUM 2 TABLET: 50; 8.6 TABLET ORAL at 08:08

## 2022-10-19 RX ADMIN — PHENAZOPYRIDINE HYDROCHLORIDE 200 MG: 200 TABLET ORAL at 08:08

## 2022-10-19 RX ADMIN — PHENAZOPYRIDINE HYDROCHLORIDE 200 MG: 200 TABLET ORAL at 11:31

## 2022-10-19 RX ADMIN — INSULIN LISPRO 4 UNITS: 100 INJECTION, SOLUTION INTRAVENOUS; SUBCUTANEOUS at 20:46

## 2022-10-19 RX ADMIN — ENOXAPARIN SODIUM 40 MG: 40 INJECTION SUBCUTANEOUS at 20:35

## 2022-10-19 RX ADMIN — INSULIN LISPRO 2 UNITS: 100 INJECTION, SOLUTION INTRAVENOUS; SUBCUTANEOUS at 07:56

## 2022-10-19 RX ADMIN — OXYCODONE HYDROCHLORIDE 10 MG: 10 TABLET, FILM COATED, EXTENDED RELEASE ORAL at 20:34

## 2022-10-19 RX ADMIN — BACLOFEN 10 MG: 10 TABLET ORAL at 15:43

## 2022-10-19 RX ADMIN — TAMSULOSIN HYDROCHLORIDE 0.4 MG: 0.4 CAPSULE ORAL at 08:07

## 2022-10-19 RX ADMIN — DULOXETINE HYDROCHLORIDE 60 MG: 30 CAPSULE, DELAYED RELEASE ORAL at 20:33

## 2022-10-19 RX ADMIN — NITROFURANTOIN (MONOHYDRATE/MACROCRYSTALS) 100 MG: 75; 25 CAPSULE ORAL at 20:36

## 2022-10-19 RX ADMIN — MICONAZOLE NITRATE: 20 CREAM TOPICAL at 20:35

## 2022-10-19 RX ADMIN — LEVETIRACETAM 1000 MG: 500 TABLET, FILM COATED ORAL at 08:07

## 2022-10-19 RX ADMIN — INSULIN LISPRO 2 UNITS: 100 INJECTION, SOLUTION INTRAVENOUS; SUBCUTANEOUS at 17:34

## 2022-10-19 RX ADMIN — GABAPENTIN 800 MG: 400 CAPSULE ORAL at 12:46

## 2022-10-19 RX ADMIN — OXYCODONE HYDROCHLORIDE 10 MG: 10 TABLET ORAL at 05:39

## 2022-10-19 RX ADMIN — BACLOFEN 10 MG: 10 TABLET ORAL at 08:07

## 2022-10-19 ASSESSMENT — ENCOUNTER SYMPTOMS
NAUSEA: 0
SHORTNESS OF BREATH: 0
HEADACHES: 0
BLURRED VISION: 0
PALPITATIONS: 0
HALLUCINATIONS: 0
FEVER: 0
VOMITING: 0

## 2022-10-19 ASSESSMENT — PAIN DESCRIPTION - PAIN TYPE
TYPE: ACUTE PAIN

## 2022-10-19 ASSESSMENT — PAIN SCALES - PAIN ASSESSMENT IN ADVANCED DEMENTIA (PAINAD): BREATHING: NORMAL

## 2022-10-19 ASSESSMENT — GAIT ASSESSMENTS
GAIT LEVEL OF ASSIST: CONTACT GUARD ASSIST
ASSISTIVE DEVICE: FRONT WHEEL WALKER
DISTANCE (FEET): 175

## 2022-10-19 ASSESSMENT — PAIN SCALES - WONG BAKER: WONGBAKER_NUMERICALRESPONSE: HURTS JUST A LITTLE BIT

## 2022-10-19 ASSESSMENT — ACTIVITIES OF DAILY LIVING (ADL): BED_CHAIR_WHEELCHAIR_TRANSFER_DESCRIPTION: ADAPTIVE EQUIPMENT;INCREASED TIME;SET-UP OF EQUIPMENT

## 2022-10-19 NOTE — THERAPY
Occupational Therapy  Daily Treatment     Patient Name: Jacque Mcintyre  Age:  41 y.o., Sex:  female  Medical Record #: [id number]  Today's Date: 10/19/2022     Precautions  Precautions: Fall Risk, Other (See Comments)  Comments: chronic LBP         Subjective    Pt received up in chair, agreeable to OT     Objective       10/19/22 1031   OT Charge Group   OT Therapy Activity 4   OT Total Time Spent   OT Individual Total Time Spent (Mins) 60   Vitals   Pulse (!) 111   Patient BP Position Sitting   Blood Pressure (!) 97/74   Vitals Comments BP consistent with standing activity though pt reporting lightheadness, HR elevated indicating possibly fatigue related   Cognition    Speech/ Communication Delayed Responses   Attention Impaired   Initiation Impaired     Pt completed pipe tree activity in standing at high table to promote bilateral integration and attn to L side, required seated rest x3 for symptom management due to ongoing lightheadedness. Intermittent cues for problem solving pipe tree puzzle though improved with progression of task, pt independently incorporating LUE into task.     Discussed DME options for shower safety at home, pt reports her parents have a walk in shower with HH showerhead and a tub shower with curtain, showed patient TTB as well as standard shower chair, discussed benefits of grab bar placement for safety. Pt will follow up with parents to determine if grab bar is in place and if installation is possible/       Assessment    Patient tolerated session well, continutes with lightheadedness impacting standing tolerance, BP low but stable during session. Pt required increased time for task completion due to LUE deficits and impaired attention, intermittent cues for attn to LLE engagement in standing.     Strengths: Able to follow instructions, Alert and oriented, Independent prior level of function, Making steady progress towards goals, Motivated for self care and independence, Pleasant and  cooperative, Supportive family, Willingly participates in therapeutic activities  Barriers: Decreased endurance, Fatigue, Generalized weakness, Impaired activity tolerance, Impaired balance    Plan    LUE neuro re-ed, standing balance/tolerance, functional mobility, L attention, ADLs with selene technique as needed, IADLs       Occupational Therapy Goals (Active)       Problem: Bathing       Dates: Start: 10/16/22         Goal: STG-Within one week, patient will bathe with Min A overall using AE/DME as needed.       Dates: Start: 10/16/22               Problem: Dressing       Dates: Start: 10/16/22         Goal: STG-Within one week, patient will dress LB with Min A overall using AE/DME as needed.       Dates: Start: 10/16/22               Problem: Functional Transfers       Dates: Start: 10/16/22         Goal: STG-Within one week, patient will transfer to toilet with CGA overall using AE/DME as needed.       Dates: Start: 10/16/22               Problem: OT Long Term Goals       Dates: Start: 10/16/22         Goal: LTG-By discharge, patient will complete basic self care tasks with supervision-Mod I overall using AE/DME as needed.       Dates: Start: 10/16/22            Goal: LTG-By discharge, patient will perform bathroom transfers with supervision overall using AE/DME as needed.       Dates: Start: 10/16/22               Problem: Toileting       Dates: Start: 10/16/22         Goal: STG-Within one week, patient will complete toileting tasks with Min A overall using AE/DME as needed.       Dates: Start: 10/16/22

## 2022-10-19 NOTE — CARE PLAN
The patient is Stable - Low risk of patient condition declining or worsening    Shift Goals  Clinical Goals: Safety, Pain control  Patient Goals: pain mgt    Progress made toward(s) clinical / shift goals:    Problem: Knowledge Deficit - Standard  Goal: Patient and family/care givers will demonstrate understanding of plan of care, disease process/condition, diagnostic tests and medications  Outcome: Progressing     Problem: Bladder / Voiding  Goal: Patient will establish and maintain regular urinary output  Outcome: Progressing. Patient has indwelling urban, 2000 output overnight.      Problem: Fall Risk - Rehab  Goal: Patient will remain free from falls  Outcome: Progressing. Bed in lowest locked position with call light within reach. Patient calls appropriately for needs.

## 2022-10-19 NOTE — PROGRESS NOTES
"Rehab Progress Note     Encounter Date: 10/19/2022    CC: Decreased mobility, brain metastasis     Interval Events (Subjective)  Patient sitting up in room. She reports the pyridium is helping. With her low Fe she was started on Fe supplement.     Objective:  VITAL SIGNS: BP (!) 97/74   Pulse (!) 111   Temp 37 °C (98.6 °F) (Oral)   Resp 18   Ht 1.626 m (5' 4.02\")   Wt 65 kg (143 lb 4.8 oz)   SpO2 91%   BMI 24.58 kg/m²   Gen: NAD  Psych: Mood and affect appropriate  CV: RRR, no edema  Resp: CTAB, no upper airway sounds  Abd: NTND  Neuro: AOx4, following commands    Recent Results (from the past 72 hour(s))   POCT glucose device results    Collection Time: 10/16/22  5:27 PM   Result Value Ref Range    POC Glucose, Blood 200 (H) 65 - 99 mg/dL   POCT glucose device results    Collection Time: 10/16/22  8:23 PM   Result Value Ref Range    POC Glucose, Blood 245 (H) 65 - 99 mg/dL   POCT glucose device results    Collection Time: 10/17/22  7:22 AM   Result Value Ref Range    POC Glucose, Blood 189 (H) 65 - 99 mg/dL   POCT glucose device results    Collection Time: 10/17/22 11:03 AM   Result Value Ref Range    POC Glucose, Blood 205 (H) 65 - 99 mg/dL   URINALYSIS    Collection Time: 10/17/22  3:30 PM    Specimen: Urine, Graf Cath   Result Value Ref Range    Color Yellow     Character Cloudy (A)     Specific Gravity 1.031 <1.035    Ph 5.5 5.0 - 8.0    Glucose >=1000 (A) Negative mg/dL    Ketones Negative Negative mg/dL    Protein 100 (A) Negative mg/dL    Bilirubin Negative Negative    Urobilinogen, Urine 0.2 Negative    Nitrite Positive (A) Negative    Leukocyte Esterase Small (A) Negative    Occult Blood Moderate (A) Negative    Micro Urine Req Microscopic    URINE MICROSCOPIC (W/UA)    Collection Time: 10/17/22  3:30 PM   Result Value Ref Range    WBC Packed (A) /hpf    RBC 10-20 (A) /hpf    Bacteria Many (A) None /hpf    Epithelial Cells Rare /hpf    Hyaline Cast 0-2 /lpf   URINE CULTURE-EXISTING-LESS THAN 48 " HOURS    Collection Time: 10/17/22  3:30 PM    Specimen: Urine, Clean Catch   Result Value Ref Range    Significant Indicator POS (POS)     Source UR     Site URINE, CLEAN CATCH     Culture Result - (A)     Culture Result (A)      Lactose fermenting Gram negative elijah  >100,000 cfu/mL     POCT glucose device results    Collection Time: 10/17/22  5:06 PM   Result Value Ref Range    POC Glucose, Blood 207 (H) 65 - 99 mg/dL   POCT glucose device results    Collection Time: 10/17/22  7:55 PM   Result Value Ref Range    POC Glucose, Blood 228 (H) 65 - 99 mg/dL   PROCALCITONIN    Collection Time: 10/18/22  6:04 AM   Result Value Ref Range    Procalcitonin 0.09 <0.25 ng/mL   CBC WITHOUT DIFFERENTIAL    Collection Time: 10/18/22  6:04 AM   Result Value Ref Range    WBC 7.6 4.8 - 10.8 K/uL    RBC 3.50 (L) 4.20 - 5.40 M/uL    Hemoglobin 8.7 (L) 12.0 - 16.0 g/dL    Hematocrit 28.2 (L) 37.0 - 47.0 %    MCV 80.6 (L) 81.4 - 97.8 fL    MCH 24.9 (L) 27.0 - 33.0 pg    MCHC 30.9 (L) 33.6 - 35.0 g/dL    RDW 47.8 35.9 - 50.0 fL    Platelet Count 424 164 - 446 K/uL    MPV 9.9 9.0 - 12.9 fL   Basic Metabolic Panel    Collection Time: 10/18/22  6:04 AM   Result Value Ref Range    Sodium 140 135 - 145 mmol/L    Potassium 3.7 3.6 - 5.5 mmol/L    Chloride 105 96 - 112 mmol/L    Co2 26 20 - 33 mmol/L    Glucose 178 (H) 65 - 99 mg/dL    Bun 7 (L) 8 - 22 mg/dL    Creatinine 0.39 (L) 0.50 - 1.40 mg/dL    Calcium 8.1 (L) 8.5 - 10.5 mg/dL    Anion Gap 9.0 7.0 - 16.0   ESTIMATED GFR    Collection Time: 10/18/22  6:04 AM   Result Value Ref Range    GFR (CKD-EPI) 126 >60 mL/min/1.73 m 2   POCT glucose device results    Collection Time: 10/18/22  7:45 AM   Result Value Ref Range    POC Glucose, Blood 177 (H) 65 - 99 mg/dL   POCT glucose device results    Collection Time: 10/18/22 10:52 AM   Result Value Ref Range    POC Glucose, Blood 185 (H) 65 - 99 mg/dL   POCT glucose device results    Collection Time: 10/18/22  5:01 PM   Result Value Ref Range     POC Glucose, Blood 198 (H) 65 - 99 mg/dL   POCT glucose device results    Collection Time: 10/18/22  7:45 PM   Result Value Ref Range    POC Glucose, Blood 205 (H) 65 - 99 mg/dL   POCT glucose device results    Collection Time: 10/19/22  7:39 AM   Result Value Ref Range    POC Glucose, Blood 170 (H) 65 - 99 mg/dL       Current Facility-Administered Medications   Medication Frequency    ferric gluconate complex (Ferrlecit) 250 mg in  mL IVPB BID    ciprofloxacin (CIPRO) tablet 500 mg Q12HRS    phenazopyridine (PYRIDIUM) tablet 200 mg TID WITH MEALS    insulin GLARGINE (Lantus,Semglee) injection Q EVENING    And    insulin lispro (AdmeLOG,HumaLOG) injection 4X/DAY ACHS    vitamin D3 (cholecalciferol) tablet 1,000 Units DAILY    Respiratory Therapy Consult Continuous RT    Pharmacy Consult Request ...Pain Management Review 1 Each PHARMACY TO DOSE    hydrALAZINE (APRESOLINE) tablet 25 mg Q8HRS PRN    acetaminophen (Tylenol) tablet 650 mg Q4HRS PRN    omeprazole (PRILOSEC) capsule 20 mg DAILY    mag hydrox-al hydrox-simeth (MAALOX PLUS ES or MYLANTA DS) suspension 20 mL Q2HRS PRN    ondansetron (ZOFRAN ODT) dispertab 4 mg 4X/DAY PRN    Or    ondansetron (ZOFRAN) syringe/vial injection 4 mg 4X/DAY PRN    traZODone (DESYREL) tablet 50 mg QHS PRN    sodium chloride (OCEAN) 0.65 % nasal spray 2 Spray PRN    oxyCODONE immediate-release (ROXICODONE) tablet 5 mg Q3HRS PRN    Or    oxyCODONE immediate release (ROXICODONE) tablet 10 mg Q3HRS PRN    traMADol (Ultram) 50 MG tablet 50 mg Q4HRS PRN    midazolam (VERSED) 5 mg/mL (1 mL vial) PRN    ALPRAZolam (XANAX) tablet 0.25 mg 4X/DAY PRN    atorvastatin (LIPITOR) tablet 40 mg Q EVENING    baclofen (LIORESAL) tablet 10 mg TID    DULoxetine (CYMBALTA) capsule 60 mg Q EVENING    enoxaparin (Lovenox) inj 40 mg DAILY AT 1800    gabapentin (NEURONTIN) capsule 800 mg 4X/DAY    levETIRAcetam (KEPPRA) tablet 1,000 mg BID    OXcarbazepine (TRILEPTAL) tablet 300 mg BID    oxyCODONE CR  (OXYCONTIN) tablet 10 mg Q12HRS    QUEtiapine (Seroquel) tablet 200 mg Q EVENING    tamsulosin (FLOMAX) capsule 0.4 mg AFTER BREAKFAST    miconazole 2%-zinc oxide (Steven) topical cream BID    polyethylene glycol/lytes (MIRALAX) PACKET 1 Packet BID    And    senna-docusate (PERICOLACE or SENOKOT S) 8.6-50 MG per tablet 2 Tablet DAILY    And    magnesium hydroxide (MILK OF MAGNESIA) suspension 30 mL QDAY PRN    And    bisacodyl (DULCOLAX) suppository 10 mg QDAY PRN    albuterol inhaler 2 Puff Q4H PRN (RT)       Orders Placed This Encounter   Procedures    Diet Order Diet: Consistent CHO (Diabetic)     Standing Status:   Standing     Number of Occurrences:   1     Order Specific Question:   Diet:     Answer:   Consistent CHO (Diabetic) [4]       Assessment:  Active Hospital Problems    Diagnosis     *Metastatic cancer to brain (HCC)        Medical Decision Making and Plan:  Adapted from Dr. Arambula's A&P  Metastatic brain cancer  Melanoma  BRAF+, follow-up oncology  Staples removed.     ##MSK  #Impaired ADLs and mobility  Patient is admitted to [facility] for comprehensive rehabilitation therapy as described.   Rehabilitation nursing monitors bowel and bladder control, educates on medication administration, co-morbidities and monitors patient safety.  Expected Length of Stay: 10-14 days  Anticipated discharge date: TBD  Anticipated discharge destination: home with parents  Prognosis: good  Equipment: TBD  Postdischarge therapies: TBD  Followup: Pebbles Tejada MD (neurosurgery), Mely Romero DO (PM&R), PCP  Precautions: none  Code: full resuscitation     #Postsurgical pain, acute, controlled  #Neuropathic pain, acute, controlled  Ordered baclofen 10mg TID, gabapentin 800mg QID, oxycontin 10mg BID     ##NEURO  #Metastasis to brain  Ordered keppra 1000mg BID, trileptal 300mg BID  Monitor  On Baclofen 10 mg TID for spasms     #RESP  Respiratory therapy: RT performs O2 management prn, breathing  retraining, pulmonary hygiene and bronchospasm management prn to optimize participation in therapies.      ##CARDIAC  #Chronic hyperlipidemia  Ordered lipitor 40mg QHS  DVT prophylaxis: Ordered lovenox 40mg daily. Encourage OOB. Monitor daily for signs and symptoms of DVT including but not limited to swelling and pain to prevent the development of DVT that may interfere with therapies.     ##GI  GI prophylaxis:  Ordered prilosec 20mg daily to prevent gastritis/dyspepsia which may interfere with therapies.  #At risk of opioid induced constipation  Goal of one continent BM daily     #At high risk of malnutrition  Dietician monitors nutrient intake, recommend supplements prn and provide nutrition education to pt/family to promote optimal nutrition for wound healing/recovery.     ##/RENAL  #At risk of urinary retention  Dysuria on 10/17-10/18/22. UA positive for UTI. Urine culture sent. Ciprofloxacin and pyridium. UCx sent     ##HEME  #Acute blood loss anemia  #Leukocytosis - Consult hospitalist  #Thrombocytosis, likely reactive  Monitor     ##ENDO  #Diabetes mellitus with hyperglycemia  Ordered Lantus 20mg QHS -> 30     ##SKIN  Skin/dermal ulcer prophylaxis: Monitor for new skin conditions with q.2 h. turns as required to prevent the development of skin breakdown.     Total time:  26 minutes.  I spent greater than 50% of the time for patient care, counseling, and coordination on this date, including unit/floor time, and face-to-face time with the patient as per interval events and assessment and plan above. Topics discussed included discharge planning, staple removal, follow-up with oncology, possible MRI, and increase in Lantus. Discussed case with hospitalist    Zach Patel M.D.

## 2022-10-19 NOTE — PROGRESS NOTES
Request from Dr August to assist with coordination of radiation mapping, MRI with transport from [facility] rehab.  Message sent to rehab case management.  Spoke with Antonieta, transitional care coordinator.  She reported to let her know when MRI and radiation mapping will be done.  She can then work on transportation.  Call placed to scheduling.  Was able to get 7 am MRI check in time on Wednesday Oct. 26th.  This will be to check in at [facility] for the MRI.  Spoke with radiation therapy and updated regarding coordination.  They placed patient on schedule for 8:30 check in time at radiation therapy.  They are aware patient will be coming from MRI.      Return call placed to Antonieta and provided updated information.  She will work on getting transportation for patient for that day.    Received call back and message was left by Antonieta.  She reported patient is doing remarkably well and may even discharge prior to those appointments.

## 2022-10-19 NOTE — THERAPY
Occupational Therapy  Daily Treatment     Patient Name: Jacque Mcintyre  Age:  41 y.o., Sex:  female  Medical Record #: [id number]  Today's Date: 10/19/2022     Precautions  Precautions: Fall Risk, Other (See Comments)  Comments: chronic LBP         Subjective    Pt received on toilet, mother present     Objective       10/19/22 1301   OT Charge Group   OT Therapy Activity 2   OT Total Time Spent   OT Individual Total Time Spent (Mins) 30   Sitting Upper Body Exercises   Sitting Upper Body Exercises Yes   Upper Extremity Bike Level 5 Resistance  (10 min motomed for forced use LUE)   Balance   Comments functional mobility with FWW and L AFO 1x40 feet and 1x20 feet with min A, reports of ongoing lightheadedness     Pt completed toilet hygiene seated on toilet using RUE, min A for thoroughness, CGA for standing safety to pull pants up    Mother present for session, discussed bathroom setup, chair options (standard for walk-in, TTB for tub shower) and grab bar placement recommendations, issued printed info on grab bar installation and chair options.     Assessment    Patient tolerated session well, focus on mobility tolerance and safety in bathroom and using FWW, remains limited by lightheadedness, L inattention, L motor coordination deficits.     Strengths: Able to follow instructions, Alert and oriented, Independent prior level of function, Making steady progress towards goals, Motivated for self care and independence, Pleasant and cooperative, Supportive family, Willingly participates in therapeutic activities  Barriers: Decreased endurance, Fatigue, Generalized weakness, Impaired activity tolerance, Impaired balance    Plan    LUE neuro re-ed, standing balance/tolerance, functional mobility, L attention, ADLs with selene technique as needed, IADLs    Occupational Therapy Goals (Active)       Problem: Bathing       Dates: Start: 10/16/22         Goal: STG-Within one week, patient will bathe with Min A overall using AE/DME  as needed.       Dates: Start: 10/16/22               Problem: Dressing       Dates: Start: 10/16/22         Goal: STG-Within one week, patient will dress LB with Min A overall using AE/DME as needed.       Dates: Start: 10/16/22               Problem: Functional Transfers       Dates: Start: 10/16/22         Goal: STG-Within one week, patient will transfer to toilet with CGA overall using AE/DME as needed.       Dates: Start: 10/16/22               Problem: OT Long Term Goals       Dates: Start: 10/16/22         Goal: LTG-By discharge, patient will complete basic self care tasks with supervision-Mod I overall using AE/DME as needed.       Dates: Start: 10/16/22            Goal: LTG-By discharge, patient will perform bathroom transfers with supervision overall using AE/DME as needed.       Dates: Start: 10/16/22               Problem: Toileting       Dates: Start: 10/16/22         Goal: STG-Within one week, patient will complete toileting tasks with Min A overall using AE/DME as needed.       Dates: Start: 10/16/22

## 2022-10-19 NOTE — CT SIMULATION
PATIENT NAME Jacque Mcintyre   PRIMARY PHYSICIAN Adriana Levine [id number]   REFERRING PHYSICIAN No ref. provider found 1980     No matching staging information was found for the patient.       Treatment Planning CT Simulation      Order Questions     Question Answer    Is this for a new course of treatment? Yes    Is this an Addendum? No    Simulation Status Initial    Planned Start Date 10/31/2022    Treatment Site Brain    Laterality Midline    Treatment Technique SRS    Treatment Pattern/Frequency Daily    Concurrent Chemotherapy No    CT Technique 3D    Slice Thickness 1mm    Scan Extent Brain    Contrast IV    IV Contrast Volume Other    Volume (cc) 100    Treatment Device(s) SRS Mask    Patient Position Supine    Patient Orientation Head First    Arm Position Up    Treatment Machine No preference    Treatment Image Guidance CBCT    Frequency (CBCT) Daily    Image Guidance Match Bone    Treatment Planning Image Fusion CT/MR    Special Physics Consult Stereotactic    Other Orders Weekly Physics Check     Special Tx Procedure    Release to patient Immediate

## 2022-10-19 NOTE — THERAPY
"Speech Language Pathology  Daily Treatment     Patient Name: Jacque Mcintyre  Age:  41 y.o., Sex:  female  Medical Record #: [id number]  Today's Date: 10/19/2022     Precautions  Precautions: Fall Risk, Other (See Comments)  Comments: chronic LBP    Subjective    Pt was seen for 2 separate ST sessions today, pleasant and cooperative during both sessions      Objective       10/19/22 0831   Treatment Charges   SLP Cognitive Skill Development First 15 Minutes 1   SLP Cognitive Skill Development Additional 15 Minutes 5   SLP Total Time Spent   SLP Individual Total Time Spent (Mins) 90       Assessment    2130-9986:  Pt reported that she was feeling tired this morning, but was motivated to participate in this session at bedside.  Initiated \"Cognitive task #4\" requiring pt to itemize each cost on a fishing trip to [place].  Pt was actively falling asleep while working on this task needing cues several times to remain awake; however was still able to complete about 80% of this task with 100% accuracy.  Pt will continue this task during her next scheduled ST session this afternoon.      9043-7515: Pt continued to be fatigued during this afternoons session, requiring frequent cues to remain awake.  Offered to get pt back into bed, but pt preferred to attempt to complete this therapy session.  She was able to complete the trip to [place] task from this mornings session with min cues for attention to details, likely because she was so tired.  Pt also initiated an executive function task (Car Buying Activity) requiring her to determine which car would be the best match given a list of with list items and descriptions of 6 cars.  Pt was able to partially complete this task, but was unable to finish this session.      Strengths: Able to follow instructions, Alert and oriented, Effective communication skills, Independent prior level of function, Motivated for self care and independence, Pleasant and cooperative, Willingly participates " "in therapeutic activities  Barriers: Impaired functional cognition    Plan    Continue \"Car buying Activity\", executive functions         Speech Therapy Problems (Active)       Problem: Memory STGs       Dates: Start: 10/16/22         Goal: STG-Within one week, patient will recall daily events with 80% accuracy, given min verbal cues and external aids.       Dates: Start: 10/16/22               Problem: Problem Solving STGs       Dates: Start: 10/16/22         Goal: STG-Within one week, patient will complete executive function tasks with 90% accuracy given min verbal cues.       Dates: Start: 10/16/22            Goal: STG-Within one week, patient will complete complex attn tasks (alternating/divided) with 90% accuracy given min verbal cues.       Dates: Start: 10/16/22               Problem: Speech/Swallowing LTGs       Dates: Start: 10/16/22         Goal: LTG-By discharge, patient will solve complex problems mod I for safe discharge home.       Dates: Start: 10/16/22               "

## 2022-10-19 NOTE — THERAPY
Physical Therapy   Daily Treatment     Patient Name: aJcque Mcintyre  Age:  41 y.o., Sex:  female  Medical Record #: [id number]  Today's Date: 10/19/2022     Precautions  Precautions: Fall Risk, Other (See Comments)  Comments: chronic LBP    Subjective    Pt resting in bed, willing to participate     Objective       10/19/22 1600   PT Charge Group   PT Gait Training 2   PT Total Time Spent   PT Individual Total Time Spent (Mins) 30   Gait Functional Level of Assist    Gait Level Of Assist Contact Guard Assist   Assistive Device Front Wheel Walker  (L toe off AFO)   Distance (Feet) 175  (in addition to 100 ft x 1)   # of Times Distance was Traveled 2   Transfer Functional Level of Assist   Bed, Chair, Wheelchair Transfer Contact Guard Assist   Bed Chair Wheelchair Transfer Description Adaptive equipment;Increased time;Set-up of equipment   Supine Lower Body Exercise   Comments PROM  / stretching for L ankle dorsiflexion/  gastrocs   Bed Mobility    Supine to Sit Minimal Assist   Sit to Stand Contact Guard Assist       Assessment    Pt tolerated increased gait distances with CGA only for safety/ balance. Vital signs stable throughout tx.    Strengths: Able to follow instructions, Effective communication skills, Alert and oriented, Good insight into deficits/needs, Independent prior level of function, Making steady progress towards goals, Manages pain appropriately, Motivated for self care and independence, Pleasant and cooperative, Supportive family, Willingly participates in therapeutic activities  Barriers: Hemiparesis    Plan    DF strengthening in gravity minimized position, trial NMES consider FES/bioness during gait training, progress gait with FWW, initiate curb/stairs,balance and coordinaton     Get in/out of bed safely, in/out of a vehicle, safely use mobility device, walk or wheel around home/community, navigate up and down stairs, show how to get up/down from the ground, ensure home is accessible, demonstrate  HEP, complete caregiver training       Physical Therapy Problems (Active)       Problem: Balance       Dates: Start: 10/16/22         Goal: STG-Within one week, patient will maintain dynamic standing: score >45/56 on the Thornton Balance Assessment.       Dates: Start: 10/16/22               Problem: Mobility       Dates: Start: 10/16/22         Goal: STG-Within one week, patient will ambulate household distance at least 500' with SPC or no AD, SBA.       Dates: Start: 10/16/22               Problem: Mobility Transfers       Dates: Start: 10/16/22         Goal: STG-Within one week, patient will perform bed mobility Mod I with bed flat.       Dates: Start: 10/16/22            Goal: STG-Within one week, patient will transfer bed to chair with SPC or no AD.       Dates: Start: 10/16/22               Problem: PT-Long Term Goals       Dates: Start: 10/16/22         Goal: LTG-By discharge, patient will maintain balance: at least 22/30 on the FGA indicating low fall risk, to facilitate safe DC to home.       Dates: Start: 10/16/22            Goal: LTG-By discharge, patient will ambulate at least 1000' during 6 Minute Walk Test.       Dates: Start: 10/16/22            Goal: LTG-By discharge, patient will transfer one surface to another Mod I.       Dates: Start: 10/16/22            Goal: LTG-By discharge, patient will transfer in/out of a car SPV.       Dates: Start: 10/16/22            Goal: LTG-By discharge, patient will ambulate up/down ADA ramp with SPC or no AD, and SPV, to safely enter/exit  her parent's home.       Dates: Start: 10/16/22

## 2022-10-19 NOTE — THERAPY
"Speech Language Pathology  Daily Treatment     Patient Name: Jacque Mcintyre  Age:  41 y.o., Sex:  female  Medical Record #: [id number]  Today's Date: 10/19/2022     Precautions  Precautions: Fall Risk, Other (See Comments)  Comments: chronic LBP    Subjective    Pt seen in room due to length of session (30 minutes), pleasant and cooperative.      Objective       10/18/22 1104   Treatment Charges   SLP Cognitive Skill Development First 15 Minutes 1   SLP Cognitive Skill Development Additional 15 Minutes 1   SLP Total Time Spent   SLP Individual Total Time Spent (Mins) 30   Cognition   Complex Attention Minimal (4)   Interdisciplinary Plan of Care Collaboration   Patient Position at End of Therapy Seated;Call Light within Reach;Tray Table within Reach;Phone within Reach       Assessment    Reviewed BI handouts presented day prior. Pt highlighting areas she feels are relevant to current situation as it pertains to behavior changes, mental fatigue and executive function as well as potential compensatory strategies for fatigue and exec function deficits. Pt demonstrating good insight to CLOF. Pt presented with choice of activities, selected complex reading passage to ID spelling errors \"this is something I am really good at\" Pt with a total of 3 omissions, with MIN cues was able to locate to achieve 100% accuracy.     Strengths: Able to follow instructions, Alert and oriented, Effective communication skills, Independent prior level of function, Motivated for self care and independence, Pleasant and cooperative, Willingly participates in therapeutic activities  Barriers: Impaired functional cognition    Plan    Continue to target memory strategies, exec function, complex attention    Passport items to be completed:  arrive to therapy appointments on time, complete daily memory log entries, solve problems related to safety situations, review education related to hospitalization, complete caregiver training     Speech Therapy " Problems (Active)       Problem: Memory STGs       Dates: Start: 10/16/22         Goal: STG-Within one week, patient will recall daily events with 80% accuracy, given min verbal cues and external aids.       Dates: Start: 10/16/22               Problem: Problem Solving STGs       Dates: Start: 10/16/22         Goal: STG-Within one week, patient will complete executive function tasks with 90% accuracy given min verbal cues.       Dates: Start: 10/16/22            Goal: STG-Within one week, patient will complete complex attn tasks (alternating/divided) with 90% accuracy given min verbal cues.       Dates: Start: 10/16/22               Problem: Speech/Swallowing LTGs       Dates: Start: 10/16/22         Goal: LTG-By discharge, patient will solve complex problems mod I for safe discharge home.       Dates: Start: 10/16/22

## 2022-10-19 NOTE — CARE PLAN
"  Problem: Pain - Standard  Goal: Alleviation of pain or a reduction in pain to the patient’s comfort goal  Outcome: Progressing  Patient is able to rate pain on a scale of 1-10.             Problem: Fall Risk - Rehab  Goal: Patient will remain free from falls  Outcome: Progressing  Ginger Trinidad Fall risk Assessment Score: 13    Moderate fall risk Interventions  - Bed and strip alarm   - Yellow sign by the door   - Yellow wrist band \"Fall risk\"  - Room near to the nurse station  - Do not leave patient unattended in the bathroom  - Fall risk education provided                   "

## 2022-10-19 NOTE — ASSESSMENT & PLAN NOTE
Having lightheadedness/dizziness when standing  Dizziness continues to be better (10/23)  Orthostatics neg (on 10/19 and 10/20)  CT head was ok and had some improvement of edema (no bleed)

## 2022-10-19 NOTE — PROGRESS NOTES
[facility] Medicine Daily Progress Note    Date of Service  10/19/2022    Chief Complaint:  Diabetes  UTI/Leukocytosis    Interval History:  Pt states she is getting dizzy on standing recently.    Review of Systems  Review of Systems   Constitutional:  Negative for fever.   Eyes:  Negative for blurred vision.   Respiratory:  Negative for shortness of breath.    Cardiovascular:  Negative for palpitations.   Gastrointestinal:  Negative for nausea and vomiting.   Neurological:  Positive for dizziness. Negative for headaches.   Psychiatric/Behavioral:  Negative for hallucinations.       Physical Exam  Temp:  [36.5 °C (97.7 °F)-37 °C (98.6 °F)] 37 °C (98.6 °F)  Pulse:  [] 98  Resp:  [18] 18  BP: (100-110)/(67-75) 108/75  SpO2:  [91 %-96 %] 91 %    Physical Exam  Vitals and nursing note reviewed.   Constitutional:       General: She is not in acute distress.  HENT:      Mouth/Throat:      Mouth: Mucous membranes are moist.      Pharynx: Oropharynx is clear.   Eyes:      General: No scleral icterus.  Neck:      Vascular: No JVD.   Cardiovascular:      Rate and Rhythm: Normal rate and regular rhythm.      Heart sounds: Normal heart sounds. No murmur heard.  Pulmonary:      Effort: Pulmonary effort is normal.      Breath sounds: Normal breath sounds. No stridor.   Abdominal:      General: There is no distension.      Palpations: Abdomen is soft.      Tenderness: There is no abdominal tenderness.   Musculoskeletal:      Cervical back: No rigidity.      Right lower leg: No edema.      Left lower leg: No edema.   Skin:     General: Skin is warm and dry.      Findings: No rash.   Neurological:      Mental Status: She is alert and oriented to person, place, and time.   Psychiatric:         Mood and Affect: Mood normal.         Behavior: Behavior normal.       Fluids    Intake/Output Summary (Last 24 hours) at 10/19/2022 0937  Last data filed at 10/19/2022 0509  Gross per 24 hour   Intake 580 ml   Output 3200 ml   Net -2620 ml          Laboratory  Recent Labs     10/18/22  0604   WBC 7.6   RBC 3.50*   HEMOGLOBIN 8.7*   HEMATOCRIT 28.2*   MCV 80.6*   MCH 24.9*   MCHC 30.9*   RDW 47.8   PLATELETCT 424   MPV 9.9       Recent Labs     10/18/22  0604   SODIUM 140   POTASSIUM 3.7   CHLORIDE 105   CO2 26   GLUCOSE 178*   BUN 7*   CREATININE 0.39*   CALCIUM 8.1*                     Imaging       Assessment/Plan  Dizziness on standing  Assessment & Plan  Having lightheadedness/dizziness when standing  Will check orthostatics    Borderline abnormal TFTs  Assessment & Plan  TSH: 8.78  FT4: 1.05   Likely subclinical hypothyroidism  Needs repeat TFT's as out patient    Anemia  Assessment & Plan  Fe: 19, sats 7%  Vit B12 wnl  On IV Fe    Dyslipidemia  Assessment & Plan  On Lipitor    Vitamin D deficiency  Assessment & Plan  Vit D: 21  On supplements    Brain mass- (present on admission)  Assessment & Plan  Has prior hx of melanoma s/p immunotherapy  Presented with left sided weakness  Now w/ metastatic melanoma to brain with vasogenic edema and MLS  S/P crani with resection of multiple masses on 10/6  S/P Decadron taper  On Keppra  Plan for palliative XRT    Essential hypertension- (present on admission)  Assessment & Plan  BP low normal but ok  S/P recent IVF's  Note on Flomax  Note: home meds include Lisinopril and Atenolol  Monitor    DM2 (diabetes mellitus, type 2) (HCC)- (present on admission)  Assessment & Plan  Hba1c: 7.8 (10/16)  BS this am: 170 (10/19)  On Glargine: 22 units qhs --> 30 units (10/18)  Note: home meds include Tresiba 20 u qhs, Metformin 1000 mg bid, and Jardiance 12.5 mg  Note: pt agreeable to being discharged only on insulin and no oral meds  Will monitor another day since meds were recently adjusted    Bipolar 1 disorder (HCC)- (present on admission)  Assessment & Plan  On Trileptal     UTI (urinary tract infection)- (present on admission)  Assessment & Plan  S/P leukocytosis  Has been afebrile  PCT: wnl  CXR: ok  U/A (+)  F/U  C&S  On empiric Cipro --> will change per Pharm/ID request to [place] until C&S come back

## 2022-10-20 ENCOUNTER — APPOINTMENT (OUTPATIENT)
Dept: RADIATION ONCOLOGY | Facility: MEDICAL CENTER | Age: 42
End: 2022-10-20
Attending: RADIOLOGY
Payer: COMMERCIAL

## 2022-10-20 LAB
GLUCOSE BLD STRIP.AUTO-MCNC: 153 MG/DL (ref 65–99)
GLUCOSE BLD STRIP.AUTO-MCNC: 223 MG/DL (ref 65–99)
GLUCOSE BLD STRIP.AUTO-MCNC: 243 MG/DL (ref 65–99)
GLUCOSE BLD STRIP.AUTO-MCNC: 312 MG/DL (ref 65–99)

## 2022-10-20 PROCEDURE — 770010 HCHG ROOM/CARE - REHAB SEMI PRIVAT*

## 2022-10-20 PROCEDURE — A9270 NON-COVERED ITEM OR SERVICE: HCPCS | Performed by: HOSPITALIST

## 2022-10-20 PROCEDURE — 700102 HCHG RX REV CODE 250 W/ 637 OVERRIDE(OP): Performed by: HOSPITALIST

## 2022-10-20 PROCEDURE — 700102 HCHG RX REV CODE 250 W/ 637 OVERRIDE(OP): Performed by: PHYSICAL MEDICINE & REHABILITATION

## 2022-10-20 PROCEDURE — 97535 SELF CARE MNGMENT TRAINING: CPT

## 2022-10-20 PROCEDURE — 99232 SBSQ HOSP IP/OBS MODERATE 35: CPT | Performed by: HOSPITALIST

## 2022-10-20 PROCEDURE — A9270 NON-COVERED ITEM OR SERVICE: HCPCS | Performed by: PHYSICAL MEDICINE & REHABILITATION

## 2022-10-20 PROCEDURE — 97130 THER IVNTJ EA ADDL 15 MIN: CPT

## 2022-10-20 PROCEDURE — 700111 HCHG RX REV CODE 636 W/ 250 OVERRIDE (IP): Performed by: HOSPITALIST

## 2022-10-20 PROCEDURE — 82962 GLUCOSE BLOOD TEST: CPT

## 2022-10-20 PROCEDURE — 94760 N-INVAS EAR/PLS OXIMETRY 1: CPT

## 2022-10-20 PROCEDURE — 97116 GAIT TRAINING THERAPY: CPT

## 2022-10-20 PROCEDURE — 94640 AIRWAY INHALATION TREATMENT: CPT

## 2022-10-20 PROCEDURE — 99233 SBSQ HOSP IP/OBS HIGH 50: CPT | Performed by: PHYSICAL MEDICINE & REHABILITATION

## 2022-10-20 PROCEDURE — 700111 HCHG RX REV CODE 636 W/ 250 OVERRIDE (IP): Performed by: PHYSICAL MEDICINE & REHABILITATION

## 2022-10-20 PROCEDURE — 97129 THER IVNTJ 1ST 15 MIN: CPT

## 2022-10-20 PROCEDURE — 700105 HCHG RX REV CODE 258: Performed by: HOSPITALIST

## 2022-10-20 RX ORDER — INSULIN LISPRO 100 [IU]/ML
2-12 INJECTION, SOLUTION INTRAVENOUS; SUBCUTANEOUS
Status: DISCONTINUED | OUTPATIENT
Start: 2022-10-20 | End: 2022-10-21

## 2022-10-20 RX ADMIN — PHENAZOPYRIDINE HYDROCHLORIDE 200 MG: 200 TABLET ORAL at 11:20

## 2022-10-20 RX ADMIN — BACLOFEN 10 MG: 10 TABLET ORAL at 20:17

## 2022-10-20 RX ADMIN — GABAPENTIN 800 MG: 400 CAPSULE ORAL at 18:02

## 2022-10-20 RX ADMIN — NITROFURANTOIN (MONOHYDRATE/MACROCRYSTALS) 100 MG: 75; 25 CAPSULE ORAL at 08:34

## 2022-10-20 RX ADMIN — OXYCODONE HYDROCHLORIDE 10 MG: 10 TABLET ORAL at 11:19

## 2022-10-20 RX ADMIN — DULOXETINE HYDROCHLORIDE 60 MG: 30 CAPSULE, DELAYED RELEASE ORAL at 20:15

## 2022-10-20 RX ADMIN — LEVETIRACETAM 1000 MG: 500 TABLET, FILM COATED ORAL at 08:34

## 2022-10-20 RX ADMIN — Medication 1000 UNITS: at 08:34

## 2022-10-20 RX ADMIN — ENOXAPARIN SODIUM 40 MG: 40 INJECTION SUBCUTANEOUS at 20:13

## 2022-10-20 RX ADMIN — QUETIAPINE FUMARATE 200 MG: 100 TABLET ORAL at 20:15

## 2022-10-20 RX ADMIN — OXCARBAZEPINE 300 MG: 300 TABLET, FILM COATED ORAL at 08:34

## 2022-10-20 RX ADMIN — MICONAZOLE NITRATE: 20 CREAM TOPICAL at 09:00

## 2022-10-20 RX ADMIN — POLYETHYLENE GLYCOL 3350 1 PACKET: 17 POWDER, FOR SOLUTION ORAL at 08:35

## 2022-10-20 RX ADMIN — PHENAZOPYRIDINE HYDROCHLORIDE 200 MG: 200 TABLET ORAL at 18:02

## 2022-10-20 RX ADMIN — INSULIN LISPRO 8 UNITS: 100 INJECTION, SOLUTION INTRAVENOUS; SUBCUTANEOUS at 20:31

## 2022-10-20 RX ADMIN — SENNOSIDES AND DOCUSATE SODIUM 2 TABLET: 50; 8.6 TABLET ORAL at 08:34

## 2022-10-20 RX ADMIN — ATORVASTATIN CALCIUM 40 MG: 40 TABLET, FILM COATED ORAL at 20:16

## 2022-10-20 RX ADMIN — TAMSULOSIN HYDROCHLORIDE 0.4 MG: 0.4 CAPSULE ORAL at 08:34

## 2022-10-20 RX ADMIN — LEVETIRACETAM 1000 MG: 500 TABLET, FILM COATED ORAL at 20:16

## 2022-10-20 RX ADMIN — OMEPRAZOLE 20 MG: 20 CAPSULE, DELAYED RELEASE ORAL at 08:34

## 2022-10-20 RX ADMIN — OXYCODONE HYDROCHLORIDE 10 MG: 10 TABLET ORAL at 06:10

## 2022-10-20 RX ADMIN — SODIUM CHLORIDE 250 MG: 9 INJECTION, SOLUTION INTRAVENOUS at 11:03

## 2022-10-20 RX ADMIN — PHENAZOPYRIDINE HYDROCHLORIDE 200 MG: 200 TABLET ORAL at 08:34

## 2022-10-20 RX ADMIN — GABAPENTIN 800 MG: 400 CAPSULE ORAL at 14:41

## 2022-10-20 RX ADMIN — OXYCODONE HYDROCHLORIDE 10 MG: 10 TABLET, FILM COATED, EXTENDED RELEASE ORAL at 21:44

## 2022-10-20 RX ADMIN — MICONAZOLE NITRATE: 20 CREAM TOPICAL at 20:20

## 2022-10-20 RX ADMIN — GABAPENTIN 800 MG: 400 CAPSULE ORAL at 20:16

## 2022-10-20 RX ADMIN — BACLOFEN 10 MG: 10 TABLET ORAL at 14:41

## 2022-10-20 RX ADMIN — NITROFURANTOIN (MONOHYDRATE/MACROCRYSTALS) 100 MG: 75; 25 CAPSULE ORAL at 18:02

## 2022-10-20 RX ADMIN — INSULIN LISPRO 2 UNITS: 100 INJECTION, SOLUTION INTRAVENOUS; SUBCUTANEOUS at 07:33

## 2022-10-20 RX ADMIN — OXCARBAZEPINE 300 MG: 300 TABLET, FILM COATED ORAL at 20:18

## 2022-10-20 RX ADMIN — INSULIN LISPRO 4 UNITS: 100 INJECTION, SOLUTION INTRAVENOUS; SUBCUTANEOUS at 17:05

## 2022-10-20 RX ADMIN — OXYCODONE HYDROCHLORIDE 10 MG: 10 TABLET ORAL at 18:02

## 2022-10-20 RX ADMIN — GABAPENTIN 800 MG: 400 CAPSULE ORAL at 08:35

## 2022-10-20 RX ADMIN — INSULIN LISPRO 4 UNITS: 100 INJECTION, SOLUTION INTRAVENOUS; SUBCUTANEOUS at 11:27

## 2022-10-20 RX ADMIN — BACLOFEN 10 MG: 10 TABLET ORAL at 08:34

## 2022-10-20 RX ADMIN — ALBUTEROL SULFATE 2 PUFF: 90 AEROSOL, METERED RESPIRATORY (INHALATION) at 06:59

## 2022-10-20 ASSESSMENT — ENCOUNTER SYMPTOMS
FEVER: 0
DIZZINESS: 1
COUGH: 0
BLURRED VISION: 0
NERVOUS/ANXIOUS: 0
DIARRHEA: 0

## 2022-10-20 ASSESSMENT — ACTIVITIES OF DAILY LIVING (ADL)
TOILETING_LEVEL_OF_ASSIST_DESCRIPTION: INCREASED TIME;GRAB BAR;SUPERVISION FOR SAFETY;VERBAL CUEING
BED_CHAIR_WHEELCHAIR_TRANSFER_DESCRIPTION: INCREASED TIME;SUPERVISION FOR SAFETY;VERBAL CUEING;SET-UP OF EQUIPMENT
TOILET_TRANSFER_DESCRIPTION: ADAPTIVE EQUIPMENT;GRAB BAR;INCREASED TIME;SUPERVISION FOR SAFETY;VERBAL CUEING
TUB_SHOWER_TRANSFER_DESCRIPTION: GRAB BAR;SHOWER BENCH;SUPERVISION FOR SAFETY;VERBAL CUEING

## 2022-10-20 ASSESSMENT — GAIT ASSESSMENTS
ASSISTIVE DEVICE: FRONT WHEEL WALKER
DISTANCE (FEET): 150
DEVIATION: DECREASED HEEL STRIKE;DECREASED TOE OFF;BRADYKINETIC
GAIT LEVEL OF ASSIST: CONTACT GUARD ASSIST

## 2022-10-20 NOTE — PROGRESS NOTES
[facility] Medicine Daily Progress Note    Date of Service  10/20/2022    Chief Complaint:  Diabetes  UTI/Leukocytosis    Interval History:  Dizziness is better today.  Also discussed about increasing her Glargine for increased BS.    Review of Systems  Review of Systems   Constitutional:  Negative for fever.   Eyes:  Negative for blurred vision.   Respiratory:  Negative for cough.    Cardiovascular:  Negative for chest pain.   Gastrointestinal:  Negative for diarrhea.   Musculoskeletal:  Negative for joint pain.   Neurological:  Positive for dizziness.   Psychiatric/Behavioral:  The patient is not nervous/anxious.       Physical Exam  Temp:  [36.3 °C (97.3 °F)-36.8 °C (98.3 °F)] 36.3 °C (97.3 °F)  Pulse:  [] 87  Resp:  [12-18] 12  BP: (100-117)/(58-74) 100/58  SpO2:  [86 %-98 %] 90 %    Physical Exam  Vitals and nursing note reviewed.   Constitutional:       Appearance: She is not diaphoretic.   HENT:      Mouth/Throat:      Pharynx: No oropharyngeal exudate or posterior oropharyngeal erythema.   Eyes:      Extraocular Movements: Extraocular movements intact.   Neck:      Vascular: No carotid bruit or JVD.   Cardiovascular:      Rate and Rhythm: Normal rate and regular rhythm.      Heart sounds: Normal heart sounds. No murmur heard.  Pulmonary:      Effort: Pulmonary effort is normal.      Breath sounds: Normal breath sounds. No stridor.   Abdominal:      General: Bowel sounds are normal.      Palpations: Abdomen is soft.   Musculoskeletal:      Right lower leg: No edema.      Left lower leg: No edema.   Skin:     General: Skin is warm and dry.      Findings: No rash.   Neurological:      Mental Status: She is alert and oriented to person, place, and time.   Psychiatric:         Mood and Affect: Mood normal.         Behavior: Behavior normal.       Fluids    Intake/Output Summary (Last 24 hours) at 10/20/2022 1101  Last data filed at 10/20/2022 1000  Gross per 24 hour   Intake 840 ml   Output 2000 ml   Net  -1160 ml         Laboratory  Recent Labs     10/18/22  0604   WBC 7.6   RBC 3.50*   HEMOGLOBIN 8.7*   HEMATOCRIT 28.2*   MCV 80.6*   MCH 24.9*   MCHC 30.9*   RDW 47.8   PLATELETCT 424   MPV 9.9       Recent Labs     10/18/22  0604   SODIUM 140   POTASSIUM 3.7   CHLORIDE 105   CO2 26   GLUCOSE 178*   BUN 7*   CREATININE 0.39*   CALCIUM 8.1*                     Imaging       Assessment/Plan  Dizziness on standing  Assessment & Plan  Having lightheadedness/dizziness when standing  Dizziness better today (10/20)  Orthostatics (-)  Will repeat orthos (10/20)    Borderline abnormal TFTs  Assessment & Plan  TSH: 8.78  FT4: 1.05   Likely subclinical hypothyroidism  Needs repeat TFT's as out patient    Anemia  Assessment & Plan  Fe: 19, sats 7%  Vit B12 wnl  On IV Fe    Dyslipidemia  Assessment & Plan  On Lipitor    Vitamin D deficiency  Assessment & Plan  Vit D: 21  On supplements    Brain mass- (present on admission)  Assessment & Plan  Has prior hx of melanoma s/p immunotherapy  Presented with left sided weakness  Now w/ metastatic melanoma to brain with vasogenic edema and MLS  S/P crani with resection of multiple masses on 10/6  S/P Decadron taper  On Keppra  Plan for palliative XRT    Essential hypertension- (present on admission)  Assessment & Plan  BP low normal but ok  S/P recent IVF's  Note on Flomax  Note: home meds include Lisinopril and Atenolol  Monitor    DM2 (diabetes mellitus, type 2) (HCC)- (present on admission)  Assessment & Plan  Hba1c: 7.8 (10/16)  BS trending better but still elevated: 159-223  On Glargine: 22 units qhs --> 30 units (10/18) --> will increase to 38 units qhs (10/20)  Note: home meds include Tresiba 20 u qhs, Metformin 1000 mg bid, and Jardiance 12.5 mg  Note: pt agreeable to being discharged only on insulin and no oral meds  Cont to monitor    Bipolar 1 disorder (HCC)- (present on admission)  Assessment & Plan  On Trileptal     UTI (urinary tract infection)- (present on  admission)  Assessment & Plan  S/P leukocytosis  Has been afebrile  PCT: wnl  CXR: ok  U/A (+)  F/U C&S  Off empiric Cipro -- per Pharm/ID request  Now on Macrobid (thru 10/23)

## 2022-10-20 NOTE — CARE PLAN
Problem: Problem Solving STGs  Goal: STG-Within one week, patient will complete executive function tasks with 90% accuracy given min verbal cues.  Outcome: Met  Note: Pt is able to complete executive functions with min cues   Goal: STG-Within one week, patient will complete complex attn tasks (alternating/divided) with 90% accuracy given min verbal cues.  Outcome: Met  Note: Pt is able to complete complex attention tasks with min A     Problem: Memory STGs  Goal: STG-Within one week, patient will recall daily events with 80% accuracy, given min verbal cues and external aids.  Outcome: Met  Note: Pt is able to recall new information with min A

## 2022-10-20 NOTE — FLOWSHEET NOTE
10/20/22 0700   Events/Summary/Plan   Events/Summary/Plan o2 spot check mdi   Vital Signs   Pulse 87  (Simultaneous filing. User may not have seen previous data.)   Respiration 12  (Simultaneous filing. User may not have seen previous data.)   Pulse Oximetry 90 %  (Simultaneous filing. User may not have seen previous data.)   $ Pulse Oximetry (Spot Check) Yes   Respiratory Assessment   Level of Consciousness Responds to voice   Chest Exam   Work Of Breathing / Effort Within Normal Limits   Breath Sounds   RUL Breath Sounds Clear   RML Breath Sounds Clear   RLL Breath Sounds Clear   ASHLEY Breath Sounds Clear   LLL Breath Sounds Clear   Oxygen   O2 (LPM) 0   O2 Delivery Device None - Room Air  (Simultaneous filing. User may not have seen previous data.)

## 2022-10-20 NOTE — CARE PLAN
Problem: Balance  Goal: STG-Within one week, patient will maintain dynamic standing: score >45/56 on the Thornton Balance Assessment.  Outcome: Not Met  Note: Thornton not completed yet     Problem: Mobility  Goal: STG-Within one week, patient will ambulate household distance at least 500' with SPC or no AD, SBA.  Outcome: Not Met  Note: 175 ft with FWW     Problem: Mobility Transfers  Goal: STG-Within one week, patient will perform bed mobility Mod I with bed flat.  Outcome: Not Met  Note: Min A  Goal: STG-Within one week, patient will transfer bed to chair with SPC or no AD.  Outcome: Not Met  Note: FWW

## 2022-10-20 NOTE — THERAPY
"Speech Language Pathology  Daily Treatment     Patient Name: Jacque Mcintyre  Age:  41 y.o., Sex:  female  Medical Record #: [id number]  Today's Date: 10/20/2022     Precautions  Precautions: Fall Risk, Other (See Comments)  Comments: chronic LBP    Subjective    Pt was pleasant and cooperative during this ST session, pt reported that she feels more alert today and was able to participate in this session without falling asleep      Objective       10/20/22 1001   Treatment Charges   SLP Cognitive Skill Development First 15 Minutes 1   SLP Cognitive Skill Development Additional 15 Minutes 3   SLP Total Time Spent   SLP Individual Total Time Spent (Mins) 60       Assessment    Pt continued the \"Car Buying Activity\" task from yesterdays session.  Pt was able to complete this task with 100% accuracy with only 1 cue required to keep pt on task (pt distracted by looking at the map on the wall and required 1 cue to continue working on the task).  Pt was also able to complete a complex problem solving task targeting medication management independently with 100% accuracy.  Pt reported that she feels as though her cognition is close to its baseline and only notices slow processing or trouble with memory when she is not feeling well or very tired.  Pt would like to discharge speech therapy at this time so the reminder of her rehabilitation can focus on PT/OT.  Pt requested a packet of cognitive tasks to work on outside of therapy.      Strengths: Able to follow instructions, Alert and oriented, Effective communication skills, Independent prior level of function, Motivated for self care and independence, Pleasant and cooperative, Willingly participates in therapeutic activities  Barriers: Impaired functional cognition    Plan    D/C ST         Speech Therapy Problems (Active)       Problem: Memory STGs       Dates: Start: 10/20/22         Goal: STG-Within one week, patient will follow 3 step auditory directions with spv to achieve " 90% accuracy.         Dates: Start: 10/20/22               Problem: Problem Solving STGs       Dates: Start: 10/20/22         Goal: STG-Within one week, patient will complete executive function tasks with 90% accuracy given spv.        Dates: Start: 10/20/22               Problem: Speech/Swallowing LTGs       Dates: Start: 10/16/22         Goal: LTG-By discharge, patient will solve complex problems mod I for safe discharge home.       Dates: Start: 10/16/22

## 2022-10-20 NOTE — CARE PLAN
The patient is Stable - Low risk of patient condition declining or worsening    Shift Goals  Clinical Goals: Safety  Patient Goals: Pain control.    Progress made toward(s) clinical / shift goals:    Problem: Knowledge Deficit - Standard  Goal: Patient and family/care givers will demonstrate understanding of plan of care, disease process/condition, diagnostic tests and medications  Outcome: Progressing     Problem: Bladder / Voiding  Goal: Patient will establish and maintain regular urinary output  Outcome: Progressing. Patient has indwelling urban in place, urine clear and orange in color due to medication. Good output over hs shift, 1200ml.      Problem: Skin Integrity  Goal: Skin integrity is maintained or improved  Outcome: Progressing. Remaining staples removed from cranial incision 10/19, skin intact, approximated, dolly.

## 2022-10-20 NOTE — CARE PLAN
Problem: Functional Transfers  Goal: STG-Within one week, patient will transfer to toilet with CGA overall using AE/DME as needed.  Outcome: Progressing     Problem: Bathing  Goal: STG-Within one week, patient will bathe with Min A overall using AE/DME as needed.  Outcome: Met     Problem: Dressing  Goal: STG-Within one week, patient will dress LB with Min A overall using AE/DME as needed.  Outcome: Met     Problem: Toileting  Goal: STG-Within one week, patient will complete toileting tasks with Min A overall using AE/DME as needed.  Outcome: Met

## 2022-10-20 NOTE — THERAPY
Physical Therapy   Daily Treatment     Patient Name: Jacque Mcintyre  Age:  41 y.o., Sex:  female  Medical Record #: [id number]  Today's Date: 10/20/2022     Precautions  Precautions: Fall Risk, Other (See Comments)  Comments: chronic LBP    Subjective    Patient pleasant and agreeable to participate. Reports increased fatigue but is motivated to walk. Agreeable to assess for need for AFO consult.      Objective       10/20/22 1401   PT Charge Group   PT Gait Training 2   PT Total Time Spent   PT Individual Total Time Spent (Mins) 30   Gait Functional Level of Assist    Gait Level Of Assist Contact Guard Assist   Assistive Device Front Wheel Walker   Distance (Feet) 150  (1st bout without AFO; second set with AFO)   # of Times Distance was Traveled 2   Deviation Decreased Heel Strike;Decreased Toe Off;Bradykinetic   Transfer Functional Level of Assist   Bed, Chair, Wheelchair Transfer Contact Guard Assist   Bed Chair Wheelchair Transfer Description Increased time;Supervision for safety;Verbal cueing;Set-up of equipment  (Stand pivot using WC arm rests and bed rails for bed <> WC transfer)   Bed Mobility    Supine to Sit Standby Assist   Sit to Supine Minimal Assist   Sit to Stand Contact Guard Assist   Interdisciplinary Plan of Care Collaboration   IDT Collaboration with  Occupational Therapist   Patient Position at End of Therapy In Bed;Call Light within Reach;Tray Table within Reach;Phone within Reach   Collaboration Comments Collaborated with primary OT re: use of AFO for gait       Assessment    Patient demonstrates partial antigravity L ankle DF, much improved from initial evaluation (she initially had 0/5). She showed minimal difference in foot clearance and balance while ambulating with and without AFO, with small increase in steppage gait pattern without AFO. She reports feeling more stable while ambulating with the AFO but will likely not need AFO long-term as she continues to see return of motor control.  Educated on use of potentially using plastic posterior leaf spring AFO if she feels she still needs one, rather than a custom or carbon fiber AFO, as well as importance of further facilitating muscle return without the use of a brace.      Strengths: Able to follow instructions, Effective communication skills, Alert and oriented, Good insight into deficits/needs, Independent prior level of function, Making steady progress towards goals, Manages pain appropriately, Motivated for self care and independence, Pleasant and cooperative, Supportive family, Willingly participates in therapeutic activities  Barriers: Hemiparesis    Plan    Ankle DF PROM/stretching, DF strengthening/facilitation, continue assessing need for AFO at discharge, progress gait with FWW, L sided awareness/attention, initiate curbs/stairs, balance/coordination, overall activity tolerance and endurance    Passport items to be completed:  Get in/out of bed safely, in/out of a vehicle, safely use mobility device, walk or wheel around home/community, navigate up and down stairs, show how to get up/down from the ground, ensure home is accessible, demonstrate HEP, complete caregiver training    Physical Therapy Problems (Active)       Problem: Balance       Dates: Start: 10/16/22         Goal: STG-Within one week, patient will maintain dynamic standing: score >45/56 on the Thornton Balance Assessment.       Dates: Start: 10/16/22         Goal Note filed on 10/20/22 1304 by Ivelisse Kerr, PT       Hillary not completed yet                 Problem: Mobility       Dates: Start: 10/16/22         Goal: STG-Within one week, patient will ambulate household distance at least 500' with SPC or no AD, SBA.       Dates: Start: 10/16/22         Goal Note filed on 10/20/22 1304 by Ivelisse Kerr, PT       175 ft with FWW                 Problem: Mobility Transfers       Dates: Start: 10/16/22         Goal: STG-Within one week, patient will perform bed mobility Mod I with bed  flat.       Dates: Start: 10/16/22         Goal Note filed on 10/20/22 1304 by Ivelisse Kerr, JADE       Min A              Goal: STG-Within one week, patient will transfer bed to chair with SPC or no AD.       Dates: Start: 10/16/22         Goal Note filed on 10/20/22 1304 by Ivelisse Kerr PT       FWW                 Problem: PT-Long Term Goals       Dates: Start: 10/16/22         Goal: LTG-By discharge, patient will maintain balance: at least 22/30 on the FGA indicating low fall risk, to facilitate safe DC to home.       Dates: Start: 10/16/22            Goal: LTG-By discharge, patient will ambulate at least 1000' during 6 Minute Walk Test.       Dates: Start: 10/16/22            Goal: LTG-By discharge, patient will transfer one surface to another Mod I.       Dates: Start: 10/16/22            Goal: LTG-By discharge, patient will transfer in/out of a car SPV.       Dates: Start: 10/16/22            Goal: LTG-By discharge, patient will ambulate up/down ADA ramp with SPC or no AD, and SPV, to safely enter/exit  her parent's home.       Dates: Start: 10/16/22

## 2022-10-20 NOTE — THERAPY
Occupational Therapy  Daily Treatment     Patient Name: Jacque Mcintyre  Age:  41 y.o., Sex:  female  Medical Record #: [id number]  Today's Date: 10/20/2022     Precautions  Precautions: Fall Risk, Other (See Comments)  Comments: chronic LBP         Subjective    Pt received in bed, agreeable to OT session and to showering     Objective       10/20/22 0831   OT Charge Group   OT Self Care / ADL 4   OT Total Time Spent   OT Individual Total Time Spent (Mins) 60   Functional Level of Assist   Grooming Supervision;Standing   Grooming Description Standing at sink;Supervision for safety   Bathing Contact Guard Assist   Bathing Description Grab bar;Hand held shower;Supervision for safety;Verbal cueing  (CGA for standing components, setup to cover IVx2)   Upper Body Dressing Supervision   Upper Body Dressing Description Set-up of equipment   Lower Body Dressing Contact Guard Assist   Lower Body Dressing Description Verbal cueing;Supervision for safety;Increased time   Toileting Contact Guard Assist   Toileting Description Increased time;Grab bar;Supervision for safety;Verbal cueing   Toilet Transfers Minimal Assist   Toilet Transfer Description Adaptive equipment;Grab bar;Increased time;Supervision for safety;Verbal cueing   Tub / Shower Transfers Minimal Assist   Tub Shower Transfer Description Grab bar;Shower bench;Supervision for safety;Verbal cueing  (FWW into bathroom, GB for transition)       Assessment    Patient tolerated session well, no c/o dizziness this session, continues with slow processing and intermittent inattention to L side however is improving in these categories. Fatigued by end of session with limited standing tolerance. LUE coordination remains impaired but functional for ADLs, unsupported sitting balance improving though did demo mild L lean when fatigued.     Strengths: Able to follow instructions, Alert and oriented, Independent prior level of function, Making steady progress towards goals, Motivated  for self care and independence, Pleasant and cooperative, Supportive family, Willingly participates in therapeutic activities  Barriers: Decreased endurance, Fatigue, Generalized weakness, Impaired activity tolerance, Impaired balance    Plan    LUE neuro re-ed, standing balance/tolerance, functional mobility, L attention, ADLs with selene technique as needed, IADLs    Occupational Therapy Goals (Active)       Problem: Bathing       Dates: Start: 10/16/22         Goal: STG-Within one week, patient will bathe with Min A overall using AE/DME as needed.       Dates: Start: 10/16/22               Problem: Dressing       Dates: Start: 10/16/22         Goal: STG-Within one week, patient will dress LB with Min A overall using AE/DME as needed.       Dates: Start: 10/16/22               Problem: Functional Transfers       Dates: Start: 10/16/22         Goal: STG-Within one week, patient will transfer to toilet with CGA overall using AE/DME as needed.       Dates: Start: 10/16/22               Problem: OT Long Term Goals       Dates: Start: 10/16/22         Goal: LTG-By discharge, patient will complete basic self care tasks with supervision-Mod I overall using AE/DME as needed.       Dates: Start: 10/16/22            Goal: LTG-By discharge, patient will perform bathroom transfers with supervision overall using AE/DME as needed.       Dates: Start: 10/16/22               Problem: Toileting       Dates: Start: 10/16/22         Goal: STG-Within one week, patient will complete toileting tasks with Min A overall using AE/DME as needed.       Dates: Start: 10/16/22

## 2022-10-20 NOTE — CARE PLAN
Problem: Speech/Swallowing LTGs  Goal: LTG-By discharge, patient will solve complex problems mod I for safe discharge home.  Outcome: Met     Problem: Problem Solving STGs  Goal: STG-Within one week, patient will complete executive function tasks with 90% accuracy given min verbal cues.  Outcome: Met  Note: Pt is able to complete executive functions with min cues   Goal: STG-Within one week, patient will complete complex attn tasks (alternating/divided) with 90% accuracy given min verbal cues.  Outcome: Met  Note: Pt is able to complete complex attention tasks with min A     Problem: Memory STGs  Goal: STG-Within one week, patient will recall daily events with 80% accuracy, given min verbal cues and external aids.  Outcome: Met  Note: Pt is able to recall new information with min A     Problem: Problem Solving STGs  Goal: STG-Within one week, patient will complete executive function tasks with 90% accuracy given spv.   Outcome: Met     Problem: Memory STGs  Goal: STG-Within one week, patient will follow 3 step auditory directions with spv to achieve 90% accuracy.    Outcome: Met

## 2022-10-20 NOTE — PROGRESS NOTES
"Rehab Progress Note     Encounter Date: 10/20/2022    CC: Decreased mobility, brain metastasis     Interval Events (Subjective)  Patient sitting up in room. She reports therapy is going well. She denies pain. Denies NVD. She tolerated staple removal. Discussed would have IDT later today to discuss planning. Discussed that CM is working with oncology RN to work out best plan.    IDT Team Meeting 10/20/2022    IZach M.D., was present and led the interdisciplinary team conference on 10/20/2022.  I led the IDT conference and agree with the IDT conference documentation and plan of care as noted below.     RN:  Diet Regular   % Meal     Pain Occasional Oxy   Sleep    Bowel Continent   Bladder Continent   In's & Out's    Finished IV Fe today    PT:  Bed Mobility Zack   Transfers CGA   Mobility CGA   Stairs    Left dorsiflexion limited  HH, left AFO if no more improvement    OT:  Eating    Grooming    Bathing Zack   UB Dressing    LB Dressing    Toileting Zack   Shower & Transfer Zack   Low SBP when standing  Limited by slow processing    SLP:  Mild processing and memory deficits  Limited if fatigued    CM:  Continues to work on disposition and DME needs.      DC/Disposition:  10/25/22    Objective:  VITAL SIGNS: /58   Pulse 87   Temp 36.3 °C (97.3 °F) (Oral)   Resp 12   Ht 1.626 m (5' 4.02\")   Wt 65 kg (143 lb 4.8 oz)   SpO2 90%   BMI 24.58 kg/m²   Gen: NAD  Psych: Mood and affect appropriate  CV: RRR, no edema  Resp: CTAB, no upper airway sounds  Abd: NTND  Neuro: AOx4, following commands  Unchanged from 10/19/22    Recent Results (from the past 72 hour(s))   URINALYSIS    Collection Time: 10/17/22  3:30 PM    Specimen: Urine, Graf Cath   Result Value Ref Range    Color Yellow     Character Cloudy (A)     Specific Gravity 1.031 <1.035    Ph 5.5 5.0 - 8.0    Glucose >=1000 (A) Negative mg/dL    Ketones Negative Negative mg/dL    Protein 100 (A) Negative mg/dL    Bilirubin Negative " Negative    Urobilinogen, Urine 0.2 Negative    Nitrite Positive (A) Negative    Leukocyte Esterase Small (A) Negative    Occult Blood Moderate (A) Negative    Micro Urine Req Microscopic    URINE MICROSCOPIC (W/UA)    Collection Time: 10/17/22  3:30 PM   Result Value Ref Range    WBC Packed (A) /hpf    RBC 10-20 (A) /hpf    Bacteria Many (A) None /hpf    Epithelial Cells Rare /hpf    Hyaline Cast 0-2 /lpf   URINE CULTURE-EXISTING-LESS THAN 48 HOURS    Collection Time: 10/17/22  3:30 PM    Specimen: Urine, Clean Catch   Result Value Ref Range    Significant Indicator POS (POS)     Source UR     Site URINE, CLEAN CATCH     Culture Result - (A)     Culture Result (A)      Klebsiella aerogenes  >100,000 cfu/mL  Susceptibilities in progress     POCT glucose device results    Collection Time: 10/17/22  5:06 PM   Result Value Ref Range    POC Glucose, Blood 207 (H) 65 - 99 mg/dL   POCT glucose device results    Collection Time: 10/17/22  7:55 PM   Result Value Ref Range    POC Glucose, Blood 228 (H) 65 - 99 mg/dL   PROCALCITONIN    Collection Time: 10/18/22  6:04 AM   Result Value Ref Range    Procalcitonin 0.09 <0.25 ng/mL   CBC WITHOUT DIFFERENTIAL    Collection Time: 10/18/22  6:04 AM   Result Value Ref Range    WBC 7.6 4.8 - 10.8 K/uL    RBC 3.50 (L) 4.20 - 5.40 M/uL    Hemoglobin 8.7 (L) 12.0 - 16.0 g/dL    Hematocrit 28.2 (L) 37.0 - 47.0 %    MCV 80.6 (L) 81.4 - 97.8 fL    MCH 24.9 (L) 27.0 - 33.0 pg    MCHC 30.9 (L) 33.6 - 35.0 g/dL    RDW 47.8 35.9 - 50.0 fL    Platelet Count 424 164 - 446 K/uL    MPV 9.9 9.0 - 12.9 fL   Basic Metabolic Panel    Collection Time: 10/18/22  6:04 AM   Result Value Ref Range    Sodium 140 135 - 145 mmol/L    Potassium 3.7 3.6 - 5.5 mmol/L    Chloride 105 96 - 112 mmol/L    Co2 26 20 - 33 mmol/L    Glucose 178 (H) 65 - 99 mg/dL    Bun 7 (L) 8 - 22 mg/dL    Creatinine 0.39 (L) 0.50 - 1.40 mg/dL    Calcium 8.1 (L) 8.5 - 10.5 mg/dL    Anion Gap 9.0 7.0 - 16.0   ESTIMATED GFR    Collection  Time: 10/18/22  6:04 AM   Result Value Ref Range    GFR (CKD-EPI) 126 >60 mL/min/1.73 m 2   POCT glucose device results    Collection Time: 10/18/22  7:45 AM   Result Value Ref Range    POC Glucose, Blood 177 (H) 65 - 99 mg/dL   POCT glucose device results    Collection Time: 10/18/22 10:52 AM   Result Value Ref Range    POC Glucose, Blood 185 (H) 65 - 99 mg/dL   POCT glucose device results    Collection Time: 10/18/22  5:01 PM   Result Value Ref Range    POC Glucose, Blood 198 (H) 65 - 99 mg/dL   POCT glucose device results    Collection Time: 10/18/22  7:45 PM   Result Value Ref Range    POC Glucose, Blood 205 (H) 65 - 99 mg/dL   POCT glucose device results    Collection Time: 10/19/22  7:39 AM   Result Value Ref Range    POC Glucose, Blood 170 (H) 65 - 99 mg/dL   POCT glucose device results    Collection Time: 10/19/22 11:31 AM   Result Value Ref Range    POC Glucose, Blood 207 (H) 65 - 99 mg/dL   POCT glucose device results    Collection Time: 10/19/22  5:07 PM   Result Value Ref Range    POC Glucose, Blood 159 (H) 65 - 99 mg/dL   POCT glucose device results    Collection Time: 10/19/22  8:43 PM   Result Value Ref Range    POC Glucose, Blood 223 (H) 65 - 99 mg/dL   POCT glucose device results    Collection Time: 10/20/22  7:33 AM   Result Value Ref Range    POC Glucose, Blood 153 (H) 65 - 99 mg/dL   POCT glucose device results    Collection Time: 10/20/22 11:23 AM   Result Value Ref Range    POC Glucose, Blood 223 (H) 65 - 99 mg/dL       Current Facility-Administered Medications   Medication Frequency    insulin GLARGINE (Lantus,Semglee) injection Q EVENING    And    insulin lispro (AdmeLOG,HumaLOG) injection 4X/DAY ACHS    nitrofurantoin (MACROBID) capsule 100 mg BID WITH MEALS    phenazopyridine (PYRIDIUM) tablet 200 mg TID WITH MEALS    vitamin D3 (cholecalciferol) tablet 1,000 Units DAILY    Respiratory Therapy Consult Continuous RT    Pharmacy Consult Request ...Pain Management Review 1 Each PHARMACY TO  DOSE    hydrALAZINE (APRESOLINE) tablet 25 mg Q8HRS PRN    acetaminophen (Tylenol) tablet 650 mg Q4HRS PRN    omeprazole (PRILOSEC) capsule 20 mg DAILY    mag hydrox-al hydrox-simeth (MAALOX PLUS ES or MYLANTA DS) suspension 20 mL Q2HRS PRN    ondansetron (ZOFRAN ODT) dispertab 4 mg 4X/DAY PRN    Or    ondansetron (ZOFRAN) syringe/vial injection 4 mg 4X/DAY PRN    traZODone (DESYREL) tablet 50 mg QHS PRN    sodium chloride (OCEAN) 0.65 % nasal spray 2 Spray PRN    oxyCODONE immediate-release (ROXICODONE) tablet 5 mg Q3HRS PRN    Or    oxyCODONE immediate release (ROXICODONE) tablet 10 mg Q3HRS PRN    traMADol (Ultram) 50 MG tablet 50 mg Q4HRS PRN    midazolam (VERSED) 5 mg/mL (1 mL vial) PRN    ALPRAZolam (XANAX) tablet 0.25 mg 4X/DAY PRN    atorvastatin (LIPITOR) tablet 40 mg Q EVENING    baclofen (LIORESAL) tablet 10 mg TID    DULoxetine (CYMBALTA) capsule 60 mg Q EVENING    enoxaparin (Lovenox) inj 40 mg DAILY AT 1800    gabapentin (NEURONTIN) capsule 800 mg 4X/DAY    levETIRAcetam (KEPPRA) tablet 1,000 mg BID    OXcarbazepine (TRILEPTAL) tablet 300 mg BID    oxyCODONE CR (OXYCONTIN) tablet 10 mg Q12HRS    QUEtiapine (Seroquel) tablet 200 mg Q EVENING    tamsulosin (FLOMAX) capsule 0.4 mg AFTER BREAKFAST    miconazole 2%-zinc oxide (Steven) topical cream BID    polyethylene glycol/lytes (MIRALAX) PACKET 1 Packet BID    And    senna-docusate (PERICOLACE or SENOKOT S) 8.6-50 MG per tablet 2 Tablet DAILY    And    magnesium hydroxide (MILK OF MAGNESIA) suspension 30 mL QDAY PRN    And    bisacodyl (DULCOLAX) suppository 10 mg QDAY PRN    albuterol inhaler 2 Puff Q4H PRN (RT)       Orders Placed This Encounter   Procedures    Diet Order Diet: Consistent CHO (Diabetic)     Standing Status:   Standing     Number of Occurrences:   1     Order Specific Question:   Diet:     Answer:   Consistent CHO (Diabetic) [4]       Assessment:  Active Hospital Problems    Diagnosis     *Metastatic cancer to brain (HCC)        Medical  Decision Making and Plan:  Adapted from Dr. Arambula's A&P  Metastatic brain cancer  Melanoma  BRAF+, follow-up oncology  Staples removed.     ##MSK  #Impaired ADLs and mobility  Patient is admitted to [facility] for comprehensive rehabilitation therapy as described.   Rehabilitation nursing monitors bowel and bladder control, educates on medication administration, co-morbidities and monitors patient safety.  Expected Length of Stay: 10-14 days  Anticipated discharge date: TBD  Anticipated discharge destination: home with parents  Prognosis: good  Equipment: TBD  Postdischarge therapies: TBD  Followup: Pebbles Tejada MD (neurosurgery), Mely Romero DO (PM&R), PCP  Precautions: none  Code: full resuscitation     #Postsurgical pain, acute, controlled  #Neuropathic pain, acute, controlled  Ordered baclofen 10mg TID, gabapentin 800mg QID, oxycontin 10mg BID     ##NEURO  #Metastasis to brain  Ordered keppra 1000mg BID, trileptal 300mg BID  Monitor  On Baclofen 10 mg TID for spasms     #RESP  Respiratory therapy: RT performs O2 management prn, breathing retraining, pulmonary hygiene and bronchospasm management prn to optimize participation in therapies.      ##CARDIAC  #Chronic hyperlipidemia  Ordered lipitor 40mg QHS  DVT prophylaxis: Ordered lovenox 40mg daily. Encourage OOB. Monitor daily for signs and symptoms of DVT including but not limited to swelling and pain to prevent the development of DVT that may interfere with therapies.     ##GI  GI prophylaxis:  Ordered prilosec 20mg daily to prevent gastritis/dyspepsia which may interfere with therapies.  #At risk of opioid induced constipation  Goal of one continent BM daily     #At high risk of malnutrition  Dietician monitors nutrient intake, recommend supplements prn and provide nutrition education to pt/family to promote optimal nutrition for wound healing/recovery.     ##/RENAL  #At risk of urinary retention  Dysuria on 10/17-10/18/22.  UA positive for UTI. Urine culture sent. Ciprofloxacin and pyridium. UCx sent     ##HEME  #Acute blood loss anemia  #Leukocytosis - Consult hospitalist  #Thrombocytosis, likely reactive  Monitor     ##ENDO  #Diabetes mellitus with hyperglycemia  Ordered Lantus 20mg QHS -> 30 -> 38     ##SKIN  Skin/dermal ulcer prophylaxis: Monitor for new skin conditions with q.2 h. turns as required to prevent the development of skin breakdown.     Total time:  36 minutes.  I spent greater than 50% of the time for patient care, counseling, and coordination on this date, including unit/floor time, and face-to-face time with the patient as per interval events and assessment and plan above. Topics discussed included discharge planning, ongoing hyperglycemia, increase Lantus, improving mobility and oncology planning for MRI and CT mapping. Patient was discussed separately in IDT today; please see details above.    Zach Patel M.D.

## 2022-10-21 ENCOUNTER — APPOINTMENT (OUTPATIENT)
Dept: RADIOLOGY | Facility: MEDICAL CENTER | Age: 42
End: 2022-10-21
Attending: PHYSICAL MEDICINE & REHABILITATION
Payer: COMMERCIAL

## 2022-10-21 LAB
ERYTHROCYTE [DISTWIDTH] IN BLOOD BY AUTOMATED COUNT: 49.9 FL (ref 35.9–50)
GLUCOSE BLD STRIP.AUTO-MCNC: 178 MG/DL (ref 65–99)
GLUCOSE BLD STRIP.AUTO-MCNC: 186 MG/DL (ref 65–99)
GLUCOSE BLD STRIP.AUTO-MCNC: 197 MG/DL (ref 65–99)
GLUCOSE BLD STRIP.AUTO-MCNC: 209 MG/DL (ref 65–99)
HCT VFR BLD AUTO: 27.3 % (ref 37–47)
HGB BLD-MCNC: 8 G/DL (ref 12–16)
MCH RBC QN AUTO: 24.2 PG (ref 27–33)
MCHC RBC AUTO-ENTMCNC: 29.3 G/DL (ref 33.6–35)
MCV RBC AUTO: 82.7 FL (ref 81.4–97.8)
PLATELET # BLD AUTO: 405 K/UL (ref 164–446)
PMV BLD AUTO: 10.1 FL (ref 9–12.9)
RBC # BLD AUTO: 3.3 M/UL (ref 4.2–5.4)
WBC # BLD AUTO: 6.5 K/UL (ref 4.8–10.8)

## 2022-10-21 PROCEDURE — 70450 CT HEAD/BRAIN W/O DYE: CPT

## 2022-10-21 PROCEDURE — 700102 HCHG RX REV CODE 250 W/ 637 OVERRIDE(OP): Performed by: PHYSICAL MEDICINE & REHABILITATION

## 2022-10-21 PROCEDURE — 82962 GLUCOSE BLOOD TEST: CPT | Mod: 91

## 2022-10-21 PROCEDURE — 85027 COMPLETE CBC AUTOMATED: CPT

## 2022-10-21 PROCEDURE — 97530 THERAPEUTIC ACTIVITIES: CPT

## 2022-10-21 PROCEDURE — A9270 NON-COVERED ITEM OR SERVICE: HCPCS | Performed by: HOSPITALIST

## 2022-10-21 PROCEDURE — 36415 COLL VENOUS BLD VENIPUNCTURE: CPT

## 2022-10-21 PROCEDURE — 700111 HCHG RX REV CODE 636 W/ 250 OVERRIDE (IP): Performed by: PHYSICAL MEDICINE & REHABILITATION

## 2022-10-21 PROCEDURE — A9270 NON-COVERED ITEM OR SERVICE: HCPCS | Performed by: PHYSICAL MEDICINE & REHABILITATION

## 2022-10-21 PROCEDURE — 99232 SBSQ HOSP IP/OBS MODERATE 35: CPT | Performed by: HOSPITALIST

## 2022-10-21 PROCEDURE — 82270 OCCULT BLOOD FECES: CPT

## 2022-10-21 PROCEDURE — 97110 THERAPEUTIC EXERCISES: CPT

## 2022-10-21 PROCEDURE — 700102 HCHG RX REV CODE 250 W/ 637 OVERRIDE(OP): Performed by: HOSPITALIST

## 2022-10-21 PROCEDURE — 99233 SBSQ HOSP IP/OBS HIGH 50: CPT | Performed by: PHYSICAL MEDICINE & REHABILITATION

## 2022-10-21 PROCEDURE — 770010 HCHG ROOM/CARE - REHAB SEMI PRIVAT*

## 2022-10-21 RX ORDER — INSULIN LISPRO 100 [IU]/ML
4 INJECTION, SOLUTION INTRAVENOUS; SUBCUTANEOUS ONCE
Status: COMPLETED | OUTPATIENT
Start: 2022-10-21 | End: 2022-10-21

## 2022-10-21 RX ORDER — INSULIN LISPRO 100 [IU]/ML
2-12 INJECTION, SOLUTION INTRAVENOUS; SUBCUTANEOUS
Status: DISCONTINUED | OUTPATIENT
Start: 2022-10-21 | End: 2022-10-22

## 2022-10-21 RX ADMIN — INSULIN LISPRO 2 UNITS: 100 INJECTION, SOLUTION INTRAVENOUS; SUBCUTANEOUS at 07:13

## 2022-10-21 RX ADMIN — PHENAZOPYRIDINE HYDROCHLORIDE 200 MG: 200 TABLET ORAL at 08:26

## 2022-10-21 RX ADMIN — NITROFURANTOIN (MONOHYDRATE/MACROCRYSTALS) 100 MG: 75; 25 CAPSULE ORAL at 17:29

## 2022-10-21 RX ADMIN — MICONAZOLE NITRATE: 20 CREAM TOPICAL at 08:31

## 2022-10-21 RX ADMIN — DULOXETINE HYDROCHLORIDE 60 MG: 30 CAPSULE, DELAYED RELEASE ORAL at 21:26

## 2022-10-21 RX ADMIN — GABAPENTIN 800 MG: 400 CAPSULE ORAL at 08:26

## 2022-10-21 RX ADMIN — OXYCODONE HYDROCHLORIDE 10 MG: 10 TABLET ORAL at 06:16

## 2022-10-21 RX ADMIN — BACLOFEN 10 MG: 10 TABLET ORAL at 08:25

## 2022-10-21 RX ADMIN — QUETIAPINE FUMARATE 200 MG: 100 TABLET ORAL at 21:26

## 2022-10-21 RX ADMIN — GABAPENTIN 800 MG: 400 CAPSULE ORAL at 17:29

## 2022-10-21 RX ADMIN — BACLOFEN 10 MG: 10 TABLET ORAL at 13:30

## 2022-10-21 RX ADMIN — ATORVASTATIN CALCIUM 40 MG: 40 TABLET, FILM COATED ORAL at 21:27

## 2022-10-21 RX ADMIN — OXCARBAZEPINE 300 MG: 300 TABLET, FILM COATED ORAL at 08:26

## 2022-10-21 RX ADMIN — PHENAZOPYRIDINE HYDROCHLORIDE 200 MG: 200 TABLET ORAL at 11:25

## 2022-10-21 RX ADMIN — TAMSULOSIN HYDROCHLORIDE 0.4 MG: 0.4 CAPSULE ORAL at 08:26

## 2022-10-21 RX ADMIN — LEVETIRACETAM 1000 MG: 500 TABLET, FILM COATED ORAL at 08:26

## 2022-10-21 RX ADMIN — LEVETIRACETAM 1000 MG: 500 TABLET, FILM COATED ORAL at 21:27

## 2022-10-21 RX ADMIN — PHENAZOPYRIDINE HYDROCHLORIDE 200 MG: 200 TABLET ORAL at 17:29

## 2022-10-21 RX ADMIN — ALBUTEROL SULFATE 2 PUFF: 90 AEROSOL, METERED RESPIRATORY (INHALATION) at 06:18

## 2022-10-21 RX ADMIN — NITROFURANTOIN (MONOHYDRATE/MACROCRYSTALS) 100 MG: 75; 25 CAPSULE ORAL at 08:26

## 2022-10-21 RX ADMIN — OXYCODONE HYDROCHLORIDE 10 MG: 10 TABLET, FILM COATED, EXTENDED RELEASE ORAL at 21:26

## 2022-10-21 RX ADMIN — INSULIN LISPRO 4 UNITS: 100 INJECTION, SOLUTION INTRAVENOUS; SUBCUTANEOUS at 12:30

## 2022-10-21 RX ADMIN — ENOXAPARIN SODIUM 40 MG: 40 INJECTION SUBCUTANEOUS at 21:27

## 2022-10-21 RX ADMIN — BACLOFEN 10 MG: 10 TABLET ORAL at 21:26

## 2022-10-21 RX ADMIN — GABAPENTIN 800 MG: 400 CAPSULE ORAL at 21:27

## 2022-10-21 RX ADMIN — OMEPRAZOLE 20 MG: 20 CAPSULE, DELAYED RELEASE ORAL at 08:26

## 2022-10-21 RX ADMIN — MICONAZOLE NITRATE: 20 CREAM TOPICAL at 21:31

## 2022-10-21 RX ADMIN — INSULIN LISPRO 2 UNITS: 100 INJECTION, SOLUTION INTRAVENOUS; SUBCUTANEOUS at 21:44

## 2022-10-21 RX ADMIN — INSULIN LISPRO 2 UNITS: 100 INJECTION, SOLUTION INTRAVENOUS; SUBCUTANEOUS at 17:36

## 2022-10-21 RX ADMIN — OXYCODONE HYDROCHLORIDE 10 MG: 10 TABLET ORAL at 17:29

## 2022-10-21 RX ADMIN — OXCARBAZEPINE 300 MG: 300 TABLET, FILM COATED ORAL at 21:26

## 2022-10-21 RX ADMIN — SENNOSIDES AND DOCUSATE SODIUM 2 TABLET: 50; 8.6 TABLET ORAL at 08:26

## 2022-10-21 RX ADMIN — OXYCODONE HYDROCHLORIDE 10 MG: 10 TABLET ORAL at 13:30

## 2022-10-21 RX ADMIN — Medication 1000 UNITS: at 08:26

## 2022-10-21 RX ADMIN — GABAPENTIN 800 MG: 400 CAPSULE ORAL at 12:28

## 2022-10-21 ASSESSMENT — ENCOUNTER SYMPTOMS
FEVER: 0
SHORTNESS OF BREATH: 0
DIZZINESS: 1
DIARRHEA: 0
CHILLS: 0
ABDOMINAL PAIN: 0
NAUSEA: 0
VOMITING: 0
NERVOUS/ANXIOUS: 0

## 2022-10-21 ASSESSMENT — PAIN DESCRIPTION - PAIN TYPE: TYPE: ACUTE PAIN

## 2022-10-21 ASSESSMENT — GAIT ASSESSMENTS: GAIT LEVEL OF ASSIST: REFUSED

## 2022-10-21 NOTE — THERAPY
Physical Therapy   Daily Treatment     Patient Name: Jacque Mcintyre  Age:  41 y.o., Sex:  female  Medical Record #: [id number]  Today's Date: 10/21/2022     Precautions  Precautions: Fall Risk, Other (See Comments)  Comments: chronic LBP    Subjective    Patient resting in bed; asking to practice bed exercises; physician came in to tell patient CT was ordered-parents and patient anxious about results, parents at bedside, encouraging patient to participate in exercises     Objective       10/21/22 1231   PT Charge Group   PT Therapeutic Exercise 1   PT Therapeutic Activities 1   PT Total Time Spent   PT Individual Total Time Spent (Mins) 30   Supine Lower Body Exercise   Supine Lower Body Exercises   (hand-out given)   Hip Abduction 1 set of 10;Bilateral   Hip Adduction  1 set of 10;Bilateral   Heel Slide 1 set of 10;Bilateral   Ankle Pumps 1 set of 10;Bilateral   Gluteal Isometrics 1 set of 10;Bilateral   Quadriceps Isometrics 1 set of 10;Bilateral   Interdisciplinary Plan of Care Collaboration   IDT Collaboration with  Hospitalist RN;Family / Caregiver   Collaboration Comments parents at bedside;  ordering Cat-Scan to r/o brain bleed after drop in hemoglobin values       Assessment    Able to perform supine there-ex (1 set of 10) in modified form to avoid bladder pain    Strengths: Able to follow instructions, Effective communication skills, Alert and oriented, Good insight into deficits/needs, Independent prior level of function, Making steady progress towards goals, Manages pain appropriately, Motivated for self care and independence, Pleasant and cooperative, Supportive family, Willingly participates in therapeutic activities  Barriers: Hemiparesis    Plan    DF strengthening in gravity minimized position, trial NMES consider FES/bioness during gait training, progress gait with FWW, initiate curb/stairs,balance and coordinaton     Get in/out of bed safely, in/out of a vehicle, safely use mobility device,  walk or wheel around home/community, navigate up and down stairs, show how to get up/down from the ground, ensure home is accessible, demonstrate HEP, complete caregiver training     Physical Therapy Problems (Active)       Problem: Balance       Dates: Start: 10/16/22         Goal: STG-Within one week, patient will maintain dynamic standing: score >45/56 on the Thornton Balance Assessment.       Dates: Start: 10/16/22         Goal Note filed on 10/20/22 1304 by Ivelisse Kerr, PT       Thornton not completed yet                 Problem: Mobility       Dates: Start: 10/16/22         Goal: STG-Within one week, patient will ambulate household distance at least 500' with SPC or no AD, SBA.       Dates: Start: 10/16/22         Goal Note filed on 10/20/22 1304 by Ivelisse Kerr, PT       175 ft with FWW                 Problem: Mobility Transfers       Dates: Start: 10/16/22         Goal: STG-Within one week, patient will perform bed mobility Mod I with bed flat.       Dates: Start: 10/16/22         Goal Note filed on 10/20/22 1304 by Ivelisse Kerr, PT       Min A              Goal: STG-Within one week, patient will transfer bed to chair with SPC or no AD.       Dates: Start: 10/16/22         Goal Note filed on 10/20/22 1304 by Ivelisse Kerr, PT       FWW                 Problem: PT-Long Term Goals       Dates: Start: 10/16/22         Goal: LTG-By discharge, patient will maintain balance: at least 22/30 on the FGA indicating low fall risk, to facilitate safe DC to home.       Dates: Start: 10/16/22            Goal: LTG-By discharge, patient will ambulate at least 1000' during 6 Minute Walk Test.       Dates: Start: 10/16/22            Goal: LTG-By discharge, patient will transfer one surface to another Mod I.       Dates: Start: 10/16/22            Goal: LTG-By discharge, patient will transfer in/out of a car SPV.       Dates: Start: 10/16/22            Goal: LTG-By discharge, patient will ambulate up/down ADA ramp with SPC or no AD,  and SPV, to safely enter/exit  her parent's home.       Dates: Start: 10/16/22

## 2022-10-21 NOTE — THERAPY
Physical Therapy   Daily Treatment     Patient Name: Jacque Mcintyre  Age:  41 y.o., Sex:  female  Medical Record #: [id number]  Today's Date: 10/21/2022     Precautions  Precautions: Fall Risk, Other (See Comments)  Comments: chronic LBP    Subjective    Patient reports dizziness throughout session, questioning d/c plan as she feels she could benefit from returning to facility after medical procedure next week to continue strengthening before transition home     Objective       10/21/22 1031   PT Charge Group   Charges Yes   PT Therapeutic Exercise 2   PT Therapeutic Activities 2   PT Total Time Spent   PT Individual Total Time Spent (Mins) 60   Vitals   Blood Pressure (!) 95/67  (after 4min stationary biking; patient asymptomatic)   Gait Functional Level of Assist    Gait Level Of Assist Refused   Sitting Lower Body Exercises   Sitting Lower Body Exercises   (hand-out given)   Ankle Pumps 1 set of 15;Bilateral   Hip Abduction 1 set of 15;Bilateral   Long Arc Quad 1 set of 15;Bilateral   Marching 1 set of 15;Reciprocal   Hamstring Curl 1 set of 15;Bilateral   Other Exercises Motomed 5min forward, 5min backward   Interdisciplinary Plan of Care Collaboration   IDT Collaboration with  Nursing;Physician;Occupational Therapist   Collaboration Comments nursing aware of low BP, vitals taken at start of session; physician telling patient she may need brain scan this afternoon to account for low hemoglobin values; OT asking to assess need for left PLS AFO       Assessment    Activity limited this session due to c/o dizziness    Strengths: Able to follow instructions, Effective communication skills, Alert and oriented, Good insight into deficits/needs, Independent prior level of function, Making steady progress towards goals, Manages pain appropriately, Motivated for self care and independence, Pleasant and cooperative, Supportive family, Willingly participates in therapeutic activities  Barriers: Hemiparesis    Plan  DF  strengthening in gravity minimized position, trial NMES consider FES/bioness during gait training, progress gait with FWW, initiate curb/stairs,balance and coordination     Physical Therapy Problems (Active)       Problem: Balance       Dates: Start: 10/16/22         Goal: STG-Within one week, patient will maintain dynamic standing: score >45/56 on the Thornton Balance Assessment.       Dates: Start: 10/16/22         Goal Note filed on 10/20/22 1304 by Ivelisse Kerr, PT       Thornton not completed yet                 Problem: Mobility       Dates: Start: 10/16/22         Goal: STG-Within one week, patient will ambulate household distance at least 500' with SPC or no AD, SBA.       Dates: Start: 10/16/22         Goal Note filed on 10/20/22 1304 by Ivelisse Kerr, PT       175 ft with FWW                 Problem: Mobility Transfers       Dates: Start: 10/16/22         Goal: STG-Within one week, patient will perform bed mobility Mod I with bed flat.       Dates: Start: 10/16/22         Goal Note filed on 10/20/22 1304 by Ivelisse Kerr, PT       Min A              Goal: STG-Within one week, patient will transfer bed to chair with SPC or no AD.       Dates: Start: 10/16/22         Goal Note filed on 10/20/22 1304 by Ivelisse Kerr, PT       FWW                 Problem: PT-Long Term Goals       Dates: Start: 10/16/22         Goal: LTG-By discharge, patient will maintain balance: at least 22/30 on the FGA indicating low fall risk, to facilitate safe DC to home.       Dates: Start: 10/16/22            Goal: LTG-By discharge, patient will ambulate at least 1000' during 6 Minute Walk Test.       Dates: Start: 10/16/22            Goal: LTG-By discharge, patient will transfer one surface to another Mod I.       Dates: Start: 10/16/22            Goal: LTG-By discharge, patient will transfer in/out of a car SPV.       Dates: Start: 10/16/22            Goal: LTG-By discharge, patient will ambulate up/down ADA ramp with SPC or no AD, and SPV,  to safely enter/exit  her parent's home.       Dates: Start: 10/16/22

## 2022-10-21 NOTE — CARE PLAN
"  Problem: Pain - Standard  Goal: Alleviation of pain or a reduction in pain to the patient’s comfort goal  Outcome: Progressing  Note: Assessed for pain and discomfort , pt on pain mgt , with relief .     Problem: Diabetes Management  Goal: Patient will achieve and maintain glucose in satisfactory range  Outcome: Progressing  Note: F/s monitored - HS-312, due coverage given. No s/s of hypo and hyperglycemia noted.     Problem: Fall Risk - Rehab  Goal: Patient will remain free from falls  Outcome: Progressing  Note: Ginger Trinidad Fall risk Assessment Score: 13    Moderate fall risk Interventions  - Bed and strip alarm   - Yellow sign by the door   - Yellow wrist band \"Fall risk\"  - Room near to the nurse station  - Do not leave patient unattended in the bathroom  - Fall risk education provided       The patient is Stable - Low risk of patient condition declining or worsening    Shift Goals  Clinical Goals: Safety  Patient Goals: Pain control.    Progress made toward(s) clinical / shift goals:  Pt free from fall and injury,pain under control.    Patient is not progressing towards the following goals:      "

## 2022-10-21 NOTE — THERAPY
Missed Therapy     Patient Name: Jacque Mcintyre  Age:  41 y.o., Sex:  female  Medical Record #: [id number]  Today's Date: 10/21/2022    Discussed missed therapy with Dr. Patel, nursing, PT, therapy scheduling.       10/21/22 1301   Therapy Missed   Missed Therapy (Minutes) 60   Reason For Missed Therapy Medical - Patient on Hold from Therapy;Medical - Patient not Able To Participate  (pt feeling dizzy and leaving soon for CT scan)

## 2022-10-21 NOTE — PROGRESS NOTES
1530  Patient left Rehab facility to [facility] for a head CT scan. Patient transported by T.

## 2022-10-21 NOTE — PROGRESS NOTES
[facility] Medicine Daily Progress Note    Date of Service  10/21/2022    Chief Complaint:  Diabetes  UTI/Leukocytosis    Interval History:  Discussed about increasing her Glargine again.  Also discussed bout her worsening anemia and dizziness and will check a CT of the head.    Review of Systems  Review of Systems   Constitutional:  Negative for chills and fever.   Respiratory:  Negative for shortness of breath.    Cardiovascular:  Negative for chest pain.   Gastrointestinal:  Negative for abdominal pain, diarrhea, nausea and vomiting.   Neurological:  Positive for dizziness.   Psychiatric/Behavioral:  The patient is not nervous/anxious.       Physical Exam  Temp:  [36.3 °C (97.4 °F)-36.8 °C (98.2 °F)] 36.7 °C (98.1 °F)  Pulse:  [] 104  Resp:  [14-18] 16  BP: ()/(60-67) 104/67  SpO2:  [90 %-96 %] 95 %    Physical Exam  Vitals and nursing note reviewed.   Constitutional:       Appearance: Normal appearance.   HENT:      Mouth/Throat:      Mouth: Mucous membranes are moist.      Pharynx: Oropharynx is clear.   Eyes:      Conjunctiva/sclera: Conjunctivae normal.      Pupils: Pupils are equal, round, and reactive to light.   Neck:      Vascular: No JVD.   Cardiovascular:      Rate and Rhythm: Normal rate and regular rhythm.      Heart sounds: Normal heart sounds.   Pulmonary:      Effort: Pulmonary effort is normal.      Breath sounds: Normal breath sounds.   Abdominal:      General: Bowel sounds are normal.      Palpations: Abdomen is soft.   Musculoskeletal:      Cervical back: Normal range of motion and neck supple.      Right lower leg: No edema.      Left lower leg: No edema.   Skin:     General: Skin is warm and dry.   Neurological:      Mental Status: She is alert and oriented to person, place, and time.   Psychiatric:         Mood and Affect: Mood normal.         Behavior: Behavior normal.       Fluids    Intake/Output Summary (Last 24 hours) at 10/21/2022 1100  Last data filed at 10/21/2022  0538  Gross per 24 hour   Intake 240 ml   Output 2250 ml   Net -2010 ml         Laboratory  Recent Labs     10/21/22  0548   WBC 6.5   RBC 3.30*   HEMOGLOBIN 8.0*   HEMATOCRIT 27.3*   MCV 82.7   MCH 24.2*   MCHC 29.3*   RDW 49.9   PLATELETCT 405   MPV 10.1                           Imaging       Assessment/Plan  Dizziness on standing  Assessment & Plan  Having lightheadedness/dizziness when standing  Dizziness better today (10/20)  Orthostatics neg (on 10/19 and 10/20)    Borderline abnormal TFTs  Assessment & Plan  TSH: 8.78  FT4: 1.05   Likely subclinical hypothyroidism  Needs repeat TFT's as out patient    Anemia  Assessment & Plan  H&H slowly decreasing: Hb 10.1 (10/16) --> 8.7 (10/18) --> 8.0 (10/21)  Fe: 19, sats 7%  Vit B12 wnl  S/P IV Fe  Will check SFOB  Pt has been dizzy for a few days -- d/w Physiatry -- will get CT head    Dyslipidemia  Assessment & Plan  On Lipitor    Vitamin D deficiency  Assessment & Plan  Vit D: 21  On supplements    Brain mass- (present on admission)  Assessment & Plan  Has prior hx of melanoma s/p immunotherapy  Presented with left sided weakness  Now w/ metastatic melanoma to brain with vasogenic edema and MLS  S/P crani with resection of multiple masses on 10/6  S/P Decadron taper  On Keppra  Plan for palliative XRT    Essential hypertension- (present on admission)  Assessment & Plan  BP low normal  Suspect 2nd to Baclofen  S/P recent IVF's  Note on Flomax  Note: home meds include Lisinopril and Atenolol  Monitor    DM2 (diabetes mellitus, type 2) (HCC)- (present on admission)  Assessment & Plan  Hba1c: 7.8 (10/16)  BS still elevated: 178 this am (10/21)  On Glargine: 22 units qhs --> 30 units (10/18) --> 38 units qhs (10/20) --> will increase to 45 units qhs (10/21)  Note: home meds include Tresiba 20 u qhs, Metformin 1000 mg bid, and Jardiance 12.5 mg  Note: pt agreeable to being discharged only on insulin and no oral meds  Cont to monitor    Bipolar 1 disorder (HCC)- (present  on admission)  Assessment & Plan  On Trileptal     UTI (urinary tract infection)- (present on admission)  Assessment & Plan  S/P leukocytosis  Has been afebrile  PCT: wnl  CXR: ok  Cultures show Klebsiella  Off empiric Cipro  On Macrobid (thru 10/23)

## 2022-10-21 NOTE — THERAPY
Occupational Therapy  Daily Treatment     Patient Name: Jacque Mcintyre  Age:  41 y.o., Sex:  female  Medical Record #: [id number]  Today's Date: 10/21/2022     Precautions  Precautions: Fall Risk, Other (See Comments)  Comments: chronic LBP         Subjective    Pt received up in w/c, agreeable to OT session     Objective       10/21/22 0931   OT Charge Group   OT Therapy Activity 2   OT Total Time Spent   OT Individual Total Time Spent (Mins) 30   Pain   Intervention Heat Applied   IADL Treatments   IADL Treatments Kitchen mobility education   Kitchen Mobility Education pt completed simulated cooking task (oatmeal) using FWW with SBA, good attention to FWW placement and proximity to UEs upport, no LOB, fatigued by 10 minute task requiring seated rest break before returning to room   Home Management pt loaded laundry into washing machine with SBA and FWW       Assessment    Pt tolerated session well, focus on progression of mobility and application of safety strategies to IADL tasks, pt with good awareness of FWW and body positioning and overall good safety awareness, limited by impaired endurance necessitating frequent rest breaks, continues with intermittent lightheadedness today though improved overall from earlier in the week.     Strengths: Able to follow instructions, Alert and oriented, Independent prior level of function, Making steady progress towards goals, Motivated for self care and independence, Pleasant and cooperative, Supportive family, Willingly participates in therapeutic activities  Barriers: Decreased endurance, Fatigue, Generalized weakness, Impaired activity tolerance, Impaired balance    Plan    LUE neuro re-ed, standing balance/tolerance, functional mobility, L attention, ADLs with selene technique as needed, IADLs    Occupational Therapy Goals (Active)       Problem: Functional Transfers       Dates: Start: 10/16/22         Goal: STG-Within one week, patient will transfer to toilet with CGA  overall using AE/DME as needed.       Dates: Start: 10/16/22               Problem: OT Long Term Goals       Dates: Start: 10/16/22         Goal: LTG-By discharge, patient will complete basic self care tasks with supervision-Mod I overall using AE/DME as needed.       Dates: Start: 10/16/22            Goal: LTG-By discharge, patient will perform bathroom transfers with supervision overall using AE/DME as needed.       Dates: Start: 10/16/22

## 2022-10-21 NOTE — DISCHARGE PLANNING
Case Management/IDT follow up.   IDT continues to recommend IRF level of care as patient continue to make progress with all therapies.   Projected dc date set for 10/25/22.      DC needs:  Recommendations made for home health for PT/OT/SLP  Follow up with: PCP and oncology    Met with pt / family providing update from IDT and discussed plan of care.    Plan:  Continue to follow

## 2022-10-21 NOTE — PROGRESS NOTES
"Rehab Progress Note     Encounter Date: 10/21/2022    CC: Decreased mobility, brain metastasis     Interval Events (Subjective)  Patient sitting up in room. She reports ongoing dizziness. She also has small drop in Hemoglobin. She is finishing her IV Fe. Discussed would order CT head as with new neuro symptoms and anemia would be concerned for bleeding. Denies changes in function.     IDT Team Meeting 10/20/2022  DC/Disposition:  10/25/22    Objective:  VITAL SIGNS: /67   Pulse (!) 104   Temp 36.7 °C (98.1 °F)   Resp 16   Ht 1.626 m (5' 4.02\")   Wt 65 kg (143 lb 4.8 oz)   SpO2 95%   BMI 24.58 kg/m²   Gen: NAD  Psych: Mood and affect appropriate  CV: RRR, no edema  Resp: CTAB, no upper airway sounds  Abd: NTND  Neuro: AOx4, walking without device    Recent Results (from the past 72 hour(s))   POCT glucose device results    Collection Time: 10/18/22  5:01 PM   Result Value Ref Range    POC Glucose, Blood 198 (H) 65 - 99 mg/dL   POCT glucose device results    Collection Time: 10/18/22  7:45 PM   Result Value Ref Range    POC Glucose, Blood 205 (H) 65 - 99 mg/dL   POCT glucose device results    Collection Time: 10/19/22  7:39 AM   Result Value Ref Range    POC Glucose, Blood 170 (H) 65 - 99 mg/dL   POCT glucose device results    Collection Time: 10/19/22 11:31 AM   Result Value Ref Range    POC Glucose, Blood 207 (H) 65 - 99 mg/dL   POCT glucose device results    Collection Time: 10/19/22  5:07 PM   Result Value Ref Range    POC Glucose, Blood 159 (H) 65 - 99 mg/dL   POCT glucose device results    Collection Time: 10/19/22  8:43 PM   Result Value Ref Range    POC Glucose, Blood 223 (H) 65 - 99 mg/dL   POCT glucose device results    Collection Time: 10/20/22  7:33 AM   Result Value Ref Range    POC Glucose, Blood 153 (H) 65 - 99 mg/dL   POCT glucose device results    Collection Time: 10/20/22 11:23 AM   Result Value Ref Range    POC Glucose, Blood 223 (H) 65 - 99 mg/dL   POCT glucose device results    " Collection Time: 10/20/22  5:05 PM   Result Value Ref Range    POC Glucose, Blood 243 (H) 65 - 99 mg/dL   POCT glucose device results    Collection Time: 10/20/22  8:28 PM   Result Value Ref Range    POC Glucose, Blood 312 (H) 65 - 99 mg/dL   CBC WITHOUT DIFFERENTIAL    Collection Time: 10/21/22  5:48 AM   Result Value Ref Range    WBC 6.5 4.8 - 10.8 K/uL    RBC 3.30 (L) 4.20 - 5.40 M/uL    Hemoglobin 8.0 (L) 12.0 - 16.0 g/dL    Hematocrit 27.3 (L) 37.0 - 47.0 %    MCV 82.7 81.4 - 97.8 fL    MCH 24.2 (L) 27.0 - 33.0 pg    MCHC 29.3 (L) 33.6 - 35.0 g/dL    RDW 49.9 35.9 - 50.0 fL    Platelet Count 405 164 - 446 K/uL    MPV 10.1 9.0 - 12.9 fL   POCT glucose device results    Collection Time: 10/21/22  7:12 AM   Result Value Ref Range    POC Glucose, Blood 178 (H) 65 - 99 mg/dL   POCT glucose device results    Collection Time: 10/21/22 11:24 AM   Result Value Ref Range    POC Glucose, Blood 209 (H) 65 - 99 mg/dL       Current Facility-Administered Medications   Medication Frequency    insulin GLARGINE (Lantus,Semglee) injection Q EVENING    And    insulin lispro (AdmeLOG,HumaLOG) injection 4X/DAY ACHS    nitrofurantoin (MACROBID) capsule 100 mg BID WITH MEALS    phenazopyridine (PYRIDIUM) tablet 200 mg TID WITH MEALS    vitamin D3 (cholecalciferol) tablet 1,000 Units DAILY    Respiratory Therapy Consult Continuous RT    Pharmacy Consult Request ...Pain Management Review 1 Each PHARMACY TO DOSE    hydrALAZINE (APRESOLINE) tablet 25 mg Q8HRS PRN    acetaminophen (Tylenol) tablet 650 mg Q4HRS PRN    omeprazole (PRILOSEC) capsule 20 mg DAILY    mag hydrox-al hydrox-simeth (MAALOX PLUS ES or MYLANTA DS) suspension 20 mL Q2HRS PRN    ondansetron (ZOFRAN ODT) dispertab 4 mg 4X/DAY PRN    Or    ondansetron (ZOFRAN) syringe/vial injection 4 mg 4X/DAY PRN    traZODone (DESYREL) tablet 50 mg QHS PRN    sodium chloride (OCEAN) 0.65 % nasal spray 2 Spray PRN    oxyCODONE immediate-release (ROXICODONE) tablet 5 mg Q3HRS PRN    Or     oxyCODONE immediate release (ROXICODONE) tablet 10 mg Q3HRS PRN    traMADol (Ultram) 50 MG tablet 50 mg Q4HRS PRN    midazolam (VERSED) 5 mg/mL (1 mL vial) PRN    ALPRAZolam (XANAX) tablet 0.25 mg 4X/DAY PRN    atorvastatin (LIPITOR) tablet 40 mg Q EVENING    baclofen (LIORESAL) tablet 10 mg TID    DULoxetine (CYMBALTA) capsule 60 mg Q EVENING    enoxaparin (Lovenox) inj 40 mg DAILY AT 1800    gabapentin (NEURONTIN) capsule 800 mg 4X/DAY    levETIRAcetam (KEPPRA) tablet 1,000 mg BID    OXcarbazepine (TRILEPTAL) tablet 300 mg BID    oxyCODONE CR (OXYCONTIN) tablet 10 mg Q12HRS    QUEtiapine (Seroquel) tablet 200 mg Q EVENING    tamsulosin (FLOMAX) capsule 0.4 mg AFTER BREAKFAST    miconazole 2%-zinc oxide (Steven) topical cream BID    polyethylene glycol/lytes (MIRALAX) PACKET 1 Packet BID    And    senna-docusate (PERICOLACE or SENOKOT S) 8.6-50 MG per tablet 2 Tablet DAILY    And    magnesium hydroxide (MILK OF MAGNESIA) suspension 30 mL QDAY PRN    And    bisacodyl (DULCOLAX) suppository 10 mg QDAY PRN    albuterol inhaler 2 Puff Q4H PRN (RT)       Orders Placed This Encounter   Procedures    Diet Order Diet: Consistent CHO (Diabetic)     Standing Status:   Standing     Number of Occurrences:   1     Order Specific Question:   Diet:     Answer:   Consistent CHO (Diabetic) [4]       Assessment:  Active Hospital Problems    Diagnosis     *Metastatic cancer to brain (HCC)        Medical Decision Making and Plan:  Adapted from Dr. Arambula's A&P  Metastatic brain cancer  Melanoma  BRAF+, follow-up oncology  Staples removed.   New dizziness and worsening anemia. Will check CT head    ##MSK  #Impaired ADLs and mobility  Patient is admitted to [facility] for comprehensive rehabilitation therapy as described.   Rehabilitation nursing monitors bowel and bladder control, educates on medication administration, co-morbidities and monitors patient safety.  Expected Length of Stay: 10-14 days  Anticipated  discharge date: TBD  Anticipated discharge destination: home with parents  Prognosis: good  Equipment: TBD  Postdischarge therapies: TBD  Followup: Pebbles Tejada MD (neurosurgery), Mely Romero DO (PM&R), PCP  Precautions: none  Code: full resuscitation     #Postsurgical pain, acute, controlled  #Neuropathic pain, acute, controlled  Ordered baclofen 10mg TID, gabapentin 800mg QID, oxycontin 10mg BID     ##NEURO  #Metastasis to brain  Ordered keppra 1000mg BID, trileptal 300mg BID  Monitor  On Baclofen 10 mg TID for spasms     #RESP  Respiratory therapy: RT performs O2 management prn, breathing retraining, pulmonary hygiene and bronchospasm management prn to optimize participation in therapies.      ##CARDIAC  #Chronic hyperlipidemia  Ordered lipitor 40mg QHS  DVT prophylaxis: Ordered lovenox 40mg daily. Encourage OOB. Monitor daily for signs and symptoms of DVT including but not limited to swelling and pain to prevent the development of DVT that may interfere with therapies.     ##GI  GI prophylaxis:  Ordered prilosec 20mg daily to prevent gastritis/dyspepsia which may interfere with therapies.  #At risk of opioid induced constipation  Goal of one continent BM daily     #At high risk of malnutrition  Dietician monitors nutrient intake, recommend supplements prn and provide nutrition education to pt/family to promote optimal nutrition for wound healing/recovery.     ##/RENAL  #At risk of urinary retention  Dysuria on 10/17-10/18/22. UA positive for UTI. Urine culture sent. Ciprofloxacin and pyridium. UCx sent     ##HEME  #Acute blood loss anemia  #Leukocytosis - Consult hospitalist  #Thrombocytosis, likely reactive  Monitor     ##ENDO  #Diabetes mellitus with hyperglycemia  Ordered Lantus 20mg QHS -> 30 -> 38     ##SKIN  Skin/dermal ulcer prophylaxis: Monitor for new skin conditions with q.2 h. turns as required to prevent the development of skin breakdown.     Total time:  36 minutes.  I spent greater than 50%  of the time for patient care, counseling, and coordination on this date, including unit/floor time, and face-to-face time with the patient as per interval events and assessment and plan above. Topics discussed included discharge planning, worsening anemia, dizziness, discussed with hospitalist and stat CT head.     Zach Patel M.D.

## 2022-10-21 NOTE — CARE PLAN
"  Problem: Knowledge Deficit - Standard  Goal: Patient and family/care givers will demonstrate understanding of plan of care, disease process/condition, diagnostic tests and medications  Outcome: Progressing  Patient verbalized understanding plan of care.        Problem: Fall Risk - Rehab  Goal: Patient will remain free from falls  Outcome: Progressing  Ginger Trinidad Fall risk Assessment Score: 13    Moderate fall risk Interventions  - Bed and strip alarm   - Yellow sign by the door   - Yellow wrist band \"Fall risk\"  - Room near to the nurse station  - Do not leave patient unattended in the bathroom  - Fall risk education provided                   "

## 2022-10-22 LAB
ERYTHROCYTE [DISTWIDTH] IN BLOOD BY AUTOMATED COUNT: 49.1 FL (ref 35.9–50)
GLUCOSE BLD STRIP.AUTO-MCNC: 157 MG/DL (ref 65–99)
GLUCOSE BLD STRIP.AUTO-MCNC: 162 MG/DL (ref 65–99)
GLUCOSE BLD STRIP.AUTO-MCNC: 200 MG/DL (ref 65–99)
GLUCOSE BLD STRIP.AUTO-MCNC: 239 MG/DL (ref 65–99)
HCT VFR BLD AUTO: 28.1 % (ref 37–47)
HEMOCCULT SP1 STL QL: NEGATIVE
HGB BLD-MCNC: 8.3 G/DL (ref 12–16)
MCH RBC QN AUTO: 24.3 PG (ref 27–33)
MCHC RBC AUTO-ENTMCNC: 29.5 G/DL (ref 33.6–35)
MCV RBC AUTO: 82.2 FL (ref 81.4–97.8)
PLATELET # BLD AUTO: 418 K/UL (ref 164–446)
PMV BLD AUTO: 9.9 FL (ref 9–12.9)
RBC # BLD AUTO: 3.42 M/UL (ref 4.2–5.4)
WBC # BLD AUTO: 6.5 K/UL (ref 4.8–10.8)

## 2022-10-22 PROCEDURE — 770010 HCHG ROOM/CARE - REHAB SEMI PRIVAT*

## 2022-10-22 PROCEDURE — 700102 HCHG RX REV CODE 250 W/ 637 OVERRIDE(OP): Performed by: PHYSICAL MEDICINE & REHABILITATION

## 2022-10-22 PROCEDURE — 700102 HCHG RX REV CODE 250 W/ 637 OVERRIDE(OP): Performed by: HOSPITALIST

## 2022-10-22 PROCEDURE — 85027 COMPLETE CBC AUTOMATED: CPT

## 2022-10-22 PROCEDURE — 94760 N-INVAS EAR/PLS OXIMETRY 1: CPT

## 2022-10-22 PROCEDURE — 82962 GLUCOSE BLOOD TEST: CPT

## 2022-10-22 PROCEDURE — 700111 HCHG RX REV CODE 636 W/ 250 OVERRIDE (IP): Performed by: PHYSICAL MEDICINE & REHABILITATION

## 2022-10-22 PROCEDURE — 36415 COLL VENOUS BLD VENIPUNCTURE: CPT

## 2022-10-22 PROCEDURE — A9270 NON-COVERED ITEM OR SERVICE: HCPCS | Performed by: PHYSICAL MEDICINE & REHABILITATION

## 2022-10-22 PROCEDURE — A9270 NON-COVERED ITEM OR SERVICE: HCPCS | Performed by: HOSPITALIST

## 2022-10-22 PROCEDURE — 99232 SBSQ HOSP IP/OBS MODERATE 35: CPT | Performed by: HOSPITALIST

## 2022-10-22 RX ORDER — INSULIN LISPRO 100 [IU]/ML
2-12 INJECTION, SOLUTION INTRAVENOUS; SUBCUTANEOUS
Status: DISCONTINUED | OUTPATIENT
Start: 2022-10-22 | End: 2022-10-25 | Stop reason: HOSPADM

## 2022-10-22 RX ADMIN — PHENAZOPYRIDINE HYDROCHLORIDE 200 MG: 200 TABLET ORAL at 17:30

## 2022-10-22 RX ADMIN — ENOXAPARIN SODIUM 40 MG: 40 INJECTION SUBCUTANEOUS at 21:44

## 2022-10-22 RX ADMIN — INSULIN LISPRO 2 UNITS: 100 INJECTION, SOLUTION INTRAVENOUS; SUBCUTANEOUS at 07:42

## 2022-10-22 RX ADMIN — LEVETIRACETAM 1000 MG: 500 TABLET, FILM COATED ORAL at 21:45

## 2022-10-22 RX ADMIN — OMEPRAZOLE 20 MG: 20 CAPSULE, DELAYED RELEASE ORAL at 08:54

## 2022-10-22 RX ADMIN — GABAPENTIN 800 MG: 400 CAPSULE ORAL at 08:54

## 2022-10-22 RX ADMIN — TAMSULOSIN HYDROCHLORIDE 0.4 MG: 0.4 CAPSULE ORAL at 08:54

## 2022-10-22 RX ADMIN — POLYETHYLENE GLYCOL 3350 1 PACKET: 17 POWDER, FOR SOLUTION ORAL at 08:54

## 2022-10-22 RX ADMIN — OXYCODONE HYDROCHLORIDE 10 MG: 10 TABLET ORAL at 04:49

## 2022-10-22 RX ADMIN — INSULIN LISPRO 4 UNITS: 100 INJECTION, SOLUTION INTRAVENOUS; SUBCUTANEOUS at 11:16

## 2022-10-22 RX ADMIN — OXYCODONE HYDROCHLORIDE 10 MG: 10 TABLET, FILM COATED, EXTENDED RELEASE ORAL at 21:45

## 2022-10-22 RX ADMIN — BACLOFEN 10 MG: 10 TABLET ORAL at 08:54

## 2022-10-22 RX ADMIN — BACLOFEN 10 MG: 10 TABLET ORAL at 21:45

## 2022-10-22 RX ADMIN — PHENAZOPYRIDINE HYDROCHLORIDE 200 MG: 200 TABLET ORAL at 08:54

## 2022-10-22 RX ADMIN — GABAPENTIN 800 MG: 400 CAPSULE ORAL at 12:36

## 2022-10-22 RX ADMIN — DULOXETINE HYDROCHLORIDE 60 MG: 30 CAPSULE, DELAYED RELEASE ORAL at 21:45

## 2022-10-22 RX ADMIN — SENNOSIDES AND DOCUSATE SODIUM 2 TABLET: 50; 8.6 TABLET ORAL at 08:54

## 2022-10-22 RX ADMIN — OXYCODONE HYDROCHLORIDE 10 MG: 10 TABLET ORAL at 18:38

## 2022-10-22 RX ADMIN — NITROFURANTOIN (MONOHYDRATE/MACROCRYSTALS) 100 MG: 75; 25 CAPSULE ORAL at 17:30

## 2022-10-22 RX ADMIN — BACLOFEN 10 MG: 10 TABLET ORAL at 14:46

## 2022-10-22 RX ADMIN — GABAPENTIN 800 MG: 400 CAPSULE ORAL at 21:44

## 2022-10-22 RX ADMIN — ATORVASTATIN CALCIUM 40 MG: 40 TABLET, FILM COATED ORAL at 21:45

## 2022-10-22 RX ADMIN — INSULIN LISPRO 2 UNITS: 100 INJECTION, SOLUTION INTRAVENOUS; SUBCUTANEOUS at 21:53

## 2022-10-22 RX ADMIN — QUETIAPINE FUMARATE 200 MG: 100 TABLET ORAL at 21:47

## 2022-10-22 RX ADMIN — INSULIN LISPRO 2 UNITS: 100 INJECTION, SOLUTION INTRAVENOUS; SUBCUTANEOUS at 17:34

## 2022-10-22 RX ADMIN — OXYCODONE HYDROCHLORIDE 10 MG: 10 TABLET ORAL at 12:36

## 2022-10-22 RX ADMIN — Medication 1000 UNITS: at 08:54

## 2022-10-22 RX ADMIN — OXCARBAZEPINE 300 MG: 300 TABLET, FILM COATED ORAL at 08:54

## 2022-10-22 RX ADMIN — OXCARBAZEPINE 300 MG: 300 TABLET, FILM COATED ORAL at 21:45

## 2022-10-22 RX ADMIN — NITROFURANTOIN (MONOHYDRATE/MACROCRYSTALS) 100 MG: 75; 25 CAPSULE ORAL at 08:54

## 2022-10-22 RX ADMIN — PHENAZOPYRIDINE HYDROCHLORIDE 200 MG: 200 TABLET ORAL at 11:13

## 2022-10-22 RX ADMIN — OXYCODONE HYDROCHLORIDE 10 MG: 10 TABLET, FILM COATED, EXTENDED RELEASE ORAL at 08:54

## 2022-10-22 RX ADMIN — GABAPENTIN 800 MG: 400 CAPSULE ORAL at 17:30

## 2022-10-22 RX ADMIN — LEVETIRACETAM 1000 MG: 500 TABLET, FILM COATED ORAL at 08:54

## 2022-10-22 ASSESSMENT — ENCOUNTER SYMPTOMS
ABDOMINAL PAIN: 0
DIARRHEA: 0
SHORTNESS OF BREATH: 0
DIZZINESS: 1
FEVER: 0
CHILLS: 0
NERVOUS/ANXIOUS: 0
VOMITING: 0
NAUSEA: 0

## 2022-10-22 ASSESSMENT — PAIN DESCRIPTION - PAIN TYPE
TYPE: ACUTE PAIN
TYPE: ACUTE PAIN

## 2022-10-22 NOTE — PROGRESS NOTES
[facility] Medicine Daily Progress Note    Date of Service  10/22/2022    Chief Complaint:  Diabetes  UTI/Leukocytosis    Interval History:  Pt feeling good today.  Dizziness better.  Pt able to urinate on her own.    Review of Systems  Review of Systems   Constitutional:  Negative for chills and fever.   Respiratory:  Negative for shortness of breath.    Cardiovascular:  Negative for chest pain.   Gastrointestinal:  Negative for abdominal pain, diarrhea, nausea and vomiting.   Neurological:  Positive for dizziness.   Psychiatric/Behavioral:  The patient is not nervous/anxious.       Physical Exam  Temp:  [36.7 °C (98 °F)-37.2 °C (98.9 °F)] 36.7 °C (98 °F)  Pulse:  [78-91] 90  Resp:  [12-18] 16  BP: (101-104)/(68) 104/68  SpO2:  [90 %-94 %] 92 %    Physical Exam  Vitals and nursing note reviewed.   Constitutional:       General: She is not in acute distress.  HENT:      Mouth/Throat:      Mouth: Mucous membranes are moist.      Pharynx: Oropharynx is clear.   Eyes:      General: No scleral icterus.  Neck:      Vascular: No JVD.   Cardiovascular:      Rate and Rhythm: Normal rate and regular rhythm.      Heart sounds: Normal heart sounds.   Pulmonary:      Effort: Pulmonary effort is normal.      Breath sounds: No wheezing or rales.   Abdominal:      General: Bowel sounds are normal.      Palpations: Abdomen is soft.   Musculoskeletal:      Cervical back: No rigidity.      Right lower leg: No edema.      Left lower leg: No edema.   Skin:     General: Skin is warm and dry.   Neurological:      Mental Status: She is alert and oriented to person, place, and time.   Psychiatric:         Mood and Affect: Mood normal.         Behavior: Behavior normal.       Fluids    Intake/Output Summary (Last 24 hours) at 10/22/2022 1045  Last data filed at 10/21/2022 2127  Gross per 24 hour   Intake 120 ml   Output 1475 ml   Net -1355 ml         Laboratory  Recent Labs     10/21/22  0548 10/22/22  0624   WBC 6.5 6.5   RBC 3.30* 3.42*    HEMOGLOBIN 8.0* 8.3*   HEMATOCRIT 27.3* 28.1*   MCV 82.7 82.2   MCH 24.2* 24.3*   MCHC 29.3* 29.5*   RDW 49.9 49.1   PLATELETCT 405 418   MPV 10.1 9.9                           Imaging       Assessment/Plan  Dizziness on standing  Assessment & Plan  Having lightheadedness/dizziness when standing  Dizziness continues to be better recently (10/22)  Orthostatics neg (on 10/19 and 10/20)  CT head was ok and had some improvement of edema (no bleed)    Borderline abnormal TFTs  Assessment & Plan  TSH: 8.78  FT4: 1.05   Likely subclinical hypothyroidism  Needs repeat TFT's as out patient    Anemia  Assessment & Plan  H&H slowly decreasing: Hb 10.1 (10/16) --> 8.7 (10/18) --> 8.0 (10/21)  Fe: 19, sats 7%  Vit B12 wnl  S/P IV Fe  F/U SFOB  Pt has been dizzy recently: CT head was ok and had some improvement of edema (no bleed)    Dyslipidemia  Assessment & Plan  On Lipitor    Vitamin D deficiency  Assessment & Plan  Vit D: 21  On supplements    Brain mass- (present on admission)  Assessment & Plan  Has prior hx of melanoma s/p immunotherapy  Presented with left sided weakness  Now w/ metastatic melanoma to brain with vasogenic edema and MLS  S/P crani with resection of multiple masses on 10/6  S/P Decadron taper  On Keppra  Plan for palliative XRT    Essential hypertension- (present on admission)  Assessment & Plan  BP ok normal  Note on Flomax  Note: on Baclofen -- can cause lower BP  Note: home meds include Lisinopril and Atenolol  Monitor    DM2 (diabetes mellitus, type 2) (HCC)- (present on admission)  Assessment & Plan  Hba1c: 7.8 (10/16)  BS: 162 this am (10/22)  On Glargine: 22 units qhs --> 30 units (10/18) --> 38 units qhs (10/20) --> 45 units qhs (10/21) --> will increase to 50 units qhs (10/22)  Note: home meds include Tresiba 20 u qhs, Metformin 1000 mg bid, and Jardiance 12.5 mg  Note: pt would like to be discharged only on insulin and no oral meds  Cont to monitor    Bipolar 1 disorder (HCC)- (present on  admission)  Assessment & Plan  On Trileptal     UTI (urinary tract infection)- (present on admission)  Assessment & Plan  S/P leukocytosis  Has been afebrile  PCT: wnl  CXR: ok  Cultures show Klebsiella  Off empiric Cipro  On Macrobid (thru 10/23)

## 2022-10-22 NOTE — FLOWSHEET NOTE
10/22/22 0825   Events/Summary/Plan   Events/Summary/Plan O2 check pt on RA   Vital Signs   Pulse 90   Respiration 16   Pulse Oximetry 92 %   $ Pulse Oximetry (Spot Check) Yes   Respiratory Assessment   Respiratory Pattern Within Normal Limits   Level of Consciousness Alert   Breath Sounds   RUL Breath Sounds Clear   RML Breath Sounds Clear   RLL Breath Sounds Clear   ASHLEY Breath Sounds Clear   LLL Breath Sounds Clear   Secretions   Cough Non Productive   Oxygen   O2 (LPM) 0   O2 Delivery Device Room air w/o2 available

## 2022-10-22 NOTE — CARE PLAN
"  Problem: Bladder / Voiding  Goal: Patient will establish and maintain regular urinary output  Outcome: Progressing  Patient had urban removed yesterday evening; patient is voiding well; PVRs have been averaging 90. Pain has greatly subsided since having urban removed.              Problem: Fall Risk - Rehab  Goal: Patient will remain free from falls  Outcome: Progressing  Ginger Trinidad Fall risk Assessment Score: 13        Moderate fall risk Interventions  - Bed and strip alarm   - Yellow sign by the door   - Yellow wrist band \"Fall risk\"  - Room near to the nurse station  - Do not leave patient unattended in the bathroom  - Fall risk education provided                   "

## 2022-10-22 NOTE — CARE PLAN
"  Problem: Diabetes Management  Goal: Patient will achieve and maintain glucose in satisfactory range  Note: FS ac and hs, Insulin given as ordered. HS snacks given as per patient's request. Will monitor.     Problem: Bladder / Voiding  Goal: Patient will establish and maintain regular urinary output  Note: Able to void.      Problem: Bowel Elimination  Goal: Patient will participate in bowel management program  Note: Unable to collect stool for OBx2, it got mixed/contaminated with urine. CN notified.     Problem: Fall Risk - Rehab  Goal: Patient will remain free from falls  Note: Miles Amos Fall risk Assessment Score: 13      Moderate fall risk Interventions  - Bed and strip alarm   - Yellow sign by the door   - Yellow wrist band \"Fall risk\"  - Room near to the nurse station  - Do not leave patient unattended in the bathroom  - Fall risk education provided           The patient is Stable - Low risk of patient condition declining or worsening    Shift Goals  Clinical Goals: Safety  Patient Goals: Pain control.      "

## 2022-10-23 PROBLEM — R00.0 TACHYCARDIA: Status: ACTIVE | Noted: 2022-10-23

## 2022-10-23 LAB
BACTERIA UR CULT: ABNORMAL
BACTERIA UR CULT: ABNORMAL
GLUCOSE BLD STRIP.AUTO-MCNC: 126 MG/DL (ref 65–99)
GLUCOSE BLD STRIP.AUTO-MCNC: 143 MG/DL (ref 65–99)
GLUCOSE BLD STRIP.AUTO-MCNC: 190 MG/DL (ref 65–99)
GLUCOSE BLD STRIP.AUTO-MCNC: 201 MG/DL (ref 65–99)
SIGNIFICANT IND 70042: ABNORMAL
SITE SITE: ABNORMAL
SOURCE SOURCE: ABNORMAL

## 2022-10-23 PROCEDURE — 770010 HCHG ROOM/CARE - REHAB SEMI PRIVAT*

## 2022-10-23 PROCEDURE — 99232 SBSQ HOSP IP/OBS MODERATE 35: CPT | Performed by: HOSPITALIST

## 2022-10-23 PROCEDURE — 700102 HCHG RX REV CODE 250 W/ 637 OVERRIDE(OP): Performed by: HOSPITALIST

## 2022-10-23 PROCEDURE — 94760 N-INVAS EAR/PLS OXIMETRY 1: CPT

## 2022-10-23 PROCEDURE — 94640 AIRWAY INHALATION TREATMENT: CPT

## 2022-10-23 PROCEDURE — 82962 GLUCOSE BLOOD TEST: CPT | Mod: 91

## 2022-10-23 PROCEDURE — 700111 HCHG RX REV CODE 636 W/ 250 OVERRIDE (IP): Performed by: PHYSICAL MEDICINE & REHABILITATION

## 2022-10-23 PROCEDURE — A9270 NON-COVERED ITEM OR SERVICE: HCPCS | Performed by: PHYSICAL MEDICINE & REHABILITATION

## 2022-10-23 PROCEDURE — 97116 GAIT TRAINING THERAPY: CPT | Mod: CQ

## 2022-10-23 PROCEDURE — A9270 NON-COVERED ITEM OR SERVICE: HCPCS | Performed by: HOSPITALIST

## 2022-10-23 PROCEDURE — 97112 NEUROMUSCULAR REEDUCATION: CPT | Mod: CQ

## 2022-10-23 PROCEDURE — 700102 HCHG RX REV CODE 250 W/ 637 OVERRIDE(OP): Performed by: PHYSICAL MEDICINE & REHABILITATION

## 2022-10-23 RX ADMIN — ENOXAPARIN SODIUM 40 MG: 40 INJECTION SUBCUTANEOUS at 20:39

## 2022-10-23 RX ADMIN — TAMSULOSIN HYDROCHLORIDE 0.4 MG: 0.4 CAPSULE ORAL at 08:24

## 2022-10-23 RX ADMIN — OXYCODONE HYDROCHLORIDE 10 MG: 10 TABLET ORAL at 15:00

## 2022-10-23 RX ADMIN — OXYCODONE HYDROCHLORIDE 10 MG: 10 TABLET ORAL at 05:29

## 2022-10-23 RX ADMIN — OXCARBAZEPINE 300 MG: 300 TABLET, FILM COATED ORAL at 20:37

## 2022-10-23 RX ADMIN — PHENAZOPYRIDINE HYDROCHLORIDE 200 MG: 200 TABLET ORAL at 17:22

## 2022-10-23 RX ADMIN — Medication 1000 UNITS: at 08:26

## 2022-10-23 RX ADMIN — OXYCODONE HYDROCHLORIDE 10 MG: 10 TABLET ORAL at 11:39

## 2022-10-23 RX ADMIN — INSULIN LISPRO 4 UNITS: 100 INJECTION, SOLUTION INTRAVENOUS; SUBCUTANEOUS at 11:44

## 2022-10-23 RX ADMIN — PHENAZOPYRIDINE HYDROCHLORIDE 200 MG: 200 TABLET ORAL at 08:22

## 2022-10-23 RX ADMIN — ALBUTEROL SULFATE 2 PUFF: 90 AEROSOL, METERED RESPIRATORY (INHALATION) at 08:49

## 2022-10-23 RX ADMIN — PHENAZOPYRIDINE HYDROCHLORIDE 200 MG: 200 TABLET ORAL at 11:39

## 2022-10-23 RX ADMIN — DULOXETINE HYDROCHLORIDE 60 MG: 30 CAPSULE, DELAYED RELEASE ORAL at 20:37

## 2022-10-23 RX ADMIN — INSULIN LISPRO 2 UNITS: 100 INJECTION, SOLUTION INTRAVENOUS; SUBCUTANEOUS at 20:46

## 2022-10-23 RX ADMIN — BACLOFEN 10 MG: 10 TABLET ORAL at 20:37

## 2022-10-23 RX ADMIN — GABAPENTIN 800 MG: 400 CAPSULE ORAL at 20:37

## 2022-10-23 RX ADMIN — BACLOFEN 10 MG: 10 TABLET ORAL at 14:59

## 2022-10-23 RX ADMIN — OXYCODONE HYDROCHLORIDE 10 MG: 10 TABLET, FILM COATED, EXTENDED RELEASE ORAL at 08:24

## 2022-10-23 RX ADMIN — GABAPENTIN 800 MG: 400 CAPSULE ORAL at 12:26

## 2022-10-23 RX ADMIN — OXYCODONE HYDROCHLORIDE 10 MG: 10 TABLET, FILM COATED, EXTENDED RELEASE ORAL at 20:37

## 2022-10-23 RX ADMIN — ATORVASTATIN CALCIUM 40 MG: 40 TABLET, FILM COATED ORAL at 20:36

## 2022-10-23 RX ADMIN — GABAPENTIN 800 MG: 400 CAPSULE ORAL at 08:24

## 2022-10-23 RX ADMIN — OXCARBAZEPINE 300 MG: 300 TABLET, FILM COATED ORAL at 08:23

## 2022-10-23 RX ADMIN — BACLOFEN 10 MG: 10 TABLET ORAL at 08:22

## 2022-10-23 RX ADMIN — LEVETIRACETAM 1000 MG: 500 TABLET, FILM COATED ORAL at 08:24

## 2022-10-23 RX ADMIN — QUETIAPINE FUMARATE 200 MG: 100 TABLET ORAL at 20:36

## 2022-10-23 RX ADMIN — NITROFURANTOIN (MONOHYDRATE/MACROCRYSTALS) 100 MG: 75; 25 CAPSULE ORAL at 08:26

## 2022-10-23 RX ADMIN — GABAPENTIN 800 MG: 400 CAPSULE ORAL at 17:22

## 2022-10-23 RX ADMIN — LEVETIRACETAM 1000 MG: 500 TABLET, FILM COATED ORAL at 20:37

## 2022-10-23 RX ADMIN — OMEPRAZOLE 20 MG: 20 CAPSULE, DELAYED RELEASE ORAL at 08:22

## 2022-10-23 ASSESSMENT — GAIT ASSESSMENTS
DEVIATION: DECREASED BASE OF SUPPORT;BRADYKINETIC
DISTANCE (FEET): 150
GAIT LEVEL OF ASSIST: CONTACT GUARD ASSIST
DISTANCE (FEET): 200
ASSISTIVE DEVICE: FRONT WHEEL WALKER
GAIT LEVEL OF ASSIST: STANDBY ASSIST
ASSISTIVE DEVICE: FRONT WHEEL WALKER
DEVIATION: DECREASED BASE OF SUPPORT;DECREASED HEEL STRIKE;BRADYKINETIC

## 2022-10-23 ASSESSMENT — PAIN DESCRIPTION - PAIN TYPE
TYPE: ACUTE PAIN
TYPE: ACUTE PAIN

## 2022-10-23 ASSESSMENT — ENCOUNTER SYMPTOMS
BLURRED VISION: 0
DIARRHEA: 0
FEVER: 0
NERVOUS/ANXIOUS: 0
COUGH: 0
DIZZINESS: 1

## 2022-10-23 NOTE — PROGRESS NOTES
[facility] Medicine Daily Progress Note    Date of Service  10/23/2022    Chief Complaint:  Diabetes  UTI/Leukocytosis    Interval History:  Dizziness still better.  No complaints.    Review of Systems  Review of Systems   Constitutional:  Negative for fever.   Eyes:  Negative for blurred vision.   Respiratory:  Negative for cough.    Cardiovascular:  Negative for chest pain.   Gastrointestinal:  Negative for diarrhea.   Musculoskeletal:  Negative for joint pain.   Neurological:  Positive for dizziness.   Psychiatric/Behavioral:  The patient is not nervous/anxious.       Physical Exam  Temp:  [36.3 °C (97.3 °F)-36.8 °C (98.2 °F)] 36.4 °C (97.5 °F)  Pulse:  [] 124  Resp:  [14-16] 16  BP: ()/(62-77) 106/77  SpO2:  [90 %-95 %] 94 %    Physical Exam  Vitals and nursing note reviewed.   Constitutional:       Appearance: She is not diaphoretic.   HENT:      Mouth/Throat:      Pharynx: No oropharyngeal exudate or posterior oropharyngeal erythema.   Eyes:      Extraocular Movements: Extraocular movements intact.   Neck:      Vascular: No carotid bruit or JVD.   Cardiovascular:      Rate and Rhythm: Normal rate and regular rhythm.      Heart sounds: Normal heart sounds.   Pulmonary:      Effort: Pulmonary effort is normal.      Breath sounds: No wheezing or rales.   Abdominal:      General: There is no distension.      Palpations: Abdomen is soft.      Tenderness: There is no abdominal tenderness.   Musculoskeletal:      Right lower leg: No edema.      Left lower leg: No edema.   Skin:     General: Skin is warm and dry.   Neurological:      Mental Status: She is alert and oriented to person, place, and time.   Psychiatric:         Mood and Affect: Mood normal.         Behavior: Behavior normal.       Fluids    Intake/Output Summary (Last 24 hours) at 10/23/2022 1055  Last data filed at 10/22/2022 2145  Gross per 24 hour   Intake 120 ml   Output --   Net 120 ml         Laboratory  Recent Labs     10/21/22  5331  10/22/22  0624   WBC 6.5 6.5   RBC 3.30* 3.42*   HEMOGLOBIN 8.0* 8.3*   HEMATOCRIT 27.3* 28.1*   MCV 82.7 82.2   MCH 24.2* 24.3*   MCHC 29.3* 29.5*   RDW 49.9 49.1   PLATELETCT 405 418   MPV 10.1 9.9                           Imaging       Assessment/Plan  Tachycardia  Assessment & Plan  HR more elevated this am -- pt states she was just got off the treadmill  Consider starting Atenolol but BP on the lower side and occ dips lower  Note: home meds include Atenolol  Cont to monitor for now    Dizziness on standing  Assessment & Plan  Having lightheadedness/dizziness when standing  Dizziness continues to be better (10/23)  Orthostatics neg (on 10/19 and 10/20)  CT head was ok and had some improvement of edema (no bleed)    Borderline abnormal TFTs  Assessment & Plan  TSH: 8.78  FT4: 1.05   Likely subclinical hypothyroidism  Needs repeat TFT's as out patient    Anemia  Assessment & Plan  H&H slowly decreasing: Hb 10.1 (10/16) --> 8.7 (10/18) --> 8.0 (10/21) --> 8.3 (10/22)  Fe: 19, sats 7%  B12: wnl  S/P IV Fe  SFOB (-) x 1    Dyslipidemia  Assessment & Plan  On Lipitor    Vitamin D deficiency  Assessment & Plan  Vit D: 21  On supplements    Brain mass- (present on admission)  Assessment & Plan  Has prior hx of melanoma s/p immunotherapy  Presented with left sided weakness  Now w/ metastatic melanoma to brain with vasogenic edema and MLS  S/P crani with resection of multiple masses on 10/6  S/P Decadron taper  On Keppra  Plan for palliative XRT    Essential hypertension- (present on admission)  Assessment & Plan  BP low normal and occ dips lower  Currently not on any hypertensive meds  Note on Flomax  Note: on Baclofen -- can cause lower BP  Note: home meds include Lisinopril and Atenolol  Monitor    DM2 (diabetes mellitus, type 2) (HCC)- (present on admission)  Assessment & Plan  Hba1c: 7.8 (10/16)  BS trending better: 143 this am (10/23)  On Glargine: 22 units qhs --> 30 units (10/18) --> 38 units qhs (10/20) --> 45  units qhs (10/21) --> 50 units qhs (10/22)  Note: home meds include Tresiba 20 u qhs, Metformin 1000 mg bid, and Jardiance 12.5 mg  Note: pt would like to be discharged only on insulin and no oral meds  Cont to monitor    Bipolar 1 disorder (HCC)- (present on admission)  Assessment & Plan  On Trileptal     UTI (urinary tract infection)- (present on admission)  Assessment & Plan  S/P leukocytosis  Has been afebrile  PCT: wnl  CXR: ok  Cultures show Klebsiella  Off empiric Cipro  On Macrobid (thru 10/23)

## 2022-10-23 NOTE — THERAPY
Physical Therapy   Daily Treatment     Patient Name: Jacque Mcintyre  Age:  41 y.o., Sex:  female  Medical Record #: [id number]  Today's Date: 10/23/2022     Precautions  Precautions: Fall Risk, Other (See Comments)  Comments: chronic LBP    Subjective    Pt reports chronic LBP. She reports her parents are getting a ramp for the entry to the house since there is no hand rail     Objective       10/23/22 0731 10/23/22 0900   Therapy Missed   Missed Therapy (Minutes) 30  --    Reason For Missed Therapy Medical - Patient with Nursing  (bladder scan/pt in bathroom on arrival to the room, make up @0901)  --    PT Charge Group   PT Gait Training 1 1   PT Neuromuscular Re-Education / Balance 1 1   Supervising Physical Therapist Gauri Gupta   PT Total Time Spent   PT Individual Total Time Spent (Mins) 30 30   Vitals   Pulse (!) 124  --    Patient BP Position Sitting  --    Blood Pressure 106/77  --    Room Air Oximetry 94  --    O2 (LPM) 0  --    O2 Delivery Device None - Room Air  --    Vitals Comments vitals assessed post amb on treadmill  --    Pain   Intervention Emotional Support  --    Pain 0 - 10 Group   Location Back  --    Location Orientation Mid;Lower  --    Comfort Goal Perform Activity;Comfort with Movement  --    Therapist Pain Assessment Post Activity Pain Same as Prior to Activity  (pt reports chonic back pain)  --    Cognition    Speech/ Communication  --  Delayed Responses   Attention  --  Impaired   Initiation  --  Impaired   Comments  --  frequently closing eye during supine ther ex   Gait Functional Level of Assist    Gait Level Of Assist Contact Guard Assist Standby Assist   Assistive Device Front Wheel Walker Front Wheel Walker   Distance (Feet) 150 200   # of Times Distance was Traveled 1 2   Deviation Decreased Base Of Support;Decreased Heel Strike;Bradykinetic Decreased Base Of Support;Bradykinetic  (inc SL stance time, pathway deviations)   Bed Mobility    Sit to Stand  --   "Contact Guard Assist   Neuro-Muscular Treatments   Neuro-Muscular Treatments Postural Changes;Verbal Cuing Postural Facilitation;Sequencing;Verbal Cuing   Comments BWSTT on litegait with ~10%BWS throughout. pt amb 4 minuteswith B UE support at a velocity of 0.8-1.0 mph and 1 minute without UE support. vc throughout for equal stride and emphasis on heel strike closed chain strengthening shuttel for forced wbing/use 3 bands B LE and 2 bands for SL squats, each 2 sets of 10 reps vc/tc for eccentric control, coutning aloud and keeping eyes open during activity. modA transfer on/off shuttle   Interdisciplinary Plan of Care Collaboration   IDT Collaboration with  Certified Nursing Assistant  --    Patient Position at End of Therapy Seated;Chair Alarm On;Call Light within Reach;Tray Table within Reach;Phone within Reach Seated;Self Releasing Lap Belt Applied;Chair Alarm On;Call Light within Reach;Tray Table within Reach   Collaboration Comments bladder scan  --      pt on neuro plasticity, motor recovery, repetitions and dc planning in preparation for DC 10/25    Scheduled caregiver training for Monday @ 1-2pm for PT/OT        Assessment    Barriers generalized weakness/selene weakness,  impaired task attention,     Pt will need a standard 18\" w wc for dc  Pt to purchase a FWW for transfers    Strengths: Able to follow instructions, Effective communication skills, Alert and oriented, Good insight into deficits/needs, Independent prior level of function, Making steady progress towards goals, Manages pain appropriately, Motivated for self care and independence, Pleasant and cooperative, Supportive family, Willingly participates in therapeutic activities  Barriers: Hemiparesis    Plan      FT car/general transfers, gait training with parents    Ankle DF PROM/stretching, DF strengthening/facilitation, continue assessing need for AFO at discharge, progress gait with FWW, L sided awareness/attention, initiate curbs/stairs, " balance/coordination, overall activity tolerance and endurance     Passport items to be completed:  Get in/out of bed safely, in/out of a vehicle, safely use mobility device, walk or wheel around home/community, navigate up and down stairs, show how to get up/down from the ground, ensure home is accessible, demonstrate HEP, complete caregiver training       Physical Therapy Problems (Active)       Problem: Balance       Dates: Start: 10/16/22         Goal: STG-Within one week, patient will maintain dynamic standing: score >45/56 on the Thornton Balance Assessment.       Dates: Start: 10/16/22         Goal Note filed on 10/20/22 1304 by Ivelisse Kerr, PT       Thornton not completed yet                 Problem: Mobility       Dates: Start: 10/16/22         Goal: STG-Within one week, patient will ambulate household distance at least 500' with SPC or no AD, SBA.       Dates: Start: 10/16/22         Goal Note filed on 10/20/22 1304 by Ivelisse Kerr, PT       175 ft with FWW                 Problem: Mobility Transfers       Dates: Start: 10/16/22         Goal: STG-Within one week, patient will perform bed mobility Mod I with bed flat.       Dates: Start: 10/16/22         Goal Note filed on 10/20/22 1304 by Ivelisse Kerr, PT       Min A              Goal: STG-Within one week, patient will transfer bed to chair with SPC or no AD.       Dates: Start: 10/16/22         Goal Note filed on 10/20/22 1304 by Ivelisse Kerr, PT       FWW                 Problem: PT-Long Term Goals       Dates: Start: 10/16/22         Goal: LTG-By discharge, patient will maintain balance: at least 22/30 on the FGA indicating low fall risk, to facilitate safe DC to home.       Dates: Start: 10/16/22            Goal: LTG-By discharge, patient will ambulate at least 1000' during 6 Minute Walk Test.       Dates: Start: 10/16/22            Goal: LTG-By discharge, patient will transfer one surface to another Mod I.       Dates: Start: 10/16/22            Goal: LTG-By  discharge, patient will transfer in/out of a car SPV.       Dates: Start: 10/16/22            Goal: LTG-By discharge, patient will ambulate up/down ADA ramp with SPC or no AD, and SPV, to safely enter/exit  her parent's home.       Dates: Start: 10/16/22

## 2022-10-23 NOTE — CARE PLAN
"  Problem: Diabetes Management  Goal: Patient will achieve and maintain glucose in satisfactory range  Note: FS ac and hs, Insulin given as ordered. Will monitor.     Problem: Infection - Diabetes  Goal: Patient will remain free from signs and symptoms of infection  Note: Continues ABT for UTI, no adverse reaction. Will monitor.     Problem: Bowel Elimination  Goal: Patient will participate in bowel management program  Note: Still need stool #2 for OBx2 (stool).     Problem: Fall Risk - Rehab  Goal: Patient will remain free from falls  Note: Ginger Trinidad Fall risk Assessment Score: 13      Moderate fall risk Interventions  - Bed and strip alarm   - Yellow sign by the door   - Yellow wrist band \"Fall risk\"  - Room near to the nurse station  - Do not leave patient unattended in the bathroom  - Fall risk education provided           The patient is Stable - Low risk of patient condition declining or worsening    Shift Goals  Clinical Goals: Safety  Patient Goals: Pain control.    "

## 2022-10-23 NOTE — FLOWSHEET NOTE
10/23/22 0846   Events/Summary/Plan   Events/Summary/Plan SpO2 check, Pt has not needed any Albuterol MDI for many days. Requested albuterol today.   Vital Signs   Pulse (!) 124   Respiration 16   Pulse Oximetry 94 %   $ Pulse Oximetry (Spot Check) Yes   Breath Sounds   RUL Breath Sounds Clear   RML Breath Sounds Clear   RLL Breath Sounds Clear   ASHLEY Breath Sounds Clear   LLL Breath Sounds Clear   Secretions   Cough Non Productive   Oxygen   O2 Delivery Device None - Room Air

## 2022-10-24 PROBLEM — R00.0 TACHYCARDIA: Status: RESOLVED | Noted: 2022-10-23 | Resolved: 2022-10-24

## 2022-10-24 LAB
GLUCOSE BLD STRIP.AUTO-MCNC: 135 MG/DL (ref 65–99)
GLUCOSE BLD STRIP.AUTO-MCNC: 147 MG/DL (ref 65–99)
GLUCOSE BLD STRIP.AUTO-MCNC: 163 MG/DL (ref 65–99)
GLUCOSE BLD STRIP.AUTO-MCNC: 172 MG/DL (ref 65–99)
GLUCOSE BLD STRIP.AUTO-MCNC: 242 MG/DL (ref 65–99)

## 2022-10-24 PROCEDURE — 770010 HCHG ROOM/CARE - REHAB SEMI PRIVAT*

## 2022-10-24 PROCEDURE — A9270 NON-COVERED ITEM OR SERVICE: HCPCS | Performed by: PHYSICAL MEDICINE & REHABILITATION

## 2022-10-24 PROCEDURE — 90791 PSYCH DIAGNOSTIC EVALUATION: CPT | Performed by: PSYCHOLOGIST

## 2022-10-24 PROCEDURE — 82962 GLUCOSE BLOOD TEST: CPT | Mod: 91

## 2022-10-24 PROCEDURE — 700102 HCHG RX REV CODE 250 W/ 637 OVERRIDE(OP): Performed by: PHYSICAL MEDICINE & REHABILITATION

## 2022-10-24 PROCEDURE — 97116 GAIT TRAINING THERAPY: CPT

## 2022-10-24 PROCEDURE — 97530 THERAPEUTIC ACTIVITIES: CPT

## 2022-10-24 PROCEDURE — 700111 HCHG RX REV CODE 636 W/ 250 OVERRIDE (IP): Performed by: PHYSICAL MEDICINE & REHABILITATION

## 2022-10-24 PROCEDURE — 99232 SBSQ HOSP IP/OBS MODERATE 35: CPT | Performed by: HOSPITALIST

## 2022-10-24 PROCEDURE — 97110 THERAPEUTIC EXERCISES: CPT

## 2022-10-24 PROCEDURE — 99233 SBSQ HOSP IP/OBS HIGH 50: CPT | Performed by: PHYSICAL MEDICINE & REHABILITATION

## 2022-10-24 PROCEDURE — RXMED WILLOW AMBULATORY MEDICATION CHARGE: Performed by: PHYSICAL MEDICINE & REHABILITATION

## 2022-10-24 RX ORDER — GABAPENTIN 800 MG/1
800 TABLET ORAL 4 TIMES DAILY
Qty: 90 TABLET | Refills: 2 | Status: SHIPPED | OUTPATIENT
Start: 2022-10-24

## 2022-10-24 RX ORDER — DULOXETIN HYDROCHLORIDE 60 MG/1
60 CAPSULE, DELAYED RELEASE ORAL EVERY EVENING
Qty: 30 CAPSULE | Refills: 2 | Status: SHIPPED | OUTPATIENT
Start: 2022-10-24

## 2022-10-24 RX ORDER — OXYCODONE HYDROCHLORIDE 15 MG/1
15 TABLET, FILM COATED, EXTENDED RELEASE ORAL EVERY 12 HOURS
Qty: 28 TABLET | Refills: 0 | Status: SHIPPED | OUTPATIENT
Start: 2022-10-24 | End: 2022-11-08

## 2022-10-24 RX ORDER — BACLOFEN 10 MG/1
10 TABLET ORAL 3 TIMES DAILY
Qty: 90 TABLET | Refills: 2 | Status: SHIPPED | OUTPATIENT
Start: 2022-10-24 | End: 2024-01-03

## 2022-10-24 RX ORDER — LEVETIRACETAM 1000 MG/1
1000 TABLET ORAL 2 TIMES DAILY
Qty: 60 TABLET | Refills: 2 | Status: ON HOLD | OUTPATIENT
Start: 2022-10-24 | End: 2022-12-12

## 2022-10-24 RX ORDER — QUETIAPINE FUMARATE 200 MG/1
200 TABLET, FILM COATED ORAL EVERY EVENING
Qty: 60 TABLET | Refills: 2 | Status: SHIPPED | OUTPATIENT
Start: 2022-10-24

## 2022-10-24 RX ORDER — OXYCODONE HYDROCHLORIDE 10 MG/1
10 TABLET ORAL EVERY 6 HOURS PRN
Qty: 52 TABLET | Refills: 0 | Status: SHIPPED | OUTPATIENT
Start: 2022-10-24 | End: 2022-11-07

## 2022-10-24 RX ORDER — OXCARBAZEPINE 300 MG/1
300 TABLET, FILM COATED ORAL 2 TIMES DAILY
Qty: 60 TABLET | Refills: 2 | Status: SHIPPED | OUTPATIENT
Start: 2022-10-24

## 2022-10-24 RX ORDER — INSULIN LISPRO 100 [IU]/ML
2-10 INJECTION, SOLUTION INTRAVENOUS; SUBCUTANEOUS
Qty: 9 ML | Refills: 2 | Status: SHIPPED | OUTPATIENT
Start: 2022-10-24 | End: 2024-01-03

## 2022-10-24 RX ORDER — ATORVASTATIN CALCIUM 40 MG/1
40 TABLET, FILM COATED ORAL EVERY EVENING
Qty: 30 TABLET | Refills: 2 | Status: SHIPPED | OUTPATIENT
Start: 2022-10-24

## 2022-10-24 RX ADMIN — POLYETHYLENE GLYCOL 3350 1 PACKET: 17 POWDER, FOR SOLUTION ORAL at 08:02

## 2022-10-24 RX ADMIN — OXCARBAZEPINE 300 MG: 300 TABLET, FILM COATED ORAL at 08:03

## 2022-10-24 RX ADMIN — TRAZODONE HYDROCHLORIDE 50 MG: 50 TABLET ORAL at 20:14

## 2022-10-24 RX ADMIN — Medication 1000 UNITS: at 08:03

## 2022-10-24 RX ADMIN — OMEPRAZOLE 20 MG: 20 CAPSULE, DELAYED RELEASE ORAL at 08:03

## 2022-10-24 RX ADMIN — INSULIN LISPRO 2 UNITS: 100 INJECTION, SOLUTION INTRAVENOUS; SUBCUTANEOUS at 11:31

## 2022-10-24 RX ADMIN — ENOXAPARIN SODIUM 40 MG: 40 INJECTION SUBCUTANEOUS at 20:11

## 2022-10-24 RX ADMIN — GABAPENTIN 800 MG: 400 CAPSULE ORAL at 14:13

## 2022-10-24 RX ADMIN — TAMSULOSIN HYDROCHLORIDE 0.4 MG: 0.4 CAPSULE ORAL at 08:02

## 2022-10-24 RX ADMIN — BACLOFEN 10 MG: 10 TABLET ORAL at 20:14

## 2022-10-24 RX ADMIN — DULOXETINE HYDROCHLORIDE 60 MG: 30 CAPSULE, DELAYED RELEASE ORAL at 20:12

## 2022-10-24 RX ADMIN — PHENAZOPYRIDINE HYDROCHLORIDE 200 MG: 200 TABLET ORAL at 08:03

## 2022-10-24 RX ADMIN — GABAPENTIN 800 MG: 400 CAPSULE ORAL at 20:12

## 2022-10-24 RX ADMIN — ATORVASTATIN CALCIUM 40 MG: 40 TABLET, FILM COATED ORAL at 20:14

## 2022-10-24 RX ADMIN — OXYCODONE HYDROCHLORIDE 10 MG: 10 TABLET, FILM COATED, EXTENDED RELEASE ORAL at 08:03

## 2022-10-24 RX ADMIN — QUETIAPINE FUMARATE 200 MG: 100 TABLET ORAL at 20:11

## 2022-10-24 RX ADMIN — OXYCODONE HYDROCHLORIDE 10 MG: 10 TABLET ORAL at 04:30

## 2022-10-24 RX ADMIN — OXCARBAZEPINE 300 MG: 300 TABLET, FILM COATED ORAL at 20:13

## 2022-10-24 RX ADMIN — LEVETIRACETAM 1000 MG: 500 TABLET, FILM COATED ORAL at 20:13

## 2022-10-24 RX ADMIN — PHENAZOPYRIDINE HYDROCHLORIDE 200 MG: 200 TABLET ORAL at 17:25

## 2022-10-24 RX ADMIN — INSULIN LISPRO 4 UNITS: 100 INJECTION, SOLUTION INTRAVENOUS; SUBCUTANEOUS at 20:22

## 2022-10-24 RX ADMIN — OXYCODONE HYDROCHLORIDE 10 MG: 10 TABLET ORAL at 10:45

## 2022-10-24 RX ADMIN — BACLOFEN 10 MG: 10 TABLET ORAL at 14:12

## 2022-10-24 RX ADMIN — GABAPENTIN 800 MG: 400 CAPSULE ORAL at 08:02

## 2022-10-24 RX ADMIN — SENNOSIDES AND DOCUSATE SODIUM 2 TABLET: 50; 8.6 TABLET ORAL at 08:03

## 2022-10-24 RX ADMIN — OXYCODONE HYDROCHLORIDE 10 MG: 10 TABLET ORAL at 14:14

## 2022-10-24 RX ADMIN — PHENAZOPYRIDINE HYDROCHLORIDE 200 MG: 200 TABLET ORAL at 11:27

## 2022-10-24 RX ADMIN — POLYETHYLENE GLYCOL 3350 1 PACKET: 17 POWDER, FOR SOLUTION ORAL at 20:11

## 2022-10-24 RX ADMIN — OXYCODONE HYDROCHLORIDE 10 MG: 10 TABLET, FILM COATED, EXTENDED RELEASE ORAL at 20:12

## 2022-10-24 RX ADMIN — LEVETIRACETAM 1000 MG: 500 TABLET, FILM COATED ORAL at 08:02

## 2022-10-24 RX ADMIN — GABAPENTIN 800 MG: 400 CAPSULE ORAL at 17:25

## 2022-10-24 RX ADMIN — BACLOFEN 10 MG: 10 TABLET ORAL at 08:03

## 2022-10-24 ASSESSMENT — GAIT ASSESSMENTS
ASSISTIVE DEVICE: FRONT WHEEL WALKER
DEVIATION: DECREASED BASE OF SUPPORT;BRADYKINETIC
GAIT LEVEL OF ASSIST: STANDBY ASSIST

## 2022-10-24 ASSESSMENT — BRIEF INTERVIEW FOR MENTAL STATUS (BIMS)
ASKED TO RECALL SOCK: YES, NO CUE REQUIRED
WHAT MONTH IS IT: ACCURATE WITHIN 5 DAYS
ASKED TO RECALL BLUE: YES, NO CUE REQUIRED
WHAT DAY OF THE WEEK IS IT: CORRECT
WHAT YEAR IS IT: CORRECT
ASKED TO RECALL BED: YES, NO CUE REQUIRED
INITIAL REPETITION OF BED BLUE SOCK - FIRST ATTEMPT: 3
BIMS SUMMARY SCORE: 15

## 2022-10-24 ASSESSMENT — ACTIVITIES OF DAILY LIVING (ADL)
TOILETING_LEVEL_OF_ASSIST: ABLE TO COMPLETE TOILETING WITHOUT ASSIST
TOILET_TRANSFER_DESCRIPTION: ADAPTIVE EQUIPMENT
SHOWER_TRANSFER_LEVEL_OF_ASSIST: REQUIRES SUPERVISION WITH SHOWER TRANSFER
TUB_SHOWER_TRANSFER_DESCRIPTION: SUPERVISION FOR SAFETY;VERBAL CUEING
TOILET_TRANSFER_LEVEL_OF_ASSIST: ABLE TO COMPLETE TOILET TRANSFER WITHOUT ASSIST
BED_CHAIR_WHEELCHAIR_TRANSFER_DESCRIPTION: ADAPTIVE EQUIPMENT;INCREASED TIME;SUPERVISION FOR SAFETY;VERBAL CUEING

## 2022-10-24 ASSESSMENT — ENCOUNTER SYMPTOMS
FEVER: 0
ABDOMINAL PAIN: 0
NAUSEA: 0
VOMITING: 0
DIARRHEA: 0
SHORTNESS OF BREATH: 0
NERVOUS/ANXIOUS: 0
CHILLS: 0

## 2022-10-24 ASSESSMENT — PAIN DESCRIPTION - PAIN TYPE: TYPE: ACUTE PAIN

## 2022-10-24 NOTE — THERAPY
Occupational Therapy  Daily Treatment     Patient Name: Jacque Mcintyre  Age:  41 y.o., Sex:  female  Medical Record #: [id number]  Today's Date: 10/24/2022     Precautions  Precautions: Fall Risk, Other (See Comments)  Comments: chronic LBP         Subjective    Pt received up in w/c, reports feeling great today, agreeable to OT, mother present     Objective       10/24/22 1301   OT Charge Group   OT Therapy Activity 2   OT Total Time Spent   OT Individual Total Time Spent (Mins) 30   Functional Level of Assist   Toilet Transfers Modified Independent   Toilet Transfer Description Adaptive equipment  (FWW)   Tub / Shower Transfers Supervised   Tub Shower Transfer Description Supervision for safety;Verbal cueing  (FWW dry transfer with simulation of home environment)     Discussed home setup with mother, per pt and mother patient will use walk in shower at home, mother will purchase shower chair at [facility] today and will have GB installed within the week, patient able to transition in/out of shower using wall for support instead of grab bar with supervision, discussed rec of initial supervision for showering/shower transfers which pt and mother are in agreement with.     Attempted stairs per patient request, pt CGA for up/down 4 steps with bilateral hand rails, min A using unilateral hand rail. Discussed alternate entry options, patient and mother stated they could enter through garage which would be one step only and could use FWW, direct hand off to PT at end of session to trial curb step with FWW.     Assessment    Patient tolerated session well, tiffany continued progress with mobility with FWW and is now completing mobility in room at mod I level, reports she got dressed without assist this AM including putting on her compression socks. Patient with good safety awareness, cleared to be mod I with FWW in room for increased independence.   Strengths: Able to follow instructions, Alert and oriented, Independent prior  level of function, Making steady progress towards goals, Motivated for self care and independence, Pleasant and cooperative, Supportive family, Willingly participates in therapeutic activities  Barriers: Decreased endurance, Fatigue, Generalized weakness, Impaired activity tolerance, Impaired balance    Plan    D/C home tomorrow with support from family, HHOT follow up    Occupational Therapy Goals (Active)       Problem: Functional Transfers       Dates: Start: 10/16/22         Goal: STG-Within one week, patient will transfer to toilet with CGA overall using AE/DME as needed.       Dates: Start: 10/16/22               Problem: OT Long Term Goals       Dates: Start: 10/16/22         Goal: LTG-By discharge, patient will complete basic self care tasks with supervision-Mod I overall using AE/DME as needed.       Dates: Start: 10/16/22            Goal: LTG-By discharge, patient will perform bathroom transfers with supervision overall using AE/DME as needed.       Dates: Start: 10/16/22

## 2022-10-24 NOTE — CARE PLAN
Problem: Pain - Standard  Goal: Alleviation of pain or a reduction in pain to the patient’s comfort goal  Flowsheets  Taken 10/24/2022 1045  Pain Rating Scale (NPRS): 7  Taken 10/24/2022 0800  Non Verbal Scale: Calm     Problem: Fall Risk - Rehab  Goal: Patient will remain free from falls  Note: Patient remains free from falls this shift. Patient was educated on using the call light to prevent falls. Patients bed is in the lowest position. The patients belongings are placed in near proximity to the patient.     The patient is Stable - Low risk of patient condition declining or worsening

## 2022-10-24 NOTE — PROGRESS NOTES
"Rehab Progress Note     Encounter Date: 10/22/2022    CC: Decreased mobility, brain metastasis     Interval Events (Subjective)  Patient sitting up in room. She reports therapy is going well and is ready to go home tomorrow. Reviewed discharge medications. She will follow-up with oncology.  CT head reviewed and no changes. Denies NVD. Denies SOB.     IDT Team Meeting 10/20/2022  DC/Disposition:  10/25/22    Objective:  VITAL SIGNS: /63   Pulse 90   Temp 37.1 °C (98.7 °F) (Oral)   Resp 18   Ht 1.626 m (5' 4.02\")   Wt 65 kg (143 lb 4.8 oz)   SpO2 95%   BMI 24.58 kg/m²   Gen: NAD  Psych: Mood and affect appropriate  CV: RRR, no edema  Resp: CTAB, no upper airway sounds  Abd: NTND  Neuro: AOx4, walking Mane    Recent Results (from the past 72 hour(s))   POCT glucose device results    Collection Time: 10/21/22  5:32 PM   Result Value Ref Range    POC Glucose, Blood 186 (H) 65 - 99 mg/dL   POCT glucose device results    Collection Time: 10/21/22  9:24 PM   Result Value Ref Range    POC Glucose, Blood 197 (H) 65 - 99 mg/dL   CBC WITHOUT DIFFERENTIAL    Collection Time: 10/22/22  6:24 AM   Result Value Ref Range    WBC 6.5 4.8 - 10.8 K/uL    RBC 3.42 (L) 4.20 - 5.40 M/uL    Hemoglobin 8.3 (L) 12.0 - 16.0 g/dL    Hematocrit 28.1 (L) 37.0 - 47.0 %    MCV 82.2 81.4 - 97.8 fL    MCH 24.3 (L) 27.0 - 33.0 pg    MCHC 29.5 (L) 33.6 - 35.0 g/dL    RDW 49.1 35.9 - 50.0 fL    Platelet Count 418 164 - 446 K/uL    MPV 9.9 9.0 - 12.9 fL   POCT glucose device results    Collection Time: 10/22/22  7:40 AM   Result Value Ref Range    POC Glucose, Blood 162 (H) 65 - 99 mg/dL   POCT glucose device results    Collection Time: 10/22/22 11:12 AM   Result Value Ref Range    POC Glucose, Blood 239 (H) 65 - 99 mg/dL   POCT glucose device results    Collection Time: 10/22/22  5:29 PM   Result Value Ref Range    POC Glucose, Blood 157 (H) 65 - 99 mg/dL   POCT glucose device results    Collection Time: 10/22/22  9:43 PM   Result Value Ref " Range    POC Glucose, Blood 200 (H) 65 - 99 mg/dL   POCT glucose device results    Collection Time: 10/23/22  8:18 AM   Result Value Ref Range    POC Glucose, Blood 143 (H) 65 - 99 mg/dL   POCT glucose device results    Collection Time: 10/23/22 11:39 AM   Result Value Ref Range    POC Glucose, Blood 201 (H) 65 - 99 mg/dL   POCT glucose device results    Collection Time: 10/23/22  5:20 PM   Result Value Ref Range    POC Glucose, Blood 126 (H) 65 - 99 mg/dL   POCT glucose device results    Collection Time: 10/23/22  8:42 PM   Result Value Ref Range    POC Glucose, Blood 190 (H) 65 - 99 mg/dL   POCT glucose device results    Collection Time: 10/24/22  8:12 AM   Result Value Ref Range    POC Glucose, Blood 135 (H) 65 - 99 mg/dL   POCT glucose device results    Collection Time: 10/24/22 11:29 AM   Result Value Ref Range    POC Glucose, Blood 172 (H) 65 - 99 mg/dL   POCT glucose device results    Collection Time: 10/24/22  2:10 PM   Result Value Ref Range    POC Glucose, Blood 163 (H) 65 - 99 mg/dL       Current Facility-Administered Medications   Medication Frequency    insulin GLARGINE (Lantus,Semglee) injection Q EVENING    And    insulin lispro (AdmeLOG,HumaLOG) injection 4X/DAY ACHS    phenazopyridine (PYRIDIUM) tablet 200 mg TID WITH MEALS    vitamin D3 (cholecalciferol) tablet 1,000 Units DAILY    Respiratory Therapy Consult Continuous RT    Pharmacy Consult Request ...Pain Management Review 1 Each PHARMACY TO DOSE    hydrALAZINE (APRESOLINE) tablet 25 mg Q8HRS PRN    acetaminophen (Tylenol) tablet 650 mg Q4HRS PRN    omeprazole (PRILOSEC) capsule 20 mg DAILY    mag hydrox-al hydrox-simeth (MAALOX PLUS ES or MYLANTA DS) suspension 20 mL Q2HRS PRN    ondansetron (ZOFRAN ODT) dispertab 4 mg 4X/DAY PRN    Or    ondansetron (ZOFRAN) syringe/vial injection 4 mg 4X/DAY PRN    traZODone (DESYREL) tablet 50 mg QHS PRN    sodium chloride (OCEAN) 0.65 % nasal spray 2 Spray PRN    oxyCODONE immediate-release (ROXICODONE)  tablet 5 mg Q3HRS PRN    Or    oxyCODONE immediate release (ROXICODONE) tablet 10 mg Q3HRS PRN    traMADol (Ultram) 50 MG tablet 50 mg Q4HRS PRN    midazolam (VERSED) 5 mg/mL (1 mL vial) PRN    ALPRAZolam (XANAX) tablet 0.25 mg 4X/DAY PRN    atorvastatin (LIPITOR) tablet 40 mg Q EVENING    baclofen (LIORESAL) tablet 10 mg TID    DULoxetine (CYMBALTA) capsule 60 mg Q EVENING    enoxaparin (Lovenox) inj 40 mg DAILY AT 1800    gabapentin (NEURONTIN) capsule 800 mg 4X/DAY    levETIRAcetam (KEPPRA) tablet 1,000 mg BID    OXcarbazepine (TRILEPTAL) tablet 300 mg BID    oxyCODONE CR (OXYCONTIN) tablet 10 mg Q12HRS    QUEtiapine (Seroquel) tablet 200 mg Q EVENING    tamsulosin (FLOMAX) capsule 0.4 mg AFTER BREAKFAST    polyethylene glycol/lytes (MIRALAX) PACKET 1 Packet BID    And    senna-docusate (PERICOLACE or SENOKOT S) 8.6-50 MG per tablet 2 Tablet DAILY    And    magnesium hydroxide (MILK OF MAGNESIA) suspension 30 mL QDAY PRN    And    bisacodyl (DULCOLAX) suppository 10 mg QDAY PRN    albuterol inhaler 2 Puff Q4H PRN (RT)       Orders Placed This Encounter   Procedures    Diet Order Diet: Consistent CHO (Diabetic)     Standing Status:   Standing     Number of Occurrences:   1     Order Specific Question:   Diet:     Answer:   Consistent CHO (Diabetic) [4]       Assessment:  Active Hospital Problems    Diagnosis     *Metastatic cancer to brain (HCC)        Medical Decision Making and Plan:  Adapted from Dr. Arambula's A&P  Metastatic brain cancer  Melanoma  BRAF+, follow-up oncology  Staples removed.   New dizziness and worsening anemia. Will check CT head - stable with improving edema    ##MSK  #Impaired ADLs and mobility  Patient is admitted to [facility] for comprehensive rehabilitation therapy as described.   Rehabilitation nursing monitors bowel and bladder control, educates on medication administration, co-morbidities and monitors patient safety.  Expected Length of Stay: 10-14  days  Anticipated discharge date: 10/25/22  Anticipated discharge destination: home with parents  Prognosis: good  Equipment: None  Postdischarge therapies: PT/OT  Followup: Pebbles Tejada MD (neurosurgery), Mely Romero DO (PM&R), PCP  Precautions: none  Code: full resuscitation     #Postsurgical pain, acute, controlled  #Neuropathic pain, acute, controlled  Ordered baclofen 10mg TID, gabapentin 800mg QID, oxycontin 10mg BID     ##NEURO  #Metastasis to brain  Ordered keppra 1000mg BID, trileptal 300mg BID  Monitor  On Baclofen 10 mg TID for spasms     #RESP  Respiratory therapy: RT performs O2 management prn, breathing retraining, pulmonary hygiene and bronchospasm management prn to optimize participation in therapies.      ##CARDIAC  #Chronic hyperlipidemia  Ordered lipitor 40mg QHS  DVT prophylaxis: Ordered lovenox 40mg daily. Encourage OOB. Monitor daily for signs and symptoms of DVT including but not limited to swelling and pain to prevent the development of DVT that may interfere with therapies.     ##GI  GI prophylaxis:  Ordered prilosec 20mg daily to prevent gastritis/dyspepsia which may interfere with therapies.  #At risk of opioid induced constipation  Goal of one continent BM daily     #At high risk of malnutrition  Dietician monitors nutrient intake, recommend supplements prn and provide nutrition education to pt/family to promote optimal nutrition for wound healing/recovery.     ##/RENAL  #At risk of urinary retention  Dysuria on 10/17-10/18/22. UA positive for UTI. Urine culture sent. Ciprofloxacin and pyridium. UCx sent     ##HEME  #Acute blood loss anemia  #Leukocytosis - Consult hospitalist  #Thrombocytosis, likely reactive  Monitor     ##ENDO  #Diabetes mellitus with hyperglycemia  Ordered Lantus 20mg QHS -> 30 -> 38     ##SKIN  Skin/dermal ulcer prophylaxis: Monitor for new skin conditions with q.2 h. turns as required to prevent the development of skin breakdown.     Total time:  36 minutes.   I spent greater than 50% of the time for patient care, counseling, and coordination on this date, including unit/floor time, and face-to-face time with the patient as per interval events and assessment and plan above. Topics discussed included discharge planning, CT head results, discharge medications, and continue current lantus dose. Patient to be given opiates at discharge     I discussed the risks and benefits of using opiate medications for pain control.  I discussed the risk of addiction, potential for overdose, and respiratory depression (and the potential need for opiate antagonist therapy if this occurs).  I encouraged the patient to take this medication sparingly with the expressed goal of weaning off the medication as soon as is clinically appropriate.  I informed the patient that we are only able to provide a 14 day supply of these medications at discharge and that they will be responsible for requesting any refills needed from their primary care provider or their surgeon.  We discussed the need to safely secure these medications to prevent theft, inadvertent ingestion, or misuse.  Any unused medication should be immediately disposed of through a sanctioned medication disposal program.  We discussed adjunctive pain medications and conservative therapies at length.I answered the patient's questions regarding this treatment, and the patient indicated understanding and willingness to proceed.      Zach Patel M.D.

## 2022-10-24 NOTE — CARE PLAN
"  Problem: Pain - Standard  Goal: Alleviation of pain or a reduction in pain to the patient’s comfort goal  Outcome: Progressing  Note: Assessed for pain and discomfort , on pain mgt  with oxycodone with relief [see mar] .Cont monitored.     Problem: Diabetes Management  Goal: Patient will achieve and maintain glucose in satisfactory range  Outcome: Progressing  Note: Finger stick monitored - HS result- 190 , coverage given .Snacks provided . No s/s of hypo and hyperglycemia noted. Pt aware of signs ans symptoms to watch and report .     Problem: Fall Risk - Rehab  Goal: Patient will remain free from falls  Outcome: Progressing  Note: Ginger Trinidad Fall risk Assessment Score: 13    Moderate fall risk Interventions  - Bed and strip alarm   - Yellow sign by the door   - Yellow wrist band \"Fall risk\"  - Room near to the nurse station  - Do not leave patient unattended in the bathroom  - Fall risk education provided      Problem: Skin Integrity  Goal: Skin integrity is maintained or improved  Outcome: Progressing  Note: Head  incision line , intact well approximated open to air, healing . No s/s of infection noted.   The patient is Stable - Low risk of patient condition declining or worsening    Shift Goals  Clinical Goals: safety  Patient Goals: pain control    Progress made toward(s) clinical / shift goals:  Pt free from fall and injury, pain under control .    "

## 2022-10-24 NOTE — CARE PLAN
Problem: Knowledge Deficit - Standard  Goal: Patient and family/care givers will demonstrate understanding of plan of care, disease process/condition, diagnostic tests and medications  Outcome: Progressing     Problem: Skin Integrity - Diabetes  Goal: Patient's skin on legs and feet will remain intact while hospitalized  Note: Patient's skin is maintained and free from new or accidental injury this shift.  Will continue to monitor.    The patient is Stable - Low risk of patient condition declining or worsening    Shift Goals  Clinical Goals: safety  Patient Goals: pain control

## 2022-10-24 NOTE — DISCHARGE PLANNING
Per Gaby ARZOLA W/C pend through [facility]. TC with UC to cancel 10/26 GMT as t now D/C 10/25 and not on 10/26, 10/26 will be Mapping appointment O/P.

## 2022-10-24 NOTE — THERAPY
"Physical Therapy   Daily Treatment     Patient Name: Jacque Mcintyre  Age:  41 y.o., Sex:  female  Medical Record #: [id number]  Today's Date: 10/24/2022     Precautions  Precautions: Fall Risk, Other (See Comments)  Comments: chronic LBP    Subjective    Pt and mother present for family training.     Objective       10/24/22 1331   PT Charge Group   PT Gait Training 1   PT Therapeutic Activities 1   PT Total Time Spent   PT Individual Total Time Spent (Mins) 30   Stairs Functional Level of Assist   Level of Assist with Stairs Contact Guard Assist   # of Stairs Climbed   (8\" curb step x 2 trials)   Stairs Description Extra time;Verbal cueing;Walker   Bed Mobility    Supine to Sit Modified Independent   Sit to Supine Modified Independent   Sit to Stand Standby Assist   Scooting Modified Independent   Rolling Modified Independent     Pt's mother present for family training, able to use gait belt to safely assist pt with sit<>stand/ car transfer/ outdoor ambulation and 8\" curb step x 2 trials to simulate entry through garage. Pt completed all tasks with FWW.    Gait training initiated without UE support (within // bars for safety) with light CGA x 100 ft.    Assessment    Pt continues to progress with strength/ endurance and safety. Pt's mother aware of on-going balance and safety deficits and is able to provide appropriate assistance as needed.    Strengths: Able to follow instructions, Effective communication skills, Alert and oriented, Good insight into deficits/needs, Independent prior level of function, Making steady progress towards goals, Manages pain appropriately, Motivated for self care and independence, Pleasant and cooperative, Supportive family, Willingly participates in therapeutic activities  Barriers: Hemiparesis    Plan    D/c tomorrow      Physical Therapy Problems (Active)       Problem: Balance       Dates: Start: 10/16/22         Goal: STG-Within one week, patient will maintain dynamic standing: score " >45/56 on the Thornton Balance Assessment.       Dates: Start: 10/16/22         Goal Note filed on 10/20/22 1304 by Ivelisse Kerr, PT       Thornton not completed yet                 Problem: Mobility       Dates: Start: 10/16/22         Goal: STG-Within one week, patient will ambulate household distance at least 500' with SPC or no AD, SBA.       Dates: Start: 10/16/22         Goal Note filed on 10/20/22 1304 by Ivelisse Kerr, PT       175 ft with FWW                 Problem: Mobility Transfers       Dates: Start: 10/16/22         Goal: STG-Within one week, patient will perform bed mobility Mod I with bed flat.       Dates: Start: 10/16/22         Goal Note filed on 10/20/22 1304 by Ivelisse Kerr, PT       Min A              Goal: STG-Within one week, patient will transfer bed to chair with SPC or no AD.       Dates: Start: 10/16/22         Goal Note filed on 10/20/22 1304 by Ivelisse Kerr, PT       FWW                 Problem: PT-Long Term Goals       Dates: Start: 10/16/22         Goal: LTG-By discharge, patient will maintain balance: at least 22/30 on the FGA indicating low fall risk, to facilitate safe DC to home.       Dates: Start: 10/16/22            Goal: LTG-By discharge, patient will ambulate at least 1000' during 6 Minute Walk Test.       Dates: Start: 10/16/22            Goal: LTG-By discharge, patient will transfer one surface to another Mod I.       Dates: Start: 10/16/22            Goal: LTG-By discharge, patient will transfer in/out of a car SPV.       Dates: Start: 10/16/22            Goal: LTG-By discharge, patient will ambulate up/down ADA ramp with SPC or no AD, and SPV, to safely enter/exit  her parent's home.       Dates: Start: 10/16/22

## 2022-10-24 NOTE — THERAPY
Occupational Therapy  Daily Treatment     Patient Name: Jacque Mcintyre  Age:  41 y.o., Sex:  female  Medical Record #: [id number]  Today's Date: 10/24/2022     Precautions  Precautions: Fall Risk, Other (See Comments)  Comments: chronic LBP         Subjective    Pt supine in bed upon arrival, pleasant and cooperative, agreeable to therapy but first requested a snack d/t low blood sugar      Objective       10/24/22 1431   OT Charge Group   OT Therapy Activity 2   OT Therapeutic Exercise  2   OT Total Time Spent   OT Individual Total Time Spent (Mins) 60   Functional Level of Assist   Eating Independent   Grooming Modified Independent;Standing   Supine Upper Body Exercises   Comments BUE therex using 3lb dumbbell: chest press, bicep curl, tricep press, shoulder IR/ER, wrist flex/ext and supination/pronation 3 sets x 10 each   Interdisciplinary Plan of Care Collaboration   Patient Position at End of Therapy In Bed;Call Light within Reach;Tray Table within Reach       Assessment    Pt completed UB exercises to improve overall strength and cardiovascular endurance in order to maximize independence with ADL's and functional mobility tasks. Pt completed to the best of her abilities with no complaints of pain. Pt with significant fatigue throughout session - at start of session reported low blood sugar and by end of session falling asleep frequently     Strengths: Able to follow instructions, Alert and oriented, Independent prior level of function, Making steady progress towards goals, Motivated for self care and independence, Pleasant and cooperative, Supportive family, Willingly participates in therapeutic activities  Barriers: Decreased endurance, Fatigue, Generalized weakness, Impaired activity tolerance, Impaired balance    Plan    Refer to Primary OT POC/goals     Occupational Therapy Goals (Active)       Problem: Functional Transfers       Dates: Start: 10/16/22         Goal: STG-Within one week, patient will transfer  to toilet with CGA overall using AE/DME as needed.       Dates: Start: 10/16/22               Problem: OT Long Term Goals       Dates: Start: 10/16/22         Goal: LTG-By discharge, patient will complete basic self care tasks with supervision-Mod I overall using AE/DME as needed.       Dates: Start: 10/16/22            Goal: LTG-By discharge, patient will perform bathroom transfers with supervision overall using AE/DME as needed.       Dates: Start: 10/16/22

## 2022-10-24 NOTE — DISCHARGE SUMMARY
Rehab Discharge Summary    Admission Date: 10/15/2022    Discharge Date: 10/25/2022    Attending Provider: Zach Patel MD/PhD    Admission Diagnosis:   Active Hospital Problems    Diagnosis     *Metastatic cancer to brain (HCC)     Tachycardia     Dizziness on standing     Vitamin D deficiency     Dyslipidemia     Anemia     Borderline abnormal TFTs     Thrombocytosis     Brain mass     Essential hypertension     DM2 (diabetes mellitus, type 2) (HCC)     Bipolar 1 disorder (HCC)     UTI (urinary tract infection)        Discharge Diagnosis:  Active Hospital Problems    Diagnosis     *Metastatic cancer to brain (HCC)     Tachycardia     Dizziness on standing     Vitamin D deficiency     Dyslipidemia     Anemia     Borderline abnormal TFTs     Thrombocytosis     Brain mass     Essential hypertension     DM2 (diabetes mellitus, type 2) (HCC)     Bipolar 1 disorder (HCC)     UTI (urinary tract infection)        HPI per H&P:  The patient is a 41 y.o. female with a past medical history of diabetes mellitus; now admitted for acute inpatient rehabilitation with severe functional debility due to nontraumatic brain injury. Per documentation, patient presented to the ED on 10/1/2022 for new left sided weakness. It progressed for the past several days to the point where she started falling. No visual changes, headache, nausea, vomitting, fevers.       The patient was found to have the following:  3 masses in the right cerebral hemisphere largest measuring 3.2 cm with extensive vasogenic edema as described above. Findings are likely due to metastases.  Localized midline shift to the left side frontoparietal level measuring 3 mm.  The 2 smaller masses in the right frontal lobe demonstrates some increased density which could indicate that hemorrhage may be present within these masses.  CT chest, abdomen, pelvis from 10/1/2022 showed bilateral pulmonary nodules, enlarged left hilar lymph node, enlarged left external  iliac lymph node, 9 mm right lower quadrant peritoneal nodule, 7 mm left and left upper quadrant subcutaneous soft tissue nodules.       Biopsy results show malignant melanoma with  1. AE1/AE3: Negative.   2. Cam 5.2: Negative.   3. HMB-45: Positive.   4. Melan-A: Positive.   5. S100: Positive.   6. Sox 10: Positive.   BRAF positive     The patient underwent Right frontal craniotomy for resection of supratentorial intraaxial brain tumors, three, same incision and same craniotomy on 10/6/2022 by Dr. Pebbles Tejada MD.      Plan is for palliative radiation starting 10/28.       Patient was admitted to [facility] on 10/15/2022.     Hospital Course by Problem List:  Adapted from Dr. Arambula's A&P  Metastatic brain cancer  Melanoma  BRAF+, follow-up oncology  Staples removed.   New dizziness and worsening anemia. Will check CT head - stable with improving edema     ##MSK  #Impaired ADLs and mobility  Patient is admitted to [facility] for comprehensive rehabilitation therapy as described.   Rehabilitation nursing monitors bowel and bladder control, educates on medication administration, co-morbidities and monitors patient safety.  Expected Length of Stay: 10-14 days  Anticipated discharge date: 10/25/22  Anticipated discharge destination: home with parents  Prognosis: good  Equipment: None  Postdischarge therapies: PT/OT  Followup: Pebbles Tejada MD (neurosurgery), Mely Romero DO (PM&R), PCP  Precautions: none  Code: full resuscitation     #Postsurgical pain, acute, controlled  #Neuropathic pain, acute, controlled  Ordered baclofen 10mg TID, gabapentin 800mg QID, oxycontin 10mg BID     ##NEURO  #Metastasis to brain  Ordered keppra 1000mg BID, trileptal 300mg BID  Monitor  On Baclofen 10 mg TID for spasms     #RESP  Respiratory therapy: RT performs O2 management prn, breathing retraining, pulmonary hygiene and bronchospasm management prn to optimize participation in  therapies.      ##CARDIAC  #Chronic hyperlipidemia  Ordered lipitor 40mg QHS  DVT prophylaxis: Ordered lovenox 40mg daily. Encourage OOB. Monitor daily for signs and symptoms of DVT including but not limited to swelling and pain to prevent the development of DVT that may interfere with therapies.     ##GI  GI prophylaxis:  Ordered prilosec 20mg daily to prevent gastritis/dyspepsia which may interfere with therapies.  #At risk of opioid induced constipation  Goal of one continent BM daily     #At high risk of malnutrition  Dietician monitors nutrient intake, recommend supplements prn and provide nutrition education to pt/family to promote optimal nutrition for wound healing/recovery.      ##/RENAL  #At risk of urinary retention  Dysuria on 10/17-10/18/22. UA positive for UTI. Urine culture sent. Ciprofloxacin and pyridium. UCx sent     ##HEME  #Acute blood loss anemia  #Leukocytosis - Consult hospitalist  #Thrombocytosis, likely reactive  Monitor     ##ENDO  #Diabetes mellitus with hyperglycemia  Ordered Lantus 20mg QHS -> 30 -> 38     ##SKIN  Skin/dermal ulcer prophylaxis: Monitor for new skin conditions with q.2 h. turns as required to prevent the development of skin breakdown.     Functional Status at Discharge  Eating:  Supervision  Eating Description:  Supervision for safety, Increased time (pt able to cut food using BUE, requires increased time)  Grooming:  Supervision, Standing  Grooming Description:  Standing at sink, Supervision for safety  Bathing:  Contact Guard Assist  Bathing Description:  Grab bar, Hand held shower, Supervision for safety, Verbal cueing (CGA for standing components, setup to cover IVx2)  Upper Body Dressing:  Supervision  Upper Body Dressing Description:  Set-up of equipment  Lower Body Dressing:  Contact Guard Assist  Lower Body Dressing Description:  Verbal cueing, Supervision for safety, Increased time     Walk:  Standby Assist  Distance Walked:   (250x1, 200 x 1, 100x 1)  Number  of Times Distance Was Traveled:  2  Assistive Device:  Front Wheel Walker  Gait Deviation:  Decreased Base Of Support, Bradykinetic (mild path devaition, easily distracted)  Wheelchair:     Distance Propelled:      Wheelchair Description:     Stairs (P) Contact Guard Assist  Stairs Description (P) Extra time, Verbal cueing, Walker     Comprehension:  Modified Independent  Comprehension Description:  Glasses  Expression:  Independent  Expression Description:     Social Interaction:  Independent  Social Interaction Description:     Problem Solving:  Supervision  Problem Solving Description:  Verbal cueing, Increased time, Therapy schedule  Memory:  Supervision  Memory Description:  Increased time, Verbal cueing, Therapy schedule       I, Zach Patel M.D., personally performed a complete drug regimen review and no potential clinically significant medication issues were identified.   Discharge Medication:     Medication List        START taking these medications        Instructions   Insulin Glargine (2 Unit Dial) 300 UNIT/ML Sopn   Inject 50 Units under the skin every day.  Dose: 50 Units     insulin lispro 100 UNIT/ML Sopn injection PEN  Commonly known as: HumaLOG,AdmeLOG   Inject 2-10 Units under the skin 4 Times a Day,Before Meals and at Bedtime.  Dose: 2-10 Units            CHANGE how you take these medications        Instructions   * OxyCONTIN 15 MG T12a  What changed:   medication strength  how much to take  Generic drug: oxyCODONE HCl ER   Take 15 mg by mouth every 12 hours for 14 days.  Dose: 15 mg     * oxyCODONE immediate release 10 MG immediate release tablet  What changed:   medication strength  how much to take  when to take this  reasons to take this  Commonly known as: ROXICODONE   Take 1 Tablet by mouth every 6 hours as needed for Moderate Pain or Severe Pain for up to 14 days.  Dose: 10 mg           * This list has 2 medication(s) that are the same as other medications prescribed for you.  Read the directions carefully, and ask your doctor or other care provider to review them with you.                CONTINUE taking these medications        Instructions   acetaminophen 500 MG Tabs  Commonly known as: TYLENOL   Take 1,000 mg by mouth 2 times a day.  Dose: 1,000 mg     atorvastatin 40 MG Tabs  Commonly known as: LIPITOR   Take 1 Tablet by mouth every evening.  Dose: 40 mg     baclofen 10 MG Tabs  Commonly known as: LIORESAL   Take 1 Tablet by mouth 3 times a day.  Dose: 10 mg     clonazePAM 0.5 MG Tabs  Commonly known as: KLONOPIN   Take 0.25 mg by mouth 2 times a day as needed (sleep). Indications: Feeling Anxious  Dose: 0.25 mg     DULoxetine 60 MG Cpep delayed-release capsule  Commonly known as: CYMBALTA   Take 1 Capsule by mouth every evening.  Dose: 60 mg     gabapentin 800 MG tablet  Commonly known as: NEURONTIN   Take 1 Tablet by mouth 4 times a day.  Dose: 800 mg     levetiracetam 1000 MG tablet  Commonly known as: KEPPRA   Take 1 Tablet by mouth 2 times a day.  Dose: 1,000 mg     OXcarbazepine 300 MG Tabs  Commonly known as: TRILEPTAL   Take 1 Tablet by mouth 2 times a day.  Dose: 300 mg     QUEtiapine 200 MG Tabs  Commonly known as: Seroquel   Take 1 Tablet by mouth every evening.  Dose: 200 mg            STOP taking these medications      albuterol 108 (90 Base) MCG/ACT Aers inhalation aerosol  Commonly known as: Proventil HFA     bisacodyl 10 MG Supp  Commonly known as: DULCOLAX     dexamethasone 4 MG Tabs  Commonly known as: DECADRON     famotidine 20 MG Tabs  Commonly known as: PEPCID     magnesium hydroxide 400 MG/5ML Susp  Commonly known as: MILK OF MAGNESIA     meloxicam 15 MG tablet  Commonly known as: MOBIC     polyethylene glycol/lytes 17 g Pack  Commonly known as: MIRALAX     senna-docusate 8.6-50 MG Tabs  Commonly known as: PERICOLACE or SENOKOT S     tamsulosin 0.4 MG capsule  Commonly known as: FLOMAX     Tresiba FlexTouch 100 UNIT/ML Sopn  Generic drug: Insulin Degludec               Discharge Diet:  Regular    Discharge Activity:  As tolerated     Disposition:  Patient to discharge home with family support and community resources.     Equipment:  None    Follow-up & Discharge Instructions:  Follow up with your primary care provider (PCP) within 7-10 days of discharge to review your medications and take over your care.     If you develop chest pain, fever, chills, change in neurologic function (weakness, sensation changes, vision changes), or other concerning sxs, seek immediate medical attention or call 911.      Condition on Discharge:  Good    More than 33 minutes was spent on discharging this patient, including face-to-face time, prescription management, and the dictation of this note.    Zach Patel M.D.    Date of Service: 10/25/2022

## 2022-10-24 NOTE — PROGRESS NOTES
[facility] Medicine Daily Progress Note    Date of Service  10/24/2022    Chief Complaint:  Diabetes  UTI/Leukocytosis    Interval History:  Dizziness still better.  Doing ok.    Review of Systems  Review of Systems   Constitutional:  Negative for chills and fever.   Respiratory:  Negative for shortness of breath.    Cardiovascular:  Negative for chest pain.   Gastrointestinal:  Negative for abdominal pain, diarrhea, nausea and vomiting.   Psychiatric/Behavioral:  The patient is not nervous/anxious.       Physical Exam  Temp:  [36.4 °C (97.6 °F)-37.1 °C (98.7 °F)] 37.1 °C (98.7 °F)  Pulse:  [87-98] 90  Resp:  [12-20] 18  BP: ()/(63-68) 101/63  SpO2:  [90 %-95 %] 95 %    Physical Exam  Vitals and nursing note reviewed.   Constitutional:       Appearance: Normal appearance.   HENT:      Head: Atraumatic.   Eyes:      Conjunctiva/sclera: Conjunctivae normal.      Pupils: Pupils are equal, round, and reactive to light.   Cardiovascular:      Rate and Rhythm: Normal rate and regular rhythm.      Heart sounds: No murmur heard.  Pulmonary:      Effort: Pulmonary effort is normal.      Breath sounds: No stridor. No wheezing or rales.   Abdominal:      General: There is no distension.      Palpations: Abdomen is soft.      Tenderness: There is no abdominal tenderness.   Musculoskeletal:      Cervical back: Normal range of motion and neck supple.      Right lower leg: No edema.      Left lower leg: No edema.   Skin:     General: Skin is warm and dry.      Findings: No rash.   Neurological:      Mental Status: She is alert and oriented to person, place, and time.   Psychiatric:         Mood and Affect: Mood normal.         Behavior: Behavior normal.       Fluids    Intake/Output Summary (Last 24 hours) at 10/24/2022 1135  Last data filed at 10/24/2022 1045  Gross per 24 hour   Intake 720 ml   Output --   Net 720 ml         Laboratory  Recent Labs     10/22/22  0624   WBC 6.5   RBC 3.42*   HEMOGLOBIN 8.3*   HEMATOCRIT 28.1*    MCV 82.2   MCH 24.3*   MCHC 29.5*   RDW 49.1   PLATELETCT 418   MPV 9.9                           Imaging       Assessment/Plan  Tachycardia  Assessment & Plan  HR recently ok  Note: home meds include Atenolol  Cont to monitor     Dizziness on standing  Assessment & Plan  Having lightheadedness/dizziness when standing  Dizziness continues to be better (10/23)  Orthostatics neg (on 10/19 and 10/20)  CT head was ok and had some improvement of edema (no bleed)    Borderline abnormal TFTs  Assessment & Plan  TSH: 8.78  FT4: 1.05   Likely subclinical hypothyroidism  Needs repeat TFT's as out patient    Anemia  Assessment & Plan  H&H slowly decreasing: Hb 10.1 (10/16) --> 8.7 (10/18) --> 8.0 (10/21) --> 8.3 (10/22)  Fe: 19, sats 7%  B12: wnl  S/P IV Fe  SFOB (-) x 1    Dyslipidemia  Assessment & Plan  On Lipitor    Vitamin D deficiency  Assessment & Plan  Vit D: 21  On supplements    Brain mass- (present on admission)  Assessment & Plan  Has prior hx of melanoma s/p immunotherapy  Presented with left sided weakness  Now w/ metastatic melanoma to brain with vasogenic edema and MLS  S/P crani with resection of multiple masses on 10/6  S/P Decadron taper  On Keppra  Plan for palliative XRT    Essential hypertension- (present on admission)  Assessment & Plan  BP low normal  Currently not on any hypertensive meds  Note on Flomax  Note: on Baclofen -- can cause lower BP  Note: home meds include Lisinopril and Atenolol  Monitor    DM2 (diabetes mellitus, type 2) (HCC)- (present on admission)  Assessment & Plan  Hba1c: 7.8 (10/16)  BS trending better and a little labile  On Glargine: 22 units qhs --> 30 units (10/18) --> 38 units qhs (10/20) --> 45 units qhs (10/21) --> 50 units qhs (10/22)  Note: home meds include Tresiba 20 u qhs, Metformin 1000 mg bid, and Jardiance 12.5 mg  Note: pt would like to be discharged only on insulin and no oral meds  Cont to monitor    Bipolar 1 disorder (HCC)- (present on admission)  Assessment &  Plan  On Trileptal     UTI (urinary tract infection)- (present on admission)  Assessment & Plan  Cultures showed Klebsiella  S/P Macrobid (10/23)

## 2022-10-24 NOTE — DISCHARGE INSTRUCTIONS
Occupational Therapy Discharge Instructions for Jacque Mcintyre    10/24/2022    Level of Assist Required for Eating: Able to Complete Eating without Assist  Level of Assist Required for Grooming: Able to Complete Grooming without Assist  Level of Assist Required for Dressing: Able to Complete Dressing without Assist  Level of Assist Required for Toileting: Able to Complete Toileting without Assist  Level of Assist Required for Toilet Transfer: Able to Complete Toilet Transfer without Assist  Level of Assist Required for Bathing: Requires Supervision with Bathing  Equipment for Bathing: Shower Chair, Grab Bars in Tub / Shower  Level of Assist Required for Shower Transfer: Requires Supervision with Shower Transfer  Equipment for Shower Transfer: Shower Chair, Grab Bars in Tub / Shower  Driving: Please Contact Physician Prior to Driving  Comments: It was great working with you Jacque! Enjoy being home, I wish you the best in your continued recovery. -Tabitha OT  Stroke Awareness     Common Risk Factors for Stroke include: Age, Atrial Fibrillation, Carotid Artery Stenosis, Diabetes Mellitus, Excessive Alcohol Consumption, High Blood Pressure, Overweight, Physical inactivity, and Smoking.     Warning signs and symptoms of a stroke include:  Sudden numbness or weakness of the face, arm or leg (especially on one side of the body)  Sudden confusion, trouble speaking or understanding  Sudden trouble seeing in one or both eyes  Sudden trouble walking, dizziness, loss of balance or coordination.   Sudden severe headache with no known cause    IT IS VERY IMPORTANT TO GET TREATMENT QUICKLY WHEN A STROKE OCCURS. IF YOU EXPERIENCE ANY OF THE ABOVE WARNING SIGNS, CALL 911 IMMEDIATELY.  Depression / Suicide Risk    As you are discharged from this [facility] facility, it is important to learn how to keep safe from harming yourself.    Recognize the warning signs:  Abrupt changes in personality, positive or negative- including increase  in energy   Giving away possessions  Change in eating patterns- significant weight changes-  positive or negative  Change in sleeping patterns- unable to sleep or sleeping all the time   Unwillingness or inability to communicate  Depression  Unusual sadness, discouragement and loneliness  Talk of wanting to die  Neglect of personal appearance   Rebelliousness- reckless behavior  Withdrawal from people/activities they love  Confusion- inability to concentrate     If you or a loved one observes any of these behaviors or has concerns about self-harm, here's what you can do:  Talk about it- your feelings and reasons for harming yourself  Remove any means that you might use to hurt yourself (examples: pills, rope, extension cords, firearm)  Get professional help from the community (Mental Health, Substance Abuse, psychological counseling)  Do not be alone:Call your Safe Contact- someone whom you trust who will be there for you.  Call your local CRISIS HOTLINE 968-9181 or 508-710-5919  Call your local Children's Mobile Crisis Response Team [place] (408) 880-4330 or www.Pikum  Call the toll free National Suicide Prevention Hotlines   National Suicide Prevention Lifeline 154-002-IFSR (6196)  National Hope Line Network 800-SUICIDE (969-7479)        [facility] NURSING DISCHARGE INSTRUCTIONS    Blood Pressure: (!) 97/59  Weight: 65 kg (143 lb 4.8 oz)  Nursing recommendations for Jacquecaden Mcintyre at time of discharge are as follows:  Client verbalized understanding of all discharge instructions and prescriptions.     Review all your home medications and newly ordered medications with your doctor and/or pharmacist. Follow medication instructions as directed by your doctor and/or pharmacist.    Pain Management:   Discharge Pain Medication Instructions:  Discharge Pain Medication Instructions: take as directed  Comfort Goal: Sleep Comfortably, Comfort with Movement, Perform Activity, Stay  Alert  Notify your primary care provider if pain is unrelieved with these measures, if the pain is new, or increased in intensity.    Discharge Skin Characteristics: Warm, Dry  Discharge Skin Exam: Clear    Skin / Wound Care Instructions: Please contact your primary care physician for any change in skin integrity.     If You Have Surgical Incisions / Wounds:  Monitor surgical site(s) for signs of increased swelling, redness or symptoms of drainage from the site or fever as this could indicate signs and symptoms of infection. If these symptoms are noted, notifiy your primary care provider.      Discharge Safety Instructions: No Supervision Needed     Discharge Safety Concerns: No Concerns Noted  The interdisciplinary team has made recommendation that you do not require supervision in the house due to demonstration of safety with ADL's and IADLS and problem solving skills  Anti-embolic stockings are not required to increase circulation to the lower extremities.    Discharge Diet: diabetic     Discharge Liquids: thin  Discharge Bowel Function: Continent  Please contact your primary care physician for any changes in bowel habits.  Discharge Bowel Program:    Discharge Bladder Function: Continent  Discharge Urinary Devices: None      Nursing Discharge Plan:   Smoking Cessation Offered: Patient Counseled  Influenza Vaccine Indication: Indicated: 9 to 64 years of age    Case Management Discharge Instructions:   Discharge Location:    Agency Name/Address/Phone:    Home Health:    Outpatient Services:    DME Provider/Phone:    Medical Equipment Ordered:    Prescription Faxed to:        Discharge Medication Instructions:  Below are the medications your physician expects you to take upon discharge:  Fall Prevention in the Home, Adult  Falls can cause injuries. They can happen to people of all ages. There are many things you can do to make your home safe and to help prevent falls. Ask for help when making these changes, if  needed.  What actions can I take to prevent falls?  General Instructions  Use good lighting in all rooms. Replace any light bulbs that burn out.  Turn on the lights when you go into a dark area. Use night-lights.  Keep items that you use often in easy-to-reach places. Lower the shelves around your home if necessary.  Set up your furniture so you have a clear path. Avoid moving your furniture around.  Do not have throw rugs and other things on the floor that can make you trip.  Avoid walking on wet floors.  If any of your floors are uneven, fix them.  Add color or contrast paint or tape to clearly yuan and help you see:  Any grab bars or handrails.  First and last steps of stairways.  Where the edge of each step is.  If you use a stepladder:  Make sure that it is fully opened. Do not climb a closed stepladder.  Make sure that both sides of the stepladder are locked into place.  Ask someone to hold the stepladder for you while you use it.  If there are any pets around you, be aware of where they are.  What can I do in the bathroom?         Keep the floor dry. Clean up any water that spills onto the floor as soon as it happens.  Remove soap buildup in the tub or shower regularly.  Use non-skid mats or decals on the floor of the tub or shower.  Attach bath mats securely with double-sided, non-slip rug tape.  If you need to sit down in the shower, use a plastic, non-slip stool.  Install grab bars by the toilet and in the tub and shower. Do not use towel bars as grab bars.  What can I do in the bedroom?  Make sure that you have a light by your bed that is easy to reach.  Do not use any sheets or blankets that are too big for your bed. They should not hang down onto the floor.  Have a firm chair that has side arms. You can use this for support while you get dressed.  What can I do in the kitchen?  Clean up any spills right away.  If you need to reach something above you, use a strong step stool that has a grab bar.  Keep  electrical cords out of the way.  Do not use floor polish or wax that makes floors slippery. If you must use wax, use non-skid floor wax.  What can I do with my stairs?  Do not leave any items on the stairs.  Make sure that you have a light switch at the top of the stairs and the bottom of the stairs. If you do not have them, ask someone to add them for you.  Make sure that there are handrails on both sides of the stairs, and use them. Fix handrails that are broken or loose. Make sure that handrails are as long as the stairways.  Install non-slip stair treads on all stairs in your home.  Avoid having throw rugs at the top or bottom of the stairs. If you do have throw rugs, attach them to the floor with carpet tape.  Choose a carpet that does not hide the edge of the steps on the stairway.  Check any carpeting to make sure that it is firmly attached to the stairs. Fix any carpet that is loose or worn.  What can I do on the outside of my home?  Use bright outdoor lighting.  Regularly fix the edges of walkways and driveways and fix any cracks.  Remove anything that might make you trip as you walk through a door, such as a raised step or threshold.  Trim any bushes or trees on the path to your home.  Regularly check to see if handrails are loose or broken. Make sure that both sides of any steps have handrails.  Install guardrails along the edges of any raised decks and porches.  Clear walking paths of anything that might make someone trip, such as tools or rocks.  Have any leaves, snow, or ice cleared regularly.  Use sand or salt on walking paths during winter.  Clean up any spills in your garage right away. This includes grease or oil spills.  What other actions can I take?  Wear shoes that:  Have a low heel. Do not wear high heels.  Have rubber bottoms.  Are comfortable and fit you well.  Are closed at the toe. Do not wear open-toe sandals.  Use tools that help you move around (mobility aids) if they are needed. These  include:  Canes.  Walkers.  Scooters.  Crutches.  Review your medicines with your doctor. Some medicines can make you feel dizzy. This can increase your chance of falling.  Ask your doctor what other things you can do to help prevent falls.  Where to find more information  Centers for Disease Control and Prevention[place]ANKLIN: https://cdc.gov  National [place] on Aging: https://on7hcjr.shazia.nih.gov  Contact a doctor if:  You are afraid of falling at home.  You feel weak, drowsy, or dizzy at home.  You fall at home.  Summary  There are many simple things that you can do to make your home safe and to help prevent falls.  Ways to make your home safe include removing tripping hazards and installing grab bars in the bathroom.  Ask for help when making these changes in your home.  This information is not intended to replace advice given to you by your health care provider. Make sure you discuss any questions you have with your health care provider.  Document Released: 10/14/2010 Document Revised: 04/09/2020 Document Reviewed: 08/02/2018  Elsevier Patient Education © 2020 Elsevier Inc.

## 2022-10-24 NOTE — PROGRESS NOTES
PSYCHOLOGICAL CONSULTATION:  Reason for admission: Malignant melanoma metastatic to brain (HCC) [C79.31]  Brain mass [G93.89]  Metastatic cancer to brain (HCC) [C79.31]  Reason for consult: Metastatic cancer  Requesting Physician: Jorge    Legal status: Legal Status: Voluntary    Chief Complaint: Metastatic cancer, pain, cognitive changes    HPI: Jacque Mcintyre is a 41 y.o. female with a past medical history of diabetes mellitus; now admitted for acute inpatient rehabilitation with severe functional debility due to nontraumatic brain injury. Per documentation, patient presented to the ED on 10/1/2022 for new left sided weakness. It progressed for the past several days to the point where she started falling. No visual changes, headache, nausea, vomitting, fevers.       The patient was found to have the following:  3 masses in the right cerebral hemisphere largest measuring 3.2 cm with extensive vasogenic edema as described above. Findings are likely due to metastases.  Localized midline shift to the left side frontoparietal level measuring 3 mm.  The 2 smaller masses in the right frontal lobe demonstrates some increased density which could indicate that hemorrhage may be present within these masses.  CT chest, abdomen, pelvis from 10/1/2022 showed bilateral pulmonary nodules, enlarged left hilar lymph node, enlarged left external iliac lymph node, 9 mm right lower quadrant peritoneal nodule, 7 mm left and left upper quadrant subcutaneous soft tissue nodules.     Psychology was consulted due to metastatic cancer, cognitive changes, and emotional changes.    Risk Assessment: current symptoms as reported by pt.  Suicidal Ideation: Denies    Homicidal Ideation: Denies      Psychiatric Review of Systems:current symptoms as reported by pt.  Depression: Endorses some depressive symptoms in the context of metastatic cancer and hospitalization  Nanci: Denies  Anxiety/Panic Attacks: Endorses anxiety symptoms in the context  of uncertainty with regard to prognosis  PTSD symptom: Denies   Psychosis: Denies   Other:         Medical Review of Systems: as reported by pt. All systems reviewed. Only those found to be + are noted below. All others are negative.   Neurological:    TBIs: Endorses   SZs:Denies   Strokes:Denies   Other: Metastatic melanoma  Other medical symptoms:   Thyroid: Denies   Diabetes: Endorses   Cardiovascular disease: Denies    Past Psychiatric Hx: Anxiety    Family Psychiatric Hx: None reported    Social Hx:   Social History     Socioeconomic History    Marital status:     Number of children: 2   Occupational History    Occupation: stay at home mom     Employer: none   Tobacco Use    Smoking status: Every Day     Packs/day: 0.75     Years: 10.00     Pack years: 7.50     Types: Cigarettes    Smokeless tobacco: Never   Vaping Use    Vaping Use: Never used   Substance and Sexual Activity    Alcohol use: Not Currently    Drug use: Yes     Types: Inhaled     Comment: occasional smoked marijuanna approx 3 times per month    Sexual activity: Yes     Social Determinants of Health     Transportation Needs: No Transportation Needs    Lack of Transportation (Medical): No    Lack of Transportation (Non-Medical): No        Medical Hx: Chart reviewed. Only those findings of potential interest to psychiatry are noted below:  Past Medical History:   Diagnosis Date    Asthma     inhaler    Bipolar 1 disorder (HCC)     Cancer (HCC) 01/01/2016    melanoma    Cough     Depression     DM mellitus,     insulin    Hyperlipidemia     Hypertension     Pap smear     Dr. Baird    PCOS (polycystic ovarian syndrome)     Shortness of breath     Sputum production     Wheezing      Medical Conditions:     Allergies: Augmentin and Clavulanic acid  Medications (currently prescribed at [facility]):    Current Facility-Administered Medications:     insulin GLARGINE (Lantus,Semglee) injection, 50 Units, Subcutaneous, Q EVENING, 50 Units at 10/23/22 2043  **AND** insulin lispro (AdmeLOG,HumaLOG) injection, 2-12 Units, Subcutaneous, 4X/DAY ACHS, Khurram Harris M.D., 2 Units at 10/24/22 1131    phenazopyridine (PYRIDIUM) tablet 200 mg, 200 mg, Oral, TID WITH MEALS, Zach Patel M.D., 200 mg at 10/24/22 1127    vitamin D3 (cholecalciferol) tablet 1,000 Units, 1,000 Units, Oral, DAILY, Agustin Arambula D.OKalee, 1,000 Units at 10/24/22 0803    Respiratory Therapy Consult, , Nebulization, Continuous RT, Zach Patel M.D.    Pharmacy Consult Request ...Pain Management Review 1 Each, 1 Each, Other, PHARMACY TO DOSE, Zach Patel M.D.    hydrALAZINE (APRESOLINE) tablet 25 mg, 25 mg, Oral, Q8HRS PRN, Zach Patel M.D.    acetaminophen (Tylenol) tablet 650 mg, 650 mg, Oral, Q4HRS PRN, Zach Patel M.D.    omeprazole (PRILOSEC) capsule 20 mg, 20 mg, Oral, DAILY, Zach Patel M.D., 20 mg at 10/24/22 0803    mag hydrox-al hydrox-simeth (MAALOX PLUS ES or MYLANTA DS) suspension 20 mL, 20 mL, Oral, Q2HRS PRN, Zach Patel M.D.    ondansetron (ZOFRAN ODT) dispertab 4 mg, 4 mg, Oral, 4X/DAY PRN **OR** ondansetron (ZOFRAN) syringe/vial injection 4 mg, 4 mg, Intramuscular, 4X/DAY PRN, Zach Patel M.D.    traZODone (DESYREL) tablet 50 mg, 50 mg, Oral, QHS PRN, Zach Patel M.D.    sodium chloride (OCEAN) 0.65 % nasal spray 2 Spray, 2 Spray, Nasal, PRN, Zach Patel M.D.    oxyCODONE immediate-release (ROXICODONE) tablet 5 mg, 5 mg, Oral, Q3HRS PRN **OR** oxyCODONE immediate release (ROXICODONE) tablet 10 mg, 10 mg, Oral, Q3HRS PRN, Zach Patel M.D., 10 mg at 10/24/22 1045    traMADol (Ultram) 50 MG tablet 50 mg, 50 mg, Oral, Q4HRS PRN, Zach Patel M.D.    midazolam (VERSED) 5 mg/mL (1 mL vial), 5 mg, Nasal, PRN, Zach Patel M.D.    ALPRAZolam (XANAX) tablet 0.25 mg, 0.25 mg, Oral, 4X/DAY PRN, Zach Patel M.D.     atorvastatin (LIPITOR) tablet 40 mg, 40 mg, Oral, Q EVENING, Zach Patel M.D., 40 mg at 10/23/22 2036    baclofen (LIORESAL) tablet 10 mg, 10 mg, Oral, TID, Zach Patel M.D., 10 mg at 10/24/22 0803    DULoxetine (CYMBALTA) capsule 60 mg, 60 mg, Oral, Q EVENING, Zach Patel M.D., 60 mg at 10/23/22 2037    enoxaparin (Lovenox) inj 40 mg, 40 mg, Subcutaneous, DAILY AT 1800, Zach Patel M.D., 40 mg at 10/23/22 2039    gabapentin (NEURONTIN) capsule 800 mg, 800 mg, Oral, 4X/DAY, Zach Patel M.D., 800 mg at 10/24/22 0802    levETIRAcetam (KEPPRA) tablet 1,000 mg, 1,000 mg, Oral, BID, Zach Patel M.D., 1,000 mg at 10/24/22 0802    OXcarbazepine (TRILEPTAL) tablet 300 mg, 300 mg, Oral, BID, Zach Patel M.D., 300 mg at 10/24/22 0803    oxyCODONE CR (OXYCONTIN) tablet 10 mg, 10 mg, Oral, Q12HRS, Zach Patel M.D., 10 mg at 10/24/22 0803    QUEtiapine (Seroquel) tablet 200 mg, 200 mg, Oral, Q EVENING, Zach Patel M.D., 200 mg at 10/23/22 2036    tamsulosin (FLOMAX) capsule 0.4 mg, 0.4 mg, Oral, AFTER BREAKFAST, Zach Patel M.D., 0.4 mg at 10/24/22 0802    senna-docusate (PERICOLACE or SENOKOT S) 8.6-50 MG per tablet 2 Tablet, 2 Tablet, Oral, DAILY, 2 Tablet at 10/24/22 0803 **AND** polyethylene glycol/lytes (MIRALAX) PACKET 1 Packet, 1 Packet, Oral, BID, 1 Packet at 10/24/22 0802 **AND** magnesium hydroxide (MILK OF MAGNESIA) suspension 30 mL, 30 mL, Oral, QDAY PRN **AND** bisacodyl (DULCOLAX) suppository 10 mg, 10 mg, Rectal, QDAY PRN, Agustin Arambula D.O.    albuterol inhaler 2 Puff, 2 Puff, Inhalation, Q4H PRN (RT), Zach Patel M.D., 2 Puff at 10/23/22 0849  Labs:  Recent Results (from the past 24 hour(s))   POCT glucose device results    Collection Time: 10/23/22  5:20 PM   Result Value Ref Range    POC Glucose, Blood 126 (H) 65 - 99 mg/dL   POCT glucose device results     "Collection Time: 10/23/22  8:42 PM   Result Value Ref Range    POC Glucose, Blood 190 (H) 65 - 99 mg/dL   POCT glucose device results    Collection Time: 10/24/22  8:12 AM   Result Value Ref Range    POC Glucose, Blood 135 (H) 65 - 99 mg/dL   POCT glucose device results    Collection Time: 10/24/22 11:29 AM   Result Value Ref Range    POC Glucose, Blood 172 (H) 65 - 99 mg/dL          Cranial Imaging Hx: Head CT 10/21/2022  IMPRESSION:     1.  Redemonstration of treated right hemispheric lesions, with improving perilesional edema compared to prior study. No midline shift.  2.  No new intracranial mass lesions. No large vascular territory infarct. No intracranial hemorrhage.  Other:    Psychiatric Examination:  Vitals: Blood pressure 101/63, pulse 90, temperature 37.1 °C (98.7 °F), temperature source Oral, resp. rate 18, height 1.626 m (5' 4.02\"), weight 65 kg (143 lb 4.8 oz), SpO2 95 %, not currently breastfeeding.  Musculoskeletal: Sitting in wheelchair normal psychomotor activity, no tics or unusual mannerisms noted  Appearance and Eye Contact: Easily established rapport WDWN, appropriate dress and grooming. Behavior is calm, cooperative,  appropriate eye contact  Attention/Alertness: Alert  Thought Process: Linear, Logical, and Goal Directed    Thought Content: No psychotic processes noted  Speech: Clear with normal rate and rhythm  Mood: \"Doing okay\"            Affect: Somewhat flat         SI/HI: Denies    Memory: Recent and remote memory appear intact     Orientation: alert, oriented to person, place and time  Insight into symptoms: within normal limits  Judgement into symptoms:within normal limits    Neuropsychological Testing:   Not formally tested.          ASSESSMENT: Jacque Mcintyre is a 41 y.o. female with a past medical history of diabetes mellitus; now admitted for acute inpatient rehabilitation with severe functional debility due to nontraumatic brain injury. Per documentation, patient presented to the " ED on 10/1/2022 for new left sided weakness. It progressed for the past several days to the point where she started falling. No visual changes, headache, nausea, vomitting, fevers.       The patient was found to have the following:  3 masses in the right cerebral hemisphere largest measuring 3.2 cm with extensive vasogenic edema as described above. Findings are likely due to metastases.  Localized midline shift to the left side frontoparietal level measuring 3 mm.  The 2 smaller masses in the right frontal lobe demonstrates some increased density which could indicate that hemorrhage may be present within these masses.  CT chest, abdomen, pelvis from 10/1/2022 showed bilateral pulmonary nodules, enlarged left hilar lymph node, enlarged left external iliac lymph node, 9 mm right lower quadrant peritoneal nodule, 7 mm left and left upper quadrant subcutaneous soft tissue nodules.     Psychology was consulted due to metastatic cancer, cognitive changes, and emotional changes. Session focused on assessment of current functioning as well as psychoeducation regarding the cognitive and emotional impact of hospitalization.  Patient is scheduled to discharge tomorrow, is interested in a referral for follow-up psychological support during cancer treatment regimen.  Patient informed of current providers outpatient virtual clinic, will submit referral.  Psychology signing off due to discharge    DSM5 Diagnostic Considerations: Adjustment disorder with mixed anxiety and depressed mood      PLAN:  Records reviewed: Yes  Discussed patient with other provider: Jorge  Signing off  Thank you for the consult.    Tamra Martínez, Ph.D.

## 2022-10-24 NOTE — THERAPY
Physical Therapy   Daily Treatment     Patient Name: Jacque Mcintyre  Age:  41 y.o., Sex:  female  Medical Record #: [id number]  Today's Date: 10/24/2022     Precautions  Precautions: Fall Risk, Other (See Comments)  Comments: chronic LBP    Subjective    Patient agreeable to session. Has no complaints.       Objective       10/24/22 0919   PT Charge Group   PT Gait Training 2   PT Therapeutic Exercise 1   PT Therapeutic Activities 1   PT Total Time Spent   PT Individual Total Time Spent (Mins) 60   Cognition    Attention Impaired   Initiation Impaired   Gait Functional Level of Assist    Gait Level Of Assist Standby Assist   Assistive Device Front Wheel Walker   Distance (Feet)   (250x1, 200 x 1, 100x 1)   Deviation Decreased Base Of Support;Bradykinetic  (mild path devaition, easily distracted)   Stairs Functional Level of Assist   Level of Assist with Stairs Contact Guard Assist   # of Stairs Climbed 12   Stairs Description Extra time;Hand rails;Safety concerns;Supervision for safety;Verbal cueing  (cues for descent with weak leg)   Transfer Functional Level of Assist   Bed, Chair, Wheelchair Transfer Contact Guard Assist   Bed Chair Wheelchair Transfer Description Adaptive equipment;Increased time;Supervision for safety;Verbal cueing   Sitting Lower Body Exercises   Other Exercises seated DF and eversion LLE 2 x 30 reps   Standing Lower Body Exercises   Hip Flexion 1 set of 15;Left   Hip Extension 1 set of 15;Left   Hip Abduction 1 set of 15;Left   Heel Rise 1 set of 15;Bilateral   Comments provided standing HEP, education not to be completed alone, and to have chair behind for safety.   Bed Mobility    Sit to Stand Contact Guard Assist   Interdisciplinary Plan of Care Collaboration   IDT Collaboration with  Physician   Patient Position at End of Therapy Seated;Chair Alarm On;Self Releasing Lap Belt Applied;Call Light within Reach;Tray Table within Reach   Collaboration Comments MD in room end or session.    Standing at walker step over dowel x 15 B, then progressed to step over blue half bolster x 15 B. Static standing x 1 min no UE support.   Standing supported: LLE marching x 10 focusing on triple flexion patterning.     Assessment    Patient shows improved stair completion today, at CGA with bilateral railings. Will have good social support for discharge to parents home. Mild deviations with gait but improving overall. Understanding of d/c recommendations.   Strengths: Able to follow instructions, Effective communication skills, Alert and oriented, Good insight into deficits/needs, Independent prior level of function, Making steady progress towards goals, Manages pain appropriately, Motivated for self care and independence, Pleasant and cooperative, Supportive family, Willingly participates in therapeutic activities  Barriers: Hemiparesis    Plan    FT car/general transfers, gait training with parents     Ankle DF PROM/stretching, DF strengthening/facilitation, continue assessing need for AFO at discharge, progress gait with FWW, L sided awareness/attention, initiate curbs/stairs, balance/coordination, overall activity tolerance and endurance     Passport items to be completed:  Get in/out of bed safely, in/out of a vehicle, safely use mobility device, walk or wheel around home/community, navigate up and down stairs, show how to get up/down from the ground, ensure home is accessible, demonstrate HEP, complete caregiver training    Physical Therapy Problems (Active)       Problem: Balance       Dates: Start: 10/16/22         Goal: STG-Within one week, patient will maintain dynamic standing: score >45/56 on the Thornton Balance Assessment.       Dates: Start: 10/16/22         Goal Note filed on 10/20/22 1304 by Ivelisse Kerr, PT       Hillary not completed yet                 Problem: Mobility       Dates: Start: 10/16/22         Goal: STG-Within one week, patient will ambulate household distance at least 500' with SPC or  no AD, SBA.       Dates: Start: 10/16/22         Goal Note filed on 10/20/22 1304 by Ivelisse Kerr, PT       175 ft with FWW                 Problem: Mobility Transfers       Dates: Start: 10/16/22         Goal: STG-Within one week, patient will perform bed mobility Mod I with bed flat.       Dates: Start: 10/16/22         Goal Note filed on 10/20/22 1304 by Ivelisse Kerr, PT       Min A              Goal: STG-Within one week, patient will transfer bed to chair with SPC or no AD.       Dates: Start: 10/16/22         Goal Note filed on 10/20/22 1304 by Ivelisse Kerr PT       FWW                 Problem: PT-Long Term Goals       Dates: Start: 10/16/22         Goal: LTG-By discharge, patient will maintain balance: at least 22/30 on the FGA indicating low fall risk, to facilitate safe DC to home.       Dates: Start: 10/16/22            Goal: LTG-By discharge, patient will ambulate at least 1000' during 6 Minute Walk Test.       Dates: Start: 10/16/22            Goal: LTG-By discharge, patient will transfer one surface to another Mod I.       Dates: Start: 10/16/22            Goal: LTG-By discharge, patient will transfer in/out of a car SPV.       Dates: Start: 10/16/22            Goal: LTG-By discharge, patient will ambulate up/down ADA ramp with SPC or no AD, and SPV, to safely enter/exit  her parent's home.       Dates: Start: 10/16/22

## 2022-10-25 ENCOUNTER — PHARMACY VISIT (OUTPATIENT)
Dept: PHARMACY | Facility: MEDICAL CENTER | Age: 42
End: 2022-10-25
Payer: COMMERCIAL

## 2022-10-25 VITALS
SYSTOLIC BLOOD PRESSURE: 97 MMHG | HEART RATE: 98 BPM | TEMPERATURE: 97.8 F | RESPIRATION RATE: 20 BRPM | WEIGHT: 143.3 LBS | OXYGEN SATURATION: 96 % | HEIGHT: 64 IN | DIASTOLIC BLOOD PRESSURE: 59 MMHG | BODY MASS INDEX: 24.46 KG/M2

## 2022-10-25 LAB
GLUCOSE BLD STRIP.AUTO-MCNC: 122 MG/DL (ref 65–99)
PATHOLOGY CONSULT NOTE: NORMAL

## 2022-10-25 PROCEDURE — 99239 HOSP IP/OBS DSCHRG MGMT >30: CPT | Performed by: PHYSICAL MEDICINE & REHABILITATION

## 2022-10-25 PROCEDURE — A9270 NON-COVERED ITEM OR SERVICE: HCPCS | Performed by: PHYSICAL MEDICINE & REHABILITATION

## 2022-10-25 PROCEDURE — 700102 HCHG RX REV CODE 250 W/ 637 OVERRIDE(OP): Performed by: PHYSICAL MEDICINE & REHABILITATION

## 2022-10-25 PROCEDURE — 82962 GLUCOSE BLOOD TEST: CPT

## 2022-10-25 RX ADMIN — POLYETHYLENE GLYCOL 3350 1 PACKET: 17 POWDER, FOR SOLUTION ORAL at 08:12

## 2022-10-25 RX ADMIN — LEVETIRACETAM 1000 MG: 500 TABLET, FILM COATED ORAL at 08:10

## 2022-10-25 RX ADMIN — Medication 1000 UNITS: at 08:11

## 2022-10-25 RX ADMIN — SENNOSIDES AND DOCUSATE SODIUM 2 TABLET: 50; 8.6 TABLET ORAL at 08:10

## 2022-10-25 RX ADMIN — GABAPENTIN 800 MG: 400 CAPSULE ORAL at 08:10

## 2022-10-25 RX ADMIN — OXCARBAZEPINE 300 MG: 300 TABLET, FILM COATED ORAL at 08:11

## 2022-10-25 RX ADMIN — OMEPRAZOLE 20 MG: 20 CAPSULE, DELAYED RELEASE ORAL at 08:11

## 2022-10-25 RX ADMIN — OXYCODONE HYDROCHLORIDE 10 MG: 10 TABLET ORAL at 04:51

## 2022-10-25 RX ADMIN — OXYCODONE HYDROCHLORIDE 10 MG: 10 TABLET, FILM COATED, EXTENDED RELEASE ORAL at 08:11

## 2022-10-25 RX ADMIN — OXYCODONE HYDROCHLORIDE 10 MG: 10 TABLET ORAL at 00:25

## 2022-10-25 RX ADMIN — BACLOFEN 10 MG: 10 TABLET ORAL at 08:11

## 2022-10-25 RX ADMIN — PHENAZOPYRIDINE HYDROCHLORIDE 200 MG: 200 TABLET ORAL at 08:11

## 2022-10-25 ASSESSMENT — PAIN DESCRIPTION - PAIN TYPE
TYPE: ACUTE PAIN

## 2022-10-25 NOTE — CARE PLAN
Problem: Knowledge Deficit - Standard  Goal: Patient and family/care givers will demonstrate understanding of plan of care, disease process/condition, diagnostic tests and medications  Outcome: Met     Problem: Pain - Standard  Goal: Alleviation of pain or a reduction in pain to the patient’s comfort goal  Outcome: Met     Problem: Diabetes Management  Goal: Patient will achieve and maintain glucose in satisfactory range  Outcome: Met     Problem: Skin Integrity - Diabetes  Goal: Patient's skin on legs and feet will remain intact while hospitalized  Outcome: Met     Problem: Infection - Diabetes  Goal: Patient will remain free from signs and symptoms of infection  Outcome: Met  Goal: Promotion wound healing, line and drain management  Outcome: Met     Problem: Bladder / Voiding  Goal: Patient will establish and maintain regular urinary output  Outcome: Met     Problem: Bowel Elimination  Goal: Patient will participate in bowel management program  Outcome: Met     Problem: Fall Risk - Rehab  Goal: Patient will remain free from falls  Outcome: Met     Problem: Skin Integrity  Goal: Skin integrity is maintained or improved  Outcome: Met   The patient is Stable - Low risk of patient condition declining or worsening

## 2022-10-25 NOTE — CARE PLAN
Problem: Pain - Standard  Goal: Alleviation of pain or a reduction in pain to the patient’s comfort goal  Outcome: Progressing     Problem: Fall Risk - Rehab  Goal: Patient will remain free from falls  Outcome: Progressing   The patient is Stable - Low risk of patient condition declining or worsening    Shift Goals  Clinical Goals: pain management  Patient Goals: sleep    Progress made toward(s) clinical / shift goals:  Patient medicated for pain per MAR Patient verbalized pain relief    Patient is not progressing towards the following goals:

## 2022-10-25 NOTE — PROGRESS NOTES
Patient discharged to home per order.  Discharge instructions reviewed with patient and parents; they verbalize understanding and signed copies placed in chart.  Patient has all belongings; signed copy of form in chart.  Patient left facility at 1100 via wheelchair accompanied by rehab staff and parents.  Have enjoyed working with this pleasant patient.

## 2022-10-26 ENCOUNTER — HOSPITAL ENCOUNTER (OUTPATIENT)
Dept: RADIATION ONCOLOGY | Facility: MEDICAL CENTER | Age: 42
End: 2022-10-31
Attending: RADIOLOGY
Payer: COMMERCIAL

## 2022-10-26 ENCOUNTER — HOSPITAL ENCOUNTER (OUTPATIENT)
Dept: RADIOLOGY | Facility: MEDICAL CENTER | Age: 42
End: 2022-10-26
Attending: RADIOLOGY
Payer: COMMERCIAL

## 2022-10-26 ENCOUNTER — HOSPITAL ENCOUNTER (OUTPATIENT)
Dept: RADIATION ONCOLOGY | Facility: MEDICAL CENTER | Age: 42
End: 2022-10-26

## 2022-10-26 DIAGNOSIS — G93.89 BRAIN MASS: ICD-10-CM

## 2022-10-26 PROCEDURE — 77290 THER RAD SIMULAJ FIELD CPLX: CPT | Mod: 26 | Performed by: RADIOLOGY

## 2022-10-26 PROCEDURE — 77470 SPECIAL RADIATION TREATMENT: CPT

## 2022-10-26 PROCEDURE — 77334 RADIATION TREATMENT AID(S): CPT

## 2022-10-26 PROCEDURE — 77290 THER RAD SIMULAJ FIELD CPLX: CPT

## 2022-10-26 PROCEDURE — 77334 RADIATION TREATMENT AID(S): CPT | Mod: 26 | Performed by: RADIOLOGY

## 2022-10-26 PROCEDURE — A9576 INJ PROHANCE MULTIPACK: HCPCS | Performed by: RADIOLOGY

## 2022-10-26 PROCEDURE — 700117 HCHG RX CONTRAST REV CODE 255: Performed by: RADIOLOGY

## 2022-10-26 PROCEDURE — 70553 MRI BRAIN STEM W/O & W/DYE: CPT

## 2022-10-26 PROCEDURE — 77470 SPECIAL RADIATION TREATMENT: CPT | Mod: 26 | Performed by: RADIOLOGY

## 2022-10-26 PROCEDURE — 77263 THER RADIOLOGY TX PLNG CPLX: CPT | Performed by: RADIOLOGY

## 2022-10-26 RX ADMIN — GADOTERIDOL 15 ML: 279.3 INJECTION, SOLUTION INTRAVENOUS at 08:29

## 2022-10-26 NOTE — RADIATION PLANNING NOTES
DATE OF SERVICE: 10/26/2022    DIAGNOSIS:  Metastatic melanoma, recurrent, stage IV    DATE OF SERVICE: 10/26/2022    TYPE OF SIMULATION: Brain    GOAL OF TREATMENT:   [] Curative  [x] Palliative  [] Oligometastatic    CONTRAST:    [x] IV Contrast*                POSITION:    [x]  Supine  [] Prone     COMPLEX:  [x] Complex Blocking   []Arcs  [] Custom Blocks  [] >3 Sites    PROCEDURE: Patient placed Stereotactic Mask with head in neutral position. CT scan obtained at 1 mm intervals. Images viewed and approved.  Images reconstructed as need.  Image data sets transferred to Brain-Lab for planning.    I have personally reviewed the relevant data, performed the target localization, and determined all relevant factors for this patient’s simulation.    *Omnipaque 80 -100cc IVP in conjunction with 500cc NS

## 2022-10-26 NOTE — RADIATION PLANNING NOTES
Clinical Treatment Planning Note    DATE OF SERVICE: 10/26/2022    DIAGNOSIS:  No matching staging information was found for the patient.       IMAGING REVIEWED:  [x] CT     [x] MRI     [] PET/CT     [] BONE SCAN     [] MAMMO     [] OTHER      TREATMENT INTENT:   [] CURATIVE     [] MAINTENANCE     [x]  PALLIATIVE      []  SUPPORTIVE     []  PROPHYLACTIC     [] BENIGN     []  CONSOLIDATIVE      [] DEFINITIVE   []  OLOGIMETASTATIC      LINE OF TREATMENT:  [x] ADJUVANT   [] DEFINITIVE   [] NEOADJUVANT   [] RE-TREATMENT      TECHNIQUE PLANNED:  [] IMRT   [] 3D   [] SBRT   [x] SRS/SRT   [] HDR   [] ELECTRON       IMRT JUSTIFICATION:  []   An immediately adjacent area has been previously irradiated and abutting portals must be established with high precision.    []  Dose escalation is planned to deliver radiation doses in excess of those commonly utilized for similar tumors with conventional treatment.    []  The target volume is concave or convex, and the critical normal tissues are within or around that convexity or concavity.    []  The target volume is in close proximity to critical structures that must be protected.    []  The volume of interest must be covered with narrow margins to adequately protect  immediately adjacent structures.      FIELDS & BLOCKING:  [x] COMPLEX BLOCKS     []  = 3 TX AREAS     []  ARCS     []  CUSTOM SHEILD        []  SIMPLE BLOCK      CHEMOTHERAPY:  []  CONCURRENT     []  INDUCTION     [] SEQUENTIAL     []  <30 DAYS FROM XRT      NOTES:  OAR CONSTRAINTS: (GUIDELINES ONLY NOT ABSOLUTE)    Target Prescribed Coverage   PTV 95% of PTV covered by 100% (Gy) of RX Dose       TRICIA Goal   Cord Max Dose < 13Gy   Brainstem Max Dose < 12.5Gy   Optic Chiasm Max Dose < 12Gy   R Optic Nerve Max Dose < 12Gy   L Optic Nerve Max Dose < 12Gy   R Eye Max Dose < 5-7Gy   L Eye Max Dose < 5-7Gy   R Lens Max Dose < 5-7Gy   L Lens Max Dose < 5-7Gy

## 2022-10-26 NOTE — RADIATION PLANNING NOTES
PATIENT NAME Jacque Mcintyre   PRIMARY PHYSICIAN Adriana Levine [id number]   REFERRING PHYSICIAN No ref. provider found 1980     DATE OF SERVICE: 10/26/2022    DIAGNOSIS:  No matching staging information was found for the patient.       SPECIAL TREATMENT PROCEDURE NOTE:  Considerable additional effort required in the management of this case because of administration of Stereotactic Radiotherapy, which may result in increased normal tissue toxicity and require greater effort in contouring and treatment because of greater precision.

## 2022-11-01 ENCOUNTER — HOSPITAL ENCOUNTER (OUTPATIENT)
Dept: RADIATION ONCOLOGY | Facility: MEDICAL CENTER | Age: 42
End: 2022-11-30
Attending: RADIOLOGY
Payer: COMMERCIAL

## 2022-11-01 PROCEDURE — 77370 RADIATION PHYSICS CONSULT: CPT | Performed by: RADIOLOGY

## 2022-11-02 PROCEDURE — 77295 3-D RADIOTHERAPY PLAN: CPT | Mod: 26 | Performed by: RADIOLOGY

## 2022-11-02 PROCEDURE — 77295 3-D RADIOTHERAPY PLAN: CPT | Performed by: RADIOLOGY

## 2022-11-02 PROCEDURE — 77300 RADIATION THERAPY DOSE PLAN: CPT | Performed by: RADIOLOGY

## 2022-11-02 PROCEDURE — 77300 RADIATION THERAPY DOSE PLAN: CPT | Mod: 26 | Performed by: RADIOLOGY

## 2022-11-02 PROCEDURE — 77334 RADIATION TREATMENT AID(S): CPT | Performed by: RADIOLOGY

## 2022-11-02 PROCEDURE — 77334 RADIATION TREATMENT AID(S): CPT | Mod: 26 | Performed by: RADIOLOGY

## 2022-11-03 ENCOUNTER — HOSPITAL ENCOUNTER (OUTPATIENT)
Dept: RADIATION ONCOLOGY | Facility: MEDICAL CENTER | Age: 42
End: 2022-11-03

## 2022-11-03 ENCOUNTER — PATIENT OUTREACH (OUTPATIENT)
Dept: ONCOLOGY | Facility: MEDICAL CENTER | Age: 42
End: 2022-11-03
Payer: COMMERCIAL

## 2022-11-03 LAB
CHEMOTHERAPY INFUSION START DATE: NORMAL
CHEMOTHERAPY RECORDS: 3000
CHEMOTHERAPY RECORDS: 6
CHEMOTHERAPY RECORDS: NORMAL
CHEMOTHERAPY RX CANCER: NORMAL
DATE 1ST CHEMO CANCER: NORMAL
RAD ONC ARIA COURSE LAST TREATMENT DATE: NORMAL
RAD ONC ARIA COURSE TREATMENT ELAPSED DAYS: NORMAL
RAD ONC ARIA REFERENCE POINT DOSAGE GIVEN TO DATE: 6
RAD ONC ARIA REFERENCE POINT DOSAGE GIVEN TO DATE: 6.38
RAD ONC ARIA REFERENCE POINT ID: NORMAL
RAD ONC ARIA REFERENCE POINT ID: NORMAL
RAD ONC ARIA REFERENCE POINT SESSION DOSAGE GIVEN: 6
RAD ONC ARIA REFERENCE POINT SESSION DOSAGE GIVEN: 6.38

## 2022-11-03 PROCEDURE — 77373 STRTCTC BDY RAD THER TX DLVR: CPT | Performed by: RADIOLOGY

## 2022-11-03 PROCEDURE — 77280 THER RAD SIMULAJ FIELD SMPL: CPT | Performed by: RADIOLOGY

## 2022-11-03 PROCEDURE — 77280 THER RAD SIMULAJ FIELD SMPL: CPT | Mod: 26 | Performed by: RADIOLOGY

## 2022-11-03 NOTE — PROGRESS NOTES
Follow up call placed to patient.  She reports today first day of radiation. She currently is doing fine after discharge.  Pt states her mother takes her to her appointments and denies current financial barriers.  She was getting paid leave and currently her boss is working on getting her catastrophic leave.  Provided patient information on support groups, classes and cancer community clubhouse.  Pt reports does not feel she needs any of this at this time.  Pt has no questions for navigator at this time.  She thanked navigator.  Continue to be available as needed.

## 2022-11-03 NOTE — CT SIMULATION
DATE OF SERVICE: 11/3/2022    Radiation Therapy Episodes       Active Episodes       Radiation Therapy: SRS (10/31/2022)                   Radiation Treatments         Plan Last Treated On Elapsed Days Fractions Treated Prescribed Fraction Dose (cGy) Prescribed Total Dose (cGy)    5 Lesion SRT 11/3/2022 0 @ [id number] 1 of 5 600 3,000                  Reference Point Last Treated On Elapsed Days Most Recent Session Dose (cGy) Total Dose (cGy)    5 Lesion SRT 11/3/2022 0 @ [id number] 600 600    5 Lesion SRT CP 11/3/2022 0 @ [id number]                            First Visit Simple Simulation: Called by [employer] machine to verify treatment parameters including:  treatment site, treatment dose, and treatment setup prior to first treatment. Image derived shifts reviewed in all appropriate plains.  Shifts approved.  Patient treated.    I have personally reviewed the relevant data, performed the target localization, and determined all relevant factors for this patient’s simulation.

## 2022-11-04 ENCOUNTER — HOSPITAL ENCOUNTER (OUTPATIENT)
Dept: RADIATION ONCOLOGY | Facility: MEDICAL CENTER | Age: 42
End: 2022-11-04

## 2022-11-04 LAB
CHEMOTHERAPY INFUSION START DATE: NORMAL
CHEMOTHERAPY RECORDS: 3000
CHEMOTHERAPY RECORDS: 6
CHEMOTHERAPY RECORDS: NORMAL
CHEMOTHERAPY RX CANCER: NORMAL
DATE 1ST CHEMO CANCER: NORMAL
RAD ONC ARIA COURSE LAST TREATMENT DATE: NORMAL
RAD ONC ARIA COURSE TREATMENT ELAPSED DAYS: NORMAL
RAD ONC ARIA REFERENCE POINT DOSAGE GIVEN TO DATE: 12
RAD ONC ARIA REFERENCE POINT DOSAGE GIVEN TO DATE: 12.76
RAD ONC ARIA REFERENCE POINT ID: NORMAL
RAD ONC ARIA REFERENCE POINT ID: NORMAL
RAD ONC ARIA REFERENCE POINT SESSION DOSAGE GIVEN: 6
RAD ONC ARIA REFERENCE POINT SESSION DOSAGE GIVEN: 6.38

## 2022-11-04 PROCEDURE — 77435 SBRT MANAGEMENT: CPT | Performed by: RADIOLOGY

## 2022-11-04 PROCEDURE — 77280 THER RAD SIMULAJ FIELD SMPL: CPT | Mod: 26 | Performed by: RADIOLOGY

## 2022-11-04 PROCEDURE — 77280 THER RAD SIMULAJ FIELD SMPL: CPT | Performed by: RADIOLOGY

## 2022-11-04 PROCEDURE — 77373 STRTCTC BDY RAD THER TX DLVR: CPT | Performed by: RADIOLOGY

## 2022-11-04 NOTE — PROCEDURES
DATE OF SERVICE: 11/4/2022    DIAGNOSIS:  No matching staging information was found for the patient.     TREATMENT:  Radiation Therapy Episodes       Active Episodes       Radiation Therapy: SRS (10/31/2022)                   Radiation Treatments         Plan Last Treated On Elapsed Days Fractions Treated Prescribed Fraction Dose (cGy) Prescribed Total Dose (cGy)    5 Lesion SRT 11/4/2022 1 @ [id number] 2 of 5 600 3,000                  Reference Point Last Treated On Elapsed Days Most Recent Session Dose (cGy) Total Dose (cGy)    5 Lesion SRT 11/4/2022 1 @ [id number] 600 1200    5 Lesion SRT CP 11/4/2022 1 @ [id number]                            STEREOTACTIC PROCEDURE NOTE:    Called by [employer] machine to verify treatment parameters including:  treatment site, treatment dose, and treatment setup prior to stereotactic treatment..    Patient was placed in the treatment position with use of immobilization device and  laser guidance. CBCT images were acquired for target localization.  Images were reviewed in the axial, coronal, and saggital views and shifts were made as necessary to ensure that patient position matched simulation position.      Treatment delivered per  prescription.  The medical physicist was present throughout the set-up, verification and treatment delivery to oversee the procedure and ensure all parameters agreed with the computerized plan.    I have personally reviewed the relevant data, performed the target localization, and determined all relevant factors for this patient’s simulation.

## 2022-11-07 ENCOUNTER — HOSPITAL ENCOUNTER (OUTPATIENT)
Dept: RADIATION ONCOLOGY | Facility: MEDICAL CENTER | Age: 42
End: 2022-11-07

## 2022-11-07 LAB
CHEMOTHERAPY INFUSION START DATE: NORMAL
CHEMOTHERAPY RECORDS: 3000
CHEMOTHERAPY RECORDS: 6
CHEMOTHERAPY RECORDS: NORMAL
CHEMOTHERAPY RX CANCER: NORMAL
DATE 1ST CHEMO CANCER: NORMAL
RAD ONC ARIA COURSE LAST TREATMENT DATE: NORMAL
RAD ONC ARIA COURSE TREATMENT ELAPSED DAYS: NORMAL
RAD ONC ARIA REFERENCE POINT DOSAGE GIVEN TO DATE: 18
RAD ONC ARIA REFERENCE POINT DOSAGE GIVEN TO DATE: 19.14
RAD ONC ARIA REFERENCE POINT ID: NORMAL
RAD ONC ARIA REFERENCE POINT ID: NORMAL
RAD ONC ARIA REFERENCE POINT SESSION DOSAGE GIVEN: 6
RAD ONC ARIA REFERENCE POINT SESSION DOSAGE GIVEN: 6.38

## 2022-11-07 PROCEDURE — 77373 STRTCTC BDY RAD THER TX DLVR: CPT | Performed by: RADIOLOGY

## 2022-11-07 PROCEDURE — 77280 THER RAD SIMULAJ FIELD SMPL: CPT | Performed by: RADIOLOGY

## 2022-11-07 PROCEDURE — 77280 THER RAD SIMULAJ FIELD SMPL: CPT | Mod: 26 | Performed by: RADIOLOGY

## 2022-11-07 PROCEDURE — 77336 RADIATION PHYSICS CONSULT: CPT | Performed by: RADIOLOGY

## 2022-11-08 ENCOUNTER — HOSPITAL ENCOUNTER (OUTPATIENT)
Dept: RADIATION ONCOLOGY | Facility: MEDICAL CENTER | Age: 42
End: 2022-11-08

## 2022-11-08 LAB
CHEMOTHERAPY INFUSION START DATE: NORMAL
CHEMOTHERAPY RECORDS: 3000
CHEMOTHERAPY RECORDS: 6
CHEMOTHERAPY RECORDS: NORMAL
CHEMOTHERAPY RX CANCER: NORMAL
DATE 1ST CHEMO CANCER: NORMAL
RAD ONC ARIA COURSE LAST TREATMENT DATE: NORMAL
RAD ONC ARIA COURSE TREATMENT ELAPSED DAYS: NORMAL
RAD ONC ARIA REFERENCE POINT DOSAGE GIVEN TO DATE: 24
RAD ONC ARIA REFERENCE POINT DOSAGE GIVEN TO DATE: 25.52
RAD ONC ARIA REFERENCE POINT ID: NORMAL
RAD ONC ARIA REFERENCE POINT ID: NORMAL
RAD ONC ARIA REFERENCE POINT SESSION DOSAGE GIVEN: 6
RAD ONC ARIA REFERENCE POINT SESSION DOSAGE GIVEN: 6.38

## 2022-11-08 PROCEDURE — 77280 THER RAD SIMULAJ FIELD SMPL: CPT | Mod: 26 | Performed by: RADIOLOGY

## 2022-11-08 PROCEDURE — 77280 THER RAD SIMULAJ FIELD SMPL: CPT | Performed by: RADIOLOGY

## 2022-11-08 PROCEDURE — 77373 STRTCTC BDY RAD THER TX DLVR: CPT | Performed by: RADIOLOGY

## 2022-11-08 NOTE — PROCEDURES
DATE OF SERVICE: 11/8/2022    DIAGNOSIS:  No matching staging information was found for the patient.     TREATMENT:  Radiation Therapy Episodes       Active Episodes       Radiation Therapy: SRS (10/31/2022)                   Radiation Treatments         Plan Last Treated On Elapsed Days Fractions Treated Prescribed Fraction Dose (cGy) Prescribed Total Dose (cGy)    5 Lesion SRT 11/8/2022 5 @ [id number] 4 of 5 600 3,000                  Reference Point Last Treated On Elapsed Days Most Recent Session Dose (cGy) Total Dose (cGy)    5 Lesion SRT 11/8/2022 5 @ [id number] 600 2,400    5 Lesion SRT CP 11/8/2022 5 @ [id number] 638 2,552                            STEREOTACTIC PROCEDURE NOTE:    Called by [employer] machine to verify treatment parameters including:  treatment site, treatment dose, and treatment setup prior to stereotactic treatment..    Patient was placed in the treatment position with use of immobilization device and  laser guidance. CBCT images were acquired for target localization.  Images were reviewed in the axial, coronal, and saggital views and shifts were made as necessary to ensure that patient position matched simulation position.      Treatment delivered per  prescription.  The medical physicist was present throughout the set-up, verification and treatment delivery to oversee the procedure and ensure all parameters agreed with the computerized plan.    I have personally reviewed the relevant data, performed the target localization, and determined all relevant factors for this patient’s simulation.

## 2022-11-08 NOTE — PROCEDURES
NEUROSURGERY STEREOTACTIC PROCEDURE NOTE    Patient name:  Jacque Mcintyre    Primary Physician:  Adriana Levine M.D. MRN: [id number]  CSN: [id number]   Referring physician:  No ref. provider found : 1980, 41 y.o.     ENCOUNTER DATE:  2022    DIAGNOSIS:  Metastatic melanoma to the brain    PROCEDURE:   EDUARDO-BEAM STEREOTACTIC RADIOTHERAPY    CLASSIFICATION:  []SIMPLE  [x]COMPLEX    ISOCENTERS:  []1 []2 [x]3+    TREATMENT SUMMARY:  [facility] Treatment Information          2022    08:37   [facility] Treatment Summary   Course First Treatment Date 2022     Course Last Treatment Date 2022            After discussion of risks, benefits, and alternatives, the patient elected to proceed with stereotactic radiosurgery. The patient underwent a planning session consisting of radiosurgery protocol MRI, as well as a CT scan in their mask. The image data sets were fused for treatment planning. The lesion was contoured, critical structures were contoured as well, including brain stem, optic nerves, optic chiasm, globes, etc. A plan was then created with the goal of optimizing radiation to the lesions and minimizing radiation to critical structures. Once a plan was approved by all involved parties, the patient was brought to the treatment room, attached to the table, and aligned. When the alignment was confirmed, the patient proceeded with treatment.     There were no adverse events during the treatment.    Pebbles Tejada M.D.

## 2022-11-09 ENCOUNTER — HOSPITAL ENCOUNTER (OUTPATIENT)
Dept: RADIATION ONCOLOGY | Facility: MEDICAL CENTER | Age: 42
End: 2022-11-09

## 2022-11-09 VITALS — HEART RATE: 92 BPM | DIASTOLIC BLOOD PRESSURE: 60 MMHG | OXYGEN SATURATION: 96 % | SYSTOLIC BLOOD PRESSURE: 94 MMHG

## 2022-11-09 LAB
CHEMOTHERAPY INFUSION START DATE: NORMAL
CHEMOTHERAPY RECORDS: 3000
CHEMOTHERAPY RECORDS: 6
CHEMOTHERAPY RECORDS: NORMAL
CHEMOTHERAPY RX CANCER: NORMAL
DATE 1ST CHEMO CANCER: NORMAL
RAD ONC ARIA COURSE LAST TREATMENT DATE: NORMAL
RAD ONC ARIA COURSE TREATMENT ELAPSED DAYS: NORMAL
RAD ONC ARIA REFERENCE POINT DOSAGE GIVEN TO DATE: 30
RAD ONC ARIA REFERENCE POINT DOSAGE GIVEN TO DATE: 31.9
RAD ONC ARIA REFERENCE POINT ID: NORMAL
RAD ONC ARIA REFERENCE POINT ID: NORMAL
RAD ONC ARIA REFERENCE POINT SESSION DOSAGE GIVEN: 6
RAD ONC ARIA REFERENCE POINT SESSION DOSAGE GIVEN: 6.38

## 2022-11-09 PROCEDURE — 77280 THER RAD SIMULAJ FIELD SMPL: CPT | Performed by: RADIOLOGY

## 2022-11-09 PROCEDURE — 77373 STRTCTC BDY RAD THER TX DLVR: CPT | Performed by: RADIOLOGY

## 2022-11-09 PROCEDURE — 77280 THER RAD SIMULAJ FIELD SMPL: CPT | Mod: 26 | Performed by: RADIOLOGY

## 2022-11-09 NOTE — ON TREATMENT VISIT
ON TREATMENT  NOTE  RADIATION ONCOLOGY DEPARTMENT    Patient name:  Jacque Mcintyre    Primary Physician:  Adriana Levine M.D. MRN: [id number]  CSN: [id number]   Referring physician:  No ref. provider found   : 1980, 41 y.o.     ENCOUNTER DATE:  2022      DIAGNOSIS: Metastatic melanoma recurrent brain    TREATMENT SUMMARY:  Course First Treatment Date 2022  Course Last Treatment Date 2022  Radiation Treatments       Active   Plans   5 Lesion SRT   Most recent treatment: Dose planned: 600 cGy (fraction 5 of 5 on 2022)   Total: Dose planned: 3,000 cGy   Elapsed Days: 6 @            Historical   No historical radiation treatments to show.                 SUBJECTIVE:  She is doing quite well, no major complaints today.  She is doing well with physical therapy.  No focal neurologic problems today.    VITAL SIGNS:      2022    11:00 AM 10/25/2022     7:00 AM 10/24/2022     6:37 PM 10/24/2022     2:00 PM 10/24/2022     7:00 AM 10/24/2022     6:00 AM 10/23/2022     6:28 PM   Vitals   SYSTOLIC 94 97 106 102 101 100 109   DIASTOLIC 60 59 56 68 63 66 68   Pulse 92 98 98 71 90 98 91   Temperature  36.6 °C (97.8 °F) 36.7 °C (98 °F) 36.6 °C (97.8 °F) 37.1 °C (98.7 °F) 36.8 °C (98.3 °F) 36.8 °C (98.3 °F)   Respiration  20 18 18 18 20 18   Pulse Oximetry 96 % 96 % 96 % 96 % 95 % 91 % 93 %     KPS: 90, Able to carry on normal activity; minor signs or symptoms of disease (ECOG equivalent 0)  Encounter Vitals  Blood Pressure: (!) 94/60  Pulse: 92  Pulse Oximetry: 96 %       View : No data to display.                   PHYSICAL EXAM:  Physical Exam  Constitutional:       Appearance: Normal appearance.   HENT:      Head: Normocephalic and atraumatic.   Eyes:      Extraocular Movements: Extraocular movements intact.      Pupils: Pupils are equal, round, and reactive to light.   Cardiovascular:      Rate and Rhythm: Normal rate and regular rhythm.   Pulmonary:      Effort: Pulmonary  effort is normal.      Breath sounds: Normal breath sounds.   Abdominal:      General: Abdomen is flat.      Palpations: Abdomen is soft.   Neurological:      General: No focal deficit present.      Mental Status: She is alert and oriented to person, place, and time.      Cranial Nerves: No cranial nerve deficit.      Sensory: No sensory deficit.      Motor: No weakness.   Psychiatric:         Mood and Affect: Mood normal.         Behavior: Behavior normal.            11/9/2022    10:59 AM   Toxicity Assessment   Toxicity Assessment Brain   Fatigue (lethargy, malaise, asthenia) Increased fatigue over baseline, but not altering normal activities   Radiation Dermatitis Faint erythema or dry desquamation   Nausea None   Headache None   External Auditory Canal Normal   Middle Ear/Hearing Normal   Dizziness/lightheadedness None   Memory loss Normal   Neuropathy - Motor Normal   Seizure None   Vertigo None       CURRENT MEDICATIONS:    Current Outpatient Medications:   •  atorvastatin (LIPITOR) 40 MG Tab, Take 1 Tablet by mouth every evening., Disp: 30 Tablet, Rfl: 2  •  baclofen (LIORESAL) 10 MG Tab, Take 1 Tablet by mouth 3 times a day., Disp: 90 Tablet, Rfl: 2  •  DULoxetine (CYMBALTA) 60 MG Cap DR Particles delayed-release capsule, Take 1 Capsule by mouth every evening., Disp: 30 Capsule, Rfl: 2  •  gabapentin (NEURONTIN) 800 MG tablet, Take 1 Tablet by mouth 4 times a day., Disp: 90 Tablet, Rfl: 2  •  Insulin Glargine, 2 Unit Dial, 300 UNIT/ML Solution Pen-injector, Inject 50 Units under the skin every day., Disp: 9 mL, Rfl: 3  •  insulin lispro 100 UNIT/ML SC SOPN injection PEN, Inject 2-10 Units under the skin 4 Times a Day,Before Meals and at Bedtime., Disp: 9 mL, Rfl: 2  •  levetiracetam (KEPPRA) 1000 MG tablet, Take 1 Tablet by mouth 2 times a day., Disp: 60 Tablet, Rfl: 2  •  OXcarbazepine (TRILEPTAL) 300 MG Tab, Take 1 Tablet by mouth 2 times a day., Disp: 60 Tablet, Rfl: 2  •  QUEtiapine (SEROQUEL) 200 MG  Tab, Take 1 Tablet by mouth every evening., Disp: 60 Tablet, Rfl: 2  •  acetaminophen (TYLENOL) 500 MG Tab, Take 1,000 mg by mouth 2 times a day., Disp: , Rfl:   •  clonazePAM (KLONOPIN) 0.5 MG Tab, Take 0.25 mg by mouth 2 times a day as needed (sleep). Indications: Feeling Anxious, Disp: , Rfl:     LABORATORY DATA:   Lab Results   Component Value Date/Time    SODIUM 140 10/18/2022 06:04 AM    POTASSIUM 3.7 10/18/2022 06:04 AM    CHLORIDE 105 10/18/2022 06:04 AM    CO2 26 10/18/2022 06:04 AM    GLUCOSE 178 (H) 10/18/2022 06:04 AM    BUN 7 (L) 10/18/2022 06:04 AM    CREATININE 0.39 (L) 10/18/2022 06:04 AM       Lab Results   Component Value Date/Time    WBC 6.5 10/22/2022 06:24 AM    RBC 3.42 (L) 10/22/2022 06:24 AM    HEMOGLOBIN 8.3 (L) 10/22/2022 06:24 AM    HEMATOCRIT 28.1 (L) 10/22/2022 06:24 AM    MCV 82.2 10/22/2022 06:24 AM    MCH 24.3 (L) 10/22/2022 06:24 AM    MCHC 29.5 (L) 10/22/2022 06:24 AM    PLATELETCT 418 10/22/2022 06:24 AM         RADIOLOGY DATA:  CT-CHEST,ABDOMEN,PELVIS WITH    Result Date: 10/1/2022  1.  BILATERAL pulmonary nodules, the largest of which measures 12 mm in the LEFT lower lobe and which has enlarged since May of this year. 2.  Enlarged LEFT hilar lymph node 3.  Enlarged LEFT external iliac lymph node. This would be amenable to image guided tissue sampling if clinically appropriate. 4.  7 mm LEFT and LEFT upper quadrant subcutaneous soft tissue nodules, new since prior 5.  9 mm RIGHT lower quadrant peritoneal nodule, new since prior 6.  33 mm LEFT ovarian cyst 7.  Thyroid nodules measuring up to 23 mm 8.  BILATERAL L5 pars defects with grade 1 anterolisthesis of L5 on S1 9.  Incompletely assessed LEFT renal lesion, new since prior and most likely a cyst though attention on top is recommended.    CT-HEAD W/O    Result Date: 10/21/2022  1.  Redemonstration of treated right hemispheric lesions, with improving perilesional edema compared to prior study. No midline shift. 2.  No new  intracranial mass lesions. No large vascular territory infarct. No intracranial hemorrhage.     CT-HEAD W/O    Result Date: 10/6/2022  1.  Small amount of extra-axial and intracavitary RIGHT frontal applied following resection of RIGHT frontal masses 2.  No evidence of significant mass effect, large territorial infarction or new mass 3.  Residual tumor not excluded by this exam.     CT-HEAD W/O    Addendum Date: 10/1/2022    These findings were discussed with JESENIA HERNANDEZ on 10/01/2022.    Result Date: 10/1/2022  1.  3 masses in the right cerebral hemisphere largest measuring 3.2 cm with extensive vasogenic edema as described above. Findings are likely due to metastases. 2.  Localized midline shift to the left side frontoparietal level measuring 3 mm. 3.  The 2 smaller masses in the right frontal lobe demonstrates some increased density which could indicate that hemorrhage may be present within these masses. These findings were discussed with Benjamín milan for dr hernandez on 10/01/2022.     [id number] VIEW    Result Date: 10/7/2022  1. Diffuse colonic stool could represent constipation. 2.  No dilated loops of bowel or free air.    DX-CHEST-PORTABLE (1 VIEW)    Result Date: 10/17/2022  1.  No acute cardiopulmonary disease. No acute infiltrates. 2.  Previously described pulmonary nodules in the right lower lobe and left lower lobe best seen on CT are not appreciated on this exam.    DX-CHEST-PORTABLE (1 VIEW)    Result Date: 10/1/2022  1.  Possible pulmonary nodule within each lung. Metastasis is a possibility. 2.  No infiltrates or consolidations identified.    MR-BRAIN-WITH & W/O    Result Date: 10/26/2022  Total of 5 hemorrhagic metastatic lesions are present in the brain as described above. Right frontal craniotomy noted with resection of the anterior most right frontal lesion which was best seen on the study from 10/2/2022.    MR-BRAIN-WITH & W/O    Result Date: 10/7/2022  When compared with the previous MRI dated  10/2/2022, there has been significant interval reduction in the extent of contrast enhancement of the the previously seen metastatic lesions. Hemorrhagic products are noted within these lesions. There is diffuse white matter edema is noted surrounding these lesions. There are no new lesions. These findings likely represents true tumor response or pseudo response secondary to the chemotherapy.    MR-BRAIN-WITH & W/O    Result Date: 10/2/2022  1.  Right-sided predominant, supratentorial metastatic disease with the largest mass in the right frontoparietal region measuring 3.4 x 3.0 cm as described in detail above. 2.  Associated edema with regional associated cortical effacement and partial effacement of the right lateral ventricle. Minimal localized midline shift adjacent to the large right frontoparietal mass. 3.  Evidence of prior hemorrhage within these masses. 4.  Small focus of restricted diffusion in the right occipital bone could represent osseous metastatic disease.      IMPRESSION:  Cancer Staging   No matching staging information was found for the patient.    PLAN:  No change in treatment plan.  Follow-up in 6 weeks.  MRI in about 2 months.  She sees medical oncology next week to start systemic treatment.    Disposition:  Treatment plan and imaging reviewed. Questions answered. Continue therapy outlined.     Judy August M.D.    No orders of the defined types were placed in this encounter.

## 2022-11-09 NOTE — PROCEDURES
DATE OF SERVICE: 11/9/2022    DIAGNOSIS:  No matching staging information was found for the patient.     TREATMENT:  Radiation Therapy Episodes       Active Episodes       Radiation Therapy: SRS (10/31/2022)                   Radiation Treatments         Plan Last Treated On Elapsed Days Fractions Treated Prescribed Fraction Dose (cGy) Prescribed Total Dose (cGy)    5 Lesion SRT 11/9/2022 6 @ [id number] 5 of 5 600 3,000                  Reference Point Last Treated On Elapsed Days Most Recent Session Dose (cGy) Total Dose (cGy)    5 Lesion SRT 11/9/2022 6 @ [id number] 600 3,000    5 Lesion SRT CP 11/9/2022 6 @ [id number] 638 3,190                            STEREOTACTIC PROCEDURE NOTE:    Called by True[employer] machine to verify treatment parameters including:  treatment site, treatment dose, and treatment setup prior to stereotactic treatment..    Patient was placed in the treatment position with use of immobilization device and  laser guidance. CBCT images were acquired for target localization.  Images were reviewed in the axial, coronal, and saggital views and shifts were made as necessary to ensure that patient position matched simulation position.      Treatment delivered per  prescription.  The medical physicist was present throughout the set-up, verification and treatment delivery to oversee the procedure and ensure all parameters agreed with the computerized plan.    I have personally reviewed the relevant data, performed the target localization, and determined all relevant factors for this patient’s simulation.

## 2022-11-09 NOTE — RADIATION COMPLETION NOTES
END OF TREATMENT SUMMARY    Patient name:  Jacque Mcintyre    Primary Physician:  Adriana Levine M.D. MRN: [id number]  CSN: [id number]   Referring physician:  No ref. provider found  : 1980, 41 y.o.       TREATMENT SUMMARY:        Course First Treatment Date 2022    Course Last Treatment Date 2022   Course Elapsed Days 6 @ [id number]   Course Intent Palliative     Radiation Therapy Episodes       Active Episodes       Radiation Therapy: SRS (10/31/2022)                   Radiation Treatments         Plan Last Treated On Elapsed Days Fractions Treated Prescribed Fraction Dose (cGy) Prescribed Total Dose (cGy)    5 Lesion SRT 2022 6 @ [id number] 5 of 5 600 3,000                  Reference Point Last Treated On Elapsed Days Most Recent Session Dose (cGy) Total Dose (cGy)    5 Lesion SRT 2022 6 @ [id number] 600 3,000    5 Lesion SRT CP 2022 6 @ [id number] 638 3,190                                     STAGE: Stage IV     TREATMENT INDICATION:   Post operative SRS after resection     CONCURRENT SYSTEMIC TREATMENT:   none     RT COURSE DISCONTINUED EARLY:   No     PATIENT EXPERIENCE:       2022    10:59 AM   Toxicity Assessment   Toxicity Assessment Brain   Fatigue (lethargy, malaise, asthenia) Increased fatigue over baseline, but not altering normal activities   Radiation Dermatitis Faint erythema or dry desquamation   Nausea None   Headache None   External Auditory Canal Normal   Middle Ear/Hearing Normal   Dizziness/lightheadedness None   Memory loss Normal   Neuropathy - Motor Normal   Seizure None   Vertigo None        FOLLOW-UP PLAN:   6 Weeks     COMMENT:          ANATOMIC TARGET SUMMARY    ANATOMIC TARGET MODALITY TECHNIQUE   Multiple post op sites and intact lesions   External beam, photons SBRT            COMMENT:         DIAGRAMS:        DOSE VOLUME HISTOGRAMS:

## 2022-11-11 NOTE — ADDENDUM NOTE
Encounter addended by: Judy August M.D. on: 11/11/2022 9:26 AM   Actions taken: Clinical Note Signed

## 2022-11-15 ENCOUNTER — OFFICE VISIT (OUTPATIENT)
Dept: PHYSICAL MEDICINE AND REHAB | Facility: MEDICAL CENTER | Age: 42
End: 2022-11-15
Payer: COMMERCIAL

## 2022-11-15 VITALS
SYSTOLIC BLOOD PRESSURE: 102 MMHG | WEIGHT: 147 LBS | BODY MASS INDEX: 25.1 KG/M2 | DIASTOLIC BLOOD PRESSURE: 58 MMHG | TEMPERATURE: 98.4 F | OXYGEN SATURATION: 98 % | HEIGHT: 64 IN | HEART RATE: 90 BPM | RESPIRATION RATE: 15 BRPM

## 2022-11-15 DIAGNOSIS — R53.81 PHYSICAL DECONDITIONING: ICD-10-CM

## 2022-11-15 DIAGNOSIS — Z74.09 IMPAIRED MOBILITY AND ACTIVITIES OF DAILY LIVING: ICD-10-CM

## 2022-11-15 DIAGNOSIS — Z78.9 IMPAIRED MOBILITY AND ACTIVITIES OF DAILY LIVING: ICD-10-CM

## 2022-11-15 DIAGNOSIS — F32.9 REACTIVE DEPRESSION (SITUATIONAL): ICD-10-CM

## 2022-11-15 DIAGNOSIS — C79.31 MALIGNANT MELANOMA METASTATIC TO BRAIN (HCC): Primary | ICD-10-CM

## 2022-11-15 DIAGNOSIS — G81.94 LEFT HEMIPARESIS (HCC): ICD-10-CM

## 2022-11-15 PROCEDURE — 99214 OFFICE O/P EST MOD 30 MIN: CPT | Performed by: PHYSICAL MEDICINE & REHABILITATION

## 2022-11-15 ASSESSMENT — FIBROSIS 4 INDEX: FIB4 SCORE: 0.26

## 2022-11-15 NOTE — PROGRESS NOTES
[facility]  PM&R [facility] Rehabilitation [facility]  [address] [place] [address]  Ph: [phone]    NEW PATIENT EVALUATION    *Patient established in PM&R practice, however, patient new to writer as patient is transferring care. Therefore, patient billed as established.     Patient Name: Jacque Mcintyre   Patient : 1980  Patient Age: 41 y.o.     Examining Physician: Dr. Mely Romero, DO    PM&R History to Date - Background Information:  Dates of Admission/Discharge to ARU: 10/15/2022 - 10/25/2022    SUBJECTIVE:   Patient Identification: Jacque Mcintyre is a 41 y.o. female with PMH significant for diabetes mellitus and rehabilitation history significant for nontraumatic brain injury secondary to metastatic melanoma 10/1/2022 s/p right frontal craniotomy for resection 10/6/2022 Dr. Tejada and is presenting to PM&R clinic for a NEW OUTPATIENT evaluation with the following chief complaint/s:    Chief Complaint: ARU discharge follow-up    Accompanied by Today: Mom  History of Present Illness:    - Records reviewed: Acute rehab discharge summary reviewed in its entirety.  - Has completed radiation therapy. Starts immunotherapy Thursday.   - No issues with ARU discharge.   - Has not heard from therapy. Getting up and walking with walker.   - Urinating normally and having regular bowel meds.   - No longer on pain meds.   - Had been a nurse prior working in psychiatry.   - Having spasticity in the left foot. On the baclofen 10mg TID.   - Chronic diabetic neuropathy on Gabapentin 800mg QID.   - Sees NSGY on Friday.   - Repeat MRI in late . Has PET scan on Dec 5th full body.     Review of Systems:  All other pertinent positive review of systems are noted above in HPI.   All other systems reviewed and are negative.    Past Medical History:  Past Medical History:   Diagnosis Date    Cancer (HCC) 2016    melanoma    Asthma     inhaler    Bipolar 1 disorder (HCC)     Cough     Depression      DM mellitus,     insulin    Hyperlipidemia     Hypertension     Pap smear     Dr. Baird    PCOS (polycystic ovarian syndrome)     Shortness of breath     Sputum production     Wheezing       Past Surgical History:   Procedure Laterality Date    CRANIOTOMY STEALTH Right 10/6/2022    Procedure: RIGHT FRONTAL CRANIOTOMY WITH STEALTH;  Surgeon: Pebbles Tejada M.D.;  Location: [facility];  Service: Neurosurgery    CT BRONCHOSCOPY,DIAGNOSTIC N/A 6/17/2022    Procedure: FIBER OPTIC BRONCHOSCOPY WITH WASH, BRUSH, BRONCHOALVEOLAR LAVAGE, BIOPSY, FINE NEEDLE ASPIRATION;  Surgeon: Yue Swanson M.D.;  Location: [facility];  Service: Pulmonary    ENDOBRONCHIAL US ADD-ON N/A 6/17/2022    Procedure: ENDOBRONCHIAL ULTRASOUND (EBUS);  Surgeon: Yue Swanson M.D.;  Location: [facility];  Service: Pulmonary    CT LAP,APPENDECTOMY N/A 10/7/2019    Procedure: APPENDECTOMY, LAPAROSCOPIC;  Surgeon: Lionel Frazier M.D.;  Location: [facility];  Service: General    AXILLARY NODE DISSECTION Left 6/23/2017    Procedure: AXILLARY NODE DISSECTION- COMPLETION LYPHADENECTOMY;  Surgeon: Lionel Weinberg M.D.;  Location: [facility];  Service:     WIDE EXCISION Left 5/9/2017    Procedure: WIDE EXCISION - MALIGNANT MELANOMA HAND;  Surgeon: Lionel Weinberg M.D.;  Location: [facility]ERY [facility];  Service:     NODE BIOPSY SENTINEL Left 5/9/2017    Procedure: NODE BIOPSY SENTINEL - AXILLARY;  Surgeon: Lionel Weinberg M.D.;  Location: SURGERY [facility];  Service:     OTHER  2016    excisino of melanoma left hand    ADENOIDECTOMY      MYRINGOTOMY      with tube placement        Current Outpatient Medications:     atorvastatin (LIPITOR) 40 MG Tab, Take 1 Tablet by mouth every evening., Disp: 30 Tablet, Rfl: 2    baclofen (LIORESAL) 10 MG Tab, Take 1 Tablet by mouth 3 times a day., Disp: 90 Tablet, Rfl: 2    DULoxetine (CYMBALTA) 60 MG Cap DR Particles delayed-release  capsule, Take 1 Capsule by mouth every evening., Disp: 30 Capsule, Rfl: 2    gabapentin (NEURONTIN) 800 MG tablet, Take 1 Tablet by mouth 4 times a day., Disp: 90 Tablet, Rfl: 2    Insulin Glargine, 2 Unit Dial, 300 UNIT/ML Solution Pen-injector, Inject 50 Units under the skin every day., Disp: 9 mL, Rfl: 3    insulin lispro 100 UNIT/ML SC SOPN injection PEN, Inject 2-10 Units under the skin 4 Times a Day,Before Meals and at Bedtime., Disp: 9 mL, Rfl: 2    levetiracetam (KEPPRA) 1000 MG tablet, Take 1 Tablet by mouth 2 times a day., Disp: 60 Tablet, Rfl: 2    OXcarbazepine (TRILEPTAL) 300 MG Tab, Take 1 Tablet by mouth 2 times a day., Disp: 60 Tablet, Rfl: 2    QUEtiapine (SEROQUEL) 200 MG Tab, Take 1 Tablet by mouth every evening., Disp: 60 Tablet, Rfl: 2    acetaminophen (TYLENOL) 500 MG Tab, Take 1,000 mg by mouth 2 times a day., Disp: , Rfl:     clonazePAM (KLONOPIN) 0.5 MG Tab, Take 0.25 mg by mouth 2 times a day as needed (sleep). Indications: Feeling Anxious, Disp: , Rfl:   Allergies   Allergen Reactions    Augmentin Vomiting    Clavulanic Acid Vomiting     Violent vomiting        Past Social History:  Social History     Socioeconomic History    Marital status:      Spouse name: Not on file    Number of children: 2    Years of education: Not on file    Highest education level: Not on file   Occupational History    Occupation: stay at home mom     Employer: none   Tobacco Use    Smoking status: Every Day     Packs/day: 0.75     Years: 10.00     Pack years: 7.50     Types: Cigarettes    Smokeless tobacco: Never   Vaping Use    Vaping Use: Never used   Substance and Sexual Activity    Alcohol use: Not Currently    Drug use: Yes     Types: Inhaled     Comment: occasional smoked marijuanna approx 3 times per month    Sexual activity: Yes   Other Topics Concern    Not on file   Social History Narrative    Not on file     Social Determinants of Health     Financial Resource Strain: Not on file   Food  Insecurity: Not on file   Transportation Needs: No Transportation Needs    Lack of Transportation (Medical): No    Lack of Transportation (Non-Medical): No   Physical Activity: Not on file   Stress: Not on file   Social Connections: Not on file   Intimate Partner Violence: Not on file   Housing Stability: Not on file        Family History:  Family History   Problem Relation Age of Onset    Psychiatric Illness Mother         depression    Diabetes Mother     Hyperlipidemia Mother     Thyroid Mother         s/p radioactive iodine treatment on replacement    Hypertension Father     Diabetes Father        Depression and Opioid Screening  PHQ-9:      10/18/2022     8:00 PM 10/19/2022     8:00 PM 10/20/2022     7:44 PM   Depression Screen (PHQ-2/PHQ-9)   PHQ-2 Total Score 0 0 0     Interpretation of PHQ-9 Total Score   Score Severity   1-4 No Depression   5-9 Mild Depression   10-14 Moderate Depression   15-19 Moderately Severe Depression   20-27 Severe Depression     OBJECTIVE:   Vital Signs:  Vitals:    11/15/22 0826   BP: 102/58   Pulse: 90   Resp: 15   Temp: 36.9 °C (98.4 °F)   SpO2: 98%        Pertinent Labs:  Lab Results   Component Value Date/Time    SODIUM 140 10/18/2022 06:04 AM    POTASSIUM 3.7 10/18/2022 06:04 AM    CHLORIDE 105 10/18/2022 06:04 AM    CO2 26 10/18/2022 06:04 AM    GLUCOSE 178 (H) 10/18/2022 06:04 AM    BUN 7 (L) 10/18/2022 06:04 AM    CREATININE 0.39 (L) 10/18/2022 06:04 AM       Lab Results   Component Value Date/Time    HBA1C 7.8 (H) 10/16/2022 05:55 AM       Lab Results   Component Value Date/Time    WBC 6.5 10/22/2022 06:24 AM    RBC 3.42 (L) 10/22/2022 06:24 AM    HEMOGLOBIN 8.3 (L) 10/22/2022 06:24 AM    HEMATOCRIT 28.1 (L) 10/22/2022 06:24 AM    MCV 82.2 10/22/2022 06:24 AM    MCH 24.3 (L) 10/22/2022 06:24 AM    MCHC 29.5 (L) 10/22/2022 06:24 AM    MPV 9.9 10/22/2022 06:24 AM    NEUTSPOLYS 65.40 10/16/2022 05:55 AM    LYMPHOCYTES 24.80 10/16/2022 05:55 AM    MONOCYTES 8.80 10/16/2022  05:55 AM    EOSINOPHILS 0.20 10/16/2022 05:55 AM    BASOPHILS 0.30 10/16/2022 05:55 AM       Lab Results   Component Value Date/Time    ASTSGOT 8 (L) 10/16/2022 05:55 AM    ALTSGPT 10 10/16/2022 05:55 AM        Physical Exam:   GEN: No apparent distress  HEENT: Head normocephalic, atraumatic.  Sclera nonicteric bilaterally, no ocular discharge appreciated bilaterally.  CV: Extremities warm and well-perfused, no peripheral edema appreciated bilaterally.  PULMONARY: Breathing nonlabored on room air, no respiratory accessory muscle use.  Not requiring supplemental oxygen.  SKIN: No appreciable skin breakdown on exposed areas of skin.  PSYCH: Mood and affect within normal limits.  NEURO: Awake alert.  Conversational.  Logical thought content.    Motor Exam Upper Extremities   ? Myotome R L   Shoulder Abduction C5 5 5   Elbow flexion C5 5 5   Wrist extension C6 5 5   Elbow extension C7 5 5   Finger flexion C8 4 4   Finger abduction T1 4 4     Motor Exam Lower Extremities  ? Myotome R L   Hip flexion L2 5 4   Knee extension L3 5 4   Ankle dorsiflexion L4 5 4   Toe extension L5 5 4   Ankle plantarflexion S1 5 4       Godinez's negative bilaterally.  No clonus appreciated at BL ankles.    Imaging:   MRI brain 10/26/2022  Total of 5 hemorrhagic metastatic lesions are present in the brain as described above.  Right frontal craniotomy noted with resection of the anterior most right frontal lesion which was best seen on the study from 10/2/2022.    ASSESSMENT/PLAN: Jacque Mcintyre  is a 41 y.o. female with rehabilitation history significant for nontraumatic brain injury secondary to metastatic melanoma 10/1/2022 s/p right frontal craniotomy for resection 10/6/2022 Dr. Tejada, here for evaluation. The following plan was discussed with the patient who is in agreement.     Visit Diagnoses     ICD-10-CM   1. Impaired mobility and activities of daily living  Z74.09    Z78.9   2. Physical deconditioning  R53.81   3. Malignant  melanoma metastatic to brain (HCC)  C79.31      Mom assists with history.    Rehab/Neuro:   Nontraumatic brain injury secondary to metastatic melanoma 10/1/2022 s/p right frontal craniotomy for resection 10/6/2022 Dr. Tejada  S/p radiation  Immunotherapy to start 11/17  Left hemiparesis  -Records reviewed as aforementioned in the HPI.  -Therapy: Urgent referral to home health therapy as patient discharged at the end of October and has not had therapy since  -Occupation: Had been a psychiatric nurse.  Hopes to return to work in January or February.  - Driving status: Counseled on safe return to driving.  She has had no seizure activity, strength on the right side and left side is generally intact though left is mildly hemiparetic.  No cognitive or visual concerns.  -PLOF: Completely independent  -CLOF: Ambulatory short distances without the walker, uses the walker for any prolonged period of time ambulating.  Uses manual wheelchair for distances for transport only.  -Follow-up with neurosurgery Friday 11/18  -Repeat MRI late January 2023  -Full body PET scan December 5    Spasticity:   -Affecting left foot mostly, improved after radiation  - Med management: Continue baclofen 10 mg 3 times daily, no side effects experienced.    Neuropathic Pain:   Chronic diabetic polyneuropathy  - Med management: Continue home dose of gabapentin 800 mg 4 times daily    Mood:   Reactive depression  -Med management: On Cymbalta, continue home dose 60 mg  -Referral: Telemedicine for neuropsych consultation with Dr. Martínez      Follow up: 3 months for general reevaluation    Total time spent was 24 minutes.  Included in this time is the time spent preparing for the visit including record review, my exam and evaluation, counseling and education regarding that which is aforementioned in the assessment and plan.  Time was spent documenting into patient's electronic health record.  Time was spent ordering the appropriate labs, tests,  procedures, referrals, medications. Included this time as the time spent obtaining history from someone other than the patient.  Some of the time included occurred outside of charting time.  Discussion involved the patient and mom.      Please note that this dictation was created using voice recognition software. I have made every reasonable attempt to correct obvious errors but there may be errors of grammar and content that I may have overlooked prior to finalization of this note.    Dr. Mely Romero DO, MS  Department of Physical Medicine & Rehabilitation  Neuro Rehabilitation Clinic  [facility]

## 2022-11-16 ENCOUNTER — APPOINTMENT (RX ONLY)
Dept: URBAN - METROPOLITAN AREA CLINIC 20 | Facility: CLINIC | Age: 42
Setting detail: DERMATOLOGY
End: 2022-11-16

## 2022-11-16 DIAGNOSIS — L72.8 OTHER FOLLICULAR CYSTS OF THE SKIN AND SUBCUTANEOUS TISSUE: ICD-10-CM

## 2022-11-16 DIAGNOSIS — Z87.2 PERSONAL HISTORY OF DISEASES OF THE SKIN AND SUBCUTANEOUS TISSUE: ICD-10-CM

## 2022-11-16 DIAGNOSIS — C79.89 SECONDARY MALIGNANT NEOPLASM OF OTHER SPECIFIED SITES: ICD-10-CM

## 2022-11-16 DIAGNOSIS — Z85.820 PERSONAL HISTORY OF MALIGNANT MELANOMA OF SKIN: ICD-10-CM

## 2022-11-16 DIAGNOSIS — T81.89 OTHER COMPLICATIONS OF PROCEDURES, NOT ELSEWHERE CLASSIFIED: ICD-10-CM

## 2022-11-16 DIAGNOSIS — D49.2 NEOPLASM OF UNSPECIFIED BEHAVIOR OF BONE, SOFT TISSUE, AND SKIN: ICD-10-CM

## 2022-11-16 DIAGNOSIS — L57.8 OTHER SKIN CHANGES DUE TO CHRONIC EXPOSURE TO NONIONIZING RADIATION: ICD-10-CM

## 2022-11-16 DIAGNOSIS — L70.8 OTHER ACNE: ICD-10-CM

## 2022-11-16 DIAGNOSIS — L81.4 OTHER MELANIN HYPERPIGMENTATION: ICD-10-CM

## 2022-11-16 DIAGNOSIS — L40.0 PSORIASIS VULGARIS: ICD-10-CM

## 2022-11-16 DIAGNOSIS — D22 MELANOCYTIC NEVI: ICD-10-CM

## 2022-11-16 DIAGNOSIS — D18.0 HEMANGIOMA: ICD-10-CM

## 2022-11-16 PROBLEM — D18.01 HEMANGIOMA OF SKIN AND SUBCUTANEOUS TISSUE: Status: ACTIVE | Noted: 2022-11-16

## 2022-11-16 PROBLEM — D22.61 MELANOCYTIC NEVI OF RIGHT UPPER LIMB, INCLUDING SHOULDER: Status: ACTIVE | Noted: 2022-11-16

## 2022-11-16 PROBLEM — D48.5 NEOPLASM OF UNCERTAIN BEHAVIOR OF SKIN: Status: ACTIVE | Noted: 2022-11-16

## 2022-11-16 PROBLEM — D22.4 MELANOCYTIC NEVI OF SCALP AND NECK: Status: ACTIVE | Noted: 2022-11-16

## 2022-11-16 PROBLEM — D22.5 MELANOCYTIC NEVI OF TRUNK: Status: ACTIVE | Noted: 2022-11-16

## 2022-11-16 PROBLEM — T81.89XA OTHER COMPLICATIONS OF PROCEDURES, NOT ELSEWHERE CLASSIFIED, INITIAL ENCOUNTER: Status: ACTIVE | Noted: 2022-11-16

## 2022-11-16 PROBLEM — D22.72 MELANOCYTIC NEVI OF LEFT LOWER LIMB, INCLUDING HIP: Status: ACTIVE | Noted: 2022-11-16

## 2022-11-16 PROCEDURE — ? ADDITIONAL NOTES

## 2022-11-16 PROCEDURE — ? PHOTO-DOCUMENTATION

## 2022-11-16 PROCEDURE — ? PRESCRIPTION

## 2022-11-16 PROCEDURE — ? OBSERVATION AND MEASURE

## 2022-11-16 PROCEDURE — ? DIAGNOSIS COMMENT

## 2022-11-16 PROCEDURE — ? COUNSELING

## 2022-11-16 PROCEDURE — 99214 OFFICE O/P EST MOD 30 MIN: CPT

## 2022-11-16 RX ORDER — DOXYCYCLINE HYCLATE 100 MG/1
CAPSULE, GELATIN COATED ORAL
Qty: 14 | Refills: 0 | Status: CANCELLED
Stop reason: CLARIF

## 2022-11-16 ASSESSMENT — LOCATION SIMPLE DESCRIPTION DERM
LOCATION SIMPLE: RIGHT LOWER EXTREMITY
LOCATION SIMPLE: LEFT CHEEK
LOCATION SIMPLE: RIGHT HAND
LOCATION SIMPLE: LEFT LOWER EXTREMITY
LOCATION SIMPLE: RIGHT BUTTOCK
LOCATION SIMPLE: GENITALIA
LOCATION SIMPLE: LEFT POSTERIOR THIGH
LOCATION SIMPLE: RIGHT PLANTAR SURFACE
LOCATION SIMPLE: LEFT PLANTAR SURFACE
LOCATION SIMPLE: RIGHT POSTERIOR UPPER ARM
LOCATION SIMPLE: RIGHT EAR
LOCATION SIMPLE: RIGHT CHEEK
LOCATION SIMPLE: LEFT UPPER BACK
LOCATION SIMPLE: LEFT THIGH
LOCATION SIMPLE: RIGHT CALF
LOCATION SIMPLE: BACK
LOCATION SIMPLE: LEFT ANKLE
LOCATION SIMPLE: RIGHT UPPER BACK
LOCATION SIMPLE: LEFT ELBOW
LOCATION SIMPLE: LEFT POSTERIOR UPPER ARM
LOCATION SIMPLE: LEFT HAND
LOCATION SIMPLE: ABDOMEN
LOCATION SIMPLE: POSTERIOR SCALP
LOCATION SIMPLE: RIGHT ELBOW

## 2022-11-16 ASSESSMENT — LOCATION ZONE DERM
LOCATION ZONE: LEG
LOCATION ZONE: FACE
LOCATION ZONE: SCALP
LOCATION ZONE: EAR
LOCATION ZONE: HAND
LOCATION ZONE: ARM
LOCATION ZONE: FEET
LOCATION ZONE: TRUNK

## 2022-11-16 ASSESSMENT — LOCATION DETAILED DESCRIPTION DERM
LOCATION DETAILED: LEFT PROXIMAL POSTERIOR THIGH
LOCATION DETAILED: RIGHT CHEEK
LOCATION DETAILED: RIGHT BUTTOCK
LOCATION DETAILED: RIGHT PROXIMAL POSTERIOR UPPER ARM
LOCATION DETAILED: LEFT ANTERIOR DISTAL THIGH
LOCATION DETAILED: RIGHT INFERIOR UPPER BACK
LOCATION DETAILED: RIGHT SUPERIOR MEDIAL BUCCAL CHEEK
LOCATION DETAILED: LEFT DISTAL POSTERIOR UPPER ARM
LOCATION DETAILED: RIGHT SUPERIOR HELIX
LOCATION DETAILED: LEFT UPPER BACK
LOCATION DETAILED: RIGHT DISTAL LATERAL CALF
LOCATION DETAILED: LEFT MID BACK
LOCATION DETAILED: LEFT SUPERIOR UPPER BACK
LOCATION DETAILED: LEFT POSTERIOR ANKLE
LOCATION DETAILED: LEFT INFERIOR OCCIPITAL SCALP
LOCATION DETAILED: RIGHT ANTERIOR THIGH
LOCATION DETAILED: GENITALIA
LOCATION DETAILED: RIGHT MID-UPPER BACK
LOCATION DETAILED: RIGHT ELBOW
LOCATION DETAILED: LEFT DORSAL HAND
LOCATION DETAILED: LEFT ANTERIOR THIGH
LOCATION DETAILED: LEFT RADIAL DORSAL HAND
LOCATION DETAILED: LEFT ANTERIOR PROXIMAL THIGH
LOCATION DETAILED: RIGHT LATERAL ABDOMEN
LOCATION DETAILED: LEFT ELBOW
LOCATION DETAILED: LEFT INFERIOR CENTRAL MALAR CHEEK
LOCATION DETAILED: RIGHT DORSAL HAND
LOCATION DETAILED: RIGHT PLANTAR FOREFOOT OVERLYING 1ST METATARSAL
LOCATION DETAILED: LEFT PLANTAR FOREFOOT OVERLYING 1ST METATARSAL

## 2022-11-16 NOTE — PROCEDURE: DIAGNOSIS COMMENT
Comment: Excised in 2016; 2.8mm, re-excised 2017 (1+ node,18 neg), chuy simpson/ Ileana [id number], Lelo PERKINS
Detail Level: Simple
Comment: Biopsy proven mild to moderate 10/2017; monitor for repigmentation
Comment: Biopsy proven; all sites excised\\nRight buttock concern early evolving MIS
Comment: Biopsy x 2; [id number], proven benign nevi, closely monitor
Render Risk Assessment In Note?: no
Comment: 10/2022; Went to ER for stroke like symptoms, CT/MRI showed multiple locations (brain, bilateral lung, thyroid, kidney, peritoneal) masses consistent with metastatic melanoma.

## 2022-11-16 NOTE — PROCEDURE: COUNSELING

## 2022-11-16 NOTE — PROCEDURE: ADDITIONAL NOTES
Render Risk Assessment In Note?: no
Additional Notes: 5/31/22; per pt PET showed intense uptake to left hilum concern for metastasis.\\nPt had biopsy to lymph per pt no sign of cancer.\\n\\n-10/2022; 5 masses, largest 3.4cm x 3 cm\\n\\n-10/2022; CT-CAP, Several nodules in lung, thyroid nodule, abdominal/peritoneal  with several nodules, and questionable new lesion kidney possible cyst.
Detail Level: Simple
Additional Notes: Continue Wynzora.
Additional Notes: currently improved, hold off on any systemic treatment. \\n\\nHistory below:\\n\\nIf worsens will send note to Dr. Caraballo, would like consult on starting spironlactone for hormonal acne, make sure there is no contraindications. 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 Recent retrospective and observational studies do not suggest an increased risk of prostate or breast cancer (Yanet 2016; Nate 2020; Gabbie 2019).\\n\\nPt has been on MInocycline through PCP for 2 years.  Discussed long term SE w/ minocycline.
Additional Notes: Clear no open wounds
Additional Notes: Will monitor
Additional Notes: Care Team\\n\\nDr. Simone at [facility]; per pt completed radiation to brain\\nDr. Caraballo Oncology\\nPer pt start Yerovy this week\\n\\nRequested MR from above providers

## 2022-11-16 NOTE — HPI: MELANOMA F/U (HISTORY OF MALIGNANT MELANOMA)
What Is The Reason For Today's Visit?: Surveillance against skin cancer recurrences
Breslow Depth?: 2.8MM
Year Excised?: 2016, 2017

## 2022-11-17 ENCOUNTER — HOSPITAL ENCOUNTER (OUTPATIENT)
Facility: MEDICAL CENTER | Age: 42
End: 2022-11-17
Attending: INTERNAL MEDICINE
Payer: COMMERCIAL

## 2022-11-17 LAB
ALBUMIN SERPL BCP-MCNC: 3.6 G/DL (ref 3.2–4.9)
ALBUMIN/GLOB SERPL: 1.9 G/DL
ALP SERPL-CCNC: 77 U/L (ref 30–99)
ALT SERPL-CCNC: 6 U/L (ref 2–50)
ANION GAP SERPL CALC-SCNC: 12 MMOL/L (ref 7–16)
AST SERPL-CCNC: 6 U/L (ref 12–45)
BILIRUB SERPL-MCNC: <0.2 MG/DL (ref 0.1–1.5)
BUN SERPL-MCNC: 12 MG/DL (ref 8–22)
CALCIUM SERPL-MCNC: 9.1 MG/DL (ref 8.5–10.5)
CHLORIDE SERPL-SCNC: 101 MMOL/L (ref 96–112)
CO2 SERPL-SCNC: 24 MMOL/L (ref 20–33)
CREAT SERPL-MCNC: 0.44 MG/DL (ref 0.5–1.4)
FERRITIN SERPL-MCNC: 84.1 NG/ML (ref 10–291)
GFR SERPLBLD CREATININE-BSD FMLA CKD-EPI: 122 ML/MIN/1.73 M 2
GLOBULIN SER CALC-MCNC: 1.9 G/DL (ref 1.9–3.5)
GLUCOSE SERPL-MCNC: 249 MG/DL (ref 65–99)
HGB RETIC QN AUTO: 25.3 PG/CELL (ref 29–35)
IMM RETICS NFR: 45.4 % (ref 9.3–17.4)
LDH SERPL L TO P-CCNC: 606 U/L (ref 107–266)
POTASSIUM SERPL-SCNC: 4.8 MMOL/L (ref 3.6–5.5)
PROT SERPL-MCNC: 5.5 G/DL (ref 6–8.2)
RETICS # AUTO: 0.11 M/UL (ref 0.04–0.06)
RETICS/RBC NFR: 3.7 % (ref 0.8–2.1)
SODIUM SERPL-SCNC: 137 MMOL/L (ref 135–145)
TSH SERPL DL<=0.005 MIU/L-ACNC: 0.7 UIU/ML (ref 0.38–5.33)
VIT B12 SERPL-MCNC: 854 PG/ML (ref 211–911)

## 2022-11-17 PROCEDURE — 83615 LACTATE (LD) (LDH) ENZYME: CPT

## 2022-11-17 PROCEDURE — 84443 ASSAY THYROID STIM HORMONE: CPT

## 2022-11-17 PROCEDURE — 82728 ASSAY OF FERRITIN: CPT

## 2022-11-17 PROCEDURE — 85046 RETICYTE/HGB CONCENTRATE: CPT

## 2022-11-17 PROCEDURE — 82607 VITAMIN B-12: CPT

## 2022-11-17 PROCEDURE — 80053 COMPREHEN METABOLIC PANEL: CPT

## 2022-11-17 RX ORDER — ACETAMINOPHEN 325 MG/1
650 TABLET ORAL ONCE
Status: CANCELLED | OUTPATIENT
Start: 2022-11-17

## 2022-11-17 RX ORDER — DIPHENHYDRAMINE HCL 25 MG
25 TABLET ORAL ONCE
Status: CANCELLED | OUTPATIENT
Start: 2022-11-17 | End: 2022-11-17

## 2022-11-17 RX ORDER — SODIUM CHLORIDE 9 MG/ML
INJECTION, SOLUTION INTRAVENOUS CONTINUOUS
Status: CANCELLED | OUTPATIENT
Start: 2022-11-17

## 2022-11-20 ENCOUNTER — OUTPATIENT INFUSION SERVICES (OUTPATIENT)
Dept: ONCOLOGY | Facility: MEDICAL CENTER | Age: 42
End: 2022-11-20
Attending: NURSE PRACTITIONER
Payer: COMMERCIAL

## 2022-11-20 VITALS
BODY MASS INDEX: 25.82 KG/M2 | DIASTOLIC BLOOD PRESSURE: 75 MMHG | RESPIRATION RATE: 18 BRPM | HEIGHT: 64 IN | OXYGEN SATURATION: 95 % | HEART RATE: 87 BPM | TEMPERATURE: 97.7 F | WEIGHT: 151.24 LBS | SYSTOLIC BLOOD PRESSURE: 114 MMHG

## 2022-11-20 DIAGNOSIS — D64.9 ANEMIA, UNSPECIFIED TYPE: ICD-10-CM

## 2022-11-20 LAB
ABO GROUP BLD: NORMAL
BARCODED ABORH UBTYP: 8400
BARCODED PRD CODE UBPRD: NORMAL
BARCODED UNIT NUM UBUNT: NORMAL
BASOPHILS # BLD AUTO: 0.9 % (ref 0–1.8)
BASOPHILS # BLD: 0.08 K/UL (ref 0–0.12)
BLD GP AB SCN SERPL QL: NORMAL
COMPONENT R 8504R: NORMAL
EOSINOPHIL # BLD AUTO: 0.31 K/UL (ref 0–0.51)
EOSINOPHIL NFR BLD: 3.3 % (ref 0–6.9)
ERYTHROCYTE [DISTWIDTH] IN BLOOD BY AUTOMATED COUNT: 61.8 FL (ref 35.9–50)
HCT VFR BLD AUTO: 26.1 % (ref 37–47)
HGB BLD-MCNC: 7.7 G/DL (ref 12–16)
IMM GRANULOCYTES # BLD AUTO: 0.05 K/UL (ref 0–0.11)
IMM GRANULOCYTES NFR BLD AUTO: 0.5 % (ref 0–0.9)
LYMPHOCYTES # BLD AUTO: 1.11 K/UL (ref 1–4.8)
LYMPHOCYTES NFR BLD: 11.9 % (ref 22–41)
MCH RBC QN AUTO: 24.6 PG (ref 27–33)
MCHC RBC AUTO-ENTMCNC: 29.5 G/DL (ref 33.6–35)
MCV RBC AUTO: 83.4 FL (ref 81.4–97.8)
MONOCYTES # BLD AUTO: 0.85 K/UL (ref 0–0.85)
MONOCYTES NFR BLD AUTO: 9.1 % (ref 0–13.4)
NEUTROPHILS # BLD AUTO: 6.92 K/UL (ref 2–7.15)
NEUTROPHILS NFR BLD: 74.3 % (ref 44–72)
NRBC # BLD AUTO: 0 K/UL
NRBC BLD-RTO: 0 /100 WBC
OUTPT INFUS CBC COMMENT OICOM: ABNORMAL
PLATELET # BLD AUTO: 562 K/UL (ref 164–446)
PMV BLD AUTO: 9.2 FL (ref 9–12.9)
PRODUCT TYPE UPROD: NORMAL
RBC # BLD AUTO: 3.13 M/UL (ref 4.2–5.4)
RH BLD: NORMAL
UNIT STATUS USTAT: NORMAL
WBC # BLD AUTO: 9.3 K/UL (ref 4.8–10.8)

## 2022-11-20 PROCEDURE — 96374 THER/PROPH/DIAG INJ IV PUSH: CPT

## 2022-11-20 PROCEDURE — 86850 RBC ANTIBODY SCREEN: CPT

## 2022-11-20 PROCEDURE — 86900 BLOOD TYPING SEROLOGIC ABO: CPT

## 2022-11-20 PROCEDURE — 700111 HCHG RX REV CODE 636 W/ 250 OVERRIDE (IP): Performed by: NURSE PRACTITIONER

## 2022-11-20 PROCEDURE — P9016 RBC LEUKOCYTES REDUCED: HCPCS

## 2022-11-20 PROCEDURE — 86901 BLOOD TYPING SEROLOGIC RH(D): CPT

## 2022-11-20 PROCEDURE — 85025 COMPLETE CBC W/AUTO DIFF WBC: CPT

## 2022-11-20 PROCEDURE — A9270 NON-COVERED ITEM OR SERVICE: HCPCS | Performed by: NURSE PRACTITIONER

## 2022-11-20 PROCEDURE — 36430 TRANSFUSION BLD/BLD COMPNT: CPT

## 2022-11-20 PROCEDURE — 700102 HCHG RX REV CODE 250 W/ 637 OVERRIDE(OP): Performed by: NURSE PRACTITIONER

## 2022-11-20 PROCEDURE — 306780 HCHG STAT FOR TRANSFUSION PER CASE

## 2022-11-20 PROCEDURE — 86923 COMPATIBILITY TEST ELECTRIC: CPT

## 2022-11-20 RX ORDER — SODIUM CHLORIDE 9 MG/ML
INJECTION, SOLUTION INTRAVENOUS CONTINUOUS
Status: CANCELLED | OUTPATIENT
Start: 2022-11-20

## 2022-11-20 RX ORDER — ACETAMINOPHEN 325 MG/1
650 TABLET ORAL ONCE
Status: CANCELLED | OUTPATIENT
Start: 2022-11-20

## 2022-11-20 RX ORDER — DIPHENHYDRAMINE HCL 25 MG
25 TABLET ORAL ONCE
Status: CANCELLED | OUTPATIENT
Start: 2022-11-20 | End: 2022-11-20

## 2022-11-20 RX ORDER — DIPHENHYDRAMINE HCL 25 MG
25 TABLET ORAL ONCE
Status: COMPLETED | OUTPATIENT
Start: 2022-11-20 | End: 2022-11-20

## 2022-11-20 RX ORDER — ACETAMINOPHEN 325 MG/1
650 TABLET ORAL ONCE
Status: COMPLETED | OUTPATIENT
Start: 2022-11-20 | End: 2022-11-20

## 2022-11-20 RX ADMIN — DIPHENHYDRAMINE HYDROCHLORIDE 25 MG: 25 TABLET ORAL at 15:59

## 2022-11-20 RX ADMIN — ACETAMINOPHEN 650 MG: 325 TABLET ORAL at 15:59

## 2022-11-20 RX ADMIN — HYDROCORTISONE SODIUM SUCCINATE 100 MG: 100 INJECTION, POWDER, FOR SOLUTION INTRAMUSCULAR; INTRAVENOUS at 16:02

## 2022-11-20 ASSESSMENT — FIBROSIS 4 INDEX: FIB4 SCORE: 0.25

## 2022-11-20 ASSESSMENT — PAIN DESCRIPTION - PAIN TYPE: TYPE: CHRONIC PAIN

## 2022-11-20 NOTE — PROGRESS NOTES
Pt arrived to [facility] for RBC transfusion. She receives chemo at [facility] and states that they sent out a CBC. We do not have the results so will send another today. PIV started in R hand, CBC and COD drawn and sent. Hgb = 7.7, parameters met for transfusion. Pre medications given as ordered and consents signed. Transfusion tolerated without adverse reaction. PIV removed and catheter intact. No future appts needed at this time. Pt left in stable condition.

## 2022-11-21 PROCEDURE — RXMED WILLOW AMBULATORY MEDICATION CHARGE: Performed by: PHYSICAL MEDICINE & REHABILITATION

## 2022-11-22 ENCOUNTER — PHARMACY VISIT (OUTPATIENT)
Dept: PHARMACY | Facility: MEDICAL CENTER | Age: 42
End: 2022-11-22
Payer: COMMERCIAL

## 2022-11-23 ENCOUNTER — PHARMACY VISIT (OUTPATIENT)
Dept: PHARMACY | Facility: MEDICAL CENTER | Age: 42
End: 2022-11-23
Payer: COMMERCIAL

## 2022-11-29 ENCOUNTER — HOSPITAL ENCOUNTER (OUTPATIENT)
Dept: LAB | Facility: MEDICAL CENTER | Age: 42
End: 2022-11-29
Attending: INTERNAL MEDICINE
Payer: COMMERCIAL

## 2022-11-29 LAB
ALBUMIN SERPL BCP-MCNC: 3.4 G/DL (ref 3.2–4.9)
ALBUMIN/GLOB SERPL: 1.6 G/DL
ALP SERPL-CCNC: 87 U/L (ref 30–99)
ALT SERPL-CCNC: 7 U/L (ref 2–50)
ANION GAP SERPL CALC-SCNC: 8 MMOL/L (ref 7–16)
ANISOCYTOSIS BLD QL SMEAR: ABNORMAL
AST SERPL-CCNC: 9 U/L (ref 12–45)
BASOPHILS # BLD AUTO: 1 % (ref 0–1.8)
BASOPHILS # BLD: 0.07 K/UL (ref 0–0.12)
BILIRUB SERPL-MCNC: <0.2 MG/DL (ref 0.1–1.5)
BUN SERPL-MCNC: 9 MG/DL (ref 8–22)
CALCIUM SERPL-MCNC: 9.1 MG/DL (ref 8.5–10.5)
CHLORIDE SERPL-SCNC: 105 MMOL/L (ref 96–112)
CO2 SERPL-SCNC: 27 MMOL/L (ref 20–33)
COMMENT 1642: NORMAL
CREAT SERPL-MCNC: 0.42 MG/DL (ref 0.5–1.4)
EOSINOPHIL # BLD AUTO: 0.49 K/UL (ref 0–0.51)
EOSINOPHIL NFR BLD: 6.9 % (ref 0–6.9)
ERYTHROCYTE [DISTWIDTH] IN BLOOD BY AUTOMATED COUNT: 66 FL (ref 35.9–50)
GFR SERPLBLD CREATININE-BSD FMLA CKD-EPI: 124 ML/MIN/1.73 M 2
GLOBULIN SER CALC-MCNC: 2.1 G/DL (ref 1.9–3.5)
GLUCOSE SERPL-MCNC: 123 MG/DL (ref 65–99)
HCT VFR BLD AUTO: 28.7 % (ref 37–47)
HGB BLD-MCNC: 8.3 G/DL (ref 12–16)
IMM GRANULOCYTES # BLD AUTO: 0.04 K/UL (ref 0–0.11)
IMM GRANULOCYTES NFR BLD AUTO: 0.6 % (ref 0–0.9)
LYMPHOCYTES # BLD AUTO: 1.09 K/UL (ref 1–4.8)
LYMPHOCYTES NFR BLD: 15.3 % (ref 22–41)
MACROCYTES BLD QL SMEAR: ABNORMAL
MCH RBC QN AUTO: 25.4 PG (ref 27–33)
MCHC RBC AUTO-ENTMCNC: 28.9 G/DL (ref 33.6–35)
MCV RBC AUTO: 87.8 FL (ref 81.4–97.8)
MICROCYTES BLD QL SMEAR: ABNORMAL
MONOCYTES # BLD AUTO: 0.78 K/UL (ref 0–0.85)
MONOCYTES NFR BLD AUTO: 11 % (ref 0–13.4)
MORPHOLOGY BLD-IMP: NORMAL
NEUTROPHILS # BLD AUTO: 4.64 K/UL (ref 2–7.15)
NEUTROPHILS NFR BLD: 65.2 % (ref 44–72)
NRBC # BLD AUTO: 0 K/UL
NRBC BLD-RTO: 0 /100 WBC
PLATELET # BLD AUTO: 492 K/UL (ref 164–446)
PLATELET BLD QL SMEAR: NORMAL
PMV BLD AUTO: 9.4 FL (ref 9–12.9)
POLYCHROMASIA BLD QL SMEAR: NORMAL
POTASSIUM SERPL-SCNC: 4.3 MMOL/L (ref 3.6–5.5)
PROT SERPL-MCNC: 5.5 G/DL (ref 6–8.2)
RBC # BLD AUTO: 3.27 M/UL (ref 4.2–5.4)
RBC BLD AUTO: PRESENT
SODIUM SERPL-SCNC: 140 MMOL/L (ref 135–145)
WBC # BLD AUTO: 7.1 K/UL (ref 4.8–10.8)

## 2022-11-29 PROCEDURE — 36415 COLL VENOUS BLD VENIPUNCTURE: CPT

## 2022-11-29 PROCEDURE — 80053 COMPREHEN METABOLIC PANEL: CPT

## 2022-11-29 PROCEDURE — 85025 COMPLETE CBC W/AUTO DIFF WBC: CPT

## 2022-11-30 ENCOUNTER — HOSPITAL ENCOUNTER (OUTPATIENT)
Facility: MEDICAL CENTER | Age: 42
End: 2022-11-30
Attending: INTERNAL MEDICINE
Payer: COMMERCIAL

## 2022-11-30 PROCEDURE — 82728 ASSAY OF FERRITIN: CPT

## 2022-12-01 LAB
AMBIGUOUS DTTM AMBI4: NORMAL
FERRITIN SERPL-MCNC: 49 NG/ML (ref 10–291)

## 2022-12-01 NOTE — PROCEDURE: ADDITIONAL NOTES
"Continue Latanoprost (green top) at bedtime both eyes and Timolol (yellow top) twice a day in both eyes     Use artificial tears up to four times a day (like Refresh Optive, Systane Balance, TheraTears, or generic artificial tears are ok. Avoid \"get the red out\" drops).     Glasses prescription given - optional to update    Continue care with Dr. Garsia as he recommends    Keep blood sugars and blood pressure under good control.     Mateo Gray MD  [phone]    Patient Education   Diabetes weakens the blood vessels all over the body, including the eyes. Damage to the blood vessels in the eyes can cause swelling or bleeding into part of the eye (called the retina). This is called diabetic retinopathy (KARY-tin--[facility]-thee). If not treated, this disease can cause vision loss or blindness.   Symptoms may include blurred or distorted vision, but many people have no symptoms. It's important to see your eye doctor regularly to check for problems.   Early treatment and good control can help protect your vision. Here are the things you can do to help prevent vision loss:      1. Keep your blood sugar levels under tight control.      2. Bring high blood pressure under control.      3. No smoking.      4. Have yearly dilated eye exams.          "
Render Risk Assessment In Note?: no
Additional Notes: Pt last PET scan 2019 unremarkable.\\nPt to reach out to Dr. Caraballo about future maintenance imaging if warranted.
Detail Level: Simple
Additional Notes: Plan;\\n\\n1. Pt completeted 18 sessions of UVB with + results, however not long term solution due to history of MM.\\n2. Also used Duobri with + results\\n3. Submit for extract laser.\\n4. Pt not candidate for biologic due to invasive melanoma history. \\nMeantime clobetasol w/ occlusion at bedtime.
Additional Notes: currently improved, hold off on any systemic treatment. \\n\\nHistory below:\\n\\nIf worsens will send note to Dr. Caraballo, would like consult on starting spironlactone for hormonal acne, make sure there is no contraindications. 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 Recent retrospective and observational studies do not suggest an increased risk of prostate or breast cancer (Yanet 2016; Nate 2020; Gabbie 2019).\\n\\nPt has been on MInocycline through PCP for 2 years.  Discussed long term SE w/ minocycline.
Additional Notes: Refer to wound care [facility].\\n\\nAlso I have recommended pt see Dr. Martini podiatrist
Additional Notes: Per pt wound care DC her, she is managing wounds herself, went to podiatrist working on better fitting shoes.\\n\\nPt high risk 2/2 hx of DM.

## 2022-12-05 ENCOUNTER — HOSPITAL ENCOUNTER (OUTPATIENT)
Dept: RADIOLOGY | Facility: MEDICAL CENTER | Age: 42
End: 2022-12-05
Attending: INTERNAL MEDICINE
Payer: COMMERCIAL

## 2022-12-05 DIAGNOSIS — C43.62 MALIGNANT MELANOMA OF LEFT UPPER EXTREMITY INCLUDING SHOULDER (HCC): ICD-10-CM

## 2022-12-05 PROCEDURE — A9552 F18 FDG: HCPCS

## 2022-12-06 ENCOUNTER — HOSPITAL ENCOUNTER (OUTPATIENT)
Dept: LAB | Facility: MEDICAL CENTER | Age: 42
End: 2022-12-06
Attending: INTERNAL MEDICINE
Payer: COMMERCIAL

## 2022-12-06 LAB
DAT C3D-SP REAG RBC QL: NORMAL
DAT IGG-SP REAG RBC QL: NORMAL
LDH SERPL L TO P-CCNC: 380 U/L (ref 107–266)

## 2022-12-06 PROCEDURE — 36415 COLL VENOUS BLD VENIPUNCTURE: CPT

## 2022-12-06 PROCEDURE — 86880 COOMBS TEST DIRECT: CPT | Mod: 91

## 2022-12-06 PROCEDURE — 83615 LACTATE (LD) (LDH) ENZYME: CPT

## 2022-12-06 PROCEDURE — 83010 ASSAY OF HAPTOGLOBIN QUANT: CPT

## 2022-12-08 ENCOUNTER — HOSPITAL ENCOUNTER (OUTPATIENT)
Facility: MEDICAL CENTER | Age: 42
End: 2022-12-08
Attending: NURSE PRACTITIONER
Payer: COMMERCIAL

## 2022-12-08 LAB
FERRITIN SERPL-MCNC: 71.9 NG/ML (ref 10–291)
HAPTOGLOB SERPL-MCNC: 368 MG/DL (ref 30–200)
TSH SERPL DL<=0.005 MIU/L-ACNC: 0.56 UIU/ML (ref 0.38–5.33)

## 2022-12-08 PROCEDURE — 84443 ASSAY THYROID STIM HORMONE: CPT

## 2022-12-08 PROCEDURE — 82728 ASSAY OF FERRITIN: CPT

## 2022-12-09 ENCOUNTER — APPOINTMENT (OUTPATIENT)
Dept: RADIOLOGY | Facility: MEDICAL CENTER | Age: 42
DRG: 081 | End: 2022-12-09
Attending: EMERGENCY MEDICINE
Payer: COMMERCIAL

## 2022-12-09 ENCOUNTER — HOSPITAL ENCOUNTER (INPATIENT)
Facility: MEDICAL CENTER | Age: 42
LOS: 3 days | DRG: 081 | End: 2022-12-12
Attending: EMERGENCY MEDICINE | Admitting: STUDENT IN AN ORGANIZED HEALTH CARE EDUCATION/TRAINING PROGRAM
Payer: COMMERCIAL

## 2022-12-09 DIAGNOSIS — C79.31 BRAIN METASTASES: ICD-10-CM

## 2022-12-09 PROBLEM — E11.40 DIABETIC NEUROPATHY (HCC): Status: ACTIVE | Noted: 2022-12-09

## 2022-12-09 LAB
ALBUMIN SERPL BCP-MCNC: 3.9 G/DL (ref 3.2–4.9)
ALBUMIN/GLOB SERPL: 1.6 G/DL
ALP SERPL-CCNC: 104 U/L (ref 30–99)
ALT SERPL-CCNC: 6 U/L (ref 2–50)
ANION GAP SERPL CALC-SCNC: 12 MMOL/L (ref 7–16)
ANISOCYTOSIS BLD QL SMEAR: ABNORMAL
AST SERPL-CCNC: 9 U/L (ref 12–45)
BASOPHILS # BLD AUTO: 1.8 % (ref 0–1.8)
BASOPHILS # BLD: 0.11 K/UL (ref 0–0.12)
BILIRUB SERPL-MCNC: <0.2 MG/DL (ref 0.1–1.5)
BUN SERPL-MCNC: 8 MG/DL (ref 8–22)
CALCIUM SERPL-MCNC: 9.2 MG/DL (ref 8.5–10.5)
CHLORIDE SERPL-SCNC: 103 MMOL/L (ref 96–112)
CO2 SERPL-SCNC: 26 MMOL/L (ref 20–33)
CREAT SERPL-MCNC: 0.37 MG/DL (ref 0.5–1.4)
EKG IMPRESSION: NORMAL
EOSINOPHIL # BLD AUTO: 0.27 K/UL (ref 0–0.51)
EOSINOPHIL NFR BLD: 4.5 % (ref 0–6.9)
ERYTHROCYTE [DISTWIDTH] IN BLOOD BY AUTOMATED COUNT: 64.2 FL (ref 35.9–50)
GFR SERPLBLD CREATININE-BSD FMLA CKD-EPI: 127 ML/MIN/1.73 M 2
GLOBULIN SER CALC-MCNC: 2.4 G/DL (ref 1.9–3.5)
GLUCOSE BLD STRIP.AUTO-MCNC: 197 MG/DL (ref 65–99)
GLUCOSE SERPL-MCNC: 138 MG/DL (ref 65–99)
HCG SERPL QL: NEGATIVE
HCT VFR BLD AUTO: 30.7 % (ref 37–47)
HGB BLD-MCNC: 9.1 G/DL (ref 12–16)
LYMPHOCYTES # BLD AUTO: 1.33 K/UL (ref 1–4.8)
LYMPHOCYTES NFR BLD: 21.8 % (ref 22–41)
MACROCYTES BLD QL SMEAR: ABNORMAL
MANUAL DIFF BLD: NORMAL
MCH RBC QN AUTO: 25.3 PG (ref 27–33)
MCHC RBC AUTO-ENTMCNC: 29.6 G/DL (ref 33.6–35)
MCV RBC AUTO: 85.3 FL (ref 81.4–97.8)
MICROCYTES BLD QL SMEAR: ABNORMAL
MONOCYTES # BLD AUTO: 0.34 K/UL (ref 0–0.85)
MONOCYTES NFR BLD AUTO: 5.5 % (ref 0–13.4)
MORPHOLOGY BLD-IMP: NORMAL
NEUTROPHILS # BLD AUTO: 4.05 K/UL (ref 2–7.15)
NEUTROPHILS NFR BLD: 66.4 % (ref 44–72)
NRBC # BLD AUTO: 0 K/UL
NRBC BLD-RTO: 0 /100 WBC
PLATELET # BLD AUTO: 571 K/UL (ref 164–446)
PLATELET BLD QL SMEAR: NORMAL
PMV BLD AUTO: 8.7 FL (ref 9–12.9)
POLYCHROMASIA BLD QL SMEAR: NORMAL
POTASSIUM SERPL-SCNC: 3.8 MMOL/L (ref 3.6–5.5)
PROT SERPL-MCNC: 6.3 G/DL (ref 6–8.2)
RBC # BLD AUTO: 3.6 M/UL (ref 4.2–5.4)
RBC BLD AUTO: PRESENT
SODIUM SERPL-SCNC: 141 MMOL/L (ref 135–145)
WBC # BLD AUTO: 6.1 K/UL (ref 4.8–10.8)

## 2022-12-09 PROCEDURE — 700102 HCHG RX REV CODE 250 W/ 637 OVERRIDE(OP): Performed by: STUDENT IN AN ORGANIZED HEALTH CARE EDUCATION/TRAINING PROGRAM

## 2022-12-09 PROCEDURE — 84703 CHORIONIC GONADOTROPIN ASSAY: CPT

## 2022-12-09 PROCEDURE — 700111 HCHG RX REV CODE 636 W/ 250 OVERRIDE (IP): Performed by: STUDENT IN AN ORGANIZED HEALTH CARE EDUCATION/TRAINING PROGRAM

## 2022-12-09 PROCEDURE — A9270 NON-COVERED ITEM OR SERVICE: HCPCS | Performed by: STUDENT IN AN ORGANIZED HEALTH CARE EDUCATION/TRAINING PROGRAM

## 2022-12-09 PROCEDURE — 85025 COMPLETE CBC W/AUTO DIFF WBC: CPT

## 2022-12-09 PROCEDURE — 82962 GLUCOSE BLOOD TEST: CPT

## 2022-12-09 PROCEDURE — 36415 COLL VENOUS BLD VENIPUNCTURE: CPT

## 2022-12-09 PROCEDURE — 700111 HCHG RX REV CODE 636 W/ 250 OVERRIDE (IP): Performed by: EMERGENCY MEDICINE

## 2022-12-09 PROCEDURE — 93005 ELECTROCARDIOGRAM TRACING: CPT | Performed by: EMERGENCY MEDICINE

## 2022-12-09 PROCEDURE — 70450 CT HEAD/BRAIN W/O DYE: CPT

## 2022-12-09 PROCEDURE — 96374 THER/PROPH/DIAG INJ IV PUSH: CPT

## 2022-12-09 PROCEDURE — 99223 1ST HOSP IP/OBS HIGH 75: CPT | Mod: GC | Performed by: STUDENT IN AN ORGANIZED HEALTH CARE EDUCATION/TRAINING PROGRAM

## 2022-12-09 PROCEDURE — 85007 BL SMEAR W/DIFF WBC COUNT: CPT

## 2022-12-09 PROCEDURE — 80053 COMPREHEN METABOLIC PANEL: CPT

## 2022-12-09 PROCEDURE — 99285 EMERGENCY DEPT VISIT HI MDM: CPT

## 2022-12-09 PROCEDURE — 770004 HCHG ROOM/CARE - ONCOLOGY PRIVATE *

## 2022-12-09 RX ORDER — DULOXETIN HYDROCHLORIDE 20 MG/1
60 CAPSULE, DELAYED RELEASE ORAL EVERY EVENING
Status: DISCONTINUED | OUTPATIENT
Start: 2022-12-09 | End: 2022-12-12 | Stop reason: HOSPADM

## 2022-12-09 RX ORDER — OXCARBAZEPINE 300 MG/1
300 TABLET, FILM COATED ORAL 2 TIMES DAILY
Status: DISCONTINUED | OUTPATIENT
Start: 2022-12-09 | End: 2022-12-12 | Stop reason: HOSPADM

## 2022-12-09 RX ORDER — DEXAMETHASONE SODIUM PHOSPHATE 4 MG/ML
6 INJECTION, SOLUTION INTRA-ARTICULAR; INTRALESIONAL; INTRAMUSCULAR; INTRAVENOUS; SOFT TISSUE ONCE
Status: COMPLETED | OUTPATIENT
Start: 2022-12-09 | End: 2022-12-09

## 2022-12-09 RX ORDER — FAMOTIDINE 20 MG/1
10 TABLET, FILM COATED ORAL
Status: DISCONTINUED | OUTPATIENT
Start: 2022-12-09 | End: 2022-12-12 | Stop reason: HOSPADM

## 2022-12-09 RX ORDER — ATORVASTATIN CALCIUM 40 MG/1
40 TABLET, FILM COATED ORAL EVERY EVENING
Status: DISCONTINUED | OUTPATIENT
Start: 2022-12-09 | End: 2022-12-12 | Stop reason: HOSPADM

## 2022-12-09 RX ORDER — ACETAMINOPHEN 160 MG
2000 TABLET,DISINTEGRATING ORAL EVERY MORNING
COMMUNITY
End: 2024-01-03

## 2022-12-09 RX ORDER — VITAMIN B COMPLEX
2000 TABLET ORAL DAILY
Status: DISCONTINUED | OUTPATIENT
Start: 2022-12-10 | End: 2022-12-12 | Stop reason: HOSPADM

## 2022-12-09 RX ORDER — ONDANSETRON 4 MG/1
4 TABLET, ORALLY DISINTEGRATING ORAL 2 TIMES DAILY PRN
COMMUNITY
Start: 2022-11-22 | End: 2023-12-04

## 2022-12-09 RX ORDER — B-COMPLEX WITH VITAMIN C
1 TABLET ORAL EVERY MORNING
COMMUNITY
End: 2024-01-03

## 2022-12-09 RX ORDER — ONDANSETRON 2 MG/ML
4 INJECTION INTRAMUSCULAR; INTRAVENOUS EVERY 4 HOURS PRN
Status: DISCONTINUED | OUTPATIENT
Start: 2022-12-09 | End: 2022-12-12 | Stop reason: HOSPADM

## 2022-12-09 RX ORDER — CLONAZEPAM 0.5 MG/1
0.5 TABLET ORAL 2 TIMES DAILY PRN
Status: DISCONTINUED | OUTPATIENT
Start: 2022-12-09 | End: 2022-12-12 | Stop reason: HOSPADM

## 2022-12-09 RX ORDER — MELOXICAM 15 MG/1
15 TABLET ORAL DAILY
COMMUNITY

## 2022-12-09 RX ORDER — GABAPENTIN 400 MG/1
800 CAPSULE ORAL 4 TIMES DAILY
Status: DISCONTINUED | OUTPATIENT
Start: 2022-12-09 | End: 2022-12-12 | Stop reason: HOSPADM

## 2022-12-09 RX ORDER — AMOXICILLIN 250 MG
2 CAPSULE ORAL 2 TIMES DAILY
Status: DISCONTINUED | OUTPATIENT
Start: 2022-12-09 | End: 2022-12-12 | Stop reason: HOSPADM

## 2022-12-09 RX ORDER — BISACODYL 10 MG
10 SUPPOSITORY, RECTAL RECTAL
Status: DISCONTINUED | OUTPATIENT
Start: 2022-12-09 | End: 2022-12-12 | Stop reason: HOSPADM

## 2022-12-09 RX ORDER — DEXAMETHASONE SODIUM PHOSPHATE 4 MG/ML
4 INJECTION, SOLUTION INTRA-ARTICULAR; INTRALESIONAL; INTRAMUSCULAR; INTRAVENOUS; SOFT TISSUE EVERY 6 HOURS
Status: DISCONTINUED | OUTPATIENT
Start: 2022-12-09 | End: 2022-12-09

## 2022-12-09 RX ORDER — FERROUS SULFATE 325(65) MG
325 TABLET ORAL DAILY
Status: DISCONTINUED | OUTPATIENT
Start: 2022-12-10 | End: 2022-12-12 | Stop reason: HOSPADM

## 2022-12-09 RX ORDER — ACETAMINOPHEN 325 MG/1
650 TABLET ORAL EVERY 6 HOURS PRN
Status: DISCONTINUED | OUTPATIENT
Start: 2022-12-09 | End: 2022-12-12 | Stop reason: HOSPADM

## 2022-12-09 RX ORDER — BACLOFEN 10 MG/1
10 TABLET ORAL 3 TIMES DAILY
Status: DISCONTINUED | OUTPATIENT
Start: 2022-12-09 | End: 2022-12-12 | Stop reason: HOSPADM

## 2022-12-09 RX ORDER — OXYCODONE HYDROCHLORIDE 5 MG/1
2.5 TABLET ORAL
Status: DISCONTINUED | OUTPATIENT
Start: 2022-12-09 | End: 2022-12-12 | Stop reason: HOSPADM

## 2022-12-09 RX ORDER — QUETIAPINE FUMARATE 100 MG/1
200 TABLET, FILM COATED ORAL EVERY EVENING
Status: DISCONTINUED | OUTPATIENT
Start: 2022-12-09 | End: 2022-12-12 | Stop reason: HOSPADM

## 2022-12-09 RX ORDER — ALBUTEROL SULFATE 90 UG/1
1-2 AEROSOL, METERED RESPIRATORY (INHALATION) EVERY 4 HOURS PRN
COMMUNITY

## 2022-12-09 RX ORDER — OXYCODONE HYDROCHLORIDE 5 MG/1
5 TABLET ORAL
Status: DISCONTINUED | OUTPATIENT
Start: 2022-12-09 | End: 2022-12-12 | Stop reason: HOSPADM

## 2022-12-09 RX ORDER — POLYETHYLENE GLYCOL 3350 17 G/17G
1 POWDER, FOR SOLUTION ORAL
Status: DISCONTINUED | OUTPATIENT
Start: 2022-12-09 | End: 2022-12-12 | Stop reason: HOSPADM

## 2022-12-09 RX ORDER — IBUPROFEN 200 MG
600 TABLET ORAL 2 TIMES DAILY PRN
Status: ON HOLD | COMMUNITY
End: 2024-01-04

## 2022-12-09 RX ORDER — CLONAZEPAM 0.5 MG/1
0.25 TABLET ORAL 2 TIMES DAILY PRN
Status: DISCONTINUED | OUTPATIENT
Start: 2022-12-09 | End: 2022-12-09

## 2022-12-09 RX ORDER — FERROUS SULFATE 325(65) MG
325 TABLET ORAL DAILY
COMMUNITY
End: 2024-01-03

## 2022-12-09 RX ORDER — INSULIN LISPRO 100 [IU]/ML
2-9 INJECTION, SOLUTION INTRAVENOUS; SUBCUTANEOUS EVERY 6 HOURS
Status: DISCONTINUED | OUTPATIENT
Start: 2022-12-09 | End: 2022-12-11

## 2022-12-09 RX ORDER — FAMOTIDINE 20 MG/1
20 TABLET, FILM COATED ORAL DAILY
COMMUNITY

## 2022-12-09 RX ORDER — MORPHINE SULFATE 4 MG/ML
2 INJECTION INTRAVENOUS
Status: DISCONTINUED | OUTPATIENT
Start: 2022-12-09 | End: 2022-12-12 | Stop reason: HOSPADM

## 2022-12-09 RX ORDER — INSULIN LISPRO 100 [IU]/ML
2-9 INJECTION, SOLUTION INTRAVENOUS; SUBCUTANEOUS EVERY 6 HOURS
Status: DISCONTINUED | OUTPATIENT
Start: 2022-12-09 | End: 2022-12-09

## 2022-12-09 RX ORDER — LEVETIRACETAM 500 MG/1
1000 TABLET ORAL 2 TIMES DAILY
Status: DISCONTINUED | OUTPATIENT
Start: 2022-12-09 | End: 2022-12-12

## 2022-12-09 RX ORDER — ALBUTEROL SULFATE 90 UG/1
1-2 AEROSOL, METERED RESPIRATORY (INHALATION) EVERY 4 HOURS PRN
Status: DISCONTINUED | OUTPATIENT
Start: 2022-12-09 | End: 2022-12-12 | Stop reason: HOSPADM

## 2022-12-09 RX ORDER — B-COMPLEX WITH VITAMIN C
1 TABLET ORAL EVERY MORNING
Status: DISCONTINUED | OUTPATIENT
Start: 2022-12-10 | End: 2022-12-09

## 2022-12-09 RX ORDER — PROCHLORPERAZINE MALEATE 10 MG
10 TABLET ORAL EVERY 6 HOURS PRN
COMMUNITY
Start: 2022-11-22

## 2022-12-09 RX ORDER — DEXAMETHASONE SODIUM PHOSPHATE 4 MG/ML
4 INJECTION, SOLUTION INTRA-ARTICULAR; INTRALESIONAL; INTRAMUSCULAR; INTRAVENOUS; SOFT TISSUE EVERY 4 HOURS
Status: DISCONTINUED | OUTPATIENT
Start: 2022-12-09 | End: 2022-12-10

## 2022-12-09 RX ADMIN — DEXAMETHASONE SODIUM PHOSPHATE 4 MG: 4 INJECTION, SOLUTION INTRA-ARTICULAR; INTRALESIONAL; INTRAMUSCULAR; INTRAVENOUS; SOFT TISSUE at 20:16

## 2022-12-09 RX ADMIN — LEVETIRACETAM 1000 MG: 500 TABLET, FILM COATED ORAL at 20:15

## 2022-12-09 RX ADMIN — DEXAMETHASONE SODIUM PHOSPHATE 6 MG: 4 INJECTION, SOLUTION INTRA-ARTICULAR; INTRALESIONAL; INTRAMUSCULAR; INTRAVENOUS; SOFT TISSUE at 17:55

## 2022-12-09 RX ADMIN — OXYCODONE 5 MG: 5 TABLET ORAL at 20:16

## 2022-12-09 RX ADMIN — ATORVASTATIN CALCIUM 40 MG: 40 TABLET, FILM COATED ORAL at 20:15

## 2022-12-09 RX ADMIN — GABAPENTIN 800 MG: 400 CAPSULE ORAL at 20:15

## 2022-12-09 RX ADMIN — OXCARBAZEPINE 300 MG: 300 TABLET, FILM COATED ORAL at 21:57

## 2022-12-09 RX ADMIN — DULOXETINE HYDROCHLORIDE 60 MG: 20 CAPSULE, DELAYED RELEASE ORAL at 20:15

## 2022-12-09 RX ADMIN — INSULIN LISPRO 2 UNITS: 100 INJECTION, SOLUTION INTRAVENOUS; SUBCUTANEOUS at 20:28

## 2022-12-09 RX ADMIN — CLONAZEPAM 0.25 MG: 0.5 TABLET ORAL at 20:16

## 2022-12-09 RX ADMIN — DEXAMETHASONE SODIUM PHOSPHATE 4 MG: 4 INJECTION, SOLUTION INTRA-ARTICULAR; INTRALESIONAL; INTRAMUSCULAR; INTRAVENOUS; SOFT TISSUE at 21:56

## 2022-12-09 RX ADMIN — INSULIN GLARGINE-YFGN 50 UNITS: 100 INJECTION, SOLUTION SUBCUTANEOUS at 20:26

## 2022-12-09 RX ADMIN — BACLOFEN 10 MG: 10 TABLET ORAL at 20:16

## 2022-12-09 RX ADMIN — SENNOSIDES AND DOCUSATE SODIUM 2 TABLET: 50; 8.6 TABLET ORAL at 20:16

## 2022-12-09 RX ADMIN — QUETIAPINE FUMARATE 200 MG: 100 TABLET ORAL at 21:57

## 2022-12-09 ASSESSMENT — ENCOUNTER SYMPTOMS
NAUSEA: 0
SORE THROAT: 0
SENSORY CHANGE: 1
DOUBLE VISION: 0
FLANK PAIN: 0
SEIZURES: 0
FALLS: 0
FOCAL WEAKNESS: 1
LOSS OF CONSCIOUSNESS: 0
SINUS PAIN: 0
HEARTBURN: 0
BACK PAIN: 1
SHORTNESS OF BREATH: 0
COUGH: 0
DIZZINESS: 0
CHILLS: 0
PALPITATIONS: 0
VOMITING: 0
SPEECH CHANGE: 1
MYALGIAS: 0
FEVER: 0
BLURRED VISION: 0

## 2022-12-09 ASSESSMENT — LIFESTYLE VARIABLES
CONSUMPTION TOTAL: NEGATIVE
TOTAL SCORE: 0
ALCOHOL_USE: NO
DOES PATIENT WANT TO STOP DRINKING: NO
AVERAGE NUMBER OF DAYS PER WEEK YOU HAVE A DRINK CONTAINING ALCOHOL: 0
HAVE PEOPLE ANNOYED YOU BY CRITICIZING YOUR DRINKING: NO
EVER FELT BAD OR GUILTY ABOUT YOUR DRINKING: NO
HAVE YOU EVER FELT YOU SHOULD CUT DOWN ON YOUR DRINKING: NO
ON A TYPICAL DAY WHEN YOU DRINK ALCOHOL HOW MANY DRINKS DO YOU HAVE: 0
EVER HAD A DRINK FIRST THING IN THE MORNING TO STEADY YOUR NERVES TO GET RID OF A HANGOVER: NO
HOW MANY TIMES IN THE PAST YEAR HAVE YOU HAD 5 OR MORE DRINKS IN A DAY: 0
TOTAL SCORE: 0
TOTAL SCORE: 0

## 2022-12-09 ASSESSMENT — COGNITIVE AND FUNCTIONAL STATUS - GENERAL
STANDING UP FROM CHAIR USING ARMS: A LITTLE
SUGGESTED CMS G CODE MODIFIER MOBILITY: CK
DRESSING REGULAR LOWER BODY CLOTHING: A LITTLE
MOVING TO AND FROM BED TO CHAIR: A LITTLE
HELP NEEDED FOR BATHING: A LITTLE
CLIMB 3 TO 5 STEPS WITH RAILING: A LITTLE
DAILY ACTIVITIY SCORE: 21
DRESSING REGULAR UPPER BODY CLOTHING: A LITTLE
MOVING FROM LYING ON BACK TO SITTING ON SIDE OF FLAT BED: A LITTLE
MOBILITY SCORE: 18
SUGGESTED CMS G CODE MODIFIER DAILY ACTIVITY: CJ
WALKING IN HOSPITAL ROOM: A LITTLE
TURNING FROM BACK TO SIDE WHILE IN FLAT BAD: A LITTLE

## 2022-12-09 ASSESSMENT — FIBROSIS 4 INDEX
FIB4 SCORE: 0.28
FIB4 SCORE: 0.3

## 2022-12-09 ASSESSMENT — PAIN DESCRIPTION - PAIN TYPE: TYPE: CHRONIC PAIN

## 2022-12-09 NOTE — ED TRIAGE NOTES
"Chief Complaint   Patient presents with    Numbness     Pt having right sided facial numbness since 2000 yesterday. Equal  strengths. Pt had chemotherapy yesterday. Hx metastatic melanoma.     Charge RN aware of pt. Pt educated to inform staff of any changes. Pt placed in family room.     /67   Pulse 80   Temp 36.4 °C (97.5 °F) (Temporal)   Resp 16   Ht 1.676 m (5' 6\")   Wt 69.4 kg (153 lb)   SpO2 97%   BMI 24.69 kg/m²     "

## 2022-12-09 NOTE — ED PROVIDER NOTES
ED Provider Note    CHIEF COMPLAINT  Chief Complaint   Patient presents with    Numbness     Pt having right sided facial numbness since 2000 yesterday. Equal  strengths. Pt had chemotherapy yesterday. Hx metastatic melanoma.       Time Seen 2:25 PM    HPI  Jacque Mcintyre is a 42 y.o. female with a history of metastatic melanoma to her brain status post resection of 3 masses and residual neurological deficits who presents to the emergency department with new onset right sided facial numbness and weakness.  The patient reports that last night she was brushing her teeth and noticed that the right side of her face felt numb and weak.  She reports that she was dribbling out water and toothpaste.  This morning she reports that it is worsening and noticed that she cannot pull back the right side of her face into a smile.  She denies any new onset headache, new onset weakness anywhere else in her body, new onset numbness/tingling elsewhere in her body, incontinence, vision changes. She also denies fever, chills, nausea, vomiting, diarrhea.    REVIEW OF SYSTEMS  Pertinent positives: Right-sided facial numbness and right-sided facial droop  Pertinent negatives: Headache, vision changes, incontinence, numbness/tingling/weakness elsewhere    Fevers, vision change, runny nose/sore throat, chest pain, shortness of breath, nausea/vomiting, dysuria, rash, neuro symptoms, extremity pain or swelling, suicidal, bleeding/bruising, gland swelling     All other systems reviewed and are negative.     PAST MEDICAL HISTORY  Past Medical History:   Diagnosis Date    Asthma     inhaler    Bipolar 1 disorder (HCC)     Cancer (HCC) 01/01/2016    melanoma    Cough     Depression     DM mellitus,     insulin    Hyperlipidemia     Hypertension     Pap smear     Dr. Baird    PCOS (polycystic ovarian syndrome)     Shortness of breath     Sputum production     Wheezing        FAMILY HISTORY  Family History   Problem Relation Age of Onset     Psychiatric Illness Mother         depression    Diabetes Mother     Hyperlipidemia Mother     Thyroid Mother         s/p radioactive iodine treatment on replacement    Hypertension Father     Diabetes Father        SOCIAL HISTORY  Social History     Tobacco Use    Smoking status: Former     Packs/day: 0.75     Years: 10.00     Pack years: 7.50     Types: Cigarettes    Smokeless tobacco: Never   Vaping Use    Vaping Use: Never used   Substance Use Topics    Alcohol use: Not Currently    Drug use: Not Currently     Types: Inhaled     Comment: occasional smoked marijuanna approx 3 times per month       SURGICAL HISTORY  Past Surgical History:   Procedure Laterality Date    CRANIOTOMY STEALTH Right 10/6/2022    Procedure: RIGHT FRONTAL CRANIOTOMY WITH STEALTH;  Surgeon: Pebbles Tejada M.D.;  Location: [facility];  Service: Neurosurgery    NH BRONCHOSCOPY,DIAGNOSTIC N/A 6/17/2022    Procedure: FIBER OPTIC BRONCHOSCOPY WITH WASH, BRUSH, BRONCHOALVEOLAR LAVAGE, BIOPSY, FINE NEEDLE ASPIRATION;  Surgeon: Yue Swanson M.D.;  Location: [facility];  Service: Pulmonary    ENDOBRONCHIAL US ADD-ON N/A 6/17/2022    Procedure: ENDOBRONCHIAL ULTRASOUND (EBUS);  Surgeon: Yue Swanson M.D.;  Location: [facility];  Service: Pulmonary    NH LAP,APPENDECTOMY N/A 10/7/2019    Procedure: APPENDECTOMY, LAPAROSCOPIC;  Surgeon: Lionel Frazier M.D.;  Location: [facility];  Service: General    AXILLARY NODE DISSECTION Left 6/23/2017    Procedure: AXILLARY NODE DISSECTION- COMPLETION LYPHADENECTOMY;  Surgeon: Lionel Weinberg M.D.;  Location: [facility];  Service:     WIDE EXCISION Left 5/9/2017    Procedure: WIDE EXCISION - MALIGNANT MELANOMA HAND;  Surgeon: Lionel Weinberg M.D.;  Location: SURGERY SAME DAY [facility];  Service:     NODE BIOPSY SENTINEL Left 5/9/2017    Procedure: NODE BIOPSY SENTINEL - AXILLARY;  Surgeon: Lionel Weinberg M.D.;  Location: SURGERY SAME DAY  "[facility];  Service:     OTHER  2016    excisino of melanoma left hand    ADENOIDECTOMY      MYRINGOTOMY      with tube placement       CURRENT MEDICATIONS  Home Medications       Reviewed by Mirian Urena R.N. (Registered Nurse) on 12/09/22 at 1414  Med List Status: Not Addressed     Medication Last Dose Status   acetaminophen (TYLENOL) 500 MG Tab  Active   atorvastatin (LIPITOR) 40 MG Tab  Active   baclofen (LIORESAL) 10 MG Tab  Active   clonazePAM (KLONOPIN) 0.5 MG Tab  Active   DULoxetine (CYMBALTA) 60 MG Cap DR Particles delayed-release capsule  Active   gabapentin (NEURONTIN) 800 MG tablet  Active   Insulin Glargine, 2 Unit Dial, 300 UNIT/ML Solution Pen-injector  Active   insulin lispro 100 UNIT/ML SC SOPN injection PEN  Active   levetiracetam (KEPPRA) 1000 MG tablet  Active   OXcarbazepine (TRILEPTAL) 300 MG Tab  Active   QUEtiapine (SEROQUEL) 200 MG Tab  Active                    ALLERGIES  Allergies   Allergen Reactions    Augmentin Vomiting    Clavulanic Acid Vomiting     Violent vomiting       PHYSICAL EXAM  VITAL SIGNS: /67   Pulse 80   Temp 36.4 °C (97.5 °F) (Temporal)   Resp 16   Ht 1.676 m (5' 6\")   Wt 69.4 kg (153 lb)   SpO2 97%   BMI 24.69 kg/m²   Constitutional: Awake and alert  HENT: Head atraumatic, ears normal inspection, oropharynx is benign with moist mucous membranes, nares clear  Eyes: Pupils reactive, left pupil larger than right this is baseline for patient and has been since she was a child.  Extraocular muscles are intact. No ptosis or miosis.  Neck: Neck is supple without meningismus. No JVD. No pain. No bruit is noted  Cardiovascular: Normal heart rate, Normal rhythm  Thorax & Lungs: Normal breath sounds, No respiratory distress, No wheezing, No chest tenderness.   Abdomen: Soft, No tenderness, No masses, No pulsatile masses.   Skin: No pathologic rash  Extremities: Intact distal pulses, No edema, No tenderness, No cyanosis, No clubbing.   Neurologic: Patient with " decreased sensation over her right cheek.  Sensation to her right forehead intact.  Right sided facial droop.  Patient able to adequately close eyes.  Right eyebrow not affected.  Equal strength in bilateral arms and legs.  Sensation intact in bilateral extremities.     NIHSS    1a. Level of Consciousness  Alert. Keenly Responsive  Not Alert but arousable by minor stimulation  Not Alert. Requires repeated stimulation  Responds only w reflex motor/autonomic effects or totally unresponsive  Score:0  1b. Level of Consciousness: ask month and age  Answers both questions correctly  Answers one question correctly  Answers neither question correctly  Score: 0  1c. Level of Consciousness: ask to open and close eyes/make a fist with non-paretic hand  Performs both tasks correctly  Performs one task correctly  Performs neither task correctly  Score: 0  2. Best gaze: Horizontal eye movement             0 Normal             1 Partial gaze palsy. Forced deviation or total palsy not present             2 Forced deviation. Not overcome by oculo-cephalic maneuver            Score: 0  3. Visual Fields             0 No loss             1 Partial hemianopia             2 Complete hemianopia             3 Bilateral hemianopia. Blindness             Score:0  4. Facial Palsy           0 Normal symmetrical movements           1 Minor paralysis           2 Partial paralysis           3 Complete paralysis           Score: 2  5. Motor Arm Right and Left           0 No drift           1 Drift but does not hit bed           2 Some effort against gravity. Hits bed           3 No effort against gravity           4 No movement          UN Amputation/joint fusion/explain          Score R: 0          Score L: 0  6. Motor Leg: Right and Left          0 No drift          1 Drift but does not hit bed          2 Some effort against gravity. Hits bed          3 No effort against gravity          4 No movement          UN Amputation/joint  fusion/explain          Score R :0          Score L :0  7. Limb Ataxia. Finger to nose/ shin-heel         0 Absent         1 Present in one limb         2 Present in two limbs         UN Amputation/joint fusion explain         Score:0  8. Sensory. Pinprick         0 Normal. No sensory loss         1 Mild to moderate sensory loss         2 Severe or total sensory loss         Score: 1  9. Best language. Show the kitchen/cookie picture         0 No aphasia         1 Mild to moderate aphasia         2 Severe aphasia         3 Mute. Global aphasia         Score:0  10. Dysarthria. Show the reading list         0 Normal         1 Mild to moderate dysarthria         2 Severe dysarthria         3 Intubated or other physical barrier         Score: 0  11. Extinction and Inattention         0 No abnormality         1 Extinction to simultaneous stimulation         2 Profound Zackary-inattention or extinction         Score: 0        Total score: 3     RADIOLOGY/PROCEDURES  CT-HEAD W/O   Final Result      1.  Multiple intracranial metastatic lesions with surrounding edema again noted. There is a new lesion in the right parietal lobe with surrounding edema.   2.  No evidence of intracranial hemorrhage.              Imaging is interpreted by radiologist    LABS:  Results for orders placed or performed during the hospital encounter of 12/09/22   CBC WITH DIFFERENTIAL   Result Value Ref Range    WBC 6.1 4.8 - 10.8 K/uL    RBC 3.60 (L) 4.20 - 5.40 M/uL    Hemoglobin 9.1 (L) 12.0 - 16.0 g/dL    Hematocrit 30.7 (L) 37.0 - 47.0 %    MCV 85.3 81.4 - 97.8 fL    MCH 25.3 (L) 27.0 - 33.0 pg    MCHC 29.6 (L) 33.6 - 35.0 g/dL    RDW 64.2 (H) 35.9 - 50.0 fL    Platelet Count 571 (H) 164 - 446 K/uL    MPV 8.7 (L) 9.0 - 12.9 fL    Neutrophils-Polys 66.40 44.00 - 72.00 %    Lymphocytes 21.80 (L) 22.00 - 41.00 %    Monocytes 5.50 0.00 - 13.40 %    Eosinophils 4.50 0.00 - 6.90 %    Basophils 1.80 0.00 - 1.80 %    Nucleated RBC 0.00 /100 WBC     Neutrophils (Absolute) 4.05 2.00 - 7.15 K/uL    Lymphs (Absolute) 1.33 1.00 - 4.80 K/uL    Monos (Absolute) 0.34 0.00 - 0.85 K/uL    Eos (Absolute) 0.27 0.00 - 0.51 K/uL    Baso (Absolute) 0.11 0.00 - 0.12 K/uL    NRBC (Absolute) 0.00 K/uL    Anisocytosis 1+     Macrocytosis 1+     Microcytosis 1+    COMP METABOLIC PANEL   Result Value Ref Range    Sodium 141 135 - 145 mmol/L    Potassium 3.8 3.6 - 5.5 mmol/L    Chloride 103 96 - 112 mmol/L    Co2 26 20 - 33 mmol/L    Anion Gap 12.0 7.0 - 16.0    Glucose 138 (H) 65 - 99 mg/dL    Bun 8 8 - 22 mg/dL    Creatinine 0.37 (L) 0.50 - 1.40 mg/dL    Calcium 9.2 8.5 - 10.5 mg/dL    AST(SGOT) 9 (L) 12 - 45 U/L    ALT(SGPT) 6 2 - 50 U/L    Alkaline Phosphatase 104 (H) 30 - 99 U/L    Total Bilirubin <0.2 0.1 - 1.5 mg/dL    Albumin 3.9 3.2 - 4.9 g/dL    Total Protein 6.3 6.0 - 8.2 g/dL    Globulin 2.4 1.9 - 3.5 g/dL    A-G Ratio 1.6 g/dL   ESTIMATED GFR   Result Value Ref Range    GFR (CKD-EPI) 127 >60 mL/min/1.73 m 2   DIFFERENTIAL MANUAL   Result Value Ref Range    Manual Diff Status PERFORMED    PERIPHERAL SMEAR REVIEW   Result Value Ref Range    Peripheral Smear Review see below    PLATELET ESTIMATE   Result Value Ref Range    Plt Estimation Increased    MORPHOLOGY   Result Value Ref Range    RBC Morphology Present     Polychromia 1+    EKG   Result Value Ref Range    Report       [facility] Emergency Dept.    Test Date:  2022  Pt Name:    KALEE CENTENO                    Department: ER  MRN:        [id number]                      Room:       [facility]  Gender:     Female                       Technician: [id number]  :        1980                   Requested By:FLOR CAMACHO  Order #:    [id number]                    Reading MD: FLOR CAMACHO MD    Measurements  Intervals                                Axis  Rate:       80                           P:          54  NE:         151                          QRS:        43  QRSD:       96                            T:          58  QT:         398  QTc:        460    Interpretive Statements  Sinus rhythm  Baseline wander in lead(s) V3,V5  Compared to ECG 10/06/2022 15:45:13  Sinus bradycardia no longer present  Electronically Signed On 12-9-2022 17:47:41 PST by FLOR NOLAND MD         COURSE & MEDICAL DECISION MAKING  Patient presents with new onset right-sided facial weakness and numbness.  Given her history of malignant melanoma metastatic to brain and recent brain surgery concern for a number of etiologies including overall edema, worsening metastases, hemorrhagic stroke, ischemic stroke.  CT scanning without contrast ordered for initial evaluation.      CT scan returned with multiple metastatic lesions and a new lesion in the right parietal lobe associated edema.  I ordered 6 mg of IV dexamethasone.  Paged oncology.    I discussed case with Dr. Wright.  He advised MRI of the brain with contrast to further delineate these metastatic lesions.  Patient should receive scheduled dexamethasone.  4 mg every 4 hours while hospitalized.  He requested consultation with radiation oncology plus minus neurosurgical consultation after MRI.  Hospitalist was paged for admission.    Discussed with [place]    FINAL IMPRESSION  1.  Metastatic brain lesions with associated edema      This dictation was created using voice recognition software. The accuracy of the dictation is limited to the abilities of the software. I expect there may be some errors of grammar and possibly content. The nursing notes were reviewed and certain aspects of this information were incorporated into this note.      Electronically signed by: Flor Noland M.D., 12/9/2022 2:44 PM

## 2022-12-10 ENCOUNTER — APPOINTMENT (OUTPATIENT)
Dept: RADIOLOGY | Facility: MEDICAL CENTER | Age: 42
DRG: 081 | End: 2022-12-10
Attending: STUDENT IN AN ORGANIZED HEALTH CARE EDUCATION/TRAINING PROGRAM
Payer: COMMERCIAL

## 2022-12-10 LAB
ALBUMIN SERPL BCP-MCNC: 4.1 G/DL (ref 3.2–4.9)
ALBUMIN/GLOB SERPL: 1.5 G/DL
ALP SERPL-CCNC: 110 U/L (ref 30–99)
ALT SERPL-CCNC: 6 U/L (ref 2–50)
ANION GAP SERPL CALC-SCNC: 14 MMOL/L (ref 7–16)
AST SERPL-CCNC: <5 U/L (ref 12–45)
BASOPHILS # BLD AUTO: 0.3 % (ref 0–1.8)
BASOPHILS # BLD: 0.03 K/UL (ref 0–0.12)
BILIRUB SERPL-MCNC: 0.2 MG/DL (ref 0.1–1.5)
BUN SERPL-MCNC: 10 MG/DL (ref 8–22)
CALCIUM SERPL-MCNC: 9.8 MG/DL (ref 8.5–10.5)
CHLORIDE SERPL-SCNC: 102 MMOL/L (ref 96–112)
CO2 SERPL-SCNC: 25 MMOL/L (ref 20–33)
CREAT SERPL-MCNC: 0.43 MG/DL (ref 0.5–1.4)
EOSINOPHIL # BLD AUTO: 0 K/UL (ref 0–0.51)
EOSINOPHIL NFR BLD: 0 % (ref 0–6.9)
ERYTHROCYTE [DISTWIDTH] IN BLOOD BY AUTOMATED COUNT: 62.4 FL (ref 35.9–50)
GFR SERPLBLD CREATININE-BSD FMLA CKD-EPI: 123 ML/MIN/1.73 M 2
GLOBULIN SER CALC-MCNC: 2.7 G/DL (ref 1.9–3.5)
GLUCOSE BLD STRIP.AUTO-MCNC: 152 MG/DL (ref 65–99)
GLUCOSE BLD STRIP.AUTO-MCNC: 162 MG/DL (ref 65–99)
GLUCOSE BLD STRIP.AUTO-MCNC: 184 MG/DL (ref 65–99)
GLUCOSE BLD STRIP.AUTO-MCNC: 198 MG/DL (ref 65–99)
GLUCOSE SERPL-MCNC: 143 MG/DL (ref 65–99)
HCT VFR BLD AUTO: 35.1 % (ref 37–47)
HGB BLD-MCNC: 10.8 G/DL (ref 12–16)
IMM GRANULOCYTES # BLD AUTO: 0.04 K/UL (ref 0–0.11)
IMM GRANULOCYTES NFR BLD AUTO: 0.5 % (ref 0–0.9)
LDH SERPL L TO P-CCNC: 272 U/L (ref 107–266)
LYMPHOCYTES # BLD AUTO: 0.76 K/UL (ref 1–4.8)
LYMPHOCYTES NFR BLD: 8.8 % (ref 22–41)
MCH RBC QN AUTO: 25.6 PG (ref 27–33)
MCHC RBC AUTO-ENTMCNC: 30.8 G/DL (ref 33.6–35)
MCV RBC AUTO: 83.2 FL (ref 81.4–97.8)
MONOCYTES # BLD AUTO: 0.12 K/UL (ref 0–0.85)
MONOCYTES NFR BLD AUTO: 1.4 % (ref 0–13.4)
NEUTROPHILS # BLD AUTO: 7.66 K/UL (ref 2–7.15)
NEUTROPHILS NFR BLD: 89 % (ref 44–72)
NRBC # BLD AUTO: 0 K/UL
NRBC BLD-RTO: 0 /100 WBC
PLATELET # BLD AUTO: 625 K/UL (ref 164–446)
PMV BLD AUTO: 9 FL (ref 9–12.9)
POTASSIUM SERPL-SCNC: 4 MMOL/L (ref 3.6–5.5)
PROT SERPL-MCNC: 6.8 G/DL (ref 6–8.2)
RBC # BLD AUTO: 4.22 M/UL (ref 4.2–5.4)
SODIUM SERPL-SCNC: 141 MMOL/L (ref 135–145)
WBC # BLD AUTO: 8.6 K/UL (ref 4.8–10.8)

## 2022-12-10 PROCEDURE — 92610 EVALUATE SWALLOWING FUNCTION: CPT

## 2022-12-10 PROCEDURE — 700111 HCHG RX REV CODE 636 W/ 250 OVERRIDE (IP): Performed by: STUDENT IN AN ORGANIZED HEALTH CARE EDUCATION/TRAINING PROGRAM

## 2022-12-10 PROCEDURE — 99232 SBSQ HOSP IP/OBS MODERATE 35: CPT | Mod: GC | Performed by: INTERNAL MEDICINE

## 2022-12-10 PROCEDURE — 80053 COMPREHEN METABOLIC PANEL: CPT

## 2022-12-10 PROCEDURE — 700117 HCHG RX CONTRAST REV CODE 255: Performed by: STUDENT IN AN ORGANIZED HEALTH CARE EDUCATION/TRAINING PROGRAM

## 2022-12-10 PROCEDURE — 770004 HCHG ROOM/CARE - ONCOLOGY PRIVATE *

## 2022-12-10 PROCEDURE — 70553 MRI BRAIN STEM W/O & W/DYE: CPT

## 2022-12-10 PROCEDURE — 85025 COMPLETE CBC W/AUTO DIFF WBC: CPT

## 2022-12-10 PROCEDURE — 36415 COLL VENOUS BLD VENIPUNCTURE: CPT

## 2022-12-10 PROCEDURE — A9576 INJ PROHANCE MULTIPACK: HCPCS | Performed by: STUDENT IN AN ORGANIZED HEALTH CARE EDUCATION/TRAINING PROGRAM

## 2022-12-10 PROCEDURE — 99223 1ST HOSP IP/OBS HIGH 75: CPT | Performed by: RADIOLOGY

## 2022-12-10 PROCEDURE — 700111 HCHG RX REV CODE 636 W/ 250 OVERRIDE (IP)

## 2022-12-10 PROCEDURE — 700102 HCHG RX REV CODE 250 W/ 637 OVERRIDE(OP): Performed by: STUDENT IN AN ORGANIZED HEALTH CARE EDUCATION/TRAINING PROGRAM

## 2022-12-10 PROCEDURE — 82962 GLUCOSE BLOOD TEST: CPT | Mod: 91

## 2022-12-10 PROCEDURE — A9270 NON-COVERED ITEM OR SERVICE: HCPCS | Performed by: STUDENT IN AN ORGANIZED HEALTH CARE EDUCATION/TRAINING PROGRAM

## 2022-12-10 PROCEDURE — 83615 LACTATE (LD) (LDH) ENZYME: CPT

## 2022-12-10 RX ORDER — DEXAMETHASONE SODIUM PHOSPHATE 4 MG/ML
4 INJECTION, SOLUTION INTRA-ARTICULAR; INTRALESIONAL; INTRAMUSCULAR; INTRAVENOUS; SOFT TISSUE EVERY 6 HOURS
Status: DISCONTINUED | OUTPATIENT
Start: 2022-12-10 | End: 2022-12-11

## 2022-12-10 RX ORDER — DEXAMETHASONE SODIUM PHOSPHATE 4 MG/ML
4 INJECTION, SOLUTION INTRA-ARTICULAR; INTRALESIONAL; INTRAMUSCULAR; INTRAVENOUS; SOFT TISSUE EVERY 8 HOURS
Status: DISCONTINUED | OUTPATIENT
Start: 2022-12-10 | End: 2022-12-10

## 2022-12-10 RX ADMIN — QUETIAPINE FUMARATE 200 MG: 100 TABLET ORAL at 21:35

## 2022-12-10 RX ADMIN — BACLOFEN 10 MG: 10 TABLET ORAL at 17:57

## 2022-12-10 RX ADMIN — ONDANSETRON 4 MG: 2 INJECTION INTRAMUSCULAR; INTRAVENOUS at 14:48

## 2022-12-10 RX ADMIN — SENNOSIDES AND DOCUSATE SODIUM 2 TABLET: 50; 8.6 TABLET ORAL at 05:07

## 2022-12-10 RX ADMIN — OXCARBAZEPINE 300 MG: 300 TABLET, FILM COATED ORAL at 08:19

## 2022-12-10 RX ADMIN — DULOXETINE HYDROCHLORIDE 60 MG: 20 CAPSULE, DELAYED RELEASE ORAL at 17:57

## 2022-12-10 RX ADMIN — CLONAZEPAM 0.5 MG: 0.5 TABLET ORAL at 05:06

## 2022-12-10 RX ADMIN — OXYCODONE 5 MG: 5 TABLET ORAL at 21:52

## 2022-12-10 RX ADMIN — OXYCODONE 5 MG: 5 TABLET ORAL at 12:29

## 2022-12-10 RX ADMIN — GABAPENTIN 800 MG: 400 CAPSULE ORAL at 08:28

## 2022-12-10 RX ADMIN — GABAPENTIN 800 MG: 400 CAPSULE ORAL at 12:57

## 2022-12-10 RX ADMIN — OXYCODONE 5 MG: 5 TABLET ORAL at 04:49

## 2022-12-10 RX ADMIN — OXCARBAZEPINE 300 MG: 300 TABLET, FILM COATED ORAL at 21:38

## 2022-12-10 RX ADMIN — LEVETIRACETAM 1000 MG: 500 TABLET, FILM COATED ORAL at 17:57

## 2022-12-10 RX ADMIN — INSULIN LISPRO 2 UNITS: 100 INJECTION, SOLUTION INTRAVENOUS; SUBCUTANEOUS at 18:07

## 2022-12-10 RX ADMIN — DEXAMETHASONE SODIUM PHOSPHATE 4 MG: 4 INJECTION, SOLUTION INTRA-ARTICULAR; INTRALESIONAL; INTRAMUSCULAR; INTRAVENOUS; SOFT TISSUE at 02:04

## 2022-12-10 RX ADMIN — DEXAMETHASONE SODIUM PHOSPHATE 4 MG: 4 INJECTION, SOLUTION INTRA-ARTICULAR; INTRALESIONAL; INTRAMUSCULAR; INTRAVENOUS; SOFT TISSUE at 10:18

## 2022-12-10 RX ADMIN — GABAPENTIN 800 MG: 400 CAPSULE ORAL at 17:57

## 2022-12-10 RX ADMIN — OXYCODONE 5 MG: 5 TABLET ORAL at 00:44

## 2022-12-10 RX ADMIN — Medication 2000 UNITS: at 05:07

## 2022-12-10 RX ADMIN — DEXAMETHASONE SODIUM PHOSPHATE 4 MG: 4 INJECTION, SOLUTION INTRA-ARTICULAR; INTRALESIONAL; INTRAMUSCULAR; INTRAVENOUS; SOFT TISSUE at 21:39

## 2022-12-10 RX ADMIN — FERROUS SULFATE TAB 325 MG (65 MG ELEMENTAL FE) 325 MG: 325 (65 FE) TAB at 05:07

## 2022-12-10 RX ADMIN — SENNOSIDES AND DOCUSATE SODIUM 2 TABLET: 50; 8.6 TABLET ORAL at 17:57

## 2022-12-10 RX ADMIN — LEVETIRACETAM 1000 MG: 500 TABLET, FILM COATED ORAL at 05:07

## 2022-12-10 RX ADMIN — DEXAMETHASONE SODIUM PHOSPHATE 4 MG: 4 INJECTION, SOLUTION INTRA-ARTICULAR; INTRALESIONAL; INTRAMUSCULAR; INTRAVENOUS; SOFT TISSUE at 05:07

## 2022-12-10 RX ADMIN — BACLOFEN 10 MG: 10 TABLET ORAL at 12:30

## 2022-12-10 RX ADMIN — DEXAMETHASONE SODIUM PHOSPHATE 4 MG: 4 INJECTION, SOLUTION INTRA-ARTICULAR; INTRALESIONAL; INTRAMUSCULAR; INTRAVENOUS; SOFT TISSUE at 14:48

## 2022-12-10 RX ADMIN — ATORVASTATIN CALCIUM 40 MG: 40 TABLET, FILM COATED ORAL at 17:57

## 2022-12-10 RX ADMIN — INSULIN LISPRO 2 UNITS: 100 INJECTION, SOLUTION INTRAVENOUS; SUBCUTANEOUS at 00:43

## 2022-12-10 RX ADMIN — CLONAZEPAM 0.5 MG: 0.5 TABLET ORAL at 12:29

## 2022-12-10 RX ADMIN — INSULIN LISPRO 2 UNITS: 100 INJECTION, SOLUTION INTRAVENOUS; SUBCUTANEOUS at 12:40

## 2022-12-10 RX ADMIN — INSULIN GLARGINE-YFGN 50 UNITS: 100 INJECTION, SOLUTION SUBCUTANEOUS at 18:05

## 2022-12-10 RX ADMIN — GABAPENTIN 800 MG: 400 CAPSULE ORAL at 21:35

## 2022-12-10 RX ADMIN — BACLOFEN 10 MG: 10 TABLET ORAL at 05:07

## 2022-12-10 RX ADMIN — GADOTERIDOL 13 ML: 279.3 INJECTION, SOLUTION INTRAVENOUS at 11:57

## 2022-12-10 ASSESSMENT — PAIN DESCRIPTION - PAIN TYPE
TYPE: CHRONIC PAIN

## 2022-12-10 ASSESSMENT — ENCOUNTER SYMPTOMS
FEVER: 0
SHORTNESS OF BREATH: 0
PALPITATIONS: 0
EYE DISCHARGE: 0
CHILLS: 0
EYE PAIN: 0
COUGH: 0
SENSORY CHANGE: 0
SPEECH CHANGE: 0
VOMITING: 0
NAUSEA: 0
ABDOMINAL PAIN: 0

## 2022-12-10 NOTE — DIETARY
"Nutrition services: Day 1 of admit.  Jacque Mcintyre is a 42 y.o. female with admitting DX of malignant melanoma metastatic to brain.     Consult received for unintentional weight loss of 14-23 lbs in 6 months due to decreased appetite (MST 3). RD visited pt at bedside. Per pt she reports a good appetite but has noticed weight loss d/t cancer and radiation over the last 6 months. Pt states her UBW to be 165 lbs and initially believed she was losing weight d/t diabetes. Pt preferences obtained. No further questions at this time.     Assessment:  Height: 167.6 cm (5' 6\")  Weight: 69.4 kg (153 lb) via standing scale   Body mass index is 24.69 kg/m²., BMI classification: Normal   Diet/Intake: Consistent CHO % x1 meal    Evaluation:   Pt with reported wt loss of 12 lbs (7.3%) from UBW  in the last 6 months. Wt loss confirmed via chart review. Wt loss is notable though not clinically significant. No overt signs of muscle or fat wasting noted.   Current clinical picture and MD progress notes reviewed. Pt with hx of metastatic melanoma to brain s/p resection in October and 5 treatments of radiation. Presented with left-sided facial droop and numbness. Assessed by SLP today and regular diet texture found to be appropriate.   Labs (12/10) Glu 143 (H), A1C (10/16/2022) 7.8  Meds: gabapentin, Lantus, SSI, oxycodone, senna, Dexamethasone, Vit D3  Skin: no noted staged wounds or pressure injuries   +BM 12/7    Malnutrition Risk: Pt does not meet criteria per ASPEN guidelines     Recommendations/Plan:  Diabetic diet as tolerated    Encourage intake of all meals   Document intake of all meals as % taken in ADL's to provide interdisciplinary communication across all shifts.   Monitor weight.  Nutrition rep will continue to see patient for ongoing meal and snack preferences.     RD to monitor per department policy       "

## 2022-12-10 NOTE — ED NOTES
Med rec completed per patient at bedside.  Allergies reviewed with patient.  No outpatient antibiotics in the last 30 days.  Patient's preferred pharmacy: [facility], otherwise [facility] at [address].    Patient reports that she used her Ventolin rescue inhaler while here in the E.D., around 18:00.

## 2022-12-10 NOTE — PROGRESS NOTES
RADIATION ONCOLOGY CONSULT    DATE OF SERVICE: 12/10/2022    IDENTIFICATION: A 42 y.o. female with history of melanoma dating back approximately 6 years ago presented with new brain metastasis in October of this year.   She is status post resection of 3 brain metastasis followed by postoperative SRT.  In addition to other lesions were treated.  She completed therapy  11/9/2022.  She is recently admitted  for right-sided facial numbness/droop.   CT head showed new right parietal mass.  MRI scan done today confirmed this.. She is here at the kind request of Dr. Wright.      HISTORY OF PRESENT ILLNESS:   Patient's history dates back to 6 years ago when she was diagnosed with melanoma.    She was initially treated with adjuvant immunotherapy for 3 years and had been off it for approximately 3 years.More recently she was admitted to the hospital in October and found to have multiple brain metastasis.  3 of the brain metastasis were resected on 10/6/2022.  They were consistent with the BRAF positive metastatic melanoma.  She then received postoperative radiation therapy to these 2 sites using SRT and 2 additional brain metastasis.   this was completed on 11/9/2022.  She is then started palliative immunotherapy with Opdivo and Yervoy which was started on 11/17/2022.  More recently she has been noting right-sided facial numbness/droop.  CT scan done showed multiple intracranial metastatic lesions with surrounding edema again noted.  There was felt to be a new lesion in the right parietal lobe with surrounding edema.   Most recent PET CT scan 12/5/2022 showed multiple hypermetabolic pulmonary nodules increased large heterogeneous hypermetabolic bilateral thyroid glands multiple mesenteric masses in the left pelvic sidewall likely metastasis.  Questionable necrotic 4.3 cm mass in the left abdomen inferior to the left kidney versus tumor involvement of the bowel.  Multiple subcutaneous and muscle nodules.  There is a large 7.8  cm cystic mass in the right adnexa.  Peripheral activity may be physiologic.    MRI scan of the brain done today 12/10/2022 showed interval appearance of a new hemorrhagic metastatic lesion in the right parietal region there is minimal rim enhancement.  Interval slight decrease in size of enhancing component of left subinsular white matter lesion with slight increase in surrounding T2 signal abnormality.  Minimal nodular enhancement noted in the site of the previously treated lesions in the medial left frontal regions and right prefrontal region as well as right inferior temporal region.  There is dural thickening and enhancement underlying the right frontal craniotomy and it is again noted likely representing postoperative change.   Since she has been on steroids a lot of her symptoms have improved.    PAST MEDICAL HISTORY:   Past Medical History:   Diagnosis Date    Asthma     inhaler    Bipolar 1 disorder (HCC)     Cancer (HCC) 01/01/2016    melanoma    Cough     Depression     DM mellitus,     insulin    Hyperlipidemia     Hypertension     Pap smear     Dr. Baird    PCOS (polycystic ovarian syndrome)     Shortness of breath     Sputum production     Wheezing        PAST SURGICAL HISTORY:  Past Surgical History:   Procedure Laterality Date    CRANIOTOMY STEALTH Right 10/6/2022    Procedure: RIGHT FRONTAL CRANIOTOMY WITH STEALTH;  Surgeon: Pebbles Tejada M.D.;  Location: [facility];  Service: Neurosurgery    HI BRONCHOSCOPY,DIAGNOSTIC N/A 6/17/2022    Procedure: FIBER OPTIC BRONCHOSCOPY WITH WASH, BRUSH, BRONCHOALVEOLAR LAVAGE, BIOPSY, FINE NEEDLE ASPIRATION;  Surgeon: Yue Swanson M.D.;  Location: [facility];  Service: Pulmonary    ENDOBRONCHIAL US ADD-ON N/A 6/17/2022    Procedure: ENDOBRONCHIAL ULTRASOUND (EBUS);  Surgeon: Yue Swanson M.D.;  Location: [facility];  Service: Pulmonary    HI LAP,APPENDECTOMY N/A 10/7/2019    Procedure: APPENDECTOMY, LAPAROSCOPIC;   Surgeon: Lionel Frazier M.D.;  Location: SURGERY [facility];  Service: General    AXILLARY NODE DISSECTION Left 6/23/2017    Procedure: AXILLARY NODE DISSECTION- COMPLETION LYPHADENECTOMY;  Surgeon: Lionel Weinberg M.D.;  Location: SURGERY [facility];  Service:     WIDE EXCISION Left 5/9/2017    Procedure: WIDE EXCISION - MALIGNANT MELANOMA HAND;  Surgeon: Lionel Weinberg M.D.;  Location: SURGERY SAME DAY [facility];  Service:     NODE BIOPSY SENTINEL Left 5/9/2017    Procedure: NODE BIOPSY SENTINEL - AXILLARY;  Surgeon: Lionel Weinberg M.D.;  Location: SURGERY SAME DAY [facility];  Service:     OTHER  2016    excisino of melanoma left hand    ADENOIDECTOMY      MYRINGOTOMY      with tube placement           CURRENT MEDICATIONS:  Current Facility-Administered Medications   Medication Dose Route Frequency Provider Last Rate Last Admin    dexamethasone (DECADRON) injection 4 mg  4 mg Intravenous Q6HRS Mindy Giraldo D.O.        senna-docusate (PERICOLACE or SENOKOT S) 8.6-50 MG per tablet 2 Tablet  2 Tablet Oral BID Leandro Desai M.D.   2 Tablet at 12/10/22 0507    And    polyethylene glycol/lytes (MIRALAX) PACKET 1 Packet  1 Packet Oral QDAY PRN Leandro Desai M.D.        And    magnesium hydroxide (MILK OF MAGNESIA) suspension 30 mL  30 mL Oral QDAY PRN Leandro Desai M.D.        And    bisacodyl (DULCOLAX) suppository 10 mg  10 mg Rectal QDAY PRN Leandro Desai M.D.        acetaminophen (Tylenol) tablet 650 mg  650 mg Oral Q6HRS PRN Leandro Desai M.D.        Pharmacy Consult Request ...Pain Management Review 1 Each  1 Each Other PHARMACY TO DOSE Leandro Desai M.D.        oxyCODONE immediate-release (ROXICODONE) tablet 2.5 mg  2.5 mg Oral Q3HRS PRN Leandro Desai M.D.        Or    oxyCODONE immediate-release (ROXICODONE) tablet 5 mg  5 mg Oral Q3HRS PRN Leandro Desai M.D.   5 mg at 12/10/22 1229    Or    morphine 4 MG/ML injection 2 mg  2 mg Intravenous Q3HRS PRN Leandro Desai M.D.        atorvastatin (LIPITOR) tablet 40 mg   40 mg Oral Q EVENING Leandro Desai M.D.   40 mg at 12/09/22 2015    baclofen (LIORESAL) tablet 10 mg  10 mg Oral TID Leandro Desai M.D.   10 mg at 12/10/22 1230    DULoxetine (CYMBALTA) capsule 60 mg  60 mg Oral Q EVENING Leandro Desai M.D.   60 mg at 12/09/22 2015    gabapentin (NEURONTIN) capsule 800 mg  800 mg Oral 4X/DAY Leandro Desai M.D.   800 mg at 12/10/22 1257    levETIRAcetam (KEPPRA) tablet 1,000 mg  1,000 mg Oral BID Leandro Desai M.D.   1,000 mg at 12/10/22 0507    OXcarbazepine (TRILEPTAL) tablet 300 mg  300 mg Oral BID Leandro Desai M.D.   300 mg at 12/10/22 0819    QUEtiapine (Seroquel) tablet 200 mg  200 mg Oral Q EVENING Leandro Desai M.D.   200 mg at 12/09/22 2157    insulin GLARGINE (Lantus,Semglee) injection  50 Units Subcutaneous Q EVENING Leandro Desai M.D.   50 Units at 12/09/22 2026    And    insulin lispro (AdmeLOG,HumaLOG) injection  2-9 Units Subcutaneous Q6HRS Leandro Desai M.D.   2 Units at 12/10/22 1240    And    dextrose 10 % BOLUS 25 g  25 g Intravenous Q15 MIN PRN Leandro Desai M.D.        vitamin D3 (cholecalciferol) tablet 2,000 Units  2,000 Units Oral DAILY Leandro Desai M.D.   2,000 Units at 12/10/22 0507    famotidine (PEPCID) tablet 10 mg  10 mg Oral QDAY PRN Leandro Desai M.D.        albuterol inhaler 1-2 Puff  1-2 Puff Inhalation Q4HRS PRN Leandro Desai M.D.        ondansetron (ZOFRAN) syringe/vial injection 4 mg  4 mg Intravenous Q4HRS PRN Leandro Desai M.D.        ferrous sulfate tablet 325 mg  325 mg Oral DAILY Leandro Desai M.D.   325 mg at 12/10/22 0507    clonazePAM (KLONOPIN) tablet 0.5 mg  0.5 mg Oral BID PRN Leandro Desai M.D.   0.5 mg at 12/10/22 1229       ALLERGIES:    Augmentin [amoxicillin-pot clavulanate]    FAMILY HISTORY:    Family History   Problem Relation Age of Onset    Psychiatric Illness Mother         depression    Diabetes Mother     Hyperlipidemia Mother     Thyroid Mother         s/p radioactive iodine treatment on replacement    Hypertension Father     Diabetes Father     [unfilled]        SOCIAL HISTORY:     reports that she has quit smoking. Her smoking use included cigarettes. She has a 7.50 pack-year smoking history. She has never used smokeless tobacco. She reports that she does not currently use alcohol. She reports that she does not currently use drugs after having used the following drugs: Inhaled.      Review of Systems:   Constitutional: negative fever, chills, sweats, or weight loss  HENT:   Right-sided facial numbness and droop slightly improved since steroids  Eyes:  negative vision changes  Respiratory:  negative cough, congestion, SOB, hemoptysis  Cardiovascular:  negative palpations, chest pain or easy fatiguability   Gastrointestinal:  negative diarrhea, abdominal pain or constipation.  No blood in stool.  Genitourinal:  negative hematuria, dysuria, incontinence  Musculoskeletal:  negative back pain or joint pain   Skin:  has noted subcutaneous nodules but with the first cycle of chemotherapy they seem to be going away.  Neurological:   Had the facial droop  Endocrine:  hyperglycemia from the steroids.  Has known thyroid nodules  Psyche:  nothing new       PHYSICAL EXAM:    1= Restricted in physically strenuous activity, but ambulatory and able to carry out work of a light sedentary nature, e.g., light housework, office work.  Vitals:    12/09/22 2340 12/10/22 0344 12/10/22 0719 12/10/22 1233   BP:  121/69 129/76 128/85   Pulse:  68 66 85   Resp:  18 17 18   Temp:  36.1 °C (97 °F) 36.3 °C (97.4 °F) 37.2 °C (99 °F)   TempSrc:  Temporal Temporal Temporal   SpO2:  95% 96% 96%   Weight: 69.4 kg (153 lb)      Height:       Location: Back  Description: Aching        GENERAL:  alert oriented x3 lying in bed resting comfortably.  HEENT:  Pupils are equal, round, and reactive to light.  Extraocular muscles   are intact. Sclerae nonicteric.  Conjunctivae pink.  Oral cavity, tongue   protrudes midline.   NECK:   No peripheral adenopathy of the neck, supraclavicular fossa or  axillae   bilaterally.  LUNGS:  Clear to ascultation   HEART:  Regular rate and rhythm.  No murmur appreciated  ABDOMEN:  Soft. No evidence of hepatosplenomegaly.    EXTREMITIES:  Without Edema.  NEUROLOGIC:  Cranial nerves II through XII were intact.   Motor and sensory grossly within normal limits            IMPRESSION:    A 42 y.o. with history of melanoma dating back approximately 6 years ago presented with new brain metastasis in October of this year.   She is status post resection of 3 brain metastasis followed by postoperative SRT.  In addition to other lesions were treated.  She completed therapy  11/9/2022.  She is recently admitted  for right-sided facial numbness/droop.   CT head showed new right parietal mass.  MRI scan done today confirmed this.      RECOMMENDATIONS:    I do long discussion with the patient today I told her that would like to present her in tumor board on Monday morning.  I explained to her the Dr. Tejada is on-call tomorrow so we should contact her and have her take a look at everything.   I will also let Dr. August know on Monday.  They can make a decision whether she needs surgical resection and postoperative radiation therapy or radiation therapy alone.  Patient understands and agrees with plan.      Thank you for the opportunity to participate in her care.  If any questions or comments, please do not hesitate in calling.    Please note that this dictation was created using voice recognition software. I have made every reasonable attempt to correct obvious errors, but I expect that there are errors of grammar and possibly content that I did not discover before finalizing the note.

## 2022-12-10 NOTE — CONSULTS
Consult Note: Oncology    Date of consultation: 12/10/2022 12:02 PM    Referring provider: Dr Desai    Reason for consultation: Melanoma      History of presenting illness:     42-year-old patient of Dr. Reed withBRAF mutated melanoma.  She received Yervoy in 2017 in the adjuvant setting       In October 2022, the patient was admitted to the hospital with brain metastases and underwent resection of three brain masses on 10/06/22 which were consistent with BRAF positive metastatic melanoma. Palliative immunotherapy with Opdivo and Yervoy were started on 11/17/22.    Admitted with worsening CNS symptoms   CT brain-Multiple intracranial metastatic lesions with surrounding edema again noted. There is a new lesion in the right parietal lobe with surrounding edema.  MRI brain results pending     she had a PET scan done few days ago which showed widespread metastatic disease however this was compared to the PET scan from 5/2022 and not to the CT scan from 10/2022    She is having hyperglycemia related to steroids.  Otherwise feeling better after starting Decadron    Past Medical History:    Past Medical History:   Diagnosis Date    Asthma     inhaler    Bipolar 1 disorder (HCC)     Cancer (HCC) 01/01/2016    melanoma    Cough     Depression     DM mellitus,     insulin    Hyperlipidemia     Hypertension     Pap smear     Dr. Baird    PCOS (polycystic ovarian syndrome)     Shortness of breath     Sputum production     Wheezing        Past surgical history:    Past Surgical History:   Procedure Laterality Date    CRANIOTOMY STEALTH Right 10/6/2022    Procedure: RIGHT FRONTAL CRANIOTOMY WITH STEALTH;  Surgeon: Pebbles Tejada M.D.;  Location: SURGERY [facility];  Service: Neurosurgery    AK BRONCHOSCOPY,DIAGNOSTIC N/A 6/17/2022    Procedure: FIBER OPTIC BRONCHOSCOPY WITH WASH, BRUSH, BRONCHOALVEOLAR LAVAGE, BIOPSY, FINE NEEDLE ASPIRATION;  Surgeon: Yue Swanson M.D.;  Location: SURGERY [facility];  Service: Pulmonary     ENDOBRONCHIAL US ADD-ON N/A 6/17/2022    Procedure: ENDOBRONCHIAL ULTRASOUND (EBUS);  Surgeon: Yue Swanson M.D.;  Location: SURGERY [facility];  Service: Pulmonary    DC LAP,APPENDECTOMY N/A 10/7/2019    Procedure: APPENDECTOMY, LAPAROSCOPIC;  Surgeon: Lionel Frazier M.D.;  Location: SURGERY [facility];  Service: General    AXILLARY NODE DISSECTION Left 6/23/2017    Procedure: AXILLARY NODE DISSECTION- COMPLETION LYPHADENECTOMY;  Surgeon: Lionel Weinberg M.D.;  Location: SURGERY [facility];  Service:     WIDE EXCISION Left 5/9/2017    Procedure: WIDE EXCISION - MALIGNANT MELANOMA HAND;  Surgeon: Lionel Weinberg M.D.;  Location: SURGERY SAME DAY [facility];  Service:     NODE BIOPSY SENTINEL Left 5/9/2017    Procedure: NODE BIOPSY SENTINEL - AXILLARY;  Surgeon: Lionel Weinberg M.D.;  Location: SURGERY SAME DAY [facility];  Service:     OTHER  2016    excisino of melanoma left hand    ADENOIDECTOMY      MYRINGOTOMY      with tube placement       Allergies:  Augmentin [amoxicillin-pot clavulanate]    Medications:    Current Facility-Administered Medications   Medication Dose Route Frequency Provider Last Rate Last Admin    dexamethasone (DECADRON) injection 4 mg  4 mg Intravenous Q8HRS Mindy Giarldo D.O.        senna-docusate (PERICOLACE or SENOKOT S) 8.6-50 MG per tablet 2 Tablet  2 Tablet Oral BID Leandro Desai M.D.   2 Tablet at 12/10/22 0507    And    polyethylene glycol/lytes (MIRALAX) PACKET 1 Packet  1 Packet Oral QDAY PRN Leandro Desai M.D.        And    magnesium hydroxide (MILK OF MAGNESIA) suspension 30 mL  30 mL Oral QDAY PRN Leandro Desai M.D.        And    bisacodyl (DULCOLAX) suppository 10 mg  10 mg Rectal QDAY PRN Leandro Desai M.D.        acetaminophen (Tylenol) tablet 650 mg  650 mg Oral Q6HRS PRN Leandro Desai M.D.        Pharmacy Consult Request ...Pain Management Review 1 Each  1 Each Other PHARMACY TO DOSE Leandro Desai M.D.        oxyCODONE immediate-release (ROXICODONE) tablet  2.5 mg  2.5 mg Oral Q3HRS PRN Leandro Desai M.D.        Or    oxyCODONE immediate-release (ROXICODONE) tablet 5 mg  5 mg Oral Q3HRS PRN Leandro Desai M.D.   5 mg at 12/10/22 0449    Or    morphine 4 MG/ML injection 2 mg  2 mg Intravenous Q3HRS PRN Leandro Desai M.D.        atorvastatin (LIPITOR) tablet 40 mg  40 mg Oral Q EVENING Leandro Desai M.D.   40 mg at 12/09/22 2015    baclofen (LIORESAL) tablet 10 mg  10 mg Oral TID Leandro Desai M.D.   10 mg at 12/10/22 0507    DULoxetine (CYMBALTA) capsule 60 mg  60 mg Oral Q EVENING Leandro Desai M.D.   60 mg at 12/09/22 2015    gabapentin (NEURONTIN) capsule 800 mg  800 mg Oral 4X/DAY Leandro Desai M.D.   800 mg at 12/10/22 0828    levETIRAcetam (KEPPRA) tablet 1,000 mg  1,000 mg Oral BID Leandro Desai M.D.   1,000 mg at 12/10/22 0507    OXcarbazepine (TRILEPTAL) tablet 300 mg  300 mg Oral BID Leandro Desai M.D.   300 mg at 12/10/22 0819    QUEtiapine (Seroquel) tablet 200 mg  200 mg Oral Q EVENING Leandro Desai M.D.   200 mg at 12/09/22 2157    insulin GLARGINE (Lantus,Semglee) injection  50 Units Subcutaneous Q EVENING Leandro Desai M.D.   50 Units at 12/09/22 2026    And    insulin lispro (AdmeLOG,HumaLOG) injection  2-9 Units Subcutaneous Q6HRS Leandro Desai M.D.   2 Units at 12/10/22 0043    And    dextrose 10 % BOLUS 25 g  25 g Intravenous Q15 MIN PRN Leandro Desai M.D.        vitamin D3 (cholecalciferol) tablet 2,000 Units  2,000 Units Oral DAILY Leandro Desai M.D.   2,000 Units at 12/10/22 0507    famotidine (PEPCID) tablet 10 mg  10 mg Oral QDAY PRN Leandro Desai M.D.        albuterol inhaler 1-2 Puff  1-2 Puff Inhalation Q4HRS PRN Leandro Desai M.D.        ondansetron (ZOFRAN) syringe/vial injection 4 mg  4 mg Intravenous Q4HRS PRN Leandro Desai M.D.        ferrous sulfate tablet 325 mg  325 mg Oral DAILY Leandro Desai M.D.   325 mg at 12/10/22 0507    clonazePAM (KLONOPIN) tablet 0.5 mg  0.5 mg Oral BID PRN Leandro Desai M.D.   0.5 mg at 12/10/22 0506       Social History:     Social History      Socioeconomic History    Marital status:      Spouse name: Not on file    Number of children: 2    Years of education: Not on file    Highest education level: Associate degree: academic program   Occupational History    Occupation: stay at home mom     Employer: none   Tobacco Use    Smoking status: Former     Packs/day: 0.75     Years: 10.00     Pack years: 7.50     Types: Cigarettes    Smokeless tobacco: Never   Vaping Use    Vaping Use: Never used   Substance and Sexual Activity    Alcohol use: Not Currently    Drug use: Not Currently     Types: Inhaled     Comment: occasional smoked marijuyahir approx 3 times per month    Sexual activity: Yes   Other Topics Concern    Not on file   Social History Narrative    Not on file     Social Determinants of Health     Financial Resource Strain: Low Risk     Difficulty of Paying Living Expenses: Not very hard   Food Insecurity: No Food Insecurity    Worried About Running Out of Food in the Last Year: Never true    Ran Out of Food in the Last Year: Never true   Transportation Needs: No Transportation Needs    Lack of Transportation (Medical): No    Lack of Transportation (Non-Medical): No   Physical Activity: Inactive    Days of Exercise per Week: 0 days    Minutes of Exercise per Session: 0 min   Stress: Stress Concern Present    Feeling of Stress : To some extent   Social Connections: Socially Isolated    Frequency of Communication with Friends and Family: More than three times a week    Frequency of Social Gatherings with Friends and Family: More than three times a week    Attends Shinto Services: Never    Active Member of Clubs or Organizations: No    Attends Club or Organization Meetings: Never    Marital Status:    Intimate Partner Violence: Not on file   Housing Stability: Low Risk     Unable to Pay for Housing in the Last Year: No    Number of Places Lived in the Last Year: 2    Unstable Housing in the Last Year: No       Family History:    "  Family History   Problem Relation Age of Onset    Psychiatric Illness Mother         depression    Diabetes Mother     Hyperlipidemia Mother     Thyroid Mother         s/p radioactive iodine treatment on replacement    Hypertension Father     Diabetes Father        Review of Systems:  All other review of systems are negative except what was mentioned above in the HPI.    Constitutional: No fever, chills, weight loss ,malaise/fatigue.    HEENT: No new auditory or visual complaints. No sore throat and neck pain.     Respiratory:No new cough, sputum production, shortness of breath and wheezing.    Cardiovascular: No new chest pain, palpitations, orthopnea and leg swelling.    Gastrointestinal: No heartburn, nausea, vomiting ,abdominal pain, hematochezia or melena     Genitourinary: Negative for dysuria, hematuria    Musculoskeletal: No new arthralgias or myalgias   Skin: Negative for rash and itching.    Neurological: Negative for focal weakness or headaches.    Endo/Heme/Allergies: No abnormal bleed/bruise.    Psychiatric/Behavioral: No new depression/anxiety.    Physical Exam:  Vitals:   /76   Pulse 66   Temp 36.3 °C (97.4 °F) (Temporal)   Resp 17   Ht 1.676 m (5' 6\")   Wt 69.4 kg (153 lb)   SpO2 96%   BMI 24.69 kg/m²     General: Not in acute distress, alert and oriented x 3  HEENT: No pallor, icterus. Oropharynx clear.   Neck: Supple, no palpable masses.  Lymph nodes: No palpable cervical, supraclavicular, axillary or inguinal lymphadenopathy.    CVS: regular rate and rhythm, no rubs or gallops  RESP: Clear to auscultate bilaterally, no wheezing or crackles.   ABD: Soft, non tender, non distended, positive bowel sounds, no palpable organomegaly  EXT: No edema or cyanosis  CNS: Alert and oriented x3, No focal deficits.  Skin- No rash      Labs:   Recent Labs     12/08/22  1042 12/09/22  1455 12/10/22  0741   RBC  --  3.60* 4.22   HEMOGLOBIN  --  9.1* 10.8*   HEMATOCRIT  --  30.7* 35.1*   PLATELETCT  " --  571* 625*   FERRITIN 71.9  --   --      Lab Results   Component Value Date/Time    SODIUM 141 12/10/2022 07:41 AM    POTASSIUM 4.0 12/10/2022 07:41 AM    CHLORIDE 102 12/10/2022 07:41 AM    CO2 25 12/10/2022 07:41 AM    GLUCOSE 143 (H) 12/10/2022 07:41 AM    BUN 10 12/10/2022 07:41 AM    CREATININE 0.43 (L) 12/10/2022 07:41 AM        Assessment and Plan:  BRAF mutated melanoma she had widespread systemic progression and CNS progression she was started on ipilimumab and nivolumab x2 doses.    Follow-up on the brain MRI results please get opinion from radiation oncology and neurosurgery depending on the MRI results.  I discussed with Dr. Lassiter who will review her images and presented in the tumor board    Symptomatically better Decadron causing hyperglycemia okay to lower the dose.    Her recent PET scan shows significant systemic disease however this was compared to the PET scan from 5/2022 and note from the CT scan from 10/2022 will ask radiology to get an official comparison.  Regardless she is already on ipilimumab and nivolumab and she feels like her subcutaneous nodules have improved significantly after starting immunotherapy.  This may be indicating positive treatment response at least outside the CNS.    Depending on the tempo of disease may have to consider switching treatment to BRAF/MEKi however prefer continuing ipilimumab and nivolumab as she just started treatment and appears to have subjective improvement in her subcutaneous nodules.      Complex patient requiring complex decision making. Reviewed images/ lab with patient.  Antineoplastic therapy requiring close monitoring.    Please note that this dictation was created using voice recognition software. I have made every reasonable attempt to correct obvious errors, but I expect that there are errors of grammar and possibly content that I did not discover before finalizing the note.      SIGNATURES:  Juan Wright M.D.    CC:  Adriana CORTES  KIMBERLYN Levine  No ref. provider found

## 2022-12-10 NOTE — PROGRESS NOTES
4 Eyes Skin Assessment Completed by Bel RICCI, RN and Shital CORTES RN.    Head Scar from right sided craniotomy   Ears WDL  Nose WDL  Mouth WDL  Neck WDL  Breast/Chest WDL  Shoulder Blades WDL  Spine WDL  (R) Arm/Elbow/Hand Redness and Blanching- pt states psoriasis on elbows  (L) Arm/Elbow/Hand Redness and Blanching- pt states psoriasis on elbows  Abdomen WDL  Groin WDL  Scrotum/Coccyx/Buttocks WDL  (R) Leg WDL  (L) Leg WDL  (R) Heel/Foot/Toe WDL  (L) Heel/Foot/Toe WDL- healed diabetic ulcer under big toe          Devices In Places Blood Pressure Cuff, Pulse Ox, and Nasal Cannula      Interventions In Place NC W/Ear Foams, Waffle Overlay, and Pressure Redistribution Mattress    Possible Skin Injury No    Pictures Uploaded Into Epic N/A  Wound Consult Placed N/A  RN Wound Prevention Protocol Ordered No

## 2022-12-10 NOTE — ASSESSMENT & PLAN NOTE
-Patient is s/p resection of 3 brain masses 10/2022, radiation and palliative immunotherapy with opdivo and yervoy on 11/17/2022  -CT head new right sided parietal lesion with edema. MRI brain showing new hemorrhagic metastatic lesion in R parietal region.   -Consulted oncology, radiation oncology and neurosurgery   -Continue Decadron 4mg q6h IV and home keppra 1000mg BID per neurosurgery  -Steroid taper will be done outpatient per oncology  -Neurochecks within normal limits for 24 hrs. Discontinued  -Fall/aspiration/speech precautions   -further plan dependent on tumor board to take place 12/12/22

## 2022-12-10 NOTE — PROGRESS NOTES
Daily Progress Note:     Date of Service: 12/10/2022  Primary Team: [facility] Team   Attending: Justen Ravi M.D.   Senior Resident: Dr. Do  Intern: Dr. Mindy Giraldo  Contact:  [phone]    Hospital Course:   Admitted 12/9/22 for dysarthria, R sided facial droop and numbness/tingling mouth. 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 CT head showed new lesion R parietal lobe with surrounding edema. Treated with dexamethasone IV 4mg. MRI brain showing new hemorrhagic metastatic lesion in R parietal region. Neurosurgery and radiation oncology consulted.    Interval Update:  -patient reports that she still has R sided facial droop  -improvement in her slurred speech  -no more numbness and tingling to R face    Consultants/Specialty:  Oncology  Radiation oncology  Neurosurgery    Review of Systems:  Review of Systems   Constitutional:  Negative for chills and fever.   HENT:  Negative for ear discharge and ear pain.    Eyes:  Negative for pain and discharge.   Respiratory:  Negative for cough and shortness of breath.    Cardiovascular:  Negative for chest pain and palpitations.   Gastrointestinal:  Negative for abdominal pain, nausea and vomiting.   Genitourinary:  Negative for dysuria and urgency.   Neurological:  Negative for sensory change and speech change.        Positive for R side facial droop  Improved numbness and tingling to right mouth area     Objective:   Vitals:   Temp:  [36.1 °C (97 °F)-37.2 °C (99 °F)] 37.2 °C (99 °F)  Pulse:  [66-85] 85  Resp:  [16-18] 18  BP: ()/(66-87) 128/85  SpO2:  [94 %-97 %] 96 %    Physical Exam  Constitutional:       General: She is not in acute distress.     Appearance: She is not ill-appearing or toxic-appearing.   HENT:      Head: Normocephalic and atraumatic.      Nose: Nose normal. No rhinorrhea.      Mouth/Throat:      Mouth: Mucous  membranes are moist.      Pharynx: Oropharynx is clear.   Eyes:      General: No scleral icterus.     Extraocular Movements: Extraocular movements intact.      Conjunctiva/sclera: Conjunctivae normal.   Cardiovascular:      Rate and Rhythm: Normal rate and regular rhythm.      Heart sounds: No murmur heard.  Pulmonary:      Effort: Pulmonary effort is normal. No respiratory distress.      Breath sounds: Normal breath sounds. No wheezing.   Abdominal:      General: There is no distension.      Palpations: Abdomen is soft.      Tenderness: There is no abdominal tenderness. There is no guarding or rebound.   Musculoskeletal:         General: No deformity or signs of injury.      Cervical back: Normal range of motion and neck supple.   Skin:     General: Skin is warm and dry.   Neurological:      General: No focal deficit present.      Mental Status: She is alert and oriented to person, place, and time.   Psychiatric:         Mood and Affect: Mood normal.         Behavior: Behavior normal.     Labs:   Lab Results   Component Value Date/Time    WBC 8.6 12/10/2022 07:41 AM    RBC 4.22 12/10/2022 07:41 AM    HEMOGLOBIN 10.8 (L) 12/10/2022 07:41 AM    HEMATOCRIT 35.1 (L) 12/10/2022 07:41 AM    MCV 83.2 12/10/2022 07:41 AM    MCH 25.6 (L) 12/10/2022 07:41 AM    MCHC 30.8 (L) 12/10/2022 07:41 AM    MPV 9.0 12/10/2022 07:41 AM    NEUTSPOLYS 89.00 (H) 12/10/2022 07:41 AM    LYMPHOCYTES 8.80 (L) 12/10/2022 07:41 AM    MONOCYTES 1.40 12/10/2022 07:41 AM    EOSINOPHILS 0.00 12/10/2022 07:41 AM    BASOPHILS 0.30 12/10/2022 07:41 AM    ANISOCYTOSIS 1+ 12/09/2022 02:55 PM        Lab Results   Component Value Date/Time    SODIUM 141 12/10/2022 07:41 AM    POTASSIUM 4.0 12/10/2022 07:41 AM    CHLORIDE 102 12/10/2022 07:41 AM    CO2 25 12/10/2022 07:41 AM    GLUCOSE 143 (H) 12/10/2022 07:41 AM    BUN 10 12/10/2022 07:41 AM    CREATININE 0.43 (L) 12/10/2022 07:41 AM        No results found for: CCTEK45K, PHRVPJ906H, CGDEA265D, ARTHCO3,  ARTBE, GAPBG, TLN2MBF1    Lab Results   Component Value Date/Time    PROTHROMBTM 13.3 10/05/2022 03:44 PM    INR 1.02 10/05/2022 03:44 PM      Imaging:   MR-BRAIN-WITH & W/O  Narrative: 12/10/2022 11:53 AM    HISTORY/REASON FOR EXAM: Brain mass or lesion; for new brain mass  Malignant melanoma metastatic to the brain, right facial droop    TECHNIQUE/EXAM DESCRIPTION:    T1 sagittal, T2 axial, flair coronal, T1 coronal, and diffusion-weighted axial images were obtained of the brain pre-contrast followed by T1 coronal and axial images post intravenous administration of 13 mL ProHance.    COMPARISON: CT brain 12/9/2022, MRI brain 10/26/2022    FINDINGS:    Right frontal craniotomy noted with right frontal encephalomalacia at site of prior resection.  Gradient-echo images demonstrate hemosiderin staining the site of multiple intracranial metastatic lesions.  Postcontrast images demonstrate intrinsic T1 shortening within the right parietal metastatic lesion with only minimal rim enhancement of this lesion identified.  This lesion measures 20 x 21 x 13 (CC, AP, ML). This lesion is new from the previous study.    A ring-enhancing lesion measuring 5 mm in diameter is noted in the left subinsular white matter, slightly decreased in size from the previous examination. Edema surrounding this lesion is slightly more extensive.  The previously seen lesions involving the right prefrontal region, medial right frontal and medial anterior right parietal region has significantly decreased in size. These lesions do demonstrate mild nodular enhancement best seen on the axial   postcontrast series but overall decreased in size compared to the previous study. The lateral right prefrontal lesion demonstrates mild enhancement along the superolateral rim abutting the dura which may represent postoperative dural enhancement.    Also noted is a nodular lesion involving the right inferior temporal region with intrinsic T1 shortening and no  definite associated enhancement.. There is minimal edema surrounding this lesion. This lesion is also improved compared to the previous   examination.  Nodular dural thickening and enhancement continues to be demonstrated in the right frontal region involving the convexity.    Visualized intracranial arterial flow voids are within normal limits.  Bone marrow signal in the calvarium is within normal limits.  Included portions of the paranasal sinuses are within normal limits.  Included portions of the mastoid air cells are within normal limits.  Included portions of the orbits are within normal limits  Impression: Interval appearance of a new hemorrhagic metastatic lesion in the right parietal region. Minimal rim enhancement of this lesion is demonstrated.    Interval slight decrease in size of the enhancing component of the left subinsular white matter lesion with slight increase in surrounding T2 signal abnormality.    Minimal nodular enhancement is noted in the site of the previously treated lesions in the medial left frontal region and the right prefrontal region as well as the right inferior temporal region.    Dural thickening and enhancement underlying the right frontal craniotomy is again noted, likely representing postoperative change, however recommend attention on follow-up examination.      Assessment and Plan:  * Malignant melanoma metastatic to brain (HCC)- (present on admission)  Assessment & Plan  -Patient is s/p resection of 3 brain masses 10/2022, radiation and palliative immunotherapy with opdivo and yervoy on 11/17/2022  -CT head new right sided parietal lesion with edema. MRI brain showing new hemorrhagic metastatic lesion in R parietal region.   -Consulted oncology, radiation oncology and neurosurgery   -Continue Decadron 4mg q4h IV and home keppra 1000mg BID  -Neurochecks  -Fall/aspiration/speech precautions    Normocytic anemia  Assessment & Plan  Likely iron deficiency anemia secondary to  malignant melanoma  -review of records shows that last admit iron panel workup done  -continue home ferrous sulfate    Diabetic neuropathy (HCC)  Assessment & Plan  Continue home gabapentin    Dyslipidemia- (present on admission)  Assessment & Plan  Continue home statin    Chronic prescription benzodiazepine use- (present on admission)  Assessment & Plan  Continue to avoid seizures    DM2 (diabetes mellitus, type 2) (HCC)- (present on admission)  Assessment & Plan  Continue insulin therapy, may require titration given ongoing steroid treatment    Code Status: FULL  DVT prophylaxis: SCDs  Diet: diabetic  GI: none  Disposition: to remain inpatient pending oncology, radiation oncology and neurosurgery consults    Mindy Giraldo DO  PGY-1 Internal Medicine

## 2022-12-10 NOTE — THERAPY
Speech Language Pathology   Clinical Swallow Evaluation     Patient Name: Jacque Mcintyre  AGE:  42 y.o., SEX:  female  Medical Record #: [id number]  Today's Date: 12/10/2022     Precautions  Precautions: Fall Risk, Swallow Precautions ( See Comments)  Comments: R-sided weakness    Assessment  Patient is 42 y.o. female admitted 12/9 with dysarthria, left-sided facial droop, and numbness. PMHx of metastatic melanoma s/p resection in October and 5 radiation treatments and Type 2 DM. Pt found to have new right-sided parietal lesion w/ edema. Last seen by SLP in October 2022 by SLP at [facility] for cognition only.   Head CT reports: 1.  Multiple intracranial metastatic lesions with surrounding edema again noted. There is a new lesion in the right parietal lobe with surrounding edema.  2.  No evidence of intracranial hemorrhage.  CXR reports: 1.  No acute cardiopulmonary disease. No acute infiltrates.  2.  Previously described pulmonary nodules in the right lower lobe and left lower lobe best seen on CT are not appreciated on this exam.    Level of Consciousness: Alert, Awake  Affect/Behavior: Appropriate, Calm, Cooperative, Tearful  Follows Directives: Yes  Orientation: Oriented x 4  Hearing: Functional hearing  Vision: Functional vision      Prior Living Situation & Level of Function:  Pt reports she has been living with her retired parents since d/c from [facility] in October. She reports they help intermittently with ADLs. 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       Oral Mechanism Evaluation  Facial Symmetry: Central R facial droop  Facial Sensation: Equal  Labial Observations: R sided weakness  Lingual Observations: R lingual deviation  Dentition: Good  Comments:      Voice  Quality: WFL  Resonance: WFL  Intensity: Appropriate  Cough: WFL  Comments:      Current Method of Nutrition   Oral diet (Diabetic/thins)       Assessment  Positioning: Villa's (60-90 degrees)  Bolus Administration: Patient  Oxygen Requirements: Room Air  Factor(s)  "Affecting Performance: None    Swallowing Trials  Ice: WFL  Thin Liquid (TN0): Impaired  Pureed (PU4): WFL  Soft & Bite-Sized (SB6): WFL  Regular (RG7): WFL    Comments:  Patient seen this date for clinical swallow evaluation. Patient awake, alert, and very pleasant during evaluation. Patient oriented and appeared to be an accurate historian. RN and patient report that patient has appeared to be tolerating diabetic diet w/out difficulty. Patient noted to have slight dysarthria, as well as right-sided labial and lingual weakness. Patient did report intermittent loss of bolus from oral cavity when attempting to \"swish\" water after brushing teeth. Patient consumed PO trials of single ice chips, thins via cup sip and straw, pudding, soft solids, mixed consistencies, and dry solids. Patient presented with anterior bolus loss x1 on cup sips of thins. Straw sip was presented on lip side of oral cavity and patient reported \"this feels easier\" d/t left side being stronger than right side of oral cavity. Anterior bolus loss x1 was noted on pudding as well. Cough x1 out of 6 straw sips of thins. Patient reported \"I promise that doesn't normally happen. I don't want pneumonia.\" No further coughing was noted on any other trials. No oral pocketing was noted.      Clinical Impressions  Patient presents with mild s/sx of oropharyngeal dysphagia. Patient appears appropriate to continue current diet of regular textures w/ thin liquids. Recommend straw sips from left side of oral cavity. Patient was instructed on OMEX to improve right-sided oral motor strength. SLP to follow.      Recommendations  1.  Recommend patient continue regular diet w/ thin liquids; liquids via straw sip out of left side of oral cavity preferred to prevent spillage   2.  Instrumentation: None indicated at this time  3.  Swallowing Instructions & Precautions:   Supervision: Independent  Positioning: Fully upright and midline during oral intake  Medication: As " "tolerated  Strategies: Small bites/sips, Slow rate of intake, liquids via straw on left-side of oral cavity   Oral Care: Q4h  4.  SLP to follow to ensure diet tolerance       Plan  Recommend Speech Therapy 3 times per week until therapy goals are met for the following treatments:  Dysphagia Training and Patient / Family / Caregiver Education.    Discharge Recommendations: Anticipate that the patient will have no further speech therapy needs after discharge from the hospital     Objective     12/10/22 1020   Precautions   Precautions Fall Risk;Swallow Precautions ( See Comments)   Comments R-sided weakness   Vitals   O2 Delivery Device Room air w/o2 available   Pain 0 - 10 Group   Therapist Pain Assessment Post Activity Pain Same as Prior to Activity;Nurse Notified;0   Prior Living Situation   Prior Services Intermittent Physical Support for ADL Per Family   Lives with - Patient's Self Care Capacity Parents   Comments Pt reported that she has been staying with her parents since d/c from [facility] and they are retired and available to help as needed.   Prior Level Of Function   Communication Within Functional Limits   Swallow Within Functional Limits   Dentition Intact   Dentures None   Hearing Within Functional Limits for Evaluation   Hearing Aid None   Vision Within Functional Limits for Evaluation   Patient's Primary Language English   Occupation (Pre-Hospital Vocational) Not Employed   Comments Normally works as an RN at [employer]   Dysphagia Strategies / Recommendations   Diet / Liquid Recommendation Regular (7);Thin (0)   Medication Administration  Whole with Liquid Wash   Patient / Family Goals   Patient / Family Goal #1 \"I'm doing pretty well I think\"   Short Term Goals   Short Term Goal # 1 Patient will consume regular diet w/ thin liquids with no overt s/sx of aspiration.   Education Group   Education Provided Dysphagia   Dysphagia Patient Response Patient;Acceptance;Explanation;Verbal Demonstration;Reinforcement " Needed   Problem List   Problem List Dysphagia;Dysarthia   Anticipated Discharge Needs   Discharge Recommendations Anticipate that the patient will have no further speech therapy needs after discharge from the hospital

## 2022-12-10 NOTE — NON-PROVIDER
Medical Student Progress Note  This note is intended for the purposes of medical student education and feedback only    ID: Jacque Mcintyre 42y F    SUBJECTIVE:  Ms. Mcintyre is a 42y F with metastatic melanoma of the brain s/p resection/radiation/chemo, T2DM (HbA1c 7.8% 10/2022), HTN, DLD, Bipolar Disorder 1, and PCOS who was admitted after 1d of new neurologic deficits including dysarthria, left sided facial droop, perioral paresthesia, and dysphagia. Oncology, radiation oncology, and neurosurgery have been looped in and patient will be discussed on tumor board 12/12/2022.    Interval Events:  - continued facial droop, no dysphagia or dysarthria  - no pain    OBJECTIVE:  24h Vitals:  Temp: 36.4  Tmax: 36.4  HR: 67-83  BP: 97//72  RR: 18  SaO2: 95% 1L NC    Physical Exam:   General: no acute distress.   HEENT: moist mucous membranes. PERRLA. EOMI.  CVS: RRR with no murmurs, rubs, or extra heart sounds.  Resp: CTA to all lung fields with no crackles, wheezing, or rhonchi.  Abdomen: soft, nontender, nondistended. Bowel sounds present in all four quadrants.  Extremities: No LE edema. Distal pulses 2+ x4. Capillary refill <2s to distal extremities x4.  Neuro: A&O x4 and appropriate. CN 7 palsy otherwise CN 2-6, 8-12 grossly intact. Improved from 12/10.  Psych: No depression. No anxiety.  Skin: No rash, pallor, or mottling.     Labs:  Recent Labs     12/09/22  1455   WBC 6.1   RBC 3.60*   HEMOGLOBIN 9.1*   HEMATOCRIT 30.7*   MCV 85.3   MCH 25.3*   MCHC 29.6*   RDW 64.2*   PLATELETCT 571*   MPV 8.7*     Recent Labs     12/09/22  1455   SODIUM 141   POTASSIUM 3.8   CHLORIDE 103   CO2 26   GLUCOSE 138*   BUN 8   CREATININE 0.37*   CALCIUM 9.2         Recent Labs     12/09/22  1455   ASTSGOT 9*   ALTSGPT 6   TBILIRUBIN <0.2   ALKPHOSPHAT 104*   GLOBULIN 2.4     - Laboratory studies reviewed     Imaging:  CT head shows known metastatic lesions to the brain with new R parietal lesion, no hemorrhage or other acute processes  -  Imaging studies reviewed     ASSESSMENT & PLAN:  Ms. Mcintyre is a pleasant 42y F with metastatic melanoma to the brain with new hemorrhagic lesion (MRI confirmed) to the right parietal love with new CN 7 palsy, responding to steroids but without complete resolution of acute symptoms. She is otherwise at her baseline with T2DM (HbA1c 7.8% 10/2022), HTN, DLD, Bipolar Disorder 2, and PCOS.    # Metastatic Melanoma  Etiology/Ddx/Decision making:  - s/p resection, radiation, palliative immunotherapy with opdivo and yervoy  - new lesion in R parietal lobe, hemorrhagic on MRI  - new L facial droop, dysarthria, and dysphagia resolved  Plan:  - taper down dexamethasone  - consider intiating bevacizumab or other anti-VEGF therapy to target vasogenic edema 2/2 tumor   - levetiracetam 100mg BID for seizure ppx  - oxcarbazepine 300mg BID for seizure ppx    # T2DM  # Severe peripheral neuropathy  Etiology/Ddx/Decision making:  - HbA1c 7.8% 10/2022  Plan:  - insulin glargine 50u q24h  - insulin lispro on sliding scale  - gabapentin 800mg QID  - duloxetine 60mg q24h    # Major Depression  Etiology/Ddx/Decision making:  - chronic issue, well managed per pt  Plan:  - continue home meds      # Dyslipidemia  Etiology/Ddx/Decision making:  - LDL 45 in 10/2019  Plan:  - continue home statin    # Normocytic Anemia  Etiology/Ddx/Decision making:  - Hb/Hct 10.3/34.8 (baseline 10-12/30-35)  - MCV 85  - Fe 19  - TIBC 276  Plan:  - ferrous sulfate 325mg PO to be dosed every other day as this improves response    Prophylaxis:  DVT: mechanical only 2/2 hemorrhagic brain lesion  GI: prn    Disposition: pending tumor board discussion monday    Javier Espinosa MS4  [employer]

## 2022-12-10 NOTE — CARE PLAN
The patient is Watcher - Medium risk of patient condition declining or worsening    Shift Goals  Clinical Goals: pain control, MRI tomorrow, seizure pecautions  Patient Goals: rest, pain control    Progress made toward(s) clinical / shift goals:    Problem: Fall Risk  Goal: Patient will remain free from falls  Outcome: Progressing     Problem: Pain - Standard  Goal: Alleviation of pain or a reduction in pain to the patient’s comfort goal  Outcome: Progressing       Patient is not progressing towards the following goals:

## 2022-12-10 NOTE — H&P
[facility] Internal Medicine History & Physical Note    Date of Service  12/9/2022    [facility] Team: VISHNU   Attending: Lewis Alba M.d.  Senior Resident: Dr. Desai  Intern:  n/a  Contact Number: [phone]    Primary Care Physician  Adriana Levine M.D.    Consultants  oncology    Specialist Names: Dr. Wright    Code Status  Full Code    Chief Complaint  Chief Complaint   Patient presents with    Numbness     Pt having right sided facial numbness since 2000 yesterday. Equal  strengths. Pt had chemotherapy yesterday. Hx metastatic melanoma.       History of Presenting Illness (HPI):   Jacque Mcintyre is a 42 y.o. female who presented 12/9/2022 with dysarthria, left sided facial droop, and numbness of mouth/lips. She experienced difficulty gargling and spitting water while brushing her teeth last night, and was also noted to have new left sided facial droop. She has history of metastatic melanoma to brain, underwent resection in October and then 5 radiation treatments subsequently and is currently on opdivo and yervoy. Her oncologist is Dr. Reed [facility]. She denies any other acute symptoms, does have chronic left sided weakness and uses a walker.    I discussed the plan of care with patient and family.    Review of Systems  Review of Systems   Constitutional:  Negative for chills and fever.   HENT:  Negative for ear pain, hearing loss, sinus pain and sore throat.         Labial numbness, drooling, dysarthria, facial droop   Eyes:  Negative for blurred vision and double vision.   Respiratory:  Negative for cough and shortness of breath.    Cardiovascular:  Negative for chest pain and palpitations.   Gastrointestinal:  Negative for heartburn, nausea and vomiting.   Genitourinary:  Negative for dysuria and flank pain.   Musculoskeletal:  Positive for back pain (chronic). Negative for falls and myalgias.   Neurological:  Positive for sensory change (around lips), speech change (slight slurring)  and focal weakness (chronic on left side since surgery). Negative for dizziness, seizures and loss of consciousness.     Past Medical History   has a past medical history of Asthma, Bipolar 1 disorder (HCC), Cancer (HCC) (01/01/2016), Cough, Depression, DM mellitus,, Hyperlipidemia, Hypertension, Pap smear, PCOS (polycystic ovarian syndrome), Shortness of breath, Sputum production, and Wheezing.    Surgical History   has a past surgical history that includes myringotomy; adenoidectomy; other (2016); wide excision (Left, 5/9/2017); node biopsy sentinel (Left, 5/9/2017); axillary node dissection (Left, 6/23/2017); pr lap,appendectomy (N/A, 10/7/2019); pr bronchoscopy,diagnostic (N/A, 6/17/2022); endobronchial us add-on (N/A, 6/17/2022); and craniotomy stealth (Right, 10/6/2022).     Family History  family history includes Diabetes in her father and mother; Hyperlipidemia in her mother; Hypertension in her father; Psychiatric Illness in her mother; Thyroid in her mother.   Family history reviewed with patient.     Social History  Tobacco: 7.5 py  Alcohol: denies  Recreational drugs (illegal or prescription): denies  Employment: former psych nurse, currently unable to work  Living Situation: w/ mother  Recent Travel: n/a  Primary Care Provider: Reviewed    Other (stressors, spirituality, exposures): n/a    Allergies  Allergies   Allergen Reactions    Augmentin Vomiting    Clavulanic Acid Vomiting     Violent vomiting       Medications  Prior to Admission Medications   Prescriptions Last Dose Informant Patient Reported? Taking?   DULoxetine (CYMBALTA) 60 MG Cap DR Particles delayed-release capsule   No No   Sig: Take 1 Capsule by mouth every evening.   Insulin Glargine, 2 Unit Dial, 300 UNIT/ML Solution Pen-injector   No No   Sig: Inject 50 Units under the skin every day.   OXcarbazepine (TRILEPTAL) 300 MG Tab   No No   Sig: Take 1 Tablet by mouth 2 times a day.   QUEtiapine (SEROQUEL) 200 MG Tab   No No   Sig: Take 1  Tablet by mouth every evening.   acetaminophen (TYLENOL) 500 MG Tab  Patient Yes No   Sig: Take 1,000 mg by mouth 2 times a day.   atorvastatin (LIPITOR) 40 MG Tab   No No   Sig: Take 1 Tablet by mouth every evening.   baclofen (LIORESAL) 10 MG Tab   No No   Sig: Take 1 Tablet by mouth 3 times a day.   clonazePAM (KLONOPIN) 0.5 MG Tab  Patient Yes No   Sig: Take 0.25 mg by mouth 2 times a day as needed (sleep). Indications: Feeling Anxious   gabapentin (NEURONTIN) 800 MG tablet   No No   Sig: Take 1 Tablet by mouth 4 times a day.   insulin lispro 100 UNIT/ML SC SOPN injection PEN   No No   Sig: Inject 2-10 Units under the skin 4 Times a Day,Before Meals and at Bedtime.   levetiracetam (KEPPRA) 1000 MG tablet   No No   Sig: Take 1 Tablet by mouth 2 times a day.      Facility-Administered Medications: None       Physical Exam  Temp:  [36.4 °C (97.5 °F)] 36.4 °C (97.5 °F)  Pulse:  [78-83] 79  Resp:  [16-18] 18  BP: ()/(66-79) 121/79  SpO2:  [94 %-97 %] 94 %  Blood Pressure: 121/79   Temperature: 36.4 °C (97.5 °F)   Pulse: 79   Respiration: 18   Pulse Oximetry: 94 %       Physical Exam  Constitutional:       General: She is not in acute distress.  HENT:      Head:      Comments: Surgical scar at top of head     Right Ear: External ear normal.      Left Ear: External ear normal.      Nose: Nose normal. No congestion.      Mouth/Throat:      Mouth: Mucous membranes are moist.      Pharynx: Oropharynx is clear.   Eyes:      General: No scleral icterus.     Extraocular Movements: Extraocular movements intact.      Comments: Left pupil larger than right, patient reports chronic from birth   Cardiovascular:      Rate and Rhythm: Normal rate and regular rhythm.      Heart sounds: No murmur heard.  Pulmonary:      Effort: No respiratory distress.      Breath sounds: No wheezing or rales.   Abdominal:      General: Abdomen is flat. There is no distension.      Palpations: Abdomen is soft.      Tenderness: There is no  abdominal tenderness.   Musculoskeletal:      Cervical back: Normal range of motion and neck supple.      Right lower leg: No edema.      Left lower leg: No edema.   Skin:     General: Skin is warm and dry.      Capillary Refill: Capillary refill takes less than 2 seconds.      Coloration: Skin is not jaundiced.   Neurological:      Mental Status: She is alert and oriented to person, place, and time.      Cranial Nerves: Cranial nerve deficit present.      Sensory: No sensory deficit.      Motor: Weakness present.      Coordination: Coordination normal.      Comments: LLE 4/5, LUE weaker than RUE otherwise 5/5 strength  Left sided facial/lip droop       Laboratory:  Recent Labs     12/09/22  1455   WBC 6.1   RBC 3.60*   HEMOGLOBIN 9.1*   HEMATOCRIT 30.7*   MCV 85.3   MCH 25.3*   MCHC 29.6*   RDW 64.2*   PLATELETCT 571*   MPV 8.7*     Recent Labs     12/09/22  1455   SODIUM 141   POTASSIUM 3.8   CHLORIDE 103   CO2 26   GLUCOSE 138*   BUN 8   CREATININE 0.37*   CALCIUM 9.2     Recent Labs     12/09/22  1455   ALTSGPT 6   ASTSGOT 9*   ALKPHOSPHAT 104*   TBILIRUBIN <0.2   GLUCOSE 138*         No results for input(s): NTPROBNP in the last 72 hours.      No results for input(s): TROPONINT in the last 72 hours.    Imaging:  CT-HEAD W/O   Final Result      1.  Multiple intracranial metastatic lesions with surrounding edema again noted. There is a new lesion in the right parietal lobe with surrounding edema.   2.  No evidence of intracranial hemorrhage.         MR-BRAIN-WITH    (Results Pending)       EKG:  I have personally reviewed the images and compared with prior images. and My impression is: no acute ischemic changes    Assessment/Plan:  Problem Representation:     This is a 42 year-old female with history of metastatic melanoma to brain who presents with new cranial nerve deficits, found on CT to have new parietal mass on the right side and other masses. Admit for MRI and IV steroids pending consultations with  specialities.    I anticipate this patient will require at least two midnights for appropriate medical management, necessitating inpatient admission because of brain lesions    Patient will need a Med/Surg bed on NEUROLOGY service .  The need is secondary to above.    * Malignant melanoma metastatic to brain (HCC)- (present on admission)  Assessment & Plan  New right sided parietal lesion with edema. ER physician discussed with Dr. Wright, the on call oncologist, w/ below recs.  -Decadron 4mg q4h IV  -Neurochecks  -MRI brain w/ contrast  -Fall/aspiration/speech precautions, continue home antiepileptics  -Radiation oncology and possibly neurosurgery consultation after MRI    Diabetic neuropathy (HCC)  Assessment & Plan  Continue home gabapentin    Dyslipidemia- (present on admission)  Assessment & Plan  Continue home statin    Chronic prescription benzodiazepine use- (present on admission)  Assessment & Plan  Continue to avoid seizures    DM2 (diabetes mellitus, type 2) (HCC)- (present on admission)  Assessment & Plan  Continue insulin therapy, may require titration given ongoing steroid treatment      VTE prophylaxis: SCDs/TEDs

## 2022-12-11 PROBLEM — G51.0 FACIAL PARALYSIS/BELLS PALSY: Status: ACTIVE | Noted: 2022-12-11

## 2022-12-11 LAB
ALBUMIN SERPL BCP-MCNC: 4.1 G/DL (ref 3.2–4.9)
ALBUMIN/GLOB SERPL: 1.7 G/DL
ALP SERPL-CCNC: 97 U/L (ref 30–99)
ALT SERPL-CCNC: 7 U/L (ref 2–50)
ANION GAP SERPL CALC-SCNC: 13 MMOL/L (ref 7–16)
ANISOCYTOSIS BLD QL SMEAR: ABNORMAL
AST SERPL-CCNC: 9 U/L (ref 12–45)
BASOPHILS # BLD AUTO: 0 % (ref 0–1.8)
BASOPHILS # BLD: 0 K/UL (ref 0–0.12)
BILIRUB SERPL-MCNC: 0.2 MG/DL (ref 0.1–1.5)
BUN SERPL-MCNC: 11 MG/DL (ref 8–22)
CALCIUM SERPL-MCNC: 9.6 MG/DL (ref 8.5–10.5)
CHLORIDE SERPL-SCNC: 99 MMOL/L (ref 96–112)
CO2 SERPL-SCNC: 27 MMOL/L (ref 20–33)
CREAT SERPL-MCNC: 0.45 MG/DL (ref 0.5–1.4)
EOSINOPHIL # BLD AUTO: 0 K/UL (ref 0–0.51)
EOSINOPHIL NFR BLD: 0 % (ref 0–6.9)
ERYTHROCYTE [DISTWIDTH] IN BLOOD BY AUTOMATED COUNT: 66.1 FL (ref 35.9–50)
GFR SERPLBLD CREATININE-BSD FMLA CKD-EPI: 122 ML/MIN/1.73 M 2
GLOBULIN SER CALC-MCNC: 2.4 G/DL (ref 1.9–3.5)
GLUCOSE BLD STRIP.AUTO-MCNC: 118 MG/DL (ref 65–99)
GLUCOSE BLD STRIP.AUTO-MCNC: 135 MG/DL (ref 65–99)
GLUCOSE BLD STRIP.AUTO-MCNC: 141 MG/DL (ref 65–99)
GLUCOSE BLD STRIP.AUTO-MCNC: 168 MG/DL (ref 65–99)
GLUCOSE SERPL-MCNC: 132 MG/DL (ref 65–99)
HCT VFR BLD AUTO: 34.8 % (ref 37–47)
HGB BLD-MCNC: 10.3 G/DL (ref 12–16)
HYPOCHROMIA BLD QL SMEAR: ABNORMAL
LYMPHOCYTES # BLD AUTO: 0.84 K/UL (ref 1–4.8)
LYMPHOCYTES NFR BLD: 9.6 % (ref 22–41)
MACROCYTES BLD QL SMEAR: ABNORMAL
MAGNESIUM SERPL-MCNC: 1.8 MG/DL (ref 1.5–2.5)
MANUAL DIFF BLD: NORMAL
MCH RBC QN AUTO: 25.1 PG (ref 27–33)
MCHC RBC AUTO-ENTMCNC: 29.6 G/DL (ref 33.6–35)
MCV RBC AUTO: 84.7 FL (ref 81.4–97.8)
MONOCYTES # BLD AUTO: 0.37 K/UL (ref 0–0.85)
MONOCYTES NFR BLD AUTO: 4.3 % (ref 0–13.4)
MORPHOLOGY BLD-IMP: NORMAL
NEUTROPHILS # BLD AUTO: 7.49 K/UL (ref 2–7.15)
NEUTROPHILS NFR BLD: 86.1 % (ref 44–72)
NRBC # BLD AUTO: 0 K/UL
NRBC BLD-RTO: 0 /100 WBC
PHOSPHATE SERPL-MCNC: 5 MG/DL (ref 2.5–4.5)
PLATELET # BLD AUTO: 587 K/UL (ref 164–446)
PLATELET BLD QL SMEAR: NORMAL
PMV BLD AUTO: 8.9 FL (ref 9–12.9)
POTASSIUM SERPL-SCNC: 3.9 MMOL/L (ref 3.6–5.5)
PROT SERPL-MCNC: 6.5 G/DL (ref 6–8.2)
RBC # BLD AUTO: 4.11 M/UL (ref 4.2–5.4)
RBC BLD AUTO: PRESENT
SODIUM SERPL-SCNC: 139 MMOL/L (ref 135–145)
WBC # BLD AUTO: 8.7 K/UL (ref 4.8–10.8)

## 2022-12-11 PROCEDURE — 700111 HCHG RX REV CODE 636 W/ 250 OVERRIDE (IP): Performed by: STUDENT IN AN ORGANIZED HEALTH CARE EDUCATION/TRAINING PROGRAM

## 2022-12-11 PROCEDURE — 770004 HCHG ROOM/CARE - ONCOLOGY PRIVATE *

## 2022-12-11 PROCEDURE — 700102 HCHG RX REV CODE 250 W/ 637 OVERRIDE(OP)

## 2022-12-11 PROCEDURE — 83735 ASSAY OF MAGNESIUM: CPT

## 2022-12-11 PROCEDURE — 82962 GLUCOSE BLOOD TEST: CPT | Mod: 91

## 2022-12-11 PROCEDURE — 700111 HCHG RX REV CODE 636 W/ 250 OVERRIDE (IP)

## 2022-12-11 PROCEDURE — A9270 NON-COVERED ITEM OR SERVICE: HCPCS | Performed by: STUDENT IN AN ORGANIZED HEALTH CARE EDUCATION/TRAINING PROGRAM

## 2022-12-11 PROCEDURE — 85007 BL SMEAR W/DIFF WBC COUNT: CPT

## 2022-12-11 PROCEDURE — 700111 HCHG RX REV CODE 636 W/ 250 OVERRIDE (IP): Performed by: INTERNAL MEDICINE

## 2022-12-11 PROCEDURE — 80053 COMPREHEN METABOLIC PANEL: CPT

## 2022-12-11 PROCEDURE — 99232 SBSQ HOSP IP/OBS MODERATE 35: CPT | Mod: GC | Performed by: INTERNAL MEDICINE

## 2022-12-11 PROCEDURE — 85025 COMPLETE CBC W/AUTO DIFF WBC: CPT

## 2022-12-11 PROCEDURE — 700102 HCHG RX REV CODE 250 W/ 637 OVERRIDE(OP): Performed by: STUDENT IN AN ORGANIZED HEALTH CARE EDUCATION/TRAINING PROGRAM

## 2022-12-11 PROCEDURE — 84100 ASSAY OF PHOSPHORUS: CPT

## 2022-12-11 PROCEDURE — A9270 NON-COVERED ITEM OR SERVICE: HCPCS

## 2022-12-11 RX ORDER — CARBOXYMETHYLCELLULOSE SODIUM 5 MG/ML
1 SOLUTION/ DROPS OPHTHALMIC PRN
Status: DISCONTINUED | OUTPATIENT
Start: 2022-12-11 | End: 2022-12-12 | Stop reason: HOSPADM

## 2022-12-11 RX ORDER — INSULIN LISPRO 100 [IU]/ML
2-9 INJECTION, SOLUTION INTRAVENOUS; SUBCUTANEOUS EVERY 6 HOURS
Status: DISCONTINUED | OUTPATIENT
Start: 2022-12-11 | End: 2022-12-12 | Stop reason: HOSPADM

## 2022-12-11 RX ORDER — DEXAMETHASONE SODIUM PHOSPHATE 4 MG/ML
4 INJECTION, SOLUTION INTRA-ARTICULAR; INTRALESIONAL; INTRAMUSCULAR; INTRAVENOUS; SOFT TISSUE EVERY 6 HOURS
Status: DISCONTINUED | OUTPATIENT
Start: 2022-12-11 | End: 2022-12-12

## 2022-12-11 RX ORDER — INSULIN LISPRO 100 [IU]/ML
2-9 INJECTION, SOLUTION INTRAVENOUS; SUBCUTANEOUS EVERY 6 HOURS
Status: CANCELLED | OUTPATIENT
Start: 2022-12-11

## 2022-12-11 RX ADMIN — BACLOFEN 10 MG: 10 TABLET ORAL at 17:56

## 2022-12-11 RX ADMIN — GABAPENTIN 800 MG: 400 CAPSULE ORAL at 12:30

## 2022-12-11 RX ADMIN — Medication 2000 UNITS: at 06:03

## 2022-12-11 RX ADMIN — LEVETIRACETAM 1000 MG: 500 TABLET, FILM COATED ORAL at 17:57

## 2022-12-11 RX ADMIN — ATORVASTATIN CALCIUM 40 MG: 40 TABLET, FILM COATED ORAL at 17:56

## 2022-12-11 RX ADMIN — DEXAMETHASONE SODIUM PHOSPHATE 4 MG: 4 INJECTION, SOLUTION INTRA-ARTICULAR; INTRALESIONAL; INTRAMUSCULAR; INTRAVENOUS; SOFT TISSUE at 21:21

## 2022-12-11 RX ADMIN — GABAPENTIN 800 MG: 400 CAPSULE ORAL at 21:21

## 2022-12-11 RX ADMIN — DULOXETINE HYDROCHLORIDE 60 MG: 20 CAPSULE, DELAYED RELEASE ORAL at 17:57

## 2022-12-11 RX ADMIN — LEVETIRACETAM 1000 MG: 500 TABLET, FILM COATED ORAL at 06:03

## 2022-12-11 RX ADMIN — POLYETHYLENE GLYCOL 3350 1 PACKET: 17 POWDER, FOR SOLUTION ORAL at 06:03

## 2022-12-11 RX ADMIN — DEXAMETHASONE SODIUM PHOSPHATE 4 MG: 4 INJECTION, SOLUTION INTRA-ARTICULAR; INTRALESIONAL; INTRAMUSCULAR; INTRAVENOUS; SOFT TISSUE at 08:18

## 2022-12-11 RX ADMIN — SENNOSIDES AND DOCUSATE SODIUM 2 TABLET: 50; 8.6 TABLET ORAL at 06:03

## 2022-12-11 RX ADMIN — FERROUS SULFATE TAB 325 MG (65 MG ELEMENTAL FE) 325 MG: 325 (65 FE) TAB at 06:03

## 2022-12-11 RX ADMIN — OXCARBAZEPINE 300 MG: 300 TABLET, FILM COATED ORAL at 08:18

## 2022-12-11 RX ADMIN — OXCARBAZEPINE 300 MG: 300 TABLET, FILM COATED ORAL at 21:20

## 2022-12-11 RX ADMIN — SENNOSIDES AND DOCUSATE SODIUM 2 TABLET: 50; 8.6 TABLET ORAL at 17:56

## 2022-12-11 RX ADMIN — ONDANSETRON 4 MG: 2 INJECTION INTRAMUSCULAR; INTRAVENOUS at 12:41

## 2022-12-11 RX ADMIN — BACLOFEN 10 MG: 10 TABLET ORAL at 06:03

## 2022-12-11 RX ADMIN — OXYCODONE 5 MG: 5 TABLET ORAL at 08:18

## 2022-12-11 RX ADMIN — CARBOXYMETHYLCELLULOSE SODIUM 1 DROP: 5 SOLUTION/ DROPS OPHTHALMIC at 21:22

## 2022-12-11 RX ADMIN — INSULIN LISPRO 2 UNITS: 100 INJECTION, SOLUTION INTRAVENOUS; SUBCUTANEOUS at 12:38

## 2022-12-11 RX ADMIN — BACLOFEN 10 MG: 10 TABLET ORAL at 12:30

## 2022-12-11 RX ADMIN — OXYCODONE 5 MG: 5 TABLET ORAL at 21:21

## 2022-12-11 RX ADMIN — DEXAMETHASONE SODIUM PHOSPHATE 4 MG: 4 INJECTION, SOLUTION INTRA-ARTICULAR; INTRALESIONAL; INTRAMUSCULAR; INTRAVENOUS; SOFT TISSUE at 16:20

## 2022-12-11 RX ADMIN — GABAPENTIN 800 MG: 400 CAPSULE ORAL at 16:20

## 2022-12-11 RX ADMIN — DEXAMETHASONE SODIUM PHOSPHATE 4 MG: 4 INJECTION, SOLUTION INTRA-ARTICULAR; INTRALESIONAL; INTRAMUSCULAR; INTRAVENOUS; SOFT TISSUE at 03:32

## 2022-12-11 RX ADMIN — GABAPENTIN 800 MG: 400 CAPSULE ORAL at 08:17

## 2022-12-11 RX ADMIN — OXYCODONE 5 MG: 5 TABLET ORAL at 12:41

## 2022-12-11 RX ADMIN — CLONAZEPAM 0.5 MG: 0.5 TABLET ORAL at 12:41

## 2022-12-11 RX ADMIN — QUETIAPINE FUMARATE 200 MG: 100 TABLET ORAL at 21:20

## 2022-12-11 ASSESSMENT — ENCOUNTER SYMPTOMS
ABDOMINAL PAIN: 0
FALLS: 0
DIZZINESS: 0
CHILLS: 0
NAUSEA: 0
VOMITING: 0
FEVER: 0
FOCAL WEAKNESS: 0
PALPITATIONS: 0
MYALGIAS: 0
EYE DISCHARGE: 0
EYE PAIN: 0

## 2022-12-11 ASSESSMENT — PAIN DESCRIPTION - PAIN TYPE
TYPE: ACUTE PAIN

## 2022-12-11 NOTE — PROGRESS NOTES
Daily Progress Note:     Date of Service: 12/11/2022  Primary Team: [facility] Team   Attending: Justen Ravi M.D.   Senior Resident: Dr. Esquivel  Intern: Dr. Mindy Giraldo  Contact:  [phone]    Hospital Course:   Admitted 12/9/22 for dysarthria, R sided facial droop and numbness/tingling mouth. 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 CT head showed new lesion R parietal lobe with surrounding edema. Treated with dexamethasone IV 4mg q6h. MRI brain showing new hemorrhagic metastatic lesion in R parietal region. Neurosurgery and radiation oncology consulted who will discuss patient at tumor board on 12/12/22 for treatment plan.    Her Bell's Palsy treatment requires 7 days of prednisone 60mg, which is covered by her decadron that she is already currently receiving.     Interval Update:  -Patient denies any complaints this AM  -She states that the numbness and tingling around mouth hasn't come back  -Still does have R sided facial droop and unable to squeeze R eye closed  -She denies history of herpes or cold sores  -No exposure to Lyme endemic areas or tick bites    Consultants/Specialty:  Oncology  Radiation oncology  Neurosurgery    Review of Systems:  Review of Systems   Constitutional:  Negative for chills and fever.   HENT:  Negative for ear discharge and ear pain.    Eyes:  Negative for pain and discharge.   Cardiovascular:  Negative for chest pain and palpitations.   Gastrointestinal:  Negative for abdominal pain, nausea and vomiting.   Genitourinary:  Negative for dysuria and urgency.   Musculoskeletal:  Negative for falls and myalgias.   Neurological:  Negative for dizziness and focal weakness.     Objective:   Vitals:   Temp:  [35.9 °C (96.6 °F)-37.1 °C (98.7 °F)] 36.6 °C (97.8 °F)  Pulse:  [57-79] 61  Resp:  [15-18] 15  BP: ()/(59-88) 114/72  SpO2:  [96 %-99 %] 98  %    Physical Exam  Constitutional:       General: She is not in acute distress.     Appearance: She is not toxic-appearing.   HENT:      Head: Normocephalic and atraumatic.      Nose: Nose normal. No rhinorrhea.      Mouth/Throat:      Mouth: Mucous membranes are moist.      Pharynx: Oropharynx is clear.   Eyes:      General: No scleral icterus.     Extraocular Movements: Extraocular movements intact.      Conjunctiva/sclera: Conjunctivae normal.   Cardiovascular:      Rate and Rhythm: Normal rate and regular rhythm.      Heart sounds: Normal heart sounds. No murmur heard.  Pulmonary:      Effort: Pulmonary effort is normal. No respiratory distress.      Breath sounds: Normal breath sounds. No wheezing.   Abdominal:      General: There is no distension.      Palpations: Abdomen is soft.      Tenderness: There is no abdominal tenderness.   Musculoskeletal:         General: No deformity or signs of injury.      Cervical back: Normal range of motion and neck supple.   Skin:     General: Skin is warm and dry.   Neurological:      General: No focal deficit present.      Mental Status: She is alert and oriented to person, place, and time.   Psychiatric:         Mood and Affect: Mood normal.         Behavior: Behavior normal.     Labs:   Lab Results   Component Value Date/Time    WBC 8.7 12/11/2022 06:12 AM    RBC 4.11 (L) 12/11/2022 06:12 AM    HEMOGLOBIN 10.3 (L) 12/11/2022 06:12 AM    HEMATOCRIT 34.8 (L) 12/11/2022 06:12 AM    MCV 84.7 12/11/2022 06:12 AM    MCH 25.1 (L) 12/11/2022 06:12 AM    MCHC 29.6 (L) 12/11/2022 06:12 AM    MPV 8.9 (L) 12/11/2022 06:12 AM    NEUTSPOLYS 86.10 (H) 12/11/2022 06:12 AM    LYMPHOCYTES 9.60 (L) 12/11/2022 06:12 AM    MONOCYTES 4.30 12/11/2022 06:12 AM    EOSINOPHILS 0.00 12/11/2022 06:12 AM    BASOPHILS 0.00 12/11/2022 06:12 AM    HYPOCHROMIA 1+ 12/11/2022 06:12 AM    ANISOCYTOSIS 1+ 12/11/2022 06:12 AM        Lab Results   Component Value Date/Time    SODIUM 139 12/11/2022 06:12 AM     POTASSIUM 3.9 12/11/2022 06:12 AM    CHLORIDE 99 12/11/2022 06:12 AM    CO2 27 12/11/2022 06:12 AM    GLUCOSE 132 (H) 12/11/2022 06:12 AM    BUN 11 12/11/2022 06:12 AM    CREATININE 0.45 (L) 12/11/2022 06:12 AM        No results found for: ETLIN84I, RPVYAB917V, FTMVY073A, ARTHCO3, ARTBE, GAPBG, MHI8AMU7    Lab Results   Component Value Date/Time    PROTHROMBTM 13.3 10/05/2022 03:44 PM    INR 1.02 10/05/2022 03:44 PM      Assessment and Plan:  * Malignant melanoma metastatic to brain (HCC)- (present on admission)  Assessment & Plan  -Patient is s/p resection of 3 brain masses 10/2022, radiation and palliative immunotherapy with opdivo and yervoy on 11/17/2022  -CT head new right sided parietal lesion with edema. MRI brain showing new hemorrhagic metastatic lesion in R parietal region.   -Consulted oncology, radiation oncology and neurosurgery   -Continue Decadron 4mg q6h IV and home keppra 1000mg BID per neurosurgery  -Steroid taper will be done outpatient per oncology  -Neurochecks within normal limits for 24 hrs. Discontinued  -Fall/aspiration/speech precautions   -further plan dependent on tumor board to take place 12/12/22    Facial paralysis/[place] palsy- (present on admission)  Assessment & Plan  Presented with right facial droop that includes eyebrow involvement  Patient denies history of herpes virus or cold sores. Also denies exposure to Lyme endemic areas or tick bites  She denied work-up for possible causes of Bell's Palsy     Treatment of prednisone 60mg for 7 days,  12/9 - 12/15.   The decadron 4mg IV q6h that she is receiving for new R parietal mass with edema covers Bell's Palsy treatment  Artificial tears QID due to incomplete eye closure and to prevent corneal injury  When asleep, ointment formulation of artificial tears to be applied and the eye to be carefully taped shut using a medical-grade waterproof transparent dressing or tape for protection      Normocytic anemia  Assessment & Plan  Likely  iron deficiency anemia secondary to malignant melanoma  -review of records shows that last admit iron panel workup done  -continue home ferrous sulfate    Diabetic neuropathy (HCC)  Assessment & Plan  Continue home gabapentin    Dyslipidemia- (present on admission)  Assessment & Plan  Continue home statin    Chronic prescription benzodiazepine use- (present on admission)  Assessment & Plan  Continue to avoid seizures    DM2 (diabetes mellitus, type 2) (HCC)- (present on admission)  Assessment & Plan  Continue insulin therapy, may require titration given ongoing steroid treatment    Code Status: FULL  DVT prophylaxis: SCDs  Diet: diabetic  GI: none  Disposition: to remain inpatient pending tumor board discussion taking place on 12/12/22    Mindy Giraldo DO  PGY-1 Internal Medicine

## 2022-12-11 NOTE — ASSESSMENT & PLAN NOTE
Likely iron deficiency anemia secondary to malignant melanoma  -review of records shows that last admit iron panel workup done  -continue home ferrous sulfate

## 2022-12-11 NOTE — CARE PLAN
The patient is Stable - Low risk of patient condition declining or worsening    Shift Goals  Clinical Goals: safety  Patient Goals: get MRI done, go home    Progress made toward(s) clinical / shift goals:    Problem: Knowledge Deficit - Standard  Goal: Patient and family/care givers will demonstrate understanding of plan of care, disease process/condition, diagnostic tests and medications  Outcome: Progressing     Problem: Pain - Standard  Goal: Alleviation of pain or a reduction in pain to the patient’s comfort goal  Outcome: Progressing  Note: Pt medicated with prn oxy 5 per MAR.        Patient is not progressing towards the following goals:

## 2022-12-11 NOTE — CONSULTS
Neurosurgery Consult Note    Patient: Jacque Mcintyre MRN: [id number]    Date of Consultation: 12/11/2022    Reason for Consultation: brain mass    Referring Physician: Dr. Mindy Giraldo, [facility]    Chief Complaint: right facial weakness    History of Present Illness:  The patient is a 42 y.o. female with BRAF mutated metastatic melanoma systemically and multiple brain metastases who is presenting with 2 days of right facial weakness.  She states that she was brushing her teeth a couple days ago, noticed some numbness in her mouth, and new weakness on her right face, she was not able to keep the water in her mouth.  She presented to [facility] ED, and was admitted for work-up.  MRI was repeated, which revealed new intracranial metastasis.  Neurosurgery was consulted for evaluation.  She was started on Decadron 4 mg every 6 hours.    Tamra states that she has been doing relatively well.  She does not have any headaches.  Her left leg strength has been progressively improving since surgery.    In review, she was diagnosed in October with metastatic melanoma having discovered multiple intracranial metastases.  She underwent a right frontal craniotomy for resection of 3 dominant lesions with myself on 10/6/2022.  She recovered well from surgery, then underwent stereotactic radiosurgery for a number of small additional metastases.  She was also started on immunotherapy recently.      Medications:  Current Facility-Administered Medications   Medication Dose Route Frequency Provider Last Rate Last Admin    dexamethasone (DECADRON) injection 4 mg  4 mg Intravenous Q6HRS Justen Ravi M.D.   4 mg at 12/11/22 0818    senna-docusate (PERICOLACE or SENOKOT S) 8.6-50 MG per tablet 2 Tablet  2 Tablet Oral BID Leandro Desai M.D.   2 Tablet at 12/11/22 0603    And    polyethylene glycol/lytes (MIRALAX) PACKET 1 Packet  1 Packet Oral QDAY PRN Leandro Desai M.D.   1 Packet at 12/11/22 0603    And    magnesium hydroxide (MILK OF  MAGNESIA) suspension 30 mL  30 mL Oral QDAY PRN Leandro Desai M.D.        And    bisacodyl (DULCOLAX) suppository 10 mg  10 mg Rectal QDAY PRN Leandro Desai M.D.        acetaminophen (Tylenol) tablet 650 mg  650 mg Oral Q6HRS PRN Leandro Desai M.D.        Pharmacy Consult Request ...Pain Management Review 1 Each  1 Each Other PHARMACY TO DOSE Leandro Desai M.D.        oxyCODONE immediate-release (ROXICODONE) tablet 2.5 mg  2.5 mg Oral Q3HRS PRN Leandro Desai M.D.        Or    oxyCODONE immediate-release (ROXICODONE) tablet 5 mg  5 mg Oral Q3HRS PRN Leandro Desai M.D.   5 mg at 12/11/22 0818    Or    morphine 4 MG/ML injection 2 mg  2 mg Intravenous Q3HRS PRN Leandro Desai M.D.        atorvastatin (LIPITOR) tablet 40 mg  40 mg Oral Q EVENING Leandro Desai M.D.   40 mg at 12/10/22 1757    baclofen (LIORESAL) tablet 10 mg  10 mg Oral TID Leandro Desai M.D.   10 mg at 12/11/22 0603    DULoxetine (CYMBALTA) capsule 60 mg  60 mg Oral Q EVENING Leandro Desai M.D.   60 mg at 12/10/22 1757    gabapentin (NEURONTIN) capsule 800 mg  800 mg Oral 4X/DAY Leandro Desai M.D.   800 mg at 12/11/22 0817    levETIRAcetam (KEPPRA) tablet 1,000 mg  1,000 mg Oral BID Leandro Desai M.D.   1,000 mg at 12/11/22 0603    OXcarbazepine (TRILEPTAL) tablet 300 mg  300 mg Oral BID Leandro Deasi M.D.   300 mg at 12/11/22 0818    QUEtiapine (Seroquel) tablet 200 mg  200 mg Oral Q EVENING Leandro Desai M.D.   200 mg at 12/10/22 2135    insulin GLARGINE (Lantus,Semglee) injection  50 Units Subcutaneous Q EVENING Leandro Desai M.D.   50 Units at 12/10/22 1805    And    insulin lispro (AdmeLOG,HumaLOG) injection  2-9 Units Subcutaneous Q6HRS Leandro Desai M.D.   2 Units at 12/10/22 1807    And    dextrose 10 % BOLUS 25 g  25 g Intravenous Q15 MIN PRN Leandro Desai M.D.        vitamin D3 (cholecalciferol) tablet 2,000 Units  2,000 Units Oral DAILY Leandro Desai M.D.   2,000 Units at 12/11/22 0603    famotidine (PEPCID) tablet 10 mg  10 mg Oral QDAY PRN Leandro Desai M.D.        albuterol  inhaler 1-2 Puff  1-2 Puff Inhalation Q4HRS PRN Leandro Desai M.D.        ondansetron (ZOFRAN) syringe/vial injection 4 mg  4 mg Intravenous Q4HRS PRN Leandro Desai M.D.   4 mg at 12/10/22 1448    ferrous sulfate tablet 325 mg  325 mg Oral DAILY Leandro Desai M.D.   325 mg at 12/11/22 0603    clonazePAM (KLONOPIN) tablet 0.5 mg  0.5 mg Oral BID PRN Leandro Desai M.D.   0.5 mg at 12/10/22 1229       Allergies:  Allergies   Allergen Reactions    Augmentin [Amoxicillin-Pot Clavulanate] Vomiting       Past Medical History:  Past Medical History:   Diagnosis Date    Asthma     inhaler    Bipolar 1 disorder (HCC)     Cancer (HCC) 01/01/2016    melanoma    Cough     Depression     DM mellitus,     insulin    Hyperlipidemia     Hypertension     Pap smear     Dr. Baird    PCOS (polycystic ovarian syndrome)     Shortness of breath     Sputum production     Wheezing        Past Surgical History:  Past Surgical History:   Procedure Laterality Date    CRANIOTOMY STEALTH Right 10/6/2022    Procedure: RIGHT FRONTAL CRANIOTOMY WITH STEALTH;  Surgeon: Pebbles Tejada M.D.;  Location: [facility];  Service: Neurosurgery    MN BRONCHOSCOPY,DIAGNOSTIC N/A 6/17/2022    Procedure: FIBER OPTIC BRONCHOSCOPY WITH WASH, BRUSH, BRONCHOALVEOLAR LAVAGE, BIOPSY, FINE NEEDLE ASPIRATION;  Surgeon: Yue Swanson M.D.;  Location: [facility];  Service: Pulmonary    ENDOBRONCHIAL US ADD-ON N/A 6/17/2022    Procedure: ENDOBRONCHIAL ULTRASOUND (EBUS);  Surgeon: Yue Swanson M.D.;  Location: [facility];  Service: Pulmonary    MN LAP,APPENDECTOMY N/A 10/7/2019    Procedure: APPENDECTOMY, LAPAROSCOPIC;  Surgeon: Lionel Frazier M.D.;  Location: [facility];  Service: General    AXILLARY NODE DISSECTION Left 6/23/2017    Procedure: AXILLARY NODE DISSECTION- COMPLETION LYPHADENECTOMY;  Surgeon: Lionel Weinberg M.D.;  Location: [facility];  Service:     WIDE EXCISION Left 5/9/2017    Procedure: WIDE  EXCISION - MALIGNANT MELANOMA HAND;  Surgeon: Lionel Weinberg M.D.;  Location: SURGERY SAME DAY [facility];  Service:     NODE BIOPSY SENTINEL Left 5/9/2017    Procedure: NODE BIOPSY SENTINEL - AXILLARY;  Surgeon: Lionel Weinberg M.D.;  Location: SURGERY SAME DAY [facility];  Service:     OTHER  2016    excisino of melanoma left hand    ADENOIDECTOMY      MYRINGOTOMY      with tube placement       Family History:  Family History   Problem Relation Age of Onset    Psychiatric Illness Mother         depression    Diabetes Mother     Hyperlipidemia Mother     Thyroid Mother         s/p radioactive iodine treatment on replacement    Hypertension Father     Diabetes Father        Social History:  Social History     Socioeconomic History    Marital status:      Spouse name: Not on file    Number of children: 2    Years of education: Not on file    Highest education level: Associate degree: academic program   Occupational History    Occupation: stay at home mom     Employer: none   Tobacco Use    Smoking status: Former     Packs/day: 0.75     Years: 10.00     Pack years: 7.50     Types: Cigarettes    Smokeless tobacco: Never   Vaping Use    Vaping Use: Never used   Substance and Sexual Activity    Alcohol use: Not Currently    Drug use: Not Currently     Types: Inhaled     Comment: occasional smoked marijuanna approx 3 times per month    Sexual activity: Yes   Other Topics Concern    Not on file   Social History Narrative    Not on file     Social Determinants of Health     Financial Resource Strain: Low Risk     Difficulty of Paying Living Expenses: Not very hard   Food Insecurity: No Food Insecurity    Worried About Running Out of Food in the Last Year: Never true    Ran Out of Food in the Last Year: Never true   Transportation Needs: No Transportation Needs    Lack of Transportation (Medical): No    Lack of Transportation (Non-Medical): No   Physical Activity: Inactive    Days of Exercise per Week: 0 days     Minutes of Exercise per Session: 0 min   Stress: Stress Concern Present    Feeling of Stress : To some extent   Social Connections: Socially Isolated    Frequency of Communication with Friends and Family: More than three times a week    Frequency of Social Gatherings with Friends and Family: More than three times a week    Attends [employer] Services: Never    Active Member of Clubs or Organizations: No    Attends Club or Organization Meetings: Never    Marital Status:    Intimate Partner Violence: Not on file   Housing Stability: Low Risk     Unable to Pay for Housing in the Last Year: No    Number of Places Lived in the Last Year: 2    Unstable Housing in the Last Year: No       Physical Examination:  Vitals:    12/11/22 0714   BP: 114/72   Pulse: 61   Resp: 15   Temp: 36.6 °C (97.8 °F)   SpO2: 98%     Laying in bed, no acute distress  Cranial incision well-healed  Some alopecia    Neurological  Awake, alert, able to provide an accurate history  Pupils equally round and reactive to light  Extraocular movements full  Facial sensation intact to light touch bilaterally  Left face House-Brackman 1  Right face House-Brackman 2, has difficulty with forced eye closure, nasalis function  Hearing intact to finger rub bilaterally  Voices strong  No pronator drift  Moves bilateral upper and right lower extremity 5/5 strength  Left lower extremity 4+/5 strength in all muscle groups  Sensation intact to light touch in all 4 extremities    Labs:  Recent Labs     12/09/22  1455 12/10/22  0741 12/11/22  0612   WBC 6.1 8.6 8.7   RBC 3.60* 4.22 4.11*   HEMOGLOBIN 9.1* 10.8* 10.3*   HEMATOCRIT 30.7* 35.1* 34.8*   MCV 85.3 83.2 84.7   MCH 25.3* 25.6* 25.1*   MCHC 29.6* 30.8* 29.6*   RDW 64.2* 62.4* 66.1*   PLATELETCT 571* 625* 587*   MPV 8.7* 9.0 8.9*     Recent Labs     12/09/22  1455 12/10/22  0741 12/11/22  0612   SODIUM 141 141 139   POTASSIUM 3.8 4.0 3.9   CHLORIDE 103 102 99   CO2 26 25 27   GLUCOSE 138* 143* 132*   BUN  8 10 11   CREATININE 0.37* 0.43* 0.45*   CALCIUM 9.2 9.8 9.6               Imaging:  MRI brain with and without contrast dated 12/10/2022 was independently reviewed in detail,  and my interpretation is as follows.  New right parietal lobe hemorrhagic metastasis with rim of enhancement.  Measures 20 x 21 x 13 mm.  Left subinsular 5 mm ring-enhancing lesion decreased in size from previous examination, slight increase in edema.  Additional multiple intracranial lesions that were treated with stereotactic radiation are significantly decreased in size.    Assessment and Plan:    Jacque Mcintyre is a 42 y.o. 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  I do not think that her right facial weakness is caused by this new metastasis.  It is possible that she has a component of mild Bell's palsy.    Recommendations:  - No acute neurosurgical intervention at this time.  - Recommend treating her for Bell's palsy, consult otolaryngology if needed  -Continue Decadron 4 every 6  -Keppra 1000 mg twice daily  -Regarding the right parietal metastasis, we will discuss her case at tumor board conference tomorrow and provide final recommendations.    Thank you for this consult. Please call with questions.    Pebbles Tejada M.D.  [facility]  [address] [place] [address] [place] [id number]  [phone]    A total of 35 minutes were spent in the evaluation, examination, coordination of care, review of labs and imaging of this patient. I spent >50% of time face-to-face on patient counseling.

## 2022-12-11 NOTE — PROGRESS NOTES
Oncology/Hematology Progress Note               Author: Juan Wright M.D. Date & Time created: 12/11/2022  10:08 AM     CC MRI     Interval History:  MRI - Interval appearance of a new hemorrhagic metastatic lesion in the right parietal region. Minimal rim enhancement of this lesion is demonstrated.     Interval slight decrease in size of the enhancing component of the left subinsular white matter lesion with slight increase in surrounding T2 signal abnormality.     Minimal nodular enhancement is noted in the site of the previously treated lesions in the medial left frontal region and the right prefrontal region as well as the right inferior temporal region.    Review of Systems:  ROS no shortness of breath chest pain.    Physical Exam:  Physical Exam awake alert and oriented comfortable.  Abdomen soft nontender chest clear to auscultation neurological grossly nonfocal    Labs:          Recent Labs     12/09/22  1455 12/10/22  0741 12/11/22 0612   SODIUM 141 141 139   POTASSIUM 3.8 4.0 3.9   CHLORIDE 103 102 99   CO2 26 25 27   BUN 8 10 11   CREATININE 0.37* 0.43* 0.45*   MAGNESIUM  --   --  1.8   PHOSPHORUS  --   --  5.0*   CALCIUM 9.2 9.8 9.6     Recent Labs     12/09/22  1455 12/10/22  0741 12/11/22  0612   ALTSGPT 6 6 7   ASTSGOT 9* <5* 9*   ALKPHOSPHAT 104* 110* 97   TBILIRUBIN <0.2 0.2 0.2   GLUCOSE 138* 143* 132*     Recent Labs     12/08/22  1042 12/09/22  1455 12/10/22  0741 12/11/22  0612   RBC  --  3.60* 4.22 4.11*   HEMOGLOBIN  --  9.1* 10.8* 10.3*   HEMATOCRIT  --  30.7* 35.1* 34.8*   PLATELETCT  --  571* 625* 587*   FERRITIN 71.9  --   --   --      Recent Labs     12/09/22  1455 12/10/22  0741 12/11/22  0612   WBC 6.1 8.6 8.7   NEUTSPOLYS 66.40 89.00* 86.10*   LYMPHOCYTES 21.80* 8.80* 9.60*   MONOCYTES 5.50 1.40 4.30   EOSINOPHILS 4.50 0.00 0.00   BASOPHILS 1.80 0.30 0.00   ASTSGOT 9* <5* 9*   ALTSGPT 6 6 7   ALKPHOSPHAT 104* 110* 97   TBILIRUBIN <0.2 0.2 0.2     Recent Labs     12/09/22  6766  12/10/22  0741 22  0612   SODIUM 141 141 139   POTASSIUM 3.8 4.0 3.9   CHLORIDE 103 102 99   CO2 26 25 27   GLUCOSE 138* 143* 132*   BUN 8 10 11   CREATININE 0.37* 0.43* 0.45*   CALCIUM 9.2 9.8 9.6     Hemodynamics:  Temp (24hrs), Av.6 °C (97.8 °F), Min:35.9 °C (96.6 °F), Max:37.2 °C (99 °F)  Temperature: 36.6 °C (97.8 °F)  Pulse  Av.5  Min: 57  Max: 99   Blood Pressure: 114/72     Respiratory:    Respiration: 15, Pulse Oximetry: 98 %           Fluids:  No intake or output data in the 24 hours ending 22 1008     GI/Nutrition:  Orders Placed This Encounter   Procedures    Diet Order Diet: Consistent CHO (Diabetic) (with thin liquids)     Standing Status:   Standing     Number of Occurrences:   1     Order Specific Question:   Diet:     Answer:   Consistent CHO (Diabetic) [4]     Comments:   with thin liquids     Medical Decision Making, by Problem:  Active Hospital Problems    Diagnosis     *Malignant melanoma metastatic to brain (HCC) [C79.31]     Diabetic neuropathy (HCC) [E11.40]     Dyslipidemia [E78.5]     Normocytic anemia [D64.9]     Chronic prescription benzodiazepine use [Z79.899]     DM2 (diabetes mellitus, type 2) (HCC) [E11.9]        Plan:  BRAF mutated melanoma she had widespread systemic progression and CNS progression she was started on ipilimumab and nivolumab x2 doses.  New parietal lobe brain met, seen by radiation oncology candidate for SBRT.  Recent outpatient PET scan shows widespread disease however this was not compared with the prior CT scan.    Clinically she appears to be responding with improvement in the subcutaneous nodule.  Current plan to do radiation to the new brain lesion and continue with ipilimumab and nivolumab as outpatient.  Low threshold for switching to BRAF/MEK inhibitor if she develops overt progression keep outpatient appointment with Dr. Reed to continue treatment will sign off for now.    Consider earlier taper of her steroids after her SBRT is she  is currently on immunotherapy.  This can be done as an outpatient there is higher risk of radiation necrosis in the setting of Ipi/Nivo immunotherapy which needs to be closely monitored.  Case discussed with radiation oncology    Will sign off for now please call with questions      Quality-Core Measures     Statement Selected

## 2022-12-11 NOTE — ASSESSMENT & PLAN NOTE
Presented with right facial droop that includes eyebrow involvement  Patient denies history of herpes virus or cold sores. Also denies exposure to Lyme endemic areas or tick bites  She denied work-up for possible causes of Bell's Palsy     Treatment of prednisone 60mg for 7 days,  12/9 - 12/15.   The decadron 4mg IV q6h that she is receiving for new R parietal mass with edema covers Bell's Palsy treatment  Artificial tears QID due to incomplete eye closure and to prevent corneal injury  When asleep, ointment formulation of artificial tears to be applied and the eye to be carefully taped shut using a medical-grade waterproof transparent dressing or tape for protection

## 2022-12-12 ENCOUNTER — PHARMACY VISIT (OUTPATIENT)
Dept: PHARMACY | Facility: MEDICAL CENTER | Age: 42
End: 2022-12-12
Payer: COMMERCIAL

## 2022-12-12 ENCOUNTER — HOSPITAL ENCOUNTER (OUTPATIENT)
Dept: RADIATION ONCOLOGY | Facility: MEDICAL CENTER | Age: 42
End: 2022-12-12

## 2022-12-12 VITALS
OXYGEN SATURATION: 95 % | DIASTOLIC BLOOD PRESSURE: 64 MMHG | HEART RATE: 60 BPM | RESPIRATION RATE: 15 BRPM | SYSTOLIC BLOOD PRESSURE: 109 MMHG | BODY MASS INDEX: 24.59 KG/M2 | WEIGHT: 153 LBS | HEIGHT: 66 IN | TEMPERATURE: 97 F

## 2022-12-12 DIAGNOSIS — G93.89 BRAIN MASS: ICD-10-CM

## 2022-12-12 LAB
GLUCOSE BLD STRIP.AUTO-MCNC: 112 MG/DL (ref 65–99)
GLUCOSE BLD STRIP.AUTO-MCNC: 143 MG/DL (ref 65–99)
GLUCOSE BLD STRIP.AUTO-MCNC: 153 MG/DL (ref 65–99)

## 2022-12-12 PROCEDURE — 99232 SBSQ HOSP IP/OBS MODERATE 35: CPT | Mod: 25 | Performed by: RADIOLOGY

## 2022-12-12 PROCEDURE — 77334 RADIATION TREATMENT AID(S): CPT | Mod: 26 | Performed by: RADIOLOGY

## 2022-12-12 PROCEDURE — 99238 HOSP IP/OBS DSCHRG MGMT 30/<: CPT | Mod: GC | Performed by: INTERNAL MEDICINE

## 2022-12-12 PROCEDURE — 77334 RADIATION TREATMENT AID(S): CPT | Performed by: RADIOLOGY

## 2022-12-12 PROCEDURE — 82962 GLUCOSE BLOOD TEST: CPT | Mod: 91

## 2022-12-12 PROCEDURE — 77290 THER RAD SIMULAJ FIELD CPLX: CPT | Mod: 26 | Performed by: RADIOLOGY

## 2022-12-12 PROCEDURE — A9270 NON-COVERED ITEM OR SERVICE: HCPCS | Performed by: STUDENT IN AN ORGANIZED HEALTH CARE EDUCATION/TRAINING PROGRAM

## 2022-12-12 PROCEDURE — 77290 THER RAD SIMULAJ FIELD CPLX: CPT | Performed by: RADIOLOGY

## 2022-12-12 PROCEDURE — 700102 HCHG RX REV CODE 250 W/ 637 OVERRIDE(OP): Performed by: STUDENT IN AN ORGANIZED HEALTH CARE EDUCATION/TRAINING PROGRAM

## 2022-12-12 PROCEDURE — 700111 HCHG RX REV CODE 636 W/ 250 OVERRIDE (IP): Performed by: INTERNAL MEDICINE

## 2022-12-12 PROCEDURE — RXMED WILLOW AMBULATORY MEDICATION CHARGE

## 2022-12-12 PROCEDURE — 77263 THER RADIOLOGY TX PLNG CPLX: CPT | Performed by: RADIOLOGY

## 2022-12-12 PROCEDURE — 77470 SPECIAL RADIATION TREATMENT: CPT | Performed by: RADIOLOGY

## 2022-12-12 PROCEDURE — 77470 SPECIAL RADIATION TREATMENT: CPT | Mod: 26 | Performed by: RADIOLOGY

## 2022-12-12 RX ORDER — ACYCLOVIR 400 MG/1
400 TABLET ORAL
Status: DISCONTINUED | OUTPATIENT
Start: 2022-12-12 | End: 2022-12-12 | Stop reason: HOSPADM

## 2022-12-12 RX ORDER — ACYCLOVIR 400 MG/1
400 TABLET ORAL
Qty: 50 TABLET | Refills: 0 | Status: SHIPPED | OUTPATIENT
Start: 2022-12-12 | End: 2022-12-22

## 2022-12-12 RX ORDER — CARBOXYMETHYLCELLULOSE SODIUM 5 MG/ML
1 SOLUTION/ DROPS OPHTHALMIC PRN
Qty: 30 ML | Refills: 10 | Status: SHIPPED | OUTPATIENT
Start: 2022-12-12 | End: 2023-01-11

## 2022-12-12 RX ADMIN — SENNOSIDES AND DOCUSATE SODIUM 2 TABLET: 50; 8.6 TABLET ORAL at 05:09

## 2022-12-12 RX ADMIN — Medication 2000 UNITS: at 05:09

## 2022-12-12 RX ADMIN — OXCARBAZEPINE 300 MG: 300 TABLET, FILM COATED ORAL at 06:46

## 2022-12-12 RX ADMIN — OXYCODONE 5 MG: 5 TABLET ORAL at 06:46

## 2022-12-12 RX ADMIN — OXYCODONE 5 MG: 5 TABLET ORAL at 12:07

## 2022-12-12 RX ADMIN — INSULIN LISPRO 2 UNITS: 100 INJECTION, SOLUTION INTRAVENOUS; SUBCUTANEOUS at 05:10

## 2022-12-12 RX ADMIN — DEXAMETHASONE SODIUM PHOSPHATE 4 MG: 4 INJECTION, SOLUTION INTRA-ARTICULAR; INTRALESIONAL; INTRAMUSCULAR; INTRAVENOUS; SOFT TISSUE at 02:37

## 2022-12-12 RX ADMIN — BACLOFEN 10 MG: 10 TABLET ORAL at 12:08

## 2022-12-12 RX ADMIN — BACLOFEN 10 MG: 10 TABLET ORAL at 05:09

## 2022-12-12 RX ADMIN — CLONAZEPAM 0.5 MG: 0.5 TABLET ORAL at 09:34

## 2022-12-12 RX ADMIN — POLYETHYLENE GLYCOL 3350 1 PACKET: 17 POWDER, FOR SOLUTION ORAL at 05:25

## 2022-12-12 RX ADMIN — FERROUS SULFATE TAB 325 MG (65 MG ELEMENTAL FE) 325 MG: 325 (65 FE) TAB at 05:09

## 2022-12-12 RX ADMIN — OXYCODONE 5 MG: 5 TABLET ORAL at 09:35

## 2022-12-12 RX ADMIN — GABAPENTIN 800 MG: 400 CAPSULE ORAL at 12:08

## 2022-12-12 RX ADMIN — GABAPENTIN 800 MG: 400 CAPSULE ORAL at 09:34

## 2022-12-12 RX ADMIN — ACETAMINOPHEN 650 MG: 325 TABLET, FILM COATED ORAL at 09:34

## 2022-12-12 RX ADMIN — LEVETIRACETAM 1000 MG: 500 TABLET, FILM COATED ORAL at 05:08

## 2022-12-12 ASSESSMENT — PAIN DESCRIPTION - PAIN TYPE: TYPE: ACUTE PAIN

## 2022-12-12 NOTE — CARE PLAN
The patient is Watcher - Medium risk of patient condition declining or worsening    Shift Goals  Clinical Goals: safety, BM  Patient Goals: walk hallway, BM    Progress made toward(s) clinical / shift goals:    Problem: Bowel Elimination  Goal: Establish and maintain regular bowel function  Outcome: Progressing  Note: Pt without BM since 12/7.  Pt received Miralax from [facility], pt ambulated, encouraged adequate water intake, prune juice offered.      Problem: Mobility  Goal: Patient's capacity to carry out activities will improve  Outcome: Progressing  Note: Pt set goal to ambulate hallways this shift, pt ambulated hallway with RN.  Steady gait, uses FWW.         Patient is not progressing towards the following goals:

## 2022-12-12 NOTE — PROGRESS NOTES
Spoke to resident Dr. Giraldo  This is a 42 year female with know melanoma, known brain metastases who prevented with right facial weakness.  Noted to have a hemorrhagic lesion in the right parietal lobe.  She has grade 2-3 facial weakness of her right facial nerve.  In the setting of tumor it is the most likely cause.  I was informed they do not want steroid in the setting for her receiving immunotherapy.    Impression: right facial weakness most likely related to brain metastasis.  The primary recommendation is steroids and acyclovir.  Please defer to Oncology for medication discission.  It is important to protect her right eye.  It can become dried and scarred from the inability to blind/ close the eye.  Daily I recommend artifical tears and lacralube with the eye taped shut at night.

## 2022-12-12 NOTE — PROGRESS NOTES
Pt. Brought down to Radiation Oncology and Simulated for SRT Brain-to start next Monday. Pt given 100 mL-Onmipaque 300 ct contrast.

## 2022-12-12 NOTE — RADIATION PLANNING NOTES
PATIENT NAME Jacque Mcintyre   PRIMARY PHYSICIAN Adriana Levine [phone]   REFERRING PHYSICIAN No ref. provider found 1980     DATE OF SERVICE: 12/12/2022    DIAGNOSIS:Metastatic BRAF mutant melanoma      SPECIAL TREATMENT PROCEDURE NOTE:  Considerable additional effort required in the management of this case because of administration of Stereotactic Radiotherapy, which may result in increased normal tissue toxicity and require greater effort in contouring and treatment because of greater precision.

## 2022-12-12 NOTE — CARE PLAN
The patient is Stable - Low risk of patient condition declining or worsening    Shift Goals  Clinical Goals: pain control, monitor for BM  Patient Goals: rest pain and pain control    Progress made toward(s) clinical / shift goals:  Patient did not have a bowel movement during the shift. Patients rested quietly and her pain was managed with prn pain medication. Patient blood sugars at 0100 was 143 and at 0500 was 153.     Patient is not progressing towards the following goals:

## 2022-12-12 NOTE — PROGRESS NOTES
Neurosurgery Progress Note    Subjective: No events overnight.    Medications:  Current Facility-Administered Medications   Medication Dose Route Frequency Provider Last Rate Last Admin    insulin GLARGINE (Lantus,Semglee) injection  50 Units Subcutaneous Q EVENING Mindy Giraldo D.O.   50 Units at 12/11/22 1811    And    insulin lispro (AdmeLOG,HumaLOG) injection  2-9 Units Subcutaneous Q6HRS JAYESH SargentO.   2 Units at 12/12/22 0510    And    dextrose 10 % BOLUS 25 g  25 g Intravenous Q15 MIN PRN Mindy Giraldo D.O.        carboxymethylcellulose (REFRESH TEARS) 0.5 % ophthalmic drops 1 Drop  1 Drop Right Eye PRN Mindy Giraldo D.O.   1 Drop at 12/11/22 2122    artificial tears (EYE LUBRICANT) ophth ointment 1 Application  1 Application Both Eyes Nightly Mindy Giraldo D.O.        senna-docusate (PERICOLACE or SENOKOT S) 8.6-50 MG per tablet 2 Tablet  2 Tablet Oral BID Leandro Desai M.D.   2 Tablet at 12/12/22 0509    And    polyethylene glycol/lytes (MIRALAX) PACKET 1 Packet  1 Packet Oral QDAY PRN Leandro Desai M.D.   1 Packet at 12/12/22 0525    And    magnesium hydroxide (MILK OF MAGNESIA) suspension 30 mL  30 mL Oral QDAY PRN Leandro Desai M.D.        And    bisacodyl (DULCOLAX) suppository 10 mg  10 mg Rectal QDAY PRN Leandro Desai M.D.        acetaminophen (Tylenol) tablet 650 mg  650 mg Oral Q6HRS PRN Leandro Desai M.D.   650 mg at 12/12/22 0934    Pharmacy Consult Request ...Pain Management Review 1 Each  1 Each Other PHARMACY TO DOSE Leandro Desai M.D.        oxyCODONE immediate-release (ROXICODONE) tablet 2.5 mg  2.5 mg Oral Q3HRS PRN Leandro Desai M.D.        Or    oxyCODONE immediate-release (ROXICODONE) tablet 5 mg  5 mg Oral Q3HRS PRN Leandro Desai M.D.   5 mg at 12/12/22 0935    Or    morphine 4 MG/ML injection 2 mg  2 mg Intravenous Q3HRS PRN Leandro Desai M.D.        atorvastatin (LIPITOR) tablet 40 mg  40 mg Oral Q EVENING Leandro Desai M.D.   40 mg at 12/11/22 1756    baclofen (LIORESAL) tablet 10 mg  10 mg Oral TID Leandro  KIMBERLYN Desai   10 mg at 12/12/22 0509    DULoxetine (CYMBALTA) capsule 60 mg  60 mg Oral Q EVENING Leandro Desai M.D.   60 mg at 12/11/22 1757    gabapentin (NEURONTIN) capsule 800 mg  800 mg Oral 4X/DAY Leandro Desai M.D.   800 mg at 12/12/22 0934    OXcarbazepine (TRILEPTAL) tablet 300 mg  300 mg Oral BID Leandro Desai M.D.   300 mg at 12/12/22 0646    QUEtiapine (Seroquel) tablet 200 mg  200 mg Oral Q EVENING Leandro Desai M.D.   200 mg at 12/11/22 2120    vitamin D3 (cholecalciferol) tablet 2,000 Units  2,000 Units Oral DAILY Leandro Desai M.D.   2,000 Units at 12/12/22 0509    famotidine (PEPCID) tablet 10 mg  10 mg Oral QDAY PRN Leandro Desai M.D.        albuterol inhaler 1-2 Puff  1-2 Puff Inhalation Q4HRS PRN Leandro Desai M.D.        ondansetron (ZOFRAN) syringe/vial injection 4 mg  4 mg Intravenous Q4HRS PRN Leandro Desai M.D.   4 mg at 12/11/22 1241    ferrous sulfate tablet 325 mg  325 mg Oral DAILY Leandro Desai M.D.   325 mg at 12/12/22 0509    clonazePAM (KLONOPIN) tablet 0.5 mg  0.5 mg Oral BID PRN Leandro Desai M.D.   0.5 mg at 12/12/22 0934       Allergies:  Allergies   Allergen Reactions    Augmentin [Amoxicillin-Pot Clavulanate] Vomiting       Past Medical History:  Past Medical History:   Diagnosis Date    Asthma     inhaler    Bipolar 1 disorder (HCC)     Cancer (HCC) 01/01/2016    melanoma    Cough     Depression     DM mellitus,     insulin    Hyperlipidemia     Hypertension     Pap smear     Dr. Baird    PCOS (polycystic ovarian syndrome)     Shortness of breath     Sputum production     Wheezing        Past Surgical History:  Past Surgical History:   Procedure Laterality Date    CRANIOTOMY STEALTH Right 10/6/2022    Procedure: RIGHT FRONTAL CRANIOTOMY WITH STEALTH;  Surgeon: Pebbles Tejada M.D.;  Location: SURGERY [facility];  Service: Neurosurgery    [place] BRONCHOSCOPY,DIAGNOSTIC N/A 6/17/2022    Procedure: FIBER OPTIC BRONCHOSCOPY WITH WASH, BRUSH, BRONCHOALVEOLAR LAVAGE, BIOPSY, FINE NEEDLE ASPIRATION;  Surgeon:  Yue Swanson M.D.;  Location: SURGERY [facility];  Service: Pulmonary    ENDOBRONCHIAL US ADD-ON N/A 6/17/2022    Procedure: ENDOBRONCHIAL ULTRASOUND (EBUS);  Surgeon: Yue Swanson M.D.;  Location: SURGERY [facility];  Service: Pulmonary    [place] LAP,APPENDECTOMY N/A 10/7/2019    Procedure: APPENDECTOMY, LAPAROSCOPIC;  Surgeon: Lionel Frazier M.D.;  Location: SURGERY [facility];  Service: General    AXILLARY NODE DISSECTION Left 6/23/2017    Procedure: AXILLARY NODE DISSECTION- COMPLETION LYPHADENECTOMY;  Surgeon: Lionel Weinberg M.D.;  Location: SURGERY [facility];  Service:     WIDE EXCISION Left 5/9/2017    Procedure: WIDE EXCISION - MALIGNANT MELANOMA HAND;  Surgeon: Lionel Weinberg M.D.;  Location: SURGERY SAME DAY [facility];  Service:     NODE BIOPSY SENTINEL Left 5/9/2017    Procedure: NODE BIOPSY SENTINEL - AXILLARY;  Surgeon: Lionel Weinberg M.D.;  Location: SURGERY SAME DAY [facility];  Service:     OTHER  2016    excisino of melanoma left hand    ADENOIDECTOMY      MYRINGOTOMY      with tube placement       Family History:  Family History   Problem Relation Age of Onset    Psychiatric Illness Mother         depression    Diabetes Mother     Hyperlipidemia Mother     Thyroid Mother         s/p radioactive iodine treatment on replacement    Hypertension Father     Diabetes Father        Social History:  Social History     Socioeconomic History    Marital status:      Spouse name: Not on file    Number of children: 2    Years of education: Not on file    Highest education level: Associate degree: academic program   Occupational History    Occupation: stay at home mom     Employer: none   Tobacco Use    Smoking status: Former     Packs/day: 0.75     Years: 10.00     Pack years: 7.50     Types: Cigarettes    Smokeless tobacco: Never   Vaping Use    Vaping Use: Never used   Substance and Sexual Activity    Alcohol use: Not Currently    Drug use: Not Currently     Types:  Inhaled     Comment: occasional smoked sarkis approx 3 times per month    Sexual activity: Yes   Other Topics Concern    Not on file   Social History Narrative    Not on file     Social Determinants of Health     Financial Resource Strain: Low Risk     Difficulty of Paying Living Expenses: Not very hard   Food Insecurity: No Food Insecurity    Worried About Running Out of Food in the Last Year: Never true    Ran Out of Food in the Last Year: Never true   Transportation Needs: No Transportation Needs    Lack of Transportation (Medical): No    Lack of Transportation (Non-Medical): No   Physical Activity: Inactive    Days of Exercise per Week: 0 days    Minutes of Exercise per Session: 0 min   Stress: Stress Concern Present    Feeling of Stress : To some extent   Social Connections: Socially Isolated    Frequency of Communication with Friends and Family: More than three times a week    Frequency of Social Gatherings with Friends and Family: More than three times a week    Attends [employer] Services: Never    Active Member of Clubs or Organizations: No    Attends Club or Organization Meetings: Never    Marital Status:    Intimate Partner Violence: Not on file   Housing Stability: Low Risk     Unable to Pay for Housing in the Last Year: No    Number of Places Lived in the Last Year: 2    Unstable Housing in the Last Year: No       Physical Examination:  Vitals:    12/12/22 0745   BP: 109/64   Pulse: 60   Resp: 15   Temp: 36.1 °C (97 °F)   SpO2: 95%     Laying in bed, no acute distress  Cranial incision well-healed  Some alopecia    Neurological  Awake, alert, able to provide an accurate history  Pupils equally round and reactive to light  Extraocular movements full  Facial sensation intact to light touch bilaterally  Left face House-Brackman 1  Right face House-Brackman 2, has difficulty with forced eye closure, nasalis function, smile  Hearing intact to finger rub bilaterally  Voice strong  No pronator  drift  Moves bilateral upper and right lower extremity 5/5 strength  Left lower extremity 4+/5 strength in all muscle groups  Sensation intact to light touch in all 4 extremities    Labs:  Recent Labs     12/09/22  1455 12/10/22  0741 12/11/22  0612   WBC 6.1 8.6 8.7   RBC 3.60* 4.22 4.11*   HEMOGLOBIN 9.1* 10.8* 10.3*   HEMATOCRIT 30.7* 35.1* 34.8*   MCV 85.3 83.2 84.7   MCH 25.3* 25.6* 25.1*   MCHC 29.6* 30.8* 29.6*   RDW 64.2* 62.4* 66.1*   PLATELETCT 571* 625* 587*   MPV 8.7* 9.0 8.9*       Recent Labs     12/09/22  1455 12/10/22  0741 12/11/22  0612   SODIUM 141 141 139   POTASSIUM 3.8 4.0 3.9   CHLORIDE 103 102 99   CO2 26 25 27   GLUCOSE 138* 143* 132*   BUN 8 10 11   CREATININE 0.37* 0.43* 0.45*   CALCIUM 9.2 9.8 9.6                   Assessment and Plan:    Jacque Mcintyre is a 42 y.o. 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  I do not think that her right facial weakness is caused by this new metastasis.  It is possible that she has a component of mild Bell's palsy.    Recommendations:  - Her case was discussed at Brain Tumor Board today. Plan for stereotactic radiosurgery to right parietal lesion. Will stop steroids in order to allow immunotherapy to work optimally.  - D/c'd Keppra. Continue patient's Trileptal.   - With respect to Bell's Palsy, recommend adding acyclovir  - Patient may follow up with me as previously scheduled.    Thank you for this consult. Please call with questions.    Pebbles Tejada M.D.  [facility]  [address] [place] [address]  [phone]    A total of 45 minutes were spent in the evaluation, examination, coordination of care, review of labs and imaging of this patient. I spent >50% of time face-to-face on patient counseling.

## 2022-12-12 NOTE — PROGRESS NOTES
RADIATION ONCOLOGY INPT FOLLOW UP    Patient name:  Jacque Mcintyre    Primary Physician:  Adriana Levine M.D. MRN: [id number]  CSN: [id number]   Referring physician:  No ref. provider found : 1980, 42 y.o.       DATE OF SERVICE: 2022    IDENTIFICATION: A 42 y.o. female with metastatic BRAF mutant malignant melanoma, stage IV who is being seen for consideration of SRS.  Visit Diagnoses     ICD-10-CM   1. Brain metastases (HCC)  C79.31     She is being seen in consultation at the kind request of Dr. Tejada.      HISTORY OF PRESENT ILLNESS: This is a 42-year-old lady with metastatic BRAF mutated melanoma who is well-known to us from her prior admission.  In brief, she was discovered to have metastatic disease at her prior visit in the setting of multiple brain metastases as well as systemic disease, and had undergone craniotomy and resection of her dominant brain metastases followed by completion of postoperative SRS and rehab therapy.  She has subsequently been started on immunotherapy, and is now status post 2 doses of ipilimumab nivolumab.      She has been living at home, and noticed that she had some numbness in her mouth, as well as new weakness on her right face, was unable to keep water in her mouth, and as a result she presented to the emergency room and was admitted for work-up.      An MRI was repeated that revealed new hemorrhagic lesion in the right parietal lobe, mild amount of edema around this.  Her previously treated postoperative areas appear to be doing well, appear to be relatively stable, perhaps of minimal nodular enhancement in the medial left frontal region in the right prefrontal region.  Of note, there is dural thickening and enhancement underlying the right frontal craniotomy site, likely postoperative.  There is some faint enhancement of the bilateral vestibulocochlear nerves though, no other obvious diffuse appearing dural enhancement is present throughout the  brain.    Neurosurgery was consulted, and she was started on Decadron 4 mg every 6 hours, now it has been 2 days of this thus far.  Her case was discussed in CNS tumor board this morning as well, I am now seeing her for consideration of additional radiation therapy.    PROBLEM LIST:  Patient Active Problem List   Diagnosis    Asthma    Depression    Nausea & vomiting    Sepsis (HCC)    UTI (urinary tract infection)    Lactic acidosis    Anxiety    Diarrhea    Melanoma of left upper arm (HCC)    Bipolar 1 disorder (HCC)    DM2 (diabetes mellitus, type 2) (HCC)    Appendicitis    Hypokalemia    Essential hypertension    Gastroenteritis    Abnormal CT scan, chest    Cellulitis    Leucocytosis    Malignant melanoma metastatic to brain (HCC)    Brain mass    Chronic prescription benzodiazepine use    Constipation    Adrenal insufficiency due to corticosteroid withdrawal (HCC)    Metastatic cancer to brain (HCC)    Vitamin D deficiency    Dyslipidemia    Normocytic anemia    Borderline abnormal TFTs    Thrombocytosis    Dizziness on standing    Diabetic neuropathy (HCC)    Facial paralysis/[place] palsy        PAST SURGICAL HISTORY:  Past Surgical History:   Procedure Laterality Date    CRANIOTOMY STEALTH Right 10/6/2022    Procedure: RIGHT FRONTAL CRANIOTOMY WITH STEALTH;  Surgeon: Pebbles Tejada M.D.;  Location: [facility];  Service: Neurosurgery    DE BRONCHOSCOPY,DIAGNOSTIC N/A 6/17/2022    Procedure: FIBER OPTIC BRONCHOSCOPY WITH WASH, BRUSH, BRONCHOALVEOLAR LAVAGE, BIOPSY, FINE NEEDLE ASPIRATION;  Surgeon: Yue Swanson M.D.;  Location: [facility];  Service: Pulmonary    ENDOBRONCHIAL US ADD-ON N/A 6/17/2022    Procedure: ENDOBRONCHIAL ULTRASOUND (EBUS);  Surgeon: Yue Swanson M.D.;  Location: [facility];  Service: Pulmonary    DE LAP,APPENDECTOMY N/A 10/7/2019    Procedure: APPENDECTOMY, LAPAROSCOPIC;  Surgeon: Lionel Frazier M.D.;  Location: [facility];   Service: General    AXILLARY NODE DISSECTION Left 6/23/2017    Procedure: AXILLARY NODE DISSECTION- COMPLETION LYPHADENECTOMY;  Surgeon: Lionel Weinberg M.D.;  Location: SURGERY [facility];  Service:     WIDE EXCISION Left 5/9/2017    Procedure: WIDE EXCISION - MALIGNANT MELANOMA HAND;  Surgeon: Lionel Weinberg M.D.;  Location: SURGERY SAME DAY [facility];  Service:     NODE BIOPSY SENTINEL Left 5/9/2017    Procedure: NODE BIOPSY SENTINEL - AXILLARY;  Surgeon: Lionel Weinberg M.D.;  Location: SURGERY SAME DAY [facility];  Service:     OTHER  2016    excisino of melanoma left hand    ADENOIDECTOMY      MYRINGOTOMY      with tube placement       CURRENT MEDICATIONS:  Current Facility-Administered Medications   Medication Dose Route Frequency Provider Last Rate Last Admin    insulin GLARGINE (Lantus,Semglee) injection  50 Units Subcutaneous Q EVENING JAYESH SargentO.   50 Units at 12/11/22 1811    And    insulin lispro (AdmeLOG,HumaLOG) injection  2-9 Units Subcutaneous Q6HRS JUANCHO Sargent.O.   2 Units at 12/12/22 0510    And    dextrose 10 % BOLUS 25 g  25 g Intravenous Q15 MIN PRN Mindy Giraldo D.O.        carboxymethylcellulose (REFRESH TEARS) 0.5 % ophthalmic drops 1 Drop  1 Drop Right Eye PRN Mindy Giraldo D.O.   1 Drop at 12/11/22 2122    artificial tears (EYE LUBRICANT) ophth ointment 1 Application  1 Application Both Eyes Nightly Mindy Giraldo D.O.        senna-docusate (PERICOLACE or SENOKOT S) 8.6-50 MG per tablet 2 Tablet  2 Tablet Oral BID Leandro Desai M.D.   2 Tablet at 12/12/22 0509    And    polyethylene glycol/lytes (MIRALAX) PACKET 1 Packet  1 Packet Oral QDAY PRN Leandro Desai M.D.   1 Packet at 12/12/22 0525    And    magnesium hydroxide (MILK OF MAGNESIA) suspension 30 mL  30 mL Oral QDAY PRN Leandro Desai M.D.        And    bisacodyl (DULCOLAX) suppository 10 mg  10 mg Rectal QDAY PRN Leandro Desai M.D.        acetaminophen (Tylenol) tablet 650 mg  650 mg Oral Q6HRS PRN Leandro Desai M.D.   650 mg at  12/12/22 0934    Pharmacy Consult Request ...Pain Management Review 1 Each  1 Each Other PHARMACY TO DOSE Leandro Desai M.D.        oxyCODONE immediate-release (ROXICODONE) tablet 2.5 mg  2.5 mg Oral Q3HRS PRN Leandro Desai M.D.        Or    oxyCODONE immediate-release (ROXICODONE) tablet 5 mg  5 mg Oral Q3HRS PRN Leandro Desai M.D.   5 mg at 12/12/22 0935    Or    morphine 4 MG/ML injection 2 mg  2 mg Intravenous Q3HRS PRN Leandro Desai M.D.        atorvastatin (LIPITOR) tablet 40 mg  40 mg Oral Q EVENING Leandro Desai M.D.   40 mg at 12/11/22 1756    baclofen (LIORESAL) tablet 10 mg  10 mg Oral TID Leandro Desai M.D.   10 mg at 12/12/22 0509    DULoxetine (CYMBALTA) capsule 60 mg  60 mg Oral Q EVENING Leandro Desai M.D.   60 mg at 12/11/22 1757    gabapentin (NEURONTIN) capsule 800 mg  800 mg Oral 4X/DAY Leandro Desai M.D.   800 mg at 12/12/22 0934    levETIRAcetam (KEPPRA) tablet 1,000 mg  1,000 mg Oral BID Leandro Desai M.D.   1,000 mg at 12/12/22 0508    OXcarbazepine (TRILEPTAL) tablet 300 mg  300 mg Oral BID Leandro Desai M.D.   300 mg at 12/12/22 0646    QUEtiapine (Seroquel) tablet 200 mg  200 mg Oral Q EVENING Leandro Desai M.D.   200 mg at 12/11/22 2120    vitamin D3 (cholecalciferol) tablet 2,000 Units  2,000 Units Oral DAILY Leandro Desai M.D.   2,000 Units at 12/12/22 0509    famotidine (PEPCID) tablet 10 mg  10 mg Oral QDAY PRN Leandro Desai M.D.        albuterol inhaler 1-2 Puff  1-2 Puff Inhalation Q4HRS PRN Leandro Desai M.D.        ondansetron (ZOFRAN) syringe/vial injection 4 mg  4 mg Intravenous Q4HRS PRN Leandro Desai M.D.   4 mg at 12/11/22 1241    ferrous sulfate tablet 325 mg  325 mg Oral DAILY Leandro Desai M.D.   325 mg at 12/12/22 0509    clonazePAM (KLONOPIN) tablet 0.5 mg  0.5 mg Oral BID PRN Leandro Desai M.D.   0.5 mg at 12/12/22 0922       ALLERGIES:    Augmentin [amoxicillin-pot clavulanate]    FAMILY HISTORY:    family history includes Diabetes in her father and mother; Hyperlipidemia in her mother; Hypertension in  "her father; Psychiatric Illness in her mother; Thyroid in her mother.    SOCIAL HISTORY:     reports that she has quit smoking. Her smoking use included cigarettes. She has a 7.50 pack-year smoking history. She has never used smokeless tobacco. She reports that she does not currently use alcohol. She reports that she does not currently use drugs after having used the following drugs: Inhaled.    REVIEW OF SYSTEMS:  A complete review of system taken. Pertinent items in HPI.       PHYSICAL EXAM:    PERFORMANCE STATUS:  /64   Pulse 60   Temp 36.1 °C (97 °F) (Temporal)   Resp 15   Ht 1.676 m (5' 6\")   Wt 69.4 kg (153 lb)   SpO2 95%   BMI 24.69 kg/m²   Physical Exam  Constitutional:       Appearance: Normal appearance.   HENT:      Head:      Comments: Well-healing craniotomy scars.  Eyes:      Extraocular Movements: Extraocular movements intact.      Pupils: Pupils are equal, round, and reactive to light.   Cardiovascular:      Rate and Rhythm: Normal rate and regular rhythm.   Pulmonary:      Effort: Pulmonary effort is normal.      Breath sounds: Normal breath sounds.   Abdominal:      General: Abdomen is flat.      Palpations: Abdomen is soft.   Neurological:      Mental Status: She is alert.      Cranial Nerves: Facial asymmetry present.      Sensory: Sensation is intact.      Motor: Motor function is intact.      Comments: Right-sided facial weakness  Bilateral upper and lower extremity 5 out of 5, sensation widely intact        LABORATORY DATA:   Recent Labs     12/09/22  1455 12/10/22  0741 12/11/22  0612   WBC 6.1 8.6 8.7   RBC 3.60* 4.22 4.11*   HEMOGLOBIN 9.1* 10.8* 10.3*   HEMATOCRIT 30.7* 35.1* 34.8*   MCV 85.3 83.2 84.7   MCH 25.3* 25.6* 25.1*   MCHC 29.6* 30.8* 29.6*   RDW 64.2* 62.4* 66.1*   PLATELETCT 571* 625* 587*   MPV 8.7* 9.0 8.9*     Recent Labs     12/09/22  1455 12/10/22  0741 12/11/22  0612   SODIUM 141 141 139   POTASSIUM 3.8 4.0 3.9   CHLORIDE 103 102 99   CO2 26 25 27   GLUCOSE " 138* 143* 132*   BUN 8 10 11   CREATININE 0.37* 0.43* 0.45*   CALCIUM 9.2 9.8 9.6          RADIOLOGY DATA:  CT-HEAD W/O    Result Date: 12/9/2022  1.  Multiple intracranial metastatic lesions with surrounding edema again noted. There is a new lesion in the right parietal lobe with surrounding edema. 2.  No evidence of intracranial hemorrhage.     CT-HEAD W/O    Result Date: 10/21/2022  1.  Redemonstration of treated right hemispheric lesions, with improving perilesional edema compared to prior study. No midline shift. 2.  No new intracranial mass lesions. No large vascular territory infarct. No intracranial hemorrhage.     DX-CHEST-PORTABLE (1 VIEW)    Result Date: 10/17/2022  1.  No acute cardiopulmonary disease. No acute infiltrates. 2.  Previously described pulmonary nodules in the right lower lobe and left lower lobe best seen on CT are not appreciated on this exam.    MR-BRAIN-WITH & W/O    Result Date: 12/10/2022  Interval appearance of a new hemorrhagic metastatic lesion in the right parietal region. Minimal rim enhancement of this lesion is demonstrated. Interval slight decrease in size of the enhancing component of the left subinsular white matter lesion with slight increase in surrounding T2 signal abnormality. Minimal nodular enhancement is noted in the site of the previously treated lesions in the medial left frontal region and the right prefrontal region as well as the right inferior temporal region. Dural thickening and enhancement underlying the right frontal craniotomy is again noted, likely representing postoperative change, however recommend attention on follow-up examination.    MR-BRAIN-WITH & W/O    Result Date: 10/26/2022  Total of 5 hemorrhagic metastatic lesions are present in the brain as described above. Right frontal craniotomy noted with resection of the anterior most right frontal lesion which was best seen on the study from 10/2/2022.    CT-PETCT-WHOLE BODY    Result Date:  12/5/2022  Extensive disease progression: 1. Multiple hypermetabolic pulmonary nodules, in keeping with metastasis. Small bilateral pleural effusions. 2. Increased large heterogeneous hypermetabolic bilateral thyroid glands, left more than right, likely disease infiltration. 3. Multiple mesenteric masses and left pelvic sidewall mass, likely metastasis. Questionable necrotic 4.3 cm mass in the left abdomen inferior to the left kidney versus tumor involvement of the bowel. 4.  Multiple subcutaneous and muscle nodules, as described. Large 7.8 cm cystic mass in the right adnexa. Peripheral activity may be physiologic. Correlate with pelvic ultrasound.       IMPRESSION:    A 42 y.o. with metastatic BRAF mutant malignant melanoma, stage IV, being seen for consideration of SRS.      RECOMMENDATIONS:   I had a discussion again with Ms. Mcintyre today regarding her presentation that brought her to the hospital, and our suspicion that is likely a Bell's palsy given her brain MRI and does not show any obvious cranial nerve enhancement in the brainstem with exception of the bilateral vestibulocochlear nerves.  We had reviewed her case in CNS tumor board this morning, and aside from the one new lesion in the right parietal area, there is not obvious gross progression of disease.  The lesion does not explain her symptoms, and she does not have an appearance that is consistent with diffuse leptomeningeal disease that would also possibly explain her presentation, though she does have areas of dural enhancement.  These may be postoperative, and will need to be monitored.    Furthermore, her immunotherapy regimen appears to be working for her extracranially and she seems to be responding reasonably well thus far outside the brain.  Therefore, at this point, we recommend stopping her steroids, continue on ipi/nivo with regards to systemic regimen, and proceed with SRS to the new R parietal lesion. We will monitor the dural enhancements  with serial MRI in the future, if she has further CNS progression, then at that point, we can consider switching her regimen to a BRAF targeted therapy.  But for now, since the immunotherapy appears to be working well for her extracranially, we will proceed with SRS to the new brain metastasis we will continue her immunotherapy regimen.  As such, it is critical to stop the steroids, and though this may leave her Bell's palsy more symptomatic, she feels this is acceptable to obtain continued systemic control of her disease.    SRS simulation later this morning, with likely single fraction or possibly 3 fraction treatment starting next week.  No need to stay as inpatient for radiation standpoint. Please stop steroids.      Thank you for the opportunity to participate in her care.  If any questions or comments, please do not hesitate in calling.

## 2022-12-12 NOTE — RADIATION PLANNING NOTES
DATE OF SERVICE: 12/12/2022    DIAGNOSIS: Metastatic BRAF mutant melanoma    DATE OF SERVICE: 12/12/2022    TYPE OF SIMULATION: Brain    GOAL OF TREATMENT:   [] Curative  [x] Palliative  [] Oligometastatic    CONTRAST:    [x] IV Contrast*                POSITION:    [x]  Supine  [] Prone     COMPLEX:  [x] Complex Blocking   []Arcs  [] Custom Blocks  [] >3 Sites    PROCEDURE: Patient placed Stereotactic Mask with head in neutral position. CT scan obtained at 1 mm intervals. Images viewed and approved.  Images reconstructed as need.  Image data sets transferred to Brain-Lab for planning.    I have personally reviewed the relevant data, performed the target localization, and determined all relevant factors for this patient’s simulation.    *Omnipaque 80 -100cc IVP in conjunction with 500cc NS

## 2022-12-12 NOTE — NON-PROVIDER
Medical Student Progress Note  This note is intended for the purposes of medical student education and feedback only    ID: Jacque Mcintyre 42y F     SUBJECTIVE:  Ms. Mcintyre is a 42y F with metastatic melanoma of the brain s/p resection/radiation/chemo, T2DM (HbA1c 7.8% 10/2022), HTN, DLD, Bipolar Disorder 1, and PCOS who was admitted after 1d of new neurologic deficits including dysarthria, left sided facial droop, perioral paresthesia, and dysphagia. Oncology, radiation oncology, and neurosurgery have been looped in and patient will be discussed on tumor board 12/12/2022.     Interval Events:  - continued facial droop, no dysphagia or dysarthria  - thought to be from bells palsy possibly  - no new pain or neurologic deficits/symptoms  - tumor board discussion was had at 0700, decided to continue with radiation therapy as outpatient    OBJECTIVE:  24h Vitals:  Temp: 36.7  Tmax: 36.7  HR: 60  BP: 109/64  RR: 16  SaO2: 95% RA    Physical Exam: unchanged from 12/11  General: no acute distress.   HEENT: moist mucous membranes. PERRLA. EOMI.  CVS: RRR with no murmurs, rubs, or extra heart sounds.  Resp: CTA to all lung fields with no crackles, wheezing, or rhonchi.  Abdomen: soft, nontender, nondistended. Bowel sounds present in all four quadrants.  Extremities: No LE edema. Distal pulses 2+ x4. Capillary refill <2s to distal extremities x4.  Neuro: A&O x4 and appropriate. CN 7 palsy otherwise CN 2-6, 8-12 grossly intact. Improved from 12/10.  Psych: No depression. No anxiety.  Skin: No rash, pallor, or mottling.     Labs:  Recent Labs     12/09/22  1455 12/10/22  0741 12/11/22  0612   WBC 6.1 8.6 8.7   RBC 3.60* 4.22 4.11*   HEMOGLOBIN 9.1* 10.8* 10.3*   HEMATOCRIT 30.7* 35.1* 34.8*   MCV 85.3 83.2 84.7   MCH 25.3* 25.6* 25.1*   MCHC 29.6* 30.8* 29.6*   RDW 64.2* 62.4* 66.1*   PLATELETCT 571* 625* 587*   MPV 8.7* 9.0 8.9*     Recent Labs     12/09/22  1455 12/10/22  0741 12/11/22  0612   SODIUM 141 141 139   POTASSIUM 3.8 4.0  3.9   CHLORIDE 103 102 99   CO2 26 25 27   GLUCOSE 138* 143* 132*   BUN 8 10 11   CREATININE 0.37* 0.43* 0.45*   CALCIUM 9.2 9.8 9.6         Recent Labs     12/09/22  1455 12/10/22  0741 12/11/22  0612   ASTSGOT 9* <5* 9*   ALTSGPT 6 6 7   TBILIRUBIN <0.2 0.2 0.2   ALKPHOSPHAT 104* 110* 97   GLOBULIN 2.4 2.7 2.4     - Laboratory studies reviewed     Imaging:  - MRI with new hemorrhagic mass in the right parietal lobe  - Imaging studies reviewed     ASSESSMENT & PLAN:  Ms. Mcintyre is a pleasant 42y F with metastatic melanoma to the brain with new hemorrhagic lesion (MRI confirmed) to the right parietal love with new CN 7 palsy, responding to steroids but without complete resolution of acute symptoms. She is otherwise at her baseline with T2DM (HbA1c 7.8% 10/2022), HTN, DLD, Bipolar Disorder 2, and PCOS.     # Metastatic Melanoma  Etiology/Ddx/Decision making:  - s/p resection, radiation, palliative immunotherapy with opdivo and yervoy  - new lesion in R parietal lobe, hemorrhagic on MRI  - new L facial droop, dysarthria, and dysphagia  - tumor board discussion led to decision to proceed with radiation therapy as outpatient  Plan:  - discontinue dexamethasone  - consider intiating bevacizumab or other anti-VEGF therapy to target vasogenic edema 2/2 tumor   - levetiracetam 100mg BID for seizure ppx  - oxcarbazepine 300mg BID for seizure ppx  - radiation therapy as outpatient     # T2DM  # Severe peripheral neuropathy  Etiology/Ddx/Decision making:  - HbA1c 7.8% 10/2022  Plan:  - insulin glargine 50u q24h  - insulin lispro on sliding scale  - gabapentin 800mg QID  - duloxetine 60mg q24h     # Major Depression  Etiology/Ddx/Decision making:  - chronic issue, well managed per pt  Plan:  - continue home meds    # Dyslipidemia  Etiology/Ddx/Decision making:  - LDL 45 in 10/2019  Plan:  - continue home statin     # Normocytic Anemia  Etiology/Ddx/Decision making:  - Hb/Hct 10.3/34.8 (baseline 10-12/30-35)  - MCV 85  - Fe 19  -  [facility] 276  Plan:  - ferrous sulfate 325mg PO to be dosed every other day as this improves response     Prophylaxis:  DVT: mechanical only 2/2 hemorrhagic brain lesion  GI: prn     Disposition: discharge today     Javier Espinosa MS4  [employer]

## 2022-12-12 NOTE — CARE PLAN
The patient is Stable - Low risk of patient condition declining or worsening    Shift Goals  Clinical Goals: pain control, monitor for BM  Patient Goals: rest pain and pain control    Progress made toward(s) clinical / shift goals:  yes    Patient is not progressing towards the following goals:      Problem: Knowledge Deficit - Standard  Goal: Patient and family/care givers will demonstrate understanding of plan of care, disease process/condition, diagnostic tests and medications  Outcome: Progressing     Problem: Fall Risk  Goal: Patient will remain free from falls  Outcome: Progressing     Problem: Pain - Standard  Goal: Alleviation of pain or a reduction in pain to the patient’s comfort goal  Outcome: Progressing     Problem: Bowel Elimination  Goal: Establish and maintain regular bowel function  Outcome: Progressing     Problem: Mobility  Goal: Patient's capacity to carry out activities will improve  Outcome: Progressing

## 2022-12-12 NOTE — CT SIMULATION
PATIENT NAME Jacque Mcintyre   PRIMARY PHYSICIAN Adriana Levine [id number]   REFERRING PHYSICIAN No ref. provider found 1980     No matching staging information was found for the patient.       Treatment Planning CT Simulation      Order Questions     Question Answer    Is this for a new course of treatment? Yes    Is this an Addendum? No    Simulation Status Initial    Planned Start Date 12/19/2022    Treatment Site Brain    Laterality Midline    Treatment Technique SRS    Treatment Pattern/Frequency Daily    Concurrent Chemotherapy No    CT Technique 3D    Slice Thickness 1mm    Scan Extent Brain    Contrast IV    IV Contrast Volume Other    Volume (cc) 100    Treatment Device(s) SRS Mask    Patient Position Supine    Patient Orientation Head First    Arm Position Up    Treatment Machine No preference    Treatment Image Guidance CBCT    Frequency (CBCT) Daily    Image Guidance Match Bone    Treatment Planning Image Fusion CT/MR    Special Physics Consult Stereotactic    Other Orders Weekly Physics Check     Special Tx Procedure    Release to patient Immediate

## 2022-12-12 NOTE — RADIATION PLANNING NOTES
Clinical Treatment Planning Note    DATE OF SERVICE: 12/12/2022    DIAGNOSIS:Metastatic BRAF mutant melanoma      IMAGING REVIEWED:  [x] CT     [x] MRI     [] PET/CT     [] BONE SCAN     [] MAMMO     [] OTHER      TREATMENT INTENT:   [] CURATIVE     [] MAINTENANCE     [x]  PALLIATIVE      []  SUPPORTIVE     []  PROPHYLACTIC     [] BENIGN     []  CONSOLIDATIVE      [] DEFINITIVE   []  OLOGIMETASTATIC      LINE OF TREATMENT:  [] ADJUVANT   [x] DEFINITIVE   [] NEOADJUVANT   [] RE-TREATMENT      TECHNIQUE PLANNED:  [] IMRT   [] 3D   [] SBRT   [x] SRS/SRT   [] HDR   [] ELECTRON       IMRT JUSTIFICATION:  []   An immediately adjacent area has been previously irradiated and abutting portals must be established with high precision.    []  Dose escalation is planned to deliver radiation doses in excess of those commonly utilized for similar tumors with conventional treatment.    []  The target volume is concave or convex, and the critical normal tissues are within or around that convexity or concavity.    []  The target volume is in close proximity to critical structures that must be protected.    []  The volume of interest must be covered with narrow margins to adequately protect  immediately adjacent structures.      FIELDS & BLOCKING:  [x] COMPLEX BLOCKS     []  = 3 TX AREAS     []  ARCS     []  CUSTOM SHEILD        []  SIMPLE BLOCK      CHEMOTHERAPY:  []  CONCURRENT     []  INDUCTION     [] SEQUENTIAL     []  <30 DAYS FROM XRT      NOTES:  OAR CONSTRAINTS: (GUIDELINES ONLY NOT ABSOLUTE)    Target Prescribed Coverage   PTV 95% of PTV covered by 100% (Gy) of RX Dose       TRICIA Goal   Cord Max Dose < 13Gy   Brainstem Max Dose < 12.5Gy   Optic Chiasm Max Dose < 12Gy   R Optic Nerve Max Dose < 12Gy   L Optic Nerve Max Dose < 12Gy   R Eye Max Dose < 5-7Gy   L Eye Max Dose < 5-7Gy   R Lens Max Dose < 5-7Gy   L Lens Max Dose < 5-7Gy

## 2022-12-15 ENCOUNTER — HOSPITAL ENCOUNTER (OUTPATIENT)
Dept: RADIATION ONCOLOGY | Facility: MEDICAL CENTER | Age: 42
End: 2022-12-31
Attending: RADIOLOGY
Payer: COMMERCIAL

## 2022-12-15 LAB
CHEMOTHERAPY INFUSION START DATE: NORMAL
CHEMOTHERAPY INFUSION STOP DATE: NORMAL
CHEMOTHERAPY RECORDS: 3000
CHEMOTHERAPY RECORDS: 6
CHEMOTHERAPY RECORDS: NORMAL
CHEMOTHERAPY RX CANCER: NORMAL
DATE 1ST CHEMO CANCER: NORMAL
RAD ONC ARIA COURSE LAST TREATMENT DATE: NORMAL
RAD ONC ARIA COURSE TREATMENT ELAPSED DAYS: NORMAL
RAD ONC ARIA REFERENCE POINT DOSAGE GIVEN TO DATE: 30
RAD ONC ARIA REFERENCE POINT DOSAGE GIVEN TO DATE: 31.9
RAD ONC ARIA REFERENCE POINT ID: NORMAL
RAD ONC ARIA REFERENCE POINT ID: NORMAL

## 2022-12-15 PROCEDURE — 77334 RADIATION TREATMENT AID(S): CPT | Mod: 26 | Performed by: RADIOLOGY

## 2022-12-15 PROCEDURE — 77295 3-D RADIOTHERAPY PLAN: CPT | Performed by: RADIOLOGY

## 2022-12-15 PROCEDURE — 77295 3-D RADIOTHERAPY PLAN: CPT | Mod: 26 | Performed by: RADIOLOGY

## 2022-12-15 PROCEDURE — 77334 RADIATION TREATMENT AID(S): CPT | Performed by: RADIOLOGY

## 2022-12-15 PROCEDURE — 77300 RADIATION THERAPY DOSE PLAN: CPT | Mod: 26 | Performed by: RADIOLOGY

## 2022-12-15 PROCEDURE — 77300 RADIATION THERAPY DOSE PLAN: CPT | Performed by: RADIOLOGY

## 2022-12-16 LAB
CHEMOTHERAPY INFUSION START DATE: NORMAL
CHEMOTHERAPY INFUSION STOP DATE: NORMAL
CHEMOTHERAPY RECORDS: NORMAL
RAD ONC ARIA COURSE TREATMENT ELAPSED DAYS: NORMAL
RAD ONC ARIA REFERENCE POINT DOSAGE GIVEN TO DATE: 0
RAD ONC ARIA REFERENCE POINT DOSAGE GIVEN TO DATE: 0
RAD ONC ARIA REFERENCE POINT ID: NORMAL
RAD ONC ARIA REFERENCE POINT ID: NORMAL

## 2022-12-16 PROCEDURE — 77370 RADIATION PHYSICS CONSULT: CPT | Performed by: RADIOLOGY

## 2022-12-19 ENCOUNTER — HOSPITAL ENCOUNTER (OUTPATIENT)
Dept: RADIATION ONCOLOGY | Facility: MEDICAL CENTER | Age: 42
End: 2022-12-19

## 2022-12-19 LAB
CHEMOTHERAPY INFUSION START DATE: NORMAL
CHEMOTHERAPY RECORDS: 2700
CHEMOTHERAPY RECORDS: 9
CHEMOTHERAPY RECORDS: NORMAL
CHEMOTHERAPY RX CANCER: NORMAL
DATE 1ST CHEMO CANCER: NORMAL
RAD ONC ARIA COURSE LAST TREATMENT DATE: NORMAL
RAD ONC ARIA COURSE TREATMENT ELAPSED DAYS: NORMAL
RAD ONC ARIA REFERENCE POINT DOSAGE GIVEN TO DATE: 10.42
RAD ONC ARIA REFERENCE POINT DOSAGE GIVEN TO DATE: 9
RAD ONC ARIA REFERENCE POINT ID: NORMAL
RAD ONC ARIA REFERENCE POINT ID: NORMAL
RAD ONC ARIA REFERENCE POINT SESSION DOSAGE GIVEN: 10.42
RAD ONC ARIA REFERENCE POINT SESSION DOSAGE GIVEN: 9

## 2022-12-19 PROCEDURE — 77435 SBRT MANAGEMENT: CPT | Performed by: RADIOLOGY

## 2022-12-19 PROCEDURE — 77280 THER RAD SIMULAJ FIELD SMPL: CPT | Performed by: RADIOLOGY

## 2022-12-19 PROCEDURE — 77373 STRTCTC BDY RAD THER TX DLVR: CPT | Performed by: RADIOLOGY

## 2022-12-19 PROCEDURE — 77280 THER RAD SIMULAJ FIELD SMPL: CPT | Mod: 26 | Performed by: RADIOLOGY

## 2022-12-19 NOTE — DISCHARGE SUMMARY
[facility] Internal Medicine Discharge Summary    Attending: Dr. Justen Ravi  Senior Resident: Dr. Gregorio Esquivel  Intern:  Dr. Mindy Giraldo  Contact Number: [phone]    CHIEF COMPLAINT ON ADMISSION  Chief Complaint   Patient presents with    Numbness     Pt having right sided facial numbness since 2000 yesterday. Equal  strengths. Pt had chemotherapy yesterday. Hx metastatic melanoma.       Reason for Admission  Bell's Palsy, right-sided  New right parietal lobe metastasis    Admission Date  12/9/2022    CODE STATUS  Full Code    HPI & HOSPITAL COURSE  Jacque Mcintyre is a 42 y.o. female with a past medical history significant for stage 4, +BRAF, melanoma with brain mets s/p resection 10/2022, XRT, currently on palliative IMT (ipilimumab and nivolumab), type 2 diabetes who presented to the hospital on 12/9/2022 with right facial numbness and weakness that began the night of 12/8/2022.  Found to have new right parietal lobe lesion with edema on CT and admitted for neurosurgical evaluation.    #Bell's palsy, right-sided  Exam with right eyebrow involvement.  No history of Lyme, tick exposure, herpes virus, or cold sores.  Empiric course of acyclovir.  No steroids while continuing immunotherapy per radiation oncology.  Artificial tears 4 times daily to prevent corneal injury and lubricating ointment with eye taped shut at night for protection.    #New right parietal metastasis  MRI showed new hemorrhagic metastatic lesion in right parietal region with minimal rim enhancement.  Neurosurgery and radiation oncology consulted and felt her right-sided facial droop was due to Bell's palsy and not her new right parietal lesion.  Initially started on empiric steroids however they were discontinued to facilitate optimal efficacy of immunotherapy.  Patient was discussed at CNS tumor board; radiation oncology planning for SRT on 12/19/2022.  Keppra was discontinued per neurosurgery recs; she was continued on  Trileptal.    Therefore, she is discharged in fair and stable condition to home with close outpatient follow-up.    The patient met 2-midnight criteria for an inpatient stay at the time of discharge.    Discharge Date  12/12/2022    Physical Exam on Day of Discharge  Physical Exam  Constitutional:       General: She is not in acute distress.  HENT:      Mouth/Throat:      Mouth: Mucous membranes are moist.   Eyes:      Extraocular Movements: Extraocular movements intact.      Pupils: Pupils are equal, round, and reactive to light.   Cardiovascular:      Rate and Rhythm: Normal rate and regular rhythm.   Pulmonary:      Effort: Pulmonary effort is normal. No respiratory distress.      Breath sounds: No wheezing or rales.   Abdominal:      Palpations: Abdomen is soft.      Tenderness: There is no abdominal tenderness.   Musculoskeletal:      Right lower leg: No edema.      Left lower leg: No edema.   Skin:     General: Skin is warm and dry.   Neurological:      Mental Status: She is alert and oriented to person, place, and time.      Coordination: Coordination normal.      Comments: Mild right sided facial droop with eyebrow involvement  Unable to completely close right eye  Strength grossly intact and sensation to light touch grossly intact in all four       FOLLOW UP ITEMS POST DISCHARGE  See Above.    DISCHARGE DIAGNOSES  Principal Problem:    Malignant melanoma metastatic to brain (HCC) POA: Yes  Active Problems:    DM2 (diabetes mellitus, type 2) (HCC) (Chronic) POA: Yes    Chronic prescription benzodiazepine use (Chronic) POA: Yes    Dyslipidemia POA: Yes    Normocytic anemia POA: Unknown    Diabetic neuropathy (HCC) POA: Unknown    Facial paralysis/[place] palsy POA: Yes  Resolved Problems:    * No resolved hospital problems. *      FOLLOW UP  Future Appointments   Date Time Provider Department Center   12/21/2022 12:30 PM KIMBERLYN Marcano None   12/23/2022 12:00 PM KIMBERLYN Marcano None    2/15/2023 10:30 AM Mely Romero D.O. [facility] None     Adriana Levine M.D.  [address] [address]  [phone]    Schedule an appointment as soon as possible for a visit in 2 week(s)  Follow-up after hospital stay      MEDICATIONS ON DISCHARGE     Medication List        START taking these medications        Instructions   acyclovir 400 MG tablet  Commonly known as: Zovirax   Take 1 Tablet by mouth 5 Times a Day for 10 days.  Dose: 400 mg     artificial tears Oint ophth ointment  Commonly known as: EYE LUBRICANT   Doctor's comments: Please apply to right eye at night, before sleep Also carefully tape shut right eye using a medical-grade waterproof transparent dressing or tape  Apply 1 Application to right eye every evening for 30 days. Please apply to right eye at night, before sleep. Also carefully tape shut right eye using a medical-grade waterproof transparent dressing or tape  Dose: 1 Application     carboxymethylcellulose 0.5 % Soln  Commonly known as: REFRESH TEARS   Doctor's comments: Please apply 4 times a day to right eye to prevent corneal injury  Administer 1 Drop into the right eye as needed (incomplete eyelid closure) for up to 30 days.  Dose: 1 Drop            CONTINUE taking these medications        Instructions   acetaminophen 500 MG Tabs  Commonly known as: TYLENOL   Take 1,000 mg by mouth 2 times a day as needed for Mild Pain or Moderate Pain.  Dose: 1,000 mg     atorvastatin 40 MG Tabs  Commonly known as: LIPITOR   Take 1 Tablet by mouth every evening.  Dose: 40 mg     baclofen 10 MG Tabs  Commonly known as: LIORESAL   Take 1 Tablet by mouth 3 times a day.  Dose: 10 mg     clonazePAM 0.5 MG Tabs  Commonly known as: KLONOPIN   Take 0.5 mg by mouth 2 times a day as needed (Anxiety).  Dose: 0.5 mg     DULoxetine 60 MG Cpep delayed-release capsule  Commonly known as: CYMBALTA   Take 1 Capsule by mouth every evening.  Dose: 60 mg     ferrous sulfate 325 (65 Fe)  MG tablet   Take 325 mg by mouth every day.  Dose: 325 mg     gabapentin 800 MG tablet  Commonly known as: NEURONTIN   Take 1 Tablet by mouth 4 times a day.  Dose: 800 mg     HumaLOG KwikPen 100 UNIT/ML Sopn injection PEN  Generic drug: insulin lispro   Inject 2-10 Units under the skin 4 Times a Day,Before Meals and at Bedtime.  Dose: 2-10 Units     ibuprofen 200 MG Tabs  Commonly known as: MOTRIN   Take 600 mg by mouth 2 times a day as needed for Mild Pain or Moderate Pain. 3 tablets = 600 mg.  Dose: 600 mg     meloxicam 15 MG tablet  Commonly known as: MOBIC   Take 15 mg by mouth every day.  Dose: 15 mg     ondansetron 4 MG Tbdp  Commonly known as: ZOFRAN ODT   Take 4 mg by mouth 2 times a day as needed for Nausea.  Dose: 4 mg     OXcarbazepine 300 MG Tabs  Commonly known as: TRILEPTAL   Take 1 Tablet by mouth 2 times a day.  Dose: 300 mg     Pepcid AC 10 MG tablet  Generic drug: famotidine   Take 10 mg by mouth 1 time a day as needed (Heartburn).  Dose: 10 mg     prochlorperazine 10 MG Tabs  Commonly known as: COMPAZINE   Take 10 mg by mouth every 6 hours as needed for Nausea.  Dose: 10 mg     QUEtiapine 200 MG Tabs  Commonly known as: Seroquel   Take 1 Tablet by mouth every evening.  Dose: 200 mg     Toujeo Max SoloStar 300 UNIT/ML Sopn  Generic drug: Insulin Glargine (2 Unit Dial)   Inject 50 Units under the skin every day.  Dose: 50 Units     Ventolin  (90 Base) MCG/ACT Aers inhalation aerosol  Generic drug: albuterol   Inhale 1-2 Puffs every four hours as needed for Shortness of Breath.  Dose: 1-2 Puff     Vitamin B Complex Tabs   Take 1 Tablet by mouth every morning.  Dose: 1 Tablet     Vitamin D3 2000 UNIT Caps   Take 2,000 Units by mouth every morning.  Dose: 2,000 Units            STOP taking these medications      levetiracetam 1000 MG tablet  Commonly known as: KEPPRA              Allergies  Allergies   Allergen Reactions    Augmentin [Amoxicillin-Pot Clavulanate] Vomiting       DIET  No orders  of the defined types were placed in this encounter.      ACTIVITY  As tolerated.  Weight bearing as tolerated    CONSULTATIONS  Neurosurgery- Dr. Pebbles Tejada  Heme/Onc- Dr. Juan Wright  ENT- Dr. Lauren Burgess  Radiation oncology- Adilia Bradford    PROCEDURES  None    LABORATORY  Lab Results   Component Value Date    SODIUM 139 12/11/2022    POTASSIUM 3.9 12/11/2022    CHLORIDE 99 12/11/2022    CO2 27 12/11/2022    GLUCOSE 132 (H) 12/11/2022    BUN 11 12/11/2022    CREATININE 0.45 (L) 12/11/2022        Lab Results   Component Value Date    WBC 8.7 12/11/2022    HEMOGLOBIN 10.3 (L) 12/11/2022    HEMATOCRIT 34.8 (L) 12/11/2022    PLATELETCT 587 (H) 12/11/2022        Total time of the discharge process exceeds 47 minutes.

## 2022-12-20 NOTE — PROCEDURES
DATE OF SERVICE: 12/19/2022    DIAGNOSIS:  No matching staging information was found for the patient.     TREATMENT:  Radiation Therapy Episodes       Active Episodes       Radiation Therapy: SRS (12/19/2022)    Radiation Therapy: SRS (10/31/2022)                   Radiation Treatments         Plan Last Treated On Elapsed Days Fractions Treated Prescribed Fraction Dose (cGy) Prescribed Total Dose (cGy)    SRT R Pariet 12/19/2022 0 @ [id number] 1 of 3 900 2,700    5 Lesion [facility] 12/16/2022  @  0 of 5 -- --                  Reference Point Last Treated On Elapsed Days Most Recent Session Dose (cGy) Total Dose (cGy)    SRT R Pariet 12/19/2022 0 @ [id number] 900 900    SRT R Pariet CP 12/19/2022 0 @ [id number] 1,042 1,042    5 Lesion SRT 12/16/2022  @  -- 0    5 Lesion SRT CP 12/16/2022  @  -- 0                      Historical Treatments (Plans: 1)      Plan Last Treated On Elapsed Days Fractions Treated Prescribed Fraction Dose (cGy) Prescribed Total Dose (cGy)   5 Lesion SRT 11/9/2022 6 @ [id number] 5 of 5 600 3,000                Reference Point Last Treated On Elapsed Days Most Recent Session Dose (cGy) Total Dose (cGy)   5 Lesion SRT 11/9/2022 6 @ [id number] -- 3,000   5 Lesion SRT CP 11/9/2022 6 @ [id number] -- 3,190                            STEREOTACTIC PROCEDURE NOTE:    Called by [employer] machine to verify treatment parameters including:  treatment site, treatment dose, and treatment setup prior to stereotactic treatment..    Patient was placed in the treatment position with use of immobilization device and  laser guidance. CBCT images were acquired for target localization.  Images were reviewed in the axial, coronal, and saggital views and shifts were made as necessary to ensure that patient position matched simulation position.      Treatment delivered per  prescription.  The medical physicist was present throughout the set-up, verification and treatment delivery to oversee the procedure and  ensure all parameters agreed with the computerized plan.    I have personally reviewed the relevant data, performed the target localization, and determined all relevant factors for this patient’s simulation.

## 2022-12-21 ENCOUNTER — HOSPITAL ENCOUNTER (OUTPATIENT)
Dept: RADIATION ONCOLOGY | Facility: MEDICAL CENTER | Age: 42
End: 2022-12-21

## 2022-12-21 ENCOUNTER — APPOINTMENT (OUTPATIENT)
Dept: RADIATION ONCOLOGY | Facility: MEDICAL CENTER | Age: 42
End: 2022-12-21
Attending: RADIOLOGY
Payer: COMMERCIAL

## 2022-12-21 LAB
CHEMOTHERAPY INFUSION START DATE: NORMAL
CHEMOTHERAPY RECORDS: 2700
CHEMOTHERAPY RECORDS: 9
CHEMOTHERAPY RECORDS: NORMAL
CHEMOTHERAPY RX CANCER: NORMAL
DATE 1ST CHEMO CANCER: NORMAL
RAD ONC ARIA COURSE LAST TREATMENT DATE: NORMAL
RAD ONC ARIA COURSE TREATMENT ELAPSED DAYS: NORMAL
RAD ONC ARIA REFERENCE POINT DOSAGE GIVEN TO DATE: 18
RAD ONC ARIA REFERENCE POINT DOSAGE GIVEN TO DATE: 20.83
RAD ONC ARIA REFERENCE POINT ID: NORMAL
RAD ONC ARIA REFERENCE POINT ID: NORMAL
RAD ONC ARIA REFERENCE POINT SESSION DOSAGE GIVEN: 10.42
RAD ONC ARIA REFERENCE POINT SESSION DOSAGE GIVEN: 9

## 2022-12-21 PROCEDURE — 77280 THER RAD SIMULAJ FIELD SMPL: CPT | Performed by: RADIOLOGY

## 2022-12-21 PROCEDURE — 77373 STRTCTC BDY RAD THER TX DLVR: CPT | Performed by: RADIOLOGY

## 2022-12-21 PROCEDURE — 77336 RADIATION PHYSICS CONSULT: CPT | Performed by: RADIOLOGY

## 2022-12-21 PROCEDURE — 77280 THER RAD SIMULAJ FIELD SMPL: CPT | Mod: 26 | Performed by: RADIOLOGY

## 2022-12-21 NOTE — PROCEDURES
DATE OF SERVICE: 12/21/2022    DIAGNOSIS:  No matching staging information was found for the patient.     TREATMENT:  Radiation Therapy Episodes       Active Episodes       Radiation Therapy: SRS (12/19/2022)    Radiation Therapy: SRS (10/31/2022)                   Radiation Treatments         Plan Last Treated On Elapsed Days Fractions Treated Prescribed Fraction Dose (cGy) Prescribed Total Dose (cGy)    SRT R Pariet 12/21/2022 2 @ [id number] 2 of 3 900 2,700    5 Lesion [facility] 12/16/2022  @  0 of 5 -- --                  Reference Point Last Treated On Elapsed Days Most Recent Session Dose (cGy) Total Dose (cGy)    SRT R Pariet 12/21/2022 2 @ [id number] 900 1,800    SRT R Pariet CP 12/21/2022 2 @ [id number] 1,042 2,083    5 Lesion SRT 12/16/2022  @  -- 0    5 Lesion SRT CP 12/16/2022  @  -- 0                      Historical Treatments (Plans: 1)      Plan Last Treated On Elapsed Days Fractions Treated Prescribed Fraction Dose (cGy) Prescribed Total Dose (cGy)   5 Lesion SRT 11/9/2022 6 @ [id number] 5 of 5 600 3,000                Reference Point Last Treated On Elapsed Days Most Recent Session Dose (cGy) Total Dose (cGy)   5 Lesion SRT 11/9/2022 6 @ [id number] -- 3,000   5 Lesion SRT CP 11/9/2022 6 @ [id number] -- 3,190                            STEREOTACTIC PROCEDURE NOTE:    Called by [employer] machine to verify treatment parameters including:  treatment site, treatment dose, and treatment setup prior to stereotactic treatment..    Patient was placed in the treatment position with use of immobilization device and  laser guidance. CBCT images were acquired for target localization.  Images were reviewed in the axial, coronal, and saggital views and shifts were made as necessary to ensure that patient position matched simulation position.      Treatment delivered per  prescription.  The medical physicist was present throughout the set-up, verification and treatment delivery to oversee the procedure and  ensure all parameters agreed with the computerized plan.    I have personally reviewed the relevant data, performed the target localization, and determined all relevant factors for this patient’s simulation.

## 2022-12-23 ENCOUNTER — HOSPITAL ENCOUNTER (OUTPATIENT)
Dept: RADIATION ONCOLOGY | Facility: MEDICAL CENTER | Age: 42
End: 2022-12-23

## 2022-12-23 LAB
CHEMOTHERAPY INFUSION START DATE: NORMAL
CHEMOTHERAPY RECORDS: 2700
CHEMOTHERAPY RECORDS: 9
CHEMOTHERAPY RECORDS: NORMAL
CHEMOTHERAPY RX CANCER: NORMAL
DATE 1ST CHEMO CANCER: NORMAL
RAD ONC ARIA COURSE LAST TREATMENT DATE: NORMAL
RAD ONC ARIA COURSE TREATMENT ELAPSED DAYS: NORMAL
RAD ONC ARIA REFERENCE POINT DOSAGE GIVEN TO DATE: 27
RAD ONC ARIA REFERENCE POINT DOSAGE GIVEN TO DATE: 31.25
RAD ONC ARIA REFERENCE POINT ID: NORMAL
RAD ONC ARIA REFERENCE POINT ID: NORMAL
RAD ONC ARIA REFERENCE POINT SESSION DOSAGE GIVEN: 10.42
RAD ONC ARIA REFERENCE POINT SESSION DOSAGE GIVEN: 9

## 2022-12-23 PROCEDURE — 77280 THER RAD SIMULAJ FIELD SMPL: CPT | Performed by: RADIOLOGY

## 2022-12-23 PROCEDURE — RXMED WILLOW AMBULATORY MEDICATION CHARGE: Performed by: PHYSICAL MEDICINE & REHABILITATION

## 2022-12-23 PROCEDURE — 77373 STRTCTC BDY RAD THER TX DLVR: CPT | Performed by: RADIOLOGY

## 2022-12-23 PROCEDURE — 77280 THER RAD SIMULAJ FIELD SMPL: CPT | Mod: 26 | Performed by: RADIOLOGY

## 2022-12-23 NOTE — ADDENDUM NOTE
Encounter addended by: Mari Sparrow, Med Ass't on: 12/23/2022 1:03 PM   Actions taken: Pend clinical note

## 2022-12-23 NOTE — RADIATION COMPLETION NOTES
END OF TREATMENT SUMMARY    Patient name:  Jacque Mcintyre    Primary Physician:  Adriana Levine M.D. MRN: [id number]  CSN: [id number]   Referring physician:  No ref. provider found  : 1980, 42 y.o.       TREATMENT SUMMARY:        Course First Treatment Date 2022    Course Last Treatment Date 2022   Course Elapsed Days 4 @ [id number]   Course Intent Palliative     Radiation Therapy Episodes       Active Episodes       Radiation Therapy: SRS (2022)    Radiation Therapy: SRS (10/31/2022)                   Radiation Treatments         Plan Last Treated On Elapsed Days Fractions Treated Prescribed Fraction Dose (cGy) Prescribed Total Dose (cGy)    SRT R Pariet 2022 4 @ [id number] 3 of 3 900 2,700    5 Lesion [facility] 2022  @  0 of 5 -- --                  Reference Point Last Treated On Elapsed Days Most Recent Session Dose (cGy) Total Dose (cGy)    SRT R Pariet 2022 4 @ [id number] 900 2,700    SRT R Pariet CP 2022 4 @ [id number] 1,042 3,125    5 Lesion SRT 2022  @  -- 0    5 Lesion SRT CP 2022  @  -- 0                      Historical Treatments (Plans: 1)      Plan Last Treated On Elapsed Days Fractions Treated Prescribed Fraction Dose (cGy) Prescribed Total Dose (cGy)   5 Lesion SRT 2022 6 @ [id number] 5 of 5 600 3,000                Reference Point Last Treated On Elapsed Days Most Recent Session Dose (cGy) Total Dose (cGy)   5 Lesion SRT 2022 6 @ [id number] -- 3,000   5 Lesion SRT CP 2022 6 @ [id number] -- 3,190                                     STAGE: Metastatic melanoma, stage IV     TREATMENT INDICATION:   Right parietal recurrence     CONCURRENT SYSTEMIC TREATMENT:   none     RT COURSE DISCONTINUED EARLY:   No     PATIENT EXPERIENCE:       2022   Toxicity Assessment   Toxicity Assessment Brain Brain   Fatigue (lethargy, malaise, asthenia) Increased fatigue over baseline, but not altering normal  activities Increased fatigue over baseline, but not altering normal activities   Radiation Dermatitis None Faint erythema or dry desquamation   Nausea None None   Mouth Dryness Normal    Headache None None   External Auditory Canal Normal Normal   Inner Ear/Hearing Normal    Middle Ear/Hearing Normal Normal   Dizziness/lightheadedness None None   Memory loss Normal Normal   Neuropathy - Motor Normal Normal   Seizure None None   Vertigo None None       Multiple values from one day are sorted in reverse-chronological order        FOLLOW-UP PLAN:   6 Weeks     COMMENT:          ANATOMIC TARGET SUMMARY    ANATOMIC TARGET MODALITY TECHNIQUE   R parietal   External beam, photons FSRT            COMMENT:         DIAGRAMS:            DOSE VOLUME HISTOGRAMS:

## 2022-12-23 NOTE — PROCEDURES
DATE OF SERVICE: 12/23/2022    DIAGNOSIS:  No matching staging information was found for the patient.     TREATMENT:  Radiation Therapy Episodes       Active Episodes       Radiation Therapy: SRS (12/19/2022)    Radiation Therapy: SRS (10/31/2022)                   Radiation Treatments         Plan Last Treated On Elapsed Days Fractions Treated Prescribed Fraction Dose (cGy) Prescribed Total Dose (cGy)    SRT R Pariet 12/23/2022 4 @ [id number] 3 of 3 900 2,700    5 Lesion [facility] 12/16/2022  @  0 of 5 -- --                  Reference Point Last Treated On Elapsed Days Most Recent Session Dose (cGy) Total Dose (cGy)    SRT R Pariet 12/23/2022 4 @ [id number] 900 2,700    SRT R Pariet CP 12/23/2022 4 @ [id number] 1,042 3,125    5 Lesion SRT 12/16/2022  @  -- 0    5 Lesion SRT CP 12/16/2022  @  -- 0                      Historical Treatments (Plans: 1)      Plan Last Treated On Elapsed Days Fractions Treated Prescribed Fraction Dose (cGy) Prescribed Total Dose (cGy)   5 Lesion SRT 11/9/2022 6 @ [id number] 5 of 5 600 3,000                Reference Point Last Treated On Elapsed Days Most Recent Session Dose (cGy) Total Dose (cGy)   5 Lesion SRT 11/9/2022 6 @ [id number] -- 3,000   5 Lesion SRT CP 11/9/2022 6 @ [id number] -- 3,190                            STEREOTACTIC PROCEDURE NOTE:    Called by [employer] machine to verify treatment parameters including:  treatment site, treatment dose, and treatment setup prior to stereotactic treatment..    Patient was placed in the treatment position with use of immobilization device and  laser guidance. CBCT images were acquired for target localization.  Images were reviewed in the axial, coronal, and saggital views and shifts were made as necessary to ensure that patient position matched simulation position.      Treatment delivered per  prescription.  The medical physicist was present throughout the set-up, verification and treatment delivery to oversee the procedure and  ensure all parameters agreed with the computerized plan.    I have personally reviewed the relevant data, performed the target localization, and determined all relevant factors for this patient’s simulation.

## 2022-12-27 ENCOUNTER — PHARMACY VISIT (OUTPATIENT)
Dept: PHARMACY | Facility: MEDICAL CENTER | Age: 42
End: 2022-12-27
Payer: COMMERCIAL

## 2023-01-05 ENCOUNTER — HOSPITAL ENCOUNTER (OUTPATIENT)
Facility: MEDICAL CENTER | Age: 43
End: 2023-01-05
Attending: INTERNAL MEDICINE
Payer: COMMERCIAL

## 2023-01-05 LAB
ALBUMIN SERPL BCP-MCNC: 4.8 G/DL (ref 3.2–4.9)
ALBUMIN/GLOB SERPL: 2.3 G/DL
ALP SERPL-CCNC: 131 U/L (ref 30–99)
ALT SERPL-CCNC: 11 U/L (ref 2–50)
ANION GAP SERPL CALC-SCNC: 11 MMOL/L (ref 7–16)
AST SERPL-CCNC: 12 U/L (ref 12–45)
BILIRUB SERPL-MCNC: 0.3 MG/DL (ref 0.1–1.5)
BUN SERPL-MCNC: 12 MG/DL (ref 8–22)
CALCIUM ALBUM COR SERPL-MCNC: 9.1 MG/DL (ref 8.5–10.5)
CALCIUM SERPL-MCNC: 9.7 MG/DL (ref 8.5–10.5)
CHLORIDE SERPL-SCNC: 102 MMOL/L (ref 96–112)
CO2 SERPL-SCNC: 24 MMOL/L (ref 20–33)
CREAT SERPL-MCNC: 0.4 MG/DL (ref 0.5–1.4)
FERRITIN SERPL-MCNC: 68.5 NG/ML (ref 10–291)
GFR SERPLBLD CREATININE-BSD FMLA CKD-EPI: 125 ML/MIN/1.73 M 2
GLOBULIN SER CALC-MCNC: 2.1 G/DL (ref 1.9–3.5)
GLUCOSE SERPL-MCNC: 88 MG/DL (ref 65–99)
POTASSIUM SERPL-SCNC: 4.4 MMOL/L (ref 3.6–5.5)
PROT SERPL-MCNC: 6.9 G/DL (ref 6–8.2)
SODIUM SERPL-SCNC: 137 MMOL/L (ref 135–145)
TSH SERPL DL<=0.005 MIU/L-ACNC: 0.45 UIU/ML (ref 0.38–5.33)

## 2023-01-05 PROCEDURE — 82728 ASSAY OF FERRITIN: CPT

## 2023-01-05 PROCEDURE — 80053 COMPREHEN METABOLIC PANEL: CPT

## 2023-01-05 PROCEDURE — 84443 ASSAY THYROID STIM HORMONE: CPT

## 2023-01-24 ENCOUNTER — HOSPITAL ENCOUNTER (OUTPATIENT)
Dept: LAB | Facility: MEDICAL CENTER | Age: 43
End: 2023-01-24
Attending: INTERNAL MEDICINE
Payer: COMMERCIAL

## 2023-01-24 LAB
ALBUMIN SERPL BCP-MCNC: 4.6 G/DL (ref 3.2–4.9)
ALBUMIN/GLOB SERPL: 1.8 G/DL
ALP SERPL-CCNC: 135 U/L (ref 30–99)
ALT SERPL-CCNC: 17 U/L (ref 2–50)
ANION GAP SERPL CALC-SCNC: 16 MMOL/L (ref 7–16)
AST SERPL-CCNC: 16 U/L (ref 12–45)
B-HCG SERPL-ACNC: <1 MIU/ML (ref 0–5)
BASOPHILS # BLD AUTO: 1.1 % (ref 0–1.8)
BASOPHILS # BLD: 0.08 K/UL (ref 0–0.12)
BILIRUB SERPL-MCNC: 0.3 MG/DL (ref 0.1–1.5)
BUN SERPL-MCNC: 12 MG/DL (ref 8–22)
CALCIUM ALBUM COR SERPL-MCNC: 8.6 MG/DL (ref 8.5–10.5)
CALCIUM SERPL-MCNC: 9.1 MG/DL (ref 8.5–10.5)
CHLORIDE SERPL-SCNC: 102 MMOL/L (ref 96–112)
CO2 SERPL-SCNC: 23 MMOL/L (ref 20–33)
CREAT SERPL-MCNC: 0.59 MG/DL (ref 0.5–1.4)
EOSINOPHIL # BLD AUTO: 0.22 K/UL (ref 0–0.51)
EOSINOPHIL NFR BLD: 3 % (ref 0–6.9)
ERYTHROCYTE [DISTWIDTH] IN BLOOD BY AUTOMATED COUNT: 58.9 FL (ref 35.9–50)
GFR SERPLBLD CREATININE-BSD FMLA CKD-EPI: 114 ML/MIN/1.73 M 2
GLOBULIN SER CALC-MCNC: 2.5 G/DL (ref 1.9–3.5)
GLUCOSE SERPL-MCNC: 123 MG/DL (ref 65–99)
HCT VFR BLD AUTO: 45.2 % (ref 37–47)
HGB BLD-MCNC: 13.7 G/DL (ref 12–16)
IMM GRANULOCYTES # BLD AUTO: 0.02 K/UL (ref 0–0.11)
IMM GRANULOCYTES NFR BLD AUTO: 0.3 % (ref 0–0.9)
LYMPHOCYTES # BLD AUTO: 1.22 K/UL (ref 1–4.8)
LYMPHOCYTES NFR BLD: 16.5 % (ref 22–41)
MCH RBC QN AUTO: 27.1 PG (ref 27–33)
MCHC RBC AUTO-ENTMCNC: 30.3 G/DL (ref 33.6–35)
MCV RBC AUTO: 89.3 FL (ref 81.4–97.8)
MONOCYTES # BLD AUTO: 0.54 K/UL (ref 0–0.85)
MONOCYTES NFR BLD AUTO: 7.3 % (ref 0–13.4)
NEUTROPHILS # BLD AUTO: 5.3 K/UL (ref 2–7.15)
NEUTROPHILS NFR BLD: 71.8 % (ref 44–72)
NRBC # BLD AUTO: 0 K/UL
NRBC BLD-RTO: 0 /100 WBC
PLATELET # BLD AUTO: 341 K/UL (ref 164–446)
PMV BLD AUTO: 10.6 FL (ref 9–12.9)
POTASSIUM SERPL-SCNC: 4.5 MMOL/L (ref 3.6–5.5)
PROT SERPL-MCNC: 7.1 G/DL (ref 6–8.2)
RBC # BLD AUTO: 5.06 M/UL (ref 4.2–5.4)
SODIUM SERPL-SCNC: 141 MMOL/L (ref 135–145)
TSH SERPL DL<=0.005 MIU/L-ACNC: 0.48 UIU/ML (ref 0.38–5.33)
WBC # BLD AUTO: 7.4 K/UL (ref 4.8–10.8)

## 2023-01-24 PROCEDURE — 84702 CHORIONIC GONADOTROPIN TEST: CPT

## 2023-01-24 PROCEDURE — 84443 ASSAY THYROID STIM HORMONE: CPT

## 2023-01-24 PROCEDURE — 36415 COLL VENOUS BLD VENIPUNCTURE: CPT

## 2023-01-24 PROCEDURE — 80053 COMPREHEN METABOLIC PANEL: CPT

## 2023-01-24 PROCEDURE — 85025 COMPLETE CBC W/AUTO DIFF WBC: CPT

## 2023-02-02 ENCOUNTER — HOSPITAL ENCOUNTER (OUTPATIENT)
Dept: RADIATION ONCOLOGY | Facility: MEDICAL CENTER | Age: 43
End: 2023-02-28
Attending: RADIOLOGY
Payer: COMMERCIAL

## 2023-02-02 NOTE — PROGRESS NOTES
As a means of avoiding spread of COVID-19, this visit is being conducted by telephone. This telephone visit was initiated by the patient and they verbally consented.    Time at start of call: 1:11 PM  Location: At home in [place]    Reason for Call:  Post Treatment Follow Up    HPI:    This is a 42-year-old lady with metastatic BRAF mutant malignant melanoma, stage IV, with multiple brain lesions as well as systemic disease, who had seen approximately 3 months ago initially for consideration of radiation therapy.  At that time, she underwent craniotomy and resection of her larger dominant lesions, and was treated with postoperative fractionated stereotactic radiation.  Thereafter, she was discovered to have several additional lesions and completed a second round of SRS as below.  She now continues on immunotherapy with medical oncology.    Radiation Oncology          12/21/2022 12/23/2022   Aria Course Treatment Dates   Course First Treatment Date 12/19/2022 12/19/2022     Course Last Treatment Date 12/21/2022 12/23/2022     Aria Treatment Summary   SRT R Pariet  Plan from Course C2 Brain SRS   Fraction 2 of 3 3 of 3    3 of 3   Elapsed Course Days 2 @ [id number] 4 @ [id number]    4 @ 202212231207   Prescribed Fraction Dose 900 cGy 900 cGy    900 cGy   Prescribed Total Dose 2,700 cGy 2,700 cGy    2,700 cGy   SRT R Pariet  Reference Point from Course C2 Brain SRS   Elapsed Course Days 2 @ [id number] 4 @ [id number]    4 @ [id number]   Session Dose 900 cGy 900 cGy    --   Total Dose 1,800 cGy 2,700 cGy    2,700 cGy   SRT R Pariet CP  Reference Point from Course C2 Brain SRS   Elapsed Course Days 2 @ [id number] 4 @ [id number]    4 @ [id number]   Session Dose 1,042 cGy 1,042 cGy    --   Total Dose 2,083 cGy 3,125 cGy    3,125 cGy       More values are hidden. Newest values shown. Go to activity for more data.        INTERVAL HISTORY:  We are having a telephone follow-up shortly after completion of her  recent course of stereotactic radiation.  She is doing tremendously well.  She is on immunotherapy now, and continues on Nivo/Ipi.  She feels quite well, has no major sensory or motor problems, no headaches, nausea or vomiting, or systemic complaints.  A follow-up MRI and CT chest abdomen pelvis has been ordered by medical oncology to be done in the next few weeks.  Her energy level is good.  Her hair has slowly grown back.  Overall, she feels quite good.    Labs / Images Reviewed:   CT-HEAD W/O    Result Date: 12/9/2022  1.  Multiple intracranial metastatic lesions with surrounding edema again noted. There is a new lesion in the right parietal lobe with surrounding edema. 2.  No evidence of intracranial hemorrhage.     MR-BRAIN-WITH & W/O    Addendum Date: 12/12/2022    Addendum to previous examination. Enhancement is noted in the lateral aspect of the bilateral internal auditory canals. Patient has a right facial palsy. Explanations for the enhancement include post viral enhancement related to Bell's palsy or leptomeningeal spread of metastatic melanoma with metastatic lesions in the internal auditory canals. Recommend additional evaluation with lumbar puncture. This was discussed with during tumor board today 12/12/2022 around 7:30 AM.    Result Date: 12/12/2022  Interval appearance of a new hemorrhagic metastatic lesion in the right parietal region. Minimal rim enhancement of this lesion is demonstrated. Interval slight decrease in size of the enhancing component of the left subinsular white matter lesion with slight increase in surrounding T2 signal abnormality. Minimal nodular enhancement is noted in the site of the previously treated lesions in the medial left frontal region and the right prefrontal region as well as the right inferior temporal region. Dural thickening and enhancement underlying the right frontal craniotomy is again noted, likely representing postoperative change, however recommend attention  on follow-up examination.    CT-PETCT-WHOLE BODY    Result Date: 12/5/2022  Extensive disease progression: 1. Multiple hypermetabolic pulmonary nodules, in keeping with metastasis. Small bilateral pleural effusions. 2. Increased large heterogeneous hypermetabolic bilateral thyroid glands, left more than right, likely disease infiltration. 3. Multiple mesenteric masses and left pelvic sidewall mass, likely metastasis. Questionable necrotic 4.3 cm mass in the left abdomen inferior to the left kidney versus tumor involvement of the bowel. 4.  Multiple subcutaneous and muscle nodules, as described. Large 7.8 cm cystic mass in the right adnexa. Peripheral activity may be physiologic. Correlate with pelvic ultrasound.      Assessment and Plan:   Metastatic BRAF mutant melanoma, stage IV      Follow-up: She is doing quite well at this time.  Her follow-up MRI is already scheduled.  I will plan to see her in about 6 weeks.    Time at end of call: 1:19 PM    Total Time Spent:5-10 minutes    Judy August M.D.

## 2023-02-12 ENCOUNTER — HOSPITAL ENCOUNTER (OUTPATIENT)
Dept: RADIOLOGY | Facility: MEDICAL CENTER | Age: 43
End: 2023-02-12
Attending: INTERNAL MEDICINE
Payer: COMMERCIAL

## 2023-02-12 DIAGNOSIS — C77.3 SECONDARY MALIGNANT NEOPLASM OF AXILLARY LYMPH NODES (HCC): ICD-10-CM

## 2023-02-12 DIAGNOSIS — Z51.12 ENCOUNTER FOR ANTINEOPLASTIC IMMUNOTHERAPY: ICD-10-CM

## 2023-02-12 DIAGNOSIS — C43.62 MALIGNANT MELANOMA OF LEFT UPPER EXTREMITY INCLUDING SHOULDER (HCC): ICD-10-CM

## 2023-02-12 PROCEDURE — A9579 GAD-BASE MR CONTRAST NOS,1ML: HCPCS

## 2023-02-12 PROCEDURE — 700117 HCHG RX CONTRAST REV CODE 255

## 2023-02-12 PROCEDURE — 71260 CT THORAX DX C+: CPT

## 2023-02-12 PROCEDURE — 70553 MRI BRAIN STEM W/O & W/DYE: CPT

## 2023-02-12 PROCEDURE — 700117 HCHG RX CONTRAST REV CODE 255: Performed by: INTERNAL MEDICINE

## 2023-02-12 RX ADMIN — IOHEXOL 100 ML: 350 INJECTION, SOLUTION INTRAVENOUS at 12:29

## 2023-02-12 RX ADMIN — GADOTERIDOL 15 ML: 279.3 INJECTION, SOLUTION INTRAVENOUS at 12:02

## 2023-02-12 RX ADMIN — IOHEXOL 20 ML: 240 INJECTION, SOLUTION INTRATHECAL; INTRAVASCULAR; INTRAVENOUS; ORAL at 12:29

## 2023-02-15 ENCOUNTER — APPOINTMENT (OUTPATIENT)
Dept: PHYSICAL MEDICINE AND REHAB | Facility: MEDICAL CENTER | Age: 43
End: 2023-02-15

## 2023-02-16 ENCOUNTER — HOSPITAL ENCOUNTER (OUTPATIENT)
Facility: MEDICAL CENTER | Age: 43
End: 2023-02-16
Attending: INTERNAL MEDICINE
Payer: COMMERCIAL

## 2023-02-16 ENCOUNTER — HOSPITAL ENCOUNTER (OUTPATIENT)
Dept: LAB | Facility: MEDICAL CENTER | Age: 43
End: 2023-02-16
Attending: INTERNAL MEDICINE
Payer: COMMERCIAL

## 2023-02-16 LAB
ALBUMIN SERPL BCP-MCNC: 4.6 G/DL (ref 3.2–4.9)
ALBUMIN SERPL BCP-MCNC: 4.6 G/DL (ref 3.2–4.9)
ALBUMIN/GLOB SERPL: 2.1 G/DL
ALBUMIN/GLOB SERPL: 2.4 G/DL
ALP SERPL-CCNC: 117 U/L (ref 30–99)
ALP SERPL-CCNC: 123 U/L (ref 30–99)
ALT SERPL-CCNC: 15 U/L (ref 2–50)
ALT SERPL-CCNC: 17 U/L (ref 2–50)
ANION GAP SERPL CALC-SCNC: 12 MMOL/L (ref 7–16)
ANION GAP SERPL CALC-SCNC: 12 MMOL/L (ref 7–16)
AST SERPL-CCNC: 12 U/L (ref 12–45)
AST SERPL-CCNC: 15 U/L (ref 12–45)
B-HCG SERPL-ACNC: <1 MIU/ML (ref 0–5)
BASOPHILS # BLD AUTO: 0.7 % (ref 0–1.8)
BASOPHILS # BLD: 0.05 K/UL (ref 0–0.12)
BILIRUB SERPL-MCNC: 0.3 MG/DL (ref 0.1–1.5)
BILIRUB SERPL-MCNC: 0.4 MG/DL (ref 0.1–1.5)
BUN SERPL-MCNC: 14 MG/DL (ref 8–22)
BUN SERPL-MCNC: 18 MG/DL (ref 8–22)
CALCIUM ALBUM COR SERPL-MCNC: 8.6 MG/DL (ref 8.5–10.5)
CALCIUM ALBUM COR SERPL-MCNC: 8.7 MG/DL (ref 8.5–10.5)
CALCIUM SERPL-MCNC: 9.1 MG/DL (ref 8.5–10.5)
CALCIUM SERPL-MCNC: 9.2 MG/DL (ref 8.5–10.5)
CHLORIDE SERPL-SCNC: 105 MMOL/L (ref 96–112)
CHLORIDE SERPL-SCNC: 106 MMOL/L (ref 96–112)
CO2 SERPL-SCNC: 21 MMOL/L (ref 20–33)
CO2 SERPL-SCNC: 23 MMOL/L (ref 20–33)
CREAT SERPL-MCNC: 0.64 MG/DL (ref 0.5–1.4)
CREAT SERPL-MCNC: 0.65 MG/DL (ref 0.5–1.4)
EOSINOPHIL # BLD AUTO: 0.37 K/UL (ref 0–0.51)
EOSINOPHIL NFR BLD: 5.2 % (ref 0–6.9)
ERYTHROCYTE [DISTWIDTH] IN BLOOD BY AUTOMATED COUNT: 51.2 FL (ref 35.9–50)
GFR SERPLBLD CREATININE-BSD FMLA CKD-EPI: 111 ML/MIN/1.73 M 2
GFR SERPLBLD CREATININE-BSD FMLA CKD-EPI: 112 ML/MIN/1.73 M 2
GLOBULIN SER CALC-MCNC: 1.9 G/DL (ref 1.9–3.5)
GLOBULIN SER CALC-MCNC: 2.2 G/DL (ref 1.9–3.5)
GLUCOSE SERPL-MCNC: 183 MG/DL (ref 65–99)
GLUCOSE SERPL-MCNC: 253 MG/DL (ref 65–99)
HCT VFR BLD AUTO: 45.5 % (ref 37–47)
HGB BLD-MCNC: 14.2 G/DL (ref 12–16)
IMM GRANULOCYTES # BLD AUTO: 0.04 K/UL (ref 0–0.11)
IMM GRANULOCYTES NFR BLD AUTO: 0.6 % (ref 0–0.9)
LYMPHOCYTES # BLD AUTO: 1.28 K/UL (ref 1–4.8)
LYMPHOCYTES NFR BLD: 18.1 % (ref 22–41)
MAGNESIUM SERPL-MCNC: 2.1 MG/DL (ref 1.5–2.5)
MCH RBC QN AUTO: 27.4 PG (ref 27–33)
MCHC RBC AUTO-ENTMCNC: 31.2 G/DL (ref 33.6–35)
MCV RBC AUTO: 87.7 FL (ref 81.4–97.8)
MONOCYTES # BLD AUTO: 0.58 K/UL (ref 0–0.85)
MONOCYTES NFR BLD AUTO: 8.2 % (ref 0–13.4)
NEUTROPHILS # BLD AUTO: 4.77 K/UL (ref 2–7.15)
NEUTROPHILS NFR BLD: 67.2 % (ref 44–72)
NRBC # BLD AUTO: 0 K/UL
NRBC BLD-RTO: 0 /100 WBC
PLATELET # BLD AUTO: 328 K/UL (ref 164–446)
PMV BLD AUTO: 10.5 FL (ref 9–12.9)
POTASSIUM SERPL-SCNC: 4.4 MMOL/L (ref 3.6–5.5)
POTASSIUM SERPL-SCNC: 4.6 MMOL/L (ref 3.6–5.5)
PROT SERPL-MCNC: 6.5 G/DL (ref 6–8.2)
PROT SERPL-MCNC: 6.8 G/DL (ref 6–8.2)
RBC # BLD AUTO: 5.19 M/UL (ref 4.2–5.4)
SODIUM SERPL-SCNC: 139 MMOL/L (ref 135–145)
SODIUM SERPL-SCNC: 140 MMOL/L (ref 135–145)
TSH SERPL DL<=0.005 MIU/L-ACNC: 3.58 UIU/ML (ref 0.38–5.33)
WBC # BLD AUTO: 7.1 K/UL (ref 4.8–10.8)

## 2023-02-16 PROCEDURE — 80053 COMPREHEN METABOLIC PANEL: CPT

## 2023-02-16 PROCEDURE — 84443 ASSAY THYROID STIM HORMONE: CPT

## 2023-02-16 PROCEDURE — 83735 ASSAY OF MAGNESIUM: CPT

## 2023-02-16 PROCEDURE — 36415 COLL VENOUS BLD VENIPUNCTURE: CPT

## 2023-02-16 PROCEDURE — 85025 COMPLETE CBC W/AUTO DIFF WBC: CPT

## 2023-02-16 PROCEDURE — 80053 COMPREHEN METABOLIC PANEL: CPT | Mod: 91

## 2023-02-16 PROCEDURE — 84702 CHORIONIC GONADOTROPIN TEST: CPT

## 2023-02-19 ENCOUNTER — HOSPITAL ENCOUNTER (OUTPATIENT)
Facility: MEDICAL CENTER | Age: 43
End: 2023-02-19
Attending: INTERNAL MEDICINE
Payer: COMMERCIAL

## 2023-02-19 PROCEDURE — 87899 AGENT NOS ASSAY W/OPTIC: CPT | Mod: 91

## 2023-02-19 PROCEDURE — 87045 FECES CULTURE AEROBIC BACT: CPT

## 2023-02-21 LAB
E COLI SXT1+2 STL IA: NORMAL
SIGNIFICANT IND 70042: NORMAL
SITE SITE: NORMAL
SOURCE SOURCE: NORMAL

## 2023-02-22 ENCOUNTER — APPOINTMENT (RX ONLY)
Dept: URBAN - METROPOLITAN AREA CLINIC 20 | Facility: CLINIC | Age: 43
Setting detail: DERMATOLOGY
End: 2023-02-22

## 2023-02-22 ENCOUNTER — HOSPITAL ENCOUNTER (OUTPATIENT)
Dept: RADIATION ONCOLOGY | Facility: MEDICAL CENTER | Age: 43
End: 2023-02-22

## 2023-02-22 ENCOUNTER — HOSPITAL ENCOUNTER (OUTPATIENT)
Dept: LAB | Facility: MEDICAL CENTER | Age: 43
End: 2023-02-22
Attending: NURSE PRACTITIONER
Payer: COMMERCIAL

## 2023-02-22 VITALS
OXYGEN SATURATION: 95 % | SYSTOLIC BLOOD PRESSURE: 128 MMHG | RESPIRATION RATE: 22 BRPM | TEMPERATURE: 98.3 F | BODY MASS INDEX: 25.5 KG/M2 | DIASTOLIC BLOOD PRESSURE: 100 MMHG | HEART RATE: 100 BPM | WEIGHT: 158 LBS

## 2023-02-22 DIAGNOSIS — L70.8 OTHER ACNE: ICD-10-CM

## 2023-02-22 DIAGNOSIS — D22 MELANOCYTIC NEVI: ICD-10-CM

## 2023-02-22 DIAGNOSIS — T81.89 OTHER COMPLICATIONS OF PROCEDURES, NOT ELSEWHERE CLASSIFIED: ICD-10-CM

## 2023-02-22 DIAGNOSIS — C79.31 METASTATIC CANCER TO BRAIN (HCC): ICD-10-CM

## 2023-02-22 DIAGNOSIS — D49.2 NEOPLASM OF UNSPECIFIED BEHAVIOR OF BONE, SOFT TISSUE, AND SKIN: ICD-10-CM | Status: RESOLVED

## 2023-02-22 DIAGNOSIS — Z87.2 PERSONAL HISTORY OF DISEASES OF THE SKIN AND SUBCUTANEOUS TISSUE: ICD-10-CM

## 2023-02-22 DIAGNOSIS — L57.8 OTHER SKIN CHANGES DUE TO CHRONIC EXPOSURE TO NONIONIZING RADIATION: ICD-10-CM

## 2023-02-22 DIAGNOSIS — L81.4 OTHER MELANIN HYPERPIGMENTATION: ICD-10-CM

## 2023-02-22 DIAGNOSIS — L40.0 PSORIASIS VULGARIS: ICD-10-CM | Status: INADEQUATELY CONTROLLED

## 2023-02-22 DIAGNOSIS — Z85.820 PERSONAL HISTORY OF MALIGNANT MELANOMA OF SKIN: ICD-10-CM

## 2023-02-22 DIAGNOSIS — D18.0 HEMANGIOMA: ICD-10-CM

## 2023-02-22 DIAGNOSIS — C79.89 SECONDARY MALIGNANT NEOPLASM OF OTHER SPECIFIED SITES: ICD-10-CM

## 2023-02-22 DIAGNOSIS — C79.31 MALIGNANT MELANOMA METASTATIC TO BRAIN (HCC): ICD-10-CM

## 2023-02-22 PROBLEM — D22.4 MELANOCYTIC NEVI OF SCALP AND NECK: Status: ACTIVE | Noted: 2023-02-22

## 2023-02-22 PROBLEM — T81.89XA OTHER COMPLICATIONS OF PROCEDURES, NOT ELSEWHERE CLASSIFIED, INITIAL ENCOUNTER: Status: ACTIVE | Noted: 2023-02-22

## 2023-02-22 PROBLEM — D22.61 MELANOCYTIC NEVI OF RIGHT UPPER LIMB, INCLUDING SHOULDER: Status: ACTIVE | Noted: 2023-02-22

## 2023-02-22 PROBLEM — D22.72 MELANOCYTIC NEVI OF LEFT LOWER LIMB, INCLUDING HIP: Status: ACTIVE | Noted: 2023-02-22

## 2023-02-22 PROBLEM — D22.5 MELANOCYTIC NEVI OF TRUNK: Status: ACTIVE | Noted: 2023-02-22

## 2023-02-22 PROBLEM — D18.01 HEMANGIOMA OF SKIN AND SUBCUTANEOUS TISSUE: Status: ACTIVE | Noted: 2023-02-22

## 2023-02-22 PROBLEM — D48.5 NEOPLASM OF UNCERTAIN BEHAVIOR OF SKIN: Status: ACTIVE | Noted: 2023-02-22

## 2023-02-22 LAB
BACTERIA STL CULT: NORMAL
C JEJUNI+C COLI AG STL QL: NORMAL
E COLI SXT1+2 STL IA: NORMAL
SIGNIFICANT IND 70042: NORMAL
SITE SITE: NORMAL
SOURCE SOURCE: NORMAL

## 2023-02-22 PROCEDURE — 77470 SPECIAL RADIATION TREATMENT: CPT | Performed by: RADIOLOGY

## 2023-02-22 PROCEDURE — ? COUNSELING

## 2023-02-22 PROCEDURE — ? ADDITIONAL NOTES

## 2023-02-22 PROCEDURE — 77290 THER RAD SIMULAJ FIELD CPLX: CPT | Performed by: RADIOLOGY

## 2023-02-22 PROCEDURE — 99213 OFFICE O/P EST LOW 20 MIN: CPT

## 2023-02-22 PROCEDURE — ? OBSERVATION AND MEASURE

## 2023-02-22 PROCEDURE — ? DIAGNOSIS COMMENT

## 2023-02-22 PROCEDURE — 86060 ANTISTREPTOLYSIN O TITER: CPT

## 2023-02-22 PROCEDURE — 77334 RADIATION TREATMENT AID(S): CPT | Mod: 26 | Performed by: RADIOLOGY

## 2023-02-22 PROCEDURE — 99214 OFFICE O/P EST MOD 30 MIN: CPT | Mod: 25 | Performed by: RADIOLOGY

## 2023-02-22 PROCEDURE — 77263 THER RADIOLOGY TX PLNG CPLX: CPT | Performed by: RADIOLOGY

## 2023-02-22 PROCEDURE — 36415 COLL VENOUS BLD VENIPUNCTURE: CPT

## 2023-02-22 PROCEDURE — 77334 RADIATION TREATMENT AID(S): CPT | Performed by: RADIOLOGY

## 2023-02-22 PROCEDURE — 77470 SPECIAL RADIATION TREATMENT: CPT | Mod: 26 | Performed by: RADIOLOGY

## 2023-02-22 PROCEDURE — ? ORDER TESTS

## 2023-02-22 PROCEDURE — 77290 THER RAD SIMULAJ FIELD CPLX: CPT | Mod: 26 | Performed by: RADIOLOGY

## 2023-02-22 PROCEDURE — 99212 OFFICE O/P EST SF 10 MIN: CPT | Mod: 25 | Performed by: RADIOLOGY

## 2023-02-22 RX ORDER — TRAZODONE HYDROCHLORIDE 100 MG/1
100 TABLET ORAL NIGHTLY
COMMUNITY

## 2023-02-22 ASSESSMENT — LOCATION SIMPLE DESCRIPTION DERM
LOCATION SIMPLE: LEFT POSTERIOR UPPER ARM
LOCATION SIMPLE: RIGHT EAR
LOCATION SIMPLE: LEFT UPPER BACK
LOCATION SIMPLE: RIGHT UPPER BACK
LOCATION SIMPLE: RIGHT ELBOW
LOCATION SIMPLE: RIGHT POSTERIOR UPPER ARM
LOCATION SIMPLE: LEFT CHEEK
LOCATION SIMPLE: RIGHT CHEEK
LOCATION SIMPLE: RIGHT HAND
LOCATION SIMPLE: LEFT POSTERIOR THIGH
LOCATION SIMPLE: LEFT ELBOW
LOCATION SIMPLE: RIGHT PLANTAR SURFACE
LOCATION SIMPLE: ABDOMEN
LOCATION SIMPLE: LEFT HAND
LOCATION SIMPLE: LEFT ANKLE
LOCATION SIMPLE: POSTERIOR SCALP
LOCATION SIMPLE: LEFT PLANTAR SURFACE
LOCATION SIMPLE: LEFT LOWER EXTREMITY
LOCATION SIMPLE: RIGHT BUTTOCK
LOCATION SIMPLE: LEFT THIGH
LOCATION SIMPLE: RIGHT CALF
LOCATION SIMPLE: BACK
LOCATION SIMPLE: RIGHT LOWER EXTREMITY

## 2023-02-22 ASSESSMENT — LOCATION ZONE DERM
LOCATION ZONE: ARM
LOCATION ZONE: FEET
LOCATION ZONE: TRUNK
LOCATION ZONE: FACE
LOCATION ZONE: HAND
LOCATION ZONE: EAR
LOCATION ZONE: LEG
LOCATION ZONE: SCALP

## 2023-02-22 ASSESSMENT — LOCATION DETAILED DESCRIPTION DERM
LOCATION DETAILED: LEFT DORSAL HAND
LOCATION DETAILED: RIGHT DISTAL LATERAL CALF
LOCATION DETAILED: LEFT ANTERIOR THIGH
LOCATION DETAILED: RIGHT PROXIMAL POSTERIOR UPPER ARM
LOCATION DETAILED: RIGHT LATERAL ABDOMEN
LOCATION DETAILED: LEFT SUPERIOR UPPER BACK
LOCATION DETAILED: LEFT UPPER BACK
LOCATION DETAILED: RIGHT ANTERIOR THIGH
LOCATION DETAILED: RIGHT MID-UPPER BACK
LOCATION DETAILED: LEFT RADIAL DORSAL HAND
LOCATION DETAILED: RIGHT PLANTAR FOREFOOT OVERLYING 1ST METATARSAL
LOCATION DETAILED: RIGHT INFERIOR UPPER BACK
LOCATION DETAILED: LEFT ANTERIOR DISTAL THIGH
LOCATION DETAILED: LEFT ELBOW
LOCATION DETAILED: RIGHT DORSAL HAND
LOCATION DETAILED: LEFT INFERIOR CENTRAL MALAR CHEEK
LOCATION DETAILED: LEFT MID BACK
LOCATION DETAILED: LEFT DISTAL POSTERIOR UPPER ARM
LOCATION DETAILED: LEFT INFERIOR OCCIPITAL SCALP
LOCATION DETAILED: RIGHT SUPERIOR MEDIAL BUCCAL CHEEK
LOCATION DETAILED: LEFT PLANTAR FOREFOOT OVERLYING 1ST METATARSAL
LOCATION DETAILED: LEFT ANTERIOR PROXIMAL THIGH
LOCATION DETAILED: RIGHT CHEEK
LOCATION DETAILED: RIGHT BUTTOCK
LOCATION DETAILED: LEFT POSTERIOR ANKLE
LOCATION DETAILED: LEFT PROXIMAL POSTERIOR THIGH
LOCATION DETAILED: RIGHT SUPERIOR HELIX
LOCATION DETAILED: RIGHT ELBOW

## 2023-02-22 ASSESSMENT — PAIN SCALES - GENERAL: PAINLEVEL: 4=SLIGHT-MODERATE PAIN

## 2023-02-22 ASSESSMENT — FIBROSIS 4 INDEX: FIB4 SCORE: 0.42

## 2023-02-22 NOTE — PROCEDURE: ADDITIONAL NOTES
Detail Level: Simple
Additional Notes: Will monitor, no subcutaneous nodules noted today
Render Risk Assessment In Note?: no
Additional Notes: currently improved, hold off on any systemic treatment. \\n\\nHistory below:\\n\\nIf worsens will send note to Dr. Caraballo, would like consult on starting spironlactone for hormonal acne, make sure there is no contraindications. 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 Recent retrospective and observational studies do not suggest an increased risk of prostate or breast cancer (Yanet 2016; Nate 2020; Gabibe 2019).\\n\\nPt has been on MInocycline through PCP for 2 years.  Discussed long term SE w/ minocycline.
Additional Notes: Clear no open wounds
Additional Notes: Pt recently was exposed to her son w. + strep throat.  She denies recent sore throat however psoriasis has flared.  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

## 2023-02-22 NOTE — PROGRESS NOTES
Patient was seen today in clinic with Dr. August for follow up.  Vitals signs and weight were obtained and pain assessment was completed.  Allergies and medications were reviewed with the patient.       Vitals/Pain:  Vitals:    02/22/23 1314   BP: (!) 128/100   Pulse: 100   Resp: (!) 22   Temp: 36.8 °C (98.3 °F)   SpO2: 95%   Weight: 71.7 kg (158 lb)   Pain Score: 4=Slight-Moderate Pain        Allergies:   Augmentin [amoxicillin-pot clavulanate]    Current Medications:  Current Outpatient Medications   Medication Sig Dispense Refill    traZODone (DESYREL) 100 MG Tab Take 100 mg by mouth every evening.      ondansetron (ZOFRAN ODT) 4 MG TABLET DISPERSIBLE Take 4 mg by mouth 2 times a day as needed for Nausea.      prochlorperazine (COMPAZINE) 10 MG Tab Take 10 mg by mouth every 6 hours as needed for Nausea.      ibuprofen (MOTRIN) 200 MG Tab Take 600 mg by mouth 2 times a day as needed for Mild Pain or Moderate Pain. 3 tablets = 600 mg.      Cholecalciferol (VITAMIN D3) 2000 UNIT Cap Take 2,000 Units by mouth every morning.      B Complex Vitamins (VITAMIN B COMPLEX) Tab Take 1 Tablet by mouth every morning.      VENTOLIN  (90 Base) MCG/ACT Aero Soln inhalation aerosol Inhale 1-2 Puffs every four hours as needed for Shortness of Breath.      famotidine (PEPCID) 10 MG tablet Take 10 mg by mouth 1 time a day as needed (Heartburn).      meloxicam (MOBIC) 15 MG tablet Take 15 mg by mouth every day.      ferrous sulfate 325 (65 Fe) MG tablet Take 325 mg by mouth every day.      atorvastatin (LIPITOR) 40 MG Tab Take 1 Tablet by mouth every evening. 30 Tablet 2    DULoxetine (CYMBALTA) 60 MG Cap DR Particles delayed-release capsule Take 1 Capsule by mouth every evening. 30 Capsule 2    gabapentin (NEURONTIN) 800 MG tablet Take 1 Tablet by mouth 4 times a day. 90 Tablet 2    Insulin Glargine, 2 Unit Dial, 300 UNIT/ML Solution Pen-injector Inject 50 Units under the skin every day. 9 mL 3    insulin lispro 100 UNIT/ML SC  SOPN injection PEN Inject 2-10 Units under the skin 4 Times a Day,Before Meals and at Bedtime. 9 mL 2    OXcarbazepine (TRILEPTAL) 300 MG Tab Take 1 Tablet by mouth 2 times a day. 60 Tablet 2    QUEtiapine (SEROQUEL) 200 MG Tab Take 1 Tablet by mouth every evening. 60 Tablet 2    acetaminophen (TYLENOL) 500 MG Tab Take 1,000 mg by mouth 2 times a day as needed for Mild Pain or Moderate Pain.      clonazePAM (KLONOPIN) 0.5 MG Tab Take 0.5 mg by mouth 2 times a day as needed (Anxiety).      baclofen (LIORESAL) 10 MG Tab Take 1 Tablet by mouth 3 times a day. 90 Tablet 2     No current facility-administered medications for this encounter.         PCP:  Julianna Slade R.N.    no

## 2023-02-22 NOTE — CT SIMULATION
PATIENT NAME Jacque Mcintyre   PRIMARY PHYSICIAN Adriana Levine [id number]   REFERRING PHYSICIAN Pebbles Tejada M.D. 1980     No matching staging information was found for the patient.       Treatment Planning CT Simulation      Order Questions     Question Answer    Is this for a new course of treatment? Yes    Is this an Addendum? No    Simulation Status Initial    Planned Start Date 3/1/2023    Treatment Site Brain    Laterality Midline    Treatment Technique SRS    Treatment Pattern/Frequency Single Fx    Concurrent Chemotherapy No    CT Technique 3D    Slice Thickness 1mm    Scan Extent Brain    Contrast IV    IV Contrast Volume Other    Volume (cc) 100    Treatment Device(s) SRS Mask    Patient Position Supine    Patient Orientation Head First    Arm Position Up    Treatment Machine No preference    Treatment Image Guidance CBCT    Frequency (CBCT) Daily    Image Guidance Match Bone    Treatment Planning Image Fusion CT/MR    Special Physics Consult Stereotactic    Other Orders Weekly Physics Check     Special Tx Procedure    Release to patient Immediate

## 2023-02-22 NOTE — PROCEDURE: COUNSELING
Detail Level: Detailed
Show Follow-Up Variable: Yes
Quality 224: Stage 0-Iic Melanoma: Overutilization Of Imaging Studies For Only Stage 0-Iic Melanoma: None of the following diagnostic imaging studies ordered: chest X-ray, CT, Ultrasound, MRI, PET, or nuclear medicine scans (ML)
Quality 137: Melanoma: Continuity Of Care - Recall System: Patient information entered into a recall system that includes: target date for the next exam specified AND a process to follow up with patients regarding missed or unscheduled appointments
When Should The Patient Follow-Up For Their Next Full-Body Skin Exam?: 3 Months
Dapsone Counseling: I discussed with the patient the risks of dapsone including but not limited to hemolytic anemia, agranulocytosis, rashes, methemoglobinemia, kidney failure, peripheral neuropathy, headaches, GI upset, and liver toxicity.  Patients who start dapsone require monitoring including baseline LFTs and weekly CBCs for the first month, then every month thereafter.  The patient verbalized understanding of the proper use and possible adverse effects of dapsone.  All of the patient's questions and concerns were addressed.
Erythromycin Counseling:  I discussed with the patient the risks of erythromycin including but not limited to GI upset, allergic reaction, drug rash, diarrhea, increase in liver enzymes, and yeast infections.
Spironolactone Pregnancy And Lactation Text: This medication can cause feminization of the male fetus and should be avoided during pregnancy. The active metabolite is also found in breast milk.
Tazorac Pregnancy And Lactation Text: This medication is not safe during pregnancy. It is unknown if this medication is excreted in breast milk.
High Dose Vitamin A Counseling: Side effects reviewed, pt to contact office should one occur.
Use Enhanced Medication Counseling?: No
Benzoyl Peroxide Pregnancy And Lactation Text: This medication is Pregnancy Category C. It is unknown if benzoyl peroxide is excreted in breast milk.
Bactrim Pregnancy And Lactation Text: This medication is Pregnancy Category D and is known to cause fetal risk.  It is also excreted in breast milk.
Azithromycin Counseling:  I discussed with the patient the risks of azithromycin including but not limited to GI upset, allergic reaction, drug rash, diarrhea, and yeast infections.
Dapsone Pregnancy And Lactation Text: This medication is Pregnancy Category C and is not considered safe during pregnancy or breast feeding.
Sarecycline Counseling: Patient advised regarding possible photosensitivity and discoloration of the teeth, skin, lips, tongue and gums.  Patient instructed to avoid sunlight, if possible.  When exposed to sunlight, patients should wear protective clothing, sunglasses, and sunscreen.  The patient was instructed to call the office immediately if the following severe adverse effects occur:  hearing changes, easy bruising/bleeding, severe headache, or vision changes.  The patient verbalized understanding of the proper use and possible adverse effects of sarecycline.  All of the patient's questions and concerns were addressed.
Topical Sulfur Applications Pregnancy And Lactation Text: This medication is Pregnancy Category C and has an unknown safety profile during pregnancy. It is unknown if this topical medication is excreted in breast milk.
Topical Retinoid counseling:  Patient advised to apply a pea-sized amount only at bedtime and wait 30 minutes after washing their face before applying.  If too drying, patient may add a non-comedogenic moisturizer. The patient verbalized understanding of the proper use and possible adverse effects of retinoids.  All of the patient's questions and concerns were addressed.
Erythromycin Pregnancy And Lactation Text: This medication is Pregnancy Category B and is considered safe during pregnancy. It is also excreted in breast milk.
Tetracycline Counseling: Patient counseled regarding possible photosensitivity and increased risk for sunburn.  Patient instructed to avoid sunlight, if possible.  When exposed to sunlight, patients should wear protective clothing, sunglasses, and sunscreen.  The patient was instructed to call the office immediately if the following severe adverse effects occur:  hearing changes, easy bruising/bleeding, severe headache, or vision changes.  The patient verbalized understanding of the proper use and possible adverse effects of tetracycline.  All of the patient's questions and concerns were addressed. Patient understands to avoid pregnancy while on therapy due to potential birth defects.
Birth Control Pills Counseling: Birth Control Pill Counseling: I discussed with the patient the potential side effects of OCPs including but not limited to increased risk of stroke, heart attack, thrombophlebitis, deep venous thrombosis, hepatic adenomas, breast changes, GI upset, headaches, and depression.  The patient verbalized understanding of the proper use and possible adverse effects of OCPs. All of the patient's questions and concerns were addressed.
High Dose Vitamin A Pregnancy And Lactation Text: High dose vitamin A therapy is contraindicated during pregnancy and breast feeding.
Topical Clindamycin Counseling: Patient counseled that this medication may cause skin irritation or allergic reactions.  In the event of skin irritation, the patient was advised to reduce the amount of the drug applied or use it less frequently.   The patient verbalized understanding of the proper use and possible adverse effects of clindamycin.  All of the patient's questions and concerns were addressed.
Tetracycline Pregnancy And Lactation Text: This medication is Pregnancy Category D and not consider safe during pregnancy. It is also excreted in breast milk.
Azithromycin Pregnancy And Lactation Text: This medication is considered safe during pregnancy and is also secreted in breast milk.
Doxycycline Counseling:  Patient counseled regarding possible photosensitivity and increased risk for sunburn.  Patient instructed to avoid sunlight, if possible.  When exposed to sunlight, patients should wear protective clothing, sunglasses, and sunscreen.  The patient was instructed to call the office immediately if the following severe adverse effects occur:  hearing changes, easy bruising/bleeding, severe headache, or vision changes.  The patient verbalized understanding of the proper use and possible adverse effects of doxycycline.  All of the patient's questions and concerns were addressed.
Isotretinoin Counseling: Patient should get monthly blood tests, not donate blood, not drive at night if vision affected, not share medication, and not undergo elective surgery for 6 months after tx completed. Side effects reviewed, pt to contact office should one occur.
Topical Retinoid Pregnancy And Lactation Text: This medication is Pregnancy Category C. It is unknown if this medication is excreted in breast milk.
Birth Control Pills Pregnancy And Lactation Text: This medication should be avoided if pregnant and for the first 30 days post-partum.
Minocycline Counseling: Patient advised regarding possible photosensitivity and discoloration of the teeth, skin, lips, tongue and gums.  Patient instructed to avoid sunlight, if possible.  When exposed to sunlight, patients should wear protective clothing, sunglasses, and sunscreen.  The patient was instructed to call the office immediately if the following severe adverse effects occur:  hearing changes, easy bruising/bleeding, severe headache, or vision changes.  The patient verbalized understanding of the proper use and possible adverse effects of minocycline.  All of the patient's questions and concerns were addressed.
Detail Level: Zone
Topical Clindamycin Pregnancy And Lactation Text: This medication is Pregnancy Category B and is considered safe during pregnancy. It is unknown if it is excreted in breast milk.
Doxycycline Pregnancy And Lactation Text: This medication is Pregnancy Category D and not consider safe during pregnancy. It is also excreted in breast milk but is considered safe for shorter treatment courses.
Spironolactone Counseling: Patient advised regarding risks of diarrhea, abdominal pain, hyperkalemia, birth defects (for female patients), liver toxicity and renal toxicity. The patient may need blood work to monitor liver and kidney function and potassium levels while on therapy. The patient verbalized understanding of the proper use and possible adverse effects of spironolactone.  All of the patient's questions and concerns were addressed.
Benzoyl Peroxide Counseling: Patient counseled that medicine may cause skin irritation and bleach clothing.  In the event of skin irritation, the patient was advised to reduce the amount of the drug applied or use it less frequently.   The patient verbalized understanding of the proper use and possible adverse effects of benzoyl peroxide.  All of the patient's questions and concerns were addressed.
Bactrim Counseling:  I discussed with the patient the risks of sulfa antibiotics including but not limited to GI upset, allergic reaction, drug rash, diarrhea, dizziness, photosensitivity, and yeast infections.  Rarely, more serious reactions can occur including but not limited to aplastic anemia, agranulocytosis, methemoglobinemia, blood dyscrasias, liver or kidney failure, lung infiltrates or desquamative/blistering drug rashes.
Isotretinoin Pregnancy And Lactation Text: This medication is Pregnancy Category X and is considered extremely dangerous during pregnancy. It is unknown if it is excreted in breast milk.
Topical Sulfur Applications Counseling: Topical Sulfur Counseling: Patient counseled that this medication may cause skin irritation or allergic reactions.  In the event of skin irritation, the patient was advised to reduce the amount of the drug applied or use it less frequently.   The patient verbalized understanding of the proper use and possible adverse effects of topical sulfur application.  All of the patient's questions and concerns were addressed.
Tazorac Counseling:  Patient advised that medication is irritating and drying.  Patient may need to apply sparingly and wash off after an hour before eventually leaving it on overnight.  The patient verbalized understanding of the proper use and possible adverse effects of tazorac.  All of the patient's questions and concerns were addressed.

## 2023-02-22 NOTE — PROCEDURE: ORDER TESTS
Performing Laboratory: 0
Billing Type: Third-Party Bill
Bill For Surgical Tray: no
Expected Date Of Service: 02/22/2023

## 2023-02-22 NOTE — RADIATION PLANNING NOTES
Clinical Treatment Planning Note    DATE OF SERVICE: 2/22/2023    DIAGNOSIS:Metastatic melanoma, stage IV        IMAGING REVIEWED:  [x] CT     [x] MRI     [] PET/CT     [] BONE SCAN     [] MAMMO     [] OTHER      TREATMENT INTENT:   [] CURATIVE     [] MAINTENANCE     [x]  PALLIATIVE      []  SUPPORTIVE     []  PROPHYLACTIC     [] BENIGN     []  CONSOLIDATIVE      [] DEFINITIVE   []  OLOGIMETASTATIC      LINE OF TREATMENT:  [] ADJUVANT   [x] DEFINITIVE   [] NEOADJUVANT   [] RE-TREATMENT      TECHNIQUE PLANNED:  [] IMRT   [] 3D   [] SBRT   [x] SRS/SRT   [] HDR   [] ELECTRON       IMRT JUSTIFICATION:  []   An immediately adjacent area has been previously irradiated and abutting portals must be established with high precision.    []  Dose escalation is planned to deliver radiation doses in excess of those commonly utilized for similar tumors with conventional treatment.    []  The target volume is concave or convex, and the critical normal tissues are within or around that convexity or concavity.    []  The target volume is in close proximity to critical structures that must be protected.    []  The volume of interest must be covered with narrow margins to adequately protect  immediately adjacent structures.      FIELDS & BLOCKING:  [x] COMPLEX BLOCKS     []  = 3 TX AREAS     []  ARCS     []  CUSTOM SHEILD        []  SIMPLE BLOCK      CHEMOTHERAPY:  []  CONCURRENT     []  INDUCTION     [] SEQUENTIAL     []  <30 DAYS FROM XRT      NOTES:  OAR CONSTRAINTS: (GUIDELINES ONLY NOT ABSOLUTE)    Target Prescribed Coverage   PTV 95% of PTV covered by 100% (Gy) of RX Dose       TRICIA Goal   Cord Max Dose < 13Gy   Brainstem Max Dose < 12.5Gy   Optic Chiasm Max Dose < 12Gy   R Optic Nerve Max Dose < 12Gy   L Optic Nerve Max Dose < 12Gy   R Eye Max Dose < 5-7Gy   L Eye Max Dose < 5-7Gy   R Lens Max Dose < 5-7Gy   L Lens Max Dose < 5-7Gy

## 2023-02-22 NOTE — RADIATION PLANNING NOTES
PATIENT NAME Jacque Mcintyre   PRIMARY PHYSICIAN Adriana Levine [id number]   REFERRING PHYSICIAN No ref. provider found 1980     DATE OF SERVICE: 2/22/2023    DIAGNOSIS:Metastatic melanoma, stage IV        SPECIAL TREATMENT PROCEDURE NOTE:  Considerable additional effort required in the management of this case because of administration of Stereotactic Radiotherapy, which may result in increased normal tissue toxicity and require greater effort in contouring and treatment because of greater precision.

## 2023-02-22 NOTE — PROCEDURE: DIAGNOSIS COMMENT
Render Risk Assessment In Note?: no
Comment: 5/31/22; per pt PET showed intense uptake to left hilum concern for metastasis.\\nPt had biopsy to lymph per pt no sign of cancer.\\n\\n-10/2022; 5 masses, largest 3.4cm x 3 cm\\n\\n-10/2022; CT-CAP, Several nodules in lung, thyroid nodule, abdominal/peritoneal  with several nodules, and questionable new lesion kidney possible cyst.\\n\\n-10/2022; Went to ER for stroke like symptoms, CT/MRI showed multiple locations (brain, bilateral lung, thyroid, kidney, peritoneal) masses consistent with metastatic melanoma.  Managed by Dr. Caraballo, treatment radiation, Yervoy & Opdivo.
Detail Level: Simple
Comment: Excised in 2016; 2.8mm, re-excised 2017 (1+ node,18 neg), tx w/ Ileana [id number], Lelo 2B // no metastasis from [id number]\\n\\n-Refer to metastasis note 2022
Comment: Biopsy proven; all sites excised\\nRight buttock concern early evolving MIS
Comment: Biopsy proven mild to moderate 10/2017; monitor for repigmentation
Comment: Biopsy x 2; [id number], proven benign nevi, closely monitor

## 2023-02-22 NOTE — RADIATION PLANNING NOTES
DATE OF SERVICE: 2/22/2023    DIAGNOSIS: Metastatic melanoma, stage IV    DATE OF SERVICE: 2/22/2023    TYPE OF SIMULATION: Brain    GOAL OF TREATMENT:   [] Curative  [x] Palliative  [] Oligometastatic    CONTRAST:    [x] IV Contrast*                POSITION:    [x]  Supine  [] Prone     COMPLEX:  [x] Complex Blocking   []Arcs  [] Custom Blocks  [] >3 Sites    PROCEDURE: Patient placed Stereotactic Mask with head in neutral position. CT scan obtained at 1 mm intervals. Images viewed and approved.  Images reconstructed as need.  Image data sets transferred to Brain-Lab for planning.    I have personally reviewed the relevant data, performed the target localization, and determined all relevant factors for this patient’s simulation.    *Omnipaque 80 -100cc IVP in conjunction with 500cc NS

## 2023-02-22 NOTE — PROGRESS NOTES
RADIATION ONCOLOGY FOLLOW-UP    Patient name:  Jacque Mcintyre    Primary Physician:  Adriana Levine M.D. MRN: [id number]  [facility]: [id number]   Referring physician:  Pebbles Tejada M.D.  : 1980, 42 y.o.     DATE OF SERVICE: 2023    IDENTIFICATION:   A 42 y.o. female with recurrent metastatic malignant melanoma who comes for consideration of additional stereotactic radiosurgery.    RADIATION SUMMARY:  Radiation Oncology          2022   Aria Course Treatment Dates   Course First Treatment Date 2022     Course Last Treatment Date 2022     Aria Treatment Summary   SRT R Pariet  Plan from Course C2 Brain SRS   Fraction 2 of 3 3 of 3    3 of 3   Elapsed Course Days 2 @ [id number] 4 @ [id number]    4 @ [id number]   Prescribed Fraction Dose 900 cGy 900 cGy    900 cGy   Prescribed Total Dose 2,700 cGy 2,700 cGy    2,700 cGy   SRT R Pariet  Reference Point from Course C2 Brain SRS   Elapsed Course Days 2 @ [id number] 4 @ [id number]    4 @ [id number]   Session Dose 900 cGy 900 cGy    --   Total Dose 1,800 cGy 2,700 cGy    2,700 cGy   SRT R Pariet CP  Reference Point from Course C2 Brain SRS   Elapsed Course Days 2 @ [id number] 4 @ [id number]    4 @ [id number]   Session Dose 1,042 cGy 1,042 cGy    --   Total Dose 2,083 cGy 3,125 cGy    3,125 cGy       More values are hidden. Newest values shown. Go to activity for more data.        HISTORY OF PRESENT ILLNESS:  Subjective        This is a 42-year-old lady with metastatic BRAF mutant malignant melanoma, stage IV, with multiple brain lesions as well as systemic disease, who had seen approximately 3 months ago initially for consideration of radiation therapy.  At that time, she underwent craniotomy and resection of her larger dominant lesions, and was treated with postoperative fractionated stereotactic radiation.  Thereafter, she was discovered to have several additional lesions and  completed a second round of SRS as below.  She now continues on immunotherapy with medical oncology.    02/02- We are having a telephone follow-up shortly after completion of her recent course of stereotactic radiation.  She is doing tremendously well.  She is on immunotherapy now, and continues on Nivo/Ipi.  She feels quite well, has no major sensory or motor problems, no headaches, nausea or vomiting, or systemic complaints.  A follow-up MRI and CT chest abdomen pelvis has been ordered by medical oncology to be done in the next few weeks.  Her energy level is good.  Her hair has slowly grown back.  Overall, she feels quite good.    INTERVAL HISTORY:  2/22/23 she continues to do relatively well.  She continues on her immunotherapy which she is tolerating without any major issues.  She had a follow-up MRI and a CT chest abdomen pelvis completed last week.  The systemic scan shows an excellent response in most of her disease is responding or stable.  However her brain MRI shows one small new 6 mm lesion that is definitively new from her prior MRI.  The remainder of her previously treated disease appears to have responded well.  She now comes to discuss stereotactic radiosurgery for this new ischemic lesion.  Symptomatically, she is doing well, she has regained nearly complete neurologic function, she is walking, she has no fine motor problems, no problems with balance, no headaches, dizziness, nausea, vomiting.    PROBLEM LIST:  Patient Active Problem List   Diagnosis    Asthma    Depression    Nausea & vomiting    Sepsis (HCC)    UTI (urinary tract infection)    Lactic acidosis    Anxiety    Diarrhea    Melanoma of left upper arm (HCC)    Bipolar 1 disorder (HCC)    DM2 (diabetes mellitus, type 2) (HCC)    Appendicitis    Hypokalemia    Essential hypertension    Gastroenteritis    Abnormal CT scan, chest    Cellulitis    Leucocytosis    Malignant melanoma metastatic to brain (HCC)    Brain mass    Chronic  prescription benzodiazepine use    Constipation    Adrenal insufficiency due to corticosteroid withdrawal (HCC)    Metastatic cancer to brain (HCC)    Vitamin D deficiency    Dyslipidemia    Normocytic anemia    Borderline abnormal TFTs    Thrombocytosis    Dizziness on standing    Diabetic neuropathy (HCC)    Facial paralysis/[place] palsy       CURRENT MEDICATIONS:  Current Outpatient Medications   Medication Sig Dispense Refill    traZODone (DESYREL) 100 MG Tab Take 100 mg by mouth every evening.      ondansetron (ZOFRAN ODT) 4 MG TABLET DISPERSIBLE Take 4 mg by mouth 2 times a day as needed for Nausea.      prochlorperazine (COMPAZINE) 10 MG Tab Take 10 mg by mouth every 6 hours as needed for Nausea.      ibuprofen (MOTRIN) 200 MG Tab Take 600 mg by mouth 2 times a day as needed for Mild Pain or Moderate Pain. 3 tablets = 600 mg.      Cholecalciferol (VITAMIN D3) 2000 UNIT Cap Take 2,000 Units by mouth every morning.      B Complex Vitamins (VITAMIN B COMPLEX) Tab Take 1 Tablet by mouth every morning.      VENTOLIN  (90 Base) MCG/ACT Aero Soln inhalation aerosol Inhale 1-2 Puffs every four hours as needed for Shortness of Breath.      famotidine (PEPCID) 10 MG tablet Take 10 mg by mouth 1 time a day as needed (Heartburn).      meloxicam (MOBIC) 15 MG tablet Take 15 mg by mouth every day.      ferrous sulfate 325 (65 Fe) MG tablet Take 325 mg by mouth every day.      atorvastatin (LIPITOR) 40 MG Tab Take 1 Tablet by mouth every evening. 30 Tablet 2    DULoxetine (CYMBALTA) 60 MG Cap DR Particles delayed-release capsule Take 1 Capsule by mouth every evening. 30 Capsule 2    gabapentin (NEURONTIN) 800 MG tablet Take 1 Tablet by mouth 4 times a day. 90 Tablet 2    Insulin Glargine, 2 Unit Dial, 300 UNIT/ML Solution Pen-injector Inject 50 Units under the skin every day. 9 mL 3    insulin lispro 100 UNIT/ML SC SOPN injection PEN Inject 2-10 Units under the skin 4 Times a Day,Before Meals and at Bedtime. 9 mL 2     OXcarbazepine (TRILEPTAL) 300 MG Tab Take 1 Tablet by mouth 2 times a day. 60 Tablet 2    QUEtiapine (SEROQUEL) 200 MG Tab Take 1 Tablet by mouth every evening. 60 Tablet 2    acetaminophen (TYLENOL) 500 MG Tab Take 1,000 mg by mouth 2 times a day as needed for Mild Pain or Moderate Pain.      clonazePAM (KLONOPIN) 0.5 MG Tab Take 0.5 mg by mouth 2 times a day as needed (Anxiety).      baclofen (LIORESAL) 10 MG Tab Take 1 Tablet by mouth 3 times a day. 90 Tablet 2     No current facility-administered medications for this encounter.       ALLERGIES:  Augmentin [amoxicillin-pot clavulanate]    REVIEW OF SYSTEMS:    A complete review of system taken. Pertinent items in HPI.  All others negative.    PHYSICAL EXAM:  PERFORMANCE STATUS:       View : No data to display.                   View : No data to display.              BP (!) 128/100   Pulse 100   Temp 36.8 °C (98.3 °F)   Resp (!) 22   Wt 71.7 kg (158 lb)   SpO2 95%   BMI 25.50 kg/m²   Physical Exam  Constitutional:       Appearance: Normal appearance.   HENT:      Head: Normocephalic and atraumatic.   Eyes:      Extraocular Movements: Extraocular movements intact.      Conjunctiva/sclera: Conjunctivae normal.   Cardiovascular:      Rate and Rhythm: Normal rate and regular rhythm.   Pulmonary:      Effort: Pulmonary effort is normal.      Breath sounds: Normal breath sounds.   Abdominal:      General: Abdomen is flat.      Palpations: Abdomen is soft.   Neurological:      General: No focal deficit present.      Mental Status: She is alert and oriented to person, place, and time.      Cranial Nerves: No cranial nerve deficit.      Sensory: No sensory deficit.      Motor: No weakness.      Coordination: Coordination normal.       LABORATORY DATA:   Lab Results   Component Value Date/Time    WBC 7.1 02/16/2023 09:18 AM    RBC 5.19 02/16/2023 09:18 AM    HEMOGLOBIN 14.2 02/16/2023 09:18 AM    HEMATOCRIT 45.5 02/16/2023 09:18 AM    MCV 87.7 02/16/2023 09:18 AM     MCH 27.4 02/16/2023 09:18 AM    MCHC 31.2 (L) 02/16/2023 09:18 AM    RDW 51.2 (H) 02/16/2023 09:18 AM    PLATELETCT 328 02/16/2023 09:18 AM    MPV 10.5 02/16/2023 09:18 AM    NEUTSPOLYS 67.20 02/16/2023 09:18 AM    LYMPHOCYTES 18.10 (L) 02/16/2023 09:18 AM    MONOCYTES 8.20 02/16/2023 09:18 AM    EOSINOPHILS 5.20 02/16/2023 09:18 AM    BASOPHILS 0.70 02/16/2023 09:18 AM    HYPOCHROMIA 1+ 12/11/2022 06:12 AM    ANISOCYTOSIS 1+ 12/11/2022 06:12 AM      Lab Results   Component Value Date/Time    SODIUM 139 02/16/2023 03:20 PM    POTASSIUM 4.4 02/16/2023 03:20 PM    CHLORIDE 106 02/16/2023 03:20 PM    CO2 21 02/16/2023 03:20 PM    GLUCOSE 183 (H) 02/16/2023 03:20 PM    BUN 18 02/16/2023 03:20 PM    CREATININE 0.65 02/16/2023 03:20 PM         RADIOLOGY DATA:  CT-CHEST,ABDOMEN,PELVIS WITH    Result Date: 2/12/2023  1.  Positive response to therapy. 2.  Pulmonary, left hilar, left external iliac and right peritoneal metastases are all smaller. Some of the pulmonary metastases have resolved. 3.  Stable soft tissue nodule in the left abdominal wall. 4.  No new metastatic disease.    MR-BRAIN-WITH & W/O    Result Date: 2/13/2023  1.  There is an approximately 6 mm sized enhancing hemorrhagic lesion in the left parietal lobe. This is new since the previous study. 2.  There is punctate enhancing lesion in the left sylvian region. This is reduced since the previous study. 3.  There are chronic hemorrhagic areas in the right frontal and temporal lobes. There is no abnormal contrast enhancement. These lesions likely represent treated metastasis. 4.  Mild cerebral volume loss.      IMPRESSION:    A 42 y.o. with recurrent metastatic malignant melanoma with involvement of lung, soft tissue and brain who continues on immunotherapy with good systemic response with a small 6 mm CNS lesion who comes to discuss SRS.    CANCER STATUS:  Mixed Response    RECOMMENDATIONS:   At this point, given that her systemic disease is well controlled  but she has 1 new brain lesion, it is certainly reasonable to proceed with stereotactic radiosurgery for the small 6 mm lesion.  It has essentially grown through her immunotherapy but her current therapy regimen is working well for her otherwise overall, so therefore we will proceed with definitive SRS to this new lesion.  I again reviewed with her the logistics and set up of daily stereotactic radiosurgery, likely single fraction SRS to this new lesion, and again reviewed the acute and long-term toxicities as well as benefits of this approach.  At some point, we will run into complications with the amount of brain volume that we have treated, but given that her disease is relatively still low volume in the brain, we can still continue to proceed with SRS at this point without major adverse risk.  I did review with her the risk of radiation necrosis that would require steroid treatment, with a complication that given that she is on immunotherapy this would be less than ideal given that immunotherapy is working well for her.  She understands at risk relatively well, and wishes to proceed and radiation simulation will be scheduled for later today, likely with single fraction SRS to follow approximately 1 week from now.    Thank you for the opportunity to participate in her care.  If any questions or comments, please do not hesitate in calling.    Orders Placed This Encounter    Rad Onc Treatment Planning CT Simulation    traZODone (DESYREL) 100 MG Tab

## 2023-02-24 LAB — ASO AB SERPL-ACNC: <55 IU/ML (ref 0–330)

## 2023-02-27 PROCEDURE — 77334 RADIATION TREATMENT AID(S): CPT | Performed by: RADIOLOGY

## 2023-02-27 PROCEDURE — 77295 3-D RADIOTHERAPY PLAN: CPT | Mod: 26 | Performed by: RADIOLOGY

## 2023-02-27 PROCEDURE — 77295 3-D RADIOTHERAPY PLAN: CPT | Performed by: RADIOLOGY

## 2023-02-27 PROCEDURE — 77334 RADIATION TREATMENT AID(S): CPT | Mod: 26 | Performed by: RADIOLOGY

## 2023-02-27 PROCEDURE — 77300 RADIATION THERAPY DOSE PLAN: CPT | Mod: 26 | Performed by: RADIOLOGY

## 2023-02-27 PROCEDURE — 77300 RADIATION THERAPY DOSE PLAN: CPT | Performed by: RADIOLOGY

## 2023-02-28 PROCEDURE — 77370 RADIATION PHYSICS CONSULT: CPT | Performed by: RADIOLOGY

## 2023-03-01 ENCOUNTER — HOSPITAL ENCOUNTER (OUTPATIENT)
Dept: RADIATION ONCOLOGY | Facility: MEDICAL CENTER | Age: 43
End: 2023-03-31
Attending: RADIOLOGY
Payer: COMMERCIAL

## 2023-03-01 ENCOUNTER — HOSPITAL ENCOUNTER (OUTPATIENT)
Dept: RADIATION ONCOLOGY | Facility: MEDICAL CENTER | Age: 43
End: 2023-03-01

## 2023-03-01 LAB
CHEMOTHERAPY INFUSION START DATE: NORMAL
CHEMOTHERAPY INFUSION STOP DATE: NORMAL
CHEMOTHERAPY INFUSION STOP DATE: NORMAL
CHEMOTHERAPY RECORDS: 22
CHEMOTHERAPY RECORDS: 22
CHEMOTHERAPY RECORDS: 2200
CHEMOTHERAPY RECORDS: 2200
CHEMOTHERAPY RECORDS: 2700
CHEMOTHERAPY RECORDS: 9
CHEMOTHERAPY RECORDS: NORMAL
CHEMOTHERAPY RX CANCER: NORMAL
DATE 1ST CHEMO CANCER: NORMAL
RAD ONC ARIA COURSE LAST TREATMENT DATE: NORMAL
RAD ONC ARIA COURSE TREATMENT ELAPSED DAYS: NORMAL
RAD ONC ARIA REFERENCE POINT DOSAGE GIVEN TO DATE: 22
RAD ONC ARIA REFERENCE POINT DOSAGE GIVEN TO DATE: 22
RAD ONC ARIA REFERENCE POINT DOSAGE GIVEN TO DATE: 26.19
RAD ONC ARIA REFERENCE POINT DOSAGE GIVEN TO DATE: 26.19
RAD ONC ARIA REFERENCE POINT DOSAGE GIVEN TO DATE: 27
RAD ONC ARIA REFERENCE POINT DOSAGE GIVEN TO DATE: 31.25
RAD ONC ARIA REFERENCE POINT ID: NORMAL
RAD ONC ARIA REFERENCE POINT SESSION DOSAGE GIVEN: 22
RAD ONC ARIA REFERENCE POINT SESSION DOSAGE GIVEN: 26.19

## 2023-03-01 PROCEDURE — 77432 STEREOTACTIC RADIATION TRMT: CPT | Performed by: RADIOLOGY

## 2023-03-01 PROCEDURE — 77280 THER RAD SIMULAJ FIELD SMPL: CPT | Mod: 26 | Performed by: RADIOLOGY

## 2023-03-01 PROCEDURE — 77336 RADIATION PHYSICS CONSULT: CPT | Performed by: RADIOLOGY

## 2023-03-01 PROCEDURE — 77280 THER RAD SIMULAJ FIELD SMPL: CPT | Performed by: RADIOLOGY

## 2023-03-01 PROCEDURE — 77372 SRS LINEAR BASED: CPT | Performed by: RADIOLOGY

## 2023-03-01 NOTE — PROCEDURES
DATE OF SERVICE: 3/1/2023    DIAGNOSIS:  No matching staging information was found for the patient.     TREATMENT:  Radiation Therapy Episodes       Active Episodes       Radiation Therapy: SRS (3/1/2023)    Radiation Therapy: SRS (12/19/2022)    Radiation Therapy: SRS (10/31/2022)                   Radiation Treatments         Plan Last Treated On Elapsed Days Fractions Treated Prescribed Fraction Dose (cGy) Prescribed Total Dose (cGy)    1 Lesion SRS 3/1/2023 0 @ [id number] 1 of 1 2,200 2,200                  Reference Point Last Treated On Elapsed Days Most Recent Session Dose (cGy) Total Dose (cGy)    1 Lesion SRS 3/1/2023 0 @ [id number] 2,200 2,200    1 Lesion SRS CP 3/1/2023 0 @ [id number] 2,619 2,619                      Historical Treatments (Plans: 3)      Plan Last Treated On Elapsed Days Fractions Treated Prescribed Fraction Dose (cGy) Prescribed Total Dose (cGy)   5 Lesion SRT 11/9/2022 6 @ [id number] 5 of 5 600 3,000   SRT R Pariet 12/23/2022 4 @ [id number] 3 of 3 900 2,700   5 Lesion AAR 12/16/2022  @  0 of 5 -- --                Reference Point Last Treated On Elapsed Days Most Recent Session Dose (cGy) Total Dose (cGy)   5 Lesion SRT 11/9/2022 6 @ [id number] -- 3,000   5 Lesion SRT CP 11/9/2022 6 @ [id number] -- 3,190   SRT R Pariet 12/23/2022 4 @ [id number] -- 2,700   SRT R Pariet CP 12/23/2022 4 @ [id number] -- 3,125   5 Lesion SRT 12/16/2022  @  -- 0   5 Lesion SRT CP 12/16/2022  @  -- 0                            STEREOTACTIC PROCEDURE NOTE:    Called by [employer] machine to verify treatment parameters including:  treatment site, treatment dose, and treatment setup prior to stereotactic treatment..    Patient was placed in the treatment position with use of immobilization device and  laser guidance. CBCT images were acquired for target localization.  Images were reviewed in the axial, coronal, and saggital views and shifts were made as necessary to ensure that patient position  matched simulation position.      Treatment delivered per  prescription.  The medical physicist was present throughout the set-up, verification and treatment delivery to oversee the procedure and ensure all parameters agreed with the computerized plan.    I have personally reviewed the relevant data, performed the target localization, and determined all relevant factors for this patient’s simulation.

## 2023-03-01 NOTE — ADDENDUM NOTE
Encounter addended by: Mari Sparrow, Med Ass't on: 3/1/2023 1:51 PM   Actions taken: Pend clinical note

## 2023-03-01 NOTE — RADIATION COMPLETION NOTES
END OF TREATMENT SUMMARY    Patient name:  Jacque Mcintyre    Primary Physician:  Adriana Levine M.D. MRN: [id number]  CSN: [id number]   Referring physician:  Dr. Tejada : 1980, 42 y.o.       TREATMENT SUMMARY:        Course First Treatment Date 2023    Course Last Treatment Date 2023   Course Elapsed Days 0 @ [id number]   Course Intent Palliative     Radiation Therapy Episodes       Active Episodes       Radiation Therapy: SRS (3/1/2023)    Radiation Therapy: SRS (2022)    Radiation Therapy: SRS (10/31/2022)                   Radiation Treatments         Plan Last Treated On Elapsed Days Fractions Treated Prescribed Fraction Dose (cGy) Prescribed Total Dose (cGy)    1 Lesion SRS 3/1/2023 0 @ [id number] 1 of 1 2,200 2,200                  Reference Point Last Treated On Elapsed Days Most Recent Session Dose (cGy) Total Dose (cGy)    1 Lesion SRS 3/1/2023 0 @ [id number] 2,200 2,200    1 Lesion SRS CP 3/1/2023 0 @ [id number] 2,619 2,619                      Historical Treatments (Plans: 3)      Plan Last Treated On Elapsed Days Fractions Treated Prescribed Fraction Dose (cGy) Prescribed Total Dose (cGy)   5 Lesion SRT 2022 6 @ [id number] 5 of 5 600 3,000   SRT R Pariet 2022 4 @ [id number] 3 of 3 900 2,700   5 Lesion [facility] 2022  @  0 of 5 -- --                Reference Point Last Treated On Elapsed Days Most Recent Session Dose (cGy) Total Dose (cGy)   5 Lesion SRT 2022 6 @ [id number] -- 3,000   5 Lesion SRT CP 2022 6 @ [id number] -- 3,190   SRT R Pariet 2022 4 @ [id number] -- 2,700   SRT R Pariet CP 2022 4 @ [id number] -- 3,125   5 Lesion SRT 2022  @  -- 0   5 Lesion SRT CP 2022  @  -- 0                                     STAGE: Recurrent metastatic melanoma, stage IV     TREATMENT INDICATION:   Palliative SRS for progressive brain lesions     CONCURRENT SYSTEMIC TREATMENT:   Concurrent immunotherapy     RT  COURSE DISCONTINUED EARLY:   No     PATIENT EXPERIENCE:       12/21/2022     1:07 PM 11/9/2022    10:59 AM   Toxicity Assessment   Toxicity Assessment Brain Brain   Fatigue (lethargy, malaise, asthenia) Increased fatigue over baseline, but not altering normal activities Increased fatigue over baseline, but not altering normal activities   Radiation Dermatitis None Faint erythema or dry desquamation   Nausea None None   Mouth Dryness Normal    Headache None None   External Auditory Canal Normal Normal   Inner Ear/Hearing Normal    Middle Ear/Hearing Normal Normal   Dizziness/lightheadedness None None   Memory loss Normal Normal   Neuropathy - Motor Normal Normal   Seizure None None   Vertigo None None        FOLLOW-UP PLAN:   8 Week virtual visit     COMMENT:          ANATOMIC TARGET SUMMARY    ANATOMIC TARGET MODALITY TECHNIQUE   Posterior left parietal lesion   External beam, photons SRS            COMMENT:         DIAGRAMS:          DOSE VOLUME HISTOGRAMS:

## 2023-03-14 ENCOUNTER — HOSPITAL ENCOUNTER (OUTPATIENT)
Dept: LAB | Facility: MEDICAL CENTER | Age: 43
End: 2023-03-14
Attending: NURSE PRACTITIONER
Payer: COMMERCIAL

## 2023-03-14 LAB
ALBUMIN SERPL BCP-MCNC: 4.5 G/DL (ref 3.2–4.9)
ALBUMIN/GLOB SERPL: 2 G/DL
ALP SERPL-CCNC: 108 U/L (ref 30–99)
ALT SERPL-CCNC: 10 U/L (ref 2–50)
ANION GAP SERPL CALC-SCNC: 11 MMOL/L (ref 7–16)
AST SERPL-CCNC: 10 U/L (ref 12–45)
BILIRUB SERPL-MCNC: 0.2 MG/DL (ref 0.1–1.5)
BUN SERPL-MCNC: 19 MG/DL (ref 8–22)
CALCIUM ALBUM COR SERPL-MCNC: 9.3 MG/DL (ref 8.5–10.5)
CALCIUM SERPL-MCNC: 9.7 MG/DL (ref 8.5–10.5)
CHLORIDE SERPL-SCNC: 105 MMOL/L (ref 96–112)
CO2 SERPL-SCNC: 26 MMOL/L (ref 20–33)
CREAT SERPL-MCNC: 0.58 MG/DL (ref 0.5–1.4)
GFR SERPLBLD CREATININE-BSD FMLA CKD-EPI: 114 ML/MIN/1.73 M 2
GLOBULIN SER CALC-MCNC: 2.3 G/DL (ref 1.9–3.5)
GLUCOSE SERPL-MCNC: 107 MG/DL (ref 65–99)
POTASSIUM SERPL-SCNC: 4.3 MMOL/L (ref 3.6–5.5)
PROT SERPL-MCNC: 6.8 G/DL (ref 6–8.2)
SODIUM SERPL-SCNC: 142 MMOL/L (ref 135–145)

## 2023-03-14 PROCEDURE — 84443 ASSAY THYROID STIM HORMONE: CPT

## 2023-03-14 PROCEDURE — 36415 COLL VENOUS BLD VENIPUNCTURE: CPT

## 2023-03-14 PROCEDURE — 80053 COMPREHEN METABOLIC PANEL: CPT

## 2023-03-15 LAB — TSH SERPL DL<=0.005 MIU/L-ACNC: 0.73 UIU/ML (ref 0.38–5.33)

## 2023-04-11 ENCOUNTER — HOSPITAL ENCOUNTER (OUTPATIENT)
Dept: LAB | Facility: MEDICAL CENTER | Age: 43
End: 2023-04-11
Attending: NURSE PRACTITIONER
Payer: COMMERCIAL

## 2023-04-11 LAB
ALBUMIN SERPL BCP-MCNC: 4.5 G/DL (ref 3.2–4.9)
ALBUMIN/GLOB SERPL: 1.6 G/DL
ALP SERPL-CCNC: 112 U/L (ref 30–99)
ALT SERPL-CCNC: 15 U/L (ref 2–50)
ANION GAP SERPL CALC-SCNC: 13 MMOL/L (ref 7–16)
AST SERPL-CCNC: 12 U/L (ref 12–45)
BILIRUB SERPL-MCNC: 0.3 MG/DL (ref 0.1–1.5)
BUN SERPL-MCNC: 13 MG/DL (ref 8–22)
CALCIUM ALBUM COR SERPL-MCNC: 8.7 MG/DL (ref 8.5–10.5)
CALCIUM SERPL-MCNC: 9.1 MG/DL (ref 8.5–10.5)
CHLORIDE SERPL-SCNC: 103 MMOL/L (ref 96–112)
CO2 SERPL-SCNC: 24 MMOL/L (ref 20–33)
CREAT SERPL-MCNC: 0.51 MG/DL (ref 0.5–1.4)
GFR SERPLBLD CREATININE-BSD FMLA CKD-EPI: 118 ML/MIN/1.73 M 2
GLOBULIN SER CALC-MCNC: 2.8 G/DL (ref 1.9–3.5)
GLUCOSE SERPL-MCNC: 179 MG/DL (ref 65–99)
POTASSIUM SERPL-SCNC: 4.3 MMOL/L (ref 3.6–5.5)
PROT SERPL-MCNC: 7.3 G/DL (ref 6–8.2)
SODIUM SERPL-SCNC: 140 MMOL/L (ref 135–145)

## 2023-04-11 PROCEDURE — 84443 ASSAY THYROID STIM HORMONE: CPT

## 2023-04-11 PROCEDURE — 80053 COMPREHEN METABOLIC PANEL: CPT

## 2023-04-11 PROCEDURE — 36415 COLL VENOUS BLD VENIPUNCTURE: CPT

## 2023-04-12 LAB — TSH SERPL DL<=0.005 MIU/L-ACNC: 0.72 UIU/ML (ref 0.38–5.33)

## 2023-04-14 ASSESSMENT — LIFESTYLE VARIABLES
SMOKING_YEARS: 12
TOBACCO_USE: YES
SMOKING_STATUS: YES

## 2023-04-17 ENCOUNTER — HOSPITAL ENCOUNTER (OUTPATIENT)
Dept: RADIATION ONCOLOGY | Facility: MEDICAL CENTER | Age: 43
End: 2023-04-30
Attending: RADIOLOGY
Payer: COMMERCIAL

## 2023-04-17 NOTE — PROGRESS NOTES
This visit is being conducted by telephone. This telephone visit was initiated by the patient and they verbally consented.  Patient at home in [facility].      Reason for Call: Post treatment follow-up    Treatment History:     Radiation Oncology          12/23/2022 3/1/2023   Aria Course Treatment Dates   Course First Treatment Date 12/19/2022 12/19/2022 03/01/2023     Course Last Treatment Date 12/23/2022 12/23/2022 03/01/2023     Aria Treatment Summary   1 Lesion SRS  Plan from Course C3 Brain SRS   Fraction  1 of 1    1 of 1   Elapsed Course Days  0 @ [id number]    0 @ [id number]   Prescribed Fraction Dose  2,200 cGy    2,200 cGy   Prescribed Total Dose  2,200 cGy    2,200 cGy   1 Lesion SRS  Reference Point from Course C3 Brain SRS   Elapsed Course Days  0 @ [id number]    0 @ [id number]   Session Dose  2,200 cGy    --   Total Dose  2,200 cGy    2,200 cGy   1 Lesion SRS CP  Reference Point from Course C3 Brain SRS   Elapsed Course Days  0 @ [id number]    0 @ [id number]   Session Dose  2,619 cGy    --   Total Dose  2,619 cGy    2,619 cGy   SRT R Pariet  Plan from Course C2 Brain SRS   Fraction 3 of 3    3 of 3    Elapsed Course Days 4 @ [id number]    4 @ [id number]    Prescribed Fraction Dose 900 cGy    900 cGy    Prescribed Total Dose 2,700 cGy    2,700 cGy    SRT R Pariet  Reference Point from Course C2 Brain SRS   Elapsed Course Days 4 @ [id number]    4 @ [id number]    Session Dose 900 cGy    --    Total Dose 2,700 cGy    2,700 cGy    SRT R Pariet CP  Reference Point from Course C2 Brain SRS   Elapsed Course Days 4 @ [id number]    4 @ [id number]    Session Dose 1,042 cGy    --    Total Dose 3,125 cGy    3,125 cGy        More values are hidden. Newest values shown. Go to activity for more data.        HPI:    Subjective     This is a 42-year-old lady with metastatic BRAF mutant malignant melanoma, stage IV, with multiple brain lesions as well as systemic disease, who  had seen approximately 3 months ago initially for consideration of radiation therapy.  At that time, she underwent craniotomy and resection of her larger dominant lesions, and was treated with postoperative fractionated stereotactic radiation.  Thereafter, she was discovered to have several additional lesions and completed a second round of SRS as below.  She now continues on immunotherapy with medical oncology.     02/02- We are having a telephone follow-up shortly after completion of her recent course of stereotactic radiation.  She is doing tremendously well.  She is on immunotherapy now, and continues on Nivo/Ipi.  She feels quite well, has no major sensory or motor problems, no headaches, nausea or vomiting, or systemic complaints.  A follow-up MRI and CT chest abdomen pelvis has been ordered by medical oncology to be done in the next few weeks.  Her energy level is good.  Her hair has slowly grown back.  Overall, she feels quite good.        Interval History:   2/22/23 she continues to do relatively well.  She continues on her immunotherapy which she is tolerating without any major issues.  She had a follow-up MRI and a CT chest abdomen pelvis completed last week.  The systemic scan shows an excellent response in most of her disease is responding or stable.  However her brain MRI shows one small new 6 mm lesion that is definitively new from her prior MRI.  The remainder of her previously treated disease appears to have responded well.  She now comes to discuss stereotactic radiosurgery for this new lesion.  Symptomatically, she is doing well, she has regained nearly complete neurologic function, she is walking, she has no fine motor problems, no problems with balance, no headaches, dizziness, nausea, vomiting.    4/17/2023  She is now status post SRS to an additional lesion that was completed 6 weeks ago.  She continues to do well.  She has no specific CNS related side effects.  She continues on immunotherapy  without any major problems.  No new neurologic problems, headaches, dizziness, nausea or vomiting.    Labs / Images Reviewed:   No results found.    Assessment:   Metastatic melanoma, stage IV    Cancer Status:   Therapy Completed, Status Pending Restaging Work-up    Plan:   At this point, we will plan to see her back in about 6 to 7 weeks with a repeat brain MRI.  Repeat systemic scans and brain MRI are already ordered and scheduled.  She is doing quite well.  And I am hopeful that she will not have additional disease and a good response of the treated areas as before.    Time Spent on Call: 5 minutes      Time Spent in Preparation: 5 minutes    Total Time Spent: 10 minutes    Judy August M.D.

## 2023-04-24 ENCOUNTER — HOSPITAL ENCOUNTER (OUTPATIENT)
Dept: LAB | Facility: MEDICAL CENTER | Age: 43
End: 2023-04-24
Attending: FAMILY MEDICINE
Payer: COMMERCIAL

## 2023-04-24 LAB
25(OH)D3 SERPL-MCNC: 30 NG/ML (ref 30–100)
BASOPHILS # BLD AUTO: 1 % (ref 0–1.8)
BASOPHILS # BLD: 0.07 K/UL (ref 0–0.12)
EOSINOPHIL # BLD AUTO: 0.16 K/UL (ref 0–0.51)
EOSINOPHIL NFR BLD: 2.2 % (ref 0–6.9)
ERYTHROCYTE [DISTWIDTH] IN BLOOD BY AUTOMATED COUNT: 53.1 FL (ref 35.9–50)
EST. AVERAGE GLUCOSE BLD GHB EST-MCNC: 174 MG/DL
HBA1C MFR BLD: 7.7 % (ref 4–5.6)
HCT VFR BLD AUTO: 45.5 % (ref 37–47)
HGB BLD-MCNC: 14.6 G/DL (ref 12–16)
IMM GRANULOCYTES # BLD AUTO: 0.02 K/UL (ref 0–0.11)
IMM GRANULOCYTES NFR BLD AUTO: 0.3 % (ref 0–0.9)
LYMPHOCYTES # BLD AUTO: 1.26 K/UL (ref 1–4.8)
LYMPHOCYTES NFR BLD: 17.3 % (ref 22–41)
MCH RBC QN AUTO: 28.4 PG (ref 27–33)
MCHC RBC AUTO-ENTMCNC: 32.1 G/DL (ref 33.6–35)
MCV RBC AUTO: 88.5 FL (ref 81.4–97.8)
MONOCYTES # BLD AUTO: 0.35 K/UL (ref 0–0.85)
MONOCYTES NFR BLD AUTO: 4.8 % (ref 0–13.4)
NEUTROPHILS # BLD AUTO: 5.43 K/UL (ref 2–7.15)
NEUTROPHILS NFR BLD: 74.4 % (ref 44–72)
NRBC # BLD AUTO: 0 K/UL
NRBC BLD-RTO: 0 /100 WBC
PLATELET # BLD AUTO: 313 K/UL (ref 164–446)
PMV BLD AUTO: 10.8 FL (ref 9–12.9)
RBC # BLD AUTO: 5.14 M/UL (ref 4.2–5.4)
T3FREE SERPL-MCNC: 2 PG/ML (ref 2–4.4)
T4 FREE SERPL-MCNC: 0.72 NG/DL (ref 0.93–1.7)
TSH SERPL DL<=0.005 MIU/L-ACNC: 0.78 UIU/ML (ref 0.38–5.33)
WBC # BLD AUTO: 7.3 K/UL (ref 4.8–10.8)

## 2023-04-24 PROCEDURE — 82570 ASSAY OF URINE CREATININE: CPT

## 2023-04-24 PROCEDURE — 85025 COMPLETE CBC W/AUTO DIFF WBC: CPT

## 2023-04-24 PROCEDURE — 81003 URINALYSIS AUTO W/O SCOPE: CPT

## 2023-04-24 PROCEDURE — 84443 ASSAY THYROID STIM HORMONE: CPT

## 2023-04-24 PROCEDURE — 84481 FREE ASSAY (FT-3): CPT

## 2023-04-24 PROCEDURE — 82306 VITAMIN D 25 HYDROXY: CPT

## 2023-04-24 PROCEDURE — 80053 COMPREHEN METABOLIC PANEL: CPT

## 2023-04-24 PROCEDURE — 83036 HEMOGLOBIN GLYCOSYLATED A1C: CPT

## 2023-04-24 PROCEDURE — 84439 ASSAY OF FREE THYROXINE: CPT

## 2023-04-24 PROCEDURE — 82043 UR ALBUMIN QUANTITATIVE: CPT

## 2023-04-24 PROCEDURE — 80061 LIPID PANEL: CPT

## 2023-04-24 PROCEDURE — 36415 COLL VENOUS BLD VENIPUNCTURE: CPT

## 2023-04-25 LAB
ALBUMIN SERPL BCP-MCNC: 4.4 G/DL (ref 3.2–4.9)
ALBUMIN/GLOB SERPL: 1.9 G/DL
ALP SERPL-CCNC: 103 U/L (ref 30–99)
ALT SERPL-CCNC: 28 U/L (ref 2–50)
ANION GAP SERPL CALC-SCNC: 13 MMOL/L (ref 7–16)
APPEARANCE UR: CLEAR
AST SERPL-CCNC: 19 U/L (ref 12–45)
BILIRUB SERPL-MCNC: 0.2 MG/DL (ref 0.1–1.5)
BILIRUB UR QL STRIP.AUTO: NEGATIVE
BUN SERPL-MCNC: 20 MG/DL (ref 8–22)
CALCIUM ALBUM COR SERPL-MCNC: 9 MG/DL (ref 8.5–10.5)
CALCIUM SERPL-MCNC: 9.3 MG/DL (ref 8.5–10.5)
CHLORIDE SERPL-SCNC: 107 MMOL/L (ref 96–112)
CHOLEST SERPL-MCNC: 199 MG/DL (ref 100–199)
CO2 SERPL-SCNC: 22 MMOL/L (ref 20–33)
COLOR UR: YELLOW
CREAT SERPL-MCNC: 0.49 MG/DL (ref 0.5–1.4)
CREAT UR-MCNC: 57.49 MG/DL
FASTING STATUS PATIENT QL REPORTED: NORMAL
GFR SERPLBLD CREATININE-BSD FMLA CKD-EPI: 119 ML/MIN/1.73 M 2
GLOBULIN SER CALC-MCNC: 2.3 G/DL (ref 1.9–3.5)
GLUCOSE SERPL-MCNC: 211 MG/DL (ref 65–99)
GLUCOSE UR STRIP.AUTO-MCNC: NEGATIVE MG/DL
HDLC SERPL-MCNC: 46 MG/DL
KETONES UR STRIP.AUTO-MCNC: NEGATIVE MG/DL
LDLC SERPL CALC-MCNC: 111 MG/DL
LEUKOCYTE ESTERASE UR QL STRIP.AUTO: NEGATIVE
MICRO URNS: NORMAL
MICROALBUMIN UR-MCNC: <1.2 MG/DL
MICROALBUMIN/CREAT UR: NORMAL MG/G (ref 0–30)
NITRITE UR QL STRIP.AUTO: NEGATIVE
PH UR STRIP.AUTO: 6 [PH] (ref 5–8)
POTASSIUM SERPL-SCNC: 4.8 MMOL/L (ref 3.6–5.5)
PROT SERPL-MCNC: 6.7 G/DL (ref 6–8.2)
PROT UR QL STRIP: NEGATIVE MG/DL
RBC UR QL AUTO: NEGATIVE
SODIUM SERPL-SCNC: 142 MMOL/L (ref 135–145)
SP GR UR STRIP.AUTO: 1.02
TRIGL SERPL-MCNC: 212 MG/DL (ref 0–149)
UROBILINOGEN UR STRIP.AUTO-MCNC: 0.2 MG/DL

## 2023-05-05 ENCOUNTER — HOSPITAL ENCOUNTER (OUTPATIENT)
Dept: RADIOLOGY | Facility: MEDICAL CENTER | Age: 43
End: 2023-05-05
Attending: INTERNAL MEDICINE
Payer: COMMERCIAL

## 2023-05-05 DIAGNOSIS — C79.31 SECONDARY MALIGNANT NEOPLASM OF BRAIN AND SPINAL CORD (HCC): ICD-10-CM

## 2023-05-05 DIAGNOSIS — E86.0 DEHYDRATION: ICD-10-CM

## 2023-05-05 DIAGNOSIS — Z79.899 NEED FOR PROPHYLACTIC CHEMOTHERAPY: ICD-10-CM

## 2023-05-05 DIAGNOSIS — C43.62 MALIGNANT MELANOMA OF LEFT UPPER EXTREMITY INCLUDING SHOULDER (HCC): ICD-10-CM

## 2023-05-05 DIAGNOSIS — D64.9 ANEMIA, UNSPECIFIED TYPE: ICD-10-CM

## 2023-05-05 DIAGNOSIS — C77.3 SECONDARY MALIGNANT NEOPLASM OF BRACHIAL LYMPH NODE (HCC): ICD-10-CM

## 2023-05-05 DIAGNOSIS — C79.49 SECONDARY MALIGNANT NEOPLASM OF BRAIN AND SPINAL CORD (HCC): ICD-10-CM

## 2023-05-05 DIAGNOSIS — I95.89 CHRONIC HYPOTENSION: ICD-10-CM

## 2023-05-05 DIAGNOSIS — Z51.12 ENCOUNTER FOR ANTINEOPLASTIC IMMUNOTHERAPY: ICD-10-CM

## 2023-05-05 PROCEDURE — 700117 HCHG RX CONTRAST REV CODE 255: Performed by: INTERNAL MEDICINE

## 2023-05-05 PROCEDURE — A9579 GAD-BASE MR CONTRAST NOS,1ML: HCPCS | Performed by: INTERNAL MEDICINE

## 2023-05-05 PROCEDURE — 70553 MRI BRAIN STEM W/O & W/DYE: CPT

## 2023-05-05 PROCEDURE — 71260 CT THORAX DX C+: CPT

## 2023-05-05 RX ADMIN — IOHEXOL 100 ML: 350 INJECTION, SOLUTION INTRAVENOUS at 16:56

## 2023-05-05 RX ADMIN — GADOTERIDOL 15 ML: 279.3 INJECTION, SOLUTION INTRAVENOUS at 17:14

## 2023-05-05 RX ADMIN — IOHEXOL 25 ML: 240 INJECTION, SOLUTION INTRATHECAL; INTRAVASCULAR; INTRAVENOUS; ORAL at 16:56

## 2023-05-17 ENCOUNTER — APPOINTMENT (RX ONLY)
Dept: URBAN - METROPOLITAN AREA CLINIC 20 | Facility: CLINIC | Age: 43
Setting detail: DERMATOLOGY
End: 2023-05-17

## 2023-05-17 DIAGNOSIS — L70.8 OTHER ACNE: ICD-10-CM

## 2023-05-17 DIAGNOSIS — D18.0 HEMANGIOMA: ICD-10-CM

## 2023-05-17 DIAGNOSIS — C79.89 SECONDARY MALIGNANT NEOPLASM OF OTHER SPECIFIED SITES: ICD-10-CM

## 2023-05-17 DIAGNOSIS — D22 MELANOCYTIC NEVI: ICD-10-CM

## 2023-05-17 DIAGNOSIS — Z85.820 PERSONAL HISTORY OF MALIGNANT MELANOMA OF SKIN: ICD-10-CM

## 2023-05-17 DIAGNOSIS — L57.8 OTHER SKIN CHANGES DUE TO CHRONIC EXPOSURE TO NONIONIZING RADIATION: ICD-10-CM

## 2023-05-17 DIAGNOSIS — L81.4 OTHER MELANIN HYPERPIGMENTATION: ICD-10-CM

## 2023-05-17 DIAGNOSIS — Z87.2 PERSONAL HISTORY OF DISEASES OF THE SKIN AND SUBCUTANEOUS TISSUE: ICD-10-CM

## 2023-05-17 DIAGNOSIS — L40.0 PSORIASIS VULGARIS: ICD-10-CM

## 2023-05-17 PROBLEM — D18.01 HEMANGIOMA OF SKIN AND SUBCUTANEOUS TISSUE: Status: ACTIVE | Noted: 2023-05-17

## 2023-05-17 PROBLEM — D22.72 MELANOCYTIC NEVI OF LEFT LOWER LIMB, INCLUDING HIP: Status: ACTIVE | Noted: 2023-05-17

## 2023-05-17 PROBLEM — D22.5 MELANOCYTIC NEVI OF TRUNK: Status: ACTIVE | Noted: 2023-05-17

## 2023-05-17 PROBLEM — D22.61 MELANOCYTIC NEVI OF RIGHT UPPER LIMB, INCLUDING SHOULDER: Status: ACTIVE | Noted: 2023-05-17

## 2023-05-17 PROBLEM — D22.4 MELANOCYTIC NEVI OF SCALP AND NECK: Status: ACTIVE | Noted: 2023-05-17

## 2023-05-17 PROCEDURE — ? COUNSELING

## 2023-05-17 PROCEDURE — ? OBSERVATION AND MEASURE

## 2023-05-17 PROCEDURE — 99213 OFFICE O/P EST LOW 20 MIN: CPT

## 2023-05-17 PROCEDURE — ? DIAGNOSIS COMMENT

## 2023-05-17 PROCEDURE — ? ADDITIONAL NOTES

## 2023-05-17 ASSESSMENT — LOCATION SIMPLE DESCRIPTION DERM
LOCATION SIMPLE: BACK
LOCATION SIMPLE: RIGHT LOWER EXTREMITY
LOCATION SIMPLE: LEFT LOWER EXTREMITY
LOCATION SIMPLE: ABDOMEN
LOCATION SIMPLE: RIGHT HAND
LOCATION SIMPLE: RIGHT CHEEK
LOCATION SIMPLE: RIGHT UPPER BACK
LOCATION SIMPLE: LEFT POSTERIOR THIGH
LOCATION SIMPLE: LEFT ANKLE
LOCATION SIMPLE: LEFT HAND
LOCATION SIMPLE: RIGHT POSTERIOR UPPER ARM
LOCATION SIMPLE: LEFT THIGH
LOCATION SIMPLE: RIGHT BUTTOCK
LOCATION SIMPLE: LEFT UPPER BACK
LOCATION SIMPLE: POSTERIOR SCALP
LOCATION SIMPLE: RIGHT THUMB
LOCATION SIMPLE: RIGHT CALF
LOCATION SIMPLE: LEFT CHEEK
LOCATION SIMPLE: RIGHT EAR
LOCATION SIMPLE: LEFT 2ND TOE
LOCATION SIMPLE: LEFT ELBOW

## 2023-05-17 ASSESSMENT — LOCATION ZONE DERM
LOCATION ZONE: ARM
LOCATION ZONE: HAND
LOCATION ZONE: SCALP
LOCATION ZONE: FINGER
LOCATION ZONE: EAR
LOCATION ZONE: TRUNK
LOCATION ZONE: FACE
LOCATION ZONE: TOE
LOCATION ZONE: LEG

## 2023-05-17 ASSESSMENT — LOCATION DETAILED DESCRIPTION DERM
LOCATION DETAILED: RIGHT MID-UPPER BACK
LOCATION DETAILED: RIGHT INFERIOR UPPER BACK
LOCATION DETAILED: LEFT INFERIOR OCCIPITAL SCALP
LOCATION DETAILED: LEFT ELBOW
LOCATION DETAILED: RIGHT SUPERIOR HELIX
LOCATION DETAILED: LEFT INFERIOR CENTRAL MALAR CHEEK
LOCATION DETAILED: RIGHT LATERAL ABDOMEN
LOCATION DETAILED: RIGHT PROXIMAL DORSAL THUMB
LOCATION DETAILED: RIGHT BUTTOCK
LOCATION DETAILED: LEFT DORSAL HAND
LOCATION DETAILED: RIGHT ANTERIOR THIGH
LOCATION DETAILED: RIGHT DORSAL HAND
LOCATION DETAILED: LEFT POSTERIOR ANKLE
LOCATION DETAILED: RIGHT DISTAL LATERAL CALF
LOCATION DETAILED: LEFT MID BACK
LOCATION DETAILED: LEFT DORSAL 2ND TOE
LOCATION DETAILED: LEFT PROXIMAL POSTERIOR THIGH
LOCATION DETAILED: RIGHT CHEEK
LOCATION DETAILED: LEFT ANTERIOR THIGH
LOCATION DETAILED: LEFT ANTERIOR DISTAL THIGH
LOCATION DETAILED: RIGHT SUPERIOR MEDIAL BUCCAL CHEEK
LOCATION DETAILED: LEFT RADIAL DORSAL HAND
LOCATION DETAILED: RIGHT PROXIMAL POSTERIOR UPPER ARM
LOCATION DETAILED: LEFT UPPER BACK
LOCATION DETAILED: LEFT SUPERIOR UPPER BACK
LOCATION DETAILED: LEFT ANTERIOR PROXIMAL THIGH

## 2023-05-17 NOTE — PROCEDURE: DIAGNOSIS COMMENT
Render Risk Assessment In Note?: no
Comment: 5/31/22; per pt PET showed intense uptake to left hilum concern for metastasis.\\nPt had biopsy to lymph per pt no sign of cancer.\\n\\n-10/2022; 5 masses, largest 3.4cm x 3 cm\\n\\n-10/2022; CT-CAP, Several nodules in lung, thyroid nodule, abdominal/peritoneal  with several nodules, and questionable new lesion kidney possible cyst.\\n\\n-10/2022; Went to ER for stroke like symptoms, CT/MRI showed multiple locations (brain, bilateral lung, thyroid, kidney, peritoneal) masses consistent with metastatic melanoma.  Managed by Dr. Caraballo, treatment radiation, Yervoy & Opdivo.\\n\\n-started 11/2022; Pt well managed w/ palliative immunotherapy w/ Opdivo and Yervoy\\n\\n-2/2023; CT chest showed + response\\n-2/2023; MRI brain showed 6 mm lesion parietal lobe\\n-3/2023; 1 dose stereotactic radiation brain\\n\\nPer patient brain scan in July 2023
Detail Level: Simple
Comment: Excised in 2016; 2.8mm, re-excised 2017 (1+ node,18 neg), tx w/ Ileana [id number], Lelo 2B // no metastasis from [id number]\\n\\n-Refer to metastasis note 2022
Comment: Biopsy proven; all sites excised\\nRight buttock concern early evolving MIS
Comment: Biopsy proven mild to moderate 10/2017; monitor for repigmentation
Comment: Biopsy x 2; [id number], proven benign nevi, closely monitor

## 2023-05-17 NOTE — PROCEDURE: ADDITIONAL NOTES
Detail Level: Simple
Additional Notes: Pt has clobetasol and currently using.  Will let me know if not working which I will change steroid and add Vit D oint.
Render Risk Assessment In Note?: no

## 2023-05-25 ENCOUNTER — HOSPITAL ENCOUNTER (OUTPATIENT)
Facility: MEDICAL CENTER | Age: 43
End: 2023-05-25
Attending: NURSE PRACTITIONER
Payer: COMMERCIAL

## 2023-05-25 PROCEDURE — 84439 ASSAY OF FREE THYROXINE: CPT

## 2023-05-25 PROCEDURE — 80053 COMPREHEN METABOLIC PANEL: CPT

## 2023-05-25 PROCEDURE — 84443 ASSAY THYROID STIM HORMONE: CPT

## 2023-05-26 LAB
ALBUMIN SERPL BCP-MCNC: 4.5 G/DL (ref 3.2–4.9)
ALBUMIN/GLOB SERPL: 2.1 G/DL
ALP SERPL-CCNC: 108 U/L (ref 30–99)
ALT SERPL-CCNC: 23 U/L (ref 2–50)
ANION GAP SERPL CALC-SCNC: 20 MMOL/L (ref 7–16)
AST SERPL-CCNC: 16 U/L (ref 12–45)
BILIRUB SERPL-MCNC: 0.2 MG/DL (ref 0.1–1.5)
BUN SERPL-MCNC: 20 MG/DL (ref 8–22)
CALCIUM ALBUM COR SERPL-MCNC: 9.4 MG/DL (ref 8.5–10.5)
CALCIUM SERPL-MCNC: 9.8 MG/DL (ref 8.5–10.5)
CHLORIDE SERPL-SCNC: 103 MMOL/L (ref 96–112)
CO2 SERPL-SCNC: 21 MMOL/L (ref 20–33)
CREAT SERPL-MCNC: 0.64 MG/DL (ref 0.5–1.4)
GFR SERPLBLD CREATININE-BSD FMLA CKD-EPI: 111 ML/MIN/1.73 M 2
GLOBULIN SER CALC-MCNC: 2.1 G/DL (ref 1.9–3.5)
GLUCOSE SERPL-MCNC: 156 MG/DL (ref 65–99)
POTASSIUM SERPL-SCNC: 4.2 MMOL/L (ref 3.6–5.5)
PROT SERPL-MCNC: 6.6 G/DL (ref 6–8.2)
SODIUM SERPL-SCNC: 144 MMOL/L (ref 135–145)
T4 FREE SERPL-MCNC: 0.99 NG/DL (ref 0.93–1.7)
TSH SERPL DL<=0.005 MIU/L-ACNC: 0.77 UIU/ML (ref 0.38–5.33)

## 2023-06-02 ASSESSMENT — LIFESTYLE VARIABLES
SMOKING_STATUS: NO
TOBACCO_USE: NO

## 2023-06-05 ENCOUNTER — HOSPITAL ENCOUNTER (OUTPATIENT)
Dept: RADIATION ONCOLOGY | Facility: MEDICAL CENTER | Age: 43
End: 2023-06-30
Attending: RADIOLOGY
Payer: COMMERCIAL

## 2023-06-05 VITALS
DIASTOLIC BLOOD PRESSURE: 62 MMHG | BODY MASS INDEX: 22.77 KG/M2 | SYSTOLIC BLOOD PRESSURE: 98 MMHG | HEART RATE: 96 BPM | OXYGEN SATURATION: 95 % | WEIGHT: 141.09 LBS

## 2023-06-05 DIAGNOSIS — C79.31 MALIGNANT MELANOMA METASTATIC TO BRAIN (HCC): ICD-10-CM

## 2023-06-05 PROCEDURE — 99214 OFFICE O/P EST MOD 30 MIN: CPT | Performed by: RADIOLOGY

## 2023-06-05 PROCEDURE — 99212 OFFICE O/P EST SF 10 MIN: CPT | Performed by: RADIOLOGY

## 2023-06-05 ASSESSMENT — PAIN SCALES - GENERAL: PAINLEVEL: NO PAIN

## 2023-06-05 ASSESSMENT — FIBROSIS 4 INDEX: FIB4 SCORE: 0.47

## 2023-06-05 NOTE — PROGRESS NOTES
[facility]  Neurosurgery Clinic Note      Patient: Jacque Mcintyre MRN: [id number]    Date of Consultation: 6/5/2023    Subjective:  The patient is a 42 y.o. female presenting for follow-up of melanoma metastatic to the brain. In review, she was diagnosed in October 2022 with metastatic melanoma having discovered multiple intracranial metastases. She had significant left foot and leg weakness at the time of presentation.  She underwent a right frontal craniotomy for resection of 3 dominant lesions on 10/6/2022 ([facility]).  She recovered well from surgery, then underwent stereotactic radiosurgery for a number of small additional metastases.  She had been followed by Dr. August in clinic, and was found to have a new small metastasis, for which she underwent SRS. She has been undergoing immunotherapy. She presents today for MRI review.    She is feeling fairly well. No headaches. Hair is growing back, but it is patchy gray. She is fatigued, nauseated, but energy is much improved. She is working part time in nursing with insurance authorizations.    Medications:  Current Outpatient Medications   Medication Sig Dispense Refill    traZODone (DESYREL) 100 MG Tab Take 100 mg by mouth every evening.      ondansetron (ZOFRAN ODT) 4 MG TABLET DISPERSIBLE Take 4 mg by mouth 2 times a day as needed for Nausea.      prochlorperazine (COMPAZINE) 10 MG Tab Take 10 mg by mouth every 6 hours as needed for Nausea.      ibuprofen (MOTRIN) 200 MG Tab Take 600 mg by mouth 2 times a day as needed for Mild Pain or Moderate Pain. 3 tablets = 600 mg.      Cholecalciferol (VITAMIN D3) 2000 UNIT Cap Take 2,000 Units by mouth every morning.      B Complex Vitamins (VITAMIN B COMPLEX) Tab Take 1 Tablet by mouth every morning.      VENTOLIN  (90 Base) MCG/ACT Aero Soln inhalation aerosol Inhale 1-2 Puffs every four hours as needed for Shortness of Breath.      famotidine (PEPCID) 10 MG tablet Take 10 mg by mouth 1 time  a day as needed (Heartburn).      meloxicam (MOBIC) 15 MG tablet Take 15 mg by mouth every day.      ferrous sulfate 325 (65 Fe) MG tablet Take 325 mg by mouth every day.      atorvastatin (LIPITOR) 40 MG Tab Take 1 Tablet by mouth every evening. 30 Tablet 2    baclofen (LIORESAL) 10 MG Tab Take 1 Tablet by mouth 3 times a day. 90 Tablet 2    DULoxetine (CYMBALTA) 60 MG Cap DR Particles delayed-release capsule Take 1 Capsule by mouth every evening. 30 Capsule 2    gabapentin (NEURONTIN) 800 MG tablet Take 1 Tablet by mouth 4 times a day. 90 Tablet 2    Insulin Glargine, 2 Unit Dial, 300 UNIT/ML Solution Pen-injector Inject 50 Units under the skin every day. 9 mL 3    insulin lispro 100 UNIT/ML SC SOPN injection PEN Inject 2-10 Units under the skin 4 Times a Day,Before Meals and at Bedtime. 9 mL 2    OXcarbazepine (TRILEPTAL) 300 MG Tab Take 1 Tablet by mouth 2 times a day. 60 Tablet 2    QUEtiapine (SEROQUEL) 200 MG Tab Take 1 Tablet by mouth every evening. 60 Tablet 2    acetaminophen (TYLENOL) 500 MG Tab Take 1,000 mg by mouth 2 times a day as needed for Mild Pain or Moderate Pain.      clonazePAM (KLONOPIN) 0.5 MG Tab Take 0.5 mg by mouth 2 times a day as needed (Anxiety).       No current facility-administered medications for this encounter.         Physical Examination:  Vitals:    06/05/23 1019   BP: 98/62   Pulse: 96   SpO2: 95%     No acute distress. In good spirits.    Neurological  Awake, alert, oriented to person, place and time.  Pupils equally round and reactive to light, 4 to 3mm.  Extraocular movements full.  Facial sensation intact to light touch.  Face symmetric, HB 1.  Palate rises symmetrically.  Tongue is midline.  Shoulder shrug 5/5.  No pronator drift.  Sensation intact to light touch in all 4 extremities.  Excellent strength in all four extremities, perhaps 4+ in left plantarflexion.    Imaging:    MRI brain with and without contrast dated 6/5/2023 was independently reviewed in detail,  and my  interpretation is as follows. Unchanged nodular enhancement in right frontal perisylvian metastatic lesion. No new enhancing lesions. Other lesions are improved.    Assessment and Plan:    Jacque Mcintyre is a 42 y.o. female presenting with metastatic melanoma to the brain. She is doing very well with good response to her metastatic lesions to radiation, and no new lesions currently.    Recommendations:  - MRI in 3 months    Pebbles Tejada M.D.  [facility]  [address] [place] [address] [place] [address]  [phone]    A total of 35 minutes were spent in the evaluation, examination, coordination of care, review of labs and imaging of this patient. I spent >50% of time face-to-face on patient counseling.

## 2023-06-05 NOTE — PROGRESS NOTES
Patient was seen today in clinic with Dr. August for a follow up.  Vitals signs and weight were obtained and pain assessment was completed.  Allergies and medications were reviewed with the patient.  Toxicities of treatment assessed.     Vitals/Pain:  Vitals:    06/05/23 1019   BP: 98/62   BP Location: Right arm   Patient Position: Sitting   BP Cuff Size: Large adult   Pulse: 96   SpO2: 95%   Weight: 64 kg (141 lb 1.5 oz)   Pain Score: No pain        Allergies:   Augmentin [amoxicillin-pot clavulanate]    Current Medications:  Current Outpatient Medications   Medication Sig Dispense Refill    traZODone (DESYREL) 100 MG Tab Take 100 mg by mouth every evening.      ondansetron (ZOFRAN ODT) 4 MG TABLET DISPERSIBLE Take 4 mg by mouth 2 times a day as needed for Nausea.      prochlorperazine (COMPAZINE) 10 MG Tab Take 10 mg by mouth every 6 hours as needed for Nausea.      ibuprofen (MOTRIN) 200 MG Tab Take 600 mg by mouth 2 times a day as needed for Mild Pain or Moderate Pain. 3 tablets = 600 mg.      Cholecalciferol (VITAMIN D3) 2000 UNIT Cap Take 2,000 Units by mouth every morning.      B Complex Vitamins (VITAMIN B COMPLEX) Tab Take 1 Tablet by mouth every morning.      VENTOLIN  (90 Base) MCG/ACT Aero Soln inhalation aerosol Inhale 1-2 Puffs every four hours as needed for Shortness of Breath.      famotidine (PEPCID) 10 MG tablet Take 10 mg by mouth 1 time a day as needed (Heartburn).      meloxicam (MOBIC) 15 MG tablet Take 15 mg by mouth every day.      ferrous sulfate 325 (65 Fe) MG tablet Take 325 mg by mouth every day.      atorvastatin (LIPITOR) 40 MG Tab Take 1 Tablet by mouth every evening. 30 Tablet 2    baclofen (LIORESAL) 10 MG Tab Take 1 Tablet by mouth 3 times a day. 90 Tablet 2    DULoxetine (CYMBALTA) 60 MG Cap DR Particles delayed-release capsule Take 1 Capsule by mouth every evening. 30 Capsule 2    gabapentin (NEURONTIN) 800 MG tablet Take 1 Tablet by mouth 4 times a day. 90 Tablet 2     Insulin Glargine, 2 Unit Dial, 300 UNIT/ML Solution Pen-injector Inject 50 Units under the skin every day. 9 mL 3    insulin lispro 100 UNIT/ML SC SOPN injection PEN Inject 2-10 Units under the skin 4 Times a Day,Before Meals and at Bedtime. 9 mL 2    OXcarbazepine (TRILEPTAL) 300 MG Tab Take 1 Tablet by mouth 2 times a day. 60 Tablet 2    QUEtiapine (SEROQUEL) 200 MG Tab Take 1 Tablet by mouth every evening. 60 Tablet 2    acetaminophen (TYLENOL) 500 MG Tab Take 1,000 mg by mouth 2 times a day as needed for Mild Pain or Moderate Pain.      clonazePAM (KLONOPIN) 0.5 MG Tab Take 0.5 mg by mouth 2 times a day as needed (Anxiety).       No current facility-administered medications for this encounter.         PCP:  Anthony Rodriguez Ass't

## 2023-06-05 NOTE — PROGRESS NOTES
RADIATION ONCOLOGY FOLLOW-UP    Patient name:  Jacque Mcintyre    Primary Physician:  Adriana Levine M.D. MRN: [id number]  [facility]: [id number]   Referring physician:  Pebbles Tejada M.D.  : 1980, 42 y.o.     DATE OF SERVICE: 2023    IDENTIFICATION:   A 42 y.o. female with metastatic melanoma now status post resection and stereotactic radiosurgery who comes for ongoing follow-up.    RADIATION SUMMARY:  Radiation Oncology          2022 3/1/2023   Aria Course Treatment Dates   Course First Treatment Date 2022    Course Last Treatment Date 2022    Aria Treatment Summary   1 Lesion SRS  Plan from Course C3 Brain SRS   Fraction  1 of 1    1 of    Elapsed Course Days  0 @ [id number]    0 @ [id number]   Prescribed Fraction Dose  2,200 cGy    2,200 cGy   Prescribed Total Dose  2,200 cGy    2,200 cGy   1 Lesion SRS  Reference Point from Course C3 Brain SRS   Elapsed Course Days  0 @ [id number]    0 @ [id number]   Session Dose  2,200 cGy    --   Total Dose  2,200 cGy    2,200 cGy   1 Lesion SRS CP  Reference Point from Course C3 Brain SRS   Elapsed Course Days  0 @ [id number]    0 @ [id number]   Session Dose  2,619 cGy    --   Total Dose  2,619 cGy    2,619 cGy   SRT R Pariet  Plan from Course C2 Brain SRS   Fraction 3 of 3    3 of 3    Elapsed Course Days 4 @ [id number]    4 @ [id number]    Prescribed Fraction Dose 900 cGy    900 cGy    Prescribed Total Dose 2,700 cGy    2,700 cGy    SRT R Pariet  Reference Point from Course C2 Brain SRS   Elapsed Course Days 4 @ [id number]    4 @ [id number]    Session Dose 900 cGy    --    Total Dose 2,700 cGy    2,700 cGy    SRT R Pariet CP  Reference Point from Course C2 Brain SRS   Elapsed Course Days 4 @     4 @     Session Dose 1,042 cGy    --    Total Dose 3,125 cGy    3,125 cGy       Details          More values are hidden. Newest values shown. Go to  activity for more data.                HISTORY OF PRESENT ILLNESS:  Subjective      This is a 42-year-old lady with metastatic BRAF mutant malignant melanoma, stage IV, with multiple brain lesions as well as systemic disease, who had seen approximately 3 months ago initially for consideration of radiation therapy.  At that time, she underwent craniotomy and resection of her larger dominant lesions, and was treated with postoperative fractionated stereotactic radiation.  Thereafter, she was discovered to have several additional lesions and completed a second round of SRS as below.  She now continues on immunotherapy with medical oncology.     02/02- We are having a telephone follow-up shortly after completion of her recent course of stereotactic radiation.  She is doing tremendously well.  She is on immunotherapy now, and continues on Nivo/Ipi.  She feels quite well, has no major sensory or motor problems, no headaches, nausea or vomiting, or systemic complaints.  A follow-up MRI and CT chest abdomen pelvis has been ordered by medical oncology to be done in the next few weeks.  Her energy level is good.  Her hair has slowly grown back.  Overall, she feels quite good.          INTERVAL HISTORY:    2/22/23 she continues to do relatively well.  She continues on her immunotherapy which she is tolerating without any major issues.  She had a follow-up MRI and a CT chest abdomen pelvis completed last week.  The systemic scan shows an excellent response in most of her disease is responding or stable.  However her brain MRI shows one small new 6 mm lesion that is definitively new from her prior MRI.  The remainder of her previously treated disease appears to have responded well.  She now comes to discuss stereotactic radiosurgery for this new lesion.  Symptomatically, she is doing well, she has regained nearly complete neurologic function, she is walking, she has no fine motor problems, no problems with balance, no headaches,  dizziness, nausea, vomiting.     4/17/2023  She is now status post SRS to an additional lesion that was completed 6 weeks ago.  She continues to do well.  She has no specific CNS related side effects.  She continues on immunotherapy without any major problems.  No new neurologic problems, headaches, dizziness, nausea or vomiting.    6/5/23:  She comes today to review her brain MRI and for ongoing follow-up.  She is doing tremendously well.  She reports that she has intermittent nausea that appears to be well controlled, but otherwise has no headaches, dizziness or balance difficulties, or vomiting.  She continues on immunotherapy.  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      PROBLEM LIST:  Patient Active Problem List   Diagnosis    Asthma    Depression    Nausea & vomiting    Sepsis (HCC)    UTI (urinary tract infection)    Lactic acidosis    Anxiety    Diarrhea    Melanoma of left upper arm (HCC)    Bipolar 1 disorder (HCC)    DM2 (diabetes mellitus, type 2) (HCC)    Appendicitis    Hypokalemia    Essential hypertension    Gastroenteritis    Abnormal CT scan, chest    Cellulitis    Leucocytosis    Malignant melanoma metastatic to brain (HCC)    Brain mass    Chronic prescription benzodiazepine use    Constipation    Adrenal insufficiency due to corticosteroid withdrawal (HCC)    Metastatic cancer to brain (HCC)    Vitamin D deficiency    Dyslipidemia    Normocytic anemia    Borderline abnormal TFTs    Thrombocytosis    Dizziness on standing    Diabetic neuropathy (HCC)    Facial paralysis/Proctor palsy       CURRENT MEDICATIONS:  Current Outpatient Medications   Medication Sig Dispense Refill    traZODone (DESYREL) 100 MG Tab Take 100 mg by mouth every evening.      ondansetron (ZOFRAN ODT) 4 MG TABLET DISPERSIBLE Take 4 mg by mouth 2 times a day as needed for Nausea.      prochlorperazine (COMPAZINE) 10 MG Tab Take 10 mg by mouth every 6 hours as needed for Nausea.      ibuprofen (MOTRIN)  200 MG Tab Take 600 mg by mouth 2 times a day as needed for Mild Pain or Moderate Pain. 3 tablets = 600 mg.      Cholecalciferol (VITAMIN D3) 2000 UNIT Cap Take 2,000 Units by mouth every morning.      B Complex Vitamins (VITAMIN B COMPLEX) Tab Take 1 Tablet by mouth every morning.      VENTOLIN  (90 Base) MCG/ACT Aero Soln inhalation aerosol Inhale 1-2 Puffs every four hours as needed for Shortness of Breath.      famotidine (PEPCID) 10 MG tablet Take 10 mg by mouth 1 time a day as needed (Heartburn).      meloxicam (MOBIC) 15 MG tablet Take 15 mg by mouth every day.      ferrous sulfate 325 (65 Fe) MG tablet Take 325 mg by mouth every day.      atorvastatin (LIPITOR) 40 MG Tab Take 1 Tablet by mouth every evening. 30 Tablet 2    baclofen (LIORESAL) 10 MG Tab Take 1 Tablet by mouth 3 times a day. 90 Tablet 2    DULoxetine (CYMBALTA) 60 MG Cap DR Particles delayed-release capsule Take 1 Capsule by mouth every evening. 30 Capsule 2    gabapentin (NEURONTIN) 800 MG tablet Take 1 Tablet by mouth 4 times a day. 90 Tablet 2    Insulin Glargine, 2 Unit Dial, 300 UNIT/ML Solution Pen-injector Inject 50 Units under the skin every day. 9 mL 3    insulin lispro 100 UNIT/ML SC SOPN injection PEN Inject 2-10 Units under the skin 4 Times a Day,Before Meals and at Bedtime. 9 mL 2    OXcarbazepine (TRILEPTAL) 300 MG Tab Take 1 Tablet by mouth 2 times a day. 60 Tablet 2    QUEtiapine (SEROQUEL) 200 MG Tab Take 1 Tablet by mouth every evening. 60 Tablet 2    acetaminophen (TYLENOL) 500 MG Tab Take 1,000 mg by mouth 2 times a day as needed for Mild Pain or Moderate Pain.      clonazePAM (KLONOPIN) 0.5 MG Tab Take 0.5 mg by mouth 2 times a day as needed (Anxiety).       No current facility-administered medications for this encounter.       ALLERGIES:  Augmentin [amoxicillin-pot clavulanate]    REVIEW OF SYSTEMS:    A complete review of system taken. Pertinent items in HPI.  All others negative.    PHYSICAL EXAM:  PERFORMANCE  STATUS:       No data to display                   No data to display              BP 98/62 (BP Location: Right arm, Patient Position: Sitting, BP Cuff Size: Large adult)   Pulse 96   Wt 64 kg (141 lb 1.5 oz)   SpO2 95%   BMI 22.77 kg/m²   Physical Exam  Constitutional:       Appearance: Normal appearance.   HENT:      Head: Normocephalic and atraumatic.   Eyes:      Extraocular Movements: Extraocular movements intact.      Conjunctiva/sclera: Conjunctivae normal.   Neurological:      General: No focal deficit present.      Mental Status: She is alert and oriented to person, place, and time.      Cranial Nerves: No cranial nerve deficit.      Sensory: No sensory deficit.      Motor: No weakness.   Psychiatric:         Behavior: Behavior normal.         Thought Content: Thought content normal.         LABORATORY DATA:   Lab Results   Component Value Date/Time    WBC 7.3 04/24/2023 08:32 AM    RBC 5.14 04/24/2023 08:32 AM    HEMOGLOBIN 14.6 04/24/2023 08:32 AM    HEMATOCRIT 45.5 04/24/2023 08:32 AM    MCV 88.5 04/24/2023 08:32 AM    MCH 28.4 04/24/2023 08:32 AM    MCHC 32.1 (L) 04/24/2023 08:32 AM    RDW 53.1 (H) 04/24/2023 08:32 AM    PLATELETCT 313 04/24/2023 08:32 AM    MPV 10.8 04/24/2023 08:32 AM    NEUTSPOLYS 74.40 (H) 04/24/2023 08:32 AM    LYMPHOCYTES 17.30 (L) 04/24/2023 08:32 AM    MONOCYTES 4.80 04/24/2023 08:32 AM    EOSINOPHILS 2.20 04/24/2023 08:32 AM    BASOPHILS 1.00 04/24/2023 08:32 AM    HYPOCHROMIA 1+ 12/11/2022 06:12 AM    ANISOCYTOSIS 1+ 12/11/2022 06:12 AM      Lab Results   Component Value Date/Time    SODIUM 144 05/25/2023 01:48 PM    POTASSIUM 4.2 05/25/2023 01:48 PM    CHLORIDE 103 05/25/2023 01:48 PM    CO2 21 05/25/2023 01:48 PM    GLUCOSE 156 (H) 05/25/2023 01:48 PM    BUN 20 05/25/2023 01:48 PM    CREATININE 0.64 05/25/2023 01:48 PM         RADIOLOGY DATA:  CT-CHEST,ABDOMEN,PELVIS WITH    Result Date: 5/7/2023  1.  There has been a partial response to therapy. 2.  Interval decrease in  size of the left hilar node and left anterior abdominal wall subcutaneous nodule with resolution of the right paracolic gutter peritoneal nodule. 3.  Stable residual left lower lobe pleural lung nodule with a few other micronodules probably present previously but obscured by motion on prior exams. 4.  Stable lymphadenopathy in the mid abdominal mesentery and left external iliac regions. 5.  No new areas of metastatic disease.    MR-BRAIN-WITH & W/O    Result Date: 5/5/2023  1.  Persistent somewhat nodular enhancement in the RIGHT frontal perisylvian metastatic lesion, unchanged 2.  Other lesions show no suspicious pattern of enhancement and variable evidence of prior hemorrhage 3.  No new lesions 4.  These findings are in keeping with therapeutic response. 5.  Postsurgical changes in the RIGHT frontal brain.      IMPRESSION:    A 42 y.o. with metastatic melanoma status post surgical resection, postoperative SRS, as well as additional SRS now presenting for ongoing follow-up.    CANCER STATUS:  Disease Stable    RECOMMENDATIONS:   She is doing quite well.  She will continue on immunotherapy with medical oncology.  In the meantime, we will continue CNS surveillance with another MRI and follow-up in 3 months.    Thank you for the opportunity to participate in her care.  If any questions or comments, please do not hesitate in calling.    Orders Placed This Encounter    MR-BRAIN-WITH & W/O

## 2023-06-22 ENCOUNTER — HOSPITAL ENCOUNTER (OUTPATIENT)
Facility: MEDICAL CENTER | Age: 43
End: 2023-06-22
Attending: NURSE PRACTITIONER
Payer: COMMERCIAL

## 2023-06-22 LAB
ALBUMIN SERPL BCP-MCNC: 4.5 G/DL (ref 3.2–4.9)
ALBUMIN/GLOB SERPL: 2.3 G/DL
ALP SERPL-CCNC: 112 U/L (ref 30–99)
ALT SERPL-CCNC: 19 U/L (ref 2–50)
ANION GAP SERPL CALC-SCNC: 15 MMOL/L (ref 7–16)
AST SERPL-CCNC: 12 U/L (ref 12–45)
BILIRUB SERPL-MCNC: 0.2 MG/DL (ref 0.1–1.5)
BUN SERPL-MCNC: 15 MG/DL (ref 8–22)
CALCIUM ALBUM COR SERPL-MCNC: 8.7 MG/DL (ref 8.5–10.5)
CALCIUM SERPL-MCNC: 9.1 MG/DL (ref 8.5–10.5)
CHLORIDE SERPL-SCNC: 101 MMOL/L (ref 96–112)
CO2 SERPL-SCNC: 22 MMOL/L (ref 20–33)
CREAT SERPL-MCNC: 0.57 MG/DL (ref 0.5–1.4)
GFR SERPLBLD CREATININE-BSD FMLA CKD-EPI: 114 ML/MIN/1.73 M 2
GLOBULIN SER CALC-MCNC: 2 G/DL (ref 1.9–3.5)
GLUCOSE SERPL-MCNC: 124 MG/DL (ref 65–99)
POTASSIUM SERPL-SCNC: 4.4 MMOL/L (ref 3.6–5.5)
PROT SERPL-MCNC: 6.5 G/DL (ref 6–8.2)
SODIUM SERPL-SCNC: 138 MMOL/L (ref 135–145)
TSH SERPL DL<=0.005 MIU/L-ACNC: 0.71 UIU/ML (ref 0.38–5.33)

## 2023-06-22 PROCEDURE — 84443 ASSAY THYROID STIM HORMONE: CPT

## 2023-06-22 PROCEDURE — 80053 COMPREHEN METABOLIC PANEL: CPT

## 2023-07-24 ENCOUNTER — HOSPITAL ENCOUNTER (EMERGENCY)
Facility: MEDICAL CENTER | Age: 43
End: 2023-07-24
Payer: COMMERCIAL

## 2023-07-24 ENCOUNTER — HOSPITAL ENCOUNTER (OUTPATIENT)
Facility: MEDICAL CENTER | Age: 43
End: 2023-07-24
Attending: INTERNAL MEDICINE
Payer: COMMERCIAL

## 2023-07-24 VITALS
TEMPERATURE: 97.1 F | BODY MASS INDEX: 23.6 KG/M2 | RESPIRATION RATE: 16 BRPM | HEIGHT: 64 IN | WEIGHT: 138.23 LBS | OXYGEN SATURATION: 99 % | DIASTOLIC BLOOD PRESSURE: 62 MMHG | HEART RATE: 90 BPM | SYSTOLIC BLOOD PRESSURE: 98 MMHG

## 2023-07-24 LAB
C DIFF DNA SPEC QL NAA+PROBE: NEGATIVE
C DIFF TOX GENS STL QL NAA+PROBE: NEGATIVE

## 2023-07-24 PROCEDURE — 302449 STATCHG TRIAGE ONLY (STATISTIC)

## 2023-07-24 PROCEDURE — 87493 C DIFF AMPLIFIED PROBE: CPT

## 2023-07-24 ASSESSMENT — FIBROSIS 4 INDEX: FIB4 SCORE: 0.39

## 2023-07-24 NOTE — ED NOTES
Pt up to  stating she figured out shes in the wrong place and just needs to go to the lab to give a sample. Pt signed ama form and ambulated out.

## 2023-07-24 NOTE — ED TRIAGE NOTES
Chief Complaint   Patient presents with    Diarrhea     Patient reports stage 4 melanoma cancer and is receiving opdovo immunotherapy treatment shots every 2 weeks. Patient reports last shot a little over two weeks ago. Patient states her oncology doctor told her to come in to R/O Cdiff.

## 2023-07-26 ENCOUNTER — HOSPITAL ENCOUNTER (OUTPATIENT)
Dept: RADIOLOGY | Facility: MEDICAL CENTER | Age: 43
End: 2023-07-26
Attending: NURSE PRACTITIONER
Payer: COMMERCIAL

## 2023-07-26 DIAGNOSIS — C79.31 SECONDARY MALIGNANT NEOPLASM OF BRAIN AND SPINAL CORD (HCC): ICD-10-CM

## 2023-07-26 DIAGNOSIS — C43.62: ICD-10-CM

## 2023-07-26 DIAGNOSIS — C79.49 SECONDARY MALIGNANT NEOPLASM OF BRAIN AND SPINAL CORD (HCC): ICD-10-CM

## 2023-07-26 DIAGNOSIS — C77.3 SECONDARY MALIGNANT NEOPLASM OF BRACHIAL LYMPH NODE (HCC): ICD-10-CM

## 2023-07-26 PROCEDURE — 700117 HCHG RX CONTRAST REV CODE 255: Performed by: NURSE PRACTITIONER

## 2023-07-26 PROCEDURE — 71260 CT THORAX DX C+: CPT

## 2023-07-26 RX ADMIN — IOHEXOL 25 ML: 240 INJECTION, SOLUTION INTRATHECAL; INTRAVASCULAR; INTRAVENOUS; ORAL at 12:45

## 2023-07-26 RX ADMIN — IOHEXOL 100 ML: 350 INJECTION, SOLUTION INTRAVENOUS at 12:26

## 2023-07-28 ENCOUNTER — HOSPITAL ENCOUNTER (OUTPATIENT)
Facility: MEDICAL CENTER | Age: 43
End: 2023-07-28
Attending: INTERNAL MEDICINE
Payer: COMMERCIAL

## 2023-07-28 PROCEDURE — 83993 ASSAY FOR CALPROTECTIN FECAL: CPT

## 2023-07-29 ENCOUNTER — HOSPITAL ENCOUNTER (OUTPATIENT)
Facility: MEDICAL CENTER | Age: 43
End: 2023-07-29
Attending: INTERNAL MEDICINE
Payer: COMMERCIAL

## 2023-07-29 PROCEDURE — 87077 CULTURE AEROBIC IDENTIFY: CPT

## 2023-07-29 PROCEDURE — 87045 FECES CULTURE AEROBIC BACT: CPT

## 2023-07-29 PROCEDURE — 87899 AGENT NOS ASSAY W/OPTIC: CPT

## 2023-07-30 LAB
E COLI SXT1+2 STL IA: NORMAL
SIGNIFICANT IND 70042: NORMAL
SITE SITE: NORMAL
SOURCE SOURCE: NORMAL

## 2023-08-01 LAB — CALPROTECTIN STL-MCNT: 90 UG/G

## 2023-08-05 ENCOUNTER — APPOINTMENT (OUTPATIENT)
Dept: RADIOLOGY | Facility: MEDICAL CENTER | Age: 43
End: 2023-08-05
Attending: RADIOLOGY
Payer: COMMERCIAL

## 2023-08-05 DIAGNOSIS — C79.31 MALIGNANT MELANOMA METASTATIC TO BRAIN (HCC): ICD-10-CM

## 2023-08-05 PROCEDURE — 700111 HCHG RX REV CODE 636 W/ 250 OVERRIDE (IP)

## 2023-08-05 PROCEDURE — 70553 MRI BRAIN STEM W/O & W/DYE: CPT

## 2023-08-05 PROCEDURE — 700117 HCHG RX CONTRAST REV CODE 255: Performed by: RADIOLOGY

## 2023-08-05 PROCEDURE — A9579 GAD-BASE MR CONTRAST NOS,1ML: HCPCS | Performed by: RADIOLOGY

## 2023-08-05 RX ADMIN — GADOTERIDOL 15 ML: 279.3 INJECTION, SOLUTION INTRAVENOUS at 13:31

## 2023-08-05 RX ADMIN — HEPARIN 500 UNITS: 100 SYRINGE at 13:28

## 2023-08-22 ENCOUNTER — HOSPITAL ENCOUNTER (OUTPATIENT)
Facility: MEDICAL CENTER | Age: 43
End: 2023-08-22
Attending: NURSE PRACTITIONER
Payer: COMMERCIAL

## 2023-08-22 PROCEDURE — 83993 ASSAY FOR CALPROTECTIN FECAL: CPT

## 2023-08-25 LAB — CALPROTECTIN STL-MCNT: 249 UG/G

## 2023-09-10 ASSESSMENT — LIFESTYLE VARIABLES
SMOKING_STATUS: NO
TOBACCO_USE: NO

## 2023-09-11 ENCOUNTER — HOSPITAL ENCOUNTER (OUTPATIENT)
Dept: RADIATION ONCOLOGY | Facility: MEDICAL CENTER | Age: 43
End: 2023-09-30
Attending: RADIOLOGY
Payer: COMMERCIAL

## 2023-09-11 VITALS
WEIGHT: 132.06 LBS | SYSTOLIC BLOOD PRESSURE: 110 MMHG | OXYGEN SATURATION: 99 % | DIASTOLIC BLOOD PRESSURE: 72 MMHG | BODY MASS INDEX: 22.67 KG/M2 | HEART RATE: 84 BPM

## 2023-09-11 DIAGNOSIS — C79.31 METASTATIC CANCER TO BRAIN (HCC): ICD-10-CM

## 2023-09-11 PROCEDURE — 99212 OFFICE O/P EST SF 10 MIN: CPT | Performed by: RADIOLOGY

## 2023-09-11 PROCEDURE — 99213 OFFICE O/P EST LOW 20 MIN: CPT | Performed by: RADIOLOGY

## 2023-09-11 ASSESSMENT — FIBROSIS 4 INDEX: FIB4 SCORE: 0.39

## 2023-09-11 ASSESSMENT — PAIN SCALES - GENERAL: PAINLEVEL: NO PAIN

## 2023-09-11 NOTE — PROGRESS NOTES
Patient was seen today in clinic with Dr. August for follow.  Vitals signs and weight were obtained and pain assessment was completed.  Allergies and medications were reviewed with the patient.  Toxicities of treatment assessed.     Vitals/Pain:  Vitals:    09/11/23 0853   BP: 110/72   BP Location: Left arm   Patient Position: Sitting   BP Cuff Size: Adult   Pulse: 84   SpO2: 99%   Weight: 59.9 kg (132 lb 0.9 oz)   Pain Score: No pain        Allergies:   Augmentin [amoxicillin-pot clavulanate]    Current Medications:  Current Outpatient Medications   Medication Sig Dispense Refill    traZODone (DESYREL) 100 MG Tab Take 100 mg by mouth every evening.      ondansetron (ZOFRAN ODT) 4 MG TABLET DISPERSIBLE Take 4 mg by mouth 2 times a day as needed for Nausea.      prochlorperazine (COMPAZINE) 10 MG Tab Take 10 mg by mouth every 6 hours as needed for Nausea.      ibuprofen (MOTRIN) 200 MG Tab Take 600 mg by mouth 2 times a day as needed for Mild Pain or Moderate Pain. 3 tablets = 600 mg.      Cholecalciferol (VITAMIN D3) 2000 UNIT Cap Take 2,000 Units by mouth every morning.      B Complex Vitamins (VITAMIN B COMPLEX) Tab Take 1 Tablet by mouth every morning.      VENTOLIN  (90 Base) MCG/ACT Aero Soln inhalation aerosol Inhale 1-2 Puffs every four hours as needed for Shortness of Breath.      famotidine (PEPCID) 10 MG tablet Take 10 mg by mouth 1 time a day as needed (Heartburn).      meloxicam (MOBIC) 15 MG tablet Take 15 mg by mouth every day.      ferrous sulfate 325 (65 Fe) MG tablet Take 325 mg by mouth every day.      atorvastatin (LIPITOR) 40 MG Tab Take 1 Tablet by mouth every evening. 30 Tablet 2    baclofen (LIORESAL) 10 MG Tab Take 1 Tablet by mouth 3 times a day. 90 Tablet 2    DULoxetine (CYMBALTA) 60 MG Cap DR Particles delayed-release capsule Take 1 Capsule by mouth every evening. 30 Capsule 2    gabapentin (NEURONTIN) 800 MG tablet Take 1 Tablet by mouth 4 times a day. 90 Tablet 2    Insulin  Glargine, 2 Unit Dial, 300 UNIT/ML Solution Pen-injector Inject 50 Units under the skin every day. 9 mL 3    insulin lispro 100 UNIT/ML SC SOPN injection PEN Inject 2-10 Units under the skin 4 Times a Day,Before Meals and at Bedtime. 9 mL 2    OXcarbazepine (TRILEPTAL) 300 MG Tab Take 1 Tablet by mouth 2 times a day. 60 Tablet 2    QUEtiapine (SEROQUEL) 200 MG Tab Take 1 Tablet by mouth every evening. 60 Tablet 2    acetaminophen (TYLENOL) 500 MG Tab Take 1,000 mg by mouth 2 times a day as needed for Mild Pain or Moderate Pain.      clonazePAM (KLONOPIN) 0.5 MG Tab Take 0.5 mg by mouth 2 times a day as needed (Anxiety).       No current facility-administered medications for this encounter.         PCP:  Anthony Rothman Ass't

## 2023-09-11 NOTE — PROGRESS NOTES
RADIATION ONCOLOGY FOLLOW-UP    Patient name:  Jacque Mcintyre    Primary Physician:  Adriana Levine M.D. MRN: [id number]  CSN: [id number]   Referring physician:  Pebbles Tejada M.D.  : 1980, 42 y.o.     DATE OF SERVICE: 2023    IDENTIFICATION:   A 42 y.o. female with metastatic melanoma who presents for routine follow-up.    RADIATION SUMMARY:  Radiation Oncology          2022 3/1/2023   Aria Course Treatment Dates   Course First Treatment Date 2022    Course Last Treatment Date 2022    Aria Treatment Summary   1 Lesion SRS  Plan from Course C3 Brain SRS   Fraction  1 of 1    1 of 1   Elapsed Course Days  0 @ [id number]    0 @ [id number]   Prescribed Fraction Dose  2,200 cGy    2,200 cGy   Prescribed Total Dose  2,200 cGy    2,200 cGy   1 Lesion SRS  Reference Point from Course C3 Brain SRS   Elapsed Course Days  0 @ [id number]    0 @ [id number]   Session Dose  2,200 cGy    --   Total Dose  2,200 cGy    2,200 cGy   1 Lesion SRS CP  Reference Point from Course C3 Brain SRS   Elapsed Course Days  0 @     0 @ [id number]   Session Dose  2,619 cGy    --   Total Dose  2,619 cGy    2,619 cGy   SRT R Pariet  Plan from Course C2 Brain SRS   Fraction 3 of 3    3 of 3    Elapsed Course Days 4 @ [id number]    4 @ [id number]    Prescribed Fraction Dose 900 cGy    900 cGy    Prescribed Total Dose 2,700 cGy    2,700 cGy    SRT R Pariet  Reference Point from Course C2 Brain SRS   Elapsed Course Days 4 @ [id number]    4 @ [id number]    Session Dose 900 cGy    --    Total Dose 2,700 cGy    2,700 cGy    SRT R Pariet CP  Reference Point from Course C2 Brain SRS   Elapsed Course Days 4 @     4 @     Session Dose 1,042 cGy    --    Total Dose 3,125 cGy    3,125 cGy       Details          More values are hidden. Newest values shown. Go to activity for more data.                HISTORY OF  PRESENT ILLNESS:  Subjective    This is a 42-year-old lady with metastatic BRAF mutant malignant melanoma, stage IV, with multiple brain lesions as well as systemic disease, who had seen approximately 3 months ago initially for consideration of radiation therapy.  At that time, she underwent craniotomy and resection of her larger dominant lesions, and was treated with postoperative fractionated stereotactic radiation.  Thereafter, she was discovered to have several additional lesions and completed a second round of SRS as below.  She now continues on immunotherapy with medical oncology.     02/02/23- We are having a telephone follow-up shortly after completion of her recent course of stereotactic radiation.  She is doing tremendously well.  She is on immunotherapy now, and continues on Nivo/Ipi.  She feels quite well, has no major sensory or motor problems, no headaches, nausea or vomiting, or systemic complaints.  A follow-up MRI and CT chest abdomen pelvis has been ordered by medical oncology to be done in the next few weeks.  Her energy level is good.  Her hair has slowly grown back.  Overall, she feels quite good.     2/22/23 she continues to do relatively well.  She continues on her immunotherapy which she is tolerating without any major issues.  She had a follow-up MRI and a CT chest abdomen pelvis completed last week.  The systemic scan shows an excellent response in most of her disease is responding or stable.  However her brain MRI shows one small new 6 mm lesion that is definitively new from her prior MRI.  The remainder of her previously treated disease appears to have responded well.  She now comes to discuss stereotactic radiosurgery for this new lesion.  Symptomatically, she is doing well, she has regained nearly complete neurologic function, she is walking, she has no fine motor problems, no problems with balance, no headaches, dizziness, nausea, vomiting.     4/17/2023  She is now status post SRS to  an additional lesion that was completed 6 weeks ago.  She continues to do well.  She has no specific CNS related side effects.  She continues on immunotherapy without any major problems.  No new neurologic problems, headaches, dizziness, nausea or vomiting.     6/5/23:  She comes today to review her brain MRI and for ongoing follow-up.  She is doing tremendously well.  She reports that she has intermittent nausea that appears to be well controlled, but otherwise has no headaches, dizziness or balance difficulties, or vomiting.  She continues on immunotherapy.  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    INTERVAL HISTORY:  9/11/23 from a CNS standpoint, she continues to do well.  She has no CNS related complaints today, including headaches, nausea, vomiting, weakness or numbness.  She is walking independently now for several months.  She has returned back to work.  Unfortunately, systemically, she experienced colitis about 2 months ago that has been attributed to an immune related side effect.  She has been off all immunotherapy since July 6.  She has had multiple trials of steroids and unfortunately has not resolved, and she has a follow-up with GI today.  She sees medical oncology later this week to discuss further systemic therapy options.    PROBLEM LIST:  Patient Active Problem List   Diagnosis    Asthma    Depression    Nausea & vomiting    Sepsis (HCC)    UTI (urinary tract infection)    Lactic acidosis    Anxiety    Diarrhea    Melanoma of left upper arm (HCC)    Bipolar 1 disorder (HCC)    DM2 (diabetes mellitus, type 2) (HCC)    Appendicitis    Hypokalemia    Essential hypertension    Gastroenteritis    Abnormal CT scan, chest    Cellulitis    Leucocytosis    Malignant melanoma metastatic to brain (HCC)    Brain mass    Chronic prescription benzodiazepine use    Constipation    Adrenal insufficiency due to corticosteroid withdrawal (HCC)    Metastatic cancer to brain (HCC)     Vitamin D deficiency    Dyslipidemia    Normocytic anemia    Borderline abnormal TFTs    Thrombocytosis    Dizziness on standing    Diabetic neuropathy (HCC)    Facial paralysis/[place] palsy       CURRENT MEDICATIONS:  Current Outpatient Medications   Medication Sig Dispense Refill    traZODone (DESYREL) 100 MG Tab Take 100 mg by mouth every evening.      ondansetron (ZOFRAN ODT) 4 MG TABLET DISPERSIBLE Take 4 mg by mouth 2 times a day as needed for Nausea.      prochlorperazine (COMPAZINE) 10 MG Tab Take 10 mg by mouth every 6 hours as needed for Nausea.      ibuprofen (MOTRIN) 200 MG Tab Take 600 mg by mouth 2 times a day as needed for Mild Pain or Moderate Pain. 3 tablets = 600 mg.      Cholecalciferol (VITAMIN D3) 2000 UNIT Cap Take 2,000 Units by mouth every morning.      B Complex Vitamins (VITAMIN B COMPLEX) Tab Take 1 Tablet by mouth every morning.      VENTOLIN  (90 Base) MCG/ACT Aero Soln inhalation aerosol Inhale 1-2 Puffs every four hours as needed for Shortness of Breath.      famotidine (PEPCID) 10 MG tablet Take 10 mg by mouth 1 time a day as needed (Heartburn).      meloxicam (MOBIC) 15 MG tablet Take 15 mg by mouth every day.      ferrous sulfate 325 (65 Fe) MG tablet Take 325 mg by mouth every day.      atorvastatin (LIPITOR) 40 MG Tab Take 1 Tablet by mouth every evening. 30 Tablet 2    baclofen (LIORESAL) 10 MG Tab Take 1 Tablet by mouth 3 times a day. 90 Tablet 2    DULoxetine (CYMBALTA) 60 MG Cap DR Particles delayed-release capsule Take 1 Capsule by mouth every evening. 30 Capsule 2    gabapentin (NEURONTIN) 800 MG tablet Take 1 Tablet by mouth 4 times a day. 90 Tablet 2    Insulin Glargine, 2 Unit Dial, 300 UNIT/ML Solution Pen-injector Inject 50 Units under the skin every day. 9 mL 3    insulin lispro 100 UNIT/ML SC SOPN injection PEN Inject 2-10 Units under the skin 4 Times a Day,Before Meals and at Bedtime. 9 mL 2    OXcarbazepine (TRILEPTAL) 300 MG Tab Take 1 Tablet by mouth 2 times  a day. 60 Tablet 2    QUEtiapine (SEROQUEL) 200 MG Tab Take 1 Tablet by mouth every evening. 60 Tablet 2    acetaminophen (TYLENOL) 500 MG Tab Take 1,000 mg by mouth 2 times a day as needed for Mild Pain or Moderate Pain.      clonazePAM (KLONOPIN) 0.5 MG Tab Take 0.5 mg by mouth 2 times a day as needed (Anxiety).       No current facility-administered medications for this encounter.       ALLERGIES:  Augmentin [amoxicillin-pot clavulanate]    REVIEW OF SYSTEMS:    A complete review of system taken. Pertinent items in HPI.  All others negative.    PHYSICAL EXAM:  PERFORMANCE STATUS:       No data to display                   No data to display              /72 (BP Location: Left arm, Patient Position: Sitting, BP Cuff Size: Adult)   Pulse 84   Wt 59.9 kg (132 lb 0.9 oz)   SpO2 99%   BMI 22.67 kg/m²   Physical Exam  Constitutional:       Appearance: Normal appearance.   HENT:      Head: Normocephalic and atraumatic.   Eyes:      Extraocular Movements: Extraocular movements intact.      Conjunctiva/sclera: Conjunctivae normal.   Neurological:      General: No focal deficit present.      Mental Status: She is alert and oriented to person, place, and time.      Cranial Nerves: No cranial nerve deficit.      Sensory: No sensory deficit.      Motor: No weakness.      Gait: Gait normal.         LABORATORY DATA:   Lab Results   Component Value Date/Time    WBC 7.3 04/24/2023 08:32 AM    RBC 5.14 04/24/2023 08:32 AM    HEMOGLOBIN 14.6 04/24/2023 08:32 AM    HEMATOCRIT 45.5 04/24/2023 08:32 AM    MCV 88.5 04/24/2023 08:32 AM    MCH 28.4 04/24/2023 08:32 AM    MCHC 32.1 (L) 04/24/2023 08:32 AM    RDW 53.1 (H) 04/24/2023 08:32 AM    PLATELETCT 313 04/24/2023 08:32 AM    MPV 10.8 04/24/2023 08:32 AM    NEUTSPOLYS 74.40 (H) 04/24/2023 08:32 AM    LYMPHOCYTES 17.30 (L) 04/24/2023 08:32 AM    MONOCYTES 4.80 04/24/2023 08:32 AM    EOSINOPHILS 2.20 04/24/2023 08:32 AM    BASOPHILS 1.00 04/24/2023 08:32 AM    HYPOCHROMIA 1+  12/11/2022 06:12 AM    ANISOCYTOSIS 1+ 12/11/2022 06:12 AM      Lab Results   Component Value Date/Time    SODIUM 138 06/22/2023 01:45 PM    POTASSIUM 4.4 06/22/2023 01:45 PM    CHLORIDE 101 06/22/2023 01:45 PM    CO2 22 06/22/2023 01:45 PM    GLUCOSE 124 (H) 06/22/2023 01:45 PM    BUN 15 06/22/2023 01:45 PM    CREATININE 0.57 06/22/2023 01:45 PM         RADIOLOGY DATA:  CT-CHEST,ABDOMEN,PELVIS WITH    Result Date: 7/26/2023  1.  There has been a mixed response to therapy. 2.  There has been interval enlargement of the left mid abdominal mesenteric metastatic lymph node. 3.  Stable left external iliac node. 4.  Resolution of the left anterior abdominal wall subcutaneous nodule and right paracolic gutter nodule. 5.  No new suspicious lung nodules.    MR-BRAIN-WITH & W/O    Result Date: 8/5/2023  1.  Previous right frontal craniotomy. 2.  Multifocal areas of hemosiderin deposition consistent with treated metastases in the right frontal, parietal, and occipital lobes, as well as the left parietal lobe. 3.  No enhancing lesions in the brain parenchyma to suggest recurrent metastatic disease. 4.  Bandlike area of encephalomalacia or gliosis in the right anterior superior frontal lobe. 5.  Age-related cerebral atrophy.      IMPRESSION:    A 42 y.o. with metastatic melanoma who comes to review MRI and follow-up.    CANCER STATUS:  Disease Stable    RECOMMENDATIONS:   Jacque continues to do quite well from a CNS standpoint.  She has no major CNS related concerns today.  We will continue routine surveillance with an MRI in 3 months from her last MRI.  Unfortunately with regards to systemic treatment, it seems she is likely to be off immunotherapy, and she sees medical oncology later this week to discuss either observation or next line of systemic options.    Thank you for the opportunity to participate in her care.  If any questions or comments, please do not hesitate in calling.    Orders Placed This Encounter     MR-BRAIN-WITH & W/O

## 2023-09-20 ENCOUNTER — APPOINTMENT (RX ONLY)
Dept: URBAN - METROPOLITAN AREA CLINIC 20 | Facility: CLINIC | Age: 43
Setting detail: DERMATOLOGY
End: 2023-09-20

## 2023-09-20 DIAGNOSIS — Z87.2 PERSONAL HISTORY OF DISEASES OF THE SKIN AND SUBCUTANEOUS TISSUE: ICD-10-CM

## 2023-09-20 DIAGNOSIS — D22 MELANOCYTIC NEVI: ICD-10-CM

## 2023-09-20 DIAGNOSIS — L57.8 OTHER SKIN CHANGES DUE TO CHRONIC EXPOSURE TO NONIONIZING RADIATION: ICD-10-CM

## 2023-09-20 DIAGNOSIS — L70.8 OTHER ACNE: ICD-10-CM

## 2023-09-20 DIAGNOSIS — L40.0 PSORIASIS VULGARIS: ICD-10-CM

## 2023-09-20 DIAGNOSIS — C79.89 SECONDARY MALIGNANT NEOPLASM OF OTHER SPECIFIED SITES: ICD-10-CM

## 2023-09-20 DIAGNOSIS — D18.0 HEMANGIOMA: ICD-10-CM

## 2023-09-20 DIAGNOSIS — L72.8 OTHER FOLLICULAR CYSTS OF THE SKIN AND SUBCUTANEOUS TISSUE: ICD-10-CM

## 2023-09-20 DIAGNOSIS — B07.8 OTHER VIRAL WARTS: ICD-10-CM

## 2023-09-20 DIAGNOSIS — Z85.820 PERSONAL HISTORY OF MALIGNANT MELANOMA OF SKIN: ICD-10-CM

## 2023-09-20 DIAGNOSIS — L81.4 OTHER MELANIN HYPERPIGMENTATION: ICD-10-CM

## 2023-09-20 PROBLEM — D18.01 HEMANGIOMA OF SKIN AND SUBCUTANEOUS TISSUE: Status: ACTIVE | Noted: 2023-09-20

## 2023-09-20 PROBLEM — D22.4 MELANOCYTIC NEVI OF SCALP AND NECK: Status: ACTIVE | Noted: 2023-09-20

## 2023-09-20 PROBLEM — D22.5 MELANOCYTIC NEVI OF TRUNK: Status: ACTIVE | Noted: 2023-09-20

## 2023-09-20 PROBLEM — D22.61 MELANOCYTIC NEVI OF RIGHT UPPER LIMB, INCLUDING SHOULDER: Status: ACTIVE | Noted: 2023-09-20

## 2023-09-20 PROBLEM — D22.72 MELANOCYTIC NEVI OF LEFT LOWER LIMB, INCLUDING HIP: Status: ACTIVE | Noted: 2023-09-20

## 2023-09-20 PROCEDURE — ? DIAGNOSIS COMMENT

## 2023-09-20 PROCEDURE — ? OBSERVATION AND MEASURE

## 2023-09-20 PROCEDURE — ? COUNSELING

## 2023-09-20 PROCEDURE — ? ADDITIONAL NOTES

## 2023-09-20 PROCEDURE — 99213 OFFICE O/P EST LOW 20 MIN: CPT

## 2023-09-20 ASSESSMENT — LOCATION DETAILED DESCRIPTION DERM
LOCATION DETAILED: RIGHT SUPERIOR HELIX
LOCATION DETAILED: RIGHT ELBOW
LOCATION DETAILED: LEFT INFERIOR OCCIPITAL SCALP
LOCATION DETAILED: LEFT ANTERIOR THIGH
LOCATION DETAILED: RIGHT LATERAL ABDOMEN
LOCATION DETAILED: RIGHT ANTERIOR THIGH
LOCATION DETAILED: RIGHT INFERIOR UPPER BACK
LOCATION DETAILED: LEFT MID BACK
LOCATION DETAILED: RIGHT MID DORSAL MIDDLE FINGER
LOCATION DETAILED: RIGHT DISTAL LATERAL CALF
LOCATION DETAILED: RIGHT BUTTOCK
LOCATION DETAILED: LEFT DORSAL HAND
LOCATION DETAILED: GENITALIA
LOCATION DETAILED: LEFT ELBOW
LOCATION DETAILED: LEFT POSTERIOR ANKLE
LOCATION DETAILED: LEFT RADIAL DORSAL HAND
LOCATION DETAILED: RIGHT DORSAL HAND
LOCATION DETAILED: LEFT INFERIOR CENTRAL MALAR CHEEK
LOCATION DETAILED: LEFT PROXIMAL POSTERIOR THIGH
LOCATION DETAILED: RIGHT PROXIMAL POSTERIOR UPPER ARM
LOCATION DETAILED: RIGHT CHEEK
LOCATION DETAILED: RIGHT SUPERIOR MEDIAL BUCCAL CHEEK
LOCATION DETAILED: LEFT ANTERIOR DISTAL THIGH
LOCATION DETAILED: LEFT ANTERIOR PROXIMAL THIGH
LOCATION DETAILED: RIGHT MID-UPPER BACK
LOCATION DETAILED: LEFT SUPERIOR UPPER BACK
LOCATION DETAILED: LEFT UPPER BACK

## 2023-09-20 ASSESSMENT — LOCATION SIMPLE DESCRIPTION DERM
LOCATION SIMPLE: RIGHT MIDDLE FINGER
LOCATION SIMPLE: RIGHT LOWER EXTREMITY
LOCATION SIMPLE: RIGHT CALF
LOCATION SIMPLE: LEFT HAND
LOCATION SIMPLE: RIGHT BUTTOCK
LOCATION SIMPLE: RIGHT ELBOW
LOCATION SIMPLE: RIGHT EAR
LOCATION SIMPLE: ABDOMEN
LOCATION SIMPLE: RIGHT HAND
LOCATION SIMPLE: LEFT ELBOW
LOCATION SIMPLE: RIGHT UPPER BACK
LOCATION SIMPLE: POSTERIOR SCALP
LOCATION SIMPLE: LEFT LOWER EXTREMITY
LOCATION SIMPLE: LEFT ANKLE
LOCATION SIMPLE: LEFT UPPER BACK
LOCATION SIMPLE: BACK
LOCATION SIMPLE: RIGHT CHEEK
LOCATION SIMPLE: GENITALIA
LOCATION SIMPLE: RIGHT POSTERIOR UPPER ARM
LOCATION SIMPLE: LEFT POSTERIOR THIGH
LOCATION SIMPLE: LEFT CHEEK
LOCATION SIMPLE: LEFT THIGH

## 2023-09-20 ASSESSMENT — LOCATION ZONE DERM
LOCATION ZONE: EAR
LOCATION ZONE: ARM
LOCATION ZONE: TRUNK
LOCATION ZONE: HAND
LOCATION ZONE: SCALP
LOCATION ZONE: FACE
LOCATION ZONE: FINGER
LOCATION ZONE: LEG

## 2023-09-20 NOTE — PROCEDURE: ADDITIONAL NOTES
Additional Notes: 1mm x 1mm, no change
Detail Level: Simple
Render Risk Assessment In Note?: no
Additional Notes: No tax at this time
Additional Notes: Plan\\n\\n1.  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  Advised  slow taper of oral prednisone to avoid rebound flare of psoriasis. \\n2.  However if flare occurs recommend UVB phototherapy as a possible form of treatment.  \\n\\nPt has clobetasol and currently using.  Will let me know if not working which I will change steroid and add Vit D oint.
Additional Notes: Plan\\n\\n1. Discussed with pt consider follow up with [facility] clinic to ensure from a dermatological stand point is on point. \\n2. Continue 3 mth FBE

## 2023-09-20 NOTE — PROCEDURE: DIAGNOSIS COMMENT
Render Risk Assessment In Note?: no
Comment: 5/31/22; per pt PET showed intense uptake to left hilum concern for metastasis.\\nPt had biopsy to lymph per pt no sign of cancer.\\n\\n-10/2022; 5 masses, largest 3.4cm x 3 cm\\n\\n-10/2022; CT-CAP, Several nodules in lung, thyroid nodule, abdominal/peritoneal  with several nodules, and questionable new lesion kidney possible cyst.\\n\\n-10/2022; Went to ER for stroke like symptoms, CT/MRI showed multiple locations (brain, bilateral lung, thyroid, kidney, peritoneal) masses consistent with metastatic melanoma.  Managed by Dr. Caraballo, treatment radiation, Yervoy & Opdivo.\\n\\n-started 11/2022; Pt well managed w/ palliative immunotherapy w/ Opdivo and Yervoy\\n\\n-2/2023; CT chest showed + response\\n-2/2023; MRI brain showed 6 mm lesion parietal lobe\\n-3/2023; 1 dose stereotactic radiation brain\\n-5/2023; MRI brain no evidence of progression\\n-7/2023; stopped immunotherapy 2/2 diarrhea, nausea, and vomiting
Detail Level: Simple
Comment: Excised in 2016; 2.8mm, re-excised 2017 (1+ node,18 neg), tx w/ Ileana [phone], Lelo 2B // no metastasis from [id number]\\n\\n-Refer to metastasis note 2022
Comment: Biopsy proven; all sites excised\\nRight buttock concern early evolving MIS
Comment: Biopsy proven mild to moderate 10/2017; monitor for repigmentation
Comment: Biopsy x 2; [id number], proven benign nevi, closely monitor

## 2023-09-20 NOTE — PROCEDURE: COUNSELING
Detail Level: Detailed
Show Follow-Up Variable: Yes
Quality 224: Stage 0-Iic Melanoma: Overutilization Of Imaging Studies For Only Stage 0-Iic Melanoma: None of the following diagnostic imaging studies ordered: chest X-ray, CT, Ultrasound, MRI, PET, or nuclear medicine scans (ML)
Quality 137: Melanoma: Continuity Of Care - Recall System: Patient information entered into a recall system that includes: target date for the next exam specified AND a process to follow up with patients regarding missed or unscheduled appointments
When Should The Patient Follow-Up For Their Next Full-Body Skin Exam?: 3 Months
Dapsone Counseling: I discussed with the patient the risks of dapsone including but not limited to hemolytic anemia, agranulocytosis, rashes, methemoglobinemia, kidney failure, peripheral neuropathy, headaches, GI upset, and liver toxicity.  Patients who start dapsone require monitoring including baseline LFTs and weekly CBCs for the first month, then every month thereafter.  The patient verbalized understanding of the proper use and possible adverse effects of dapsone.  All of the patient's questions and concerns were addressed.
Erythromycin Counseling:  I discussed with the patient the risks of erythromycin including but not limited to GI upset, allergic reaction, drug rash, diarrhea, increase in liver enzymes, and yeast infections.
Spironolactone Pregnancy And Lactation Text: This medication can cause feminization of the male fetus and should be avoided during pregnancy. The active metabolite is also found in breast milk.
Tazorac Pregnancy And Lactation Text: This medication is not safe during pregnancy. It is unknown if this medication is excreted in breast milk.
High Dose Vitamin A Counseling: Side effects reviewed, pt to contact office should one occur.
Use Enhanced Medication Counseling?: No
Benzoyl Peroxide Pregnancy And Lactation Text: This medication is Pregnancy Category C. It is unknown if benzoyl peroxide is excreted in breast milk.
Bactrim Pregnancy And Lactation Text: This medication is Pregnancy Category D and is known to cause fetal risk.  It is also excreted in breast milk.
Azithromycin Counseling:  I discussed with the patient the risks of azithromycin including but not limited to GI upset, allergic reaction, drug rash, diarrhea, and yeast infections.
Dapsone Pregnancy And Lactation Text: This medication is Pregnancy Category C and is not considered safe during pregnancy or breast feeding.
Sarecycline Counseling: Patient advised regarding possible photosensitivity and discoloration of the teeth, skin, lips, tongue and gums.  Patient instructed to avoid sunlight, if possible.  When exposed to sunlight, patients should wear protective clothing, sunglasses, and sunscreen.  The patient was instructed to call the office immediately if the following severe adverse effects occur:  hearing changes, easy bruising/bleeding, severe headache, or vision changes.  The patient verbalized understanding of the proper use and possible adverse effects of sarecycline.  All of the patient's questions and concerns were addressed.
Topical Sulfur Applications Pregnancy And Lactation Text: This medication is Pregnancy Category C and has an unknown safety profile during pregnancy. It is unknown if this topical medication is excreted in breast milk.
Topical Retinoid counseling:  Patient advised to apply a pea-sized amount only at bedtime and wait 30 minutes after washing their face before applying.  If too drying, patient may add a non-comedogenic moisturizer. The patient verbalized understanding of the proper use and possible adverse effects of retinoids.  All of the patient's questions and concerns were addressed.
Erythromycin Pregnancy And Lactation Text: This medication is Pregnancy Category B and is considered safe during pregnancy. It is also excreted in breast milk.
Tetracycline Counseling: Patient counseled regarding possible photosensitivity and increased risk for sunburn.  Patient instructed to avoid sunlight, if possible.  When exposed to sunlight, patients should wear protective clothing, sunglasses, and sunscreen.  The patient was instructed to call the office immediately if the following severe adverse effects occur:  hearing changes, easy bruising/bleeding, severe headache, or vision changes.  The patient verbalized understanding of the proper use and possible adverse effects of tetracycline.  All of the patient's questions and concerns were addressed. Patient understands to avoid pregnancy while on therapy due to potential birth defects.
Birth Control Pills Counseling: Birth Control Pill Counseling: I discussed with the patient the potential side effects of OCPs including but not limited to increased risk of stroke, heart attack, thrombophlebitis, deep venous thrombosis, hepatic adenomas, breast changes, GI upset, headaches, and depression.  The patient verbalized understanding of the proper use and possible adverse effects of OCPs. All of the patient's questions and concerns were addressed.
High Dose Vitamin A Pregnancy And Lactation Text: High dose vitamin A therapy is contraindicated during pregnancy and breast feeding.
Topical Clindamycin Counseling: Patient counseled that this medication may cause skin irritation or allergic reactions.  In the event of skin irritation, the patient was advised to reduce the amount of the drug applied or use it less frequently.   The patient verbalized understanding of the proper use and possible adverse effects of clindamycin.  All of the patient's questions and concerns were addressed.
Tetracycline Pregnancy And Lactation Text: This medication is Pregnancy Category D and not consider safe during pregnancy. It is also excreted in breast milk.
Azithromycin Pregnancy And Lactation Text: This medication is considered safe during pregnancy and is also secreted in breast milk.
Doxycycline Counseling:  Patient counseled regarding possible photosensitivity and increased risk for sunburn.  Patient instructed to avoid sunlight, if possible.  When exposed to sunlight, patients should wear protective clothing, sunglasses, and sunscreen.  The patient was instructed to call the office immediately if the following severe adverse effects occur:  hearing changes, easy bruising/bleeding, severe headache, or vision changes.  The patient verbalized understanding of the proper use and possible adverse effects of doxycycline.  All of the patient's questions and concerns were addressed.
Isotretinoin Counseling: Patient should get monthly blood tests, not donate blood, not drive at night if vision affected, not share medication, and not undergo elective surgery for 6 months after tx completed. Side effects reviewed, pt to contact office should one occur.
Topical Retinoid Pregnancy And Lactation Text: This medication is Pregnancy Category C. It is unknown if this medication is excreted in breast milk.
Birth Control Pills Pregnancy And Lactation Text: This medication should be avoided if pregnant and for the first 30 days post-partum.
Minocycline Counseling: Patient advised regarding possible photosensitivity and discoloration of the teeth, skin, lips, tongue and gums.  Patient instructed to avoid sunlight, if possible.  When exposed to sunlight, patients should wear protective clothing, sunglasses, and sunscreen.  The patient was instructed to call the office immediately if the following severe adverse effects occur:  hearing changes, easy bruising/bleeding, severe headache, or vision changes.  The patient verbalized understanding of the proper use and possible adverse effects of minocycline.  All of the patient's questions and concerns were addressed.
Detail Level: Zone
Topical Clindamycin Pregnancy And Lactation Text: This medication is Pregnancy Category B and is considered safe during pregnancy. It is unknown if it is excreted in breast milk.
Doxycycline Pregnancy And Lactation Text: This medication is Pregnancy Category D and not consider safe during pregnancy. It is also excreted in breast milk but is considered safe for shorter treatment courses.
Spironolactone Counseling: Patient advised regarding risks of diarrhea, abdominal pain, hyperkalemia, birth defects (for female patients), liver toxicity and renal toxicity. The patient may need blood work to monitor liver and kidney function and potassium levels while on therapy. The patient verbalized understanding of the proper use and possible adverse effects of spironolactone.  All of the patient's questions and concerns were addressed.
Benzoyl Peroxide Counseling: Patient counseled that medicine may cause skin irritation and bleach clothing.  In the event of skin irritation, the patient was advised to reduce the amount of the drug applied or use it less frequently.   The patient verbalized understanding of the proper use and possible adverse effects of benzoyl peroxide.  All of the patient's questions and concerns were addressed.
Bactrim Counseling:  I discussed with the patient the risks of sulfa antibiotics including but not limited to GI upset, allergic reaction, drug rash, diarrhea, dizziness, photosensitivity, and yeast infections.  Rarely, more serious reactions can occur including but not limited to aplastic anemia, agranulocytosis, methemoglobinemia, blood dyscrasias, liver or kidney failure, lung infiltrates or desquamative/blistering drug rashes.
Isotretinoin Pregnancy And Lactation Text: This medication is Pregnancy Category X and is considered extremely dangerous during pregnancy. It is unknown if it is excreted in breast milk.
Topical Sulfur Applications Counseling: Topical Sulfur Counseling: Patient counseled that this medication may cause skin irritation or allergic reactions.  In the event of skin irritation, the patient was advised to reduce the amount of the drug applied or use it less frequently.   The patient verbalized understanding of the proper use and possible adverse effects of topical sulfur application.  All of the patient's questions and concerns were addressed.
Tazorac Counseling:  Patient advised that medication is irritating and drying.  Patient may need to apply sparingly and wash off after an hour before eventually leaving it on overnight.  The patient verbalized understanding of the proper use and possible adverse effects of tazorac.  All of the patient's questions and concerns were addressed.
Detail Level: Simple

## 2023-09-23 ENCOUNTER — HOSPITAL ENCOUNTER (OUTPATIENT)
Dept: LAB | Facility: MEDICAL CENTER | Age: 43
End: 2023-09-23
Attending: FAMILY MEDICINE
Payer: COMMERCIAL

## 2023-09-23 LAB
ALBUMIN SERPL BCP-MCNC: 4.3 G/DL (ref 3.2–4.9)
ALBUMIN/GLOB SERPL: 1.9 G/DL
ALP SERPL-CCNC: 76 U/L (ref 30–99)
ALT SERPL-CCNC: 18 U/L (ref 2–50)
ANION GAP SERPL CALC-SCNC: 15 MMOL/L (ref 7–16)
AST SERPL-CCNC: 11 U/L (ref 12–45)
BASOPHILS # BLD AUTO: 0.2 % (ref 0–1.8)
BASOPHILS # BLD: 0.03 K/UL (ref 0–0.12)
BILIRUB SERPL-MCNC: 0.3 MG/DL (ref 0.1–1.5)
BUN SERPL-MCNC: 15 MG/DL (ref 8–22)
CALCIUM ALBUM COR SERPL-MCNC: 9.6 MG/DL (ref 8.5–10.5)
CALCIUM SERPL-MCNC: 9.8 MG/DL (ref 8.5–10.5)
CHLORIDE SERPL-SCNC: 101 MMOL/L (ref 96–112)
CHOLEST SERPL-MCNC: 212 MG/DL (ref 100–199)
CO2 SERPL-SCNC: 26 MMOL/L (ref 20–33)
CREAT SERPL-MCNC: 0.57 MG/DL (ref 0.5–1.4)
CREAT UR-MCNC: 167.31 MG/DL
EOSINOPHIL # BLD AUTO: 0.02 K/UL (ref 0–0.51)
EOSINOPHIL NFR BLD: 0.1 % (ref 0–6.9)
ERYTHROCYTE [DISTWIDTH] IN BLOOD BY AUTOMATED COUNT: 53.6 FL (ref 35.9–50)
EST. AVERAGE GLUCOSE BLD GHB EST-MCNC: 166 MG/DL
FASTING STATUS PATIENT QL REPORTED: NORMAL
GFR SERPLBLD CREATININE-BSD FMLA CKD-EPI: 114 ML/MIN/1.73 M 2
GLOBULIN SER CALC-MCNC: 2.3 G/DL (ref 1.9–3.5)
GLUCOSE SERPL-MCNC: 142 MG/DL (ref 65–99)
HBA1C MFR BLD: 7.4 % (ref 4–5.6)
HCT VFR BLD AUTO: 40.1 % (ref 37–47)
HDLC SERPL-MCNC: 97 MG/DL
HGB BLD-MCNC: 12.8 G/DL (ref 12–16)
IMM GRANULOCYTES # BLD AUTO: 0.06 K/UL (ref 0–0.11)
IMM GRANULOCYTES NFR BLD AUTO: 0.4 % (ref 0–0.9)
LDLC SERPL CALC-MCNC: 63 MG/DL
LYMPHOCYTES # BLD AUTO: 2.22 K/UL (ref 1–4.8)
LYMPHOCYTES NFR BLD: 16.2 % (ref 22–41)
MCH RBC QN AUTO: 27.4 PG (ref 27–33)
MCHC RBC AUTO-ENTMCNC: 31.9 G/DL (ref 32.2–35.5)
MCV RBC AUTO: 85.9 FL (ref 81.4–97.8)
MICROALBUMIN UR-MCNC: <1.2 MG/DL
MICROALBUMIN/CREAT UR: NORMAL MG/G (ref 0–30)
MONOCYTES # BLD AUTO: 1 K/UL (ref 0–0.85)
MONOCYTES NFR BLD AUTO: 7.3 % (ref 0–13.4)
NEUTROPHILS # BLD AUTO: 10.34 K/UL (ref 1.82–7.42)
NEUTROPHILS NFR BLD: 75.8 % (ref 44–72)
NRBC # BLD AUTO: 0 K/UL
NRBC BLD-RTO: 0 /100 WBC (ref 0–0.2)
PLATELET # BLD AUTO: 441 K/UL (ref 164–446)
PMV BLD AUTO: 10.3 FL (ref 9–12.9)
POTASSIUM SERPL-SCNC: 4 MMOL/L (ref 3.6–5.5)
PROT SERPL-MCNC: 6.6 G/DL (ref 6–8.2)
RBC # BLD AUTO: 4.67 M/UL (ref 4.2–5.4)
SODIUM SERPL-SCNC: 142 MMOL/L (ref 135–145)
TRIGL SERPL-MCNC: 258 MG/DL (ref 0–149)
WBC # BLD AUTO: 13.7 K/UL (ref 4.8–10.8)

## 2023-09-23 PROCEDURE — 82570 ASSAY OF URINE CREATININE: CPT

## 2023-09-23 PROCEDURE — 80061 LIPID PANEL: CPT

## 2023-09-23 PROCEDURE — 80053 COMPREHEN METABOLIC PANEL: CPT

## 2023-09-23 PROCEDURE — 85025 COMPLETE CBC W/AUTO DIFF WBC: CPT

## 2023-09-23 PROCEDURE — 82043 UR ALBUMIN QUANTITATIVE: CPT

## 2023-09-23 PROCEDURE — 83036 HEMOGLOBIN GLYCOSYLATED A1C: CPT

## 2023-09-23 PROCEDURE — 36415 COLL VENOUS BLD VENIPUNCTURE: CPT

## 2023-09-24 ENCOUNTER — HOSPITAL ENCOUNTER (OUTPATIENT)
Facility: MEDICAL CENTER | Age: 43
End: 2023-09-24
Attending: INTERNAL MEDICINE
Payer: COMMERCIAL

## 2023-09-24 PROCEDURE — 82653 EL-1 FECAL QUANTITATIVE: CPT

## 2023-09-27 LAB — ELASTASE PANC STL-MCNT: 317 UG/G

## 2023-11-11 ENCOUNTER — HOSPITAL ENCOUNTER (OUTPATIENT)
Dept: RADIOLOGY | Facility: MEDICAL CENTER | Age: 43
End: 2023-11-11
Attending: RADIOLOGY
Payer: COMMERCIAL

## 2023-11-11 DIAGNOSIS — C79.31 METASTATIC CANCER TO BRAIN (HCC): ICD-10-CM

## 2023-11-11 PROCEDURE — 70553 MRI BRAIN STEM W/O & W/DYE: CPT

## 2023-11-11 PROCEDURE — 700117 HCHG RX CONTRAST REV CODE 255: Performed by: RADIOLOGY

## 2023-11-11 PROCEDURE — A9579 GAD-BASE MR CONTRAST NOS,1ML: HCPCS | Performed by: RADIOLOGY

## 2023-11-11 RX ADMIN — GADOTERIDOL 12 ML: 279.3 INJECTION, SOLUTION INTRAVENOUS at 08:12

## 2023-11-12 ASSESSMENT — LIFESTYLE VARIABLES
TOBACCO_USE: YES
SMOKING_YEARS: 10
SMOKING_STATUS: YES

## 2023-11-13 ENCOUNTER — HOSPITAL ENCOUNTER (OUTPATIENT)
Dept: RADIATION ONCOLOGY | Facility: MEDICAL CENTER | Age: 43
End: 2023-11-30
Attending: RADIOLOGY
Payer: COMMERCIAL

## 2023-11-13 ENCOUNTER — HOSPITAL ENCOUNTER (OUTPATIENT)
Dept: RADIOLOGY | Facility: MEDICAL CENTER | Age: 43
End: 2023-11-13

## 2023-11-13 VITALS
BODY MASS INDEX: 20.74 KG/M2 | SYSTOLIC BLOOD PRESSURE: 111 MMHG | HEART RATE: 108 BPM | DIASTOLIC BLOOD PRESSURE: 88 MMHG | OXYGEN SATURATION: 100 % | WEIGHT: 120.81 LBS

## 2023-11-13 DIAGNOSIS — C79.31 METASTATIC CANCER TO BRAIN (HCC): ICD-10-CM

## 2023-11-13 DIAGNOSIS — C79.31 MALIGNANT MELANOMA METASTATIC TO BRAIN (HCC): ICD-10-CM

## 2023-11-13 PROCEDURE — 99212 OFFICE O/P EST SF 10 MIN: CPT | Performed by: RADIOLOGY

## 2023-11-13 PROCEDURE — 99214 OFFICE O/P EST MOD 30 MIN: CPT | Performed by: RADIOLOGY

## 2023-11-13 ASSESSMENT — FIBROSIS 4 INDEX: FIB4 SCORE: 0.26

## 2023-11-13 NOTE — PROGRESS NOTES
[facility]  Neurosurgery Clinic Note        Patient: Jacque Mcintyre MRN: [id number]     Date of visit: 11/13/2023     Subjective:  The patient is a 42 y.o. female presenting for follow-up of melanoma metastatic to the brain. In review, she was diagnosed in October 2022 with metastatic melanoma having discovered multiple intracranial metastases. She had significant left foot and leg weakness at the time of presentation.  She underwent a right frontal craniotomy for resection of 3 dominant lesions on 10/6/2022 ([facility]).  She recovered well from surgery, then underwent stereotactic radiosurgery for a number of small additional metastases.  She had been followed by Dr. August in clinic, and was found to have a new small metastasis, for which she underwent SRS. She has been undergoing immunotherapy. She presents today for 5 month MRI review, last visit 6/5/23.     She has been feeling very poorly since July when she developed autoimmune colitis. She has been off immunotherapy since then. She is nauseated, losing weight. Cannot work due to her symptoms. Showed progression on PET in October for abdominal lymph node and renal lesion.     Medications:  Current Medications          Current Outpatient Medications   Medication Sig Dispense Refill    traZODone (DESYREL) 100 MG Tab Take 100 mg by mouth every evening.        ondansetron (ZOFRAN ODT) 4 MG TABLET DISPERSIBLE Take 4 mg by mouth 2 times a day as needed for Nausea.        prochlorperazine (COMPAZINE) 10 MG Tab Take 10 mg by mouth every 6 hours as needed for Nausea.        ibuprofen (MOTRIN) 200 MG Tab Take 600 mg by mouth 2 times a day as needed for Mild Pain or Moderate Pain. 3 tablets = 600 mg.        Cholecalciferol (VITAMIN D3) 2000 UNIT Cap Take 2,000 Units by mouth every morning.        B Complex Vitamins (VITAMIN B COMPLEX) Tab Take 1 Tablet by mouth every morning.        VENTOLIN  (90 Base) MCG/ACT Aero Soln inhalation aerosol  Inhale 1-2 Puffs every four hours as needed for Shortness of Breath.        famotidine (PEPCID) 10 MG tablet Take 10 mg by mouth 1 time a day as needed (Heartburn).        meloxicam (MOBIC) 15 MG tablet Take 15 mg by mouth every day.        ferrous sulfate 325 (65 Fe) MG tablet Take 325 mg by mouth every day.        atorvastatin (LIPITOR) 40 MG Tab Take 1 Tablet by mouth every evening. 30 Tablet 2    baclofen (LIORESAL) 10 MG Tab Take 1 Tablet by mouth 3 times a day. 90 Tablet 2    DULoxetine (CYMBALTA) 60 MG Cap DR Particles delayed-release capsule Take 1 Capsule by mouth every evening. 30 Capsule 2    gabapentin (NEURONTIN) 800 MG tablet Take 1 Tablet by mouth 4 times a day. 90 Tablet 2    Insulin Glargine, 2 Unit Dial, 300 UNIT/ML Solution Pen-injector Inject 50 Units under the skin every day. 9 mL 3    insulin lispro 100 UNIT/ML SC SOPN injection PEN Inject 2-10 Units under the skin 4 Times a Day,Before Meals and at Bedtime. 9 mL 2    OXcarbazepine (TRILEPTAL) 300 MG Tab Take 1 Tablet by mouth 2 times a day. 60 Tablet 2    QUEtiapine (SEROQUEL) 200 MG Tab Take 1 Tablet by mouth every evening. 60 Tablet 2    acetaminophen (TYLENOL) 500 MG Tab Take 1,000 mg by mouth 2 times a day as needed for Mild Pain or Moderate Pain.        clonazePAM (KLONOPIN) 0.5 MG Tab Take 0.5 mg by mouth 2 times a day as needed (Anxiety).          No current facility-administered medications for this encounter.               Physical Examination:    No acute distress. In good spirits.     Neurological  Awake, alert, oriented to person, place and time.  Pupils equally round and reactive to light, 4 to 3mm.  Extraocular movements full.  Facial sensation intact to light touch.  Face symmetric, HB 1.  Palate rises symmetrically.  Tongue is midline.  Shoulder shrug 5/5.  No pronator drift.  Sensation intact to light touch in all 4 extremities.  Excellent strength in all four extremities, perhaps 4+ in left plantarflexion and left triceps.      Imaging:     MRI brain with and without contrast dated 6/5/2023 was independently reviewed in detail,  and my interpretation is as follows. Unchanged nodular enhancement in right frontal perisylvian metastatic lesion. No new enhancing lesions. Other lesions are improved.    MRI brain with and without contrast dated 6/5/2023 was independently reviewed in detail,  and my interpretation is as follows. 1.  Minimal unchanged faint enhancement in the small left parietal cortical lesion. Other previously identified treated lesions appear unchanged without significant enhancement. No new abnormal enhancement to suggest residual or recurrent intracranial   metastatic disease.  2.  Status post right frontal craniotomy.         Assessment and Plan:     Jacque Mcintyre is a 42 y.o. female presenting with metastatic melanoma to the brain. She is doing very well with good response to her brain metastatic lesions to radiation, and no new lesions currently.     Recommendations:  - MRI in 3 months  - f/u with oncologist re: targeted therapy.     Pebbles Tejada M.D.  [facility]  [address] [place] [address]  [place] [address]  [phone]     A total of 35 minutes were spent in the evaluation, examination, coordination of care, review of labs and imaging of this patient. I spent >50% of time face-to-face on patient counseling.

## 2023-11-13 NOTE — PROGRESS NOTES
Patient was seen today in clinic with Dr. August for follow up.  Vitals signs and weight were obtained and pain assessment was completed.  Allergies and medications were reviewed with the patient.  Toxicities of treatment assessed.     Vitals/Pain:  Vitals:    11/13/23 0853   BP: 111/88   BP Location: Right arm   Patient Position: Sitting   BP Cuff Size: Adult   Pulse: (!) 108   SpO2: 100%   Weight: 54.8 kg (120 lb 13 oz)            Allergies:   Augmentin [amoxicillin-pot clavulanate]    Current Medications:  Current Outpatient Medications   Medication Sig Dispense Refill    traZODone (DESYREL) 100 MG Tab Take 100 mg by mouth every evening.      ondansetron (ZOFRAN ODT) 4 MG TABLET DISPERSIBLE Take 4 mg by mouth 2 times a day as needed for Nausea.      prochlorperazine (COMPAZINE) 10 MG Tab Take 10 mg by mouth every 6 hours as needed for Nausea.      ibuprofen (MOTRIN) 200 MG Tab Take 600 mg by mouth 2 times a day as needed for Mild Pain or Moderate Pain. 3 tablets = 600 mg.      Cholecalciferol (VITAMIN D3) 2000 UNIT Cap Take 2,000 Units by mouth every morning.      B Complex Vitamins (VITAMIN B COMPLEX) Tab Take 1 Tablet by mouth every morning.      VENTOLIN  (90 Base) MCG/ACT Aero Soln inhalation aerosol Inhale 1-2 Puffs every four hours as needed for Shortness of Breath.      famotidine (PEPCID) 10 MG tablet Take 10 mg by mouth 1 time a day as needed (Heartburn).      meloxicam (MOBIC) 15 MG tablet Take 15 mg by mouth every day.      ferrous sulfate 325 (65 Fe) MG tablet Take 325 mg by mouth every day.      atorvastatin (LIPITOR) 40 MG Tab Take 1 Tablet by mouth every evening. 30 Tablet 2    baclofen (LIORESAL) 10 MG Tab Take 1 Tablet by mouth 3 times a day. 90 Tablet 2    DULoxetine (CYMBALTA) 60 MG Cap DR Particles delayed-release capsule Take 1 Capsule by mouth every evening. 30 Capsule 2    gabapentin (NEURONTIN) 800 MG tablet Take 1 Tablet by mouth 4 times a day. 90 Tablet 2    Insulin Glargine, 2  Unit Dial, 300 UNIT/ML Solution Pen-injector Inject 50 Units under the skin every day. 9 mL 3    insulin lispro 100 UNIT/ML SC SOPN injection PEN Inject 2-10 Units under the skin 4 Times a Day,Before Meals and at Bedtime. 9 mL 2    OXcarbazepine (TRILEPTAL) 300 MG Tab Take 1 Tablet by mouth 2 times a day. 60 Tablet 2    QUEtiapine (SEROQUEL) 200 MG Tab Take 1 Tablet by mouth every evening. 60 Tablet 2    acetaminophen (TYLENOL) 500 MG Tab Take 1,000 mg by mouth 2 times a day as needed for Mild Pain or Moderate Pain.      clonazePAM (KLONOPIN) 0.5 MG Tab Take 0.5 mg by mouth 2 times a day as needed (Anxiety).       No current facility-administered medications for this encounter.         PCP:  Anthony Rothman Ass't

## 2023-11-13 NOTE — PROGRESS NOTES
RADIATION ONCOLOGY FOLLOW-UP    Patient name:  Jacque Mcintyre    Primary Physician:  Adriana Levine M.D. MRN: [id number]  [facility]: [id number]   Referring physician:  Pebbles Tejada M.D.  : 1980, 42 y.o.     DATE OF SERVICE: 2023    IDENTIFICATION:   A 42 y.o. female with metastatic melanoma who comes for ongoing follow-up    RADIATION SUMMARY:  Radiation Oncology          2022 3/1/2023   Aria Course Treatment Dates   Course First Treatment Date 2022    Course Last Treatment Date 2022    Aria Treatment Summary   1 Lesion SRS  Plan from Course C3 Brain SRS   Fraction  1 of 1    1 of 1   Elapsed Course Days  0 @ [id number]    0 @ [id number]   Prescribed Fraction Dose  2,200 cGy    2,200 cGy   Prescribed Total Dose  2,200 cGy    2,200 cGy   1 Lesion SRS  Reference Point from Course C3 Brain SRS   Elapsed Course Days  0 @ [id number]    0 @ [id number]   Session Dose  2,200 cGy    --   Total Dose  2,200 cGy    2,200 cGy   1 Lesion SRS CP  Reference Point from Course C3 Brain SRS   Elapsed Course Days  0 @     0 @ [id number]   Session Dose  2,619 cGy    --   Total Dose  2,619 cGy    2,619 cGy   SRT R Pariet  Plan from Course C2 Brain SRS   Fraction 3 of 3    3 of 3    Elapsed Course Days 4 @ [id number]    4 @ [id number]    Prescribed Fraction Dose 900 cGy    900 cGy    Prescribed Total Dose 2,700 cGy    2,700 cGy    SRT R Pariet  Reference Point from Course C2 Brain SRS   Elapsed Course Days 4 @ [id number]    4 @ [id number]    Session Dose 900 cGy    --    Total Dose 2,700 cGy    2,700 cGy    SRT R Pariet CP  Reference Point from Course C2 Brain SRS   Elapsed Course Days 4 @     4 @     Session Dose 1,042 cGy    --    Total Dose 3,125 cGy    3,125 cGy       Details          More values are hidden. Newest values shown. Go to activity for more data.                HISTORY OF  PRESENT ILLNESS:  Subjective    This is a 42-year-old lady with metastatic BRAF mutant malignant melanoma, stage IV, with multiple brain lesions as well as systemic disease, who had seen approximately 3 months ago initially for consideration of radiation therapy.  At that time, she underwent craniotomy and resection of her larger dominant lesions, and was treated with postoperative fractionated stereotactic radiation.  Thereafter, she was discovered to have several additional lesions and completed a second round of SRS as below.  She now continues on immunotherapy with medical oncology.     02/02/23- We are having a telephone follow-up shortly after completion of her recent course of stereotactic radiation.  She is doing tremendously well.  She is on immunotherapy now, and continues on Nivo/Ipi.  She feels quite well, has no major sensory or motor problems, no headaches, nausea or vomiting, or systemic complaints.  A follow-up MRI and CT chest abdomen pelvis has been ordered by medical oncology to be done in the next few weeks.  Her energy level is good.  Her hair has slowly grown back.  Overall, she feels quite good.     2/22/23 she continues to do relatively well.  She continues on her immunotherapy which she is tolerating without any major issues.  She had a follow-up MRI and a CT chest abdomen pelvis completed last week.  The systemic scan shows an excellent response in most of her disease is responding or stable.  However her brain MRI shows one small new 6 mm lesion that is definitively new from her prior MRI.  The remainder of her previously treated disease appears to have responded well.  She now comes to discuss stereotactic radiosurgery for this new lesion.  Symptomatically, she is doing well, she has regained nearly complete neurologic function, she is walking, she has no fine motor problems, no problems with balance, no headaches, dizziness, nausea, vomiting.     4/17/2023  She is now status post SRS to  an additional lesion that was completed 6 weeks ago.  She continues to do well.  She has no specific CNS related side effects.  She continues on immunotherapy without any major problems.  No new neurologic problems, headaches, dizziness, nausea or vomiting.     6/5/23:  She comes today to review her brain MRI and for ongoing follow-up.  She is doing tremendously well.  She reports that she has intermittent nausea that appears to be well controlled, but otherwise has no headaches, dizziness or balance difficulties, or vomiting.  She continues on immunotherapy.  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    9/11/23 from a CNS standpoint, she continues to do well.  She has no CNS related complaints today, including headaches, nausea, vomiting, weakness or numbness.  She is walking independently now for several months.  She has returned back to work.  Unfortunately, systemically, she experienced colitis about 2 months ago that has been attributed to an immune related side effect.  She has been off all immunotherapy since July 6.  She has had multiple trials of steroids and unfortunately has not resolved, and she has a follow-up with GI today.  She sees medical oncology later this week to discuss further systemic therapy options.     INTERVAL HISTORY:  11/13/2023 we are seeing Ms. Mcintyre. today for ongoing follow-up.  Since I last saw her, she had a PET scan performed by medical oncology.  I reviewed the report, images not currently available today, that unfortunately shows progression of disease with a left abdominal lymph node metastasis near the kidney measuring about 3.3 cm, as well as another lesion associated with a small bowel loop in the left lower abdomen now measuring about 5.4 cm.  Unfortunately, she is having significant immune related colitis, and has been off immunotherapy for several months.  She has had trials with steroids, but she continues to have diarrhea at least 4-5 times a day.   This is her main problem now, and the plan is likely to transition her to BRAF directed therapy.  She has a follow-up with Dr. Reed in the next few days.  With regards to CNS, she had a follow-up MRI performed a few days ago that I reviewed with Dr. Tejada today in clinic, and essentially shows stable findings.      PROBLEM LIST:  Patient Active Problem List   Diagnosis    Asthma    Depression    Nausea & vomiting    Sepsis (HCC)    UTI (urinary tract infection)    Lactic acidosis    Anxiety    Diarrhea    Melanoma of left upper arm (HCC)    Bipolar 1 disorder (HCC)    DM2 (diabetes mellitus, type 2) (HCC)    Appendicitis    Hypokalemia    Essential hypertension    Gastroenteritis    Abnormal CT scan, chest    Cellulitis    Leucocytosis    Malignant melanoma metastatic to brain (HCC)    Brain mass    Chronic prescription benzodiazepine use    Constipation    Adrenal insufficiency due to corticosteroid withdrawal (HCC)    Metastatic cancer to brain (HCC)    Vitamin D deficiency    Dyslipidemia    Normocytic anemia    Borderline abnormal TFTs    Thrombocytosis    Dizziness on standing    Diabetic neuropathy (HCC)    Facial paralysis/Brohman palsy       CURRENT MEDICATIONS:  Current Outpatient Medications   Medication Sig Dispense Refill    traZODone (DESYREL) 100 MG Tab Take 100 mg by mouth every evening.      ondansetron (ZOFRAN ODT) 4 MG TABLET DISPERSIBLE Take 4 mg by mouth 2 times a day as needed for Nausea.      prochlorperazine (COMPAZINE) 10 MG Tab Take 10 mg by mouth every 6 hours as needed for Nausea.      ibuprofen (MOTRIN) 200 MG Tab Take 600 mg by mouth 2 times a day as needed for Mild Pain or Moderate Pain. 3 tablets = 600 mg.      Cholecalciferol (VITAMIN D3) 2000 UNIT Cap Take 2,000 Units by mouth every morning.      B Complex Vitamins (VITAMIN B COMPLEX) Tab Take 1 Tablet by mouth every morning.      VENTOLIN  (90 Base) MCG/ACT Aero Soln inhalation aerosol Inhale 1-2 Puffs every four hours as  needed for Shortness of Breath.      famotidine (PEPCID) 10 MG tablet Take 10 mg by mouth 1 time a day as needed (Heartburn).      meloxicam (MOBIC) 15 MG tablet Take 15 mg by mouth every day.      ferrous sulfate 325 (65 Fe) MG tablet Take 325 mg by mouth every day.      atorvastatin (LIPITOR) 40 MG Tab Take 1 Tablet by mouth every evening. 30 Tablet 2    baclofen (LIORESAL) 10 MG Tab Take 1 Tablet by mouth 3 times a day. 90 Tablet 2    DULoxetine (CYMBALTA) 60 MG Cap DR Particles delayed-release capsule Take 1 Capsule by mouth every evening. 30 Capsule 2    gabapentin (NEURONTIN) 800 MG tablet Take 1 Tablet by mouth 4 times a day. 90 Tablet 2    Insulin Glargine, 2 Unit Dial, 300 UNIT/ML Solution Pen-injector Inject 50 Units under the skin every day. 9 mL 3    insulin lispro 100 UNIT/ML SC SOPN injection PEN Inject 2-10 Units under the skin 4 Times a Day,Before Meals and at Bedtime. 9 mL 2    OXcarbazepine (TRILEPTAL) 300 MG Tab Take 1 Tablet by mouth 2 times a day. 60 Tablet 2    QUEtiapine (SEROQUEL) 200 MG Tab Take 1 Tablet by mouth every evening. 60 Tablet 2    acetaminophen (TYLENOL) 500 MG Tab Take 1,000 mg by mouth 2 times a day as needed for Mild Pain or Moderate Pain.      clonazePAM (KLONOPIN) 0.5 MG Tab Take 0.5 mg by mouth 2 times a day as needed (Anxiety).       No current facility-administered medications for this encounter.       ALLERGIES:  Augmentin [amoxicillin-pot clavulanate]    REVIEW OF SYSTEMS:    A complete review of system taken. Pertinent items in HPI.  All others negative.    PHYSICAL EXAM:  PERFORMANCE STATUS:       No data to display                   No data to display              /88 (BP Location: Right arm, Patient Position: Sitting, BP Cuff Size: Adult)   Pulse (!) 108   Wt 54.8 kg (120 lb 13 oz)   SpO2 100%   BMI 20.74 kg/m²   Physical Exam  Constitutional:       Appearance: Normal appearance.   HENT:      Head: Normocephalic and atraumatic.   Eyes:      Extraocular  Movements: Extraocular movements intact.      Conjunctiva/sclera: Conjunctivae normal.   Neurological:      General: No focal deficit present.      Mental Status: She is alert and oriented to person, place, and time.      Cranial Nerves: Cranial nerves 2-12 are intact.      Sensory: Sensation is intact.      Motor: Motor function is intact.      Coordination: Coordination is intact.      Gait: Gait is intact.      Comments: Mild left arm biceps flexion weakness.         LABORATORY DATA:   Lab Results   Component Value Date/Time    WBC 13.7 (H) 09/23/2023 08:38 AM    RBC 4.67 09/23/2023 08:38 AM    HEMOGLOBIN 12.8 09/23/2023 08:38 AM    HEMATOCRIT 40.1 09/23/2023 08:38 AM    MCV 85.9 09/23/2023 08:38 AM    MCH 27.4 09/23/2023 08:38 AM    MCHC 31.9 (L) 09/23/2023 08:38 AM    RDW 53.6 (H) 09/23/2023 08:38 AM    PLATELETCT 441 09/23/2023 08:38 AM    MPV 10.3 09/23/2023 08:38 AM    NEUTSPOLYS 75.80 (H) 09/23/2023 08:38 AM    LYMPHOCYTES 16.20 (L) 09/23/2023 08:38 AM    MONOCYTES 7.30 09/23/2023 08:38 AM    EOSINOPHILS 0.10 09/23/2023 08:38 AM    BASOPHILS 0.20 09/23/2023 08:38 AM    HYPOCHROMIA 1+ 12/11/2022 06:12 AM    ANISOCYTOSIS 1+ 12/11/2022 06:12 AM      Lab Results   Component Value Date/Time    SODIUM 142 09/23/2023 08:38 AM    POTASSIUM 4.0 09/23/2023 08:38 AM    CHLORIDE 101 09/23/2023 08:38 AM    CO2 26 09/23/2023 08:38 AM    GLUCOSE 142 (H) 09/23/2023 08:38 AM    BUN 15 09/23/2023 08:38 AM    CREATININE 0.57 09/23/2023 08:38 AM         RADIOLOGY DATA:  MR-BRAIN-WITH & W/O    Result Date: 11/11/2023  1.  Minimal unchanged faint enhancement in the small left parietal cortical lesion. Other previously identified treated lesions appear unchanged without significant enhancement. No new abnormal enhancement to suggest residual or recurrent intracranial metastatic disease. 2.  Status post right frontal craniotomy.      IMPRESSION:    A 42 y.o. with metastatic melanoma status post craniotomy and resection, adjuvant  radiation, as well as SRS for unresectable lesions who we are seeing for ongoing follow-up.    CANCER STATUS:  Disease Progression Distant    RECOMMENDATIONS:   Unfortunately, Jacque is having progression of disease with systemic disease in her abdomen according to the PET report.  Fortunately, the MRI is quite reassuring and she does not have any obvious progressive CNS disease.  Functionally, she is also doing quite well neurologically, and has no major issues.  Unfortunately, the immune related colitis that she is having is her now her main problem, and this is refractory to steroids.  There has been some discussion about budesonide or infliximab, and medical oncology is working on getting approval for these.  She would likely transition to BRAF directed therapy.  For now, next systemic line of treatment as per medical oncology, but with regards to CNS, we will continue routine CNS surveillance with another brain MRI and follow-up in 3 months.    Thank you for the opportunity to participate in her care.  If any questions or comments, please do not hesitate in calling.    No orders of the defined types were placed in this encounter.

## 2023-11-17 ENCOUNTER — HOSPITAL ENCOUNTER (OUTPATIENT)
Facility: MEDICAL CENTER | Age: 43
End: 2023-11-17
Attending: NURSE PRACTITIONER
Payer: COMMERCIAL

## 2023-11-17 LAB
HBV CORE AB SERPL QL IA: NONREACTIVE
HBV SURFACE AB SERPL IA-ACNC: 36.5 MIU/ML (ref 0–10)
HBV SURFACE AG SER QL: NORMAL

## 2023-11-17 PROCEDURE — 86706 HEP B SURFACE ANTIBODY: CPT

## 2023-11-17 PROCEDURE — 87340 HEPATITIS B SURFACE AG IA: CPT

## 2023-11-17 PROCEDURE — 86704 HEP B CORE ANTIBODY TOTAL: CPT

## 2023-11-30 ENCOUNTER — HOSPITAL ENCOUNTER (OUTPATIENT)
Dept: LAB | Facility: MEDICAL CENTER | Age: 43
End: 2023-11-30
Attending: INTERNAL MEDICINE
Payer: COMMERCIAL

## 2023-11-30 PROCEDURE — 86480 TB TEST CELL IMMUN MEASURE: CPT

## 2023-11-30 PROCEDURE — 36415 COLL VENOUS BLD VENIPUNCTURE: CPT

## 2023-12-01 ENCOUNTER — HOSPITAL ENCOUNTER (OUTPATIENT)
Facility: MEDICAL CENTER | Age: 43
End: 2023-12-01
Attending: NURSE PRACTITIONER
Payer: COMMERCIAL

## 2023-12-01 LAB
ALBUMIN SERPL BCP-MCNC: 3.3 G/DL (ref 3.2–4.9)
ALBUMIN/GLOB SERPL: 1.8 G/DL
ALP SERPL-CCNC: 78 U/L (ref 30–99)
ALT SERPL-CCNC: 7 U/L (ref 2–50)
ANION GAP SERPL CALC-SCNC: 10 MMOL/L (ref 7–16)
AST SERPL-CCNC: 7 U/L (ref 12–45)
BILIRUB SERPL-MCNC: <0.2 MG/DL (ref 0.1–1.5)
BUN SERPL-MCNC: 13 MG/DL (ref 8–22)
CALCIUM ALBUM COR SERPL-MCNC: 8.7 MG/DL (ref 8.5–10.5)
CALCIUM SERPL-MCNC: 8.1 MG/DL (ref 8.5–10.5)
CHLORIDE SERPL-SCNC: 103 MMOL/L (ref 96–112)
CO2 SERPL-SCNC: 23 MMOL/L (ref 20–33)
CREAT SERPL-MCNC: 0.59 MG/DL (ref 0.5–1.4)
GFR SERPLBLD CREATININE-BSD FMLA CKD-EPI: 113 ML/MIN/1.73 M 2
GLOBULIN SER CALC-MCNC: 1.8 G/DL (ref 1.9–3.5)
GLUCOSE SERPL-MCNC: 240 MG/DL (ref 65–99)
MAGNESIUM SERPL-MCNC: 1.7 MG/DL (ref 1.5–2.5)
POTASSIUM SERPL-SCNC: 4.4 MMOL/L (ref 3.6–5.5)
PROT SERPL-MCNC: 5.1 G/DL (ref 6–8.2)
SODIUM SERPL-SCNC: 136 MMOL/L (ref 135–145)

## 2023-12-01 PROCEDURE — 80053 COMPREHEN METABOLIC PANEL: CPT

## 2023-12-01 PROCEDURE — 83735 ASSAY OF MAGNESIUM: CPT

## 2023-12-04 ENCOUNTER — HOSPITAL ENCOUNTER (EMERGENCY)
Facility: MEDICAL CENTER | Age: 43
End: 2023-12-04
Attending: EMERGENCY MEDICINE
Payer: COMMERCIAL

## 2023-12-04 VITALS
HEIGHT: 64 IN | DIASTOLIC BLOOD PRESSURE: 84 MMHG | SYSTOLIC BLOOD PRESSURE: 125 MMHG | HEART RATE: 96 BPM | RESPIRATION RATE: 16 BRPM | BODY MASS INDEX: 20.47 KG/M2 | OXYGEN SATURATION: 96 % | TEMPERATURE: 97.5 F | WEIGHT: 119.93 LBS

## 2023-12-04 DIAGNOSIS — R11.2 NAUSEA AND VOMITING, UNSPECIFIED VOMITING TYPE: ICD-10-CM

## 2023-12-04 DIAGNOSIS — R19.7 DIARRHEA, UNSPECIFIED TYPE: ICD-10-CM

## 2023-12-04 LAB
ALBUMIN SERPL BCP-MCNC: 3.9 G/DL (ref 3.2–4.9)
ALBUMIN/GLOB SERPL: 1.4 G/DL
ALP SERPL-CCNC: 90 U/L (ref 30–99)
ALT SERPL-CCNC: 7 U/L (ref 2–50)
ANION GAP SERPL CALC-SCNC: 13 MMOL/L (ref 7–16)
AST SERPL-CCNC: 9 U/L (ref 12–45)
BASOPHILS # BLD AUTO: 0.6 % (ref 0–1.8)
BASOPHILS # BLD: 0.06 K/UL (ref 0–0.12)
BILIRUB SERPL-MCNC: 0.3 MG/DL (ref 0.1–1.5)
BUN SERPL-MCNC: 9 MG/DL (ref 8–22)
CALCIUM ALBUM COR SERPL-MCNC: 9.4 MG/DL (ref 8.5–10.5)
CALCIUM SERPL-MCNC: 9.3 MG/DL (ref 8.5–10.5)
CHLORIDE SERPL-SCNC: 100 MMOL/L (ref 96–112)
CO2 SERPL-SCNC: 25 MMOL/L (ref 20–33)
CREAT SERPL-MCNC: 0.44 MG/DL (ref 0.5–1.4)
EOSINOPHIL # BLD AUTO: 0 K/UL (ref 0–0.51)
EOSINOPHIL NFR BLD: 0 % (ref 0–6.9)
ERYTHROCYTE [DISTWIDTH] IN BLOOD BY AUTOMATED COUNT: 46.6 FL (ref 35.9–50)
GFR SERPLBLD CREATININE-BSD FMLA CKD-EPI: 121 ML/MIN/1.73 M 2
GLOBULIN SER CALC-MCNC: 2.8 G/DL (ref 1.9–3.5)
GLUCOSE SERPL-MCNC: 143 MG/DL (ref 65–99)
HCT VFR BLD AUTO: 37.9 % (ref 37–47)
HGB BLD-MCNC: 11.9 G/DL (ref 12–16)
IMM GRANULOCYTES # BLD AUTO: 0.04 K/UL (ref 0–0.11)
IMM GRANULOCYTES NFR BLD AUTO: 0.4 % (ref 0–0.9)
LIPASE SERPL-CCNC: 20 U/L (ref 11–82)
LYMPHOCYTES # BLD AUTO: 1.57 K/UL (ref 1–4.8)
LYMPHOCYTES NFR BLD: 14.6 % (ref 22–41)
MCH RBC QN AUTO: 25.3 PG (ref 27–33)
MCHC RBC AUTO-ENTMCNC: 31.4 G/DL (ref 32.2–35.5)
MCV RBC AUTO: 80.6 FL (ref 81.4–97.8)
MONOCYTES # BLD AUTO: 0.57 K/UL (ref 0–0.85)
MONOCYTES NFR BLD AUTO: 5.3 % (ref 0–13.4)
NEUTROPHILS # BLD AUTO: 8.49 K/UL (ref 1.82–7.42)
NEUTROPHILS NFR BLD: 79.1 % (ref 44–72)
NRBC # BLD AUTO: 0 K/UL
NRBC BLD-RTO: 0 /100 WBC (ref 0–0.2)
PLATELET # BLD AUTO: 729 K/UL (ref 164–446)
PMV BLD AUTO: 9.4 FL (ref 9–12.9)
POTASSIUM SERPL-SCNC: 4.2 MMOL/L (ref 3.6–5.5)
PROT SERPL-MCNC: 6.7 G/DL (ref 6–8.2)
RBC # BLD AUTO: 4.7 M/UL (ref 4.2–5.4)
SODIUM SERPL-SCNC: 138 MMOL/L (ref 135–145)
WBC # BLD AUTO: 10.7 K/UL (ref 4.8–10.8)

## 2023-12-04 PROCEDURE — 99284 EMERGENCY DEPT VISIT MOD MDM: CPT

## 2023-12-04 PROCEDURE — 700111 HCHG RX REV CODE 636 W/ 250 OVERRIDE (IP): Mod: JZ | Performed by: EMERGENCY MEDICINE

## 2023-12-04 PROCEDURE — 700105 HCHG RX REV CODE 258: Performed by: EMERGENCY MEDICINE

## 2023-12-04 PROCEDURE — 96374 THER/PROPH/DIAG INJ IV PUSH: CPT

## 2023-12-04 PROCEDURE — 302083 SET,INFUSION NEEDLE 20 X 3/4": Performed by: EMERGENCY MEDICINE

## 2023-12-04 PROCEDURE — 83690 ASSAY OF LIPASE: CPT

## 2023-12-04 PROCEDURE — 36415 COLL VENOUS BLD VENIPUNCTURE: CPT

## 2023-12-04 PROCEDURE — 85025 COMPLETE CBC W/AUTO DIFF WBC: CPT

## 2023-12-04 PROCEDURE — 80053 COMPREHEN METABOLIC PANEL: CPT

## 2023-12-04 RX ORDER — SODIUM CHLORIDE, SODIUM LACTATE, POTASSIUM CHLORIDE, CALCIUM CHLORIDE 600; 310; 30; 20 MG/100ML; MG/100ML; MG/100ML; MG/100ML
1000 INJECTION, SOLUTION INTRAVENOUS ONCE
Status: COMPLETED | OUTPATIENT
Start: 2023-12-04 | End: 2023-12-04

## 2023-12-04 RX ORDER — ONDANSETRON 4 MG/1
4 TABLET, ORALLY DISINTEGRATING ORAL EVERY 8 HOURS PRN
Qty: 15 TABLET | Refills: 0 | Status: SHIPPED | OUTPATIENT
Start: 2023-12-04

## 2023-12-04 RX ORDER — METOCLOPRAMIDE 10 MG/1
10 TABLET ORAL 4 TIMES DAILY
Qty: 30 TABLET | Refills: 0 | Status: SHIPPED | OUTPATIENT
Start: 2023-12-04

## 2023-12-04 RX ORDER — ONDANSETRON 2 MG/ML
4 INJECTION INTRAMUSCULAR; INTRAVENOUS ONCE
Status: COMPLETED | OUTPATIENT
Start: 2023-12-04 | End: 2023-12-04

## 2023-12-04 RX ADMIN — SODIUM CHLORIDE, POTASSIUM CHLORIDE, SODIUM LACTATE AND CALCIUM CHLORIDE 1000 ML: 600; 310; 30; 20 INJECTION, SOLUTION INTRAVENOUS at 16:26

## 2023-12-04 RX ADMIN — ONDANSETRON 4 MG: 2 INJECTION INTRAMUSCULAR; INTRAVENOUS at 16:26

## 2023-12-04 ASSESSMENT — FIBROSIS 4 INDEX: FIB4 SCORE: 0.27

## 2023-12-04 NOTE — ED PROVIDER NOTES
ED Provider Note    CHIEF COMPLAINT  Chief Complaint   Patient presents with    N/V     Began last night. Unable to keep anything down, using PO zofran at home without relief.     Chills     On and off for a few days. Unknown if having fevers.        EXTERNAL RECORDS REVIEWED  Patient's last encounter was in the neurosurgical clinic multidisciplinary brain tumor clinic for radiation therapy and follow-up.  Patient has not melanoma metastasized to the brain.    HPI/ROS  LIMITATION TO HISTORY   Select: : None  OUTSIDE HISTORIAN(S):  Parent mother is at bedside and echoes patient's history    Jacque Mcintyre is a 43 y.o. female who presents accompanied by her mother with a chief complaint of nausea vomiting and chills but no fever.  This has been going on for about a day.  She has a history of stage IV melanoma.  She has been struggling with a reaction to her medications.  On Friday she received packed red blood cells because of anemia.  This is something that is monitored by her physician.  Additionally, she has had diarrhea since July secondary to her immune suppressant.  She has now had 2 doses of infliximab and last dose was on Friday that has made a great improvement in her diarrhea.  Previously she was having 3-5 episodes per day, now 1-2 episodes per day.  No blood in her diarrhea.  She had a colonoscopy in October 2023.  She was concerned however about the diarrhea and the inability to tolerate fluids, prompting her visit.    PAST MEDICAL HISTORY   has a past medical history of Asthma, Bipolar 1 disorder (HCC), Cancer (HCC) (01/01/2016), Cough, Depression, DM mellitus,, Hyperlipidemia, Hypertension, Pap smear, PCOS (polycystic ovarian syndrome), Shortness of breath, Sputum production, and Wheezing.    SURGICAL HISTORY   has a past surgical history that includes myringotomy; adenoidectomy; other (2016); wide excision (Left, 5/9/2017); node biopsy sentinel (Left, 5/9/2017); axillary node dissection (Left,  "6/23/2017); lap,appendectomy (N/A, 10/7/2019); bronchoscopy,diagnostic (N/A, 6/17/2022); endobronchial us add-on (N/A, 6/17/2022); and craniotomy stealth (Right, 10/6/2022).    FAMILY HISTORY  Family History   Problem Relation Age of Onset    Psychiatric Illness Mother         depression    Diabetes Mother     Hyperlipidemia Mother     Thyroid Mother         s/p radioactive iodine treatment on replacement    Hypertension Father     Diabetes Father        SOCIAL HISTORY  Social History     Tobacco Use    Smoking status: Former     Current packs/day: 0.75     Average packs/day: 0.8 packs/day for 10.0 years (7.5 ttl pk-yrs)     Types: Cigarettes    Smokeless tobacco: Never   Vaping Use    Vaping Use: Never used   Substance and Sexual Activity    Alcohol use: Not Currently    Drug use: Yes     Types: Oral     Comment: marijuanna    Sexual activity: Yes       CURRENT MEDICATIONS  Home Medications    **Home medications have not yet been reviewed for this encounter**         ALLERGIES  Allergies   Allergen Reactions    Augmentin [Amoxicillin-Pot Clavulanate] Vomiting       PHYSICAL EXAM  VITAL SIGNS: /64   Pulse 82   Temp 36.6 °C (97.8 °F) (Oral)   Resp 16   Ht 1.626 m (5' 4\")   Wt 54.4 kg (119 lb 14.9 oz)   SpO2 97%   BMI 20.59 kg/m²    Vitals reviewed.  Constitutional: Patient is oriented to person, place, and time. Appears well-developed and well-nourished. No distress.    Head: Normocephalic and atraumatic.   Ears: Normal external ears bilaterally.   Mouth/Throat: Oropharynx is clear.  Eyes: Conjunctivae are normal.  Neck: Normal range of motion.  Cardiovascular: Normal rate, regular rhythm and normal heart sounds. Normal peripheral pulses.  Pulmonary/Chest: Effort normal and breath sounds normal. No respiratory distress, no wheezes, rhonchi, or rales  Abdominal: Soft. Bowel sounds are normal. There is no tenderness  Musculoskeletal: No edema and no tenderness.   Neurological: No cranial nerve deficits. " Normal motor and sensory exam. No focal deficits.   Skin: Skin is warm and dry. No erythema. No pallor.   Psychiatric: Patient has a normal mood and affect.     DIAGNOSTIC STUDIES / PROCEDURES    LABS  Results for orders placed or performed during the hospital encounter of 12/04/23   CBC WITH DIFFERENTIAL   Result Value Ref Range    WBC 10.7 4.8 - 10.8 K/uL    RBC 4.70 4.20 - 5.40 M/uL    Hemoglobin 11.9 (L) 12.0 - 16.0 g/dL    Hematocrit 37.9 37.0 - 47.0 %    MCV 80.6 (L) 81.4 - 97.8 fL    MCH 25.3 (L) 27.0 - 33.0 pg    MCHC 31.4 (L) 32.2 - 35.5 g/dL    RDW 46.6 35.9 - 50.0 fL    Platelet Count 729 (H) 164 - 446 K/uL    MPV 9.4 9.0 - 12.9 fL    Neutrophils-Polys 79.10 (H) 44.00 - 72.00 %    Lymphocytes 14.60 (L) 22.00 - 41.00 %    Monocytes 5.30 0.00 - 13.40 %    Eosinophils 0.00 0.00 - 6.90 %    Basophils 0.60 0.00 - 1.80 %    Immature Granulocytes 0.40 0.00 - 0.90 %    Nucleated RBC 0.00 0.00 - 0.20 /100 WBC    Neutrophils (Absolute) 8.49 (H) 1.82 - 7.42 K/uL    Lymphs (Absolute) 1.57 1.00 - 4.80 K/uL    Monos (Absolute) 0.57 0.00 - 0.85 K/uL    Eos (Absolute) 0.00 0.00 - 0.51 K/uL    Baso (Absolute) 0.06 0.00 - 0.12 K/uL    Immature Granulocytes (abs) 0.04 0.00 - 0.11 K/uL    NRBC (Absolute) 0.00 K/uL   CMP   Result Value Ref Range    Sodium 138 135 - 145 mmol/L    Potassium 4.2 3.6 - 5.5 mmol/L    Chloride 100 96 - 112 mmol/L    Co2 25 20 - 33 mmol/L    Anion Gap 13.0 7.0 - 16.0    Glucose 143 (H) 65 - 99 mg/dL    Bun 9 8 - 22 mg/dL    Creatinine 0.44 (L) 0.50 - 1.40 mg/dL    Calcium 9.3 8.5 - 10.5 mg/dL    Correct Calcium 9.4 8.5 - 10.5 mg/dL    AST(SGOT) 9 (L) 12 - 45 U/L    ALT(SGPT) 7 2 - 50 U/L    Alkaline Phosphatase 90 30 - 99 U/L    Total Bilirubin 0.3 0.1 - 1.5 mg/dL    Albumin 3.9 3.2 - 4.9 g/dL    Total Protein 6.7 6.0 - 8.2 g/dL    Globulin 2.8 1.9 - 3.5 g/dL    A-G Ratio 1.4 g/dL   LIPASE   Result Value Ref Range    Lipase 20 11 - 82 U/L   ESTIMATED GFR   Result Value Ref Range    GFR (CKD-EPI) 121  >60 mL/min/1.73 m 2     RADIOLOGY    COURSE & MEDICAL DECISION MAKING    ED Observation Status? No; Patient does not meet criteria for ED Observation.     INITIAL ASSESSMENT, COURSE AND PLAN  Care Narrative:     This is a pleasant 43-year-old female.  She has a history of metastatic melanoma.  She is undergoing immune modulation.  She has been having diarrhea for months.  She was concerned because she also developed nausea and vomiting and could not keep anything down despite taking Zofran at home.  An IV will be established, IV fluids provided as well as an evaluation of the labs.    5:39 PM patient's reevaluated at the bedside.  Her mother remains at the bedside.  IV fluids infusing.  We discussed labs which are reassuring.  Her glucose was slightly elevated 143.  White blood cell count was normal.  She is feeling better.  She still has a bit left in her IV fluid bag to infuse.  Anticipate discharge to home once this is complete.  She does have Zofran at home although she is running low and she will be given additional refill for this as well as adding Reglan.  She does have Compazine.  I advised her to take the medication on a scheduled basis for the next 24 hours.  She is given strict return precautions.  She is improved.  Again I anticipate discharge to home once IV fluids are infused.      HYDRATION: Based on the patient's presentation of Acute Vomiting and Inability to take oral fluids the patient was given IV fluids. IV Hydration was used because oral hydration was not adequate alone. Upon recheck following hydration, the patient was improved.        DISPOSITION AND DISCUSSIONS  I have discussed management of the patient with the following physicians and DIANN's: None    Discussion of management with other QHP or appropriate source(s): None    Escalation of care considered, and ultimately not performed:acute inpatient care management, however at this time, the patient is most appropriate for outpatient  management    Barriers to care at this time, including but not limited to: None.     Decision tools and prescription drugs considered including, but not limited to: None.    FINAL DIAGNOSIS  1. Nausea and vomiting, unspecified vomiting type    2. Diarrhea, unspecified type           Electronically signed by: Tamra Leyva D.O., 12/4/2023 3:59 PM

## 2023-12-04 NOTE — ED TRIAGE NOTES
Chief Complaint   Patient presents with    N/V     Began last night. Unable to keep anything down, using PO zofran at home without relief.     Chills     On and off for a few days. Unknown if having fevers.      Pt ambulates to triage for above. Hx stage 4 melanoma. States has been on remicade for the last few months. Last immunotherapy in July.     Pt also states had recent blood transfusion on Friday.     Pt educated on triage process, thanked for patience. Pt to family room waiting area, encouraged to notify staff of changes.     Pt has port in place.

## 2023-12-14 ENCOUNTER — HOSPITAL ENCOUNTER (OUTPATIENT)
Dept: RADIOLOGY | Facility: MEDICAL CENTER | Age: 43
End: 2023-12-14
Attending: INTERNAL MEDICINE
Payer: COMMERCIAL

## 2023-12-14 DIAGNOSIS — C77.3 SECONDARY MALIGNANT NEOPLASM OF BRACHIAL LYMPH NODE (HCC): ICD-10-CM

## 2023-12-14 DIAGNOSIS — C43.62 MALIGNANT MELANOMA OF LEFT UPPER EXTREMITY INCLUDING SHOULDER (HCC): ICD-10-CM

## 2023-12-14 PROCEDURE — 36598 INJ W/FLUOR EVAL CV DEVICE: CPT

## 2023-12-14 PROCEDURE — 700117 HCHG RX CONTRAST REV CODE 255: Performed by: RADIOLOGY

## 2023-12-14 RX ADMIN — IOHEXOL 5 ML: 300 INJECTION, SOLUTION INTRAVENOUS at 14:15

## 2023-12-14 NOTE — PROGRESS NOTES
Pt presents to IR 1. Pt. was consented by MD at bedside, confirmed by this RN and consent at bedside. Pt transferred to procedure table in supine position. Patient underwent a Port-O-Gram by Dr. Berg. Procedure site was marked by MD and verified using imaging guidance. Pt placed on monitor, prepped and draped in a sterile fashion. Vitals were taken every 5 minutes and remained stable during procedure (see doc flow sheet for results). CO2 waveform capnography was monitored and remained WNL throughout procedure. Pt. Ambulatory with steady gait. A&O x 4. Pt. d/c home to self.

## 2023-12-14 NOTE — OR SURGEON
Immediate Post- Operative Note        Findings: Port injection and venogram does not show any kink, evidence of fibrin sheath or flow restriction. Hep lock placed.      Procedure(s): Port injection and venogram      Estimated Blood Loss: Less than 5 ml        Complications: None            12/14/2023     1:44 PM     Swapna Berg M.D.

## 2023-12-15 ENCOUNTER — HOSPITAL ENCOUNTER (OUTPATIENT)
Facility: MEDICAL CENTER | Age: 43
End: 2023-12-15
Attending: NURSE PRACTITIONER
Payer: COMMERCIAL

## 2023-12-15 LAB
ALBUMIN SERPL BCP-MCNC: 3.4 G/DL (ref 3.2–4.9)
ALBUMIN/GLOB SERPL: 1.4 G/DL
ALP SERPL-CCNC: 79 U/L (ref 30–99)
ALT SERPL-CCNC: 8 U/L (ref 2–50)
ANION GAP SERPL CALC-SCNC: 11 MMOL/L (ref 7–16)
AST SERPL-CCNC: 12 U/L (ref 12–45)
BILIRUB SERPL-MCNC: <0.2 MG/DL (ref 0.1–1.5)
BUN SERPL-MCNC: 14 MG/DL (ref 8–22)
CALCIUM ALBUM COR SERPL-MCNC: 9 MG/DL (ref 8.5–10.5)
CALCIUM SERPL-MCNC: 8.5 MG/DL (ref 8.5–10.5)
CHLORIDE SERPL-SCNC: 105 MMOL/L (ref 96–112)
CO2 SERPL-SCNC: 22 MMOL/L (ref 20–33)
CREAT SERPL-MCNC: 0.44 MG/DL (ref 0.5–1.4)
GFR SERPLBLD CREATININE-BSD FMLA CKD-EPI: 121 ML/MIN/1.73 M 2
GLOBULIN SER CALC-MCNC: 2.4 G/DL (ref 1.9–3.5)
GLUCOSE SERPL-MCNC: 135 MG/DL (ref 65–99)
HGB RETIC QN AUTO: 20.8 PG/CELL (ref 29–35)
IMM RETICS NFR: 30.5 % (ref 2.6–16.1)
IRON SATN MFR SERPL: 22 % (ref 15–55)
IRON SERPL-MCNC: 54 UG/DL (ref 40–170)
LDH SERPL L TO P-CCNC: 264 U/L (ref 107–266)
POTASSIUM SERPL-SCNC: 4.3 MMOL/L (ref 3.6–5.5)
PROT SERPL-MCNC: 5.8 G/DL (ref 6–8.2)
RETICS # AUTO: 0.08 M/UL (ref 0.04–0.12)
RETICS/RBC NFR: 2.2 % (ref 0.8–2.6)
SODIUM SERPL-SCNC: 138 MMOL/L (ref 135–145)
TIBC SERPL-MCNC: 249 UG/DL (ref 250–450)
UIBC SERPL-MCNC: 195 UG/DL (ref 110–370)

## 2023-12-15 PROCEDURE — 85046 RETICYTE/HGB CONCENTRATE: CPT

## 2023-12-15 PROCEDURE — 83615 LACTATE (LD) (LDH) ENZYME: CPT

## 2023-12-15 PROCEDURE — 82746 ASSAY OF FOLIC ACID SERUM: CPT

## 2023-12-15 PROCEDURE — 82728 ASSAY OF FERRITIN: CPT

## 2023-12-15 PROCEDURE — 82607 VITAMIN B-12: CPT

## 2023-12-15 PROCEDURE — 83010 ASSAY OF HAPTOGLOBIN QUANT: CPT

## 2023-12-15 PROCEDURE — 84443 ASSAY THYROID STIM HORMONE: CPT

## 2023-12-15 PROCEDURE — 83540 ASSAY OF IRON: CPT

## 2023-12-15 PROCEDURE — 83550 IRON BINDING TEST: CPT

## 2023-12-15 PROCEDURE — 80053 COMPREHEN METABOLIC PANEL: CPT

## 2023-12-16 LAB
FERRITIN SERPL-MCNC: 10.2 NG/ML (ref 10–291)
FOLATE SERPL-MCNC: 32.6 NG/ML
TSH SERPL DL<=0.005 MIU/L-ACNC: 0.63 UIU/ML (ref 0.38–5.33)
VIT B12 SERPL-MCNC: 786 PG/ML (ref 211–911)

## 2023-12-17 LAB — HAPTOGLOB SERPL-MCNC: 315 MG/DL (ref 30–200)

## 2023-12-29 ENCOUNTER — HOSPITAL ENCOUNTER (OUTPATIENT)
Facility: MEDICAL CENTER | Age: 43
End: 2023-12-29
Attending: INTERNAL MEDICINE
Payer: COMMERCIAL

## 2023-12-29 PROCEDURE — 83550 IRON BINDING TEST: CPT

## 2023-12-29 PROCEDURE — 83540 ASSAY OF IRON: CPT

## 2023-12-29 PROCEDURE — 82728 ASSAY OF FERRITIN: CPT

## 2024-01-03 ENCOUNTER — APPOINTMENT (OUTPATIENT)
Dept: RADIOLOGY | Facility: MEDICAL CENTER | Age: 44
DRG: 812 | End: 2024-01-03
Attending: EMERGENCY MEDICINE
Payer: COMMERCIAL

## 2024-01-03 ENCOUNTER — APPOINTMENT (RX ONLY)
Dept: URBAN - METROPOLITAN AREA CLINIC 20 | Facility: CLINIC | Age: 44
Setting detail: DERMATOLOGY
End: 2024-01-03

## 2024-01-03 ENCOUNTER — HOSPITAL ENCOUNTER (INPATIENT)
Facility: MEDICAL CENTER | Age: 44
LOS: 1 days | DRG: 812 | End: 2024-01-04
Attending: EMERGENCY MEDICINE | Admitting: INTERNAL MEDICINE
Payer: COMMERCIAL

## 2024-01-03 DIAGNOSIS — L72.8 OTHER FOLLICULAR CYSTS OF THE SKIN AND SUBCUTANEOUS TISSUE: ICD-10-CM

## 2024-01-03 DIAGNOSIS — D50.9 IRON DEFICIENCY ANEMIA, UNSPECIFIED IRON DEFICIENCY ANEMIA TYPE: ICD-10-CM

## 2024-01-03 DIAGNOSIS — Z87.2 PERSONAL HISTORY OF DISEASES OF THE SKIN AND SUBCUTANEOUS TISSUE: ICD-10-CM

## 2024-01-03 DIAGNOSIS — L70.8 OTHER ACNE: ICD-10-CM

## 2024-01-03 DIAGNOSIS — D18.0 HEMANGIOMA: ICD-10-CM

## 2024-01-03 DIAGNOSIS — L40.0 PSORIASIS VULGARIS: ICD-10-CM

## 2024-01-03 DIAGNOSIS — E87.6 HYPOKALEMIA: ICD-10-CM

## 2024-01-03 DIAGNOSIS — Z85.820 HISTORY OF MELANOMA: ICD-10-CM

## 2024-01-03 DIAGNOSIS — B07.8 OTHER VIRAL WARTS: ICD-10-CM

## 2024-01-03 DIAGNOSIS — Z85.820 PERSONAL HISTORY OF MALIGNANT MELANOMA OF SKIN: ICD-10-CM

## 2024-01-03 DIAGNOSIS — D22 MELANOCYTIC NEVI: ICD-10-CM

## 2024-01-03 DIAGNOSIS — L81.4 OTHER MELANIN HYPERPIGMENTATION: ICD-10-CM

## 2024-01-03 DIAGNOSIS — C79.89 SECONDARY MALIGNANT NEOPLASM OF OTHER SPECIFIED SITES: ICD-10-CM

## 2024-01-03 DIAGNOSIS — R50.9 FEVER, UNSPECIFIED FEVER CAUSE: ICD-10-CM

## 2024-01-03 DIAGNOSIS — D64.9 ANEMIA, UNSPECIFIED TYPE: ICD-10-CM

## 2024-01-03 DIAGNOSIS — D72.829 LEUKOCYTOSIS, UNSPECIFIED TYPE: ICD-10-CM

## 2024-01-03 DIAGNOSIS — L57.8 OTHER SKIN CHANGES DUE TO CHRONIC EXPOSURE TO NONIONIZING RADIATION: ICD-10-CM

## 2024-01-03 DIAGNOSIS — D75.839 THROMBOCYTOSIS: ICD-10-CM

## 2024-01-03 PROBLEM — D22.61 MELANOCYTIC NEVI OF RIGHT UPPER LIMB, INCLUDING SHOULDER: Status: ACTIVE | Noted: 2024-01-03

## 2024-01-03 PROBLEM — D22.4 MELANOCYTIC NEVI OF SCALP AND NECK: Status: ACTIVE | Noted: 2024-01-03

## 2024-01-03 PROBLEM — D22.5 MELANOCYTIC NEVI OF TRUNK: Status: ACTIVE | Noted: 2024-01-03

## 2024-01-03 PROBLEM — D22.72 MELANOCYTIC NEVI OF LEFT LOWER LIMB, INCLUDING HIP: Status: ACTIVE | Noted: 2024-01-03

## 2024-01-03 PROBLEM — D18.01 HEMANGIOMA OF SKIN AND SUBCUTANEOUS TISSUE: Status: ACTIVE | Noted: 2024-01-03

## 2024-01-03 LAB
ABO GROUP BLD: ABNORMAL
ALBUMIN SERPL BCP-MCNC: 2.9 G/DL (ref 3.2–4.9)
ALBUMIN SERPL BCP-MCNC: 3 G/DL (ref 3.2–4.9)
ALBUMIN/GLOB SERPL: 1.2 G/DL
ALBUMIN/GLOB SERPL: 1.7 G/DL
ALP SERPL-CCNC: 75 U/L (ref 30–99)
ALP SERPL-CCNC: 83 U/L (ref 30–99)
ALT SERPL-CCNC: 5 U/L (ref 2–50)
ALT SERPL-CCNC: 8 U/L (ref 2–50)
ANION GAP SERPL CALC-SCNC: 14 MMOL/L (ref 7–16)
ANION GAP SERPL CALC-SCNC: 15 MMOL/L (ref 7–16)
AST SERPL-CCNC: 12 U/L (ref 12–45)
AST SERPL-CCNC: 6 U/L (ref 12–45)
BARCODED ABORH UBTYP: 7300
BARCODED ABORH UBTYP: 8400
BARCODED PRD CODE UBPRD: ABNORMAL
BARCODED PRD CODE UBPRD: ABNORMAL
BARCODED UNIT NUM UBUNT: ABNORMAL
BARCODED UNIT NUM UBUNT: ABNORMAL
BASOPHILS # BLD AUTO: 0.2 % (ref 0–1.8)
BASOPHILS # BLD: 0.03 K/UL (ref 0–0.12)
BILIRUB SERPL-MCNC: <0.2 MG/DL (ref 0.1–1.5)
BILIRUB SERPL-MCNC: <0.2 MG/DL (ref 0.1–1.5)
BLD GP AB SCN SERPL QL: ABNORMAL
BUN SERPL-MCNC: 12 MG/DL (ref 8–22)
BUN SERPL-MCNC: 9 MG/DL (ref 8–22)
CALCIUM ALBUM COR SERPL-MCNC: 8.4 MG/DL (ref 8.5–10.5)
CALCIUM ALBUM COR SERPL-MCNC: 8.6 MG/DL (ref 8.5–10.5)
CALCIUM SERPL-MCNC: 7.6 MG/DL (ref 8.5–10.5)
CALCIUM SERPL-MCNC: 7.7 MG/DL (ref 8.5–10.5)
CHLORIDE SERPL-SCNC: 104 MMOL/L (ref 96–112)
CHLORIDE SERPL-SCNC: 104 MMOL/L (ref 96–112)
CO2 SERPL-SCNC: 20 MMOL/L (ref 20–33)
CO2 SERPL-SCNC: 22 MMOL/L (ref 20–33)
COMPONENT R 8504R: ABNORMAL
COMPONENT R 8504R: ABNORMAL
CREAT SERPL-MCNC: 0.37 MG/DL (ref 0.5–1.4)
CREAT SERPL-MCNC: 0.45 MG/DL (ref 0.5–1.4)
EOSINOPHIL # BLD AUTO: 0.01 K/UL (ref 0–0.51)
EOSINOPHIL NFR BLD: 0.1 % (ref 0–6.9)
ERYTHROCYTE [DISTWIDTH] IN BLOOD BY AUTOMATED COUNT: 48.8 FL (ref 35.9–50)
FERRITIN SERPL-MCNC: 13.1 NG/ML (ref 10–291)
FERRITIN SERPL-MCNC: 14.6 NG/ML (ref 10–291)
FLUAV RNA SPEC QL NAA+PROBE: NEGATIVE
FLUBV RNA SPEC QL NAA+PROBE: NEGATIVE
GFR SERPLBLD CREATININE-BSD FMLA CKD-EPI: 121 ML/MIN/1.73 M 2
GFR SERPLBLD CREATININE-BSD FMLA CKD-EPI: 127 ML/MIN/1.73 M 2
GLOBULIN SER CALC-MCNC: 1.8 G/DL (ref 1.9–3.5)
GLOBULIN SER CALC-MCNC: 2.4 G/DL (ref 1.9–3.5)
GLUCOSE BLD STRIP.AUTO-MCNC: 188 MG/DL (ref 65–99)
GLUCOSE SERPL-MCNC: 127 MG/DL (ref 65–99)
GLUCOSE SERPL-MCNC: 182 MG/DL (ref 65–99)
HCT VFR BLD AUTO: 17.6 % (ref 37–47)
HGB BLD-MCNC: 5.4 G/DL (ref 12–16)
IMM GRANULOCYTES # BLD AUTO: 0.05 K/UL (ref 0–0.11)
IMM GRANULOCYTES NFR BLD AUTO: 0.3 % (ref 0–0.9)
IRON SATN MFR SERPL: 5 % (ref 15–55)
IRON SERPL-MCNC: 10 UG/DL (ref 40–170)
LACTATE SERPL-SCNC: 1.1 MMOL/L (ref 0.5–2)
LACTATE SERPL-SCNC: 1.4 MMOL/L (ref 0.5–2)
LYMPHOCYTES # BLD AUTO: 2.68 K/UL (ref 1–4.8)
LYMPHOCYTES NFR BLD: 18.2 % (ref 22–41)
MCH RBC QN AUTO: 23.4 PG (ref 27–33)
MCHC RBC AUTO-ENTMCNC: 30.7 G/DL (ref 32.2–35.5)
MCV RBC AUTO: 76.2 FL (ref 81.4–97.8)
MONOCYTES # BLD AUTO: 1.46 K/UL (ref 0–0.85)
MONOCYTES NFR BLD AUTO: 9.9 % (ref 0–13.4)
NEUTROPHILS # BLD AUTO: 10.47 K/UL (ref 1.82–7.42)
NEUTROPHILS NFR BLD: 71.3 % (ref 44–72)
NRBC # BLD AUTO: 0 K/UL
NRBC BLD-RTO: 0 /100 WBC (ref 0–0.2)
PLATELET # BLD AUTO: 876 K/UL (ref 164–446)
PMV BLD AUTO: 9.2 FL (ref 9–12.9)
POTASSIUM SERPL-SCNC: 3.3 MMOL/L (ref 3.6–5.5)
POTASSIUM SERPL-SCNC: 4.6 MMOL/L (ref 3.6–5.5)
PROCALCITONIN SERPL-MCNC: 0.06 NG/ML
PRODUCT TYPE UPROD: ABNORMAL
PRODUCT TYPE UPROD: ABNORMAL
PROT SERPL-MCNC: 4.8 G/DL (ref 6–8.2)
PROT SERPL-MCNC: 5.3 G/DL (ref 6–8.2)
RBC # BLD AUTO: 2.31 M/UL (ref 4.2–5.4)
RH BLD: ABNORMAL
RSV RNA SPEC QL NAA+PROBE: NEGATIVE
S PYO DNA SPEC NAA+PROBE: NOT DETECTED
SARS-COV-2 RNA RESP QL NAA+PROBE: NOTDETECTED
SODIUM SERPL-SCNC: 139 MMOL/L (ref 135–145)
SODIUM SERPL-SCNC: 140 MMOL/L (ref 135–145)
SPECIMEN SOURCE: NORMAL
TIBC SERPL-MCNC: 207 UG/DL (ref 250–450)
UIBC SERPL-MCNC: 197 UG/DL (ref 110–370)
UNIT STATUS USTAT: ABNORMAL
UNIT STATUS USTAT: ABNORMAL
VIT B12 SERPL-MCNC: 1430 PG/ML (ref 211–911)
WBC # BLD AUTO: 14.7 K/UL (ref 4.8–10.8)

## 2024-01-03 PROCEDURE — 82962 GLUCOSE BLOOD TEST: CPT

## 2024-01-03 PROCEDURE — 83605 ASSAY OF LACTIC ACID: CPT | Mod: 91

## 2024-01-03 PROCEDURE — 86901 BLOOD TYPING SEROLOGIC RH(D): CPT

## 2024-01-03 PROCEDURE — ? COUNSELING

## 2024-01-03 PROCEDURE — 82728 ASSAY OF FERRITIN: CPT

## 2024-01-03 PROCEDURE — 36415 COLL VENOUS BLD VENIPUNCTURE: CPT

## 2024-01-03 PROCEDURE — 99213 OFFICE O/P EST LOW 20 MIN: CPT

## 2024-01-03 PROCEDURE — 96376 TX/PRO/DX INJ SAME DRUG ADON: CPT

## 2024-01-03 PROCEDURE — ? OBSERVATION AND MEASURE

## 2024-01-03 PROCEDURE — 700111 HCHG RX REV CODE 636 W/ 250 OVERRIDE (IP): Mod: JZ | Performed by: EMERGENCY MEDICINE

## 2024-01-03 PROCEDURE — ? DIAGNOSIS COMMENT

## 2024-01-03 PROCEDURE — 86900 BLOOD TYPING SEROLOGIC ABO: CPT

## 2024-01-03 PROCEDURE — 0241U HCHG SARS-COV-2 COVID-19 NFCT DS RESP RNA 4 TRGT MIC: CPT

## 2024-01-03 PROCEDURE — 85025 COMPLETE CBC W/AUTO DIFF WBC: CPT

## 2024-01-03 PROCEDURE — 84145 PROCALCITONIN (PCT): CPT

## 2024-01-03 PROCEDURE — 85046 RETICYTE/HGB CONCENTRATE: CPT

## 2024-01-03 PROCEDURE — 82746 ASSAY OF FOLIC ACID SERUM: CPT

## 2024-01-03 PROCEDURE — 80053 COMPREHEN METABOLIC PANEL: CPT

## 2024-01-03 PROCEDURE — 86850 RBC ANTIBODY SCREEN: CPT

## 2024-01-03 PROCEDURE — 770004 HCHG ROOM/CARE - ONCOLOGY PRIVATE *

## 2024-01-03 PROCEDURE — 96374 THER/PROPH/DIAG INJ IV PUSH: CPT

## 2024-01-03 PROCEDURE — 700102 HCHG RX REV CODE 250 W/ 637 OVERRIDE(OP): Performed by: INTERNAL MEDICINE

## 2024-01-03 PROCEDURE — A9270 NON-COVERED ITEM OR SERVICE: HCPCS | Performed by: INTERNAL MEDICINE

## 2024-01-03 PROCEDURE — 96375 TX/PRO/DX INJ NEW DRUG ADDON: CPT

## 2024-01-03 PROCEDURE — 700102 HCHG RX REV CODE 250 W/ 637 OVERRIDE(OP): Performed by: EMERGENCY MEDICINE

## 2024-01-03 PROCEDURE — 700111 HCHG RX REV CODE 636 W/ 250 OVERRIDE (IP): Mod: JZ | Performed by: INTERNAL MEDICINE

## 2024-01-03 PROCEDURE — 96372 THER/PROPH/DIAG INJ SC/IM: CPT

## 2024-01-03 PROCEDURE — 71046 X-RAY EXAM CHEST 2 VIEWS: CPT

## 2024-01-03 PROCEDURE — 99285 EMERGENCY DEPT VISIT HI MDM: CPT

## 2024-01-03 PROCEDURE — 87651 STREP A DNA AMP PROBE: CPT

## 2024-01-03 PROCEDURE — 82607 VITAMIN B-12: CPT

## 2024-01-03 PROCEDURE — 99223 1ST HOSP IP/OBS HIGH 75: CPT | Performed by: INTERNAL MEDICINE

## 2024-01-03 PROCEDURE — 87040 BLOOD CULTURE FOR BACTERIA: CPT | Mod: 91

## 2024-01-03 PROCEDURE — ? ADDITIONAL NOTES

## 2024-01-03 PROCEDURE — 700105 HCHG RX REV CODE 258: Performed by: EMERGENCY MEDICINE

## 2024-01-03 PROCEDURE — A9270 NON-COVERED ITEM OR SERVICE: HCPCS | Performed by: EMERGENCY MEDICINE

## 2024-01-03 RX ORDER — GABAPENTIN 400 MG/1
800 CAPSULE ORAL 4 TIMES DAILY
Status: DISCONTINUED | OUTPATIENT
Start: 2024-01-03 | End: 2024-01-04 | Stop reason: HOSPADM

## 2024-01-03 RX ORDER — PROMETHAZINE HYDROCHLORIDE 25 MG/1
12.5-25 TABLET ORAL EVERY 4 HOURS PRN
Status: DISCONTINUED | OUTPATIENT
Start: 2024-01-03 | End: 2024-01-04 | Stop reason: HOSPADM

## 2024-01-03 RX ORDER — TRAZODONE HYDROCHLORIDE 100 MG/1
100 TABLET ORAL NIGHTLY
Status: DISCONTINUED | OUTPATIENT
Start: 2024-01-03 | End: 2024-01-04 | Stop reason: HOSPADM

## 2024-01-03 RX ORDER — DULOXETIN HYDROCHLORIDE 20 MG/1
60 CAPSULE, DELAYED RELEASE ORAL EVERY EVENING
Status: DISCONTINUED | OUTPATIENT
Start: 2024-01-03 | End: 2024-01-04 | Stop reason: HOSPADM

## 2024-01-03 RX ORDER — PROMETHAZINE HYDROCHLORIDE 12.5 MG/1
12.5 SUPPOSITORY RECTAL EVERY 6 HOURS PRN
COMMUNITY

## 2024-01-03 RX ORDER — PROMETHAZINE HYDROCHLORIDE 25 MG/1
12.5-25 SUPPOSITORY RECTAL EVERY 4 HOURS PRN
Status: DISCONTINUED | OUTPATIENT
Start: 2024-01-03 | End: 2024-01-04 | Stop reason: HOSPADM

## 2024-01-03 RX ORDER — INSULIN GLARGINE 100 [IU]/ML
5 INJECTION, SOLUTION SUBCUTANEOUS EVERY EVENING
COMMUNITY

## 2024-01-03 RX ORDER — AMOXICILLIN 250 MG
2 CAPSULE ORAL 2 TIMES DAILY
Status: DISCONTINUED | OUTPATIENT
Start: 2024-01-03 | End: 2024-01-04 | Stop reason: HOSPADM

## 2024-01-03 RX ORDER — MORPHINE SULFATE 4 MG/ML
4 INJECTION INTRAVENOUS ONCE
Status: COMPLETED | OUTPATIENT
Start: 2024-01-03 | End: 2024-01-03

## 2024-01-03 RX ORDER — ACETAMINOPHEN 325 MG/1
650 TABLET ORAL EVERY 4 HOURS PRN
Status: DISCONTINUED | OUTPATIENT
Start: 2024-01-03 | End: 2024-01-04 | Stop reason: HOSPADM

## 2024-01-03 RX ORDER — ALBUTEROL SULFATE 90 UG/1
1-2 AEROSOL, METERED RESPIRATORY (INHALATION) EVERY 4 HOURS PRN
Status: DISCONTINUED | OUTPATIENT
Start: 2024-01-03 | End: 2024-01-04 | Stop reason: HOSPADM

## 2024-01-03 RX ORDER — PROCHLORPERAZINE EDISYLATE 5 MG/ML
5-10 INJECTION INTRAMUSCULAR; INTRAVENOUS EVERY 4 HOURS PRN
Status: DISCONTINUED | OUTPATIENT
Start: 2024-01-03 | End: 2024-01-04 | Stop reason: HOSPADM

## 2024-01-03 RX ORDER — CLONAZEPAM 1 MG/1
1 TABLET ORAL 2 TIMES DAILY
Status: DISCONTINUED | OUTPATIENT
Start: 2024-01-03 | End: 2024-01-04 | Stop reason: HOSPADM

## 2024-01-03 RX ORDER — ONDANSETRON 2 MG/ML
4 INJECTION INTRAMUSCULAR; INTRAVENOUS EVERY 4 HOURS PRN
Status: DISCONTINUED | OUTPATIENT
Start: 2024-01-03 | End: 2024-01-04 | Stop reason: HOSPADM

## 2024-01-03 RX ORDER — BISACODYL 10 MG
10 SUPPOSITORY, RECTAL RECTAL
Status: DISCONTINUED | OUTPATIENT
Start: 2024-01-03 | End: 2024-01-04 | Stop reason: HOSPADM

## 2024-01-03 RX ORDER — POLYETHYLENE GLYCOL 3350 17 G/17G
1 POWDER, FOR SOLUTION ORAL
Status: DISCONTINUED | OUTPATIENT
Start: 2024-01-03 | End: 2024-01-04 | Stop reason: HOSPADM

## 2024-01-03 RX ORDER — ONDANSETRON 4 MG/1
4 TABLET, ORALLY DISINTEGRATING ORAL EVERY 4 HOURS PRN
Status: DISCONTINUED | OUTPATIENT
Start: 2024-01-03 | End: 2024-01-04 | Stop reason: HOSPADM

## 2024-01-03 RX ORDER — ACETAMINOPHEN 325 MG/1
650 TABLET ORAL ONCE
Status: COMPLETED | OUTPATIENT
Start: 2024-01-03 | End: 2024-01-03

## 2024-01-03 RX ORDER — FAMOTIDINE 20 MG/1
20 TABLET, FILM COATED ORAL DAILY
Status: DISCONTINUED | OUTPATIENT
Start: 2024-01-04 | End: 2024-01-04 | Stop reason: HOSPADM

## 2024-01-03 RX ORDER — OXYCODONE HYDROCHLORIDE 5 MG/1
5 TABLET ORAL EVERY 4 HOURS PRN
Status: DISCONTINUED | OUTPATIENT
Start: 2024-01-03 | End: 2024-01-04 | Stop reason: HOSPADM

## 2024-01-03 RX ORDER — ONDANSETRON 2 MG/ML
4 INJECTION INTRAMUSCULAR; INTRAVENOUS ONCE
Status: COMPLETED | OUTPATIENT
Start: 2024-01-03 | End: 2024-01-03

## 2024-01-03 RX ORDER — OXCARBAZEPINE 300 MG/1
300 TABLET, FILM COATED ORAL EVERY 12 HOURS
Status: DISCONTINUED | OUTPATIENT
Start: 2024-01-03 | End: 2024-01-04 | Stop reason: HOSPADM

## 2024-01-03 RX ORDER — LABETALOL HYDROCHLORIDE 5 MG/ML
10 INJECTION, SOLUTION INTRAVENOUS EVERY 4 HOURS PRN
Status: DISCONTINUED | OUTPATIENT
Start: 2024-01-03 | End: 2024-01-04 | Stop reason: HOSPADM

## 2024-01-03 RX ORDER — ATORVASTATIN CALCIUM 40 MG/1
40 TABLET, FILM COATED ORAL EVERY EVENING
Status: DISCONTINUED | OUTPATIENT
Start: 2024-01-03 | End: 2024-01-04 | Stop reason: HOSPADM

## 2024-01-03 RX ORDER — QUETIAPINE FUMARATE 100 MG/1
200 TABLET, FILM COATED ORAL EVERY EVENING
Status: DISCONTINUED | OUTPATIENT
Start: 2024-01-03 | End: 2024-01-04 | Stop reason: HOSPADM

## 2024-01-03 RX ORDER — SODIUM CHLORIDE, SODIUM LACTATE, POTASSIUM CHLORIDE, CALCIUM CHLORIDE 600; 310; 30; 20 MG/100ML; MG/100ML; MG/100ML; MG/100ML
1000 INJECTION, SOLUTION INTRAVENOUS ONCE
Status: COMPLETED | OUTPATIENT
Start: 2024-01-03 | End: 2024-01-03

## 2024-01-03 RX ADMIN — GABAPENTIN 800 MG: 400 CAPSULE ORAL at 21:54

## 2024-01-03 RX ADMIN — ATORVASTATIN CALCIUM 40 MG: 40 TABLET, FILM COATED ORAL at 21:54

## 2024-01-03 RX ADMIN — OXYCODONE 5 MG: 5 TABLET ORAL at 23:13

## 2024-01-03 RX ADMIN — SODIUM CHLORIDE, POTASSIUM CHLORIDE, SODIUM LACTATE AND CALCIUM CHLORIDE 1000 ML: 600; 310; 30; 20 INJECTION, SOLUTION INTRAVENOUS at 18:20

## 2024-01-03 RX ADMIN — DULOXETINE HYDROCHLORIDE 60 MG: 60 CAPSULE, DELAYED RELEASE ORAL at 21:54

## 2024-01-03 RX ADMIN — ACETAMINOPHEN 650 MG: 325 TABLET, FILM COATED ORAL at 23:14

## 2024-01-03 RX ADMIN — CLONAZEPAM 1 MG: 1 TABLET ORAL at 21:54

## 2024-01-03 RX ADMIN — ONDANSETRON 4 MG: 2 INJECTION INTRAMUSCULAR; INTRAVENOUS at 18:15

## 2024-01-03 RX ADMIN — ONDANSETRON 4 MG: 2 INJECTION INTRAMUSCULAR; INTRAVENOUS at 23:13

## 2024-01-03 RX ADMIN — MORPHINE SULFATE 4 MG: 4 INJECTION, SOLUTION INTRAMUSCULAR; INTRAVENOUS at 18:17

## 2024-01-03 RX ADMIN — ACETAMINOPHEN 650 MG: 325 TABLET, FILM COATED ORAL at 18:14

## 2024-01-03 RX ADMIN — OXCARBAZEPINE 300 MG: 300 TABLET, FILM COATED ORAL at 21:54

## 2024-01-03 RX ADMIN — TRAZODONE HYDROCHLORIDE 100 MG: 100 TABLET ORAL at 21:54

## 2024-01-03 RX ADMIN — QUETIAPINE FUMARATE 200 MG: 200 TABLET ORAL at 21:54

## 2024-01-03 RX ADMIN — INSULIN GLARGINE-YFGN 5 UNITS: 100 INJECTION, SOLUTION SUBCUTANEOUS at 22:27

## 2024-01-03 ASSESSMENT — LOCATION SIMPLE DESCRIPTION DERM
LOCATION SIMPLE: POSTERIOR SCALP
LOCATION SIMPLE: BACK
LOCATION SIMPLE: RIGHT HAND
LOCATION SIMPLE: LEFT THIGH
LOCATION SIMPLE: LEFT ELBOW
LOCATION SIMPLE: ABDOMEN
LOCATION SIMPLE: RIGHT MIDDLE FINGER
LOCATION SIMPLE: RIGHT POSTERIOR UPPER ARM
LOCATION SIMPLE: RIGHT LOWER EXTREMITY
LOCATION SIMPLE: RIGHT ELBOW
LOCATION SIMPLE: RIGHT UPPER BACK
LOCATION SIMPLE: LEFT ANKLE
LOCATION SIMPLE: LEFT LOWER EXTREMITY
LOCATION SIMPLE: RIGHT BUTTOCK
LOCATION SIMPLE: RIGHT CHEEK
LOCATION SIMPLE: RIGHT EAR
LOCATION SIMPLE: LEFT UPPER BACK
LOCATION SIMPLE: LEFT CHEEK
LOCATION SIMPLE: RIGHT CALF
LOCATION SIMPLE: GENITALIA
LOCATION SIMPLE: LEFT POSTERIOR THIGH
LOCATION SIMPLE: LEFT HAND

## 2024-01-03 ASSESSMENT — LOCATION DETAILED DESCRIPTION DERM
LOCATION DETAILED: RIGHT LATERAL ABDOMEN
LOCATION DETAILED: RIGHT SUPERIOR HELIX
LOCATION DETAILED: RIGHT PROXIMAL POSTERIOR UPPER ARM
LOCATION DETAILED: LEFT DORSAL HAND
LOCATION DETAILED: LEFT ELBOW
LOCATION DETAILED: LEFT INFERIOR CENTRAL MALAR CHEEK
LOCATION DETAILED: RIGHT SUPERIOR MEDIAL BUCCAL CHEEK
LOCATION DETAILED: LEFT ANTERIOR PROXIMAL THIGH
LOCATION DETAILED: RIGHT INFERIOR UPPER BACK
LOCATION DETAILED: RIGHT DORSAL HAND
LOCATION DETAILED: LEFT RADIAL DORSAL HAND
LOCATION DETAILED: RIGHT CHEEK
LOCATION DETAILED: RIGHT DISTAL LATERAL CALF
LOCATION DETAILED: LEFT ANTERIOR DISTAL THIGH
LOCATION DETAILED: RIGHT MID DORSAL MIDDLE FINGER
LOCATION DETAILED: LEFT MID BACK
LOCATION DETAILED: LEFT SUPERIOR UPPER BACK
LOCATION DETAILED: LEFT POSTERIOR ANKLE
LOCATION DETAILED: RIGHT BUTTOCK
LOCATION DETAILED: LEFT UPPER BACK
LOCATION DETAILED: LEFT PROXIMAL POSTERIOR THIGH
LOCATION DETAILED: RIGHT ANTERIOR THIGH
LOCATION DETAILED: RIGHT MID-UPPER BACK
LOCATION DETAILED: GENITALIA
LOCATION DETAILED: RIGHT ELBOW
LOCATION DETAILED: LEFT INFERIOR OCCIPITAL SCALP
LOCATION DETAILED: LEFT ANTERIOR THIGH

## 2024-01-03 ASSESSMENT — LOCATION ZONE DERM
LOCATION ZONE: FINGER
LOCATION ZONE: FACE
LOCATION ZONE: HAND
LOCATION ZONE: SCALP
LOCATION ZONE: TRUNK
LOCATION ZONE: EAR
LOCATION ZONE: LEG
LOCATION ZONE: ARM

## 2024-01-03 ASSESSMENT — PAIN DESCRIPTION - PAIN TYPE: TYPE: ACUTE PAIN;CHRONIC PAIN

## 2024-01-03 ASSESSMENT — FIBROSIS 4 INDEX: FIB4 SCORE: 0.17

## 2024-01-03 NOTE — HPI: FULL BODY SKIN EXAMINATION
What Type Of Note Output Would You Prefer (Optional)?: Standard Output
What Is The Reason For Today's Visit?: Full Body Skin Examination
What Is The Reason For Today's Visit? (Being Monitored For X): concerning skin lesions on a periodic basis
No difficulties

## 2024-01-03 NOTE — PROCEDURE: DIAGNOSIS COMMENT
Render Risk Assessment In Note?: no
Comment: 5/31/22; per pt PET showed intense uptake to left hilum concern for metastasis.\\nPt had biopsy to lymph per pt no sign of cancer.\\n\\n-10/2022; 5 masses, largest 3.4cm x 3 cm\\n\\n-10/2022; CT-CAP, Several nodules in lung, thyroid nodule, abdominal/peritoneal  with several nodules, and questionable new lesion kidney possible cyst.\\n\\n-10/2022; Went to ER for stroke like symptoms, CT/MRI showed multiple locations (brain, bilateral lung, thyroid, kidney, peritoneal) masses consistent with metastatic melanoma.  Managed by Dr. Caraballo, treatment radiation, Yervoy & Opdivo.\\n\\n-started 11/2022; Pt well managed w/ palliative immunotherapy w/ Opdivo and Yervoy\\n\\n-2/2023; CT chest showed + response\\n-2/2023; MRI brain showed 6 mm lesion parietal lobe\\n-3/2023; 1 dose stereotactic radiation brain\\n-5/2023; MRI brain no evidence of progression\\n-7/2023; stopped immunotherapy 2/2 diarrhea, nausea, and vomiting\\n-1/2023; per pt the side effects of immunotherapy have been significant, she has lost a significant amount of weight, unable to eat w/o vomiting.  Awaiting Dr. Caraballo’s note for treatment update.
Detail Level: Simple
Comment: Excised in 2016; 2.8mm, re-excised 2017 (1+ node,18 neg), tx w/ Ileana [id number], Lelo 2B // no metastasis from [id number]\\n\\n-Refer to metastasis note 2022
Comment: Biopsy proven; all sites excised\\nRight buttock concern early evolving MIS
Comment: Biopsy proven mild to moderate 10/2017; monitor for repigmentation
Comment: Biopsy x 2; [id number], proven benign nevi, closely monitor

## 2024-01-03 NOTE — PROCEDURE: ADDITIONAL NOTES
Additional Notes: Plan\\n\\n1. Discussed with pt consider follow up with [facility] Melanoma clinic to ensure from a dermatological stand point is on point. \\n2. Continue 3 mth FBE
Render Risk Assessment In Note?: no
Detail Level: Simple
Additional Notes: 1mm x 1mm, no change
Additional Notes: Plan\\n\\n1.  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  Advised  slow taper of oral prednisone to avoid rebound flare of psoriasis. \\n2.  However if flare occurs recommend UVB phototherapy as a possible form of treatment.  \\n\\nPt has clobetasol and currently using.  Will let me know if not working which I will change steroid and add Vit D oint.
Additional Notes: No tax at this time

## 2024-01-04 ENCOUNTER — PHARMACY VISIT (OUTPATIENT)
Dept: PHARMACY | Facility: MEDICAL CENTER | Age: 44
End: 2024-01-04
Payer: COMMERCIAL

## 2024-01-04 VITALS
HEART RATE: 95 BPM | HEIGHT: 64 IN | DIASTOLIC BLOOD PRESSURE: 77 MMHG | WEIGHT: 122 LBS | SYSTOLIC BLOOD PRESSURE: 105 MMHG | RESPIRATION RATE: 17 BRPM | OXYGEN SATURATION: 96 % | TEMPERATURE: 98.6 F | BODY MASS INDEX: 20.83 KG/M2

## 2024-01-04 PROBLEM — R68.89 FLU-LIKE SYMPTOMS: Status: ACTIVE | Noted: 2024-01-04

## 2024-01-04 LAB
ALBUMIN SERPL BCP-MCNC: 2.8 G/DL (ref 3.2–4.9)
ALBUMIN/GLOB SERPL: 1.3 G/DL
ALP SERPL-CCNC: 74 U/L (ref 30–99)
ALT SERPL-CCNC: 5 U/L (ref 2–50)
ANION GAP SERPL CALC-SCNC: 9 MMOL/L (ref 7–16)
APPEARANCE UR: CLEAR
AST SERPL-CCNC: 6 U/L (ref 12–45)
BASOPHILS # BLD AUTO: 0.5 % (ref 0–1.8)
BASOPHILS # BLD: 0.05 K/UL (ref 0–0.12)
BILIRUB SERPL-MCNC: 0.6 MG/DL (ref 0.1–1.5)
BILIRUB UR QL STRIP.AUTO: NEGATIVE
BUN SERPL-MCNC: 8 MG/DL (ref 8–22)
CALCIUM ALBUM COR SERPL-MCNC: 8.5 MG/DL (ref 8.5–10.5)
CALCIUM SERPL-MCNC: 7.5 MG/DL (ref 8.5–10.5)
CHLORIDE SERPL-SCNC: 108 MMOL/L (ref 96–112)
CO2 SERPL-SCNC: 25 MMOL/L (ref 20–33)
COLOR UR: YELLOW
CREAT SERPL-MCNC: 0.33 MG/DL (ref 0.5–1.4)
EOSINOPHIL # BLD AUTO: 0 K/UL (ref 0–0.51)
EOSINOPHIL NFR BLD: 0 % (ref 0–6.9)
ERYTHROCYTE [DISTWIDTH] IN BLOOD BY AUTOMATED COUNT: 50.6 FL (ref 35.9–50)
FOLATE SERPL-MCNC: 18.3 NG/ML
GFR SERPLBLD CREATININE-BSD FMLA CKD-EPI: 130 ML/MIN/1.73 M 2
GLOBULIN SER CALC-MCNC: 2.1 G/DL (ref 1.9–3.5)
GLUCOSE SERPL-MCNC: 108 MG/DL (ref 65–99)
GLUCOSE UR STRIP.AUTO-MCNC: NEGATIVE MG/DL
HCT VFR BLD AUTO: 30.2 % (ref 37–47)
HEMOCCULT STL QL: POSITIVE
HGB BLD-MCNC: 9.6 G/DL (ref 12–16)
HGB RETIC QN AUTO: 20.8 PG/CELL (ref 29–35)
IMM GRANULOCYTES # BLD AUTO: 0.03 K/UL (ref 0–0.11)
IMM GRANULOCYTES NFR BLD AUTO: 0.3 % (ref 0–0.9)
IMM RETICS NFR: 36.7 % (ref 2.6–16.1)
IRON SATN MFR SERPL: 66 % (ref 15–55)
IRON SERPL-MCNC: 113 UG/DL (ref 40–170)
KETONES UR STRIP.AUTO-MCNC: NEGATIVE MG/DL
LDH SERPL L TO P-CCNC: 279 U/L (ref 107–266)
LEUKOCYTE ESTERASE UR QL STRIP.AUTO: NEGATIVE
LYMPHOCYTES # BLD AUTO: 1.44 K/UL (ref 1–4.8)
LYMPHOCYTES NFR BLD: 14.3 % (ref 22–41)
MCH RBC QN AUTO: 24.9 PG (ref 27–33)
MCHC RBC AUTO-ENTMCNC: 31.8 G/DL (ref 32.2–35.5)
MCV RBC AUTO: 78.2 FL (ref 81.4–97.8)
MICRO URNS: NORMAL
MONOCYTES # BLD AUTO: 1.22 K/UL (ref 0–0.85)
MONOCYTES NFR BLD AUTO: 12.1 % (ref 0–13.4)
NEUTROPHILS # BLD AUTO: 7.35 K/UL (ref 1.82–7.42)
NEUTROPHILS NFR BLD: 72.8 % (ref 44–72)
NITRITE UR QL STRIP.AUTO: NEGATIVE
NRBC # BLD AUTO: 0 K/UL
NRBC BLD-RTO: 0 /100 WBC (ref 0–0.2)
PH UR STRIP.AUTO: 6.5 [PH] (ref 5–8)
PLATELET # BLD AUTO: 605 K/UL (ref 164–446)
PMV BLD AUTO: 8.8 FL (ref 9–12.9)
POTASSIUM SERPL-SCNC: 3.4 MMOL/L (ref 3.6–5.5)
PROT SERPL-MCNC: 4.9 G/DL (ref 6–8.2)
PROT UR QL STRIP: NEGATIVE MG/DL
RBC # BLD AUTO: 3.86 M/UL (ref 4.2–5.4)
RBC UR QL AUTO: NEGATIVE
RETICS # AUTO: 0.03 M/UL (ref 0.04–0.12)
RETICS/RBC NFR: 1.4 % (ref 0.8–2.6)
SODIUM SERPL-SCNC: 142 MMOL/L (ref 135–145)
SP GR UR STRIP.AUTO: 1.01
TIBC SERPL-MCNC: 172 UG/DL (ref 250–450)
UIBC SERPL-MCNC: 59 UG/DL (ref 110–370)
UROBILINOGEN UR STRIP.AUTO-MCNC: 0.2 MG/DL
WBC # BLD AUTO: 10.1 K/UL (ref 4.8–10.8)

## 2024-01-04 PROCEDURE — 83550 IRON BINDING TEST: CPT

## 2024-01-04 PROCEDURE — 36430 TRANSFUSION BLD/BLD COMPNT: CPT

## 2024-01-04 PROCEDURE — 36415 COLL VENOUS BLD VENIPUNCTURE: CPT

## 2024-01-04 PROCEDURE — 700105 HCHG RX REV CODE 258: Performed by: STUDENT IN AN ORGANIZED HEALTH CARE EDUCATION/TRAINING PROGRAM

## 2024-01-04 PROCEDURE — 700102 HCHG RX REV CODE 250 W/ 637 OVERRIDE(OP): Mod: JZ | Performed by: STUDENT IN AN ORGANIZED HEALTH CARE EDUCATION/TRAINING PROGRAM

## 2024-01-04 PROCEDURE — 83615 LACTATE (LD) (LDH) ENZYME: CPT

## 2024-01-04 PROCEDURE — 82272 OCCULT BLD FECES 1-3 TESTS: CPT

## 2024-01-04 PROCEDURE — A9270 NON-COVERED ITEM OR SERVICE: HCPCS | Performed by: INTERNAL MEDICINE

## 2024-01-04 PROCEDURE — 81003 URINALYSIS AUTO W/O SCOPE: CPT

## 2024-01-04 PROCEDURE — RXMED WILLOW AMBULATORY MEDICATION CHARGE: Performed by: STUDENT IN AN ORGANIZED HEALTH CARE EDUCATION/TRAINING PROGRAM

## 2024-01-04 PROCEDURE — 80053 COMPREHEN METABOLIC PANEL: CPT

## 2024-01-04 PROCEDURE — 85025 COMPLETE CBC W/AUTO DIFF WBC: CPT

## 2024-01-04 PROCEDURE — A9270 NON-COVERED ITEM OR SERVICE: HCPCS | Mod: JZ | Performed by: STUDENT IN AN ORGANIZED HEALTH CARE EDUCATION/TRAINING PROGRAM

## 2024-01-04 PROCEDURE — P9016 RBC LEUKOCYTES REDUCED: HCPCS

## 2024-01-04 PROCEDURE — 86923 COMPATIBILITY TEST ELECTRIC: CPT

## 2024-01-04 PROCEDURE — 700102 HCHG RX REV CODE 250 W/ 637 OVERRIDE(OP): Performed by: INTERNAL MEDICINE

## 2024-01-04 PROCEDURE — 700111 HCHG RX REV CODE 636 W/ 250 OVERRIDE (IP)

## 2024-01-04 PROCEDURE — 99239 HOSP IP/OBS DSCHRG MGMT >30: CPT | Performed by: STUDENT IN AN ORGANIZED HEALTH CARE EDUCATION/TRAINING PROGRAM

## 2024-01-04 PROCEDURE — 83540 ASSAY OF IRON: CPT

## 2024-01-04 PROCEDURE — 700111 HCHG RX REV CODE 636 W/ 250 OVERRIDE (IP): Mod: JZ | Performed by: STUDENT IN AN ORGANIZED HEALTH CARE EDUCATION/TRAINING PROGRAM

## 2024-01-04 RX ORDER — HYDROMORPHONE HYDROCHLORIDE 1 MG/ML
0.5 INJECTION, SOLUTION INTRAMUSCULAR; INTRAVENOUS; SUBCUTANEOUS ONCE
Status: COMPLETED | OUTPATIENT
Start: 2024-01-04 | End: 2024-01-04

## 2024-01-04 RX ORDER — FERROUS SULFATE 325(65) MG
325 TABLET ORAL DAILY
Qty: 30 TABLET | Refills: 0 | Status: SHIPPED | OUTPATIENT
Start: 2024-01-04

## 2024-01-04 RX ORDER — POTASSIUM CHLORIDE 20 MEQ/1
40 TABLET, EXTENDED RELEASE ORAL ONCE
Status: COMPLETED | OUTPATIENT
Start: 2024-01-04 | End: 2024-01-04

## 2024-01-04 RX ORDER — SODIUM CHLORIDE 9 MG/ML
INJECTION, SOLUTION INTRAVENOUS CONTINUOUS
Status: DISCONTINUED | OUTPATIENT
Start: 2024-01-04 | End: 2024-01-04 | Stop reason: HOSPADM

## 2024-01-04 RX ADMIN — HYDROMORPHONE HYDROCHLORIDE 0.5 MG: 1 INJECTION, SOLUTION INTRAMUSCULAR; INTRAVENOUS; SUBCUTANEOUS at 05:19

## 2024-01-04 RX ADMIN — FAMOTIDINE 20 MG: 20 TABLET, FILM COATED ORAL at 05:19

## 2024-01-04 RX ADMIN — CLONAZEPAM 1 MG: 1 TABLET ORAL at 05:19

## 2024-01-04 RX ADMIN — HEPARIN 500 UNITS: 100 SYRINGE at 12:02

## 2024-01-04 RX ADMIN — OXCARBAZEPINE 300 MG: 300 TABLET, FILM COATED ORAL at 05:19

## 2024-01-04 RX ADMIN — GABAPENTIN 800 MG: 400 CAPSULE ORAL at 08:55

## 2024-01-04 RX ADMIN — SODIUM CHLORIDE: 9 INJECTION, SOLUTION INTRAVENOUS at 08:57

## 2024-01-04 RX ADMIN — POTASSIUM CHLORIDE 40 MEQ: 1500 TABLET, EXTENDED RELEASE ORAL at 11:30

## 2024-01-04 RX ADMIN — OXYCODONE 5 MG: 5 TABLET ORAL at 10:30

## 2024-01-04 RX ADMIN — ALTEPLASE 2 MG: 2.2 INJECTION, POWDER, LYOPHILIZED, FOR SOLUTION INTRAVENOUS at 09:12

## 2024-01-04 ASSESSMENT — LIFESTYLE VARIABLES
HAVE PEOPLE ANNOYED YOU BY CRITICIZING YOUR DRINKING: NO
AVERAGE NUMBER OF DAYS PER WEEK YOU HAVE A DRINK CONTAINING ALCOHOL: 0
TOTAL SCORE: 0
SUBSTANCE_ABUSE: 0
EVER HAD A DRINK FIRST THING IN THE MORNING TO STEADY YOUR NERVES TO GET RID OF A HANGOVER: NO
TOTAL SCORE: 0
CONSUMPTION TOTAL: NEGATIVE
HAVE YOU EVER FELT YOU SHOULD CUT DOWN ON YOUR DRINKING: NO
TOTAL SCORE: 0
ALCOHOL_USE: NO
HOW MANY TIMES IN THE PAST YEAR HAVE YOU HAD 5 OR MORE DRINKS IN A DAY: 0
EVER FELT BAD OR GUILTY ABOUT YOUR DRINKING: NO
ON A TYPICAL DAY WHEN YOU DRINK ALCOHOL HOW MANY DRINKS DO YOU HAVE: 0
DOES PATIENT WANT TO STOP DRINKING: NO

## 2024-01-04 ASSESSMENT — ENCOUNTER SYMPTOMS
HALLUCINATIONS: 0
SORE THROAT: 1
DOUBLE VISION: 0
BLURRED VISION: 0
SPEECH CHANGE: 0
BRUISES/BLEEDS EASILY: 0
NAUSEA: 0
SPUTUM PRODUCTION: 0
HEARTBURN: 0
CHILLS: 1
FLANK PAIN: 0
TREMORS: 0
PALPITATIONS: 0
POLYDIPSIA: 0
ORTHOPNEA: 0
HEADACHES: 0
BACK PAIN: 0
PHOTOPHOBIA: 0
HEMOPTYSIS: 0
COUGH: 1
NERVOUS/ANXIOUS: 0
VOMITING: 0
FOCAL WEAKNESS: 0
WEIGHT LOSS: 0
NECK PAIN: 0
FEVER: 1

## 2024-01-04 ASSESSMENT — COGNITIVE AND FUNCTIONAL STATUS - GENERAL
SUGGESTED CMS G CODE MODIFIER DAILY ACTIVITY: CH
SUGGESTED CMS G CODE MODIFIER MOBILITY: CH
DAILY ACTIVITIY SCORE: 24
MOBILITY SCORE: 24

## 2024-01-04 ASSESSMENT — PATIENT HEALTH QUESTIONNAIRE - PHQ9
1. LITTLE INTEREST OR PLEASURE IN DOING THINGS: NOT AT ALL
2. FEELING DOWN, DEPRESSED, IRRITABLE, OR HOPELESS: NOT AT ALL
SUM OF ALL RESPONSES TO PHQ9 QUESTIONS 1 AND 2: 0

## 2024-01-04 ASSESSMENT — FIBROSIS 4 INDEX: FIB4 SCORE: 0.22

## 2024-01-04 ASSESSMENT — PAIN DESCRIPTION - PAIN TYPE: TYPE: ACUTE PAIN;CHRONIC PAIN

## 2024-01-04 NOTE — ASSESSMENT & PLAN NOTE
Unclear etiology.  COVID-19/influenza/RSV negative.  Chest x-ray negative.  UA negative.  Supportive care, droplet precautions

## 2024-01-04 NOTE — ED NOTES
Labs, 1st set of cultures. Covid/flu swab and strep swabs sent to lab. Pt medicated per MAR. Pt taken to xray

## 2024-01-04 NOTE — ASSESSMENT & PLAN NOTE
Presented with hemoglobin 5.4, MCV 76  In the absence of signs of bleeding, patient may have iron deficiency anemia due to poor oral intake  Iron studies reordered.  Vitamin B12 is not low, folate ordered  Follow reticulocyte count, ferritin, fecal occult test, LDH  Repeat H&H, transfuse as needed  Will consider GI consult for endoscopy

## 2024-01-04 NOTE — DISCHARGE INSTRUCTIONS
Take your medications as directed, I have added daily iron supplement, it can cause constipation if you develop this you can spread to every 2 days. Discuss with your oncologist as you may benefit from IV iron infusion   Repeat labs were ordered in 1 week to monitor your anemia and your platelet count   Suspect you have an upper respiratory infection, okay to take over the counter tyelnol/motrin and cough medicine to help with symptoms, however if you fail to improve or get worse or develop high fevers, Shortness of breath or other concerning symptoms seek medical attention   If you develop any evidence of bleeding seek medical attention

## 2024-01-04 NOTE — ED NOTES
Pt ambulated to room with a steady gait. Oral temp checked, 100.4F. pt states fever began yesterday. Reports nasal congestion & headache. States her father is sick at home as well. Has not been tested for anything recently. Pt on monitoring, chart up for ERP.

## 2024-01-04 NOTE — ED NOTES
Bedside report received from off going RN/tech: Sindy YANEZ RN, assumed care of patient.  POC discussed with patient. Call light within reach, all needs addressed at this time.       Fall risk interventions in place: Move the patient closer to the nurse's station, Patient's personal possessions are with in their safe reach, and Keep floor surfaces clean and dry (all applicable per [place] Fall risk assessment)   Continuous monitoring: Cardiac Leads, Pulse Ox, or Blood Pressure  IVF/IV medications: NS bolus   Oxygen: Room Air  Bedside sitter: Not Applicable   Isolation: Not Applicable

## 2024-01-04 NOTE — ED NOTES
Pt resting in bed, in no apparent distress. Reached out to blood bank for released unit of RBC location/ETA.

## 2024-01-04 NOTE — ED NOTES
Consent for blood signed at bedside. Risk and benefits of transfusion reviewed with pt who voices understanding.

## 2024-01-04 NOTE — PROGRESS NOTES
4 Eyes Skin Assessment Completed by MICHAEL Moreno and MICHAEL Dunham.    Head WDL  Ears WDL  Nose WDL  Mouth WDL  Neck WDL  Breast/Chest WDL  Shoulder Blades WDL  Spine WDL  (R) Arm/Elbow/Hand WDL  (L) Arm/Elbow/Hand WDL  Abdomen WDL  Groin WDL  Scrotum/Coccyx/Buttocks WDL  (R) Leg WDL  (L) Leg WDL  (R) Heel/Foot/Toe WDL  (L) Heel/Foot/Toe WDL          Devices In Places Blood Pressure Cuff      Interventions In Place Pillows    Possible Skin Injury No    Pictures Uploaded Into Epic N/A  Wound Consult Placed N/A  RN Wound Prevention Protocol Ordered No

## 2024-01-04 NOTE — ED TRIAGE NOTES
Ambulates to triage with friend  Chief Complaint   Patient presents with    Sent by MD     Sent by oncologist for low grade fever 100.5    Headache     All day     Pt has stage 4 melanoma, has not had any recent chemo treatments, was getting her NS infusion today and was told her come here for a work up for her low grade fever. Denies any cough, CP or SOB.

## 2024-01-04 NOTE — ED PROVIDER NOTES
ED Provider Note    CHIEF COMPLAINT  Chief Complaint   Patient presents with    Sent by MD     Sent by oncologist for low grade fever 100.5    Headache     All day        HPI    Primary care provider: Adriana Levine M.D.   History obtained from: Patient  History limited by: None     Jacque Mcintyre is a 43 y.o. female who presents to the ED with mother after being referred from her oncologist to the ED for temperature up to 100.5.  Patient with melanoma and on chemo treatment.  She reports that she just noticed today that she has some congestion and perhaps slight sore throat.  Her father has had similar symptoms but otherwise no ill contacts.  Patient reports she has a headache but otherwise no new pain except chronic back pain.  She has chronic nausea and vomiting due to her cancer treatment and states that she received 1 L of normal saline today.  She would like more IV fluid and also Zofran.  She also would like acetaminophen for her fever and pain medicine.  She has chronic diarrhea since July.  No dysuria but she reports decreased urination likely due to dehydration.  No rash noted.    REVIEW OF SYSTEMS  Please see HPI for pertinent positives/negatives.  All other systems reviewed and are negative.     PAST MEDICAL HISTORY  Past Medical History:   Diagnosis Date    Cancer (HCC) 01/01/2016    melanoma    Asthma     inhaler    Bipolar 1 disorder (HCC)     Cough     Depression     DM mellitus,     insulin    Hyperlipidemia     Hypertension     Pap smear     Dr. Baird    PCOS (polycystic ovarian syndrome)     Shortness of breath     Sputum production     Wheezing         SURGICAL HISTORY  Past Surgical History:   Procedure Laterality Date    CRANIOTOMY STEALTH Right 10/6/2022    Procedure: RIGHT FRONTAL CRANIOTOMY WITH STEALTH;  Surgeon: Pebbles Tejada M.D.;  Location: SURGERY [facility];  Service: Neurosurgery    ND BRONCHOSCOPY,DIAGNOSTIC N/A  6/17/2022    Procedure: FIBER OPTIC BRONCHOSCOPY WITH WASH, BRUSH, BRONCHOALVEOLAR LAVAGE, BIOPSY, FINE NEEDLE ASPIRATION;  Surgeon: Yue Swanson M.D.;  Location: [facility];  Service: Pulmonary    ENDOBRONCHIAL US ADD-ON N/A 6/17/2022    Procedure: ENDOBRONCHIAL ULTRASOUND (EBUS);  Surgeon: Yue Swanson M.D.;  Location: SURGERY [facility];  Service: Pulmonary    KY LAP,APPENDECTOMY N/A 10/7/2019    Procedure: APPENDECTOMY, LAPAROSCOPIC;  Surgeon: Lionel Frazier M.D.;  Location: SURGERY [facility];  Service: General    AXILLARY NODE DISSECTION Left 6/23/2017    Procedure: AXILLARY NODE DISSECTION- COMPLETION LYPHADENECTOMY;  Surgeon: Lionel Weinberg M.D.;  Location: [facility];  Service:     WIDE EXCISION Left 5/9/2017    Procedure: WIDE EXCISION - MALIGNANT MELANOMA HAND;  Surgeon: Lionel Weinberg M.D.;  Location: SURGERY [facility] DAY [facility];  Service:     NODE BIOPSY SENTINEL Left 5/9/2017    Procedure: NODE BIOPSY SENTINEL - AXILLARY;  Surgeon: Lionel Weinberg M.D.;  Location: SURGERY SAME DAY [facility];  Service:     OTHER  2016    excisino of melanoma left hand    ADENOIDECTOMY      MYRINGOTOMY      with tube placement        SOCIAL HISTORY  Social History     Tobacco Use    Smoking status: Every Day     Current packs/day: 0.75     Average packs/day: 0.8 packs/day for 10.0 years (7.5 ttl pk-yrs)     Types: Cigarettes    Smokeless tobacco: Never   Vaping Use    Vaping Use: Never used   Substance and Sexual Activity    Alcohol use: Not Currently    Drug use: Yes     Types: Oral     Comment: marijuanna gummies    Sexual activity: Yes        FAMILY HISTORY  Family History   Problem Relation Age of Onset    Psychiatric Illness Mother         depression    Diabetes Mother     Hyperlipidemia Mother     Thyroid Mother         s/p radioactive iodine treatment on replacement    Hypertension Father     Diabetes Father         CURRENT MEDICATIONS  Home Medications        Reviewed by Samia Omer (Pharmacy Tech) on 01/03/24 at 2030  Med List Status: Complete     Medication Last Dose Status   acetaminophen (TYLENOL) 500 MG Tab 1/2/2024 Active   atorvastatin (LIPITOR) 40 MG Tab 1/2/2024 Active   clonazepam (KLONOPIN) 2 MG tablet 1/3/2024 Active   DULoxetine (CYMBALTA) 60 MG Cap DR Particles delayed-release capsule 1/2/2024 Active   famotidine (PEPCID) 20 MG Tab 1/3/2024 Active   gabapentin (NEURONTIN) 800 MG tablet 1/3/2024 Active   ibuprofen (MOTRIN) 200 MG Tab 1/2/2024 Active   inFLIXimab (REMICADE IV) > 1 WEEK Active   insulin glargine 100 UNIT/ML SC SOLN 1/2/2024 Active   meloxicam (MOBIC) 15 MG tablet 1/2/2024 Active   metoclopramide (REGLAN) 10 MG Tab > 3 WEEKS Active   ondansetron (ZOFRAN ODT) 4 MG TABLET DISPERSIBLE 1/3/2024 Active   OXcarbazepine (TRILEPTAL) 300 MG Tab 1/3/2024 Active   prochlorperazine (COMPAZINE) 10 MG Tab ~ 1 MONTH Active   promethazine (PHENERGAN) 12.5 MG Suppos 1/3/2024 Active   QUEtiapine (SEROQUEL) 200 MG Tab 1/2/2024 Active   traZODone (DESYREL) 100 MG Tab 1/2/2024 Active   VENTOLIN  (90 Base) MCG/ACT Aero Soln inhalation aerosol 1/3/2024 Active                     ALLERGIES  Allergies   Allergen Reactions    Lactose Unspecified     GI Upset    Augmentin [Amoxicillin-Pot Clavulanate] Vomiting     PCN ok, allergic to the Clavulanate per pt 1/3/24        PHYSICAL EXAM  VITAL SIGNS: BP 94/58   Pulse 88   Temp 36.6 °C (97.9 °F) (Oral)   Resp 18   Wt 55.6 kg (122 lb 9.2 oz)   SpO2 91%   BMI 21.04 kg/m²  @CAROL[[id number]::@     Pulse ox interpretation: 94% I interpret this pulse ox as normal     Constitutional: Well developed, well nourished, alert in no apparent distress, nontoxic appearance    HENT: No external signs of trauma, normocephalic, oropharynx moist and clear, no trismus/drooling/stridor, airway is widely patent and patient speaking with normal voice without difficulty  Eyes: Unequal pupils that patient reports is chronic,  conjunctiva without erythema, no discharge, no icterus    Neck: Soft and supple, trachea midline, no stridor, no tenderness, no LAD, good ROM    Cardiovascular: Regular rate and rhythm, no murmurs/rubs/gallops, strong distal pulses and good perfusion    Thorax & Lungs: No respiratory distress, CTAB, port in place on right upper chest  Abdomen: Soft, nontender, nondistended, no guarding, no rebound, normal BS    Back: No CVAT     Extremities: No cyanosis, no edema, no gross deformity, good ROM, intact distal pulses with brisk cap refill    Skin: Warm, dry, no pallor/cyanosis, no rash noted      Neuro: A/O times 3, no focal deficits noted    Psychiatric: Cooperative, normal mood and affect, normal judgement, appropriate for clinical situation        DIAGNOSTIC STUDIES / PROCEDURES        LABS  All labs reviewed by me.     Results for orders placed or performed during the hospital encounter of 01/03/24   CBC With Differential   Result Value Ref Range    WBC 14.7 (H) 4.8 - 10.8 K/uL    RBC 2.31 (L) 4.20 - 5.40 M/uL    Hemoglobin 5.4 (LL) 12.0 - 16.0 g/dL    Hematocrit 17.6 (LL) 37.0 - 47.0 %    MCV 76.2 (L) 81.4 - 97.8 fL    MCH 23.4 (L) 27.0 - 33.0 pg    MCHC 30.7 (L) 32.2 - 35.5 g/dL    RDW 48.8 35.9 - 50.0 fL    Platelet Count 876 (H) 164 - 446 K/uL    MPV 9.2 9.0 - 12.9 fL    Neutrophils-Polys 71.30 44.00 - 72.00 %    Lymphocytes 18.20 (L) 22.00 - 41.00 %    Monocytes 9.90 0.00 - 13.40 %    Eosinophils 0.10 0.00 - 6.90 %    Basophils 0.20 0.00 - 1.80 %    Immature Granulocytes 0.30 0.00 - 0.90 %    Nucleated RBC 0.00 0.00 - 0.20 /100 WBC    Neutrophils (Absolute) 10.47 (H) 1.82 - 7.42 K/uL    Lymphs (Absolute) 2.68 1.00 - 4.80 K/uL    Monos (Absolute) 1.46 (H) 0.00 - 0.85 K/uL    Eos (Absolute) 0.01 0.00 - 0.51 K/uL    Baso (Absolute) 0.03 0.00 - 0.12 K/uL    Immature Granulocytes (abs) 0.05 0.00 - 0.11 K/uL    NRBC (Absolute) 0.00 K/uL   Comp Metabolic Panel   Result Value Ref Range    Sodium 140 135 - 145 mmol/L     Potassium 3.3 (L) 3.6 - 5.5 mmol/L    Chloride 104 96 - 112 mmol/L    Co2 22 20 - 33 mmol/L    Anion Gap 14.0 7.0 - 16.0    Glucose 127 (H) 65 - 99 mg/dL    Bun 12 8 - 22 mg/dL    Creatinine 0.37 (L) 0.50 - 1.40 mg/dL    Calcium 7.7 (L) 8.5 - 10.5 mg/dL    Correct Calcium 8.6 8.5 - 10.5 mg/dL    AST(SGOT) 12 12 - 45 U/L    ALT(SGPT) 8 2 - 50 U/L    Alkaline Phosphatase 83 30 - 99 U/L    Total Bilirubin <0.2 0.1 - 1.5 mg/dL    Albumin 2.9 (L) 3.2 - 4.9 g/dL    Total Protein 5.3 (L) 6.0 - 8.2 g/dL    Globulin 2.4 1.9 - 3.5 g/dL    A-G Ratio 1.2 g/dL   Urinalysis    Specimen: Urine, Clean Catch   Result Value Ref Range    Color Yellow     Character Clear     Specific Gravity 1.007 <1.035    Ph 6.5 5.0 - 8.0    Glucose Negative Negative mg/dL    Ketones Negative Negative mg/dL    Protein Negative Negative mg/dL    Bilirubin Negative Negative    Urobilinogen, Urine 0.2 Negative    Nitrite Negative Negative    Leukocyte Esterase Negative Negative    Occult Blood Negative Negative    Micro Urine Req see below    Lactic Acid   Result Value Ref Range    Lactic Acid 1.1 0.5 - 2.0 mmol/L   Lactic Acid   Result Value Ref Range    Lactic Acid 1.4 0.5 - 2.0 mmol/L   CoV-2, Flu A/B, And RSV by PCR ([employer])    Specimen: Nasopharyngeal; Respirate   Result Value Ref Range    Influenza virus A RNA Negative Negative    Influenza virus B, PCR Negative Negative    RSV, PCR Negative Negative    SARS-CoV-2 by PCR NotDetected     SARS-CoV-2 Source NP Swab    Group A Strep by PCR    Specimen: Throat   Result Value Ref Range    Group A Strep by PCR Not Detected Not Detected   PROCALCITONIN   Result Value Ref Range    Procalcitonin 0.06 <0.25 ng/mL   COD - Adult (Type and Screen)   Result Value Ref Range    ABO Grouping Only AB (A)     Rh Grouping Only POS     Antibody Screen-Cod NEG     Component R       R99                 Red Cells, LR       [id number]   issued       01/04/24   00:01      Product Type R99     Dispense Status issued      Unit Number (Barcoded) [id number]     Product Code (Barcoded) H9068L39     Blood Type (Barcoded) 8400    ESTIMATED GFR   Result Value Ref Range    GFR (CKD-EPI) 127 >60 mL/min/1.73 m 2   VITAMIN B12   Result Value Ref Range    Vitamin B12 -True Cobalamin 1430 (H) 211 - 911 pg/mL   FERRITIN   Result Value Ref Range    Ferritin 13.1 10.0 - 291.0 ng/mL   POCT glucose device results   Result Value Ref Range    POC Glucose, Blood 188 (H) 65 - 99 mg/dL        RADIOLOGY  I have independently interpreted the diagnostic imaging associated with this visit and am waiting the final reading from the radiologist.   My preliminary interpretation is as follows: No acute compared to prior.    DX-CHEST-2 VIEWS   Final Result      NEGATIVE TWO VIEWS OF THE CHEST.             COURSE & MEDICAL DECISION MAKING  Nursing notes, VS, PMSFHx reviewed in chart.     Review of past medical records shows the patient was last seen in this ED December 4, 2023 for nausea and vomiting with chills.  Last MRI brain with and without contrast on November 11, 2023 had the following findings:    1.  Minimal unchanged faint enhancement in the small left parietal cortical lesion. Other previously identified treated lesions appear unchanged without significant enhancement. No new abnormal enhancement to suggest residual or recurrent intracranial   metastatic disease.  2.  Status post right frontal craniotomy.    Last CT chest/abdomen/pelvis July 26, 2023 had the following findings:  1.  There has been a mixed response to therapy.  2.  There has been interval enlargement of the left mid abdominal mesenteric metastatic lymph node.  3.  Stable left external iliac node.  4.  Resolution of the left anterior abdominal wall subcutaneous nodule and right paracolic gutter nodule.  5.  No new suspicious lung nodules.      Differential diagnoses considered include but are not limited to: URI, pneumonia, influenza, COVID, pharyngitis/tonsillitis, epiglottitis,  peritonsillar abscess, retropharyngeal abscess, mononucleosis, viral syndrome, sepsis, UTI, dehydration, electrolyte abnormality       ED Observation Status? Yes; I am placing the patient in to an observation status due to a diagnostic uncertainty as well as therapeutic intensity. Patient placed in observation status at 5:47 PM, 1/3/2024.     Observation plan is as follows: We will obtain laboratory and imaging studies and monitor patient in the ED.    Upon Reevaluation, the patient's condition has: not improved; and will be escalated to hospitalization.    Patient discharged from ED Observation status at 2018 on January 3, 2024.       INITIAL ASSESSMENT AND PLAN  Care Narrative: This is a 43-year-old female patient with melanoma currently on chemo as well as medical condition including diabetes,, bipolar, hypertension, hyperlipidemia sent in by her oncologist to the ED due to fever.  Will obtain laboratory and imaging studies and closely monitor patient in the ED.      Discussion of management with other [facility] or appropriate source(s): Hospitalist    2018: D/W hospitalist who will admit patient      History and physical exam as above.  Patient without neutropenia but does have significant anemia.  Patient reports that this has happened in the past with no clear etiology.  She is agreeable to transfusion of PRBC.  Thrombocytosis is slightly higher than previous results.  She also has mild hypokalemia.  No elevation of lactic acid or procalcitonin to suggest sepsis and influenza/RSV/COVID testing returned negative.  Strep test is also negative.  Chest x-ray without acute findings or evidence of focal pneumonia.  At this time, no clear etiology for her fever although I suspect likely viral.  I discussed the findings with the patient and she is agreeable to admission.  I discussed with the hospitalist who graciously agreed to admit patient for further care.      I have explained to the patient the risks and benefits of  transfusion of blood products.  This includes, as appropriate, the risk of mild allergic reaction, hemolytic reaction, transfusion-associated lung injury, febrile reactions, circulatory or iron overload, and infection.    We discussed possible alternatives and their risks, including directed donation, autologous transfusion, and no transfusion, including IV or oral iron supplementation, as appropriate.  I believe the patient understands the risks and benefits and was able to express understanding.        HYDRATION: Based on the patient's presentation of Acute Vomiting the patient was given IV fluids. IV Hydration was used because oral hydration was not as rapid as required. Upon recheck following hydration, the patient was improving.      CRITICAL CARE NOTE:  Total critical care time spent on this patient was 30 minutes exclusive of separately billable procedures.  This may include direct bedside patient care, speaking with family members, review of past medical records, reviewing the results of laboratory/diagnostic studies, consulting with other physicians, as well as evaluating the response to the therapy instituted.        FINAL IMPRESSION  1. Fever, unspecified fever cause Acute   2. Anemia, unspecified type Acute   3. Leukocytosis, unspecified type Active   4. Thrombocytosis Chronic   5. Hypokalemia Active   6. History of melanoma Active          DISPOSITION  Patient will be admitted by hospitalist in guarded condition      Electronically signed by: Zbigniew Giraldo D.O., 1/3/2024 5:47 PM      Portions of this record were made with voice recognition software.  Despite my review, errors may remain.  Please interpret this chart in the appropriate context.

## 2024-01-04 NOTE — H&P
Hospital Medicine History & Physical Note    Date of Service  1/3/2024    Primary Care Physician  Adriana Levine M.D.      Code Status  Full Code    Chief Complaint  Chief Complaint   Patient presents with    Sent by MD     Sent by oncologist for low grade fever 100.5    Headache     All day       History of Presenting Illness  Jacque Mcintyre is a 43 y.o. female with past medical history of metastatic melanoma, asthma, hypertension, bipolar disorder, type 2 diabetes, nicotine dependence who presented 1/3/2024 with complaints of generalized weakness, chills, subjective fever, dry cough over the last week, headache.  She has chronic nausea and diarrhea since July, attributed to treatment of melanoma with immunotherapy.  Subsequently diarrhea was tried to be treated with infliximab and steroids.  Patient sent to ER by Dr. Nation/oncology with low-grade fever 100.5  CBC showed hemoglobin 5.4, WBC 14.7.  Chest x-ray is negative.  COVID-19/RSV/influenza screen negative.  Lactic acid is not elevated.  Patient states she was evaluated in the past for low hemoglobin with endoscopies and no source of bleeding was found.  She denies any melena or blood in stool or blood in urine.    I discussed the plan of care with patient and ERP .    Review of Systems  Review of Systems   Constitutional:  Positive for chills, fever and malaise/fatigue. Negative for weight loss.   HENT:  Positive for sore throat. Negative for ear pain, hearing loss and tinnitus.    Eyes:  Negative for blurred vision, double vision and photophobia.   Respiratory:  Positive for cough. Negative for hemoptysis and sputum production.    Cardiovascular:  Negative for chest pain, palpitations and orthopnea.   Gastrointestinal:  Negative for heartburn, nausea and vomiting.   Genitourinary:  Negative for dysuria, flank pain, frequency and hematuria.   Musculoskeletal:  Positive for joint pain. Negative for back pain and neck pain.   Skin:  Negative for  itching and rash.   Neurological:  Negative for tremors, speech change, focal weakness and headaches.   Endo/Heme/Allergies:  Negative for environmental allergies and polydipsia. Does not bruise/bleed easily.   Psychiatric/Behavioral:  Negative for hallucinations and substance abuse. The patient is not nervous/anxious.        Past Medical History   has a past medical history of Asthma, Bipolar 1 disorder (HCC), Cancer (HCC) (01/01/2016), Cough, Depression, DM mellitus,, Hyperlipidemia, Hypertension, Pap smear, PCOS (polycystic ovarian syndrome), Shortness of breath, Sputum production, and Wheezing.    Surgical History   has a past surgical history that includes myringotomy; adenoidectomy; other (2016); wide excision (Left, 5/9/2017); node biopsy sentinel (Left, 5/9/2017); axillary node dissection (Left, 6/23/2017); pr lap,appendectomy (N/A, 10/7/2019); pr bronchoscopy,diagnostic (N/A, 6/17/2022); endobronchial us add-on (N/A, 6/17/2022); and craniotomy stealth (Right, 10/6/2022).     Family History  family history includes Diabetes in her father and mother; Hyperlipidemia in her mother; Hypertension in her father; Psychiatric Illness in her mother; Thyroid in her mother.   Family history reviewed with patient. There is no family history that is pertinent to the chief complaint.     Social History   reports that she has been smoking cigarettes. She has a 7.5 pack-year smoking history. She has never used smokeless tobacco. She reports that she does not currently use alcohol. She reports current drug use. Drug: Oral.    Allergies  Allergies   Allergen Reactions    Augmentin [Amoxicillin-Pot Clavulanate] Vomiting     PCN ok, allergic to the Clavulanate per pt 1/3/24       Medications  Prior to Admission Medications   Prescriptions Last Dose Informant Patient Reported? Taking?   DULoxetine (CYMBALTA) 60 MG Cap DR Particles delayed-release capsule 1/2/2024 at PM Patient No No   Sig: Take 1 Capsule by mouth every evening.    OXcarbazepine (TRILEPTAL) 300 MG Tab 1/3/2024 at AM Patient No No   Sig: Take 1 Tablet by mouth 2 times a day.   QUEtiapine (SEROQUEL) 200 MG Tab 1/2/2024 at PM Patient No No   Sig: Take 1 Tablet by mouth every evening.   VENTOLIN  (90 Base) MCG/ACT Aero Soln inhalation aerosol 1/3/2024 at AM Patient Yes No   Sig: Inhale 1-2 Puffs every four hours as needed for Shortness of Breath.   acetaminophen (TYLENOL) 500 MG Tab 1/2/2024 at PRN Patient Yes No   Sig: Take 1,000 mg by mouth 2 times a day as needed for Mild Pain or Moderate Pain.   atorvastatin (LIPITOR) 40 MG Tab 1/2/2024 at PM Patient No No   Sig: Take 1 Tablet by mouth every evening.   clonazepam (KLONOPIN) 2 MG tablet 1/3/2024 at AM Patient Yes No   Sig: Take 1 mg by mouth 2 times a day. 1 mg = 1/2 tablet - scheduled   famotidine (PEPCID) 20 MG Tab 1/3/2024 at AM Patient Yes No   Sig: Take 20 mg by mouth every day.   gabapentin (NEURONTIN) 800 MG tablet 1/3/2024 at 1300 Patient No No   Sig: Take 1 Tablet by mouth 4 times a day.   ibuprofen (MOTRIN) 200 MG Tab 1/2/2024 at PRN Patient Yes No   Sig: Take 600 mg by mouth 2 times a day as needed for Mild Pain or Moderate Pain. 3 tablets = 600 mg.   inFLIXimab (REMICADE IV) > 1 WEEK at UNK Patient Yes Yes   Sig: Infuse 1 Dose into a venous catheter. [facility] [phone]   insulin glargine 100 UNIT/ML SC SOLN 1/2/2024 at PM Patient Yes Yes   Sig: Inject 5 Units under the skin every evening.   meloxicam (MOBIC) 15 MG tablet 1/2/2024 at AM Patient Yes No   Sig: Take 15 mg by mouth every day.   metoclopramide (REGLAN) 10 MG Tab > 3 WEEKS at PRN Patient No No   Sig: Take 1 Tablet by mouth 4 times a day.   ondansetron (ZOFRAN ODT) 4 MG TABLET DISPERSIBLE 1/3/2024 at AM Patient No No   Sig: Take 1 Tablet by mouth every 8 hours as needed for Nausea/Vomiting.   prochlorperazine (COMPAZINE) 10 MG Tab ~ 1 MONTH at PRN Patient Yes No   Sig: Take 10 mg by mouth every 6 hours as needed for Nausea.    promethazine (PHENERGAN) 12.5 MG Suppos 1/3/2024 at NOON Patient Yes Yes   Sig: Insert 12.5 mg into the rectum every 6 hours as needed for Nausea/Vomiting.   traZODone (DESYREL) 100 MG Tab 1/2/2024 at PM Patient Yes No   Sig: Take 100 mg by mouth every evening.      Facility-Administered Medications: None       Physical Exam  Temp:  [36.1 °C (97 °F)-38 °C (100.4 °F)] 38 °C (100.4 °F)  Pulse:  [] 88  Resp:  [16] 16  BP: (118-121)/(74-84) 120/84  SpO2:  [94 %-99 %] 98 %  Blood Pressure: 120/84   Temperature: 38 °C (100.4 °F)   Pulse: 88   Respiration: 16   Pulse Oximetry: 98 %       Physical Exam  Vitals and nursing note reviewed.   Constitutional:       General: She is not in acute distress.     Appearance: Normal appearance. She is ill-appearing.   HENT:      Head: Normocephalic and atraumatic.      Nose: Nose normal.      Mouth/Throat:      Mouth: Mucous membranes are moist.   Eyes:      Extraocular Movements: Extraocular movements intact.      Pupils: Pupils are equal, round, and reactive to light.   Cardiovascular:      Rate and Rhythm: Normal rate and regular rhythm.   Pulmonary:      Effort: Pulmonary effort is normal.      Breath sounds: Normal breath sounds.   Abdominal:      General: Abdomen is flat. There is no distension.      Tenderness: There is no abdominal tenderness.   Musculoskeletal:         General: No swelling or deformity. Normal range of motion.      Cervical back: Normal range of motion and neck supple.   Skin:     General: Skin is warm and dry.      Coloration: Skin is pale.   Neurological:      General: No focal deficit present.      Mental Status: She is alert and oriented to person, place, and time.   Psychiatric:         Mood and Affect: Mood normal.         Behavior: Behavior normal.         Laboratory:  Recent Labs     01/03/24  1815   WBC 14.7*   RBC 2.31*   HEMOGLOBIN 5.4*   HEMATOCRIT 17.6*   MCV 76.2*   MCH 23.4*   MCHC 30.7*   RDW 48.8   PLATELETCT 876*   MPV 9.2     Recent  "Labs     01/03/24  1815   SODIUM 140   POTASSIUM 3.3*   CHLORIDE 104   CO2 22   GLUCOSE 127*   BUN 12   CREATININE 0.37*   CALCIUM 7.7*     Recent Labs     01/03/24  1815   ALTSGPT 8   ASTSGOT 12   ALKPHOSPHAT 83   TBILIRUBIN <0.2   GLUCOSE 127*         No results for input(s): \"NTPROBNP\" in the last 72 hours.      No results for input(s): \"TROPONINT\" in the last 72 hours.    Imaging:  [id number] VIEWS   Final Result      NEGATIVE TWO VIEWS OF THE CHEST.          X-Ray:  I have personally reviewed the images and compared with prior images.    Assessment/Plan:  Justification for Admission Status  I anticipate this patient will require at least two midnights for appropriate medical management, necessitating inpatient admission because anemia requiring transfusion    Patient will need a Telemetry bed on MEDICAL service .  The need is secondary to anemia requiring transfusion.    * Anemia- (present on admission)  Assessment & Plan  Presented with hemoglobin 5.4, MCV 76  In the absence of signs of bleeding, patient may have iron deficiency anemia due to poor oral intake  Iron studies reordered.  Vitamin B12 is not low, folate ordered  Follow reticulocyte count, ferritin, fecal occult test, LDH  Repeat H&H, transfuse as needed      Flu-like symptoms with SIRS  Assessment & Plan  Unclear etiology.  COVID-19/influenza/RSV negative.  Chest x-ray negative.  UA negative.  Supportive care, droplet precautions    Hypokalemia- (present on admission)  Assessment & Plan  Replace, repeat potassium level    DM2 (diabetes mellitus, type 2) (HCC)- (present on admission)  Assessment & Plan  Random blood sugar 127  Monitor daily    Bipolar 1 disorder (HCC)- (present on admission)  Assessment & Plan  Continue trazodone, Cymbalta    Asthma- (present on admission)  Assessment & Plan  Continue albuterol as needed.  Not in exacerbation        VTE prophylaxis: enoxaparin ppx  "

## 2024-01-04 NOTE — ED NOTES
Pt transported up to floor before end of PRBC unit BP and temp could be taken. Unit complete. Tiffanie RODRIGUEZ notified of need for vital signs

## 2024-01-04 NOTE — ED NOTES
Med rec complete per patient  Allergies reviewed.  Patient denies any outpatient antibiotics in the last 30 days.   Anticoagulants taken in the last 14 days? No    Patients preferred pharmacy: [facility] on [address]

## 2024-01-04 NOTE — DISCHARGE SUMMARY
Discharge Summary    CHIEF COMPLAINT ON ADMISSION  Chief Complaint   Patient presents with    Sent by MD     Sent by oncologist for low grade fever 100.5    Headache     All day       Reason for Admission  Sent by MD     Admission Date  1/3/2024    CODE STATUS  Full Code    HPI & HOSPITAL COURSE     Jacque Mcintyre is a 43 y.o. female with past medical history of metastatic melanoma, on treatment, asthma, hypertension, bipolar disorder, type 2 diabetes, nicotine dependence who presented 1/3/2024 with complaints of generalized weakness, chills, subjective fever, dry cough over the last week and headache.  She has chronic nausea and diarrhea since July, attributed to treatment of melanoma with immunotherapy.  Subsequently diarrhea was tried to be treated with infliximab and steroids.  Patient sent to ER by her oncologist when she presented with fever of 100.5  On admission, CBC showed WBC 14.7,  hemoglobin 5.4.  Chest x-ray was negative.  COVID-19/RSV/influenza screen negative.  Lactic acid was not elevated.  Patient states she was evaluated in the past for low hemoglobin with endoscopies and no source of bleeding was found.  She denies any melena or blood in stool or blood in urine. She was transfused with 2 units of PRBC and repeat H/H remained stable. Patient has upper respiratory symptoms and sick contacts, suspect her symptoms are viral. She is very anxious to discharge. She was encourage to follow up with her hematologist to monitor her H/H and she may need referral to GI. Also educated that should her symptoms fail to improve or have persistent fevers that she should return for further evaluation.           Therefore, she is discharged in fair and stable condition to home with close outpatient follow-up.    The patient met 2-midnight criteria for an inpatient stay at the time of discharge.    Discharge Date  1/4/24    FOLLOW UP ITEMS POST DISCHARGE  Anemia   URI symptoms   Chronic medical conditions      DISCHARGE DIAGNOSES  Principal Problem:    Anemia (POA: Yes)  Active Problems:    Asthma (POA: Yes)    Bipolar 1 disorder (HCC) (POA: Yes)    DM2 (diabetes mellitus, type 2) (HCC) (Chronic) (POA: Yes)    Hypokalemia (POA: Yes)    Flu-like symptoms with SIRS (POA: Unknown)  Resolved Problems:    * No resolved hospital problems. *      FOLLOW UP  Future Appointments   Date Time Provider Department Center   2/13/2024  8:30 AM [address] [address]   2/16/2024  9:00 AM Judy August M.D. [facility] None     Adriana Levine M.D.  [address] [address]  [phone]    Schedule an appointment as soon as possible for a visit in 1 week(s)  call to schedule hospital follow up      MEDICATIONS ON DISCHARGE     Medication List        START taking these medications        Instructions   ferrous sulfate 325 (65 Fe) MG tablet   Take 1 Tablet by mouth every day.  Dose: 325 mg            CONTINUE taking these medications        Instructions   acetaminophen 500 MG Tabs  Commonly known as: Tylenol   Take 1,000 mg by mouth 2 times a day as needed for Mild Pain or Moderate Pain.  Dose: 1,000 mg     atorvastatin 40 MG Tabs  Commonly known as: Lipitor   Take 1 Tablet by mouth every evening.  Dose: 40 mg     clonazepam 2 MG tablet  Commonly known as: KlonoPIN   Take 1 mg by mouth 2 times a day. 1 mg = 1/2 tablet - scheduled  Dose: 1 mg     DULoxetine 60 MG Cpep delayed-release capsule  Commonly known as: Cymbalta   Take 1 Capsule by mouth every evening.  Dose: 60 mg     famotidine 20 MG Tabs  Commonly known as: Pepcid   Take 20 mg by mouth every day.  Dose: 20 mg     gabapentin 800 MG tablet  Commonly known as: Neurontin   Take 1 Tablet by mouth 4 times a day.  Dose: 800 mg     insulin glargine 100 UNIT/ML Soln  Commonly known as: Lantus   Inject 5 Units under the skin every evening.  Dose: 5 Units     meloxicam 15 MG tablet  Commonly known as: Mobic   Take 15 mg by mouth every day.  Dose: 15  mg     metoclopramide 10 MG Tabs  Commonly known as: Reglan   Take 1 Tablet by mouth 4 times a day.  Dose: 10 mg     ondansetron 4 MG Tbdp  Commonly known as: Zofran ODT   Take 1 Tablet by mouth every 8 hours as needed for Nausea/Vomiting.  Dose: 4 mg     OXcarbazepine 300 MG Tabs  Commonly known as: Trileptal   Take 1 Tablet by mouth 2 times a day.  Dose: 300 mg     prochlorperazine 10 MG Tabs  Commonly known as: Compazine   Take 10 mg by mouth every 6 hours as needed for Nausea.  Dose: 10 mg     promethazine 12.5 MG Supp  Commonly known as: Phenergan   Insert 12.5 mg into the rectum every 6 hours as needed for Nausea/Vomiting.  Dose: 12.5 mg     QUEtiapine 200 MG Tabs  Commonly known as: SEROquel   Take 1 Tablet by mouth every evening.  Dose: 200 mg     REMICADE IV   Infuse 1 Dose into a venous catheter. [facility] [phone]  Dose: 1 Dose     traZODone 100 MG Tabs  Commonly known as: Desyrel   Take 100 mg by mouth every evening.  Dose: 100 mg     Ventolin  (90 Base) MCG/ACT Aers inhalation aerosol  Generic drug: albuterol   Inhale 1-2 Puffs every four hours as needed for Shortness of Breath.  Dose: 1-2 Puff            STOP taking these medications      ibuprofen 200 MG Tabs  Commonly known as: Motrin              Allergies  Allergies   Allergen Reactions    Lactose Unspecified     GI Upset    Augmentin [Amoxicillin-Pot Clavulanate] Vomiting     PCN ok, allergic to the Clavulanate per pt 1/3/24       DIET  Orders Placed This Encounter   Procedures    Diet Order Diet: Regular     Standing Status:   Standing     Number of Occurrences:   1     Order Specific Question:   Diet:     Answer:   Regular [1]       ACTIVITY  As tolerated.      CONSULTATIONS  NA    PROCEDURES  NA    LABORATORY  Lab Results   Component Value Date    SODIUM 142 01/04/2024    POTASSIUM 3.4 (L) 01/04/2024    CHLORIDE 108 01/04/2024    CO2 25 01/04/2024    GLUCOSE 108 (H) 01/04/2024    BUN 8 01/04/2024    CREATININE 0.33 (L)  01/04/2024        Lab Results   Component Value Date    WBC 10.1 01/04/2024    HEMOGLOBIN 9.6 (L) 01/04/2024    HEMATOCRIT 30.2 (L) 01/04/2024    PLATELETCT 605 (H) 01/04/2024        Total time of the discharge process exceeds 45 minutes.

## 2024-01-05 ENCOUNTER — HOSPITAL ENCOUNTER (OUTPATIENT)
Facility: MEDICAL CENTER | Age: 44
End: 2024-01-05
Attending: INTERNAL MEDICINE
Payer: COMMERCIAL

## 2024-01-05 LAB
ALBUMIN SERPL BCP-MCNC: 3.1 G/DL (ref 3.2–4.9)
ALBUMIN/GLOB SERPL: 1.4 G/DL
ALP SERPL-CCNC: 84 U/L (ref 30–99)
ALT SERPL-CCNC: 8 U/L (ref 2–50)
ANION GAP SERPL CALC-SCNC: 13 MMOL/L (ref 7–16)
AST SERPL-CCNC: 12 U/L (ref 12–45)
BILIRUB SERPL-MCNC: 0.2 MG/DL (ref 0.1–1.5)
BUN SERPL-MCNC: 5 MG/DL (ref 8–22)
CALCIUM ALBUM COR SERPL-MCNC: 9.2 MG/DL (ref 8.5–10.5)
CALCIUM SERPL-MCNC: 8.5 MG/DL (ref 8.5–10.5)
CHLORIDE SERPL-SCNC: 105 MMOL/L (ref 96–112)
CO2 SERPL-SCNC: 24 MMOL/L (ref 20–33)
CREAT SERPL-MCNC: 0.37 MG/DL (ref 0.5–1.4)
GFR SERPLBLD CREATININE-BSD FMLA CKD-EPI: 127 ML/MIN/1.73 M 2
GLOBULIN SER CALC-MCNC: 2.2 G/DL (ref 1.9–3.5)
GLUCOSE SERPL-MCNC: 157 MG/DL (ref 65–99)
MAGNESIUM SERPL-MCNC: 1.8 MG/DL (ref 1.5–2.5)
POTASSIUM SERPL-SCNC: 4.1 MMOL/L (ref 3.6–5.5)
PROT SERPL-MCNC: 5.3 G/DL (ref 6–8.2)
SODIUM SERPL-SCNC: 142 MMOL/L (ref 135–145)

## 2024-01-05 PROCEDURE — 83735 ASSAY OF MAGNESIUM: CPT

## 2024-01-05 PROCEDURE — 80053 COMPREHEN METABOLIC PANEL: CPT

## 2024-02-09 ENCOUNTER — HOSPITAL ENCOUNTER (OUTPATIENT)
Facility: MEDICAL CENTER | Age: 44
End: 2024-02-09
Attending: NURSE PRACTITIONER
Payer: COMMERCIAL

## 2024-02-09 ENCOUNTER — HOSPITAL ENCOUNTER (OUTPATIENT)
Dept: RADIOLOGY | Facility: MEDICAL CENTER | Age: 44
End: 2024-02-09
Attending: INTERNAL MEDICINE
Payer: COMMERCIAL

## 2024-02-09 DIAGNOSIS — E86.0 DEHYDRATION: ICD-10-CM

## 2024-02-09 DIAGNOSIS — R19.7 DIARRHEA, UNSPECIFIED TYPE: ICD-10-CM

## 2024-02-09 DIAGNOSIS — Z11.59 ENCOUNTER FOR SCREENING FOR OTHER VIRAL DISEASES: ICD-10-CM

## 2024-02-09 DIAGNOSIS — K51.018 ULCERATIVE (CHRONIC) PANCOLITIS WITH OTHER COMPLICATION (HCC): ICD-10-CM

## 2024-02-09 DIAGNOSIS — D50.8 OTHER IRON DEFICIENCY ANEMIAS: ICD-10-CM

## 2024-02-09 LAB
ALBUMIN SERPL BCP-MCNC: 4.2 G/DL (ref 3.2–4.9)
ALBUMIN/GLOB SERPL: 1.8 G/DL
ALP SERPL-CCNC: 83 U/L (ref 30–99)
ALT SERPL-CCNC: 11 U/L (ref 2–50)
ANION GAP SERPL CALC-SCNC: 13 MMOL/L (ref 7–16)
AST SERPL-CCNC: 13 U/L (ref 12–45)
BASOPHILS # BLD AUTO: 1 % (ref 0–1.8)
BASOPHILS # BLD: 0.09 K/UL (ref 0–0.12)
BILIRUB SERPL-MCNC: 0.2 MG/DL (ref 0.1–1.5)
BUN SERPL-MCNC: 14 MG/DL (ref 8–22)
CALCIUM ALBUM COR SERPL-MCNC: 9.2 MG/DL (ref 8.5–10.5)
CALCIUM SERPL-MCNC: 9.4 MG/DL (ref 8.5–10.5)
CHLORIDE SERPL-SCNC: 96 MMOL/L (ref 96–112)
CO2 SERPL-SCNC: 24 MMOL/L (ref 20–33)
CREAT SERPL-MCNC: 0.51 MG/DL (ref 0.5–1.4)
EOSINOPHIL # BLD AUTO: 0 K/UL (ref 0–0.51)
EOSINOPHIL NFR BLD: 0 % (ref 0–6.9)
ERYTHROCYTE [DISTWIDTH] IN BLOOD BY AUTOMATED COUNT: 63.4 FL (ref 35.9–50)
GFR SERPLBLD CREATININE-BSD FMLA CKD-EPI: 117 ML/MIN/1.73 M 2
GLOBULIN SER CALC-MCNC: 2.3 G/DL (ref 1.9–3.5)
GLUCOSE SERPL-MCNC: 112 MG/DL (ref 65–99)
HCT VFR BLD AUTO: 36 % (ref 37–47)
HGB BLD-MCNC: 11.2 G/DL (ref 12–16)
IMM GRANULOCYTES # BLD AUTO: 0.03 K/UL (ref 0–0.11)
IMM GRANULOCYTES NFR BLD AUTO: 0.3 % (ref 0–0.9)
LYMPHOCYTES # BLD AUTO: 2.42 K/UL (ref 1–4.8)
LYMPHOCYTES NFR BLD: 26 % (ref 22–41)
MAGNESIUM SERPL-MCNC: 2.1 MG/DL (ref 1.5–2.5)
MCH RBC QN AUTO: 25.3 PG (ref 27–33)
MCHC RBC AUTO-ENTMCNC: 31.1 G/DL (ref 32.2–35.5)
MCV RBC AUTO: 81.3 FL (ref 81.4–97.8)
MONOCYTES # BLD AUTO: 0.49 K/UL (ref 0–0.85)
MONOCYTES NFR BLD AUTO: 5.3 % (ref 0–13.4)
NEUTROPHILS # BLD AUTO: 6.27 K/UL (ref 1.82–7.42)
NEUTROPHILS NFR BLD: 67.4 % (ref 44–72)
NRBC # BLD AUTO: 0 K/UL
NRBC BLD-RTO: 0 /100 WBC (ref 0–0.2)
PLATELET # BLD AUTO: 633 K/UL (ref 164–446)
PMV BLD AUTO: 9.3 FL (ref 9–12.9)
POTASSIUM SERPL-SCNC: 4.2 MMOL/L (ref 3.6–5.5)
PROT SERPL-MCNC: 6.5 G/DL (ref 6–8.2)
RBC # BLD AUTO: 4.43 M/UL (ref 4.2–5.4)
SODIUM SERPL-SCNC: 133 MMOL/L (ref 135–145)
WBC # BLD AUTO: 9.3 K/UL (ref 4.8–10.8)

## 2024-02-09 PROCEDURE — 80053 COMPREHEN METABOLIC PANEL: CPT

## 2024-02-09 PROCEDURE — 85025 COMPLETE CBC W/AUTO DIFF WBC: CPT

## 2024-02-09 PROCEDURE — 700117 HCHG RX CONTRAST REV CODE 255: Performed by: INTERNAL MEDICINE

## 2024-02-09 PROCEDURE — 83735 ASSAY OF MAGNESIUM: CPT

## 2024-02-09 PROCEDURE — 71260 CT THORAX DX C+: CPT

## 2024-02-09 RX ADMIN — IOHEXOL 25 ML: 240 INJECTION, SOLUTION INTRATHECAL; INTRAVASCULAR; INTRAVENOUS; ORAL at 09:45

## 2024-02-09 RX ADMIN — IOHEXOL 100 ML: 350 INJECTION, SOLUTION INTRAVENOUS at 09:15

## 2024-02-10 ENCOUNTER — HOSPITAL ENCOUNTER (OUTPATIENT)
Dept: RADIOLOGY | Facility: MEDICAL CENTER | Age: 44
End: 2024-02-10
Attending: RADIOLOGY
Payer: COMMERCIAL

## 2024-02-10 DIAGNOSIS — C79.31 METASTATIC CANCER TO BRAIN (HCC): ICD-10-CM

## 2024-02-10 DIAGNOSIS — C79.31 MALIGNANT MELANOMA METASTATIC TO BRAIN (HCC): ICD-10-CM

## 2024-02-10 PROCEDURE — A9579 GAD-BASE MR CONTRAST NOS,1ML: HCPCS | Performed by: RADIOLOGY

## 2024-02-10 PROCEDURE — 70553 MRI BRAIN STEM W/O & W/DYE: CPT

## 2024-02-10 PROCEDURE — 700117 HCHG RX CONTRAST REV CODE 255: Performed by: RADIOLOGY

## 2024-02-10 RX ADMIN — GADOTERIDOL 12 ML: 279.3 INJECTION, SOLUTION INTRAVENOUS at 18:08

## 2024-02-13 ENCOUNTER — HOSPITAL ENCOUNTER (OUTPATIENT)
Dept: CARDIOLOGY | Facility: MEDICAL CENTER | Age: 44
End: 2024-02-13
Attending: INTERNAL MEDICINE
Payer: COMMERCIAL

## 2024-02-13 DIAGNOSIS — C43.62 MALIGNANT MELANOMA OF LEFT UPPER EXTREMITY INCLUDING SHOULDER (HCC): ICD-10-CM

## 2024-02-13 DIAGNOSIS — E86.0 DEHYDRATION: ICD-10-CM

## 2024-02-13 DIAGNOSIS — I95.89 CHRONIC HYPOTENSION: ICD-10-CM

## 2024-02-13 DIAGNOSIS — C77.3 SECONDARY MALIGNANT NEOPLASM OF BRACHIAL LYMPH NODE (HCC): ICD-10-CM

## 2024-02-13 DIAGNOSIS — D50.8 IRON DEFICIENCY ANEMIA SECONDARY TO INADEQUATE DIETARY IRON INTAKE: ICD-10-CM

## 2024-02-13 DIAGNOSIS — Z51.12 ENCOUNTER FOR ANTINEOPLASTIC IMMUNOTHERAPY: ICD-10-CM

## 2024-02-13 LAB
LV EJECT FRACT MOD 2C 99903: 56.73
LV EJECT FRACT MOD 4C 99902: 53.32
LV EJECT FRACT MOD BP 99901: 54.85

## 2024-02-13 PROCEDURE — 93306 TTE W/DOPPLER COMPLETE: CPT | Mod: 26 | Performed by: INTERNAL MEDICINE

## 2024-02-13 PROCEDURE — 93306 TTE W/DOPPLER COMPLETE: CPT

## 2024-02-21 ENCOUNTER — HOSPITAL ENCOUNTER (OUTPATIENT)
Dept: RADIATION ONCOLOGY | Facility: MEDICAL CENTER | Age: 44
End: 2024-02-29
Attending: RADIOLOGY
Payer: COMMERCIAL

## 2024-02-21 VITALS
DIASTOLIC BLOOD PRESSURE: 79 MMHG | TEMPERATURE: 98.2 F | SYSTOLIC BLOOD PRESSURE: 111 MMHG | OXYGEN SATURATION: 98 % | BODY MASS INDEX: 21.49 KG/M2 | WEIGHT: 125.22 LBS | RESPIRATION RATE: 18 BRPM | HEART RATE: 107 BPM

## 2024-02-21 DIAGNOSIS — C34.90 MALIGNANT NEOPLASM OF UNSPECIFIED PART OF UNSPECIFIED BRONCHUS OR LUNG (HCC): ICD-10-CM

## 2024-02-21 DIAGNOSIS — C79.31 METASTATIC CANCER TO BRAIN (HCC): ICD-10-CM

## 2024-02-21 PROCEDURE — 99214 OFFICE O/P EST MOD 30 MIN: CPT | Performed by: RADIOLOGY

## 2024-02-21 PROCEDURE — 99212 OFFICE O/P EST SF 10 MIN: CPT | Performed by: RADIOLOGY

## 2024-02-21 RX ORDER — OXYCODONE HYDROCHLORIDE 5 MG/1
5 TABLET ORAL EVERY 8 HOURS PRN
COMMUNITY

## 2024-02-21 RX ORDER — FLUTICASONE FUROATE, UMECLIDINIUM BROMIDE AND VILANTEROL TRIFENATATE 200; 62.5; 25 UG/1; UG/1; UG/1
1 POWDER RESPIRATORY (INHALATION) DAILY
COMMUNITY

## 2024-02-21 ASSESSMENT — LIFESTYLE VARIABLES
TOBACCO_USE: YES
SMOKING_YEARS: 12
SMOKING_STATUS: YES

## 2024-02-21 ASSESSMENT — FIBROSIS 4 INDEX: FIB4 SCORE: 0.28

## 2024-02-21 ASSESSMENT — PAIN SCALES - GENERAL: PAINLEVEL: 5=MODERATE PAIN

## 2024-02-21 NOTE — PROGRESS NOTES
Patient was seen today in clinic with Dr. August for follow up.  Vitals signs and weight were obtained and pain assessment was completed.  Allergies and medications were reviewed with the patient.       Vitals/Pain:  Vitals:    02/21/24 1003   BP: 111/79   Pulse: (!) 107   Resp: 18   Temp: 36.8 °C (98.2 °F)   SpO2: 98%   Weight: 56.8 kg (125 lb 3.5 oz)   Pain Score: 5=Moderate Pain        Allergies:   Lactose and Augmentin [amoxicillin-pot clavulanate]    Current Medications:  Current Outpatient Medications   Medication Sig Dispense Refill    oxyCODONE immediate-release (ROXICODONE) 5 MG Tab Take 5 mg by mouth every 8 hours as needed for Severe Pain.      fluticasone-umeclidinium-vilanterol (TRELEGY ELLIPTA) 200-62.5-25 mcg/act inhaler Inhale 1 Puff every day.      ferrous sulfate 325 (65 Fe) MG tablet Take 1 Tablet by mouth every day. 30 Tablet 0    insulin glargine 100 UNIT/ML SC SOLN Inject 5 Units under the skin every evening.      ondansetron (ZOFRAN ODT) 4 MG TABLET DISPERSIBLE Take 1 Tablet by mouth every 8 hours as needed for Nausea/Vomiting. 15 Tablet 0    traZODone (DESYREL) 100 MG Tab Take 100 mg by mouth every evening.      prochlorperazine (COMPAZINE) 10 MG Tab Take 10 mg by mouth every 6 hours as needed for Nausea.      VENTOLIN  (90 Base) MCG/ACT Aero Soln inhalation aerosol Inhale 1-2 Puffs every four hours as needed for Shortness of Breath.      meloxicam (MOBIC) 15 MG tablet Take 15 mg by mouth every day.      atorvastatin (LIPITOR) 40 MG Tab Take 1 Tablet by mouth every evening. 30 Tablet 2    DULoxetine (CYMBALTA) 60 MG Cap DR Particles delayed-release capsule Take 1 Capsule by mouth every evening. 30 Capsule 2    gabapentin (NEURONTIN) 800 MG tablet Take 1 Tablet by mouth 4 times a day. 90 Tablet 2    OXcarbazepine (TRILEPTAL) 300 MG Tab Take 1 Tablet by mouth 2 times a day. 60 Tablet 2    QUEtiapine (SEROQUEL) 200 MG Tab Take 1 Tablet by mouth every evening. 60 Tablet 2    acetaminophen  (TYLENOL) 500 MG Tab Take 1,000 mg by mouth 2 times a day as needed for Mild Pain or Moderate Pain.      clonazepam (KLONOPIN) 2 MG tablet Take 1 mg by mouth 2 times a day. 1 mg = 1/2 tablet - scheduled      promethazine (PHENERGAN) 12.5 MG Suppos Insert 12.5 mg into the rectum every 6 hours as needed for Nausea/Vomiting. (Patient not taking: Reported on 2/21/2024)      inFLIXimab (REMICADE IV) Infuse 1 Dose into a venous catheter. [facility] [phone]      metoclopramide (REGLAN) 10 MG Tab Take 1 Tablet by mouth 4 times a day. (Patient not taking: Reported on 2/21/2024) 30 Tablet 0    famotidine (PEPCID) 20 MG Tab Take 20 mg by mouth every day. (Patient not taking: Reported on 2/21/2024)       No current facility-administered medications for this encounter.         PCP:  Lee Slade R.N.

## 2024-02-21 NOTE — PROGRESS NOTES
RADIATION ONCOLOGY FOLLOW-UP    Patient name:  Jacque Mcintyre    Primary Physician:  Adriana Howard M.D. MRN: [id number]  CSN: [id number]   Referring physician:  Pebbles Tejada M.D.  : 1980, 43 y.o.     DATE OF SERVICE: 2024    IDENTIFICATION:   A 43 y.o. 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      RADIATION SUMMARY:  Radiation Oncology          2022 3/1/2023   Aria Course Treatment Dates   Course First Treatment Date 2022    Course Last Treatment Date 2022    Aria Treatment Summary   1 Lesion SRS  Plan from Course C3 Brain SRS   Fraction  1 of 1    1 of 1   Elapsed Course Days  0 @ [id number]    0 @ [id number]   Prescribed Fraction Dose  2,200 cGy    2,200 cGy   Prescribed Total Dose  2,200 cGy    2,200 cGy   1 Lesion SRS  Reference Point from Course C3 Brain SRS   Elapsed Course Days  0 @ [id number]    0 @ [id number]   Session Dose  2,200 cGy    --   Total Dose  2,200 cGy    2,200 cGy   1 Lesion SRS CP  Reference Point from Course C3 Brain SRS   Elapsed Course Days  0 @     0 @ [id number]   Session Dose  2,619 cGy    --   Total Dose  2,619 cGy    2,619 cGy   SRT R Pariet  Plan from Course C2 Brain SRS   Fraction 3 of 3    3 of 3    Elapsed Course Days 4 @ [id number]    4 @ [id number]    Prescribed Fraction Dose 900 cGy    900 cGy    Prescribed Total Dose 2,700 cGy    2,700 cGy    SRT R Pariet  Reference Point from Course C2 Brain SRS   Elapsed Course Days 4 @ [id number]    4 @ [id number]    Session Dose 900 cGy    --    Total Dose 2,700 cGy    2,700 cGy    SRT R Pariet CP  Reference Point from Course C2 Brain SRS   Elapsed Course Days 4 @     4 @     Session Dose 1,042 cGy    --    Total Dose 3,125 cGy    3,125 cGy       Details          More values are hidden. Newest values shown. Go to activity for more data.                 HISTORY OF PRESENT ILLNESS:  Subjective    This is a 42-year-old lady with metastatic BRAF mutant malignant melanoma, stage IV, with multiple brain lesions as well as systemic disease, who had seen approximately 3 months ago initially for consideration of radiation therapy.  At that time, she underwent craniotomy and resection of her larger dominant lesions, and was treated with postoperative fractionated stereotactic radiation.  Thereafter, she was discovered to have several additional lesions and completed a second round of SRS as below.  She now continues on immunotherapy with medical oncology.     02/02/23- We are having a telephone follow-up shortly after completion of her recent course of stereotactic radiation.  She is doing tremendously well.  She is on immunotherapy now, and continues on Nivo/Ipi.  She feels quite well, has no major sensory or motor problems, no headaches, nausea or vomiting, or systemic complaints.  A follow-up MRI and CT chest abdomen pelvis has been ordered by medical oncology to be done in the next few weeks.  Her energy level is good.  Her hair has slowly grown back.  Overall, she feels quite good.     2/22/23 she continues to do relatively well.  She continues on her immunotherapy which she is tolerating without any major issues.  She had a follow-up MRI and a CT chest abdomen pelvis completed last week.  The systemic scan shows an excellent response in most of her disease is responding or stable.  However her brain MRI shows one small new 6 mm lesion that is definitively new from her prior MRI.  The remainder of her previously treated disease appears to have responded well.  She now comes to discuss stereotactic radiosurgery for this new lesion.  Symptomatically, she is doing well, she has regained nearly complete neurologic function, she is walking, she has no fine motor problems, no problems with balance, no headaches, dizziness, nausea, vomiting.     4/17/2023  She is now  status post SRS to an additional lesion that was completed 6 weeks ago.  She continues to do well.  She has no specific CNS related side effects.  She continues on immunotherapy without any major problems.  No new neurologic problems, headaches, dizziness, nausea or vomiting.     6/5/23:  She comes today to review her brain MRI and for ongoing follow-up.  She is doing tremendously well.  She reports that she has intermittent nausea that appears to be well controlled, but otherwise has no headaches, dizziness or balance difficulties, or vomiting.  She continues on immunotherapy.  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     9/11/23 from a CNS standpoint, she continues to do well.  She has no CNS related complaints today, including headaches, nausea, vomiting, weakness or numbness.  She is walking independently now for several months.  She has returned back to work.  Unfortunately, systemically, she experienced colitis about 2 months ago that has been attributed to an immune related side effect.  She has been off all immunotherapy since July 6.  She has had multiple trials of steroids and unfortunately has not resolved, and she has a follow-up with GI today.  She sees medical oncology later this week to discuss further systemic therapy options.      11/13/2023 we are seeing Ms. Mcintyre. today for ongoing follow-up.  Since I last saw her, she had a PET scan performed by medical oncology.  I reviewed the report, images not currently available today, that unfortunately shows progression of disease with a left abdominal lymph node metastasis near the kidney measuring about 3.3 cm, as well as another lesion associated with a small bowel loop in the left lower abdomen now measuring about 5.4 cm.  Unfortunately, she is having significant immune related colitis, and has been off immunotherapy for several months.  She has had trials with steroids, but she continues to have diarrhea at least 4-5 times a  day.  This is her main problem now, and the plan is likely to transition her to BRAF directed therapy.  She has a follow-up with Dr. Reed in the next few days.  With regards to CNS, she had a follow-up MRI performed a few days ago that I reviewed with Dr. Tejada today in clinic, and essentially shows stable findings.       INTERVAL HISTORY:  2/21/23 she is doing relatively well.  She had a follow-up brain MRI that we are reviewing today.  She has started BRAF directed therapy with Braftovi and Mektovi, but had difficulty tolerating the Mektovi after 2 challenges, and is now on single agent Braftovi.  She seems to be tolerating this well.  She had follow-up CT chest abdomen pelvis done also that I reviewed personally.  She has no CNS related complaints.  She still feels somewhat weak and deconditioned and is trying to work through this.  Socially, she lives with her parents and this has become somewhat stressful with trying to think about whether she should move out or whether she will be able to work or have enough money to ultimately sustain that.    PROBLEM LIST:  Patient Active Problem List   Diagnosis    Asthma    Depression    Nausea & vomiting    Sepsis (HCC)    UTI (urinary tract infection)    Lactic acidosis    Anxiety    Diarrhea    Melanoma of left upper arm (HCC)    Bipolar 1 disorder (HCC)    DM2 (diabetes mellitus, type 2) (HCC)    Appendicitis    Hypokalemia    Essential hypertension    Gastroenteritis    Abnormal CT scan, chest    Cellulitis    Leucocytosis    Malignant melanoma metastatic to brain (HCC)    Brain mass    Chronic prescription benzodiazepine use    Constipation    Adrenal insufficiency due to corticosteroid withdrawal (HCC)    Metastatic cancer to brain (HCC)    Vitamin D deficiency    Dyslipidemia    Normocytic anemia    Borderline abnormal TFTs    Thrombocytosis    Dizziness on standing    Diabetic neuropathy (HCC)    Facial paralysis/[place] palsy    Anemia    Flu-like symptoms with  SIRS       CURRENT MEDICATIONS:  Current Outpatient Medications   Medication Sig Dispense Refill    oxyCODONE immediate-release (ROXICODONE) 5 MG Tab Take 5 mg by mouth every 8 hours as needed for Severe Pain.      fluticasone-umeclidinium-vilanterol (TRELEGY ELLIPTA) 200-62.5-25 mcg/act inhaler Inhale 1 Puff every day.      ferrous sulfate 325 (65 Fe) MG tablet Take 1 Tablet by mouth every day. 30 Tablet 0    insulin glargine 100 UNIT/ML SC SOLN Inject 5 Units under the skin every evening.      ondansetron (ZOFRAN ODT) 4 MG TABLET DISPERSIBLE Take 1 Tablet by mouth every 8 hours as needed for Nausea/Vomiting. 15 Tablet 0    traZODone (DESYREL) 100 MG Tab Take 100 mg by mouth every evening.      prochlorperazine (COMPAZINE) 10 MG Tab Take 10 mg by mouth every 6 hours as needed for Nausea.      VENTOLIN  (90 Base) MCG/ACT Aero Soln inhalation aerosol Inhale 1-2 Puffs every four hours as needed for Shortness of Breath.      meloxicam (MOBIC) 15 MG tablet Take 15 mg by mouth every day.      atorvastatin (LIPITOR) 40 MG Tab Take 1 Tablet by mouth every evening. 30 Tablet 2    DULoxetine (CYMBALTA) 60 MG Cap DR Particles delayed-release capsule Take 1 Capsule by mouth every evening. 30 Capsule 2    gabapentin (NEURONTIN) 800 MG tablet Take 1 Tablet by mouth 4 times a day. 90 Tablet 2    OXcarbazepine (TRILEPTAL) 300 MG Tab Take 1 Tablet by mouth 2 times a day. 60 Tablet 2    QUEtiapine (SEROQUEL) 200 MG Tab Take 1 Tablet by mouth every evening. 60 Tablet 2    acetaminophen (TYLENOL) 500 MG Tab Take 1,000 mg by mouth 2 times a day as needed for Mild Pain or Moderate Pain.      clonazepam (KLONOPIN) 2 MG tablet Take 1 mg by mouth 2 times a day. 1 mg = 1/2 tablet - scheduled      promethazine (PHENERGAN) 12.5 MG Suppos Insert 12.5 mg into the rectum every 6 hours as needed for Nausea/Vomiting. (Patient not taking: Reported on 2/21/2024)      inFLIXimab (REMICADE IV) Infuse 1 Dose into a venous catheter. [facility]  Specialist [phone]      metoclopramide (REGLAN) 10 MG Tab Take 1 Tablet by mouth 4 times a day. (Patient not taking: Reported on 2/21/2024) 30 Tablet 0    famotidine (PEPCID) 20 MG Tab Take 20 mg by mouth every day. (Patient not taking: Reported on 2/21/2024)       No current facility-administered medications for this encounter.       ALLERGIES:  Lactose and Augmentin [amoxicillin-pot clavulanate]    REVIEW OF SYSTEMS:    A complete review of system taken. Pertinent items in HPI.  All others negative.    PHYSICAL EXAM:  PERFORMANCE STATUS:       No data to display                   No data to display              /79   Pulse (!) 107   Temp 36.8 °C (98.2 °F)   Resp 18   Wt 56.8 kg (125 lb 3.5 oz)   SpO2 98%   BMI 21.49 kg/m²   Physical Exam  Constitutional:       Appearance: Normal appearance.   Cardiovascular:      Rate and Rhythm: Normal rate and regular rhythm.   Pulmonary:      Effort: Pulmonary effort is normal. No respiratory distress.   Abdominal:      General: Abdomen is flat.      Palpations: Abdomen is soft.   Neurological:      General: No focal deficit present.      Mental Status: She is alert and oriented to person, place, and time.      Cranial Nerves: No cranial nerve deficit.      Sensory: No sensory deficit.      Motor: No weakness.      Coordination: Coordination normal.      Gait: Gait normal.         LABORATORY DATA:   Lab Results   Component Value Date/Time    WBC 9.3 02/09/2024 11:12 AM    RBC 4.43 02/09/2024 11:12 AM    HEMOGLOBIN 11.2 (L) 02/09/2024 11:12 AM    HEMATOCRIT 36.0 (L) 02/09/2024 11:12 AM    MCV 81.3 (L) 02/09/2024 11:12 AM    MCH 25.3 (L) 02/09/2024 11:12 AM    MCHC 31.1 (L) 02/09/2024 11:12 AM    RDW 63.4 (H) 02/09/2024 11:12 AM    PLATELETCT 633 (H) 02/09/2024 11:12 AM    MPV 9.3 02/09/2024 11:12 AM    NEUTSPOLYS 67.40 02/09/2024 11:12 AM    LYMPHOCYTES 26.00 02/09/2024 11:12 AM    MONOCYTES 5.30 02/09/2024 11:12 AM    EOSINOPHILS 0.00 02/09/2024 11:12 AM     BASOPHILS 1.00 02/09/2024 11:12 AM    HYPOCHROMIA 1+ 12/11/2022 06:12 AM    ANISOCYTOSIS 1+ 12/11/2022 06:12 AM      Lab Results   Component Value Date/Time    SODIUM 133 (L) 02/09/2024 11:12 AM    POTASSIUM 4.2 02/09/2024 11:12 AM    CHLORIDE 96 02/09/2024 11:12 AM    CO2 24 02/09/2024 11:12 AM    GLUCOSE 112 (H) 02/09/2024 11:12 AM    BUN 14 02/09/2024 11:12 AM    CREATININE 0.51 02/09/2024 11:12 AM         RADIOLOGY DATA:  CT-CHEST,ABDOMEN,PELVIS WITH    Result Date: 2/9/2024  1.  There is a new small pulmonary embolus located in a right lower lobe subsegmental branch 2.  No other acute finding 3.  Stable left hilar adenopathy 4.   Stable left lower lobe pleural thickening measuring 6 mm 5.  Mesenteric mass adjacent to the 3rd portion the duodenum is smaller now measuring 2.0 x 2.0 cm and previously 2.7 x 2.3 cm 6.  Previously enlarged left external iliac node is now normal in size 7.  Therefore overall, there has been favorable response to therapy 8.  Findings were discussed with Dr. Watters 2/9/2024 2:40 PM.    DX-CHEST-2 VIEWS    Result Date: 1/3/2024  NEGATIVE TWO VIEWS OF THE CHEST.    MR-BRAIN-WITH & W/O    Result Date: 2/10/2024  1.  Persistent but decreased size of the focus of enhancement associated with the LEFT parietal cortical metastasis suggests therapeutic response 2.  No evidence of residual or recurrent enhancing tumor elsewhere. No new lesions.      IMPRESSION:    A 43 y.o. with Metastatic melanoma to brain status post resection, postoperative SRS as well as definitive SRS for additional brain lesions who I am seeing for ongoing follow-up.      CANCER STATUS:  Disease Stable    RECOMMENDATIONS:   I reviewed her MRI and her CT scans personally.  These are very reassuring.  She has no new CNS related disease.  Her mesenteric mass appears to be responding now.  She does not have any other major concerning findings.  She is having some ongoing difficulty with deconditioning, and we will see if she  can have a physical therapy evaluation for this.  Otherwise I will plan to see her in 3 months with brain MRI at that time.  Socially, she does seem somewhat more stressed about her diagnosis and I think could use the help of oncology nurse navigation to assess her distress and plug her into any appropriate supportive measures.  Will see if we can connect her with that as well.    Thank you for the opportunity to participate in her care.  If any questions or comments, please do not hesitate in calling.    Orders Placed This Encounter    MR-BRAIN-WITH & W/O    Referral to Physical Therapy    oxyCODONE immediate-release (ROXICODONE) 5 MG Tab    fluticasone-umeclidinium-vilanterol (TRELEGY ELLIPTA) 200-62.5-25 mcg/act inhaler

## 2024-02-23 ENCOUNTER — HOSPITAL ENCOUNTER (OUTPATIENT)
Facility: MEDICAL CENTER | Age: 44
End: 2024-02-23
Attending: NURSE PRACTITIONER
Payer: COMMERCIAL

## 2024-02-23 LAB
ALBUMIN SERPL BCP-MCNC: 3.6 G/DL (ref 3.2–4.9)
ALBUMIN/GLOB SERPL: 1.5 G/DL
ALP SERPL-CCNC: 85 U/L (ref 30–99)
ALT SERPL-CCNC: 18 U/L (ref 2–50)
ANION GAP SERPL CALC-SCNC: 11 MMOL/L (ref 7–16)
AST SERPL-CCNC: 14 U/L (ref 12–45)
BILIRUB SERPL-MCNC: 0.2 MG/DL (ref 0.1–1.5)
BUN SERPL-MCNC: 10 MG/DL (ref 8–22)
CALCIUM ALBUM COR SERPL-MCNC: 9.5 MG/DL (ref 8.5–10.5)
CALCIUM SERPL-MCNC: 9.2 MG/DL (ref 8.5–10.5)
CHLORIDE SERPL-SCNC: 104 MMOL/L (ref 96–112)
CK SERPL-CCNC: 22 U/L (ref 0–154)
CO2 SERPL-SCNC: 25 MMOL/L (ref 20–33)
CREAT SERPL-MCNC: 0.38 MG/DL (ref 0.5–1.4)
GFR SERPLBLD CREATININE-BSD FMLA CKD-EPI: 126 ML/MIN/1.73 M 2
GLOBULIN SER CALC-MCNC: 2.4 G/DL (ref 1.9–3.5)
GLUCOSE SERPL-MCNC: 178 MG/DL (ref 65–99)
POTASSIUM SERPL-SCNC: 4.6 MMOL/L (ref 3.6–5.5)
PROT SERPL-MCNC: 6 G/DL (ref 6–8.2)
SODIUM SERPL-SCNC: 140 MMOL/L (ref 135–145)

## 2024-02-23 PROCEDURE — 82550 ASSAY OF CK (CPK): CPT

## 2024-02-23 PROCEDURE — 80053 COMPREHEN METABOLIC PANEL: CPT

## 2024-03-25 ENCOUNTER — APPOINTMENT (OUTPATIENT)
Dept: RADIOLOGY | Facility: MEDICAL CENTER | Age: 44
End: 2024-03-25
Attending: RADIOLOGY
Payer: COMMERCIAL

## 2024-04-03 ENCOUNTER — APPOINTMENT (RX ONLY)
Dept: URBAN - METROPOLITAN AREA CLINIC 20 | Facility: CLINIC | Age: 44
Setting detail: DERMATOLOGY
End: 2024-04-03

## 2024-04-03 DIAGNOSIS — C79.89 SECONDARY MALIGNANT NEOPLASM OF OTHER SPECIFIED SITES: ICD-10-CM

## 2024-04-03 DIAGNOSIS — L72.8 OTHER FOLLICULAR CYSTS OF THE SKIN AND SUBCUTANEOUS TISSUE: ICD-10-CM

## 2024-04-03 DIAGNOSIS — L57.8 OTHER SKIN CHANGES DUE TO CHRONIC EXPOSURE TO NONIONIZING RADIATION: ICD-10-CM

## 2024-04-03 DIAGNOSIS — B07.8 OTHER VIRAL WARTS: ICD-10-CM

## 2024-04-03 DIAGNOSIS — D18.0 HEMANGIOMA: ICD-10-CM

## 2024-04-03 DIAGNOSIS — D22 MELANOCYTIC NEVI: ICD-10-CM

## 2024-04-03 DIAGNOSIS — Z85.820 PERSONAL HISTORY OF MALIGNANT MELANOMA OF SKIN: ICD-10-CM

## 2024-04-03 DIAGNOSIS — Z87.2 PERSONAL HISTORY OF DISEASES OF THE SKIN AND SUBCUTANEOUS TISSUE: ICD-10-CM

## 2024-04-03 DIAGNOSIS — L81.4 OTHER MELANIN HYPERPIGMENTATION: ICD-10-CM

## 2024-04-03 PROBLEM — D18.01 HEMANGIOMA OF SKIN AND SUBCUTANEOUS TISSUE: Status: ACTIVE | Noted: 2024-04-03

## 2024-04-03 PROBLEM — D22.61 MELANOCYTIC NEVI OF RIGHT UPPER LIMB, INCLUDING SHOULDER: Status: ACTIVE | Noted: 2024-04-03

## 2024-04-03 PROBLEM — D22.72 MELANOCYTIC NEVI OF LEFT LOWER LIMB, INCLUDING HIP: Status: ACTIVE | Noted: 2024-04-03

## 2024-04-03 PROBLEM — D22.4 MELANOCYTIC NEVI OF SCALP AND NECK: Status: ACTIVE | Noted: 2024-04-03

## 2024-04-03 PROBLEM — D22.5 MELANOCYTIC NEVI OF TRUNK: Status: ACTIVE | Noted: 2024-04-03

## 2024-04-03 PROCEDURE — ? OBSERVATION AND MEASURE

## 2024-04-03 PROCEDURE — ? ADDITIONAL NOTES

## 2024-04-03 PROCEDURE — ? DIAGNOSIS COMMENT

## 2024-04-03 PROCEDURE — 99213 OFFICE O/P EST LOW 20 MIN: CPT

## 2024-04-03 PROCEDURE — ? COUNSELING

## 2024-04-03 ASSESSMENT — LOCATION SIMPLE DESCRIPTION DERM
LOCATION SIMPLE: RIGHT UPPER BACK
LOCATION SIMPLE: RIGHT LOWER EXTREMITY
LOCATION SIMPLE: GENITALIA
LOCATION SIMPLE: LEFT UPPER BACK
LOCATION SIMPLE: POSTERIOR SCALP
LOCATION SIMPLE: LEFT HAND
LOCATION SIMPLE: RIGHT MIDDLE FINGER
LOCATION SIMPLE: LEFT THIGH
LOCATION SIMPLE: RIGHT EAR
LOCATION SIMPLE: RIGHT CALF
LOCATION SIMPLE: LEFT POSTERIOR THIGH
LOCATION SIMPLE: RIGHT HAND
LOCATION SIMPLE: RIGHT CHEEK
LOCATION SIMPLE: ABDOMEN
LOCATION SIMPLE: LEFT LOWER EXTREMITY
LOCATION SIMPLE: LEFT ANKLE
LOCATION SIMPLE: BACK
LOCATION SIMPLE: RIGHT POSTERIOR UPPER ARM
LOCATION SIMPLE: RIGHT BUTTOCK

## 2024-04-03 ASSESSMENT — LOCATION ZONE DERM
LOCATION ZONE: SCALP
LOCATION ZONE: FACE
LOCATION ZONE: LEG
LOCATION ZONE: EAR
LOCATION ZONE: HAND
LOCATION ZONE: FINGER
LOCATION ZONE: ARM
LOCATION ZONE: TRUNK

## 2024-04-03 ASSESSMENT — LOCATION DETAILED DESCRIPTION DERM
LOCATION DETAILED: RIGHT PROXIMAL POSTERIOR UPPER ARM
LOCATION DETAILED: GENITALIA
LOCATION DETAILED: LEFT ANTERIOR DISTAL THIGH
LOCATION DETAILED: LEFT ANTERIOR PROXIMAL THIGH
LOCATION DETAILED: LEFT INFERIOR OCCIPITAL SCALP
LOCATION DETAILED: RIGHT DORSAL HAND
LOCATION DETAILED: LEFT MID BACK
LOCATION DETAILED: RIGHT BUTTOCK
LOCATION DETAILED: RIGHT MID-UPPER BACK
LOCATION DETAILED: LEFT DORSAL HAND
LOCATION DETAILED: RIGHT SUPERIOR HELIX
LOCATION DETAILED: RIGHT INFERIOR UPPER BACK
LOCATION DETAILED: RIGHT MID DORSAL MIDDLE FINGER
LOCATION DETAILED: RIGHT LATERAL ABDOMEN
LOCATION DETAILED: LEFT ANTERIOR THIGH
LOCATION DETAILED: LEFT PROXIMAL POSTERIOR THIGH
LOCATION DETAILED: RIGHT CHEEK
LOCATION DETAILED: LEFT RADIAL DORSAL HAND
LOCATION DETAILED: LEFT SUPERIOR UPPER BACK
LOCATION DETAILED: RIGHT DISTAL LATERAL CALF
LOCATION DETAILED: RIGHT ANTERIOR THIGH
LOCATION DETAILED: LEFT POSTERIOR ANKLE
LOCATION DETAILED: LEFT UPPER BACK

## 2024-04-03 NOTE — PROCEDURE: ADDITIONAL NOTES
Additional Notes: Plan\\n\\n1. Discussed with pt consider follow up with [facility] Melanoma clinic to ensure from a dermatological stand point is on point. \\n2. Continue 3 mth FBE
Render Risk Assessment In Note?: no
Detail Level: Simple
Additional Notes: 1mm x 1mm, no change
Additional Notes: No tax at this time

## 2024-04-03 NOTE — PROCEDURE: DIAGNOSIS COMMENT
Render Risk Assessment In Note?: no
Comment: 5/31/22; per pt PET showed intense uptake to left hilum concern for metastasis.\\nPt had biopsy to lymph per pt no sign of cancer.\\n\\n-10/2022; 5 masses, largest 3.4cm x 3 cm\\n\\n-10/2022; CT-CAP, Several nodules in lung, thyroid nodule, abdominal/peritoneal  with several nodules, and questionable new lesion kidney possible cyst.\\n\\n-10/2022; Went to ER for stroke like symptoms, CT/MRI showed multiple locations (brain, bilateral lung, thyroid, kidney, peritoneal) masses consistent with metastatic melanoma.  Managed by Dr. Caraballo, treatment radiation, Yervoy & Opdivo.\\n\\n-started 11/2022; Pt well managed w/ palliative immunotherapy w/ Opdivo and Yervoy\\n\\n-2/2023; CT chest showed + response\\n-2/2023; MRI brain showed 6 mm lesion parietal lobe\\n-3/2023; 1 dose stereotactic radiation brain\\n-5/2023; MRI brain no evidence of progression\\n-7/2023; stopped immunotherapy 2/2 diarrhea, nausea, and vomiting\\n-1/2023; per pt the side effects of immunotherapy have been significant, she has lost a significant amount of weight, unable to eat w/o vomiting.  Awaiting Dr. Caraballo’s note for treatment update.
Detail Level: Simple
Comment: Excised in 2016; 2.8mm, re-excised 2017 (1+ node,18 neg), tx w/ Ileana [id number], Lelo 2B // no metastasis from [id number]\\n\\n-Refer to metastasis note 2022
Comment: Biopsy proven; all sites excised\\nRight buttock concern early evolving MIS
Comment: Biopsy proven mild to moderate 10/2017; monitor for repigmentation
Comment: Biopsy x 2; [id number], proven benign nevi, closely monitor

## 2024-04-03 NOTE — PROCEDURE: COUNSELING
Detail Level: Detailed
Show Follow-Up Variable: Yes
Quality 224: Stage 0-Iic Melanoma: Overutilization Of Imaging Studies For Only Stage 0-Iic Melanoma: None of the following diagnostic imaging studies ordered: chest X-ray, CT, Ultrasound, MRI, PET, or nuclear medicine scans (ML)
Quality 137: Melanoma: Continuity Of Care - Recall System: Patient information entered into a recall system that includes: target date for the next exam specified AND a process to follow up with patients regarding missed or unscheduled appointments
When Should The Patient Follow-Up For Their Next Full-Body Skin Exam?: 3 Months
Detail Level: Simple
Detail Level: Zone

## 2024-04-05 ENCOUNTER — HOSPITAL ENCOUNTER (OUTPATIENT)
Facility: MEDICAL CENTER | Age: 44
End: 2024-04-05
Attending: NURSE PRACTITIONER
Payer: COMMERCIAL

## 2024-04-05 LAB
ALBUMIN SERPL BCP-MCNC: 4.4 G/DL (ref 3.2–4.9)
ALBUMIN/GLOB SERPL: 1.8 G/DL
ALP SERPL-CCNC: 149 U/L (ref 30–99)
ALT SERPL-CCNC: 35 U/L (ref 2–50)
ANION GAP SERPL CALC-SCNC: 17 MMOL/L (ref 7–16)
AST SERPL-CCNC: 12 U/L (ref 12–45)
BILIRUB SERPL-MCNC: 0.2 MG/DL (ref 0.1–1.5)
BUN SERPL-MCNC: 13 MG/DL (ref 8–22)
CALCIUM ALBUM COR SERPL-MCNC: 9 MG/DL (ref 8.5–10.5)
CALCIUM SERPL-MCNC: 9.3 MG/DL (ref 8.5–10.5)
CHLORIDE SERPL-SCNC: 103 MMOL/L (ref 96–112)
CO2 SERPL-SCNC: 22 MMOL/L (ref 20–33)
CREAT SERPL-MCNC: 0.44 MG/DL (ref 0.5–1.4)
GFR SERPLBLD CREATININE-BSD FMLA CKD-EPI: 121 ML/MIN/1.73 M 2
GLOBULIN SER CALC-MCNC: 2.4 G/DL (ref 1.9–3.5)
GLUCOSE SERPL-MCNC: 253 MG/DL (ref 65–99)
MAGNESIUM SERPL-MCNC: 1.9 MG/DL (ref 1.5–2.5)
POTASSIUM SERPL-SCNC: 3.8 MMOL/L (ref 3.6–5.5)
PROT SERPL-MCNC: 6.8 G/DL (ref 6–8.2)
SODIUM SERPL-SCNC: 142 MMOL/L (ref 135–145)

## 2024-04-05 PROCEDURE — 80053 COMPREHEN METABOLIC PANEL: CPT

## 2024-04-05 PROCEDURE — 83735 ASSAY OF MAGNESIUM: CPT

## 2024-04-08 ENCOUNTER — HOSPITAL ENCOUNTER (OUTPATIENT)
Dept: LAB | Facility: MEDICAL CENTER | Age: 44
End: 2024-04-08
Attending: FAMILY MEDICINE
Payer: COMMERCIAL

## 2024-04-08 LAB
ALBUMIN SERPL BCP-MCNC: 4.7 G/DL (ref 3.2–4.9)
ALBUMIN/GLOB SERPL: 1.7 G/DL
ALP SERPL-CCNC: 130 U/L (ref 30–99)
ALT SERPL-CCNC: 24 U/L (ref 2–50)
ANION GAP SERPL CALC-SCNC: 15 MMOL/L (ref 7–16)
AST SERPL-CCNC: 17 U/L (ref 12–45)
BASOPHILS # BLD AUTO: 0.7 % (ref 0–1.8)
BASOPHILS # BLD: 0.07 K/UL (ref 0–0.12)
BILIRUB SERPL-MCNC: 0.2 MG/DL (ref 0.1–1.5)
BUN SERPL-MCNC: 13 MG/DL (ref 8–22)
CALCIUM ALBUM COR SERPL-MCNC: 9 MG/DL (ref 8.5–10.5)
CALCIUM SERPL-MCNC: 9.6 MG/DL (ref 8.5–10.5)
CHLORIDE SERPL-SCNC: 99 MMOL/L (ref 96–112)
CHOLEST SERPL-MCNC: 158 MG/DL (ref 100–199)
CO2 SERPL-SCNC: 23 MMOL/L (ref 20–33)
CREAT SERPL-MCNC: 0.46 MG/DL (ref 0.5–1.4)
EOSINOPHIL # BLD AUTO: 0 K/UL (ref 0–0.51)
EOSINOPHIL NFR BLD: 0 % (ref 0–6.9)
ERYTHROCYTE [DISTWIDTH] IN BLOOD BY AUTOMATED COUNT: 61.8 FL (ref 35.9–50)
EST. AVERAGE GLUCOSE BLD GHB EST-MCNC: 143 MG/DL
GFR SERPLBLD CREATININE-BSD FMLA CKD-EPI: 120 ML/MIN/1.73 M 2
GLOBULIN SER CALC-MCNC: 2.7 G/DL (ref 1.9–3.5)
GLUCOSE SERPL-MCNC: 173 MG/DL (ref 65–99)
HBA1C MFR BLD: 6.6 % (ref 4–5.6)
HCT VFR BLD AUTO: 48.4 % (ref 37–47)
HDLC SERPL-MCNC: 50 MG/DL
HGB BLD-MCNC: 15.4 G/DL (ref 12–16)
IMM GRANULOCYTES # BLD AUTO: 0.03 K/UL (ref 0–0.11)
IMM GRANULOCYTES NFR BLD AUTO: 0.3 % (ref 0–0.9)
LDLC SERPL CALC-MCNC: 74 MG/DL
LYMPHOCYTES # BLD AUTO: 1.45 K/UL (ref 1–4.8)
LYMPHOCYTES NFR BLD: 15.3 % (ref 22–41)
MCH RBC QN AUTO: 27.4 PG (ref 27–33)
MCHC RBC AUTO-ENTMCNC: 31.8 G/DL (ref 32.2–35.5)
MCV RBC AUTO: 86 FL (ref 81.4–97.8)
MONOCYTES # BLD AUTO: 0.72 K/UL (ref 0–0.85)
MONOCYTES NFR BLD AUTO: 7.6 % (ref 0–13.4)
NEUTROPHILS # BLD AUTO: 7.21 K/UL (ref 1.82–7.42)
NEUTROPHILS NFR BLD: 76.1 % (ref 44–72)
NRBC # BLD AUTO: 0 K/UL
NRBC BLD-RTO: 0 /100 WBC (ref 0–0.2)
PLATELET # BLD AUTO: 505 K/UL (ref 164–446)
PMV BLD AUTO: 9.2 FL (ref 9–12.9)
POTASSIUM SERPL-SCNC: 3.7 MMOL/L (ref 3.6–5.5)
PROT SERPL-MCNC: 7.4 G/DL (ref 6–8.2)
RBC # BLD AUTO: 5.63 M/UL (ref 4.2–5.4)
SODIUM SERPL-SCNC: 137 MMOL/L (ref 135–145)
TRIGL SERPL-MCNC: 168 MG/DL (ref 0–149)
WBC # BLD AUTO: 9.5 K/UL (ref 4.8–10.8)

## 2024-04-08 PROCEDURE — 85025 COMPLETE CBC W/AUTO DIFF WBC: CPT

## 2024-04-08 PROCEDURE — 36415 COLL VENOUS BLD VENIPUNCTURE: CPT

## 2024-04-08 PROCEDURE — 83036 HEMOGLOBIN GLYCOSYLATED A1C: CPT

## 2024-04-08 PROCEDURE — 80061 LIPID PANEL: CPT

## 2024-04-08 PROCEDURE — 80053 COMPREHEN METABOLIC PANEL: CPT

## 2024-05-03 ENCOUNTER — HOSPITAL ENCOUNTER (OUTPATIENT)
Facility: MEDICAL CENTER | Age: 44
End: 2024-05-03
Attending: NURSE PRACTITIONER
Payer: COMMERCIAL

## 2024-05-03 LAB
ALBUMIN SERPL BCP-MCNC: 4.3 G/DL (ref 3.2–4.9)
ALBUMIN/GLOB SERPL: 2.3 G/DL
ALP SERPL-CCNC: 101 U/L (ref 30–99)
ALT SERPL-CCNC: 17 U/L (ref 2–50)
ANION GAP SERPL CALC-SCNC: 16 MMOL/L (ref 7–16)
AST SERPL-CCNC: 15 U/L (ref 12–45)
BILIRUB SERPL-MCNC: 0.3 MG/DL (ref 0.1–1.5)
BUN SERPL-MCNC: 9 MG/DL (ref 8–22)
CALCIUM ALBUM COR SERPL-MCNC: 9.3 MG/DL (ref 8.5–10.5)
CALCIUM SERPL-MCNC: 9.5 MG/DL (ref 8.5–10.5)
CHLORIDE SERPL-SCNC: 99 MMOL/L (ref 96–112)
CO2 SERPL-SCNC: 22 MMOL/L (ref 20–33)
CREAT SERPL-MCNC: 0.56 MG/DL (ref 0.5–1.4)
GFR SERPLBLD CREATININE-BSD FMLA CKD-EPI: 114 ML/MIN/1.73 M 2
GLOBULIN SER CALC-MCNC: 1.9 G/DL (ref 1.9–3.5)
GLUCOSE SERPL-MCNC: 407 MG/DL (ref 65–99)
POTASSIUM SERPL-SCNC: 4.1 MMOL/L (ref 3.6–5.5)
PROT SERPL-MCNC: 6.2 G/DL (ref 6–8.2)
SODIUM SERPL-SCNC: 137 MMOL/L (ref 135–145)

## 2024-05-06 ENCOUNTER — HOSPITAL ENCOUNTER (OUTPATIENT)
Dept: RADIOLOGY | Facility: MEDICAL CENTER | Age: 44
End: 2024-05-06
Attending: INTERNAL MEDICINE
Payer: COMMERCIAL

## 2024-05-06 DIAGNOSIS — D50.8 OTHER IRON DEFICIENCY ANEMIAS: ICD-10-CM

## 2024-05-06 DIAGNOSIS — Z51.12 ENCOUNTER FOR ANTINEOPLASTIC IMMUNOTHERAPY: ICD-10-CM

## 2024-05-06 DIAGNOSIS — L27.0 GENERALIZED SKIN ERUPTION DUE TO DRUGS AND MEDICAMENTS TAKEN INTERNALLY: ICD-10-CM

## 2024-05-06 DIAGNOSIS — R68.89 GENERAL SYMPTOM: ICD-10-CM

## 2024-05-06 DIAGNOSIS — R21 RASH AND OTHER NONSPECIFIC SKIN ERUPTION: ICD-10-CM

## 2024-05-06 DIAGNOSIS — K51.018 ULCERATIVE (CHRONIC) PANCOLITIS WITH OTHER COMPLICATION (HCC): ICD-10-CM

## 2024-05-06 DIAGNOSIS — C79.31 SECONDARY MALIGNANT NEOPLASM OF BRAIN AND SPINAL CORD (HCC): ICD-10-CM

## 2024-05-06 DIAGNOSIS — Z79.899 LONG TERM USE OF DRUG: ICD-10-CM

## 2024-05-06 DIAGNOSIS — Z11.59 ENCOUNTER FOR SCREENING FOR OTHER VIRAL DISEASES: ICD-10-CM

## 2024-05-06 DIAGNOSIS — C79.49 SECONDARY MALIGNANT NEOPLASM OF BRAIN AND SPINAL CORD (HCC): ICD-10-CM

## 2024-05-06 DIAGNOSIS — I26.99 SUBACUTE PULMONARY EMBOLISM WITHOUT ACUTE COR PULMONALE (HCC): ICD-10-CM

## 2024-05-06 DIAGNOSIS — C77.3 SECONDARY AND UNSPECIFIED MALIGNANT NEOPLASM OF AXILLA AND UPPER LIMB LYMPH NODES (HCC): ICD-10-CM

## 2024-05-06 DIAGNOSIS — C43.62 MALIGNANT MELANOMA OF LEFT UPPER EXTREMITY INCLUDING SHOULDER (HCC): ICD-10-CM

## 2024-05-06 DIAGNOSIS — Z79.01 LONG TERM (CURRENT) USE OF ANTICOAGULANTS: ICD-10-CM

## 2024-05-06 DIAGNOSIS — I95.89 OTHER HYPOTENSION: ICD-10-CM

## 2024-05-06 RX ADMIN — IOHEXOL 100 ML: 350 INJECTION, SOLUTION INTRAVENOUS at 14:45

## 2024-05-06 RX ADMIN — HEPARIN 500 UNITS: 100 SYRINGE at 14:45

## 2024-05-06 RX ADMIN — IOHEXOL 25 ML: 240 INJECTION, SOLUTION INTRATHECAL; INTRAVASCULAR; INTRAVENOUS; ORAL at 14:45

## 2024-05-06 NOTE — PROCEDURES
Port to right chest wall accessed per protocol with Bolanos needle. Port flushes easily with brisk blood return. Port deaccessed per protocol flushing with 20ml normal saline and 500 units heparin. Blood return verified prior to deaccess. Patient tolerated well with minor discomfort.

## 2024-05-22 ENCOUNTER — HOSPITAL ENCOUNTER (OUTPATIENT)
Dept: RADIOLOGY | Facility: MEDICAL CENTER | Age: 44
End: 2024-05-22
Attending: RADIOLOGY
Payer: COMMERCIAL

## 2024-05-22 DIAGNOSIS — C79.31 METASTATIC CANCER TO BRAIN (HCC): ICD-10-CM

## 2024-05-22 RX ADMIN — GADOTERIDOL 10 ML: 279.3 INJECTION, SOLUTION INTRAVENOUS at 08:38

## 2024-05-24 ENCOUNTER — APPOINTMENT (RX ONLY)
Dept: URBAN - METROPOLITAN AREA CLINIC 20 | Facility: CLINIC | Age: 44
Setting detail: DERMATOLOGY
End: 2024-05-24

## 2024-05-24 DIAGNOSIS — B08.1 MOLLUSCUM CONTAGIOSUM: ICD-10-CM

## 2024-05-24 PROBLEM — D48.5 NEOPLASM OF UNCERTAIN BEHAVIOR OF SKIN: Status: ACTIVE | Noted: 2024-05-24

## 2024-05-24 PROCEDURE — ? COUNSELING

## 2024-05-24 PROCEDURE — ? BIOPSY BY SHAVE METHOD

## 2024-05-24 PROCEDURE — 17110 DESTRUCTION B9 LES UP TO 14: CPT

## 2024-05-24 PROCEDURE — 11102 TANGNTL BX SKIN SINGLE LES: CPT | Mod: 59

## 2024-05-24 PROCEDURE — ? LIQUID NITROGEN

## 2024-05-24 ASSESSMENT — LOCATION DETAILED DESCRIPTION DERM
LOCATION DETAILED: LEFT SUPERIOR LATERAL UPPER BACK
LOCATION DETAILED: LEFT ANTERIOR DISTAL UPPER ARM
LOCATION DETAILED: RIGHT VENTRAL PROXIMAL FOREARM
LOCATION DETAILED: RIGHT MID-UPPER BACK

## 2024-05-24 ASSESSMENT — LOCATION SIMPLE DESCRIPTION DERM
LOCATION SIMPLE: LEFT UPPER BACK
LOCATION SIMPLE: RIGHT UPPER BACK
LOCATION SIMPLE: RIGHT FOREARM
LOCATION SIMPLE: LEFT UPPER ARM

## 2024-05-24 ASSESSMENT — LOCATION ZONE DERM
LOCATION ZONE: ARM
LOCATION ZONE: TRUNK

## 2024-05-24 NOTE — PROCEDURE: LIQUID NITROGEN
Spray Paint Technique: No
Show Aperture Variable?: Yes
Medical Necessity Information: It is in your best interest to select a reason for this procedure from the list below. All of these items fulfill various CMS LCD requirements except the new and changing color options.
Consent: The patient's consent was obtained including but not limited to risks of crusting, scabbing, blistering, scarring, darker or lighter pigmentary change, recurrence, incomplete removal and infection.
Post-Care Instructions: I reviewed with the patient in detail post-care instructions. Patient is to wear sunprotection, and avoid picking at any of the treated lesions. Pt may apply Vaseline to crusted or scabbing areas.
Medical Necessity Clause: This procedure was medically necessary because the lesions that were treated were:
Spray Paint Text: The liquid nitrogen was applied to the skin utilizing a spray paint frosting technique.
Detail Level: Detailed

## 2024-05-27 ASSESSMENT — LIFESTYLE VARIABLES
SMOKING_YEARS: 10
TOBACCO_USE: YES
SMOKING_STATUS: YES

## 2024-05-30 ENCOUNTER — HOSPITAL ENCOUNTER (OUTPATIENT)
Dept: RADIATION ONCOLOGY | Facility: MEDICAL CENTER | Age: 44
End: 2024-05-30
Attending: RADIOLOGY
Payer: COMMERCIAL

## 2024-05-30 DIAGNOSIS — C79.31 METASTATIC CANCER TO BRAIN (HCC): ICD-10-CM

## 2024-05-30 DIAGNOSIS — C43.62 MELANOMA OF LEFT UPPER ARM (HCC): ICD-10-CM

## 2024-05-30 NOTE — PROGRESS NOTES
This visit is being conducted by telephone. This telephone visit was initiated by the patient and they verbally consented.  Patient at home in [facility].      Reason for Call:  Post tx FU    Treatment History:     Radiation Oncology          12/23/2022 3/1/2023   Aria Course Treatment Dates   Course First Treatment Date 12/19/2022 12/19/2022 03/01/2023    Course Last Treatment Date 12/23/2022 12/23/2022 03/01/2023    Aria Treatment Summary   1 Lesion SRS  Plan from Course C3 Brain SRS   Fraction  1 of 1    1 of 1   Elapsed Course Days  0 @ [id number]    0 @ [id number]   Prescribed Fraction Dose  2,200 cGy    2,200 cGy   Prescribed Total Dose  2,200 cGy    2,200 cGy   1 Lesion SRS  Reference Point from Course C3 Brain SRS   Elapsed Course Days  0 @ [id number]    0 @ [id number]   Session Dose  2,200 cGy    --   Total Dose  2,200 cGy    2,200 cGy   1 Lesion SRS CP  Reference Point from Course C3 Brain SRS   Elapsed Course Days  0 @ [id number]    0 @ [id number]   Session Dose  2,619 cGy    --   Total Dose  2,619 cGy    2,619 cGy   SRT R Pariet  Plan from Course C2 Brain SRS   Fraction 3 of 3    3 of 3    Elapsed Course Days 4 @ [id number]    4 @ [id number]    Prescribed Fraction Dose 900 cGy    900 cGy    Prescribed Total Dose 2,700 cGy    2,700 cGy    SRT R Pariet  Reference Point from Course C2 Brain SRS   Elapsed Course Days 4 @ [id number]    4 @ [id number]    Session Dose 900 cGy    --    Total Dose 2,700 cGy    2,700 cGy    SRT R Pariet CP  Reference Point from Course C2 Brain SRS   Elapsed Course Days 4 @ [id number]    4 @ [id number]    Session Dose 1,042 cGy    --    Total Dose 3,125 cGy    3,125 cGy       Details          More values are hidden. Newest values shown. Go to activity for more data.                HPI:    Subjective    This is a 42-year-old lady with metastatic BRAF mutant malignant melanoma, stage IV, with multiple brain lesions as well as systemic disease,  who had seen approximately 3 months ago initially for consideration of radiation therapy.  At that time, she underwent craniotomy and resection of her larger dominant lesions, and was treated with postoperative fractionated stereotactic radiation.  Thereafter, she was discovered to have several additional lesions and completed a second round of SRS as below.  She now continues on immunotherapy with medical oncology.     02/02/23- We are having a telephone follow-up shortly after completion of her recent course of stereotactic radiation.  She is doing tremendously well.  She is on immunotherapy now, and continues on Nivo/Ipi.  She feels quite well, has no major sensory or motor problems, no headaches, nausea or vomiting, or systemic complaints.  A follow-up MRI and CT chest abdomen pelvis has been ordered by medical oncology to be done in the next few weeks.  Her energy level is good.  Her hair has slowly grown back.  Overall, she feels quite good.     2/22/23 she continues to do relatively well.  She continues on her immunotherapy which she is tolerating without any major issues.  She had a follow-up MRI and a CT chest abdomen pelvis completed last week.  The systemic scan shows an excellent response in most of her disease is responding or stable.  However her brain MRI shows one small new 6 mm lesion that is definitively new from her prior MRI.  The remainder of her previously treated disease appears to have responded well.  She now comes to discuss stereotactic radiosurgery for this new lesion.  Symptomatically, she is doing well, she has regained nearly complete neurologic function, she is walking, she has no fine motor problems, no problems with balance, no headaches, dizziness, nausea, vomiting.     4/17/2023  She is now status post SRS to an additional lesion that was completed 6 weeks ago.  She continues to do well.  She has no specific CNS related side effects.  She continues on immunotherapy without any  major problems.  No new neurologic problems, headaches, dizziness, nausea or vomiting.     6/5/23:  She comes today to review her brain MRI and for ongoing follow-up.  She is doing tremendously well.  She reports that she has intermittent nausea that appears to be well controlled, but otherwise has no headaches, dizziness or balance difficulties, or vomiting.  She continues on immunotherapy.  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     9/11/23 from a CNS standpoint, she continues to do well.  She has no CNS related complaints today, including headaches, nausea, vomiting, weakness or numbness.  She is walking independently now for several months.  She has returned back to work.  Unfortunately, systemically, she experienced colitis about 2 months ago that has been attributed to an immune related side effect.  She has been off all immunotherapy since July 6.  She has had multiple trials of steroids and unfortunately has not resolved, and she has a follow-up with GI today.  She sees medical oncology later this week to discuss further systemic therapy options.      11/13/2023 we are seeing Ms. Johnson today for ongoing follow-up.  Since I last saw her, she had a PET scan performed by medical oncology.  I reviewed the report, images not currently available today, that unfortunately shows progression of disease with a left abdominal lymph node metastasis near the kidney measuring about 3.3 cm, as well as another lesion associated with a small bowel loop in the left lower abdomen now measuring about 5.4 cm.  Unfortunately, she is having significant immune related colitis, and has been off immunotherapy for several months.  She has had trials with steroids, but she continues to have diarrhea at least 4-5 times a day.  This is her main problem now, and the plan is likely to transition her to BRAF directed therapy.  She has a follow-up with Dr. Reed in the next few days.  With regards to CNS, she had a  follow-up MRI performed a few days ago that I reviewed with Dr. Tejada today in clinic, and essentially shows stable findings.      2/21/23 she is doing relatively well.  She had a follow-up brain MRI that we are reviewing today.  She has started BRAF directed therapy with Braftovi and Mektovi, but had difficulty tolerating the Mektovi after 2 challenges, and is now on single agent Braftovi.  She seems to be tolerating this well.  She had follow-up CT chest abdomen pelvis done also that I reviewed personally.  She has no CNS related complaints.  She still feels somewhat weak and deconditioned and is trying to work through this.  Socially, she lives with her parents and this has become somewhat stressful with trying to think about whether she should move out or whether she will be able to work or have enough money to ultimately sustain that.            Interval History:   5/30/2024 she is doing quite well now.  She has switched therapy to dabrafenib and trametinib and is tolerating this well.  She had a follow-up brain MRI and systemic imaging performed this month.  The MRI shows essentially stable findings no new concerning findings.  She has no headaches or CNS symptoms.  The systemic imaging shows a decrease in the size of her left central mesenteric mass.      Labs / Images Reviewed:   CT-CHEST,ABDOMEN,PELVIS WITH    Result Date: 5/7/2024  1.  Stable 6 x 5 mm left lower lobe pleural nodule. 2.  Decrease in size of left central mesenteric mass since prior. 3.  Interval development of mild biliary dilation which may be further assessed with MRCP. 4.  1.5 cm uterine lesion possibly a submucosal fibroid, this may be assessed with pelvic ultrasound study.    MR-BRAIN-WITH & W/O    Result Date: 5/24/2024  Overall stable appearance of treated metastatic lesions in the brain. No new lesions are seen. Nodular enhancement involving the left parietal cortical and subcortical lesion remains unchanged.      Assessment:    Metastatic melanoma      Cancer Status:   Disease Stable    Plan:   She is responding well to her current line of therapy.  Her brain imaging is very reassuring.  Will proceed with another surveillance MRI and follow-up in 3 months.  Systemic imaging as per medical oncology.    Time Spent on Call: 5 minutes      Time Spent in Preparation: 10 minutes    Total Time Spent: 15 minutes    Judy August M.D.

## 2024-06-21 ENCOUNTER — OFFICE VISIT (OUTPATIENT)
Dept: WOUND CARE | Facility: MEDICAL CENTER | Age: 44
End: 2024-06-21
Attending: INTERNAL MEDICINE
Payer: COMMERCIAL

## 2024-06-21 VITALS
OXYGEN SATURATION: 95 % | TEMPERATURE: 98.2 F | RESPIRATION RATE: 16 BRPM | DIASTOLIC BLOOD PRESSURE: 70 MMHG | HEART RATE: 101 BPM | SYSTOLIC BLOOD PRESSURE: 124 MMHG

## 2024-06-21 DIAGNOSIS — Z79.4 TYPE 2 DIABETES MELLITUS WITH FOOT ULCER, WITH LONG-TERM CURRENT USE OF INSULIN (HCC): Chronic | ICD-10-CM

## 2024-06-21 DIAGNOSIS — L97.509 TYPE 2 DIABETES MELLITUS WITH FOOT ULCER, WITH LONG-TERM CURRENT USE OF INSULIN (HCC): Chronic | ICD-10-CM

## 2024-06-21 DIAGNOSIS — C79.31 MALIGNANT MELANOMA METASTATIC TO BRAIN (HCC): ICD-10-CM

## 2024-06-21 DIAGNOSIS — L97.522 DIABETIC ULCER OF OTHER PART OF LEFT FOOT ASSOCIATED WITH TYPE 2 DIABETES MELLITUS, WITH FAT LAYER EXPOSED (HCC): ICD-10-CM

## 2024-06-21 DIAGNOSIS — L84 CORN OR CALLUS: ICD-10-CM

## 2024-06-21 DIAGNOSIS — E11.621 DIABETIC ULCER OF OTHER PART OF LEFT FOOT ASSOCIATED WITH TYPE 2 DIABETES MELLITUS, WITH FAT LAYER EXPOSED (HCC): ICD-10-CM

## 2024-06-21 DIAGNOSIS — E11.621 TYPE 2 DIABETES MELLITUS WITH FOOT ULCER, WITH LONG-TERM CURRENT USE OF INSULIN (HCC): Chronic | ICD-10-CM

## 2024-06-21 DIAGNOSIS — E11.42 DIABETIC POLYNEUROPATHY ASSOCIATED WITH TYPE 2 DIABETES MELLITUS (HCC): ICD-10-CM

## 2024-06-21 PROCEDURE — 3078F DIAST BP <80 MM HG: CPT | Performed by: NURSE PRACTITIONER

## 2024-06-21 PROCEDURE — 11042 DBRDMT SUBQ TIS 1ST 20SQCM/<: CPT

## 2024-06-21 PROCEDURE — 11042 DBRDMT SUBQ TIS 1ST 20SQCM/<: CPT | Performed by: NURSE PRACTITIONER

## 2024-06-21 PROCEDURE — 99214 OFFICE O/P EST MOD 30 MIN: CPT | Mod: 25 | Performed by: NURSE PRACTITIONER

## 2024-06-21 PROCEDURE — 3074F SYST BP LT 130 MM HG: CPT | Performed by: NURSE PRACTITIONER

## 2024-06-21 PROCEDURE — 99214 OFFICE O/P EST MOD 30 MIN: CPT

## 2024-06-21 RX ORDER — DABRAFENIB 75 MG/1
150 CAPSULE ORAL EVERY 12 HOURS
COMMUNITY
Start: 2024-06-19

## 2024-06-21 RX ORDER — APIXABAN 5 MG/1
5 TABLET, FILM COATED ORAL 2 TIMES DAILY
COMMUNITY
Start: 2024-04-11

## 2024-06-21 RX ORDER — CEPHALEXIN 500 MG/1
500 TABLET ORAL 3 TIMES DAILY
COMMUNITY
Start: 2024-06-17 | End: 2024-06-22

## 2024-06-21 NOTE — PATIENT INSTRUCTIONS
-Keep dressings clean and dry. Change dressings every 3-4 days, and if the dressings become saturated, soiled, or fall off.    -If you need to change your dressings at home: Wash your wound with normal saline, wound cleanser, or unscented soap and water prior to applying your new dressings. Please avoid cleansing with hydrogen peroxide or rubbing alcohol. Hydrogen peroxide and rubbing alcohol are toxic to new tissue and skin cells.    -Avoid prolonged standing or sitting without elevating your legs.    -Remove your compression garments if you have severe pain, severe swelling, numbness, color change, or temperature change in your toes. If you need to remove your compression garments, do so by unrolling them. Do not cut the compression garments off, this is to prevent cutting yourself on accident.    -Never walk around the house barefoot. Always wear a rubber soled slipper when walking around the house.    -Wear your offloading boot any time you are up walking.    -Should you experience any significant changes in your wound, such as signs of infection (increasing redness, swelling, localized heat, increased pain, fever > 101 F, chills) or have any questions regarding your home care instructions, please contact the wound center at [phone]. If after hours, contact your primary care physician or go to the hospital emergency room.     -If you are 5 or more minutes late for an appointment, we reserve the right to cancel and reschedule that appointment. Additionally, if you are habitually late or not showing (3 late cancellations and/or no shows), we reserve the right to cancel your remaining appointments and it will be your responsibility to obtain a new referral if services are still needed.

## 2024-06-21 NOTE — PROGRESS NOTES
Provider Encounter- Diabetic Foot Ulcer      HISTORY OF PRESENT ILLNESS  Wound History:    START OF CARE IN CLINIC: 6/21/2024    REFERRING PROVIDER: Cliff Reed MD     WOUND- Diabetic foot ulcer   LOCATION: Left first MTH     Previous ulcerative callus-right first MTH, right great toe     HISTORY: Patient has had a callus for several years to left first MTH.  Approximately 6/9/2024, patient noticed drainage from the ulcer.  She she applied OTC bandage.  Followed up with oncology, placed on Keflex.    Pertinent Medical History: DM, PE-on Eliquis, melanoma left upper arm metastasized to brain, hypertension, dyslipidemia, bipolar    DIABETES HX: Diagnosed with type 2 diabetes 15 years ago, and is currently managing with insulin.  Checks blood sugars frequently, continuous glucose monitor and reports that these typically run around 130s.  Has  had previous diabetes education.  Does  have numbness in feet.  Usually wears orthotic shoes approximately 1-year-old. Does  check feet routinely.  Has not had previous foot ulcers or foot surgery.  Current occupation: Retired RN psych.  Offloading: None.        TOBACCO USE:   Daily    Patient's problem list, allergies, and current medications reviewed and updated in Epic    Interval History:  6/21/2024: Initial evaluation, initial provider clinic visit with Lorenza BREEN, KATT-BC, CWON, CFPATRICIA.  Pt denies fevers, chills, nausea, vomiting.  Rx to [facility] for offloading boot for left foot, even up for right foot, evaluation and modification of current inserts ordered        REVIEW OF SYSTEMS:   Review of Systems   Constitutional:  Negative for chills and fever.   Eyes:         Denies visual impairment   Respiratory:  Negative for cough and shortness of breath.    Cardiovascular:  Negative for chest pain, palpitations, claudication and leg swelling.   Gastrointestinal:  Negative for constipation, diarrhea, nausea and vomiting.   Genitourinary:  Negative for dysuria.    Musculoskeletal:  Negative for falls and joint pain.   Skin:         Callus right great toe, callus left great toe with wound   Neurological:  Positive for sensory change (numbness in feet).   Psychiatric/Behavioral:  Negative for depression. The patient is not nervous/anxious.        PHYSICAL EXAMINATION:   /70   Pulse (!) 101   Temp 36.8 °C (98.2 °F) (Temporal)   Resp 16   SpO2 95%     Physical Exam  Vitals reviewed.   Cardiovascular:      Rate and Rhythm: Normal rate.      Pulses: Normal pulses.      Comments: +2 DP, +2 PT bilaterally palpated  Pulmonary:      Effort: Pulmonary effort is normal.   Abdominal:      Palpations: Abdomen is soft.   Musculoskeletal:      Comments: mycotic toenails   Skin:     General: Skin is warm and dry.      Comments: Left first MTH: Thick macerated callus with moderate odor, postdebridement revealed ulcer with red senescent tissue, no periwound erythema, no edema see wound flowsheet for further detail    Right first MTH and right great toe calluses: Raised, nonfluctuant, stable   Neurological:      Mental Status: She is alert and oriented to person, place, and time.      Comments: Insensate to feet   Psychiatric:         Mood and Affect: Mood and affect normal.         Cognition and Memory: Memory normal.         Judgment: Judgment normal.         Monofilament testing with a 10 gram force: sensation intact: decreased bilaterally  Visual Inspection: Feet with maceration, ulcers, fissures.  Pedal pulses: intact bilaterally     WOUND ASSESSMENT  Wound 06/21/24 Diabetic Ulcer MTH 1 Plantar Left --Left Plantar 1st Metatarsal Head (Active)   Wound Image     06/21/24 1500   Site Assessment Other (Comment) 06/21/24 1500   Periwound Assessment Callused;Maceration 06/21/24 1500   Margins Attached edges 06/21/24 1500   Drainage Amount Moderate 06/21/24 1500   Drainage Description Serosanguineous 06/21/24 1500   Treatments Cleansed;Provider debridement;Silver nitrate 06/21/24 1500    Offloading/DME Other (comment) 06/21/24 1500   Wound Cleansing Hypochlorus Acid 06/21/24 1500   Periwound Protectant No-sting Skin Prep;Barrier Paste 06/21/24 1500   Dressing Changed New 06/21/24 1500   Dressing Cleansing/Solutions Not Applicable 06/21/24 1500   Dressing Options Hydrofiber Silver;Nonadhesive Foam;Hypafix Tape 06/21/24 1500   Dressing Change/Treatment Frequency Monday, Wednesday, Friday, and As Needed 06/21/24 1500   Non-staged Wound Description Full thickness 06/21/24 1500   Post-Procedure Length (cm) 1.3 cm 06/21/24 1500   Post-Procedure Width (cm) 2.2 cm 06/21/24 1500   Post-Procedure Depth (cm) 0.1 cm 06/21/24 1500   Post-Procedure Surface Area (cm^2) 2.86 cm^2 06/21/24 1500   Post-Procedure Volume (cm^3) 0.286 cm^3 06/21/24 1500   Tunneling (cm) 0 cm 06/21/24 1500   Undermining (cm) 0 cm 06/21/24 1500   Wound Odor Mild 06/21/24 1500   Pulses DP;PT;2+ 06/21/24 1500   Right Foot Monofilament 10-point exam (Sensate) 1/10 06/21/24 1500   Left Foot Monofilament 10-point exam (Sensate) 1/10 06/21/24 1500   Exposed Structures None 06/21/24 1500   Number of days: 2       Wound 06/21/24 Diabetic Ulcer MTH 1 Plantar Right --Right Plantar 1st Metatarsal Head (Active)   Wound Image   06/21/24 1500   Site Assessment Callus 06/21/24 1500   Periwound Assessment Callused 06/21/24 1500   Drainage Amount None 06/21/24 1500   Offloading/DME Other (comment) 06/21/24 1500   Periwound Protectant Not Applicable 06/21/24 1500   Dressing Cleansing/Solutions Not Applicable 06/21/24 1500   Dressing Options Open to Air 06/21/24 1500   Non-staged Wound Description Not applicable 06/21/24 1500   Tunneling (cm) 0 cm 06/21/24 1500   Undermining (cm) 0 cm 06/21/24 1500   Pulses DP;PT;2+ 06/21/24 1500   Right Foot Monofilament 10-point exam (Sensate) 1/10 06/21/24 1500   Left Foot Monofilament 10-point exam (Sensate) 1/10 06/21/24 1500   Exposed Structures None 06/21/24 1500   Number of days: 2         PROCEDURE: Excisional  debridement left first MTH  -2% viscous lidocaine applied topically to wound bed for approximately 5 minutes prior to debridement  -Curette, scissors, forceps used to debride wound bed and periwound callus.  Excisional debridement was performed to remove devitalized tissue until healthy, bleeding tissue was visualized.   Entire surface of wound, the 2.86 cm2 debrided.  Tissue debrided into the subcutaneous layer.  Periwound callus, ~10 cm2, debrided to skin level, excising hyperkeratinized tissue.   -Bleeding controlled with manual pressure and silver nitrate.    -Wound care completed by wound RN, refer to flowsheet  -Patient tolerated the procedure well, without c/o pain or discomfort.       Pertinent Labs and Diagnostics:    Labs:     A1c:   Lab Results   Component Value Date/Time    HBA1C 6.6 (H) 04/08/2024 09:49 AM          IMAGING: No results found.    VASCULAR STUDIES: No results found.    LAST  WOUND CULTURE:  DATE :        Lab Results   Component Value Date/Time    CULTRSULT No growth after 5 days of incubation. 01/03/2024 06:48 PM           ASSESSMENT AND PLAN:     1. 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  Medically necessary to promote wound healing.  -Patient to follow-up weekly at wound care clinic  - Patient to change dressing 1-2 times in between clinic appointments  - On Keflex, no evidence of infection    Wound care: Hydrofiber silver, nonadhesive foam, Hypafix tape    2. Corn or callus  6/21/2024: pre ulcerative calluses to right first MTH, right great toe  - No drainage.  No fluctuance   -Calluses to be debrided at next visit   -Referral to podiatry placed for foot nail care and      3. Type 2 diabetes mellitus with foot  ulcer, with long-term current use of insulin ([facility])  4. Diabetic polyneuropathy associated with type 2 diabetes mellitus ([facility])  6/21/2024: Complicating factor, blood sugars average 130s  -Rx to [facility] for offloading boot for left foot, even up for right foot, adjustment/modification of right foot insert given to patient.  - Importance of tight glucose control for wound healing   - Implications of loss of protective sensation (LOPS) discussed with patient- including increased risk for amputation.  - Advised to check feet at least daily, moisturize feet, and to always wear protective foot wear.   -  Importance of offloading foot to assist with wound healing  - Advised pt not to trim nails or calluses, seek foot/nail care from podiatrist or certified foot/nail nurse  - Importance of adequate nutrition for wound healing    5. Malignant melanoma metastatic to brain ([facility])  6/21/2024: Complicating factor started from left arm forcing shelter            PATIENT EDUCATION  My total time spent caring for the patient on the day of the encounter was 30 minutes.   This does not include time spent on separately billable procedures/tests.       Please note that this note may have been created using voice recognition software. I have worked with technical experts from [employer] to optimize the interface.  I have made every reasonable attempt to correct obvious errors, but there may be errors of grammar and possibly content that I did not discover before finalizing the note.    N

## 2024-06-23 ASSESSMENT — ENCOUNTER SYMPTOMS
CONSTIPATION: 0
DIARRHEA: 0
CLAUDICATION: 0
NAUSEA: 0
VOMITING: 0
SHORTNESS OF BREATH: 0
CHILLS: 0
PALPITATIONS: 0
SENSORY CHANGE: 1
FALLS: 0
DEPRESSION: 0
NERVOUS/ANXIOUS: 0
FEVER: 0
COUGH: 0

## 2024-06-24 ENCOUNTER — HOSPITAL ENCOUNTER (OUTPATIENT)
Dept: RADIOLOGY | Facility: MEDICAL CENTER | Age: 44
End: 2024-06-24
Attending: INTERNAL MEDICINE
Payer: COMMERCIAL

## 2024-06-24 DIAGNOSIS — Z79.899 LONG TERM USE OF DRUG: ICD-10-CM

## 2024-06-24 DIAGNOSIS — K51.018 ULCERATIVE (CHRONIC) PANCOLITIS WITH OTHER COMPLICATION (HCC): ICD-10-CM

## 2024-06-24 DIAGNOSIS — R68.89 OTHER GENERAL SYMPTOMS AND SIGNS: ICD-10-CM

## 2024-06-24 DIAGNOSIS — C77.3 SECONDARY AND UNSPECIFIED MALIGNANT NEOPLASM OF AXILLA AND UPPER LIMB LYMPH NODES (HCC): ICD-10-CM

## 2024-06-24 DIAGNOSIS — C79.49 SECONDARY MALIGNANT NEOPLASM OF BRAIN AND SPINAL CORD (HCC): ICD-10-CM

## 2024-06-24 DIAGNOSIS — E86.0 DEHYDRATION: ICD-10-CM

## 2024-06-24 DIAGNOSIS — I26.99 OTHER PULMONARY EMBOLISM WITHOUT ACUTE COR PULMONALE, UNSPECIFIED CHRONICITY (HCC): ICD-10-CM

## 2024-06-24 DIAGNOSIS — D50.8 OTHER IRON DEFICIENCY ANEMIAS: ICD-10-CM

## 2024-06-24 DIAGNOSIS — L27.0 GENERALIZED SKIN ERUPTION DUE TO DRUGS AND MEDICAMENTS TAKEN INTERNALLY: ICD-10-CM

## 2024-06-24 DIAGNOSIS — C79.31 SECONDARY MALIGNANT NEOPLASM OF BRAIN AND SPINAL CORD (HCC): ICD-10-CM

## 2024-06-24 DIAGNOSIS — I95.89 OTHER HYPOTENSION: ICD-10-CM

## 2024-06-24 DIAGNOSIS — R19.7 DIARRHEA, UNSPECIFIED TYPE: ICD-10-CM

## 2024-06-24 DIAGNOSIS — Z11.59 ENCOUNTER FOR SCREENING FOR OTHER VIRAL DISEASES: ICD-10-CM

## 2024-06-24 DIAGNOSIS — C43.62 MALIGNANT MELANOMA OF LEFT UPPER EXTREMITY INCLUDING SHOULDER (HCC): ICD-10-CM

## 2024-06-24 DIAGNOSIS — Z51.12 ENCOUNTER FOR ANTINEOPLASTIC IMMUNOTHERAPY: ICD-10-CM

## 2024-06-24 DIAGNOSIS — Z79.01 LONG TERM (CURRENT) USE OF ANTICOAGULANTS: ICD-10-CM

## 2024-06-24 LAB — GLUCOSE BLD-MCNC: 139 MG/DL (ref 65–99)

## 2024-06-24 PROCEDURE — A9552 F18 FDG: HCPCS

## 2024-06-26 ENCOUNTER — APPOINTMENT (OUTPATIENT)
Dept: WOUND CARE | Facility: MEDICAL CENTER | Age: 44
End: 2024-06-26
Attending: INTERNAL MEDICINE
Payer: COMMERCIAL

## 2024-06-26 VITALS
TEMPERATURE: 97.3 F | RESPIRATION RATE: 16 BRPM | DIASTOLIC BLOOD PRESSURE: 70 MMHG | OXYGEN SATURATION: 97 % | HEART RATE: 89 BPM | SYSTOLIC BLOOD PRESSURE: 110 MMHG

## 2024-06-26 DIAGNOSIS — E11.621 DIABETIC ULCER OF OTHER PART OF LEFT FOOT ASSOCIATED WITH TYPE 2 DIABETES MELLITUS, WITH FAT LAYER EXPOSED (HCC): ICD-10-CM

## 2024-06-26 DIAGNOSIS — C79.31 MALIGNANT MELANOMA METASTATIC TO BRAIN (HCC): ICD-10-CM

## 2024-06-26 DIAGNOSIS — L97.509 TYPE 2 DIABETES MELLITUS WITH FOOT ULCER, WITH LONG-TERM CURRENT USE OF INSULIN (HCC): ICD-10-CM

## 2024-06-26 DIAGNOSIS — E11.42 DIABETIC POLYNEUROPATHY ASSOCIATED WITH TYPE 2 DIABETES MELLITUS (HCC): ICD-10-CM

## 2024-06-26 DIAGNOSIS — L97.522 DIABETIC ULCER OF OTHER PART OF LEFT FOOT ASSOCIATED WITH TYPE 2 DIABETES MELLITUS, WITH FAT LAYER EXPOSED (HCC): ICD-10-CM

## 2024-06-26 DIAGNOSIS — Z79.4 TYPE 2 DIABETES MELLITUS WITH FOOT ULCER, WITH LONG-TERM CURRENT USE OF INSULIN (HCC): ICD-10-CM

## 2024-06-26 DIAGNOSIS — E11.621 TYPE 2 DIABETES MELLITUS WITH FOOT ULCER, WITH LONG-TERM CURRENT USE OF INSULIN (HCC): ICD-10-CM

## 2024-06-26 DIAGNOSIS — L84 PRE-ULCERATIVE CALLUSES: ICD-10-CM

## 2024-06-26 PROCEDURE — 11056 PARNG/CUTG B9 HYPRKR LES 2-4: CPT

## 2024-06-26 PROCEDURE — 99213 OFFICE O/P EST LOW 20 MIN: CPT

## 2024-06-26 NOTE — PROGRESS NOTES
Provider Encounter- Diabetic Foot Ulcer      HISTORY OF PRESENT ILLNESS  Wound History:    START OF CARE IN CLINIC: 6/21/2024    REFERRING PROVIDER: Cliff Reed MD     WOUND- Diabetic foot ulcer   LOCATION: Left first MTH     Previous ulcerative callus-right first MTH, right great toe     HISTORY: Patient has had a callus for several years to left first MTH.  Approximately 6/9/2024, patient noticed drainage from the ulcer.  She she applied OTC bandage.  Followed up with oncology, placed on Keflex.    Pertinent Medical History: DM, PE-on Eliquis, melanoma left upper arm metastasized to brain, hypertension, dyslipidemia, bipolar    DIABETES HX: Diagnosed with type 2 diabetes 15 years ago, and is currently managing with insulin.  Checks blood sugars frequently, continuous glucose monitor and reports that these typically run around 130s.  Has  had previous diabetes education.  Does  have numbness in feet.  Usually wears orthotic shoes approximately 1-year-old. Does  check feet routinely.  Has not had previous foot ulcers or foot surgery.  Current occupation: Retired RN psych.  Offloading: None.        TOBACCO USE:   Daily    Patient's problem list, allergies, and current medications reviewed and updated in Epic    Interval History:  6/21/2024: Initial evaluation, initial provider clinic visit with Lorenza BREEN, KATT-BC, TOMAS, CFCN.  Pt denies fevers, chills, nausea, vomiting.  Rx to [facility] for offloading boot for left foot, even up for right foot, evaluation and modification of current inserts ordered    6/26/2024 : Clinic visit with NUHA Soriano, KATT-BC, CWBAKARIN, CFCN.   Patient states she is feeling well.  Her left first MTH ulcer is resolved, callus at skin level.  Callus to right plantar first MTH is quite thick, debrided to skin level in clinic today.  Referral placed to podiatry last week was declined due to her insurance.  However, she was told there is a podiatrist in town who does accept  Medicaid, Dr. Dubose.  Will send referral there.  I also informed patient that there is a foot nurse at [facility] who will manage callus and nails for reasonable cash price.  Information provided.  Patient was discharged today with a new prescription for orthotic shoes and inserts.  He was encouraged to return to clinic ASAP if wound recurs or if she develops new wounds.   She reports her blood sugar today in clinic was 270, checked by CGM.  She had a bagel and peanut butter for breakfast this morning.    REVIEW OF SYSTEMS:   Unchanged from previous clinic visit on 6/21/2024, except as documented in interval history above    PHYSICAL EXAMINATION:   /70   Pulse 89   Temp 36.3 °C (97.3 °F) (Temporal)   Resp 16   SpO2 97%     Physical Exam  Vitals reviewed.   Cardiovascular:      Rate and Rhythm: Normal rate.      Pulses: Normal pulses.      Comments: +2 DP, +2 PT bilaterally palpated  Pulmonary:      Effort: Pulmonary effort is normal.   Abdominal:      Palpations: Abdomen is soft.   Musculoskeletal:      Comments: Prominent first MTH's bilaterally  Positive Silf[place]old bilaterally   Skin:     General: Skin is warm and dry.      Comments: Left first MTH: Thin stained callus, no drainage.  No periwound erythema or induration.  Resolved    Right first MTH preulcerative callus: Thick, raised.  Debrided to skin level, no open wound found   Neurological:      Mental Status: She is alert and oriented to person, place, and time.      Comments: Insensate to feet   Psychiatric:         Mood and Affect: Mood and affect normal.         Cognition and Memory: Memory normal.         Judgment: Judgment normal.         Monofilament testing with a 10 gram force: sensation intact: decreased bilaterally  Visual Inspection: Feet with maceration, ulcers, fissures.  Pedal pulses: intact bilaterally     WOUND ASSESSMENT       PROCEDURE: Debridement of calluses to bilateral first MTP just  - scalpel used to debride both calluses to  skin level.  Lawrence board used to file rough edges after debridement  -There was no bleeding, no open wounds exposed.  -Wound care completed by wound RN, refer to flowsheet  -Patient tolerated the procedure well, without c/o pain or discomfort.                        Pertinent Labs and Diagnostics:    Labs:     A1c:   Lab Results   Component Value Date/Time    HBA1C 6.6 (H) 04/08/2024 09:49 AM          IMAGING: No results found.    VASCULAR STUDIES: No results found.    LAST  WOUND CULTURE:  DATE :        Lab Results   Component Value Date/Time    CULTRSULT No growth after 5 days of incubation. 01/03/2024 06:48 PM           ASSESSMENT AND PLAN:     1. Diabetic ulcer of other part of left foot associated with type 2 diabetes mellitus, with fat layer exposed (HCC)  Chronic callus several months, open June 2024 6/26/2024: First MTH ulcer resolved.  Covered with thin stained callus  -Callus debrided in clinic today  -Wound resolved.  Patient discharged from [facility].  - She understands she is to return to clinic ASAP if wound recurs or she develops new wounds  - Rx for new shoes and inserts provided.    Wound care: Open to air    2.  Preulcerative calluses  6/26/2024: Chronic callus to both first MTH is, and to base of right great toe.  Per patient present for several years, no improvement with orthotic footwear  - Calluses debrided skin level in clinic today  - Patient was referred to podiatry last visit, however the doctor she was referred to does not accept her insurance.  She was told that Dr. Dubose take Medicaid.  New referral placed  - I also informed patient of certified foot nurse who works out of [facility], cash pay only.  Information provided  -Rx for new shoes and inserts provided in clinic today.    3. Type 2 diabetes mellitus with foot ulcer, with long-term current use of insulin ([facility])  4. Diabetic polyneuropathy associated with type 2 diabetes mellitus (HCC)    6/26/2024: Patient was given Rx for offloading boot  last visit.  However, she showed up today with her orthotic shoes and inserts.  Her wound is healed  -Rx to [facility] for new orthotic shoe and insert provided in clinic today.  Patient to begin process ASAP  - Importance of tight glucose control for wound healing   - Implications of loss of protective sensation (LOPS) reviewed with patient- including increased risk for amputation.  - Advised to check feet at least daily, moisturize feet, and to always wear protective foot wear.   - Advised pt not to trim nails or calluses, seek foot/nail care from podiatrist or certified foot/nail nurse.  See above      5. Malignant melanoma metastatic to brain (HCC)    6/26/2024: Complicating factor started from left arm forcing long term  -Repeat PET scan completed on 6/24, shows improvement.  Patient has an appointment with her oncologist on Monday 7/1          PATIENT EDUCATION  My total time spent caring for the patient on the day of the encounter was 20 minutes.   This does not include time spent on separately billable procedures/tests.       Please note that this note may have been created using voice recognition software. I have worked with technical experts from [employer] to optimize the interface.  I have made every reasonable attempt to correct obvious errors, but there may be errors of grammar and possibly content that I did not discover before finalizing the note.    N

## 2024-06-27 ENCOUNTER — APPOINTMENT (OUTPATIENT)
Dept: WOUND CARE | Facility: MEDICAL CENTER | Age: 44
End: 2024-06-27
Attending: INTERNAL MEDICINE
Payer: COMMERCIAL

## 2024-06-28 ENCOUNTER — APPOINTMENT (RX ONLY)
Dept: URBAN - METROPOLITAN AREA CLINIC 20 | Facility: CLINIC | Age: 44
Setting detail: DERMATOLOGY
End: 2024-06-28

## 2024-06-28 DIAGNOSIS — Z87.2 PERSONAL HISTORY OF DISEASES OF THE SKIN AND SUBCUTANEOUS TISSUE: ICD-10-CM

## 2024-06-28 DIAGNOSIS — D18.0 HEMANGIOMA: ICD-10-CM

## 2024-06-28 DIAGNOSIS — Z85.820 PERSONAL HISTORY OF MALIGNANT MELANOMA OF SKIN: ICD-10-CM

## 2024-06-28 DIAGNOSIS — L72.8 OTHER FOLLICULAR CYSTS OF THE SKIN AND SUBCUTANEOUS TISSUE: ICD-10-CM

## 2024-06-28 DIAGNOSIS — T81.89 OTHER COMPLICATIONS OF PROCEDURES, NOT ELSEWHERE CLASSIFIED: ICD-10-CM

## 2024-06-28 DIAGNOSIS — L81.4 OTHER MELANIN HYPERPIGMENTATION: ICD-10-CM

## 2024-06-28 DIAGNOSIS — D22 MELANOCYTIC NEVI: ICD-10-CM

## 2024-06-28 DIAGNOSIS — B07.8 OTHER VIRAL WARTS: ICD-10-CM

## 2024-06-28 DIAGNOSIS — D485 NEOPLASM OF UNCERTAIN BEHAVIOR OF SKIN: ICD-10-CM

## 2024-06-28 DIAGNOSIS — C79.89 SECONDARY MALIGNANT NEOPLASM OF OTHER SPECIFIED SITES: ICD-10-CM

## 2024-06-28 DIAGNOSIS — L57.8 OTHER SKIN CHANGES DUE TO CHRONIC EXPOSURE TO NONIONIZING RADIATION: ICD-10-CM

## 2024-06-28 PROBLEM — D22.72 MELANOCYTIC NEVI OF LEFT LOWER LIMB, INCLUDING HIP: Status: ACTIVE | Noted: 2024-06-28

## 2024-06-28 PROBLEM — D22.4 MELANOCYTIC NEVI OF SCALP AND NECK: Status: ACTIVE | Noted: 2024-06-28

## 2024-06-28 PROBLEM — T81.89XA OTHER COMPLICATIONS OF PROCEDURES, NOT ELSEWHERE CLASSIFIED, INITIAL ENCOUNTER: Status: ACTIVE | Noted: 2024-06-28

## 2024-06-28 PROBLEM — D48.5 NEOPLASM OF UNCERTAIN BEHAVIOR OF SKIN: Status: ACTIVE | Noted: 2024-06-28

## 2024-06-28 PROBLEM — D18.01 HEMANGIOMA OF SKIN AND SUBCUTANEOUS TISSUE: Status: ACTIVE | Noted: 2024-06-28

## 2024-06-28 PROBLEM — D22.5 MELANOCYTIC NEVI OF TRUNK: Status: ACTIVE | Noted: 2024-06-28

## 2024-06-28 PROBLEM — D22.61 MELANOCYTIC NEVI OF RIGHT UPPER LIMB, INCLUDING SHOULDER: Status: ACTIVE | Noted: 2024-06-28

## 2024-06-28 PROCEDURE — ? ADDITIONAL NOTES

## 2024-06-28 PROCEDURE — 99213 OFFICE O/P EST LOW 20 MIN: CPT

## 2024-06-28 PROCEDURE — ? DIAGNOSIS COMMENT

## 2024-06-28 PROCEDURE — ? COUNSELING

## 2024-06-28 PROCEDURE — ? OBSERVATION AND MEASURE

## 2024-06-28 ASSESSMENT — LOCATION DETAILED DESCRIPTION DERM
LOCATION DETAILED: LEFT DORSAL HAND
LOCATION DETAILED: RIGHT INFERIOR POSTAURICULAR SKIN
LOCATION DETAILED: RIGHT DORSAL HAND
LOCATION DETAILED: LEFT MID BACK
LOCATION DETAILED: LEFT UPPER BACK
LOCATION DETAILED: RIGHT SUPERIOR HELIX
LOCATION DETAILED: LEFT PROXIMAL POSTERIOR THIGH
LOCATION DETAILED: LEFT RADIAL DORSAL HAND
LOCATION DETAILED: RIGHT MID-UPPER BACK
LOCATION DETAILED: LEFT ANTERIOR THIGH
LOCATION DETAILED: RIGHT LATERAL ABDOMEN
LOCATION DETAILED: RIGHT BUTTOCK
LOCATION DETAILED: RIGHT CHEEK
LOCATION DETAILED: LEFT ANTERIOR PROXIMAL THIGH
LOCATION DETAILED: LEFT POSTERIOR ANKLE
LOCATION DETAILED: RIGHT MID DORSAL MIDDLE FINGER
LOCATION DETAILED: LEFT ANTERIOR DISTAL THIGH
LOCATION DETAILED: RIGHT DISTAL LATERAL CALF
LOCATION DETAILED: LEFT INFERIOR OCCIPITAL SCALP
LOCATION DETAILED: GENITALIA
LOCATION DETAILED: RIGHT ANTERIOR THIGH
LOCATION DETAILED: LEFT SUPERIOR UPPER BACK
LOCATION DETAILED: RIGHT INFERIOR UPPER BACK
LOCATION DETAILED: RIGHT PROXIMAL POSTERIOR UPPER ARM

## 2024-06-28 ASSESSMENT — LOCATION SIMPLE DESCRIPTION DERM
LOCATION SIMPLE: LEFT POSTERIOR THIGH
LOCATION SIMPLE: RIGHT BUTTOCK
LOCATION SIMPLE: POSTERIOR SCALP
LOCATION SIMPLE: RIGHT POSTERIOR UPPER ARM
LOCATION SIMPLE: RIGHT CALF
LOCATION SIMPLE: LEFT UPPER BACK
LOCATION SIMPLE: ABDOMEN
LOCATION SIMPLE: LEFT LOWER EXTREMITY
LOCATION SIMPLE: LEFT ANKLE
LOCATION SIMPLE: SCALP
LOCATION SIMPLE: RIGHT EAR
LOCATION SIMPLE: GENITALIA
LOCATION SIMPLE: RIGHT LOWER EXTREMITY
LOCATION SIMPLE: BACK
LOCATION SIMPLE: RIGHT UPPER BACK
LOCATION SIMPLE: RIGHT MIDDLE FINGER
LOCATION SIMPLE: LEFT THIGH
LOCATION SIMPLE: RIGHT CHEEK
LOCATION SIMPLE: RIGHT HAND
LOCATION SIMPLE: LEFT HAND

## 2024-06-28 ASSESSMENT — LOCATION ZONE DERM
LOCATION ZONE: SCALP
LOCATION ZONE: TRUNK
LOCATION ZONE: FACE
LOCATION ZONE: LEG
LOCATION ZONE: EAR
LOCATION ZONE: ARM
LOCATION ZONE: FINGER
LOCATION ZONE: HAND

## 2024-06-28 NOTE — PROCEDURE: ADDITIONAL NOTES
Additional Notes: Plan\\n\\n1. Discussed with pt consider follow up with [facility] Melanoma clinic to ensure from a dermatological stand point is on point. \\n2. Continue 3 mth FBE
Render Risk Assessment In Note?: no
Detail Level: Simple
Additional Notes: 1mm x 1mm, no change
Additional Notes: No tax at this time
Additional Notes: Path comment: deeper biopsy recommended to rule out underlying squamous cell carcinoma. NER. At today’s visit.
Additional Notes: Pt is being managed by wound care

## 2024-06-28 NOTE — PROCEDURE: DIAGNOSIS COMMENT
Render Risk Assessment In Note?: no
Comment: 5/31/22; per pt PET showed intense uptake to left hilum concern for metastasis.\\nPt had biopsy to lymph per pt no sign of cancer.\\n\\n-10/2022; 5 masses, largest 3.4cm x 3 cm\\n\\n-10/2022; CT-CAP, Several nodules in lung, thyroid nodule, abdominal/peritoneal  with several nodules, and questionable new lesion kidney possible cyst.\\n\\n-10/2022; Went to ER for stroke like symptoms, CT/MRI showed multiple locations (brain, bilateral lung, thyroid, kidney, peritoneal) masses consistent with metastatic melanoma.  Managed by Dr. Caraballo, treatment radiation, Yervoy & Opdivo.\\n\\n-started 11/2022; Pt well managed w/ palliative immunotherapy w/ Opdivo and Yervoy\\n\\n-2/2023; CT chest showed + response\\n-2/2023; MRI brain showed 6 mm lesion parietal lobe\\n-3/2023; 1 dose stereotactic radiation brain\\n-5/2023; MRI brain no evidence of progression\\n-7/2023; stopped immunotherapy 2/2 diarrhea, nausea, and vomiting\\n-1/2023; per pt the side effects of immunotherapy have been significant, she has lost a significant amount of weight, unable to eat w/o vomiting.  Awaiting Dr. Caraballo’s note for treatment update.
Detail Level: Simple
Comment: Excised in 2016; 2.8mm, re-excised 2017 (1+ node,18 neg), tx w/ Ileana [id number], Lelo 2B // no metastasis from [id number]\\n\\n-Refer to metastasis note 2022
Comment: Biopsy proven; all sites excised\\nRight buttock concern early evolving MIS
Comment: Biopsy proven mild to moderate 10/2017; monitor for repigmentation
Comment: Biopsy x 2; [id number], proven benign nevi, closely monitor

## 2024-07-02 ENCOUNTER — APPOINTMENT (OUTPATIENT)
Dept: WOUND CARE | Facility: MEDICAL CENTER | Age: 44
End: 2024-07-02
Attending: INTERNAL MEDICINE
Payer: COMMERCIAL

## 2024-07-10 ENCOUNTER — APPOINTMENT (OUTPATIENT)
Dept: WOUND CARE | Facility: MEDICAL CENTER | Age: 44
End: 2024-07-10
Attending: INTERNAL MEDICINE
Payer: COMMERCIAL

## 2024-07-17 ENCOUNTER — APPOINTMENT (OUTPATIENT)
Dept: WOUND CARE | Facility: MEDICAL CENTER | Age: 44
End: 2024-07-17
Attending: INTERNAL MEDICINE
Payer: COMMERCIAL

## 2024-07-24 ENCOUNTER — APPOINTMENT (OUTPATIENT)
Dept: WOUND CARE | Facility: MEDICAL CENTER | Age: 44
End: 2024-07-24
Attending: INTERNAL MEDICINE
Payer: COMMERCIAL

## 2024-07-31 ENCOUNTER — APPOINTMENT (OUTPATIENT)
Dept: WOUND CARE | Facility: MEDICAL CENTER | Age: 44
End: 2024-07-31
Attending: INTERNAL MEDICINE
Payer: COMMERCIAL

## 2024-08-30 ENCOUNTER — HOSPITAL ENCOUNTER (OUTPATIENT)
Dept: RADIOLOGY | Facility: MEDICAL CENTER | Age: 44
End: 2024-08-30
Attending: RADIOLOGY
Payer: MEDICAID

## 2024-08-30 DIAGNOSIS — C43.62 MELANOMA OF LEFT UPPER ARM (HCC): ICD-10-CM

## 2024-08-30 DIAGNOSIS — C79.31 METASTATIC CANCER TO BRAIN (HCC): ICD-10-CM

## 2024-08-30 PROCEDURE — 70553 MRI BRAIN STEM W/O & W/DYE: CPT

## 2024-08-30 PROCEDURE — 700117 HCHG RX CONTRAST REV CODE 255: Mod: JZ,UD | Performed by: RADIOLOGY

## 2024-08-30 PROCEDURE — A9579 GAD-BASE MR CONTRAST NOS,1ML: HCPCS | Mod: JZ,UD | Performed by: RADIOLOGY

## 2024-08-30 RX ADMIN — GADOTERIDOL 10 ML: 279.3 INJECTION, SOLUTION INTRAVENOUS at 09:00

## 2024-09-03 ENCOUNTER — HOSPITAL ENCOUNTER (OUTPATIENT)
Dept: LAB | Facility: MEDICAL CENTER | Age: 44
End: 2024-09-03
Payer: MEDICAID

## 2024-09-03 ENCOUNTER — NON-PROVIDER VISIT (OUTPATIENT)
Dept: WOUND CARE | Facility: MEDICAL CENTER | Age: 44
End: 2024-09-03
Payer: MEDICAID

## 2024-09-03 LAB
ALBUMIN SERPL BCP-MCNC: 3.8 G/DL (ref 3.2–4.9)
ALBUMIN/GLOB SERPL: 1.5 G/DL
ALP SERPL-CCNC: 106 U/L (ref 30–99)
ALT SERPL-CCNC: 15 U/L (ref 2–50)
ANION GAP SERPL CALC-SCNC: 13 MMOL/L (ref 7–16)
AST SERPL-CCNC: 17 U/L (ref 12–45)
BASOPHILS # BLD AUTO: 0.9 % (ref 0–1.8)
BASOPHILS # BLD: 0.08 K/UL (ref 0–0.12)
BILIRUB SERPL-MCNC: <0.2 MG/DL (ref 0.1–1.5)
BUN SERPL-MCNC: 18 MG/DL (ref 8–22)
CALCIUM ALBUM COR SERPL-MCNC: 9 MG/DL (ref 8.5–10.5)
CALCIUM SERPL-MCNC: 8.8 MG/DL (ref 8.5–10.5)
CHLORIDE SERPL-SCNC: 105 MMOL/L (ref 96–112)
CHOLEST SERPL-MCNC: 137 MG/DL (ref 100–199)
CO2 SERPL-SCNC: 21 MMOL/L (ref 20–33)
CREAT SERPL-MCNC: 0.48 MG/DL (ref 0.5–1.4)
EOSINOPHIL # BLD AUTO: 0.06 K/UL (ref 0–0.51)
EOSINOPHIL NFR BLD: 0.7 % (ref 0–6.9)
ERYTHROCYTE [DISTWIDTH] IN BLOOD BY AUTOMATED COUNT: 46.6 FL (ref 35.9–50)
GFR SERPLBLD CREATININE-BSD FMLA CKD-EPI: 118 ML/MIN/1.73 M 2
GLOBULIN SER CALC-MCNC: 2.5 G/DL (ref 1.9–3.5)
GLUCOSE SERPL-MCNC: 181 MG/DL (ref 65–99)
HCT VFR BLD AUTO: 36.2 % (ref 37–47)
HDLC SERPL-MCNC: 54 MG/DL
HGB BLD-MCNC: 11.3 G/DL (ref 12–16)
IMM GRANULOCYTES # BLD AUTO: 0.05 K/UL (ref 0–0.11)
IMM GRANULOCYTES NFR BLD AUTO: 0.6 % (ref 0–0.9)
LDLC SERPL CALC-MCNC: 61 MG/DL
LYMPHOCYTES # BLD AUTO: 1.13 K/UL (ref 1–4.8)
LYMPHOCYTES NFR BLD: 12.9 % (ref 22–41)
MCH RBC QN AUTO: 29.4 PG (ref 27–33)
MCHC RBC AUTO-ENTMCNC: 31.2 G/DL (ref 32.2–35.5)
MCV RBC AUTO: 94 FL (ref 81.4–97.8)
MONOCYTES # BLD AUTO: 0.77 K/UL (ref 0–0.85)
MONOCYTES NFR BLD AUTO: 8.8 % (ref 0–13.4)
NEUTROPHILS # BLD AUTO: 6.67 K/UL (ref 1.82–7.42)
NEUTROPHILS NFR BLD: 76.1 % (ref 44–72)
NRBC # BLD AUTO: 0 K/UL
NRBC BLD-RTO: 0 /100 WBC (ref 0–0.2)
PLATELET # BLD AUTO: 471 K/UL (ref 164–446)
PMV BLD AUTO: 10 FL (ref 9–12.9)
POTASSIUM SERPL-SCNC: 4.5 MMOL/L (ref 3.6–5.5)
PROT SERPL-MCNC: 6.3 G/DL (ref 6–8.2)
RBC # BLD AUTO: 3.85 M/UL (ref 4.2–5.4)
SODIUM SERPL-SCNC: 139 MMOL/L (ref 135–145)
TRIGL SERPL-MCNC: 110 MG/DL (ref 0–149)
TSH SERPL DL<=0.005 MIU/L-ACNC: 1 UIU/ML (ref 0.38–5.33)
WBC # BLD AUTO: 8.8 K/UL (ref 4.8–10.8)

## 2024-09-03 PROCEDURE — 97597 DBRDMT OPN WND 1ST 20 CM/<: CPT

## 2024-09-03 PROCEDURE — 84443 ASSAY THYROID STIM HORMONE: CPT

## 2024-09-03 PROCEDURE — 80053 COMPREHEN METABOLIC PANEL: CPT

## 2024-09-03 PROCEDURE — 85025 COMPLETE CBC W/AUTO DIFF WBC: CPT

## 2024-09-03 PROCEDURE — 36415 COLL VENOUS BLD VENIPUNCTURE: CPT

## 2024-09-03 PROCEDURE — 80061 LIPID PANEL: CPT

## 2024-09-03 NOTE — CERTIFICATION
Non Provider Encounter - Diabetic Foot Ulcer      HISTORY OF PRESENT ILLNESS   Patient was seen in clinic and discharged on 6/9/2024 with closed wound. Patient noticed drainage mid July and managed wound at home. Called her PCP two weeks ago and was prescribed bactrim DS and referral placed back to wound care clinic.  DM, PE-on Eliquis, melanoma left upper arm metastasized to brain, hypertension, dyslipidemia, bipolar   Wound care supplies ordered today.     Wound History:   START OF CARE IN CLINIC: 09/03/24    REFERRING PROVIDER: Ronit Marrero N.P.    WOUND: Diabetic foot ulcer   LOCATION: left plantar 1st MTH      Pertinent Medical History:   - HOW / WHEN injury first occurred: 6 weeks  - Current/home treatment: Gauze and tape    DIABETES HX: Diagnosed with type 2 diabetes in 15 years, and is currently managing diabetes with insulin.    Checks blood sugars: 1x/day  Blood sugar average: 160-180  A1c:   Lab Results   Component Value Date/Time    HBA1C 6.6 (H) 04/08/2024 09:49 AM        Has had previous diabetes education.    Does have numbness in feet.    Foot wear: ordered and waiting for delivery of  shoes.  Does check feet routinely.    Previous foot ulcers: Yes  Previous foot surgery:no  Current occupation Not currently.    Offloading  not currently offloading, waiting for shoe delivery .     TOBACCO USE:   Tobacco Use: High Risk (9/3/2024)    Patient History     Smoking Tobacco Use: Every Day     Smokeless Tobacco Use: Never     Passive Exposure: Not on file       Patient's problem list, allergies, and current medications reviewed and updated in Epic. Please see medical record for details.     WOUND ASSESSMENT  Wound 09/03/24 Diabetic Ulcer MTH 1 Plantar Left (Active)   Wound Image    09/03/24 0800   Site Assessment Black;Red;Boggy 09/03/24 0800   Periwound Assessment Callused;Maceration 09/03/24 0800   Margins Unattached edges 09/03/24 0800   Drainage Amount Small 09/03/24 0800   Drainage Description  Serosanguineous 09/03/24 0800   Treatments Cleansed;Site care;CSWD - Conservative Sharp Wound Debridement 09/03/24 0800   Offloading/DME Other (comment) 09/03/24 0800   Wound Cleansing Hypochlorus Acid 09/03/24 0800   Periwound Protectant No-sting Skin Prep;Barrier Paste 09/03/24 0800   Dressing Changed New 09/03/24 0800   Dressing Options Hydrofiber Silver;Nonadhesive Foam;Hypafix Tape 09/03/24 0800   Dressing Change/Treatment Frequency Every 48 hrs, and As Needed 09/03/24 0800   Wound Team Following Weekly 09/03/24 0800   Non-staged Wound Description Full thickness 09/03/24 0800   Wound Length (cm) 1.9 cm 09/03/24 0800   Wound Width (cm) 2.5 cm 09/03/24 0800   Wound Depth (cm) 0.2 cm 09/03/24 0800   Wound Surface Area (cm^2) 4.75 cm^2 09/03/24 0800   Wound Volume (cm^3) 0.95 cm^3 09/03/24 0800   Post-Procedure Length (cm) 2 cm 09/03/24 0800   Post-Procedure Width (cm) 3 cm 09/03/24 0800   Post-Procedure Depth (cm) 0.4 cm 09/03/24 0800   Post-Procedure Surface Area (cm^2) 6 cm^2 09/03/24 0800   Post-Procedure Volume (cm^3) 2.4 cm^3 09/03/24 0800   Tunneling (cm) 0 cm 09/03/24 0800   Undermining (cm) 0 cm 09/03/24 0800   Wound Odor None 09/03/24 0800   Exposed Structures None 09/03/24 0800   Number of days: 0        PROCEDURES    -Curette used to debride wound bed and periwound callus. Entire surface of wound, 0.95 cm2 debrided.  Periwound callus, ~ <5cm2, debrided to skin level  -Refer to flowsheet for wound care details.       PATIENT EDUCATION  - Patient educated on wound clinic goals for care, moist wound healing, dressing selection, and how to apply dressing  - Educated on s/s of infection and when to seek emergency medical attention  - Order for wound supplies placed: Yes  - Requested next visit on provider: Yes  - Importance of tight glucose control for wound healing   - Implications of loss of protective sensation (LOPS) discussed with patient- including increased risk for amputation.  - Advised to check  feet at least daily, moisturize feet, and to always wear protective foot wear.   - Importance of offloading foot to assist with wound healing  - Advised pt not to trim nails or calluses, seek foot/nail care from podiatrist or certified foot/nail nurse  - Importance of adequate nutrition for wound healing

## 2024-09-03 NOTE — PROGRESS NOTES
Advanced Wound Care   [facility]   [address]   [address]   [place] [address]   [phone] Fax: [phone]        Duration of Supply Order: 90 Days  Dispense as written.     Wound 09/03/24 Diabetic Ulcer MTH 1 Plantar Left (Active)   Wound Image    09/03/24 0800   Site Assessment Black;Red;Boggy 09/03/24 0800   Periwound Assessment Callused;Maceration 09/03/24 0800   Margins Unattached edges 09/03/24 0800   Drainage Amount Small 09/03/24 0800   Drainage Description Serosanguineous 09/03/24 0800   Treatments Cleansed;Site care;CSWD - Conservative Sharp Wound Debridement 09/03/24 0800   Offloading/DME Other (comment) 09/03/24 0800   Wound Cleansing Hypochlorus Acid 09/03/24 0800   Periwound Protectant No-sting Skin Prep;Barrier Paste 09/03/24 0800   Dressing Changed New 09/03/24 0800   Dressing Options Hydrofiber Silver;Nonadhesive Foam;Hypafix Tape 09/03/24 0800   Dressing Change/Treatment Frequency Every 48 hrs, and As Needed 09/03/24 0800   Wound Team Following Weekly 09/03/24 0800   Non-staged Wound Description Full thickness 09/03/24 0800   Wound Length (cm) 1.9 cm 09/03/24 0800   Wound Width (cm) 2.5 cm 09/03/24 0800   Wound Depth (cm) 0.2 cm 09/03/24 0800   Wound Surface Area (cm^2) 4.75 cm^2 09/03/24 0800   Wound Volume (cm^3) 0.95 cm^3 09/03/24 0800   Post-Procedure Length (cm) 2 cm 09/03/24 0800   Post-Procedure Width (cm) 3 cm 09/03/24 0800   Post-Procedure Depth (cm) 0.4 cm 09/03/24 0800   Post-Procedure Surface Area (cm^2) 6 cm^2 09/03/24 0800   Post-Procedure Volume (cm^3) 2.4 cm^3 09/03/24 0800   Tunneling (cm) 0 cm 09/03/24 0800   Undermining (cm) 0 cm 09/03/24 0800   Wound Odor None 09/03/24 0800   Right Foot Monofilament 10-point exam (Sensate) 0/10 09/03/24 0800   Left Foot Monofilament 10-point exam (Sensate) 0/10 09/03/24 0800   Exposed Structures None 09/03/24 0800            PROCEDURE:   Non-selective debridement to wound and periwound using normal  saline and gauze to remove nonviable tissue and biofilm.     Conservative sharp wound debridement using curette to remove ~1 to 2cm2 nonviable tissue from **location** wound bed. Patient tolerated well; denied pain.      I agree with current plan of care.    SIGNATURE:_______________________________________ DATE:________________    PROVIDER NAME:__________________________________

## 2024-09-03 NOTE — PROGRESS NOTES
Advanced Wound Care   [facility]   [address]   [address]   [place] [address]   [phone] Fax: [phone]        Duration of Supply Order: 90 Days  Dispense as written.     Wound 09/03/24 Diabetic Ulcer MTH 1 Plantar Left (Active)   Wound Image    09/03/24 0800   Site Assessment Black;Red;Boggy 09/03/24 0800   Periwound Assessment Callused;Maceration 09/03/24 0800   Margins Unattached edges 09/03/24 0800   Drainage Amount Small 09/03/24 0800   Drainage Description Serosanguineous 09/03/24 0800   Treatments Cleansed;Site care;CSWD - Conservative Sharp Wound Debridement 09/03/24 0800   Offloading/DME Other (comment) 09/03/24 0800   Wound Cleansing Hypochlorus Acid 09/03/24 0800   Periwound Protectant No-sting Skin Prep;Barrier Paste 09/03/24 0800   Dressing Changed New 09/03/24 0800   Dressing Options Hydrofiber Silver;Nonadhesive Foam;Hypafix Tape 09/03/24 0800   Dressing Change/Treatment Frequency Every 48 hrs, and As Needed 09/03/24 0800   Wound Team Following Weekly 09/03/24 0800   Non-staged Wound Description Full thickness 09/03/24 0800   Wound Length (cm) 1.9 cm 09/03/24 0800   Wound Width (cm) 2.5 cm 09/03/24 0800   Wound Depth (cm) 0.2 cm 09/03/24 0800   Wound Surface Area (cm^2) 4.75 cm^2 09/03/24 0800   Wound Volume (cm^3) 0.95 cm^3 09/03/24 0800   Post-Procedure Length (cm) 2 cm 09/03/24 0800   Post-Procedure Width (cm) 3 cm 09/03/24 0800   Post-Procedure Depth (cm) 0.4 cm 09/03/24 0800   Post-Procedure Surface Area (cm^2) 6 cm^2 09/03/24 0800   Post-Procedure Volume (cm^3) 2.4 cm^3 09/03/24 0800   Tunneling (cm) 0 cm 09/03/24 0800   Undermining (cm) 0 cm 09/03/24 0800   Wound Odor None 09/03/24 0800   Right Foot Monofilament 10-point exam (Sensate) 0/10 09/03/24 0800   Left Foot Monofilament 10-point exam (Sensate) 0/10 09/03/24 0800   Exposed Structures None 09/03/24 0800            PROCEDURE:   Non-selective debridement to wound and periwound using normal  saline and gauze to remove nonviable tissue and biofilm.     Conservative sharp wound debridement using curette to remove ~1 to 2cm2 nonviable tissue from **location** wound bed. Patient tolerated well; denied pain.      I agree with current plan of care.    SIGNATURE:_______________________________________ DATE:________________    PROVIDER NAME:__________________________________

## 2024-09-03 NOTE — PATIENT INSTRUCTIONS
-Keep your wound dressing clean, dry, and intact. Only change dressing if it's over saturated, soiled or falls off.     -Change your dressing every two days or if it becomes soiled, soaked, or falls off.    -Should you experience any significant changes in your wound(s), such as infection (redness, swelling, localized heat, increased pain, fever > 101 F, chills) or have any questions regarding your home care instructions, please contact the wound center at [phone]. If after hours, contact your primary care physician or go to the hospital emergency room.     If you are admitted to any hospital, you will need a new referral to come back to the wound clinic and any scheduled appointments that you already have, may be cancelled.

## 2024-09-06 ENCOUNTER — HOSPITAL ENCOUNTER (OUTPATIENT)
Dept: RADIATION ONCOLOGY | Facility: MEDICAL CENTER | Age: 44
End: 2024-09-06
Attending: RADIOLOGY
Payer: MEDICAID

## 2024-09-06 VITALS
SYSTOLIC BLOOD PRESSURE: 106 MMHG | HEART RATE: 104 BPM | DIASTOLIC BLOOD PRESSURE: 70 MMHG | WEIGHT: 123 LBS | BODY MASS INDEX: 21.11 KG/M2 | OXYGEN SATURATION: 95 %

## 2024-09-06 DIAGNOSIS — C79.31 METASTATIC CANCER TO BRAIN (HCC): ICD-10-CM

## 2024-09-06 PROCEDURE — 99213 OFFICE O/P EST LOW 20 MIN: CPT | Mod: 95 | Performed by: RADIOLOGY

## 2024-09-06 ASSESSMENT — FIBROSIS 4 INDEX: FIB4 SCORE: 0.42

## 2024-09-06 NOTE — PROGRESS NOTES
Virtual Visit: Established Patient   This visit was conducted via Teams using secure and encrypted videoconferencing technology.   The patient was in their home in the [facility].    The patient's identity was confirmed and verbal consent was obtained for this virtual visit.    Subjective:   CC: No chief complaint on file.    Jacque Mcintyre is a 43 y.o. female presenting for evaluation and management of:  This is a 42-year-old lady with metastatic BRAF mutant malignant melanoma, stage IV, with multiple brain lesions as well as systemic disease, who had seen approximately 3 months ago initially for consideration of radiation therapy.  At that time, she underwent craniotomy and resection of her larger dominant lesions, and was treated with postoperative fractionated stereotactic radiation.  Thereafter, she was discovered to have several additional lesions and completed a second round of SRS as below.  She now continues on immunotherapy with medical oncology.     02/02/23- We are having a telephone follow-up shortly after completion of her recent course of stereotactic radiation.  She is doing tremendously well.  She is on immunotherapy now, and continues on Nivo/Ipi.  She feels quite well, has no major sensory or motor problems, no headaches, nausea or vomiting, or systemic complaints.  A follow-up MRI and CT chest abdomen pelvis has been ordered by medical oncology to be done in the next few weeks.  Her energy level is good.  Her hair has slowly grown back.  Overall, she feels quite good.     2/22/23 she continues to do relatively well.  She continues on her immunotherapy which she is tolerating without any major issues.  She had a follow-up MRI and a CT chest abdomen pelvis completed last week.  The systemic scan shows an excellent response in most of her disease is responding or stable.  However her brain MRI shows one small new 6 mm lesion that is definitively new from her prior MRI.  The remainder of her  previously treated disease appears to have responded well.  She now comes to discuss stereotactic radiosurgery for this new lesion.  Symptomatically, she is doing well, she has regained nearly complete neurologic function, she is walking, she has no fine motor problems, no problems with balance, no headaches, dizziness, nausea, vomiting.     4/17/2023  She is now status post SRS to an additional lesion that was completed 6 weeks ago.  She continues to do well.  She has no specific CNS related side effects.  She continues on immunotherapy without any major problems.  No new neurologic problems, headaches, dizziness, nausea or vomiting.     6/5/23:  She comes today to review her brain MRI and for ongoing follow-up.  She is doing tremendously well.  She reports that she has intermittent nausea that appears to be well controlled, but otherwise has no headaches, dizziness or balance difficulties, or vomiting.  She continues on immunotherapy.  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     9/11/23 from a CNS standpoint, she continues to do well.  She has no CNS related complaints today, including headaches, nausea, vomiting, weakness or numbness.  She is walking independently now for several months.  She has returned back to work.  Unfortunately, systemically, she experienced colitis about 2 months ago that has been attributed to an immune related side effect.  She has been off all immunotherapy since July 6.  She has had multiple trials of steroids and unfortunately has not resolved, and she has a follow-up with GI today.  She sees medical oncology later this week to discuss further systemic therapy options.      11/13/2023 we are seeing Ms. Mcintyre. today for ongoing follow-up.  Since I last saw her, she had a PET scan performed by medical oncology.  I reviewed the report, images not currently available today, that unfortunately shows progression of disease with a left abdominal lymph node metastasis  near the kidney measuring about 3.3 cm, as well as another lesion associated with a small bowel loop in the left lower abdomen now measuring about 5.4 cm.  Unfortunately, she is having significant immune related colitis, and has been off immunotherapy for several months.  She has had trials with steroids, but she continues to have diarrhea at least 4-5 times a day.  This is her main problem now, and the plan is likely to transition her to BRAF directed therapy.  She has a follow-up with Dr. Reed in the next few days.  With regards to CNS, she had a follow-up MRI performed a few days ago that I reviewed with Dr. Tejada today in clinic, and essentially shows stable findings.     2/21/23 she is doing relatively well.  She had a follow-up brain MRI that we are reviewing today.  She has started BRAF directed therapy with Braftovi and Mektovi, but had difficulty tolerating the Mektovi after 2 challenges, and is now on single agent Braftovi.  She seems to be tolerating this well.  She had follow-up CT chest abdomen pelvis done also that I reviewed personally.  She has no CNS related complaints.  She still feels somewhat weak and deconditioned and is trying to work through this.  Socially, she lives with her parents and this has become somewhat stressful with trying to think about whether she should move out or whether she will be able to work or have enough money to ultimately sustain that.       5/30/2024 she is doing quite well now.  She has switched therapy to dabrafenib and trametinib and is tolerating this well.  She had a follow-up brain MRI and systemic imaging performed this month.  The MRI shows essentially stable findings no new concerning findings.  She has no headaches or CNS symptoms.  The systemic imaging shows a decrease in the size of her left central mesenteric mass.      Interval history:  9/6/24 Tamra is doing very well.  She has no major neurologic complaints.  She had a hard time tolerating combined  therapy and so she continues on dabrafenib alone.  She is doing well with follow-up with medical oncology.  She had a follow-up brain MRI performed that shows stable findings, with still a stable left parietal metastasis though this has been treated several months ago.  She has no headaches or CNS symptoms.  Her systemic imaging is also relatively encouraging with actually significant improvement in her disease burden as of her last PET scan on June 24.        Current medicines (including changes today)  Current Outpatient Medications   Medication Sig Dispense Refill    TAFINLAR 75 MG Cap capsule Take 150 mg by mouth every 12 hours.      ELIQUIS 5 MG Tab Take 5 mg by mouth 2 times a day.      oxyCODONE immediate-release (ROXICODONE) 5 MG Tab Take 5 mg by mouth every 8 hours as needed for Severe Pain.      fluticasone-umeclidinium-vilanterol (TRELEGY ELLIPTA) 200-62.5-25 mcg/act inhaler Inhale 1 Puff every day.      ferrous sulfate 325 (65 Fe) MG tablet Take 1 Tablet by mouth every day. (Patient taking differently: Take 325 mg by mouth every day. Going down to 3x per day) 30 Tablet 0    insulin glargine 100 UNIT/ML SC SOLN Inject 5 Units under the skin every evening.      ondansetron (ZOFRAN ODT) 4 MG TABLET DISPERSIBLE Take 1 Tablet by mouth every 8 hours as needed for Nausea/Vomiting. 15 Tablet 0    traZODone (DESYREL) 100 MG Tab Take 100 mg by mouth every evening.      prochlorperazine (COMPAZINE) 10 MG Tab Take 10 mg by mouth every 6 hours as needed for Nausea.      VENTOLIN  (90 Base) MCG/ACT Aero Soln inhalation aerosol Inhale 1-2 Puffs every four hours as needed for Shortness of Breath.      meloxicam (MOBIC) 15 MG tablet Take 15 mg by mouth every day.      atorvastatin (LIPITOR) 40 MG Tab Take 1 Tablet by mouth every evening. 30 Tablet 2    DULoxetine (CYMBALTA) 60 MG Cap DR Particles delayed-release capsule Take 1 Capsule by mouth every evening. 30 Capsule 2    gabapentin (NEURONTIN) 800 MG tablet  Take 1 Tablet by mouth 4 times a day. 90 Tablet 2    QUEtiapine (SEROQUEL) 200 MG Tab Take 1 Tablet by mouth every evening. 60 Tablet 2    acetaminophen (TYLENOL) 500 MG Tab Take 1,000 mg by mouth 2 times a day as needed for Mild Pain or Moderate Pain.      clonazepam (KLONOPIN) 2 MG tablet Take 1 mg by mouth 2 times a day. 1 mg = 1/2 tablet - scheduled      OXcarbazepine (TRILEPTAL) 300 MG Tab Take 1 Tablet by mouth 2 times a day. 60 Tablet 2     No current facility-administered medications for this encounter.       Patient Active Problem List    Diagnosis Date Noted    Flu-like symptoms with SIRS 01/04/2024    Anemia 01/03/2024    Facial paralysis/[place] palsy 12/11/2022    Diabetic neuropathy ([facility]) 12/09/2022    Dizziness on standing 10/19/2022    Vitamin D deficiency 10/17/2022    Dyslipidemia 10/17/2022    Normocytic anemia 10/17/2022    Borderline abnormal TFTs 10/17/2022    Thrombocytosis 10/17/2022    Metastatic cancer to brain ([facility]) 10/15/2022    Adrenal insufficiency due to corticosteroid withdrawal ([facility]) 10/12/2022    Chronic prescription benzodiazepine use 10/07/2022    Constipation 10/07/2022    Brain mass 10/06/2022    Malignant melanoma metastatic to brain ([facility]) 10/01/2022    Cellulitis 08/26/2022    Leucocytosis 08/26/2022    Abnormal CT scan, chest 06/13/2022    Gastroenteritis 05/19/2022    Hypokalemia 10/08/2019    Essential hypertension 10/08/2019    Bipolar 1 disorder ([facility]) 10/07/2019    DM2 (diabetes mellitus, type 2) ([facility]) 10/07/2019    Appendicitis 10/07/2019    Melanoma of left upper arm ([facility]) 06/23/2017    Nausea & vomiting 09/04/2015    Sepsis ([facility]) 09/04/2015    UTI (urinary tract infection) 09/04/2015    Lactic acidosis 09/04/2015    Anxiety 09/04/2015    Diarrhea 09/04/2015    Asthma     Depression         Objective:   /70 Comment: stated from home  Pulse (!) 104 Comment: from home  Wt 55.8 kg (123 lb) Comment: from home  SpO2 95% Comment: stated from home  BMI 21.11 kg/m²      Physical Exam:  Constitutional: Alert, no distress, well-groomed.  Skin: No rashes in visible areas.  Eye: Round. Conjunctiva clear, lids normal. No icterus.   ENMT: Lips pink without lesions, good dentition, moist mucous membranes. Phonation normal.  Neck: No masses, no thyromegaly. Moves freely without pain.  Respiratory: Unlabored respiratory effort, no cough or audible wheeze  Psych: Alert and oriented x3, normal affect and mood.     Assessment and Plan:   The following treatment plan was discussed:     At this point, Tamra is doing well.  She is tolerating therapy well.  Her small persistent brain metastasis has remained relatively stable and I think this is all treatment related effect.  Certainly it is hard to say for sure, but given that it is relatively stable, we can continue conservative follow-up.  Next MRI follow-up with me in 3 months.  Will defer systemic imaging to medical oncology as before.  If she has any progression in that 1 lesion, then we can consider further workup, possible ASL scan or even surgical resection if need be.  However, for now continue conservative follow-up.  Next follow-up with me in MRI in 3 months.    Follow-up: No follow-ups on file.

## 2024-09-09 ENCOUNTER — OFFICE VISIT (OUTPATIENT)
Dept: WOUND CARE | Facility: MEDICAL CENTER | Age: 44
End: 2024-09-09
Payer: MEDICAID

## 2024-09-09 DIAGNOSIS — L97.509 TYPE 2 DIABETES MELLITUS WITH FOOT ULCER, WITH LONG-TERM CURRENT USE OF INSULIN (HCC): ICD-10-CM

## 2024-09-09 DIAGNOSIS — L84 PRE-ULCERATIVE CALLUSES: ICD-10-CM

## 2024-09-09 DIAGNOSIS — E11.621 DIABETIC ULCER OF OTHER PART OF LEFT FOOT ASSOCIATED WITH TYPE 2 DIABETES MELLITUS, WITH FAT LAYER EXPOSED (HCC): ICD-10-CM

## 2024-09-09 DIAGNOSIS — E11.621 TYPE 2 DIABETES MELLITUS WITH FOOT ULCER, WITH LONG-TERM CURRENT USE OF INSULIN (HCC): ICD-10-CM

## 2024-09-09 DIAGNOSIS — E11.42 DIABETIC POLYNEUROPATHY ASSOCIATED WITH TYPE 2 DIABETES MELLITUS (HCC): ICD-10-CM

## 2024-09-09 DIAGNOSIS — Z79.4 TYPE 2 DIABETES MELLITUS WITH FOOT ULCER, WITH LONG-TERM CURRENT USE OF INSULIN (HCC): ICD-10-CM

## 2024-09-09 DIAGNOSIS — L97.522 DIABETIC ULCER OF OTHER PART OF LEFT FOOT ASSOCIATED WITH TYPE 2 DIABETES MELLITUS, WITH FAT LAYER EXPOSED (HCC): ICD-10-CM

## 2024-09-09 DIAGNOSIS — Z91.198 FAILURE TO ATTEND APPOINTMENT WITH REASON GIVEN: ICD-10-CM

## 2024-09-09 DIAGNOSIS — C79.31 MALIGNANT MELANOMA METASTATIC TO BRAIN (HCC): ICD-10-CM

## 2024-09-09 NOTE — PROGRESS NOTES
Patient unable to make it to her appointment on time and canceled.  Per note from PAR, patient believes ulcer is close to healing.  Next appointment is with RN on 9/11/2024.

## 2024-09-11 ENCOUNTER — NON-PROVIDER VISIT (OUTPATIENT)
Dept: WOUND CARE | Facility: MEDICAL CENTER | Age: 44
End: 2024-09-11
Payer: MEDICAID

## 2024-09-11 PROCEDURE — 97597 DBRDMT OPN WND 1ST 20 CM/<: CPT

## 2024-09-11 NOTE — PATIENT INSTRUCTIONS
After Visit Summary Wound Care Instructions    Nutrition - Patient instructed increased protein diet unless contraindicated in renal failure (meat, eggs, fish, yogurt, cottage cheese, beans), use of multivitamin with minerals and Arginaid supplementation (check if ok with Primary Care Provider first).    Infection -  instructed signs and symptoms of infection, increased redness and swelling, localized heat over wound and surrounding area/fever/chills/nausea and vomiting, when to call doctor or go to Emergency Room.     Dressing Changes - Instructed patient rationale for wound care products. Instructed to keep dressings clean and dry, shower on clinic days right before coming in. Change your dressing if it becomes soiled, soaked, or falls off.     Diabetic Instruction - Patient instructed keep tight control over Blood Sugar, <140 for optimal wound healing; Implications of loss of protective sensation (LOPS) discussed with patient, including increased risk for amputation.  Advised to check feet at least daily, moisturize feet, and to always wear protective foot wear, arrange meticulous regular foot care by podiatrist or Certified Foot Care Nurse (CFCN). Patient with good understanding.     Discharge Instructions - discharge today as wound is resolved; new skin tissue over wound area will be fragile for a few days, bathe and dry area gently, only ever regains a maximum of 80% of the tensile strength of the surrounding skin, remodeling of scar can continue for 6 months to a year. Contact Primary Care Provider for a referral back here if any problems with area opening and draining again.    Questions - should you have any questions regarding your home care instructions, please contact the wound center at [phone]. If after hours, contact your primary care physician or go to the hospital emergency room.   If you are admitted to any hospital, you will need a new referral to come back to the wound clinic and any  scheduled appointments that you already have, may be cancelled.

## 2024-09-11 NOTE — PROGRESS NOTES
CSWD using Curette to remove nonviable tissue from  wound bed and thick periwound callous.  Pt states her DM shoes are ready to  this week.    Dressing changed to HFB-R due to minimal drainage and increase callous build up.

## 2024-09-13 ENCOUNTER — TELEPHONE (OUTPATIENT)
Dept: WOUND CARE | Facility: MEDICAL CENTER | Age: 44
End: 2024-09-13
Payer: MEDICAID

## 2024-09-16 ENCOUNTER — NON-PROVIDER VISIT (OUTPATIENT)
Dept: WOUND CARE | Facility: MEDICAL CENTER | Age: 44
End: 2024-09-16
Payer: MEDICAID

## 2024-09-16 PROCEDURE — 97597 DBRDMT OPN WND 1ST 20 CM/<: CPT

## 2024-09-16 NOTE — PROGRESS NOTES
CSWD using curette to remove thick periwound callous and non viable tissue from wound bed.  She will  her DM shoes this week.  Wound measures smaller.

## 2024-09-16 NOTE — PATIENT INSTRUCTIONS
-Keep your wound dressing clean, dry, and intact. Only change dressing if it's over saturated, soiled or falls off.     -Change your dressing if it becomes soiled, soaked, or falls off.  --Should you experience any significant changes in your wound(s), such as infection (redness, swelling, localized heat, increased pain, fever > 101 F, chills) or have any questions regarding your home care instructions, please contact the wound center at [phone]. If after hours, contact your primary care physician or go to the hospital emergency room.

## 2024-09-17 ENCOUNTER — HOSPITAL ENCOUNTER (OUTPATIENT)
Dept: RADIOLOGY | Facility: MEDICAL CENTER | Age: 44
End: 2024-09-17
Payer: MEDICAID

## 2024-09-17 DIAGNOSIS — E11.621 ULCER OF LEFT GREAT TOE DUE TO DIABETES MELLITUS (HCC): ICD-10-CM

## 2024-09-17 DIAGNOSIS — L97.529 ULCER OF LEFT GREAT TOE DUE TO DIABETES MELLITUS (HCC): ICD-10-CM

## 2024-09-17 PROCEDURE — 93922 UPR/L XTREMITY ART 2 LEVELS: CPT

## 2024-09-19 ENCOUNTER — HOSPITAL ENCOUNTER (OUTPATIENT)
Dept: RADIOLOGY | Facility: MEDICAL CENTER | Age: 44
End: 2024-09-19
Payer: MEDICAID

## 2024-09-24 ENCOUNTER — OFFICE VISIT (OUTPATIENT)
Dept: WOUND CARE | Facility: MEDICAL CENTER | Age: 44
End: 2024-09-24
Payer: MEDICAID

## 2024-09-24 VITALS
RESPIRATION RATE: 16 BRPM | SYSTOLIC BLOOD PRESSURE: 110 MMHG | DIASTOLIC BLOOD PRESSURE: 62 MMHG | TEMPERATURE: 96.6 F | HEART RATE: 88 BPM

## 2024-09-24 DIAGNOSIS — L84 PRE-ULCERATIVE CALLUSES: ICD-10-CM

## 2024-09-24 DIAGNOSIS — L84 CORN OR CALLUS: ICD-10-CM

## 2024-09-24 DIAGNOSIS — L97.522 DIABETIC ULCER OF OTHER PART OF LEFT FOOT ASSOCIATED WITH TYPE 2 DIABETES MELLITUS, WITH FAT LAYER EXPOSED (HCC): ICD-10-CM

## 2024-09-24 DIAGNOSIS — C79.31 MALIGNANT MELANOMA METASTATIC TO BRAIN (HCC): ICD-10-CM

## 2024-09-24 DIAGNOSIS — E11.42 DIABETIC POLYNEUROPATHY ASSOCIATED WITH TYPE 2 DIABETES MELLITUS (HCC): ICD-10-CM

## 2024-09-24 DIAGNOSIS — E11.621 DIABETIC ULCER OF OTHER PART OF LEFT FOOT ASSOCIATED WITH TYPE 2 DIABETES MELLITUS, WITH FAT LAYER EXPOSED (HCC): ICD-10-CM

## 2024-09-24 PROCEDURE — 99214 OFFICE O/P EST MOD 30 MIN: CPT

## 2024-09-24 PROCEDURE — 99214 OFFICE O/P EST MOD 30 MIN: CPT | Mod: 25 | Performed by: NURSE PRACTITIONER

## 2024-09-24 PROCEDURE — 11056 PARNG/CUTG B9 HYPRKR LES 2-4: CPT

## 2024-09-24 PROCEDURE — 3074F SYST BP LT 130 MM HG: CPT | Performed by: NURSE PRACTITIONER

## 2024-09-24 PROCEDURE — 11056 PARNG/CUTG B9 HYPRKR LES 2-4: CPT | Performed by: NURSE PRACTITIONER

## 2024-09-24 PROCEDURE — 3078F DIAST BP <80 MM HG: CPT | Performed by: NURSE PRACTITIONER

## 2024-09-24 NOTE — PATIENT INSTRUCTIONS
- Resolved wound be fragile for a few days, bathe and dry area gently, only ever regains a maximum of 80% of the tensile strength of the surrounding skin, remodeling of scar can continue for 6mo - a year. Contact PCP for a referral back her if any problems with area opening and draining again.    -Do not use hydrogen peroxide to cleanse area. Use diabetic lotion to help relieve dryness.     -Avoid prolonged standing or sitting without elevating your legs.    -Remove your compression garments if you have severe pain, severe swelling, numbness, color change, or temperature change in your toes. If you need to remove your compression garments, do so by unrolling them. Do not cut the compression garments off, this is to prevent cutting yourself on accident.    -Never walk around the house barefoot. Always wear a rubber soled slipper when walking around the house.    -Should you experience any significant changes in your wound(s), such as signs of infection (increasing redness, swelling, localized heat, increased pain, fever > 101 F, chills) or have any questions regarding your home care instructions, please contact the wound center at [phone]. If after hours, contact your primary care physician or go to the hospital emergency room.     -If you are admitted to any hospital, you will need a new referral to come back to the wound clinic and any scheduled appointments that you already have, may be cancelled.     -If you are 5 or more minutes late for an appointment, we reserve the right to cancel and reschedule that appointment. Additionally, if you are habitually late or not showing (3 late cancellations and/or no shows), we reserve the right to cancel your remaining appointments and it will be your responsibility to obtain a new referral if services are still needed.

## 2024-09-24 NOTE — PROGRESS NOTES
[facility]   [facility]   [address]   [address]   [place] [address]   [phone] Fax: [phone]    Discharge Note        Wound location: Left plantar MTH1   Date of Discharge: 9/24/24    Assessment:  Discharge patient at this time secondary to wound resolution.     Patient instructed that they are discharged today. Instructed patient to keep area clean, it will be fragile for a few days, bathe and dry area gently, and that tissue only ever regains a maximum of 80% of the tensile strength of the surrounding skin. Remodeling of scar can continue for 6mo - a year. Contact PCP for a referral back to wound care if any problems with area opening and draining. Patient verbalizes understanding of all.

## 2024-09-24 NOTE — PROGRESS NOTES
Provider Encounter- Diabetic Foot Ulcer      HISTORY OF PRESENT ILLNESS  Wound History:    START OF CARE IN CLINIC:9/3/2024    REFERRING PROVIDER: Cliff Reed MD     WOUND- Diabetic foot ulcer   LOCATION: Left first MTH     Previous ulcerative callus-right first MTH, right great toe     HISTORY: Patient has had a callus for several years to left first MTH.  Approximately 6/9/2024, patient noticed drainage from the ulcer.  She she applied OTC bandage.  Followed up with oncology, placed on Keflex.    Returns to wound care clinic 9/3/2024 after being discharged on 6/26/2024 for wound resolution.  Patient noticed that her ulcers took longer  than expected to receive her orthotics and shoes from [facility].  Patient has treated her wounds herself.  She followed up with her PCP beginning of September 2024 and was referred to [facility] wound care clinic as well as prescribed Bactrim DS which she completed.  She received her new orthotics around 9/13/2024.  She is gradually weaning into them.    Pertinent Medical History: DM, PE-on Eliquis, melanoma left upper arm metastasized to brain, hypertension, dyslipidemia, bipolar    DIABETES HX: Diagnosed with type 2 diabetes 15 years ago, and is currently managing with insulin.  Checks blood sugars frequently, continuous glucose monitor and reports that these typically run around 140s.  Has  had previous diabetes education.  Does  have numbness in feet.   Does  check feet routinely.  Has not had previous foot ulcers or foot surgery.  Current occupation: Retired RN psych.  Offloading: None.        TOBACCO USE:   Daily    Patient's problem list, allergies, and current medications reviewed and updated in Epic    Interval History:  Chart sent on 9/24/2024.  See previous note for further detail.    9/24/2024: Initial provider Clinic visit with Lorenza BREEN, FNP-BC, CWON, CFCN.  Pt denies fevers, chills, nausea, vomiting.       REVIEW OF SYSTEMS:   Review of Systems    Constitutional:  Negative for chills and fever.   Eyes:         Denies visual impairment   Respiratory:  Negative for cough and shortness of breath.    Cardiovascular:  Negative for chest pain, palpitations, claudication and leg swelling.   Gastrointestinal:  Negative for constipation, diarrhea, nausea and vomiting.   Genitourinary:  Negative for dysuria.   Musculoskeletal:  Negative for falls and joint pain.   Skin:         Callus right foot and left foot   Neurological:  Positive for sensory change (numbness in feet).   Psychiatric/Behavioral:  Negative for depression. The patient is not nervous/anxious.        PHYSICAL EXAMINATION:   /62   Pulse 88   Temp 35.9 °C (96.6 °F) (Temporal)   Resp 16     Physical Exam  Vitals reviewed.   Cardiovascular:      Rate and Rhythm: Normal rate.      Pulses: Normal pulses.      Comments: +2 DP, +2 PT bilaterally palpated  Pulmonary:      Effort: Pulmonary effort is normal.   Abdominal:      Palpations: Abdomen is soft.   Musculoskeletal:      Comments: Prominent first MTH's bilaterally  Positive [place] bilaterally   Skin:     General: Skin is warm and dry.      Comments: Left first MTH: Thick callus, no drainage.  No open ulceration post callus debridement no periwound erythema or induration.      Right first MTH preulcerative callus: Thick, raised.  Debrided to skin level, no open wound found, however macerated skin noted    Right great toe callus: Thick, no ulcer noted postdebridement   Neurological:      Mental Status: She is alert and oriented to person, place, and time.      Comments: Insensate to feet   Psychiatric:         Mood and Affect: Mood and affect normal.         Cognition and Memory: Memory normal.         Judgment: Judgment normal.         Monofilament testing with a 10 gram force: sensation intact: decreased bilaterally  Visual Inspection: Feet with maceration, ulcers, fissures.  Pedal pulses: intact bilaterally     WOUND ASSESSMENT          PROCEDURE: Debridement of calluses to bilateral first MTP and right plantar great toe   - scalpel and curette used to debride all 3 calluses to skin level.  Quitman board used to file rough edges after debridement.  Total area debrided 20 cm² to skin level.  -Pinpoint bleeding to bilateral first MTH.  Treated with silver nitrate. no open wounds exposed.  -Wound care completed by wound RN, protected with nonadhesive foam and Hypafix tape  -Patient tolerated the procedure well, without c/o pain or discomfort.      Precallus debridement left foot      Post callus debridement left foot      Precallus debridement right foot      Post callus debridement of right foot omitted                Pertinent Labs and Diagnostics:    Labs:     A1c:   Lab Results   Component Value Date/Time    HBA1C 6.6 (H) 04/08/2024 09:49 AM          IMAGING: No results found.    VASCULAR STUDIES: No results found.    LAST  WOUND CULTURE:  DATE :        Lab Results   Component Value Date/Time    CULTRSULT No growth after 5 days of incubation. 01/03/2024 06:48 PM           ASSESSMENT AND PLAN:     1. Diabetic ulcer of other part of left foot associated with type 2 diabetes mellitus, with fat layer exposed (HCC)  Chronic callus several months, open June 2024 9/24/2024: First MTH ulcer resolved.  Thick callus  -Callus debrided in clinic today  -Wound resolved.  Patient discharged from [facility].  - She understands she is to seek new referral and return to clinic ASAP if wound recurs or she develops new wounds  -Patient to remove protective foam dressings in approximately 2 to 3 days.  Okay to leave open to air if no bleeding noted.  -Okay to shower.  Avoid soaking, bathing, swimming for approximately next 2 weeks to allow skin to continue to remodel.    Wound care: Dimethicone lotion, nonadhesive foam, Hypafix tape    2.  Preulcerative calluses  9/24/2024: Chronic callus to both first MTH, and to proximal phalanx of right great toe.  Per patient  present for several years, previously no improvement with orthotic footwear  - Calluses debrided skin level in clinic today  - Patient was referred to podiatry last visit, however the doctor she was referred to does not accept her insurance.  She was told that Dr. Dubose take Medicaid.  New referral placed  -Patient aware of certified foot nurse who works out of [facility], cash pay only.  Information provided    Wound care: Dimethicone lotion, nonadhesive foam, Hypafix tape    3. Type 2 diabetes mellitus with foot ulcer, with long-term current use of insulin ([facility])  4. Diabetic polyneuropathy associated with type 2 diabetes mellitus ([facility])    9/24/2024: Blood sugar 145.  Following with [facility].  Has irritation to the dorsal aspect of right foot and left foot.  Patient to call and schedule adjustment follow-up with [facility]  - Importance of tight glucose control for wound healing   - Implications of loss of protective sensation (LOPS) reviewed with patient- including increased risk for amputation.  - Advised to check feet at least daily, moisturize feet, and to always wear protective foot wear.   - Advised pt not to trim nails or calluses, seek foot/nail care from podiatrist or certified foot/nail nurse.  See above  -Referral to podiatrist placed    5. Malignant melanoma metastatic to brain ([facility])    9/24/2024: Complicating factor started from left arm forcing correction  -Repeat PET scan completed on 6/24, shows improvement.  -Referral to dermatologist placed.  Previously was with NUHA Parrish at skin cancer dermatology however she no longer accept Medicaid.    PATIENT EDUCATION  My total time spent caring for the patient on the day of the encounter was 30 minutes.   This does not include time spent on separately billable procedures/tests.       Please note that this note may have been created using voice recognition software. I have worked with technical experts from [employer] to optimize the interface.  I  have made every reasonable attempt to correct obvious errors, but there may be errors of grammar and possibly content that I did not discover before finalizing the note.    N

## 2024-09-26 ASSESSMENT — ENCOUNTER SYMPTOMS
CHILLS: 0
NERVOUS/ANXIOUS: 0
SHORTNESS OF BREATH: 0
CONSTIPATION: 0
FALLS: 0
SENSORY CHANGE: 1
DIARRHEA: 0
NAUSEA: 0
DEPRESSION: 0
CLAUDICATION: 0
PALPITATIONS: 0
VOMITING: 0
COUGH: 0
FEVER: 0

## 2024-10-07 ENCOUNTER — APPOINTMENT (OUTPATIENT)
Dept: RADIOLOGY | Facility: MEDICAL CENTER | Age: 44
End: 2024-10-07
Attending: OBSTETRICS & GYNECOLOGY
Payer: MEDICAID

## 2024-10-07 ENCOUNTER — TELEPHONE (OUTPATIENT)
Dept: INTERNAL MEDICINE | Facility: OTHER | Age: 44
End: 2024-10-07

## 2024-10-13 ENCOUNTER — APPOINTMENT (OUTPATIENT)
Dept: RADIOLOGY | Facility: MEDICAL CENTER | Age: 44
End: 2024-10-13
Attending: EMERGENCY MEDICINE
Payer: MEDICAID

## 2024-10-13 ENCOUNTER — HOSPITAL ENCOUNTER (EMERGENCY)
Facility: MEDICAL CENTER | Age: 44
End: 2024-10-13
Attending: EMERGENCY MEDICINE
Payer: MEDICAID

## 2024-10-13 VITALS
OXYGEN SATURATION: 99 % | HEART RATE: 83 BPM | WEIGHT: 123.02 LBS | RESPIRATION RATE: 17 BRPM | TEMPERATURE: 97.7 F | SYSTOLIC BLOOD PRESSURE: 93 MMHG | HEIGHT: 64 IN | DIASTOLIC BLOOD PRESSURE: 61 MMHG | BODY MASS INDEX: 21 KG/M2

## 2024-10-13 DIAGNOSIS — J45.20 MILD INTERMITTENT ASTHMA WITHOUT COMPLICATION: ICD-10-CM

## 2024-10-13 LAB
ALBUMIN SERPL BCP-MCNC: 3.4 G/DL (ref 3.2–4.9)
ALBUMIN/GLOB SERPL: 1.2 G/DL
ALP SERPL-CCNC: 109 U/L (ref 30–99)
ALT SERPL-CCNC: 7 U/L (ref 2–50)
ANION GAP SERPL CALC-SCNC: 11 MMOL/L (ref 7–16)
ANISOCYTOSIS BLD QL SMEAR: ABNORMAL
AST SERPL-CCNC: 13 U/L (ref 12–45)
BASOPHILS # BLD AUTO: 0.8 % (ref 0–1.8)
BASOPHILS # BLD: 0.08 K/UL (ref 0–0.12)
BILIRUB SERPL-MCNC: 0.2 MG/DL (ref 0.1–1.5)
BUN SERPL-MCNC: 10 MG/DL (ref 8–22)
CALCIUM ALBUM COR SERPL-MCNC: 9.4 MG/DL (ref 8.5–10.5)
CALCIUM SERPL-MCNC: 8.9 MG/DL (ref 8.5–10.5)
CHLORIDE SERPL-SCNC: 104 MMOL/L (ref 96–112)
CO2 SERPL-SCNC: 23 MMOL/L (ref 20–33)
COMMENT 1642: NORMAL
CREAT SERPL-MCNC: 0.58 MG/DL (ref 0.5–1.4)
EKG IMPRESSION: NORMAL
EOSINOPHIL # BLD AUTO: 0.07 K/UL (ref 0–0.51)
EOSINOPHIL NFR BLD: 0.7 % (ref 0–6.9)
ERYTHROCYTE [DISTWIDTH] IN BLOOD BY AUTOMATED COUNT: 48.2 FL (ref 35.9–50)
GFR SERPLBLD CREATININE-BSD FMLA CKD-EPI: 113 ML/MIN/1.73 M 2
GLOBULIN SER CALC-MCNC: 2.8 G/DL (ref 1.9–3.5)
GLUCOSE SERPL-MCNC: 287 MG/DL (ref 65–99)
HCT VFR BLD AUTO: 30.1 % (ref 37–47)
HGB BLD-MCNC: 8.3 G/DL (ref 12–16)
HYPOCHROMIA BLD QL SMEAR: ABNORMAL
IMM GRANULOCYTES # BLD AUTO: 0.04 K/UL (ref 0–0.11)
IMM GRANULOCYTES NFR BLD AUTO: 0.4 % (ref 0–0.9)
LYMPHOCYTES # BLD AUTO: 0.86 K/UL (ref 1–4.8)
LYMPHOCYTES NFR BLD: 8.9 % (ref 22–41)
MCH RBC QN AUTO: 22.8 PG (ref 27–33)
MCHC RBC AUTO-ENTMCNC: 27.6 G/DL (ref 32.2–35.5)
MCV RBC AUTO: 82.7 FL (ref 81.4–97.8)
MONOCYTES # BLD AUTO: 0.85 K/UL (ref 0–0.85)
MONOCYTES NFR BLD AUTO: 8.8 % (ref 0–13.4)
MORPHOLOGY BLD-IMP: NORMAL
NEUTROPHILS # BLD AUTO: 7.75 K/UL (ref 1.82–7.42)
NEUTROPHILS NFR BLD: 80.4 % (ref 44–72)
NRBC # BLD AUTO: 0 K/UL
NRBC BLD-RTO: 0 /100 WBC (ref 0–0.2)
NT-PROBNP SERPL IA-MCNC: 261 PG/ML (ref 0–125)
OVALOCYTES BLD QL SMEAR: NORMAL
PLATELET # BLD AUTO: 629 K/UL (ref 164–446)
PLATELET BLD QL SMEAR: NORMAL
PMV BLD AUTO: 9.8 FL (ref 9–12.9)
POLYCHROMASIA BLD QL SMEAR: NORMAL
POTASSIUM SERPL-SCNC: 3.8 MMOL/L (ref 3.6–5.5)
PROT SERPL-MCNC: 6.2 G/DL (ref 6–8.2)
RBC # BLD AUTO: 3.64 M/UL (ref 4.2–5.4)
RBC BLD AUTO: PRESENT
SODIUM SERPL-SCNC: 138 MMOL/L (ref 135–145)
TROPONIN T SERPL-MCNC: 12 NG/L (ref 6–19)
TROPONIN T SERPL-MCNC: 24 NG/L (ref 6–19)
WBC # BLD AUTO: 9.7 K/UL (ref 4.8–10.8)

## 2024-10-13 PROCEDURE — 93005 ELECTROCARDIOGRAM TRACING: CPT | Performed by: EMERGENCY MEDICINE

## 2024-10-13 PROCEDURE — 94640 AIRWAY INHALATION TREATMENT: CPT

## 2024-10-13 PROCEDURE — 96374 THER/PROPH/DIAG INJ IV PUSH: CPT

## 2024-10-13 PROCEDURE — 93005 ELECTROCARDIOGRAM TRACING: CPT

## 2024-10-13 PROCEDURE — 85025 COMPLETE CBC W/AUTO DIFF WBC: CPT

## 2024-10-13 PROCEDURE — 83880 ASSAY OF NATRIURETIC PEPTIDE: CPT

## 2024-10-13 PROCEDURE — 71045 X-RAY EXAM CHEST 1 VIEW: CPT | Performed by: RADIOLOGY

## 2024-10-13 PROCEDURE — 80053 COMPREHEN METABOLIC PANEL: CPT

## 2024-10-13 PROCEDURE — 84484 ASSAY OF TROPONIN QUANT: CPT

## 2024-10-13 PROCEDURE — 700101 HCHG RX REV CODE 250: Mod: UD | Performed by: EMERGENCY MEDICINE

## 2024-10-13 PROCEDURE — 700111 HCHG RX REV CODE 636 W/ 250 OVERRIDE (IP): Mod: JZ,UD | Performed by: EMERGENCY MEDICINE

## 2024-10-13 PROCEDURE — 71045 X-RAY EXAM CHEST 1 VIEW: CPT

## 2024-10-13 PROCEDURE — 36415 COLL VENOUS BLD VENIPUNCTURE: CPT

## 2024-10-13 PROCEDURE — 99284 EMERGENCY DEPT VISIT MOD MDM: CPT

## 2024-10-13 RX ORDER — PREDNISONE 20 MG/1
40 TABLET ORAL DAILY
Qty: 8 TABLET | Refills: 0 | Status: SHIPPED | OUTPATIENT
Start: 2024-10-13 | End: 2024-10-17

## 2024-10-13 RX ORDER — ALBUTEROL SULFATE 5 MG/ML
2.5 SOLUTION RESPIRATORY (INHALATION) ONCE
Status: COMPLETED | OUTPATIENT
Start: 2024-10-13 | End: 2024-10-13

## 2024-10-13 RX ORDER — ONDANSETRON 2 MG/ML
4 INJECTION INTRAMUSCULAR; INTRAVENOUS ONCE
Status: COMPLETED | OUTPATIENT
Start: 2024-10-13 | End: 2024-10-13

## 2024-10-13 RX ORDER — ALBUTEROL SULFATE 0.83 MG/ML
2.5 SOLUTION RESPIRATORY (INHALATION) EVERY 4 HOURS PRN
Qty: 1 EACH | Refills: 0 | Status: SHIPPED | OUTPATIENT
Start: 2024-10-13 | End: 2024-10-23

## 2024-10-13 RX ORDER — PREDNISONE 20 MG/1
60 TABLET ORAL DAILY
Status: COMPLETED | OUTPATIENT
Start: 2024-10-13 | End: 2024-10-13

## 2024-10-13 RX ORDER — PREDNISONE 20 MG/1
40 TABLET ORAL DAILY
Qty: 8 TABLET | Refills: 0 | Status: SHIPPED | OUTPATIENT
Start: 2024-10-13 | End: 2024-10-13

## 2024-10-13 RX ADMIN — ONDANSETRON 4 MG: 2 INJECTION INTRAMUSCULAR; INTRAVENOUS at 08:51

## 2024-10-13 RX ADMIN — PREDNISONE 60 MG: 20 TABLET ORAL at 10:26

## 2024-10-13 RX ADMIN — ALBUTEROL SULFATE 2.5 MG: 2.5 SOLUTION RESPIRATORY (INHALATION) at 11:07

## 2024-10-13 RX ADMIN — ALBUTEROL SULFATE 2.5 MG: 2.5 SOLUTION RESPIRATORY (INHALATION) at 08:58

## 2024-10-13 ASSESSMENT — PAIN DESCRIPTION - PAIN TYPE: TYPE: CHRONIC PAIN

## 2024-10-13 ASSESSMENT — FIBROSIS 4 INDEX: FIB4 SCORE: 0.42

## 2024-10-14 ENCOUNTER — APPOINTMENT (OUTPATIENT)
Dept: RADIOLOGY | Facility: MEDICAL CENTER | Age: 44
End: 2024-10-14
Attending: OBSTETRICS & GYNECOLOGY
Payer: MEDICAID

## 2024-10-14 ENCOUNTER — TELEPHONE (OUTPATIENT)
Dept: WOUND CARE | Facility: MEDICAL CENTER | Age: 44
End: 2024-10-14

## 2024-10-29 ENCOUNTER — HOSPITAL ENCOUNTER (OUTPATIENT)
Facility: MEDICAL CENTER | Age: 44
End: 2024-10-29
Attending: INTERNAL MEDICINE
Payer: MEDICAID

## 2024-10-29 LAB
ALBUMIN SERPL BCP-MCNC: 3.6 G/DL (ref 3.2–4.9)
ALBUMIN/GLOB SERPL: 1.6 G/DL
ALP SERPL-CCNC: 89 U/L (ref 30–99)
ALT SERPL-CCNC: 6 U/L (ref 2–50)
ANION GAP SERPL CALC-SCNC: 16 MMOL/L (ref 7–16)
AST SERPL-CCNC: 9 U/L (ref 12–45)
BILIRUB SERPL-MCNC: <0.2 MG/DL (ref 0.1–1.5)
BUN SERPL-MCNC: 11 MG/DL (ref 8–22)
CALCIUM ALBUM COR SERPL-MCNC: 9.3 MG/DL (ref 8.5–10.5)
CALCIUM SERPL-MCNC: 9 MG/DL (ref 8.5–10.5)
CHLORIDE SERPL-SCNC: 99 MMOL/L (ref 96–112)
CO2 SERPL-SCNC: 23 MMOL/L (ref 20–33)
CREAT SERPL-MCNC: 0.41 MG/DL (ref 0.5–1.4)
GFR SERPLBLD CREATININE-BSD FMLA CKD-EPI: 123 ML/MIN/1.73 M 2
GLOBULIN SER CALC-MCNC: 2.3 G/DL (ref 1.9–3.5)
GLUCOSE SERPL-MCNC: 221 MG/DL (ref 65–99)
POTASSIUM SERPL-SCNC: 4.1 MMOL/L (ref 3.6–5.5)
PROT SERPL-MCNC: 5.9 G/DL (ref 6–8.2)
SODIUM SERPL-SCNC: 138 MMOL/L (ref 135–145)

## 2024-10-29 PROCEDURE — 80053 COMPREHEN METABOLIC PANEL: CPT

## 2024-10-31 ENCOUNTER — APPOINTMENT (OUTPATIENT)
Dept: ONCOLOGY | Facility: MEDICAL CENTER | Age: 44
End: 2024-10-31
Attending: INTERNAL MEDICINE
Payer: MEDICAID

## 2024-11-09 SDOH — ECONOMIC STABILITY: FOOD INSECURITY: WITHIN THE PAST 12 MONTHS, YOU WORRIED THAT YOUR FOOD WOULD RUN OUT BEFORE YOU GOT MONEY TO BUY MORE.: NEVER TRUE

## 2024-11-09 SDOH — ECONOMIC STABILITY: FOOD INSECURITY: WITHIN THE PAST 12 MONTHS, THE FOOD YOU BOUGHT JUST DIDN'T LAST AND YOU DIDN'T HAVE MONEY TO GET MORE.: NEVER TRUE

## 2024-11-09 SDOH — ECONOMIC STABILITY: HOUSING INSECURITY
IN THE LAST 12 MONTHS, WAS THERE A TIME WHEN YOU DID NOT HAVE A STEADY PLACE TO SLEEP OR SLEPT IN A SHELTER (INCLUDING NOW)?: NO

## 2024-11-09 SDOH — ECONOMIC STABILITY: INCOME INSECURITY: HOW HARD IS IT FOR YOU TO PAY FOR THE VERY BASICS LIKE FOOD, HOUSING, MEDICAL CARE, AND HEATING?: NOT HARD AT ALL

## 2024-11-09 SDOH — HEALTH STABILITY: PHYSICAL HEALTH: ON AVERAGE, HOW MANY MINUTES DO YOU ENGAGE IN EXERCISE AT THIS LEVEL?: 0 MIN

## 2024-11-09 SDOH — ECONOMIC STABILITY: INCOME INSECURITY: IN THE LAST 12 MONTHS, WAS THERE A TIME WHEN YOU WERE NOT ABLE TO PAY THE MORTGAGE OR RENT ON TIME?: NO

## 2024-11-09 SDOH — HEALTH STABILITY: MENTAL HEALTH
STRESS IS WHEN SOMEONE FEELS TENSE, NERVOUS, ANXIOUS, OR CAN'T SLEEP AT NIGHT BECAUSE THEIR MIND IS TROUBLED. HOW STRESSED ARE YOU?: TO SOME EXTENT

## 2024-11-09 SDOH — ECONOMIC STABILITY: TRANSPORTATION INSECURITY
IN THE PAST 12 MONTHS, HAS LACK OF TRANSPORTATION KEPT YOU FROM MEETINGS, WORK, OR FROM GETTING THINGS NEEDED FOR DAILY LIVING?: NO

## 2024-11-09 SDOH — HEALTH STABILITY: PHYSICAL HEALTH: ON AVERAGE, HOW MANY DAYS PER WEEK DO YOU ENGAGE IN MODERATE TO STRENUOUS EXERCISE (LIKE A BRISK WALK)?: 0 DAYS

## 2024-11-09 ASSESSMENT — SOCIAL DETERMINANTS OF HEALTH (SDOH)
DO YOU BELONG TO ANY CLUBS OR ORGANIZATIONS SUCH AS CHURCH GROUPS UNIONS, FRATERNAL OR ATHLETIC GROUPS, OR SCHOOL GROUPS?: NO
HOW OFTEN DO YOU HAVE SIX OR MORE DRINKS ON ONE OCCASION: NEVER
HOW OFTEN DO YOU ATTEND CHURCH OR RELIGIOUS SERVICES?: NEVER
DO YOU BELONG TO ANY CLUBS OR ORGANIZATIONS SUCH AS CHURCH GROUPS UNIONS, FRATERNAL OR ATHLETIC GROUPS, OR SCHOOL GROUPS?: NO
HOW OFTEN DO YOU HAVE A DRINK CONTAINING ALCOHOL: MONTHLY OR LESS
HOW OFTEN DO YOU ATTENT MEETINGS OF THE CLUB OR ORGANIZATION YOU BELONG TO?: NEVER
IN A TYPICAL WEEK, HOW MANY TIMES DO YOU TALK ON THE PHONE WITH FAMILY, FRIENDS, OR NEIGHBORS?: MORE THAN THREE TIMES A WEEK
IN A TYPICAL WEEK, HOW MANY TIMES DO YOU TALK ON THE PHONE WITH FAMILY, FRIENDS, OR NEIGHBORS?: MORE THAN THREE TIMES A WEEK
HOW OFTEN DO YOU ATTENT MEETINGS OF THE CLUB OR ORGANIZATION YOU BELONG TO?: NEVER
HOW OFTEN DO YOU GET TOGETHER WITH FRIENDS OR RELATIVES?: ONCE A WEEK
IN THE PAST 12 MONTHS, HAS THE ELECTRIC, GAS, OIL, OR WATER COMPANY THREATENED TO SHUT OFF SERVICE IN YOUR HOME?: NO
HOW OFTEN DO YOU ATTEND CHURCH OR RELIGIOUS SERVICES?: NEVER
HOW OFTEN DO YOU GET TOGETHER WITH FRIENDS OR RELATIVES?: ONCE A WEEK
HOW MANY DRINKS CONTAINING ALCOHOL DO YOU HAVE ON A TYPICAL DAY WHEN YOU ARE DRINKING: 1 OR 2
HOW HARD IS IT FOR YOU TO PAY FOR THE VERY BASICS LIKE FOOD, HOUSING, MEDICAL CARE, AND HEATING?: NOT HARD AT ALL
WITHIN THE PAST 12 MONTHS, YOU WORRIED THAT YOUR FOOD WOULD RUN OUT BEFORE YOU GOT THE MONEY TO BUY MORE: NEVER TRUE

## 2024-11-09 ASSESSMENT — LIFESTYLE VARIABLES
HOW OFTEN DO YOU HAVE A DRINK CONTAINING ALCOHOL: MONTHLY OR LESS
HOW MANY STANDARD DRINKS CONTAINING ALCOHOL DO YOU HAVE ON A TYPICAL DAY: 1 OR 2
AUDIT-C TOTAL SCORE: 1
HOW OFTEN DO YOU HAVE SIX OR MORE DRINKS ON ONE OCCASION: NEVER
SKIP TO QUESTIONS 9-10: 1

## 2024-11-12 ENCOUNTER — OFFICE VISIT (OUTPATIENT)
Dept: INTERNAL MEDICINE | Facility: OTHER | Age: 44
End: 2024-11-12
Payer: MEDICAID

## 2024-11-12 VITALS
TEMPERATURE: 98.3 F | HEIGHT: 64 IN | DIASTOLIC BLOOD PRESSURE: 67 MMHG | SYSTOLIC BLOOD PRESSURE: 103 MMHG | OXYGEN SATURATION: 98 % | WEIGHT: 123.6 LBS | HEART RATE: 104 BPM | BODY MASS INDEX: 21.1 KG/M2

## 2024-11-12 DIAGNOSIS — Z85.820 HISTORY OF MELANOMA: ICD-10-CM

## 2024-11-12 DIAGNOSIS — L81.4 LENTIGO: ICD-10-CM

## 2024-11-12 DIAGNOSIS — D22.9 NEVUS: ICD-10-CM

## 2024-11-12 DIAGNOSIS — D18.01 HEMANGIOMA OF SKIN: ICD-10-CM

## 2024-11-12 DIAGNOSIS — L40.9 PSORIASIS: ICD-10-CM

## 2024-11-12 PROCEDURE — 3078F DIAST BP <80 MM HG: CPT | Performed by: DERMATOLOGY

## 2024-11-12 PROCEDURE — 99203 OFFICE O/P NEW LOW 30 MIN: CPT | Performed by: DERMATOLOGY

## 2024-11-12 PROCEDURE — 3074F SYST BP LT 130 MM HG: CPT | Performed by: DERMATOLOGY

## 2024-11-12 ASSESSMENT — FIBROSIS 4 INDEX: FIB4 SCORE: 0.26

## 2024-11-12 NOTE — LETTER
[facility]  Estrella Joshi MD  [place] [address]  Fax: [phone]   Authorization for Release/Disclosure of   Protected Health Information   Name: JACQUE MCINTYRE : 1980 SSN: [id number]   Address: [facility] [address]  [place] [address] Phone:    There are no phone numbers on file.   I authorize the entity listed below to release/disclose the PHI below to:   [facility] Estrella Joshi M.D.   Provider or Entity Name:     Address   City, State, [facility]   Phone:      Fax:     Reason for request: continuity of care   Information to be released:    [  ] LAST COLONOSCOPY,  including any PATH REPORT and follow-up  [  ] LAST FIT/COLOGUARD RESULT [  ] LAST DEXA  [  ] LAST MAMMOGRAM  [  ] LAST PAP  [  ] LAST LABS [  ] RETINA EXAM REPORT  [  ] IMMUNIZATION RECORDS  [  ] Release all info      [  ] Check here and initial the line next to each item to release ALL health information INCLUDING  _____ Care and treatment for drug and / or alcohol abuse  _____ HIV testing, infection status, or AIDS  _____ Genetic Testing    DATES OF SERVICE OR TIME PERIOD TO BE DISCLOSED: _____________  I understand and acknowledge that:  * This Authorization may be revoked at any time by you in writing, except if your health information has already been used or disclosed.  * Your health information that will be used or disclosed as a result of you signing this authorization could be re-disclosed by the recipient. If this occurs, your re-disclosed health information may no longer be protected by State or Federal laws.  * You may refuse to sign this Authorization. Your refusal will not affect your ability to obtain treatment.  * This Authorization becomes effective upon signing and will  on (date) __________.      If no date is indicated, this Authorization will  one (1) year from the signature date.    Name: Jacque Mcintyre  Signature: Date:   2024     PLEASE FAX REQUESTED RECORDS BACK TO: [phone]

## 2024-11-12 NOTE — PROGRESS NOTES
"Jacque Mcintyre  1980  [id number]    Consultation             Vitals:    11/12/24 0902   BP: 103/67   BP Location: Left arm   Patient Position: Sitting   BP Cuff Size: Adult   Pulse: (!) 104   Temp: 36.8 °C (98.3 °F)   TempSrc: Temporal   SpO2: 98%   Weight: 56.1 kg (123 lb 9.6 oz)   Height: 1.626 m (5' 4\")       Chief Complaint   Patient presents with    Establish Care     Stage 4 melanoma  Pt currently on chemo pills        ___________________________________________________________________            History of Present Illness (HPI) here for skin check  H/o melanoma on L hand about 2017 and then metastatic in past few years. Followed by Neha Weeks at [facility].   Melanoma on R buttocks around 2018 treated with wide excision. Probable another primary melanoma.            Dr. Mendoza has referred for a consultation to evaluate skin    Current Outpatient Medications on File Prior to Visit   Medication Sig Dispense Refill    TAFINLAR 75 MG Cap capsule Take 150 mg by mouth every 12 hours.      ELIQUIS 5 MG Tab Take 5 mg by mouth 2 times a day.      oxyCODONE immediate-release (ROXICODONE) 5 MG Tab Take 5 mg by mouth every 8 hours as needed for Severe Pain.      fluticasone-umeclidinium-vilanterol (TRELEGY ELLIPTA) 200-62.5-25 mcg/act inhaler Inhale 1 Puff every day.      ferrous sulfate 325 (65 Fe) MG tablet Take 1 Tablet by mouth every day. (Patient taking differently: Take 325 mg by mouth every day. Going down to 3x per day) 30 Tablet 0    insulin glargine 100 UNIT/ML SC SOLN Inject 5 Units under the skin every evening.      ondansetron (ZOFRAN ODT) 4 MG TABLET DISPERSIBLE Take 1 Tablet by mouth every 8 hours as needed for Nausea/Vomiting. 15 Tablet 0    traZODone (DESYREL) 100 MG Tab Take 100 mg by mouth every evening.      prochlorperazine (COMPAZINE) 10 MG Tab Take 10 mg by mouth every 6 hours as needed for Nausea.      VENTOLIN  (90 Base) MCG/ACT Aero Soln inhalation aerosol Inhale 1-2 Puffs " every four hours as needed for Shortness of Breath.      meloxicam (MOBIC) 15 MG tablet Take 15 mg by mouth every day.      atorvastatin (LIPITOR) 40 MG Tab Take 1 Tablet by mouth every evening. 30 Tablet 2    DULoxetine (CYMBALTA) 60 MG Cap DR Particles delayed-release capsule Take 1 Capsule by mouth every evening. 30 Capsule 2    gabapentin (NEURONTIN) 800 MG tablet Take 1 Tablet by mouth 4 times a day. 90 Tablet 2    OXcarbazepine (TRILEPTAL) 300 MG Tab Take 1 Tablet by mouth 2 times a day. 60 Tablet 2    QUEtiapine (SEROQUEL) 200 MG Tab Take 1 Tablet by mouth every evening. 60 Tablet 2    acetaminophen (TYLENOL) 500 MG Tab Take 1,000 mg by mouth 2 times a day as needed for Mild Pain or Moderate Pain.      clonazepam (KLONOPIN) 2 MG tablet Take 1 mg by mouth 2 times a day. 1 mg = 1/2 tablet - scheduled       No current facility-administered medications on file prior to visit.     Lactose and Augmentin [amoxicillin-pot clavulanate]      Review of Systems:        General: Denies fever      Hematologic: no excessive bleeding problems              Past Medical History:   Diagnosis Date    Asthma     inhaler    Bipolar 1 disorder (HCC)     Cancer (HCC) 01/01/2016    melanoma    Cough     Depression     DM mellitus,     insulin    Hyperlipidemia     Hypertension     Pap smear     Dr. Baird    PCOS (polycystic ovarian syndrome)     Shortness of breath     Sputum production     Wheezing      Skin Cancer   H/o metastatic melanoma Stage 4  H/o melanoma L hand s/p adjuvant ipi in 2017, metastatic disease in body/brain in 2022 s/p ipi/nivo, nivo stopped mid 2023 due to diarrhea. BRAF+. Review external PET/CT 10/31/23 for extent of disease, ?small bowel met which may be bleeding.     Melanoma on R buttocks wide local excision in 2018    Psoriasis elbows - using duobrii    Social History     Tobacco Use    Smoking status: Every Day     Current packs/day: 0.75     Average packs/day: 0.8 packs/day for 10.0 years (7.5 ttl pk-yrs)      Types: Cigarettes    Smokeless tobacco: Never   Vaping Use    Vaping status: Never Used   Substance Use Topics    Alcohol use: Not Currently    Drug use: Yes     Types: Oral, Marijuana     Comment: marijuanna gummies     Sunscreen Use:Yes    Past Surgical History:   Procedure Laterality Date    CRANIOTOMY STEALTH Right 10/6/2022    Procedure: RIGHT FRONTAL CRANIOTOMY WITH STEALTH;  Surgeon: Pebbles Tejada M.D.;  Location: [facility];  Service: Neurosurgery    KY BRONCHOSCOPY,DIAGNOSTIC N/A 6/17/2022    Procedure: FIBER OPTIC BRONCHOSCOPY WITH WASH, BRUSH, BRONCHOALVEOLAR LAVAGE, BIOPSY, FINE NEEDLE ASPIRATION;  Surgeon: Yeu Swanson M.D.;  Location: [facility];  Service: Pulmonary    ENDOBRONCHIAL US ADD-ON N/A 6/17/2022    Procedure: ENDOBRONCHIAL ULTRASOUND (EBUS);  Surgeon: Yue Swanson M.D.;  Location: [facility];  Service: Pulmonary    KY LAP,APPENDECTOMY N/A 10/7/2019    Procedure: APPENDECTOMY, LAPAROSCOPIC;  Surgeon: Lionel Frazier M.D.;  Location: [facility];  Service: General    AXILLARY NODE DISSECTION Left 6/23/2017    Procedure: AXILLARY NODE DISSECTION- COMPLETION LYPHADENECTOMY;  Surgeon: Lionel Weinberg M.D.;  Location: [facility];  Service:     WIDE EXCISION Left 5/9/2017    Procedure: WIDE EXCISION - MALIGNANT MELANOMA HAND;  Surgeon: Lionel Weinberg M.D.;  Location: SURGERY SAME DAY [facility];  Service:     NODE BIOPSY SENTINEL Left 5/9/2017    Procedure: NODE BIOPSY SENTINEL - AXILLARY;  Surgeon: Lionel Weinberg M.D.;  Location: SURGERY SAME DAY [facility];  Service:     OTHER  2016    excisino of melanoma left hand    ADENOIDECTOMY      MYRINGOTOMY      with tube placement           Family History   Problem Relation Age of Onset    Psychiatric Illness Mother         depression    Diabetes Mother     Hyperlipidemia Mother     Thyroid Mother         s/p radioactive iodine treatment on replacement    Hypertension  Father     Diabetes Father          Physical Exam:   Constitutional: Well nourished, NAD, pleasant  alert and cooperative; normal mood and affect; normal attention span and concentration.    Skin   Full body exam done: no suspicious lesions on eyelids, lips, face, ears, neck, chest, back, abdomen, upper extremities, fingernails, lower extremities except as noted   Face several brown macs  B/l elbows mild psoriasiform plaques  Abdomen and chest few red vascular paps  L lateral hand 5th finger side linear scar  R post ankle about 5mm brown mac  R buttocks mid lateral diagonal surgical scar  Few scattered brown macs on arms and legs   Several shave biopsy scars on back, arms                Assessment and Plan:   1. Hemangioma of skin  Explained spot is a blood vessel. Recommend follow for changes. ABCD's of changing skin lesions were reviewed, and the appropriate use of sunscreen was outlined and recommended.      2. Lentigo  Recommend follow for changes. ABCD's of changing skin lesions were reviewed, and the appropriate use of sunscreen was outlined and recommended.     3. History of melanoma     H/o metastatic melanoma Stage 4  H/o melanoma L hand s/p adjuvant ipi in 2017, metastatic disease in body/brain in 2022 s/p ipi/nivo, nivo stopped mid 2023 due to diarrhea. BRAF+. Review external PET/CT 10/31/23 for extent of disease, ?small bowel met which may be bleeding.     Melanoma on R buttocks wide local excision in 2018    Follow with skin check every 6 months. [facility]     4. Psoriasis  Well controlled on duobrii.     5. Nevus  Pt with many nevi on arms, legs, body. Recommend follow for changes. ABCD's of changing skin lesions were reviewed, and the appropriate use of sunscreen was outlined and recommended.              Estrella Joshi M.D.   Return in about 6 months (around 5/12/2025).

## 2024-11-19 ENCOUNTER — HOSPITAL ENCOUNTER (INPATIENT)
Facility: MEDICAL CENTER | Age: 44
LOS: 8 days | End: 2024-11-27
Attending: EMERGENCY MEDICINE | Admitting: STUDENT IN AN ORGANIZED HEALTH CARE EDUCATION/TRAINING PROGRAM
Payer: MEDICAID

## 2024-11-19 ENCOUNTER — APPOINTMENT (OUTPATIENT)
Dept: RADIOLOGY | Facility: MEDICAL CENTER | Age: 44
End: 2024-11-19
Attending: EMERGENCY MEDICINE
Payer: MEDICAID

## 2024-11-19 DIAGNOSIS — E87.1 HYPONATREMIA: ICD-10-CM

## 2024-11-19 DIAGNOSIS — D64.9 ANEMIA, UNSPECIFIED TYPE: ICD-10-CM

## 2024-11-19 DIAGNOSIS — Z90.49 S/P SMALL BOWEL RESECTION: ICD-10-CM

## 2024-11-19 DIAGNOSIS — Z98.890 S/P EXPLORATORY LAPAROTOMY: ICD-10-CM

## 2024-11-19 DIAGNOSIS — C43.9 METASTATIC MELANOMA (HCC): ICD-10-CM

## 2024-11-19 DIAGNOSIS — R19.03 RIGHT LOWER QUADRANT ABDOMINAL MASS: ICD-10-CM

## 2024-11-19 DIAGNOSIS — R11.0 NAUSEA: ICD-10-CM

## 2024-11-19 DIAGNOSIS — E86.0 DEHYDRATION: ICD-10-CM

## 2024-11-19 PROBLEM — R19.00 ABDOMINAL MASS: Status: ACTIVE | Noted: 2024-11-19

## 2024-11-19 LAB
ABO GROUP BLD: ABNORMAL
ALBUMIN SERPL BCP-MCNC: 3.5 G/DL (ref 3.2–4.9)
ALBUMIN/GLOB SERPL: 1.4 G/DL
ALP SERPL-CCNC: 75 U/L (ref 30–99)
ALT SERPL-CCNC: 5 U/L (ref 2–50)
ANION GAP SERPL CALC-SCNC: 12 MMOL/L (ref 7–16)
ANISOCYTOSIS BLD QL SMEAR: ABNORMAL
APPEARANCE UR: CLEAR
AST SERPL-CCNC: 8 U/L (ref 12–45)
BARCODED ABORH UBTYP: 8400
BARCODED PRD CODE UBPRD: ABNORMAL
BARCODED UNIT NUM UBUNT: ABNORMAL
BASOPHILS # BLD AUTO: 0.5 % (ref 0–1.8)
BASOPHILS # BLD: 0.08 K/UL (ref 0–0.12)
BILIRUB SERPL-MCNC: 0.2 MG/DL (ref 0.1–1.5)
BILIRUB UR QL STRIP.AUTO: NEGATIVE
BLD GP AB SCN SERPL QL: ABNORMAL
BUN SERPL-MCNC: 15 MG/DL (ref 8–22)
CALCIUM ALBUM COR SERPL-MCNC: 8.7 MG/DL (ref 8.5–10.5)
CALCIUM SERPL-MCNC: 8.3 MG/DL (ref 8.5–10.5)
CHLORIDE SERPL-SCNC: 96 MMOL/L (ref 96–112)
CO2 SERPL-SCNC: 23 MMOL/L (ref 20–33)
COLOR UR: YELLOW
COMMENT 1642: NORMAL
COMPONENT R 8504R: ABNORMAL
CREAT SERPL-MCNC: 0.48 MG/DL (ref 0.5–1.4)
EKG IMPRESSION: NORMAL
EOSINOPHIL # BLD AUTO: 0.04 K/UL (ref 0–0.51)
EOSINOPHIL NFR BLD: 0.2 % (ref 0–6.9)
ERYTHROCYTE [DISTWIDTH] IN BLOOD BY AUTOMATED COUNT: 53.1 FL (ref 35.9–50)
FERRITIN SERPL-MCNC: 26.6 NG/ML (ref 10–291)
GFR SERPLBLD CREATININE-BSD FMLA CKD-EPI: 118 ML/MIN/1.73 M 2
GLOBULIN SER CALC-MCNC: 2.5 G/DL (ref 1.9–3.5)
GLUCOSE BLD STRIP.AUTO-MCNC: 206 MG/DL (ref 65–99)
GLUCOSE SERPL-MCNC: 207 MG/DL (ref 65–99)
GLUCOSE UR STRIP.AUTO-MCNC: NEGATIVE MG/DL
HCG SERPL QL: NEGATIVE
HCT VFR BLD AUTO: 18.9 % (ref 37–47)
HGB BLD-MCNC: 5.3 G/DL (ref 12–16)
HOLDING TUBE BB 8507: NORMAL
HYPOCHROMIA BLD QL SMEAR: ABNORMAL
IMM GRANULOCYTES # BLD AUTO: 0.08 K/UL (ref 0–0.11)
IMM GRANULOCYTES NFR BLD AUTO: 0.5 % (ref 0–0.9)
IRON SATN MFR SERPL: 6 % (ref 15–55)
IRON SERPL-MCNC: 18 UG/DL (ref 40–170)
KETONES UR STRIP.AUTO-MCNC: NEGATIVE MG/DL
LACTATE SERPL-SCNC: 1.7 MMOL/L (ref 0.5–2)
LEUKOCYTE ESTERASE UR QL STRIP.AUTO: NEGATIVE
LYMPHOCYTES # BLD AUTO: 1.43 K/UL (ref 1–4.8)
LYMPHOCYTES NFR BLD: 8.2 % (ref 22–41)
MAGNESIUM SERPL-MCNC: 2.1 MG/DL (ref 1.5–2.5)
MCH RBC QN AUTO: 20.5 PG (ref 27–33)
MCHC RBC AUTO-ENTMCNC: 28 G/DL (ref 32.2–35.5)
MCV RBC AUTO: 73 FL (ref 81.4–97.8)
MICRO URNS: NORMAL
MICROCYTES BLD QL SMEAR: ABNORMAL
MONOCYTES # BLD AUTO: 1.55 K/UL (ref 0–0.85)
MONOCYTES NFR BLD AUTO: 8.9 % (ref 0–13.4)
MORPHOLOGY BLD-IMP: NORMAL
NEUTROPHILS # BLD AUTO: 14.26 K/UL (ref 1.82–7.42)
NEUTROPHILS NFR BLD: 81.7 % (ref 44–72)
NITRITE UR QL STRIP.AUTO: NEGATIVE
NRBC # BLD AUTO: 0.04 K/UL
NRBC BLD-RTO: 0.2 /100 WBC (ref 0–0.2)
NT-PROBNP SERPL IA-MCNC: 125 PG/ML (ref 0–125)
PH UR STRIP.AUTO: 5.5 [PH] (ref 5–8)
PLATELET # BLD AUTO: 897 K/UL (ref 164–446)
PLATELET BLD QL SMEAR: NORMAL
PMV BLD AUTO: 9.3 FL (ref 9–12.9)
POIKILOCYTOSIS BLD QL SMEAR: NORMAL
POLYCHROMASIA BLD QL SMEAR: NORMAL
POTASSIUM SERPL-SCNC: 3.6 MMOL/L (ref 3.6–5.5)
PROCALCITONIN SERPL-MCNC: 0.23 NG/ML
PRODUCT TYPE UPROD: ABNORMAL
PROT SERPL-MCNC: 6 G/DL (ref 6–8.2)
PROT UR QL STRIP: NEGATIVE MG/DL
RBC # BLD AUTO: 2.59 M/UL (ref 4.2–5.4)
RBC BLD AUTO: PRESENT
RBC UR QL AUTO: NEGATIVE
RH BLD: ABNORMAL
SCHISTOCYTES BLD QL SMEAR: NORMAL
SODIUM SERPL-SCNC: 131 MMOL/L (ref 135–145)
SP GR UR STRIP.AUTO: 1.02
STOMATOCYTES BLD QL SMEAR: NORMAL
TIBC SERPL-MCNC: 295 UG/DL (ref 250–450)
TROPONIN T SERPL-MCNC: 19 NG/L (ref 6–19)
TSH SERPL DL<=0.005 MIU/L-ACNC: 0.41 UIU/ML (ref 0.38–5.33)
UIBC SERPL-MCNC: 277 UG/DL (ref 110–370)
UNIT STATUS USTAT: ABNORMAL
UROBILINOGEN UR STRIP.AUTO-MCNC: 0.2 EU/DL
WBC # BLD AUTO: 17.4 K/UL (ref 4.8–10.8)

## 2024-11-19 PROCEDURE — 84145 PROCALCITONIN (PCT): CPT

## 2024-11-19 PROCEDURE — 83735 ASSAY OF MAGNESIUM: CPT

## 2024-11-19 PROCEDURE — 74177 CT ABD & PELVIS W/CONTRAST: CPT

## 2024-11-19 PROCEDURE — 770020 HCHG ROOM/CARE - TELE (206)

## 2024-11-19 PROCEDURE — 96376 TX/PRO/DX INJ SAME DRUG ADON: CPT

## 2024-11-19 PROCEDURE — 99223 1ST HOSP IP/OBS HIGH 75: CPT | Mod: AI | Performed by: STUDENT IN AN ORGANIZED HEALTH CARE EDUCATION/TRAINING PROGRAM

## 2024-11-19 PROCEDURE — 85025 COMPLETE CBC W/AUTO DIFF WBC: CPT

## 2024-11-19 PROCEDURE — 700111 HCHG RX REV CODE 636 W/ 250 OVERRIDE (IP): Mod: JZ,UD | Performed by: EMERGENCY MEDICINE

## 2024-11-19 PROCEDURE — 83880 ASSAY OF NATRIURETIC PEPTIDE: CPT

## 2024-11-19 PROCEDURE — 86850 RBC ANTIBODY SCREEN: CPT

## 2024-11-19 PROCEDURE — 30233N1 TRANSFUSION OF NONAUTOLOGOUS RED BLOOD CELLS INTO PERIPHERAL VEIN, PERCUTANEOUS APPROACH: ICD-10-PCS | Performed by: EMERGENCY MEDICINE

## 2024-11-19 PROCEDURE — 86901 BLOOD TYPING SEROLOGIC RH(D): CPT

## 2024-11-19 PROCEDURE — 93005 ELECTROCARDIOGRAM TRACING: CPT | Performed by: EMERGENCY MEDICINE

## 2024-11-19 PROCEDURE — 82728 ASSAY OF FERRITIN: CPT

## 2024-11-19 PROCEDURE — 87086 URINE CULTURE/COLONY COUNT: CPT

## 2024-11-19 PROCEDURE — 84703 CHORIONIC GONADOTROPIN ASSAY: CPT

## 2024-11-19 PROCEDURE — 81003 URINALYSIS AUTO W/O SCOPE: CPT

## 2024-11-19 PROCEDURE — 700102 HCHG RX REV CODE 250 W/ 637 OVERRIDE(OP)

## 2024-11-19 PROCEDURE — 700117 HCHG RX CONTRAST REV CODE 255: Mod: UD | Performed by: EMERGENCY MEDICINE

## 2024-11-19 PROCEDURE — 700111 HCHG RX REV CODE 636 W/ 250 OVERRIDE (IP): Mod: JZ | Performed by: STUDENT IN AN ORGANIZED HEALTH CARE EDUCATION/TRAINING PROGRAM

## 2024-11-19 PROCEDURE — 80053 COMPREHEN METABOLIC PANEL: CPT

## 2024-11-19 PROCEDURE — 36415 COLL VENOUS BLD VENIPUNCTURE: CPT

## 2024-11-19 PROCEDURE — 93005 ELECTROCARDIOGRAM TRACING: CPT

## 2024-11-19 PROCEDURE — 96374 THER/PROPH/DIAG INJ IV PUSH: CPT

## 2024-11-19 PROCEDURE — 700105 HCHG RX REV CODE 258: Performed by: EMERGENCY MEDICINE

## 2024-11-19 PROCEDURE — 86923 COMPATIBILITY TEST ELECTRIC: CPT

## 2024-11-19 PROCEDURE — 96375 TX/PRO/DX INJ NEW DRUG ADDON: CPT

## 2024-11-19 PROCEDURE — 700102 HCHG RX REV CODE 250 W/ 637 OVERRIDE(OP): Performed by: STUDENT IN AN ORGANIZED HEALTH CARE EDUCATION/TRAINING PROGRAM

## 2024-11-19 PROCEDURE — P9016 RBC LEUKOCYTES REDUCED: HCPCS | Mod: 91

## 2024-11-19 PROCEDURE — 83540 ASSAY OF IRON: CPT

## 2024-11-19 PROCEDURE — A9270 NON-COVERED ITEM OR SERVICE: HCPCS | Performed by: STUDENT IN AN ORGANIZED HEALTH CARE EDUCATION/TRAINING PROGRAM

## 2024-11-19 PROCEDURE — 82962 GLUCOSE BLOOD TEST: CPT

## 2024-11-19 PROCEDURE — 86900 BLOOD TYPING SEROLOGIC ABO: CPT

## 2024-11-19 PROCEDURE — A9270 NON-COVERED ITEM OR SERVICE: HCPCS

## 2024-11-19 PROCEDURE — 99285 EMERGENCY DEPT VISIT HI MDM: CPT

## 2024-11-19 PROCEDURE — 87040 BLOOD CULTURE FOR BACTERIA: CPT | Mod: 91

## 2024-11-19 PROCEDURE — 71275 CT ANGIOGRAPHY CHEST: CPT

## 2024-11-19 PROCEDURE — 83550 IRON BINDING TEST: CPT

## 2024-11-19 PROCEDURE — 36430 TRANSFUSION BLD/BLD COMPNT: CPT

## 2024-11-19 PROCEDURE — 83605 ASSAY OF LACTIC ACID: CPT

## 2024-11-19 PROCEDURE — 84484 ASSAY OF TROPONIN QUANT: CPT

## 2024-11-19 PROCEDURE — 84443 ASSAY THYROID STIM HORMONE: CPT

## 2024-11-19 RX ORDER — OXYCODONE HYDROCHLORIDE 5 MG/1
5 TABLET ORAL
Status: DISCONTINUED | OUTPATIENT
Start: 2024-11-19 | End: 2024-11-22

## 2024-11-19 RX ORDER — SODIUM CHLORIDE 9 MG/ML
INJECTION, SOLUTION INTRAVENOUS CONTINUOUS
Status: ACTIVE | OUTPATIENT
Start: 2024-11-19 | End: 2024-11-20

## 2024-11-19 RX ORDER — INSULIN LISPRO 100 [IU]/ML
0.2 INJECTION, SOLUTION INTRAVENOUS; SUBCUTANEOUS
Status: DISCONTINUED | OUTPATIENT
Start: 2024-11-20 | End: 2024-11-26

## 2024-11-19 RX ORDER — INSULIN LISPRO 100 [IU]/ML
1-6 INJECTION, SOLUTION INTRAVENOUS; SUBCUTANEOUS
Status: DISCONTINUED | OUTPATIENT
Start: 2024-11-19 | End: 2024-11-26

## 2024-11-19 RX ORDER — ONDANSETRON 4 MG/1
4 TABLET, ORALLY DISINTEGRATING ORAL EVERY 4 HOURS PRN
Status: DISCONTINUED | OUTPATIENT
Start: 2024-11-19 | End: 2024-11-20

## 2024-11-19 RX ORDER — LABETALOL HYDROCHLORIDE 5 MG/ML
10 INJECTION, SOLUTION INTRAVENOUS EVERY 4 HOURS PRN
Status: DISCONTINUED | OUTPATIENT
Start: 2024-11-19 | End: 2024-11-19

## 2024-11-19 RX ORDER — DEXTROSE MONOHYDRATE 25 G/50ML
25 INJECTION, SOLUTION INTRAVENOUS
Status: DISCONTINUED | OUTPATIENT
Start: 2024-11-19 | End: 2024-11-26

## 2024-11-19 RX ORDER — ONDANSETRON 2 MG/ML
4 INJECTION INTRAMUSCULAR; INTRAVENOUS EVERY 4 HOURS PRN
Status: DISCONTINUED | OUTPATIENT
Start: 2024-11-19 | End: 2024-11-20

## 2024-11-19 RX ORDER — ALBUTEROL SULFATE 5 MG/ML
2.5 SOLUTION RESPIRATORY (INHALATION) EVERY 4 HOURS PRN
Status: DISCONTINUED | OUTPATIENT
Start: 2024-11-19 | End: 2024-11-27 | Stop reason: HOSPADM

## 2024-11-19 RX ORDER — METOCLOPRAMIDE HYDROCHLORIDE 5 MG/ML
5 INJECTION INTRAMUSCULAR; INTRAVENOUS ONCE
Status: COMPLETED | OUTPATIENT
Start: 2024-11-19 | End: 2024-11-19

## 2024-11-19 RX ORDER — GABAPENTIN 400 MG/1
800 CAPSULE ORAL 4 TIMES DAILY
Status: DISCONTINUED | OUTPATIENT
Start: 2024-11-19 | End: 2024-11-27 | Stop reason: HOSPADM

## 2024-11-19 RX ORDER — POLYETHYLENE GLYCOL 3350 17 G/17G
1 POWDER, FOR SOLUTION ORAL
Status: DISCONTINUED | OUTPATIENT
Start: 2024-11-19 | End: 2024-11-26

## 2024-11-19 RX ORDER — CLONAZEPAM 1 MG/1
1 TABLET ORAL 2 TIMES DAILY
Status: DISCONTINUED | OUTPATIENT
Start: 2024-11-19 | End: 2024-11-27 | Stop reason: HOSPADM

## 2024-11-19 RX ORDER — OXCARBAZEPINE 300 MG/1
300 TABLET, FILM COATED ORAL 2 TIMES DAILY
Status: DISCONTINUED | OUTPATIENT
Start: 2024-11-19 | End: 2024-11-27 | Stop reason: HOSPADM

## 2024-11-19 RX ORDER — FERROUS SULFATE 325(65) MG
325 TABLET ORAL
COMMUNITY

## 2024-11-19 RX ORDER — DULOXETIN HYDROCHLORIDE 30 MG/1
60 CAPSULE, DELAYED RELEASE ORAL EVERY EVENING
Status: DISCONTINUED | OUTPATIENT
Start: 2024-11-19 | End: 2024-11-27 | Stop reason: HOSPADM

## 2024-11-19 RX ORDER — HYDROMORPHONE HYDROCHLORIDE 1 MG/ML
0.5 INJECTION, SOLUTION INTRAMUSCULAR; INTRAVENOUS; SUBCUTANEOUS
Status: DISCONTINUED | OUTPATIENT
Start: 2024-11-19 | End: 2024-11-22

## 2024-11-19 RX ORDER — OXYCODONE HYDROCHLORIDE 5 MG/1
10 TABLET ORAL
Status: DISCONTINUED | OUTPATIENT
Start: 2024-11-19 | End: 2024-11-22

## 2024-11-19 RX ORDER — ONDANSETRON 4 MG/1
4 TABLET, ORALLY DISINTEGRATING ORAL EVERY 8 HOURS PRN
Status: DISCONTINUED | OUTPATIENT
Start: 2024-11-19 | End: 2024-11-19

## 2024-11-19 RX ORDER — DIPHENHYDRAMINE HYDROCHLORIDE 50 MG/ML
25 INJECTION INTRAMUSCULAR; INTRAVENOUS ONCE
Status: COMPLETED | OUTPATIENT
Start: 2024-11-19 | End: 2024-11-19

## 2024-11-19 RX ORDER — ALBUTEROL SULFATE 90 UG/1
1-2 INHALANT RESPIRATORY (INHALATION) EVERY 4 HOURS PRN
Status: DISCONTINUED | OUTPATIENT
Start: 2024-11-19 | End: 2024-11-27 | Stop reason: HOSPADM

## 2024-11-19 RX ORDER — ALBUTEROL SULFATE 5 MG/ML
2.5 SOLUTION RESPIRATORY (INHALATION) EVERY 4 HOURS PRN
COMMUNITY

## 2024-11-19 RX ORDER — SODIUM CHLORIDE 9 MG/ML
1000 INJECTION, SOLUTION INTRAVENOUS ONCE
Status: COMPLETED | OUTPATIENT
Start: 2024-11-19 | End: 2024-11-19

## 2024-11-19 RX ORDER — QUETIAPINE FUMARATE 100 MG/1
200 TABLET, FILM COATED ORAL EVERY EVENING
Status: DISCONTINUED | OUTPATIENT
Start: 2024-11-19 | End: 2024-11-27 | Stop reason: HOSPADM

## 2024-11-19 RX ORDER — PROCHLORPERAZINE MALEATE 10 MG
10 TABLET ORAL EVERY 6 HOURS PRN
Status: DISCONTINUED | OUTPATIENT
Start: 2024-11-19 | End: 2024-11-27 | Stop reason: HOSPADM

## 2024-11-19 RX ORDER — OXYCODONE HYDROCHLORIDE 5 MG/1
5-10 TABLET ORAL EVERY 6 HOURS PRN
Status: DISCONTINUED | OUTPATIENT
Start: 2024-11-19 | End: 2024-11-19

## 2024-11-19 RX ORDER — AMOXICILLIN 250 MG
2 CAPSULE ORAL EVERY EVENING
Status: DISCONTINUED | OUTPATIENT
Start: 2024-11-19 | End: 2024-11-26

## 2024-11-19 RX ORDER — TRAZODONE HYDROCHLORIDE 50 MG/1
100 TABLET, FILM COATED ORAL NIGHTLY
Status: DISCONTINUED | OUTPATIENT
Start: 2024-11-19 | End: 2024-11-27 | Stop reason: HOSPADM

## 2024-11-19 RX ORDER — ACETAMINOPHEN 325 MG/1
650 TABLET ORAL EVERY 6 HOURS PRN
Status: DISCONTINUED | OUTPATIENT
Start: 2024-11-19 | End: 2024-11-20

## 2024-11-19 RX ORDER — PROCHLORPERAZINE EDISYLATE 5 MG/ML
10 INJECTION INTRAMUSCULAR; INTRAVENOUS EVERY 6 HOURS PRN
Status: DISCONTINUED | OUTPATIENT
Start: 2024-11-19 | End: 2024-11-27 | Stop reason: HOSPADM

## 2024-11-19 RX ORDER — ATORVASTATIN CALCIUM 40 MG/1
40 TABLET, FILM COATED ORAL EVERY EVENING
Status: DISCONTINUED | OUTPATIENT
Start: 2024-11-19 | End: 2024-11-27 | Stop reason: HOSPADM

## 2024-11-19 RX ADMIN — ONDANSETRON 4 MG: 2 INJECTION INTRAMUSCULAR; INTRAVENOUS at 18:35

## 2024-11-19 RX ADMIN — OXYCODONE HYDROCHLORIDE 10 MG: 10 TABLET ORAL at 18:35

## 2024-11-19 RX ADMIN — CLONAZEPAM 1 MG: 1 TABLET ORAL at 18:01

## 2024-11-19 RX ADMIN — DULOXETINE HYDROCHLORIDE 60 MG: 30 CAPSULE, DELAYED RELEASE ORAL at 18:00

## 2024-11-19 RX ADMIN — INSULIN LISPRO 2 UNITS: 100 INJECTION, SOLUTION INTRAVENOUS; SUBCUTANEOUS at 23:09

## 2024-11-19 RX ADMIN — METOCLOPRAMIDE HYDROCHLORIDE 5 MG: 5 INJECTION INTRAMUSCULAR; INTRAVENOUS at 13:52

## 2024-11-19 RX ADMIN — SODIUM CHLORIDE 1000 ML: 9 INJECTION, SOLUTION INTRAVENOUS at 13:50

## 2024-11-19 RX ADMIN — SODIUM CHLORIDE: 9 INJECTION, SOLUTION INTRAVENOUS at 16:31

## 2024-11-19 RX ADMIN — OXCARBAZEPINE 300 MG: 300 TABLET, FILM COATED ORAL at 18:01

## 2024-11-19 RX ADMIN — HYDROMORPHONE HYDROCHLORIDE 0.5 MG: 1 INJECTION, SOLUTION INTRAMUSCULAR; INTRAVENOUS; SUBCUTANEOUS at 21:38

## 2024-11-19 RX ADMIN — FLUTICASONE FUROATE, UMECLIDINIUM BROMIDE AND VILANTEROL TRIFENATATE 1 PUFF: 200; 62.5; 25 POWDER RESPIRATORY (INHALATION) at 21:37

## 2024-11-19 RX ADMIN — TRAZODONE HYDROCHLORIDE 100 MG: 100 TABLET ORAL at 21:37

## 2024-11-19 RX ADMIN — GABAPENTIN 800 MG: 400 CAPSULE ORAL at 21:37

## 2024-11-19 RX ADMIN — GABAPENTIN 800 MG: 400 CAPSULE ORAL at 18:00

## 2024-11-19 RX ADMIN — METOCLOPRAMIDE 5 MG: 5 INJECTION, SOLUTION INTRAMUSCULAR; INTRAVENOUS at 16:27

## 2024-11-19 RX ADMIN — SENNOSIDES AND DOCUSATE SODIUM 2 TABLET: 50; 8.6 TABLET ORAL at 18:01

## 2024-11-19 RX ADMIN — ATORVASTATIN CALCIUM 40 MG: 40 TABLET, FILM COATED ORAL at 18:00

## 2024-11-19 RX ADMIN — DIPHENHYDRAMINE HYDROCHLORIDE 25 MG: 50 INJECTION, SOLUTION INTRAMUSCULAR; INTRAVENOUS at 13:55

## 2024-11-19 RX ADMIN — QUETIAPINE FUMARATE 200 MG: 100 TABLET ORAL at 21:36

## 2024-11-19 RX ADMIN — IOHEXOL 100 ML: 350 INJECTION, SOLUTION INTRAVENOUS at 15:00

## 2024-11-19 ASSESSMENT — ENCOUNTER SYMPTOMS
DIZZINESS: 0
CONSTIPATION: 0
CHILLS: 0
DOUBLE VISION: 0
FEVER: 0
ABDOMINAL PAIN: 0
NAUSEA: 1
EYE PAIN: 0
WEAKNESS: 1
HEADACHES: 0
WEIGHT LOSS: 1
BLOOD IN STOOL: 0
TINGLING: 0
VOMITING: 0
BLURRED VISION: 0
DIARRHEA: 0

## 2024-11-19 ASSESSMENT — LIFESTYLE VARIABLES
ON A TYPICAL DAY WHEN YOU DRINK ALCOHOL HOW MANY DRINKS DO YOU HAVE: 0
CONSUMPTION TOTAL: NEGATIVE
AVERAGE NUMBER OF DAYS PER WEEK YOU HAVE A DRINK CONTAINING ALCOHOL: 0
HOW MANY TIMES IN THE PAST YEAR HAVE YOU HAD 5 OR MORE DRINKS IN A DAY: 0
DOES PATIENT WANT TO STOP DRINKING: NO
TOTAL SCORE: 0
ALCOHOL_USE: NO
HAVE PEOPLE ANNOYED YOU BY CRITICIZING YOUR DRINKING: NO
TOTAL SCORE: 0
EVER FELT BAD OR GUILTY ABOUT YOUR DRINKING: NO
HAVE YOU EVER FELT YOU SHOULD CUT DOWN ON YOUR DRINKING: NO
EVER HAD A DRINK FIRST THING IN THE MORNING TO STEADY YOUR NERVES TO GET RID OF A HANGOVER: NO
TOTAL SCORE: 0

## 2024-11-19 ASSESSMENT — SOCIAL DETERMINANTS OF HEALTH (SDOH)

## 2024-11-19 ASSESSMENT — FIBROSIS 4 INDEX
FIB4 SCORE: 0.26
FIB4 SCORE: 0.18
FIB4 SCORE: 0.18

## 2024-11-19 ASSESSMENT — PAIN DESCRIPTION - PAIN TYPE
TYPE: ACUTE PAIN
TYPE: ACUTE PAIN;CHRONIC PAIN
TYPE: ACUTE PAIN
TYPE: ACUTE PAIN

## 2024-11-19 NOTE — ED NOTES
PIV line established. Blood drawn and sent to the lab. IVFs infusing and medicated per MAR.  notified of 2nd BC needed. Aware of POC. Call light within reach.

## 2024-11-19 NOTE — ED NOTES
Pt ambulated back to [address] from triage. Pt able to transfer self to bed, call light in reach. Chart up for ERP.

## 2024-11-19 NOTE — ED NOTES
Med Rec completed per patient   Allergies reviewed  No ORAL antibiotics in last 30 days    Patient takes Eliquis 5 mg twice a day   Last dose 11/19/2024 AM

## 2024-11-19 NOTE — ED PROVIDER NOTES
"ER Provider Note    Scribed for Faraz Vazquez M.D. by Lauryn Arce. 11/19/2024   1:26 PM    Primary Care Provider: KAMILA PARK N.P.    CHIEF COMPLAINT  Chief Complaint   Patient presents with    Sent by MD     ·Active stage 4 melanoma  ·Hx anemia and PE - on eliquis  ·Hypertensive, tachycardic, weakness in clinic  ·Concerns for another clot    Pt denies CP/SOB     EXTERNAL RECORDS REVIEWED  Outpatient Notes Review of records show that the patient has a history of stage 4 melanoma. Reviewed from our talk about her transfer here.     HPI/ROS  LIMITATION TO HISTORY   Select: : None  OUTSIDE HISTORIAN(S):  None    Jacque Mcintyre is a 43 y.o. female with a history of stage 4 melanoma who presents to the ED for evaluation of generalized weakness and fatigue. The patient reports that she was at her primary care physician's office today, who had sent her to the ED due to concerns of her vital signs. The patient reports that she had a blood transfusion on October 31 st and reports that she has been feeling \"crappy\" since then. She notes that, on top of feeling weak, has lost about 7 pounds within the last month and is unable to keep anything down. The patient states that her oncologist is Dr. Loredo and adds that he had prescribed her Zofran, which she has been taking with mild alleviation. She also reports taking phenergan and Reglan. She also notes that she takes Seroquel. The patient reports that she has been feeling dehydrated due to vomiting and is requesting fluids. Denies any fever, chest pain, difficulty breathing, swelling in her legs, runny nose, or cough. Patient reports that she has been taking her Eliquis, but notes that it has been once a day instead of twice a day due to having difficulty taking her medications. She also reports that her hear rate is normally in the 100-105 bpm range. The patient adds that she was diagnosed with a blood clot in the past, but reports not having symptoms, adding that they " had found it on a CT scan. The patient has an allergy to Augmentin and Lactose.    PAST MEDICAL HISTORY  Past Medical History:   Diagnosis Date    Asthma     inhaler    Bipolar 1 disorder (HCC)     Cancer (HCC) 01/01/2016    melanoma    Cough     Depression     DM mellitus,     insulin    Hyperlipidemia     Hypertension     Pap smear     Dr. Baird    PCOS (polycystic ovarian syndrome)     Shortness of breath     Sputum production     Wheezing      SURGICAL HISTORY  Past Surgical History:   Procedure Laterality Date    CRANIOTOMY STEALTH Right 10/6/2022    Procedure: RIGHT FRONTAL CRANIOTOMY WITH STEALTH;  Surgeon: Pebbles Tejada M.D.;  Location: [facility];  Service: Neurosurgery    ID BRONCHOSCOPY,DIAGNOSTIC N/A 6/17/2022    Procedure: FIBER OPTIC BRONCHOSCOPY WITH WASH, BRUSH, BRONCHOALVEOLAR LAVAGE, BIOPSY, FINE NEEDLE ASPIRATION;  Surgeon: Yue Swanson M.D.;  Location: [facility];  Service: Pulmonary    ENDOBRONCHIAL US ADD-ON N/A 6/17/2022    Procedure: ENDOBRONCHIAL ULTRASOUND (EBUS);  Surgeon: Yue Swanson M.D.;  Location: [facility];  Service: Pulmonary    ID LAP,APPENDECTOMY N/A 10/7/2019    Procedure: APPENDECTOMY, LAPAROSCOPIC;  Surgeon: Lionel Frazier M.D.;  Location: [facility];  Service: General    AXILLARY NODE DISSECTION Left 6/23/2017    Procedure: AXILLARY NODE DISSECTION- COMPLETION LYPHADENECTOMY;  Surgeon: Lionel Weinberg M.D.;  Location: [facility];  Service:     WIDE EXCISION Left 5/9/2017    Procedure: WIDE EXCISION - MALIGNANT MELANOMA HAND;  Surgeon: Lionel Weinberg M.D.;  Location: SURGERY SAME DAY [facility];  Service:     NODE BIOPSY SENTINEL Left 5/9/2017    Procedure: NODE BIOPSY SENTINEL - AXILLARY;  Surgeon: Lionel Weinberg M.D.;  Location: SURGERY SAME DAY [facility];  Service:     OTHER  2016    excisino of melanoma left hand    ADENOIDECTOMY      MYRINGOTOMY      with tube placement     FAMILY  HISTORY  Family History   Problem Relation Age of Onset    Psychiatric Illness Mother         depression    Diabetes Mother     Hyperlipidemia Mother     Thyroid Mother         s/p radioactive iodine treatment on replacement    Hypertension Father     Diabetes Father      SOCIAL HISTORY   reports that she has been smoking cigarettes. She has a 7.5 pack-year smoking history. She has never used smokeless tobacco. She reports that she does not currently use alcohol. She reports current drug use. Drugs: Oral and Marijuana.    CURRENT MEDICATIONS  Previous Medications    ACETAMINOPHEN (TYLENOL) 500 MG TAB    Take 1,000 mg by mouth 2 times a day as needed for Mild Pain or Moderate Pain.    ATORVASTATIN (LIPITOR) 40 MG TAB    Take 1 Tablet by mouth every evening.    CLONAZEPAM (KLONOPIN) 2 MG TABLET    Take 1 mg by mouth 2 times a day. 1 mg = 1/2 tablet - scheduled    DULOXETINE (CYMBALTA) 60 MG CAP DR PARTICLES DELAYED-RELEASE CAPSULE    Take 1 Capsule by mouth every evening.    ELIQUIS 5 MG TAB    Take 5 mg by mouth 2 times a day.    FERROUS SULFATE 325 (65 FE) MG TABLET    Take 1 Tablet by mouth every day.    FLUTICASONE-UMECLIDINIUM-VILANTEROL (TRELEGY ELLIPTA) 200-62.5-25 MCG/ACT INHALER    Inhale 1 Puff every day.    GABAPENTIN (NEURONTIN) 800 MG TABLET    Take 1 Tablet by mouth 4 times a day.    INSULIN GLARGINE 100 UNIT/ML SC SOLN    Inject 5 Units under the skin every evening.    MELOXICAM (MOBIC) 15 MG TABLET    Take 15 mg by mouth every day.    ONDANSETRON (ZOFRAN ODT) 4 MG TABLET DISPERSIBLE    Take 1 Tablet by mouth every 8 hours as needed for Nausea/Vomiting.    OXCARBAZEPINE (TRILEPTAL) 300 MG TAB    Take 1 Tablet by mouth 2 times a day.    OXYCODONE IMMEDIATE-RELEASE (ROXICODONE) 5 MG TAB    Take 5 mg by mouth every 8 hours as needed for Severe Pain.    PROCHLORPERAZINE (COMPAZINE) 10 MG TAB    Take 10 mg by mouth every 6 hours as needed for Nausea.    QUETIAPINE (SEROQUEL) 200 MG TAB    Take 1 Tablet by  "mouth every evening.    TAFINLAR 75 MG CAP CAPSULE    Take 150 mg by mouth every 12 hours.    TRAZODONE (DESYREL) 100 MG TAB    Take 100 mg by mouth every evening.    VENTOLIN  (90 BASE) MCG/ACT AERO SOLN INHALATION AEROSOL    Inhale 1-2 Puffs every four hours as needed for Shortness of Breath.     ALLERGIES  Allergies   Allergen Reactions    Lactose Unspecified     GI Upset    Augmentin [Amoxicillin-Pot Clavulanate] Vomiting     PCN ok, allergic to the Clavulanate per pt 1/3/24      PHYSICAL EXAM  /74   Pulse (!) 115   Temp 36.3 °C (97.3 °F) (Temporal)   Resp 18   Ht 1.626 m (5' 4\")   Wt 53.5 kg (117 lb 15.1 oz)   LMP 2024 (Approximate)   SpO2 94%   BMI 20.25 kg/m²      Constitutional: Well developed, Well nourished, Pale, Mild distress,    HENT: Normocephalic, Atraumatic, Dry mucous membranes.  Eyes: PERRLA, EOMI, Conjunctiva normal, No discharge.   Neck: No tenderness, Supple, No stridor.   Cardiovascular: Tachycardic, Normal rhythm.   Thorax & Lungs: Clear to auscultation bilaterally, No respiratory distress.   Abdomen: Soft, No tenderness, No masses.   Skin: Warm, Dry, No rash.    Musculoskeletal: No major deformities noted.  Neurologic: Awake, alert. Moves all extremities spontaneously.  Psychiatric: Affect normal, Judgment normal, Mood normal.      DIAGNOSTIC STUDIES  Labs:  Results for orders placed or performed during the hospital encounter of 24   EKG    Collection Time: 24 12:34 PM   Result Value Ref Range    Report       [facility]    Test Date:  2024  Pt Name:    KALEE CENTENO                    Department: ER  MRN:        [id number]                      Room:  Gender:     Female                       Technician: [id number]  :        [id number]                   Requested By:ER TRIAGE PROTOCOL  Order #:    [id number]                    Reading MD: TORY CORNEJO MD    Measurements  Intervals                                " Axis  Rate:       108                          P:          73  MI:         125                          QRS:        76  QRSD:       93                           T:          0  QT:         384  QTc:        515    Interpretive Statements  Sinus tachycardia  Borderline T wave abnormalities  Prolonged QT interval  Compared to ECG 10/13/2024 07:27:40  T-wave abnormality now present  Prolonged QT interval now present  Electronically Signed On 11- 13:23:25 PST by TORY CORNEJO MD     Lactic Acid    Collection Time: 11/19/24  1:42 PM   Result Value Ref Range    Lactic Acid 1.7 0.5 - 2.0 mmol/L   CBC with Differential    Collection Time: 11/19/24  1:42 PM   Result Value Ref Range    WBC 17.4 (H) 4.8 - 10.8 K/uL    RBC 2.59 (L) 4.20 - 5.40 M/uL    Hemoglobin 5.3 (LL) 12.0 - 16.0 g/dL    Hematocrit 18.9 (L) 37.0 - 47.0 %    MCV 73.0 (L) 81.4 - 97.8 fL    MCH 20.5 (L) 27.0 - 33.0 pg    MCHC 28.0 (L) 32.2 - 35.5 g/dL    RDW 53.1 (H) 35.9 - 50.0 fL    Platelet Count 897 (H) 164 - 446 K/uL    MPV 9.3 9.0 - 12.9 fL    Neutrophils-Polys 81.70 (H) 44.00 - 72.00 %    Lymphocytes 8.20 (L) 22.00 - 41.00 %    Monocytes 8.90 0.00 - 13.40 %    Eosinophils 0.20 0.00 - 6.90 %    Basophils 0.50 0.00 - 1.80 %    Immature Granulocytes 0.50 0.00 - 0.90 %    Nucleated RBC 0.20 0.00 - 0.20 /100 WBC    Neutrophils (Absolute) 14.26 (H) 1.82 - 7.42 K/uL    Lymphs (Absolute) 1.43 1.00 - 4.80 K/uL    Monos (Absolute) 1.55 (H) 0.00 - 0.85 K/uL    Eos (Absolute) 0.04 0.00 - 0.51 K/uL    Baso (Absolute) 0.08 0.00 - 0.12 K/uL    Immature Granulocytes (abs) 0.08 0.00 - 0.11 K/uL    NRBC (Absolute) 0.04 K/uL    Hypochromia 2+ (A)     Anisocytosis 1+     Microcytosis 3+ (A)    Complete Metabolic Panel    Collection Time: 11/19/24  1:42 PM   Result Value Ref Range    Sodium 131 (L) 135 - 145 mmol/L    Potassium 3.6 3.6 - 5.5 mmol/L    Chloride 96 96 - 112 mmol/L    Co2 23 20 - 33 mmol/L    Anion Gap 12.0 7.0 - 16.0    Glucose 207 (H) 65 - 99 mg/dL     Bun 15 8 - 22 mg/dL    Creatinine 0.48 (L) 0.50 - 1.40 mg/dL    Calcium 8.3 (L) 8.5 - 10.5 mg/dL    Correct Calcium 8.7 8.5 - 10.5 mg/dL    AST(SGOT) 8 (L) 12 - 45 U/L    ALT(SGPT) 5 2 - 50 U/L    Alkaline Phosphatase 75 30 - 99 U/L    Total Bilirubin 0.2 0.1 - 1.5 mg/dL    Albumin 3.5 3.2 - 4.9 g/dL    Total Protein 6.0 6.0 - 8.2 g/dL    Globulin 2.5 1.9 - 3.5 g/dL    A-G Ratio 1.4 g/dL   HCG QUAL Serum    Collection Time: 11/19/24  1:42 PM   Result Value Ref Range    Beta-Hcg Qualitative Serum Negative Negative   Troponin    Collection Time: 11/19/24  1:42 PM   Result Value Ref Range    Troponin T 19 6 - 19 ng/L   proBrain Natriuretic Peptide, NT    Collection Time: 11/19/24  1:42 PM   Result Value Ref Range    NT-proBNP 125 0 - 125 pg/mL   Procalcitonin    Collection Time: 11/19/24  1:42 PM   Result Value Ref Range    Procalcitonin 0.23 <0.25 ng/mL   TSH WITH REFLEX TO FT4    Collection Time: 11/19/24  1:42 PM   Result Value Ref Range    TSH 0.411 0.380 - 5.330 uIU/mL   PLATELET ESTIMATE    Collection Time: 11/19/24  1:42 PM   Result Value Ref Range    Plt Estimation Increased    MORPHOLOGY    Collection Time: 11/19/24  1:42 PM   Result Value Ref Range    RBC Morphology Present     Polychromia 1+     Poikilocytosis 2+     Schistocytes 1+     Stomatocytes 1+    PERIPHERAL SMEAR REVIEW    Collection Time: 11/19/24  1:42 PM   Result Value Ref Range    Peripheral Smear Review see below    DIFFERENTIAL COMMENT    Collection Time: 11/19/24  1:42 PM   Result Value Ref Range    Comments-Diff see below    ESTIMATED GFR    Collection Time: 11/19/24  1:42 PM   Result Value Ref Range    GFR (CKD-EPI) 118 >60 mL/min/1.73 m 2   IRON/TOTAL IRON BIND    Collection Time: 11/19/24  1:42 PM   Result Value Ref Range    Iron 18 (L) 40 - 170 ug/dL    Total Iron Binding 295 250 - 450 ug/dL    Unsat Iron Binding 277 110 - 370 ug/dL    % Saturation 6 (L) 15 - 55 %   FERRITIN    Collection Time: 11/19/24  1:42 PM   Result Value Ref  Range    Ferritin 26.6 10.0 - 291.0 ng/mL   MAGNESIUM    Collection Time: 11/19/24  1:42 PM   Result Value Ref Range    Magnesium 2.1 1.5 - 2.5 mg/dL   Blood Culture - Draw one from central line and one from peripheral site    Collection Time: 11/19/24  1:44 PM    Specimen: Line; Blood   Result Value Ref Range    Significant Indicator NEG     Source BLD     Site Peripheral     Culture Result       No Growth  Note: Blood cultures are incubated for 5 days and  are monitored continuously.Positive blood cultures  are called to the RN and reported as soon as  they are identified.     Blood Culture - Draw one from central line and one from peripheral site    Collection Time: 11/19/24  2:25 PM    Specimen: Peripheral; Blood   Result Value Ref Range    Significant Indicator NEG     Source BLD     Site Peripheral     Culture Result       No Growth  Note: Blood cultures are incubated for 5 days and  are monitored continuously.Positive blood cultures  are called to the RN and reported as soon as  they are identified.     COD - Adult (Type and Screen)    Collection Time: 11/19/24  2:32 PM   Result Value Ref Range    ABO Grouping Only AB (A)     Rh Grouping Only POS     Antibody Screen-Cod NEG     Component R       Red Cells, LR       [id number]   transfused   11/19/24 16:00    Product Type R99     Dispense Status transfused     Unit Number (Barcoded) [id number]     Product Code (Barcoded) Y9532I33     Blood Type (Barcoded) 8400    Urinalysis    Collection Time: 11/19/24  2:45 PM    Specimen: Urine   Result Value Ref Range    Color Yellow     Character Clear     Specific Gravity 1.016 <1.035    Ph 5.5 5.0 - 8.0    Glucose Negative Negative mg/dL    Ketones Negative Negative mg/dL    Protein Negative Negative mg/dL    Bilirubin Negative Negative    Urobilinogen, Urine 0.2 <=1.0 EU/dL    Nitrite Negative Negative    Leukocyte Esterase Negative Negative    Occult Blood Negative Negative    Micro Urine Req see below    HOLD  BLOOD BANK SPECIMEN (NOT TESTED)    Collection Time: 11/19/24  2:50 PM   Result Value Ref Range    Holding Tube - Bb DONE      Radiology:   This attending emergency physician has independently interpreted the diagnostic imaging associated with this visit and is awaiting the final reading from the radiologist.   Preliminary interpretation is a follows: Large mass right abdomen  Radiologist interpretation:   CT-ABDOMEN-PELVIS WITH   Final Result      1.  Large 11 cm mass centered on one of the small bowel loops in the left hemiabdomen. Primary differential considerations include small bowel adenocarcinoma, gastrointestinal stromal tumor and small bowel metastasis.   2.  The mass abuts the descending and sigmoid colon without colonic invasion. No abutment of other organs.   3.  No metastatic disease in the abdomen.   4.  Small volume pelvic ascites.      CT-CTA CHEST PULMONARY ARTERY W/ RECONS   Final Result      1.  No evidence of pulmonary embolus.      2.  Minimal left pleural effusion.           COURSE & MEDICAL DECISION MAKING     Hydration: Based on the patient's presentation of Dehydration the patient was given IV fluids. IV Hydration was used because oral hydration was not adequate alone. Upon recheck following hydration, the patient was improved.    Transfusion: I have explained to the patient the risks and benefits of transfusion of blood products.  This includes, as appropriate, the risk of mild allergic reaction, hemolytic reaction, transfusion-associated lung injury, febrile reactions, circulatory or iron overload, and infection.    We discussed possible alternatives and their risks, including directed donation, autologous transfusion, and no transfusion, including IV or oral iron supplementation, as appropriate.  I believe the patient understands the risks and benefits and was able to express understanding.    INITIAL ASSESSMENT, COURSE AND PLAN  Differential diagnoses include but not limited to: Thyroid,  Dehydration, Anemia, PE     Care Narrative: Patient is coming in with nausea, some abdominal pains, tachycardia, dehydration.  Primary care is worried about PE, she is scheduled to get a CT scan of the chest abdomen pelvis as an outpatient soon.  Laboratory test shows the patient is anemic, give the patient IV fluids for dehydration, consented the patient and transfuse the patient on the unit of blood.  CT scan shows a large mass on the right side of the abdomen, discussed the case with Dr. Cummings will consult on the patient.  Patient will be admitted to the hospital    1:26 PM - Patient was evaluated at bedside. Discussed the plan of care, including ordering imaging, labs and EKG to further evaluate. Ordered for CT-CTA chest pulmonary artery w/ recons, CT-abdomen-pelvis with, DX-chest, TSH with reflex FT4, HCG qual serum, Troponin, proBrain Natriuretic peptide, Pro calcitonin, Lactic acid, CBC with diff, CMP, UA, urine culture, blood culture, blood culture, EKG to evaluate. The patient will be medicated with Benadryl 25 mg, Reglan 5 mg for her symptoms. Patient verbalizes understanding and support with my plan of care.     3:30 PM - I discussed the patient's case and the above findings with Oncology Team via text who said that they will evaluate the patient.      3:32 PM - Patient was reevaluated at bedside. The patient reports that her nausea has slightly improved. Discussed lab and radiology results with the patient. I informed the patient that I had texted our surgical oncology team, who had said that they will see her. Discussed the plan for admission. Patient verbalizes understanding and agreement to this plan of care.      4:10 PM - I discussed the patient's case and the above findings with Dr. Zazueta (Internal Medicine) who agrees to evaluate the patient for admission.        CRITICAL CARE  The very real possibility of a deterioration of this patient's condition required the highest level of my  preparedness for sudden, emergent intervention.  I provided critical care services, which included medication orders, frequent reevaluations of the patient's condition and response to treatment, ordering and reviewing test results, and discussing the case with various consultants.  The critical care time associated with the care of the patient was 35 minutes. Review chart for interventions. This time is exclusive of any other billable procedures.      DISPOSITION AND DISCUSSIONS    I have discussed management of the patient with the following physicians and DIANN's:  Oncology Team, Dr. Zazueta (Internal Medicine)    DISPOSITION:  Patient will be hospitalized by Dr. Zazueta in critical condition.    FINAL DIAGNOSIS  1. Nausea    2. Dehydration    3. Anemia, unspecified type    4. Right lower quadrant abdominal mass    5. Hyponatremia    6.      CCT: 35 min     Lauryn MCKEON (Scribe), am scribing for, and in the presence of, Faraz Vazquez M.D..    Electronically signed by: Lauryn Arce (Brinaibe), 11/19/2024    IFaraz M.D. personally performed the services described in this documentation, as scribed by Lauryn Arce in my presence, and it is both accurate and complete.      The note accurately reflects work and decisions made by me.  Faraz Vazquez M.D.  11/19/2024  8:32 PM

## 2024-11-19 NOTE — ED TRIAGE NOTES
".  Chief Complaint   Patient presents with    Sent by MD     ·Active stage 4 melanoma  ·Hx anemia and PE - on eliquis  ·Hypertensive, tachycardic, weakness in clinic  ·Concerns for another clot    Pt denies CP/SOB     .Patient ambualtory to triage for above complaint. Patient aware to notify RN of any changes in symptoms. Patient educated on triage process. A&O x 4 and placed in FAMILY ROOM.    ./74   Pulse (!) 115   Temp 36.3 °C (97.3 °F) (Temporal)   Resp 18   Ht 1.626 m (5' 4\")   Wt 53.5 kg (117 lb 15.1 oz)   LMP 09/01/2024 (Approximate)   SpO2 94%   BMI 20.25 kg/m²       "

## 2024-11-20 LAB
ALBUMIN SERPL BCP-MCNC: 3.1 G/DL (ref 3.2–4.9)
ALBUMIN/GLOB SERPL: 1.3 G/DL
ALP SERPL-CCNC: 69 U/L (ref 30–99)
ALT SERPL-CCNC: <5 U/L (ref 2–50)
ANION GAP SERPL CALC-SCNC: 10 MMOL/L (ref 7–16)
AST SERPL-CCNC: 11 U/L (ref 12–45)
BASOPHILS # BLD AUTO: 0.9 % (ref 0–1.8)
BASOPHILS # BLD: 0.1 K/UL (ref 0–0.12)
BILIRUB SERPL-MCNC: 0.4 MG/DL (ref 0.1–1.5)
BUN SERPL-MCNC: 11 MG/DL (ref 8–22)
CALCIUM ALBUM COR SERPL-MCNC: 9.2 MG/DL (ref 8.5–10.5)
CALCIUM SERPL-MCNC: 8.5 MG/DL (ref 8.5–10.5)
CHLORIDE SERPL-SCNC: 104 MMOL/L (ref 96–112)
CO2 SERPL-SCNC: 24 MMOL/L (ref 20–33)
CREAT SERPL-MCNC: 0.53 MG/DL (ref 0.5–1.4)
EOSINOPHIL # BLD AUTO: 0.11 K/UL (ref 0–0.51)
EOSINOPHIL NFR BLD: 1 % (ref 0–6.9)
ERYTHROCYTE [DISTWIDTH] IN BLOOD BY AUTOMATED COUNT: 55.8 FL (ref 35.9–50)
GFR SERPLBLD CREATININE-BSD FMLA CKD-EPI: 115 ML/MIN/1.73 M 2
GLOBULIN SER CALC-MCNC: 2.3 G/DL (ref 1.9–3.5)
GLUCOSE SERPL-MCNC: 222 MG/DL (ref 65–99)
HCT VFR BLD AUTO: 22.6 % (ref 37–47)
HGB BLD-MCNC: 6.9 G/DL (ref 12–16)
IMM GRANULOCYTES # BLD AUTO: 0.05 K/UL (ref 0–0.11)
IMM GRANULOCYTES NFR BLD AUTO: 0.4 % (ref 0–0.9)
LYMPHOCYTES # BLD AUTO: 1.34 K/UL (ref 1–4.8)
LYMPHOCYTES NFR BLD: 11.9 % (ref 22–41)
MCH RBC QN AUTO: 23.4 PG (ref 27–33)
MCHC RBC AUTO-ENTMCNC: 30.5 G/DL (ref 32.2–35.5)
MCV RBC AUTO: 76.6 FL (ref 81.4–97.8)
MONOCYTES # BLD AUTO: 1.27 K/UL (ref 0–0.85)
MONOCYTES NFR BLD AUTO: 11.3 % (ref 0–13.4)
NEUTROPHILS # BLD AUTO: 8.39 K/UL (ref 1.82–7.42)
NEUTROPHILS NFR BLD: 74.5 % (ref 44–72)
NRBC # BLD AUTO: 0.02 K/UL
NRBC BLD-RTO: 0.2 /100 WBC (ref 0–0.2)
PLATELET # BLD AUTO: 724 K/UL (ref 164–446)
PMV BLD AUTO: 9.1 FL (ref 9–12.9)
POTASSIUM SERPL-SCNC: 3.8 MMOL/L (ref 3.6–5.5)
PROT SERPL-MCNC: 5.4 G/DL (ref 6–8.2)
RBC # BLD AUTO: 2.95 M/UL (ref 4.2–5.4)
SODIUM SERPL-SCNC: 138 MMOL/L (ref 135–145)
WBC # BLD AUTO: 11.3 K/UL (ref 4.8–10.8)

## 2024-11-20 PROCEDURE — 700102 HCHG RX REV CODE 250 W/ 637 OVERRIDE(OP)

## 2024-11-20 PROCEDURE — A9270 NON-COVERED ITEM OR SERVICE: HCPCS

## 2024-11-20 PROCEDURE — A9270 NON-COVERED ITEM OR SERVICE: HCPCS | Performed by: STUDENT IN AN ORGANIZED HEALTH CARE EDUCATION/TRAINING PROGRAM

## 2024-11-20 PROCEDURE — 700101 HCHG RX REV CODE 250

## 2024-11-20 PROCEDURE — 86923 COMPATIBILITY TEST ELECTRIC: CPT

## 2024-11-20 PROCEDURE — 700102 HCHG RX REV CODE 250 W/ 637 OVERRIDE(OP): Performed by: STUDENT IN AN ORGANIZED HEALTH CARE EDUCATION/TRAINING PROGRAM

## 2024-11-20 PROCEDURE — 99232 SBSQ HOSP IP/OBS MODERATE 35: CPT | Performed by: INTERNAL MEDICINE

## 2024-11-20 PROCEDURE — 36415 COLL VENOUS BLD VENIPUNCTURE: CPT

## 2024-11-20 PROCEDURE — 99254 IP/OBS CNSLTJ NEW/EST MOD 60: CPT | Performed by: SURGERY

## 2024-11-20 PROCEDURE — P9016 RBC LEUKOCYTES REDUCED: HCPCS

## 2024-11-20 PROCEDURE — 82962 GLUCOSE BLOOD TEST: CPT | Mod: 91

## 2024-11-20 PROCEDURE — 36430 TRANSFUSION BLD/BLD COMPNT: CPT

## 2024-11-20 PROCEDURE — 85025 COMPLETE CBC W/AUTO DIFF WBC: CPT

## 2024-11-20 PROCEDURE — 770001 HCHG ROOM/CARE - MED/SURG/GYN PRIV*

## 2024-11-20 PROCEDURE — 700111 HCHG RX REV CODE 636 W/ 250 OVERRIDE (IP): Mod: JZ | Performed by: STUDENT IN AN ORGANIZED HEALTH CARE EDUCATION/TRAINING PROGRAM

## 2024-11-20 PROCEDURE — 80053 COMPREHEN METABOLIC PANEL: CPT

## 2024-11-20 PROCEDURE — 700111 HCHG RX REV CODE 636 W/ 250 OVERRIDE (IP): Mod: JZ | Performed by: INTERNAL MEDICINE

## 2024-11-20 PROCEDURE — 700105 HCHG RX REV CODE 258: Performed by: INTERNAL MEDICINE

## 2024-11-20 RX ORDER — ACETAMINOPHEN 500 MG
1000 TABLET ORAL EVERY 8 HOURS
Status: DISCONTINUED | OUTPATIENT
Start: 2024-11-20 | End: 2024-11-27 | Stop reason: HOSPADM

## 2024-11-20 RX ORDER — LIDOCAINE 4 G/G
1 PATCH TOPICAL EVERY 24 HOURS
Status: DISCONTINUED | OUTPATIENT
Start: 2024-11-20 | End: 2024-11-27 | Stop reason: HOSPADM

## 2024-11-20 RX ADMIN — OXYCODONE HYDROCHLORIDE 10 MG: 10 TABLET ORAL at 15:42

## 2024-11-20 RX ADMIN — GABAPENTIN 800 MG: 400 CAPSULE ORAL at 08:23

## 2024-11-20 RX ADMIN — PROCHLORPERAZINE EDISYLATE 10 MG: 5 INJECTION INTRAMUSCULAR; INTRAVENOUS at 22:08

## 2024-11-20 RX ADMIN — SODIUM CHLORIDE 250 MG: 9 INJECTION, SOLUTION INTRAVENOUS at 17:23

## 2024-11-20 RX ADMIN — OXCARBAZEPINE 300 MG: 300 TABLET, FILM COATED ORAL at 04:36

## 2024-11-20 RX ADMIN — INSULIN LISPRO 1 UNITS: 100 INJECTION, SOLUTION INTRAVENOUS; SUBCUTANEOUS at 09:57

## 2024-11-20 RX ADMIN — INSULIN LISPRO 4 UNITS: 100 INJECTION, SOLUTION INTRAVENOUS; SUBCUTANEOUS at 18:42

## 2024-11-20 RX ADMIN — LIDOCAINE 1 PATCH: 4 PATCH TOPICAL at 10:00

## 2024-11-20 RX ADMIN — PROCHLORPERAZINE MALEATE 10 MG: 5 TABLET ORAL at 08:24

## 2024-11-20 RX ADMIN — TRAZODONE HYDROCHLORIDE 100 MG: 100 TABLET ORAL at 23:07

## 2024-11-20 RX ADMIN — OXYCODONE HYDROCHLORIDE 10 MG: 10 TABLET ORAL at 05:23

## 2024-11-20 RX ADMIN — INSULIN LISPRO 1 UNITS: 100 INJECTION, SOLUTION INTRAVENOUS; SUBCUTANEOUS at 23:34

## 2024-11-20 RX ADMIN — OXYCODONE HYDROCHLORIDE 10 MG: 10 TABLET ORAL at 08:23

## 2024-11-20 RX ADMIN — ACETAMINOPHEN 1000 MG: 500 TABLET ORAL at 23:08

## 2024-11-20 RX ADMIN — INSULIN LISPRO 2 UNITS: 100 INJECTION, SOLUTION INTRAVENOUS; SUBCUTANEOUS at 18:43

## 2024-11-20 RX ADMIN — ATORVASTATIN CALCIUM 40 MG: 40 TABLET, FILM COATED ORAL at 17:21

## 2024-11-20 RX ADMIN — HYDROMORPHONE HYDROCHLORIDE 0.5 MG: 1 INJECTION, SOLUTION INTRAMUSCULAR; INTRAVENOUS; SUBCUTANEOUS at 23:36

## 2024-11-20 RX ADMIN — CLONAZEPAM 1 MG: 1 TABLET ORAL at 04:36

## 2024-11-20 RX ADMIN — GABAPENTIN 800 MG: 400 CAPSULE ORAL at 13:01

## 2024-11-20 RX ADMIN — SODIUM CHLORIDE 250 MG: 9 INJECTION, SOLUTION INTRAVENOUS at 11:31

## 2024-11-20 RX ADMIN — GABAPENTIN 800 MG: 400 CAPSULE ORAL at 17:21

## 2024-11-20 RX ADMIN — SENNOSIDES AND DOCUSATE SODIUM 2 TABLET: 50; 8.6 TABLET ORAL at 17:23

## 2024-11-20 RX ADMIN — OXCARBAZEPINE 300 MG: 300 TABLET, FILM COATED ORAL at 17:22

## 2024-11-20 RX ADMIN — GABAPENTIN 800 MG: 400 CAPSULE ORAL at 23:08

## 2024-11-20 RX ADMIN — DULOXETINE HYDROCHLORIDE 60 MG: 30 CAPSULE, DELAYED RELEASE ORAL at 17:22

## 2024-11-20 RX ADMIN — INSULIN LISPRO 4 UNITS: 100 INJECTION, SOLUTION INTRAVENOUS; SUBCUTANEOUS at 09:55

## 2024-11-20 RX ADMIN — CLONAZEPAM 1 MG: 1 TABLET ORAL at 17:22

## 2024-11-20 RX ADMIN — QUETIAPINE FUMARATE 200 MG: 100 TABLET ORAL at 23:07

## 2024-11-20 RX ADMIN — ACETAMINOPHEN 1000 MG: 500 TABLET ORAL at 14:16

## 2024-11-20 RX ADMIN — FLUTICASONE FUROATE, UMECLIDINIUM BROMIDE AND VILANTEROL TRIFENATATE 1 PUFF: 200; 62.5; 25 POWDER RESPIRATORY (INHALATION) at 17:24

## 2024-11-20 ASSESSMENT — ENCOUNTER SYMPTOMS
ABDOMINAL PAIN: 1
CHILLS: 0
PALPITATIONS: 0
HEADACHES: 0
WHEEZING: 0
DIZZINESS: 0
NAUSEA: 1
VOMITING: 1
FEVER: 0
SHORTNESS OF BREATH: 0
CONSTIPATION: 0
DIARRHEA: 0
COUGH: 0

## 2024-11-20 ASSESSMENT — FIBROSIS 4 INDEX: FIB4 SCORE: 0.32

## 2024-11-20 ASSESSMENT — PAIN DESCRIPTION - PAIN TYPE
TYPE: ACUTE PAIN

## 2024-11-20 NOTE — ASSESSMENT & PLAN NOTE
Patient was found to have increase in her platelet levels to the high 800s.  Most likely this is reactive thrombocytosis given her severe anemia continue to monitor.

## 2024-11-20 NOTE — CARE PLAN
The patient is Stable - Low risk of patient condition declining or worsening    Shift Goals  Clinical Goals: RBCs, monitor VS, pain  Patient Goals: rest  Family Goals: TRIXIE    Progress made toward(s) clinical / shift goals:    Problem: Hemodynamics  Goal: Patient's hemodynamics, fluid balance and neurologic status will be stable or improve  Description: Target End Date:  Prior to discharge or change in level of care    Document on Assessment and I/O flowsheet templates    1.  Monitor vital signs, pulse oximetry and cardiac monitor per provider order and/or policy  2.  Maintain blood pressure per provider order  3.  Hemodynamic monitoring per provider order  4.  Manage IV fluids and IV infusions  5.  Monitor intake and output  6.  Daily weights per unit policy or provider order  7.  Assess peripheral pulses and capillary refill  8.  Assess color and body temperature  9.  Position patient for maximum circulation/cardiac output  10. Monitor for signs/symptoms of excessive bleeding  11. Assess mental status, restlessness and changes in level of consciousness  12. Monitor temperature and report fever or hypothermia to provider immediately. Consideration of targeted temperature management.  Outcome: Progressing  Note: Pt received 2 bags of RBC, tolerated well. Hemodynamically stable. Control labs to be drawn at 3 am.       Problem: Respiratory  Goal: Patient will achieve/maintain optimum respiratory ventilation and gas exchange  Description: Target End Date:  Prior to discharge or change in level of care    Document on Assessment flowsheet    1.  Assess and monitor rate, rhythm, depth and effort of respiration  2.  Breath sounds assessed qshift and/or as needed  3.  Assess O2 saturation, administer/titrate oxygen as ordered  4.  Position patient for maximum ventilatory efficiency  5.  Turn, cough, and deep breath with splinting to improve effectiveness  6.  Collaborate with RT to administer medication/treatments per  order  7.  Encourage use of incentive spirometer and encourage patient to cough after use and utilize splinting techniques if applicable  8.  Airway suctioning  9.  Monitor sputum production for changes in color, consistency and frequency  10. Perform frequent oral hygiene  11. Alternate physical activity with rest periods  Outcome: Progressing  Note: SpO2 wasd ropping to 88% after Dilaudid 0.5 mg in the evening. Pt on 1 L O2 via NC, no SOB, comfortably resting in  bed.

## 2024-11-20 NOTE — PROGRESS NOTES
4 Eyes Skin Assessment Completed by MICHAEL Poon and MICHAEL Reich.    Head WDL  Ears Redness and Blanching- from glasses  Nose WDL  Mouth WDL  Neck WDL  Breast/Chest WDL  Shoulder Blades WDL  Spine WDL  (R) Arm/Elbow/Hand WDL  (L) Arm/Elbow/Hand WDL  Abdomen WDL  Groin WDL  Scrotum/Coccyx/Buttocks Redness and Blanching-Sacral mepilex applied  (R) Leg WDL  (L) Leg WDL  (R) Heel/Foot/Toe Redness and Blanching- Small red spot to inner ankle, blanching  (L) Heel/Foot/Toe Redness, Blanching, and Discoloration- Healing wound to G toe, brown discoloration, pt states she had seen wound care, wound is healed. Callouses noted, dry and flaky. Toes have blanching redness to bony prominences.            Devices In Places Tele Box      Interventions In Place N/A    Possible Skin Injury No    Pictures Uploaded Into Epic Yes  Wound Consult Placed N/A  RN Wound Prevention Protocol Ordered No

## 2024-11-20 NOTE — ASSESSMENT & PLAN NOTE
Patient has history of metastatic melanoma first diagnosed in 2016 received wide excisional biopsy with sentinel lymph node testing showed regional metastasis and started on immunotherapies.  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  These medications were discontinued and patient was started on Tafinlar.  -Discussion with her oncologist we will discontinue Tylenol while inpatient.

## 2024-11-20 NOTE — PROGRESS NOTES
Pt transported to unit by writing RN on [employer] monitor via [facility], she was able to ambulate self into room with standby assistance, requested to use restroom first. She is alert and oriented x4, on room air. Pt currently running first unit of blood to gravity, unable to determine infusion running time/anticipated completion time. Pt updated in regard to plan of care, all questions answered accordingly. Bed is locked and in the lowest position, call light within reach.

## 2024-11-20 NOTE — ASSESSMENT & PLAN NOTE
Patient has history of diabetes type 2 on insulin therapy at home.  Has not gotten a recent A1c and given her severe anemia likely will be helpful to determine compliance with insulin therapies.  Patient reports that she has high sugars into the 300s at home.  -continue home glargine   -Sliding scale insulin   -Hypoglycemia protocol   -will need to hold insulin when NPO

## 2024-11-20 NOTE — PROGRESS NOTES
[facility] Internal Medicine Daily Progress Note    Date of Service  11/20/2024    [facility] Team: [facility] Team   Attending: Justen Ravi M.d.  Senior Resident: Dr. Fabricio Mathew   Intern:  Dr. Royal Hui   Contact Number: [phone]    Chief Complaint  Ms Jacque Mcintyre is a 42 y/o female who presented with CC weakness and fatigue and was found to have Hgb 5.3.     Hospital Course  Ms Jacque Mcintyre is a 42 y/o female with history including relapsed metastatic melanoma ([id number] with BRAF mutation V600E mutation positive) from dorsal aspect of hand with brain mets diagnosed in September 2022 (completed 15 cycles of Yervoy in 2023), and chronic iron deficiency anemia, and PE in 2024, sent from PCP with chief complaint of generalized weakness and fatigue and was found to have Hgb 5.3. On CT Abd she was found to have large 11cm mass L small bowel which was likely the cause of her occult bleed.     Interval Problem Update  No acute overnight events. This morning pt was seen and examined, she said she felt improved from prior. Updated on plan. No acute complaints.     I have discussed this patient's plan of care and discharge plan at IDT rounds today with Case Management, Nursing, Nursing leadership, and other members of the IDT team.    Consultants/Specialty  general surgery    Code Status  Full Code    Disposition  The patient is not medically cleared for discharge to home or a post-acute facility.  Anticipate discharge to: home with close outpatient follow-up    I have placed the appropriate orders for post-discharge needs.    Review of Systems  Review of Systems   Constitutional:  Positive for malaise/fatigue. Negative for chills and fever.   Respiratory:  Negative for cough, shortness of breath and wheezing.    Cardiovascular:  Negative for chest pain and palpitations.   Gastrointestinal:  Positive for abdominal pain and nausea.   Neurological:  Negative for dizziness and headaches.        Physical Exam  Temp:  [36.4 °C  (97.5 °F)-37.4 °C (99.4 °F)] 37.1 °C (98.7 °F)  Pulse:  [] 95  Resp:  [12-20] 16  BP: ()/(56-72) 99/69  SpO2:  [92 %-99 %] 96 %    Physical Exam  Vitals and nursing note reviewed.   Constitutional:       General: She is not in acute distress.     Appearance: She is ill-appearing. She is not toxic-appearing or diaphoretic.   HENT:      Head: Normocephalic and atraumatic.   Cardiovascular:      Rate and Rhythm: Regular rhythm. Tachycardia present.      Heart sounds: No murmur heard.  Pulmonary:      Effort: Pulmonary effort is normal. No respiratory distress.      Breath sounds: Wheezing present. No rhonchi or rales.   Abdominal:      General: Abdomen is flat. Bowel sounds are normal.      Palpations: Abdomen is soft.      Tenderness: There is abdominal tenderness.   Musculoskeletal:      Right lower leg: No edema.      Left lower leg: No edema.   Skin:     General: Skin is warm and dry.      Coloration: Skin is pale.   Neurological:      Mental Status: She is alert and oriented to person, place, and time.         Fluids    Intake/Output Summary (Last 24 hours) at 11/20/2024 1230  Last data filed at 11/20/2024 1126  Gross per 24 hour   Intake 1008.34 ml   Output 0 ml   Net 1008.34 ml       Laboratory  Recent Labs     11/19/24  1342 11/20/24  0419   WBC 17.4* 11.3*   RBC 2.59* 2.95*   HEMOGLOBIN 5.3* 6.9*   HEMATOCRIT 18.9* 22.6*   MCV 73.0* 76.6*   MCH 20.5* 23.4*   MCHC 28.0* 30.5*   RDW 53.1* 55.8*   PLATELETCT 897* 724*   MPV 9.3 9.1     Recent Labs     11/19/24  1342 11/20/24  0419   SODIUM 131* 138   POTASSIUM 3.6 3.8   CHLORIDE 96 104   CO2 23 24   GLUCOSE 207* 222*   BUN 15 11   CREATININE 0.48* 0.53   CALCIUM 8.3* 8.5                   Imaging  CT-ABDOMEN-PELVIS WITH    Result Date: 11/19/2024  1.  Large 11 cm mass centered on one of the small bowel loops in the left hemiabdomen. Primary differential considerations include small bowel adenocarcinoma, gastrointestinal stromal tumor and small bowel  metastasis. 2.  The mass abuts the descending and sigmoid colon without colonic invasion. No abutment of other organs. 3.  No metastatic disease in the abdomen. 4.  Small volume pelvic ascites.    DX-CHEST-PORTABLE (1 VIEW)    Result Date: 10/13/2024  Impression: 1. No acute cardiac or pulmonary abnormality is identified. 2. No change right Port-A-Cath.     CT-CTA CHEST PULMONARY ARTERY W/ RECONS    Result Date: 11/19/2024  1.  No evidence of pulmonary embolus. 2.  Minimal left pleural effusion.        Assessment/Plan  Problem Representation:    * Metastatic melanoma (HCC)- (present on admission)  Assessment & Plan  Patient has history of metastatic melanoma first diagnosed in 2016 received wide excisional biopsy with sentinel lymph node testing showed regional metastasis and started on immunotherapies.  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  These medications were discontinued and patient was started on Tafinlar.  -Discussion with her oncologist we will discontinue Tylenol while inpatient.    Hyponatremia  Assessment & Plan  resolved    Abdominal mass  Assessment & Plan  Patient received CT abdominal exam in the emergency department which showed a large 11 cm mass growing off of her colon located on one of the small bowel loops in the left hemiabdomen.  There is a primary differential of small bowel adenocarcinoma, gastrointestinal stromal tumor, small bowel metastasis.  Patient states that this is a new finding however review of her dermatology notes shows that this is a known disease process that has likely enlarged recently.  The mass is nonobstructive surgical oncology is aware and consulted. This is most likely cause of patients anemia.   -Follow surgery recs:     -11/21 clear  liquid diet and bowel prep    -NPO 11/22     -Plan for surgery 11/22.     Anemia- (present on admission)  Assessment & Plan  Patient has a history of chronic anemia with iron deficiency and chronic disease. Likely this is the cause of her severe anemia but cannot rule out blood loss anemia given her large abdominal mass found on CT.  Received 3u RBCs since admission.   -Iron transfusion- pharmacy to dose.   -AM Hgb     Diabetic neuropathy ([facility])- (present on admission)  Assessment & Plan  Patient has a history of diabetic neuropathy on several different medications for control of her severe pain  -Continue home regimen    Thrombocytosis- (present on admission)  Assessment & Plan  Patient was found to have increase in her platelet levels to the high 800s.  Most likely this is reactive thrombocytosis given her severe anemia continue to monitor.     DM2 (diabetes mellitus, type 2) ([facility])- (present on admission)  Assessment & Plan  Patient has history of diabetes type 2 on insulin therapy at home.  Has not gotten a recent A1c and given her severe anemia likely will be helpful to determine compliance with insulin therapies.  Patient reports that she has high sugars into the 300s at home.  -continue home glargine   -Sliding scale insulin   -Hypoglycemia protocol   -will need to hold insulin when NPO        VTE prophylaxis: SCDs/TEDs    I have performed a physical exam and reviewed and updated ROS and Plan today (11/20/2024). In review of yesterday's note (11/19/2024), there are no changes except as documented above.

## 2024-11-20 NOTE — ASSESSMENT & PLAN NOTE
Patient received CT abdominal exam in the emergency department which showed a large 11 cm mass growing off of her colon located on one of the small bowel loops in the left hemiabdomen.  There is a primary differential of small bowel adenocarcinoma, gastrointestinal stromal tumor, small bowel metastasis.  Patient states that this is a new finding however review of her dermatology notes shows that this is a known disease process that has likely enlarged recently.  The mass is nonobstructive surgical oncology is aware and consulted. This is most likely cause of patients anemia.   -Follow surgery recs:     -11/21 clear liquid diet and bowel prep    -NPO 11/22     -Plan for surgery 11/22.

## 2024-11-20 NOTE — HOSPITAL COURSE
Ms Jacque Mcintyre is a 44 y/o female with history including relapsed metastatic melanoma ([id number] with BRAF mutation V600E mutation positive) from dorsal aspect of hand with brain mets diagnosed in September 2022 (completed 15 cycles of Yervoy in 2023), and chronic iron deficiency anemia, and PE in 2024, sent from PCP with chief complaint of generalized weakness and fatigue and was found to have Hgb 5.3. On CT Abd she was found to have large 11cm mass L small bowel which was likely the cause of her occult bleed.

## 2024-11-20 NOTE — ASSESSMENT & PLAN NOTE
Patient has a history of diabetic neuropathy on several different medications for control of her severe pain  -Continue home regimen

## 2024-11-20 NOTE — ASSESSMENT & PLAN NOTE
Patient has a history of chronic anemia with iron deficiency and chronic disease. Likely this is the cause of her severe anemia but cannot rule out blood loss anemia given her large abdominal mass found on CT.  Received 3u RBCs since admission.   -Iron transfusion- pharmacy to dose.   -Daily CBC

## 2024-11-20 NOTE — H&P
[facility] Internal Medicine History & Physical Note    Date of Service  11/19/2024    [facility] Team: R IM Jamil Team   Attending: Gian Zazueta M.d.  Senior Resident: Dr. Mathew  Intern:  Dr. Hui  Contact Number: [phone]    Primary Care Physician  KAMILA PARK N.P.    Consultants  oncology and surgical Oncology      Code Status  Full Code    Chief Complaint  Chief Complaint   Patient presents with    Sent by MD     ·Active stage 4 melanoma  ·Hx anemia and PE - on eliquis  ·Hypertensive, tachycardic, weakness in clinic  ·Concerns for another clot    Pt denies CP/SOB       History of Presenting Illness (HPI):   Jacque Mcintyre is a 43 y.o. female who presented 11/19/2024 after seeing her primary care provider today who had concerns for possible pulmonary embolism given high Wells criteria.  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  Patient was on immunotherapy until earlier this year where she developed colitis secondary to immunotherapy which was stopped and patient was started on Tafeninib monotherapy.  Type 2 diabetes (insulin-dependent), hypertension, depression, bipolar 1 disorder, diabetic neuropathy, asthma, dyslipidemia.  Patient states that she has not been having any chest pain, calf pain, does have a history of pulmonary embolisms in the past and was taking chronic Eliquis therapy.  Patient states that the PE in the past is unprovoked most likely due to her melanoma malignancy.  Patient reports that she has had a 7 pound weight loss over the past month, increase in her fatigue, weakness that has not changed from baseline, no changes in her bowel or bladder habits.  Patient denies hematochezia, melena.    In the emergency department patient was found to have an elevated white blood cell count to 17.4 and severe anemia with a hemoglobin of 5.3 hematocrit 18.9.  Her  platelets were elevated to 897 and patient endorses chronic thrombocytosis.  CBC revealed hyponatremia to 131 and hyperglycemia to 207.  Troponins were within normal limits BMP was normal and no elevation in her lactic acid.  A CT abdomen was obtained which showed a large 11 cm mass centered on one of the small bowel loops in the left hemiabdomen concerning for possible small bowel adenocarcinoma, gastrointestinal stromal tumor, small bowel metastasis.  The mass abrupt the descending and sigmoid colon without colonic invasion.  No metastatic disease in the abdomen.  Small volume pelvic ascites.  A CTA of her chest was obtained which did not show any evidence of pulmonary embolism and minimal left pleural effusion.  EKG was obtained which showed increase in her QTc to 515 and sinus tachycardia.    Blood type and screen was obtained and patient was given 1 unit of AB+ blood in the emergency room.  Given her anemia patient was admitted to the hospital for management and further workup of her new abdominal mass.      I discussed the plan of care with patient and family.    Review of Systems  Review of Systems   Constitutional:  Positive for weight loss. Negative for chills and fever.   Eyes:  Negative for blurred vision, double vision and pain.   Gastrointestinal:  Positive for nausea. Negative for abdominal pain, blood in stool, constipation, diarrhea, melena and vomiting.   Genitourinary:  Negative for dysuria, frequency, hematuria and urgency.   Neurological:  Positive for weakness (chronic). Negative for dizziness, tingling and headaches.       Past Medical History   has a past medical history of Asthma, Bipolar 1 disorder (HCC), Cancer (HCC) (01/01/2016), Cough, Depression, DM mellitus,, Hyperlipidemia, Hypertension, Pap smear, PCOS (polycystic ovarian syndrome), Shortness of breath, Sputum production, and Wheezing.    Surgical History   has a past surgical history that includes myringotomy; adenoidectomy; other  (2016); wide excision (Left, 5/9/2017); node biopsy sentinel (Left, 5/9/2017); axillary node dissection (Left, 6/23/2017); pr lap,appendectomy (N/A, 10/7/2019); pr bronchoscopy,diagnostic (N/A, 6/17/2022); endobronchial us add-on (N/A, 6/17/2022); and craniotomy stealth (Right, 10/6/2022).     Family History  family history includes Diabetes in her father and mother; Hyperlipidemia in her mother; Hypertension in her father; Psychiatric Illness in her mother; Thyroid in her mother.   Family history reviewed with patient.     Social History  Tobacco: current smoker 0.5 ppd with 10 pack year hx  Alcohol: denies  Recreational drugs (illegal or prescription): denies  Employment: retired psych nurse  Living Situation: at home with family  Recent Travel: denies  Primary Care Provider: Reviewed Ronit Marrero  Other (stressors, spirituality, exposures):     Allergies  Allergies   Allergen Reactions    Lactose Unspecified     GI Upset    Augmentin [Amoxicillin-Pot Clavulanate] Vomiting     PCN ok, allergic to the Clavulanate per pt 1/3/24       Medications  Prior to Admission Medications   Prescriptions Last Dose Informant Patient Reported? Taking?   DULoxetine (CYMBALTA) 60 MG Cap DR Particles delayed-release capsule 11/18/2024 Evening Patient No Yes   Sig: Take 1 Capsule by mouth every evening.   ELIQUIS 5 MG Tab 11/19/2024 Morning Patient Yes Yes   Sig: Take 5 mg by mouth 2 times a day.   OXcarbazepine (TRILEPTAL) 300 MG Tab 11/19/2024 Morning Patient No Yes   Sig: Take 1 Tablet by mouth 2 times a day.   QUEtiapine (SEROQUEL) 200 MG Tab 11/18/2024 Evening Patient No Yes   Sig: Take 1 Tablet by mouth every evening.   TAFINLAR 75 MG Cap capsule 11/19/2024 Morning Patient Yes Yes   Sig: Take 150 mg by mouth every 12 hours.   VENTOLIN  (90 Base) MCG/ACT Aero Soln inhalation aerosol 11/18/2024 Noon Patient Yes Yes   Sig: Inhale 1-2 Puffs every four hours as needed for Shortness of Breath.   acetaminophen (TYLENOL) 500 MG  Tab 10/30/2024 Patient Yes No   Sig: Take 500-1,000 mg by mouth every 6 hours as needed. Indications: Pain   albuterol (PROVENTIL) 2.5mg/0.5ml Nebu Soln 10/20/2024 Patient Yes Yes   Sig: Take 2.5 mg by nebulization every four hours as needed for Shortness of Breath.   atorvastatin (LIPITOR) 40 MG Tab 11/18/2024 Evening Patient No Yes   Sig: Take 1 Tablet by mouth every evening.   clonazepam (KLONOPIN) 2 MG tablet 11/19/2024 Morning Patient Yes Yes   Sig: Take 1 mg by mouth 2 times a day. 1 mg = 1/2 tablet - scheduled   ferrous sulfate 325 (65 Fe) MG tablet 10/30/2024 Patient Yes Yes   Sig: Take 325 mg by mouth every Monday, Wednesday, and Friday.   fluticasone-umeclidinium-vilanterol (TRELEGY ELLIPTA) 200-62.5-25 mcg/act inhaler 11/18/2024 Evening Patient Yes Yes   Sig: Inhale 1 Puff every day.   gabapentin (NEURONTIN) 800 MG tablet 11/19/2024 Morning Patient No Yes   Sig: Take 1 Tablet by mouth 4 times a day.   insulin glargine 100 UNIT/ML SC SOLN 11/18/2024 Evening Patient Yes Yes   Sig: Inject 20 Units under the skin every evening.   meloxicam (MOBIC) 15 MG tablet 11/19/2024 Morning Patient Yes Yes   Sig: Take 15 mg by mouth every day.   ondansetron (ZOFRAN ODT) 4 MG TABLET DISPERSIBLE 11/19/2024 Morning Patient No Yes   Sig: Take 1 Tablet by mouth every 8 hours as needed for Nausea/Vomiting.   oxyCODONE immediate-release (ROXICODONE) 5 MG Tab 11/19/2024 at 10:00 AM Patient Yes Yes   Sig: Take 5-10 mg by mouth every 6 hours as needed for Severe Pain.   traZODone (DESYREL) 100 MG Tab 11/18/2024 Bedtime Patient Yes Yes   Sig: Take 100 mg by mouth every evening.      Facility-Administered Medications: None       Physical Exam  Temp:  [36.3 °C (97.3 °F)-37.2 °C (99 °F)] 37.1 °C (98.7 °F)  Pulse:  [] 100  Resp:  [12-20] 17  BP: (102-119)/(63-74) 115/68  SpO2:  [93 %-97 %] 97 %  Blood Pressure: 106/72   Temperature: 37.2 °C (99 °F)   Pulse: (!) 101   Respiration: 16   Pulse Oximetry: 95 %       Physical  Exam  Constitutional:       General: She is not in acute distress.     Appearance: Normal appearance. She is ill-appearing. She is not toxic-appearing.   HENT:      Head: Normocephalic and atraumatic.      Nose: Nose normal.      Mouth/Throat:      Mouth: Mucous membranes are moist.      Pharynx: Oropharynx is clear.   Eyes:      Conjunctiva/sclera: Conjunctivae normal.      Pupils: Pupils are equal, round, and reactive to light.   Cardiovascular:      Rate and Rhythm: Regular rhythm. Tachycardia present.   Pulmonary:      Effort: Pulmonary effort is normal. No respiratory distress.      Breath sounds: Normal breath sounds.   Abdominal:      General: Abdomen is flat. There is no distension.      Tenderness: There is no abdominal tenderness. There is no guarding.   Musculoskeletal:         General: Normal range of motion.      Right lower leg: No edema.      Left lower leg: No edema.   Skin:     General: Skin is warm and dry.      Coloration: Skin is pale.   Neurological:      Mental Status: She is alert and oriented to person, place, and time. Mental status is at baseline.         Laboratory:  Recent Labs     11/19/24  1342   WBC 17.4*   RBC 2.59*   HEMOGLOBIN 5.3*   HEMATOCRIT 18.9*   MCV 73.0*   MCH 20.5*   MCHC 28.0*   RDW 53.1*   PLATELETCT 897*   MPV 9.3     Recent Labs     11/19/24  1342   SODIUM 131*   POTASSIUM 3.6   CHLORIDE 96   CO2 23   GLUCOSE 207*   BUN 15   CREATININE 0.48*   CALCIUM 8.3*     Recent Labs     11/19/24  1342   ALTSGPT 5   ASTSGOT 8*   ALKPHOSPHAT 75   TBILIRUBIN 0.2   GLUCOSE 207*         Recent Labs     11/19/24  1342   NTPROBNP 125         Recent Labs     11/19/24  1342   TROPONINT 19       Imaging:  CT-ABDOMEN-PELVIS WITH   Final Result      1.  Large 11 cm mass centered on one of the small bowel loops in the left hemiabdomen. Primary differential considerations include small bowel adenocarcinoma, gastrointestinal stromal tumor and small bowel metastasis.   2.  The mass abuts the  descending and sigmoid colon without colonic invasion. No abutment of other organs.   3.  No metastatic disease in the abdomen.   4.  Small volume pelvic ascites.      CT-CTA CHEST PULMONARY ARTERY W/ RECONS   Final Result      1.  No evidence of pulmonary embolus.      2.  Minimal left pleural effusion.        EKG showed:    Assessment/Plan:  Problem Representation: Construct a 3-5 sentence summary of the patients findings.  This should be in medical language and include key epidemiology or risk factors, the key presentation, temporal features (acute, chronic etc) key physical findings and important descriptive adjectives throughout (right, left, sharp, dull, etc)  I anticipate this patient will require at least two midnights for appropriate medical management, necessitating inpatient admission because severe anemia and new abdominal mass    Patient will need a Telemetry bed on MEDICAL service .  The need is secondary to anemia requiring transfusion .    * Metastatic melanoma (HCC)- (present on admission)  Assessment & Plan  Patient has history of metastatic melanoma first diagnosed in 2016 received wide excisional biopsy with sentinel lymph node testing showed regional metastasis and started on immunotherapies.  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  These medications were discontinued and patient was started on Tafinlar.    -Discussion with her oncologist we will discontinue Tylenol while inpatient.    Hyponatremia  Assessment & Plan  Patient has mild hyponatremia to 131.  Self-reported poor oral intake of food and liquid.  Most likely due to to dehydration and poor oral intake    -Patient receiving IV normal saline in the emergency department  -Repeat BMP in the  morning    Abdominal mass  Assessment & Plan  Patient received CT abdominal exam in the emergency department which showed a large 11 cm mass growing off of her colon located on one of the small bowel loops in the left hemiabdomen.  There is a primary differential of small bowel adenocarcinoma, gastrointestinal stromal tumor, small bowel metastasis.  Patient states that this is a new finding however review of her dermatology notes shows that this is a known disease process that has likely enlarged recently.  The mass is nonobstructive surgical oncology is aware and consulted    -N.p.o. at midnight for possible surgical management  -If surgery feels this is not a good candidate for resection can consider contacting radiation oncology  -Likely this could be contributing to patient's severe anemia.    Anemia- (present on admission)  Assessment & Plan  Patient has a history of chronic anemia with iron deficiency and chronic disease. Likely this is the cause of her severe anemia but cannot rule out blood loss anemia given her large abdominal mass found on CT.  -Obtaining iron panel  -Patient receiving 1 unit of PRBCs  -Repeat hemoglobin in the morning  -Can consider IV iron infusions based off of ferritin results tomorrow.    Diabetic neuropathy (formerly [facility])- (present on admission)  Assessment & Plan  Patient has a history of diabetic neuropathy on several different medications for control of her severe pain  -Continue home regimen    Thrombocytosis- (present on admission)  Assessment & Plan  Patient was found to have increase in her platelet levels to the high 800s.  Most likely this is reactive thrombocytosis given her severe anemia continue to monitor weight  -    DM2 (diabetes mellitus, type 2) (formerly [facility])- (present on admission)  Assessment & Plan  Patient has history of diabetes type 2 on insulin therapy at home.  Has not gotten a recent A1c and given her severe anemia likely will be helpful to determine compliance with insulin  therapies.  Patient reports that she has high sugars into the 300s at home.  -Continue insulin glargine 10 units at nighttime  -Sliding scale insulin holding while n.p.o. for possible surgery        VTE prophylaxis: SCDs/TEDs

## 2024-11-20 NOTE — PROGRESS NOTES
Senior Admit Note:   Patient is a 42 y/o F w/ PMH metastatic melanoma and chronic iron deficiency anemia who presented with chief complaint of generalized weakness and fatigue. She said that her sx gradually worsened over the past month. She reported recent blood transfusion at the end of October and had similar sx at that time. She denied any sx of melena, hematochezia, hematemesis, hematuria, nose bleeds, or recent trauma with bleed. In ED vitals notable for tachycardia 110s. Labs notable for Hgb 5.3. CTA negative for PE. CT abd/pelvis notable for abdominal mass. Pt ultimately admitted blood loss anemia requiring transfusion of multiple units.     Vitals:    11/19/24 1740   BP: 115/68   Pulse: 100   Resp: 17   Temp: 37.1 °C (98.7 °F)   SpO2: 97%    Physical Exam  Vitals and nursing note reviewed.   Constitutional:       General: She is not in acute distress.     Appearance: She is ill-appearing. She is not toxic-appearing or diaphoretic.   Cardiovascular:      Rate and Rhythm: Normal rate and regular rhythm.      Heart sounds: No murmur heard.  Pulmonary:      Effort: Pulmonary effort is normal. No respiratory distress.      Breath sounds: Normal breath sounds. No wheezing, rhonchi or rales.   Abdominal:      General: Abdomen is flat. Bowel sounds are normal. There is no distension.      Palpations: Abdomen is soft.      Tenderness: There is no abdominal tenderness. There is no guarding.   Musculoskeletal:      Right lower leg: No edema.      Left lower leg: No edema.   Skin:     General: Skin is warm and dry.      Coloration: Skin is pale.   Neurological:      Mental Status: She is alert and oriented to person, place, and time.        A/P:   #Acute on chronic blood loss anemia   Likely secondary occult bleed from abdominal mass. CT with 11cm mass L small bowel.   2u RBCs in ED.   -NPO midnight pending surgery recs.   -Surgery Oncology, Dr. Cummings, consulted.   -hold Tafinlar for now   -Iron panel and ferritin-  patient will likely need iron transfusion while inpatient.     #Metastatic Melanoma   #Abdominal Mass   Extensive hx metastatic melanoma. Abd mass most likely secondary to metastatic melanoma. Mass is non-obstructing (pt having regular BMs). Likely causing occult bleed causing patient's anemia.   -see plan acute on chronic blood loss anemia   -outpt oncology f/u.     #Reactive thrombocytosis   Noted on labs.     #T2DM   Most recent A1C of 6.6 on 4/8/24.   -Hold home glargine 5 while NPO   -ISS   -Hypoglycemia diet     #Chronic back pain   -continue home Oxy PRN     #diabetic neuropathy    -duloxetine     #Asthma  -continue inhalers     Code: Full   DVT PPX: holding home apixaban pending surgery recs   Diet: NPO midnight.

## 2024-11-20 NOTE — CARE PLAN
The patient is Stable - Low risk of patient condition declining or worsening    Shift Goals  Clinical Goals: PRBC transfusion, pain management  Patient Goals: Feel better  Family Goals: TRIXIE    Progress made toward(s) clinical / shift goals:        Problem: Pain - Standard  Goal: Alleviation of pain or a reduction in pain to the patient’s comfort goal  Outcome: Progressing     Problem: Fall Risk  Goal: Patient will remain free from falls  Outcome: Progressing     Problem: Respiratory  Goal: Patient will achieve/maintain optimum respiratory ventilation and gas exchange  Outcome: Progressing       Patient is not progressing towards the following goals:

## 2024-11-20 NOTE — CONSULTS
Date of Service  November 20, 2024     Reason for Consult: Known metastatic melanoma, concern small bowel lesion    Requested by: Hospitalist    Location: [facility]    Chief Complaint  Sent by MD (·Active stage 4 melanoma/·Hx anemia and PE - on eliquis/·Hypertensive, tachycardic, weakness in clinic/·Concerns for another clot//Pt denies CP/SOB)      HPI: Ms Jacque Mcintyre is a 44 y/o female with history including relapsed metastatic melanoma ([id number] with BRAF mutation V600E mutation positive) from dorsal aspect of hand with brain mets diagnosed in September 2022 (completed 15 cycles of Yervoy in 2023), and chronic iron deficiency anemia, and PE in 2024, sent from PCP with chief complaint of generalized weakness and fatigue, and several days of nausea and vomiting. She said that her symptoms gradually worsened over the past month. She reported recent blood transfusion at the end of October and had similar symptoms at that time. She denied any melena, hematochezia, hematemesis, hematuria, nose bleeds, or recent trauma with bleed. In ED vitals notable for tachycardia 110s. Labs notable for Hgb 5.3. CTA negative for PE. CT abd/pelvis notable for new 11 cm abdominal mass. Patient ultimately admitted blood loss anemia requiring transfusion of multiple units.     CT A/P on 11/19/24 noted a large 11 cm mass centered on one of the small bowel loops in the left hemiabdomen. The mass abuts the descending and sigmoid colon without colonic invasion. No abutment of other organs. No metastatic disease in the abdomen. Small volume pelvic ascites.    Of note, imaging in 2023 had noted mild chronic inflammation, and lymph node aggregates in abdomen and concern hypermetabolic metastatic associate with small bowel.     This morning patient is awake, oriented, and comfortable in bed. She reports reduced nausea and vomiting, but still no BM since last Sunday. She is currently on oral systemic therapy with Tafinlar, managed by Dr Reed.    Past  Medical History  Past Medical History:   Diagnosis Date    Asthma     inhaler    Bipolar 1 disorder (HCC)     Cancer (HCC) 01/01/2016    melanoma    Cough     Depression     DM mellitus,     insulin    Hyperlipidemia     Hypertension     Pap smear     Dr. Baird    PCOS (polycystic ovarian syndrome)     Shortness of breath     Sputum production     Wheezing        Past Surgical History  Past Surgical History:   Procedure Laterality Date    CRANIOTOMY STEALTH Right 10/6/2022    Procedure: RIGHT FRONTAL CRANIOTOMY WITH STEALTH;  Surgeon: Pebbles Tejada M.D.;  Location: [facility];  Service: Neurosurgery    TX BRONCHOSCOPY,DIAGNOSTIC N/A 6/17/2022    Procedure: FIBER OPTIC BRONCHOSCOPY WITH WASH, BRUSH, BRONCHOALVEOLAR LAVAGE, BIOPSY, FINE NEEDLE ASPIRATION;  Surgeon: Yue Swanson M.D.;  Location: [facility];  Service: Pulmonary    ENDOBRONCHIAL US ADD-ON N/A 6/17/2022    Procedure: ENDOBRONCHIAL ULTRASOUND (EBUS);  Surgeon: Yue Swanson M.D.;  Location: [facility];  Service: Pulmonary    TX LAP,APPENDECTOMY N/A 10/7/2019    Procedure: APPENDECTOMY, LAPAROSCOPIC;  Surgeon: Lionel Frazier M.D.;  Location: [facility];  Service: General    AXILLARY NODE DISSECTION Left 6/23/2017    Procedure: AXILLARY NODE DISSECTION- COMPLETION LYPHADENECTOMY;  Surgeon: Lionel Weinberg M.D.;  Location: [facility];  Service:     WIDE EXCISION Left 5/9/2017    Procedure: WIDE EXCISION - MALIGNANT MELANOMA HAND;  Surgeon: Lionel Weinberg M.D.;  Location: SURGERY SAME DAY [facility];  Service:     NODE BIOPSY SENTINEL Left 5/9/2017    Procedure: NODE BIOPSY SENTINEL - AXILLARY;  Surgeon: Lionel Weinberg M.D.;  Location: SURGERY SAME DAY [facility];  Service:     OTHER  2016    excisino of melanoma left hand    ADENOIDECTOMY      MYRINGOTOMY      with tube placement     Allergies:   Allergies   Allergen Reactions    Lactose Unspecified     GI Upset    Augmentin  "[Amoxicillin-Pot Clavulanate] Vomiting     PCN ok, allergic to the Clavulanate per pt 1/3/24     Problem List  Principal Problem:    Metastatic melanoma (HCC) (POA: Yes)  Active Problems:    DM2 (diabetes mellitus, type 2) (HCC) (POA: Yes)    Thrombocytosis (POA: Yes)    Diabetic neuropathy (HCC) (POA: Yes)    Anemia (Chronic) (POA: Yes)    Abdominal mass (POA: Unknown)    Hyponatremia (POA: Unknown)  Resolved Problems:    * No resolved hospital problems. *    Subjective  Review of Systems   Constitutional:  Negative for chills and fever.   Respiratory:  Negative for cough and shortness of breath.    Cardiovascular:  Negative for chest pain.   Gastrointestinal:  Positive for abdominal pain, nausea and vomiting. Negative for constipation and diarrhea.        \"No BM since Sunday\"       Objective  Temp:  [36.3 °C (97.3 °F)-37.4 °C (99.3 °F)] 37.4 °C (99.3 °F)  Pulse:  [] 98  Resp:  [12-20] 16  BP: ()/(56-74) 92/58  SpO2:  [92 %-99 %] 96 %      Physical Exam  Vitals and nursing note reviewed.   Constitutional:       General: She is not in acute distress.     Appearance: Normal appearance.   HENT:      Head: Normocephalic and atraumatic.      Nose: Nose normal.      Mouth/Throat:      Pharynx: Oropharynx is clear.   Eyes:      Conjunctiva/sclera: Conjunctivae normal.   Cardiovascular:      Rate and Rhythm: Normal rate.   Pulmonary:      Effort: Pulmonary effort is normal. No respiratory distress.   Abdominal:      General: There is no distension.      Palpations: Abdomen is soft.      Tenderness: There is abdominal tenderness. There is no guarding.   Skin:     General: Skin is warm and dry.      Coloration: Skin is not jaundiced.   Neurological:      Mental Status: She is alert and oriented to person, place, and time.   Psychiatric:         Mood and Affect: Mood normal.         Behavior: Behavior normal.        Fluids    Intake/Output Summary (Last 24 hours) at 11/20/2024 0745  Last data filed at 11/20/2024 " 0400  Gross per 24 hour   Intake 597.92 ml   Output 0 ml   Net 597.92 ml         Labs  Lab Results   Component Value Date/Time    SODIUM 138 11/20/2024 04:19 AM    POTASSIUM 3.8 11/20/2024 04:19 AM    CHLORIDE 104 11/20/2024 04:19 AM    CO2 24 11/20/2024 04:19 AM    GLUCOSE 222 (H) 11/20/2024 04:19 AM    BUN 11 11/20/2024 04:19 AM    CREATININE 0.53 11/20/2024 04:19 AM         Lab Results   Component Value Date/Time    PROTHROMBTM 13.3 10/05/2022 03:44 PM    INR 1.02 10/05/2022 03:44 PM         Lab Results   Component Value Date/Time    WBC 11.3 (H) 11/20/2024 04:19 AM    RBC 2.95 (L) 11/20/2024 04:19 AM    HEMOGLOBIN 6.9 (L) 11/20/2024 04:19 AM    HEMATOCRIT 22.6 (L) 11/20/2024 04:19 AM    MCV 76.6 (L) 11/20/2024 04:19 AM    MCH 23.4 (L) 11/20/2024 04:19 AM    MCHC 30.5 (L) 11/20/2024 04:19 AM    MPV 9.1 11/20/2024 04:19 AM    NEUTSPOLYS 74.50 (H) 11/20/2024 04:19 AM    LYMPHOCYTES 11.90 (L) 11/20/2024 04:19 AM    MONOCYTES 11.30 11/20/2024 04:19 AM    EOSINOPHILS 1.00 11/20/2024 04:19 AM    BASOPHILS 0.90 11/20/2024 04:19 AM    HYPOCHROMIA 2+ (A) 11/19/2024 01:42 PM    ANISOCYTOSIS 1+ 11/19/2024 01:42 PM         Recent Labs     11/19/24  1342 11/20/24  0419   ASTSGOT 8* 11*   ALTSGPT 5 <5   TBILIRUBIN 0.2 0.4   GLOBULIN 2.5 2.3      Imaging  CT A/P (11/19/24)  IMPRESSION:  1.  Large 11 cm mass centered on one of the small bowel loops in the left hemiabdomen. Primary differential considerations include small bowel adenocarcinoma, gastrointestinal stromal tumor and small bowel metastasis.  2.  The mass abuts the descending and sigmoid colon without colonic invasion. No abutment of other organs.  3.  No metastatic disease in the abdomen.  4.  Small volume pelvic ascites.    CT PET (6/24/24)  IMPRESSION:  1.  Positive response to therapy. The vast majority of hypermetabolic metastases seen on prior 2022 PET/CT have resolved or are no longer FDG avid.  2.  Most of the pulmonary nodules have resolved. Sub-4 mm residual  pulmonary nodules can be followed with CT imaging.  3.  Some persistent uptake in the left adnexal region, nonspecific.  4.  Nonspecific focal metabolism in one of the left upper quadrant small bowel loops. It may be physiologic, but bowel metastasis is difficult to exclude.  5.  Inhomogeneous brain metabolism, related to multiple treated brain metastases.    Pathology  PATH (10/6/2022)  ADDENDUM:     This case is being addended to report the results of BRAF Mutation   Analysis Molecular Genetics, performed by [employer] on   block A3.     BRAF Mutation:  Detected     Mutation(s):  [id number] (p.V600E)     Please see separate [employer] report for further   details.     Addendum Signed By: Deborah Galvan MD   Addendum Date: 10/13/2022   Original Report Date: 10/07/2022     FINAL DIAGNOSIS:     A. Right frontal brain mass #1:          Positive for metastatic melanoma.   B. Right frontal brain lesion #2:          Positive for metastatic melanoma.   C. Right frontal brain lesion #3:          Positive for metastatic melanoma.      Assessment and Plan  Patient is a 43F with known metastatic melanoma with mets to brain, currently on oral systemic therapy managed by Dr Reed, admitted with several days of persistent nausea and vomiting concern SBO, CT A/P revealed a large 11 cm mass adjacent to small bowel.    Metastatic melanoma, concern new metastatic lesion near small bowel  - Tentative plan for ex lap with small bowel resection on 11/22/24  - Clear diet from AM 11/21/24  - NPO from midnight Thursday  - Bowel prep starting noon on 11/21/24    2. T2DM, A1c 6.6 earlier this year  - Insulin sliding scale  - Consider increase for improved control    3. Iron deficiency anemia, chronic, HGB 6.9  - Replace iron  - Monitor  - Transfuse as indicated  - Plan to have blood available during OR procedure    4. Leukocytosis, WBC 11.3, afebrile  - Monitor    Patient seen with Dr Cummings

## 2024-11-21 LAB
ALBUMIN SERPL BCP-MCNC: 3.1 G/DL (ref 3.2–4.9)
ALBUMIN/GLOB SERPL: 1.3 G/DL
ALP SERPL-CCNC: 71 U/L (ref 30–99)
ALT SERPL-CCNC: <5 U/L (ref 2–50)
ANION GAP SERPL CALC-SCNC: 10 MMOL/L (ref 7–16)
AST SERPL-CCNC: 8 U/L (ref 12–45)
BACTERIA UR CULT: NORMAL
BILIRUB SERPL-MCNC: 0.3 MG/DL (ref 0.1–1.5)
BUN SERPL-MCNC: 9 MG/DL (ref 8–22)
CALCIUM ALBUM COR SERPL-MCNC: 8.9 MG/DL (ref 8.5–10.5)
CALCIUM SERPL-MCNC: 8.2 MG/DL (ref 8.5–10.5)
CHLORIDE SERPL-SCNC: 103 MMOL/L (ref 96–112)
CO2 SERPL-SCNC: 24 MMOL/L (ref 20–33)
CREAT SERPL-MCNC: 0.33 MG/DL (ref 0.5–1.4)
ERYTHROCYTE [DISTWIDTH] IN BLOOD BY AUTOMATED COUNT: 57.1 FL (ref 35.9–50)
GFR SERPLBLD CREATININE-BSD FMLA CKD-EPI: 129 ML/MIN/1.73 M 2
GLOBULIN SER CALC-MCNC: 2.4 G/DL (ref 1.9–3.5)
GLUCOSE BLD STRIP.AUTO-MCNC: 84 MG/DL (ref 65–99)
GLUCOSE SERPL-MCNC: 206 MG/DL (ref 65–99)
HCT VFR BLD AUTO: 26.8 % (ref 37–47)
HGB BLD-MCNC: 8.4 G/DL (ref 12–16)
INR PPP: 1.15 (ref 0.87–1.13)
MCH RBC QN AUTO: 24.6 PG (ref 27–33)
MCHC RBC AUTO-ENTMCNC: 31.3 G/DL (ref 32.2–35.5)
MCV RBC AUTO: 78.6 FL (ref 81.4–97.8)
PLATELET # BLD AUTO: 743 K/UL (ref 164–446)
PMV BLD AUTO: 9.3 FL (ref 9–12.9)
POTASSIUM SERPL-SCNC: 3.5 MMOL/L (ref 3.6–5.5)
PROT SERPL-MCNC: 5.5 G/DL (ref 6–8.2)
PROTHROMBIN TIME: 14.7 SEC (ref 12–14.6)
RBC # BLD AUTO: 3.41 M/UL (ref 4.2–5.4)
SIGNIFICANT IND 70042: NORMAL
SITE SITE: NORMAL
SODIUM SERPL-SCNC: 137 MMOL/L (ref 135–145)
SOURCE SOURCE: NORMAL
WBC # BLD AUTO: 14.2 K/UL (ref 4.8–10.8)

## 2024-11-21 PROCEDURE — 306578 KIT,PORT ACCESS 20G X 1.5INCH

## 2024-11-21 PROCEDURE — 700101 HCHG RX REV CODE 250: Performed by: NURSE PRACTITIONER

## 2024-11-21 PROCEDURE — 90656 IIV3 VACC NO PRSV 0.5 ML IM: CPT

## 2024-11-21 PROCEDURE — 99232 SBSQ HOSP IP/OBS MODERATE 35: CPT | Performed by: INTERNAL MEDICINE

## 2024-11-21 PROCEDURE — A9270 NON-COVERED ITEM OR SERVICE: HCPCS | Performed by: STUDENT IN AN ORGANIZED HEALTH CARE EDUCATION/TRAINING PROGRAM

## 2024-11-21 PROCEDURE — A9270 NON-COVERED ITEM OR SERVICE: HCPCS

## 2024-11-21 PROCEDURE — 700105 HCHG RX REV CODE 258: Performed by: INTERNAL MEDICINE

## 2024-11-21 PROCEDURE — 90471 IMMUNIZATION ADMIN: CPT

## 2024-11-21 PROCEDURE — 700111 HCHG RX REV CODE 636 W/ 250 OVERRIDE (IP): Mod: JZ | Performed by: INTERNAL MEDICINE

## 2024-11-21 PROCEDURE — 700102 HCHG RX REV CODE 250 W/ 637 OVERRIDE(OP)

## 2024-11-21 PROCEDURE — 700111 HCHG RX REV CODE 636 W/ 250 OVERRIDE (IP): Mod: JZ | Performed by: STUDENT IN AN ORGANIZED HEALTH CARE EDUCATION/TRAINING PROGRAM

## 2024-11-21 PROCEDURE — 700102 HCHG RX REV CODE 250 W/ 637 OVERRIDE(OP): Performed by: STUDENT IN AN ORGANIZED HEALTH CARE EDUCATION/TRAINING PROGRAM

## 2024-11-21 PROCEDURE — 85610 PROTHROMBIN TIME: CPT

## 2024-11-21 PROCEDURE — 80053 COMPREHEN METABOLIC PANEL: CPT

## 2024-11-21 PROCEDURE — 770001 HCHG ROOM/CARE - MED/SURG/GYN PRIV*

## 2024-11-21 PROCEDURE — 700111 HCHG RX REV CODE 636 W/ 250 OVERRIDE (IP)

## 2024-11-21 PROCEDURE — 85027 COMPLETE CBC AUTOMATED: CPT

## 2024-11-21 PROCEDURE — 82962 GLUCOSE BLOOD TEST: CPT | Mod: 91

## 2024-11-21 PROCEDURE — 306580 SET INFUSION,POWERLOC 20G X.75

## 2024-11-21 PROCEDURE — 3E02340 INTRODUCTION OF INFLUENZA VACCINE INTO MUSCLE, PERCUTANEOUS APPROACH: ICD-10-PCS | Performed by: INTERNAL MEDICINE

## 2024-11-21 RX ORDER — POTASSIUM CHLORIDE 1500 MG/1
40 TABLET, EXTENDED RELEASE ORAL ONCE
Status: COMPLETED | OUTPATIENT
Start: 2024-11-21 | End: 2024-11-21

## 2024-11-21 RX ADMIN — INFLUENZA A VIRUS A/VICTORIA/4897/2022 IVR-238 (H1N1) ANTIGEN (FORMALDEHYDE INACTIVATED), INFLUENZA A VIRUS A/CALIFORNIA/122/2022 SAN-022 (H3N2) ANTIGEN (FORMALDEHYDE INACTIVATED), AND INFLUENZA B VIRUS B/MICHIGAN/01/2021 ANTIGEN (FORMALDEHYDE INACTIVATED) 0.5 ML: 15; 15; 15 INJECTION, SUSPENSION INTRAMUSCULAR at 17:05

## 2024-11-21 RX ADMIN — OXCARBAZEPINE 300 MG: 300 TABLET, FILM COATED ORAL at 17:03

## 2024-11-21 RX ADMIN — GABAPENTIN 800 MG: 400 CAPSULE ORAL at 09:06

## 2024-11-21 RX ADMIN — POTASSIUM CHLORIDE 40 MEQ: 1500 TABLET, EXTENDED RELEASE ORAL at 09:06

## 2024-11-21 RX ADMIN — INSULIN LISPRO 4 UNITS: 100 INJECTION, SOLUTION INTRAVENOUS; SUBCUTANEOUS at 09:11

## 2024-11-21 RX ADMIN — QUETIAPINE FUMARATE 200 MG: 100 TABLET ORAL at 23:54

## 2024-11-21 RX ADMIN — DULOXETINE HYDROCHLORIDE 60 MG: 30 CAPSULE, DELAYED RELEASE ORAL at 17:05

## 2024-11-21 RX ADMIN — GABAPENTIN 800 MG: 400 CAPSULE ORAL at 17:02

## 2024-11-21 RX ADMIN — SODIUM CHLORIDE 250 MG: 9 INJECTION, SOLUTION INTRAVENOUS at 17:14

## 2024-11-21 RX ADMIN — OXYCODONE HYDROCHLORIDE 10 MG: 10 TABLET ORAL at 14:14

## 2024-11-21 RX ADMIN — ACETAMINOPHEN 1000 MG: 500 TABLET ORAL at 05:10

## 2024-11-21 RX ADMIN — OXYCODONE HYDROCHLORIDE 10 MG: 10 TABLET ORAL at 23:47

## 2024-11-21 RX ADMIN — PROCHLORPERAZINE EDISYLATE 10 MG: 5 INJECTION INTRAMUSCULAR; INTRAVENOUS at 05:19

## 2024-11-21 RX ADMIN — SODIUM CHLORIDE 250 MG: 9 INJECTION, SOLUTION INTRAVENOUS at 05:11

## 2024-11-21 RX ADMIN — ACETAMINOPHEN 1000 MG: 500 TABLET ORAL at 21:43

## 2024-11-21 RX ADMIN — INSULIN LISPRO 4 UNITS: 100 INJECTION, SOLUTION INTRAVENOUS; SUBCUTANEOUS at 14:14

## 2024-11-21 RX ADMIN — POTASSIUM CHLORIDE 40 MEQ: 1500 TABLET, EXTENDED RELEASE ORAL at 14:14

## 2024-11-21 RX ADMIN — OXCARBAZEPINE 300 MG: 300 TABLET, FILM COATED ORAL at 05:10

## 2024-11-21 RX ADMIN — INSULIN GLARGINE-YFGN 5 UNITS: 100 INJECTION, SOLUTION SUBCUTANEOUS at 05:28

## 2024-11-21 RX ADMIN — CLONAZEPAM 1 MG: 1 TABLET ORAL at 05:10

## 2024-11-21 RX ADMIN — POLYETHYLENE GLYCOL-3350 AND ELECTROLYTES 4 L: 236; 6.74; 5.86; 2.97; 22.74 POWDER, FOR SOLUTION ORAL at 12:17

## 2024-11-21 RX ADMIN — TRAZODONE HYDROCHLORIDE 100 MG: 100 TABLET ORAL at 23:54

## 2024-11-21 RX ADMIN — GABAPENTIN 800 MG: 400 CAPSULE ORAL at 21:43

## 2024-11-21 RX ADMIN — GABAPENTIN 800 MG: 400 CAPSULE ORAL at 12:17

## 2024-11-21 RX ADMIN — PROCHLORPERAZINE MALEATE 10 MG: 5 TABLET ORAL at 23:47

## 2024-11-21 RX ADMIN — ATORVASTATIN CALCIUM 40 MG: 40 TABLET, FILM COATED ORAL at 17:02

## 2024-11-21 RX ADMIN — CLONAZEPAM 1 MG: 1 TABLET ORAL at 17:02

## 2024-11-21 RX ADMIN — INSULIN LISPRO 1 UNITS: 100 INJECTION, SOLUTION INTRAVENOUS; SUBCUTANEOUS at 09:12

## 2024-11-21 ASSESSMENT — PAIN DESCRIPTION - PAIN TYPE
TYPE: ACUTE PAIN

## 2024-11-21 ASSESSMENT — ENCOUNTER SYMPTOMS
HEMOPTYSIS: 0
ABDOMINAL PAIN: 0
FEVER: 0
DIARRHEA: 0
NAUSEA: 0
HEARTBURN: 0
COUGH: 0
CHILLS: 0
VOMITING: 0
CONSTIPATION: 0

## 2024-11-21 NOTE — CARE PLAN
The patient is Stable - Low risk of patient condition declining or worsening    Shift Goals  Clinical Goals: golytely, clear liquid  Patient Goals: rest, comfort  Family Goals: bereket    Progress made toward(s) clinical / shift goals:    Problem: Knowledge Deficit - Standard  Goal: Patient and family/care givers will demonstrate understanding of plan of care, disease process/condition, diagnostic tests and medications  Description: Target End Date:  1-3 days or as soon as patient condition allows    Document in Patient Education    1.  Patient and family/caregiver oriented to unit, equipment, visitation policy and means for communicating concern  2.  Complete/review Learning Assessment  3.  Assess knowledge level of disease process/condition, treatment plan, diagnostic tests and medications  4.  Explain disease process/condition, treatment plan, diagnostic tests and medications  Outcome: Progressing  Note: Pt actively participates in POC. Pt and board updated, all questions and concerns answered. Pt encouraged to voice all questions and concerns.Patient aware of procedure tomorrow. Compliant with golytely.      Problem: Pain - Standard  Goal: Alleviation of pain or a reduction in pain to the patient’s comfort goal  Description: Target End Date:  Prior to discharge or change in level of care    Document on Vitals flowsheet    1.  Document pain using the appropriate pain scale per order or unit policy  2.  Educate and implement non-pharmacologic comfort measures (i.e. relaxation, distraction, massage, cold/heat therapy, etc.)  3.  Pain management medications as ordered  4.  Reassess pain after pain med administration per policy  5.  If opiods administered assess patient's response to pain medication is appropriate per POSS sedation scale  6.  Follow pain management plan developed in collaboration with patient and interdisciplinary team (including palliative care or pain specialists if applicable)  Outcome:  Progressing  Note: Pain managed with PRN pain medication.

## 2024-11-21 NOTE — THERAPY
Physical Therapy Contact Note    Patient Name: Jacque Mcintyre  Age:  43 y.o., Sex:  female  Medical Record #: [id number]  Today's Date: 11/21/2024    Current 6-clicks is a 24, but patient is to have surgery tomorrow (Friday, 11/22). Discussed current mobility status with patient, no current deficits per patient. Will hold on PT evaluation this date and will address after surgery.     Savanna Latif, PT, DPT

## 2024-11-21 NOTE — HOSPITAL COURSE
Jacque Mcintyre is a 43 y.o. female who presented 11/19/2024 after seeing her primary care provider today who had concerns for possible pulmonary embolism given high Wells criteria.  Patient has a past medical history of stage IV melanoma disease ([id number] with BRAF mutation V600E mutation positive ) diagnosed in 2016 received immunotherapy with recurrence in 2022 and metastasis to the brain requiring surgical intervention.  Patient was on immunotherapy until earlier this year where she developed colitis secondary to immunotherapy which was stopped and patient was started on Tafeninib monotherapy.  Type 2 diabetes (insulin-dependent), hypertension, depression, bipolar 1 disorder, diabetic neuropathy, asthma, dyslipidemia.  Patient states that she has not been having any chest pain, calf pain, does have a history of pulmonary embolisms in the past and was taking chronic Eliquis therapy.  Patient states that the PE in the past is unprovoked most likely due to her melanoma malignancy.  Patient reports that she has had a 7 pound weight loss over the past month, increase in her fatigue, weakness that has not changed from baseline, no changes in her bowel or bladder habits.  Patient denies hematochezia, melena.     In the emergency department patient was found to have an elevated white blood cell count to 17.4 and severe anemia with a hemoglobin of 5.3 hematocrit 18.9.  Her platelets were elevated to 897 and patient endorses chronic thrombocytosis.  CBC revealed hyponatremia to 131 and hyperglycemia to 207.  Troponins were within normal limits BMP was normal and no elevation in her lactic acid.  A CT abdomen was obtained which showed a large 11 cm mass centered on one of the small bowel loops in the left hemiabdomen concerning for possible small bowel adenocarcinoma, gastrointestinal stromal tumor, small bowel metastasis.  The mass abrupt the descending and sigmoid colon without colonic invasion.  No metastatic disease in  the abdomen.  Small volume pelvic ascites.  A CTA of her chest was obtained which did not show any evidence of pulmonary embolism and minimal left pleural effusion.  EKG was obtained which showed increase in her QTc to 515 and sinus tachycardia.     Blood type and screen was obtained and patient was given 1 units of AB+ blood in the emergency room.  Given her anemia patient was admitted to the hospital for management and further workup of her abdominal mass.  She received an additional 2 units of blood and was deemed a surgical candidate to undergo exploratory laparotomy with small bowel resection on 11/22

## 2024-11-21 NOTE — CARE PLAN
The patient is Stable - Low risk of patient condition declining or worsening    Shift Goals  Clinical Goals: pain management, monitor Hbg, VS, BG  Patient Goals: rest  Family Goals: TRIXIE    Progress made toward(s) clinical / shift goals:    Problem: Hemodynamics  Goal: Patient's hemodynamics, fluid balance and neurologic status will be stable or improve  Description: Target End Date:  Prior to discharge or change in level of care    Document on Assessment and I/O flowsheet templates    1.  Monitor vital signs, pulse oximetry and cardiac monitor per provider order and/or policy  2.  Maintain blood pressure per provider order  3.  Hemodynamic monitoring per provider order  4.  Manage IV fluids and IV infusions  5.  Monitor intake and output  6.  Daily weights per unit policy or provider order  7.  Assess peripheral pulses and capillary refill  8.  Assess color and body temperature  9.  Position patient for maximum circulation/cardiac output  10. Monitor for signs/symptoms of excessive bleeding  11. Assess mental status, restlessness and changes in level of consciousness  12. Monitor temperature and report fever or hypothermia to provider immediately. Consideration of targeted temperature management.  Outcome: Progressing  Note: BP still soft but stable, pt not tachycardic anymore, SR. Hbg shoshana to 8.4 this morning from 5.3 g/dL at admission, after receiving a total of 3 units of RBCs since admission.     Problem: Fluid Volume:  Goal: Will maintain balanced intake and output  Outcome: Progressing  Note: Pt on a clear liquid diet starting this morning, bowel prep stating today at noon. Abdominal surgery planned for Friday 11/22.

## 2024-11-21 NOTE — PROGRESS NOTES
[facility] Internal Medicine Daily Progress Note    Date of Service  11/21/2024    R Team: R IM Jamil Team   Attending: Justen Ravi M.d.  Senior Resident: Dr. Mathew  Intern:  Dr. Hui  Contact Number: [phone]    Chief Complaint  Jacque Mcintyre is a 43 y.o. female admitted 11/19/2024 with blood loss anemia    Hospital Course  Jacque Mcintyre is a 43 y.o. female who presented 11/19/2024 after seeing her primary care provider today who had concerns for possible pulmonary embolism given high Wells criteria.  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  Patient was on immunotherapy until earlier this year where she developed colitis secondary to immunotherapy which was stopped and patient was started on Tafeninib monotherapy.  Type 2 diabetes (insulin-dependent), hypertension, depression, bipolar 1 disorder, diabetic neuropathy, asthma, dyslipidemia.  Patient states that she has not been having any chest pain, calf pain, does have a history of pulmonary embolisms in the past and was taking chronic Eliquis therapy.  Patient states that the PE in the past is unprovoked most likely due to her melanoma malignancy.  Patient reports that she has had a 7 pound weight loss over the past month, increase in her fatigue, weakness that has not changed from baseline, no changes in her bowel or bladder habits.  Patient denies hematochezia, melena.     In the emergency department patient was found to have an elevated white blood cell count to 17.4 and severe anemia with a hemoglobin of 5.3 hematocrit 18.9.  Her platelets were elevated to 897 and patient endorses chronic thrombocytosis.  CBC revealed hyponatremia to 131 and hyperglycemia to 207.  Troponins were within normal limits BMP was normal and no elevation in her lactic  acid.  A CT abdomen was obtained which showed a large 11 cm mass centered on one of the small bowel loops in the left hemiabdomen concerning for possible small bowel adenocarcinoma, gastrointestinal stromal tumor, small bowel metastasis.  The mass abrupt the descending and sigmoid colon without colonic invasion.  No metastatic disease in the abdomen.  Small volume pelvic ascites.  A CTA of her chest was obtained which did not show any evidence of pulmonary embolism and minimal left pleural effusion.  EKG was obtained which showed increase in her QTc to 515 and sinus tachycardia.     Blood type and screen was obtained and patient was given 1 unit of AB+ blood in the emergency room.  Given her anemia patient was admitted to the hospital for management and further workup of her abdominal mass.    Interval Problem Update  Patient was seen and examined at bedside no acute events overnight.  We discussed that her surgery is planned for 11/22 that she will need to be n.p.o. at midnight.  She did not offer any complaints at this time and was on board with her current plan of care.    I have discussed this patient's plan of care and discharge plan at IDT rounds today with Case Management, Nursing, Nursing leadership, and other members of the IDT team.    Consultants/Specialty  Surgical oncology    Code Status  Full Code    Disposition  The patient is not medically cleared for discharge to home or a post-acute facility.  Anticipate discharge to: home with close outpatient follow-up    I have placed the appropriate orders for post-discharge needs.    Review of Systems  Review of Systems   Constitutional:  Negative for chills and fever.   Respiratory:  Negative for cough and hemoptysis.    Cardiovascular:  Negative for chest pain and leg swelling.   Gastrointestinal:  Negative for abdominal pain, constipation, diarrhea, heartburn, nausea and vomiting.   Genitourinary:  Negative for dysuria, frequency and urgency.         Physical Exam  Temp:  [36.4 °C (97.5 °F)-36.7 °C (98 °F)] 36.4 °C (97.5 °F)  Pulse:  [74-96] 96  Resp:  [17-18] 17  BP: (92-99)/(56-64) 99/64  SpO2:  [96 %] 96 %    Physical Exam  Constitutional:       General: She is not in acute distress.     Appearance: She is not ill-appearing.   HENT:      Nose: Nose normal.      Mouth/Throat:      Mouth: Mucous membranes are moist.      Pharynx: Oropharynx is clear.   Eyes:      Extraocular Movements: Extraocular movements intact.      Conjunctiva/sclera: Conjunctivae normal.      Pupils: Pupils are equal, round, and reactive to light.   Cardiovascular:      Rate and Rhythm: Normal rate and regular rhythm.   Pulmonary:      Effort: Pulmonary effort is normal. No respiratory distress.      Breath sounds: Normal breath sounds. No wheezing or rales.   Abdominal:      General: Abdomen is flat. There is no distension.      Palpations: Abdomen is soft.      Tenderness: There is no abdominal tenderness. There is no guarding.   Skin:     General: Skin is warm and dry.      Coloration: Skin is pale (Improving). Skin is not jaundiced.      Findings: No bruising or lesion.   Neurological:      Mental Status: She is alert.   Psychiatric:         Mood and Affect: Mood normal.         Behavior: Behavior normal.         Fluids    Intake/Output Summary (Last 24 hours) at 11/21/2024 1202  Last data filed at 11/21/2024 0800  Gross per 24 hour   Intake 1395.61 ml   Output 400 ml   Net 995.61 ml       Laboratory  Recent Labs     11/19/24  1342 11/20/24  0419 11/21/24  0024   WBC 17.4* 11.3* 14.2*   RBC 2.59* 2.95* 3.41*   HEMOGLOBIN 5.3* 6.9* 8.4*   HEMATOCRIT 18.9* 22.6* 26.8*   MCV 73.0* 76.6* 78.6*   MCH 20.5* 23.4* 24.6*   MCHC 28.0* 30.5* 31.3*   RDW 53.1* 55.8* 57.1*   PLATELETCT 897* 724* 743*   MPV 9.3 9.1 9.3     Recent Labs     11/19/24  1342 11/20/24  0419 11/21/24  0024   SODIUM 131* 138 137   POTASSIUM 3.6 3.8 3.5*   CHLORIDE 96 104 103   CO2 23 24 24   GLUCOSE 207* 222* 206*    BUN 15 11 9   CREATININE 0.48* 0.53 0.33*   CALCIUM 8.3* 8.5 8.2*     Recent Labs     11/21/24  0024   INR 1.15*               Imaging  CT-ABDOMEN-PELVIS WITH   Final Result      1.  Large 11 cm mass centered on one of the small bowel loops in the left hemiabdomen. Primary differential considerations include small bowel adenocarcinoma, gastrointestinal stromal tumor and small bowel metastasis.   2.  The mass abuts the descending and sigmoid colon without colonic invasion. No abutment of other organs.   3.  No metastatic disease in the abdomen.   4.  Small volume pelvic ascites.      CT-CTA CHEST PULMONARY ARTERY W/ RECONS   Final Result      1.  No evidence of pulmonary embolus.      2.  Minimal left pleural effusion.           Assessment/Plan  Problem Representation:    * Metastatic melanoma (HCC)- (present on admission)  Assessment & Plan  Patient has history of metastatic melanoma first diagnosed in 2016 received wide excisional biopsy with sentinel lymph node testing showed regional metastasis and started on immunotherapies.  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  These medications were discontinued and patient was started on Tafinlar.  -Discussion with her oncologist we will discontinue Tylenol while inpatient.    Hyponatremia  Assessment & Plan  resolved    Abdominal mass  Assessment & Plan  Patient received CT abdominal exam in the emergency department which showed a large 11 cm mass growing off of her colon located on one of the small bowel loops in the left hemiabdomen.  There is a primary differential of small bowel adenocarcinoma, gastrointestinal stromal tumor, small bowel metastasis.  Patient states that this is a new finding however review of her dermatology notes  shows that this is a known disease process that has likely enlarged recently.  The mass is nonobstructive surgical oncology is aware and consulted. This is most likely cause of patients anemia.   -Follow surgery recs:     -11/21 clear liquid diet and bowel prep    -NPO 11/22     -Plan for surgery 11/22.     Anemia- (present on admission)  Assessment & Plan  Patient has a history of chronic anemia with iron deficiency and chronic disease. Likely this is the cause of her severe anemia but cannot rule out blood loss anemia given her large abdominal mass found on CT.  Received 3u RBCs since admission.   -Iron transfusion- pharmacy to dose.   -AM Hgb     Diabetic neuropathy (HCC)- (present on admission)  Assessment & Plan  Patient has a history of diabetic neuropathy on several different medications for control of her severe pain  -Continue home regimen    Thrombocytosis- (present on admission)  Assessment & Plan  Patient was found to have increase in her platelet levels to the high 800s.  Most likely this is reactive thrombocytosis given her severe anemia continue to monitor.     DM2 (diabetes mellitus, type 2) ([facility])- (present on admission)  Assessment & Plan  Patient has history of diabetes type 2 on insulin therapy at home.  Has not gotten a recent A1c and given her severe anemia likely will be helpful to determine compliance with insulin therapies.  Patient reports that she has high sugars into the 300s at home.  -continue home glargine   -Sliding scale insulin   -Hypoglycemia protocol   -will need to hold insulin when NPO          VTE prophylaxis: SCDs/TEDs    I have performed a physical exam and reviewed and updated ROS and Plan today (11/21/2024). In review of yesterday's note (11/20/2024), there are no changes except as documented above.

## 2024-11-22 ENCOUNTER — ANESTHESIA EVENT (OUTPATIENT)
Dept: SURGERY | Facility: MEDICAL CENTER | Age: 44
End: 2024-11-22
Payer: MEDICAID

## 2024-11-22 ENCOUNTER — ANESTHESIA (OUTPATIENT)
Dept: SURGERY | Facility: MEDICAL CENTER | Age: 44
End: 2024-11-22
Payer: MEDICAID

## 2024-11-22 PROBLEM — F12.90 MARIJUANA USE: Status: ACTIVE | Noted: 2024-11-22

## 2024-11-22 LAB
ANION GAP SERPL CALC-SCNC: 13 MMOL/L (ref 7–16)
BASE EXCESS BLDA CALC-SCNC: -1 MMOL/L (ref -4–3)
BODY TEMPERATURE: ABNORMAL DEGREES
BUN SERPL-MCNC: 4 MG/DL (ref 8–22)
CA-I BLD ISE-SCNC: 1.09 MMOL/L (ref 1.1–1.3)
CALCIUM SERPL-MCNC: 8 MG/DL (ref 8.5–10.5)
CHLORIDE SERPL-SCNC: 103 MMOL/L (ref 96–112)
CO2 BLDA-SCNC: 24 MMOL/L (ref 32–48)
CO2 SERPL-SCNC: 22 MMOL/L (ref 20–33)
CREAT SERPL-MCNC: 0.37 MG/DL (ref 0.5–1.4)
END TIDAL CARBON DIOXIDE IECO2: 30 MMHG
ERYTHROCYTE [DISTWIDTH] IN BLOOD BY AUTOMATED COUNT: 58.8 FL (ref 35.9–50)
GFR SERPLBLD CREATININE-BSD FMLA CKD-EPI: 126 ML/MIN/1.73 M 2
GLUCOSE BLD STRIP.AUTO-MCNC: 109 MG/DL (ref 65–99)
GLUCOSE BLD STRIP.AUTO-MCNC: 131 MG/DL (ref 65–99)
GLUCOSE BLD STRIP.AUTO-MCNC: 134 MG/DL (ref 65–99)
GLUCOSE BLD STRIP.AUTO-MCNC: 138 MG/DL (ref 65–99)
GLUCOSE BLD STRIP.AUTO-MCNC: 189 MG/DL (ref 65–99)
GLUCOSE BLD STRIP.AUTO-MCNC: 212 MG/DL (ref 65–99)
GLUCOSE SERPL-MCNC: 188 MG/DL (ref 65–99)
HCO3 BLDA-SCNC: 23.3 MMOL/L (ref 21–28)
HCT VFR BLD AUTO: 27 % (ref 37–47)
HCT VFR BLD CALC: 26 % (ref 37–47)
HGB BLD-MCNC: 7.9 G/DL (ref 12–16)
HGB BLD-MCNC: 8.8 G/DL (ref 12–16)
MCH RBC QN AUTO: 23 PG (ref 27–33)
MCHC RBC AUTO-ENTMCNC: 29.3 G/DL (ref 32.2–35.5)
MCV RBC AUTO: 78.7 FL (ref 81.4–97.8)
PATHOLOGY CONSULT NOTE: NORMAL
PCO2 BLDA: 37.4 MMHG (ref 32–48)
PCO2 TEMP ADJ BLDA: 37.4 MMHG (ref 32–48)
PH BLDA: 7.4 [PH] (ref 7.35–7.45)
PH TEMP ADJ BLDA: 7.4 [PH] (ref 7.35–7.45)
PLATELET # BLD AUTO: 704 K/UL (ref 164–446)
PMV BLD AUTO: 9.2 FL (ref 9–12.9)
PO2 BLDA: 329 MMHG (ref 83–108)
PO2 TEMP ADJ BLDA: 329 MMHG (ref 64–87)
POTASSIUM BLD-SCNC: 3.4 MMOL/L (ref 3.6–5.5)
POTASSIUM SERPL-SCNC: 3.6 MMOL/L (ref 3.6–5.5)
RBC # BLD AUTO: 3.43 M/UL (ref 4.2–5.4)
SAO2 % BLDA: 100 % (ref 93–99)
SODIUM BLD-SCNC: 137 MMOL/L (ref 135–145)
SODIUM SERPL-SCNC: 138 MMOL/L (ref 135–145)
SPECIMEN DRAWN FROM PATIENT: ABNORMAL
WBC # BLD AUTO: 11.5 K/UL (ref 4.8–10.8)

## 2024-11-22 PROCEDURE — A9270 NON-COVERED ITEM OR SERVICE: HCPCS | Performed by: ANESTHESIOLOGY

## 2024-11-22 PROCEDURE — 160002 HCHG RECOVERY MINUTES (STAT): Performed by: SURGERY

## 2024-11-22 PROCEDURE — 700105 HCHG RX REV CODE 258: Performed by: ANESTHESIOLOGY

## 2024-11-22 PROCEDURE — 160009 HCHG ANES TIME/MIN: Performed by: SURGERY

## 2024-11-22 PROCEDURE — 62320 NJX INTERLAMINAR CRV/THRC: CPT | Performed by: SURGERY

## 2024-11-22 PROCEDURE — 160042 HCHG SURGERY MINUTES - EA ADDL 1 MIN LEVEL 5: Performed by: SURGERY

## 2024-11-22 PROCEDURE — 82962 GLUCOSE BLOOD TEST: CPT

## 2024-11-22 PROCEDURE — 84132 ASSAY OF SERUM POTASSIUM: CPT

## 2024-11-22 PROCEDURE — 88342 IMHCHEM/IMCYTCHM 1ST ANTB: CPT

## 2024-11-22 PROCEDURE — 700102 HCHG RX REV CODE 250 W/ 637 OVERRIDE(OP)

## 2024-11-22 PROCEDURE — 160048 HCHG OR STATISTICAL LEVEL 1-5: Performed by: SURGERY

## 2024-11-22 PROCEDURE — 700111 HCHG RX REV CODE 636 W/ 250 OVERRIDE (IP): Mod: JZ | Performed by: ANESTHESIOLOGY

## 2024-11-22 PROCEDURE — 85027 COMPLETE CBC AUTOMATED: CPT

## 2024-11-22 PROCEDURE — 99232 SBSQ HOSP IP/OBS MODERATE 35: CPT | Performed by: STUDENT IN AN ORGANIZED HEALTH CARE EDUCATION/TRAINING PROGRAM

## 2024-11-22 PROCEDURE — 88309 TISSUE EXAM BY PATHOLOGIST: CPT

## 2024-11-22 PROCEDURE — C1729 CATH, DRAINAGE: HCPCS | Performed by: SURGERY

## 2024-11-22 PROCEDURE — 700101 HCHG RX REV CODE 250: Performed by: ANESTHESIOLOGY

## 2024-11-22 PROCEDURE — A9270 NON-COVERED ITEM OR SERVICE: HCPCS

## 2024-11-22 PROCEDURE — 700105 HCHG RX REV CODE 258: Performed by: SURGERY

## 2024-11-22 PROCEDURE — 80048 BASIC METABOLIC PNL TOTAL CA: CPT

## 2024-11-22 PROCEDURE — 3E0R3BZ INTRODUCTION OF ANESTHETIC AGENT INTO SPINAL CANAL, PERCUTANEOUS APPROACH: ICD-10-PCS | Performed by: ANESTHESIOLOGY

## 2024-11-22 PROCEDURE — 82803 BLOOD GASES ANY COMBINATION: CPT

## 2024-11-22 PROCEDURE — 99232 SBSQ HOSP IP/OBS MODERATE 35: CPT | Mod: GC | Performed by: INTERNAL MEDICINE

## 2024-11-22 PROCEDURE — 160031 HCHG SURGERY MINUTES - 1ST 30 MINS LEVEL 5: Performed by: SURGERY

## 2024-11-22 PROCEDURE — 700111 HCHG RX REV CODE 636 W/ 250 OVERRIDE (IP): Performed by: ANESTHESIOLOGY

## 2024-11-22 PROCEDURE — 85014 HEMATOCRIT: CPT

## 2024-11-22 PROCEDURE — 94669 MECHANICAL CHEST WALL OSCILL: CPT

## 2024-11-22 PROCEDURE — 160035 HCHG PACU - 1ST 60 MINS PHASE I: Performed by: SURGERY

## 2024-11-22 PROCEDURE — 82330 ASSAY OF CALCIUM: CPT

## 2024-11-22 PROCEDURE — 0DB80ZZ EXCISION OF SMALL INTESTINE, OPEN APPROACH: ICD-10-PCS | Performed by: SURGERY

## 2024-11-22 PROCEDURE — 700102 HCHG RX REV CODE 250 W/ 637 OVERRIDE(OP): Performed by: ANESTHESIOLOGY

## 2024-11-22 PROCEDURE — 770022 HCHG ROOM/CARE - ICU (200)

## 2024-11-22 PROCEDURE — 99024 POSTOP FOLLOW-UP VISIT: CPT | Performed by: SURGERY

## 2024-11-22 PROCEDURE — 84295 ASSAY OF SERUM SODIUM: CPT

## 2024-11-22 RX ORDER — SODIUM CHLORIDE, SODIUM LACTATE, POTASSIUM CHLORIDE, CALCIUM CHLORIDE 600; 310; 30; 20 MG/100ML; MG/100ML; MG/100ML; MG/100ML
INJECTION, SOLUTION INTRAVENOUS
Status: DISCONTINUED | OUTPATIENT
Start: 2024-11-22 | End: 2024-11-22 | Stop reason: SURG

## 2024-11-22 RX ORDER — SODIUM CHLORIDE 9 MG/ML
1000 INJECTION, SOLUTION INTRAVENOUS ONCE
Status: COMPLETED | OUTPATIENT
Start: 2024-11-22 | End: 2024-11-22

## 2024-11-22 RX ORDER — CEFOTETAN DISODIUM 2 G/20ML
INJECTION, POWDER, FOR SOLUTION INTRAMUSCULAR; INTRAVENOUS PRN
Status: DISCONTINUED | OUTPATIENT
Start: 2024-11-22 | End: 2024-11-22 | Stop reason: SURG

## 2024-11-22 RX ORDER — ALBUTEROL SULFATE 5 MG/ML
2.5 SOLUTION RESPIRATORY (INHALATION)
Status: DISCONTINUED | OUTPATIENT
Start: 2024-11-22 | End: 2024-11-22 | Stop reason: HOSPADM

## 2024-11-22 RX ORDER — SODIUM CHLORIDE, SODIUM GLUCONATE, SODIUM ACETATE, POTASSIUM CHLORIDE AND MAGNESIUM CHLORIDE 526; 502; 368; 37; 30 MG/100ML; MG/100ML; MG/100ML; MG/100ML; MG/100ML
INJECTION, SOLUTION INTRAVENOUS
Status: DISCONTINUED | OUTPATIENT
Start: 2024-11-22 | End: 2024-11-22 | Stop reason: SURG

## 2024-11-22 RX ORDER — MIDAZOLAM HYDROCHLORIDE 1 MG/ML
2 INJECTION INTRAMUSCULAR; INTRAVENOUS
Status: DISCONTINUED | OUTPATIENT
Start: 2024-11-22 | End: 2024-11-22 | Stop reason: HOSPADM

## 2024-11-22 RX ORDER — OXYCODONE HCL 5 MG/5 ML
10 SOLUTION, ORAL ORAL
Status: DISCONTINUED | OUTPATIENT
Start: 2024-11-22 | End: 2024-11-22 | Stop reason: HOSPADM

## 2024-11-22 RX ORDER — SODIUM CHLORIDE, SODIUM LACTATE, POTASSIUM CHLORIDE, CALCIUM CHLORIDE 600; 310; 30; 20 MG/100ML; MG/100ML; MG/100ML; MG/100ML
INJECTION, SOLUTION INTRAVENOUS CONTINUOUS
Status: DISCONTINUED | OUTPATIENT
Start: 2024-11-22 | End: 2024-11-22 | Stop reason: HOSPADM

## 2024-11-22 RX ORDER — SCOLOPAMINE TRANSDERMAL SYSTEM 1 MG/1
1 PATCH, EXTENDED RELEASE TRANSDERMAL
Status: COMPLETED | OUTPATIENT
Start: 2024-11-22 | End: 2024-11-25

## 2024-11-22 RX ORDER — HALOPERIDOL 5 MG/ML
1 INJECTION INTRAMUSCULAR
Status: DISCONTINUED | OUTPATIENT
Start: 2024-11-22 | End: 2024-11-22 | Stop reason: HOSPADM

## 2024-11-22 RX ORDER — HYDROMORPHONE HYDROCHLORIDE 1 MG/ML
0.4 INJECTION, SOLUTION INTRAMUSCULAR; INTRAVENOUS; SUBCUTANEOUS
Status: DISCONTINUED | OUTPATIENT
Start: 2024-11-22 | End: 2024-11-22 | Stop reason: HOSPADM

## 2024-11-22 RX ORDER — ONDANSETRON 2 MG/ML
INJECTION INTRAMUSCULAR; INTRAVENOUS PRN
Status: DISCONTINUED | OUTPATIENT
Start: 2024-11-22 | End: 2024-11-22 | Stop reason: SURG

## 2024-11-22 RX ORDER — LIDOCAINE HYDROCHLORIDE 20 MG/ML
INJECTION, SOLUTION EPIDURAL; INFILTRATION; INTRACAUDAL; PERINEURAL PRN
Status: DISCONTINUED | OUTPATIENT
Start: 2024-11-22 | End: 2024-11-22 | Stop reason: SURG

## 2024-11-22 RX ORDER — HYDROMORPHONE HYDROCHLORIDE 1 MG/ML
0.1 INJECTION, SOLUTION INTRAMUSCULAR; INTRAVENOUS; SUBCUTANEOUS
Status: DISCONTINUED | OUTPATIENT
Start: 2024-11-22 | End: 2024-11-22 | Stop reason: HOSPADM

## 2024-11-22 RX ORDER — PHENYLEPHRINE HYDROCHLORIDE 10 MG/ML
INJECTION, SOLUTION INTRAMUSCULAR; INTRAVENOUS; SUBCUTANEOUS PRN
Status: DISCONTINUED | OUTPATIENT
Start: 2024-11-22 | End: 2024-11-22 | Stop reason: SURG

## 2024-11-22 RX ORDER — DEXAMETHASONE SODIUM PHOSPHATE 4 MG/ML
INJECTION, SOLUTION INTRA-ARTICULAR; INTRALESIONAL; INTRAMUSCULAR; INTRAVENOUS; SOFT TISSUE PRN
Status: DISCONTINUED | OUTPATIENT
Start: 2024-11-22 | End: 2024-11-22 | Stop reason: SURG

## 2024-11-22 RX ORDER — EPHEDRINE SULFATE 50 MG/ML
5 INJECTION, SOLUTION INTRAVENOUS
Status: DISCONTINUED | OUTPATIENT
Start: 2024-11-22 | End: 2024-11-22 | Stop reason: HOSPADM

## 2024-11-22 RX ORDER — BUPIVACAINE HYDROCHLORIDE 2.5 MG/ML
INJECTION, SOLUTION EPIDURAL; INFILTRATION; INTRACAUDAL PRN
Status: DISCONTINUED | OUTPATIENT
Start: 2024-11-22 | End: 2024-11-22 | Stop reason: SURG

## 2024-11-22 RX ORDER — SUCCINYLCHOLINE CHLORIDE 20 MG/ML
INJECTION INTRAMUSCULAR; INTRAVENOUS PRN
Status: DISCONTINUED | OUTPATIENT
Start: 2024-11-22 | End: 2024-11-22 | Stop reason: SURG

## 2024-11-22 RX ORDER — LABETALOL HYDROCHLORIDE 5 MG/ML
5 INJECTION, SOLUTION INTRAVENOUS
Status: DISCONTINUED | OUTPATIENT
Start: 2024-11-22 | End: 2024-11-22 | Stop reason: HOSPADM

## 2024-11-22 RX ORDER — ROCURONIUM BROMIDE 10 MG/ML
INJECTION, SOLUTION INTRAVENOUS PRN
Status: DISCONTINUED | OUTPATIENT
Start: 2024-11-22 | End: 2024-11-22 | Stop reason: SURG

## 2024-11-22 RX ORDER — LIDOCAINE HYDROCHLORIDE AND EPINEPHRINE 15; 5 MG/ML; UG/ML
INJECTION, SOLUTION EPIDURAL
Status: COMPLETED | OUTPATIENT
Start: 2024-11-22 | End: 2024-11-22

## 2024-11-22 RX ORDER — ONDANSETRON 2 MG/ML
4 INJECTION INTRAMUSCULAR; INTRAVENOUS EVERY 4 HOURS PRN
Status: DISCONTINUED | OUTPATIENT
Start: 2024-11-22 | End: 2024-11-27 | Stop reason: HOSPADM

## 2024-11-22 RX ORDER — MIDAZOLAM HYDROCHLORIDE 1 MG/ML
INJECTION INTRAMUSCULAR; INTRAVENOUS PRN
Status: DISCONTINUED | OUTPATIENT
Start: 2024-11-22 | End: 2024-11-22 | Stop reason: SURG

## 2024-11-22 RX ORDER — OXYCODONE HCL 10 MG/1
10 TABLET, FILM COATED, EXTENDED RELEASE ORAL ONCE
Status: COMPLETED | OUTPATIENT
Start: 2024-11-22 | End: 2024-11-22

## 2024-11-22 RX ORDER — HYDROMORPHONE HYDROCHLORIDE 1 MG/ML
0.2 INJECTION, SOLUTION INTRAMUSCULAR; INTRAVENOUS; SUBCUTANEOUS
Status: DISCONTINUED | OUTPATIENT
Start: 2024-11-22 | End: 2024-11-22 | Stop reason: HOSPADM

## 2024-11-22 RX ORDER — DIPHENHYDRAMINE HYDROCHLORIDE 50 MG/ML
25 INJECTION INTRAMUSCULAR; INTRAVENOUS EVERY 6 HOURS PRN
Status: DISCONTINUED | OUTPATIENT
Start: 2024-11-22 | End: 2024-11-27 | Stop reason: HOSPADM

## 2024-11-22 RX ORDER — SCOLOPAMINE TRANSDERMAL SYSTEM 1 MG/1
1 PATCH, EXTENDED RELEASE TRANSDERMAL
Status: DISCONTINUED | OUTPATIENT
Start: 2024-11-25 | End: 2024-11-27 | Stop reason: HOSPADM

## 2024-11-22 RX ORDER — ACETAMINOPHEN 500 MG
1000 TABLET ORAL ONCE
Status: COMPLETED | OUTPATIENT
Start: 2024-11-22 | End: 2024-11-22

## 2024-11-22 RX ORDER — HALOPERIDOL 5 MG/ML
1 INJECTION INTRAMUSCULAR EVERY 6 HOURS PRN
Status: DISCONTINUED | OUTPATIENT
Start: 2024-11-22 | End: 2024-11-27 | Stop reason: HOSPADM

## 2024-11-22 RX ORDER — OXYCODONE HCL 5 MG/5 ML
5 SOLUTION, ORAL ORAL
Status: DISCONTINUED | OUTPATIENT
Start: 2024-11-22 | End: 2024-11-22 | Stop reason: HOSPADM

## 2024-11-22 RX ORDER — DEXAMETHASONE SODIUM PHOSPHATE 4 MG/ML
4 INJECTION, SOLUTION INTRA-ARTICULAR; INTRALESIONAL; INTRAMUSCULAR; INTRAVENOUS; SOFT TISSUE
Status: COMPLETED | OUTPATIENT
Start: 2024-11-22 | End: 2024-11-25

## 2024-11-22 RX ORDER — DIPHENHYDRAMINE HYDROCHLORIDE 50 MG/ML
12.5 INJECTION INTRAMUSCULAR; INTRAVENOUS
Status: DISCONTINUED | OUTPATIENT
Start: 2024-11-22 | End: 2024-11-22 | Stop reason: HOSPADM

## 2024-11-22 RX ORDER — LIDOCAINE HYDROCHLORIDE 40 MG/ML
SOLUTION TOPICAL PRN
Status: DISCONTINUED | OUTPATIENT
Start: 2024-11-22 | End: 2024-11-22 | Stop reason: SURG

## 2024-11-22 RX ORDER — HYDRALAZINE HYDROCHLORIDE 20 MG/ML
5 INJECTION INTRAMUSCULAR; INTRAVENOUS
Status: DISCONTINUED | OUTPATIENT
Start: 2024-11-22 | End: 2024-11-22 | Stop reason: HOSPADM

## 2024-11-22 RX ADMIN — MIDAZOLAM HYDROCHLORIDE 1 MG: 1 INJECTION, SOLUTION INTRAMUSCULAR; INTRAVENOUS at 10:49

## 2024-11-22 RX ADMIN — TRAZODONE HYDROCHLORIDE 100 MG: 100 TABLET ORAL at 20:06

## 2024-11-22 RX ADMIN — OXCARBAZEPINE 300 MG: 300 TABLET, FILM COATED ORAL at 17:31

## 2024-11-22 RX ADMIN — PROPOFOL 150 MG: 10 INJECTION, EMULSION INTRAVENOUS at 10:57

## 2024-11-22 RX ADMIN — ATORVASTATIN CALCIUM 40 MG: 40 TABLET, FILM COATED ORAL at 17:14

## 2024-11-22 RX ADMIN — PHENYLEPHRINE HYDROCHLORIDE 200 MCG: 10 INJECTION INTRAVENOUS at 12:14

## 2024-11-22 RX ADMIN — PHENYLEPHRINE HYDROCHLORIDE 100 MCG: 10 INJECTION INTRAVENOUS at 11:46

## 2024-11-22 RX ADMIN — SODIUM CHLORIDE 1000 ML: 9 INJECTION, SOLUTION INTRAVENOUS at 22:26

## 2024-11-22 RX ADMIN — BUPIVACAINE HYDROCHLORIDE 3 ML: 2.5 INJECTION, SOLUTION EPIDURAL; INFILTRATION; INTRACAUDAL at 12:31

## 2024-11-22 RX ADMIN — ONDANSETRON 8 MG: 2 INJECTION INTRAMUSCULAR; INTRAVENOUS at 12:27

## 2024-11-22 RX ADMIN — LIDOCAINE HYDROCHLORIDE 4 ML: 40 SOLUTION TOPICAL at 10:58

## 2024-11-22 RX ADMIN — DULOXETINE HYDROCHLORIDE 60 MG: 30 CAPSULE, DELAYED RELEASE ORAL at 17:14

## 2024-11-22 RX ADMIN — INSULIN LISPRO 1 UNITS: 100 INJECTION, SOLUTION INTRAVENOUS; SUBCUTANEOUS at 17:17

## 2024-11-22 RX ADMIN — GABAPENTIN 800 MG: 400 CAPSULE ORAL at 17:14

## 2024-11-22 RX ADMIN — ROCURONIUM BROMIDE 10 MG: 50 INJECTION, SOLUTION INTRAVENOUS at 11:20

## 2024-11-22 RX ADMIN — PHENYLEPHRINE HYDROCHLORIDE 100 MCG: 10 INJECTION INTRAVENOUS at 11:40

## 2024-11-22 RX ADMIN — SODIUM CHLORIDE, POTASSIUM CHLORIDE, SODIUM LACTATE AND CALCIUM CHLORIDE: 600; 310; 30; 20 INJECTION, SOLUTION INTRAVENOUS at 10:42

## 2024-11-22 RX ADMIN — CLONAZEPAM 1 MG: 1 TABLET ORAL at 17:14

## 2024-11-22 RX ADMIN — OXCARBAZEPINE 300 MG: 300 TABLET, FILM COATED ORAL at 05:13

## 2024-11-22 RX ADMIN — OXYCODONE HYDROCHLORIDE 10 MG: 10 TABLET, FILM COATED, EXTENDED RELEASE ORAL at 09:44

## 2024-11-22 RX ADMIN — FLUTICASONE FUROATE, UMECLIDINIUM BROMIDE AND VILANTEROL TRIFENATATE 1 PUFF: 200; 62.5; 25 POWDER RESPIRATORY (INHALATION) at 20:07

## 2024-11-22 RX ADMIN — PHENYLEPHRINE HYDROCHLORIDE 200 MCG: 10 INJECTION INTRAVENOUS at 11:50

## 2024-11-22 RX ADMIN — PHENYLEPHRINE HYDROCHLORIDE 200 MCG: 10 INJECTION INTRAVENOUS at 12:10

## 2024-11-22 RX ADMIN — BUPIVACAINE HYDROCHLORIDE 4 ML: 2.5 INJECTION, SOLUTION EPIDURAL; INFILTRATION; INTRACAUDAL at 11:04

## 2024-11-22 RX ADMIN — PROPOFOL 75 MCG/KG/MIN: 10 INJECTION, EMULSION INTRAVENOUS at 11:20

## 2024-11-22 RX ADMIN — LIDOCAINE HYDROCHLORIDE 100 MG: 20 INJECTION, SOLUTION EPIDURAL; INFILTRATION; INTRACAUDAL; PERINEURAL at 10:54

## 2024-11-22 RX ADMIN — GABAPENTIN 800 MG: 400 CAPSULE ORAL at 20:06

## 2024-11-22 RX ADMIN — CEFOTETAN DISODIUM 2 G: 2 INJECTION, POWDER, FOR SOLUTION INTRAMUSCULAR; INTRAVENOUS at 11:00

## 2024-11-22 RX ADMIN — DEXAMETHASONE SODIUM PHOSPHATE 10 MG: 4 INJECTION INTRA-ARTICULAR; INTRALESIONAL; INTRAMUSCULAR; INTRAVENOUS; SOFT TISSUE at 11:00

## 2024-11-22 RX ADMIN — ROCURONIUM BROMIDE 30 MG: 50 INJECTION, SOLUTION INTRAVENOUS at 11:05

## 2024-11-22 RX ADMIN — SUCCINYLCHOLINE CHLORIDE 60 MG: 20 INJECTION, SOLUTION INTRAMUSCULAR; INTRAVENOUS at 10:57

## 2024-11-22 RX ADMIN — HYDROMORPHONE HYDROCHLORIDE: 1 INJECTION, SOLUTION INTRAMUSCULAR; INTRAVENOUS; SUBCUTANEOUS at 13:10

## 2024-11-22 RX ADMIN — QUETIAPINE FUMARATE 200 MG: 100 TABLET ORAL at 20:06

## 2024-11-22 RX ADMIN — SCOPOLAMINE 1 PATCH: 1.5 PATCH, EXTENDED RELEASE TRANSDERMAL at 09:45

## 2024-11-22 RX ADMIN — BUPIVACAINE HYDROCHLORIDE 4 ML: 2.5 INJECTION, SOLUTION EPIDURAL; INFILTRATION; INTRACAUDAL at 11:00

## 2024-11-22 RX ADMIN — Medication 25 MG: at 11:00

## 2024-11-22 RX ADMIN — LIDOCAINE HYDROCHLORIDE,EPINEPHRINE BITARTRATE 3 ML: 15; .005 INJECTION, SOLUTION EPIDURAL; INFILTRATION; INTRACAUDAL; PERINEURAL at 10:51

## 2024-11-22 RX ADMIN — PHENYLEPHRINE HYDROCHLORIDE 200 MCG: 10 INJECTION INTRAVENOUS at 11:57

## 2024-11-22 RX ADMIN — BUPIVACAINE HYDROCHLORIDE 3 ML: 2.5 INJECTION, SOLUTION EPIDURAL; INFILTRATION; INTRACAUDAL at 11:25

## 2024-11-22 RX ADMIN — ACETAMINOPHEN 1000 MG: 500 TABLET ORAL at 09:44

## 2024-11-22 RX ADMIN — FENTANYL CITRATE 100 MCG: 50 INJECTION, SOLUTION INTRAMUSCULAR; INTRAVENOUS at 11:00

## 2024-11-22 RX ADMIN — SUGAMMADEX 200 MG: 100 INJECTION, SOLUTION INTRAVENOUS at 11:52

## 2024-11-22 RX ADMIN — MIDAZOLAM HYDROCHLORIDE 1 MG: 1 INJECTION, SOLUTION INTRAMUSCULAR; INTRAVENOUS at 10:53

## 2024-11-22 RX ADMIN — CLONAZEPAM 1 MG: 1 TABLET ORAL at 05:13

## 2024-11-22 RX ADMIN — PHENYLEPHRINE HYDROCHLORIDE 200 MCG: 10 INJECTION INTRAVENOUS at 12:03

## 2024-11-22 RX ADMIN — ACETAMINOPHEN 1000 MG: 500 TABLET ORAL at 22:20

## 2024-11-22 RX ADMIN — MIDAZOLAM HYDROCHLORIDE 2 MG: 1 INJECTION, SOLUTION INTRAMUSCULAR; INTRAVENOUS at 10:42

## 2024-11-22 RX ADMIN — PHENYLEPHRINE HYDROCHLORIDE 100 MCG: 10 INJECTION INTRAVENOUS at 11:35

## 2024-11-22 RX ADMIN — SODIUM CHLORIDE, SODIUM GLUCONATE, SODIUM ACETATE, POTASSIUM CHLORIDE AND MAGNESIUM CHLORIDE: 526; 502; 368; 37; 30 INJECTION, SOLUTION INTRAVENOUS at 11:04

## 2024-11-22 ASSESSMENT — COPD QUESTIONNAIRES
DURING THE PAST 4 WEEKS HOW MUCH DID YOU FEEL SHORT OF BREATH: SOME OF THE TIME
HAVE YOU SMOKED AT LEAST 100 CIGARETTES IN YOUR ENTIRE LIFE: YES
COPD SCREENING SCORE: 3
DO YOU EVER COUGH UP ANY MUCUS OR PHLEGM?: NO/ONLY WITH OCCASIONAL COLDS OR INFECTIONS

## 2024-11-22 ASSESSMENT — ENCOUNTER SYMPTOMS
CHILLS: 0
NAUSEA: 0
COUGH: 0
VOMITING: 0
FEVER: 0
SHORTNESS OF BREATH: 0
ABDOMINAL PAIN: 1

## 2024-11-22 ASSESSMENT — PAIN DESCRIPTION - PAIN TYPE
TYPE: ACUTE PAIN

## 2024-11-22 NOTE — PROGRESS NOTES
4 Eyes Skin Assessment Completed by MICHAEL Hudson and MICHAEL Hudson.    Head WDL  Ears WDL  Nose WDLblanching and pink to base of nose  Mouth WDL  Neck WDL  Breast/Chest WDL  Shoulder Blades WDL  Spine WDL  (R) Arm/Elbow/Hand WDL  (L) Arm/Elbow/Hand WDL  Abdomen Incision  Groin WDL  Scrotum/Coccyx/Buttocks WDL, pink blanching   (R) Leg WDL  (L) Leg WDL  (R) Heel/Foot/Toe WDL dryness  (L) Heel/Foot/Toe WDL dryness    13 dashes on the epidural       Devices In Places ECG, Blood Pressure Cuff, Pulse Ox, Graf, and Nasal Cannula, epidural      Interventions In Place Gray Ear Foams, Sacral Mepilex, TAP System, Pillows, Q2 Turns, Low Air Loss Mattress, and ZFlo Pillow    Possible Skin Injury No    Pictures Uploaded Into Epic yes  Wound Consult Placed N/A  RN Wound Prevention Protocol Ordered No      Patient belongings placed in cabinet. Asked by patient not to go through her belongings at this time

## 2024-11-22 NOTE — CARE PLAN
The patient is Stable - Low risk of patient condition declining or worsening    Shift Goals  Clinical Goals: pain management  Patient Goals: rest, pain mangement  Family Goals: Updates    Progress made toward(s) clinical / shift goals:    Problem: Pain - Standard  Goal: Alleviation of pain or a reduction in pain to the patient’s comfort goal  Outcome: Progressing     Problem: Hemodynamics  Goal: Patient's hemodynamics, fluid balance and neurologic status will be stable or improve  Outcome: Progressing     Problem: Pain Management  Goal: Pain level will decrease to patient's comfort goal  Outcome: Progressing       Patient is not progressing towards the following goals:

## 2024-11-22 NOTE — OR NURSING
Report called to receiving MICHAEL Noland. Pt taken via bed to T915 w/ RN and monitor. VSS. Glasses at bedside. Mom updated.

## 2024-11-22 NOTE — ANESTHESIA PROCEDURE NOTES
Arterial Line    Performed by: Yanira Harris M.D.  Authorized by: Yanira Harris M.D.    Start Time:  11/22/2024 11:10 AM  End Time:  11/22/2024 11:11 AM  Localization: surface landmarks    Patient Location:  OR  Indication: continuous blood pressure monitoring        Catheter Size:  20 G  Seldinger Technique?: Yes    Laterality:  Right  Site:  Radial artery  Line Secured:  Antimicrobial disc, tape, transparent dressing, benzoin and steristrips  Events: patient tolerated procedure well with no complications

## 2024-11-22 NOTE — PROGRESS NOTES
[facility] Internal Medicine Daily Progress Note    Date of Service  11/22/2024    [facility] Team: R IM Jamil Team   Attending: Salvador Ye M.d.  Senior Resident: Dr. Mathew  Intern:  Dr. Hui  Contact Number: [phone]    Chief Complaint  Jacque Mcintyre is a 43 y.o. female admitted 11/19/2024 with Anemia    Hospital Course  Jacque Mcintyre is a 43 y.o. female who presented 11/19/2024 after seeing her primary care provider today who had concerns for possible pulmonary embolism given high Wells criteria.  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  Patient was on immunotherapy until earlier this year where she developed colitis secondary to immunotherapy which was stopped and patient was started on Tafeninib monotherapy.  Type 2 diabetes (insulin-dependent), hypertension, depression, bipolar 1 disorder, diabetic neuropathy, asthma, dyslipidemia.  Patient states that she has not been having any chest pain, calf pain, does have a history of pulmonary embolisms in the past and was taking chronic Eliquis therapy.  Patient states that the PE in the past is unprovoked most likely due to her melanoma malignancy.  Patient reports that she has had a 7 pound weight loss over the past month, increase in her fatigue, weakness that has not changed from baseline, no changes in her bowel or bladder habits.  Patient denies hematochezia, melena.     In the emergency department patient was found to have an elevated white blood cell count to 17.4 and severe anemia with a hemoglobin of 5.3 hematocrit 18.9.  Her platelets were elevated to 897 and patient endorses chronic thrombocytosis.  CBC revealed hyponatremia to 131 and hyperglycemia to 207.  Troponins were within normal limits BMP was normal and no elevation in her lactic acid.  A CT  abdomen was obtained which showed a large 11 cm mass centered on one of the small bowel loops in the left hemiabdomen concerning for possible small bowel adenocarcinoma, gastrointestinal stromal tumor, small bowel metastasis.  The mass abrupt the descending and sigmoid colon without colonic invasion.  No metastatic disease in the abdomen.  Small volume pelvic ascites.  A CTA of her chest was obtained which did not show any evidence of pulmonary embolism and minimal left pleural effusion.  EKG was obtained which showed increase in her QTc to 515 and sinus tachycardia.     Blood type and screen was obtained and patient was given 1 units of AB+ blood in the emergency room.  Given her anemia patient was admitted to the hospital for management and further workup of her abdominal mass.  She received an additional 2 units of blood and was deemed a surgical candidate to undergo exploratory laparotomy with small bowel resection on 11/22    Interval Problem Update  Patient seen and examined at bedside.  No acute events or night.  Was able to complete her bowel prep without any complications and last bowel movement was earlier this morning.  Patient complaining of hunger but is ready to undergo her surgery to remove the abdominal mass.  On board with current treatment regimen.    I have discussed this patient's plan of care and discharge plan at IDT rounds today with Case Management, Nursing, Nursing leadership, and other members of the IDT team.    Consultants/Specialty  Surgical oncology  Palliative care    Code Status  Full Code    Disposition  The patient is not medically cleared for discharge to home or a post-acute facility.      I have placed the appropriate orders for post-discharge needs.    Review of Systems  Review of Systems   Constitutional:  Negative for chills and fever.   Respiratory:  Negative for cough and hemoptysis.    Cardiovascular:  Negative for chest pain and leg swelling.   Gastrointestinal:  Negative  for abdominal pain, constipation, diarrhea, heartburn, nausea and vomiting.   Genitourinary:  Negative for dysuria, frequency and urgency.     Physical Exam  Temp:  [36.3 °C (97.4 °F)-36.9 °C (98.5 °F)] 36.9 °C (98.4 °F)  Pulse:  [] 97  Resp:  [16-17] 16  BP: ()/(68-78) 113/75  SpO2:  [93 %-96 %] 93 %    Physical Exam  Constitutional:       General: She is not in acute distress.     Appearance: She is not ill-appearing or toxic-appearing.   HENT:      Head: Normocephalic and atraumatic.      Nose: Nose normal.      Mouth/Throat:      Mouth: Mucous membranes are moist.      Pharynx: Oropharynx is clear.   Eyes:      Extraocular Movements: Extraocular movements intact.      Conjunctiva/sclera: Conjunctivae normal.      Pupils: Pupils are equal, round, and reactive to light.   Cardiovascular:      Rate and Rhythm: Normal rate and regular rhythm.      Pulses: Normal pulses.      Heart sounds: Normal heart sounds. No murmur heard.  Pulmonary:      Effort: Pulmonary effort is normal. No respiratory distress.      Breath sounds: Normal breath sounds. No wheezing or rales.   Abdominal:      General: Abdomen is flat. There is no distension.      Tenderness: There is no abdominal tenderness. There is no guarding.   Skin:     General: Skin is warm and dry.      Capillary Refill: Capillary refill takes less than 2 seconds.   Neurological:      General: No focal deficit present.      Mental Status: She is alert and oriented to person, place, and time. Mental status is at baseline.   Psychiatric:         Mood and Affect: Mood normal.         Behavior: Behavior normal.         Fluids    Intake/Output Summary (Last 24 hours) at 11/22/2024 1236  Last data filed at 11/22/2024 1224  Gross per 24 hour   Intake 2000 ml   Output 750 ml   Net 1250 ml       Laboratory  Recent Labs     11/20/24  0419 11/21/24  0024 11/22/24  0024   WBC 11.3* 14.2* 11.5*   RBC 2.95* 3.41* 3.43*   HEMOGLOBIN 6.9* 8.4* 7.9*   HEMATOCRIT 22.6* 26.8*  27.0*   MCV 76.6* 78.6* 78.7*   MCH 23.4* 24.6* 23.0*   MCHC 30.5* 31.3* 29.3*   RDW 55.8* 57.1* 58.8*   PLATELETCT 724* 743* 704*   MPV 9.1 9.3 9.2     Recent Labs     11/20/24  0419 11/21/24  0024 11/22/24  0024   SODIUM 138 137 138   POTASSIUM 3.8 3.5* 3.6   CHLORIDE 104 103 103   CO2 24 24 22   GLUCOSE 222* 206* 188*   BUN 11 9 4*   CREATININE 0.53 0.33* 0.37*   CALCIUM 8.5 8.2* 8.0*     Recent Labs     11/21/24  0024   INR 1.15*               Imaging  CT-ABDOMEN-PELVIS WITH   Final Result      1.  Large 11 cm mass centered on one of the small bowel loops in the left hemiabdomen. Primary differential considerations include small bowel adenocarcinoma, gastrointestinal stromal tumor and small bowel metastasis.   2.  The mass abuts the descending and sigmoid colon without colonic invasion. No abutment of other organs.   3.  No metastatic disease in the abdomen.   4.  Small volume pelvic ascites.      CT-CTA CHEST PULMONARY ARTERY W/ RECONS   Final Result      1.  No evidence of pulmonary embolus.      2.  Minimal left pleural effusion.           Assessment/Plan  Problem Representation:    * Metastatic melanoma (HCC)- (present on admission)  Assessment & Plan  Patient has history of metastatic melanoma first diagnosed in 2016 received wide excisional biopsy with sentinel lymph node testing showed regional metastasis and started on immunotherapies.  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  These medications were discontinued and patient was started on Tafinlar.  -Discussion with her oncologist we will discontinue Tylenol while inpatient.    Hyponatremia  Assessment & Plan  resolved    Abdominal mass  Assessment & Plan  Patient received CT abdominal exam in the emergency department which  showed a large 11 cm mass growing off of her colon located on one of the small bowel loops in the left hemiabdomen.  There is a primary differential of small bowel adenocarcinoma, gastrointestinal stromal tumor, small bowel metastasis.  Patient states that this is a new finding however review of her dermatology notes shows that this is a known disease process that has likely enlarged recently.  The mass is nonobstructive surgical oncology is aware and consulted. This is most likely cause of patients anemia.   -Follow surgery recs:     -11/21 clear liquid diet and bowel prep    -NPO 11/22     -Plan for surgery 11/22.     Anemia- (present on admission)  Assessment & Plan  Patient has a history of chronic anemia with iron deficiency and chronic disease. Likely this is the cause of her severe anemia but cannot rule out blood loss anemia given her large abdominal mass found on CT.  Received 3u RBCs since admission.   -Iron transfusion- pharmacy to dose.   -Daily CBC    Diabetic neuropathy (HCC)- (present on admission)  Assessment & Plan  Patient has a history of diabetic neuropathy on several different medications for control of her severe pain  -Continue home regimen    Thrombocytosis- (present on admission)  Assessment & Plan  Patient was found to have increase in her platelet levels to the high 800s.  Most likely this is reactive thrombocytosis given her severe anemia continue to monitor.     DM2 (diabetes mellitus, type 2) (HCC)- (present on admission)  Assessment & Plan  Patient has history of diabetes type 2 on insulin therapy at home.  Has not gotten a recent A1c and given her severe anemia likely will be helpful to determine compliance with insulin therapies.  Patient reports that she has high sugars into the 300s at home.  -continue home glargine   -Sliding scale insulin   -Hypoglycemia protocol   -will need to hold insulin when NPO          VTE prophylaxis: pharmacologic prophylaxis contraindicated due to  Surgical Intervention    I have performed a physical exam and reviewed and updated ROS and Plan today (11/22/2024). In review of yesterday's note (11/21/2024), there are no changes except as documented above.

## 2024-11-22 NOTE — ANESTHESIA TIME REPORT
Anesthesia Start and Stop Event Times       Date Time Event    11/22/2024 1010 Ready for Procedure     1043 Anesthesia Start     1244 Anesthesia Stop          Responsible Staff  11/22/24      Name Role Begin End    Yanira Harris M.D. Anesth 1043 1244          Overtime Reason:  no overtime (within assigned shift)    Comments:

## 2024-11-22 NOTE — OP REPORT
Date of Operation: November 22, 2024    Preoperative Diagnosis: Metastatic malignant melanoma to small intestine     Postoperative Diagnosis: Same    Operative Procedure: Exploratory laparotomy with small bowel resection    Surgeon: Emiliano    Assisting Surgeon: María    Anesthesia: General With epidural     Estimated Blood Loss: Minimal     Specimens: Small bowel with mass    Drains: None    Findings: Large small bowel metastasis measuring approximately 11 cm.  Bulky leodan disease extending all the way down to the root of the mesentery.     Counts: Sponge, needle, and instrument counts were reported correct at the conclusion of the operation x2.       DESCRIPTION OF PROCEDURE   The patient was placed supine on the operating table. Pressure points were padded. Sequential compression garments were applied to the lower extremities. A time out was performed.  Epidural and general anesthesia was administered.  The patient's abdomen was prepped and draped in the usual sterile fashion.  Midline incision was made and carried down sharply through her skin and subcutaneous tissues.  Fascia was divided and peritoneal cavity entered under direct visualization.  Osman wound retractor was placed.  The patient's large small bowel metastasis was mobile and was able to be delivered out of the wound.  It was heat adherent to mid jejunum.  There was bulky leodan disease extending down to the root of the mesentery and the SMA.  The patient's abdomen was explored and there was no other metastatic disease noted.    A point of small bowel transection was identified proximal to the patient's tumor.  Bowel here was divided using the Endo ALETHEA stapler.  LigaSure was used to divide the mesentery.  This was taken as low as safely possible though there were several large leodan masses left at the root of the mesentery.  Our distal small bowel point of transection was identified and divided using the Endo ALETHEA blue load stapler.  Her final  mesenteric bridge was divided using an Endo ALETHEA white load stapler given the leodan vascularity.  The specimen was passed off the field and hemostasis ensured.    The 2 cut ends of small bowel were then approximated using a 3-0 silk suture.  Enterotomies were made and a side-to-side anastomosis constructed using the Endo ALETHEA blue load stapler.  Common enterotomy was closed using running 3-0 PDS suture.  An outer layer of 3-0 silk Lembert sutures were placed as well.    The patient's abdomen was copiously irrigated and after ensuring that adequate hemostasis had been obtained fascia was closed using running #1 PDS suture.  Skin was closed using 4-0 Monocryl and glue.         The patient was then awakened and transferred to PACU in stable condition.     Complications: None noted

## 2024-11-22 NOTE — CARE PLAN
The patient is Watcher - Medium risk of patient condition declining or worsening    Shift Goals  Clinical Goals: golytely, pain mangement, monitor vitals  Patient Goals: rest, finish golytely  Family Goals: bereket    Progress made toward(s) clinical / shift goals:      Problem: Knowledge Deficit - Standard  Goal: Patient and family/care givers will demonstrate understanding of plan of care, disease process/condition, diagnostic tests and medications  Description: Target End Date:  1-3 days or as soon as patient condition allows    Document in Patient Education    1.  Patient and family/caregiver oriented to unit, equipment, visitation policy and means for communicating concern  2.  Complete/review Learning Assessment  3.  Assess knowledge level of disease process/condition, treatment plan, diagnostic tests and medications  4.  Explain disease process/condition, treatment plan, diagnostic tests and medications  Outcome: Progressing  Note: Patient has been educated on plan of care, medications, and safety. No further questions from patient      Problem: Pain - Standard  Goal: Alleviation of pain or a reduction in pain to the patient’s comfort goal  Description: Target End Date:  Prior to discharge or change in level of care    Document on Vitals flowsheet    1.  Document pain using the appropriate pain scale per order or unit policy  2.  Educate and implement non-pharmacologic comfort measures (i.e. relaxation, distraction, massage, cold/heat therapy, etc.)  3.  Pain management medications as ordered  4.  Reassess pain after pain med administration per policy  5.  If opiods administered assess patient's response to pain medication is appropriate per POSS sedation scale  6.  Follow pain management plan developed in collaboration with patient and interdisciplinary team (including palliative care or pain specialists if applicable)  Outcome: Progressing  Note: Patient has been educated on pain scale and medications. Patient  was given prn pain meds to alleviate pain     Problem: Fall Risk  Goal: Patient will remain free from falls  Description: Target End Date:  Prior to discharge or change in level of care    Document interventions on the Ginger Trinidad Fall Risk Assessment    1.  Assess for fall risk factors  2.  Implement fall precautions  Outcome: Progressing  Note: Patient was educated on fall risk and safety. Bed is locked and in lowest position, bed alarm on, call light placed within reach, and frequent rounding done.     Problem: Hemodynamics  Goal: Patient's hemodynamics, fluid balance and neurologic status will be stable or improve  Description: Target End Date:  Prior to discharge or change in level of care    Document on Assessment and I/O flowsheet templates    1.  Monitor vital signs, pulse oximetry and cardiac monitor per provider order and/or policy  2.  Maintain blood pressure per provider order  3.  Hemodynamic monitoring per provider order  4.  Manage IV fluids and IV infusions  5.  Monitor intake and output  6.  Daily weights per unit policy or provider order  7.  Assess peripheral pulses and capillary refill  8.  Assess color and body temperature  9.  Position patient for maximum circulation/cardiac output  10. Monitor for signs/symptoms of excessive bleeding  11. Assess mental status, restlessness and changes in level of consciousness  12. Monitor temperature and report fever or hypothermia to provider immediately. Consideration of targeted temperature management.  Outcome: Progressing  Note: Patient's vital signs have remained stable. Hemoglobin has decreased to 7.9. Golytely has been completed and patient has been NPO since midnight      Problem: Pain Management  Goal: Pain level will decrease to patient's comfort goal  Outcome: Progressing  Note: Patient has been educated on pain scale and medications. Patient utilized oxy 10mg prn to reduce pain      Problem: Bowel/Gastric:  Goal: Normal bowel function is  maintained or improved  Outcome: Progressing  Note: Patient had multiple watery bowel movements after drinking bowel prep        Patient is not progressing towards the following goals:

## 2024-11-22 NOTE — ANESTHESIA PREPROCEDURE EVALUATION
Case: [id number] Date/Time: 11/22/24 1000    Procedure: LAPAROTOMY, EXPLORATORY, SMALL BOWEL RESECTION    Location: [facility] OR 08 / SURGERY [facility]    Surgeons: Tulio Cummings M.D.          43yoF with metastatic melanoma, initially dx in 2016 with recurrence in 2022, now s/p brain met resection, chemo, immunotherapy, etc.    Appears to have another met to her small bowel, so today to OR for ex lap and SBR with tumor removal and debulking    MJ use, daily gummies.  Also tobacco smoker      Relevant Problems   PULMONARY   (positive) Asthma      CARDIAC   (positive) Essential hypertension      ENDO   (positive) DM2 (diabetes mellitus, type 2) (HCC)      Other   (positive) Abdominal mass   (positive) Adrenal insufficiency due to corticosteroid withdrawal (HCC)   (positive) Bipolar 1 disorder (HCC)   (positive) Chronic prescription benzodiazepine use   (positive) Diabetic neuropathy (HCC)   (positive) Malignant melanoma metastatic to brain (HCC)   (positive) Marijuana use (Daily edibles 20mg)       Physical Exam    Airway   Mallampati: II  TM distance: >3 FB  Neck ROM: full       Cardiovascular - normal exam  Rhythm: regular  Rate: normal  (-) murmur     Dental - normal exam           Pulmonary - normal exam  Breath sounds clear to auscultation     Abdominal    Neurological - normal exam                   Anesthesia Plan    ASA 4 (metastatic oncologic state affecting multiple organ systems; narcotic and tobacco and MJ toelrance / dependence)   ASA physical status 4 criteria: other (comment)    Plan - general and epidural   Neuraxial block will be post-op pain control    Airway plan will be ETT          Induction: intravenous    Postoperative Plan: Postoperative administration of opioids is intended.    Pertinent diagnostic labs and testing reviewed    Informed Consent:    Anesthetic plan and risks discussed with patient.    Use of blood products discussed with: patient whom consented to blood products.

## 2024-11-22 NOTE — ANESTHESIA PROCEDURE NOTES
Airway    Date/Time: 11/22/2024 10:58 AM    Performed by: Yanira Harris M.D.  Authorized by: Yanira Harris M.D.    Location:  OR  Urgency:  Elective  Indications for Airway Management:  Anesthesia      Spontaneous Ventilation: absent    Sedation Level:  Deep  Preoxygenated: Yes    Patient Position:  Sniffing  Mask Difficulty Assessment:  0 - not attempted  Final Airway Type:  Endotracheal airway  Final Endotracheal Airway:  ETT  Cuffed: Yes    Technique Used for Successful ETT Placement:  Direct laryngoscopy    Insertion Site:  Oral  Blade Type:  Elliott  Laryngoscope Blade/Videolaryngoscope Blade Size:  3  ETT Size (mm):  7.0  Measured from:  Teeth  ETT to Teeth (cm):  22  Placement Verified by: auscultation and capnometry    Cormack-Lehane Classification:  Grade I - full view of glottis  Number of Attempts at Approach:  1

## 2024-11-22 NOTE — PROGRESS NOTES
"    INTERVAL EVENTS AND INTERVENTIONS:   Admitted to the SICU post-op from ex lap and small bowel resection.   Metastatic melanoma with large mass extending to the root of the small bowel mesentery  She was extubated and appears comfortable with an epidural  No signs of bleeding      PHYSICAL EXAMINATION:      Vital Signs: BP 96/65   Pulse 77   Temp 37.1 °C (98.8 °F) (Temporal)   Resp 15   Ht 1.626 m (5' 4\")   Wt 54.1 kg (119 lb 4.3 oz)   SpO2 98%   Physical Exam  Vitals and nursing note reviewed.   Constitutional:       Appearance: Normal appearance.   HENT:      Head: Normocephalic and atraumatic.      Right Ear: External ear normal.      Left Ear: External ear normal.      Nose: Nose normal.      Mouth/Throat:      Mouth: Mucous membranes are moist.      Pharynx: Oropharynx is clear.   Eyes:      General:         Right eye: No discharge.         Left eye: No discharge.      Conjunctiva/sclera: Conjunctivae normal.      Pupils: Pupils are equal, round, and reactive to light.   Cardiovascular:      Rate and Rhythm: Normal rate and regular rhythm.      Pulses: Normal pulses.      Heart sounds: Normal heart sounds.   Pulmonary:      Effort: Pulmonary effort is normal. No respiratory distress.      Breath sounds: Normal breath sounds.   Abdominal:      General: Abdomen is flat. There is no distension.      Tenderness: There is no abdominal tenderness.      Comments: Midline incision with dermabond dressing, intact and clean.   Musculoskeletal:         General: No swelling or tenderness.      Cervical back: No rigidity or tenderness.   Skin:     General: Skin is warm and dry.      Capillary Refill: Capillary refill takes less than 2 seconds.      Comments: Fair complexion.  Pallor at baseline   Neurological:      General: No focal deficit present.      Mental Status: She is alert and oriented to person, place, and time.   Psychiatric:         Mood and Affect: Mood normal.         Behavior: Behavior normal. "       LABORATORY VALUES AND IMAGING REVIEWED        ASSESSMENT AND PLAN:   43 year old woman with metastatic melanoma.  Mets include brain, lung, and small bowel.  She was admitted to the hospital on 11/19 by way of her PCP office whom was concerned for a PE.  She has a history of PE and has been on chronic Eliquis therapy.  Her workup here showed a large intraabdominal mass representing metastasis to the small intestine.  She underwent exploratory laparotomy and small bowel resection on 11/22 and is admitted to the SICU post-op for pain control    - Metastatic melanoma  - SBO:  S/p ex lap and SBR.  Epidural and multimodal pain control.  History of chronic opiod use.  Primary team can coordinate future chemo plans, for now chemotherapy on hold.      - Prior PE:  Chronic Eliquis use.  Discuss timing of restart with surgical team.      - Type 2 diabetes:  Sliding scale insulin    - Hypertension:  restart home meds as able    - Bipolar disorder:  Restart home meds when able         ____________________________________     Leandro Vela M.D.    DD: 11/22/2024  2:13 PM

## 2024-11-22 NOTE — ANESTHESIA PROCEDURE NOTES
Peripheral IV    Date/Time: 11/22/2024 10:55 AM    Performed by: Yanira Harris M.D.  Authorized by: Yanira Harris M.D.    Size:  18 G  Laterality:  Left  Peripheral IV Location:  Forearm  Site Prep:  Alcohol  Technique:  Direct puncture  Attempts:  1  Difficult IV necessitating physician skill: IV access difficult

## 2024-11-22 NOTE — ANESTHESIA PROCEDURE NOTES
Epidural Block    Date/Time: 11/22/2024 10:51 AM    Performed by: Yanira Harris M.D.  Authorized by: Yanira Harris M.D.    Patient Location:  OR  Start Time:  11/22/2024 10:51 AM  End Time:  11/22/2024 10:56 AM  Reason for Block: at surgeon's request and post-op pain management    patient identified, IV checked, site marked, risks and benefits discussed, surgical consent, monitors and equipment checked, pre-op evaluation and timeout performed    Patient Position:  Sitting  Prep: ChloraPrep, patient draped and sterile technique    Monitoring:  Blood pressure, continuous pulse oximetry and heart rate  Approach:  Right paramedian  Location:  T8-T9  Injection Technique:  LAURA saline  Skin infiltration:  Lidocaine  Strength:  2%  Dose:  2ml  Needle Type:  Tuohy  Needle Gauge:  17 G  Needle Length:  3.5 in  Loss of resistance::  4.5  Catheter Size:  19 G  Catheter at Skin Depth:  10  Test Dose Result:  Negative

## 2024-11-22 NOTE — PROGRESS NOTES
Patient off unit with transport for procedure. Patient is hospital clothing. Family at bedside with patient.

## 2024-11-22 NOTE — CONSULTS
Palliative Care follow-up  Consult received and EMR reviewed. Case discussed with MD, updates appreciated. Consult primarily for advance directive completion. Patient currently in preop. Recommend discussing AD completion with case management over the weekend. Pending further insight from surgical oncology and medical oncology prior to in depth GOC discussion. PC will remain available for this.    Thank you for allowing Palliative Care to support this patient and family. Contact [phone] for additional assistance, change in patient status, or with any questions/concerns.      NUHA Barrios  Palliative Care Nurse Practitioner   [phone]

## 2024-11-22 NOTE — PROGRESS NOTES
Date of Service  November 22, 2024     Chief Complaint  Sent by MD (·Active stage 4 melanoma/·Hx anemia and PE - on eliquis/·Hypertensive, tachycardic, weakness in clinic/·Concerns for another clot//Pt denies CP/SOB)     Hospital Course  POD # 3     Interval Problem Update  Resting comfortably in bed  Full liquid diet yesterday  NPO since MN, completed bowel prep  Surgery discussed with Dr. Cummings    Problem List  Principal Problem:    Metastatic melanoma (HCC) (POA: Yes)  Active Problems:    DM2 (diabetes mellitus, type 2) (HCC) (POA: Yes)    Thrombocytosis (POA: Yes)    Diabetic neuropathy (HCC) (POA: Yes)    Anemia (Chronic) (POA: Yes)    Abdominal mass (POA: Unknown)    Hyponatremia (POA: Unknown)    Marijuana use (POA: Unknown)  Resolved Problems:    * No resolved hospital problems. *       Subjective  Review of Systems   Constitutional:  Positive for malaise/fatigue. Negative for chills and fever.   Respiratory:  Negative for cough and shortness of breath.    Cardiovascular:  Negative for chest pain.   Gastrointestinal:  Positive for abdominal pain. Negative for nausea and vomiting.         Objective  Temp:  [36.3 °C (97.4 °F)-36.9 °C (98.5 °F)] 36.9 °C (98.4 °F)  Pulse:  [] 97  Resp:  [16-17] 16  BP: ()/(68-78) 113/75  SpO2:  [93 %-96 %] 93 %      Physical Exam  Vitals and nursing note reviewed.   Constitutional:       General: She is not in acute distress.     Appearance: She is ill-appearing.   HENT:      Head: Normocephalic and atraumatic.      Nose: Nose normal.      Mouth/Throat:      Pharynx: Oropharynx is clear.   Eyes:      General: No scleral icterus.     Conjunctiva/sclera: Conjunctivae normal.   Cardiovascular:      Rate and Rhythm: Normal rate.   Pulmonary:      Effort: Pulmonary effort is normal. No respiratory distress.   Abdominal:      General: Bowel sounds are normal. There is no distension.      Palpations: Abdomen is soft.      Tenderness: There is abdominal tenderness.  There is no guarding.   Skin:     General: Skin is warm and dry.      Coloration: Skin is not jaundiced.   Neurological:      Mental Status: She is alert and oriented to person, place, and time.   Psychiatric:         Mood and Affect: Mood normal.         Behavior: Behavior normal.          Fluids    Intake/Output Summary (Last 24 hours) at 11/22/2024 1033  Last data filed at 11/22/2024 0800  Gross per 24 hour   Intake 2240 ml   Output 0 ml   Net 2240 ml         Labs  Lab Results   Component Value Date/Time    SODIUM 138 11/22/2024 12:24 AM    POTASSIUM 3.6 11/22/2024 12:24 AM    CHLORIDE 103 11/22/2024 12:24 AM    CO2 22 11/22/2024 12:24 AM    GLUCOSE 188 (H) 11/22/2024 12:24 AM    BUN 4 (L) 11/22/2024 12:24 AM    CREATININE 0.37 (L) 11/22/2024 12:24 AM         Lab Results   Component Value Date/Time    PROTHROMBTM 14.7 (H) 11/21/2024 12:24 AM    INR 1.15 (H) 11/21/2024 12:24 AM         Lab Results   Component Value Date/Time    WBC 11.5 (H) 11/22/2024 12:24 AM    RBC 3.43 (L) 11/22/2024 12:24 AM    HEMOGLOBIN 7.9 (L) 11/22/2024 12:24 AM    HEMATOCRIT 27.0 (L) 11/22/2024 12:24 AM    MCV 78.7 (L) 11/22/2024 12:24 AM    MCH 23.0 (L) 11/22/2024 12:24 AM    MCHC 29.3 (L) 11/22/2024 12:24 AM    MPV 9.2 11/22/2024 12:24 AM    NEUTSPOLYS 74.50 (H) 11/20/2024 04:19 AM    LYMPHOCYTES 11.90 (L) 11/20/2024 04:19 AM    MONOCYTES 11.30 11/20/2024 04:19 AM    EOSINOPHILS 1.00 11/20/2024 04:19 AM    BASOPHILS 0.90 11/20/2024 04:19 AM    HYPOCHROMIA 2+ (A) 11/19/2024 01:42 PM    ANISOCYTOSIS 1+ 11/19/2024 01:42 PM         Recent Labs     11/19/24  1342 11/20/24  0419 11/21/24  0024   ASTSGOT 8* 11* 8*   ALTSGPT 5 <5 <5   TBILIRUBIN 0.2 0.4 0.3   GLOBULIN 2.5 2.3 2.4   INR  --   --  1.15*        Imaging  CT A/P (11/19/24)  IMPRESSION:  1.  Large 11 cm mass centered on one of the small bowel loops in the left hemiabdomen. Primary differential considerations include small bowel adenocarcinoma, gastrointestinal stromal tumor and small  bowel metastasis.  2.  The mass abuts the descending and sigmoid colon without colonic invasion. No abutment of other organs.  3.  No metastatic disease in the abdomen.  4.  Small volume pelvic ascites.     CT PET (6/24/24)  IMPRESSION:  1.  Positive response to therapy. The vast majority of hypermetabolic metastases seen on prior 2022 PET/CT have resolved or are no longer FDG avid.  2.  Most of the pulmonary nodules have resolved. Sub-4 mm residual pulmonary nodules can be followed with CT imaging.  3.  Some persistent uptake in the left adnexal region, nonspecific.  4.  Nonspecific focal metabolism in one of the left upper quadrant small bowel loops. It may be physiologic, but bowel metastasis is difficult to exclude.  5.  Inhomogeneous brain metabolism, related to multiple treated brain metastases.     Pathology  [facility] (10/6/2022)  ADDENDUM:     This case is being addended to report the results of BRAF Mutation   Analysis Molecular Genetics, performed by [employer] on   block A3.     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     Addendum Signed By: Deborah Galvan MD   Addendum Date: 10/13/2022   Original Report Date: 10/07/2022     FINAL DIAGNOSIS:     A. Right frontal brain mass #1:          Positive for metastatic melanoma.   B. Right frontal brain lesion #2:          Positive for metastatic melanoma.   C. Right frontal brain lesion #3:          Positive for metastatic melanoma.      Assessment and Plan  Patient is a 43F with known metastatic melanoma with mets to brain, currently on oral systemic therapy managed by Dr Reed, admitted with several days of persistent nausea and vomiting concern SBO, CT A/P revealed a large 11 cm mass adjacent to small bowel.     Metastatic melanoma, concern new metastatic lesion near small bowel  - For exploratory laparotomy, small bowel resection, and any other  indicated procedures by Dr. Cummings today 11/22/24   - NPO from midnight Thursday  - Bowel prep completed     2. T2DM, A1c 6.6 earlier this year  - Insulin sliding scale  - Consider increase for improved control     3. Iron deficiency anemia, chronic, HGB 6.9  - Replace iron  - Monitor  - Transfuse as indicated  - Plan to have blood available during OR procedure     4. Leukocytosis, WBC 11.3, afebrile  - Monitor     Patient seen with Dr Cummings.

## 2024-11-23 ENCOUNTER — ANESTHESIA (OUTPATIENT)
Dept: INTENSIVE CARE | Facility: MEDICAL CENTER | Age: 44
End: 2024-11-23
Payer: MEDICAID

## 2024-11-23 ENCOUNTER — ANESTHESIA EVENT (OUTPATIENT)
Dept: INTENSIVE CARE | Facility: MEDICAL CENTER | Age: 44
End: 2024-11-23
Payer: MEDICAID

## 2024-11-23 LAB
ANION GAP SERPL CALC-SCNC: 12 MMOL/L (ref 7–16)
ANION GAP SERPL CALC-SCNC: 9 MMOL/L (ref 7–16)
BASOPHILS # BLD AUTO: 0.5 % (ref 0–1.8)
BASOPHILS # BLD AUTO: 0.6 % (ref 0–1.8)
BASOPHILS # BLD: 0.04 K/UL (ref 0–0.12)
BASOPHILS # BLD: 0.06 K/UL (ref 0–0.12)
BUN SERPL-MCNC: 4 MG/DL (ref 8–22)
BUN SERPL-MCNC: 5 MG/DL (ref 8–22)
CALCIUM SERPL-MCNC: 8 MG/DL (ref 8.5–10.5)
CALCIUM SERPL-MCNC: 8.1 MG/DL (ref 8.5–10.5)
CHLORIDE SERPL-SCNC: 104 MMOL/L (ref 96–112)
CHLORIDE SERPL-SCNC: 107 MMOL/L (ref 96–112)
CO2 SERPL-SCNC: 24 MMOL/L (ref 20–33)
CO2 SERPL-SCNC: 24 MMOL/L (ref 20–33)
CORTIS SERPL-MCNC: 1.9 UG/DL (ref 0–23)
CREAT SERPL-MCNC: 0.4 MG/DL (ref 0.5–1.4)
CREAT SERPL-MCNC: 0.46 MG/DL (ref 0.5–1.4)
EKG IMPRESSION: NORMAL
EOSINOPHIL # BLD AUTO: 0.03 K/UL (ref 0–0.51)
EOSINOPHIL # BLD AUTO: 0.08 K/UL (ref 0–0.51)
EOSINOPHIL NFR BLD: 0.3 % (ref 0–6.9)
EOSINOPHIL NFR BLD: 1 % (ref 0–6.9)
ERYTHROCYTE [DISTWIDTH] IN BLOOD BY AUTOMATED COUNT: 63.5 FL (ref 35.9–50)
ERYTHROCYTE [DISTWIDTH] IN BLOOD BY AUTOMATED COUNT: 67 FL (ref 35.9–50)
GFR SERPLBLD CREATININE-BSD FMLA CKD-EPI: 119 ML/MIN/1.73 M 2
GFR SERPLBLD CREATININE-BSD FMLA CKD-EPI: 124 ML/MIN/1.73 M 2
GLUCOSE BLD STRIP.AUTO-MCNC: 101 MG/DL (ref 65–99)
GLUCOSE BLD STRIP.AUTO-MCNC: 109 MG/DL (ref 65–99)
GLUCOSE BLD STRIP.AUTO-MCNC: 132 MG/DL (ref 65–99)
GLUCOSE BLD STRIP.AUTO-MCNC: 86 MG/DL (ref 65–99)
GLUCOSE BLD STRIP.AUTO-MCNC: 92 MG/DL (ref 65–99)
GLUCOSE BLD STRIP.AUTO-MCNC: 98 MG/DL (ref 65–99)
GLUCOSE SERPL-MCNC: 101 MG/DL (ref 65–99)
GLUCOSE SERPL-MCNC: 131 MG/DL (ref 65–99)
HCT VFR BLD AUTO: 25.6 % (ref 37–47)
HCT VFR BLD AUTO: 27.1 % (ref 37–47)
HGB BLD-MCNC: 7.5 G/DL (ref 12–16)
HGB BLD-MCNC: 8 G/DL (ref 12–16)
IMM GRANULOCYTES # BLD AUTO: 0.02 K/UL (ref 0–0.11)
IMM GRANULOCYTES # BLD AUTO: 0.05 K/UL (ref 0–0.11)
IMM GRANULOCYTES NFR BLD AUTO: 0.3 % (ref 0–0.9)
IMM GRANULOCYTES NFR BLD AUTO: 0.5 % (ref 0–0.9)
LYMPHOCYTES # BLD AUTO: 1.21 K/UL (ref 1–4.8)
LYMPHOCYTES # BLD AUTO: 1.27 K/UL (ref 1–4.8)
LYMPHOCYTES NFR BLD: 12.1 % (ref 22–41)
LYMPHOCYTES NFR BLD: 16.4 % (ref 22–41)
MAGNESIUM SERPL-MCNC: 1.9 MG/DL (ref 1.5–2.5)
MAGNESIUM SERPL-MCNC: 2.1 MG/DL (ref 1.5–2.5)
MCH RBC QN AUTO: 24.1 PG (ref 27–33)
MCH RBC QN AUTO: 24.4 PG (ref 27–33)
MCHC RBC AUTO-ENTMCNC: 29.3 G/DL (ref 32.2–35.5)
MCHC RBC AUTO-ENTMCNC: 29.5 G/DL (ref 32.2–35.5)
MCV RBC AUTO: 82.3 FL (ref 81.4–97.8)
MCV RBC AUTO: 82.6 FL (ref 81.4–97.8)
MONOCYTES # BLD AUTO: 0.67 K/UL (ref 0–0.85)
MONOCYTES # BLD AUTO: 0.79 K/UL (ref 0–0.85)
MONOCYTES NFR BLD AUTO: 7.9 % (ref 0–13.4)
MONOCYTES NFR BLD AUTO: 8.7 % (ref 0–13.4)
NEUTROPHILS # BLD AUTO: 5.66 K/UL (ref 1.82–7.42)
NEUTROPHILS # BLD AUTO: 7.82 K/UL (ref 1.82–7.42)
NEUTROPHILS NFR BLD: 73.1 % (ref 44–72)
NEUTROPHILS NFR BLD: 78.6 % (ref 44–72)
NRBC # BLD AUTO: 0 K/UL
NRBC # BLD AUTO: 0 K/UL
NRBC BLD-RTO: 0 /100 WBC (ref 0–0.2)
NRBC BLD-RTO: 0 /100 WBC (ref 0–0.2)
PHOSPHATE SERPL-MCNC: 5.4 MG/DL (ref 2.5–4.5)
PHOSPHATE SERPL-MCNC: 5.8 MG/DL (ref 2.5–4.5)
PLATELET # BLD AUTO: 504 K/UL (ref 164–446)
PLATELET # BLD AUTO: 565 K/UL (ref 164–446)
PMV BLD AUTO: 9.1 FL (ref 9–12.9)
PMV BLD AUTO: 9.2 FL (ref 9–12.9)
POTASSIUM SERPL-SCNC: 3.6 MMOL/L (ref 3.6–5.5)
POTASSIUM SERPL-SCNC: 4.3 MMOL/L (ref 3.6–5.5)
RBC # BLD AUTO: 3.11 M/UL (ref 4.2–5.4)
RBC # BLD AUTO: 3.28 M/UL (ref 4.2–5.4)
SODIUM SERPL-SCNC: 140 MMOL/L (ref 135–145)
SODIUM SERPL-SCNC: 140 MMOL/L (ref 135–145)
WBC # BLD AUTO: 10 K/UL (ref 4.8–10.8)
WBC # BLD AUTO: 7.7 K/UL (ref 4.8–10.8)

## 2024-11-23 PROCEDURE — 700102 HCHG RX REV CODE 250 W/ 637 OVERRIDE(OP)

## 2024-11-23 PROCEDURE — 94669 MECHANICAL CHEST WALL OSCILL: CPT

## 2024-11-23 PROCEDURE — 770022 HCHG ROOM/CARE - ICU (200)

## 2024-11-23 PROCEDURE — 700111 HCHG RX REV CODE 636 W/ 250 OVERRIDE (IP): Performed by: STUDENT IN AN ORGANIZED HEALTH CARE EDUCATION/TRAINING PROGRAM

## 2024-11-23 PROCEDURE — 84100 ASSAY OF PHOSPHORUS: CPT

## 2024-11-23 PROCEDURE — 700105 HCHG RX REV CODE 258: Performed by: ANESTHESIOLOGY

## 2024-11-23 PROCEDURE — 80048 BASIC METABOLIC PNL TOTAL CA: CPT

## 2024-11-23 PROCEDURE — 700111 HCHG RX REV CODE 636 W/ 250 OVERRIDE (IP): Mod: JZ | Performed by: SURGERY

## 2024-11-23 PROCEDURE — 700105 HCHG RX REV CODE 258: Performed by: STUDENT IN AN ORGANIZED HEALTH CARE EDUCATION/TRAINING PROGRAM

## 2024-11-23 PROCEDURE — A9270 NON-COVERED ITEM OR SERVICE: HCPCS

## 2024-11-23 PROCEDURE — 85025 COMPLETE CBC W/AUTO DIFF WBC: CPT

## 2024-11-23 PROCEDURE — 93010 ELECTROCARDIOGRAM REPORT: CPT | Performed by: INTERNAL MEDICINE

## 2024-11-23 PROCEDURE — 99024 POSTOP FOLLOW-UP VISIT: CPT | Performed by: SURGERY

## 2024-11-23 PROCEDURE — 83735 ASSAY OF MAGNESIUM: CPT

## 2024-11-23 PROCEDURE — 700111 HCHG RX REV CODE 636 W/ 250 OVERRIDE (IP): Mod: JZ | Performed by: ANESTHESIOLOGY

## 2024-11-23 PROCEDURE — 700105 HCHG RX REV CODE 258: Performed by: SURGERY

## 2024-11-23 PROCEDURE — 82962 GLUCOSE BLOOD TEST: CPT | Mod: 91

## 2024-11-23 PROCEDURE — 93005 ELECTROCARDIOGRAM TRACING: CPT | Performed by: SURGERY

## 2024-11-23 PROCEDURE — 99232 SBSQ HOSP IP/OBS MODERATE 35: CPT | Performed by: NURSE PRACTITIONER

## 2024-11-23 PROCEDURE — 82533 TOTAL CORTISOL: CPT

## 2024-11-23 RX ORDER — ENOXAPARIN SODIUM 100 MG/ML
40 INJECTION SUBCUTANEOUS DAILY
Status: DISCONTINUED | OUTPATIENT
Start: 2024-11-23 | End: 2024-11-27 | Stop reason: HOSPADM

## 2024-11-23 RX ORDER — SODIUM CHLORIDE, SODIUM LACTATE, POTASSIUM CHLORIDE, AND CALCIUM CHLORIDE .6; .31; .03; .02 G/100ML; G/100ML; G/100ML; G/100ML
500 INJECTION, SOLUTION INTRAVENOUS ONCE
Status: DISCONTINUED | OUTPATIENT
Start: 2024-11-23 | End: 2024-11-24

## 2024-11-23 RX ORDER — SODIUM CHLORIDE 9 MG/ML
1000 INJECTION, SOLUTION INTRAVENOUS ONCE
Status: COMPLETED | OUTPATIENT
Start: 2024-11-23 | End: 2024-11-24

## 2024-11-23 RX ADMIN — ACETAMINOPHEN 1000 MG: 500 TABLET ORAL at 05:41

## 2024-11-23 RX ADMIN — HYDROMORPHONE HYDROCHLORIDE: 1 INJECTION, SOLUTION INTRAMUSCULAR; INTRAVENOUS; SUBCUTANEOUS at 23:10

## 2024-11-23 RX ADMIN — ENOXAPARIN SODIUM 40 MG: 100 INJECTION SUBCUTANEOUS at 17:40

## 2024-11-23 RX ADMIN — GABAPENTIN 800 MG: 400 CAPSULE ORAL at 12:55

## 2024-11-23 RX ADMIN — GABAPENTIN 800 MG: 400 CAPSULE ORAL at 20:27

## 2024-11-23 RX ADMIN — FLUTICASONE FUROATE, UMECLIDINIUM BROMIDE AND VILANTEROL TRIFENATATE 1 PUFF: 200; 62.5; 25 POWDER RESPIRATORY (INHALATION) at 17:41

## 2024-11-23 RX ADMIN — HYDROMORPHONE HYDROCHLORIDE: 1 INJECTION, SOLUTION INTRAMUSCULAR; INTRAVENOUS; SUBCUTANEOUS at 19:54

## 2024-11-23 RX ADMIN — OXCARBAZEPINE 300 MG: 300 TABLET, FILM COATED ORAL at 16:32

## 2024-11-23 RX ADMIN — ACETAMINOPHEN 1000 MG: 500 TABLET ORAL at 12:56

## 2024-11-23 RX ADMIN — OXCARBAZEPINE 300 MG: 300 TABLET, FILM COATED ORAL at 04:35

## 2024-11-23 RX ADMIN — INSULIN LISPRO 4 UNITS: 100 INJECTION, SOLUTION INTRAVENOUS; SUBCUTANEOUS at 07:49

## 2024-11-23 RX ADMIN — GABAPENTIN 800 MG: 400 CAPSULE ORAL at 16:32

## 2024-11-23 RX ADMIN — QUETIAPINE FUMARATE 200 MG: 100 TABLET ORAL at 20:27

## 2024-11-23 RX ADMIN — TRAZODONE HYDROCHLORIDE 100 MG: 100 TABLET ORAL at 20:27

## 2024-11-23 RX ADMIN — INSULIN LISPRO 4 UNITS: 100 INJECTION, SOLUTION INTRAVENOUS; SUBCUTANEOUS at 11:39

## 2024-11-23 RX ADMIN — SODIUM CHLORIDE 1000 ML: 9 INJECTION, SOLUTION INTRAVENOUS at 23:09

## 2024-11-23 RX ADMIN — DULOXETINE HYDROCHLORIDE 60 MG: 30 CAPSULE, DELAYED RELEASE ORAL at 17:40

## 2024-11-23 RX ADMIN — GABAPENTIN 800 MG: 400 CAPSULE ORAL at 09:08

## 2024-11-23 RX ADMIN — CLONAZEPAM 1 MG: 1 TABLET ORAL at 17:41

## 2024-11-23 RX ADMIN — ONDANSETRON 4 MG: 2 INJECTION INTRAMUSCULAR; INTRAVENOUS at 15:23

## 2024-11-23 RX ADMIN — SENNOSIDES AND DOCUSATE SODIUM 2 TABLET: 50; 8.6 TABLET ORAL at 17:40

## 2024-11-23 RX ADMIN — INSULIN LISPRO 4 UNITS: 100 INJECTION, SOLUTION INTRAVENOUS; SUBCUTANEOUS at 16:32

## 2024-11-23 RX ADMIN — INSULIN GLARGINE-YFGN 5 UNITS: 100 INJECTION, SOLUTION SUBCUTANEOUS at 05:42

## 2024-11-23 RX ADMIN — ATORVASTATIN CALCIUM 40 MG: 40 TABLET, FILM COATED ORAL at 17:40

## 2024-11-23 RX ADMIN — CLONAZEPAM 1 MG: 1 TABLET ORAL at 05:41

## 2024-11-23 ASSESSMENT — PAIN DESCRIPTION - PAIN TYPE
TYPE: SURGICAL PAIN;ACUTE PAIN
TYPE: SURGICAL PAIN;ACUTE PAIN
TYPE: ACUTE PAIN;SURGICAL PAIN

## 2024-11-23 ASSESSMENT — ENCOUNTER SYMPTOMS
DIZZINESS: 0
ABDOMINAL PAIN: 1
SHORTNESS OF BREATH: 0
COUGH: 0
HEADACHES: 0
FEVER: 0
TINGLING: 0
NAUSEA: 0
DOUBLE VISION: 0
MYALGIAS: 0
VOMITING: 0
CHILLS: 0

## 2024-11-23 NOTE — CARE PLAN
The patient is Stable - Low risk of patient condition declining or worsening    Shift Goals  Clinical Goals: pain control; Maintin systolic BP >80; remain asymptomatic  Patient Goals: pain control  Family Goals: Updates    Progress made toward(s) clinical / shift goals:  Educated patient on pain rating scales, frequent pain assessments in place, medicating per MAR, PCEA in use, non pharmaceutical pain relief such as heat pads and positioning in use. ICU EKG monitor and  in use. Q1 vitals and hourly rounding in place        Patient is not progressing towards the following goals:

## 2024-11-23 NOTE — PROGRESS NOTES
Trauma / Surgical Daily Progress Note    Date of Service  11/23/2024    Chief Complaint  43 y.o. female admitted 11/19/2024 with Hypertension, tachycardia, weakness    POD #1    Interval Events  Overnight hypotension requiring pausing of epidural  Epidural resumed with MAP greater than 60  Tolerating clear liquid diet    - Monitor blood pressure and urine output  - Continue clears     Review of Systems  Review of Systems   Constitutional:  Negative for chills and fever.   Eyes:  Negative for double vision.   Respiratory:  Negative for cough and shortness of breath.    Gastrointestinal:  Positive for abdominal pain (incisional). Negative for nausea and vomiting.   Musculoskeletal:  Negative for myalgias.   Neurological:  Negative for dizziness, tingling and headaches.        Vital Signs  Temp:  [36.6 °C (97.8 °F)-37.2 °C (99 °F)] 36.6 °C (97.8 °F)  Pulse:  [54-91] 71  Resp:  [7-38] 38  BP: (79-96)/(46-65) 84/51  SpO2:  [91 %-100 %] 98 %    Physical Exam  Physical Exam  Vitals and nursing note reviewed.   Constitutional:       Appearance: She is not toxic-appearing.   HENT:      Mouth/Throat:      Mouth: Mucous membranes are moist.      Pharynx: Oropharynx is clear.   Eyes:      Conjunctiva/sclera: Conjunctivae normal.   Cardiovascular:      Rate and Rhythm: Normal rate and regular rhythm.      Pulses: Normal pulses.   Pulmonary:      Effort: Pulmonary effort is normal. No respiratory distress.   Chest:      Chest wall: No tenderness.   Abdominal:      General: There is no distension.      Palpations: Abdomen is soft.      Tenderness: There is abdominal tenderness. There is no guarding.      Comments: Midline incision approximated with surgical glue   Genitourinary:     Comments: Graf in place with yellow urine  Musculoskeletal:         General: No tenderness.   Skin:     General: Skin is warm and dry.   Neurological:      Mental Status: She is alert and oriented to person, place, and time.          Laboratory  Recent Results (from the past 24 hours)   Histology Request    Collection Time: 11/22/24 11:29 AM   Result Value Ref Range    Pathology Request Sent to Histo    POCT arterial blood gas device results    Collection Time: 11/22/24 12:23 PM   Result Value Ref Range    Ph 7.402 7.350 - 7.450    Pco2 37.4 32.0 - 48.0 mmHg    Po2 329 (H) 83 - 108 mmHg    Tco2 24 (L) 32 - 48 mmol/L    S02 100 (H) 93 - 99 %    Hco3 23.3 21.0 - 28.0 mmol/L    BE -1 -4 - 3 mmol/L    Body Temp 37.0 C degrees    Ph Temp Rosy 7.402 7.350 - 7.450    Pco2 Temp Co 37.4 32.0 - 48.0 mmHg    Po2 Temp Cor 329 (H) 64 - 87 mmHg    Specimen Arterial     End Tidal Carbon Dioxide 30 mmhg   POCT sodium device results    Collection Time: 11/22/24 12:23 PM   Result Value Ref Range    Istat Sodium 137 135 - 145 mmol/L   POCT potassium device results    Collection Time: 11/22/24 12:23 PM   Result Value Ref Range    Istat Potassium 3.4 (L) 3.6 - 5.5 mmol/L   POCT ionized CA device results    Collection Time: 11/22/24 12:23 PM   Result Value Ref Range    Istat Ionized Calcium 1.09 (L) 1.10 - 1.30 mmol/L   POCT hematocrit and hemoglobin device results    Collection Time: 11/22/24 12:23 PM   Result Value Ref Range    Istat Hematocrit 26 (L) 37 - 47 %    Istat Hemoglobin 8.8 (L) 12.0 - 16.0 g/dL   POCT glucose device results    Collection Time: 11/22/24  5:17 PM   Result Value Ref Range    POC Glucose, Blood 189 (H) 65 - 99 mg/dL   POCT glucose device results    Collection Time: 11/22/24  8:23 PM   Result Value Ref Range    POC Glucose, Blood 131 (H) 65 - 99 mg/dL   Basic Metabolic Panel    Collection Time: 11/23/24  3:15 AM   Result Value Ref Range    Sodium 140 135 - 145 mmol/L    Potassium 4.3 3.6 - 5.5 mmol/L    Chloride 107 96 - 112 mmol/L    Co2 24 20 - 33 mmol/L    Glucose 101 (H) 65 - 99 mg/dL    Bun 4 (L) 8 - 22 mg/dL    Creatinine 0.46 (L) 0.50 - 1.40 mg/dL    Calcium 8.1 (L) 8.5 - 10.5 mg/dL    Anion Gap 9.0 7.0 - 16.0   CBC WITH  DIFFERENTIAL    Collection Time: 11/23/24  3:15 AM   Result Value Ref Range    WBC 10.0 4.8 - 10.8 K/uL    RBC 3.28 (L) 4.20 - 5.40 M/uL    Hemoglobin 8.0 (L) 12.0 - 16.0 g/dL    Hematocrit 27.1 (L) 37.0 - 47.0 %    MCV 82.6 81.4 - 97.8 fL    MCH 24.4 (L) 27.0 - 33.0 pg    MCHC 29.5 (L) 32.2 - 35.5 g/dL    RDW 63.5 (H) 35.9 - 50.0 fL    Platelet Count 565 (H) 164 - 446 K/uL    MPV 9.2 9.0 - 12.9 fL    Neutrophils-Polys 78.60 (H) 44.00 - 72.00 %    Lymphocytes 12.10 (L) 22.00 - 41.00 %    Monocytes 7.90 0.00 - 13.40 %    Eosinophils 0.30 0.00 - 6.90 %    Basophils 0.60 0.00 - 1.80 %    Immature Granulocytes 0.50 0.00 - 0.90 %    Nucleated RBC 0.00 0.00 - 0.20 /100 WBC    Neutrophils (Absolute) 7.82 (H) 1.82 - 7.42 K/uL    Lymphs (Absolute) 1.21 1.00 - 4.80 K/uL    Monos (Absolute) 0.79 0.00 - 0.85 K/uL    Eos (Absolute) 0.03 0.00 - 0.51 K/uL    Baso (Absolute) 0.06 0.00 - 0.12 K/uL    Immature Granulocytes (abs) 0.05 0.00 - 0.11 K/uL    NRBC (Absolute) 0.00 K/uL   MAGNESIUM    Collection Time: 11/23/24  3:15 AM   Result Value Ref Range    Magnesium 2.1 1.5 - 2.5 mg/dL   PHOSPHORUS    Collection Time: 11/23/24  3:15 AM   Result Value Ref Range    Phosphorus 5.8 (H) 2.5 - 4.5 mg/dL   ESTIMATED GFR    Collection Time: 11/23/24  3:15 AM   Result Value Ref Range    GFR (CKD-EPI) 119 >60 mL/min/1.73 m 2   POCT glucose device results    Collection Time: 11/23/24  5:56 AM   Result Value Ref Range    POC Glucose, Blood 92 65 - 99 mg/dL   POCT glucose device results    Collection Time: 11/23/24  7:46 AM   Result Value Ref Range    POC Glucose, Blood 98 65 - 99 mg/dL       Fluids    Intake/Output Summary (Last 24 hours) at 11/23/2024 1104  Last data filed at 11/23/2024 1000  Gross per 24 hour   Intake 2972.71 ml   Output 2925 ml   Net 47.71 ml       Core Measures & Quality Metrics  Labs reviewed, Medications reviewed and Radiology images reviewed  Graf catheter: Epidural Catheter / Drain      DVT Prophylaxis: Enoxaparin  (Lovenox)  DVT prophylaxis - mechanical: SCDs          CHACHA Score  ETOH Screening    Assessment/Plan  * Metastatic melanoma (HCC)- (present on admission)  Assessment & Plan  Ocologist - Dr. Reed    Hyponatremia  Assessment & Plan  Daily labs    Abdominal mass  Assessment & Plan  11/22 ex lap and small bowel resection    DM2 (diabetes mellitus, type 2) (HCC)- (present on admission)  Assessment & Plan  Sliding scale insulin        Discussed patient condition with RN, Patient, and general surgery, Dr. Ye and Dr. Irving.

## 2024-11-23 NOTE — PROGRESS NOTES
BP remains 80s/40s-50s, MAPs high 50s-low 60s. Pt remains drowsy and difficult to arouse. Steven, anesthesiology MD notified and ordered RN to stop Dilaudid epidural. Dilaudid epidural stopped at 0259.

## 2024-11-23 NOTE — PROGRESS NOTES
Pt BP 79/47 (58). Pt lethargic and difficult to arouse. 1L NS bolus completed.  Steven, Anesthesia MD notified and ordered RN to decrease Dilaudid PCEA rate from 8mL/hr to 4mL/hr overnight.

## 2024-11-23 NOTE — PROGRESS NOTES
----- Message from Amy Khan sent at 6/28/2017  3:06 PM CDT -----  AS   Pt BP 80s/40s with MAPs high 50s-low 60s. Pt sleeping and asymptomatic upon arousal. MD John notified and ordered 1L NS bolus. Bolus infusing at 999mL/hr.

## 2024-11-23 NOTE — DIETARY
"Nutrition Services: Initial Assessment     Day 4 of admit. Jacque Mcintyre is a 43 y.o. female with admitting DX of Metastatic melanoma.     Consult received for MST score >/= 2.     Nutrition Assessment:      Height: 162.6 cm (5' 4\")  Weight: 54.1 kg (119 lb 4.3 oz)  Weight taken via standing scale  Body mass index is 20.47 kg/m². BMI classification: Normal.  Wt Readings from Last 6 Encounters:   11/20/24 54.1 kg (119 lb 4.3 oz)   11/12/24 56.1 kg (123 lb 9.6 oz)   10/13/24 55.8 kg (123 lb 0.3 oz)   09/06/24 55.8 kg (123 lb)   02/21/24 56.8 kg (125 lb 3.5 oz)   01/04/24 55.3 kg (122 lb)       Objective:  Pertinent medical hx:   Past Medical History:   Diagnosis Date    Asthma     inhaler    Bipolar 1 disorder (HCC)     Cancer (HCC) 01/01/2016    melanoma    Cough     Depression     DM mellitus,     insulin    Hyperlipidemia     Hypertension     Pap smear     Dr. Baird    PCOS (polycystic ovarian syndrome)     Shortness of breath     Sputum production     Wheezing      Pertinent labs: 11/23: Glu 101 (H), Phos 5.8 (H)  Pertinent meds: Lipitor, Lantus, SSI,   Skin/wounds: surgical incision   Food Allergies: lactose intolerance  Last BM: 11/22/24 per flowsheets  S/P exp lap with small bowel resection on 11/22     Current diet order:   Clear liquids    Subjective:   Patient reported UBW: 125 lb. Pt states she has lost about 7 lb in 40 days.   Dietary recall/energy intake: Pt reports poor intake for >1 month due to her abdominal mass. She is feeling much better after surgery and is tolerating clear liquids well. Pt reports hunger and is hopeful for diet to advance. Discussed diet advancement will be per MD and pt stated understanding.   Discussed supplement options. Pt does not tolerate the creamy type Ensures but is interested in Ensure Clear. Pt does have a history of diabetes. Will provide once daily.     Nutrition Focused Physical Exam (NFPE)   Weight loss: No significant weight loss in the past year per chart review. "   Muscle mass: Well-nourished  Subcutaneous fat: Well-nourished  Fluid Accumulation: None noted  Reduced  Strength: N/A in acute care setting.     Nutrition Diagnosis:      Inadequate oral intake related to abdominal mass, s/p small bowel resection as evidenced by reported poor intake PTA and currently on clear liquid diet.    Based on RD assessment at this time, pt does not meet criteria in congruence with ASPEN/Academy guidelines for malnutrition.     Nutrition Interventions:      Diet advancement per MD.  Recommend Ensure Clear (provides 240 calories, 8 g protein per 8 fl oz) daily.  Patient aware of active plan of care as appropriate.     Nutrition Monitoring and Evaluation:     Monitor nutrition POC  Additional fluids per MD/DO  Monitor vital signs pertinent to nutrition       RD following and will provide updated recommendations as indicated.

## 2024-11-23 NOTE — ANESTHESIA POST-OP FOLLOW-UP NOTE
"Anesthesia Pain Service Note    Type:  Epidural catheter    Patient: Jacque Mcintyre    Patient seen and examined. and Patient chart reviewed.     BP (!) 80/52   Pulse 76   Temp 36.6 °C (97.8 °F) (Temporal)   Resp (!) 24   Ht 1.626 m (5' 4\")   Wt 54.1 kg (119 lb 4.3 oz)   LMP 09/01/2024 (Approximate)   SpO2 96%   Breastfeeding No Comment: Patient states has not been breastfeeding for the last 20 years  BMI 20.47 kg/m²     Complaints:  Pain    Pain:   5/10    LOC:   Awake    Rate:  4 mL/hr    Exam:  Vital signs stable, Afebrile, and Site clean, dry, intact without tenderness or erythema    Impression:  Adequate analgesia    Assessment & Plan:  Acute post-procedural pain (G89.18)     No change  - continue  - Pain improving; pt complains of 5/10 pain but appears very comfortable  - Weaning epidural to multimodals, especially in view of soft blood pressures  - Pt states PCEA button working well for breakthrough pain  - IS encouraged  - JOHN guidelines with respect to holding/restarting anticoagulants before and after catheter removal  - Call with questions      Wayne Cox M.D.  11/23/2024  8:15 AM  "

## 2024-11-24 ENCOUNTER — ANESTHESIA EVENT (OUTPATIENT)
Dept: ANESTHESIOLOGY | Facility: MEDICAL CENTER | Age: 44
End: 2024-11-24
Payer: MEDICAID

## 2024-11-24 ENCOUNTER — ANESTHESIA (OUTPATIENT)
Dept: ANESTHESIOLOGY | Facility: MEDICAL CENTER | Age: 44
End: 2024-11-24
Payer: MEDICAID

## 2024-11-24 LAB
BACTERIA BLD CULT: NORMAL
BACTERIA BLD CULT: NORMAL
GLUCOSE BLD STRIP.AUTO-MCNC: 103 MG/DL (ref 65–99)
GLUCOSE BLD STRIP.AUTO-MCNC: 108 MG/DL (ref 65–99)
GLUCOSE BLD STRIP.AUTO-MCNC: 120 MG/DL (ref 65–99)
GLUCOSE BLD STRIP.AUTO-MCNC: 121 MG/DL (ref 65–99)
GLUCOSE BLD STRIP.AUTO-MCNC: 133 MG/DL (ref 65–99)
SIGNIFICANT IND 70042: NORMAL
SIGNIFICANT IND 70042: NORMAL
SITE SITE: NORMAL
SITE SITE: NORMAL
SOURCE SOURCE: NORMAL
SOURCE SOURCE: NORMAL

## 2024-11-24 PROCEDURE — 302129 PCA PLUS: Performed by: SURGERY

## 2024-11-24 PROCEDURE — 82962 GLUCOSE BLOOD TEST: CPT

## 2024-11-24 PROCEDURE — 700102 HCHG RX REV CODE 250 W/ 637 OVERRIDE(OP): Performed by: SURGERY

## 2024-11-24 PROCEDURE — 700111 HCHG RX REV CODE 636 W/ 250 OVERRIDE (IP): Mod: JZ | Performed by: SURGERY

## 2024-11-24 PROCEDURE — A9270 NON-COVERED ITEM OR SERVICE: HCPCS | Performed by: NURSE PRACTITIONER

## 2024-11-24 PROCEDURE — A9270 NON-COVERED ITEM OR SERVICE: HCPCS

## 2024-11-24 PROCEDURE — 700111 HCHG RX REV CODE 636 W/ 250 OVERRIDE (IP): Performed by: NURSE PRACTITIONER

## 2024-11-24 PROCEDURE — 99024 POSTOP FOLLOW-UP VISIT: CPT | Performed by: SURGERY

## 2024-11-24 PROCEDURE — 700102 HCHG RX REV CODE 250 W/ 637 OVERRIDE(OP): Performed by: NURSE PRACTITIONER

## 2024-11-24 PROCEDURE — 700111 HCHG RX REV CODE 636 W/ 250 OVERRIDE (IP): Performed by: STUDENT IN AN ORGANIZED HEALTH CARE EDUCATION/TRAINING PROGRAM

## 2024-11-24 PROCEDURE — 94669 MECHANICAL CHEST WALL OSCILL: CPT

## 2024-11-24 PROCEDURE — 770001 HCHG ROOM/CARE - MED/SURG/GYN PRIV*

## 2024-11-24 PROCEDURE — 700101 HCHG RX REV CODE 250

## 2024-11-24 PROCEDURE — A9270 NON-COVERED ITEM OR SERVICE: HCPCS | Performed by: SURGERY

## 2024-11-24 PROCEDURE — 700102 HCHG RX REV CODE 250 W/ 637 OVERRIDE(OP)

## 2024-11-24 PROCEDURE — 700105 HCHG RX REV CODE 258: Performed by: STUDENT IN AN ORGANIZED HEALTH CARE EDUCATION/TRAINING PROGRAM

## 2024-11-24 PROCEDURE — 99232 SBSQ HOSP IP/OBS MODERATE 35: CPT | Performed by: NURSE PRACTITIONER

## 2024-11-24 RX ORDER — MIDODRINE HYDROCHLORIDE 5 MG/1
5 TABLET ORAL
Status: DISCONTINUED | OUTPATIENT
Start: 2024-11-24 | End: 2024-11-24

## 2024-11-24 RX ORDER — MAGNESIUM SULFATE HEPTAHYDRATE 40 MG/ML
2 INJECTION, SOLUTION INTRAVENOUS ONCE
Status: COMPLETED | OUTPATIENT
Start: 2024-11-24 | End: 2024-11-24

## 2024-11-24 RX ORDER — MIDODRINE HYDROCHLORIDE 5 MG/1
5 TABLET ORAL ONCE
Status: COMPLETED | OUTPATIENT
Start: 2024-11-24 | End: 2024-11-24

## 2024-11-24 RX ORDER — POTASSIUM CHLORIDE 1500 MG/1
60 TABLET, EXTENDED RELEASE ORAL ONCE
Status: COMPLETED | OUTPATIENT
Start: 2024-11-24 | End: 2024-11-24

## 2024-11-24 RX ORDER — MIDODRINE HYDROCHLORIDE 5 MG/1
10 TABLET ORAL 3 TIMES DAILY
Status: DISCONTINUED | OUTPATIENT
Start: 2024-11-24 | End: 2024-11-27 | Stop reason: HOSPADM

## 2024-11-24 RX ADMIN — LIDOCAINE 1 PATCH: 4 PATCH TOPICAL at 09:21

## 2024-11-24 RX ADMIN — MIDODRINE HYDROCHLORIDE 5 MG: 5 TABLET ORAL at 11:41

## 2024-11-24 RX ADMIN — INSULIN LISPRO 4 UNITS: 100 INJECTION, SOLUTION INTRAVENOUS; SUBCUTANEOUS at 10:54

## 2024-11-24 RX ADMIN — MAGNESIUM SULFATE HEPTAHYDRATE 2 G: 2 INJECTION, SOLUTION INTRAVENOUS at 11:42

## 2024-11-24 RX ADMIN — GABAPENTIN 800 MG: 400 CAPSULE ORAL at 09:21

## 2024-11-24 RX ADMIN — Medication: at 15:13

## 2024-11-24 RX ADMIN — MIDODRINE HYDROCHLORIDE 10 MG: 5 TABLET ORAL at 15:12

## 2024-11-24 RX ADMIN — INSULIN LISPRO 4 UNITS: 100 INJECTION, SOLUTION INTRAVENOUS; SUBCUTANEOUS at 17:25

## 2024-11-24 RX ADMIN — GABAPENTIN 800 MG: 400 CAPSULE ORAL at 21:18

## 2024-11-24 RX ADMIN — ACETAMINOPHEN 1000 MG: 500 TABLET ORAL at 05:08

## 2024-11-24 RX ADMIN — GABAPENTIN 800 MG: 400 CAPSULE ORAL at 12:53

## 2024-11-24 RX ADMIN — ACETAMINOPHEN 1000 MG: 500 TABLET ORAL at 12:53

## 2024-11-24 RX ADMIN — GABAPENTIN 800 MG: 400 CAPSULE ORAL at 17:27

## 2024-11-24 RX ADMIN — CLONAZEPAM 1 MG: 1 TABLET ORAL at 17:27

## 2024-11-24 RX ADMIN — ENOXAPARIN SODIUM 40 MG: 100 INJECTION SUBCUTANEOUS at 21:20

## 2024-11-24 RX ADMIN — TRAZODONE HYDROCHLORIDE 100 MG: 100 TABLET ORAL at 23:00

## 2024-11-24 RX ADMIN — MIDODRINE HYDROCHLORIDE 5 MG: 5 TABLET ORAL at 09:21

## 2024-11-24 RX ADMIN — DULOXETINE HYDROCHLORIDE 60 MG: 30 CAPSULE, DELAYED RELEASE ORAL at 17:27

## 2024-11-24 RX ADMIN — INSULIN GLARGINE-YFGN 5 UNITS: 100 INJECTION, SOLUTION SUBCUTANEOUS at 05:17

## 2024-11-24 RX ADMIN — OXCARBAZEPINE 300 MG: 300 TABLET, FILM COATED ORAL at 17:27

## 2024-11-24 RX ADMIN — OXCARBAZEPINE 300 MG: 300 TABLET, FILM COATED ORAL at 05:08

## 2024-11-24 RX ADMIN — PHENYLEPHRINE HYDROCHLORIDE 0.25 MCG/KG/MIN: 100 INJECTION INTRAVENOUS at 05:08

## 2024-11-24 RX ADMIN — SENNOSIDES AND DOCUSATE SODIUM 2 TABLET: 50; 8.6 TABLET ORAL at 17:26

## 2024-11-24 RX ADMIN — ATORVASTATIN CALCIUM 40 MG: 40 TABLET, FILM COATED ORAL at 17:26

## 2024-11-24 RX ADMIN — ACETAMINOPHEN 1000 MG: 500 TABLET ORAL at 21:18

## 2024-11-24 RX ADMIN — FLUTICASONE FUROATE, UMECLIDINIUM BROMIDE AND VILANTEROL TRIFENATATE 1 PUFF: 200; 62.5; 25 POWDER RESPIRATORY (INHALATION) at 17:28

## 2024-11-24 RX ADMIN — POTASSIUM CHLORIDE 60 MEQ: 1500 TABLET, EXTENDED RELEASE ORAL at 11:41

## 2024-11-24 RX ADMIN — MIDODRINE HYDROCHLORIDE 10 MG: 5 TABLET ORAL at 21:18

## 2024-11-24 RX ADMIN — QUETIAPINE FUMARATE 200 MG: 100 TABLET ORAL at 23:00

## 2024-11-24 RX ADMIN — CLONAZEPAM 1 MG: 1 TABLET ORAL at 05:08

## 2024-11-24 ASSESSMENT — COGNITIVE AND FUNCTIONAL STATUS - GENERAL
WALKING IN HOSPITAL ROOM: A LOT
CLIMB 3 TO 5 STEPS WITH RAILING: A LOT
MOVING TO AND FROM BED TO CHAIR: A LITTLE
CLIMB 3 TO 5 STEPS WITH RAILING: A LOT
MOVING FROM LYING ON BACK TO SITTING ON SIDE OF FLAT BED: A LITTLE
DAILY ACTIVITIY SCORE: 21
MOBILITY SCORE: 15
WALKING IN HOSPITAL ROOM: A LOT
SUGGESTED CMS G CODE MODIFIER MOBILITY: CK
TOILETING: A LITTLE
TURNING FROM BACK TO SIDE WHILE IN FLAT BAD: A LITTLE
SUGGESTED CMS G CODE MODIFIER DAILY ACTIVITY: CK
HELP NEEDED FOR BATHING: A LITTLE
SUGGESTED CMS G CODE MODIFIER MOBILITY: CK
STANDING UP FROM CHAIR USING ARMS: A LOT
MOVING FROM LYING ON BACK TO SITTING ON SIDE OF FLAT BED: A LITTLE
MOVING TO AND FROM BED TO CHAIR: A LITTLE
TOILETING: A LOT
TURNING FROM BACK TO SIDE WHILE IN FLAT BAD: A LITTLE
DAILY ACTIVITIY SCORE: 18
STANDING UP FROM CHAIR USING ARMS: A LITTLE
HELP NEEDED FOR BATHING: A LOT
DRESSING REGULAR LOWER BODY CLOTHING: A LOT
MOBILITY SCORE: 16
DRESSING REGULAR LOWER BODY CLOTHING: A LITTLE
SUGGESTED CMS G CODE MODIFIER DAILY ACTIVITY: CJ

## 2024-11-24 ASSESSMENT — ENCOUNTER SYMPTOMS
CHILLS: 0
MYALGIAS: 0
TINGLING: 0
NAUSEA: 0
SHORTNESS OF BREATH: 0
HEADACHES: 0
ABDOMINAL PAIN: 1
VOMITING: 0
DIZZINESS: 0
DOUBLE VISION: 0
FEVER: 0
COUGH: 0

## 2024-11-24 ASSESSMENT — FIBROSIS 4 INDEX: FIB4 SCORE: 0.34

## 2024-11-24 ASSESSMENT — PAIN DESCRIPTION - PAIN TYPE
TYPE: SURGICAL PAIN
TYPE: SURGICAL PAIN
TYPE: ACUTE PAIN
TYPE: SURGICAL PAIN

## 2024-11-24 NOTE — PROGRESS NOTES
Upon initial shift assessment, pt epidural noted to have 15 dashes instead of previous 13 dashes. 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 Brooke anesthesiology MD notified.

## 2024-11-24 NOTE — PROGRESS NOTES
Pt BP 70s/40s and difficult to arouse. MD Suzie notified and at bedside. 1L NS bolus infusing and labs sent. Brooke, anesthesiology MD notified and instructed RN to decrease Dilaudid PCEA continuous rate to 2 mL/hr and demand dose to 2 mL/15 min with 1 hour limit of 30 mL.

## 2024-11-24 NOTE — PROGRESS NOTES
Trauma / Surgical Daily Progress Note    Date of Service  11/24/2024    Chief Complaint  44 y.o. female admitted 11/19/2024 with Hypertension, tachycardia, weakness    POD #2 exploratory laparotomy with small bowel resection     Interval Events  Hypotension overnight on epidural, requiring low dose vasopressors  Midodrine initiated with improvement of blood pressure  Now off vasopressors  Pain control adequate    - Multimodal pain regimen in use  - Consider transition from epidural to PCA  - Anticipate transfer to [facility] today   - Diet per surgical oncology team     Review of Systems  Review of Systems   Constitutional:  Negative for chills and fever.   Eyes:  Negative for double vision.   Respiratory:  Negative for cough and shortness of breath.    Gastrointestinal:  Positive for abdominal pain (incisional). Negative for nausea and vomiting.   Musculoskeletal:  Negative for myalgias.   Neurological:  Negative for dizziness, tingling and headaches.        Vital Signs  Pulse:  [47-96] 73  Resp:  [7-35] 26  BP: (70-98)/(46-70) 89/50  SpO2:  [85 %-100 %] 98 %    Physical Exam  Physical Exam  Vitals and nursing note reviewed.   Constitutional:       Appearance: She is not toxic-appearing.   HENT:      Mouth/Throat:      Mouth: Mucous membranes are moist.      Pharynx: Oropharynx is clear.   Eyes:      Conjunctiva/sclera: Conjunctivae normal.   Cardiovascular:      Rate and Rhythm: Normal rate and regular rhythm.   Pulmonary:      Effort: Pulmonary effort is normal. No respiratory distress.   Chest:      Chest wall: No tenderness.   Abdominal:      General: There is no distension.      Palpations: Abdomen is soft.      Tenderness: There is abdominal tenderness. There is no guarding.      Comments: Midline incision approximated with surgical glue    Genitourinary:     Comments: Graf in place with yellow urine   Musculoskeletal:         General: No tenderness.   Skin:     General: Skin is warm and dry.   Neurological:       Mental Status: She is alert and oriented to person, place, and time.         Laboratory  Recent Results (from the past 24 hours)   POCT glucose device results    Collection Time: 11/23/24  3:26 PM   Result Value Ref Range    POC Glucose, Blood 101 (H) 65 - 99 mg/dL   POCT glucose device results    Collection Time: 11/23/24  4:29 PM   Result Value Ref Range    POC Glucose, Blood 109 (H) 65 - 99 mg/dL   POCT glucose device results    Collection Time: 11/23/24  8:30 PM   Result Value Ref Range    POC Glucose, Blood 132 (H) 65 - 99 mg/dL   CORTISOL    Collection Time: 11/23/24 10:52 PM   Result Value Ref Range    Cortisol 1.9 0.0 - 23.0 ug/dL   CBC WITH DIFFERENTIAL    Collection Time: 11/23/24 10:52 PM   Result Value Ref Range    WBC 7.7 4.8 - 10.8 K/uL    RBC 3.11 (L) 4.20 - 5.40 M/uL    Hemoglobin 7.5 (L) 12.0 - 16.0 g/dL    Hematocrit 25.6 (L) 37.0 - 47.0 %    MCV 82.3 81.4 - 97.8 fL    MCH 24.1 (L) 27.0 - 33.0 pg    MCHC 29.3 (L) 32.2 - 35.5 g/dL    RDW 67.0 (H) 35.9 - 50.0 fL    Platelet Count 504 (H) 164 - 446 K/uL    MPV 9.1 9.0 - 12.9 fL    Neutrophils-Polys 73.10 (H) 44.00 - 72.00 %    Lymphocytes 16.40 (L) 22.00 - 41.00 %    Monocytes 8.70 0.00 - 13.40 %    Eosinophils 1.00 0.00 - 6.90 %    Basophils 0.50 0.00 - 1.80 %    Immature Granulocytes 0.30 0.00 - 0.90 %    Nucleated RBC 0.00 0.00 - 0.20 /100 WBC    Neutrophils (Absolute) 5.66 1.82 - 7.42 K/uL    Lymphs (Absolute) 1.27 1.00 - 4.80 K/uL    Monos (Absolute) 0.67 0.00 - 0.85 K/uL    Eos (Absolute) 0.08 0.00 - 0.51 K/uL    Baso (Absolute) 0.04 0.00 - 0.12 K/uL    Immature Granulocytes (abs) 0.02 0.00 - 0.11 K/uL    NRBC (Absolute) 0.00 K/uL   Basic Metabolic Panel    Collection Time: 11/23/24 10:52 PM   Result Value Ref Range    Sodium 140 135 - 145 mmol/L    Potassium 3.6 3.6 - 5.5 mmol/L    Chloride 104 96 - 112 mmol/L    Co2 24 20 - 33 mmol/L    Glucose 131 (H) 65 - 99 mg/dL    Bun 5 (L) 8 - 22 mg/dL    Creatinine 0.40 (L) 0.50 - 1.40 mg/dL    Calcium  8.0 (L) 8.5 - 10.5 mg/dL    Anion Gap 12.0 7.0 - 16.0   MAGNESIUM    Collection Time: 11/23/24 10:52 PM   Result Value Ref Range    Magnesium 1.9 1.5 - 2.5 mg/dL   PHOSPHORUS    Collection Time: 11/23/24 10:52 PM   Result Value Ref Range    Phosphorus 5.4 (H) 2.5 - 4.5 mg/dL   ESTIMATED GFR    Collection Time: 11/23/24 10:52 PM   Result Value Ref Range    GFR (CKD-EPI) 124 >60 mL/min/1.73 m 2   POCT glucose device results    Collection Time: 11/24/24  5:10 AM   Result Value Ref Range    POC Glucose, Blood 108 (H) 65 - 99 mg/dL   POCT glucose device results    Collection Time: 11/24/24  8:24 AM   Result Value Ref Range    POC Glucose, Blood 121 (H) 65 - 99 mg/dL   POCT glucose device results    Collection Time: 11/24/24 10:46 AM   Result Value Ref Range    POC Glucose, Blood 133 (H) 65 - 99 mg/dL       Fluids    Intake/Output Summary (Last 24 hours) at 11/24/2024 1354  Last data filed at 11/24/2024 1223  Gross per 24 hour   Intake 1101.49 ml   Output 3575 ml   Net -2473.51 ml       Core Measures & Quality Metrics  Core Measures & Quality Metrics  CHACHA Score  ETOH Screening    Assessment/Plan  * Metastatic melanoma (HCC)- (present on admission)  Assessment & Plan  Ocologist - Dr. Reed    Hyponatremia  Assessment & Plan  Daily labs    Abdominal mass  Assessment & Plan  11/22 ex lap and small bowel resection    DM2 (diabetes mellitus, type 2) (HCC)- (present on admission)  Assessment & Plan  Sliding scale insulin        Discussed patient condition with RN, Patient, and trauma surgery, Dr. Ye.

## 2024-11-24 NOTE — PROGRESS NOTES
Incidental note: patient experiencing pain with acceptable BPs when epidural is off, but is hypotensive to the 70s systolic when epidural is on (and pts pain is significantly decreased). D/w nursing and Dr Larsen; team okay with low-dose phenylephrine gtt to maintain MAPs while epidural is running.

## 2024-11-24 NOTE — PROGRESS NOTES
Epidural d/c'd per anesthesia verbal order. No resistance felt, catheter tip intact. 200mL Hydromorphone/Ropivacaine wasted with Shagufta RODRIGUEZ. Lily re-timed in MAR for 1930 per policy.

## 2024-11-24 NOTE — FLOWSHEET NOTE
11/23/24 1530   Incentive Spirometry Treatment   Incentive Spirometer Volume 2750 mL   Chest Physiotherapy Treatment   $ PEP/CPT Performed PEP / Flutter     QID

## 2024-11-24 NOTE — PROGRESS NOTES
Date of Service  November 23, 2024     Chief Complaint  Sent by MD (·Active stage 4 melanoma/·Hx anemia and PE - on eliquis/·Hypertensive, tachycardic, weakness in clinic/·Concerns for another clot//Pt denies CP/SOB)     Surgery  Exploratory laparotomy with small bowel resection     Hospital Course  POD # 2     Interval Problem Update  Ongoing hypotension with epidural requiring low dose phenylephrine overnight  Patient reports excruciating pain when epidural off, good pain control when its on  - flatus/ + BM (liquid from bowel prep)      Problem List  Principal Problem:    Metastatic melanoma (HCC) (POA: Yes)  Active Problems:    DM2 (diabetes mellitus, type 2) (HCC) (POA: Yes)    Thrombocytosis (POA: Yes)    Diabetic neuropathy (HCC) (POA: Yes)    Anemia (Chronic) (POA: Yes)    Abdominal mass (POA: Unknown)    Hyponatremia (POA: Unknown)    Marijuana use (POA: Unknown)  Resolved Problems:    * No resolved hospital problems. *       Subjective  Review of Systems   Constitutional:  Negative for chills, fever and malaise/fatigue.   Respiratory:  Negative for cough and shortness of breath.    Cardiovascular:  Negative for chest pain.   Gastrointestinal:  Positive for abdominal pain. Negative for nausea and vomiting.         Objective  Pulse:  [47-96] 50  Resp:  [7-38] 14  BP: (70-93)/(46-60) 83/51  SpO2:  [90 %-100 %] 99 %      Physical Exam  Vitals and nursing note reviewed.   Constitutional:       General: She is not in acute distress.     Appearance: She is not ill-appearing.      Comments: Epidural at 2   HENT:      Head: Normocephalic and atraumatic.      Nose: Nose normal.      Mouth/Throat:      Pharynx: Oropharynx is clear.   Eyes:      Conjunctiva/sclera: Conjunctivae normal.   Cardiovascular:      Rate and Rhythm: Normal rate.   Pulmonary:      Effort: Pulmonary effort is normal. No respiratory distress.   Abdominal:      General: Bowel sounds are normal. There is no distension.      Palpations: Abdomen  is soft.      Tenderness: There is abdominal tenderness. There is no guarding.      Comments: Abdominal midline incision, approximated, sutures and glue   Genitourinary:     Comments: Graf  Skin:     General: Skin is warm and dry.      Coloration: Skin is not jaundiced.   Neurological:      Mental Status: She is alert and oriented to person, place, and time.   Psychiatric:         Mood and Affect: Mood normal.         Behavior: Behavior normal.        Fluids    Intake/Output Summary (Last 24 hours) at 11/24/2024 0911  Last data filed at 11/24/2024 0800  Gross per 24 hour   Intake 1324.95 ml   Output 3650 ml   Net -2325.05 ml         Labs  Lab Results   Component Value Date/Time    SODIUM 140 11/23/2024 10:52 PM    POTASSIUM 3.6 11/23/2024 10:52 PM    CHLORIDE 104 11/23/2024 10:52 PM    CO2 24 11/23/2024 10:52 PM    GLUCOSE 131 (H) 11/23/2024 10:52 PM    BUN 5 (L) 11/23/2024 10:52 PM    CREATININE 0.40 (L) 11/23/2024 10:52 PM         Lab Results   Component Value Date/Time    PROTHROMBTM 14.7 (H) 11/21/2024 12:24 AM    INR 1.15 (H) 11/21/2024 12:24 AM         Lab Results   Component Value Date/Time    WBC 7.7 11/23/2024 10:52 PM    RBC 3.11 (L) 11/23/2024 10:52 PM    HEMOGLOBIN 7.5 (L) 11/23/2024 10:52 PM    HEMATOCRIT 25.6 (L) 11/23/2024 10:52 PM    MCV 82.3 11/23/2024 10:52 PM    MCH 24.1 (L) 11/23/2024 10:52 PM    MCHC 29.3 (L) 11/23/2024 10:52 PM    MPV 9.1 11/23/2024 10:52 PM    NEUTSPOLYS 73.10 (H) 11/23/2024 10:52 PM    LYMPHOCYTES 16.40 (L) 11/23/2024 10:52 PM    MONOCYTES 8.70 11/23/2024 10:52 PM    EOSINOPHILS 1.00 11/23/2024 10:52 PM    BASOPHILS 0.50 11/23/2024 10:52 PM    HYPOCHROMIA 2+ (A) 11/19/2024 01:42 PM    ANISOCYTOSIS 1+ 11/19/2024 01:42 PM         Recent Labs     11/19/24  1342 11/20/24  0419 11/21/24  0024   ASTSGOT 8* 11* 8*   ALTSGPT 5 <5 <5   TBILIRUBIN 0.2 0.4 0.3   GLOBULIN 2.5 2.3 2.4   INR  --   --  1.15*      Imaging  CT A/P (11/19/24)  IMPRESSION:  1.  Large 11 cm mass centered on one  of the small bowel loops in the left hemiabdomen. Primary differential considerations include small bowel adenocarcinoma, gastrointestinal stromal tumor and small bowel metastasis.  2.  The mass abuts the descending and sigmoid colon without colonic invasion. No abutment of other organs.  3.  No metastatic disease in the abdomen.  4.  Small volume pelvic ascites.     CT PET (6/24/24)  IMPRESSION:  1.  Positive response to therapy. The vast majority of hypermetabolic metastases seen on prior 2022 PET/CT have resolved or are no longer FDG avid.  2.  Most of the pulmonary nodules have resolved. Sub-4 mm residual pulmonary nodules can be followed with CT imaging.  3.  Some persistent uptake in the left adnexal region, nonspecific.  4.  Nonspecific focal metabolism in one of the left upper quadrant small bowel loops. It may be physiologic, but bowel metastasis is difficult to exclude.  5.  Inhomogeneous brain metabolism, related to multiple treated brain metastases.     Pathology  [facility] (10/6/2022)  ADDENDUM:     This case is being addended to report the results of BRAF Mutation   Analysis Molecular Genetics, performed by [id number] on   block A3.     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     Addendum Signed By: Deborah Galvan MD   Addendum Date: 10/13/2022   Original Report Date: 10/07/2022     FINAL DIAGNOSIS:     A. Right frontal brain mass #1:          Positive for metastatic melanoma.   B. Right frontal brain lesion #2:          Positive for metastatic melanoma.   C. Right frontal brain lesion #3:          Positive for metastatic melanoma.      Assessment and Plan  Patient is a 43F with known metastatic melanoma with mets to brain, currently on oral systemic therapy managed by Dr Reed, admitted with several days of persistent nausea and vomiting concern SBO, CT A/P revealed a large 11 cm mass  adjacent to small bowel.     Exploratory laparotomy, small bowel resection, POD #2  - Advance to full liquids  - Continue Graf  - Continue Epidural as able. May need to dc and switch to multi-modal pain control if persistently hypotensive. Please avoid norepinephrine given new anastomosis.   - OOB, ambulate  - Encourage IS  - PT / OT  - Fluid bolus     2. T2DM, A1c 6.6 earlier this year  - Insulin sliding scale  - Consider increase for improved control     3. Iron deficiency anemia, chronic, HGB 8  - Replace iron  - Monitor     4. Leukocytosis, resolved, WBC 10  - Monitor     If patient demonstrates solid blood pressures, and has good pain control - possible may transfer to T4 later in day.        Vivian Irving MD, MS  Surgical Oncology  [facility], [facility]

## 2024-11-24 NOTE — ANESTHESIA POST-OP FOLLOW-UP NOTE
"Anesthesia Pain Service Note    Type:  Epidural catheter    Patient: Jacque Mcintyre    Patient seen and examined. and Patient chart reviewed.     BP (!) 79/50   Pulse (!) 50   Temp 36.6 °C (97.8 °F) (Temporal)   Resp (!) 8   Ht 1.626 m (5' 4\")   Wt 62.5 kg (137 lb 12.6 oz)   LMP 09/01/2024 (Approximate)   SpO2 99%   Breastfeeding No Comment: Patient states has not been breastfeeding for the last 20 years  BMI 23.65 kg/m²     Complaints:   Hypotension    Pain:   1/10    LOC:   arousable    Rate:  2ml/hr with 2ml bolus    Exam:  Afebrile and Site clean, dry, intact without tenderness or erythema    Impression:  Adequate analgesia    Assessment & Plan:  Acute post-procedural pain (G89.18)     D/C by RN  Patient is on 0.5 mcg/kg/min of phenylephrine, bp 85/60.  Pain is controlled however hypotension is an issue.  Spoke with patient that risk outweighs benefit of epidural at this point.    Plan to D/C epidural and recommend addition of scheduled multimodal tylenol, celebrex.  PRN oxycodone vs tramadol PO with possible dilaudid PCA for breakthrough pain or if she doesn't tolerate PO.  Wean pressors as tolerated.   Hold lovenox 12 hr prior and at least 4 hours following epidural removal,  if restarting elequis then wait 6 hrs following removal per JOHN guidelines.        Gregorio Christiansen M.D.  11/24/2024  8:21 AM  "

## 2024-11-24 NOTE — CARE PLAN
The patient is Watcher - Medium risk of patient condition declining or worsening    Shift Goals  Clinical Goals: pain control; Maintin systolic BP >80; remain asymptomatic  Patient Goals: pain control  Family Goals: Updates    Progress made toward(s) clinical / shift goals:    Problem: Knowledge Deficit - Standard  Goal: Patient and family/care givers will demonstrate understanding of plan of care, disease process/condition, diagnostic tests and medications  Outcome: Progressing     Problem: Pain - Standard  Goal: Alleviation of pain or a reduction in pain to the patient’s comfort goal  Outcome: Progressing     Problem: Fall Risk  Goal: Patient will remain free from falls  Outcome: Progressing     Problem: Hemodynamics  Goal: Patient's hemodynamics, fluid balance and neurologic status will be stable or improve  Outcome: Progressing     Problem: Respiratory  Goal: Patient will achieve/maintain optimum respiratory ventilation and gas exchange  Outcome: Progressing     Problem: Pain Management  Goal: Pain level will decrease to patient's comfort goal  Outcome: Progressing     Problem: Fluid Volume:  Goal: Will maintain balanced intake and output  Outcome: Progressing     Problem: Bowel/Gastric:  Goal: Normal bowel function is maintained or improved  Outcome: Progressing  Goal: Will not experience complications related to bowel motility  Outcome: Progressing     Problem: Skin Integrity  Goal: Skin integrity is maintained or improved  Outcome: Progressing       Patient is not progressing towards the following goals:

## 2024-11-24 NOTE — FLOWSHEET NOTE
11/24/24 1430   Incentive Spirometry Treatment   Incentive Spirometer Volume 1750 mL   Chest Physiotherapy Treatment   $ PEP/CPT Performed PEP / Flutter     QID

## 2024-11-24 NOTE — CARE PLAN
The patient is Watcher - Medium risk of patient condition declining or worsening    Shift Goals  Clinical Goals: pain control; Maintin systolic BP >80; remain asymptomatic  Patient Goals: pain control  Family Goals: Updates    Progress made toward(s) clinical / shift goals:    Problem: Knowledge Deficit - Standard  Goal: Patient and family/care givers will demonstrate understanding of plan of care, disease process/condition, diagnostic tests and medications  Outcome: Progressing     Problem: Fall Risk  Goal: Patient will remain free from falls  Outcome: Progressing     Problem: Bowel/Gastric:  Goal: Normal bowel function is maintained or improved  Outcome: Progressing  Goal: Will not experience complications related to bowel motility  Outcome: Progressing       Patient is not progressing towards the following goals:      Problem: Pain - Standard  Goal: Alleviation of pain or a reduction in pain to the patient’s comfort goal  Outcome: Not Progressing     Problem: Hemodynamics  Goal: Patient's hemodynamics, fluid balance and neurologic status will be stable or improve  Outcome: Not Progressing

## 2024-11-25 LAB
ALBUMIN SERPL BCP-MCNC: 2.9 G/DL (ref 3.2–4.9)
ALBUMIN/GLOB SERPL: 1.3 G/DL
ALP SERPL-CCNC: 85 U/L (ref 30–99)
ALT SERPL-CCNC: 6 U/L (ref 2–50)
ANION GAP SERPL CALC-SCNC: 7 MMOL/L (ref 7–16)
AST SERPL-CCNC: 14 U/L (ref 12–45)
BILIRUB SERPL-MCNC: <0.2 MG/DL (ref 0.1–1.5)
BUN SERPL-MCNC: 4 MG/DL (ref 8–22)
CALCIUM ALBUM COR SERPL-MCNC: 9.6 MG/DL (ref 8.5–10.5)
CALCIUM SERPL-MCNC: 8.7 MG/DL (ref 8.5–10.5)
CHLORIDE SERPL-SCNC: 103 MMOL/L (ref 96–112)
CO2 SERPL-SCNC: 28 MMOL/L (ref 20–33)
CREAT SERPL-MCNC: 0.43 MG/DL (ref 0.5–1.4)
ERYTHROCYTE [DISTWIDTH] IN BLOOD BY AUTOMATED COUNT: 68.7 FL (ref 35.9–50)
GFR SERPLBLD CREATININE-BSD FMLA CKD-EPI: 121 ML/MIN/1.73 M 2
GLOBULIN SER CALC-MCNC: 2.2 G/DL (ref 1.9–3.5)
GLUCOSE BLD STRIP.AUTO-MCNC: 103 MG/DL (ref 65–99)
GLUCOSE BLD STRIP.AUTO-MCNC: 107 MG/DL (ref 65–99)
GLUCOSE BLD STRIP.AUTO-MCNC: 141 MG/DL (ref 65–99)
GLUCOSE BLD STRIP.AUTO-MCNC: 150 MG/DL (ref 65–99)
GLUCOSE BLD STRIP.AUTO-MCNC: 154 MG/DL (ref 65–99)
GLUCOSE BLD STRIP.AUTO-MCNC: 163 MG/DL (ref 65–99)
GLUCOSE BLD STRIP.AUTO-MCNC: 171 MG/DL (ref 65–99)
GLUCOSE BLD STRIP.AUTO-MCNC: 177 MG/DL (ref 65–99)
GLUCOSE BLD STRIP.AUTO-MCNC: 186 MG/DL (ref 65–99)
GLUCOSE BLD STRIP.AUTO-MCNC: 198 MG/DL (ref 65–99)
GLUCOSE BLD STRIP.AUTO-MCNC: 76 MG/DL (ref 65–99)
GLUCOSE SERPL-MCNC: 184 MG/DL (ref 65–99)
HCT VFR BLD AUTO: 27.9 % (ref 37–47)
HGB BLD-MCNC: 8.2 G/DL (ref 12–16)
MCH RBC QN AUTO: 24 PG (ref 27–33)
MCHC RBC AUTO-ENTMCNC: 29.4 G/DL (ref 32.2–35.5)
MCV RBC AUTO: 81.8 FL (ref 81.4–97.8)
PLATELET # BLD AUTO: 518 K/UL (ref 164–446)
PMV BLD AUTO: 8.9 FL (ref 9–12.9)
POTASSIUM SERPL-SCNC: 4.3 MMOL/L (ref 3.6–5.5)
PROT SERPL-MCNC: 5.1 G/DL (ref 6–8.2)
RBC # BLD AUTO: 3.41 M/UL (ref 4.2–5.4)
SODIUM SERPL-SCNC: 138 MMOL/L (ref 135–145)
WBC # BLD AUTO: 6.3 K/UL (ref 4.8–10.8)

## 2024-11-25 PROCEDURE — 99498 ADVNCD CARE PLAN ADDL 30 MIN: CPT | Performed by: NURSE PRACTITIONER

## 2024-11-25 PROCEDURE — A9270 NON-COVERED ITEM OR SERVICE: HCPCS | Performed by: NURSE PRACTITIONER

## 2024-11-25 PROCEDURE — 94669 MECHANICAL CHEST WALL OSCILL: CPT

## 2024-11-25 PROCEDURE — 700111 HCHG RX REV CODE 636 W/ 250 OVERRIDE (IP): Performed by: NURSE PRACTITIONER

## 2024-11-25 PROCEDURE — 80053 COMPREHEN METABOLIC PANEL: CPT

## 2024-11-25 PROCEDURE — 97535 SELF CARE MNGMENT TRAINING: CPT

## 2024-11-25 PROCEDURE — 85027 COMPLETE CBC AUTOMATED: CPT

## 2024-11-25 PROCEDURE — 700111 HCHG RX REV CODE 636 W/ 250 OVERRIDE (IP): Mod: JZ | Performed by: SURGERY

## 2024-11-25 PROCEDURE — 97165 OT EVAL LOW COMPLEX 30 MIN: CPT

## 2024-11-25 PROCEDURE — 99255 IP/OBS CONSLTJ NEW/EST HI 80: CPT | Mod: 25 | Performed by: NURSE PRACTITIONER

## 2024-11-25 PROCEDURE — 700102 HCHG RX REV CODE 250 W/ 637 OVERRIDE(OP)

## 2024-11-25 PROCEDURE — A9270 NON-COVERED ITEM OR SERVICE: HCPCS

## 2024-11-25 PROCEDURE — 700102 HCHG RX REV CODE 250 W/ 637 OVERRIDE(OP): Performed by: NURSE PRACTITIONER

## 2024-11-25 PROCEDURE — 99024 POSTOP FOLLOW-UP VISIT: CPT | Performed by: SURGERY

## 2024-11-25 PROCEDURE — 700111 HCHG RX REV CODE 636 W/ 250 OVERRIDE (IP): Performed by: ANESTHESIOLOGY

## 2024-11-25 PROCEDURE — 770001 HCHG ROOM/CARE - MED/SURG/GYN PRIV*

## 2024-11-25 PROCEDURE — 97163 PT EVAL HIGH COMPLEX 45 MIN: CPT

## 2024-11-25 PROCEDURE — 82962 GLUCOSE BLOOD TEST: CPT | Mod: 91

## 2024-11-25 PROCEDURE — 99497 ADVNCD CARE PLAN 30 MIN: CPT | Performed by: NURSE PRACTITIONER

## 2024-11-25 RX ORDER — CELECOXIB 200 MG/1
200 CAPSULE ORAL DAILY
Status: DISCONTINUED | OUTPATIENT
Start: 2024-11-25 | End: 2024-11-27 | Stop reason: HOSPADM

## 2024-11-25 RX ORDER — TRAMADOL HYDROCHLORIDE 50 MG/1
50 TABLET ORAL EVERY 6 HOURS
Status: DISCONTINUED | OUTPATIENT
Start: 2024-11-25 | End: 2024-11-26

## 2024-11-25 RX ADMIN — DULOXETINE HYDROCHLORIDE 60 MG: 30 CAPSULE, DELAYED RELEASE ORAL at 16:41

## 2024-11-25 RX ADMIN — FLUTICASONE FUROATE, UMECLIDINIUM BROMIDE AND VILANTEROL TRIFENATATE 1 PUFF: 200; 62.5; 25 POWDER RESPIRATORY (INHALATION) at 16:31

## 2024-11-25 RX ADMIN — TRAMADOL HYDROCHLORIDE 50 MG: 50 TABLET, COATED ORAL at 16:29

## 2024-11-25 RX ADMIN — Medication: at 16:20

## 2024-11-25 RX ADMIN — GABAPENTIN 800 MG: 400 CAPSULE ORAL at 16:29

## 2024-11-25 RX ADMIN — ACETAMINOPHEN 1000 MG: 500 TABLET ORAL at 16:28

## 2024-11-25 RX ADMIN — CLONAZEPAM 1 MG: 1 TABLET ORAL at 16:29

## 2024-11-25 RX ADMIN — GABAPENTIN 800 MG: 400 CAPSULE ORAL at 08:39

## 2024-11-25 RX ADMIN — INSULIN GLARGINE-YFGN 5 UNITS: 100 INJECTION, SOLUTION SUBCUTANEOUS at 08:43

## 2024-11-25 RX ADMIN — SENNOSIDES AND DOCUSATE SODIUM 2 TABLET: 50; 8.6 TABLET ORAL at 16:28

## 2024-11-25 RX ADMIN — ENOXAPARIN SODIUM 40 MG: 100 INJECTION SUBCUTANEOUS at 16:30

## 2024-11-25 RX ADMIN — INSULIN LISPRO 4 UNITS: 100 INJECTION, SOLUTION INTRAVENOUS; SUBCUTANEOUS at 16:38

## 2024-11-25 RX ADMIN — TRAMADOL HYDROCHLORIDE 50 MG: 50 TABLET, COATED ORAL at 08:39

## 2024-11-25 RX ADMIN — INSULIN LISPRO 4 UNITS: 100 INJECTION, SOLUTION INTRAVENOUS; SUBCUTANEOUS at 12:32

## 2024-11-25 RX ADMIN — MIDODRINE HYDROCHLORIDE 10 MG: 5 TABLET ORAL at 16:28

## 2024-11-25 RX ADMIN — TRAZODONE HYDROCHLORIDE 100 MG: 100 TABLET ORAL at 22:59

## 2024-11-25 RX ADMIN — TRAMADOL HYDROCHLORIDE 50 MG: 50 TABLET, COATED ORAL at 21:22

## 2024-11-25 RX ADMIN — OMEPRAZOLE 20 MG: 20 CAPSULE, DELAYED RELEASE ORAL at 12:30

## 2024-11-25 RX ADMIN — ACETAMINOPHEN 1000 MG: 500 TABLET ORAL at 22:59

## 2024-11-25 RX ADMIN — ATORVASTATIN CALCIUM 40 MG: 40 TABLET, FILM COATED ORAL at 16:29

## 2024-11-25 RX ADMIN — GABAPENTIN 800 MG: 400 CAPSULE ORAL at 12:30

## 2024-11-25 RX ADMIN — POLYETHYLENE GLYCOL 3350 1 PACKET: 17 POWDER, FOR SOLUTION ORAL at 12:40

## 2024-11-25 RX ADMIN — GABAPENTIN 800 MG: 400 CAPSULE ORAL at 21:22

## 2024-11-25 RX ADMIN — CLONAZEPAM 1 MG: 1 TABLET ORAL at 05:55

## 2024-11-25 RX ADMIN — MIDODRINE HYDROCHLORIDE 10 MG: 5 TABLET ORAL at 08:39

## 2024-11-25 RX ADMIN — ACETAMINOPHEN 1000 MG: 500 TABLET ORAL at 05:56

## 2024-11-25 RX ADMIN — CELECOXIB 200 MG: 200 CAPSULE ORAL at 08:39

## 2024-11-25 RX ADMIN — OXCARBAZEPINE 300 MG: 300 TABLET, FILM COATED ORAL at 05:56

## 2024-11-25 RX ADMIN — OXCARBAZEPINE 300 MG: 300 TABLET, FILM COATED ORAL at 16:29

## 2024-11-25 RX ADMIN — DEXAMETHASONE SODIUM PHOSPHATE 4 MG: 4 INJECTION INTRA-ARTICULAR; INTRALESIONAL; INTRAMUSCULAR; INTRAVENOUS; SOFT TISSUE at 15:26

## 2024-11-25 RX ADMIN — INSULIN LISPRO 1 UNITS: 100 INJECTION, SOLUTION INTRAVENOUS; SUBCUTANEOUS at 21:24

## 2024-11-25 RX ADMIN — QUETIAPINE FUMARATE 200 MG: 100 TABLET ORAL at 22:59

## 2024-11-25 ASSESSMENT — PAIN DESCRIPTION - PAIN TYPE
TYPE: SURGICAL PAIN
TYPE: ACUTE PAIN
TYPE: ACUTE PAIN
TYPE: SURGICAL PAIN
TYPE: ACUTE PAIN;CHRONIC PAIN
TYPE: SURGICAL PAIN
TYPE: ACUTE PAIN
TYPE: ACUTE PAIN;SURGICAL PAIN
TYPE: ACUTE PAIN

## 2024-11-25 ASSESSMENT — ENCOUNTER SYMPTOMS
COUGH: 0
ABDOMINAL PAIN: 1
CHILLS: 0
BACK PAIN: 1
NAUSEA: 0
FEVER: 0
SHORTNESS OF BREATH: 0
WEAKNESS: 1
VOMITING: 0

## 2024-11-25 ASSESSMENT — COGNITIVE AND FUNCTIONAL STATUS - GENERAL
WALKING IN HOSPITAL ROOM: A LITTLE
MOVING FROM LYING ON BACK TO SITTING ON SIDE OF FLAT BED: A LITTLE
MOVING TO AND FROM BED TO CHAIR: A LITTLE
CLIMB 3 TO 5 STEPS WITH RAILING: A LITTLE
SUGGESTED CMS G CODE MODIFIER DAILY ACTIVITY: CJ
TOILETING: A LITTLE
MOBILITY SCORE: 18
SUGGESTED CMS G CODE MODIFIER MOBILITY: CK
TURNING FROM BACK TO SIDE WHILE IN FLAT BAD: A LITTLE
STANDING UP FROM CHAIR USING ARMS: A LITTLE
HELP NEEDED FOR BATHING: A LITTLE
DRESSING REGULAR LOWER BODY CLOTHING: A LITTLE
DAILY ACTIVITIY SCORE: 21

## 2024-11-25 ASSESSMENT — GAIT ASSESSMENTS
GAIT LEVEL OF ASSIST: CONTACT GUARD ASSIST
ASSISTIVE DEVICE: FRONT WHEEL WALKER
DISTANCE (FEET): 200
DEVIATION: BRADYKINETIC

## 2024-11-25 ASSESSMENT — LIFESTYLE VARIABLES: SUBSTANCE_ABUSE: 0

## 2024-11-25 ASSESSMENT — ACTIVITIES OF DAILY LIVING (ADL): TOILETING: INDEPENDENT

## 2024-11-25 NOTE — CONSULTS
MRN: [id number]  Date of palliative consult: 11/25/24  Reason for consult: Symptom management and advanced care planning  Referring provider: Dr.Andrea Mathew  Location of consult: [facility]  Additional consulting services: Surgical Oncology    HPI:   Jacque Mcintyre is a 44 y.o. female with medical history significant for metastatic melanoma (diagnosed 2016, recurrence in 2022), history of pulmonary embolism on apixaban, COPD, htn, depression, bipolar 1 disorder, asthma, dyslipidemia; admitted 11/19/2024 sent over by clinic for evaluation complaints of abdominal pain, nausea, inability to tolerate oral intake, weakness and tachycardia.     Patient was found to have profound anemia with hemoglobin of 5.3, source was found to be a small bowel mass concerning for malignancy. CT abdomen revealed large small bowel metastasis measuring approximately 11cm. Bulky leodan disease extending all the way down to the root of the mesentery. A CTA of her chest was obtained which did not show any evidence of pulmonary embolism and minimal left pleural effusion. EKG was obtained which showed increase in her QTc to 515 and sinus tachycardia. She has received a total of 3 units of blood during her hospitalization. She underwent exploratory laparotomy with small bowel resection on 11/22. Patient tolerated the surgery well with no noted complications during or after the procedure. Patient was provided with an epidural for pain management from 11/22 to 11/24. She was transitioned to a PCA pump with dilaudid on 11/24. She has received 3.6 mg IV dilaudid since the PCA pump was started.     Patient is sitting up in bed, eating a regular meal for breakfast and tolerating it well. She becomes hypoxic intermittently while eating and speaking to the high 70s, low 80s, which is unsustained. She denies being on supplemental oxygen at baseline. We provided her with 2LPM supplemental oxygen via nasal cannula during our discussion and while she ate  "breakfast. She reports she ambulated on the medical floor for a short distance.     Additional Pertinent Medical History:   Patient reports ongoing chronic back pain due to significant history of degenerative disk disease affecting L5/S1 spondylosis, anterolisthesis, and has scoliosis of the thoracic spine. She endorses abdominal pain has been present for a couple of months prior to admission and was progressively worse over time which she described as feeling like her \"intestines hurt\", and describes a heavy sensation in her abdomen upon standing, with the pain located primarily across her upper abdomen which she describes as a constant ache. She denies any radiation of the pain. She also endorsed having nausea with inability to tolerate oral intake prior to admission. She reports her home oxycodone helped a little with the pain.     She reports her pain is different now, primarily post surgical incisional pain with referred pain to bilateral shoulders, pain is intermittent, shooting pain. Patient reports her pain is well controlled with the PCA pump. She is able to engage fully in conversation with appropriate verbal responses. She demonstrates some lid lag and lethargy. She denies any hallucinations or strange dreams. She endorses feeling tired.   Patient reports her pain regimen is managed outpatient by her oncologist Dr. Reed. She reports her home medications control her pain well. At home her pain is controlled with oxycodone 10mg q6hrs in addition to duloxetine 60mg daily, gabapentin 800mg QID, tramadol 50mg q6h, celecoxib 200mg qdaily. She is interested in trial of morphine extended release dose failure with oxycodone which she takes 10 mg every 6 hours. We discussed referral for outpatient palliative medicine for pain management; patient reports she would like to continue with Dr. Reed but is open to the referral if her pain is uncontrolled/or if Dr. Reed recommends.    Pain History:  Onset: " 2+months  Location: across the abdomen  Duration: constant  Characteristics: ache  Aggravating factors: standing up, eating  Alleviating factors: oxycodone  Radiation: denies  Treatments: oxycodone 10mg q6hr (home medication)  Severity: uncontrolled pain, requiring surgical resection and hospitalization for pain management    Additional symptoms: nausea, decreased appetite, weakness    Medication Allergy/Sensitivities:  Allergies   Allergen Reactions    Lactose Unspecified     GI Upset    Augmentin [Amoxicillin-Pot Clavulanate] Vomiting     PCN ok, allergic to the Clavulanate per pt 1/3/24     ROS:    Review of Systems   Gastrointestinal:  Positive for abdominal pain (at insision). Negative for nausea.   Musculoskeletal:  Positive for back pain (chronic) and joint pain.        Bilateral shoulder pain   Neurological:  Positive for weakness.   Psychiatric/Behavioral:  Negative for substance abuse.      PE:   Recent vital signs  BMI: Body mass index is 23.65 kg/m².    Temp (24hrs), Av.6 °C (97.8 °F), Min:36.3 °C (97.3 °F), Max:36.7 °C (98.1 °F)  Temperature: 36.7 °C (98.1 °F), Monitored Temp: 37.1 °C (98.8 °F)  Pulse  Av.5  Min: 47  Max: 115   Blood Pressure: 94/62  CVP (mm Hg): (!) 194 MM HG    Physical Exam  Vitals and nursing note reviewed.   Constitutional:       General: She is awake.      Appearance: Normal appearance. She is well-groomed.      Interventions: Nasal cannula in place.   HENT:      Head: Normocephalic and atraumatic.      Mouth/Throat:      Mouth: Mucous membranes are moist.   Eyes:      Extraocular Movements: Extraocular movements intact.      Conjunctiva/sclera: Conjunctivae normal.   Pulmonary:      Effort: Pulmonary effort is normal.      Comments: Even chest rise, unlabored breathing  Abdominal:      General: Bowel sounds are normal.      Comments: Midline surgical incision appears to be healing well, nonerythematous, appears noninfectious   Musculoskeletal:      Cervical back: Normal  range of motion.   Skin:     General: Skin is warm and dry.   Neurological:      Mental Status: She is oriented to person, place, and time. She is lethargic.   Psychiatric:         Mood and Affect: Mood normal.         Behavior: Behavior normal. Behavior is cooperative.         Thought Content: Thought content normal.         Judgment: Judgment normal.       Recent Labs     11/23/24 0315 11/23/24 2252 11/25/24 0923   SODIUM 140 140 138   POTASSIUM 4.3 3.6 4.3   CHLORIDE 107 104 103   CO2 24 24 28   GLUCOSE 101* 131* 184*   BUN 4* 5* 4*   CREATININE 0.46* 0.40* 0.43*   CALCIUM 8.1* 8.0* 8.7     Recent Labs     11/23/24 0315 11/23/24 2252 11/25/24 0923   WBC 10.0 7.7 6.3   RBC 3.28* 3.11* 3.41*   HEMOGLOBIN 8.0* 7.5* 8.2*   HEMATOCRIT 27.1* 25.6* 27.9*   MCV 82.6 82.3 81.8   MCH 24.4* 24.1* 24.0*   MCHC 29.5* 29.3* 29.4*   RDW 63.5* 67.0* 68.7*   PLATELETCT 565* 504* 518*   MPV 9.2 9.1 8.9*     ASSESSMENT/PLAN WITH SHARED DECISION MAKING:   Medications reviewed. Labs Reviewed.   Pertinent imaging reviewed.    PHYSICAL ASPECTS OF CARE  Palliative Performance Scale: 60%    # Metastatic Melanoma  #Pain related to metastatic melanoma  Discussed case with Dr. Cummings.   With mets to brain, lung and small bowel. S/p small bowel resection and ex lap on 11/22.   -continue PCA dilaudid pump  -transition to morphine 15mg ER q12 hours tomorrow morning with oxycodone 5-10mg PO q6hr for breakthrough pain  -reconsider continuation of tramadol after further assessment with extended release pain medications   -continue duloxetine 60mg qevening, gabapentin 800mg QID, lidocaine patch  -continue bowel regimen with senna qdaily    # Nausea  -continue scopolamine patch, watch for worsening constipation  -consider restarting home zofran PO prn if qTc allows   -continue diphenhydramine 25mg q6h prn, zofran IV prn, compazine PRN    # Htn  -primary team managing   -currently requiring midodrine for BP support  -primary team to  "evaluate ongoing need     # dld  -primary team  managing  -continue home atorvastatin     # DM2  -primary team managing   -continue ISS, and glargine as per primary team     # hx of pulmonary embolism on eliquis  -currently on lovenox for dvt ppx   -primary team to manage     SOCIAL ASPECTS OF CARE  Patient endorses having good social support. She currently lives with her retired mother (who became recently ), has two sons (Omi 19, Ryan 21 - [place] in [place]). She reports she is unable to work due to her illness. She endorses having many friends that are involved in her life.     SPIRITUAL ASPECTS OF CARE   She denies being spiritual or Denominational.     GOALS OF CARE/SERIOUS ILLNESS CONVERSATION  Introduced myself and Dr. Denise Carrillo ([id number] on palliative care elective). Discussed role of palliative care and reason for consult. Jacque is agreeable to discuss goals of care. She reports she filled out Healthcare POA paperwork which is notarized. We went over her paperwork with her, answered questions, and helped her fill out the details she wants. We discussed goals of care and POLST form; patient is interested in filling out the POLST. She shares her experience after she underwent surgical resection of brain tumors with resultant left sided paralysis (which was temporary). She reports that was a terrifying situation for her and she would not like to live that way if she can help it.  \"A vegetable.\"  We provided the patient with the POLST form for full code, full treatment though she may not want tracheostomy.  She confirms she would be okay with a trial of artificial nutrition and is against long-term artificial nutrition or surgical feeding tube.  She was intermittently drowsy and we confirmed we will report back to follow up and complete the form tomorrow.  Obtain copies of healthcare directive for scanning.  Provided palliative care contact information and encouraged Jacque to reach out with any " questions/needs. We made three copies of her Healthcare POA paperwork and paperwork is pending being scanned it into the computer.     Code Status: Full Code     ACP Documents:   Healthcare POA (pending scanning into chart). Patient selected Samantha Plummer as primary healthcare agent [phone] and first alternate Guillermina Hammond [phone]. Regarding statement of desires concerning treatment: patient selected #2, 3, 5 meaning she would not want life-sustaining prolonging treatments in cases of irreversible coma, incurable/terminal illness/condition without reasonable hope of long-term recovery or survival. She would not want treatments to be provided/continued if burdens of treatment outweigh the expected benefit.  Healthcare agent (healthcare power of ) is to consider relief of suffering, preservation/restoration of function, and quality as well as possible extension of life.  She did not complete declaration.     She is considering POLST    60 minutes spent discussing advance care planning, this time excludes any other billed services.    Interval diagnostic studies and medical documentation entries pertinent to this case were reviewed independently by me. This patient has at least one acute or chronic illness or injury that poses a threat to life or bodily function. This patient suffers from a high risk of morbidity from additional invasive diagnostic testing or intensive treatment. Discussion of recommendations and coordination of care undertaken with primary provider/treatment team.    ARIE GudinoR.N.  Palliative Care Nurse Practitioner  [phone]

## 2024-11-25 NOTE — PROGRESS NOTES
Virtual Nurse rounding complete.  Rounding Needs: Patient resting comfortably with no needs at this time.  Staff actively at bedside, no needs from VRN

## 2024-11-25 NOTE — PROGRESS NOTES
4 Eyes Skin Assessment Completed by MICHAEL Marti and MICHAEL Key.    Head WDL (pale)  Ears WDL  Nose WDL (pale)  Mouth WDL  Neck WDL  Breast/Chest Redness and Blanching, R chest port (accessed)  Shoulder Blades WDL  Spine prev. Epidural site (DIP)  (R) Arm/Elbow/Hand WDL  (L) Arm/Elbow/Hand WDL,PIV to LFA  Abdomen Redness, Blanching, and Incision  Groin WDL  Scrotum/Coccyx/Buttocks Redness and Blanching  (R) Leg WDL  (L) Leg WDL  (R) Heel/Foot/Toe WDL  (L) Heel/Foot/Toe WDL      Devices In Places Blood Pressure Cuff, Pulse Ox, and Nasal Cannula      Interventions In Place [place] W/Ear Foams, Pillows, and Pressure Redistribution Mattress    Possible Skin Injury No    Pictures Uploaded Into Epic N/A  Wound Consult Placed N/A  RN Wound Prevention Protocol Ordered No

## 2024-11-25 NOTE — DIETARY
Nutrition Update: Follow-up for PO Intake   Day 6 of admit.  Jacque Mcintyre is a 44 y.o. female with admitting DX of Metastatic melanoma (HCC) [C43.9].    Current Diet: Consistent CHO, QD Ensure Clear  PO intake per ADLs has been % x 4 meals documented in the past 48 hrs, +1 snack at 50-75%. Intake of supplement not documented. Per CNA, pt reports has not been drinking but will encourage intake at lunch.    Inadequate oral intake related to abdominal mass, s/p small bowel resection as evidenced by reported poor intake PTA and currently on clear liquid diet.     Nutrition Dx Status: Resolved     Problem: Nutritional:  Goal: Achieve adequate nutritional intake  Description: Patient will consume >75% of meals  Outcome: Progressing    RD continues to follow.

## 2024-11-25 NOTE — PROGRESS NOTES
"Received report from previous shift RN at 1900.  Assessment complete.  A&O x 4, drowsy. Patient calls appropriately.  Patient ambulates with x2 assist and FWW. Bed alarm on.   Patient has 6/10 pain. Pain managed with prescribed medications per MAR.  Denies N&V. Tolerating full liquid diet.  Skin per flowsheets.  - void (post-urban removal), - flatus, - BM.  Patient denies SOB on 1L O2 via NC.  BP (!) 88/54   Pulse (!) 57   Temp 36.6 °C (97.9 °F) (Temporal)   Resp 16   Ht 1.626 m (5' 4\")   Wt 62.5 kg (137 lb 12.6 oz)   LMP 09/01/2024 (Approximate)   SpO2 98%   Breastfeeding No Comment: Patient states has not been breastfeeding for the last 20 years  BMI 23.65 kg/m²     Patient pleasant and cooperative throughout assessment.  Reviewed plan of care with patient, pt verbalizes understanding. Call light and personal belongings with in reach. Hourly rounding in place. All needs met at this time.    "

## 2024-11-25 NOTE — THERAPY
"Physical Therapy   Initial Evaluation     Patient Name: Jacque Mcintyre  Age:  44 y.o., Sex:  female  Medical Record #: [id number]  Today's Date: 11/25/2024     Precautions  Precautions: Fall Risk  Comments: abdominal precautions, +PCA, hypotension    Assessment    Jacque Mcintyre is a 43 yo female Sent by MD (·Active stage 4 melanoma/·Hx anemia and PE - on eliquis/·Hypertensive, tachycardic, weakness in clinic/·Concerns for another clot//Pt denies CP/SOB). Now POD #3 Exploratory laparotomy with small bowel resection.  Patient presents to PT eval with pain and impaired, balance, strength and endurance.  She was found up in the chair and still has her PCA in place.  She was educated about abdominal precautions and was able to demo hallway distance gait w/FWW and cueing for self-pacing. Anticipate she will progress to home with HHPT and assist from her mom as needed. Will continue to follow while in house.     Plan    Physical Therapy Initial Treatment Plan   Treatment Plan : Family / Caregiver Training, Neuro Re-Education / Balance, Self Care / Home Evaluation, Therapeutic Activities, Therapeutic Exercise, Stair Training, Gait Training, Equipment, Bed Mobility  Treatment Frequency: 4 Times per Week  Duration: Until Therapy Goals Met    DC Equipment Recommendations: Front-Wheel Walker  Discharge Recommendations: Recommend home health for continued physical therapy services       Subjective    \"I want to be home for thanksgiving\"     Objective       11/25/24 1045   Precautions   Precautions Fall Risk   Comments abdominal precautions, +PCA, hypotension   Vitals   Vitals Comments all VSS throughout   Pain 0 - 10 Group   Location Abdomen;Back;Shoulder   Therapist Pain Assessment Post Activity;5;Nurse Notified   Prior Living Situation   Prior Services Home-Independent   Housing / Facility 1 Story House   Steps Into Home 0   Steps In Home 0   Bathroom Set up Walk In Shower   Equipment Owned Tub / Shower Seat   Lives with - Patient's " Self Care Capacity Parents   Comments Patient lives with her mother, who can assist as needed   Prior Level of Functional Mobility   Bed Mobility Independent   Transfer Status Independent   Ambulation Independent   Assistive Devices Used None   Comments reports indep PTA, but with poror endurance 2/2 low hgb   History of Falls   History of Falls No   Cognition    Cognition / Consciousness WDL   Level of Consciousness Alert   Comments pleasant and cooperative, but with some lethargy 2/2 PCA   Active ROM Upper Body   Active ROM Upper Body  WDL   Strength Upper Body   Upper Body Strength  WDL   Sensation Upper Body   Upper Extremity Sensation  X   Comments baseline PN in all fingers   Active ROM Lower Body    Active ROM Lower Body  WDL   Strength Lower Body   Lower Body Strength  X   Gross Strength Generalized Weakness, Equal Bilaterally   Sensation Lower Body   Lower Extremity Sensation   X   Comments PN in B feet at baseline   Other Treatments   Other Treatments Provided educted about abdominal precautions and use of binder for pain control. Educated about DME recs and energy conservation   Balance Assessment   Sitting Balance (Static) Fair   Sitting Balance (Dynamic) Fair   Standing Balance (Static) Fair   Standing Balance (Dynamic) Fair -   Weight Shift Sitting Fair   Weight Shift Standing Fair   Comments w/FWW   Bed Mobility    Comments found and left up in the chair, need to review log roll   Gait Analysis   Gait Level Of Assist Contact Guard Assist   Assistive Device Front Wheel Walker   Distance (Feet) 200   # of Times Distance was Traveled 1   Deviation Bradykinetic   Functional Mobility   Sit to Stand Contact Guard Assist   Bed, Chair, Wheelchair Transfer Contact Guard Assist   Mobility chair>gait>chair   Comments cueing for hand placement with transfers   ICU Target Mobility Level   ICU Mobility - Targeted Level Level 4   6 Clicks Assessment - How much HELP from from another person do you currently need...  (If the patient hasn't done an activity recently, how much help from another person do you think he/she would need if he/she tried?)   Turning from your back to your side while in a flat bed without using bedrails? 3   Moving from lying on your back to sitting on the side of a flat bed without using bedrails? 3   Moving to and from a bed to a chair (including a wheelchair)? 3   Standing up from a chair using your arms (e.g., wheelchair, or bedside chair)? 3   Walking in hospital room? 3   Climbing 3-5 steps with a railing? 3   6 clicks Mobility Score 18   Activity Tolerance   Sitting in Chair post session   Standing 6 min   Comments poor endurance   Patient / Family Goals    Patient / Family Goal #1 to go home for thanksgiving   Short Term Goals    Short Term Goal # 1 in 6 visits patient will demo all functional transfers with Sup and LRAD for safe DC   Short Term Goal # 2 in 6 visits patient will ambulate 300' with Sup and LRAD for safe DC   Short Term Goal # 3 in 6 visits patient will demo bed mobility via log roll from a flat surface indep for safe DC   Education Group   Education Provided Role of Physical Therapist;Gait Training;Use of Assistive Device   Role of Physical Therapist Patient Response Patient;Acceptance;Explanation;Demonstration;Verbal Demonstration   Gait Training Patient Response Patient;Acceptance;Explanation;Demonstration;Verbal Demonstration;Action Demonstration   Use of Assistive Device Patient Response Patient;Acceptance;Explanation;Demonstration;Verbal Demonstration;Action Demonstration   Physical Therapy Initial Treatment Plan    Treatment Plan  Family / Caregiver Training;Neuro Re-Education / Balance;Self Care / Home Evaluation;Therapeutic Activities;Therapeutic Exercise;Stair Training;Gait Training;Equipment;Bed Mobility   Treatment Frequency 4 Times per Week   Duration Until Therapy Goals Met   Problem List    Problems Pain;Impaired Bed Mobility;Impaired Transfers;Impaired  Ambulation;Functional Strength Deficit;Impaired Balance;Impaired Coordination;Decreased Activity Tolerance;Safety Awareness Deficits / Cognition;Motor Planning / Sequencing   Anticipated Discharge Equipment and Recommendations   DC Equipment Recommendations Front-Wheel Walker   Discharge Recommendations Recommend home health for continued physical therapy services     Manda Vo, PT, DPT, G[employer]

## 2024-11-25 NOTE — CARE PLAN
The patient is Stable - Low risk of patient condition declining or worsening    Shift Goals  Clinical Goals: PCA Monitoring; Monitor Vitals; BladderFunction  Patient Goals: Pain Control; Rest  Family Goals: Updates    Progress made toward(s) clinical / shift goals:  Patient medicated per MAR. Non-pharmacologic comfort measures implemented. Safety discussed. Education provided. Ambulation and repositioning encouraged. IS use encouraged. Diet intake monitored.     Problem: Knowledge Deficit - Standard  Goal: Patient and family/care givers will demonstrate understanding of plan of care, disease process/condition, diagnostic tests and medications  Description: Target End Date:  1-3 days or as soon as patient condition allows    Document in Patient Education    1.  Patient and family/caregiver oriented to unit, equipment, visitation policy and means for communicating concern  2.  Complete/review Learning Assessment  3.  Assess knowledge level of disease process/condition, treatment plan, diagnostic tests and medications  4.  Explain disease process/condition, treatment plan, diagnostic tests and medications  Outcome: Progressing     Problem: Pain - Standard  Goal: Alleviation of pain or a reduction in pain to the patient’s comfort goal  Description: Target End Date:  Prior to discharge or change in level of care    Document on Vitals flowsheet    1.  Document pain using the appropriate pain scale per order or unit policy  2.  Educate and implement non-pharmacologic comfort measures (i.e. relaxation, distraction, massage, cold/heat therapy, etc.)  3.  Pain management medications as ordered  4.  Reassess pain after pain med administration per policy  5.  If opiods administered assess patient's response to pain medication is appropriate per POSS sedation scale  6.  Follow pain management plan developed in collaboration with patient and interdisciplinary team (including palliative care or pain specialists if  applicable)  Outcome: Progressing     Problem: Hemodynamics  Goal: Patient's hemodynamics, fluid balance and neurologic status will be stable or improve  Description: Target End Date:  Prior to discharge or change in level of care    Document on Assessment and I/O flowsheet templates    1.  Monitor vital signs, pulse oximetry and cardiac monitor per provider order and/or policy  2.  Maintain blood pressure per provider order  3.  Hemodynamic monitoring per provider order  4.  Manage IV fluids and IV infusions  5.  Monitor intake and output  6.  Daily weights per unit policy or provider order  7.  Assess peripheral pulses and capillary refill  8.  Assess color and body temperature  9.  Position patient for maximum circulation/cardiac output  10. Monitor for signs/symptoms of excessive bleeding  11. Assess mental status, restlessness and changes in level of consciousness  12. Monitor temperature and report fever or hypothermia to provider immediately. Consideration of targeted temperature management.  Outcome: Progressing     Problem: Pain Management  Goal: Pain level will decrease to patient's comfort goal  Outcome: Progressing

## 2024-11-25 NOTE — PROGRESS NOTES
Date of Service  November 25, 2024     Chief Complaint  Sent by MD (·Active stage 4 melanoma/·Hx anemia and PE - on eliquis/·Hypertensive, tachycardic, weakness in clinic/·Concerns for another clot//Pt denies CP/SOB)     Surgery  Exploratory laparotomy with small bowel resection     Hospital Course  POD # 3     Interval Problem Update  No acute events overnight  Patient transferred to T4  Epidural and Graf out  PCA working, still reports moderate pain  Less problem with hypotension  AM labs pending    Problem List  Principal Problem:    Metastatic melanoma (HCC) (POA: Yes)  Active Problems:    DM2 (diabetes mellitus, type 2) (HCC) (POA: Yes)    Thrombocytosis (POA: Yes)    Diabetic neuropathy (HCC) (POA: Yes)    Anemia (Chronic) (POA: Yes)    Abdominal mass (POA: Unknown)    Hyponatremia (POA: Unknown)    Marijuana use (POA: Unknown)  Resolved Problems:    * No resolved hospital problems. *       Subjective  Review of Systems   Constitutional:  Negative for chills, fever and malaise/fatigue.   Respiratory:  Negative for cough and shortness of breath.    Cardiovascular:  Negative for chest pain.   Gastrointestinal:  Positive for abdominal pain. Negative for nausea and vomiting.         Objective  Temp:  [36.3 °C (97.3 °F)-36.6 °C (97.9 °F)] 36.5 °C (97.7 °F)  Pulse:  [50-82] 64  Resp:  [8-35] 16  BP: ()/(47-70) 109/64  SpO2:  [85 %-100 %] 91 %      Physical Exam  Vitals and nursing note reviewed.   Constitutional:       General: She is not in acute distress.     Appearance: She is not ill-appearing.   HENT:      Head: Normocephalic and atraumatic.      Nose: Nose normal.      Mouth/Throat:      Pharynx: Oropharynx is clear.   Eyes:      Conjunctiva/sclera: Conjunctivae normal.   Cardiovascular:      Rate and Rhythm: Normal rate.   Pulmonary:      Effort: Pulmonary effort is normal. No respiratory distress.   Abdominal:      General: Bowel sounds are normal. There is no distension.      Palpations: Abdomen  is soft.      Tenderness: There is abdominal tenderness. There is no guarding.      Comments: Abdominal midline incision, approximated, sutures and glue   Skin:     General: Skin is warm and dry.      Coloration: Skin is not jaundiced.   Neurological:      Mental Status: She is alert and oriented to person, place, and time.   Psychiatric:         Mood and Affect: Mood normal.         Behavior: Behavior normal.        Fluids    Intake/Output Summary (Last 24 hours) at 11/25/2024 0710  Last data filed at 11/24/2024 2035  Gross per 24 hour   Intake 115.39 ml   Output 1500 ml   Net -1384.61 ml         Labs  Lab Results   Component Value Date/Time    SODIUM 140 11/23/2024 10:52 PM    POTASSIUM 3.6 11/23/2024 10:52 PM    CHLORIDE 104 11/23/2024 10:52 PM    CO2 24 11/23/2024 10:52 PM    GLUCOSE 131 (H) 11/23/2024 10:52 PM    BUN 5 (L) 11/23/2024 10:52 PM    CREATININE 0.40 (L) 11/23/2024 10:52 PM         Lab Results   Component Value Date/Time    PROTHROMBTM 14.7 (H) 11/21/2024 12:24 AM    INR 1.15 (H) 11/21/2024 12:24 AM         Lab Results   Component Value Date/Time    WBC 7.7 11/23/2024 10:52 PM    RBC 3.11 (L) 11/23/2024 10:52 PM    HEMOGLOBIN 7.5 (L) 11/23/2024 10:52 PM    HEMATOCRIT 25.6 (L) 11/23/2024 10:52 PM    MCV 82.3 11/23/2024 10:52 PM    MCH 24.1 (L) 11/23/2024 10:52 PM    MCHC 29.3 (L) 11/23/2024 10:52 PM    MPV 9.1 11/23/2024 10:52 PM    NEUTSPOLYS 73.10 (H) 11/23/2024 10:52 PM    LYMPHOCYTES 16.40 (L) 11/23/2024 10:52 PM    MONOCYTES 8.70 11/23/2024 10:52 PM    EOSINOPHILS 1.00 11/23/2024 10:52 PM    BASOPHILS 0.50 11/23/2024 10:52 PM    HYPOCHROMIA 2+ (A) 11/19/2024 01:42 PM    ANISOCYTOSIS 1+ 11/19/2024 01:42 PM         Recent Labs     11/19/24  1342 11/20/24  0419 11/21/24  0024   ASTSGOT 8* 11* 8*   ALTSGPT 5 <5 <5   TBILIRUBIN 0.2 0.4 0.3   GLOBULIN 2.5 2.3 2.4   INR  --   --  1.15*      Imaging  CT A/P (11/19/24)  IMPRESSION:  1.  Large 11 cm mass centered on one of the small bowel loops in the left  hemiabdomen. Primary differential considerations include small bowel adenocarcinoma, gastrointestinal stromal tumor and small bowel metastasis.  2.  The mass abuts the descending and sigmoid colon without colonic invasion. No abutment of other organs.  3.  No metastatic disease in the abdomen.  4.  Small volume pelvic ascites.     CT PET (6/24/24)  IMPRESSION:  1.  Positive response to therapy. The vast majority of hypermetabolic metastases seen on prior 2022 PET/CT have resolved or are no longer FDG avid.  2.  Most of the pulmonary nodules have resolved. Sub-4 mm residual pulmonary nodules can be followed with CT imaging.  3.  Some persistent uptake in the left adnexal region, nonspecific.  4.  Nonspecific focal metabolism in one of the left upper quadrant small bowel loops. It may be physiologic, but bowel metastasis is difficult to exclude.  5.  Inhomogeneous brain metabolism, related to multiple treated brain metastases.     Pathology  PATH (11/22/24)  Pending     Assessment and Plan  Patient is a 43F with known metastatic melanoma with mets to brain, currently on oral systemic therapy managed by Dr Reed, admitted with several days of persistent nausea and vomiting concern SBO, CT A/P revealed a large 11 cm mass adjacent to small bowel.     Exploratory laparotomy, small bowel resection, POD #3  - Continue PCA  - Start CHO diet, encourage pt to go slow  - Celebrex + Tramadol scheduled  - OOB, important for patient to ambulate  - Encourage IS  - PT / OT  - Omeprazole     2. T2DM, A1c 6.6 earlier this year  - Insulin sliding scale     3. Iron deficiency anemia, chronic, pending AM labs  - Replace iron  - Monitor     Patient seen with Dr Cummings

## 2024-11-26 LAB
ALBUMIN SERPL BCP-MCNC: 3.1 G/DL (ref 3.2–4.9)
ALBUMIN/GLOB SERPL: 1.2 G/DL
ALP SERPL-CCNC: 94 U/L (ref 30–99)
ALT SERPL-CCNC: 7 U/L (ref 2–50)
ANION GAP SERPL CALC-SCNC: 11 MMOL/L (ref 7–16)
AST SERPL-CCNC: 15 U/L (ref 12–45)
BILIRUB SERPL-MCNC: <0.2 MG/DL (ref 0.1–1.5)
BUN SERPL-MCNC: 5 MG/DL (ref 8–22)
CALCIUM ALBUM COR SERPL-MCNC: 9.6 MG/DL (ref 8.5–10.5)
CALCIUM SERPL-MCNC: 8.9 MG/DL (ref 8.5–10.5)
CHLORIDE SERPL-SCNC: 102 MMOL/L (ref 96–112)
CO2 SERPL-SCNC: 27 MMOL/L (ref 20–33)
CREAT SERPL-MCNC: 0.43 MG/DL (ref 0.5–1.4)
ERYTHROCYTE [DISTWIDTH] IN BLOOD BY AUTOMATED COUNT: 70.4 FL (ref 35.9–50)
GFR SERPLBLD CREATININE-BSD FMLA CKD-EPI: 121 ML/MIN/1.73 M 2
GLOBULIN SER CALC-MCNC: 2.6 G/DL (ref 1.9–3.5)
GLUCOSE BLD STRIP.AUTO-MCNC: 125 MG/DL (ref 65–99)
GLUCOSE BLD STRIP.AUTO-MCNC: 145 MG/DL (ref 65–99)
GLUCOSE BLD STRIP.AUTO-MCNC: 165 MG/DL (ref 65–99)
GLUCOSE BLD STRIP.AUTO-MCNC: 180 MG/DL (ref 65–99)
GLUCOSE SERPL-MCNC: 165 MG/DL (ref 65–99)
HCT VFR BLD AUTO: 31.5 % (ref 37–47)
HGB BLD-MCNC: 9.5 G/DL (ref 12–16)
MCH RBC QN AUTO: 25 PG (ref 27–33)
MCHC RBC AUTO-ENTMCNC: 30.2 G/DL (ref 32.2–35.5)
MCV RBC AUTO: 82.9 FL (ref 81.4–97.8)
PLATELET # BLD AUTO: 617 K/UL (ref 164–446)
PMV BLD AUTO: 9.4 FL (ref 9–12.9)
POTASSIUM SERPL-SCNC: 4.5 MMOL/L (ref 3.6–5.5)
PROT SERPL-MCNC: 5.7 G/DL (ref 6–8.2)
RBC # BLD AUTO: 3.8 M/UL (ref 4.2–5.4)
SODIUM SERPL-SCNC: 140 MMOL/L (ref 135–145)
WBC # BLD AUTO: 6.6 K/UL (ref 4.8–10.8)

## 2024-11-26 PROCEDURE — A9270 NON-COVERED ITEM OR SERVICE: HCPCS

## 2024-11-26 PROCEDURE — 99232 SBSQ HOSP IP/OBS MODERATE 35: CPT | Mod: 25 | Performed by: NURSE PRACTITIONER

## 2024-11-26 PROCEDURE — 700102 HCHG RX REV CODE 250 W/ 637 OVERRIDE(OP)

## 2024-11-26 PROCEDURE — 770001 HCHG ROOM/CARE - MED/SURG/GYN PRIV*

## 2024-11-26 PROCEDURE — 36415 COLL VENOUS BLD VENIPUNCTURE: CPT

## 2024-11-26 PROCEDURE — 99497 ADVNCD CARE PLAN 30 MIN: CPT | Performed by: NURSE PRACTITIONER

## 2024-11-26 PROCEDURE — 700111 HCHG RX REV CODE 636 W/ 250 OVERRIDE (IP): Mod: JZ | Performed by: ANESTHESIOLOGY

## 2024-11-26 PROCEDURE — 85027 COMPLETE CBC AUTOMATED: CPT

## 2024-11-26 PROCEDURE — 700102 HCHG RX REV CODE 250 W/ 637 OVERRIDE(OP): Performed by: NURSE PRACTITIONER

## 2024-11-26 PROCEDURE — 82962 GLUCOSE BLOOD TEST: CPT | Mod: 91

## 2024-11-26 PROCEDURE — A9270 NON-COVERED ITEM OR SERVICE: HCPCS | Performed by: NURSE PRACTITIONER

## 2024-11-26 PROCEDURE — 99024 POSTOP FOLLOW-UP VISIT: CPT | Performed by: SURGERY

## 2024-11-26 PROCEDURE — 80053 COMPREHEN METABOLIC PANEL: CPT

## 2024-11-26 PROCEDURE — 700111 HCHG RX REV CODE 636 W/ 250 OVERRIDE (IP): Mod: JZ | Performed by: SURGERY

## 2024-11-26 RX ORDER — HYDROMORPHONE HYDROCHLORIDE 1 MG/ML
0.5 INJECTION, SOLUTION INTRAMUSCULAR; INTRAVENOUS; SUBCUTANEOUS
Status: DISCONTINUED | OUTPATIENT
Start: 2024-11-26 | End: 2024-11-27 | Stop reason: HOSPADM

## 2024-11-26 RX ORDER — MORPHINE SULFATE 30 MG/1
30 TABLET, FILM COATED, EXTENDED RELEASE ORAL EVERY 12 HOURS
Status: DISCONTINUED | OUTPATIENT
Start: 2024-11-26 | End: 2024-11-27 | Stop reason: HOSPADM

## 2024-11-26 RX ORDER — OXYCODONE HYDROCHLORIDE 10 MG/1
10 TABLET ORAL
Status: DISCONTINUED | OUTPATIENT
Start: 2024-11-26 | End: 2024-11-27 | Stop reason: HOSPADM

## 2024-11-26 RX ORDER — DEXTROSE MONOHYDRATE 25 G/50ML
25 INJECTION, SOLUTION INTRAVENOUS
Status: DISCONTINUED | OUTPATIENT
Start: 2024-11-26 | End: 2024-11-27 | Stop reason: HOSPADM

## 2024-11-26 RX ORDER — POLYETHYLENE GLYCOL 3350 17 G/17G
1 POWDER, FOR SOLUTION ORAL
Status: DISCONTINUED | OUTPATIENT
Start: 2024-11-26 | End: 2024-11-27 | Stop reason: HOSPADM

## 2024-11-26 RX ORDER — AMOXICILLIN 250 MG
2 CAPSULE ORAL 2 TIMES DAILY
Status: DISCONTINUED | OUTPATIENT
Start: 2024-11-26 | End: 2024-11-27 | Stop reason: HOSPADM

## 2024-11-26 RX ORDER — OXYCODONE HYDROCHLORIDE 5 MG/1
5 TABLET ORAL EVERY 6 HOURS
Status: DISCONTINUED | OUTPATIENT
Start: 2024-11-26 | End: 2024-11-27 | Stop reason: HOSPADM

## 2024-11-26 RX ORDER — MORPHINE SULFATE 30 MG/1
30 TABLET, FILM COATED, EXTENDED RELEASE ORAL EVERY 12 HOURS
Status: DISCONTINUED | OUTPATIENT
Start: 2024-11-26 | End: 2024-11-26

## 2024-11-26 RX ORDER — OXYCODONE HYDROCHLORIDE 5 MG/1
5 TABLET ORAL
Status: DISCONTINUED | OUTPATIENT
Start: 2024-11-26 | End: 2024-11-27 | Stop reason: HOSPADM

## 2024-11-26 RX ORDER — INSULIN LISPRO 100 [IU]/ML
2-9 INJECTION, SOLUTION INTRAVENOUS; SUBCUTANEOUS
Status: DISCONTINUED | OUTPATIENT
Start: 2024-11-26 | End: 2024-11-27 | Stop reason: HOSPADM

## 2024-11-26 RX ADMIN — OMEPRAZOLE 20 MG: 20 CAPSULE, DELAYED RELEASE ORAL at 09:35

## 2024-11-26 RX ADMIN — MORPHINE SULFATE 30 MG: 30 TABLET, FILM COATED, EXTENDED RELEASE ORAL at 21:54

## 2024-11-26 RX ADMIN — GABAPENTIN 800 MG: 400 CAPSULE ORAL at 16:07

## 2024-11-26 RX ADMIN — INSULIN LISPRO 4 UNITS: 100 INJECTION, SOLUTION INTRAVENOUS; SUBCUTANEOUS at 05:54

## 2024-11-26 RX ADMIN — MIDODRINE HYDROCHLORIDE 10 MG: 5 TABLET ORAL at 16:07

## 2024-11-26 RX ADMIN — INSULIN LISPRO 1 UNITS: 100 INJECTION, SOLUTION INTRAVENOUS; SUBCUTANEOUS at 13:19

## 2024-11-26 RX ADMIN — CLONAZEPAM 1 MG: 1 TABLET ORAL at 09:35

## 2024-11-26 RX ADMIN — TRAMADOL HYDROCHLORIDE 50 MG: 50 TABLET, COATED ORAL at 09:34

## 2024-11-26 RX ADMIN — GABAPENTIN 800 MG: 400 CAPSULE ORAL at 13:04

## 2024-11-26 RX ADMIN — FLUTICASONE FUROATE, UMECLIDINIUM BROMIDE AND VILANTEROL TRIFENATATE 1 PUFF: 200; 62.5; 25 POWDER RESPIRATORY (INHALATION) at 18:01

## 2024-11-26 RX ADMIN — ACETAMINOPHEN 1000 MG: 500 TABLET ORAL at 20:55

## 2024-11-26 RX ADMIN — GABAPENTIN 800 MG: 400 CAPSULE ORAL at 20:55

## 2024-11-26 RX ADMIN — INSULIN LISPRO 2 UNITS: 100 INJECTION, SOLUTION INTRAVENOUS; SUBCUTANEOUS at 21:00

## 2024-11-26 RX ADMIN — OXYCODONE 5 MG: 5 TABLET ORAL at 18:02

## 2024-11-26 RX ADMIN — OXCARBAZEPINE 300 MG: 300 TABLET, FILM COATED ORAL at 18:01

## 2024-11-26 RX ADMIN — DULOXETINE HYDROCHLORIDE 60 MG: 30 CAPSULE, DELAYED RELEASE ORAL at 18:01

## 2024-11-26 RX ADMIN — GABAPENTIN 800 MG: 400 CAPSULE ORAL at 09:34

## 2024-11-26 RX ADMIN — ATORVASTATIN CALCIUM 40 MG: 40 TABLET, FILM COATED ORAL at 18:01

## 2024-11-26 RX ADMIN — INSULIN GLARGINE-YFGN 5 UNITS: 100 INJECTION, SOLUTION SUBCUTANEOUS at 05:51

## 2024-11-26 RX ADMIN — CLONAZEPAM 1 MG: 1 TABLET ORAL at 18:01

## 2024-11-26 RX ADMIN — ENOXAPARIN SODIUM 40 MG: 100 INJECTION SUBCUTANEOUS at 18:01

## 2024-11-26 RX ADMIN — ONDANSETRON 4 MG: 2 INJECTION INTRAMUSCULAR; INTRAVENOUS at 13:04

## 2024-11-26 RX ADMIN — QUETIAPINE FUMARATE 200 MG: 100 TABLET ORAL at 20:56

## 2024-11-26 RX ADMIN — MAGNESIUM HYDROXIDE 30 ML: 1200 LIQUID ORAL at 13:04

## 2024-11-26 RX ADMIN — INSULIN LISPRO 4 UNITS: 100 INJECTION, SOLUTION INTRAVENOUS; SUBCUTANEOUS at 13:18

## 2024-11-26 RX ADMIN — MORPHINE SULFATE 30 MG: 30 TABLET, FILM COATED, EXTENDED RELEASE ORAL at 13:05

## 2024-11-26 RX ADMIN — MIDODRINE HYDROCHLORIDE 10 MG: 5 TABLET ORAL at 09:35

## 2024-11-26 RX ADMIN — SENNOSIDES AND DOCUSATE SODIUM 2 TABLET: 50; 8.6 TABLET ORAL at 13:04

## 2024-11-26 RX ADMIN — SENNOSIDES AND DOCUSATE SODIUM 2 TABLET: 50; 8.6 TABLET ORAL at 18:01

## 2024-11-26 RX ADMIN — OXCARBAZEPINE 300 MG: 300 TABLET, FILM COATED ORAL at 03:44

## 2024-11-26 RX ADMIN — OXYCODONE 5 MG: 5 TABLET ORAL at 13:06

## 2024-11-26 RX ADMIN — MIDODRINE HYDROCHLORIDE 10 MG: 5 TABLET ORAL at 20:55

## 2024-11-26 RX ADMIN — ACETAMINOPHEN 1000 MG: 500 TABLET ORAL at 13:22

## 2024-11-26 RX ADMIN — TRAZODONE HYDROCHLORIDE 100 MG: 100 TABLET ORAL at 20:56

## 2024-11-26 ASSESSMENT — ENCOUNTER SYMPTOMS
SHORTNESS OF BREATH: 0
FEVER: 0
ABDOMINAL PAIN: 1
NAUSEA: 0
COUGH: 0
CHILLS: 0
VOMITING: 0

## 2024-11-26 ASSESSMENT — PAIN DESCRIPTION - PAIN TYPE
TYPE: ACUTE PAIN
TYPE: ACUTE PAIN;SURGICAL PAIN
TYPE: SURGICAL PAIN
TYPE: ACUTE PAIN;CHRONIC PAIN
TYPE: ACUTE PAIN
TYPE: ACUTE PAIN;SURGICAL PAIN
TYPE: ACUTE PAIN;SURGICAL PAIN

## 2024-11-26 NOTE — PROGRESS NOTES
Report received from carrie RN  Pt AAOx4, Alert. Drowsy. Responds appropriately  1L  Pain managed per MAR, resting comfortably. Dilaudid PCA in use.  +void, no flatus, no BM  Diabetic diet, tolerating well. No n/v  Ambulating to BR with steady gait, x1 FWW  Calls appropriately for assistance  SCDs on    Skin: MLI with dermabond    POC reviewed, education provided. Bed locked and in low position, pt instructed to call for assistance. Comprehension verbalized. Call light within reach, all needs met at this time.

## 2024-11-26 NOTE — PROGRESS NOTES
Palliative Care Pain/ACP follow-up    Room: T433-02    HPI:   Jacque Mcintyre is a 44 y.o. female with medical history significant for metastatic melanoma admitted 11/19/2024 due to abdominal pain, nausea, inability to tolerate oral intake, weakness, and tachycardia.  Imaging notable for small abdominal mass.  Patient ultimately underwent    Interval History:  Patient is lying in bed this morning, awake, alert, in no apparent distress. She reports her pain is under good control with the PCA pump. Patient reports she hasn't been passing gas and has not passed a bowel movement yet. She has been able to sit up in a chair for part of the day and endorsed feeling abdominal pain upon exertion, movement, and prolonged sitting in the chair.  She is interested in escalating bowel regimen. She required about 140meq of morphine in the past 24 hours. She is agreeable to start morphine extended release with oral oxycodone 5-10 for breakthrough pain, and we will provide IV dilaudid for breakthrough pain if it remains uncontrolled with the oral medications. Patient is tolerating oral intake and reports her nausea has been tolerable this morning. She endorses feeling a bout of nausea overnight that was difficult to control. She is hopeful to be discharged home by [facility].    REVIEW OF SYSTEMS:  Positive for pain (abdomen). Negative for anorexia. Negative for nausea and vomiting. Positive for constipation.     PHYSICAL EXAM:  Physical Exam  Constitutional:       Comments: Mildly drowsy   Pulmonary:      Effort: No respiratory distress.   Abdominal:      General: There is distension.      Comments: MLI CDI and well approximated   Neurological:      Mental Status: She is oriented to person, place, and time.      Comments: Oriented to event   Psychiatric:         Attention and Perception: Attention normal.         Mood and Affect: Mood normal.         Speech: Speech normal.         Behavior: Behavior is cooperative.        Discussion/Plan   #Cancer associated and postoperative pain  Continue home adjuncts and scheduled Tylenol  Discontinue PCA  MEDD 140 mg  Start MS ER 30 mg p.o. every 12 hours  Start oxycodone 5-10 mg p.o. every 3 hours as needed moderate to severe breakthrough pain with IV hydromorphone 0.5 mg IV if NPO or pain unrelieved by oral oxycodone  If regiment beneficial will discuss with prescribing oncologist Dr. Reed for follow-up    #Constipation  Give milk of magnesia x 1 dose now  Increase senna-docusate to 2 tabs p.o. twice daily  Patient had MiraLAX yesterday    #Advance care planning  POLST form completed for full code, full treatment though patient is unsure about tracheostomy she wanted to leave it open, trial of artificial nutrition no longer than 2 weeks, trial of IV fluids.  Original given to patient along with 2 copies.  Copy scanned into EMR.    20 minutes spent discussing advance care planning, this time excludes any other billed services.    Interval diagnostic studies and medical documentation entries pertinent to this case were reviewed independently by me. This patient has at least one acute or chronic illness or injury that poses a threat to life or bodily function. This patient suffers from a moderate risk of morbidity from additional invasive diagnostic testing or intensive treatment. Discussion of recommendations and coordination of care undertaken with primary provider/treatment team.      NORA Gudino.  Palliative Care Nurse Practitioner  [phone]

## 2024-11-26 NOTE — DISCHARGE PLANNING
Case Management Discharge Planning    Admission Date: 11/19/2024  GMLOS: 4.9  ALOS: 7    6-Clicks ADL Score: 21  6-Clicks Mobility Score: 18      Anticipated Discharge Dispo: Discharge Disposition: D/T to home under HHA care in anticipation of covered skilled care (06)  Discharge Address: [address] [place] [address]  Discharge Contact Phone Number: [phone]    DME Needed: Yes    DME Ordered: Yes    Action(s) Taken: Chart review completed. PT/OT recommending HH.     0945: RN CM completed assessment and obtained HH choice.      1007: RN CM messaged provider requesting HH order.     1019: Choice form faxed to [facility].    Escalations Completed: None    Medically Clear: No    Next Steps: RNMARIANA to follow up with IDT regarding DCP needs/barriers.     Barriers to Discharge: Medical clearance and Outpatient referrals pending    Is the patient up for discharge tomorrow: No    Care Transition Team Assessment    Case Management role discussed with patient; Patient verbalized understanding of the Case Management role.     Demographics on facesheet verified.     Please see H&P for pertinent PMH and reason for admission.     Pt lives in [place], [place] with her mother, Guillermina. They live in 1 story apartment on the ground floor. Guillermina will provide transportation home on dc. Patient would like meds to beds for any prescriptions on dc. Her regular pharmacy is [facility]. Pt performing all ADLs/IADLs independently prior to admission. There is no DME or O2 at home. Pt reports history of depression and is managed by pts psychiatrist. Pt denies hx of etoh/drug use. Patient says she has large support system, that includes her mother, brother, sister-in-law, and friends. Patient plans to dc home with hh services.       Information Source  Orientation Level: Oriented X4  Information Given By: Patient  Informant's Name: Jacque Mcintyre  Who is responsible for making decisions for patient? : Patient    Readmission Evaluation  Is this a  readmission?: No    Elopement Risk  Legal Hold: No  Ambulatory or Self Mobile in Wheelchair: Yes  Disoriented: No  Psychiatric Symptoms: None  History of Wandering: No  Elopement this Admit: No  Vocalizing Wanting to Leave: No  Displays Behaviors, Body Language Wanting to Leave: No-Not at Risk for Elopement  Elopement Risk: Not at Risk for Elopement    Interdisciplinary Discharge Planning  Lives with - Patient's Self Care Capacity: Parents  Patient or legal guardian wants to designate a caregiver: No  Support Systems: Family Member(s)  Housing / Facility: 1 Story Apartment / Condo (ground floor apartment with no steps to enter)  Prior Services: Home-Independent    Discharge Preparedness  What is your plan after discharge?: Home health care  What are your discharge supports?: Parent, Sibling  Prior Functional Level: Ambulatory, Independent with Activities of Daily Living, Independent with Medication Management  Difficulity with ADLs: None  Difficulity with IADLs: None    Functional Assesment  Prior Functional Level: Ambulatory, Independent with Activities of Daily Living, Independent with Medication Management    Finances  Financial Barriers to Discharge: Yes  Average Monthly Income: 0 $  Source of Income: None  Prescription Coverage: Yes    Vision / Hearing Impairment  Vision Impairment : Yes  Right Eye Vision: Impaired, Wears Glasses  Left Eye Vision: Impaired, Wears Glasses       Advance Directive  Advance Directive?: DPOA for Health Care  Durable Power of  Name and Contact : Samantha Elian [phone]    Domestic Abuse  Have you ever been the victim of abuse or violence?: No  Possible Abuse/Neglect Reported to:: Not Applicable    Psychological Assessment  History of Substance Abuse: None  History of Psychiatric Problems: Yes (depression, has psychiatrist)  Non-compliant with Treatment: No  Newly Diagnosed Illness: No    Discharge Risks or Barriers  Discharge risks or barriers?: Post-acute placement /  services  Patient risk factors: Mental health, no income - pending disability    Anticipated Discharge Information  Discharge Disposition: D/T to home under A care in anticipation of covered skilled care (06)  Discharge Address: [facility] [address] [place] [address]  Discharge Contact Phone Number: [phone]

## 2024-11-26 NOTE — PROGRESS NOTES
Virtual Nurse rounding complete.    Round Needs: Needs to go to bathroom. , Other: would like to go for a walk, and Notified of Patient Needs: CNA

## 2024-11-26 NOTE — PROGRESS NOTES
Date of Service  November 26, 2024     Chief Complaint  Sent by MD (·Active stage 4 melanoma/·Hx anemia and PE - on eliquis/·Hypertensive, tachycardic, weakness in clinic/·Concerns for another clot//Pt denies CP/SOB)     Surgery  11/22/24 by Dr. Cummings  Exploratory laparotomy with small bowel resection     Hospital Course  POD # 4     Interval Problem Update  No acute events overnight  Resting comfortably in bed, drowsy but able to hold conversation  PCA working, reports improved pain control  BPs stable  Tolerating CHO diet, no N/V  Hgb trending up    Problem List  Principal Problem:    Metastatic melanoma (HCC) (POA: Yes)  Active Problems:    DM2 (diabetes mellitus, type 2) (HCC) (POA: Yes)    Thrombocytosis (POA: Yes)    Diabetic neuropathy (HCC) (POA: Yes)    Anemia (Chronic) (POA: Yes)    Abdominal mass (POA: Unknown)    Hyponatremia (POA: Unknown)    Marijuana use (POA: Unknown)  Resolved Problems:    * No resolved hospital problems. *       Subjective  Review of Systems   Constitutional:  Negative for chills, fever and malaise/fatigue.   Respiratory:  Negative for cough and shortness of breath.    Cardiovascular:  Negative for chest pain.   Gastrointestinal:  Positive for abdominal pain. Negative for nausea and vomiting.         Objective  Temp:  [36.6 °C (97.9 °F)-36.8 °C (98.2 °F)] 36.6 °C (97.9 °F)  Pulse:  [48-74] 74  Resp:  [16-18] 16  BP: ()/(62-88) 93/67  SpO2:  [92 %-98 %] 92 %      Physical Exam  Vitals and nursing note reviewed.   Constitutional:       General: She is not in acute distress.     Appearance: She is not ill-appearing.   HENT:      Head: Normocephalic and atraumatic.      Nose: Nose normal.      Mouth/Throat:      Pharynx: Oropharynx is clear.   Eyes:      Conjunctiva/sclera: Conjunctivae normal.   Cardiovascular:      Rate and Rhythm: Normal rate.   Pulmonary:      Effort: Pulmonary effort is normal. No respiratory distress.   Abdominal:      General: There is no  distension.      Palpations: Abdomen is soft.      Tenderness: There is abdominal tenderness. There is no guarding.      Comments: MLI CDI with dermabond   Skin:     General: Skin is warm and dry.      Coloration: Skin is not jaundiced.   Neurological:      Mental Status: She is alert and oriented to person, place, and time.   Psychiatric:         Mood and Affect: Mood normal.         Behavior: Behavior normal.        Fluids    Intake/Output Summary (Last 24 hours) at 11/26/2024 1237  Last data filed at 11/26/2024 0549  Gross per 24 hour   Intake 146 ml   Output --   Net 146 ml         Labs  Lab Results   Component Value Date/Time    SODIUM 140 11/26/2024 09:25 AM    POTASSIUM 4.5 11/26/2024 09:25 AM    CHLORIDE 102 11/26/2024 09:25 AM    CO2 27 11/26/2024 09:25 AM    GLUCOSE 165 (H) 11/26/2024 09:25 AM    BUN 5 (L) 11/26/2024 09:25 AM    CREATININE 0.43 (L) 11/26/2024 09:25 AM         Lab Results   Component Value Date/Time    PROTHROMBTM 14.7 (H) 11/21/2024 12:24 AM    INR 1.15 (H) 11/21/2024 12:24 AM         Lab Results   Component Value Date/Time    WBC 6.6 11/26/2024 09:25 AM    RBC 3.80 (L) 11/26/2024 09:25 AM    HEMOGLOBIN 9.5 (L) 11/26/2024 09:25 AM    HEMATOCRIT 31.5 (L) 11/26/2024 09:25 AM    MCV 82.9 11/26/2024 09:25 AM    MCH 25.0 (L) 11/26/2024 09:25 AM    MCHC 30.2 (L) 11/26/2024 09:25 AM    MPV 9.4 11/26/2024 09:25 AM    NEUTSPOLYS 73.10 (H) 11/23/2024 10:52 PM    LYMPHOCYTES 16.40 (L) 11/23/2024 10:52 PM    MONOCYTES 8.70 11/23/2024 10:52 PM    EOSINOPHILS 1.00 11/23/2024 10:52 PM    BASOPHILS 0.50 11/23/2024 10:52 PM    HYPOCHROMIA 2+ (A) 11/19/2024 01:42 PM    ANISOCYTOSIS 1+ 11/19/2024 01:42 PM         Recent Labs     11/19/24  1342 11/20/24  0419 11/21/24  0024 11/25/24  0923 11/26/24  0925   ASTSGOT 8* 11* 8* 14 15   ALTSGPT 5 <5 <5 6 7   TBILIRUBIN 0.2 0.4 0.3 <0.2 <0.2   GLOBULIN 2.5 2.3 2.4 2.2 2.6   INR  --   --  1.15*  --   --       Imaging  CT A/P (11/19/24)  IMPRESSION:  1.  Large 11 cm  mass centered on one of the small bowel loops in the left hemiabdomen. Primary differential considerations include small bowel adenocarcinoma, gastrointestinal stromal tumor and small bowel metastasis.  2.  The mass abuts the descending and sigmoid colon without colonic invasion. No abutment of other organs.  3.  No metastatic disease in the abdomen.  4.  Small volume pelvic ascites.     CT PET (6/24/24)  IMPRESSION:  1.  Positive response to therapy. The vast majority of hypermetabolic metastases seen on prior 2022 PET/CT have resolved or are no longer FDG avid.  2.  Most of the pulmonary nodules have resolved. Sub-4 mm residual pulmonary nodules can be followed with CT imaging.  3.  Some persistent uptake in the left adnexal region, nonspecific.  4.  Nonspecific focal metabolism in one of the left upper quadrant small bowel loops. It may be physiologic, but bowel metastasis is difficult to exclude.  5.  Inhomogeneous brain metabolism, related to multiple treated brain metastases.     Pathology  PATH (11/22/24)  PENDING     Assessment and Plan  Patient is a 43F with known metastatic melanoma with mets to brain, currently on oral systemic therapy managed by Dr Reed, admitted with several days of persistent nausea and vomiting concern SBO, CT A/P revealed a large 11 cm mass adjacent to small bowel.     Exploratory laparotomy, small bowel resection, POD #4  - Continue PCA  - Continue CHO diet  - Continue bowel meds  - D/C tramadol, scheduled celebrex + oxy (hx home med)  - Continue OOB/ambulation  - Encourage IS  - PT rec FWW, OT rec grab bars in bathrooms for D/C  - Omeprazole    2. T2DM, A1C prev 6.6 earlier this yr  - Continue insulin sliding scale     3. Iron deficiency anemia, chronic  - Monitor     Patient seen with Dr. Garcia.

## 2024-11-26 NOTE — PROGRESS NOTES
Bedside report received.  Assessment complete.  A&O x 4. Patient calls appropriately.  Patient ambulates with 1-2 person assist.   Patient has 7/10 pain. Pain managed with prescribed medications,bolus button within reach.  Denies N&V. Tolerating diet.  Surgical MLI is well approximated with dermabond and ERIK.  + void  Patient denies SOB.  Patient pleasant with staff and sitting up.  Review plan with of care with patient. Call light and personal belongings within reach. Hourly rounding in place. All needs met at this time.

## 2024-11-26 NOTE — CARE PLAN
The patient is Stable - Low risk of patient condition declining or worsening    Shift Goals  Clinical Goals: pain mgmt, ambulation, safety  Patient Goals: rest  Family Goals: Updates    Progress made toward(s) clinical / shift goals:  pain well managed throughout shift, pca in use. Education provided on poc safety maintained    Problem: Knowledge Deficit - Standard  Goal: Patient and family/care givers will demonstrate understanding of plan of care, disease process/condition, diagnostic tests and medications  Description: Target End Date:  1-3 days or as soon as patient condition allows    Document in Patient Education    1.  Patient and family/caregiver oriented to unit, equipment, visitation policy and means for communicating concern  2.  Complete/review Learning Assessment  3.  Assess knowledge level of disease process/condition, treatment plan, diagnostic tests and medications  4.  Explain disease process/condition, treatment plan, diagnostic tests and medications  Outcome: Progressing     Problem: Pain - Standard  Goal: Alleviation of pain or a reduction in pain to the patient’s comfort goal  Description: Target End Date:  Prior to discharge or change in level of care    Document on Vitals flowsheet    1.  Document pain using the appropriate pain scale per order or unit policy  2.  Educate and implement non-pharmacologic comfort measures (i.e. relaxation, distraction, massage, cold/heat therapy, etc.)  3.  Pain management medications as ordered  4.  Reassess pain after pain med administration per policy  5.  If opiods administered assess patient's response to pain medication is appropriate per POSS sedation scale  6.  Follow pain management plan developed in collaboration with patient and interdisciplinary team (including palliative care or pain specialists if applicable)  Outcome: Progressing       Patient is not progressing towards the following goals:

## 2024-11-26 NOTE — THERAPY
Occupational Therapy   Initial Evaluation     Patient Name: Jacque Mcintyre  Age:  44 y.o., Sex:  female  Medical Record #: [id number]  Today's Date: 11/25/2024     Precautions  Precautions: Fall Risk  Comments: abdominal precautions, +PCA, hypotension    Assessment    Patient is 44 y.o. female with a PMHx significant for DM II, HTN, depression, bipolar 1 disorder, diabetic neuropathy, asthma, DLD and stage IV melanoma disease diagnosed in 2016 received immunotherapy with recurrence in 2022 and metastasis to the brain requiring surgical intervention. Presented to the hospital after seeing PCP who had concerns for possible pulmonary embolism. CT abdomen revealed large 11 cm mass centered on one of the small bowel loops in the L hemiabdomen concerning for possible small bowel adenocarcinoma, gastrointestinal stromal tumor, small bowel metastasis. CTA of chest did not show any evidence of PE and minimal L pleural effusion. Pt now s/p ex lap with small bowel resection.     Pt greeted and seen for OT eval. Completed most ADLs/transfers with no more than SBA and functional ambulation with a FWW in this setting; required mod A for socks but stated she would prefer to get assist from family PRN. Pt would benefit from HH OT to maximize occupational independence. Patient feels she has good support from family and will not be actively followed for occupational therapy services at this time, however may be seen if requested by physician for 1 more visit within 30 days to address any discharge or equipment needs.     Plan    Occupational Therapy Initial Treatment Plan   Duration: Discharge Needs Only    DC Equipment Recommendations: Grab Bar(s) in Tub / Shower, Grab Bar(s) by Toilet  Discharge Recommendations: Recommend home health for continued occupational therapy services     Objective     11/25/24 1139   Prior Living Situation   Housing / Facility 1 Story Apartment / Condo  (ground floor apartment with no steps to enter)    Bathroom Set up Bathtub / Shower Combination;Shower Chair  (Per pt report, plan is to have grab bars installed in the shower soon.)   Equipment Owned Tub / Shower Seat   Lives with - Patient's Self Care Capacity Parents   Comments Pt lives with mother in a ground floor apartment. Report mother is retired and can assist PRN.   Prior Level of ADL Function   Self Feeding Independent   Grooming / Hygiene Independent   Bathing Independent   Dressing Independent   Toileting Independent   Comments Independent but reports completing ADLs is often exhausting.   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 Reports pain at rest is about a 4)   Cognition    Cognition / Consciousness WDL   Level of Consciousness Alert   Comments Very pleasant and participatory.   Active ROM Upper Body   Active ROM Upper Body  WDL   Strength Upper Body   Upper Body Strength  WDL   Sensation Upper Body   Upper Extremity Sensation  X   Comments baseline PN in all fingers   Coordination Upper Body   Coordination WDL   Balance Assessment   Sitting Balance (Static) Fair    Sitting Balance (Dynamic) Fair   Standing Balance (Static) Fair   Standing Balance (Dynamic) Fair   Weight Shift Sitting Fair   Weight Shift Standing Fair   Comments FWW in standing   Bed Mobility    Comments Up in recliner pre and post session   ADL Assessment   Eating Supervision   Grooming Supervision;Seated  (oral care)   Upper Body Dressing Supervision  (gown change)   Lower Body Dressing Moderate Assist  (socks)   Toileting   (Declined need but agreeable to simulated toileting; SBA)   Functional Mobility   Sit to Stand Standby Assist   Bed, Chair, Wheelchair Transfer Standby Assist   Toilet Transfers   (Declined need but reported no difficulty with BSC transfer)   Transfer Method Stand Step   Mobility FWW; recliner > hallway > recliner   Visual Perception   Comments glasses at baseline   Activity Tolerance   Comments Limited by fatigue   Education Group   Education Provided Role of Occupational Therapist;Energy Conservation   Role of Occupational Therapist Patient Response Patient;Acceptance;Explanation;Verbal Demonstration   Energy Conservation Patient Response Patient;Acceptance;Explanation;Verbal Demonstration  (Educated on activity pacing and seated rest breaks PRN)   Occupational Therapy Initial Treatment Plan    Duration Discharge Needs Only   Anticipated Discharge Equipment and Recommendations   DC Equipment Recommendations Grab Bar(s) in Tub / Shower;Grab Bar(s) by Toilet   Discharge Recommendations Recommend home health for continued occupational therapy services   Interdisciplinary Plan of Care Collaboration   IDT Collaboration with  Nursing;Physical Therapist   Patient Position at End of Therapy Seated;Call Light within Reach;Tray Table within Reach;Phone within Reach  (no alarm on entry)   Collaboration Comments OT report and recs; RN updated   Session Information   Date / Session Number  11/25 - DC needs only

## 2024-11-26 NOTE — PROGRESS NOTES
Front wheel walker delivered and sized for patient. ABD binder delivered to patients bedside for later application.

## 2024-11-26 NOTE — CARE PLAN
The patient is Stable - Low risk of patient condition declining or worsening    Shift Goals  Clinical Goals: Patient to partivcipate in OOB activity  Patient Goals: Pain management  Family Goals: Updates    Progress made toward(s) clinical / shift goals:  Patient OOB throughout shift. Medicated per MAR, bolus button in place.

## 2024-11-27 ENCOUNTER — PHARMACY VISIT (OUTPATIENT)
Dept: PHARMACY | Facility: MEDICAL CENTER | Age: 44
End: 2024-11-27
Payer: COMMERCIAL

## 2024-11-27 VITALS
DIASTOLIC BLOOD PRESSURE: 61 MMHG | RESPIRATION RATE: 18 BRPM | BODY MASS INDEX: 23.52 KG/M2 | HEART RATE: 60 BPM | TEMPERATURE: 97.7 F | SYSTOLIC BLOOD PRESSURE: 98 MMHG | WEIGHT: 137.79 LBS | OXYGEN SATURATION: 94 % | HEIGHT: 64 IN

## 2024-11-27 LAB
ALBUMIN SERPL BCP-MCNC: 2.6 G/DL (ref 3.2–4.9)
ALBUMIN/GLOB SERPL: 1.1 G/DL
ALP SERPL-CCNC: 78 U/L (ref 30–99)
ALT SERPL-CCNC: 6 U/L (ref 2–50)
ANION GAP SERPL CALC-SCNC: 11 MMOL/L (ref 7–16)
AST SERPL-CCNC: 13 U/L (ref 12–45)
BILIRUB SERPL-MCNC: <0.2 MG/DL (ref 0.1–1.5)
BUN SERPL-MCNC: 4 MG/DL (ref 8–22)
CALCIUM ALBUM COR SERPL-MCNC: 9.4 MG/DL (ref 8.5–10.5)
CALCIUM SERPL-MCNC: 8.3 MG/DL (ref 8.5–10.5)
CHLORIDE SERPL-SCNC: 103 MMOL/L (ref 96–112)
CO2 SERPL-SCNC: 24 MMOL/L (ref 20–33)
CREAT SERPL-MCNC: 0.36 MG/DL (ref 0.5–1.4)
ERYTHROCYTE [DISTWIDTH] IN BLOOD BY AUTOMATED COUNT: 72.3 FL (ref 35.9–50)
GFR SERPLBLD CREATININE-BSD FMLA CKD-EPI: 127 ML/MIN/1.73 M 2
GLOBULIN SER CALC-MCNC: 2.4 G/DL (ref 1.9–3.5)
GLUCOSE BLD STRIP.AUTO-MCNC: 106 MG/DL (ref 65–99)
GLUCOSE SERPL-MCNC: 162 MG/DL (ref 65–99)
HCT VFR BLD AUTO: 28.9 % (ref 37–47)
HGB BLD-MCNC: 8.6 G/DL (ref 12–16)
MCH RBC QN AUTO: 25.2 PG (ref 27–33)
MCHC RBC AUTO-ENTMCNC: 29.8 G/DL (ref 32.2–35.5)
MCV RBC AUTO: 84.8 FL (ref 81.4–97.8)
PLATELET # BLD AUTO: 559 K/UL (ref 164–446)
PMV BLD AUTO: 9 FL (ref 9–12.9)
POTASSIUM SERPL-SCNC: 4.1 MMOL/L (ref 3.6–5.5)
PROT SERPL-MCNC: 5 G/DL (ref 6–8.2)
RBC # BLD AUTO: 3.41 M/UL (ref 4.2–5.4)
SODIUM SERPL-SCNC: 138 MMOL/L (ref 135–145)
WBC # BLD AUTO: 5.6 K/UL (ref 4.8–10.8)

## 2024-11-27 PROCEDURE — A9270 NON-COVERED ITEM OR SERVICE: HCPCS | Performed by: NURSE PRACTITIONER

## 2024-11-27 PROCEDURE — 700102 HCHG RX REV CODE 250 W/ 637 OVERRIDE(OP): Performed by: NURSE PRACTITIONER

## 2024-11-27 PROCEDURE — A9270 NON-COVERED ITEM OR SERVICE: HCPCS

## 2024-11-27 PROCEDURE — 700102 HCHG RX REV CODE 250 W/ 637 OVERRIDE(OP)

## 2024-11-27 PROCEDURE — 82962 GLUCOSE BLOOD TEST: CPT

## 2024-11-27 PROCEDURE — 85027 COMPLETE CBC AUTOMATED: CPT

## 2024-11-27 PROCEDURE — 700111 HCHG RX REV CODE 636 W/ 250 OVERRIDE (IP): Performed by: SURGERY

## 2024-11-27 PROCEDURE — 700101 HCHG RX REV CODE 250

## 2024-11-27 PROCEDURE — RXMED WILLOW AMBULATORY MEDICATION CHARGE

## 2024-11-27 PROCEDURE — 80053 COMPREHEN METABOLIC PANEL: CPT

## 2024-11-27 PROCEDURE — 99024 POSTOP FOLLOW-UP VISIT: CPT | Performed by: SURGERY

## 2024-11-27 RX ORDER — CELECOXIB 200 MG/1
200 CAPSULE ORAL DAILY
Qty: 7 CAPSULE | Refills: 0 | Status: SHIPPED | OUTPATIENT
Start: 2024-11-27 | End: 2024-12-04

## 2024-11-27 RX ORDER — OXYCODONE HYDROCHLORIDE 5 MG/1
5 TABLET ORAL EVERY 6 HOURS PRN
Qty: 28 TABLET | Refills: 0 | Status: SHIPPED | OUTPATIENT
Start: 2024-11-27 | End: 2024-12-04

## 2024-11-27 RX ORDER — ONDANSETRON 4 MG/1
4 TABLET, ORALLY DISINTEGRATING ORAL EVERY 6 HOURS PRN
Qty: 28 TABLET | Refills: 0 | Status: SHIPPED | OUTPATIENT
Start: 2024-11-27 | End: 2024-12-04

## 2024-11-27 RX ORDER — MORPHINE SULFATE 30 MG/1
30 TABLET, FILM COATED, EXTENDED RELEASE ORAL EVERY 12 HOURS
Qty: 14 TABLET | Refills: 0 | Status: SHIPPED | OUTPATIENT
Start: 2024-11-27 | End: 2024-12-04

## 2024-11-27 RX ORDER — AMOXICILLIN 250 MG
1 CAPSULE ORAL 2 TIMES DAILY
Qty: 14 TABLET | Refills: 0 | Status: SHIPPED | OUTPATIENT
Start: 2024-11-27 | End: 2024-12-04

## 2024-11-27 RX ADMIN — OXYCODONE 5 MG: 5 TABLET ORAL at 00:26

## 2024-11-27 RX ADMIN — OMEPRAZOLE 20 MG: 20 CAPSULE, DELAYED RELEASE ORAL at 04:24

## 2024-11-27 RX ADMIN — GABAPENTIN 800 MG: 400 CAPSULE ORAL at 12:19

## 2024-11-27 RX ADMIN — CELECOXIB 200 MG: 200 CAPSULE ORAL at 04:24

## 2024-11-27 RX ADMIN — CLONAZEPAM 1 MG: 1 TABLET ORAL at 04:24

## 2024-11-27 RX ADMIN — HEPARIN 500 UNITS: 100 SYRINGE at 12:21

## 2024-11-27 RX ADMIN — MIDODRINE HYDROCHLORIDE 10 MG: 5 TABLET ORAL at 09:37

## 2024-11-27 RX ADMIN — OXYCODONE 5 MG: 5 TABLET ORAL at 05:53

## 2024-11-27 RX ADMIN — MORPHINE SULFATE 30 MG: 30 TABLET, FILM COATED, EXTENDED RELEASE ORAL at 09:37

## 2024-11-27 RX ADMIN — OXCARBAZEPINE 300 MG: 300 TABLET, FILM COATED ORAL at 04:24

## 2024-11-27 RX ADMIN — GABAPENTIN 800 MG: 400 CAPSULE ORAL at 09:37

## 2024-11-27 RX ADMIN — OXYCODONE 5 MG: 5 TABLET ORAL at 12:19

## 2024-11-27 RX ADMIN — SENNOSIDES AND DOCUSATE SODIUM 2 TABLET: 50; 8.6 TABLET ORAL at 04:24

## 2024-11-27 RX ADMIN — ACETAMINOPHEN 1000 MG: 500 TABLET ORAL at 04:24

## 2024-11-27 ASSESSMENT — PAIN DESCRIPTION - PAIN TYPE
TYPE: ACUTE PAIN
TYPE: CHRONIC PAIN

## 2024-11-27 NOTE — PROGRESS NOTES
Discharging patient home per physician order. Demonstrated understanding of discharge instructions, follow up appointments, medications. Surgical incision is CDI..  Ambulating independently, voiding without difficulty, pain well controlled, tolerating oral medications, oxygen saturation greater than 90%, tolerating diet. Educational handouts given and discussed.  Verbalized understanding of discharge instructions and educational handouts. Stated several reasons why to return to ED or seek medical attention. All questions answered.  Belongings and dressing supplies with patient at time of discharge.

## 2024-11-27 NOTE — DISCHARGE INSTRUCTIONS
Surgery Discharge Instructions    Jacque Mcintyre   Age: 44 y.o.   : 1980    During this admission you have been treated for:  Metastatic melanoma (HCC) [C43.9]  Patient Active Problem List    Diagnosis Date Noted    Melanoma of left upper arm (HCC) 2017    Marijuana use 2024    Metastatic melanoma (HCC) 2024    Abdominal mass 2024    Hyponatremia 2024    Flu-like symptoms with SIRS 2024    Anemia 2024    Facial paralysis/[place] palsy 2022    Diabetic neuropathy (HCC) 2022    Dizziness on standing 10/19/2022    Vitamin D deficiency 10/17/2022    Dyslipidemia 10/17/2022    Normocytic anemia 10/17/2022    Borderline abnormal TFTs 10/17/2022    Thrombocytosis 10/17/2022    Metastatic cancer to brain (HCC) 10/15/2022    Adrenal insufficiency due to corticosteroid withdrawal (HCC) 10/12/2022    Chronic prescription benzodiazepine use 10/07/2022    Constipation 10/07/2022    Brain mass 10/06/2022    Malignant melanoma metastatic to brain (HCC) 10/01/2022    Cellulitis 2022    Leucocytosis 2022    Abnormal CT scan, chest 2022    Gastroenteritis 2022    Hypokalemia 10/08/2019    Essential hypertension 10/08/2019    Bipolar 1 disorder ([facility]) 10/07/2019    DM2 (diabetes mellitus, type 2) ([facility]) 10/07/2019    Appendicitis 10/07/2019    Nausea & vomiting 2015    Sepsis ([facility]) 2015    UTI (urinary tract infection) 2015    Lactic acidosis 2015    Anxiety 2015    Diarrhea 2015    Asthma     Depression        Activity:   Resume light to normal activity as tolerated.  (ie - ok to walk, climb stairs, ect)  Do not engage in strenuous activity for 7-10 days.   Do not lift more than 10 pounds for the next 4-6 weeks.   It is important you are up walking several times a day to decrease the risk of a blood clot     Diet:  Resume diabetic diet as tolerated. To reduce risk of post-operative nausea and vomiting avoid spicy or  greasy foods; eat small, frequent meals and maintain adequate fluid intake.    Medication(s):   Current Discharge Medication List        START taking these medications    Details   senna-docusate (PERICOLACE OR SENOKOT S) 8.6-50 MG Tab Take 1 Tablet by mouth 2 times a day for 7 days.  Qty: 14 Tablet, Refills: 0    Associated Diagnoses: S/P exploratory laparotomy; S/P small bowel resection; Metastatic melanoma (HCC)      celecoxib (CELEBREX) 200 MG Cap Take 1 Capsule by mouth every day for 7 days.  Qty: 7 Capsule, Refills: 0    Associated Diagnoses: S/P exploratory laparotomy; S/P small bowel resection; Metastatic melanoma (HCC)      morphine ER (MS CONTIN) 30 MG Tab CR tablet Take 1 Tablet by mouth every 12 hours for 7 days.  Qty: 14 Tablet, Refills: 0    Associated Diagnoses: S/P exploratory laparotomy; S/P small bowel resection; Metastatic melanoma (HCC)      Naloxone (NARCAN) 4 MG/0.1ML Liquid Administer 4 mg into affected nostril(S) one time as needed (One spray in one nostril for overdose and call 911) for up to 1 dose.  Qty: 1 Each, Refills: 0    Associated Diagnoses: S/P exploratory laparotomy; S/P small bowel resection; Metastatic melanoma (HCC)           CONTINUE these medications which have CHANGED    Details   oxyCODONE immediate-release (ROXICODONE) 5 MG Tab Take 1 Tablet by mouth every 6 hours as needed for Severe Pain for up to 7 days.  Qty: 28 Tablet, Refills: 0    Associated Diagnoses: S/P exploratory laparotomy; S/P small bowel resection; Metastatic melanoma (HCC)      ondansetron (ZOFRAN ODT) 4 MG TABLET DISPERSIBLE Take 1 Tablet by mouth every 6 hours as needed for Nausea/Vomiting for up to 7 days.  Qty: 28 Tablet, Refills: 0    Associated Diagnoses: S/P exploratory laparotomy; S/P small bowel resection; Metastatic melanoma (HCC)           CONTINUE these medications which have NOT CHANGED    Details   ferrous sulfate 325 (65 Fe) MG tablet Take 325 mg by mouth every Monday, Wednesday, and Friday.       albuterol (PROVENTIL) 2.5mg/0.5ml Nebu Soln Take 2.5 mg by nebulization every four hours as needed for Shortness of Breath.      TAFINLAR 75 MG Cap capsule Take 150 mg by mouth every 12 hours.      ELIQUIS 5 MG Tab Take 5 mg by mouth 2 times a day.      fluticasone-umeclidinium-vilanterol (TRELEGY ELLIPTA) 200-62.5-25 mcg/act inhaler Inhale 1 Puff every day.      insulin glargine 100 UNIT/ML SC SOLN Inject 20 Units under the skin every evening.      traZODone (DESYREL) 100 MG Tab Take 100 mg by mouth every evening.      VENTOLIN  (90 Base) MCG/ACT Aero Soln inhalation aerosol Inhale 1-2 Puffs every four hours as needed for Shortness of Breath.      meloxicam (MOBIC) 15 MG tablet Take 15 mg by mouth every day.      atorvastatin (LIPITOR) 40 MG Tab Take 1 Tablet by mouth every evening.  Qty: 30 Tablet, Refills: 2    Associated Diagnoses: Dyslipidemia      DULoxetine (CYMBALTA) 60 MG Cap DR Particles delayed-release capsule Take 1 Capsule by mouth every evening.  Qty: 30 Capsule, Refills: 2    Associated Diagnoses: Bipolar 1 disorder (HCC)      gabapentin (NEURONTIN) 800 MG tablet Take 1 Tablet by mouth 4 times a day.  Qty: 90 Tablet, Refills: 2    Associated Diagnoses: Brain mass      OXcarbazepine (TRILEPTAL) 300 MG Tab Take 1 Tablet by mouth 2 times a day.  Qty: 60 Tablet, Refills: 2    Associated Diagnoses: Bipolar 1 disorder (HCC)      QUEtiapine (SEROQUEL) 200 MG Tab Take 1 Tablet by mouth every evening.  Qty: 60 Tablet, Refills: 2    Associated Diagnoses: Bipolar 1 disorder (HCC)      clonazepam (KLONOPIN) 2 MG tablet Take 1 mg by mouth 2 times a day. 1 mg = 1/2 tablet - scheduled      acetaminophen (TYLENOL) 500 MG Tab Take 500-1,000 mg by mouth every 6 hours as needed. Indications: Pain            See prescription(s); take as directed.   You do NOT have to take all of your prescription pain medication.  Take only as needed if pain is not controlled with over-the-counter medications (ex: Tylenol,  ibuprofen).    If you are taking narcotic pain medications be sure to take a stool softener (available over the counter - ex: miralax, magnesium citrate, dulcolax/bisacoidyl) to reduce risk of constipation.  Additionally, make sure that you are taking in enough fluids.  Do NOT drive or make any important decision while taking prescription pain medication.    Wound Care:  SUTURES     Your incision has been covered with sutures. The sutures will gradually dissolve and fall out.  Do not pull on any sutures.  Do NOT apply any creams, lotions, etc to the incision unless you have been specifically instructed to do so by your surgical team.    It is normal to have a small amount of clear/yellow/pink drainage from you incision as it heals.    OK to shower and wash gently with soap and water  Please call the surgical clinic if you have:              - increasing drainage from incision              - change in the color of drainage from you incision              - redness more than 1 fingerbreadth (1 centimeter) from the incision edge   - increasing pain   - fever more than 101F  Avoid exposing your incision to the sun    Bathing/Showering:    Please shower daily starting in 24 hours. You may wash over incisions with regular soap and water and pat dry.  Ok to allow water from the shower to run over you incision.  Avoid submerging you incision under water (no baths, swimming or hot tubs) until fully healed.      Reducing post discharge nausea or vomiting:    Limit mobility, take slow, deep breaths.    Restrictions:   No smoking  No alcohol while taking prescription pain medications  No strenuous activity until cleared by surgery clinic  No heavy lifting (more than 10 lbs) for at least the next 4-6 weeks  No driving while taking prescription pain medication  No driving until you have the mobility to be a safe     Additional Instructions:   If you experience chest pain or shortness of breath, or have an acute emergency -  please call [phone]    Please call clinic or seek evaluation at the ER if you have any of the following:  - Fever >101 F      - Wound infection (increasing redness, swelling, drainage, pus)     - Severe pain not controlled with oral medications     - Severe nausea or vomiting, inability to tolerate PO intake     - Bleeding that does not stop with holding pressure to the area       - Yellowing of the skin or eyes     - Change in mental status (confusion or lethargy)      - New numbness or weakness      - Any other concerns.    Follow-up:    Please follow-up in surgical oncology clinic in 2 weeks.  Please call clinic to confirm appointment [phone]  Please keep all follow-up appointments.

## 2024-11-27 NOTE — PROGRESS NOTES
AA&Ox4. Denies CP/SOB.  Reporting 5/10 pain. Medicated per MAR.   Educated patient regarding pharmacologic and non pharmacologic modalities for pain management.  Skin per flowsheet.  Tolerating diabetic diet. Denies N/V.  + void. Last BM 11/27.  Pt ambulates x1 w/fww.  All needs met at this time. Call light within reach. Pt calls appropriately. Bed low and locked, non skid socks in place. Hourly rounding in place.

## 2024-11-27 NOTE — PROGRESS NOTES
Palliative Care    Room: T433-02    HPI:   Jacque Mcintyre is a 44 y.o. female with medical history significant for metastatic melanoma admitted 11/19/2024 due to abdominal pain, nausea, inability to tolerate oral intake, weakness, and tachycardia.  Imaging notable for small abdominal mass.  Patient ultimately underwent exploratory laparotomy with small bowel resection 11/22/2024.  She is doing well postoperatively and was transitioned off of her hydromorphone PCA.  Oncology team planning to discharge patient today.    REVIEW OF SYSTEMS:  Negative for delirium. Negative for dyspnea. Positive for pain (moderate abdominal pain). Negative for anorexia. Positive for fatigue. Negative for sedation (she does appear drowsy). Negative for depression. Positive for insomnia. Negative for constipation (had bowel movement this morning).       PHYSICAL EXAM:  Physical Exam  Constitutional:       Comments: Drowsy   Pulmonary:      Effort: No respiratory distress.   Abdominal:      General: There is no distension.      Tenderness: There is abdominal tenderness.   Skin:     Coloration: Skin is pale.   Neurological:      Mental Status: She is oriented to person, place, and time.      Comments: Oriented to event   Psychiatric:         Mood and Affect: Mood normal.         Behavior: Behavior normal.         Thought Content: Thought content normal.         Judgment: Judgment normal.       Discussion/Plan   Metastatic melanoma  Cancer associated and postsurgical pain  Continue MS ER 30 mg PO Q12 hours  Continue oxycodone 5-10 mg PO Q3 hours PRN moderate to severe breakthrough pain  Continue acetaminophen 1000 mg Q 8 hours  Continue celecoxib 200 mg PO daily  Continue gabapentin 800 mg 4 times daily  Continue duloxetine 60 mg PO nightly  Continue oxcarbazepine 300 mg PO BID    Discussed with surgical oncology team who is planning to discharge patient today.  Reviewed prescriptions.  Agree with Narcan prescription.  VOLTE message sent to  "Dr. Derek tony typically prescribes pain medications outpatient.    Constipation  Senna-docusate (Pericolace or Senokot-S) 8.6-50 mg tablets 2 tabs PO BID   Polyethylene glycol/lytes (MiraLAX) 1 packet PO daily PRN    Hypotension  Continue midodrine 10 mg PO TID    Advance Care Planning  Advance Directive & POLST from completed this admission  Continue full code/full treat  Patient does not want long term artificial nutrition but okay receiving short term artificial trial no longer than 2 weeks  She was unsure about tracheostomy    Continue home medications for chronic conditions including diabetes, insomnia, chronic pain     Interval diagnostic studies and medical documentation entries pertinent to this case were reviewed independently by me. This patient has at least one acute or chronic illness or injury that poses a threat to life or bodily function. This patient suffers from a moderate risk of morbidity from additional invasive diagnostic testing or intensive treatment. Discussion of recommendations and coordination of care undertaken with primary provider/treatment team.      Saira \"Homa\" NUHA Holt, [facility]  Inpatient Palliative Care (service hours Mon-Fri 8AM - 5PM)  [phone]      "

## 2024-11-27 NOTE — CARE PLAN
The patient is Stable - Low risk of patient condition declining or worsening    Shift Goals  Clinical Goals: pain and nausea control, ambulate, monitor for bowel function  Patient Goals: rest  Family Goals: Updates    Progress made toward(s) clinical / shift goals:  medicating for nausea prn per MAR. PCA discontinued today and now tolerating pain control with prns and scheduled medications.  Pt stated she passed gas x1 this shift, no BM, distant bowel sounds. Pt able to ambulate the hallway and up to restroom multiple times this shift. Pt is a moderate fall risk with interventions in place ( call light and belongings in reach, educated on not getting OOB without assistance, calls appropriately, door sign, and bed alarm in use)    Patient is not progressing towards the following goals:

## 2024-11-27 NOTE — DOCUMENTATION QUERY
[facility]                                                                       Query Response Note      PATIENT:               KALEE CENTENO  ACCT #:                  [id number]  MRN:                     [id number]  :                      1980  ADMIT DATE:       2024 12:56 PM  DISCH DATE:          RESPONDING  PROVIDER #:        [id number]           QUERY TEXT:    Please clarify in documentation the relationship, if any, between hypotension and epidural.    Please add your response to this query in your progress notes if you agree, thank you.    The patient's Clinical Indicators include:   Surgery general note: Overnight hypotension requiring pausing of epidural. Requiring low dose vasopressors.   Anesthesiology note: Hypotensive to the 70s systolic when epidural is on.    Risk factor: Epidural    Treatment: Vasopressor, epidural paused    Thank you,  Deepthi Melgar NP, CCDS   Clinical   Connect via [place]  Options provided:   -- Hypotension is due to epidural   -- Hypotension is unrelated to epidural   -- Other explanation, please specify_____   -- Unable to determine      Query created by: Deepthi Melgar on 2024 8:38 AM    RESPONSE TEXT:    Hypotension is due to epidural          Electronically signed by:  SHAGGY BREEN 2024 3:07 PM

## 2024-11-27 NOTE — PROGRESS NOTES
Report received from carrie RN  Pt AAOx4, Alert. Responds appropriately  RA, 1L while sleeping  Pain managed per MAR, resting comfortably.  +void, +flatus, No BM  Diabetic diet, tolerating well. No n/v  Ambulating to BR with steady gait. X1 FWW  Calls appropriately for assistance  SCDs on    Skin: MLI with dermabond    POC reviewed, education provided. Bed locked and in low position, pt instructed to call for assistance. Comprehension verbalized. Call light within reach, all needs met at this time.

## 2024-11-27 NOTE — CARE PLAN
The patient is Stable - Low risk of patient condition declining or worsening    Shift Goals  Clinical Goals: pain mgmt  Patient Goals: rest  Family Goals: Updates    Progress made toward(s) clinical / shift goals:  medicated per mar with scheduled meds for pain. Pt resting comfortably. Education provided on poc. Safety maintained, pt calls appropriately    Problem: Pain - Standard  Goal: Alleviation of pain or a reduction in pain to the patient’s comfort goal  Description: Target End Date:  Prior to discharge or change in level of care    Document on Vitals flowsheet    1.  Document pain using the appropriate pain scale per order or unit policy  2.  Educate and implement non-pharmacologic comfort measures (i.e. relaxation, distraction, massage, cold/heat therapy, etc.)  3.  Pain management medications as ordered  4.  Reassess pain after pain med administration per policy  5.  If opiods administered assess patient's response to pain medication is appropriate per POSS sedation scale  6.  Follow pain management plan developed in collaboration with patient and interdisciplinary team (including palliative care or pain specialists if applicable)  Outcome: Progressing       Patient is not progressing towards the following goals:

## 2024-11-27 NOTE — DISCHARGE SUMMARY
Discharge Summary    CHIEF COMPLAINT ON ADMISSION  Chief Complaint   Patient presents with    Sent by MD     ·Active stage 4 melanoma  ·Hx anemia and PE - on eliquis  ·Hypertensive, tachycardic, weakness in clinic  ·Concerns for another clot    Pt denies CP/SOB       Reason for Admission  Sent by MD     Admission Date  11/19/2024    CODE STATUS  Full Code    HPI & HOSPITAL COURSE  This is a 44 y.o. female here with known metastatic melanoma with mets to brain, currently on oral systemic therapy managed by Dr Reed, admitted with several days of persistent nausea and vomiting concern SBO, CT A/P revealed a large 11 cm mass adjacent to small bowel.     On 11/22/24 she completed surgery by Dr. Cummings, including:  Exploratory laparotomy with small bowel resection   Pathology is pending.    Epidural anesthesia was placed preoperatively, along with Graf catheter.   Patient diet was advanced in standard gradual fashion, and patient tolerated well without complaints of N/V.    Patient was OOB and ambulated with support of nursing staff and PT team.   Epidural and Graf was discontinued before discharge, and patient was transitioned to PO pain medications.    Wound care instructions provided to patient. She is eager to discharge today.    Therefore, she is discharged in good and stable condition to home with close outpatient follow-up.    The patient met 2-midnight criteria for an inpatient stay at the time of discharge.    Discharge Date  11/27/24    FOLLOW UP ITEMS POST DISCHARGE  Follow up in surgical oncology clinic in 2 weeks.  Follow up with medical oncologist    DISCHARGE DIAGNOSES  Principal Problem:    Metastatic melanoma (HCC) (POA: Yes)  Active Problems:    DM2 (diabetes mellitus, type 2) (HCC) (POA: Yes)    Thrombocytosis (POA: Yes)    Diabetic neuropathy (HCC) (POA: Yes)    Anemia (Chronic) (POA: Yes)    Abdominal mass (POA: Unknown)    Hyponatremia (POA: Unknown)    Marijuana use (POA: Unknown)  Resolved  Problems:    * No resolved hospital problems. *      FOLLOW UP  Future Appointments   Date Time Provider Department Center   12/6/2024  8:00 AM PHUONG LORENZO MRI [facility]   12/16/2024  8:30 AM Judy August M.D. [facility] None   5/6/2025 10:00 AM Estrella Joshi M.D. [facility] [place]     No follow-up provider specified.    MEDICATIONS ON DISCHARGE     Medication List        START taking these medications        Instructions   celecoxib 200 MG Caps  Commonly known as: CeleBREX   Take 1 Capsule by mouth every day for 7 days.  Dose: 200 mg     morphine ER 30 MG Tbcr tablet  Commonly known as: Ms Contin   Take 1 Tablet by mouth every 12 hours for 7 days.  Dose: 30 mg     Naloxone 4 MG/0.1ML Liqd  Commonly known as: Narcan   Administer 4 mg into affected nostril(S) one time as needed (One spray in one nostril for overdose and call 911) for up to 1 dose.  Dose: 1 Spray     senna-docusate 8.6-50 MG Tabs  Commonly known as: Pericolace Or Senokot S   Take 1 Tablet by mouth 2 times a day for 7 days.  Dose: 1 Tablet            CHANGE how you take these medications        Instructions   ondansetron 4 MG Tbdp  What changed: when to take this  Commonly known as: Zofran ODT   Take 1 Tablet by mouth every 6 hours as needed for Nausea/Vomiting for up to 7 days.  Dose: 4 mg     oxyCODONE immediate-release 5 MG Tabs  What changed: how much to take  Commonly known as: Roxicodone   Take 1 Tablet by mouth every 6 hours as needed for Severe Pain for up to 7 days.  Dose: 5 mg            CONTINUE taking these medications        Instructions   acetaminophen 500 MG Tabs  Commonly known as: Tylenol   Take 500-1,000 mg by mouth every 6 hours as needed. Indications: Pain  Dose: 500-1,000 mg     atorvastatin 40 MG Tabs  Commonly known as: Lipitor   Take 1 Tablet by mouth every evening.  Dose: 40 mg     clonazepam 2 MG tablet  Commonly known as: KlonoPIN   Take 1 mg by mouth 2 times a day. 1 mg = 1/2 tablet - scheduled  Dose: 1 mg     DULoxetine  60 MG Cpep delayed-release capsule  Commonly known as: Cymbalta   Take 1 Capsule by mouth every evening.  Dose: 60 mg     Eliquis 5 MG Tabs  Generic drug: apixaban   Take 5 mg by mouth 2 times a day.  Dose: 5 mg     ferrous sulfate 325 (65 Fe) MG tablet   Take 325 mg by mouth every Monday, Wednesday, and Friday.  Dose: 325 mg     gabapentin 800 MG tablet  Commonly known as: Neurontin   Take 1 Tablet by mouth 4 times a day.  Dose: 800 mg     insulin glargine 100 UNIT/ML Soln  Commonly known as: Lantus   Inject 20 Units under the skin every evening.  Dose: 20 Units     meloxicam 15 MG tablet  Commonly known as: Mobic   Take 15 mg by mouth every day.  Dose: 15 mg     OXcarbazepine 300 MG Tabs  Commonly known as: Trileptal   Take 1 Tablet by mouth 2 times a day.  Dose: 300 mg     QUEtiapine 200 MG Tabs  Commonly known as: SEROquel   Take 1 Tablet by mouth every evening.  Dose: 200 mg     Tafinlar 75 MG Caps capsule  Generic drug: dabrafenib   Take 150 mg by mouth every 12 hours.  Dose: 150 mg     traZODone 100 MG Tabs  Commonly known as: Desyrel   Take 100 mg by mouth every evening.  Dose: 100 mg     Trelegy Ellipta 200-62.5-25 MCG/ACT inhaler  Generic drug: fluticasone-umeclidinium-vilanterol   Inhale 1 Puff every day.  Dose: 1 Puff     * Ventolin  (90 Base) MCG/ACT Aers inhalation aerosol  Generic drug: albuterol   Inhale 1-2 Puffs every four hours as needed for Shortness of Breath.  Dose: 1-2 Puff     * albuterol 2.5mg/0.5ml Nebu  Commonly known as: Proventil   Take 2.5 mg by nebulization every four hours as needed for Shortness of Breath.  Dose: 2.5 mg           * This list has 2 medication(s) that are the same as other medications prescribed for you. Read the directions carefully, and ask your doctor or other care provider to review them with you.                  Allergies  Allergies   Allergen Reactions    Lactose Unspecified     GI Upset    Augmentin [Amoxicillin-Pot Clavulanate] Vomiting     PCN ok,  allergic to the Clavulanate per pt 1/3/24       DIET  Orders Placed This Encounter   Procedures    Diet Order Diet: Consistent CHO (Diabetic)     Standing Status:   Standing     Number of Occurrences:   1     Order Specific Question:   Diet:     Answer:   Consistent CHO (Diabetic) [4]       ACTIVITY  As tolerated.  Weight bearing as tolerated    CONSULTATIONS  [facility]  Palliative Medicine    PROCEDURES  11/22/24 by Dr. Cummings  Exploratory laparotomy with small bowel resection     LABORATORY  Lab Results   Component Value Date    SODIUM 138 11/27/2024    POTASSIUM 4.1 11/27/2024    CHLORIDE 103 11/27/2024    CO2 24 11/27/2024    GLUCOSE 162 (H) 11/27/2024    BUN 4 (L) 11/27/2024    CREATININE 0.36 (L) 11/27/2024        Lab Results   Component Value Date    WBC 5.6 11/27/2024    HEMOGLOBIN 8.6 (L) 11/27/2024    HEMATOCRIT 28.9 (L) 11/27/2024    PLATELETCT 559 (H) 11/27/2024        Total time of the discharge process exceeds 30 minutes.

## 2024-12-02 NOTE — PROGRESS NOTES
Subjective:   12/4/2024  1:33 PM  Primary care physician: KAMILA PARK N.P.  Referring Provider: Hospital consult  Onc: Derek    Chief Complaint:   Chief Complaint   Patient presents with    Post-op     MET MELANOMA  HX BRAIN METS  SM BOWEL OBSTR  EX LAP 11/22  PATH: MELANOMA        Diagnosis:   1. Metastatic melanoma (HCC)        2. Left lower quadrant abdominal mass        3. Metastatic cancer to brain (HCC)        4. Type 2 diabetes mellitus with foot ulcer, with long-term current use of insulin (HCC)        5. Melanoma of left upper arm (HCC)          History of presenting illness:    Ms Jacque Mcintyre is a 42 y/o female with history including relapsed metastatic melanoma ([id number] with BRAF mutation V600E mutation positive) from dorsal aspect of hand with brain mets diagnosed in September 2022 (completed 15 cycles of Yervoy in 2023), and chronic iron deficiency anemia, and PE in 2024, sent from PCP with chief complaint of generalized weakness and fatigue, and several days of nausea and vomiting. She said that her symptoms gradually worsened over the past month. She reported recent blood transfusion at the end of October and had similar symptoms at that time. She denied any melena, hematochezia, hematemesis, hematuria, nose bleeds, or recent trauma with bleed. In ED vitals notable for tachycardia 110s. Labs notable for Hgb 5.3. CTA negative for PE. CT abd/pelvis notable for new 11 cm abdominal mass. Patient ultimately admitted blood loss anemia requiring transfusion of multiple units.      CT A/P on 11/19/24 noted a large 11 cm mass centered on one of the small bowel loops in the left hemiabdomen. The mass abuts the descending and sigmoid colon without colonic invasion. No abutment of other organs. No metastatic disease in the abdomen. Small volume pelvic ascites.     Of note, imaging in 2023 had noted mild chronic inflammation, and lymph node aggregates in abdomen  and concern hypermetabolic metastatic associate with small bowel.     Update 12/4/24  On 11/22/24 she completed exploratory laparotomy with small bowel resection by Dr Cummings.    Pathology noted small bowel with tumor, resection: metastatic melanoma involving segment of small intestine and mesentery.  Lymphovascular invasion is identified.    Margins are negative for tumor.  One of one lymph node, positive for metastatic melanoma (1/1).     She was discharged home on 11/27/24    She is here today for postoperative evaluation.  The patient is feeling better.  No nausea and tolerating regular diet.  Bowels are working without difficulty.    Past Medical History:   Diagnosis Date    Asthma     inhaler    Bipolar 1 disorder (HCC)     Cancer (HCC) 01/01/2016    melanoma    Cough     Depression     DM mellitus,     insulin    Hyperlipidemia     Hypertension     Pap smear     Dr. Baird    PCOS (polycystic ovarian syndrome)     Shortness of breath     Sputum production     Wheezing      Past Surgical History:   Procedure Laterality Date    SD EXPLORATORY OF ABDOMEN N/A 11/22/2024    Procedure: LAPAROTOMY, EXPLORATORY, SMALL BOWEL RESECTION;  Surgeon: Tulio Cummings M.D.;  Location: [facility];  Service: General    CRANIOTOMY STEALTH Right 10/6/2022    Procedure: RIGHT FRONTAL CRANIOTOMY WITH STEALTH;  Surgeon: Pebbles Tejada M.D.;  Location: [facility];  Service: Neurosurgery    SD BRONCHOSCOPY,DIAGNOSTIC N/A 6/17/2022    Procedure: FIBER OPTIC BRONCHOSCOPY WITH WASH, BRUSH, BRONCHOALVEOLAR LAVAGE, BIOPSY, FINE NEEDLE ASPIRATION;  Surgeon: Yue Swanson M.D.;  Location: [facility];  Service: Pulmonary    ENDOBRONCHIAL US ADD-ON N/A 6/17/2022    Procedure: ENDOBRONCHIAL ULTRASOUND (EBUS);  Surgeon: Yue Swanson M.D.;  Location: [facility];  Service: Pulmonary    SD LAP,APPENDECTOMY N/A 10/7/2019    Procedure: APPENDECTOMY, LAPAROSCOPIC;  Surgeon: iLonel Frazier  M.D.;  Location: SURGERY [facility];  Service: General    AXILLARY NODE DISSECTION Left 6/23/2017    Procedure: AXILLARY NODE DISSECTION- COMPLETION LYPHADENECTOMY;  Surgeon: Lionel Weinberg M.D.;  Location: SURGERY [facility];  Service:     WIDE EXCISION Left 5/9/2017    Procedure: WIDE EXCISION - MALIGNANT MELANOMA HAND;  Surgeon: Lionel Weinberg M.D.;  Location: SURGERY SAME DAY [facility];  Service:     NODE BIOPSY SENTINEL Left 5/9/2017    Procedure: NODE BIOPSY SENTINEL - AXILLARY;  Surgeon: Lionel Weinberg M.D.;  Location: SURGERY SAME DAY [facility];  Service:     OTHER  2016    excisino of melanoma left hand    ADENOIDECTOMY      MYRINGOTOMY      with tube placement     Allergies   Allergen Reactions    Lactose Unspecified     GI Upset    Augmentin [Amoxicillin-Pot Clavulanate] Vomiting     PCN ok, allergic to the Clavulanate per pt 1/3/24     Outpatient Encounter Medications as of 12/4/2024   Medication Sig Dispense Refill    senna-docusate (PERICOLACE OR SENOKOT S) 8.6-50 MG Tab Take 1 Tablet by mouth 2 times a day for 7 days. 14 Tablet 0    oxyCODONE immediate-release (ROXICODONE) 5 MG Tab Take 1 Tablet by mouth every 6 hours as needed for Severe Pain for up to 7 days. 28 Tablet 0    celecoxib (CELEBREX) 200 MG Cap Take 1 Capsule by mouth every day for 7 days. 7 Capsule 0    ondansetron (ZOFRAN ODT) 4 MG TABLET DISPERSIBLE Take 1 Tablet by mouth every 6 hours as needed for Nausea/Vomiting for up to 7 days. 28 Tablet 0    morphine ER (MS CONTIN) 30 MG Tab CR tablet Take 1 Tablet by mouth every 12 hours for 7 days. 14 Tablet 0    Naloxone (NARCAN) 4 MG/0.1ML Liquid Administer 4 mg into affected nostril(S) one time as needed (One spray in one nostril for overdose and call 911) for up to 1 dose. 2 Each 0    ferrous sulfate 325 (65 Fe) MG tablet Take 325 mg by mouth every Monday, Wednesday, and Friday.      albuterol (PROVENTIL) 2.5mg/0.5ml Nebu Soln Take 2.5 mg by nebulization every four  hours as needed for Shortness of Breath.      TAFINLAR 75 MG Cap capsule Take 150 mg by mouth every 12 hours.      ELIQUIS 5 MG Tab Take 5 mg by mouth 2 times a day.      fluticasone-umeclidinium-vilanterol (TRELEGY ELLIPTA) 200-62.5-25 mcg/act inhaler Inhale 1 Puff every day.      insulin glargine 100 UNIT/ML SC SOLN Inject 20 Units under the skin every evening.      traZODone (DESYREL) 100 MG Tab Take 100 mg by mouth every evening.      VENTOLIN  (90 Base) MCG/ACT Aero Soln inhalation aerosol Inhale 1-2 Puffs every four hours as needed for Shortness of Breath.      meloxicam (MOBIC) 15 MG tablet Take 15 mg by mouth every day.      atorvastatin (LIPITOR) 40 MG Tab Take 1 Tablet by mouth every evening. 30 Tablet 2    DULoxetine (CYMBALTA) 60 MG Cap DR Particles delayed-release capsule Take 1 Capsule by mouth every evening. 30 Capsule 2    gabapentin (NEURONTIN) 800 MG tablet Take 1 Tablet by mouth 4 times a day. 90 Tablet 2    OXcarbazepine (TRILEPTAL) 300 MG Tab Take 1 Tablet by mouth 2 times a day. 60 Tablet 2    QUEtiapine (SEROQUEL) 200 MG Tab Take 1 Tablet by mouth every evening. 60 Tablet 2    acetaminophen (TYLENOL) 500 MG Tab Take 500-1,000 mg by mouth every 6 hours as needed. Indications: Pain      clonazepam (KLONOPIN) 2 MG tablet Take 1 mg by mouth 2 times a day. 1 mg = 1/2 tablet - scheduled       No facility-administered encounter medications on file as of 12/4/2024.     Social History     Socioeconomic History    Marital status:      Spouse name: Not on file    Number of children: 2    Years of education: Not on file    Highest education level: Associate degree: academic program   Occupational History    Occupation: stay at home mom     Employer: none   Tobacco Use    Smoking status: Every Day     Current packs/day: 0.75     Average packs/day: 0.8 packs/day for 10.0 years (7.5 ttl pk-yrs)     Types: Cigarettes    Smokeless tobacco: Never   Vaping Use    Vaping status: Never Used    Substance and Sexual Activity    Alcohol use: Not Currently    Drug use: Yes     Types: Oral, Marijuana     Comment: marijuanna gummies    Sexual activity: Yes   Other Topics Concern    Not on file   Social History Narrative    Not on file     Social Drivers of Health     Financial Resource Strain: Low Risk  (11/9/2024)    Overall Financial Resource Strain (CARDIA)     Difficulty of Paying Living Expenses: Not hard at all   Food Insecurity: No Food Insecurity (11/19/2024)    Hunger Vital Sign     Worried About Running Out of Food in the Last Year: Never true     Ran Out of Food in the Last Year: Never true   Transportation Needs: No Transportation Needs (11/19/2024)    PRAPARE - Transportation     Lack of Transportation (Medical): No     Lack of Transportation (Non-Medical): No   Physical Activity: Inactive (11/9/2024)    Exercise Vital Sign     Days of Exercise per Week: 0 days     Minutes of Exercise per Session: 0 min   Stress: Stress Concern Present (11/9/2024)    [employer] [place] [employer] - Occupational Stress Questionnaire     Feeling of Stress : To some extent   Social Connections: Socially Isolated (11/9/2024)    Social Connection and Isolation Panel [NHANES]     Frequency of Communication with Friends and Family: More than three times a week     Frequency of Social Gatherings with Friends and Family: Once a week     Attends Muslim Services: Never     Active Member of Clubs or Organizations: No     Attends Club or Organization Meetings: Never     Marital Status:    Intimate Partner Violence: Not At Risk (11/19/2024)    Humiliation, Afraid, Rape, and Kick questionnaire     Fear of Current or Ex-Partner: No     Emotionally Abused: No     Physically Abused: No     Sexually Abused: No   Housing Stability: Low Risk  (11/19/2024)    Housing Stability Vital Sign     Unable to Pay for Housing in the Last Year: No     Number of Times Moved in the Last Year: 1     Homeless in the Last Year:  No      Social History     Tobacco Use   Smoking Status Every Day    Current packs/day: 0.75    Average packs/day: 0.8 packs/day for 10.0 years (7.5 ttl pk-yrs)    Types: Cigarettes   Smokeless Tobacco Never     Social History     Substance and Sexual Activity   Alcohol Use Not Currently     Social History     Substance and Sexual Activity   Drug Use Yes    Types: Oral, Marijuana    Comment: marijuanna gummies      Family History   Problem Relation Age of Onset    Psychiatric Illness Mother         depression    Diabetes Mother     Hyperlipidemia Mother     Thyroid Mother         s/p radioactive iodine treatment on replacement    Hypertension Father     Diabetes Father           Objective:   LMP 09/01/2024 (Approximate)     Physical Exam  Abdominal:      Comments: Abdomen is soft with minimal tenderness.  Wound closed with glue and Monocryl.         Labs   Latest Reference Range & Units 11/27/24 04:50   WBC 4.8 - 10.8 K/uL 5.6   RBC 4.20 - 5.40 M/uL 3.41 (L)   Hemoglobin 12.0 - 16.0 g/dL 8.6 (L)   Hematocrit 37.0 - 47.0 % 28.9 (L)   MCV 81.4 - 97.8 fL 84.8   MCH 27.0 - 33.0 pg 25.2 (L)   MCHC 32.2 - 35.5 g/dL 29.8 (L)   RDW 35.9 - 50.0 fL 72.3 (H)   Platelet Count 164 - 446 K/uL 559 (H)   MPV 9.0 - 12.9 fL 9.0   (L): Data is abnormally low  (H): Data is abnormally high      Latest Reference Range & Units 11/27/24 04:50   Sodium 135 - 145 mmol/L 138   Potassium 3.6 - 5.5 mmol/L 4.1   Chloride 96 - 112 mmol/L 103   Co2 20 - 33 mmol/L 24   Anion Gap 7.0 - 16.0  11.0   Glucose 65 - 99 mg/dL 162 (H)   Bun 8 - 22 mg/dL 4 (L)   Creatinine 0.50 - 1.40 mg/dL 0.36 (L)   GFR (CKD-EPI) >60 mL/min/1.73 m 2 127   Calcium 8.5 - 10.5 mg/dL 8.3 (L)   Correct Calcium 8.5 - 10.5 mg/dL 9.4   AST(SGOT) 12 - 45 U/L 13   ALT(SGPT) 2 - 50 U/L 6   Alkaline Phosphatase 30 - 99 U/L 78   Total Bilirubin 0.1 - 1.5 mg/dL <0.2   Albumin 3.2 - 4.9 g/dL 2.6 (L)   Total Protein 6.0 - 8.2 g/dL 5.0 (L)   Globulin 1.9 - 3.5 g/dL 2.4   A-G Ratio g/dL  1.1   (H): Data is abnormally high  (L): Data is abnormally low    CT A/P (11/19/24)  IMPRESSION:  1.  Large 11 cm mass centered on one of the small bowel loops in the left hemiabdomen. Primary differential considerations include small bowel adenocarcinoma, gastrointestinal stromal tumor and small bowel metastasis.  2.  The mass abuts the descending and sigmoid colon without colonic invasion. No abutment of other organs.  3.  No metastatic disease in the abdomen.  4.  Small volume pelvic ascites.     CT PET (6/24/24)  IMPRESSION:  1.  Positive response to therapy. The vast majority of hypermetabolic metastases seen on prior 2022 PET/CT have resolved or are no longer FDG avid.  2.  Most of the pulmonary nodules have resolved. Sub-4 mm residual pulmonary nodules can be followed with CT imaging.  3.  Some persistent uptake in the left adnexal region, nonspecific.  4.  Nonspecific focal metabolism in one of the left upper quadrant small bowel loops. It may be physiologic, but bowel metastasis is difficult to exclude.  5.  Inhomogeneous brain metabolism, related to multiple treated brain metastases.     Pathology  PATH (11/22/24)  FINAL DIAGNOSIS:     A. Small bowel with tumor, resection:          Metastatic melanoma involving segment of small intestine and           mesentery.          Lymphovascular invasion is identified.          Margins are negative for tumor.          One of one lymph node, positive for metastatic melanoma (1/1).     PATH (10/6/2022)  ADDENDUM:     This case is being addended to report the results of BRAF Mutation   Analysis Molecular Genetics, performed by [employer] on   block A3.     BRAF Mutation:  Detected     Mutation(s):  [id number] (p.[id number])     Please see separate [employer] report for further   details.     Addendum Signed By: Deborah Galvan MD   Addendum Date: 10/13/2022   Original Report Date: 10/07/2022     FINAL DIAGNOSIS:     A. Right frontal brain mass  #1:          Positive for metastatic melanoma.   B. Right frontal brain lesion #2:          Positive for metastatic melanoma.   C. Right frontal brain lesion #3:          Positive for metastatic melanoma.         Diagnosis:     1. Metastatic melanoma (HCC)        2. Left lower quadrant abdominal mass        3. Metastatic cancer to brain (HCC)        4. Type 2 diabetes mellitus with foot ulcer, with long-term current use of insulin (HCC)        5. Melanoma of left upper arm (HCC)            Medical Decision Making:  Today's Assessment / Status / Plan:     The patient is doing well status post small bowel resection.  She has an appointment with Dr. Reed next week.    We will see the patient back on an as-needed basis.  She knows that if she is not feeling well she may call at any time.  Greater than 30 minutes were spent with the patient.  This included review of outside records and imaging, counseling of the patient and coordination of care.    I, Dr Cummings, have entered, reviewed and confirmed the above diagnoses related to this patient on this date of service, as per the time and date noted at top of this note.

## 2024-12-04 ENCOUNTER — OFFICE VISIT (OUTPATIENT)
Dept: SURGICAL ONCOLOGY | Facility: MEDICAL CENTER | Age: 44
End: 2024-12-04
Payer: MEDICAID

## 2024-12-04 VITALS
HEART RATE: 96 BPM | BODY MASS INDEX: 19.91 KG/M2 | SYSTOLIC BLOOD PRESSURE: 104 MMHG | HEIGHT: 64 IN | DIASTOLIC BLOOD PRESSURE: 68 MMHG | OXYGEN SATURATION: 96 % | TEMPERATURE: 97.4 F | WEIGHT: 116.6 LBS

## 2024-12-04 DIAGNOSIS — R19.04 LEFT LOWER QUADRANT ABDOMINAL MASS: ICD-10-CM

## 2024-12-04 DIAGNOSIS — C43.62 MELANOMA OF LEFT UPPER ARM (HCC): ICD-10-CM

## 2024-12-04 DIAGNOSIS — C43.9 METASTATIC MELANOMA (HCC): ICD-10-CM

## 2024-12-04 DIAGNOSIS — L97.509 TYPE 2 DIABETES MELLITUS WITH FOOT ULCER, WITH LONG-TERM CURRENT USE OF INSULIN (HCC): ICD-10-CM

## 2024-12-04 DIAGNOSIS — E11.621 TYPE 2 DIABETES MELLITUS WITH FOOT ULCER, WITH LONG-TERM CURRENT USE OF INSULIN (HCC): ICD-10-CM

## 2024-12-04 DIAGNOSIS — C79.31 METASTATIC CANCER TO BRAIN (HCC): ICD-10-CM

## 2024-12-04 DIAGNOSIS — Z79.4 TYPE 2 DIABETES MELLITUS WITH FOOT ULCER, WITH LONG-TERM CURRENT USE OF INSULIN (HCC): ICD-10-CM

## 2024-12-04 PROCEDURE — 3074F SYST BP LT 130 MM HG: CPT | Performed by: SURGERY

## 2024-12-04 PROCEDURE — 3078F DIAST BP <80 MM HG: CPT | Performed by: SURGERY

## 2024-12-04 PROCEDURE — 99024 POSTOP FOLLOW-UP VISIT: CPT | Performed by: SURGERY

## 2024-12-04 ASSESSMENT — FIBROSIS 4 INDEX: FIB4 SCORE: 0.44

## 2024-12-06 ENCOUNTER — HOSPITAL ENCOUNTER (OUTPATIENT)
Dept: RADIOLOGY | Facility: MEDICAL CENTER | Age: 44
End: 2024-12-06
Attending: RADIOLOGY
Payer: MEDICAID

## 2024-12-06 ENCOUNTER — TELEPHONE (OUTPATIENT)
Dept: SURGICAL ONCOLOGY | Facility: MEDICAL CENTER | Age: 44
End: 2024-12-06

## 2024-12-06 DIAGNOSIS — C79.31 METASTATIC CANCER TO BRAIN (HCC): ICD-10-CM

## 2024-12-06 PROCEDURE — 70553 MRI BRAIN STEM W/O & W/DYE: CPT

## 2024-12-06 PROCEDURE — 700117 HCHG RX CONTRAST REV CODE 255: Mod: JZ,UD

## 2024-12-06 PROCEDURE — A9579 GAD-BASE MR CONTRAST NOS,1ML: HCPCS | Mod: JZ,UD

## 2024-12-06 RX ADMIN — GADOTERIDOL 11 ML: 279.3 INJECTION, SOLUTION INTRAVENOUS at 08:54

## 2024-12-06 NOTE — TELEPHONE ENCOUNTER
----- Message from Physician Tulio Cummings M.D. sent at 12/4/2024  1:50 PM PST -----  Can you call her in 3 weeks just to make sure she is doing ok?

## 2024-12-12 ENCOUNTER — OFF SITE VISIT (OUTPATIENT)
Dept: PALLIATIVE MEDICINE | Facility: HOSPICE | Age: 44
End: 2024-12-12
Payer: MEDICAID

## 2024-12-12 DIAGNOSIS — R29.6 FREQUENT FALLS: ICD-10-CM

## 2024-12-12 DIAGNOSIS — F34.1 DYSTHYMIA: ICD-10-CM

## 2024-12-12 DIAGNOSIS — R64 CANCER CACHEXIA (HCC): ICD-10-CM

## 2024-12-12 DIAGNOSIS — I26.99 OTHER ACUTE PULMONARY EMBOLISM, UNSPECIFIED WHETHER ACUTE COR PULMONALE PRESENT (HCC): ICD-10-CM

## 2024-12-12 DIAGNOSIS — R53.1 WEAKNESS: ICD-10-CM

## 2024-12-12 DIAGNOSIS — F32.89 OTHER DEPRESSION: Chronic | ICD-10-CM

## 2024-12-12 PROCEDURE — 99497 ADVNCD CARE PLAN 30 MIN: CPT | Performed by: NAPRAPATH

## 2024-12-12 PROCEDURE — 99349 HOME/RES VST EST MOD MDM 40: CPT | Mod: 25 | Performed by: NAPRAPATH

## 2024-12-16 ENCOUNTER — HOSPITAL ENCOUNTER (OUTPATIENT)
Dept: RADIATION ONCOLOGY | Facility: MEDICAL CENTER | Age: 44
End: 2024-12-16
Attending: RADIOLOGY
Payer: MEDICAID

## 2024-12-16 DIAGNOSIS — C43.9 METASTATIC MELANOMA (HCC): ICD-10-CM

## 2024-12-16 PROCEDURE — 99213 OFFICE O/P EST LOW 20 MIN: CPT | Performed by: RADIOLOGY

## 2024-12-16 ASSESSMENT — ENCOUNTER SYMPTOMS
EYES NEGATIVE: 1
GASTROINTESTINAL NEGATIVE: 1
NEUROLOGICAL NEGATIVE: 1
WEIGHT LOSS: 1
BACK PAIN: 1
FALLS: 1
WEAKNESS: 1

## 2024-12-16 NOTE — ADDENDUM NOTE
Encounter addended by: Judy August M.D. on: 12/16/2024 9:11 AM   Actions taken: Visit diagnoses modified, Order list changed, Diagnosis association updated, SmartForm saved, Clinical Note Signed

## 2024-12-16 NOTE — PROGRESS NOTES
Virtual Visit: Established Patient   This visit was conducted via Teams using secure and encrypted videoconferencing technology.   The patient was in their home in the [facility].    The patient's identity was confirmed and verbal consent was obtained for this virtual visit.     Subjective:   CC: Post tx FU, MRI surveillance      Jacque Mcintyre is a 44 y.o. female presenting for evaluation and management of:    This is a 42-year-old lady with metastatic BRAF mutant malignant melanoma, stage IV, with multiple brain lesions as well as systemic disease, who had seen approximately 3 months ago initially for consideration of radiation therapy.  At that time, she underwent craniotomy and resection of her larger dominant lesions, and was treated with postoperative fractionated stereotactic radiation.  Thereafter, she was discovered to have several additional lesions and completed a second round of SRS as below.  She now continues on immunotherapy with medical oncology.     02/02/23- We are having a telephone follow-up shortly after completion of her recent course of stereotactic radiation.  She is doing tremendously well.  She is on immunotherapy now, and continues on Nivo/Ipi.  She feels quite well, has no major sensory or motor problems, no headaches, nausea or vomiting, or systemic complaints.  A follow-up MRI and CT chest abdomen pelvis has been ordered by medical oncology to be done in the next few weeks.  Her energy level is good.  Her hair has slowly grown back.  Overall, she feels quite good.     2/22/23 she continues to do relatively well.  She continues on her immunotherapy which she is tolerating without any major issues.  She had a follow-up MRI and a CT chest abdomen pelvis completed last week.  The systemic scan shows an excellent response in most of her disease is responding or stable.  However her brain MRI shows one small new 6 mm lesion that is definitively new from her prior MRI.  The remainder of  her previously treated disease appears to have responded well.  She now comes to discuss stereotactic radiosurgery for this new lesion.  Symptomatically, she is doing well, she has regained nearly complete neurologic function, she is walking, she has no fine motor problems, no problems with balance, no headaches, dizziness, nausea, vomiting.     4/17/2023  She is now status post SRS to an additional lesion that was completed 6 weeks ago.  She continues to do well.  She has no specific CNS related side effects.  She continues on immunotherapy without any major problems.  No new neurologic problems, headaches, dizziness, nausea or vomiting.     6/5/23:  She comes today to review her brain MRI and for ongoing follow-up.  She is doing tremendously well.  She reports that she has intermittent nausea that appears to be well controlled, but otherwise has no headaches, dizziness or balance difficulties, or vomiting.  She continues on immunotherapy.  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     9/11/23 from a CNS standpoint, she continues to do well.  She has no CNS related complaints today, including headaches, nausea, vomiting, weakness or numbness.  She is walking independently now for several months.  She has returned back to work.  Unfortunately, systemically, she experienced colitis about 2 months ago that has been attributed to an immune related side effect.  She has been off all immunotherapy since July 6.  She has had multiple trials of steroids and unfortunately has not resolved, and she has a follow-up with GI today.  She sees medical oncology later this week to discuss further systemic therapy options.      11/13/2023 we are seeing Ms. Mcintyre. today for ongoing follow-up.  Since I last saw her, she had a PET scan performed by medical oncology.  I reviewed the report, images not currently available today, that unfortunately shows progression of disease with a left abdominal lymph node  metastasis near the kidney measuring about 3.3 cm, as well as another lesion associated with a small bowel loop in the left lower abdomen now measuring about 5.4 cm.  Unfortunately, she is having significant immune related colitis, and has been off immunotherapy for several months.  She has had trials with steroids, but she continues to have diarrhea at least 4-5 times a day.  This is her main problem now, and the plan is likely to transition her to BRAF directed therapy.  She has a follow-up with Dr. Reed in the next few days.  With regards to CNS, she had a follow-up MRI performed a few days ago that I reviewed with Dr. Tejada today in clinic, and essentially shows stable findings.     2/21/23 she is doing relatively well.  She had a follow-up brain MRI that we are reviewing today.  She has started BRAF directed therapy with Braftovi and Mektovi, but had difficulty tolerating the Mektovi after 2 challenges, and is now on single agent Braftovi.  She seems to be tolerating this well.  She had follow-up CT chest abdomen pelvis done also that I reviewed personally.  She has no CNS related complaints.  She still feels somewhat weak and deconditioned and is trying to work through this.  Socially, she lives with her parents and this has become somewhat stressful with trying to think about whether she should move out or whether she will be able to work or have enough money to ultimately sustain that.       5/30/2024 she is doing quite well now.  She has switched therapy to dabrafenib and trametinib and is tolerating this well.  She had a follow-up brain MRI and systemic imaging performed this month.  The MRI shows essentially stable findings no new concerning findings.  She has no headaches or CNS symptoms.  The systemic imaging shows a decrease in the size of her left central mesenteric mass.       9/6/24 Tamra is doing very well.  She has no major neurologic complaints.  She had a hard time tolerating combined therapy  and so she continues on dabrafenib alone.  She is doing well with follow-up with medical oncology.  She had a follow-up brain MRI performed that shows stable findings, with still a stable left parietal metastasis though this has been treated several months ago.  She has no headaches or CNS symptoms.  Her systemic imaging is also relatively encouraging with actually significant improvement in her disease burden as of her last PET scan on June 24.    12/16/24 she is doing well.  Shortly after our last follow-up, she ended up in the hospital with progressively worsening nausea and difficulty eating towards the end of November.  She was found to have a large small bowel obstruction with what appeared to be a mass that was causing significant obstructive symptoms.  She underwent a resection of this and pathology confirmed metastatic melanoma.  She is now doing much better, able to eat, nausea is resolved, weight is stabilizing.  She has a plan for follow-up with medical oncology and plans to start Opdualag soon.  She had a brain MRI performed a few days ago that I reviewed personally, shows stable findings, no concerning lesions currently.    ROS   Review of Systems   Eyes: Negative.    Gastrointestinal: Negative.    Genitourinary: Negative.    Neurological: Negative.          Current medicines (including changes today)  Current Outpatient Medications   Medication Sig Dispense Refill    Naloxone (NARCAN) 4 MG/0.1ML Liquid Administer 4 mg into affected nostril(S) one time as needed (One spray in one nostril for overdose and call 911) for up to 1 dose. 2 Each 0    ferrous sulfate 325 (65 Fe) MG tablet Take 325 mg by mouth every Monday, Wednesday, and Friday.      albuterol (PROVENTIL) 2.5mg/0.5ml Nebu Soln Take 2.5 mg by nebulization every four hours as needed for Shortness of Breath.      TAFINLAR 75 MG Cap capsule Take 150 mg by mouth every 12 hours.      ELIQUIS 5 MG Tab Take 5 mg by mouth 2 times a day.       fluticasone-umeclidinium-vilanterol (TRELEGY ELLIPTA) 200-62.5-25 mcg/act inhaler Inhale 1 Puff every day.      insulin glargine 100 UNIT/ML SC SOLN Inject 20 Units under the skin every evening.      traZODone (DESYREL) 100 MG Tab Take 100 mg by mouth every evening.      VENTOLIN  (90 Base) MCG/ACT Aero Soln inhalation aerosol Inhale 1-2 Puffs every four hours as needed for Shortness of Breath.      meloxicam (MOBIC) 15 MG tablet Take 15 mg by mouth every day.      atorvastatin (LIPITOR) 40 MG Tab Take 1 Tablet by mouth every evening. 30 Tablet 2    DULoxetine (CYMBALTA) 60 MG Cap DR Particles delayed-release capsule Take 1 Capsule by mouth every evening. 30 Capsule 2    gabapentin (NEURONTIN) 800 MG tablet Take 1 Tablet by mouth 4 times a day. 90 Tablet 2    OXcarbazepine (TRILEPTAL) 300 MG Tab Take 1 Tablet by mouth 2 times a day. 60 Tablet 2    QUEtiapine (SEROQUEL) 200 MG Tab Take 1 Tablet by mouth every evening. 60 Tablet 2    acetaminophen (TYLENOL) 500 MG Tab Take 500-1,000 mg by mouth every 6 hours as needed. Indications: Pain      clonazepam (KLONOPIN) 2 MG tablet Take 1 mg by mouth 2 times a day. 1 mg = 1/2 tablet - scheduled       No current facility-administered medications for this encounter.       Patient Active Problem List    Diagnosis Date Noted    Pulmonary embolism (HCC)     Marijuana use 11/22/2024    Metastatic melanoma (HCC) 11/19/2024    Abdominal mass 11/19/2024    Hyponatremia 11/19/2024    Flu-like symptoms with SIRS 01/04/2024    Anemia 01/03/2024    Facial paralysis/[place] palsy 12/11/2022    Diabetic neuropathy (HCC) 12/09/2022    Dizziness on standing 10/19/2022    Vitamin D deficiency 10/17/2022    Dyslipidemia 10/17/2022    Normocytic anemia 10/17/2022    Borderline abnormal TFTs 10/17/2022    Thrombocytosis 10/17/2022    Metastatic cancer to brain (HCC) 10/15/2022    Adrenal insufficiency due to corticosteroid withdrawal (HCC) 10/12/2022    Chronic prescription benzodiazepine  use 10/07/2022    Constipation 10/07/2022    Brain mass 10/06/2022    Malignant melanoma metastatic to brain (HCC) 10/01/2022    Cellulitis 08/26/2022    Leucocytosis 08/26/2022    Abnormal CT scan, chest 06/13/2022    Gastroenteritis 05/19/2022    Hypokalemia 10/08/2019    Essential hypertension 10/08/2019    Bipolar 1 disorder (HCC) 10/07/2019    DM2 (diabetes mellitus, type 2) (HCC) 10/07/2019    Appendicitis 10/07/2019    Melanoma of left upper arm (HCC) 06/23/2017    Nausea & vomiting 09/04/2015    Sepsis (HCC) 09/04/2015    UTI (urinary tract infection) 09/04/2015    Lactic acidosis 09/04/2015    Anxiety 09/04/2015    Diarrhea 09/04/2015    Asthma     Depression         Objective:   LMP 09/01/2024 (Approximate)     Physical Exam:  Constitutional: Alert, no distress, well-groomed.  Skin: No rashes in visible areas.  Eye: Round. Conjunctiva clear, lids normal. No icterus.   ENMT: Lips pink without lesions, good dentition, moist mucous membranes. Phonation normal.  Neck: No masses, no thyromegaly. Moves freely without pain.  Respiratory: Unlabored respiratory effort, no cough or audible wheeze  Psych: Alert and oriented x3, normal affect and mood.     Assessment and Plan:   The following treatment plan was discussed:     1. Metastatic melanoma (HCC)  - MR-BRAIN-WITH & W/O; Future  She is doing relatively well.  She starts Opdualag soon.  We will continue routine surveillance with another brain MRI and follow-up in 3 months.  No concerning findings currently.  Medical oncology will obtain a PET scan prior to start of her next line therapy.

## 2024-12-16 NOTE — PROGRESS NOTES
"Date & Time note created: 12/16/2024     Date of Consult: 12/12/2024     Requesting Provider: Nikki Tobin P.A.-C  Consulting Provider: NUHA Thompson  Reason for Consult: Symptom management and advance care planning  Chief Complaint: Metastatic melanoma  Most important to patient: Wants to continue treatment, get stronger, go places, \"do normal people things.\"  Serious Illness Communication: Patient understands she has overall poor prognosis with metastatic melanoma, however, she intends to continue treatment as long as this will keep her disease under control and allow her to keep enjoying life with her family.      Patient has a POLST selecting full code, full treatment, feeding tube for a period.  Patient also has advanced directive, selections for #2, #3, #5 and naming   Guillermina Hammond as her alternative healthcare agent.  POLST and advanced directive are both scanned into Epic.    Pain History: Chronic low back pain  Onset: Many years ago  Location: L5/S1 listhesis, DJD  Duration: Chronic, ongoing  Characteristics:aching, sore, nonradiating   Aggravating factors:   Alleviating factors: No relief with heat, lidocaine patches, baclofen.  Fentanyl patches have worked well in the past, oncologist Dr. Reed not recommending with immunotherapy as may mask pertinent pain signals.  Radiation: None  Treatments: Oxycodone 15 mg every 6 hours as needed, Rx'd per Dr. Reed.  Patient was also most recently on MS Contin 30 mg every 12 hours as Rx'd in postop setting with IP palliative care, but patient has since discontinued this.  Patient also takes gabapentin 800 mg 4 times daily, mainly for PNP.  Severity: Tolerable with oxycodone     Other Symptom history:   Generalized weakness, chronic peripheral neuropathy secondary to type 2 diabetes, 50 pound weight loss     Past Medical History:  - Metastatic melanoma diagnosed 2017 on left hand, new primary melanoma on right buttock in 2018, brain and lung " "metastasis in 2022, status post surgical resection of brain mets, radiation therapy to brain and immunotherapy.  In November 2024, patient developed anemia and CT scan showed 11 cm mass in the small bowel, status post urgent resection on 11/22/2024.  Patient now awaiting start of new immunotherapy with Dr. Reed.    Other PMHx:  Asthma  Bipolar 1 disorder  Type 2 diabetes  Dyslipidemia  Hypertension  Pulmonary embolism on Eliquis  Polycystic ovarian syndrome     Past Surgical History:  Appendectomy, adenoidectomy, left axillary node dissection, diagnostic bronchoscopy, craniotomy, laparoscopic small bowel excision.     Current Medications:  See medication list     Medication Allergy/Sensitivities:  Lactose intolerance, Augmentin     Caregiving:   Lives with her mother.      Psych-social:   Patient has very good family support with her mother home she lives with and 1 adult son who lives in [place] and another adult son who is in the [facility] in [place].     Palliative Performance Scale:  60     Spiritual History:  Patient is spiritual but is not Sabianist     Review of Systems:   Review of Systems   Constitutional:  Positive for malaise/fatigue and weight loss.   Musculoskeletal:  Positive for back pain and falls.   Neurological:  Positive for weakness.        Numbness and tingling bilateral feet, chronic peripheral neuropathy.     Patient has had some falls at home due to leg weakness.  Patient denies dizziness or syncope.  Patient does not walk with a walker currently.  She is open to home health physical therapy.    Vitals  BMI: Body mass index is 20  Ht : 5'4\"  Wt: 116 lbs    Vitals:  BP: 134/82  Pulse: 74  Resp:   14        Temp:          TempSrc:          SpO2: 92% on RA  Physical Exam:  Physical Exam  Eyes:      Extraocular Movements: Extraocular movements intact.      Conjunctiva/sclera: Conjunctivae normal.   Cardiovascular:      Rate and Rhythm: Normal rate and regular rhythm.      Pulses: Normal " "pulses.      Heart sounds: Normal heart sounds.   Pulmonary:      Effort: Pulmonary effort is normal.      Breath sounds: Normal breath sounds.   Abdominal:      General: Abdomen is flat. Bowel sounds are normal.      Palpations: Abdomen is soft.      Comments: Approximated surgical scar.   Skin:     General: Skin is warm and dry.      Coloration: Skin is pale.   Neurological:      General: No focal deficit present.      Mental Status: She is alert and oriented to person, place, and time.   Psychiatric:         Mood and Affect: Mood normal.         Behavior: Behavior normal.         Thought Content: Thought content normal.         Judgment: Judgment normal.      Comments: Patient tearful during visit, yearning for a more \"normal life.\"           Relevant Diagnostics:  CT abdomen pelvis on 11/19/2024 showing large 11 cm mass on small bowel and left hemiabdomen.  MRI of brain 12/8/2024 with reduction of left parietal hemorrhagic lesion.  Multifocal areas of chronic hemosiderin depositions secondary to previously treated metastases.      Procedures Review:    Exploratory laparotomy with small bowel resection on 11/22/2024.                   Problem List:  Metastatic melanoma with brain, lung and small bowel metastasis, status post brain lesion excision in 2022, small bowel resection November 2024, readying to start new immunotherapy with Dr. Reed.  Type 2 diabetes  Peripheral neuropathy  Low back pain, chronic -patient taking 10 to 15 mg every 6 hours oxycodone, meloxicam, gabapentin, duloxetine.  Pulmonary embolism on Eliquis  Anemia -most recent hemoglobin 8.6, received 3 units packed red blood cells during last hospitalization, patient taking iron supplement.   Thrombocytosis  Frequent falls at home due to leg weakness  50 pound weight loss in past few years  Bipolar 1, sees psychiatrist every 2 months, patient takes ox carbamazepine and quetiapine nightly.      Assessment and Plan:  Home health physical therapy to " help reduce fall risk.  2.  Behavioral health consult to expand coping mechanisms and improve her mental emotional quality of life despite the burden of living with metastatic melanoma.   3.  Consider methadone or other acceptable long-acting pain medication to hopefully replace or reduce oxycodone use.  Will discuss at follow-up visit.       DISCUSSION/RECOMMENDATIONS:  Continue pain management regimen, will revisit at follow-up.  Ordered home health physical therapy  Ordered behavioral health consult  Follow-up 1 month and home palliative care visit.     OPIOID THERAPY  Oxycodone 15 mg every 6 hours Rx'd per Dr. Reed.     ADJUNCT OPIOID THERAPY  Duloxetine 60 mg daily, gabapentin 800 mg 4 times daily  Education Provided:  Fall risk reduction discussion  Consuming small frequent meals to improve nutritional intake and avoid nausea.  Reviewed use of Narcan in case of oversedation with opioid therapy.  Discussed finding ways to continue enjoying life despite limitations.       Code Status: Full code     Advance Care Planning/Current Goals of Care: 20 minutes spent discussing advance care planning.  Please refer to Advance Care Planning Note for details     125 minutes spent with greater than 50% spent counseling and coordinating the patient's care regarding symptom management.            Cecilia GARCIA  [facility]  [phone]

## 2024-12-23 NOTE — TELEPHONE ENCOUNTER
1. Caller Name: Jacque                        Call Back Number: [phone]        How would the patient prefer to be contacted with a response: Phone call do NOT leave a detailed message    Last office Visit:12/04/24 status post small bowel resection.   I spoke with the patient regarding the message below. The patient reports she is doing well and denies any concerns at this time. She was advised to contact our office if she is not feeling well and to schedule an appointment as needed.

## 2025-01-14 ENCOUNTER — HOSPITAL ENCOUNTER (OUTPATIENT)
Dept: RADIOLOGY | Facility: MEDICAL CENTER | Age: 45
End: 2025-01-14
Attending: INTERNAL MEDICINE
Payer: MEDICAID

## 2025-01-14 DIAGNOSIS — R68.89 OTHER GENERAL SYMPTOMS AND SIGNS: ICD-10-CM

## 2025-01-14 DIAGNOSIS — S81.801A OPEN LEG WOUND, RIGHT, INITIAL ENCOUNTER: ICD-10-CM

## 2025-01-14 DIAGNOSIS — R21 RASH AND OTHER NONSPECIFIC SKIN ERUPTION: ICD-10-CM

## 2025-01-14 DIAGNOSIS — L27.0: ICD-10-CM

## 2025-01-14 DIAGNOSIS — I26.99 PULMONARY EMBOLISM WITH INFARCTION (HCC): ICD-10-CM

## 2025-01-14 DIAGNOSIS — C43.62 MALIGNANT MELANOMA OF LEFT UPPER EXTREMITY INCLUDING SHOULDER (HCC): ICD-10-CM

## 2025-01-14 DIAGNOSIS — Z79.01 LONG TERM (CURRENT) USE OF ANTICOAGULANTS: ICD-10-CM

## 2025-01-14 LAB — GLUCOSE BLD-MCNC: 139 MG/DL (ref 65–99)

## 2025-01-14 PROCEDURE — A9552 F18 FDG: HCPCS

## 2025-01-15 ENCOUNTER — HOSPITAL ENCOUNTER (OUTPATIENT)
Facility: MEDICAL CENTER | Age: 45
End: 2025-01-15
Attending: NURSE PRACTITIONER
Payer: MEDICAID

## 2025-01-15 LAB
AMBIGUOUS DTTM AMBI4: NORMAL
HBV CORE AB SERPL QL IA: NONREACTIVE
HBV SURFACE AB SERPL IA-ACNC: 810 MIU/ML (ref 0–10)
HBV SURFACE AG SER QL: NORMAL

## 2025-01-15 PROCEDURE — 86704 HEP B CORE ANTIBODY TOTAL: CPT

## 2025-01-15 PROCEDURE — 87340 HEPATITIS B SURFACE AG IA: CPT

## 2025-01-15 PROCEDURE — 86706 HEP B SURFACE ANTIBODY: CPT

## 2025-01-16 ENCOUNTER — OFF SITE VISIT (OUTPATIENT)
Dept: PALLIATIVE MEDICINE | Facility: HOSPICE | Age: 45
End: 2025-01-16
Payer: MEDICAID

## 2025-01-16 DIAGNOSIS — G89.29 CHRONIC BILATERAL LOW BACK PAIN WITH BILATERAL SCIATICA: ICD-10-CM

## 2025-01-16 DIAGNOSIS — M54.42 CHRONIC BILATERAL LOW BACK PAIN WITH BILATERAL SCIATICA: ICD-10-CM

## 2025-01-16 DIAGNOSIS — I26.90 CHRONIC SEPTIC PULMONARY EMBOLISM, UNSPECIFIED WHETHER ACUTE COR PULMONALE PRESENT (HCC): ICD-10-CM

## 2025-01-16 DIAGNOSIS — G89.3 CHRONIC PAIN DUE TO NEOPLASM: ICD-10-CM

## 2025-01-16 DIAGNOSIS — I27.82 CHRONIC SEPTIC PULMONARY EMBOLISM, UNSPECIFIED WHETHER ACUTE COR PULMONALE PRESENT (HCC): ICD-10-CM

## 2025-01-16 DIAGNOSIS — M54.41 CHRONIC BILATERAL LOW BACK PAIN WITH BILATERAL SCIATICA: ICD-10-CM

## 2025-01-16 DIAGNOSIS — C43.9 METASTATIC MELANOMA (HCC): ICD-10-CM

## 2025-01-16 DIAGNOSIS — E08.42 DIABETIC POLYNEUROPATHY ASSOCIATED WITH DIABETES MELLITUS DUE TO UNDERLYING CONDITION (HCC): ICD-10-CM

## 2025-01-16 DIAGNOSIS — G62.9 PERIPHERAL POLYNEUROPATHY: ICD-10-CM

## 2025-01-16 DIAGNOSIS — E11.3513 TYPE 2 DIABETES MELLITUS WITH BOTH EYES AFFECTED BY PROLIFERATIVE RETINOPATHY AND MACULAR EDEMA, WITHOUT LONG-TERM CURRENT USE OF INSULIN (HCC): ICD-10-CM

## 2025-01-16 PROCEDURE — 99349 HOME/RES VST EST MOD MDM 40: CPT | Performed by: NAPRAPATH

## 2025-01-16 NOTE — PROGRESS NOTES
"Date of Consult: 1/16/2025     Requesting Provider: Nikki Tobin P.A.-C  Consulting Provider: NUHA Thompson  Reason for Consult: Symptom management and advance care planning  Chief Complaint: Metastatic melanoma  Most important to patient: Wants to continue treatment, get stronger, go places, \"do normal people things.\"  Serious Illness Communication: Patient understands she has overall poor prognosis with metastatic melanoma, however, she intends to continue treatment as long as this will keep her disease under control and allow her to keep enjoying life with her family.      1/16/25: Recent PET scan showed increased soft tissue metastasis involving the trunk and right upper and lower extremity.  Increased uptake also seen in left upper quadrant of the bowel.  Patient overall has had improved appetite, less nausea, improved energy, and little bit better ability to get out of the house.  She was grateful that she was able to see her son, who works overseas in the , over the holidays.  She is realistically hopeful about starting new immunotherapy with Dr. Rede.     Patient has a POLST selecting full code, full treatment, feeding tube for a period.  Patient also has advanced directive, selections for #2, #3, #5 and naming   Guillermina Hammond as her alternative healthcare agent.  POLST and advanced directive are both scanned into Epic.     Pain History: Chronic low back pain  Onset: Many years ago  Location: L5/S1 listhesis, DJD  Duration: Chronic, ongoing  Characteristics:aching, sore, nonradiating   Aggravating factors:   Alleviating factors: No relief with heat, lidocaine patches, baclofen.  Fentanyl patches have worked well in the past, oncologist Dr. Reed not recommending with immunotherapy as may mask pertinent pain signals.  Radiation: None  Treatments: Meloxicam 15 mg daily, Oxycodone 10-15 mg every 6 hours as needed, Rx'd per Dr. Reed.  Patient was also most recently on MS Contin 30 mg " every 12 hours as Rx'd in postop setting with IP palliative care, but patient has since discontinued this.  Patient also takes gabapentin 800 mg 4 times daily, mainly for PNP.  Severity: Tolerable with oxycodone PRN, pt also lays back in bed on her heating pad when her pain flairs up, but this does take away from her being able to do more in her day.      Other Symptom history:   Generalized weakness, chronic peripheral neuropathy secondary to type 2 diabetes, 50 pound weight loss over the past year, now stabilized since bowel excision surgery.      Past Medical History:  - Metastatic melanoma diagnosed 2017 on left hand, new primary melanoma on right buttock in 2018, brain and lung metastasis in 2022, status post surgical resection of brain mets, radiation therapy to brain and immunotherapy.  In November 2024, patient developed anemia and CT scan showed 11 cm mass in the small bowel, status post urgent resection on 11/22/2024.  She stopped dabrafenib and now recently started on Opdualag (nivolumab and relatlimab) immunotherapy with Dr. Reed, no AEs yet.      Other PMHx:  Asthma  Bipolar 1 disorder  Type 2 diabetes  Dyslipidemia  Hypertension  Pulmonary embolism on Eliquis  Polycystic ovarian syndrome  L5/S1 spondylolisthesis diagnosed 10 years ago     Past Surgical History:  Appendectomy, adenoidectomy, left axillary node dissection, diagnostic bronchoscopy, craniotomy, laparoscopic small bowel excision.     Current Medications:  Current Outpatient Medications on File Prior to Visit   Medication Sig Dispense Refill    Naloxone (NARCAN) 4 MG/0.1ML Liquid Administer 4 mg into affected nostril(S) one time as needed (One spray in one nostril for overdose and call 911) for up to 1 dose. 2 Each 0    ferrous sulfate 325 (65 Fe) MG tablet Take 325 mg by mouth every Monday, Wednesday, and Friday.      albuterol (PROVENTIL) 2.5mg/0.5ml Nebu Soln Take 2.5 mg by nebulization every four hours as needed for Shortness of Breath.       TAFINLAR 75 MG Cap capsule Take 150 mg by mouth every 12 hours.      ELIQUIS 5 MG Tab Take 5 mg by mouth 2 times a day.      fluticasone-umeclidinium-vilanterol (TRELEGY ELLIPTA) 200-62.5-25 mcg/act inhaler Inhale 1 Puff every day.      insulin glargine 100 UNIT/ML SC SOLN Inject 20 Units under the skin every evening.      traZODone (DESYREL) 100 MG Tab Take 100 mg by mouth every evening.      VENTOLIN  (90 Base) MCG/ACT Aero Soln inhalation aerosol Inhale 1-2 Puffs every four hours as needed for Shortness of Breath.      meloxicam (MOBIC) 15 MG tablet Take 15 mg by mouth every day.      atorvastatin (LIPITOR) 40 MG Tab Take 1 Tablet by mouth every evening. 30 Tablet 2    DULoxetine (CYMBALTA) 60 MG Cap DR Particles delayed-release capsule Take 1 Capsule by mouth every evening. 30 Capsule 2    gabapentin (NEURONTIN) 800 MG tablet Take 1 Tablet by mouth 4 times a day. 90 Tablet 2    OXcarbazepine (TRILEPTAL) 300 MG Tab Take 1 Tablet by mouth 2 times a day. 60 Tablet 2    QUEtiapine (SEROQUEL) 200 MG Tab Take 1 Tablet by mouth every evening. 60 Tablet 2    acetaminophen (TYLENOL) 500 MG Tab Take 500-1,000 mg by mouth every 6 hours as needed. Indications: Pain      clonazepam (KLONOPIN) 2 MG tablet Take 1 mg by mouth 2 times a day. 1 mg = 1/2 tablet - scheduled       No current facility-administered medications on file prior to visit.         Medication Allergy/Sensitivities:  Lactose intolerance, Augmentin     Caregiving:   Lives with her mother who is home with her.      Psych-social:   Patient has very good family support with her mother home she lives with and 1 adult son who lives in [place] and another adult son who is in the [facility] in [place].     Palliative Performance Scale:  60     Spiritual History:  Patient is spiritual but is not Baptism     Review of Systems:   Review of Systems   Constitutional:  Positive for malaise/fatigue and weight loss.   Musculoskeletal:  Positive for back pain  "and falls.   Neurological:  Positive for weakness.        Numbness and tingling bilateral feet, chronic peripheral neuropathy.      Patient has had some falls at home due to leg weakness.  Patient denies dizziness or syncope.  Patient does not walk with a walker currently.  She is open to home health physical therapy.       BMI: Body mass index is 20  Ht : 5'4\"  Wt: 116 lbs    Physical Exam  Constitutional:       Comments: Sarcopenia is apparent.   Neurological:      Mental Status: She is alert and oriented to person, place, and time.   Psychiatric:         Mood and Affect: Mood normal.         Behavior: Behavior normal.        Relevant Diagnostics:  CT abdomen pelvis on 11/19/2024 showing large 11 cm mass on small bowel and left hemiabdomen.  MRI of brain 12/8/2024 with reduction of left parietal hemorrhagic lesion.  Multifocal areas of chronic hemosiderin depositions secondary to previously treated metastases.      Procedures Review:    Exploratory laparotomy with small bowel resection on 11/22/2024.                   Problem List:  Metastatic melanoma with brain, lung and small bowel metastasis, status post brain lesion excision in 2022, small bowel resection November 2024, now starting Opdualag  with Dr. Reed.  Type 2 diabetes  Peripheral neuropathy  Low back pain, chronic -patient taking 10 to 15 mg every 6 hours oxycodone, meloxicam, gabapentin, duloxetine.  Pulmonary embolism on Eliquis  Anemia -most recent hemoglobin 8.6, received 3 units packed red blood cells during last hospitalization, patient taking iron supplement.   Thrombocytosis  Frequent falls at home due to leg weakness  50 pound weight loss in past few years; weight now stabilized with improved appetite since bowel resection surgery.  Bipolar 1, sees psychiatrist every 2 months, patient takes ox carbamazepine and quetiapine nightly.        Assessment and Plan:  Home health physical therapy to help reduce fall risk - patient wanting to hold off " on doing this for now.   2.  Behavioral health consult to expand coping mechanisms and improve her mental emotional quality of life despite the burden of living with metastatic melanoma.  Tamra plans to reach out to behavioral therapist options that she has been provided.  3.  Consider methadone or other acceptable long-acting pain medication to  replace or reduce oxycodone use.           DISCUSSION/RECOMMENDATIONS:  Continue pain management regimen, will revisit at follow-up.  Discussed the value of seeking out mind-body therapies and doing talk therapy to help with chronic back pain.  Will provide some cost-effective resources at next visit.  Explored patients wishes, she is hoping to be able to take a trip to the ocean in the spring.   Follow-up 1 month and home palliative care visit.     OPIOID THERAPY  Oxycodone 15 mg every 6 hours Rx'd per Dr. Reed.     ADJUNCT OPIOID THERAPY  Duloxetine 60 mg daily, gabapentin 800 mg 4 times daily  Education Provided:  Fall risk reduction discussion  Consuming small frequent meals to improve nutritional intake and avoid nausea.  Reviewed use of Narcan in case of oversedation with opioid therapy.  Discussed finding ways to continue enjoying life despite limitations.        Code Status: Full code     Advance Care Planning/Current Goals of Care: 5 minutes spent discussing advance care planning.        65 minutes spent with greater than 50% spent counseling and coordinating the patient's care regarding symptom management.              Cecilia GARCIA  [facility]  [phone]

## 2025-02-27 ENCOUNTER — OFF SITE VISIT (OUTPATIENT)
Dept: PALLIATIVE MEDICINE | Facility: HOSPICE | Age: 45
End: 2025-02-27
Payer: MEDICAID

## 2025-02-27 NOTE — PROGRESS NOTES
"In-Home Palliative Medicine Evaluation    Date of Consult: 2/27/2025     Requesting Provider: Nikki Tobin P.A.-C  Consulting Provider: NUHA Thompson  Reason for Consult: Symptom management and advance care planning  Chief Complaint: Metastatic melanoma  Most important to patient: Wants to continue treatment, get stronger, go places, \"do normal people things.\"  Serious Illness Communication: Patient understands she has overall poor prognosis with metastatic melanoma, however, she intends to continue treatment as long as this will keep her disease under control and allow her to keep enjoying life with her family.       1/16/25: Recent PET scan showed increased soft tissue metastasis involving the trunk and right upper and lower extremity.  Increased uptake also seen in left upper quadrant of the bowel.  Patient overall has had improved appetite, less nausea, improved energy, and little bit better ability to get out of the house.  She was grateful that she was able to see her son, who works overseas in the , over the holidays.  She is realistically hopeful about starting new immunotherapy with Dr. Reed.    2/27/25: Tamra is tolerating new immunotherapy, Opdualag, which she receives every 4 weeks per Dr. Reed.  She notes significant reductions in subdermal tumors that had a cropped up on her trunk, arms and legs.  She is maintaining a good appetite, weight is stable, is able to walk short distances and is more steady on her feet now.  She still has some nausea in the mornings and takes Zofran sublingually with good result.  She notes her psoriasis is a little more flared up on her elbows, but this is expected with immunotherapy and she has topicals that she uses.  She has had no diarrhea and constipation is managed well with Colace and senna.  Jacque's chronic back pain from L5/S1 spondylolisthesis x 10 years causes the most episodes of pain and disruption in her day where she has to take an " oxycodone and lay down with her heating pad for a few hours to recover multiple times throughout the day.  She is interested in seeking out mind-body approaches to help with chronic pain, resources provided.  She is also interested in meeting with LCSW Eula Butcher for talk therapy sessions.  Referral placed.     Patient has a POLST selecting full code, full treatment, feeding tube for a period.  Patient also has advanced directive, selections for #2, #3, #5 and naming Samantha Plummer as her primary healthcare agent and  Guillermina Hammond as her alternative healthcare agent.  POLST and advanced directive are both scanned into Epic.     Pain History: Chronic low back pain  Onset: Many years ago  Location: L5/S1 listhesis, DJD  Duration: Chronic, ongoing  Characteristics:aching, sore, nonradiating   Aggravating factors:   Alleviating factors: No relief with heat, lidocaine patches, baclofen.  Fentanyl patches have worked well in the past, oncologist Dr. Reed not recommending with immunotherapy as may mask pertinent pain signals.  Radiation: None  Treatments: Meloxicam 15 mg daily, Oxycodone 10-15 mg every 6 hours as needed, Rx'd per Dr. Reed.  Patient was also most recently on MS Contin 30 mg every 12 hours as Rx'd in postop setting with IP palliative care, but patient has since discontinued this.  Patient also takes gabapentin 800 mg 4 times daily, mainly for PNP.  Severity: Tolerable with oxycodone PRN, pt also lays back in bed on her heating pad when her pain flairs up, but this does take away from her being able to do more in her day.      Other Symptom history:   Generalized weakness, chronic peripheral neuropathy secondary to type 2 diabetes, 50 pound weight loss over the past year, now stabilized since bowel excision surgery.      Past Medical History:  - Metastatic melanoma diagnosed 2017 on left hand, new primary melanoma on right buttock in 2018, brain and lung metastasis in 2022, status post surgical resection  of brain mets, radiation therapy to brain and immunotherapy.  In November 2024, patient developed anemia and CT scan showed 11 cm mass in the small bowel, status post urgent resection on 11/22/2024.  She stopped dabrafenib and now recently started on Opdualag (nivolumab and relatlimab) immunotherapy with Dr. Reed, no AEs yet.      Other PMHx:  Asthma  Bipolar 1 disorder  Type 2 diabetes  Dyslipidemia  Hypertension  Pulmonary embolism on Eliquis  Polycystic ovarian syndrome  L5/S1 spondylolisthesis diagnosed 10 years ago     Past Surgical History:  Appendectomy, adenoidectomy, left axillary node dissection, diagnostic bronchoscopy, craniotomy, laparoscopic small bowel excision.     Current Medications:  Medications Ordered Prior to Encounter          Current Outpatient Medications on File Prior to Visit   Medication Sig Dispense Refill    Naloxone (NARCAN) 4 MG/0.1ML Liquid Administer 4 mg into affected nostril(S) one time as needed (One spray in one nostril for overdose and call 911) for up to 1 dose. 2 Each 0    ferrous sulfate 325 (65 Fe) MG tablet Take 325 mg by mouth every Monday, Wednesday, and Friday.        albuterol (PROVENTIL) 2.5mg/0.5ml Nebu Soln Take 2.5 mg by nebulization every four hours as needed for Shortness of Breath.        TAFINLAR 75 MG Cap capsule Take 150 mg by mouth every 12 hours.        ELIQUIS 5 MG Tab Take 5 mg by mouth 2 times a day.        fluticasone-umeclidinium-vilanterol (TRELEGY ELLIPTA) 200-62.5-25 mcg/act inhaler Inhale 1 Puff every day.        insulin glargine 100 UNIT/ML SC SOLN Inject 20 Units under the skin every evening.        traZODone (DESYREL) 100 MG Tab Take 100 mg by mouth every evening.        VENTOLIN  (90 Base) MCG/ACT Aero Soln inhalation aerosol Inhale 1-2 Puffs every four hours as needed for Shortness of Breath.        meloxicam (MOBIC) 15 MG tablet Take 15 mg by mouth every day.        atorvastatin (LIPITOR) 40 MG Tab Take 1 Tablet by mouth every  "evening. 30 Tablet 2    DULoxetine (CYMBALTA) 60 MG Cap DR Particles delayed-release capsule Take 1 Capsule by mouth every evening. 30 Capsule 2    gabapentin (NEURONTIN) 800 MG tablet Take 1 Tablet by mouth 4 times a day. 90 Tablet 2    OXcarbazepine (TRILEPTAL) 300 MG Tab Take 1 Tablet by mouth 2 times a day. 60 Tablet 2    QUEtiapine (SEROQUEL) 200 MG Tab Take 1 Tablet by mouth every evening. 60 Tablet 2    acetaminophen (TYLENOL) 500 MG Tab Take 500-1,000 mg by mouth every 6 hours as needed. Indications: Pain        clonazepam (KLONOPIN) 2 MG tablet Take 1 mg by mouth 2 times a day. 1 mg = 1/2 tablet - scheduled          No current facility-administered medications on file prior to visit.            Medication Allergy/Sensitivities:  Lactose intolerance, Augmentin     Caregiving:   Lives with her mother who is home with her.      Psych-social:   Patient has very good family support with her mother home she lives with and 1 adult son who lives in [place] and another adult son who is in the [facility] in [place].     Palliative Performance Scale:  70     Spiritual History:  Patient is spiritual but is not Advent     Review of Systems:   Review of Systems   Constitutional:  Positive for malaise/fatigue and weight loss.   Musculoskeletal:  Positive for back pain and falls.   Neurological:  Positive for weakness.        Numbness and tingling bilateral feet, chronic peripheral neuropathy.      Patient has had some falls at home due to leg weakness.  Patient denies dizziness or syncope.  Patient does not walk with a walker currently.  She is open to home health physical therapy.        BMI: Body mass index is 20  Ht : 5'4\"  Wt: 116 lbs     Physical Exam  Constitutional:       Comments: Sarcopenia is apparent.   Neurological:      Mental Status: She is alert and oriented to person, place, and time.   Psychiatric:         Mood and Affect: Mood normal.         Behavior: Behavior normal.         Relevant " Diagnostics:  CT abdomen pelvis on 11/19/2024 showing large 11 cm mass on small bowel and left hemiabdomen.  MRI of brain 12/8/2024 with reduction of left parietal hemorrhagic lesion.  Multifocal areas of chronic hemosiderin depositions secondary to previously treated metastases.      Procedures Review:    Exploratory laparotomy with small bowel resection on 11/22/2024.                   Problem List:  Metastatic melanoma with brain, lung and small bowel metastasis, status post brain lesion excision in 2022, small bowel resection November 2024, now on Opdualag  with Dr. Reed.  Type 2 diabetes  Peripheral neuropathy  Low back pain, chronic -patient taking 10 to 15 mg every 6 hours oxycodone, meloxicam, gabapentin, duloxetine.  Pulmonary embolism on Eliquis  Anemia -most recent hemoglobin 8.6, received 3 units packed red blood cells during last hospitalization, patient taking iron supplement.   Thrombocytosis  Frequent falls at home due to leg weakness  50 pound weight loss in past few years; weight now stabilized with improved appetite since bowel resection surgery.  Bipolar 1, sees psychiatrist every 2 months, patient takes ox carbamazepine and quetiapine nightly.        DISCUSSION/RECOMMENDATIONS:  Continue pain management regimen, will revisit at follow-up.  Provided mind-body resources for expanded pain management support.  Referral to palliative LCSW, Eula Butcher for talk therapy sessions  Explored patients wishes, she is hoping to be able to take a trip to the ocean in the spring and has plans to go to some shows in [facility] this summer..   Follow-up 6-8 weeks  home palliative care visit.     OPIOID THERAPY  Oxycodone 15 mg every 6 hours Rx'd per Dr. Reed.     ADJUNCT OPIOID THERAPY  Duloxetine 60 mg daily, gabapentin 800 mg 4 times daily    BOWEL REGIMEN  Senokot and Colace as needed    Code Status: Full code     Advance Care Planning/Current Goals of Care: 0minutes spent discussing advance care planning.           30 minutes spent with greater than 50% spent counseling and coordinating the patient's care regarding symptom management.             Cecilia MELENDEZ.  [facility]  [phone]

## 2025-03-13 ENCOUNTER — NON-PROVIDER VISIT (OUTPATIENT)
Dept: PALLIATIVE MEDICINE | Facility: HOSPICE | Age: 45
End: 2025-03-13
Payer: MEDICAID

## 2025-03-14 ENCOUNTER — HOSPITAL ENCOUNTER (OUTPATIENT)
Dept: LAB | Facility: MEDICAL CENTER | Age: 45
End: 2025-03-14
Payer: MEDICAID

## 2025-03-14 LAB
ALBUMIN SERPL BCP-MCNC: 4.6 G/DL (ref 3.2–4.9)
ALBUMIN/GLOB SERPL: 1.9 G/DL
ALP SERPL-CCNC: 98 U/L (ref 30–99)
ALT SERPL-CCNC: 29 U/L (ref 2–50)
ANION GAP SERPL CALC-SCNC: 14 MMOL/L (ref 7–16)
AST SERPL-CCNC: 23 U/L (ref 12–45)
BASOPHILS # BLD AUTO: 1.5 % (ref 0–1.8)
BASOPHILS # BLD: 0.07 K/UL (ref 0–0.12)
BILIRUB SERPL-MCNC: <0.2 MG/DL (ref 0.1–1.5)
BUN SERPL-MCNC: 13 MG/DL (ref 8–22)
CALCIUM ALBUM COR SERPL-MCNC: 8.7 MG/DL (ref 8.5–10.5)
CALCIUM SERPL-MCNC: 9.2 MG/DL (ref 8.5–10.5)
CHLORIDE SERPL-SCNC: 106 MMOL/L (ref 96–112)
CHOLEST SERPL-MCNC: 167 MG/DL (ref 100–199)
CO2 SERPL-SCNC: 22 MMOL/L (ref 20–33)
CREAT SERPL-MCNC: 0.61 MG/DL (ref 0.5–1.4)
EOSINOPHIL # BLD AUTO: 0.17 K/UL (ref 0–0.51)
EOSINOPHIL NFR BLD: 3.7 % (ref 0–6.9)
ERYTHROCYTE [DISTWIDTH] IN BLOOD BY AUTOMATED COUNT: 56.1 FL (ref 35.9–50)
GFR SERPLBLD CREATININE-BSD FMLA CKD-EPI: 111 ML/MIN/1.73 M 2
GLOBULIN SER CALC-MCNC: 2.4 G/DL (ref 1.9–3.5)
GLUCOSE SERPL-MCNC: 139 MG/DL (ref 65–99)
HCT VFR BLD AUTO: 47.2 % (ref 37–47)
HDLC SERPL-MCNC: 63 MG/DL
HGB BLD-MCNC: 14.8 G/DL (ref 12–16)
IMM GRANULOCYTES # BLD AUTO: 0.01 K/UL (ref 0–0.11)
IMM GRANULOCYTES NFR BLD AUTO: 0.2 % (ref 0–0.9)
LDLC SERPL CALC-MCNC: 62 MG/DL
LYMPHOCYTES # BLD AUTO: 1.54 K/UL (ref 1–4.8)
LYMPHOCYTES NFR BLD: 33.3 % (ref 22–41)
MCH RBC QN AUTO: 28.3 PG (ref 27–33)
MCHC RBC AUTO-ENTMCNC: 31.4 G/DL (ref 32.2–35.5)
MCV RBC AUTO: 90.2 FL (ref 81.4–97.8)
MONOCYTES # BLD AUTO: 0.44 K/UL (ref 0–0.85)
MONOCYTES NFR BLD AUTO: 9.5 % (ref 0–13.4)
NEUTROPHILS # BLD AUTO: 2.39 K/UL (ref 1.82–7.42)
NEUTROPHILS NFR BLD: 51.8 % (ref 44–72)
NRBC # BLD AUTO: 0 K/UL
NRBC BLD-RTO: 0 /100 WBC (ref 0–0.2)
PLATELET # BLD AUTO: 269 K/UL (ref 164–446)
PMV BLD AUTO: 10.8 FL (ref 9–12.9)
POTASSIUM SERPL-SCNC: 3.6 MMOL/L (ref 3.6–5.5)
PROT SERPL-MCNC: 7 G/DL (ref 6–8.2)
RBC # BLD AUTO: 5.23 M/UL (ref 4.2–5.4)
SODIUM SERPL-SCNC: 142 MMOL/L (ref 135–145)
TRIGL SERPL-MCNC: 210 MG/DL (ref 0–149)
TSH SERPL DL<=0.005 MIU/L-ACNC: 3.03 UIU/ML (ref 0.38–5.33)
WBC # BLD AUTO: 4.6 K/UL (ref 4.8–10.8)

## 2025-03-14 PROCEDURE — 83721 ASSAY OF BLOOD LIPOPROTEIN: CPT | Mod: 59

## 2025-03-14 PROCEDURE — 84443 ASSAY THYROID STIM HORMONE: CPT

## 2025-03-14 PROCEDURE — 80053 COMPREHEN METABOLIC PANEL: CPT

## 2025-03-14 PROCEDURE — 80061 LIPID PANEL: CPT

## 2025-03-14 PROCEDURE — 36415 COLL VENOUS BLD VENIPUNCTURE: CPT

## 2025-03-14 PROCEDURE — 85025 COMPLETE CBC W/AUTO DIFF WBC: CPT

## 2025-03-14 NOTE — PROGRESS NOTES
"[facility] Outpatient Palliative Care   LCSW Assessment    Jacque Mcintyre is a 44 y.o.  female and is being being followed for metastatic melanoma.     Jacque currently lives in an apartment in [place] with her mother.    Pt is not a .    Jacque's relationship status is:     The patient has Good social support. Her mother assists her in the home, and she accepts health from her close friends. She has 2 adult sons who she is very proud of.     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 Pt reports retiring due to weakness and low energy from medical issues.  Caregiver's employment status is: retired  Pt does not report any financial concerns at this time.    Coping and Understanding of Illness:  Pt has a Good understanding of medical condition. Pt reports that she has \"accepted that she will die\" from her current illness, and does not feel that this needs to be a large part of our therapeutic work. Instead, she would like to focus on relational issues and her willingness to set healthy boundaries. Pt reports that in regards to her medical issues, the most difficult symptoms are her lack of energy and weakness, which limit her ability to get out with friends and family.  Despite this, pt reports strong relationships with friends and family. Pt self reports diagnosis of bipolar disorder and is curious if she also meets criteria for Post Traumatic Stress Disorder. LCSW to provide ongoing assessment of symptoms.    Advance Care Planning:  Pt currently does have an Advance Directive and POLST on file. If pt were unable to make her own decisions,  she would like her friend, Samantha Plummer, to make decisions on her behalf as outlined in her Advance Healthcare Directive. Pt has a valid POLST on file indicating Full Code.    LCSW met with Jacque for social work and psychotherapeutic assessment. A total of 58 minutes were spent with the patient  face-to-face. The visit was voluntary and " pt/family were agreeable to palliative care visit.     Plan: LCSW will provide resources to pt for local cancer support groups, as well as meet with pt on a bi-weekly basis to provide psychotherapy.     SANJU Alcantara, [facility]  [facility]  Palliative [facility]   [phone]

## 2025-03-17 ENCOUNTER — HOSPITAL ENCOUNTER (OUTPATIENT)
Dept: RADIOLOGY | Facility: MEDICAL CENTER | Age: 45
End: 2025-03-17
Attending: RADIOLOGY
Payer: MEDICAID

## 2025-03-17 DIAGNOSIS — C43.9 METASTATIC MELANOMA (HCC): ICD-10-CM

## 2025-03-17 LAB — LDLC SERPL-MCNC: 64 MG/DL (ref 0–129)

## 2025-03-17 PROCEDURE — A9579 GAD-BASE MR CONTRAST NOS,1ML: HCPCS | Mod: JZ,UD | Performed by: RADIOLOGY

## 2025-03-17 PROCEDURE — 700117 HCHG RX CONTRAST REV CODE 255: Mod: JZ,UD | Performed by: RADIOLOGY

## 2025-03-17 PROCEDURE — 700111 HCHG RX REV CODE 636 W/ 250 OVERRIDE (IP): Mod: UD

## 2025-03-17 PROCEDURE — 70553 MRI BRAIN STEM W/O & W/DYE: CPT

## 2025-03-17 RX ADMIN — GADOTERIDOL 10 ML: 279.3 INJECTION, SOLUTION INTRAVENOUS at 11:15

## 2025-03-17 RX ADMIN — HEPARIN 500 UNITS: 100 SYRINGE at 11:29

## 2025-03-20 ENCOUNTER — HOSPITAL ENCOUNTER (OUTPATIENT)
Dept: RADIOLOGY | Facility: MEDICAL CENTER | Age: 45
End: 2025-03-20
Attending: INTERNAL MEDICINE
Payer: MEDICAID

## 2025-03-20 DIAGNOSIS — C77.3 SECONDARY MALIGNANT NEOPLASM OF BRACHIAL LYMPH NODE (HCC): ICD-10-CM

## 2025-03-20 DIAGNOSIS — I95.89 OTHER HYPOTENSION: ICD-10-CM

## 2025-03-20 DIAGNOSIS — Z51.12 ENCOUNTER FOR ANTINEOPLASTIC IMMUNOTHERAPY: ICD-10-CM

## 2025-03-20 DIAGNOSIS — C79.31 SECONDARY MALIGNANT NEOPLASM OF BRAIN (HCC): ICD-10-CM

## 2025-03-20 DIAGNOSIS — Z79.899 OTHER LONG TERM (CURRENT) DRUG THERAPY: ICD-10-CM

## 2025-03-20 DIAGNOSIS — C43.62 MALIGNANT MELANOMA OF LEFT UPPER EXTREMITY INCLUDING SHOULDER (HCC): ICD-10-CM

## 2025-03-20 LAB — GLUCOSE BLD-MCNC: 149 MG/DL (ref 65–99)

## 2025-03-20 PROCEDURE — A9552 F18 FDG: HCPCS

## 2025-03-21 ENCOUNTER — HOSPITAL ENCOUNTER (OUTPATIENT)
Dept: RADIATION ONCOLOGY | Facility: MEDICAL CENTER | Age: 45
End: 2025-03-21
Attending: RADIOLOGY
Payer: MEDICAID

## 2025-03-21 DIAGNOSIS — C43.9 METASTATIC MELANOMA (HCC): ICD-10-CM

## 2025-03-21 ASSESSMENT — ENCOUNTER SYMPTOMS
MUSCULOSKELETAL NEGATIVE: 1
DOUBLE VISION: 0
BLURRED VISION: 0
NEUROLOGICAL NEGATIVE: 1
PSYCHIATRIC NEGATIVE: 1

## 2025-03-21 NOTE — PROGRESS NOTES
Virtual Visit: Established Patient   This visit was conducted via Teams using secure and encrypted videoconferencing technology.  The patient was in their home in the [facility].    The patient's identity was confirmed and verbal consent was obtained for this virtual visit.     Subjective:   CC: Follow up melanoma      Jacque Mcintyre is a 44 y.o. female presenting for evaluation and management of:     This is a 42-year-old lady with metastatic BRAF mutant malignant melanoma, stage IV, with multiple brain lesions as well as systemic disease, who had seen approximately 3 months ago initially for consideration of radiation therapy.  At that time, she underwent craniotomy and resection of her larger dominant lesions, and was treated with postoperative fractionated stereotactic radiation.  Thereafter, she was discovered to have several additional lesions and completed a second round of SRS as below.  She now continues on immunotherapy with medical oncology.     02/02/23- We are having a telephone follow-up shortly after completion of her recent course of stereotactic radiation.  She is doing tremendously well.  She is on immunotherapy now, and continues on Nivo/Ipi.  She feels quite well, has no major sensory or motor problems, no headaches, nausea or vomiting, or systemic complaints.  A follow-up MRI and CT chest abdomen pelvis has been ordered by medical oncology to be done in the next few weeks.  Her energy level is good.  Her hair has slowly grown back.  Overall, she feels quite good.     2/22/23 she continues to do relatively well.  She continues on her immunotherapy which she is tolerating without any major issues.  She had a follow-up MRI and a CT chest abdomen pelvis completed last week.  The systemic scan shows an excellent response in most of her disease is responding or stable.  However her brain MRI shows one small new 6 mm lesion that is definitively new from her prior MRI.  The remainder of her  previously treated disease appears to have responded well.  She now comes to discuss stereotactic radiosurgery for this new lesion.  Symptomatically, she is doing well, she has regained nearly complete neurologic function, she is walking, she has no fine motor problems, no problems with balance, no headaches, dizziness, nausea, vomiting.     4/17/2023  She is now status post SRS to an additional lesion that was completed 6 weeks ago.  She continues to do well.  She has no specific CNS related side effects.  She continues on immunotherapy without any major problems.  No new neurologic problems, headaches, dizziness, nausea or vomiting.     6/5/23:  She comes today to review her brain MRI and for ongoing follow-up.  She is doing tremendously well.  She reports that she has intermittent nausea that appears to be well controlled, but otherwise has no headaches, dizziness or balance difficulties, or vomiting.  She continues on immunotherapy.  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     9/11/23 from a CNS standpoint, she continues to do well.  She has no CNS related complaints today, including headaches, nausea, vomiting, weakness or numbness.  She is walking independently now for several months.  She has returned back to work.  Unfortunately, systemically, she experienced colitis about 2 months ago that has been attributed to an immune related side effect.  She has been off all immunotherapy since July 6.  She has had multiple trials of steroids and unfortunately has not resolved, and she has a follow-up with GI today.  She sees medical oncology later this week to discuss further systemic therapy options.      11/13/2023 we are seeing Ms. Mcintyre. today for ongoing follow-up.  Since I last saw her, she had a PET scan performed by medical oncology.  I reviewed the report, images not currently available today, that unfortunately shows progression of disease with a left abdominal lymph node metastasis  near the kidney measuring about 3.3 cm, as well as another lesion associated with a small bowel loop in the left lower abdomen now measuring about 5.4 cm.  Unfortunately, she is having significant immune related colitis, and has been off immunotherapy for several months.  She has had trials with steroids, but she continues to have diarrhea at least 4-5 times a day.  This is her main problem now, and the plan is likely to transition her to BRAF directed therapy.  She has a follow-up with Dr. Reed in the next few days.  With regards to CNS, she had a follow-up MRI performed a few days ago that I reviewed with Dr. Tejada today in clinic, and essentially shows stable findings.     2/21/23 she is doing relatively well.  She had a follow-up brain MRI that we are reviewing today.  She has started BRAF directed therapy with Braftovi and Mektovi, but had difficulty tolerating the Mektovi after 2 challenges, and is now on single agent Braftovi.  She seems to be tolerating this well.  She had follow-up CT chest abdomen pelvis done also that I reviewed personally.  She has no CNS related complaints.  She still feels somewhat weak and deconditioned and is trying to work through this.  Socially, she lives with her parents and this has become somewhat stressful with trying to think about whether she should move out or whether she will be able to work or have enough money to ultimately sustain that.       5/30/2024 she is doing quite well now.  She has switched therapy to dabrafenib and trametinib and is tolerating this well.  She had a follow-up brain MRI and systemic imaging performed this month.  The MRI shows essentially stable findings no new concerning findings.  She has no headaches or CNS symptoms.  The systemic imaging shows a decrease in the size of her left central mesenteric mass.       9/6/24 Tamra is doing very well.  She has no major neurologic complaints.  She had a hard time tolerating combined therapy and so she  continues on dabrafenib alone.  She is doing well with follow-up with medical oncology.  She had a follow-up brain MRI performed that shows stable findings, with still a stable left parietal metastasis though this has been treated several months ago.  She has no headaches or CNS symptoms.  Her systemic imaging is also relatively encouraging with actually significant improvement in her disease burden as of her last PET scan on June 24.     12/16/24 she is doing well.  Shortly after our last follow-up, she ended up in the hospital with progressively worsening nausea and difficulty eating towards the end of November.  She was found to have a large small bowel obstruction with what appeared to be a mass that was causing significant obstructive symptoms.  She underwent a resection of this and pathology confirmed metastatic melanoma.  She is now doing much better, able to eat, nausea is resolved, weight is stabilizing.  She has a plan for follow-up with medical oncology and plans to start Opdualag soon.  She had a brain MRI performed a few days ago that I reviewed personally, shows stable findings, no concerning lesions currently.    3/21/25 she is doing quite well.  No major complaints currently, except ongoing fatigue.  No CNS related complaints.  Had a follow-up MRI done that shows a new area of a subacute hemorrhage in the left posterior right occipital lobe, no other lesions.  There is minimal enhancement and edema around this hemorrhage.  She had a follow-up PET done that shows overall favorable response with 1 site in the left retroperitoneum that has a node with increased activity, the remainder of her disease actually is responded.  She continues on immunotherapy with medical oncology.  She is off anticoagulation.    ROS   Review of Systems   HENT: Negative.     Eyes:  Negative for blurred vision and double vision.   Musculoskeletal: Negative.    Neurological: Negative.    Psychiatric/Behavioral: Negative.            Current medicines (including changes today)  Current Outpatient Medications   Medication Sig Dispense Refill    Naloxone (NARCAN) 4 MG/0.1ML Liquid Administer 4 mg into affected nostril(S) one time as needed (One spray in one nostril for overdose and call 911) for up to 1 dose. 2 Each 0    ferrous sulfate 325 (65 Fe) MG tablet Take 325 mg by mouth every Monday, Wednesday, and Friday.      albuterol (PROVENTIL) 2.5mg/0.5ml Nebu Soln Take 2.5 mg by nebulization every four hours as needed for Shortness of Breath.      TAFINLAR 75 MG Cap capsule Take 150 mg by mouth every 12 hours.      ELIQUIS 5 MG Tab Take 5 mg by mouth 2 times a day.      fluticasone-umeclidinium-vilanterol (TRELEGY ELLIPTA) 200-62.5-25 mcg/act inhaler Inhale 1 Puff every day.      insulin glargine 100 UNIT/ML SC SOLN Inject 20 Units under the skin every evening.      traZODone (DESYREL) 100 MG Tab Take 100 mg by mouth every evening.      VENTOLIN  (90 Base) MCG/ACT Aero Soln inhalation aerosol Inhale 1-2 Puffs every four hours as needed for Shortness of Breath.      meloxicam (MOBIC) 15 MG tablet Take 15 mg by mouth every day.      atorvastatin (LIPITOR) 40 MG Tab Take 1 Tablet by mouth every evening. 30 Tablet 2    DULoxetine (CYMBALTA) 60 MG Cap DR Particles delayed-release capsule Take 1 Capsule by mouth every evening. 30 Capsule 2    gabapentin (NEURONTIN) 800 MG tablet Take 1 Tablet by mouth 4 times a day. 90 Tablet 2    OXcarbazepine (TRILEPTAL) 300 MG Tab Take 1 Tablet by mouth 2 times a day. 60 Tablet 2    QUEtiapine (SEROQUEL) 200 MG Tab Take 1 Tablet by mouth every evening. 60 Tablet 2    acetaminophen (TYLENOL) 500 MG Tab Take 500-1,000 mg by mouth every 6 hours as needed. Indications: Pain      clonazepam (KLONOPIN) 2 MG tablet Take 1 mg by mouth 2 times a day. 1 mg = 1/2 tablet - scheduled       No current facility-administered medications for this encounter.       Patient Active Problem List    Diagnosis Date Noted     Pulmonary embolism (HCC)     Marijuana use 11/22/2024    Metastatic melanoma (HCC) 11/19/2024    Abdominal mass 11/19/2024    Hyponatremia 11/19/2024    Flu-like symptoms with SIRS 01/04/2024    Anemia 01/03/2024    Facial paralysis/Steen palsy 12/11/2022    Diabetic neuropathy (HCC) 12/09/2022    Dizziness on standing 10/19/2022    Vitamin D deficiency 10/17/2022    Dyslipidemia 10/17/2022    Normocytic anemia 10/17/2022    Borderline abnormal TFTs 10/17/2022    Thrombocytosis 10/17/2022    Metastatic cancer to brain (HCC) 10/15/2022    Adrenal insufficiency due to corticosteroid withdrawal (HCC) 10/12/2022    Chronic prescription benzodiazepine use 10/07/2022    Constipation 10/07/2022    Brain mass 10/06/2022    Malignant melanoma metastatic to brain (HCC) 10/01/2022    Cellulitis 08/26/2022    Leucocytosis 08/26/2022    Abnormal CT scan, chest 06/13/2022    Gastroenteritis 05/19/2022    Hypokalemia 10/08/2019    Essential hypertension 10/08/2019    Bipolar 1 disorder ([facility]) 10/07/2019    DM2 (diabetes mellitus, type 2) ([facility]) 10/07/2019    Appendicitis 10/07/2019    Melanoma of left upper arm ([facility]) 06/23/2017    Nausea & vomiting 09/04/2015    Sepsis ([facility]) 09/04/2015    UTI (urinary tract infection) 09/04/2015    Lactic acidosis 09/04/2015    Anxiety 09/04/2015    Diarrhea 09/04/2015    Asthma     Depression         Objective:   There were no vitals taken for this visit.    Physical Exam:  Constitutional: Alert, no distress, well-groomed.  Skin: No rashes in visible areas.  Eye: Round. Conjunctiva clear, lids normal. No icterus.   ENMT: Lips pink without lesions, good dentition, moist mucous membranes. Phonation normal.  Neck: No masses, no thyromegaly. Moves freely without pain.  Respiratory: Unlabored respiratory effort, no cough or audible wheeze  Psych: Alert and oriented x3, normal affect and mood.     Assessment and Plan:   The following treatment plan was discussed:     I reviewed the MRI and the PET scan  in detail with her today.  I think we can continue observation on the MRI.  There does not appear to be an underlying lesion in the area of the hemorrhage, but this will need to be monitored.  In the meantime with regards to the PET scan, she has 1 site of oligo progression which I think would be amenable to SBRT.  I discussed the case with medical oncology and the plan will be to proceed with SBRT to this left retroperitoneal node and continue her on her current line of systemic therapy, holding anticoagulation given the CNS hemorrhage.  We will proceed with CT simulation for the left RP node, and MRI and follow-up otherwise from a CNS standpoint in about 3 to 4 months.

## 2025-03-21 NOTE — CT SIMULATION
PATIENT NAME Jacque Mcintyre   PRIMARY PHYSICIAN Janes Ronit G [id number]   REFERRING PHYSICIAN Pebbles Tejada M.D. 1980     No matching staging information was found for the patient.         Treatment Planning CT Simulation      Order Questions     Question Answer    Is this for a new course of treatment? Yes    Is this an Addendum? No    Implanted Device/Pacemaker No    Simulation Status Initial    Planned Start Date 4/7/2025    Treatment Site Abdomen    Laterality Left    Treatment Technique SBRT    Treatment Pattern/Frequency Daily    Number of fractions: 5    Concurrent Chemotherapy No    CT Technique 3D    Slice Thickness 2mm    Scan Extent Pelvis     Abdomen    Contrast IV    IV Contrast Volume 80 cc    Treatment Device(s) Vac Trevin    Patient Position Supine    Patient Orientation Head First    Arm Position Up    Treatment Machine No preference    Treatment Image Guidance CBCT    Frequency (CBCT) Daily    Image Guidance Match Bone     PTV - Soft Tissue    Treatment Planning Image Fusion CT/PET    Special Physics Consult Stereotactic    Other Orders Weekly Physics Check     Special Tx Procedure    Release to patient Immediate

## 2025-03-25 ENCOUNTER — NON-PROVIDER VISIT (OUTPATIENT)
Dept: PALLIATIVE MEDICINE | Facility: HOSPICE | Age: 45
End: 2025-03-25
Payer: MEDICAID

## 2025-03-25 NOTE — PROGRESS NOTES
"[facility]    LCSW met with pt in her home for a home visit. LCSW assessed for mood and coping with events over the week. Pt reports feeling anxious much of the time \"10 our of 10\", especially since she has multiple scans and a PET. Despite this, pt reports she is coping \"okay\" with results of scans and visit with radiation oncology. LCSW and pt explored how mood and relationship dynamics with certain family members impact sense of self and behaviors. We briefly explored automatic negative thinking. LCSW and pt will meet again in about 3 weeks.       Eula Butcher, CATIA  [facility]       "

## 2025-03-28 ENCOUNTER — HOSPITAL ENCOUNTER (OUTPATIENT)
Dept: RADIATION ONCOLOGY | Facility: MEDICAL CENTER | Age: 45
End: 2025-03-28

## 2025-03-28 PROCEDURE — 77290 THER RAD SIMULAJ FIELD CPLX: CPT | Performed by: RADIOLOGY

## 2025-03-28 PROCEDURE — 77334 RADIATION TREATMENT AID(S): CPT | Performed by: RADIOLOGY

## 2025-03-28 PROCEDURE — 77470 SPECIAL RADIATION TREATMENT: CPT | Performed by: RADIOLOGY

## 2025-03-28 NOTE — RADIATION PLANNING NOTES
Clinical Treatment Planning Note    DATE OF SERVICE: 3/28/2025    DIAGNOSIS:Melanoma stage IV         IMAGING REVIEWED:  [x] CT     [] MRI     [x] PET/CT     [] BONE SCAN     [] MAMMO     [] OTHER      TREATMENT INTENT:   [] CURATIVE     [] MAINTENANCE     [x]  PALLIATIVE      []  SUPPORTIVE     []  PROPHYLACTIC     [] BENIGN     []  CONSOLIDATIVE      [] DEFINITIVE   [x]  OLOGIMETASTATIC      LINE OF TREATMENT:  [] ADJUVANT   [x] DEFINITIVE   [] NEOADJUVANT   [] RE-TREATMENT      TECHNIQUE PLANNED:  [] IMRT   [] 3D   [x] SBRT   [] SRS/SRT   [] HDR   [] ELECTRON       IMRT JUSTIFICATION:  []   An immediately adjacent area has been previously irradiated and abutting portals must be established with high precision.    []  Dose escalation is planned to deliver radiation doses in excess of those commonly utilized for similar tumors with conventional treatment.    []  The target volume is concave or convex, and the critical normal tissues are within or around that convexity or concavity.    []  The target volume is in close proximity to critical structures that must be protected.    []  The volume of interest must be covered with narrow margins to adequately protect  immediately adjacent structures.      FIELDS & BLOCKING:  [x] COMPLEX BLOCKS     []  = 3 TX AREAS     []  ARCS     []  CUSTOM SHEILD        []  SIMPLE BLOCK      CHEMOTHERAPY:  []  CONCURRENT     []  INDUCTION     [] SEQUENTIAL     []  <30 DAYS FROM XRT      NOTES:  OAR CONSTRAINTS: (GUIDELINES ONLY NOT ABSOLUTE) QUANTEC OR AS STATED     One fraction Three fractions Five fractions    Serial Issue Max critical volume above threshold Threshold dose    (Gy) Max point dose (Gy)^a  Threshold dose   (Gy) Max point dose (Gy)^a Threshold dose   (Gy) Max point dose (Gy)^a End point (greater than or equal to Grade3)   Optic pathway <0.2 cc 8 10 15.3 (5.1 Gy/fx) 17.4 (5.8 Gy/fx) 23 (4.6 Gy/fx) 25 (5 Gy/fx) Neuritis    Cochlea      9  17.1 (5.7 Gy/fx)  25 (5 Gy/fx)  Hearing loss    Brainstem  (non medulla) <0.5 cc 10 15 18 (6 Gy/fx) 23.1 (7.7 Gy/fx) 23 (4.6 Gy/fx)   31 (6.2 Gy/fx) Cranial neuropathy   Spinal chord   and medulla <0.35 cc      <1.2 cc 10      7 14 18 (6 Gy/fx)    12.3 (4.1 Gy/fx) 21.9 (7.3 Gy/fx) 23 (4.6 Gy/fx)    14.5 (2.9 Gy/fx) 30 (6 Gy/fx) Myelitis   Spinal cord subvolume (5-6mm above and below level treated per Eber) <10% of  subvolume 10 14 18 (6 Gy/fx) 21.9 (7.3 Gy/fx) 23 (4.6 Gy/fx) 30 (6 Gy/fx) Myelitis   Cauda equina  <5 cc 14 16 21.9 (7.3 Gy/fx) 24 (8 Gy/fx) 30 (6 Gy/fx) 32 (6.4 Gy/fx) Neuritis   Sacral plexus <5 cc 14.4 16 22.5 (7.5 Gy/fx) 24 (8 (Gy/fx) 30 (6 Gy/fx) 32 (6.4 Gy/fx) Neuropathy   Esophagus^b <5 cc 11.9 15.4 17.7 (5.9 Gy/fx) 25.2 (8.4 Gy/fx) 19.5 (3.9 Gy/fx) 35 (7 Gy/fx) Stenosis/fistula   Brachial plexus <3 cc 14 17.5 20.4 (6.8 Gy/fx 24 (8 Gy/fx) 27 (5.4 Gy/fx) 30.5 (6.1 Gy/fx) Neuropathy   Heart/ pericardium <15 cc 16 22 24 (8 Gy/fx) 30 (10 Gy/fx) 32 (6.4 Gy/fx) 38 (7.6 Gy/fx) Pericarditis   Great vessels <10 cc 31 37 39 (13 Gy/fx) 45 (15 Gy/fx) 47 (9.4 Gy/fx) 53 (10.6 Gy/fx) Aneurysm   Trachea and large bronchus^b <4 cc 10.5 20.2 15 (5 Gy/fx) 30 (10 Gy/fx) 16.5 (3.3 Gy/fx) 40 (8 Gy/fx) Stenosis/ fistula   Bronchus- smaller airways <0.5 cc 12.4 13.3 18.9 (6.3 Gy/fx) 23.1 (7.7 Gy/fx) 21 (4.2 Gy/fx) 33 (6.6 Gy/fx) Stenosis with atelectasis   Rib <1 cc      <30 cc 22 30 28.8 (9.6 Gy/fx)    30.0 (10.0 Gy/fx) 36.9 (12.3 Gy/fx) 35 (7 Gy/fx) 43 (8.6 Gy/fx) Pain or fracture   Skin <10 cc 23 26 30 (10 Gy/fx) 33 (11 Gy/fx) 36.5 (7.3 Gy/fx) 39.5 (7.9 Gy/fx) Ulceration   Stomach <10 cc 11.2 12.4 16.5 (5.5 Gy/fx) 22.2 (7.4 Gy/fx) 18 (3.6 Gy/fx) 32 (6.4 Gy/fx) Ulceration/fistula   Duodenum^b <5 cc      <10 cc 11.2      9 12.4 16.5 (5.5 Gy/fx)    11.4 (3.8 Gy/fx) 22.2 (7.4 Gy/fx) 18 (3.6 Gy/fx)    12.5 (2.5 Gy/fx) 32 (6.4 Gy/fx) Ulceration   Jejunum/ Ileum^b  <5 cc 11.9 15.4 17.7 (5.9 Gy/fx) 25.2 (8.4 Gy/fx) 19.5 (3.9 Gy/fx) 35 (7 Gy/fx) Enteritis/  obstruction   Colon^b <20 cc 14.3 18.4 24 (8 Gy/fx) 28.2 (9.4 gy/fx) 25 (5 Gy/fx) 38 (7.6 Gy/fx) Colitis/ fistula   Rectum^b <20 cc 14.3 18.4 24 (8 Gy/fx) 28.2 (9.4 gy/fx) 25 (5 Gy/fx) 38 (7.6 Gy/fx) Proctitis/ fistula   Bladder wall <15 cc 11.4 18.4 16.8 (5.6 Gy/fx) 28.2 (9.4 Gy/fx) 18.3 (3.65 Gy/fx) 38 (7.6 Gy/fx) Cystitis/ fistula   Penile bulb <3 cc 14 34 21.9 (7.3 Gy/fx) 42 (14 Gy/fx) 30 (6 Gy/fx) 50 (10 Gy/fx) Impotence   Femoral heads (right and left) <10 cc 14  21.9 (7.3 Gy/fx)  30 (6 Gy/fx)  Necrosis   Renal hilium/ vascular trunk <2/3 volume 1.6 18.6 (6.2 Gy/fx)   23 (4.6 Gy/fx)  Malignant hypertension         One fraction Three fractions Five fractions    Parallel tissue Max critical volume below threshold Threshold dose (Gy) Max point dose (Gy)^a Threshold dose (Gy) Max point dose (Gy)^a Threshold dose (Gy) Max point dose (Gy)^a End point (greater than or equal to Grade 3)   Lung (right and left) 1500 cc 7 NA- Parallel tissue 11.6 (2.96 Gy/fx) NA- Parallel tissue 12.5 (2.5 Gy/fx) NA- Parallel tissue Basic lung function    Lung (right and left) 1000 cc 7.4 NA- Parallel tissue 12.4 (3.1 Gy/fx) NA- Parallel tissue 13.5 (2.7 Gy/fx) NA- Parallel tissue Pneumonitis   Liver 700 cc 9.1 NA- Parallel tissue 19.2 (4.8 Gy/fx) NA- Parallel tissue 21 (4.2 Gy/fx) NA- Parallel tissue Basic liver function   Renal Cortex (right and left) 200 cc 8.4 NA- Parallel tissue 16 (4 Gy/fx) NA- Parallel tissue 17.5 (3.5 Gy/fx) NA- Parallel tissue Basic renal function   ^a “Point” defined as 0.035 cc or less.  ^b Avoid circumferential irradiation.

## 2025-03-28 NOTE — RADIATION PLANNING NOTES
PATIENT NAME Jacque Mcintyre   PRIMARY PHYSICIAN Ronit Marrero [phone]   REFERRING PHYSICIAN No ref. provider found 1980     DATE OF SERVICE: 3/28/2025    DIAGNOSIS:Melanoma stage IV         SPECIAL TREATMENT PROCEDURE NOTE:  Considerable additional effort required in the management of this case because of administration of Stereotactic Radiotherapy, which may result in increased normal tissue toxicity and require greater effort in contouring and treatment because of greater precision.

## 2025-03-28 NOTE — RADIATION PLANNING NOTES
DATE OF SERVICE: 3/28/2025    DIAGNOSIS:Melanoma stage IV       DATE OF SERVICE: 3/28/2025    TYPE OF SIMULATION: SBRT    GOAL OF TREATMENT:   [] Curative  [x] Palliative  [x] Oligometastatic    CONTRAST:    [x] IV Contrast*  [] Oral Contrast               POSITION:    [x]  Supine  [] Prone     IMMOBILIZATION DEVICE: []  Body-Fix  [x] Omniboard  []  Bellyboard    PROCEDURE: Patient placed in immobilization device. CT obtained to assess organ motion.  Images viewed and approved.  Images reconstructed as need.  Image data sets transferred to Eclipse for planning.    I have personally reviewed the relevant data, performed the target localization, and determined all relevant factors for this patient’s simulation.    *Omnipaque 80 -100cc IVP in conjunction with 500cc NS

## 2025-04-01 ENCOUNTER — HOSPITAL ENCOUNTER (OUTPATIENT)
Dept: RADIATION ONCOLOGY | Facility: MEDICAL CENTER | Age: 45
End: 2025-04-01
Attending: RADIOLOGY
Payer: MEDICAID

## 2025-04-02 PROCEDURE — 77334 RADIATION TREATMENT AID(S): CPT | Mod: 26 | Performed by: RADIOLOGY

## 2025-04-02 PROCEDURE — 77300 RADIATION THERAPY DOSE PLAN: CPT | Performed by: RADIOLOGY

## 2025-04-02 PROCEDURE — 77334 RADIATION TREATMENT AID(S): CPT | Performed by: RADIOLOGY

## 2025-04-02 PROCEDURE — 77295 3-D RADIOTHERAPY PLAN: CPT | Mod: 26 | Performed by: RADIOLOGY

## 2025-04-02 PROCEDURE — 77300 RADIATION THERAPY DOSE PLAN: CPT | Mod: 26 | Performed by: RADIOLOGY

## 2025-04-02 PROCEDURE — 77295 3-D RADIOTHERAPY PLAN: CPT | Performed by: RADIOLOGY

## 2025-04-03 PROCEDURE — 77370 RADIATION PHYSICS CONSULT: CPT | Performed by: RADIOLOGY

## 2025-04-08 ENCOUNTER — HOSPITAL ENCOUNTER (OUTPATIENT)
Dept: RADIATION ONCOLOGY | Facility: MEDICAL CENTER | Age: 45
End: 2025-04-08

## 2025-04-08 LAB
CHEMOTHERAPY INFUSION START DATE: NORMAL
CHEMOTHERAPY RECORDS: 4000 CGY
CHEMOTHERAPY RECORDS: 8 GY
CHEMOTHERAPY RECORDS: NORMAL
CHEMOTHERAPY RX CANCER: NORMAL
DATE 1ST CHEMO CANCER: NORMAL
RAD ONC ARIA COURSE LAST TREATMENT DATE: NORMAL
RAD ONC ARIA COURSE TREATMENT ELAPSED DAYS: NORMAL
RAD ONC ARIA REFERENCE POINT DOSAGE GIVEN TO DATE: 8 GY
RAD ONC ARIA REFERENCE POINT ID: NORMAL
RAD ONC ARIA REFERENCE POINT SESSION DOSAGE GIVEN: 8 GY

## 2025-04-08 PROCEDURE — 77280 THER RAD SIMULAJ FIELD SMPL: CPT | Mod: 26 | Performed by: RADIOLOGY

## 2025-04-08 PROCEDURE — 77280 THER RAD SIMULAJ FIELD SMPL: CPT | Performed by: RADIOLOGY

## 2025-04-08 PROCEDURE — 77373 STRTCTC BDY RAD THER TX DLVR: CPT | Performed by: RADIOLOGY

## 2025-04-08 NOTE — PROCEDURES
DATE OF SERVICE: 4/8/2025    DIAGNOSIS:  RP lymph node    TREATMENT:  Radiation Therapy Episodes       Active Episodes       Radiation Therapy: SBRT (4/7/2025)    Radiation Therapy: SRS (3/1/2023)    Radiation Therapy: SRS (12/19/2022)    Radiation Therapy: SRS (10/31/2022)                   Radiation Treatments         Plan Last Treated On Elapsed Days Fractions Treated Prescribed Fraction Dose (cGy) Prescribed Total Dose (cGy)    RP Node SBRT 4/8/2025 0 @ 04/08/2025 1 of 5 800 4,000                  Reference Point Last Treated On Elapsed Days Most Recent Session Dose (cGy) Total Dose (cGy)    RP Node 4/8/2025 0 @ 04/08/2025 800 800                      Historical Treatments (Plans: 4)      Plan Last Treated On Elapsed Days Fractions Treated Prescribed Fraction Dose (cGy) Prescribed Total Dose (cGy)   5 Lesion SRT 11/9/2022 6 @ [id number] 5 of 5 600 3,000   SRT R Pariet 12/23/2022 4 @ [id number] 3 of 3 900 2,700   1 Lesion SRS 3/1/2023 0 @ [id number] 1 of 1 2,200 2,200   5 Lesion [facility] 12/16/2022  @  0 of 5 -- --                Reference Point Last Treated On Elapsed Days Most Recent Session Dose (cGy) Total Dose (cGy)   5 Lesion SRT 11/9/2022 6 @ [id number] -- 3,000   5 Lesion SRT CP 11/9/2022 6 @ [id number] -- 3,190   SRT R Pariet 12/23/2022 4 @ [id number] -- 2,700   SRT R Pariet CP 12/23/2022 4 @ [id number] -- 3,125   1 Lesion SRS 3/1/2023 0 @ [id number] -- 2,200   1 Lesion SRS CP 3/1/2023 0 @ [id number] -- 2,619   5 Lesion SRT 12/16/2022  @  -- 0   5 Lesion SRT CP 12/16/2022  @  -- 0                            STEREOTACTIC PROCEDURE NOTE:    Called by [employer] machine to verify treatment parameters including:  treatment site, treatment dose, and treatment setup prior to stereotactic treatment..    Patient was placed in the treatment position with use of immobilization device and  laser guidance. CBCT images were acquired for target localization.  Images were reviewed in the axial, coronal, and  saggital views and shifts were made as necessary to ensure that patient position matched simulation position.      Treatment delivered per  prescription.  The medical physicist was present throughout the set-up, verification and treatment delivery to oversee the procedure and ensure all parameters agreed with the computerized plan.    I have personally reviewed the relevant data, performed the target localization, and determined all relevant factors for this patient’s simulation.

## 2025-04-09 ENCOUNTER — HOSPITAL ENCOUNTER (OUTPATIENT)
Dept: RADIATION ONCOLOGY | Facility: MEDICAL CENTER | Age: 45
End: 2025-04-09

## 2025-04-09 LAB
CHEMOTHERAPY INFUSION START DATE: NORMAL
CHEMOTHERAPY RECORDS: 4000 CGY
CHEMOTHERAPY RECORDS: 8 GY
CHEMOTHERAPY RECORDS: NORMAL
CHEMOTHERAPY RX CANCER: NORMAL
DATE 1ST CHEMO CANCER: NORMAL
RAD ONC ARIA COURSE LAST TREATMENT DATE: NORMAL
RAD ONC ARIA COURSE TREATMENT ELAPSED DAYS: NORMAL
RAD ONC ARIA REFERENCE POINT DOSAGE GIVEN TO DATE: 16 GY
RAD ONC ARIA REFERENCE POINT ID: NORMAL
RAD ONC ARIA REFERENCE POINT SESSION DOSAGE GIVEN: 8 GY

## 2025-04-09 PROCEDURE — 77435 SBRT MANAGEMENT: CPT | Performed by: RADIOLOGY

## 2025-04-09 PROCEDURE — 77373 STRTCTC BDY RAD THER TX DLVR: CPT | Performed by: RADIOLOGY

## 2025-04-09 PROCEDURE — 77280 THER RAD SIMULAJ FIELD SMPL: CPT | Performed by: RADIOLOGY

## 2025-04-09 PROCEDURE — 77280 THER RAD SIMULAJ FIELD SMPL: CPT | Mod: 26 | Performed by: RADIOLOGY

## 2025-04-09 NOTE — PROCEDURES
DATE OF SERVICE: 4/9/2025    DIAGNOSIS: Metastatic Melanoma       TREATMENT:  Radiation Therapy Episodes       Active Episodes       Radiation Therapy: SBRT (4/7/2025)    Radiation Therapy: SRS (3/1/2023)    Radiation Therapy: SRS (12/19/2022)    Radiation Therapy: SRS (10/31/2022)                   Radiation Treatments         Plan Last Treated On Elapsed Days Fractions Treated Prescribed Fraction Dose (cGy) Prescribed Total Dose (cGy)    RP Node SBRT 4/9/2025 1 @ 04/09/2025 2 of 5 800 4,000                  Reference Point Last Treated On Elapsed Days Most Recent Session Dose (cGy) Total Dose (cGy)    RP Node 4/9/2025 1 @ 04/09/2025 800 1,600                      Historical Treatments (Plans: 4)      Plan Last Treated On Elapsed Days Fractions Treated Prescribed Fraction Dose (cGy) Prescribed Total Dose (cGy)   5 Lesion SRT 11/9/2022 6 @ [id number] 5 of 5 600 3,000   SRT R Pariet 12/23/2022 4 @ [id number] 3 of 3 900 2,700   1 Lesion SRS 3/1/2023 0 @ [id number] 1 of 1 2,200 2,200   5 Lesion AAR 12/16/2022  @  0 of 5 -- --                Reference Point Last Treated On Elapsed Days Most Recent Session Dose (cGy) Total Dose (cGy)   5 Lesion SRT 11/9/2022 6 @ [id number] -- 3,000   5 Lesion SRT CP 11/9/2022 6 @ [id number] -- 3,190   SRT R Pariet 12/23/2022 4 @ [id number] -- 2,700   SRT R Pariet CP 12/23/2022 4 @ [id number] -- 3,125   1 Lesion SRS 3/1/2023 0 @ [id number] -- 2,200   1 Lesion SRS CP 3/1/2023 0 @ [id number] -- 2,619   5 Lesion SRT 12/16/2022  @  -- 0   5 Lesion SRT CP 12/16/2022  @  -- 0                            STEREOTACTIC PROCEDURE NOTE:    Called by [employer] machine to verify treatment parameters including:  treatment site, treatment dose, and treatment setup prior to stereotactic treatment..    Patient was placed in the treatment position with use of immobilization device and  laser guidance. CBCT images were acquired for target localization.  Images were reviewed in the axial,  coronal, and saggital views and shifts were made as necessary to ensure that patient position matched simulation position.      Treatment delivered per  prescription.  The medical physicist was present throughout the set-up, verification and treatment delivery to oversee the procedure and ensure all parameters agreed with the computerized plan.    I have personally reviewed the relevant data, performed the target localization, and determined all relevant factors for this patient’s simulation.

## 2025-04-10 ENCOUNTER — HOSPITAL ENCOUNTER (OUTPATIENT)
Dept: RADIATION ONCOLOGY | Facility: MEDICAL CENTER | Age: 45
End: 2025-04-10

## 2025-04-10 LAB
CHEMOTHERAPY INFUSION START DATE: NORMAL
CHEMOTHERAPY RECORDS: 4000 CGY
CHEMOTHERAPY RECORDS: 8 GY
CHEMOTHERAPY RECORDS: NORMAL
CHEMOTHERAPY RX CANCER: NORMAL
DATE 1ST CHEMO CANCER: NORMAL
RAD ONC ARIA COURSE LAST TREATMENT DATE: NORMAL
RAD ONC ARIA COURSE TREATMENT ELAPSED DAYS: NORMAL
RAD ONC ARIA REFERENCE POINT DOSAGE GIVEN TO DATE: 24 GY
RAD ONC ARIA REFERENCE POINT ID: NORMAL
RAD ONC ARIA REFERENCE POINT SESSION DOSAGE GIVEN: 8 GY

## 2025-04-10 PROCEDURE — 77280 THER RAD SIMULAJ FIELD SMPL: CPT | Performed by: RADIOLOGY

## 2025-04-10 PROCEDURE — 77280 THER RAD SIMULAJ FIELD SMPL: CPT | Mod: 26 | Performed by: RADIOLOGY

## 2025-04-10 PROCEDURE — 77336 RADIATION PHYSICS CONSULT: CPT | Mod: XU | Performed by: RADIOLOGY

## 2025-04-10 PROCEDURE — 77373 STRTCTC BDY RAD THER TX DLVR: CPT | Performed by: RADIOLOGY

## 2025-04-10 NOTE — PROCEDURES
DATE OF SERVICE: 4/10/2025    DIAGNOSIS: Metastatic melanoma       TREATMENT:  Radiation Therapy Episodes       Active Episodes       Radiation Therapy: SBRT (4/7/2025)    Radiation Therapy: SRS (3/1/2023)    Radiation Therapy: SRS (12/19/2022)    Radiation Therapy: SRS (10/31/2022)                   Radiation Treatments         Plan Last Treated On Elapsed Days Fractions Treated Prescribed Fraction Dose (cGy) Prescribed Total Dose (cGy)    RP Node SBRT 4/10/2025 2 @ 04/10/2025 3 of 5 800 4,000                  Reference Point Last Treated On Elapsed Days Most Recent Session Dose (cGy) Total Dose (cGy)    RP Node 4/10/2025 2 @ 04/10/2025 800 2,400                      Historical Treatments (Plans: 4)      Plan Last Treated On Elapsed Days Fractions Treated Prescribed Fraction Dose (cGy) Prescribed Total Dose (cGy)   5 Lesion SRT 11/9/2022 6 @ [id number] 5 of 5 600 3,000   SRT R Pariet 12/23/2022 4 @ [id number] 3 of 3 900 2,700   1 Lesion SRS 3/1/2023 0 @ [id number] 1 of 1 2,200 2,200   5 Lesion AAR 12/16/2022  @  0 of 5 -- --                Reference Point Last Treated On Elapsed Days Most Recent Session Dose (cGy) Total Dose (cGy)   5 Lesion SRT 11/9/2022 6 @ [id number] -- 3,000   5 Lesion SRT CP 11/9/2022 6 @ [id number] -- 3,190   SRT R Pariet 12/23/2022 4 @ [id number] -- 2,700   SRT R Pariet CP 12/23/2022 4 @ [id number] -- 3,125   1 Lesion SRS 3/1/2023 0 @ [id number] -- 2,200   1 Lesion SRS CP 3/1/2023 0 @ [id number] -- 2,619   5 Lesion SRT 12/16/2022  @  -- 0   5 Lesion SRT CP 12/16/2022  @  -- 0                            STEREOTACTIC PROCEDURE NOTE:    Called by [employer] machine to verify treatment parameters including:  treatment site, treatment dose, and treatment setup prior to stereotactic treatment..    Patient was placed in the treatment position with use of immobilization device and  laser guidance. CBCT images were acquired for target localization.  Images were reviewed in the axial,  coronal, and saggital views and shifts were made as necessary to ensure that patient position matched simulation position.      Treatment delivered per  prescription.  The medical physicist was present throughout the set-up, verification and treatment delivery to oversee the procedure and ensure all parameters agreed with the computerized plan.    I have personally reviewed the relevant data, performed the target localization, and determined all relevant factors for this patient’s simulation.

## 2025-04-11 ENCOUNTER — HOSPITAL ENCOUNTER (OUTPATIENT)
Dept: RADIATION ONCOLOGY | Facility: MEDICAL CENTER | Age: 45
End: 2025-04-11

## 2025-04-11 LAB
CHEMOTHERAPY INFUSION START DATE: NORMAL
CHEMOTHERAPY RECORDS: 4000 CGY
CHEMOTHERAPY RECORDS: 8 GY
CHEMOTHERAPY RECORDS: NORMAL
CHEMOTHERAPY RX CANCER: NORMAL
DATE 1ST CHEMO CANCER: NORMAL
RAD ONC ARIA COURSE LAST TREATMENT DATE: NORMAL
RAD ONC ARIA COURSE TREATMENT ELAPSED DAYS: NORMAL
RAD ONC ARIA REFERENCE POINT DOSAGE GIVEN TO DATE: 32 GY
RAD ONC ARIA REFERENCE POINT ID: NORMAL
RAD ONC ARIA REFERENCE POINT SESSION DOSAGE GIVEN: 8 GY

## 2025-04-11 PROCEDURE — 77280 THER RAD SIMULAJ FIELD SMPL: CPT | Performed by: RADIOLOGY

## 2025-04-11 PROCEDURE — 77280 THER RAD SIMULAJ FIELD SMPL: CPT | Mod: 26 | Performed by: RADIOLOGY

## 2025-04-11 PROCEDURE — 77373 STRTCTC BDY RAD THER TX DLVR: CPT | Performed by: RADIOLOGY

## 2025-04-11 NOTE — PROCEDURES
DATE OF SERVICE: 4/11/2025    DIAGNOSIS: Metastatic melanoma       TREATMENT:  Radiation Therapy Episodes       Active Episodes       Radiation Therapy: SBRT (4/7/2025)    Radiation Therapy: SRS (3/1/2023)    Radiation Therapy: SRS (12/19/2022)    Radiation Therapy: SRS (10/31/2022)                   Radiation Treatments         Plan Last Treated On Elapsed Days Fractions Treated Prescribed Fraction Dose (cGy) Prescribed Total Dose (cGy)    RP Node SBRT 4/11/2025 3 @ 04/11/2025 4 of 5 800 4,000                  Reference Point Last Treated On Elapsed Days Most Recent Session Dose (cGy) Total Dose (cGy)    RP Node 4/11/2025 3 @ 04/11/2025 800 3,200                      Historical Treatments (Plans: 4)      Plan Last Treated On Elapsed Days Fractions Treated Prescribed Fraction Dose (cGy) Prescribed Total Dose (cGy)   5 Lesion SRT 11/9/2022 6 @ [id number] 5 of 5 600 3,000   SRT R Pariet 12/23/2022 4 @ [id number] 3 of 3 900 2,700   1 Lesion SRS 3/1/2023 0 @ [id number] 1 of 1 2,200 2,200   5 Lesion AAR 12/16/2022  @  0 of 5 -- --                Reference Point Last Treated On Elapsed Days Most Recent Session Dose (cGy) Total Dose (cGy)   5 Lesion SRT 11/9/2022 6 @ [id number] -- 3,000   5 Lesion SRT CP 11/9/2022 6 @ [id number] -- 3,190   SRT R Pariet 12/23/2022 4 @ [id number] -- 2,700   SRT R Pariet CP 12/23/2022 4 @ [id number] -- 3,125   1 Lesion SRS 3/1/2023 0 @ [id number] -- 2,200   1 Lesion SRS CP 3/1/2023 0 @ [id number] -- 2,619   5 Lesion SRT 12/16/2022  @  -- 0   5 Lesion SRT CP 12/16/2022  @  -- 0                            STEREOTACTIC PROCEDURE NOTE:    Called by [employer] machine to verify treatment parameters including:  treatment site, treatment dose, and treatment setup prior to stereotactic treatment..    Patient was placed in the treatment position with use of immobilization device and  laser guidance. CBCT images were acquired for target localization.  Images were reviewed in the axial,  coronal, and saggital views and shifts were made as necessary to ensure that patient position matched simulation position.      Treatment delivered per  prescription.  The medical physicist was present throughout the set-up, verification and treatment delivery to oversee the procedure and ensure all parameters agreed with the computerized plan.    I have personally reviewed the relevant data, performed the target localization, and determined all relevant factors for this patient’s simulation.

## 2025-04-14 ENCOUNTER — HOSPITAL ENCOUNTER (OUTPATIENT)
Dept: RADIATION ONCOLOGY | Facility: MEDICAL CENTER | Age: 45
End: 2025-04-14

## 2025-04-14 LAB
CHEMOTHERAPY INFUSION START DATE: NORMAL
CHEMOTHERAPY RECORDS: 4000 CGY
CHEMOTHERAPY RECORDS: 8 GY
CHEMOTHERAPY RECORDS: NORMAL
CHEMOTHERAPY RX CANCER: NORMAL
DATE 1ST CHEMO CANCER: NORMAL
RAD ONC ARIA COURSE LAST TREATMENT DATE: NORMAL
RAD ONC ARIA COURSE TREATMENT ELAPSED DAYS: NORMAL
RAD ONC ARIA REFERENCE POINT DOSAGE GIVEN TO DATE: 40 GY
RAD ONC ARIA REFERENCE POINT ID: NORMAL
RAD ONC ARIA REFERENCE POINT SESSION DOSAGE GIVEN: 8 GY

## 2025-04-14 PROCEDURE — 77373 STRTCTC BDY RAD THER TX DLVR: CPT | Performed by: RADIOLOGY

## 2025-04-14 PROCEDURE — 77280 THER RAD SIMULAJ FIELD SMPL: CPT | Performed by: RADIOLOGY

## 2025-04-14 PROCEDURE — 77280 THER RAD SIMULAJ FIELD SMPL: CPT | Mod: 26 | Performed by: RADIOLOGY

## 2025-04-14 NOTE — PROCEDURES
DATE OF SERVICE: 4/14/2025    DIAGNOSIS:metastatic melanoma       TREATMENT:  Radiation Therapy Episodes       Active Episodes       Radiation Therapy: SBRT (4/7/2025)    Radiation Therapy: SRS (3/1/2023)    Radiation Therapy: SRS (12/19/2022)    Radiation Therapy: SRS (10/31/2022)                   Radiation Treatments         Plan Last Treated On Elapsed Days Fractions Treated Prescribed Fraction Dose (cGy) Prescribed Total Dose (cGy)    RP Node SBRT 4/14/2025 6 @ 04/14/2025 5 of 5 800 4,000                  Reference Point Last Treated On Elapsed Days Most Recent Session Dose (cGy) Total Dose (cGy)    RP Node 4/14/2025 6 @ 04/14/2025 800 4,000                      Historical Treatments (Plans: 4)      Plan Last Treated On Elapsed Days Fractions Treated Prescribed Fraction Dose (cGy) Prescribed Total Dose (cGy)   5 Lesion SRT 11/9/2022 6 @ [id number] 5 of 5 600 3,000   SRT R Pariet 12/23/2022 4 @ [id number] 3 of 3 900 2,700   1 Lesion SRS 3/1/2023 0 @ [id number] 1 of 1 2,200 2,200   5 Lesion AAR 12/16/2022  @  0 of 5 -- --                Reference Point Last Treated On Elapsed Days Most Recent Session Dose (cGy) Total Dose (cGy)   5 Lesion SRT 11/9/2022 6 @ [id number] -- 3,000   5 Lesion SRT CP 11/9/2022 6 @ [id number] -- 3,190   SRT R Pariet 12/23/2022 4 @ [id number] -- 2,700   SRT R Pariet CP 12/23/2022 4 @ [id number] -- 3,125   1 Lesion SRS 3/1/2023 0 @ [id number] -- 2,200   1 Lesion SRS CP 3/1/2023 0 @ [id number] -- 2,619   5 Lesion SRT 12/16/2022  @  -- 0   5 Lesion SRT CP 12/16/2022  @  -- 0                            STEREOTACTIC PROCEDURE NOTE:    Called by [employer] machine to verify treatment parameters including:  treatment site, treatment dose, and treatment setup prior to stereotactic treatment..    Patient was placed in the treatment position with use of immobilization device and  laser guidance. CBCT images were acquired for target localization.  Images were reviewed in the axial,  coronal, and saggital views and shifts were made as necessary to ensure that patient position matched simulation position.      Treatment delivered per  prescription.  The medical physicist was present throughout the set-up, verification and treatment delivery to oversee the procedure and ensure all parameters agreed with the computerized plan.    I have personally reviewed the relevant data, performed the target localization, and determined all relevant factors for this patient’s simulation.

## 2025-04-14 NOTE — RADIATION COMPLETION NOTES
END OF TREATMENT SUMMARY    Patient name:  Jacque Mcintyre    Primary Physician:  KAMILA PARK N.P. MRN: [id number]  CSN: [id number]   Referring physician:  No ref. provider found  : 1980, 44 y.o.       TREATMENT SUMMARY:        Course First Treatment Date 2025    Course Last Treatment Date 2025   Course Elapsed Days 6 @ 2025   Course Intent Palliative     Radiation Therapy Episodes       Active Episodes       Radiation Therapy: SBRT (2025)    Radiation Therapy: SRS (3/1/2023)    Radiation Therapy: SRS (2022)    Radiation Therapy: SRS (10/31/2022)                   Radiation Treatments         Plan Last Treated On Elapsed Days Fractions Treated Prescribed Fraction Dose (cGy) Prescribed Total Dose (cGy)    RP Node SBRT 2025 6 @ 2025 5 of 5 800 4,000                  Reference Point Last Treated On Elapsed Days Most Recent Session Dose (cGy) Total Dose (cGy)    RP Node 2025 6 @ 2025 800 4,000                      Historical Treatments (Plans: 4)      Plan Last Treated On Elapsed Days Fractions Treated Prescribed Fraction Dose (cGy) Prescribed Total Dose (cGy)   5 Lesion SRT 2022 6 @ [id number] 5 of 5 600 3,000   SRT R Pariet 2022 4 @ [id number] 3 of 3 900 2,700   1 Lesion SRS 3/1/2023 0 @ [id number] 1 of 1 2,200 2,200   5 Lesion [facility] 2022  @  0 of 5 -- --                Reference Point Last Treated On Elapsed Days Most Recent Session Dose (cGy) Total Dose (cGy)   5 Lesion SRT 2022 6 @ [id number] -- 3,000   5 Lesion SRT CP 2022 6 @ [id number] -- 3,190   SRT R Pariet 2022 4 @ [id number] -- 2,700   SRT R Pariet CP 2022 4 @ [id number] -- 3,125   1 Lesion SRS 3/1/2023 0 @ [id number] -- 2,200   1 Lesion SRS CP 3/1/2023 0 @ [id number] -- 2,619   5 Lesion SRT 2022  @  -- 0   5 Lesion SRT CP 2022  @  -- 0                                     STAGE:   Metastatic melanoma     TREATMENT INDICATION:   Oligo  progressive retroperitoneal node     CONCURRENT SYSTEMIC TREATMENT:   Pending new systemic therapy regimen     RT COURSE DISCONTINUED EARLY:   No     PATIENT EXPERIENCE:       12/21/2022     1:07 PM 11/9/2022    10:59 AM   Toxicity Assessment   Toxicity Assessment Brain Brain   Fatigue (lethargy, malaise, asthenia) Increased fatigue over baseline, but not altering normal activities Increased fatigue over baseline, but not altering normal activities   Radiation Dermatitis None Faint erythema or dry desquamation   Nausea None None   Mouth Dryness Normal    Headache None None   External Auditory Canal Normal Normal   Inner Ear/Hearing Normal    Middle Ear/Hearing Normal Normal   Dizziness/lightheadedness None None   Memory loss Normal Normal   Neuropathy - Motor Normal Normal   Seizure None None   Vertigo None None        FOLLOW-UP PLAN:   6 Weeks     COMMENT:          ANATOMIC TARGET SUMMARY    ANATOMIC TARGET MODALITY TECHNIQUE   RP node   External beam, photons SBRT            COMMENT:         DIAGRAMS:      DOSE VOLUME HISTOGRAMS:

## 2025-04-18 ENCOUNTER — NON-PROVIDER VISIT (OUTPATIENT)
Dept: PALLIATIVE MEDICINE | Facility: HOSPICE | Age: 45
End: 2025-04-18
Payer: MEDICAID

## 2025-04-21 NOTE — PROGRESS NOTES
PALLIATIVE CARE SOCIAL WORK CONSULT NOTE    Patient: Jacque Mcintyre   Age: 44 y.o.  Gender: F  MRN: [id number]    History of Present illness: history of metastatic melanoma    LCSW met with pt in her home for our 3rd visit. Pt was engaged in talk therapy. She reports feeling an increase in anxiety symptoms lately. We explored and discussed topics around self-esteem, boundary setting, adjusting to a new relationship, and how to challenge automatic negative thoughts. Pt reports her     Goals of care: GOC remain the same. Pt prefers to focus on spending time with family and friends and is okay with continuing treatment at this time.    Distressing symptoms: Pt reports she has been feeling pretty well the past few weeks.     [facility]     Eula Butcher LCSW  Outpatient Palliative Care

## 2025-04-28 ENCOUNTER — HOSPITAL ENCOUNTER (OUTPATIENT)
Dept: RADIOLOGY | Facility: MEDICAL CENTER | Age: 45
End: 2025-04-28
Attending: INTERNAL MEDICINE
Payer: MEDICAID

## 2025-04-28 DIAGNOSIS — Z79.01 LONG TERM (CURRENT) USE OF ANTICOAGULANTS: ICD-10-CM

## 2025-04-28 DIAGNOSIS — I95.89 CHRONIC HYPOTENSION: ICD-10-CM

## 2025-04-28 DIAGNOSIS — I26.99 PULMONARY INFARCTION (HCC): ICD-10-CM

## 2025-04-28 DIAGNOSIS — Z79.890 NEED FOR PROPHYLACTIC HORMONE REPLACEMENT THERAPY (POSTMENOPAUSAL): ICD-10-CM

## 2025-04-28 DIAGNOSIS — C77.3 SECONDARY MALIGNANT NEOPLASM OF BRACHIAL LYMPH NODE (HCC): ICD-10-CM

## 2025-04-28 DIAGNOSIS — C78.6 SECONDARY MALIGNANT NEOPLASM OF RETROPERITONEUM AND PERITONEUM (HCC): ICD-10-CM

## 2025-04-28 DIAGNOSIS — I62.9 INTRACRANIAL HEMORRHAGE (HCC): ICD-10-CM

## 2025-04-28 DIAGNOSIS — C43.62 MALIGNANT MELANOMA OF LEFT UPPER EXTREMITY INCLUDING SHOULDER (HCC): ICD-10-CM

## 2025-04-28 DIAGNOSIS — Z51.12 ENCOUNTER FOR ANTINEOPLASTIC IMMUNOTHERAPY: ICD-10-CM

## 2025-04-28 DIAGNOSIS — Z11.59 SCREENING EXAMINATION FOR POLIOMYELITIS: ICD-10-CM

## 2025-04-28 DIAGNOSIS — E86.0 DEHYDRATION: ICD-10-CM

## 2025-04-28 DIAGNOSIS — C79.31 SECONDARY MALIGNANT NEOPLASM OF BRAIN AND SPINAL CORD (HCC): ICD-10-CM

## 2025-04-28 DIAGNOSIS — R21 RASH AND OTHER NONSPECIFIC SKIN ERUPTION: ICD-10-CM

## 2025-04-28 DIAGNOSIS — G89.3 NEOPLASM RELATED PAIN: ICD-10-CM

## 2025-04-28 DIAGNOSIS — L27.0 DERMATITIS MEDICAMENTOSA DUE TO INGESTED DRUGS: ICD-10-CM

## 2025-04-28 DIAGNOSIS — R19.7 DIARRHEA OF PRESUMED INFECTIOUS ORIGIN: ICD-10-CM

## 2025-04-28 DIAGNOSIS — K51.018 ULCERATIVE (CHRONIC) PANCOLITIS WITH OTHER COMPLICATION (HCC): ICD-10-CM

## 2025-04-28 DIAGNOSIS — R68.89 MECHANICAL AND MOTOR PROBLEMS WITH INTERNAL ORGANS: ICD-10-CM

## 2025-04-28 DIAGNOSIS — D63.0 ANEMIA IN NEOPLASTIC DISEASE: ICD-10-CM

## 2025-04-28 DIAGNOSIS — S81.801A WOUND OF RIGHT LEG, INITIAL ENCOUNTER: ICD-10-CM

## 2025-04-28 DIAGNOSIS — C79.49 SECONDARY MALIGNANT NEOPLASM OF BRAIN AND SPINAL CORD (HCC): ICD-10-CM

## 2025-04-28 PROCEDURE — 70450 CT HEAD/BRAIN W/O DYE: CPT

## 2025-05-01 ENCOUNTER — OFF SITE VISIT (OUTPATIENT)
Dept: PALLIATIVE MEDICINE | Facility: HOSPICE | Age: 45
End: 2025-05-01
Payer: MEDICAID

## 2025-05-01 DIAGNOSIS — C43.9 METASTATIC MELANOMA (HCC): ICD-10-CM

## 2025-05-01 DIAGNOSIS — C79.31 MALIGNANT MELANOMA METASTATIC TO BRAIN (HCC): ICD-10-CM

## 2025-05-01 DIAGNOSIS — G89.29 CHRONIC MIDLINE LOW BACK PAIN, UNSPECIFIED WHETHER SCIATICA PRESENT: ICD-10-CM

## 2025-05-01 DIAGNOSIS — I26.99 PULMONARY EMBOLISM, OTHER, UNSPECIFIED CHRONICITY, UNSPECIFIED WHETHER ACUTE COR PULMONALE PRESENT (HCC): ICD-10-CM

## 2025-05-01 DIAGNOSIS — R11.0 NAUSEA: ICD-10-CM

## 2025-05-01 DIAGNOSIS — M54.50 CHRONIC MIDLINE LOW BACK PAIN, UNSPECIFIED WHETHER SCIATICA PRESENT: ICD-10-CM

## 2025-05-01 PROCEDURE — 99349 HOME/RES VST EST MOD MDM 40: CPT | Performed by: NAPRAPATH

## 2025-05-05 PROBLEM — M54.50 CHRONIC MIDLINE LOW BACK PAIN: Status: ACTIVE | Noted: 2025-05-05

## 2025-05-05 PROBLEM — K52.9 GASTROENTERITIS: Status: RESOLVED | Noted: 2022-05-19 | Resolved: 2025-05-05

## 2025-05-05 PROBLEM — G89.29 CHRONIC MIDLINE LOW BACK PAIN: Status: ACTIVE | Noted: 2025-05-05

## 2025-05-05 ASSESSMENT — ENCOUNTER SYMPTOMS
RESPIRATORY NEGATIVE: 1
TINGLING: 1
CARDIOVASCULAR NEGATIVE: 1
BACK PAIN: 1
DEPRESSION: 1
NERVOUS/ANXIOUS: 1
EYES NEGATIVE: 1
NAUSEA: 1

## 2025-05-05 NOTE — PROGRESS NOTES
In Home Palliative Care  Jacque Mcintyre  44 y.o.   female   [id number]   KAMILA PARK N.P.   Referral Source: Nikki Tobin P.A.-C   Location: In Home     Reason for palliative medicine consultation and/or visit:  Goals of Care and Symptom Management  Summary: Jacque is responding to current immunotherapy without adverse effects. She still struggles with nausea, fatigue, and chronic back pain.   Primary diagnosis: Metastatic melanoma  Palliative Performance Scale: 70    Assessment/Plan with Shared Decision Making:   Metastatic melanoma (HCC)  Initial diagnosis in 2017 with subsequent brain, lung and bowel metastasis. See HPI for oncology and surgical history. Pt now on Opdualog with tumor regression and no adverse effects from therapy. Oncologist is Dr. Reed. She received 5 radiation therapy treatments to concerning left retroperitoneal node with no AEs. Recent brain MRI revealed subacute hemorrhage in right occipital lobe that is asymptomatic and not associated with tumor growth. F/U MRI planned in 3 months.     Pulmonary embolism (HCC)  Pt was on Eliquis (apixaban) for PMHx of PE. Eliquis currently on hold pending f/u brain MRI in 3 months to re-evaluate subacute hemorrhage noted in right occipital lobe.    Nausea  Jacque continues to have low grade nausea that started with onset of immunotherapy-related colitis many months ago.  She was vomiting, but has not vomited for months now. She manages nausea with zofran sublingual and sticking to foods that she tolerates. She takes Senokot and colace as needed to prevent constipation.    Recommendation: Consider low dose olanzapine (if ok with psychiatry) if current regimen becomes ineffective.    Chronic midline low back pain  Pt with history of chronic low back pain r/t L5/S1 spondylolisthesis. Current pain regimen includes heating pads, resting, Tylenol prn, duloxetine 60 mg, gabapentin 800 mg QID, meloxicam 15 mg and oxycodone 10 mg q 6 hour PRN. Patient not  comfortable with long-acting opioids per discussion with her oncologist concerns for pain identification while on immunotherapy.     Recommendations:  Jacque is working with Eula Butcher [facility] for talk therapy that may also help with neuroplastic-associated pain.  Encourage Jacque to engage in physical activity daily at a level she can tolerate.  Consider switching from gabapentin to Lyrica, if Jacque open to this.   Provided Jacque with neuroplastic pain resources that she is now looking into for possibly other tools to help with pain management.        HPI   Jacque Mcintyre is a 44 y.o. female with history of metastatic melanoma diagnosed in 2017 on left hand, new primary melanoma on right buttock in 2018, brain and lung metastasis in 2022, status post surgical resection of brain mets, radiation therapy to brain and immunotherapy. In November 2024, patient developed anemia and CT scan showed 11 cm mass in the small bowel, status post urgent resection on 11/22/2024. She also developed colitis on dabrafenib and now has switched to Opdualag (nivolumab and relatlimab) immunotherapy with Dr. Reed.      Additional Pertinent Medical History:Type 2 diabetes with peripheral neuropathy, chronic low back pain secondary to L5/S1 spondylolisthesis, 50 pound weight loss over the past year, now stabilized since bowel excision surgery. Patient also has history of depression, anxiety and bipolar 1 disorder.      Pain Evaluation  Pain History: Chronic low back pain  Onset: Many years ago  Location: L5/S1 listhesis, DJD  Duration: Chronic, ongoing  Characteristics:aching, sore, nonradiating   Aggravating factors:   Alleviating factors: No relief with heat, lidocaine patches, baclofen.  Fentanyl patches have worked well in the past, oncologist Dr. Reed not recommending with immunotherapy as may mask pertinent pain signals.  Radiation: None  Treatments: Meloxicam 15 mg daily, Oxycodone 10-15 mg every 6 hours as needed, Rx'd per Dr. Reed.   Patient was also most recently on MS Contin 30 mg every 12 hours as Rx'd in postop setting with IP palliative care, but patient has since discontinued this.  Patient also takes gabapentin 800 mg 4 times daily, mainly for PNP.  Severity: Tolerable with oxycodone PRN, pt also lays back in bed on her heating pad when her pain flairs up, but this does take away from her being able to do more in her day.     Past Medical History:   Diagnosis Date    Asthma     inhaler    Bipolar 1 disorder (HCC)     Cancer (HCC) 01/01/2016    melanoma    Cough     Depression     DM mellitus,     insulin    Hyperlipidemia     Hypertension     Pap smear     Dr. Baird    PCOS (polycystic ovarian syndrome)     Pulmonary embolism ([facility])     Shortness of breath     Sputum production     Wheezing         Medications:   Current Outpatient Medications on File Prior to Visit   Medication Sig Dispense Refill    Naloxone (NARCAN) 4 MG/0.1ML Liquid Administer 4 mg into affected nostril(S) one time as needed (One spray in one nostril for overdose and call 911) for up to 1 dose. 2 Each 0    ferrous sulfate 325 (65 Fe) MG tablet Take 325 mg by mouth every Monday, Wednesday, and Friday.      albuterol (PROVENTIL) 2.5mg/0.5ml Nebu Soln Take 2.5 mg by nebulization every four hours as needed for Shortness of Breath.      ELIQUIS 5 MG Tab Take 5 mg by mouth 2 times a day.      fluticasone-umeclidinium-vilanterol (TRELEGY ELLIPTA) 200-62.5-25 mcg/act inhaler Inhale 1 Puff every day.      insulin glargine 100 UNIT/ML SC SOLN Inject 20 Units under the skin every evening.      traZODone (DESYREL) 100 MG Tab Take 100 mg by mouth every evening.      VENTOLIN  (90 Base) MCG/ACT Aero Soln inhalation aerosol Inhale 1-2 Puffs every four hours as needed for Shortness of Breath.      meloxicam (MOBIC) 15 MG tablet Take 15 mg by mouth every day.      atorvastatin (LIPITOR) 40 MG Tab Take 1 Tablet by mouth every evening. 30 Tablet 2    DULoxetine (CYMBALTA) 60 MG Cap  DR Particles delayed-release capsule Take 1 Capsule by mouth every evening. 30 Capsule 2    gabapentin (NEURONTIN) 800 MG tablet Take 1 Tablet by mouth 4 times a day. 90 Tablet 2    OXcarbazepine (TRILEPTAL) 300 MG Tab Take 1 Tablet by mouth 2 times a day. 60 Tablet 2    QUEtiapine (SEROQUEL) 200 MG Tab Take 1 Tablet by mouth every evening. 60 Tablet 2    acetaminophen (TYLENOL) 500 MG Tab Take 500-1,000 mg by mouth every 6 hours as needed. Indications: Pain      clonazepam (KLONOPIN) 2 MG tablet Take 1 mg by mouth 2 times a day. 1 mg = 1/2 tablet - scheduled       No current facility-administered medications on file prior to visit.      Allergies:Lactose and Augmentin [amoxicillin-pot clavulanate]   Review of Systems: Review of Systems   Constitutional:  Positive for malaise/fatigue.   HENT: Negative.     Eyes: Negative.    Respiratory: Negative.     Cardiovascular: Negative.    Gastrointestinal:  Positive for nausea.   Genitourinary: Negative.    Musculoskeletal:  Positive for back pain.   Neurological:  Positive for tingling.        PNP in hands and feet, chronic.   Psychiatric/Behavioral:  Positive for depression. The patient is nervous/anxious.         Social aspects of care: Jacque has very good family support with her mother whom she lives with and one adult son who lives in [place] and another adult son who is in the [facility] in [place]. She has been getting out with friends for limited social events and is looking forward to attending a friend's non-local wedding virtually at home with some of her local friends.   Psychological aspects of care: Jacque sees psychiatry for management of bipolar 1 disorder with depression and anxiety. She is now working with Eula Butcher LCSW for talk therapy and reports these sessions have been helpful and will continue with them.     Spiritual aspects of care: Patient is spiritual but is not Zoroastrianism     Physical Exam: Physical Exam  HENT:      Head: Normocephalic and  atraumatic.   Neurological:      Mental Status: She is alert and oriented to person, place, and time.   Psychiatric:         Mood and Affect: Mood normal.         Behavior: Behavior normal.         Thought Content: Thought content normal.         Judgment: Judgment normal.          Goals of Care/Serious Illness Conversation  The patient and/or legal decision maker has provided voluntary consent to discuss advance care planning. Patient understands she has overall poor prognosis with metastatic melanoma, however, she intends to continue treatment as long as this will keep her disease under control and allow her to keep enjoying life with her family.     Code Status: Full Code  ACP Documents: Patient has a POLST selecting full code, full treatment, feeding tube for a period.  Patient also has advanced directive, selections for #2, #3, #5 and naming Samantha Plummer as her primary healthcare agent and  Guillermina Stephany as her alternative healthcare agent.  POLST and advanced directive are both scanned into Epic.  Thank you for allowing me the opportunity to participate in the care of Jacque Mcintyre .   I spent a total of 40 minutes reviewing medical records, direct face-to-face time with the patient and/or family, documentation and coordination of care. This is separate from the time spent on advance care planning, which is documented above.      Emelia Cristina, MSN, APRN, AGPCNP   In Home Palliative Medicine  P: [phone]

## 2025-05-05 NOTE — ASSESSMENT & PLAN NOTE
Initial diagnosis in 2017 with subsequent brain, lung and bowel metastasis. See HPI for oncology and surgical history. Pt now on Opdualog with tumor regression and no adverse effects from therapy. Oncologist is Dr. Reed. She received 5 radiation therapy treatments to concerning left retroperitoneal node with no AEs. Recent brain MRI revealed subacute hemorrhage in right occipital lobe that is asymptomatic and not associated with tumor growth. F/U MRI planned in 3 months.   
Initial diagnosis in 2017 with subsequent brain, lung and bowel metastasis. See HPI for oncology and surgical history. Pt now on Opdualog with tumor regression and no adverse effects from therapy. She received 5 radiation therapy treatments to concerning left retroperitoneal node with no AEs. Recent brain MRI revealed subacute hemorrhage in right occipital lobe that is asymptomatic and not associated with tumor growth. F/U MRI planned in 3 months.   
Jacque continues to have low grade nausea that started with onset of immunotherapy-related colitis many months ago.  She was vomiting, but has not vomited for months now. She manages nausea with zofran sublingual and sticking to foods that she tolerates. She takes Senokot and colace as needed to prevent constipation.    Recommendation: Consider low dose olanzapine (if ok with psychiatry) if current regimen becomes ineffective.  
Pt was on Eliquis (apixaban) for PMHx of PE. Eliquis currently on hold pending f/u brain MRI in 3 months to re-evaluate subacute hemorrhage noted in right occipital lobe.  
Pt with history of chronic low back pain r/t L5/S1 spondylolisthesis. Current pain regimen includes heating pads, resting, Tylenol prn, duloxetine 60 mg, gabapentin 800 mg QID, meloxicam 15 mg and oxycodone 10 mg q 6 hour PRN. Patient not comfortable with long-acting opioids per discussion with her oncologist concerns for pain identification while on immunotherapy.     Recommendations:  Jacque is working with Eula Butcher [facility] for talk therapy that may also help with neuroplastic-associated pain.  Encourage Jacque to engage in physical activity daily at a level she can tolerate.  Consider switching from gabapentin to Lyrica, if Jacque open to this.   Provided Jacque with neuroplastic pain resources that she is now looking into for possibly other tools to help with pain management.   
show

## 2025-05-06 ENCOUNTER — OFFICE VISIT (OUTPATIENT)
Dept: INTERNAL MEDICINE | Facility: OTHER | Age: 45
End: 2025-05-06
Payer: MEDICAID

## 2025-05-06 ENCOUNTER — TELEPHONE (OUTPATIENT)
Dept: INTERNAL MEDICINE | Facility: OTHER | Age: 45
End: 2025-05-06

## 2025-05-06 VITALS
WEIGHT: 123.2 LBS | OXYGEN SATURATION: 98 % | HEART RATE: 87 BPM | TEMPERATURE: 97.8 F | SYSTOLIC BLOOD PRESSURE: 107 MMHG | DIASTOLIC BLOOD PRESSURE: 73 MMHG | BODY MASS INDEX: 21.03 KG/M2 | HEIGHT: 64 IN

## 2025-05-06 DIAGNOSIS — L70.0 ACNE VULGARIS: ICD-10-CM

## 2025-05-06 DIAGNOSIS — L40.9 PSORIASIS: ICD-10-CM

## 2025-05-06 DIAGNOSIS — L81.4 LENTIGO: ICD-10-CM

## 2025-05-06 DIAGNOSIS — L40.9 PSORIASIS: Primary | ICD-10-CM

## 2025-05-06 DIAGNOSIS — D18.01 HEMANGIOMA OF SKIN: ICD-10-CM

## 2025-05-06 DIAGNOSIS — Z85.820 HISTORY OF MELANOMA: ICD-10-CM

## 2025-05-06 PROCEDURE — 3078F DIAST BP <80 MM HG: CPT | Performed by: DERMATOLOGY

## 2025-05-06 PROCEDURE — 99214 OFFICE O/P EST MOD 30 MIN: CPT | Performed by: DERMATOLOGY

## 2025-05-06 PROCEDURE — 3074F SYST BP LT 130 MM HG: CPT | Performed by: DERMATOLOGY

## 2025-05-06 RX ORDER — HALOBETASOL PROPIONATE AND TAZAROTENE .1; .45 MG/G; MG/G
LOTION TOPICAL
Qty: 100 G | Refills: 3 | Status: SHIPPED | OUTPATIENT
Start: 2025-05-06

## 2025-05-06 ASSESSMENT — FIBROSIS 4 INDEX: FIB4 SCORE: 0.73

## 2025-05-06 NOTE — PROGRESS NOTES
"Jacque Mcintyre  1980  [id number]    Follow up             Vitals:    05/06/25 0954   BP: 107/73   BP Location: Left arm   Patient Position: Sitting   BP Cuff Size: Small adult   Pulse: 87   Temp: 36.6 °C (97.8 °F)   TempSrc: Temporal   SpO2: 98%   Weight: 55.9 kg (123 lb 3.2 oz)   Height: 1.626 m (5' 4\")       Chief Complaint   Patient presents with    Follow-Up     ___________________________________________________________________            History of Present Illness (HPI) here for skin check  Last visit 6 months ago.   No new spots of concern    Needs refill on duobrii for psoriasis on arms and hands.     H/o melanoma on L hand about 2017 and then metastatic in past few years.  Pt now on opdualog combo (nivolumab and relatlimab-rmbw) followed by Dr. Reed  Melanoma on R buttocks around 2018 treated with wide excision.          Current Outpatient Medications on File Prior to Visit   Medication Sig Dispense Refill    Naloxone (NARCAN) 4 MG/0.1ML Liquid Administer 4 mg into affected nostril(S) one time as needed (One spray in one nostril for overdose and call 911) for up to 1 dose. 2 Each 0    ferrous sulfate 325 (65 Fe) MG tablet Take 325 mg by mouth every Monday, Wednesday, and Friday.      albuterol (PROVENTIL) 2.5mg/0.5ml Nebu Soln Take 2.5 mg by nebulization every four hours as needed for Shortness of Breath.      fluticasone-umeclidinium-vilanterol (TRELEGY ELLIPTA) 200-62.5-25 mcg/act inhaler Inhale 1 Puff every day.      insulin glargine 100 UNIT/ML SC SOLN Inject 20 Units under the skin every evening.      traZODone (DESYREL) 100 MG Tab Take 100 mg by mouth every evening.      VENTOLIN  (90 Base) MCG/ACT Aero Soln inhalation aerosol Inhale 1-2 Puffs every four hours as needed for Shortness of Breath.      meloxicam (MOBIC) 15 MG tablet Take 15 mg by mouth every day.      atorvastatin (LIPITOR) 40 MG Tab Take 1 Tablet by mouth every evening. 30 Tablet 2    DULoxetine (CYMBALTA) 60 MG " Cap DR Particles delayed-release capsule Take 1 Capsule by mouth every evening. 30 Capsule 2    gabapentin (NEURONTIN) 800 MG tablet Take 1 Tablet by mouth 4 times a day. 90 Tablet 2    OXcarbazepine (TRILEPTAL) 300 MG Tab Take 1 Tablet by mouth 2 times a day. 60 Tablet 2    QUEtiapine (SEROQUEL) 200 MG Tab Take 1 Tablet by mouth every evening. 60 Tablet 2    acetaminophen (TYLENOL) 500 MG Tab Take 500-1,000 mg by mouth every 6 hours as needed. Indications: Pain      clonazepam (KLONOPIN) 2 MG tablet Take 1 mg by mouth 2 times a day. 1 mg = 1/2 tablet - scheduled      ELIQUIS 5 MG Tab Take 5 mg by mouth 2 times a day.       No current facility-administered medications on file prior to visit.     Lactose and Augmentin [amoxicillin-pot clavulanate]      Review of Systems:        General: Denies fever      Hematologic: no excessive bleeding problems              Past Medical History:   Diagnosis Date    Asthma     inhaler    Bipolar 1 disorder (HCC)     Cancer (HCC) 01/01/2016    melanoma    Cough     Depression     DM mellitus,     insulin    Hyperlipidemia     Hypertension     Pap smear     Dr. Baird    PCOS (polycystic ovarian syndrome)     Pulmonary embolism (HCC)     Shortness of breath     Sputum production     Wheezing      Skin Cancer   H/o metastatic melanoma Stage 4  H/o melanoma L hand s/p adjuvant ipi in 2017, metastatic disease in body/brain in 2022 s/p ipi/nivo, nivo stopped mid 2023 due to diarrhea. BRAF+. Review external PET/CT 10/31/23 for extent of disease, ?small bowel met which may be bleeding.     Currently on opdualog - nivolumab and relatlimab-rmbw    Melanoma on R buttocks wide local excision in 2018         Social History     Tobacco Use    Smoking status: Every Day     Current packs/day: 0.75     Average packs/day: 0.8 packs/day for 10.0 years (7.5 ttl pk-yrs)     Types: Cigarettes    Smokeless tobacco: Never   Vaping Use    Vaping status: Never Used   Substance Use Topics    Alcohol use: Not  Currently    Drug use: Yes     Types: Oral, Marijuana     Comment: lizetteanilanna gummies     Sunscreen Use:Yes    Past Surgical History:   Procedure Laterality Date    RI EXPLORATORY OF ABDOMEN N/A 11/22/2024    Procedure: LAPAROTOMY, EXPLORATORY, SMALL BOWEL RESECTION;  Surgeon: Tulio Cummings M.D.;  Location: [facility];  Service: General    CRANIOTOMY STEALTH Right 10/6/2022    Procedure: RIGHT FRONTAL CRANIOTOMY WITH STEALTH;  Surgeon: Pebbles Tejada M.D.;  Location: [facility];  Service: Neurosurgery    RI BRONCHOSCOPY,DIAGNOSTIC N/A 6/17/2022    Procedure: FIBER OPTIC BRONCHOSCOPY WITH WASH, BRUSH, BRONCHOALVEOLAR LAVAGE, BIOPSY, FINE NEEDLE ASPIRATION;  Surgeon: Yue Swanson M.D.;  Location: [facility];  Service: Pulmonary    ENDOBRONCHIAL US ADD-ON N/A 6/17/2022    Procedure: ENDOBRONCHIAL ULTRASOUND (EBUS);  Surgeon: Yue Swanson M.D.;  Location: [facility];  Service: Pulmonary    RI LAP,APPENDECTOMY N/A 10/7/2019    Procedure: APPENDECTOMY, LAPAROSCOPIC;  Surgeon: Lionel Frazier M.D.;  Location: [facility];  Service: General    AXILLARY NODE DISSECTION Left 6/23/2017    Procedure: AXILLARY NODE DISSECTION- COMPLETION LYPHADENECTOMY;  Surgeon: Lionel Weinberg M.D.;  Location: [facility];  Service:     WIDE EXCISION Left 5/9/2017    Procedure: WIDE EXCISION - MALIGNANT MELANOMA HAND;  Surgeon: Lionel Weinberg M.D.;  Location: SURGERY SAME DAY [facility];  Service:     NODE BIOPSY SENTINEL Left 5/9/2017    Procedure: NODE BIOPSY SENTINEL - AXILLARY;  Surgeon: Lionel Weinberg M.D.;  Location: SURGERY SAME DAY [facility];  Service:     OTHER  2016    excisino of melanoma left hand    ADENOIDECTOMY      MYRINGOTOMY      with tube placement           Family History   Problem Relation Age of Onset    Psychiatric Illness Mother         depression    Diabetes Mother     Hyperlipidemia Mother     Thyroid Mother         s/p  radioactive iodine treatment on replacement    Hypertension Father     Diabetes Father          Physical Exam:   Constitutional: Well nourished, NAD, pleasant  alert and cooperative; normal mood and affect; normal attention span and concentration.    Skin   Full body exam done: no suspicious lesions on eyelids, lips, face, ears, neck, chest, back, abdomen, upper extremities, fingernails, lower extremities except as noted   Face several brown macs  Chin rare acneiform paps    B/l elbows and arms and hands mild psoriasiform plaques  Abdomen and chest few red vascular paps  L lateral hand 5th finger side linear scar  No L axillary nodes felt      R post ankle about 5mm brown mac  R buttocks mid lateral diagonal surgical scar  L buttocks about 5 mm brown mac    Few scattered brown macs on arms and legs   Several shave biopsy scars on back, arms                Assessment and Plan:   1. Hemangioma of skin, stable   Explained spot is a blood vessel. Recommend follow for changes. ABCD's of changing skin lesions were reviewed, and the appropriate use of sunscreen was outlined and recommended.      2. Lentigo, stable   Recommend follow for changes. ABCD's of changing skin lesions were reviewed, and the appropriate use of sunscreen was outlined and recommended.     3. History of melanoma     H/o metastatic melanoma Stage 4  H/o melanoma L hand s/p adjuvant ipi in 2017, metastatic disease in body/brain in 2022 s/p ipi/nivo, nivo stopped mid 2023 due to diarrhea. BRAF+. Review external PET/CT 10/31/23 for extent of disease, ?small bowel met which may be bleeding.     Melanoma on R buttocks wide local excision in 2018    Follow with skin check every 6 months.      4. Psoriasis  Well controlled on duobrii. Refilled today.   - Halobetasol Prop-Tazarotene (DUOBRII) 0.01-0.045 % Lotion; Apply to affected area once a day, topical steroid and vitamin A combo for psoriasis  Dispense: 100 g; Refill: 3      5. Nevus, stable  Pt with  several nevi on arms, legs, body. Recommend follow for changes. ABCD's of changing skin lesions were reviewed, and the appropriate use of sunscreen was outlined and recommended.     6. Acne vulgaris  Pt has just started having periods again after treatments for melanoma. Pt tends to get acne with periods. Try OTC benzoyl peroxide. Pt's mom hit menopause around 50 years old. Pt to look into endometrial ablation for heavy periods.       Estrella Joshi M.D.   Return in about 6 months (around 11/6/2025).

## 2025-05-06 NOTE — TELEPHONE ENCOUNTER
DOCUMENTATION OF PAR STATUS:    1. Name of Medication & Dose: Duobrii     2. Name of Prescription Coverage Company & phone #: Connie [employer]UI[employer]    3. Date Prior Auth Submitted: 5-6-25    4. What information was given to obtain insurance decision? Notes    5. Prior Auth Status? Pending    6. Patient Notified: N\A

## 2025-05-07 NOTE — TELEPHONE ENCOUNTER
FINAL PRIOR AUTHORIZATION STATUS:    1.  Name of Medication & Dose: Duobrii     2. Prior Auth Status: Denied.  Reason: Denial letter scanned into chart     3. Action Taken: Pharmacy Notified: yes Patient Notified: no

## 2025-05-08 NOTE — TELEPHONE ENCOUNTER
Patient needs to try a topical generic corticosteroid and one topical generic tazarotene cream or gel at the same time first and fail it, before Duobrii can be approved.

## 2025-05-16 ENCOUNTER — NON-PROVIDER VISIT (OUTPATIENT)
Dept: PALLIATIVE MEDICINE | Facility: HOSPICE | Age: 45
End: 2025-05-16
Payer: MEDICAID

## 2025-05-16 ENCOUNTER — TELEPHONE (OUTPATIENT)
Dept: INTERNAL MEDICINE | Facility: OTHER | Age: 45
End: 2025-05-16
Payer: MEDICAID

## 2025-05-16 RX ORDER — HALOBETASOL PROPIONATE 0.5 MG/G
CREAM TOPICAL
Qty: 50 G | Refills: 3 | Status: SHIPPED | OUTPATIENT
Start: 2025-05-16

## 2025-05-16 RX ORDER — TAZAROTENE 0.5 MG/G
CREAM TOPICAL
Qty: 60 G | Refills: 3 | Status: SHIPPED | OUTPATIENT
Start: 2025-05-16

## 2025-05-16 NOTE — TELEPHONE ENCOUNTER
FINAL PRIOR AUTHORIZATION STATUS:    1.  Name of Medication & Dose: Tazarotene      2. Prior Auth Status: Approved through 9-16-38-5-15-25     3. Action Taken: Pharmacy Notified: yes Patient Notified: yes, via [place]

## 2025-05-16 NOTE — TELEPHONE ENCOUNTER
DOCUMENTATION OF PAR STATUS:    1. Name of Medication & Dose: Tazorotene     2. Name of Prescription Coverage Company & phone #: Connie RUI[employer]D    3. Date Prior Auth Submitted: 5-16-25    4. What information was given to obtain insurance decision? Notes    5. Prior Auth Status? Pending    6. Patient Notified: N\A

## 2025-05-16 NOTE — TELEPHONE ENCOUNTER
I spoke to patient and she said she is okay with trying tazarotene for step therapy.    In the past, with [facility], patient was tried and failed clobetasol, triamcinolone  and calcipotriene. Informed pt to try tazarotene first and in 30 days, if it does not work, to call us to resubmit for duobrii.

## 2025-05-16 NOTE — PROGRESS NOTES
"[facility]    LCSW met with pt in her home. She reports that this past week, she has felt \"happy\" due to getting a new dog. She has been enjoying going on walks. During our session, we also focused on sense of losing control over certain aspects of her life due to her cancer diagnosis, and fears around what she may miss out on in the future. We explored relational dynamics within her family, and discussed DBT concept of black and white thinking. We will meet again in two weeks.    Eula Butcher LCSW  [facility]      "

## 2025-05-16 NOTE — TELEPHONE ENCOUNTER
I sent prescription for halobetasol steroid and tazarotene cream as separate prescriptions. Please call pt.

## 2025-05-19 ASSESSMENT — LIFESTYLE VARIABLES
SMOKING_YEARS: 12
TOBACCO_USE: YES
SMOKING_STATUS: YES

## 2025-05-22 ENCOUNTER — HOSPITAL ENCOUNTER (OUTPATIENT)
Dept: RADIATION ONCOLOGY | Facility: MEDICAL CENTER | Age: 45
End: 2025-05-22
Attending: RADIOLOGY
Payer: MEDICAID

## 2025-05-22 NOTE — PROGRESS NOTES
This visit is being conducted by telephone. This telephone visit was initiated by the patient and they verbally consented.  Patient at home in [facility].      Reason for Call: Posttreatment follow-up    Treatment History:     Radiation Oncology          4/11/2025 4/14/2025   [facility] Course Treatment Dates   Course First Treatment Date 04/08/2025 04/08/2025    Course Last Treatment Date 04/11/2025 04/14/2025    [facility]ria Treatment Summary   RP Node SBRT  Plan from Course C4 RP node   Fraction 4 of 5 5 of 5   Elapsed Course Days 3 @ 04/11/2025 6 @ 04/14/2025   Prescribed Fraction Dose 800 cGy 800 cGy   Prescribed Total Dose 4,000 cGy 4,000 cGy   RP Node  Reference Point from Course C4 RP node   Elapsed Course Days 3 @ 04/11/2025 6 @ 04/14/2025   Session Dose 800 cGy 800 cGy   Total Dose 3,200 cGy 4,000 cGy        HPI:    Subjective    Jacque Mcintyre is a 44 y.o. female presenting for evaluation and management of:     This is a 42-year-old lady with metastatic BRAF mutant malignant melanoma, stage IV, with multiple brain lesions as well as systemic disease, who had seen approximately 3 months ago initially for consideration of radiation therapy.  At that time, she underwent craniotomy and resection of her larger dominant lesions, and was treated with postoperative fractionated stereotactic radiation.  Thereafter, she was discovered to have several additional lesions and completed a second round of SRS as below.  She now continues on immunotherapy with medical oncology.     02/02/23- We are having a telephone follow-up shortly after completion of her recent course of stereotactic radiation.  She is doing tremendously well.  She is on immunotherapy now, and continues on Nivo/Ipi.  She feels quite well, has no major sensory or motor problems, no headaches, nausea or vomiting, or systemic complaints.  A follow-up MRI and CT chest abdomen pelvis has been ordered by medical oncology to be done in the next few weeks.  Her  energy level is good.  Her hair has slowly grown back.  Overall, she feels quite good.     2/22/23 she continues to do relatively well.  She continues on her immunotherapy which she is tolerating without any major issues.  She had a follow-up MRI and a CT chest abdomen pelvis completed last week.  The systemic scan shows an excellent response in most of her disease is responding or stable.  However her brain MRI shows one small new 6 mm lesion that is definitively new from her prior MRI.  The remainder of her previously treated disease appears to have responded well.  She now comes to discuss stereotactic radiosurgery for this new lesion.  Symptomatically, she is doing well, she has regained nearly complete neurologic function, she is walking, she has no fine motor problems, no problems with balance, no headaches, dizziness, nausea, vomiting.     4/17/2023  She is now status post SRS to an additional lesion that was completed 6 weeks ago.  She continues to do well.  She has no specific CNS related side effects.  She continues on immunotherapy without any major problems.  No new neurologic problems, headaches, dizziness, nausea or vomiting.     6/5/23:  She comes today to review her brain MRI and for ongoing follow-up.  She is doing tremendously well.  She reports that she has intermittent nausea that appears to be well controlled, but otherwise has no headaches, dizziness or balance difficulties, or vomiting.  She continues on immunotherapy.  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     9/11/23 from a CNS standpoint, she continues to do well.  She has no CNS related complaints today, including headaches, nausea, vomiting, weakness or numbness.  She is walking independently now for several months.  She has returned back to work.  Unfortunately, systemically, she experienced colitis about 2 months ago that has been attributed to an immune related side effect.  She has been off all  immunotherapy since July 6.  She has had multiple trials of steroids and unfortunately has not resolved, and she has a follow-up with GI today.  She sees medical oncology later this week to discuss further systemic therapy options.      11/13/2023 we are seeing Ms. Johnson today for ongoing follow-up.  Since I last saw her, she had a PET scan performed by medical oncology.  I reviewed the report, images not currently available today, that unfortunately shows progression of disease with a left abdominal lymph node metastasis near the kidney measuring about 3.3 cm, as well as another lesion associated with a small bowel loop in the left lower abdomen now measuring about 5.4 cm.  Unfortunately, she is having significant immune related colitis, and has been off immunotherapy for several months.  She has had trials with steroids, but she continues to have diarrhea at least 4-5 times a day.  This is her main problem now, and the plan is likely to transition her to BRAF directed therapy.  She has a follow-up with Dr. Reed in the next few days.  With regards to CNS, she had a follow-up MRI performed a few days ago that I reviewed with Dr. Tejada today in clinic, and essentially shows stable findings.     2/21/23 she is doing relatively well.  She had a follow-up brain MRI that we are reviewing today.  She has started BRAF directed therapy with Braftovi and Mektovi, but had difficulty tolerating the Mektovi after 2 challenges, and is now on single agent Braftovi.  She seems to be tolerating this well.  She had follow-up CT chest abdomen pelvis done also that I reviewed personally.  She has no CNS related complaints.  She still feels somewhat weak and deconditioned and is trying to work through this.  Socially, she lives with her parents and this has become somewhat stressful with trying to think about whether she should move out or whether she will be able to work or have enough money to ultimately sustain that.        5/30/2024 she is doing quite well now.  She has switched therapy to dabrafenib and trametinib and is tolerating this well.  She had a follow-up brain MRI and systemic imaging performed this month.  The MRI shows essentially stable findings no new concerning findings.  She has no headaches or CNS symptoms.  The systemic imaging shows a decrease in the size of her left central mesenteric mass.       9/6/24 Tamra is doing very well.  She has no major neurologic complaints.  She had a hard time tolerating combined therapy and so she continues on dabrafenib alone.  She is doing well with follow-up with medical oncology.  She had a follow-up brain MRI performed that shows stable findings, with still a stable left parietal metastasis though this has been treated several months ago.  She has no headaches or CNS symptoms.  Her systemic imaging is also relatively encouraging with actually significant improvement in her disease burden as of her last PET scan on June 24.     12/16/24 she is doing well.  Shortly after our last follow-up, she ended up in the hospital with progressively worsening nausea and difficulty eating towards the end of November.  She was found to have a large small bowel obstruction with what appeared to be a mass that was causing significant obstructive symptoms.  She underwent a resection of this and pathology confirmed metastatic melanoma.  She is now doing much better, able to eat, nausea is resolved, weight is stabilizing.  She has a plan for follow-up with medical oncology and plans to start Opdualag soon.  She had a brain MRI performed a few days ago that I reviewed personally, shows stable findings, no concerning lesions currently.     3/21/25 she is doing quite well.  No major complaints currently, except ongoing fatigue.  No CNS related complaints.  Had a follow-up MRI done that shows a new area of a subacute hemorrhage in the left posterior right occipital lobe, no other lesions.  There is  minimal enhancement and edema around this hemorrhage.  She had a follow-up PET done that shows overall favorable response with 1 site in the left retroperitoneum that has a node with increased activity, the remainder of her disease actually is responded.  She continues on immunotherapy with medical oncology.  She is off anticoagulation.        Interval History:   5/22/2025 she is doing very well.  She essentially had no acute or long-term toxicities from her most recent course of radiation for her retroperitoneal node.  She continues on her immunotherapy.  She is tolerating this well.  She will follow-up with medical oncology.  She already has her next brain MRI scheduled for ongoing follow-up with us.    Labs / Images Reviewed:   CT-HEAD W/O  Result Date: 4/28/2025  1.  The recently demonstrated 2.2 cm hemorrhage within the posterior left parietal occipital lobe has partially cleared with minimal residual hemorrhage and associated edema demonstrated. 2.  The previously demonstrated multiple metastatic lesions are no longer identified. Possible treated metastases within the right parietal convexity in the right occipital lobe. 3.  No hydrocephalus. 4.  No new acute hemorrhage.       Assessment:     Metastatic melanoma    Cancer Status:   Therapy Completed, Status Pending Restaging Work-up    Plan:   Jacque is doing well.  We will plan to see her at her next routine follow-up to review her brain imaging.  Systemic imaging as per medical oncology.    Time Spent on Call: 5 minutes      Time Spent in Preparation: 5 minutes    Total Time Spent: Minutes    Judy August M.D.

## 2025-05-30 ENCOUNTER — NON-PROVIDER VISIT (OUTPATIENT)
Dept: PALLIATIVE MEDICINE | Facility: HOSPICE | Age: 45
End: 2025-05-30
Payer: MEDICAID

## 2025-05-30 NOTE — NON-PROVIDER
"[facility]    LCSW met with pt in her home. We engaged in solution focused and CBT based talk therapy. We focused on topics such as \"people pleasing\" behaviors, thought checking, and anger vs assertion. We will meet again in 2 weeks.    Eula Butcher, VAMSHIW  [facility]       "

## 2025-06-13 ENCOUNTER — NON-PROVIDER VISIT (OUTPATIENT)
Dept: PALLIATIVE MEDICINE | Facility: HOSPICE | Age: 45
End: 2025-06-13
Payer: MEDICAID

## 2025-06-23 ENCOUNTER — HOSPITAL ENCOUNTER (OUTPATIENT)
Dept: RADIOLOGY | Facility: MEDICAL CENTER | Age: 45
End: 2025-06-23
Attending: RADIOLOGY
Payer: MEDICAID

## 2025-06-23 DIAGNOSIS — C43.9 METASTATIC MELANOMA (HCC): ICD-10-CM

## 2025-06-23 PROCEDURE — 700117 HCHG RX CONTRAST REV CODE 255: Mod: JZ,UD | Performed by: RADIOLOGY

## 2025-06-23 PROCEDURE — A9579 GAD-BASE MR CONTRAST NOS,1ML: HCPCS | Mod: JZ,UD | Performed by: RADIOLOGY

## 2025-06-23 PROCEDURE — 70553 MRI BRAIN STEM W/O & W/DYE: CPT

## 2025-06-23 RX ORDER — GADOTERIDOL 279.3 MG/ML
12 INJECTION INTRAVENOUS ONCE
Status: COMPLETED | OUTPATIENT
Start: 2025-06-23 | End: 2025-06-23

## 2025-06-23 RX ADMIN — GADOTERIDOL 12 ML: 279.3 INJECTION, SOLUTION INTRAVENOUS at 11:50

## 2025-06-23 NOTE — PROGRESS NOTES
[facility]    LCSW met with pt in her home. She was agreeable to a visit. We explored how she has been managing anxiety since last visit. Pt and LCSW explored various events that have occurred that led to some anxiety. We explored coping skills that have been useful. We agreed to meet again in about 2 weeks.      Eula Butcher LCSW  [facility]

## 2025-06-26 ENCOUNTER — HOSPITAL ENCOUNTER (OUTPATIENT)
Dept: RADIOLOGY | Facility: MEDICAL CENTER | Age: 45
End: 2025-06-26
Attending: INTERNAL MEDICINE
Payer: MEDICAID

## 2025-06-26 DIAGNOSIS — I26.99 PULMONARY INFARCTION (HCC): ICD-10-CM

## 2025-06-26 DIAGNOSIS — R68.89 MECHANICAL AND MOTOR PROBLEMS WITH INTERNAL ORGANS: ICD-10-CM

## 2025-06-26 DIAGNOSIS — C43.62 MALIGNANT MELANOMA OF LEFT UPPER EXTREMITY INCLUDING SHOULDER (HCC): ICD-10-CM

## 2025-06-26 DIAGNOSIS — C79.31 SECONDARY MALIGNANT NEOPLASM OF BRAIN AND SPINAL CORD (HCC): ICD-10-CM

## 2025-06-26 DIAGNOSIS — I95.89 CHRONIC HYPOTENSION: ICD-10-CM

## 2025-06-26 DIAGNOSIS — Z79.01 LONG TERM (CURRENT) USE OF ANTICOAGULANTS: ICD-10-CM

## 2025-06-26 DIAGNOSIS — Z79.899 POLYPHARMACY: ICD-10-CM

## 2025-06-26 DIAGNOSIS — C79.49 SECONDARY MALIGNANT NEOPLASM OF BRAIN AND SPINAL CORD (HCC): ICD-10-CM

## 2025-06-26 DIAGNOSIS — I62.9 INTRACRANIAL HEMORRHAGE (HCC): ICD-10-CM

## 2025-06-26 DIAGNOSIS — K51.018 ULCERATIVE (CHRONIC) PANCOLITIS WITH OTHER COMPLICATION (HCC): ICD-10-CM

## 2025-06-26 DIAGNOSIS — R19.7 DIARRHEA OF PRESUMED INFECTIOUS ORIGIN: ICD-10-CM

## 2025-06-26 DIAGNOSIS — Z11.59 SCREENING EXAMINATION FOR POLIOMYELITIS: ICD-10-CM

## 2025-06-26 DIAGNOSIS — L27.0 DERMATITIS MEDICAMENTOSA DUE TO INGESTED DRUGS: ICD-10-CM

## 2025-06-26 DIAGNOSIS — Z51.12 ENCOUNTER FOR ANTINEOPLASTIC IMMUNOTHERAPY: ICD-10-CM

## 2025-06-26 DIAGNOSIS — C77.3 SECONDARY MALIGNANT NEOPLASM OF BRACHIAL LYMPH NODE (HCC): ICD-10-CM

## 2025-06-26 DIAGNOSIS — Z01.89 DIAGNOSTIC SKIN AND SENSITIZATION TESTS: ICD-10-CM

## 2025-06-26 DIAGNOSIS — C78.6 SECONDARY MALIGNANT NEOPLASM OF RETROPERITONEUM AND PERITONEUM (HCC): ICD-10-CM

## 2025-06-26 DIAGNOSIS — G89.3 NEOPLASM RELATED PAIN: ICD-10-CM

## 2025-06-26 DIAGNOSIS — R21 RASH AND OTHER NONSPECIFIC SKIN ERUPTION: ICD-10-CM

## 2025-06-26 DIAGNOSIS — S81.801A WOUND OF RIGHT LEG, INITIAL ENCOUNTER: ICD-10-CM

## 2025-06-26 DIAGNOSIS — D63.0 ANEMIA IN NEOPLASTIC DISEASE: ICD-10-CM

## 2025-06-26 DIAGNOSIS — E86.0 DEHYDRATION: ICD-10-CM

## 2025-06-26 LAB — GLUCOSE BLD-MCNC: 146 MG/DL (ref 65–99)

## 2025-06-26 PROCEDURE — A9552 F18 FDG: HCPCS

## 2025-06-27 ENCOUNTER — HOSPITAL ENCOUNTER (OUTPATIENT)
Dept: RADIATION ONCOLOGY | Facility: MEDICAL CENTER | Age: 45
End: 2025-06-27
Attending: RADIOLOGY
Payer: MEDICAID

## 2025-06-27 DIAGNOSIS — C79.31 METASTATIC CANCER TO BRAIN (HCC): ICD-10-CM

## 2025-06-27 DIAGNOSIS — C43.9 METASTATIC MELANOMA (HCC): Primary | ICD-10-CM

## 2025-06-27 ASSESSMENT — ENCOUNTER SYMPTOMS
NEUROLOGICAL NEGATIVE: 1
GASTROINTESTINAL NEGATIVE: 1
RESPIRATORY NEGATIVE: 1

## 2025-06-27 NOTE — PROGRESS NOTES
Virtual Visit: Established Patient   This visit was conducted via Teams using secure and encrypted videoconferencing technology.   The patient was in their home in the [facility].    The patient's identity was confirmed and verbal consent was obtained for this virtual visit.    Subjective:   CC: No chief complaint on file.    Jacque Mcintyre is a 44 y.o. female presenting for evaluation and management of:    This is a 42-year-old lady with metastatic BRAF mutant malignant melanoma, stage IV, with multiple brain lesions as well as systemic disease, who had seen approximately 3 months ago initially for consideration of radiation therapy.  At that time, she underwent craniotomy and resection of her larger dominant lesions, and was treated with postoperative fractionated stereotactic radiation.  Thereafter, she was discovered to have several additional lesions and completed a second round of SRS as below.  She now continues on immunotherapy with medical oncology.     02/02/23- We are having a telephone follow-up shortly after completion of her recent course of stereotactic radiation.  She is doing tremendously well.  She is on immunotherapy now, and continues on Nivo/Ipi.  She feels quite well, has no major sensory or motor problems, no headaches, nausea or vomiting, or systemic complaints.  A follow-up MRI and CT chest abdomen pelvis has been ordered by medical oncology to be done in the next few weeks.  Her energy level is good.  Her hair has slowly grown back.  Overall, she feels quite good.     2/22/23 she continues to do relatively well.  She continues on her immunotherapy which she is tolerating without any major issues.  She had a follow-up MRI and a CT chest abdomen pelvis completed last week.  The systemic scan shows an excellent response in most of her disease is responding or stable.  However her brain MRI shows one small new 6 mm lesion that is definitively new from her prior MRI.  The remainder of her  previously treated disease appears to have responded well.  She now comes to discuss stereotactic radiosurgery for this new lesion.  Symptomatically, she is doing well, she has regained nearly complete neurologic function, she is walking, she has no fine motor problems, no problems with balance, no headaches, dizziness, nausea, vomiting.     4/17/2023  She is now status post SRS to an additional lesion that was completed 6 weeks ago.  She continues to do well.  She has no specific CNS related side effects.  She continues on immunotherapy without any major problems.  No new neurologic problems, headaches, dizziness, nausea or vomiting.     6/5/23:  She comes today to review her brain MRI and for ongoing follow-up.  She is doing tremendously well.  She reports that she has intermittent nausea that appears to be well controlled, but otherwise has no headaches, dizziness or balance difficulties, or vomiting.  She continues on immunotherapy.  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     9/11/23 from a CNS standpoint, she continues to do well.  She has no CNS related complaints today, including headaches, nausea, vomiting, weakness or numbness.  She is walking independently now for several months.  She has returned back to work.  Unfortunately, systemically, she experienced colitis about 2 months ago that has been attributed to an immune related side effect.  She has been off all immunotherapy since July 6.  She has had multiple trials of steroids and unfortunately has not resolved, and she has a follow-up with GI today.  She sees medical oncology later this week to discuss further systemic therapy options.      11/13/2023 we are seeing Ms. Mcintyre. today for ongoing follow-up.  Since I last saw her, she had a PET scan performed by medical oncology.  I reviewed the report, images not currently available today, that unfortunately shows progression of disease with a left abdominal lymph node metastasis  near the kidney measuring about 3.3 cm, as well as another lesion associated with a small bowel loop in the left lower abdomen now measuring about 5.4 cm.  Unfortunately, she is having significant immune related colitis, and has been off immunotherapy for several months.  She has had trials with steroids, but she continues to have diarrhea at least 4-5 times a day.  This is her main problem now, and the plan is likely to transition her to BRAF directed therapy.  She has a follow-up with Dr. Reed in the next few days.  With regards to CNS, she had a follow-up MRI performed a few days ago that I reviewed with Dr. Tejada today in clinic, and essentially shows stable findings.     2/21/23 she is doing relatively well.  She had a follow-up brain MRI that we are reviewing today.  She has started BRAF directed therapy with Braftovi and Mektovi, but had difficulty tolerating the Mektovi after 2 challenges, and is now on single agent Braftovi.  She seems to be tolerating this well.  She had follow-up CT chest abdomen pelvis done also that I reviewed personally.  She has no CNS related complaints.  She still feels somewhat weak and deconditioned and is trying to work through this.  Socially, she lives with her parents and this has become somewhat stressful with trying to think about whether she should move out or whether she will be able to work or have enough money to ultimately sustain that.       5/30/2024 she is doing quite well now.  She has switched therapy to dabrafenib and trametinib and is tolerating this well.  She had a follow-up brain MRI and systemic imaging performed this month.  The MRI shows essentially stable findings no new concerning findings.  She has no headaches or CNS symptoms.  The systemic imaging shows a decrease in the size of her left central mesenteric mass.       9/6/24 Tamra is doing very well.  She has no major neurologic complaints.  She had a hard time tolerating combined therapy and so she  continues on dabrafenib alone.  She is doing well with follow-up with medical oncology.  She had a follow-up brain MRI performed that shows stable findings, with still a stable left parietal metastasis though this has been treated several months ago.  She has no headaches or CNS symptoms.  Her systemic imaging is also relatively encouraging with actually significant improvement in her disease burden as of her last PET scan on June 24.     12/16/24 she is doing well.  Shortly after our last follow-up, she ended up in the hospital with progressively worsening nausea and difficulty eating towards the end of November.  She was found to have a large small bowel obstruction with what appeared to be a mass that was causing significant obstructive symptoms.  She underwent a resection of this and pathology confirmed metastatic melanoma.  She is now doing much better, able to eat, nausea is resolved, weight is stabilizing.  She has a plan for follow-up with medical oncology and plans to start Opdualag soon.  She had a brain MRI performed a few days ago that I reviewed personally, shows stable findings, no concerning lesions currently.     3/21/25 she is doing quite well.  No major complaints currently, except ongoing fatigue.  No CNS related complaints.  Had a follow-up MRI done that shows a new area of a subacute hemorrhage in the left posterior right occipital lobe, no other lesions.  There is minimal enhancement and edema around this hemorrhage.  She had a follow-up PET done that shows overall favorable response with 1 site in the left retroperitoneum that has a node with increased activity, the remainder of her disease actually is responded.  She continues on immunotherapy with medical oncology.  She is off anticoagulation.    5/22/2025 she is doing very well.  She essentially had no acute or long-term toxicities from her most recent course of radiation for her retroperitoneal node.  She continues on her immunotherapy.   She is tolerating this well.  She will follow-up with medical oncology.  She already has her next brain MRI scheduled for ongoing follow-up with us.     6/27/25 she is doing very well.  Continues on her same line of systemic therapy.  Tolerating this without any major issues.  She had a brain MRI and a PET/CT ordered by medical oncology that we are reviewing.  The PET shows essentially complete response to the last round of SBRT to the retroperitoneal lymph node.  The MRI brain is stable.  I reviewed the images personally.  Overall no concerning findings.    ROS   Review of Systems   Respiratory: Negative.     Gastrointestinal: Negative.    Genitourinary: Negative.    Neurological: Negative.          Current medicines (including changes today)  Current Medications[1]    Patient Active Problem List    Diagnosis Date Noted    Chronic midline low back pain 05/05/2025    Pulmonary embolism (HCC)     Marijuana use 11/22/2024    Metastatic melanoma (HCC) 11/19/2024    Abdominal mass 11/19/2024    Hyponatremia 11/19/2024    Flu-like symptoms with SIRS 01/04/2024    Anemia 01/03/2024    Facial paralysis/[place] palsy 12/11/2022    Diabetic neuropathy (HCC) 12/09/2022    Dizziness on standing 10/19/2022    Vitamin D deficiency 10/17/2022    Dyslipidemia 10/17/2022    Normocytic anemia 10/17/2022    Borderline abnormal TFTs 10/17/2022    Thrombocytosis 10/17/2022    Metastatic cancer to brain (HCC) 10/15/2022    Adrenal insufficiency due to corticosteroid withdrawal (HCC) 10/12/2022    Chronic prescription benzodiazepine use 10/07/2022    Constipation 10/07/2022    Brain mass 10/06/2022    Malignant melanoma metastatic to brain (HCC) 10/01/2022    Cellulitis 08/26/2022    Leucocytosis 08/26/2022    Abnormal CT scan, chest 06/13/2022    Hypokalemia 10/08/2019    Essential hypertension 10/08/2019    Bipolar 1 disorder (HCC) 10/07/2019    DM2 (diabetes mellitus, type 2) (HCC) 10/07/2019    Appendicitis 10/07/2019    Melanoma of  left upper arm (HCC) 06/23/2017    Nausea 09/04/2015    Sepsis (HCC) 09/04/2015    UTI (urinary tract infection) 09/04/2015    Lactic acidosis 09/04/2015    Anxiety 09/04/2015    Diarrhea 09/04/2015    Asthma     Depression         Objective:   There were no vitals taken for this visit.    Physical Exam:  Constitutional: Alert, no distress, well-groomed.  Skin: No rashes in visible areas.  Eye: Round. Conjunctiva clear, lids normal. No icterus.   ENMT: Lips pink without lesions, good dentition, moist mucous membranes. Phonation normal.  Neck: No masses, no thyromegaly. Moves freely without pain.  Respiratory: Unlabored respiratory effort, no cough or audible wheeze  Psych: Alert and oriented x3, normal affect and mood.     Assessment and Plan:   The following treatment plan was discussed:     Melanoma:  Jacque is doing very well.  Scans are ADRIENNE.  There are no concerning findings on my review of the images.  We will continue surveillance.  PET/CT and brain MRI and follow-up in about 3 to 4 months.    Follow-up: No follow-ups on file.              [1]   Current Outpatient Medications   Medication Sig Dispense Refill    tazorotene (TAZORAC) 0.05 % cream Apply to affected area once a day at night, topical Vitamin A 60 g 3    halobetasol (ULTRAVATE) 0.05 % cream Apply to affected area once a day for a week, topical steroid 50 g 3    Halobetasol Prop-Tazarotene (DUOBRII) 0.01-0.045 % Lotion Apply to affected area once a day, topical steroid and vitamin A combo for psoriasis 100 g 3    Naloxone (NARCAN) 4 MG/0.1ML Liquid Administer 4 mg into affected nostril(S) one time as needed (One spray in one nostril for overdose and call 911) for up to 1 dose. 2 Each 0    ferrous sulfate 325 (65 Fe) MG tablet Take 325 mg by mouth every Monday, Wednesday, and Friday.      albuterol (PROVENTIL) 2.5mg/0.5ml Nebu Soln Take 2.5 mg by nebulization every four hours as needed for Shortness of Breath.      ELIQUIS 5 MG Tab Take 5 mg by mouth 2  times a day.      fluticasone-umeclidinium-vilanterol (TRELEGY ELLIPTA) 200-62.5-25 mcg/act inhaler Inhale 1 Puff every day.      insulin glargine 100 UNIT/ML SC SOLN Inject 20 Units under the skin every evening.      traZODone (DESYREL) 100 MG Tab Take 100 mg by mouth every evening.      VENTOLIN  (90 Base) MCG/ACT Aero Soln inhalation aerosol Inhale 1-2 Puffs every four hours as needed for Shortness of Breath.      meloxicam (MOBIC) 15 MG tablet Take 15 mg by mouth every day.      atorvastatin (LIPITOR) 40 MG Tab Take 1 Tablet by mouth every evening. 30 Tablet 2    DULoxetine (CYMBALTA) 60 MG Cap DR Particles delayed-release capsule Take 1 Capsule by mouth every evening. 30 Capsule 2    gabapentin (NEURONTIN) 800 MG tablet Take 1 Tablet by mouth 4 times a day. 90 Tablet 2    OXcarbazepine (TRILEPTAL) 300 MG Tab Take 1 Tablet by mouth 2 times a day. 60 Tablet 2    QUEtiapine (SEROQUEL) 200 MG Tab Take 1 Tablet by mouth every evening. 60 Tablet 2    acetaminophen (TYLENOL) 500 MG Tab Take 500-1,000 mg by mouth every 6 hours as needed. Indications: Pain      clonazepam (KLONOPIN) 2 MG tablet Take 1 mg by mouth 2 times a day. 1 mg = 1/2 tablet - scheduled       No current facility-administered medications for this encounter.

## 2025-06-30 ENCOUNTER — HOSPITAL ENCOUNTER (OUTPATIENT)
Facility: MEDICAL CENTER | Age: 45
End: 2025-06-30
Attending: INTERNAL MEDICINE
Payer: MEDICAID

## 2025-06-30 PROCEDURE — 80053 COMPREHEN METABOLIC PANEL: CPT

## 2025-07-01 LAB
ALBUMIN SERPL BCP-MCNC: 4.3 G/DL (ref 3.2–4.9)
ALBUMIN/GLOB SERPL: 2.2 G/DL
ALP SERPL-CCNC: 99 U/L (ref 30–99)
ALT SERPL-CCNC: 20 U/L (ref 2–50)
ANION GAP SERPL CALC-SCNC: 14 MMOL/L (ref 7–16)
AST SERPL-CCNC: 23 U/L (ref 12–45)
BILIRUB SERPL-MCNC: <0.2 MG/DL (ref 0.1–1.5)
BUN SERPL-MCNC: 7 MG/DL (ref 8–22)
CALCIUM ALBUM COR SERPL-MCNC: 9.3 MG/DL (ref 8.5–10.5)
CALCIUM SERPL-MCNC: 9.5 MG/DL (ref 8.5–10.5)
CHLORIDE SERPL-SCNC: 103 MMOL/L (ref 96–112)
CO2 SERPL-SCNC: 23 MMOL/L (ref 20–33)
CREAT SERPL-MCNC: 0.58 MG/DL (ref 0.5–1.4)
GFR SERPLBLD CREATININE-BSD FMLA CKD-EPI: 113 ML/MIN/1.73 M 2
GLOBULIN SER CALC-MCNC: 2 G/DL (ref 1.9–3.5)
GLUCOSE SERPL-MCNC: 144 MG/DL (ref 65–99)
POTASSIUM SERPL-SCNC: 4.4 MMOL/L (ref 3.6–5.5)
PROT SERPL-MCNC: 6.3 G/DL (ref 6–8.2)
SODIUM SERPL-SCNC: 140 MMOL/L (ref 135–145)

## 2025-07-15 ENCOUNTER — TELEPHONE (OUTPATIENT)
Dept: INTERNAL MEDICINE | Facility: OTHER | Age: 45
End: 2025-07-15
Payer: MEDICAID

## 2025-07-15 DIAGNOSIS — L40.9 PSORIASIS: ICD-10-CM

## 2025-07-15 NOTE — TELEPHONE ENCOUNTER
Spoke to Ignacio from pt's insurance and appeal dept. Fax # is [phone]. He informed us that the note can also be signed as well by the patient and that should be okay. Sent to pt via [employer]

## 2025-07-15 NOTE — LETTER
Estrella Joshi MD  NPI: [id number]  [address] [place] [address]  [phone]  Ref# of Denial: [id number]  Medication: Duobrii    With the signed consent of the patient (see below), Jacque Mcintyre (: 1980 ID#[id number]), we are initiating the appeal process for patient's denied Duobrii.     Patient was prescribed tazorotene 0.05% cream and halobetasol 0.05% cream per the suggestion of the insurance as Duobrii 0.01-0.045% lotion was denied and required trying this first.    Patient has tried medication from 2025 to present day. She reports worsening symptoms of itchy elbows, flakiness, severe redness and pain with her symptoms. To supplement pain symptoms, patient has used her simethicone heavy duty lotion that she typically uses for her diabetic ulcers on her feet, but she is still experiencing symptoms.    Patient's previous history using Duobrii was significantly improved compared to using tazorotene 0.05% cream and halobetasol 0.05% cream separately. She reports less redness, no itchiness, less flaking and decreased pain symptoms.    To reiterate, on behalf of Dr. Estrella Joshi, we are initiating the appeal process to reconsider the patient for Duobrii to improve her symptoms. Patient provided signed consent with her signature below to begin the appeal process.    We await further correspondence for the next steps of this appeal process.    Best regards,  Anthony [facility] [employer]        Patient's signed name: _____________________    Patient's signature: ______________

## 2025-07-15 NOTE — TELEPHONE ENCOUNTER
Spoke to patient's insurance regarding next steps as patient followed denial step therapy. Representative recommended two options - an appeal process that can take 30-60 days or resubmission or prior auth with updated clinical notes.     Presented to patient options. Due to Dr. Joshi's limited availability, patient is unable to be seen until September, roughly 60 days of the appeal process. Pt opted to go through the appeal process and put her on cancellation list in case Dr. Joshi has more availability from here.    Ref#[id number] - Ref# attached to denial # of duobrii in chart.     Obtained clinical information from patient over the phone and Dr. Joshi is aware that I am initiating appeal process. Clinical info listed below, will create note under communications as well to fax to insurance.      Patient was prescribed tazorotene 0.05% cream and halobetasol 0.05% cream per the suggestion of the insurance as Duobrii 0.01-0.045% lotion was denied and required trying this first.    Patient has tried medication from 5- to present day. She reports worsening symptoms of itchy elbows, flakiness, severe redness and pain with her symptoms. To supplement pain symptoms, patient has used her simethicone heavy duty lotion that she typically uses for her diabetic ulcers on her feet, but she is still experiencing symptoms.    Patient's previous history using Duobrii was significantly improved compared to using tazorotene 0.05% cream and halobetasol 0.05% cream separately. She reports less redness, no itchiness, less flaking and decreased pain symptoms    To reiterate, on behalf of Dr. Estrella Joshi, we are initiating the appeal process to reconsider the patient for Duobrii to improve her symptoms. Patient provided verbal consent to begin process and is aware that she may receive a consent form that is required by insurance.     We await further correspondence for the next steps of this appeal process.    Best regards,  Radha Winters,  Med Ass't

## 2025-07-15 NOTE — Clinical Note
[facility]  [address] [place] [address]  Fax: [phone]   Authorization for Release/Disclosure of   Protected Health Information   Name: JACQUE MCINTYRE : 1980 SSN: [id number]   Address: [address] [place] [address] [address]  [place] [address] Phone:    There are no phone numbers on file.   I authorize the entity listed below to release/disclose the PHI below to:   [facility] and Estrella Joshi M.D.   Provider or Entity Name:     Address   City, State, Zip   Phone:      Fax:     Reason for request: continuity of care   Information to be released:    [  ] LAST COLONOSCOPY,  including any PATH REPORT and follow-up  [  ] LAST FIT/COLOGUARD RESULT [  ] LAST DEXA  [  ] LAST MAMMOGRAM  [  ] LAST PAP  [  ] LAST LABS [  ] RETINA EXAM REPORT  [  ] IMMUNIZATION RECORDS  [  ] Release all info      [  ] Check here and initial the line next to each item to release ALL health information INCLUDING  _____ Care and treatment for drug and / or alcohol abuse  _____ HIV testing, infection status, or AIDS  _____ Genetic Testing    DATES OF SERVICE OR TIME PERIOD TO BE DISCLOSED: _____________  I understand and acknowledge that:  * This Authorization may be revoked at any time by you in writing, except if your health information has already been used or disclosed.  * Your health information that will be used or disclosed as a result of you signing this authorization could be re-disclosed by the recipient. If this occurs, your re-disclosed health information may no longer be protected by State or Federal laws.  * You may refuse to sign this Authorization. Your refusal will not affect your ability to obtain treatment.  * This Authorization becomes effective upon signing and will  on (date) __________.      If no date is indicated, this Authorization will  one (1) year from the signature date.    Name: Jacque Mcintyre  Signature: Date:   7/15/2025     PLEASE FAX REQUESTED RECORDS  BACK TO: [phone]

## 2025-07-25 ENCOUNTER — APPOINTMENT (OUTPATIENT)
Dept: PALLIATIVE MEDICINE | Facility: HOSPICE | Age: 45
End: 2025-07-25
Payer: MEDICAID

## 2025-07-28 NOTE — TELEPHONE ENCOUNTER
Insurance called patient to inform them that appeal was denied because it was past 60days. They would like us to re-prescribe Duobrii for patient and I will redo prior auth.    Pt confirmed pharmacy to be [facility]

## 2025-07-31 ENCOUNTER — TELEPHONE (OUTPATIENT)
Dept: INTERNAL MEDICINE | Facility: OTHER | Age: 45
End: 2025-07-31
Payer: MEDICAID

## 2025-07-31 RX ORDER — HALOBETASOL PROPIONATE AND TAZAROTENE .1; .45 MG/G; MG/G
LOTION TOPICAL
Qty: 100 G | Refills: 3 | Status: SHIPPED | OUTPATIENT
Start: 2025-07-31

## 2025-08-04 ENCOUNTER — APPOINTMENT (OUTPATIENT)
Dept: PALLIATIVE MEDICINE | Facility: HOSPICE | Age: 45
End: 2025-08-04
Payer: MEDICAID

## 2025-08-05 ENCOUNTER — OFF SITE VISIT (OUTPATIENT)
Dept: PALLIATIVE MEDICINE | Facility: HOSPICE | Age: 45
End: 2025-08-05
Payer: MEDICAID

## 2025-08-05 DIAGNOSIS — G89.29 CHRONIC MIDLINE LOW BACK PAIN, UNSPECIFIED WHETHER SCIATICA PRESENT: ICD-10-CM

## 2025-08-05 DIAGNOSIS — M54.50 CHRONIC MIDLINE LOW BACK PAIN WITHOUT SCIATICA: Primary | ICD-10-CM

## 2025-08-05 DIAGNOSIS — G89.29 CHRONIC MIDLINE LOW BACK PAIN WITHOUT SCIATICA: Primary | ICD-10-CM

## 2025-08-05 DIAGNOSIS — M54.50 CHRONIC MIDLINE LOW BACK PAIN, UNSPECIFIED WHETHER SCIATICA PRESENT: ICD-10-CM

## 2025-08-05 PROCEDURE — 99349 HOME/RES VST EST MOD MDM 40: CPT | Performed by: NAPRAPATH

## 2025-08-11 ASSESSMENT — ENCOUNTER SYMPTOMS
EYES NEGATIVE: 1
CARDIOVASCULAR NEGATIVE: 1
INSOMNIA: 1
RESPIRATORY NEGATIVE: 1
CONSTIPATION: 1
NERVOUS/ANXIOUS: 1
NAUSEA: 1
NEUROLOGICAL NEGATIVE: 1

## 2025-08-18 ENCOUNTER — APPOINTMENT (OUTPATIENT)
Dept: RADIOLOGY | Facility: MEDICAL CENTER | Age: 45
End: 2025-08-18
Attending: OBSTETRICS & GYNECOLOGY
Payer: MEDICAID

## 2025-08-18 VITALS — HEIGHT: 64 IN | BODY MASS INDEX: 20.83 KG/M2 | WEIGHT: 122 LBS

## 2025-08-18 DIAGNOSIS — Z12.31 VISIT FOR SCREENING MAMMOGRAM: ICD-10-CM

## 2025-08-18 PROCEDURE — 77067 SCR MAMMO BI INCL CAD: CPT

## 2025-08-18 ASSESSMENT — FIBROSIS 4 INDEX: FIB4 SCORE: 0.88

## (undated) DEVICE — DRAIN PENROSE STERILE 1/4 X - 18 IN (25EA/BX)

## (undated) DEVICE — STAPLER SKIN DISP - (6/BX 10BX/CA) VISISTAT

## (undated) DEVICE — GOWN WARMING STANDARD FLEX - (30/CA)

## (undated) DEVICE — TUBE CONNECTING SUCTION - CLEAR PLASTIC STERILE 72 IN (50EA/CA)

## (undated) DEVICE — BOVIE BLADE COATED - (50/PK)

## (undated) DEVICE — DRESSING TRANSPARENT FILM TEGADERM 4 X 4.75" (50EA/BX)"

## (undated) DEVICE — RESERVOIR SUCTION 100 CC - SILICONE (20EA/CA)

## (undated) DEVICE — SUTURE 3-0 VICRYL PLUS SH - 27 INCH (36/BX)

## (undated) DEVICE — KIT  I.V. START (100EA/CA)

## (undated) DEVICE — CANISTER SUCTION 3000ML MECHANICAL FILTER AUTO SHUTOFF MEDI-VAC NONSTERILE LF DISP  (40EA/CA)

## (undated) DEVICE — MASK WITH FACE SHIELD (25/BX 4BX/CA)

## (undated) DEVICE — MASK ANESTHESIA ADULT  - (100/CA)

## (undated) DEVICE — DRESSING TRANSPARENT FILM TEGADERM 2.375 X 2.75"  (100EA/BX)"

## (undated) DEVICE — CHLORAPREP 26 ML APPLICATOR - ORANGE TINT(25/CA)

## (undated) DEVICE — TRAY SURESTEP FOLEY TEMP SENSING 16FR (10EA/CA) ORDER  #18764 FOR TEMP FOLEY ONLY

## (undated) DEVICE — SHEET TRANSVERSE LAP - (12EA/CA)

## (undated) DEVICE — CANISTER SUCTION RIGID RED 1500CC (40EA/CA)

## (undated) DEVICE — SET EXTENSION WITH 2 PORTS (48EA/CA) ***PART #2C8610 IS A SUBSTITUTE*****

## (undated) DEVICE — SENSOR SPO2 NEO LNCS ADHESIVE (20/BX) SEE USER NOTES

## (undated) DEVICE — INTRAOP NEURO IN OR 1:1 PER 15 MIN

## (undated) DEVICE — KIT ULTRASND COVER - (20EA/CA)

## (undated) DEVICE — COVER LIGHT HANDLE ALC PLUS DISP (18EA/BX)

## (undated) DEVICE — SUCTION INSTRUMENT YANKAUER BULBOUS TIP W/O VENT (50EA/CA)

## (undated) DEVICE — GLOVE, LITE (PAIR)

## (undated) DEVICE — FORCEPS IRRIGATING 9 X 1.5MM (5EA/BX)

## (undated) DEVICE — BLADE SURGICAL CLIPPER - (50EA/CA)

## (undated) DEVICE — BITE BLOCK ADULT 60FR (100EA/CA)

## (undated) DEVICE — ELECTRODE DUAL RETURN W/ CORD - (50/PK)

## (undated) DEVICE — SODIUM CHL IRRIGATION 0.9% 1000ML (12EA/CA)

## (undated) DEVICE — DRAPE LARGE 3 QUARTER - (20/CA)

## (undated) DEVICE — SPHERE NAVIGATION STEALTH (5EA/TY 12TY/PK)

## (undated) DEVICE — PATTIES SURG NEURO X-RAY 1/2X1/2 (10EA/PK 20PK/CS)

## (undated) DEVICE — SUTURE 3-0 VICRYL PLUS SH - 8X 18 INCH (12/BX)

## (undated) DEVICE — STAPLE 45MM VASCULAR WHITE 2.5MM (12EA/BX)

## (undated) DEVICE — PACK CRANI - (1EA/CA)

## (undated) DEVICE — SUTURE 3-0 ETHILON FS-1 - (36/BX) 30 INCH

## (undated) DEVICE — WATER IRRIGATION STERILE 1000ML (12EA/CA)

## (undated) DEVICE — STOCKINET TUBULAR 4IN STERILE - 4 X 36YDS (25/CA)

## (undated) DEVICE — SUTURE 0 VICRYL PLUS CT-2 - 8 X 18 INCH (12/BX)

## (undated) DEVICE — SYRINGE 10 ML CONTROL LL (25EA/BX 4BX/CA)

## (undated) DEVICE — DRAPE IOBAN II INCISE 23X17 - (10EA/BX 4BX/CA)

## (undated) DEVICE — SUTURE GENERAL

## (undated) DEVICE — SCALP CLIP RANEY 20-1037 (10EA/PK 20PK/CA)

## (undated) DEVICE — PACK MAJOR BASIN - (2EA/CA)

## (undated) DEVICE — TUBE SUCTION YANKAUER  1/4 X 6FT (20EA/CA)"

## (undated) DEVICE — BANDAGE ROLL STERILE BULKEE 4.5 IN X 4 YD (100EA/CA)

## (undated) DEVICE — BLADE CLIPPER FITS 2501 CLIPPER (BLUE)  (20EA/CA)

## (undated) DEVICE — BANDAGE ELASTIC STERILE MATRIX 6 X 10 (20EA/CA)

## (undated) DEVICE — SPONGE LAP XR ST 36X36 LF - (1/PK40PK/CA)

## (undated) DEVICE — SYRINGE SAFETY 10 ML 18 GA X 1 1/2 BLUNT LL (100/BX 4BX/CA)

## (undated) DEVICE — SUTURE 4-0 NUROLON CR/8 TF - (12/BX) ETHICON

## (undated) DEVICE — SENSOR OXIMETER ADULT SPO2 RD SET (20EA/BX)

## (undated) DEVICE — GLOVE SZ 7 BIOGEL PI MICRO - PF LF (50PR/BX 4BX/CA)

## (undated) DEVICE — SUTURE 3-0 SILK SH (12PK/BX)

## (undated) DEVICE — SET LEADWIRE 5 LEAD BEDSIDE DISPOSABLE ECG (1SET OF 5/EA)

## (undated) DEVICE — TUBING CLEARLINK DUO-VENT - C-FLO (48EA/CA)

## (undated) DEVICE — SEALER VESSEL HARMONIC ACE PLUS WITH ADVANCED HEMOSTASIS 36CM (1/EA)

## (undated) DEVICE — GLOVE BIOGEL SZ 7 SURGICAL PF LTX - (50PR/BX 4BX/CA)

## (undated) DEVICE — KIT ROOM DECONTAMINATION

## (undated) DEVICE — DRAPE LAPAROTOMY T SHEET - (12EA/CA)

## (undated) DEVICE — SYRINGE DISP. 60 CC LL - (30/BX, 12BX/CA)**WHEN THESE ARE GONE ORDER #500206**

## (undated) DEVICE — TRAY ANESTHESIA - CONTINUOUS EPIDURAL (10EA/CA) THIS IS A CUSTOM PACK

## (undated) DEVICE — TOOL MR8 2.3CM F2/7CM TAPER (1/EA)

## (undated) DEVICE — SPONGE GAUZESTER. 2X2 4-PL - (2/PK 50PK/BX 30BX/CS)

## (undated) DEVICE — STAPLER 75MM LINEAR OPEN (3EA/BX)

## (undated) DEVICE — NEPTUNE 4 PORT MANIFOLD - (20/PK)

## (undated) DEVICE — TROCAR LAPSCP 100MM 12MM NTHRD - (6/BX)

## (undated) DEVICE — ELECTRODE 850 FOAM ADHESIVE - HYDROGEL RADIOTRNSPRNT (50/PK)

## (undated) DEVICE — SYRINGE 20 ML LS (48/BX 4BX/CA)

## (undated) DEVICE — PROTECTOR ULNA NERVE - (36PR/CA)

## (undated) DEVICE — CANNULA W/SEAL 5X100 Z-THRE - ADED KII (12/BX)

## (undated) DEVICE — KIT ANESTHESIA W/CIRCUIT & 3/LT BAG W/FILTER (20EA/CA)

## (undated) DEVICE — SUTURE 4-0 MONOCRYL PLUS PS-2 - 27 INCH (36/BX)

## (undated) DEVICE — GLOVE, BIOGEL ECLIPSE, SZ 7.0, PF LTX (50/BX)

## (undated) DEVICE — SLEEVE, VASO, THIGH, MED

## (undated) DEVICE — SLEEVE VASO DVT COMPRESSION CALF MED - (10PR/CA)

## (undated) DEVICE — DRAPE SURGICAL U 77X120 - (10/CA)

## (undated) DEVICE — SLEEVE VASO CALF MED - (10PR/CA)

## (undated) DEVICE — Device

## (undated) DEVICE — PACK MINOR BASIN - (2EA/CA)

## (undated) DEVICE — DRESSING NON-ADHERING 8 X 3 - (50/BX)

## (undated) DEVICE — SUTURE 2-0 SILK SH 30 IN C/R (12PK/BX)

## (undated) DEVICE — CATHETER IV SAFETY 20 GA X 1-1/4 (50/BX)

## (undated) DEVICE — HEAD HOLDER JUNIOR/ADULT

## (undated) DEVICE — ADHESIVE DERMABOND HVD MINI (12EA/BX)

## (undated) DEVICE — SUCTION TIP STRAIGHT ARGYLE - 50EA/CA

## (undated) DEVICE — CLIP MED LG INTNL HRZN TI ESCP - (20/BX)

## (undated) DEVICE — TUBING INSUFFLATION - (10/BX)

## (undated) DEVICE — STAPLE 75MM LINEAR (12EA/BX)

## (undated) DEVICE — LEAD SET 6 DISP. EKG NIHON KOHDEN

## (undated) DEVICE — CANISTER SUCTION 3000ML MECHANICAL FILTER AUTO SHUTOFF MEDI-VAC NONSTERILE LF DISP (40EA/CA)

## (undated) DEVICE — COVER PROBE STERILE CONE (12EA/CA)

## (undated) DEVICE — NEEDLE BIOPSY 21G W/LOCKABLE SYRINGE FOR EBUS (5EA/BX)

## (undated) DEVICE — BALL COTTON NEURO 3/4 INCH - (5/PK50PK/CA)

## (undated) DEVICE — BLADE SURGICAL #15 - (50/BX 3BX/CA)

## (undated) DEVICE — TUBE E-T HI-LO CUFF 8.5MM (10EA/PK)

## (undated) DEVICE — PROBE SCREW PEDICLE DISPOSABLE

## (undated) DEVICE — BENZOIN TINCTURE AMPULE

## (undated) DEVICE — MEDICINE CUP STERILE 2 OZ - (100/CA)

## (undated) DEVICE — HUMID-VENT HEAT AND MOISTURE EXCHANGE- (50/BX)

## (undated) DEVICE — MASK, LARYNGEAL AIRWAY #4

## (undated) DEVICE — TOWELS CLOTH SURGICAL - (4/PK 20PK/CA)

## (undated) DEVICE — BANDAGE STERILE 2 IN X 75 IN (12EA/BX 8BX/CA)

## (undated) DEVICE — LACTATED RINGERS INJ 1000 ML - (14EA/CA 60CA/PF)

## (undated) DEVICE — SUTURE 3-0 MONOCRYL SH (36PK/BX)

## (undated) DEVICE — GLOVE BIOGEL INDICATOR SZ 7SURGICAL PF LTX - (50/BX 4BX/CA)

## (undated) DEVICE — BONE WAX (12PK/BX)

## (undated) DEVICE — PIN HEAD MAYFIELD DISP. (3EA/PK 12PK/BX)

## (undated) DEVICE — GOWN SURGEONS X-LARGE - DISP. (30/CA)

## (undated) DEVICE — CANNULA O2 COMFORT SOFT EAR ADULT 7 FT TUBING (50/CA)

## (undated) DEVICE — SUTURE 3-0 27IN PDS PLUS CLR - SH (36/BX)

## (undated) DEVICE — FORCEPS IRRIGATING 9 X 0.5MM (5EA/BX)

## (undated) DEVICE — CORETEMP DRAPE FORM-FITTED EASY DROPANDGO DRAPE FOR USE ON THE CORETEMP FLUID MANAGEMENT 56IN X 56IN

## (undated) DEVICE — BAG SPONGE COUNT 10.25 X 32 - BLUE (250/CA)

## (undated) DEVICE — CATHETER IV 20 GA X 1-1/4 ---SURG.& SDS ONLY--- (50EA/BX)

## (undated) DEVICE — ELECTRODE 5MM LHK LAPSCP STERILE DISP- MEGADYNE  (5/CA)

## (undated) DEVICE — DERMABOND ADVANCED - (12EA/BX)

## (undated) DEVICE — GLOVE BIOGEL INDICATOR SZ 7.5 SURGICAL PF LTX - (50PR/BX 4BX/CA)

## (undated) DEVICE — TRAY SURESTEP FOLEY TEMP SENSING 16FR SNAP SECURE(10EA/CA) ORDER #18764 FOR TEMP FOLEY ONLY

## (undated) DEVICE — GLOVE BIOGEL ECLIPSE  PF LATEX SIZE 6.5 (50PR/BX)

## (undated) DEVICE — STAPLE 60MM WHTE 2.6MM - (12/BX) WAS PART #ECR60W

## (undated) DEVICE — STAY ELASTIC BLUNT RETRACTOR 12MM (8EA/PK)

## (undated) DEVICE — CLIP LG INTNL HRZN TI ESCP LGT - (20/BX)

## (undated) DEVICE — SPONGE GAUZESTER 4 X 4 4PLY - (128PK/CA)

## (undated) DEVICE — SYRINGE SAFETY 5 ML 18 GA X 1-1/2 BLUNT LL (100/BX 4BX/CA)

## (undated) DEVICE — NEEDLE NON SAFETY 25 GA X 1 1/2 IN HYPO (100EA/BX)

## (undated) DEVICE — CLOSURE SKIN STRIP 1/2 X 4 IN - (STERI STRIP) (50/BX 4BX/CA)

## (undated) DEVICE — DRAPE ULTRA GUARD LAPAROTOMY WITH POUCHES 102 X 121 X 78 (12EA/CA)

## (undated) DEVICE — LIGASURE SM JAW SEALER CRVD - (6EA/CA)

## (undated) DEVICE — BATTERY VARISPEED

## (undated) DEVICE — DRAPE MICROSCOPE ARMATEC 120IN X 46IN (10EA/CA)

## (undated) DEVICE — HEMOSTAT SURG ABSORBABLE - 4 X 8 IN SURGICEL (24EA/CA)

## (undated) DEVICE — BANDAGE ELASTIC 6 HONEYCOMB - 6X5YD LF (20/CA)"

## (undated) DEVICE — PATTIES SURG X-RAYCOTTONOID - 1/2 X 3 IN (200/CA)

## (undated) DEVICE — SURGIFOAM (SIZE 100) - (6EA/CA)

## (undated) DEVICE — CLIP MED INTNL HRZN TI ESCP - (25/BX) DISCONTINUED ORDER 21732

## (undated) DEVICE — PAD LAP STERILE 18 X 18 - (5/PK 40PK/CA)

## (undated) DEVICE — SUTURE 0 VICRYL PLUS UR-6 - 27 INCH (36/BX)

## (undated) DEVICE — PREP FAST FLOSEAL 5ML

## (undated) DEVICE — RELOAD WITH GRIPPING SURFACE TECHNOLOGY BLUE 60MM (12EA/BX)

## (undated) DEVICE — GLOVE BIOGEL PI INDICATOR SZ 8.0 SURGICAL PF LF -(50/BX 4BX/CA)

## (undated) DEVICE — TUBING C&T SET FLYING LEADS DRAIN TUBING (10EA/BX)

## (undated) DEVICE — SPONGE GAUZE NON-STERILE 4X4 - (2000/CA 10PK/CA)

## (undated) DEVICE — BOVIE BLADE COATED &INSULATED - 25/PK

## (undated) DEVICE — STAPLER 45MM ARTICULATING - ENDO (3EA/BX)

## (undated) DEVICE — GOWN SURGEONS LARGE - (32/CA)

## (undated) DEVICE — DRAPE CHEST/BREAST - (12EA/CA)

## (undated) DEVICE — MIDAS LUBRICATOR DIFFUSER PACK (4EA/CA)

## (undated) DEVICE — STERI STRIP COMPOUND BENZOIN - TINCTURE 0.6ML WITH APPLICATOR (40EA/BX)

## (undated) DEVICE — GLOVE BIOGEL PI INDICATOR SZ 6.0 SURGICAL PF LF -(200PR/CA)

## (undated) DEVICE — BAG RETRIEVAL 10ML (10EA/BX)

## (undated) DEVICE — TOOL MR8 9CM ACORN 7.5MM DIAMETER (1/EA)

## (undated) DEVICE — SUTURE 1 PDS II PLUS TP-1 - (12PK/BX)

## (undated) DEVICE — BANDAGE ELASTIC 6 IN X 5 YDS - LATEX FREE (10/BX)

## (undated) DEVICE — FIBRILLAR SURGICEL 4X4 - 10/CA

## (undated) DEVICE — CONTAINER SPECIMEN BAG OR - STERILE 4 OZ W/LID (100EA/CA)

## (undated) DEVICE — TROCAR 5X100 NON BLADED Z-TH - READ KII (6/BX)

## (undated) DEVICE — DRAPE STRLE REG TOWEL 18X24 - (10/BX 4BX/CA)"

## (undated) DEVICE — SUTURE 2-0 VICRYL PLUS CT-1 - 8 X 18 INCH(12/BX)

## (undated) DEVICE — DRESSING XEROFORM 1X8 - (50/BX 4BX/CA)

## (undated) DEVICE — GLOVE BIOGEL SZ 8 SURGICAL PF LTX - (50PR/BX 4BX/CA)

## (undated) DEVICE — GLOVE BIOGEL PI ORTHO SZ 6 SURGICAL PF LF (40PR/BX)

## (undated) DEVICE — SYRINGE SAFETY 3 ML 18 GA X 1 1/2 BLUNT LL (100/BX 8BX/CA)

## (undated) DEVICE — TOWEL STOP TIMEOUT SAFETY FLAG (40EA/CA)

## (undated) DEVICE — CATHETER IV 14 GA X 2 ---SURG.& SDS ONLY---(200EA/CA)

## (undated) DEVICE — SET SUCTION/IRRIGATION WITH DISPOSABLE TIP (6/CA )PART #0250-070-520 IS A SUB